# Patient Record
Sex: FEMALE | Race: WHITE | NOT HISPANIC OR LATINO | Employment: UNEMPLOYED
[De-identification: names, ages, dates, MRNs, and addresses within clinical notes are randomized per-mention and may not be internally consistent; named-entity substitution may affect disease eponyms.]

---

## 2017-01-26 ENCOUNTER — RECORDS - HEALTHEAST (OUTPATIENT)
Dept: ADMINISTRATIVE | Facility: OTHER | Age: 42
End: 2017-01-26

## 2017-03-23 ENCOUNTER — RECORDS - HEALTHEAST (OUTPATIENT)
Dept: ADMINISTRATIVE | Facility: OTHER | Age: 42
End: 2017-03-23

## 2017-04-17 ENCOUNTER — RECORDS - HEALTHEAST (OUTPATIENT)
Dept: ADMINISTRATIVE | Facility: OTHER | Age: 42
End: 2017-04-17

## 2017-05-03 ENCOUNTER — RECORDS - HEALTHEAST (OUTPATIENT)
Dept: ADMINISTRATIVE | Facility: OTHER | Age: 42
End: 2017-05-03

## 2017-05-06 ENCOUNTER — RECORDS - HEALTHEAST (OUTPATIENT)
Dept: ADMINISTRATIVE | Facility: OTHER | Age: 42
End: 2017-05-06

## 2017-05-24 ENCOUNTER — RECORDS - HEALTHEAST (OUTPATIENT)
Dept: ADMINISTRATIVE | Facility: OTHER | Age: 42
End: 2017-05-24

## 2017-05-24 DIAGNOSIS — Z53.9 ERRONEOUS ENCOUNTER--DISREGARD: Primary | ICD-10-CM

## 2017-07-28 ENCOUNTER — RECORDS - HEALTHEAST (OUTPATIENT)
Dept: ADMINISTRATIVE | Facility: OTHER | Age: 42
End: 2017-07-28

## 2017-08-07 ENCOUNTER — RECORDS - HEALTHEAST (OUTPATIENT)
Dept: ADMINISTRATIVE | Facility: OTHER | Age: 42
End: 2017-08-07

## 2017-09-15 ENCOUNTER — RECORDS - HEALTHEAST (OUTPATIENT)
Dept: ADMINISTRATIVE | Facility: OTHER | Age: 42
End: 2017-09-15

## 2017-09-15 ENCOUNTER — OFFICE VISIT - HEALTHEAST (OUTPATIENT)
Dept: ALLERGY | Facility: CLINIC | Age: 42
End: 2017-09-15

## 2017-09-15 DIAGNOSIS — J30.89 ALLERGIC RHINITIS DUE TO OTHER ALLERGEN: ICD-10-CM

## 2017-09-15 DIAGNOSIS — J45.30 MILD PERSISTENT ASTHMA WITHOUT COMPLICATION: ICD-10-CM

## 2017-09-15 ASSESSMENT — MIFFLIN-ST. JEOR: SCORE: 1664.2

## 2017-09-18 ENCOUNTER — COMMUNICATION - HEALTHEAST (OUTPATIENT)
Dept: LAB | Facility: CLINIC | Age: 42
End: 2017-09-18

## 2017-09-20 ENCOUNTER — COMMUNICATION - HEALTHEAST (OUTPATIENT)
Dept: ADMINISTRATIVE | Facility: CLINIC | Age: 42
End: 2017-09-20

## 2017-09-21 ENCOUNTER — AMBULATORY - HEALTHEAST (OUTPATIENT)
Dept: ALLERGY | Facility: CLINIC | Age: 42
End: 2017-09-21

## 2017-09-21 DIAGNOSIS — J45.30 MILD PERSISTENT ASTHMA WITHOUT COMPLICATION: ICD-10-CM

## 2017-09-25 ENCOUNTER — AMBULATORY - HEALTHEAST (OUTPATIENT)
Dept: ALLERGY | Facility: CLINIC | Age: 42
End: 2017-09-25

## 2017-09-25 DIAGNOSIS — J45.30 MILD PERSISTENT ASTHMA WITHOUT COMPLICATION: ICD-10-CM

## 2017-09-26 ENCOUNTER — AMBULATORY - HEALTHEAST (OUTPATIENT)
Dept: ALLERGY | Facility: CLINIC | Age: 42
End: 2017-09-26

## 2017-09-26 DIAGNOSIS — J45.30 MILD PERSISTENT ASTHMA WITHOUT COMPLICATION: ICD-10-CM

## 2017-09-28 ENCOUNTER — COMMUNICATION - HEALTHEAST (OUTPATIENT)
Dept: ALLERGY | Facility: CLINIC | Age: 42
End: 2017-09-28

## 2017-09-28 ENCOUNTER — AMBULATORY - HEALTHEAST (OUTPATIENT)
Dept: ALLERGY | Facility: CLINIC | Age: 42
End: 2017-09-28

## 2017-09-28 DIAGNOSIS — J30.9 ALLERGIC RHINITIS: ICD-10-CM

## 2017-09-28 DIAGNOSIS — J45.30 MILD PERSISTENT ASTHMA WITHOUT COMPLICATION: ICD-10-CM

## 2017-09-29 ENCOUNTER — AMBULATORY - HEALTHEAST (OUTPATIENT)
Dept: ALLERGY | Facility: CLINIC | Age: 42
End: 2017-09-29

## 2017-09-29 DIAGNOSIS — J30.1 ALLERGIC RHINITIS DUE TO POLLEN, UNSPECIFIED RHINITIS SEASONALITY: ICD-10-CM

## 2017-10-02 ENCOUNTER — RECORDS - HEALTHEAST (OUTPATIENT)
Dept: ADMINISTRATIVE | Facility: OTHER | Age: 42
End: 2017-10-02

## 2017-10-06 ENCOUNTER — AMBULATORY - HEALTHEAST (OUTPATIENT)
Dept: ALLERGY | Facility: CLINIC | Age: 42
End: 2017-10-06

## 2017-10-06 DIAGNOSIS — J30.1 ALLERGIC RHINITIS DUE TO POLLEN, UNSPECIFIED CHRONICITY, UNSPECIFIED SEASONALITY: ICD-10-CM

## 2017-10-13 ENCOUNTER — AMBULATORY - HEALTHEAST (OUTPATIENT)
Dept: ALLERGY | Facility: CLINIC | Age: 42
End: 2017-10-13

## 2017-10-13 DIAGNOSIS — J30.1 ALLERGIC RHINITIS DUE TO POLLEN, UNSPECIFIED CHRONICITY, UNSPECIFIED SEASONALITY: ICD-10-CM

## 2017-10-20 ENCOUNTER — AMBULATORY - HEALTHEAST (OUTPATIENT)
Dept: ALLERGY | Facility: CLINIC | Age: 42
End: 2017-10-20

## 2017-10-20 DIAGNOSIS — J30.1 ALLERGIC RHINITIS DUE TO POLLEN, UNSPECIFIED CHRONICITY, UNSPECIFIED SEASONALITY: ICD-10-CM

## 2017-11-03 ENCOUNTER — AMBULATORY - HEALTHEAST (OUTPATIENT)
Dept: ALLERGY | Facility: CLINIC | Age: 42
End: 2017-11-03

## 2017-11-03 DIAGNOSIS — J30.9 ALLERGIC RHINITIS, UNSPECIFIED CHRONICITY, UNSPECIFIED SEASONALITY, UNSPECIFIED TRIGGER: ICD-10-CM

## 2017-11-17 ENCOUNTER — AMBULATORY - HEALTHEAST (OUTPATIENT)
Dept: ALLERGY | Facility: CLINIC | Age: 42
End: 2017-11-17

## 2017-11-17 DIAGNOSIS — J30.9 ALLERGIC RHINITIS, UNSPECIFIED CHRONICITY, UNSPECIFIED SEASONALITY, UNSPECIFIED TRIGGER: ICD-10-CM

## 2017-11-27 ENCOUNTER — AMBULATORY - HEALTHEAST (OUTPATIENT)
Dept: ALLERGY | Facility: CLINIC | Age: 42
End: 2017-11-27

## 2017-11-27 DIAGNOSIS — J30.9 ALLERGIC RHINITIS, UNSPECIFIED CHRONICITY, UNSPECIFIED SEASONALITY, UNSPECIFIED TRIGGER: ICD-10-CM

## 2017-12-04 ENCOUNTER — RECORDS - HEALTHEAST (OUTPATIENT)
Dept: ADMINISTRATIVE | Facility: OTHER | Age: 42
End: 2017-12-04

## 2017-12-05 ENCOUNTER — OFFICE VISIT - HEALTHEAST (OUTPATIENT)
Dept: ALLERGY | Facility: CLINIC | Age: 42
End: 2017-12-05

## 2017-12-05 DIAGNOSIS — J30.9 ALLERGIC RHINITIS, UNSPECIFIED CHRONICITY, UNSPECIFIED SEASONALITY, UNSPECIFIED TRIGGER: ICD-10-CM

## 2017-12-11 ENCOUNTER — COMMUNICATION - HEALTHEAST (OUTPATIENT)
Dept: ALLERGY | Facility: CLINIC | Age: 42
End: 2017-12-11

## 2017-12-11 DIAGNOSIS — J45.30 MILD PERSISTENT ASTHMA WITHOUT COMPLICATION: ICD-10-CM

## 2017-12-19 ENCOUNTER — AMBULATORY - HEALTHEAST (OUTPATIENT)
Dept: ALLERGY | Facility: CLINIC | Age: 42
End: 2017-12-19

## 2017-12-19 DIAGNOSIS — J30.9 ALLERGIC RHINITIS, UNSPECIFIED CHRONICITY, UNSPECIFIED SEASONALITY, UNSPECIFIED TRIGGER: ICD-10-CM

## 2018-01-05 ENCOUNTER — AMBULATORY - HEALTHEAST (OUTPATIENT)
Dept: ALLERGY | Facility: CLINIC | Age: 43
End: 2018-01-05

## 2018-01-05 DIAGNOSIS — J30.2 CHRONIC SEASONAL ALLERGIC RHINITIS DUE TO OTHER ALLERGEN: ICD-10-CM

## 2018-01-20 ENCOUNTER — RECORDS - HEALTHEAST (OUTPATIENT)
Dept: ADMINISTRATIVE | Facility: OTHER | Age: 43
End: 2018-01-20

## 2018-01-25 ENCOUNTER — RECORDS - HEALTHEAST (OUTPATIENT)
Dept: ADMINISTRATIVE | Facility: OTHER | Age: 43
End: 2018-01-25

## 2018-01-29 ENCOUNTER — AMBULATORY - HEALTHEAST (OUTPATIENT)
Dept: ALLERGY | Facility: CLINIC | Age: 43
End: 2018-01-29

## 2018-01-29 DIAGNOSIS — J30.2 CHRONIC SEASONAL ALLERGIC RHINITIS DUE TO OTHER ALLERGEN: ICD-10-CM

## 2018-02-02 ENCOUNTER — AMBULATORY - HEALTHEAST (OUTPATIENT)
Dept: ALLERGY | Facility: CLINIC | Age: 43
End: 2018-02-02

## 2018-02-02 DIAGNOSIS — J30.2 CHRONIC SEASONAL ALLERGIC RHINITIS DUE TO OTHER ALLERGEN: ICD-10-CM

## 2018-02-05 ENCOUNTER — AMBULATORY - HEALTHEAST (OUTPATIENT)
Dept: ALLERGY | Facility: CLINIC | Age: 43
End: 2018-02-05

## 2018-02-05 DIAGNOSIS — J30.2 CHRONIC SEASONAL ALLERGIC RHINITIS DUE TO OTHER ALLERGEN: ICD-10-CM

## 2018-02-09 ENCOUNTER — AMBULATORY - HEALTHEAST (OUTPATIENT)
Dept: ALLERGY | Facility: CLINIC | Age: 43
End: 2018-02-09

## 2018-02-09 DIAGNOSIS — J30.2 CHRONIC SEASONAL ALLERGIC RHINITIS DUE TO OTHER ALLERGEN: ICD-10-CM

## 2018-02-13 ENCOUNTER — AMBULATORY - HEALTHEAST (OUTPATIENT)
Dept: ALLERGY | Facility: CLINIC | Age: 43
End: 2018-02-13

## 2018-02-13 DIAGNOSIS — J30.2 CHRONIC SEASONAL ALLERGIC RHINITIS DUE TO OTHER ALLERGEN: ICD-10-CM

## 2018-02-20 ENCOUNTER — OFFICE VISIT - HEALTHEAST (OUTPATIENT)
Dept: ALLERGY | Facility: CLINIC | Age: 43
End: 2018-02-20

## 2018-02-20 ENCOUNTER — AMBULATORY - HEALTHEAST (OUTPATIENT)
Dept: ALLERGY | Facility: CLINIC | Age: 43
End: 2018-02-20

## 2018-02-20 DIAGNOSIS — J30.2 CHRONIC SEASONAL ALLERGIC RHINITIS DUE TO OTHER ALLERGEN: ICD-10-CM

## 2018-02-20 DIAGNOSIS — J45.30 MILD PERSISTENT ASTHMA WITHOUT COMPLICATION: ICD-10-CM

## 2018-03-05 ENCOUNTER — AMBULATORY - HEALTHEAST (OUTPATIENT)
Dept: ALLERGY | Facility: CLINIC | Age: 43
End: 2018-03-05

## 2018-03-05 DIAGNOSIS — J30.2 CHRONIC SEASONAL ALLERGIC RHINITIS DUE TO OTHER ALLERGEN: ICD-10-CM

## 2018-03-12 ENCOUNTER — AMBULATORY - HEALTHEAST (OUTPATIENT)
Dept: ALLERGY | Facility: CLINIC | Age: 43
End: 2018-03-12

## 2018-03-12 DIAGNOSIS — J30.2 CHRONIC SEASONAL ALLERGIC RHINITIS DUE TO OTHER ALLERGEN: ICD-10-CM

## 2018-03-20 ENCOUNTER — AMBULATORY - HEALTHEAST (OUTPATIENT)
Dept: ALLERGY | Facility: CLINIC | Age: 43
End: 2018-03-20

## 2018-03-20 DIAGNOSIS — J30.2 CHRONIC SEASONAL ALLERGIC RHINITIS DUE TO OTHER ALLERGEN: ICD-10-CM

## 2018-04-03 ENCOUNTER — AMBULATORY - HEALTHEAST (OUTPATIENT)
Dept: ALLERGY | Facility: CLINIC | Age: 43
End: 2018-04-03

## 2018-04-03 DIAGNOSIS — J30.2 CHRONIC SEASONAL ALLERGIC RHINITIS DUE TO OTHER ALLERGEN: ICD-10-CM

## 2018-04-09 ENCOUNTER — AMBULATORY - HEALTHEAST (OUTPATIENT)
Dept: ALLERGY | Facility: CLINIC | Age: 43
End: 2018-04-09

## 2018-04-09 DIAGNOSIS — J30.2 CHRONIC SEASONAL ALLERGIC RHINITIS DUE TO OTHER ALLERGEN: ICD-10-CM

## 2018-04-20 ENCOUNTER — OFFICE VISIT - HEALTHEAST (OUTPATIENT)
Dept: ALLERGY | Facility: CLINIC | Age: 43
End: 2018-04-20

## 2018-04-20 DIAGNOSIS — T50.905A ADVERSE EFFECT OF DRUG, INITIAL ENCOUNTER: ICD-10-CM

## 2018-04-20 DIAGNOSIS — J30.2 CHRONIC SEASONAL ALLERGIC RHINITIS DUE TO OTHER ALLERGEN: ICD-10-CM

## 2018-04-20 DIAGNOSIS — J38.3 VOCAL CORD DYSFUNCTION: ICD-10-CM

## 2018-04-20 DIAGNOSIS — J45.20 MILD INTERMITTENT ASTHMA WITHOUT COMPLICATION: ICD-10-CM

## 2018-04-20 ASSESSMENT — MIFFLIN-ST. JEOR: SCORE: 1652.87

## 2018-04-30 ENCOUNTER — AMBULATORY - HEALTHEAST (OUTPATIENT)
Dept: ALLERGY | Facility: CLINIC | Age: 43
End: 2018-04-30

## 2018-04-30 DIAGNOSIS — J30.2 CHRONIC SEASONAL ALLERGIC RHINITIS DUE TO OTHER ALLERGEN: ICD-10-CM

## 2018-05-02 ENCOUNTER — COMMUNICATION - HEALTHEAST (OUTPATIENT)
Dept: ADMINISTRATIVE | Facility: CLINIC | Age: 43
End: 2018-05-02

## 2018-05-11 ENCOUNTER — AMBULATORY - HEALTHEAST (OUTPATIENT)
Dept: ALLERGY | Facility: CLINIC | Age: 43
End: 2018-05-11

## 2018-05-11 DIAGNOSIS — J30.2 CHRONIC SEASONAL ALLERGIC RHINITIS DUE TO OTHER ALLERGEN: ICD-10-CM

## 2018-05-14 ENCOUNTER — AMBULATORY - HEALTHEAST (OUTPATIENT)
Dept: ALLERGY | Facility: CLINIC | Age: 43
End: 2018-05-14

## 2018-05-14 DIAGNOSIS — J30.2 CHRONIC SEASONAL ALLERGIC RHINITIS DUE TO OTHER ALLERGEN: ICD-10-CM

## 2018-05-15 ENCOUNTER — RECORDS - HEALTHEAST (OUTPATIENT)
Dept: ADMINISTRATIVE | Facility: OTHER | Age: 43
End: 2018-05-15

## 2018-05-15 ENCOUNTER — TRANSFERRED RECORDS (OUTPATIENT)
Dept: HEALTH INFORMATION MANAGEMENT | Facility: CLINIC | Age: 43
End: 2018-05-15

## 2018-05-21 ENCOUNTER — AMBULATORY - HEALTHEAST (OUTPATIENT)
Dept: ALLERGY | Facility: CLINIC | Age: 43
End: 2018-05-21

## 2018-05-21 DIAGNOSIS — J30.2 CHRONIC SEASONAL ALLERGIC RHINITIS DUE TO OTHER ALLERGEN: ICD-10-CM

## 2018-05-31 ENCOUNTER — AMBULATORY - HEALTHEAST (OUTPATIENT)
Dept: ALLERGY | Facility: CLINIC | Age: 43
End: 2018-05-31

## 2018-05-31 ENCOUNTER — COMMUNICATION - HEALTHEAST (OUTPATIENT)
Dept: ADMINISTRATIVE | Facility: CLINIC | Age: 43
End: 2018-05-31

## 2018-05-31 DIAGNOSIS — J45.30 MILD PERSISTENT ASTHMA WITHOUT COMPLICATION: ICD-10-CM

## 2018-06-04 ENCOUNTER — AMBULATORY - HEALTHEAST (OUTPATIENT)
Dept: ALLERGY | Facility: CLINIC | Age: 43
End: 2018-06-04

## 2018-06-04 DIAGNOSIS — J30.2 CHRONIC SEASONAL ALLERGIC RHINITIS DUE TO OTHER ALLERGEN: ICD-10-CM

## 2018-06-18 ENCOUNTER — AMBULATORY - HEALTHEAST (OUTPATIENT)
Dept: ALLERGY | Facility: CLINIC | Age: 43
End: 2018-06-18

## 2018-06-25 ENCOUNTER — COMMUNICATION - HEALTHEAST (OUTPATIENT)
Dept: TELEHEALTH | Facility: CLINIC | Age: 43
End: 2018-06-25

## 2018-06-25 ENCOUNTER — OFFICE VISIT - HEALTHEAST (OUTPATIENT)
Dept: ALLERGY | Facility: CLINIC | Age: 43
End: 2018-06-25

## 2018-06-25 DIAGNOSIS — J30.2 CHRONIC SEASONAL ALLERGIC RHINITIS DUE TO OTHER ALLERGEN: ICD-10-CM

## 2018-06-25 DIAGNOSIS — J45.30 MILD PERSISTENT ASTHMA WITHOUT COMPLICATION: ICD-10-CM

## 2018-06-25 ASSESSMENT — MIFFLIN-ST. JEOR: SCORE: 1648.33

## 2018-07-30 ENCOUNTER — AMBULATORY - HEALTHEAST (OUTPATIENT)
Dept: ALLERGY | Facility: CLINIC | Age: 43
End: 2018-07-30

## 2018-07-30 DIAGNOSIS — J30.2 CHRONIC SEASONAL ALLERGIC RHINITIS DUE TO OTHER ALLERGEN: ICD-10-CM

## 2018-08-13 ENCOUNTER — AMBULATORY - HEALTHEAST (OUTPATIENT)
Dept: ALLERGY | Facility: CLINIC | Age: 43
End: 2018-08-13

## 2018-08-13 DIAGNOSIS — J30.2 CHRONIC SEASONAL ALLERGIC RHINITIS DUE TO OTHER ALLERGEN: ICD-10-CM

## 2018-08-23 ENCOUNTER — TRANSFERRED RECORDS (OUTPATIENT)
Dept: HEALTH INFORMATION MANAGEMENT | Facility: CLINIC | Age: 43
End: 2018-08-23
Payer: COMMERCIAL

## 2018-08-24 ENCOUNTER — COMMUNICATION - HEALTHEAST (OUTPATIENT)
Dept: ALLERGY | Facility: CLINIC | Age: 43
End: 2018-08-24

## 2018-08-24 DIAGNOSIS — J45.30 MILD PERSISTENT ASTHMA WITHOUT COMPLICATION: ICD-10-CM

## 2018-09-14 ENCOUNTER — AMBULATORY - HEALTHEAST (OUTPATIENT)
Dept: ALLERGY | Facility: CLINIC | Age: 43
End: 2018-09-14

## 2018-09-14 DIAGNOSIS — J30.2 CHRONIC SEASONAL ALLERGIC RHINITIS DUE TO OTHER ALLERGEN: ICD-10-CM

## 2018-09-24 ENCOUNTER — AMBULATORY - HEALTHEAST (OUTPATIENT)
Dept: ALLERGY | Facility: CLINIC | Age: 43
End: 2018-09-24

## 2018-09-24 DIAGNOSIS — J30.2 CHRONIC SEASONAL ALLERGIC RHINITIS DUE TO OTHER ALLERGEN: ICD-10-CM

## 2018-10-05 ENCOUNTER — AMBULATORY - HEALTHEAST (OUTPATIENT)
Dept: ALLERGY | Facility: CLINIC | Age: 43
End: 2018-10-05

## 2018-10-05 DIAGNOSIS — J30.89 SEASONAL ALLERGIC RHINITIS DUE TO OTHER ALLERGIC TRIGGER: ICD-10-CM

## 2018-10-09 ENCOUNTER — AMBULATORY - HEALTHEAST (OUTPATIENT)
Dept: ALLERGY | Facility: CLINIC | Age: 43
End: 2018-10-09

## 2018-10-09 DIAGNOSIS — J30.89 SEASONAL ALLERGIC RHINITIS DUE TO OTHER ALLERGIC TRIGGER: ICD-10-CM

## 2018-11-05 ENCOUNTER — COMMUNICATION - HEALTHEAST (OUTPATIENT)
Dept: ALLERGY | Facility: CLINIC | Age: 43
End: 2018-11-05

## 2018-11-05 DIAGNOSIS — J45.30 MILD PERSISTENT ASTHMA WITHOUT COMPLICATION: ICD-10-CM

## 2018-11-16 ENCOUNTER — AMBULATORY - HEALTHEAST (OUTPATIENT)
Dept: ALLERGY | Facility: CLINIC | Age: 43
End: 2018-11-16

## 2018-11-16 DIAGNOSIS — J30.89 SEASONAL ALLERGIC RHINITIS DUE TO OTHER ALLERGIC TRIGGER: ICD-10-CM

## 2018-11-26 ENCOUNTER — AMBULATORY - HEALTHEAST (OUTPATIENT)
Dept: ALLERGY | Facility: CLINIC | Age: 43
End: 2018-11-26

## 2018-11-26 DIAGNOSIS — J30.89 SEASONAL ALLERGIC RHINITIS DUE TO OTHER ALLERGIC TRIGGER: ICD-10-CM

## 2018-12-21 ENCOUNTER — COMMUNICATION - HEALTHEAST (OUTPATIENT)
Dept: ALLERGY | Facility: CLINIC | Age: 43
End: 2018-12-21

## 2018-12-21 ENCOUNTER — AMBULATORY - HEALTHEAST (OUTPATIENT)
Dept: ALLERGY | Facility: CLINIC | Age: 43
End: 2018-12-21

## 2018-12-21 DIAGNOSIS — J30.89 SEASONAL ALLERGIC RHINITIS DUE TO OTHER ALLERGIC TRIGGER: ICD-10-CM

## 2018-12-28 ENCOUNTER — AMBULATORY - HEALTHEAST (OUTPATIENT)
Dept: ALLERGY | Facility: CLINIC | Age: 43
End: 2018-12-28

## 2018-12-28 DIAGNOSIS — J30.89 NON-SEASONAL ALLERGIC RHINITIS DUE TO FUNGAL SPORES: ICD-10-CM

## 2019-01-25 ENCOUNTER — AMBULATORY - HEALTHEAST (OUTPATIENT)
Dept: ALLERGY | Facility: CLINIC | Age: 44
End: 2019-01-25

## 2019-01-25 DIAGNOSIS — J30.89 NON-SEASONAL ALLERGIC RHINITIS DUE TO FUNGAL SPORES: ICD-10-CM

## 2019-01-29 ENCOUNTER — TRANSFERRED RECORDS (OUTPATIENT)
Dept: HEALTH INFORMATION MANAGEMENT | Facility: CLINIC | Age: 44
End: 2019-01-29
Payer: COMMERCIAL

## 2019-02-26 ENCOUNTER — AMBULATORY - HEALTHEAST (OUTPATIENT)
Dept: ALLERGY | Facility: CLINIC | Age: 44
End: 2019-02-26

## 2019-02-26 DIAGNOSIS — J30.89 NON-SEASONAL ALLERGIC RHINITIS DUE TO FUNGAL SPORES: ICD-10-CM

## 2019-03-25 ENCOUNTER — AMBULATORY - HEALTHEAST (OUTPATIENT)
Dept: ALLERGY | Facility: CLINIC | Age: 44
End: 2019-03-25

## 2019-03-25 DIAGNOSIS — J30.2 SEASONAL ALLERGIC RHINITIS DUE TO FUNGAL SPORES: ICD-10-CM

## 2019-04-01 ENCOUNTER — AMBULATORY - HEALTHEAST (OUTPATIENT)
Dept: ALLERGY | Facility: CLINIC | Age: 44
End: 2019-04-01

## 2019-04-01 DIAGNOSIS — J30.2 SEASONAL ALLERGIC RHINITIS DUE TO FUNGAL SPORES: ICD-10-CM

## 2019-04-15 ENCOUNTER — OFFICE VISIT - HEALTHEAST (OUTPATIENT)
Dept: ALLERGY | Facility: CLINIC | Age: 44
End: 2019-04-15

## 2019-04-15 DIAGNOSIS — J45.30 MILD PERSISTENT ASTHMA WITHOUT COMPLICATION: ICD-10-CM

## 2019-04-15 DIAGNOSIS — J30.2 SEASONAL ALLERGIC RHINITIS DUE TO FUNGAL SPORES: ICD-10-CM

## 2019-04-15 DIAGNOSIS — K90.49 FOOD INTOLERANCE IN ADULT: ICD-10-CM

## 2019-04-15 ASSESSMENT — MIFFLIN-ST. JEOR: SCORE: 1684.62

## 2019-04-23 ENCOUNTER — AMBULATORY - HEALTHEAST (OUTPATIENT)
Dept: ALLERGY | Facility: CLINIC | Age: 44
End: 2019-04-23

## 2019-04-23 DIAGNOSIS — J30.2 SEASONAL ALLERGIC RHINITIS DUE TO FUNGAL SPORES: ICD-10-CM

## 2019-04-30 ENCOUNTER — AMBULATORY - HEALTHEAST (OUTPATIENT)
Dept: ALLERGY | Facility: CLINIC | Age: 44
End: 2019-04-30

## 2019-04-30 DIAGNOSIS — J30.2 SEASONAL ALLERGIC RHINITIS DUE TO FUNGAL SPORES: ICD-10-CM

## 2019-05-03 ENCOUNTER — AMBULATORY - HEALTHEAST (OUTPATIENT)
Dept: ALLERGY | Facility: CLINIC | Age: 44
End: 2019-05-03

## 2019-05-03 DIAGNOSIS — J30.2 SEASONAL ALLERGIC RHINITIS DUE TO FUNGAL SPORES: ICD-10-CM

## 2019-05-20 ENCOUNTER — AMBULATORY - HEALTHEAST (OUTPATIENT)
Dept: ALLERGY | Facility: CLINIC | Age: 44
End: 2019-05-20

## 2019-05-20 DIAGNOSIS — J30.2 SEASONAL ALLERGIC RHINITIS DUE TO FUNGAL SPORES: ICD-10-CM

## 2019-06-07 ENCOUNTER — AMBULATORY - HEALTHEAST (OUTPATIENT)
Dept: ALLERGY | Facility: CLINIC | Age: 44
End: 2019-06-07

## 2019-06-07 DIAGNOSIS — Z51.6 DESENSITIZATION TO ALLERGENS: ICD-10-CM

## 2019-06-24 ENCOUNTER — RECORDS - HEALTHEAST (OUTPATIENT)
Dept: ADMINISTRATIVE | Facility: OTHER | Age: 44
End: 2019-06-24

## 2019-06-24 LAB
PATH REPORT.COMMENTS IMP SPEC: NORMAL
PATH REPORT.FINAL DX SPEC: NORMAL
PATH REPORT.RELEVANT HX SPEC: NORMAL
RESULT FLAG (HE HISTORICAL CONVERSION): NORMAL

## 2019-06-28 ENCOUNTER — AMBULATORY - HEALTHEAST (OUTPATIENT)
Dept: ALLERGY | Facility: CLINIC | Age: 44
End: 2019-06-28

## 2019-06-28 DIAGNOSIS — Z51.6 DESENSITIZATION TO ALLERGENS: ICD-10-CM

## 2019-07-15 ENCOUNTER — AMBULATORY - HEALTHEAST (OUTPATIENT)
Dept: ALLERGY | Facility: CLINIC | Age: 44
End: 2019-07-15

## 2019-07-15 DIAGNOSIS — Z51.6 DESENSITIZATION TO ALLERGENS: ICD-10-CM

## 2019-08-05 ENCOUNTER — AMBULATORY - HEALTHEAST (OUTPATIENT)
Dept: ALLERGY | Facility: CLINIC | Age: 44
End: 2019-08-05

## 2019-08-05 DIAGNOSIS — J30.2 SEASONAL ALLERGIC RHINITIS DUE TO FUNGAL SPORES: ICD-10-CM

## 2019-08-26 ENCOUNTER — AMBULATORY - HEALTHEAST (OUTPATIENT)
Dept: ALLERGY | Facility: CLINIC | Age: 44
End: 2019-08-26

## 2019-08-26 DIAGNOSIS — J30.2 SEASONAL ALLERGIC RHINITIS DUE TO FUNGAL SPORES: ICD-10-CM

## 2019-09-17 ENCOUNTER — AMBULATORY - HEALTHEAST (OUTPATIENT)
Dept: ALLERGY | Facility: CLINIC | Age: 44
End: 2019-09-17

## 2019-09-17 DIAGNOSIS — J30.2 SEASONAL ALLERGIC RHINITIS DUE TO FUNGAL SPORES: ICD-10-CM

## 2019-10-08 ENCOUNTER — AMBULATORY - HEALTHEAST (OUTPATIENT)
Dept: ALLERGY | Facility: CLINIC | Age: 44
End: 2019-10-08

## 2019-10-08 ENCOUNTER — COMMUNICATION - HEALTHEAST (OUTPATIENT)
Dept: ALLERGY | Facility: CLINIC | Age: 44
End: 2019-10-08

## 2019-10-08 DIAGNOSIS — J30.2 SEASONAL ALLERGIC RHINITIS DUE TO FUNGAL SPORES: ICD-10-CM

## 2019-10-10 ENCOUNTER — AMBULATORY - HEALTHEAST (OUTPATIENT)
Dept: ALLERGY | Facility: CLINIC | Age: 44
End: 2019-10-10

## 2019-10-10 DIAGNOSIS — J30.2 SEASONAL ALLERGIC RHINITIS DUE TO FUNGAL SPORES: ICD-10-CM

## 2019-10-22 ENCOUNTER — OFFICE VISIT - HEALTHEAST (OUTPATIENT)
Dept: ALLERGY | Facility: CLINIC | Age: 44
End: 2019-10-22

## 2019-10-22 DIAGNOSIS — J30.2 SEASONAL ALLERGIC RHINITIS DUE TO FUNGAL SPORES: ICD-10-CM

## 2019-10-22 ASSESSMENT — MIFFLIN-ST. JEOR: SCORE: 1684.62

## 2019-10-30 ENCOUNTER — TRANSFERRED RECORDS (OUTPATIENT)
Dept: HEALTH INFORMATION MANAGEMENT | Facility: CLINIC | Age: 44
End: 2019-10-30

## 2019-11-11 ENCOUNTER — COMMUNICATION - HEALTHEAST (OUTPATIENT)
Dept: ALLERGY | Facility: CLINIC | Age: 44
End: 2019-11-11

## 2019-11-11 DIAGNOSIS — J45.30 MILD PERSISTENT ASTHMA WITHOUT COMPLICATION: ICD-10-CM

## 2019-11-15 ENCOUNTER — AMBULATORY - HEALTHEAST (OUTPATIENT)
Dept: ALLERGY | Facility: CLINIC | Age: 44
End: 2019-11-15

## 2019-11-15 DIAGNOSIS — J30.2 SEASONAL ALLERGIC RHINITIS DUE TO FUNGAL SPORES: ICD-10-CM

## 2019-12-03 ENCOUNTER — TELEPHONE (OUTPATIENT)
Dept: GASTROENTEROLOGY | Facility: CLINIC | Age: 44
End: 2019-12-03

## 2019-12-03 DIAGNOSIS — K75.81 NASH (NONALCOHOLIC STEATOHEPATITIS): ICD-10-CM

## 2019-12-03 DIAGNOSIS — K70.31 ALCOHOLIC CIRRHOSIS OF LIVER WITH ASCITES (H): Primary | ICD-10-CM

## 2019-12-06 NOTE — TELEPHONE ENCOUNTER
RECORDS RECEIVED FROM: External   DATE RECEIVED: 12.31.19   NOTES STATUS DETAILS   OFFICE NOTE from referring provider N/A    OFFICE NOTES from other specialists [facility] 3.19.18 Dr. Luna  10.9.15 Dr. Marsh   DISCHARGE SUMMARY from hospital N/A    MEDICATION LIST [facility] Every[facility]    LIVER BIOSPY (IF APPLICABLE)      PATHOLOGY REPORTS  N/A    IMAGING     ENDOSCOPY (IF AVAILABLE) N/A    COLONOSCOPY (IF AVAILABLE) [facility] 6.27.19   ULTRASOUND LIVER N/A    CT OF ABDOMEN Care Everywhere 5.8.19 [place]  5.3.18 [facility]   MRI OF LIVER [facility] Everywhere 1.9.17, 03.16.2018 [facility]   FIBROSCAN, US ELASTOGRAPHY, FIBROSIS SCAN, MR ELASTOGRAPHY [facility] Every[facility] 12.19.16 [facility] US elastrography   LABS     HEPATIC PANEL (LIVER PANEL) [facility] 5.2.19   BASIC METABOLIC PANEL [facility] 8.8.19   COMPLETE METABOLIC PANEL N/A    COMPLETE BLOOD COUNT (CBC) [facility] 5.1.19   INTERNATIONAL NORMALIZED RATIO (INR) N/A    HEPATITIS C ANTIBODY N/A    HEPATITIS C VIRAL LOAD/PCR N/A    HEPATITIS C GENOTYPE N/A    HEPATITIS B SURFACE ANTIGEN N/A    HEPATITIS B SURFACE ANTIBODY N/A    HEPATITIS B DNA QUANT LEVEL N/A    HEPATITIS B CORE ANTIBODY N/A      12.6.19  11:44 AM  Requested imaging from [facility] [place] and [facility].    Action    Action Taken 12.13.2019 Still MRI from [facility] 01.09.2017 Called  garo at [phone] spoke to Savi tyson states she will push over the images. Images received.

## 2019-12-13 ENCOUNTER — AMBULATORY - HEALTHEAST (OUTPATIENT)
Dept: ALLERGY | Facility: CLINIC | Age: 44
End: 2019-12-13

## 2019-12-13 DIAGNOSIS — J30.2 SEASONAL ALLERGIC RHINITIS DUE TO FUNGAL SPORES: ICD-10-CM

## 2019-12-27 ENCOUNTER — TELEPHONE (OUTPATIENT)
Dept: GASTROENTEROLOGY | Facility: CLINIC | Age: 44
End: 2019-12-27

## 2019-12-27 NOTE — TELEPHONE ENCOUNTER
Tried calling pt to confirm appt. Pt does not want to get voicemail messages therefore no voicemail was left.  Monica Buitrago CMA  12/27/2019 11:31 AM

## 2019-12-30 ENCOUNTER — AMBULATORY - HEALTHEAST (OUTPATIENT)
Dept: ALLERGY | Facility: CLINIC | Age: 44
End: 2019-12-30

## 2019-12-30 DIAGNOSIS — J30.2 SEASONAL ALLERGIC RHINITIS DUE TO FUNGAL SPORES: ICD-10-CM

## 2019-12-31 ENCOUNTER — TRANSFERRED RECORDS (OUTPATIENT)
Dept: HEALTH INFORMATION MANAGEMENT | Facility: CLINIC | Age: 44
End: 2019-12-31

## 2019-12-31 ENCOUNTER — PRE VISIT (OUTPATIENT)
Dept: GASTROENTEROLOGY | Facility: CLINIC | Age: 44
End: 2019-12-31

## 2020-01-30 DIAGNOSIS — K75.81 NASH (NONALCOHOLIC STEATOHEPATITIS): Primary | ICD-10-CM

## 2020-02-03 ENCOUNTER — AMBULATORY - HEALTHEAST (OUTPATIENT)
Dept: ALLERGY | Facility: CLINIC | Age: 45
End: 2020-02-03

## 2020-02-03 DIAGNOSIS — J30.2 SEASONAL ALLERGIC RHINITIS DUE TO FUNGAL SPORES: ICD-10-CM

## 2020-02-10 ENCOUNTER — AMBULATORY - HEALTHEAST (OUTPATIENT)
Dept: ALLERGY | Facility: CLINIC | Age: 45
End: 2020-02-10

## 2020-02-10 DIAGNOSIS — J30.2 SEASONAL ALLERGIC RHINITIS DUE TO FUNGAL SPORES: ICD-10-CM

## 2020-02-18 ENCOUNTER — AMBULATORY - HEALTHEAST (OUTPATIENT)
Dept: ALLERGY | Facility: CLINIC | Age: 45
End: 2020-02-18

## 2020-02-18 DIAGNOSIS — J30.2 SEASONAL ALLERGIC RHINITIS DUE TO FUNGAL SPORES: ICD-10-CM

## 2020-03-11 ENCOUNTER — TRANSFERRED RECORDS (OUTPATIENT)
Dept: HEALTH INFORMATION MANAGEMENT | Facility: CLINIC | Age: 45
End: 2020-03-11
Payer: COMMERCIAL

## 2020-03-16 ENCOUNTER — COMMUNICATION - HEALTHEAST (OUTPATIENT)
Dept: ALLERGY | Facility: CLINIC | Age: 45
End: 2020-03-16

## 2020-03-26 ENCOUNTER — TELEPHONE (OUTPATIENT)
Dept: GASTROENTEROLOGY | Facility: CLINIC | Age: 45
End: 2020-03-26

## 2020-03-26 NOTE — TELEPHONE ENCOUNTER
[facility] Call Center    Phone Message    May a detailed message be left on voicemail: yes     Reason for Call: Other: pt calling to see if she can keep the appt in april or not. Please call the pt back as soon as possible.      Action Taken: Message routed to:  [facility] Hepatology    Travel Screening: Not Applicable                                                                       166.8

## 2020-03-30 NOTE — TELEPHONE ENCOUNTER
[facility] Call Center    Phone Message    May a detailed message be left on voicemail: yes     Reason for Call: Other: Radiology scheduler calling stating that Radiologist cancelled 4/1 ultrasound abdomen for 4/1 prior to telephone visit with Dr. Cristobal. Caller questioning if this was a miscommunication. Please advise.     Action Taken: Message routed to:  [facility] HEPATOLOGY    Travel Screening: Not Applicable

## 2020-03-31 ENCOUNTER — TELEPHONE (OUTPATIENT)
Dept: GASTROENTEROLOGY | Facility: CLINIC | Age: 45
End: 2020-03-31

## 2020-03-31 NOTE — TELEPHONE ENCOUNTER
Called patient in an attempt to change visit from telephone visit to a video visit. Patient gave verbal consent for a video visit. She downloaded the [email or web address] Touchpoint Application on her phone. Would like text message link sent to [phone].       Alethea Williamson CMA    3/31/2020 4:07 PM

## 2020-03-31 NOTE — TELEPHONE ENCOUNTER
Ultrasound will not be rescheduled.   Radiologist approved ultrasound scheduled 4/1.      Samantha OLMEDO LPN

## 2020-04-01 ENCOUNTER — ANCILLARY PROCEDURE (OUTPATIENT)
Dept: ULTRASOUND IMAGING | Facility: CLINIC | Age: 45
End: 2020-04-01
Attending: INTERNAL MEDICINE
Payer: COMMERCIAL

## 2020-04-01 ENCOUNTER — OFFICE VISIT (OUTPATIENT)
Dept: GASTROENTEROLOGY | Facility: CLINIC | Age: 45
End: 2020-04-01
Attending: INTERNAL MEDICINE
Payer: COMMERCIAL

## 2020-04-01 VITALS — HEART RATE: 66 BPM | TEMPERATURE: 98.2 F | SYSTOLIC BLOOD PRESSURE: 113 MMHG | DIASTOLIC BLOOD PRESSURE: 79 MMHG

## 2020-04-01 DIAGNOSIS — K75.81 NASH (NONALCOHOLIC STEATOHEPATITIS): ICD-10-CM

## 2020-04-01 DIAGNOSIS — K75.81 NASH (NONALCOHOLIC STEATOHEPATITIS): Primary | ICD-10-CM

## 2020-04-01 LAB
ALBUMIN SERPL-MCNC: 3.9 G/DL (ref 3.4–5)
ALP SERPL-CCNC: 82 U/L (ref 40–150)
ALT SERPL W P-5'-P-CCNC: 36 U/L (ref 0–50)
ANION GAP SERPL CALCULATED.3IONS-SCNC: 7 MMOL/L (ref 3–14)
AST SERPL W P-5'-P-CCNC: 24 U/L (ref 0–45)
BILIRUB DIRECT SERPL-MCNC: 0.1 MG/DL (ref 0–0.2)
BILIRUB SERPL-MCNC: 0.6 MG/DL (ref 0.2–1.3)
BUN SERPL-MCNC: 11 MG/DL (ref 7–30)
CALCIUM SERPL-MCNC: 9.2 MG/DL (ref 8.5–10.1)
CHLORIDE SERPL-SCNC: 105 MMOL/L (ref 94–109)
CO2 SERPL-SCNC: 24 MMOL/L (ref 20–32)
CREAT SERPL-MCNC: 0.73 MG/DL (ref 0.52–1.04)
ERYTHROCYTE [DISTWIDTH] IN BLOOD BY AUTOMATED COUNT: 13.4 % (ref 10–15)
GFR SERPL CREATININE-BSD FRML MDRD: >90 ML/MIN/{1.73_M2}
GLUCOSE SERPL-MCNC: 119 MG/DL (ref 70–99)
HCT VFR BLD AUTO: 47.3 % (ref 35–47)
HGB BLD-MCNC: 15.8 G/DL (ref 11.7–15.7)
INR PPP: 0.93 (ref 0.86–1.14)
MCH RBC QN AUTO: 28.3 PG (ref 26.5–33)
MCHC RBC AUTO-ENTMCNC: 33.4 G/DL (ref 31.5–36.5)
MCV RBC AUTO: 85 FL (ref 78–100)
PLATELET # BLD AUTO: 402 10E9/L (ref 150–450)
POTASSIUM SERPL-SCNC: 4.2 MMOL/L (ref 3.4–5.3)
PROT SERPL-MCNC: 8.1 G/DL (ref 6.8–8.8)
RBC # BLD AUTO: 5.59 10E12/L (ref 3.8–5.2)
SODIUM SERPL-SCNC: 136 MMOL/L (ref 133–144)
WBC # BLD AUTO: 9.3 10E9/L (ref 4–11)

## 2020-04-01 PROCEDURE — G0463 HOSPITAL OUTPT CLINIC VISIT: HCPCS | Mod: ZF

## 2020-04-01 PROCEDURE — 80076 HEPATIC FUNCTION PANEL: CPT | Performed by: INTERNAL MEDICINE

## 2020-04-01 PROCEDURE — 85610 PROTHROMBIN TIME: CPT | Performed by: INTERNAL MEDICINE

## 2020-04-01 PROCEDURE — 80048 BASIC METABOLIC PNL TOTAL CA: CPT | Performed by: INTERNAL MEDICINE

## 2020-04-01 PROCEDURE — 36415 COLL VENOUS BLD VENIPUNCTURE: CPT | Performed by: INTERNAL MEDICINE

## 2020-04-01 PROCEDURE — 85027 COMPLETE CBC AUTOMATED: CPT | Performed by: INTERNAL MEDICINE

## 2020-04-01 RX ORDER — INFLIXIMAB 100 MG/10ML
10 INJECTION, POWDER, LYOPHILIZED, FOR SOLUTION INTRAVENOUS
COMMUNITY
Start: 2020-02-13 | End: 2022-03-03

## 2020-04-01 RX ORDER — EPINEPHRINE 0.3 MG/.3ML
INJECTION SUBCUTANEOUS
COMMUNITY
Start: 2019-05-21 | End: 2022-03-10

## 2020-04-01 RX ORDER — ATENOLOL 50 MG/1
50 TABLET ORAL DAILY
COMMUNITY
Start: 2020-02-14

## 2020-04-01 RX ORDER — SPIRONOLACTONE AND HYDROCHLOROTHIAZIDE 25; 25 MG/1; MG/1
1 TABLET ORAL DAILY
COMMUNITY
Start: 2020-03-11

## 2020-04-01 RX ORDER — FLUCONAZOLE 100 MG/1
TABLET ORAL PRN
COMMUNITY
Start: 2020-03-24 | End: 2022-03-03

## 2020-04-01 RX ORDER — FLUCONAZOLE 150 MG/1
TABLET ORAL PRN
COMMUNITY
Start: 2020-03-24 | End: 2022-03-03

## 2020-04-01 RX ORDER — CLINDAMYCIN HCL 300 MG
CAPSULE ORAL PRN
Refills: 0 | COMMUNITY
Start: 2019-09-21 | End: 2021-07-16

## 2020-04-01 RX ORDER — DULOXETIN HYDROCHLORIDE 20 MG/1
40 CAPSULE, DELAYED RELEASE ORAL DAILY
COMMUNITY
Start: 2019-05-31 | End: 2022-03-03

## 2020-04-01 RX ORDER — AMLODIPINE BESYLATE 5 MG/1
5 TABLET ORAL EVERY OTHER DAY
COMMUNITY
Start: 2020-03-11 | End: 2022-03-03

## 2020-04-01 RX ORDER — TOBRAMYCIN 3 MG/ML
1-2 SOLUTION/ DROPS OPHTHALMIC 2 TIMES DAILY
COMMUNITY
Start: 2019-05-01 | End: 2023-10-05

## 2020-04-01 RX ORDER — PREDNISONE 5 MG/1
5 TABLET ORAL PRN
COMMUNITY
Start: 2020-03-19 | End: 2022-03-03

## 2020-04-01 RX ORDER — LIDOCAINE 50 MG/G
PATCH TOPICAL PRN
Refills: 1 | COMMUNITY
Start: 2019-06-26

## 2020-04-01 RX ORDER — TRIAMCINOLONE ACETONIDE 1 MG/G
1 CREAM TOPICAL PRN
COMMUNITY
Start: 2018-11-21 | End: 2022-03-10

## 2020-04-01 RX ORDER — ADALIMUMAB 40MG/0.4ML
40 KIT SUBCUTANEOUS WEEKLY
COMMUNITY
Start: 2020-02-11 | End: 2021-07-16

## 2020-04-01 RX ORDER — AZELASTINE 1 MG/ML
1 SPRAY, METERED NASAL PRN
COMMUNITY
Start: 2019-05-22 | End: 2022-03-10

## 2020-04-01 RX ORDER — OLOPATADINE HYDROCHLORIDE 7 MG/ML
2 SOLUTION OPHTHALMIC 2 TIMES DAILY
COMMUNITY
Start: 2019-10-07 | End: 2022-10-31

## 2020-04-01 RX ORDER — PSYLLIUM HUSK 0.4 G
4000 CAPSULE ORAL DAILY
COMMUNITY
End: 2022-03-10

## 2020-04-01 RX ORDER — CIPROFLOXACIN 500 MG/1
500 TABLET, FILM COATED ORAL PRN
COMMUNITY
Start: 2019-04-09 | End: 2022-03-03

## 2020-04-01 RX ORDER — MUPIROCIN 20 MG/G
1 OINTMENT TOPICAL PRN
COMMUNITY
Start: 2019-10-23 | End: 2022-03-10

## 2020-04-01 RX ORDER — PENICILLIN V POTASSIUM 500 MG/1
TABLET, FILM COATED ORAL PRN
Refills: 0 | COMMUNITY
Start: 2019-08-14 | End: 2022-03-03

## 2020-04-01 RX ORDER — LOSARTAN POTASSIUM 100 MG/1
100 TABLET ORAL DAILY
COMMUNITY
Start: 2020-03-19

## 2020-04-01 RX ORDER — OXYCODONE HYDROCHLORIDE 10 MG/1
5-10 TABLET ORAL 3 TIMES DAILY PRN
COMMUNITY
Start: 2019-01-24 | End: 2022-03-03

## 2020-04-01 RX ORDER — DULOXETIN HYDROCHLORIDE 60 MG/1
60 CAPSULE, DELAYED RELEASE ORAL AT BEDTIME
COMMUNITY
Start: 2018-06-25 | End: 2022-03-03

## 2020-04-01 RX ORDER — METRONIDAZOLE 7.5 MG/G
1 LOTION TOPICAL 2 TIMES DAILY
COMMUNITY
Start: 2019-12-31 | End: 2022-03-10

## 2020-04-01 RX ORDER — GLYCERIN/PROPYLENE GLYCOL 0.6 %-0.6%
DROPPERETTE, SINGLE-USE DROP DISPENSER OPHTHALMIC (EYE) PRN
COMMUNITY

## 2020-04-01 RX ORDER — ACETAMINOPHEN AND CODEINE PHOSPHATE 120; 12 MG/5ML; MG/5ML
0.35 SOLUTION ORAL
COMMUNITY
Start: 2019-11-18 | End: 2024-07-24

## 2020-04-01 SDOH — HEALTH STABILITY: MENTAL HEALTH: HOW OFTEN DO YOU HAVE A DRINK CONTAINING ALCOHOL?: MONTHLY OR LESS

## 2020-04-01 ASSESSMENT — PAIN SCALES - GENERAL: PAINLEVEL: EXTREME PAIN (8)

## 2020-04-01 NOTE — LETTER
4/1/2020      RE: Laxmi Ya  [address] [place] [id number]         [facility]    Hepatology New Patient Visit    Referring provider:  Artur Marsh    CHIEF COMPLAINT AND REASON FOR VISIT:  COLON.      HISTORY OF PRESENT ILLNESS:  Ms. Ya is a 45-year-old white female whom I have historically seen at [facility] in [place].  She at that time came to us for abnormal liver function tests and that went back all the way to 2010.  At that time, we tested for etiology of her liver disease, and this came back negative for viral hepatitis.  She did not have a significant history of alcohol use.  Imaging showed that she has diffuse hepatic steatosis and there were also smaller lesions in the liver that were read as indeterminate.  This was thought to be fatty sparing areas.  We did do an MR elastography at that time and it came back normal.  Ms. Ya also was having a rheumatological diagnosis at that time which has since been labeled as seronegative rheumatoid arthritis and she was seen in [place] for that.  During that period, she also had issues with constipation and was seen in the [facility] [place] who also commented on her COLON history and advised her to do some lifestyle modifications.  The patient also claims that she has problems with gluten-containing food and that she is intolerant of it.  Please note that she had celiac biopsies taken from the duodenum in 04/2014 and this turned out to be negative.  Her markers were also negative.      Today she is telling me that she did not gain any more weight, although she carries the same abdominal obesity.  She complains of hyperhidrosis or excessive sweating.  She has been on antibiotics with Augmentin for sinus.  The constipation which she went to [place] for since resolved and now she is having regular bowel movements.  As far as her seronegative rheumatoid arthritis is concerned,  she was started on Humira on 08/2018 and has continued that until 2 weeks ago.  Now she is doing Remicade.  Denies any recent infections, has minimal edema of the lower extremities.  She complains of some right upper quadrant abdominal discomfort and also some left lower area discomfort.  Denies any other signs of fluid retention.  Does not have any pruritus.  Denies any nausea or vomiting.  She also denies any fever or chills and does not have any cough, shortness of breath or chest pain.     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 Authorizing Provider   Albuterol Sulfate (PROAIR HFA IN) Inhale into the lungs as needed    Yes Reported, Patient   amLODIPine (NORVASC) 5 MG tablet Take 5 mg by mouth every other day 3/11/20 3/11/21 Yes Reported, Patient   amoxicillin-clavulanate (AUGMENTIN) 875-125 MG tablet 2 times daily 3/27/20  Yes Reported, Patient   Artificial Tear Solution (SOOTHE XP) SOLN as needed   Yes Reported, Patient   atenolol (TENORMIN) 50 MG tablet Take 50 mg by mouth daily 2/14/20  Yes Reported, Patient   azelastine (ASTELIN) 0.1 % nasal spray Spray 1 spray in nostril as needed 5/22/19  Yes Reported, Patient    cholecalciferol (VITAMIN D-1000 MAX ST) 25 MCG (1000 UT) TABS Take 4,000 Units by mouth daily   Yes Reported, Patient   ciprofloxacin (CIPRO) 500 MG tablet Take 500 mg by mouth as needed 4/9/19  Yes Reported, Patient   clindamycin (CLEOCIN) 300 MG capsule as needed 9/21/19  Yes Reported, Patient   docusate sodium (COLACE) 50 MG capsule Take 2 capsules by mouth 2 times daily 6/25/18  Yes Reported, Patient   DULoxetine (CYMBALTA) 20 MG capsule Take 20 mg by mouth 2 times daily 5/31/19  Yes Reported, Patient   DULoxetine (CYMBALTA) 60 MG capsule Take 60 mg by mouth At Bedtime 6/25/18  Yes Reported, Patient   EPINEPHrine (ANY BX GENERIC EQUIV) 0.3 MG/0.3ML injection 2-pack Inject 0.3 ml intramuscularly once as needed for up to 1 dose, into thigh as directed for and/or potential for an allergic reaction 5/21/19  Yes Reported, Patient   Fexofenadine HCl (ALLEGRA PO) Take 180 mg by mouth daily    Yes Reported, Patient   fluconazole (DIFLUCAN) 100 MG tablet as needed 3/24/20  Yes Reported, Patient   fluconazole (DIFLUCAN) 150 MG tablet as needed 3/24/20  Yes Reported, Patient   fluticasone (FLONASE) 50 MCG/ACT nasal spray Spray 2 sprays into both nostrils as needed    Yes Reported, Patient   fluticasone-vilanterol (BREO ELLIPTA) 200-25 MCG/INH inhaler as needed 2/27/19  Yes Reported, Patient   HUMIRA *CF* PEN 40 MG/0.4ML pen kit Take 40 mg by mouth once a week 2/11/20  Yes Reported, Patient   inFLIXimab (REMICADE) 100 MG injection Inject 3 mg/kg into the vein every 2 months 2/13/20  Yes Reported, Patient   lidocaine (LIDODERM) 5 % patch as needed 6/26/19  Yes Reported, Patient   losartan (COZAAR) 100 MG tablet Take 100 mg by mouth daily 3/19/20  Yes Reported, Patient   metroNIDAZOLE (METROLOTION) 0.75 % external lotion Apply 1 Application topically 2 times daily 12/31/19  Yes Reported, Patient   Montelukast Sodium (SINGULAIR PO) Take 10 mg by mouth At Bedtime    Yes Reported, Patient   mupirocin (BACTROBAN) 2 % external  ointment Apply 1 Application topically as needed 10/23/19  Yes Reported, Patient   norethindrone (MICRONOR) 0.35 MG tablet Take 0.35 mg by mouth 11/18/19 11/17/20 Yes Reported, Patient   olopatadine (PAZEO) 0.7 % ophthalmic solution Apply 2 Units to eye 2 times daily 10/7/19  Yes Reported, Patient   OMEPRAZOLE PO Take 40 mg by mouth 2 times daily    Yes Reported, Patient   oxyCODONE IR (ROXICODONE) 10 MG tablet Take 5-10 mg by mouth 3 times daily as needed 1/24/19  Yes Reported, Patient   penicillin V (VEETID) 500 MG tablet as needed 8/14/19  Yes Reported, Patient   predniSONE (DELTASONE) 5 MG tablet Take 5 mg by mouth as needed 3/19/20  Yes Reported, Patient   spironolactone-HCTZ (ALDACTAZIDE) 25-25 MG tablet Take 1 tablet by mouth daily 3/11/20  Yes Reported, Patient   tobramycin (TOBREX) 0.3 % ophthalmic solution 1-2 drops 2 times daily 5/1/19  Yes Reported, Patient   tobramycin-dexamethasone (TOBRADEX) 0.3-0.1 % ophthalmic ointment 0.25 inches At Bedtime 7/22/19  Yes Reported, Patient   triamcinolone (KENALOG) 0.1 % external cream Apply 1 Application topically as needed 11/21/18  Yes Reported, Patient   azithromycin (ZITHROMAX) 500 MG tablet Take 500 mg by mouth daily    Reported, Patient   CLONAZEPAM PO     Reported, Patient   Cyclobenzaprine HCl (FLEXERIL PO)     Reported, Patient   Docusate Sodium (COLACE PO)     Reported, Patient   ibuprofen (IBU) 800 MG tablet Take 800 mg by mouth every 8 hours as needed    Reported, Patient   Levonorgestrel-Ethinyl Estrad (LEVORA 0.15/30, 28, PO)     Reported, Patient   Mometasone Furo-Formoterol Fum (DULERA IN)     Reported, Patient   NAPROXEN PO     Reported, Patient   Probiotic Product (CBLPath PO)     Reported, Patient       Allergies  Allergies   Allergen Reactions     Codeine      Other reaction(s): Gastrointestinal, GI intolerance, Vomiting  Vomiting       Gadolinium Dizziness and Nausea     Hydrocodone-Acetaminophen Nausea and Vomiting     Other  reaction(s): GI intolerance     Iodine      abstract     Sulfasalazine      Other reaction(s): GI intolerance  Has tried and had nausa.     Tramadol Nausea and Vomiting     Other reaction(s): Gastrointestinal, Other (see comments)  Nausea and vomiting   unknown       Gluten Meal Other (See Comments) and Rash     Other reaction(s): Gastrointestinal, GI intolerance  Dizziness, tired, rash, stomach cramps, thrush, mouth sores  Dizziness, tired, rash, stomach cramps, thrush, mouth sores         Family hx Social hx   Family History   Problem Relation Age of Onset     Hypertension Father         abstract     Cancer Paternal Aunt         gallbladder cancer abstract      Social History     Tobacco Use     Smoking status: Never Smoker     Smokeless tobacco: Never Used   Substance Use Topics     Alcohol use: Yes     Frequency: Monthly or less     Drug use: No          REVIEW OF SYSTEMS:  Ms. Ya states that she has frequent sinusitis.  She has allergies and is currently on antibiotics and she is doing quite well with that.  Otherwise, denies any fever.  Does not have any significant fatigue.  Denies any headaches or seizures.  Denies any cough, shortness of breath or chest pain.  Has no easy bruising and does not have any diabetes, although she was previously labeled as prediabetes.  No thyroid issues.  She has no psychiatric problems according to her.  She has seronegative rheumatoid arthritis which she is currently on Remicade.  She denies any eye, hearing or skin issues.  Otherwise, a comprehensive review of symptoms was noncontributory.       Examination  /79 (BP Location: Right arm, Patient Position: Sitting, Cuff Size: Adult Large)   Pulse 66   Temp 98.2  F (36.8  C) (Oral)   There is no height or weight on file to calculate BMI.    Gen- well, NAD, A+Ox3, normal color  Eye- EOMI  ENT- MMM, normal oropharynx  Lym- no palpable lymphadenopathy  CVS- S1, S2 normal, no added sounds, RRR  RS- CTA  Abd- Obese.  Healed surgical scars.  Extr- pulses good, no EMMY  MS- hands normal- no clubbing  Neuro- A+Ox3, no asterixis  Skin- no rash or jaundice  Psych- normal mood    Laboratory  Lab Results   Component Value Date     04/01/2020    POTASSIUM 4.2 04/01/2020    CHLORIDE 105 04/01/2020    CO2 24 04/01/2020    BUN 11 04/01/2020    CR 0.73 04/01/2020       Lab Results   Component Value Date    BILITOTAL 0.6 04/01/2020    ALT 36 04/01/2020    AST 24 04/01/2020    ALKPHOS 82 04/01/2020       Lab Results   Component Value Date    ALBUMIN 3.9 04/01/2020    PROTTOTAL 8.1 04/01/2020        Lab Results   Component Value Date    WBC 9.3 04/01/2020    HGB 15.8 04/01/2020    MCV 85 04/01/2020     04/01/2020       Lab Results   Component Value Date    INR 0.93 04/01/2020         Radiology    ASSESSMENT AND PLAN:  COLON.  Ms. Ya has COOLN considering that she has on imaging diffuse fatty infiltration of the liver and had abnormal LFTs from 2010.  Today's labs came back normal.  We will continue monitoring that.  We again advised her that she needs to do lifestyle modifications and above all weight loss.  Please note that the patient has significant abdominal obesity.  She said she will do that, and if she succeeds, that is good, but otherwise, we will send her to our medical weight management group.  She will need also to be physically more active and this might help that.  We did do in the past a number of years ago MR elastography at [facility] which showed normal liver.  When patient comes back here in 6 months, we can repeat the MR elastography and see if there is any change regarding that.  There are no indirect signs of fibrosis.  Her platelets are normal.  Regarding her other significant medical issues which include hypertension which the patient is on multiple medications, we advised her to follow with her doctors and make sure that she controls that as that can lead to complications.  She will follow with her PCP and  other doctors.  Otherwise, she will be seen here in 6 months.  We did talk with her before about getting a liver biopsy.  We will readdress that at her next visit.      This was a 1-hour visit of which more than 50% was spent in explaining to the patient what our plan of care was.  We answered all of her questions.     Cheryl Cristobal MD      cc:   Artur Marsh MD   [facility]   [address] [place]  [address]     Cheryl Cristobal MD  Hepatology  [facility]

## 2020-04-01 NOTE — PROGRESS NOTES
[facility]    Hepatology New Patient Visit    Referring provider:  Artur Marsh    CHIEF COMPLAINT AND REASON FOR VISIT:  COLON.      HISTORY OF PRESENT ILLNESS:  Ms. Ya is a 45-year-old white female whom I have historically seen at [facility].  She at that time came to us for abnormal liver function tests and that went back all the way to 2010.  At that time, we tested for etiology of her liver disease, and this came back negative for viral hepatitis.  She did not have a significant history of alcohol use.  Imaging showed that she has diffuse hepatic steatosis and there were also smaller lesions in the liver that were read as indeterminate.  This was thought to be fatty sparing areas.  We did do an MR elastography at that time and it came back normal.  Ms. Ya also was having a rheumatological diagnosis at that time which has since been labeled as seronegative rheumatoid arthritis and she was seen in [place] for that.  During that period, she also had issues with constipation and was seen in the [facility] at [place] who also commented on her COLON history and advised her to do some lifestyle modifications.  The patient also claims that she has problems with gluten-containing food and that she is intolerant of it.  Please note that she had celiac biopsies taken from the duodenum in 04/2014 and this turned out to be negative.  Her markers were also negative.      Today she is telling me that she did not gain any more weight, although she carries the same abdominal obesity.  She complains of hyperhidrosis or excessive sweating.  She has been on antibiotics with Augmentin for sinus.  The constipation which she went to [place] for since resolved and now she is having regular bowel movements.  As far as her seronegative rheumatoid arthritis is concerned, she was started on Humira on 08/2018 and has continued that until 2 weeks ago.   Now she is doing Remicade.  Denies any recent infections, has minimal edema of the lower extremities.  She complains of some right upper quadrant abdominal discomfort and also some left lower area discomfort.  Denies any other signs of fluid retention.  Does not have any pruritus.  Denies any nausea or vomiting.  She also denies any fever or chills and does not have any cough, shortness of breath or chest pain.     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 Authorizing Provider   Albuterol Sulfate (PROAIR HFA IN) Inhale into the lungs as needed    Yes Reported, Patient   amLODIPine (NORVASC) 5 MG tablet Take 5 mg by mouth every other day 3/11/20 3/11/21 Yes Reported, Patient   amoxicillin-clavulanate (AUGMENTIN) 875-125 MG tablet 2 times daily 3/27/20  Yes Reported, Patient   Artificial Tear Solution (SOOTHE XP) SOLN as needed   Yes Reported, Patient   atenolol (TENORMIN) 50 MG tablet Take 50 mg by mouth daily 2/14/20  Yes Reported, Patient   azelastine (ASTELIN) 0.1 % nasal spray Spray 1 spray in nostril as needed 5/22/19  Yes Reported, Patient   cholecalciferol (VITAMIN D-1000 MAX ST) 25 MCG (1000 UT) TABS Take 4,000 Units by  mouth daily   Yes Reported, Patient   ciprofloxacin (CIPRO) 500 MG tablet Take 500 mg by mouth as needed 4/9/19  Yes Reported, Patient   clindamycin (CLEOCIN) 300 MG capsule as needed 9/21/19  Yes Reported, Patient   docusate sodium (COLACE) 50 MG capsule Take 2 capsules by mouth 2 times daily 6/25/18  Yes Reported, Patient   DULoxetine (CYMBALTA) 20 MG capsule Take 20 mg by mouth 2 times daily 5/31/19  Yes Reported, Patient   DULoxetine (CYMBALTA) 60 MG capsule Take 60 mg by mouth At Bedtime 6/25/18  Yes Reported, Patient   EPINEPHrine (ANY BX GENERIC EQUIV) 0.3 MG/0.3ML injection 2-pack Inject 0.3 ml intramuscularly once as needed for up to 1 dose, into thigh as directed for and/or potential for an allergic reaction 5/21/19  Yes Reported, Patient   Fexofenadine HCl (ALLEGRA PO) Take 180 mg by mouth daily    Yes Reported, Patient   fluconazole (DIFLUCAN) 100 MG tablet as needed 3/24/20  Yes Reported, Patient   fluconazole (DIFLUCAN) 150 MG tablet as needed 3/24/20  Yes Reported, Patient   fluticasone (FLONASE) 50 MCG/ACT nasal spray Spray 2 sprays into both nostrils as needed    Yes Reported, Patient   fluticasone-vilanterol (BREO ELLIPTA) 200-25 MCG/INH inhaler as needed 2/27/19  Yes Reported, Patient   HUMIRA *CF* PEN 40 MG/0.4ML pen kit Take 40 mg by mouth once a week 2/11/20  Yes Reported, Patient   inFLIXimab (REMICADE) 100 MG injection Inject 3 mg/kg into the vein every 2 months 2/13/20  Yes Reported, Patient   lidocaine (LIDODERM) 5 % patch as needed 6/26/19  Yes Reported, Patient   losartan (COZAAR) 100 MG tablet Take 100 mg by mouth daily 3/19/20  Yes Reported, Patient   metroNIDAZOLE (METROLOTION) 0.75 % external lotion Apply 1 Application topically 2 times daily 12/31/19  Yes Reported, Patient   Montelukast Sodium (SINGULAIR PO) Take 10 mg by mouth At Bedtime    Yes Reported, Patient   mupirocin (BACTROBAN) 2 % external ointment Apply 1 Application topically as needed 10/23/19  Yes Reported, Patient    norethindrone (MICRONOR) 0.35 MG tablet Take 0.35 mg by mouth 11/18/19 11/17/20 Yes Reported, Patient   olopatadine (PAZEO) 0.7 % ophthalmic solution Apply 2 Units to eye 2 times daily 10/7/19  Yes Reported, Patient   OMEPRAZOLE PO Take 40 mg by mouth 2 times daily    Yes Reported, Patient   oxyCODONE IR (ROXICODONE) 10 MG tablet Take 5-10 mg by mouth 3 times daily as needed 1/24/19  Yes Reported, Patient   penicillin V (VEETID) 500 MG tablet as needed 8/14/19  Yes Reported, Patient   predniSONE (DELTASONE) 5 MG tablet Take 5 mg by mouth as needed 3/19/20  Yes Reported, Patient   spironolactone-HCTZ (ALDACTAZIDE) 25-25 MG tablet Take 1 tablet by mouth daily 3/11/20  Yes Reported, Patient   tobramycin (TOBREX) 0.3 % ophthalmic solution 1-2 drops 2 times daily 5/1/19  Yes Reported, Patient   tobramycin-dexamethasone (TOBRADEX) 0.3-0.1 % ophthalmic ointment 0.25 inches At Bedtime 7/22/19  Yes Reported, Patient   triamcinolone (KENALOG) 0.1 % external cream Apply 1 Application topically as needed 11/21/18  Yes Reported, Patient   azithromycin (ZITHROMAX) 500 MG tablet Take 500 mg by mouth daily    Reported, Patient   CLONAZEPAM PO     Reported, Patient   Cyclobenzaprine HCl (FLEXERIL PO)     Reported, Patient   Docusate Sodium (COLACE PO)     Reported, Patient   ibuprofen (IBU) 800 MG tablet Take 800 mg by mouth every 8 hours as needed    Reported, Patient   Levonorgestrel-Ethinyl Estrad (LEVORA 0.15/30, 28, PO)     Reported, Patient   Mometasone Furo-Formoterol Fum (DULERA IN)     Reported, Patient   NAPROXEN PO     Reported, Patient   Probiotic Product ([employer] PO)     Reported, Patient       Allergies  Allergies   Allergen Reactions     Codeine      Other reaction(s): Gastrointestinal, GI intolerance, Vomiting  Vomiting       Gadolinium Dizziness and Nausea     Hydrocodone-Acetaminophen Nausea and Vomiting     Other reaction(s): GI intolerance     Iodine      abstract     Sulfasalazine      Other  reaction(s): GI intolerance  Has tried and had nausa.     Tramadol Nausea and Vomiting     Other reaction(s): Gastrointestinal, Other (see comments)  Nausea and vomiting   unknown       Gluten Meal Other (See Comments) and Rash     Other reaction(s): Gastrointestinal, GI intolerance  Dizziness, tired, rash, stomach cramps, thrush, mouth sores  Dizziness, tired, rash, stomach cramps, thrush, mouth sores         Family hx Social hx   Family History   Problem Relation Age of Onset     Hypertension Father         abstract     Cancer Paternal Aunt         gallbladder cancer abstract      Social History     Tobacco Use     Smoking status: Never Smoker     Smokeless tobacco: Never Used   Substance Use Topics     Alcohol use: Yes     Frequency: Monthly or less     Drug use: No          REVIEW OF SYSTEMS:  Ms. Ya states that she has frequent sinusitis.  She has allergies and is currently on antibiotics and she is doing quite well with that.  Otherwise, denies any fever.  Does not have any significant fatigue.  Denies any headaches or seizures.  Denies any cough, shortness of breath or chest pain.  Has no easy bruising and does not have any diabetes, although she was previously labeled as prediabetes.  No thyroid issues.  She has no psychiatric problems according to her.  She has seronegative rheumatoid arthritis which she is currently on Remicade.  She denies any eye, hearing or skin issues.  Otherwise, a comprehensive review of symptoms was noncontributory.       Examination  /79 (BP Location: Right arm, Patient Position: Sitting, Cuff Size: Adult Large)   Pulse 66   Temp 98.2  F (36.8  C) (Oral)   There is no height or weight on file to calculate BMI.    Gen- well, NAD, A+Ox3, normal color  Eye- EOMI  ENT- MMM, normal oropharynx  Lym- no palpable lymphadenopathy  CVS- S1, S2 normal, no added sounds, RRR  RS- CTA  Abd- Obese. Healed surgical scars.  Extr- pulses good, no EMMY  MS- hands normal- no  clubbing  Neuro- A+Ox3, no asterixis  Skin- no rash or jaundice  Psych- normal mood    Laboratory  Lab Results   Component Value Date     04/01/2020    POTASSIUM 4.2 04/01/2020    CHLORIDE 105 04/01/2020    CO2 24 04/01/2020    BUN 11 04/01/2020    CR 0.73 04/01/2020       Lab Results   Component Value Date    BILITOTAL 0.6 04/01/2020    ALT 36 04/01/2020    AST 24 04/01/2020    ALKPHOS 82 04/01/2020       Lab Results   Component Value Date    ALBUMIN 3.9 04/01/2020    PROTTOTAL 8.1 04/01/2020        Lab Results   Component Value Date    WBC 9.3 04/01/2020    HGB 15.8 04/01/2020    MCV 85 04/01/2020     04/01/2020       Lab Results   Component Value Date    INR 0.93 04/01/2020         Radiology    ASSESSMENT AND PLAN:  COLON.  Ms. Ya has COLON considering that she has on imaging diffuse fatty infiltration of the liver and had abnormal LFTs from 2010.  Today's labs came back normal.  We will continue monitoring that.  We again advised her that she needs to do lifestyle modifications and above all weight loss.  Please note that the patient has significant abdominal obesity.  She said she will do that, and if she succeeds, that is good, but otherwise, we will send her to our medical weight management group.  She will need also to be physically more active and this might help that.  We did do in the past a number of years ago MR elastography at [facility] which showed normal liver.  When patient comes back here in 6 months, we can repeat the MR elastography and see if there is any change regarding that.  There are no indirect signs of fibrosis.  Her platelets are normal.  Regarding her other significant medical issues which include hypertension which the patient is on multiple medications, we advised her to follow with her doctors and make sure that she controls that as that can lead to complications.  She will follow with her PCP and other doctors.  Otherwise, she will be seen here in 6 months.  We did  talk with her before about getting a liver biopsy.  We will readdress that at her next visit.      This was a 1-hour visit of which more than 50% was spent in explaining to the patient what our plan of care was.  We answered all of her questions.      cc:   Artur Marsh MD   [facility]   [address] [place]  [address]     Cheryl Cristobal MD  Hepatology  [facility]

## 2020-04-01 NOTE — NURSING NOTE
Chief Complaint   Patient presents with     New Patient     Abnormal LFT's         /79 (BP Location: Right arm, Patient Position: Sitting, Cuff Size: Adult Large)   Pulse 66   Temp 98.2  F (36.8  C) (Oral)         Alethea Williamson CMA    4/1/2020 11:52 AM

## 2020-04-13 ENCOUNTER — TELEPHONE (OUTPATIENT)
Dept: GASTROENTEROLOGY | Facility: CLINIC | Age: 45
End: 2020-04-13

## 2020-04-13 DIAGNOSIS — K75.81 NASH (NONALCOHOLIC STEATOHEPATITIS): Primary | ICD-10-CM

## 2020-04-13 NOTE — TELEPHONE ENCOUNTER
Patient called stating she would like to know if Dr. Cristobal wants her to get an MRI due to recent abdominal ultrasound showing increase in size of previously identified lesions.           [facility]    Phone Message    May a detailed message be left on voicemail: yes     Reason for Call: Other: Pt calling to see if she can have another telephone visit with Dr Cristobal and needs discuss the US results. Please call to discuss further.     Action Taken: Message routed to:  [facility] ([facility] heptatology    Travel Screening: Not Applicable

## 2020-05-05 ENCOUNTER — COMMUNICATION - HEALTHEAST (OUTPATIENT)
Dept: ALLERGY | Facility: CLINIC | Age: 45
End: 2020-05-05

## 2020-05-07 ENCOUNTER — COMMUNICATION - HEALTHEAST (OUTPATIENT)
Dept: ADMINISTRATIVE | Facility: CLINIC | Age: 45
End: 2020-05-07

## 2020-05-07 ENCOUNTER — COMMUNICATION - HEALTHEAST (OUTPATIENT)
Dept: ALLERGY | Facility: CLINIC | Age: 45
End: 2020-05-07

## 2020-05-11 DIAGNOSIS — K75.81 NASH (NONALCOHOLIC STEATOHEPATITIS): Primary | ICD-10-CM

## 2020-06-30 ENCOUNTER — TRANSFERRED RECORDS (OUTPATIENT)
Dept: HEALTH INFORMATION MANAGEMENT | Facility: CLINIC | Age: 45
End: 2020-06-30
Payer: COMMERCIAL

## 2020-09-30 DIAGNOSIS — K75.81 NASH (NONALCOHOLIC STEATOHEPATITIS): Primary | ICD-10-CM

## 2020-12-14 ENCOUNTER — TELEPHONE (OUTPATIENT)
Dept: GASTROENTEROLOGY | Facility: CLINIC | Age: 45
End: 2020-12-14

## 2020-12-14 NOTE — TELEPHONE ENCOUNTER
Lab orders faxed.    [facility]    Phone Message    May a detailed message be left on voicemail: yes     Reason for Call: Order(s): Other:   Reason for requested: Pt is requesting for their labs to be sent to the [facility] in [place]  Date needed: asap  Provider name: Dr. Cristobal      Action Taken: Message routed to:  [facility] hep    Travel Screening: Not Applicable

## 2020-12-23 ENCOUNTER — VIRTUAL VISIT (OUTPATIENT)
Dept: GASTROENTEROLOGY | Facility: CLINIC | Age: 45
End: 2020-12-23
Attending: INTERNAL MEDICINE
Payer: MEDICARE

## 2020-12-23 DIAGNOSIS — K75.81 NASH (NONALCOHOLIC STEATOHEPATITIS): Primary | ICD-10-CM

## 2020-12-23 PROCEDURE — 99214 OFFICE O/P EST MOD 30 MIN: CPT | Mod: 95 | Performed by: INTERNAL MEDICINE

## 2020-12-23 RX ORDER — OXYBUTYNIN CHLORIDE 5 MG/1
5 TABLET, EXTENDED RELEASE ORAL AT BEDTIME
COMMUNITY
Start: 2020-09-30 | End: 2020-12-29

## 2020-12-23 ASSESSMENT — PAIN SCALES - GENERAL: PAINLEVEL: EXTREME PAIN (9)

## 2020-12-23 NOTE — PROGRESS NOTES
"Please send text to cell phone.     Laxmi Ya is a 45 year old female who is being evaluated via a billable video visit.      The patient has been notified of following:     \"This video visit will be conducted via a call between you and your physician/provider. We have found that certain health care needs can be provided without the need for an in-person physical exam.  This service lets us provide the care you need with a video conversation.  If a prescription is necessary we can send it directly to your pharmacy.  If lab work is needed we can place an order for that and you can then stop by our lab to have the test done at a later time.    Video visits are billed at different rates depending on your insurance coverage.  Please reach out to your insurance provider with any questions.    If during the course of the call the physician/provider feels a video visit is not appropriate, you will not be charged for this service.\"    Patient has given verbal consent for Video visit? Yes  How would you like to obtain your AVS? Mail a copy  If you are dropped from the video visit, the video invite should be resent to: Text to cell phone: [phone]  Will anyone else be joining your video visit? No      [facility]    Hepatology follow-up    CHIEF COMPLAINT/REASON FOR THE VISIT:  Nonalcoholic steatohepatitis.      CONSULTING PHYSICIAN: Artur Marsh MD      SUBJECTIVE:  Mrs. Ya is a 45-year-old female whom I have previously seen at [facility].  She was seen at that time with abnormal liver function tests.  Looking back at that time, her labs were abnormal, all the way to 2010.  At that time, we did a workup for causes and it all came back negative.  But because of her imaging, we diagnosed her with diffuse hepatic steatosis.  There were small lesions which were indeterminate, thought to be fatty sparing areas.  We did an MR elastography and it came " back normal.  Repeated now at the [facility], it is still normal, so no significant fibrosis.      Mrs. Ya was also diagnosed with rheumatoid arthritis and is followed in [place]; in fact, she is on Remicade infusion from 02/2020 and this is helping with her rheumatoid arthritis.  She has also been historically on low-dose prednisone, which she has discontinued since.  Her weight has remained stable.  She did have a hospital admission for diverticulitis.  Otherwise, no edema, no significant abdominal pain.  She has some nausea but no vomiting.  She is moving her bowels at least once a day.  She denies any fever or chills and has been tested twice for COVID, which were both negative.  She otherwise has no other new symptoms.     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 Authorizing Provider   Albuterol Sulfate (PROAIR HFA IN) Inhale into the lungs as needed    Yes Reported, Patient   amLODIPine (NORVASC) 5 MG tablet Take 5 mg by mouth every other day 3/11/20 3/11/21 Yes Reported, Patient   Artificial Tear Solution (SOOTHE XP) SOLN as needed   Yes Reported, Patient   atenolol (TENORMIN) 50 MG tablet Take 50 mg by mouth  daily 2/14/20  Yes Reported, Patient   azelastine (ASTELIN) 0.1 % nasal spray Spray 1 spray in nostril as needed 5/22/19  Yes Reported, Patient   cholecalciferol (VITAMIN D-1000 MAX ST) 25 MCG (1000 UT) TABS Take 4,000 Units by mouth daily   Yes Reported, Patient   clindamycin (CLEOCIN) 300 MG capsule as needed 9/21/19  Yes Reported, Patient   CLONAZEPAM PO    Yes Reported, Patient   diclofenac (VOLTAREN) 1 % topical gel Apply topically 4 times daily as needed 9/9/20  Yes Reported, Patient   docusate sodium (COLACE) 50 MG capsule Take 2 capsules by mouth 2 times daily 6/25/18  Yes Reported, Patient   DULoxetine (CYMBALTA) 20 MG capsule Take 20 mg by mouth 2 times daily 5/31/19  Yes Reported, Patient   DULoxetine (CYMBALTA) 60 MG capsule Take 60 mg by mouth At Bedtime 6/25/18  Yes Reported, Patient   EPINEPHrine (ANY BX GENERIC EQUIV) 0.3 MG/0.3ML injection 2-pack Inject 0.3 ml intramuscularly once as needed for up to 1 dose, into thigh as directed for and/or potential for an allergic reaction 5/21/19  Yes Reported, Patient   Fexofenadine HCl (ALLEGRA PO) Take 180 mg by mouth daily    Yes Reported, Patient   fluconazole (DIFLUCAN) 150 MG tablet as needed 3/24/20  Yes Reported, Patient   fluticasone (FLONASE) 50 MCG/ACT nasal spray Spray 2 sprays into both nostrils as needed    Yes Reported, Patient   fluticasone-vilanterol (BREO ELLIPTA) 200-25 MCG/INH inhaler as needed 2/27/19  Yes Reported, Patient   ibuprofen (IBU) 800 MG tablet Take 800 mg by mouth every 8 hours as needed   Yes Reported, Patient   inFLIXimab (REMICADE) 100 MG injection Inject 3 mg/kg into the vein every 2 months 8 mg dose 2/13/20  Yes Reported, Patient   Levonorgestrel-Ethinyl Estrad (LEVORA 0.15/30, 28, PO)    Yes Reported, Patient   lidocaine (LIDODERM) 5 % patch as needed 6/26/19  Yes Reported, Patient   losartan (COZAAR) 100 MG tablet Take 100 mg by mouth daily 3/19/20  Yes Reported, Patient   metroNIDAZOLE (METROLOTION) 0.75 % external  lotion Apply 1 Application topically 2 times daily 12/31/19  Yes Reported, Patient   Mometasone Furo-Formoterol Fum (DULERA IN)    Yes Reported, Patient   Montelukast Sodium (SINGULAIR PO) Take 10 mg by mouth At Bedtime    Yes Reported, Patient   mupirocin (BACTROBAN) 2 % external ointment Apply 1 Application topically as needed 10/23/19  Yes Reported, Patient   NAPROXEN PO    Yes Reported, Patient   olopatadine (PAZEO) 0.7 % ophthalmic solution Apply 2 Units to eye 2 times daily 10/7/19  Yes Reported, Patient   OMEPRAZOLE PO Take 40 mg by mouth 2 times daily    Yes Reported, Patient   oxybutynin ER (DITROPAN-XL) 5 MG 24 hr tablet Take 5 mg by mouth At Bedtime Take for sweating 9/30/20 12/29/20 Yes Reported, Patient   oxyCODONE IR (ROXICODONE) 10 MG tablet Take 5-10 mg by mouth 3 times daily as needed 1/24/19  Yes Reported, Patient   Probiotic Product ([employer] PO)    Yes Reported, Patient   spironolactone-HCTZ (ALDACTAZIDE) 25-25 MG tablet Take 1 tablet by mouth daily 3/11/20  Yes Reported, Patient   tobramycin (TOBREX) 0.3 % ophthalmic solution 1-2 drops 2 times daily 5/1/19  Yes Reported, Patient   tobramycin-dexamethasone (TOBRADEX) 0.3-0.1 % ophthalmic ointment 0.25 inches At Bedtime 7/22/19  Yes Reported, Patient   triamcinolone (KENALOG) 0.1 % external cream Apply 1 Application topically as needed 11/21/18  Yes Reported, Patient   amoxicillin-clavulanate (AUGMENTIN) 875-125 MG tablet 2 times daily 3/27/20   Reported, Patient   azithromycin (ZITHROMAX) 500 MG tablet Take 500 mg by mouth daily    Reported, Patient   ciprofloxacin (CIPRO) 500 MG tablet Take 500 mg by mouth as needed 4/9/19   Reported, Patient   Cyclobenzaprine HCl (FLEXERIL PO)     Reported, Patient   Docusate Sodium (COLACE PO)     Reported, Patient   fluconazole (DIFLUCAN) 100 MG tablet as needed 3/24/20   Reported, Patient   HUMIRA *CF* PEN 40 MG/0.4ML pen kit Take 40 mg by mouth once a week 2/11/20   Reported, Patient    norethindrone (MICRONOR) 0.35 MG tablet Take 0.35 mg by mouth 11/18/19 11/17/20  Reported, Patient   penicillin V (VEETID) 500 MG tablet as needed 8/14/19   Reported, Patient   predniSONE (DELTASONE) 5 MG tablet Take 5 mg by mouth as needed 3/19/20   Reported, Patient       Allergies  Allergies   Allergen Reactions     Codeine      Other reaction(s): Gastrointestinal, GI intolerance, Vomiting  Vomiting       Gadolinium Dizziness and Nausea     Hydrocodone-Acetaminophen Nausea and Vomiting     Other reaction(s): GI intolerance     Iodine      abstract     Sulfasalazine      Other reaction(s): GI intolerance  Has tried and had nausa.     Tramadol Nausea and Vomiting     Other reaction(s): Gastrointestinal, Other (see comments)  Nausea and vomiting   unknown       Gluten Meal Other (See Comments) and Rash     Other reaction(s): Gastrointestinal, GI intolerance  Dizziness, tired, rash, stomach cramps, thrush, mouth sores  Dizziness, tired, rash, stomach cramps, thrush, mouth sores         Review of systems  A 10-point review of systems was negative    Examination  There were no vitals taken for this visit.  There is no height or weight on file to calculate BMI.    Gen- well, NAD, A+Ox3, normal color  Psych- normal mood    Laboratory  Lab Results   Component Value Date     04/01/2020    POTASSIUM 4.2 04/01/2020    CHLORIDE 105 04/01/2020    CO2 24 04/01/2020    BUN 11 04/01/2020    CR 0.73 04/01/2020       Lab Results   Component Value Date    BILITOTAL 0.6 04/01/2020    ALT 36 04/01/2020    AST 24 04/01/2020    ALKPHOS 82 04/01/2020       Lab Results   Component Value Date    ALBUMIN 3.9 04/01/2020    PROTTOTAL 8.1 04/01/2020        Lab Results   Component Value Date    WBC 9.3 04/01/2020    HGB 15.8 04/01/2020    MCV 85 04/01/2020     04/01/2020       Lab Results   Component Value Date    INR 0.93 04/01/2020       Radiology    ASSESSMENT AND PLAN:  ISAIAH.  Mrs. Ya has COLON.  She had this seen on  imaging and had also abnormal liver function tests which date back to 2010.  Today, she appears to be doing relatively well except for her rheumatoid arthritis, which gives her pain and stiffness in her hands mostly.  She is on Remicade.  Plan will be to repeat her liver function tests.  She did have on MR elastography, which was normal when I saw her at [facility], but she did repeat one at the [facility], which also came back normal.  Otherwise, she is having a lot of issues doing lifestyle modifications as the patient has rheumatoid arthritis and it is difficult for her to exercise, so we will send her to our medical weight management and see if they can help her.  Please note that the patient is 5 feet 6 inches and is 226 pounds.  Otherwise, she will be seen here in 6 months.  For all her other medical issues and health care maintenance, she will follow with her primary care physician.     Cheryl Cristboal MD  Hepatology  [facility]    Video-Visit Details    Type of service:  Video Visit    Video Start Time: 7:50 AM.  Video End Time:   8:18 AM.  Originating Location (pt. Location): Home.    Distant Location (provider location):  [facility] [place]     Platform used for Video Visit: [email or web address].    Cheryl Cristobal MD

## 2020-12-23 NOTE — LETTER
"12/23/2020      RE: Laxmi Ya  [address] [facility] [id number]       Please send text to cell phone.     Laxmi Ya is a 45 year old female who is being evaluated via a billable video visit.      The patient has been notified of following:     \"This video visit will be conducted via a call between you and your physician/provider. We have found that certain health care needs can be provided without the need for an in-person physical exam.  This service lets us provide the care you need with a video conversation.  If a prescription is necessary we can send it directly to your pharmacy.  If lab work is needed we can place an order for that and you can then stop by our lab to have the test done at a later time.    Video visits are billed at different rates depending on your insurance coverage.  Please reach out to your insurance provider with any questions.    If during the course of the call the physician/provider feels a video visit is not appropriate, you will not be charged for this service.\"    Patient has given verbal consent for Video visit? Yes  How would you like to obtain your AVS? Mail a copy  If you are dropped from the video visit, the video invite should be resent to: Text to cell phone: [phone]  Will anyone else be joining your video visit? No      [facility]    Hepatology follow-up    CHIEF COMPLAINT/REASON FOR THE VISIT:  Nonalcoholic steatohepatitis.      CONSULTING PHYSICIAN: Artur Marsh MD      SUBJECTIVE:  Mrs. Ya is a 45-year-old female whom I have previously seen at [facility].  She was seen at that time with abnormal liver function tests.  Looking back at that time, her labs were abnormal, all the way to 2010.  At that time, we did a workup for causes and it all came back negative.  But because of her imaging, we diagnosed her with diffuse hepatic steatosis.  There were small lesions which were " indeterminate, thought to be fatty sparing areas.  We did an MR elastography and it came back normal.  Repeated now at the [facility], it is still normal, so no significant fibrosis.      Mrs. Ya was also diagnosed with rheumatoid arthritis and is followed in [place]; in fact, she is on Remicade infusion from 02/2020 and this is helping with her rheumatoid arthritis.  She has also been historically on low-dose prednisone, which she has discontinued since.  Her weight has remained stable.  She did have a hospital admission for diverticulitis.  Otherwise, no edema, no significant abdominal pain.  She has some nausea but no vomiting.  She is moving her bowels at least once a day.  She denies any fever or chills and has been tested twice for COVID, which were both negative.  She otherwise has no other new symptoms.     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 Authorizing Provider   Albuterol Sulfate (PROAIR HFA IN) Inhale into the lungs as needed    Yes Reported, Patient   amLODIPine (NORVASC) 5 MG tablet Take 5 mg by mouth every other day 3/11/20 3/11/21 Yes Reported, Patient   Artificial Tear Solution (SOOTHE XP) SOLN as  needed   Yes Reported, Patient   atenolol (TENORMIN) 50 MG tablet Take 50 mg by mouth daily 2/14/20  Yes Reported, Patient   azelastine (ASTELIN) 0.1 % nasal spray Spray 1 spray in nostril as needed 5/22/19  Yes Reported, Patient   cholecalciferol (VITAMIN D-1000 MAX ST) 25 MCG (1000 UT) TABS Take 4,000 Units by mouth daily   Yes Reported, Patient   clindamycin (CLEOCIN) 300 MG capsule as needed 9/21/19  Yes Reported, Patient   CLONAZEPAM PO    Yes Reported, Patient   diclofenac (VOLTAREN) 1 % topical gel Apply topically 4 times daily as needed 9/9/20  Yes Reported, Patient   docusate sodium (COLACE) 50 MG capsule Take 2 capsules by mouth 2 times daily 6/25/18  Yes Reported, Patient   DULoxetine (CYMBALTA) 20 MG capsule Take 20 mg by mouth 2 times daily 5/31/19  Yes Reported, Patient   DULoxetine (CYMBALTA) 60 MG capsule Take 60 mg by mouth At Bedtime 6/25/18  Yes Reported, Patient   EPINEPHrine (ANY BX GENERIC EQUIV) 0.3 MG/0.3ML injection 2-pack Inject 0.3 ml intramuscularly once as needed for up to 1 dose, into thigh as directed for and/or potential for an allergic reaction 5/21/19  Yes Reported, Patient   Fexofenadine HCl (ALLEGRA PO) Take 180 mg by mouth daily    Yes Reported, Patient   fluconazole (DIFLUCAN) 150 MG tablet as needed 3/24/20  Yes Reported, Patient   fluticasone (FLONASE) 50 MCG/ACT nasal spray Spray 2 sprays into both nostrils as needed    Yes Reported, Patient   fluticasone-vilanterol (BREO ELLIPTA) 200-25 MCG/INH inhaler as needed 2/27/19  Yes Reported, Patient   ibuprofen (IBU) 800 MG tablet Take 800 mg by mouth every 8 hours as needed   Yes Reported, Patient   inFLIXimab (REMICADE) 100 MG injection Inject 3 mg/kg into the vein every 2 months 8 mg dose 2/13/20  Yes Reported, Patient   Levonorgestrel-Ethinyl Estrad (LEVORA 0.15/30, 28, PO)    Yes Reported, Patient   lidocaine (LIDODERM) 5 % patch as needed 6/26/19  Yes Reported, Patient   losartan (COZAAR) 100 MG tablet Take 100 mg by mouth  daily 3/19/20  Yes Reported, Patient   metroNIDAZOLE (METROLOTION) 0.75 % external lotion Apply 1 Application topically 2 times daily 12/31/19  Yes Reported, Patient   Mometasone Furo-Formoterol Fum (DULERA IN)    Yes Reported, Patient   Montelukast Sodium (SINGULAIR PO) Take 10 mg by mouth At Bedtime    Yes Reported, Patient   mupirocin (BACTROBAN) 2 % external ointment Apply 1 Application topically as needed 10/23/19  Yes Reported, Patient   NAPROXEN PO    Yes Reported, Patient   olopatadine (PAZEO) 0.7 % ophthalmic solution Apply 2 Units to eye 2 times daily 10/7/19  Yes Reported, Patient   OMEPRAZOLE PO Take 40 mg by mouth 2 times daily    Yes Reported, Patient   oxybutynin ER (DITROPAN-XL) 5 MG 24 hr tablet Take 5 mg by mouth At Bedtime Take for sweating 9/30/20 12/29/20 Yes Reported, Patient   oxyCODONE IR (ROXICODONE) 10 MG tablet Take 5-10 mg by mouth 3 times daily as needed 1/24/19  Yes Reported, Patient   Probiotic Product ([employer] PO)    Yes Reported, Patient   spironolactone-HCTZ (ALDACTAZIDE) 25-25 MG tablet Take 1 tablet by mouth daily 3/11/20  Yes Reported, Patient   tobramycin (TOBREX) 0.3 % ophthalmic solution 1-2 drops 2 times daily 5/1/19  Yes Reported, Patient   tobramycin-dexamethasone (TOBRADEX) 0.3-0.1 % ophthalmic ointment 0.25 inches At Bedtime 7/22/19  Yes Reported, Patient   triamcinolone (KENALOG) 0.1 % external cream Apply 1 Application topically as needed 11/21/18  Yes Reported, Patient   amoxicillin-clavulanate (AUGMENTIN) 875-125 MG tablet 2 times daily 3/27/20   Reported, Patient   azithromycin (ZITHROMAX) 500 MG tablet Take 500 mg by mouth daily    Reported, Patient   ciprofloxacin (CIPRO) 500 MG tablet Take 500 mg by mouth as needed 4/9/19   Reported, Patient   Cyclobenzaprine HCl (FLEXERIL PO)     Reported, Patient   Docusate Sodium (COLACE PO)     Reported, Patient   fluconazole (DIFLUCAN) 100 MG tablet as needed 3/24/20   Reported, Patient   HUMIRA *CF* PEN 40  MG/0.4ML pen kit Take 40 mg by mouth once a week 2/11/20   Reported, Patient   norethindrone (MICRONOR) 0.35 MG tablet Take 0.35 mg by mouth 11/18/19 11/17/20  Reported, Patient   penicillin V (VEETID) 500 MG tablet as needed 8/14/19   Reported, Patient   predniSONE (DELTASONE) 5 MG tablet Take 5 mg by mouth as needed 3/19/20   Reported, Patient       Allergies  Allergies   Allergen Reactions     Codeine      Other reaction(s): Gastrointestinal, GI intolerance, Vomiting  Vomiting       Gadolinium Dizziness and Nausea     Hydrocodone-Acetaminophen Nausea and Vomiting     Other reaction(s): GI intolerance     Iodine      abstract     Sulfasalazine      Other reaction(s): GI intolerance  Has tried and had nausa.     Tramadol Nausea and Vomiting     Other reaction(s): Gastrointestinal, Other (see comments)  Nausea and vomiting   unknown       Gluten Meal Other (See Comments) and Rash     Other reaction(s): Gastrointestinal, GI intolerance  Dizziness, tired, rash, stomach cramps, thrush, mouth sores  Dizziness, tired, rash, stomach cramps, thrush, mouth sores         Review of systems  A 10-point review of systems was negative    Examination  There were no vitals taken for this visit.  There is no height or weight on file to calculate BMI.    Gen- well, NAD, A+Ox3, normal color  Psych- normal mood    Laboratory  Lab Results   Component Value Date     04/01/2020    POTASSIUM 4.2 04/01/2020    CHLORIDE 105 04/01/2020    CO2 24 04/01/2020    BUN 11 04/01/2020    CR 0.73 04/01/2020       Lab Results   Component Value Date    BILITOTAL 0.6 04/01/2020    ALT 36 04/01/2020    AST 24 04/01/2020    ALKPHOS 82 04/01/2020       Lab Results   Component Value Date    ALBUMIN 3.9 04/01/2020    PROTTOTAL 8.1 04/01/2020        Lab Results   Component Value Date    WBC 9.3 04/01/2020    HGB 15.8 04/01/2020    MCV 85 04/01/2020     04/01/2020       Lab Results   Component Value Date    INR 0.93 04/01/2020        Radiology    ASSESSMENT AND PLAN:  COLON.  Mrs. Ya has COLON.  She had this seen on imaging and had also abnormal liver function tests which date back to 2010.  Today, she appears to be doing relatively well except for her rheumatoid arthritis, which gives her pain and stiffness in her hands mostly.  She is on Remicade.  Plan will be to repeat her liver function tests.  She did have on MR elastography, which was normal when I saw her at [facility], but she did repeat one at the [facility], which also came back normal.  Otherwise, she is having a lot of issues doing lifestyle modifications as the patient has rheumatoid arthritis and it is difficult for her to exercise, so we will send her to our medical weight management and see if they can help her.  Please note that the patient is 5 feet 6 inches and is 226 pounds.  Otherwise, she will be seen here in 6 months.  For all her other medical issues and health care maintenance, she will follow with her primary care physician.     Cheryl Cristobal MD  Hepatology  [facility]    Video-Visit Details    Type of service:  Video Visit    Video Start Time: 7:50 AM.  Video End Time:   8:18 AM.  Originating Location (pt. Location): Home.    Distant Location (provider location):  [facility] [place]     Platform used for Video Visit: [employer].    MD Cheryl Barrientos MD

## 2020-12-23 NOTE — LETTER
"    12/23/2020         RE: Laxmi Ya  [id number] [facility] [address]        Dear Colleague,    Thank you for referring your patient, Laxmi Ya, to the [facility] [place]. Please see a copy of my visit note below.    Please send text to cell phone.     Laxmi Ya is a 45 year old female who is being evaluated via a billable video visit.      The patient has been notified of following:     \"This video visit will be conducted via a call between you and your physician/provider. We have found that certain health care needs can be provided without the need for an in-person physical exam.  This service lets us provide the care you need with a video conversation.  If a prescription is necessary we can send it directly to your pharmacy.  If lab work is needed we can place an order for that and you can then stop by our lab to have the test done at a later time.    Video visits are billed at different rates depending on your insurance coverage.  Please reach out to your insurance provider with any questions.    If during the course of the call the physician/provider feels a video visit is not appropriate, you will not be charged for this service.\"    Patient has given verbal consent for Video visit? Yes  How would you like to obtain your AVS? Mail a copy  If you are dropped from the video visit, the video invite should be resent to: Text to cell phone: [phone]  Will anyone else be joining your video visit? No      [facility]    Hepatology follow-up    CHIEF COMPLAINT/REASON FOR THE VISIT:  Nonalcoholic steatohepatitis.      CONSULTING PHYSICIAN: Artur Marsh MD      SUBJECTIVE:  Mrs. Ya is a 45-year-old female whom I have previously seen at [facility].  She was seen at that time with abnormal liver function tests.  Looking back at that time, her labs were abnormal, all the way to 2010.  At that time, we " did a workup for causes and it all came back negative.  But because of her imaging, we diagnosed her with diffuse hepatic steatosis.  There were small lesions which were indeterminate, thought to be fatty sparing areas.  We did an MR elastography and it came back normal.  Repeated now at the [facility], it is still normal, so no significant fibrosis.      Mrs. Ya was also diagnosed with rheumatoid arthritis and is followed in [place]; in fact, she is on Remicade infusion from 02/2020 and this is helping with her rheumatoid arthritis.  She has also been historically on low-dose prednisone, which she has discontinued since.  Her weight has remained stable.  She did have a hospital admission for diverticulitis.  Otherwise, no edema, no significant abdominal pain.  She has some nausea but no vomiting.  She is moving her bowels at least once a day.  She denies any fever or chills and has been tested twice for COVID, which were both negative.  She otherwise has no other new symptoms.     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 Authorizing Provider   Albuterol Sulfate (PROAIR HFA IN) Inhale into the lungs as needed    Yes  Reported, Patient   amLODIPine (NORVASC) 5 MG tablet Take 5 mg by mouth every other day 3/11/20 3/11/21 Yes Reported, Patient   Artificial Tear Solution (SOOTHE XP) SOLN as needed   Yes Reported, Patient   atenolol (TENORMIN) 50 MG tablet Take 50 mg by mouth daily 2/14/20  Yes Reported, Patient   azelastine (ASTELIN) 0.1 % nasal spray Spray 1 spray in nostril as needed 5/22/19  Yes Reported, Patient   cholecalciferol (VITAMIN D-1000 MAX ST) 25 MCG (1000 UT) TABS Take 4,000 Units by mouth daily   Yes Reported, Patient   clindamycin (CLEOCIN) 300 MG capsule as needed 9/21/19  Yes Reported, Patient   CLONAZEPAM PO    Yes Reported, Patient   diclofenac (VOLTAREN) 1 % topical gel Apply topically 4 times daily as needed 9/9/20  Yes Reported, Patient   docusate sodium (COLACE) 50 MG capsule Take 2 capsules by mouth 2 times daily 6/25/18  Yes Reported, Patient   DULoxetine (CYMBALTA) 20 MG capsule Take 20 mg by mouth 2 times daily 5/31/19  Yes Reported, Patient   DULoxetine (CYMBALTA) 60 MG capsule Take 60 mg by mouth At Bedtime 6/25/18  Yes Reported, Patient   EPINEPHrine (ANY BX GENERIC EQUIV) 0.3 MG/0.3ML injection 2-pack Inject 0.3 ml intramuscularly once as needed for up to 1 dose, into thigh as directed for and/or potential for an allergic reaction 5/21/19  Yes Reported, Patient   Fexofenadine HCl (ALLEGRA PO) Take 180 mg by mouth daily    Yes Reported, Patient   fluconazole (DIFLUCAN) 150 MG tablet as needed 3/24/20  Yes Reported, Patient   fluticasone (FLONASE) 50 MCG/ACT nasal spray Spray 2 sprays into both nostrils as needed    Yes Reported, Patient   fluticasone-vilanterol (BREO ELLIPTA) 200-25 MCG/INH inhaler as needed 2/27/19  Yes Reported, Patient   ibuprofen (IBU) 800 MG tablet Take 800 mg by mouth every 8 hours as needed   Yes Reported, Patient   inFLIXimab (REMICADE) 100 MG injection Inject 3 mg/kg into the vein every 2 months 8 mg dose 2/13/20  Yes Reported, Patient   Levonorgestrel-Ethinyl Estrad (LEVORA  0.15/30, 28, PO)    Yes Reported, Patient   lidocaine (LIDODERM) 5 % patch as needed 6/26/19  Yes Reported, Patient   losartan (COZAAR) 100 MG tablet Take 100 mg by mouth daily 3/19/20  Yes Reported, Patient   metroNIDAZOLE (METROLOTION) 0.75 % external lotion Apply 1 Application topically 2 times daily 12/31/19  Yes Reported, Patient   Mometasone Furo-Formoterol Fum (DULERA IN)    Yes Reported, Patient   Montelukast Sodium (SINGULAIR PO) Take 10 mg by mouth At Bedtime    Yes Reported, Patient   mupirocin (BACTROBAN) 2 % external ointment Apply 1 Application topically as needed 10/23/19  Yes Reported, Patient   NAPROXEN PO    Yes Reported, Patient   olopatadine (PAZEO) 0.7 % ophthalmic solution Apply 2 Units to eye 2 times daily 10/7/19  Yes Reported, Patient   OMEPRAZOLE PO Take 40 mg by mouth 2 times daily    Yes Reported, Patient   oxybutynin ER (DITROPAN-XL) 5 MG 24 hr tablet Take 5 mg by mouth At Bedtime Take for sweating 9/30/20 12/29/20 Yes Reported, Patient   oxyCODONE IR (ROXICODONE) 10 MG tablet Take 5-10 mg by mouth 3 times daily as needed 1/24/19  Yes Reported, Patient   Probiotic Product (InfoRemate PO)    Yes Reported, Patient   spironolactone-HCTZ (ALDACTAZIDE) 25-25 MG tablet Take 1 tablet by mouth daily 3/11/20  Yes Reported, Patient   tobramycin (TOBREX) 0.3 % ophthalmic solution 1-2 drops 2 times daily 5/1/19  Yes Reported, Patient   tobramycin-dexamethasone (TOBRADEX) 0.3-0.1 % ophthalmic ointment 0.25 inches At Bedtime 7/22/19  Yes Reported, Patient   triamcinolone (KENALOG) 0.1 % external cream Apply 1 Application topically as needed 11/21/18  Yes Reported, Patient   amoxicillin-clavulanate (AUGMENTIN) 875-125 MG tablet 2 times daily 3/27/20   Reported, Patient   azithromycin (ZITHROMAX) 500 MG tablet Take 500 mg by mouth daily    Reported, Patient   ciprofloxacin (CIPRO) 500 MG tablet Take 500 mg by mouth as needed 4/9/19   Reported, Patient   Cyclobenzaprine HCl (FLEXERIL PO)      Reported, Patient   Docusate Sodium (COLACE PO)     Reported, Patient   fluconazole (DIFLUCAN) 100 MG tablet as needed 3/24/20   Reported, Patient   HUMIRA *CF* PEN 40 MG/0.4ML pen kit Take 40 mg by mouth once a week 2/11/20   Reported, Patient   norethindrone (MICRONOR) 0.35 MG tablet Take 0.35 mg by mouth 11/18/19 11/17/20  Reported, Patient   penicillin V (VEETID) 500 MG tablet as needed 8/14/19   Reported, Patient   predniSONE (DELTASONE) 5 MG tablet Take 5 mg by mouth as needed 3/19/20   Reported, Patient       Allergies  Allergies   Allergen Reactions     Codeine      Other reaction(s): Gastrointestinal, GI intolerance, Vomiting  Vomiting       Gadolinium Dizziness and Nausea     Hydrocodone-Acetaminophen Nausea and Vomiting     Other reaction(s): GI intolerance     Iodine      abstract     Sulfasalazine      Other reaction(s): GI intolerance  Has tried and had nausa.     Tramadol Nausea and Vomiting     Other reaction(s): Gastrointestinal, Other (see comments)  Nausea and vomiting   unknown       Gluten Meal Other (See Comments) and Rash     Other reaction(s): Gastrointestinal, GI intolerance  Dizziness, tired, rash, stomach cramps, thrush, mouth sores  Dizziness, tired, rash, stomach cramps, thrush, mouth sores         Review of systems  A 10-point review of systems was negative    Examination  There were no vitals taken for this visit.  There is no height or weight on file to calculate BMI.    Gen- well, NAD, A+Ox3, normal color  Psych- normal mood    Laboratory  Lab Results   Component Value Date     04/01/2020    POTASSIUM 4.2 04/01/2020    CHLORIDE 105 04/01/2020    CO2 24 04/01/2020    BUN 11 04/01/2020    CR 0.73 04/01/2020       Lab Results   Component Value Date    BILITOTAL 0.6 04/01/2020    ALT 36 04/01/2020    AST 24 04/01/2020    ALKPHOS 82 04/01/2020       Lab Results   Component Value Date    ALBUMIN 3.9 04/01/2020    PROTTOTAL 8.1 04/01/2020        Lab Results   Component Value Date     WBC 9.3 04/01/2020    HGB 15.8 04/01/2020    MCV 85 04/01/2020     04/01/2020       Lab Results   Component Value Date    INR 0.93 04/01/2020       Radiology    ASSESSMENT AND PLAN:  COLON.  Mrs. Ya has COLON.  She had this seen on imaging and had also abnormal liver function tests which date back to 2010.  Today, she appears to be doing relatively well except for her rheumatoid arthritis, which gives her pain and stiffness in her hands mostly.  She is on Remicade.  Plan will be to repeat her liver function tests.  She did have on MR elastography, which was normal when I saw her at [facility], but she did repeat one at the [facility], which also came back normal.  Otherwise, she is having a lot of issues doing lifestyle modifications as the patient has rheumatoid arthritis and it is difficult for her to exercise, so we will send her to our medical weight management and see if they can help her.  Please note that the patient is 5 feet 6 inches and is 226 pounds.  Otherwise, she will be seen here in 6 months.  For all her other medical issues and health care maintenance, she will follow with her primary care physician.     Cheryl Cristobal MD  Hepatology  [facility]    Video-Visit Details    Type of service:  Video Visit    Video Start Time: 7:50 AM.  Video End Time:   8:18 AM.  Originating Location (pt. Location): Home.    Distant Location (provider location):  [facility] [place]     Platform used for Video Visit: [employer].    Cheryl Cristobal MD              Again, thank you for allowing me to participate in the care of your patient.        Sincerely,        Cheryl Cristobal MD

## 2021-03-04 ENCOUNTER — TRANSFERRED RECORDS (OUTPATIENT)
Dept: HEALTH INFORMATION MANAGEMENT | Facility: CLINIC | Age: 46
End: 2021-03-04
Payer: COMMERCIAL

## 2021-03-12 ENCOUNTER — TRANSFERRED RECORDS (OUTPATIENT)
Dept: HEALTH INFORMATION MANAGEMENT | Facility: CLINIC | Age: 46
End: 2021-03-12
Payer: COMMERCIAL

## 2021-05-27 NOTE — PATIENT INSTRUCTIONS - HE
Breo 1 puff daily --rinse mouth after    Follow in 6 months with me    No 56 day breaks from allergy shots

## 2021-05-27 NOTE — PROGRESS NOTES
Chief complaint:  Allergy and asthma follow-up    History of present illness:  This is a 44-year-old woman who is here today for follow-up of her allergies and asthma.  She has been on allergy shots for 2 years.  Of note, she was on allergy shots previously and restarted.  She has had more improvement in her nasal congestion and drainage continues on Flonase.  He uses Astelin as needed.  She has a history of asthma and recently had a neck surgery.  While in the hospital, she was switched to Breo and continues on this.  She feels that it works better for her than the Dulera.  She had rare need for her albuterol inhaler.  ACT score today is 20.  Denies any cough, wheeze or shortness of breath.    Patient also thinks she has a milk allergy.  She has no hives, swelling or shortness of breath after eating milk or dairy-containing products.  She states she does have bloating cramping and some diarrhea.  She has not tried Lactaid pills.    Past medical history, social history, family medical history, meds and allergies reviewed and updated accordingly.      Review of Systems performed as above and the remainder is negative.         Current Outpatient Medications:      adalimumab 40 mg/0.8 mL PnKt, Inject 40 mg under the skin., Disp: , Rfl:      albuterol (PROAIR HFA) 90 mcg/actuation inhaler, Inhale 2 puffs every 4 (four) hours as needed for wheezing., Disp: 1 Inhaler, Rfl: 0     amoxicillin-clavulanate (AUGMENTIN) 875-125 mg per tablet, 1 tablet 2 (two) times a day. For 14 days, Disp: , Rfl: 0     azelastine (ASTELIN) 137 mcg (0.1 %) nasal spray, Place 2 Sprays into both nostrils two times a day, Disp: , Rfl:      ciprofloxacin HCl (CIPRO) 500 MG tablet, Take 500 mg by mouth., Disp: , Rfl:      DULoxetine (CYMBALTA) 60 MG capsule, , Disp: , Rfl:      fexofenadine (ALLEGRA) 180 MG tablet, Take 180 mg by mouth daily., Disp: , Rfl:      fluconazole (DIFLUCAN) 150 MG tablet, , Disp: , Rfl:      fluticasone (FLONASE) 50  mcg/actuation nasal spray, , Disp: , Rfl:      ibuprofen (ADVIL,MOTRIN) 800 MG tablet, , Disp: , Rfl:      levonorgestrel-ethinyl estradiol (NORDETTE) 0.15-0.03 mg per tablet, Take 1 tablet by mouth daily., Disp: , Rfl:      LYRICA 150 mg capsule, , Disp: , Rfl:      mometasone-formoterol (DULERA) 200-5 mcg/actuation HFAA inhaler, Inhale 2 puffs 2 (two) times a day., Disp: 3 Inhaler, Rfl: 1     montelukast (SINGULAIR) 10 mg tablet, Take 1 tablet (10 mg total) by mouth daily., Disp: 90 tablet, Rfl: 4     naproxen (NAPROSYN) 500 MG tablet, as needed., Disp: , Rfl: 0     nystatin-triamcinolone (MYCOLOG) ointment, , Disp: , Rfl:      omeprazole (PRILOSEC) 40 MG capsule, , Disp: , Rfl:      RETAINE MGD, PF, 0.5-0.5 % Dpet, USE THE DROPS AS NEEDED FOR DRYNESS, Disp: , Rfl: 99     tablet cutter Misc, Use As Directed. Blink eye gel, uses daily, Disp: , Rfl:      tobramycin-dexamethasone (TOBRADEX) ophthalmic solution, Place 1 Drop into both eyes 4 times a day., Disp: , Rfl:      cyclobenzaprine (FLEXERIL) 10 MG tablet, , Disp: , Rfl:      fluticasone furoate-vilanterol (BREO ELLIPTA) 200-25 mcg/dose DsDv inhaler, Inhale 1 puff daily., Disp: 3 each, Rfl: 1     losartan (COZAAR) 50 MG tablet, Take 50 mg by mouth daily., Disp: , Rfl:      metroNIDAZOLE (FLAGYL) 500 MG tablet, TAKE 1 TABLET BY MOUTH THREE TIMES A DAY FOR 10 DAYS., Disp: , Rfl: 0     metroNIDAZOLE (METROCREAM) 0.75 % cream, Apply topically., Disp: , Rfl:      olopatadine (PATANOL) 0.1 % ophthalmic solution, Apply 1 drop to eye every 12 (twelve) hours., Disp: 5 mL, Rfl: 1     olopatadine 0.2 % Drop, INSTILL 1 DROP IN BOTH EYES EVERY 12 HOURS AS NEEDED, Disp: 17.5 mL, Rfl: 0     prednisoLONE acetate (PRED-FORTE) 1 % ophthalmic suspension, PLACE 1 DROP INTO BOTH EYES TWO TIMES A DAY., Disp: , Rfl: 1     prednisoLONE acetate (PRED-FORTE) 1 % ophthalmic suspension, Place 1 Drop into both eyes two times a day., Disp: , Rfl:     Allergies   Allergen Reactions      "Codeine      Gluten Other (See Comments)     Dizziness, tired, rash, stomach cramps, thrush, mouth sores     Hydrocodone-Acetaminophen      Tramadol Nausea And Vomiting       Pulse 84   Ht 5' 5\" (1.651 m)   Wt (!) 229 lb (103.9 kg)   BMI 38.11 kg/m    Gen: Pleasant female not in acute distress  HEENT: Eyes no erythema of the bulbar or palpebral conjunctiva, no edema. Ears: TMs well visualized, no effusions. Nose: No congestion, mucosa normal. Mouth: Throat clear, no lip or tongue edema.   Cardiac: Regular rate and rhythm, no murmurs, rubs or gallops  Respiratory: Clear to auscultation bilaterally, no adventitious breath sounds    Skin: No rashes or lesions  Psych: Alert and oriented times 3    Spirometry was performed.  FEV1 to FVC ratio 82%.  FEV1 2.63 L or 86% predicted.  FVC 3.20 L or 85% of predicted.    Impression report and plan:  1.  Allergic rhinitis  2.  Mild persistent asthma  3.  Food intolerance    Explained that symptoms with milk are not consistent with an IgE mediated food allergy.  For this reason she is not a candidate for food allergy testing.  She could try Lactaid tablets over-the-counter to see if this may help her symptoms.  There is no food intolerance test that is evidence-based for milk.  Continue with allergy shots.  Follow in 6 months.  Okay to continue on Brio but I would like to decrease her to the 100-25 dose in 6 months if she continues to do well.  Repeat spirometry at that visit.  Notify of systemic reaction.  The importance of not taking 56 day breaks from allergy shots was discussed.    Time spent with patient above and beyond allergy shot administration, 25 minutes, greater than half spent counseling and coordination of care regarding allergies, asthma and food intolerance.  "

## 2021-05-31 VITALS — HEIGHT: 66 IN | WEIGHT: 221 LBS | BODY MASS INDEX: 35.52 KG/M2

## 2021-05-31 VITALS — BODY MASS INDEX: 35.02 KG/M2 | WEIGHT: 217 LBS

## 2021-06-01 VITALS — BODY MASS INDEX: 36.99 KG/M2 | WEIGHT: 222 LBS | HEIGHT: 65 IN

## 2021-06-01 VITALS — HEIGHT: 65 IN | BODY MASS INDEX: 36.11 KG/M2

## 2021-06-01 VITALS — HEIGHT: 65 IN | BODY MASS INDEX: 36.82 KG/M2 | WEIGHT: 221 LBS

## 2021-06-02 ENCOUNTER — RECORDS - HEALTHEAST (OUTPATIENT)
Dept: ADMINISTRATIVE | Facility: CLINIC | Age: 46
End: 2021-06-02

## 2021-06-02 NOTE — TELEPHONE ENCOUNTER
She sent refill requests for Dulera and Breo. She was switched in April to Breo from Dulera so we cancelled the order for Dulera. He will fill 1 Breo 1 puff daily. Laxmi has an appt on Oct 22, 19

## 2021-06-02 NOTE — TELEPHONE ENCOUNTER
Dr. Gann  This person called and is requesting a call back:    Name Of Person Who Called: Dianna From [facility]     Why Did The Person Call: Dianna called with questions about an inhaler prescription that they received. Patient reference number for the pharmacy is [id number]    Best Phone Number To Call Back: [phone]    Okay To Leave A Detailed Voicemail?     Thank you.

## 2021-06-03 VITALS — RESPIRATION RATE: 16 BRPM | BODY MASS INDEX: 38.15 KG/M2 | WEIGHT: 229 LBS | HEIGHT: 65 IN

## 2021-06-03 VITALS — HEIGHT: 65 IN | WEIGHT: 229 LBS | BODY MASS INDEX: 38.15 KG/M2

## 2021-06-07 NOTE — TELEPHONE ENCOUNTER
Left message to call back and get scheduled for an allergy shot, we are now starting shot in the [place] location only M,T,TH,F 8-4

## 2021-06-08 NOTE — TELEPHONE ENCOUNTER
Dr. Gann   This person called and is requesting a call back:    Name Of Person Who Called: Laxmi     Why Did The Person Call: patient got call to schedule allergy shot     Best Phone Number To Call Back: [phone]    Okay To Leave A Detailed Voicemail? yes    Thank you.

## 2021-06-13 NOTE — PROGRESS NOTES
Chief complaint: Severe, chronic allergic rhinitis    History of present illness: This is a pleasant 42-year-old woman who is here today for evaluation of chronic rhinitis.  Previously, she was a patient of Dr. Duran.  She was on allergy shots with Dr. Bermudez initially and then was switched to allergy shots with Dr. Duran.  She did well on her shots and had less sinus infections.  She follows with an ENT at [facility].  She switch to their allergist recently.  She has been shots with them for about a year.  Last shot was about a week ago.  She states she continues to have recurrent sinus infections though despite continuing allergy shots.  She states that she believes dogs, mold, dust are her main allergens.  Previous testing here at [facility] showed positivity's dog, dust mite and mold.  I do not have any outside records review.  She reports that she was also positive to maple trees on outside testing.  She states she has been using Kenalog shots to help with her chronic sinus infections but feels that they are causing her to gain weight.  For this reason, she would like to restart allergy shots.  She has recently been on prednisone as well as Augmentin which did clear her current sinus infection.  Regarding allergies, she takes Zyrtec, Flonase and montelukast.    She also has a history of asthma.  She takes Dulera 200-5 2 puffs twice daily.  She does not use a chamber and has been getting thrush with it so she does not use it regularly.  She states she has asthma symptoms 2-3 times a week and has frequent pneumonias.  She has had the pneumonia shot as a result of this.  She does not wake up at night short of breath, however.  No hospitalizations or ER visits for asthma.    Past medical history, social history, family medical history, meds and allergies reviewed and updated accordingly.    Review of Systems performed as above and the remainder is negative.      Current Outpatient Prescriptions:      albuterol  "(PROAIR HFA) 90 mcg/actuation inhaler, Inhale 2 puffs every 4 (four) hours as needed for wheezing., Disp: 2 Inhaler, Rfl: 1     cetirizine (ZYRTEC) 10 MG tablet, Take 1 tablet (10 mg total) by mouth daily., Disp: 90 tablet, Rfl: 4     cyclobenzaprine (FLEXERIL) 10 MG tablet, , Disp: , Rfl:      DULoxetine (CYMBALTA) 60 MG capsule, , Disp: , Rfl:      fexofenadine (ALLEGRA) 180 MG tablet, Take 180 mg by mouth daily., Disp: , Rfl:      fluconazole (DIFLUCAN) 150 MG tablet, , Disp: , Rfl:      fluticasone (FLONASE) 50 mcg/actuation nasal spray, , Disp: , Rfl:      hydrochlorothiazide (MICROZIDE) 12.5 mg capsule, , Disp: , Rfl:      hydroxychloroquine (PLAQUENIL) 200 mg tablet, , Disp: , Rfl:      ibuprofen (ADVIL,MOTRIN) 800 MG tablet, , Disp: , Rfl:      LOTEMAX 0.5 % ophthalmic suspension, , Disp: , Rfl:      LYRICA 150 mg capsule, , Disp: , Rfl:      metFORMIN (GLUCOPHAGE-XR) 500 MG 24 hr tablet, , Disp: , Rfl:      mometasone-formoterol (DULERA) 100-5 mcg/actuation HFAA inhaler, Inhale 2 puffs 2 (two) times a day., Disp: 3 Inhaler, Rfl: 4     montelukast (SINGULAIR) 10 mg tablet, Take 1 tablet (10 mg total) by mouth daily., Disp: 90 tablet, Rfl: 4     nystatin-triamcinolone (MYCOLOG) ointment, , Disp: , Rfl:      omeprazole (PRILOSEC) 40 MG capsule, , Disp: , Rfl:      triamterene-hydrochlorothiazide (MAXZIDE-25) 37.5-25 mg per tablet, , Disp: , Rfl:     Allergies   Allergen Reactions     Codeine      Hydrocodone-Acetaminophen        /80  Resp 18  Ht 5' 6\" (1.676 m)  Wt 221 lb (100.2 kg)  BMI 35.67 kg/m2  Gen: Pleasant female not in acute distress  HEENT: Eyes no erythema of the bulbar or palpebral conjunctiva, no edema. Ears: TMs well visualized, no effusions. Nose: No congestion, mucosa normal. Mouth: Throat clear, no lip or tongue edema.   Cardiac: Regular rate and rhythm, no murmurs, rubs or gallops  Respiratory: Clear to auscultation bilaterally, no adventitious breath sounds  Lymph: No " supraclavicular or cervical lymphadenopathy  Skin: No rashes or lesions  Psych: Alert and oriented times 3    Last Percutaneous Allergy Test Results  Trees  [place], White  1:20 H  (W/F in mm): 0-0 (09/15/17 1239)  Birch Mix 1:20 H (W/F in mm): 0-0 (09/15/17 1239)  [place], Common 1:20 H (W/F in mm): 0-0 (09/15/17 1239)  Elm, American 1:20 H (W/F in mm): 0-0 (09/15/17 1239)  [place], Shagbark 1:20 H (W/F in mm): 0-0 (09/15/17 1239)  Maple, Hard/Sugar 1:20 H (W/F in mm): 0-0 (09/15/17 1239)  [place] Mix 1:20 H (W/F in mm): 0-0 (09/15/17 1239)  Oak, Red 1:20 H (W/F in mm): 0-0 (09/15/17 1239)  [place], American 1:20 H (W/F in mm): 0-0 (09/15/17 1239)  [place] Tree 1:20 H (W/F in mm): 0-0 (09/15/17 1239)  Dust Mites  D. Pteronyssinus Mite 30,000 AU/ML H (W/F in mm): 0-0 (09/15/17 1239)  D. Farinae Mite 30,000 AU/ML H (W/F in mm: 0-0 (09/15/17 1239)  Grasses  Grass Mix #4 10,000 BAU/ML H: 0-0 (09/15/17 1239)  [place] Grass 1:20 H (W/F in mm): 0-0 (09/15/17 1239)  Cockroach  Cockroach Mix 1:10 H (W/F in mm): 0-0 (09/15/17 1239)  Molds/Fungi  Alternaria Tenuis 1:10 H (W/F in mm): 0-0 (09/15/17 1239)  Aspergillus Fumigatus 1:10 H (W/F in mm): 0-0 (09/15/17 1239)  Homodendrum Cladosporioides 1:10 H (W/F in mm): 0-0 (09/15/17 1239)  Penicillin Notatum 1:10 H (W/F in mm): 0-0 (09/15/17 1239)  Epicoccum 1:10 H (W/F in mm): 0-0 (09/15/17 1239)  Weeds  Ragweed, Short 1:20 H (W/F in mm): 0-0 (09/15/17 1239)  [place], Sorrel 1:20 H (W/F in mm): 0-0 (09/15/17 1239)  Lamb's Quarter 1:20 H (W/F in mm): 0-0 (09/15/17 1239)  Pigweed, Rough Red Root 1:20 H  (W/F in mm): 0-0 (09/15/17 1239)  Plantain, English 1:20 H  (W/F in mm): 0-0 (09/15/17 1239)  Sagebrush, Mugwort 1:20 H  (W/F in mm): 0-0 (09/15/17 1239)  Animal  Cat 10,000 BAU/ML H (W/F in mm): 0-0 (09/15/17 1239)  Dog 1:10 H (W/F in mm): 0-0 (09/15/17 1239)  Controls  Device Type: QUINTIP (09/15/17 1239)  Neg. control: 50% Glycerine/Saline H (W/F in mm): 0-0 (09/15/17 1239)  Pos.  control: Histamine 6mg/ML (W/F in mms): 7-10 (09/15/17 1239)    Last Intradermal Allergy Test Results  Trees  Birch Mix  1:00 H (W/F in mm): 0-0 (09/15/17 1239)  [place], Common  1:100 H  (W/F in mm): 0-0 (09/15/17 1239)  Maple, Hard/Sugar  1:100 H (W/F in mm): 0-0 (09/15/17 1239)  Oak, Red  1:100 H (W/F in mm): 0-0 (09/15/17 1239)  Dust Mites  D. Pteronyssinus Mite 30,000 AU/ML H (W/F in mm): 0-0 (09/15/17 1239)  D. Farinae Mite 30,000 AU/ML H (W/F in mm): 0-0 (09/15/17 1239)  Grasses  Grass Mix #4  10,000 BAU/ML H (W/F in mm): 0-0 (09/15/17 1239)  Molds/Fungi  Alternaria Tenuis  1:100 H (W/F in mm): 7-20 (09/15/17 1239)  Aspergillus Fumigatus 1:100 H (W/F in mm): 0-0 (09/15/17 1239)  Cladosporium Cladosporioides  1:100 H (W/F in mm): 0-0 (09/15/17 1239)  Penicillin Notatum  1:100 H (W/F in mm): 7-10 (09/15/17 1239)  Epicoccum  1:100 H (W/F in mm): 0-0 (09/15/17 1239)  Weeds  Ragweed, Short  1:100 H (W/F in mm): 0-0 (09/15/17 1239)  Animal  Cat 10,000 BAU/ML H  (W/F in mm): 0-0 (09/15/17 1239)  Dog 10,000 BAU/ML H (W/F in mm): 0-0 (09/15/17 1239)  Controls  Neg. Control: 50% Glycerine/Saline H (W/F in mm): 0-0 (09/15/17 1239)  Pos. Control: Histamine 6 mg/ml (W/F in mm): 7-10 (09/15/17 1239)    Spirometry was performed.  FEV1 to FVC ratio 86%.  FEV1 2.69 L or 84% of predicted.  FVC 3.11 L or 79% predicted.    Impression report and plan:    1.  Allergic rhinitis to mold  2.  Chronic sinusitis  3.  Mild persistent asthma    I stated I could restart her on allergy shots but I would only be able to put in her allergy shots what I see positive and testing.  She would like dog and her allergy shot but she is not positive to do some skin testing.  She has been on multiple rounds of allergy shots.  I stated we would only do allergy shots for another 3-5 years.  In regards to her medication, I would not change any of her allergy medication at this time.  Continue on the Dulera.  I provided her with the chamber he went  over technique in hopes that this will prevent thrush in her.  If she has symptoms consistent with pneumonia, I would like to see her and perform a chest x-ray as well as a spirometry.  I am wondering if this is more active asthma.  Follow during her allergy shots.  I do need to obtain her records from her outside clinic.  I am specifically looking for her specific IgE testing to the environmental allergens.  I went over the risks and benefits of allergy shots.  I stated risks include hives, swelling, shortness of breath.  I did state that one in 2.5 million shot administrations can result in death.  I stated they must wait in the office for 30 minutes following the shot and carry an epinephrine device on the day of the shot.  I stated that shots are effective in about 90% of patients.  I stated that they should check with the insurance company prior to proceeding.  They understand the risks and benefits and agreed to proceed.    Time sent with the patient, 60 minutes, greater than half spent counseling in coronation care regarding allergy shots and asthma.

## 2021-06-13 NOTE — PROGRESS NOTES
MM  1:1000 V/V EXP 11/28/2017  1:100 V/V EXP 1/28/2018  1:10 V/V EXP 9/28/2018  1:1 V/V EXP 9/28/2018    JOHN DANIELY   CHARGED 30 UNITS  CHECKED BY KELYL

## 2021-06-14 NOTE — PROGRESS NOTES
Chief complaint: Follow-up allergy shots    History of present illness: This is a 42-year-old woman who is here today for follow-up of allergy shots.  She is moving into her blue vial.  No site or systemic reactions.  She reports she was diagnosed with a sinus infection by her ent.  She is currently on prednisone and an antibiotic that she is feeling much better.  She notes that the increased dose of Dulera seems to be helping.  She has no current cough, wheeze or shortness of breath.      Past medical history, social history, family medical history, meds and allergies reviewed and updated accordingly.    Review of Systems performed as above and the remainder is negative.      Current Outpatient Prescriptions:      albuterol (PROAIR HFA) 90 mcg/actuation inhaler, Inhale 2 puffs every 4 (four) hours as needed for wheezing., Disp: 2 Inhaler, Rfl: 1     amoxicillin-clavulanate (AUGMENTIN) 875-125 mg per tablet, 1 tablet 2 (two) times a day. For 14 days, Disp: , Rfl: 0     azelastine (ASTELIN) 137 mcg (0.1 %) nasal spray, Place 2 Sprays into both nostrils two times a day, Disp: , Rfl:      cyclobenzaprine (FLEXERIL) 10 MG tablet, , Disp: , Rfl:      DULoxetine (CYMBALTA) 60 MG capsule, , Disp: , Rfl:      fexofenadine (ALLEGRA) 180 MG tablet, Take 180 mg by mouth daily., Disp: , Rfl:      fluconazole (DIFLUCAN) 150 MG tablet, , Disp: , Rfl:      fluticasone (FLONASE) 50 mcg/actuation nasal spray, , Disp: , Rfl:      hydroxychloroquine (PLAQUENIL) 200 mg tablet, , Disp: , Rfl:      ibuprofen (ADVIL,MOTRIN) 800 MG tablet, , Disp: , Rfl:      levonorgestrel-ethinyl estradiol (NORDETTE) 0.15-0.03 mg per tablet, Take 1 tablet by mouth daily., Disp: , Rfl:      losartan (COZAAR) 50 MG tablet, Take 50 mg by mouth daily., Disp: , Rfl:      LOTEMAX 0.5 % ophthalmic suspension, , Disp: , Rfl:      LYRICA 150 mg capsule, , Disp: , Rfl:      mometasone-formoterol (DULERA) 200-5 mcg/actuation HFAA inhaler, Inhale 2 puffs 2 (two)  times a day., Disp: 3 Inhaler, Rfl: 0     montelukast (SINGULAIR) 10 mg tablet, Take 1 tablet (10 mg total) by mouth daily., Disp: 90 tablet, Rfl: 4     naproxen (NAPROSYN) 500 MG tablet, as needed., Disp: , Rfl: 0     nitrofurantoin, macrocrystal-monohydrate, (MACROBID) 100 MG capsule, Take 100 mg by mouth as needed., Disp: , Rfl:      nystatin-triamcinolone (MYCOLOG) ointment, , Disp: , Rfl:      olopatadine (PATANOL) 0.1 % ophthalmic solution, Apply 1 drop to eye every 12 (twelve) hours., Disp: 5 mL, Rfl: 1     olopatadine 0.2 % Drop, 1 drops each eye every 12 hours as needed, Disp: 1 Bottle, Rfl: 0     omeprazole (PRILOSEC) 40 MG capsule, , Disp: , Rfl:      prednisoLONE acetate (PRED-FORTE) 1 % ophthalmic suspension, PLACE 1 DROP INTO BOTH EYES TWO TIMES A DAY., Disp: , Rfl: 1     predniSONE (DELTASONE) 10 mg tablet, TAKE 2 TABLETS BY MOUTH EVERY DAY IN THE MORNING, Disp: , Rfl: 0     RETAINE MGD, PF, 0.5-0.5 % Dpet, USE THE DROPS AS NEEDED FOR DRYNESS, Disp: , Rfl: 99     tobramycin-dexamethasone (TOBRADEX) ophthalmic solution, Place 1 Drop into both eyes 4 times a day., Disp: , Rfl:      cetirizine (ZYRTEC) 10 MG tablet, Take 1 tablet (10 mg total) by mouth daily., Disp: 90 tablet, Rfl: 4     hydrochlorothiazide (MICROZIDE) 12.5 mg capsule, , Disp: , Rfl:      metFORMIN (GLUCOPHAGE-XR) 500 MG 24 hr tablet, , Disp: , Rfl:      triamterene-hydrochlorothiazide (MAXZIDE-25) 37.5-25 mg per tablet, , Disp: , Rfl:     Allergies   Allergen Reactions     Codeine      Gluten Other (See Comments)     Dizziness, tired, rash, stomach cramps, thrush, mouth sores     Hydrocodone-Acetaminophen      Tramadol Nausea And Vomiting       BP (!) 140/92  Pulse 88  Resp 20  Wt 217 lb (98.4 kg)  BMI 35.02 kg/m2  Gen: Pleasant female not in acute distress  HEENT: Eyes no erythema of the bulbar or palpebral conjunctiva, no edema. Ears: TMs well visualized, no effusions. Nose: No congestion, mucosa normal. Mouth: Throat clear, no  lip or tongue edema.   Cardiac: Regular rate and rhythm, no murmurs, rubs or gallops  Respiratory: Clear to auscultation bilaterally, no adventitious breath sounds    Skin: No rashes or lesions  Psych: Alert and oriented times 3    Impression report and plan:  1.  Allergic rhinitis  2.  Asthma    Continue allergy shots.  Continue current medication therapy.  Notify of systemic reaction.  Follow in 6 weeks.    Time spent with the patient, 15 minutes, greater than half spent counseling and coordination of care regarding allergies and asthma.

## 2021-06-16 NOTE — PROGRESS NOTES
Chief complaint: Follow-up allergy shots    History of present illness: This is a pleasant 42-year-old woman who is here today for follow-up of allergy shots.  She is moving into her yellow vial.  No site or systemic reactions.  She is a restart.  She reports she had some difficulty with her asthma in January and she was sick for most of the month.  She was put on prednisone and an antibiotic.  She recently finished Augmentin as prescribed by her ENT for sinus infections.  She states she seen a rheumatologist for inflammation and believes that this causes her sinuses to be inflamed as well.  She currently is using albuterol every other day.  She states that she feels some tightness and cough when she needs to use it.  She denies any nighttime awakenings.  She does not have any cough, wheeze or shortness of breath today.    Past medical history, social history, family medical history, meds and allergies reviewed and updated accordingly.    Review of Systems performed as above and the remainder is negative.      Current Outpatient Prescriptions:      albuterol (PROAIR HFA) 90 mcg/actuation inhaler, Inhale 2 puffs every 4 (four) hours as needed for wheezing., Disp: 2 Inhaler, Rfl: 1     azelastine (ASTELIN) 137 mcg (0.1 %) nasal spray, Place 2 Sprays into both nostrils two times a day, Disp: , Rfl:      cetirizine (ZYRTEC) 10 MG tablet, Take 1 tablet (10 mg total) by mouth daily., Disp: 90 tablet, Rfl: 4     DULERA 200-5 mcg/actuation HFAA inhaler, USE 2 INHALATIONS TWICE A DAY, Disp: 39 g, Rfl: 0     DULoxetine (CYMBALTA) 60 MG capsule, , Disp: , Rfl:      fexofenadine (ALLEGRA) 180 MG tablet, Take 180 mg by mouth daily., Disp: , Rfl:      fluconazole (DIFLUCAN) 150 MG tablet, , Disp: , Rfl:      fluticasone (FLONASE) 50 mcg/actuation nasal spray, , Disp: , Rfl:      hydroxychloroquine (PLAQUENIL) 200 mg tablet, , Disp: , Rfl:      ibuprofen (ADVIL,MOTRIN) 800 MG tablet, , Disp: , Rfl:      losartan (COZAAR) 50 MG  tablet, Take 50 mg by mouth daily., Disp: , Rfl:      LOTEMAX 0.5 % ophthalmic suspension, , Disp: , Rfl:      LYRICA 150 mg capsule, , Disp: , Rfl:      montelukast (SINGULAIR) 10 mg tablet, Take 1 tablet (10 mg total) by mouth daily., Disp: 90 tablet, Rfl: 4     naproxen (NAPROSYN) 500 MG tablet, as needed., Disp: , Rfl: 0     nitrofurantoin, macrocrystal-monohydrate, (MACROBID) 100 MG capsule, Take 100 mg by mouth as needed., Disp: , Rfl:      nystatin-triamcinolone (MYCOLOG) ointment, , Disp: , Rfl:      omeprazole (PRILOSEC) 40 MG capsule, , Disp: , Rfl:      prednisoLONE acetate (PRED-FORTE) 1 % ophthalmic suspension, PLACE 1 DROP INTO BOTH EYES TWO TIMES A DAY., Disp: , Rfl: 1     prednisoLONE acetate (PRED-FORTE) 1 % ophthalmic suspension, Place 1 Drop into both eyes two times a day., Disp: , Rfl:      predniSONE (DELTASONE) 10 mg tablet, TAKE 2 TABLETS BY MOUTH EVERY DAY IN THE MORNING, Disp: , Rfl: 0     RETAINE MGD, PF, 0.5-0.5 % Dpet, USE THE DROPS AS NEEDED FOR DRYNESS, Disp: , Rfl: 99     tablet cutter Misc, Use As Directed. Blink eye gel, uses daily, Disp: , Rfl:      tobramycin-dexamethasone (TOBRADEX) ophthalmic solution, Place 1 Drop into both eyes 4 times a day., Disp: , Rfl:      amoxicillin-clavulanate (AUGMENTIN) 875-125 mg per tablet, 1 tablet 2 (two) times a day. For 14 days, Disp: , Rfl: 0     cyclobenzaprine (FLEXERIL) 10 MG tablet, , Disp: , Rfl:      hydrochlorothiazide (MICROZIDE) 12.5 mg capsule, , Disp: , Rfl:      levonorgestrel-ethinyl estradiol (NORDETTE) 0.15-0.03 mg per tablet, Take 1 tablet by mouth daily., Disp: , Rfl:      metFORMIN (GLUCOPHAGE-XR) 500 MG 24 hr tablet, , Disp: , Rfl:      olopatadine (PATANOL) 0.1 % ophthalmic solution, Apply 1 drop to eye every 12 (twelve) hours., Disp: 5 mL, Rfl: 1     olopatadine 0.2 % Drop, 1 drops each eye every 12 hours as needed, Disp: 1 Bottle, Rfl: 0     triamterene-hydrochlorothiazide (MAXZIDE-25) 37.5-25 mg per tablet, , Disp: , Rfl:  "    Allergies   Allergen Reactions     Codeine      Gluten Other (See Comments)     Dizziness, tired, rash, stomach cramps, thrush, mouth sores     Hydrocodone-Acetaminophen      Tramadol Nausea And Vomiting       /78  Pulse 78  Ht 5' 5\" (1.651 m)  Gen: Pleasant female not in acute distress  HEENT: Eyes no erythema of the bulbar or palpebral conjunctiva, no edema. Ears: TMs well visualized, no effusions. Nose: No congestion, mucosa normal. Mouth: Throat clear, no lip or tongue edema.   Cardiac: Regular rate and rhythm, no murmurs, rubs or gallops  Respiratory: Clear to auscultation bilaterally, no adventitious breath sounds    Skin: No rashes or lesions  Psych: Alert and oriented times 3    Impression report and plan:    1.  Allergic rhinitis  2.  Mild persistent asthma  Continue allergy shots.  Continue current medication therapy.  Notify of systemic reaction.  Follow in 6 weeks.  Repeat spirometry at next visit, his asthma has not improved, and Spiriva.    Time spent with the patient above and beyond allergy shot administration, 15 minutes, greater than half spent counseling and coordination of care regarding asthma.  "

## 2021-06-16 NOTE — PROGRESS NOTES
Patient had an appointment today at 1130 for an injection.  She no-showed on the schedule.  She rescheduled for 1 pm.  Patient then showed up at 1:14 pm.  Staff was not able to give shot until 1:30 pm given that the patient had failed earlier appointment, rescheduled and then was late.  It was noted at 1:50 pm that they patient was no longer in the lobby. Attempts to reach the patient on cell phone were unsuccessful.  Patient will be made aware that the 30 min wait is mandatory and a nationwide guideline.  If she fails to stay for the 30 minute wait in the future, we will deny her allergy shots.

## 2021-06-17 NOTE — PROGRESS NOTES
Chief complaint: Follow-up allergy shots     History of present illness: This is a 43-year-old woman with a history of allergic rhinitis and asthma here today for follow-up visit.  She is moving into her red vial.  No systemic reactions.  She reports that she is using her Dulera inhaler only 1-2 times per week.  She does not use it daily.  She has many concerns today.  She reports that she would like to come weekly for her shots when she is on maintenance.  We have discussed in the past but this is not necessary.  She believes on day 10 or 11 after receiving her shot, she will have increased sinus congestion.  She currently takes montelukast, Allegra, Astelin and Flonase.  She reports that her blood pressures also been high.  She would like that rechecked if possible today.  This morning she states it was 160/100.  She has no headache.  She does not feel lightheaded.  She also thinks she may be having a drug allergy to 1 of her medications.  She has been having some sweating and feels that she is becoming flushed with taking these medications.  No hives, swelling or shortness of   breath after using medications.  She states the medications do seem to be helping her conditions, however.  Recently, minocycline was added to her regimen for her face.          Past medical history, social history, family medical history, meds and allergies reviewed and updated accordingly.    Review of Systems performed as above and the remainder is negative.      Current Outpatient Prescriptions:      albuterol (PROAIR HFA) 90 mcg/actuation inhaler, Inhale 2 puffs every 4 (four) hours as needed for wheezing., Disp: 2 Inhaler, Rfl: 1     amoxicillin-clavulanate (AUGMENTIN) 875-125 mg per tablet, 1 tablet 2 (two) times a day. For 14 days, Disp: , Rfl: 0     azelastine (ASTELIN) 137 mcg (0.1 %) nasal spray, Place 2 Sprays into both nostrils two times a day, Disp: , Rfl:      cyclobenzaprine (FLEXERIL) 10 MG tablet, , Disp: , Rfl:       DULERA 200-5 mcg/actuation HFAA inhaler, USE 2 INHALATIONS TWICE A DAY, Disp: 39 g, Rfl: 0     DULoxetine (CYMBALTA) 60 MG capsule, , Disp: , Rfl:      fexofenadine (ALLEGRA) 180 MG tablet, Take 180 mg by mouth daily., Disp: , Rfl:      fluconazole (DIFLUCAN) 150 MG tablet, , Disp: , Rfl:      fluticasone (FLONASE) 50 mcg/actuation nasal spray, , Disp: , Rfl:      hydrochlorothiazide (MICROZIDE) 12.5 mg capsule, , Disp: , Rfl:      hydroxychloroquine (PLAQUENIL) 200 mg tablet, , Disp: , Rfl:      ibuprofen (ADVIL,MOTRIN) 800 MG tablet, , Disp: , Rfl:      levonorgestrel-ethinyl estradiol (NORDETTE) 0.15-0.03 mg per tablet, Take 1 tablet by mouth daily., Disp: , Rfl:      losartan (COZAAR) 50 MG tablet, Take 50 mg by mouth daily., Disp: , Rfl:      LOTEMAX 0.5 % ophthalmic suspension, , Disp: , Rfl:      LYRICA 150 mg capsule, , Disp: , Rfl:      metFORMIN (GLUCOPHAGE-XR) 500 MG 24 hr tablet, , Disp: , Rfl:      montelukast (SINGULAIR) 10 mg tablet, Take 1 tablet (10 mg total) by mouth daily., Disp: 90 tablet, Rfl: 4     naproxen (NAPROSYN) 500 MG tablet, as needed., Disp: , Rfl: 0     nitrofurantoin, macrocrystal-monohydrate, (MACROBID) 100 MG capsule, Take 100 mg by mouth as needed., Disp: , Rfl:      nystatin-triamcinolone (MYCOLOG) ointment, , Disp: , Rfl:      olopatadine (PATANOL) 0.1 % ophthalmic solution, Apply 1 drop to eye every 12 (twelve) hours., Disp: 5 mL, Rfl: 1     olopatadine 0.2 % Drop, 1 drops each eye every 12 hours as needed, Disp: 1 Bottle, Rfl: 0     omeprazole (PRILOSEC) 40 MG capsule, , Disp: , Rfl:      prednisoLONE acetate (PRED-FORTE) 1 % ophthalmic suspension, PLACE 1 DROP INTO BOTH EYES TWO TIMES A DAY., Disp: , Rfl: 1     prednisoLONE acetate (PRED-FORTE) 1 % ophthalmic suspension, Place 1 Drop into both eyes two times a day., Disp: , Rfl:      predniSONE (DELTASONE) 10 mg tablet, TAKE 2 TABLETS BY MOUTH EVERY DAY IN THE MORNING, Disp: , Rfl: 0     RETAINE MGD, PF, 0.5-0.5 % Dpet, USE  THE DROPS AS NEEDED FOR DRYNESS, Disp: , Rfl: 99     tablet cutter Misc, Use As Directed. Blink eye gel, uses daily, Disp: , Rfl:      tobramycin-dexamethasone (TOBRADEX) ophthalmic solution, Place 1 Drop into both eyes 4 times a day., Disp: , Rfl:      triamterene-hydrochlorothiazide (MAXZIDE-25) 37.5-25 mg per tablet, , Disp: , Rfl:     Allergies   Allergen Reactions     Codeine      Gluten Other (See Comments)     Dizziness, tired, rash, stomach cramps, thrush, mouth sores     Hydrocodone-Acetaminophen      Tramadol Nausea And Vomiting         Current Outpatient Prescriptions:      albuterol (PROAIR HFA) 90 mcg/actuation inhaler, Inhale 2 puffs every 4 (four) hours as needed for wheezing., Disp: 2 Inhaler, Rfl: 1     amoxicillin-clavulanate (AUGMENTIN) 875-125 mg per tablet, 1 tablet 2 (two) times a day. For 14 days, Disp: , Rfl: 0     azelastine (ASTELIN) 137 mcg (0.1 %) nasal spray, Place 2 Sprays into both nostrils two times a day, Disp: , Rfl:      cyclobenzaprine (FLEXERIL) 10 MG tablet, , Disp: , Rfl:      DULERA 200-5 mcg/actuation HFAA inhaler, USE 2 INHALATIONS TWICE A DAY, Disp: 39 g, Rfl: 0     DULoxetine (CYMBALTA) 60 MG capsule, , Disp: , Rfl:      fexofenadine (ALLEGRA) 180 MG tablet, Take 180 mg by mouth daily., Disp: , Rfl:      fluticasone (FLONASE) 50 mcg/actuation nasal spray, , Disp: , Rfl:      hydroxychloroquine (PLAQUENIL) 200 mg tablet, , Disp: , Rfl:      levonorgestrel-ethinyl estradiol (NORDETTE) 0.15-0.03 mg per tablet, Take 1 tablet by mouth daily., Disp: , Rfl:      losartan (COZAAR) 50 MG tablet, Take 50 mg by mouth daily., Disp: , Rfl:      LOTEMAX 0.5 % ophthalmic suspension, , Disp: , Rfl:      LYRICA 150 mg capsule, , Disp: , Rfl:      metFORMIN (GLUCOPHAGE-XR) 500 MG 24 hr tablet, 500 mg 2 (two) times a day. , Disp: , Rfl:      metroNIDAZOLE (METROGEL) 0.75 % gel, Apply topically 2 (two) times a day., Disp: , Rfl:      montelukast (SINGULAIR) 10 mg tablet, Take 1 tablet (10 mg  "total) by mouth daily., Disp: 90 tablet, Rfl: 4     omeprazole (PRILOSEC) 40 MG capsule, , Disp: , Rfl:      tobramycin-dexamethasone (TOBRADEX) ophthalmic solution, Place 1 Drop into both eyes 4 times a day., Disp: , Rfl:      fluconazole (DIFLUCAN) 150 MG tablet, , Disp: , Rfl:      ibuprofen (ADVIL,MOTRIN) 800 MG tablet, , Disp: , Rfl:      naproxen (NAPROSYN) 500 MG tablet, as needed., Disp: , Rfl: 0     nystatin-triamcinolone (MYCOLOG) ointment, , Disp: , Rfl:      olopatadine (PATANOL) 0.1 % ophthalmic solution, Apply 1 drop to eye every 12 (twelve) hours., Disp: 5 mL, Rfl: 1     olopatadine 0.2 % Drop, 1 drops each eye every 12 hours as needed, Disp: 1 Bottle, Rfl: 0     prednisoLONE acetate (PRED-FORTE) 1 % ophthalmic suspension, PLACE 1 DROP INTO BOTH EYES TWO TIMES A DAY., Disp: , Rfl: 1     prednisoLONE acetate (PRED-FORTE) 1 % ophthalmic suspension, Place 1 Drop into both eyes two times a day., Disp: , Rfl:      predniSONE (DELTASONE) 10 mg tablet, TAKE 2 TABLETS BY MOUTH EVERY DAY IN THE MORNING, Disp: , Rfl: 0     RETAINE MGD, PF, 0.5-0.5 % Dpet, USE THE DROPS AS NEEDED FOR DRYNESS, Disp: , Rfl: 99     tablet cutter Misc, Use As Directed. Blink eye gel, uses daily, Disp: , Rfl:     Allergies   Allergen Reactions     Codeine      Gluten Other (See Comments)     Dizziness, tired, rash, stomach cramps, thrush, mouth sores     Hydrocodone-Acetaminophen      Tramadol Nausea And Vomiting       BP (!) 140/100  Pulse 88  Ht 5' 5\" (1.651 m)  Wt 222 lb (100.7 kg)  BMI 36.94 kg/m2      Gen: Pleasant female not in acute distress  HEENT: Eyes no erythema of the bulbar or palpebral conjunctiva, no edema. Ears: TMs well visualized, no effusions. Nose: No congestion, mucosa normal. Mouth: Throat clear, no lip or tongue edema.   Cardiac: Regular rate and rhythm, no murmurs, rubs or gallops  Respiratory: Clear to auscultation bilaterally, no adventitious breath sounds    Skin: No rashes or lesions  Psych: Alert and " oriented times 3      Impression report and plan:  1.  Allergic rhinitis  2.  Mild intermittent asthma  3.  Vocal cord dysfunction  4.  Adverse medication reaction, possible    I would recommend discontinuing Dulera and she is not using it very often and having relatively little symptoms.  I reviewed some vocal cord dysfunction exercises with her.  She has some throat congestion and I think that this is likely vocal cord dysfunction.  Continue her current allergy medications.  I stated I do not want her coming every week for allergy shots as there is noted to say that this is efficacious.  I stated she can come every 2 weeks but really coming every 4 weeks is more acceptable.  She will follow in 6 months with me.  The importance of not taking 56 day breaks from allergy shots was discussed.  Blood pressure rechecked.  Still elevated.  Recommended follow-up with her primary care physician.  I do not think she has a drug allergy.  I think she should follow-up with her primary care physician to discuss some of her concerns.  Recommended perhaps a visit with the pharmacist as well to review her medication list.  I did state that polypharmacy is a possibility in regards to her reactions.      Time spent with patient, 25 minutes, greater than half spent counseling and coordination of care regarding allergies, drug allergies, vocal cord dysfunction, asthma.

## 2021-06-18 NOTE — PROGRESS NOTES
Chief complaint: Follow-up breathing concerns    History of present illness: This is a 43-year-old woman with a history of asthma who is here today to follow-up breathing concerns.  She is currently on allergen immunotherapy.  The patient notes that she developed severe leg pain and then began wheezing.  This landed her in ER.  There she was even prednisone and Zithromax.  She was told by her ENT that she needs to restart Dulera.  She had been off this medication for some time.  We did call her in 1 inhaler of Dulera, however, she was to follow-up here for further evaluation.  She reports she is feeling better now.  No cough, wheeze or shortness of breath.  Using her albuterol inhaler 1-2 times per week.  No nighttime awakenings.  She would like to continue on this medication.  She had several questions regarding her allergy shots.  She wants to be tested again for maple, however, we have tested her in the past and she was negative via percutaneous, intradermal testing and specific IgE testing.  She states that she does think she is reacting to her dog as she has congestion and sneezing around her dog.  She reports that she wakes up in the morning with green drainage that she thinks is secondary to her reaction to the dog.  Again, percutaneous, intradermal and specific IgE testing to dog was negative.    Past medical history, social history, family medical history, meds and allergies reviewed and updated accordingly.    Review of Systems performed as above and the remainder is negative.        Current Outpatient Prescriptions:      albuterol (PROAIR HFA) 90 mcg/actuation inhaler, Inhale 2 puffs every 4 (four) hours as needed for wheezing., Disp: 2 Inhaler, Rfl: 1     amoxicillin-clavulanate (AUGMENTIN) 875-125 mg per tablet, 1 tablet 2 (two) times a day. For 14 days, Disp: , Rfl: 0     azelastine (ASTELIN) 137 mcg (0.1 %) nasal spray, Place 2 Sprays into both nostrils two times a day, Disp: , Rfl:       cyclobenzaprine (FLEXERIL) 10 MG tablet, , Disp: , Rfl:      DULERA 200-5 mcg/actuation HFAA inhaler, USE 2 INHALATIONS TWICE A DAY, Disp: 39 g, Rfl: 0     DULoxetine (CYMBALTA) 60 MG capsule, , Disp: , Rfl:      fexofenadine (ALLEGRA) 180 MG tablet, Take 180 mg by mouth daily., Disp: , Rfl:      fluconazole (DIFLUCAN) 150 MG tablet, , Disp: , Rfl:      fluticasone (FLONASE) 50 mcg/actuation nasal spray, , Disp: , Rfl:      hydroxychloroquine (PLAQUENIL) 200 mg tablet, , Disp: , Rfl:      ibuprofen (ADVIL,MOTRIN) 800 MG tablet, , Disp: , Rfl:      levonorgestrel-ethinyl estradiol (NORDETTE) 0.15-0.03 mg per tablet, Take 1 tablet by mouth daily., Disp: , Rfl:      losartan (COZAAR) 50 MG tablet, Take 50 mg by mouth daily., Disp: , Rfl:      LOTEMAX 0.5 % ophthalmic suspension, , Disp: , Rfl:      LYRICA 150 mg capsule, , Disp: , Rfl:      metFORMIN (GLUCOPHAGE-XR) 500 MG 24 hr tablet, 500 mg 2 (two) times a day. , Disp: , Rfl:      metroNIDAZOLE (METROGEL) 0.75 % gel, Apply topically 2 (two) times a day., Disp: , Rfl:      mometasone-formoterol (DULERA) 200-5 mcg/actuation HFAA inhaler, Inhale 2 puffs 2 (two) times a day., Disp: 1 Inhaler, Rfl: 1     montelukast (SINGULAIR) 10 mg tablet, Take 1 tablet (10 mg total) by mouth daily., Disp: 90 tablet, Rfl: 4     naproxen (NAPROSYN) 500 MG tablet, as needed., Disp: , Rfl: 0     nystatin-triamcinolone (MYCOLOG) ointment, , Disp: , Rfl:      olopatadine (PATANOL) 0.1 % ophthalmic solution, Apply 1 drop to eye every 12 (twelve) hours., Disp: 5 mL, Rfl: 1     olopatadine 0.2 % Drop, 1 drops each eye every 12 hours as needed, Disp: 1 Bottle, Rfl: 0     omeprazole (PRILOSEC) 40 MG capsule, , Disp: , Rfl:      prednisoLONE acetate (PRED-FORTE) 1 % ophthalmic suspension, PLACE 1 DROP INTO BOTH EYES TWO TIMES A DAY., Disp: , Rfl: 1     prednisoLONE acetate (PRED-FORTE) 1 % ophthalmic suspension, Place 1 Drop into both eyes two times a day., Disp: , Rfl:      predniSONE  "(DELTASONE) 10 mg tablet, TAKE 2 TABLETS BY MOUTH EVERY DAY IN THE MORNING, Disp: , Rfl: 0     RETAINE MGD, PF, 0.5-0.5 % Dpet, USE THE DROPS AS NEEDED FOR DRYNESS, Disp: , Rfl: 99     tablet cutter Misc, Use As Directed. Blink eye gel, uses daily, Disp: , Rfl:      tobramycin-dexamethasone (TOBRADEX) ophthalmic solution, Place 1 Drop into both eyes 4 times a day., Disp: , Rfl:     Allergies   Allergen Reactions     Codeine      Gluten Other (See Comments)     Dizziness, tired, rash, stomach cramps, thrush, mouth sores     Hydrocodone-Acetaminophen      Tramadol Nausea And Vomiting       /82  Ht 5' 5\" (1.651 m)  Wt 221 lb (100.2 kg)  BMI 36.78 kg/m2  Gen: Pleasant female not in acute distress  HEENT: Eyes no erythema of the bulbar or palpebral conjunctiva, no edema. Ears: TMs well visualized, no effusions. Nose: No congestion, mucosa normal. Mouth: Throat clear, no lip or tongue edema.   Cardiac: Regular rate and rhythm, no murmurs, rubs or gallops  Respiratory: Clear to auscultation bilaterally, no adventitious breath sounds    Skin: No rashes or lesions  Psych: Alert and oriented times 3    Impression report and plan:  1.  Allergic rhinitis  2.  Mild persistent asthma    Okay to continue with Dulera for now.  Refills provided.  Patient should follow-up with me in 6 months.  Patient has been late for multiple appointments and has no showed on several appointments recently.  I did discuss this in detail with patient.  Customer advocacy is aware.  There is a letter scanned into the record regarding their policy.  The importance of not taking 56 day breaks from allergy shots was discussed.  We also discussed that the patient cannot come every week for her allergy shot as there is no data to support this.  I recommended 2-4 week intervals.  No other further allergy testing is needed at this time.  I stated I would not retest her for maple or dog as this is not clinically indicated and has been exhausted in the " past.    Time spent with patient, 25 minutes, greater than half spent counseling and coordination of care regarding asthma and allergies.

## 2021-06-19 NOTE — LETTER
Letter by Nelia Gann MD at      Author: Nelia Gann MD Service: -- Author Type: --    Filed:  Encounter Date: 4/15/2019 Status: (Other)         April 15, 2019     Artur Marsh MD  [address]  [facility] [address]    Patient: Laxmi Ya   MR Number: [id number]   YOB: 1975   Date of Visit: 4/15/2019     Dear Dr. Maximo MD:    I had the pleasure of seeing Laxmi in the [facility] allergy and asthma clinic in follow-up.  She is doing well with her shots and asthma.  I have included my note for your review.    If you have questions, please do not hesitate to call me.     Sincerely,        Nelia Gann MD        CC  No Recipients    Progress Notes:  Chief complaint:  Allergy and asthma follow-up    History of present illness:  This is a 44-year-old woman who is here today for follow-up of her allergies and asthma.  She has been on allergy shots for 2 years.  Of note, she was on allergy shots previously and restarted.  She has had more improvement in her nasal congestion and drainage continues on Flonase.  He uses Astelin as needed.  She has a history of asthma and recently had a neck surgery.  While in the hospital, she was switched to Breo and continues on this.  She feels that it works better for her than the Dulera.  She had rare need for her albuterol inhaler.  ACT score today is 20.  Denies any cough, wheeze or shortness of breath.    Patient also thinks she has a milk allergy.  She has no hives, swelling or shortness of breath after eating milk or dairy-containing products.  She states she does have bloating cramping and some diarrhea.  She has not tried Lactaid pills.    Past medical history, social history, family medical history, meds and allergies reviewed and updated accordingly.      Review of Systems performed as above and the remainder is negative.         Current Outpatient Medications:   ?  adalimumab 40 mg/0.8 mL PnKt, Inject 40 mg under the  skin., Disp: , Rfl:   ?  albuterol (PROAIR HFA) 90 mcg/actuation inhaler, Inhale 2 puffs every 4 (four) hours as needed for wheezing., Disp: 1 Inhaler, Rfl: 0  ?  amoxicillin-clavulanate (AUGMENTIN) 875-125 mg per tablet, 1 tablet 2 (two) times a day. For 14 days, Disp: , Rfl: 0  ?  azelastine (ASTELIN) 137 mcg (0.1 %) nasal spray, Place 2 Sprays into both nostrils two times a day, Disp: , Rfl:   ?  ciprofloxacin HCl (CIPRO) 500 MG tablet, Take 500 mg by mouth., Disp: , Rfl:   ?  DULoxetine (CYMBALTA) 60 MG capsule, , Disp: , Rfl:   ?  fexofenadine (ALLEGRA) 180 MG tablet, Take 180 mg by mouth daily., Disp: , Rfl:   ?  fluconazole (DIFLUCAN) 150 MG tablet, , Disp: , Rfl:   ?  fluticasone (FLONASE) 50 mcg/actuation nasal spray, , Disp: , Rfl:   ?  ibuprofen (ADVIL,MOTRIN) 800 MG tablet, , Disp: , Rfl:   ?  levonorgestrel-ethinyl estradiol (NORDETTE) 0.15-0.03 mg per tablet, Take 1 tablet by mouth daily., Disp: , Rfl:   ?  LYRICA 150 mg capsule, , Disp: , Rfl:   ?  mometasone-formoterol (DULERA) 200-5 mcg/actuation HFAA inhaler, Inhale 2 puffs 2 (two) times a day., Disp: 3 Inhaler, Rfl: 1  ?  montelukast (SINGULAIR) 10 mg tablet, Take 1 tablet (10 mg total) by mouth daily., Disp: 90 tablet, Rfl: 4  ?  naproxen (NAPROSYN) 500 MG tablet, as needed., Disp: , Rfl: 0  ?  nystatin-triamcinolone (MYCOLOG) ointment, , Disp: , Rfl:   ?  omeprazole (PRILOSEC) 40 MG capsule, , Disp: , Rfl:   ?  RETAINE MGD, PF, 0.5-0.5 % Dpet, USE THE DROPS AS NEEDED FOR DRYNESS, Disp: , Rfl: 99  ?  tablet cutter Misc, Use As Directed. Blink eye gel, uses daily, Disp: , Rfl:   ?  tobramycin-dexamethasone (TOBRADEX) ophthalmic solution, Place 1 Drop into both eyes 4 times a day., Disp: , Rfl:   ?  cyclobenzaprine (FLEXERIL) 10 MG tablet, , Disp: , Rfl:   ?  fluticasone furoate-vilanterol (BREO ELLIPTA) 200-25 mcg/dose DsDv inhaler, Inhale 1 puff daily., Disp: 3 each, Rfl: 1  ?  losartan (COZAAR) 50 MG tablet, Take 50 mg by mouth daily., Disp: ,  "Rfl:   ?  metroNIDAZOLE (FLAGYL) 500 MG tablet, TAKE 1 TABLET BY MOUTH THREE TIMES A DAY FOR 10 DAYS., Disp: , Rfl: 0  ?  metroNIDAZOLE (METROCREAM) 0.75 % cream, Apply topically., Disp: , Rfl:   ?  olopatadine (PATANOL) 0.1 % ophthalmic solution, Apply 1 drop to eye every 12 (twelve) hours., Disp: 5 mL, Rfl: 1  ?  olopatadine 0.2 % Drop, INSTILL 1 DROP IN BOTH EYES EVERY 12 HOURS AS NEEDED, Disp: 17.5 mL, Rfl: 0  ?  prednisoLONE acetate (PRED-FORTE) 1 % ophthalmic suspension, PLACE 1 DROP INTO BOTH EYES TWO TIMES A DAY., Disp: , Rfl: 1  ?  prednisoLONE acetate (PRED-FORTE) 1 % ophthalmic suspension, Place 1 Drop into both eyes two times a day., Disp: , Rfl:     Allergies   Allergen Reactions   ? Codeine    ? Gluten Other (See Comments)     Dizziness, tired, rash, stomach cramps, thrush, mouth sores   ? Hydrocodone-Acetaminophen    ? Tramadol Nausea And Vomiting       Pulse 84   Ht 5' 5\" (1.651 m)   Wt (!) 229 lb (103.9 kg)   BMI 38.11 kg/m     Gen: Pleasant female not in acute distress  HEENT: Eyes no erythema of the bulbar or palpebral conjunctiva, no edema. Ears: TMs well visualized, no effusions. Nose: No congestion, mucosa normal. Mouth: Throat clear, no lip or tongue edema.   Cardiac: Regular rate and rhythm, no murmurs, rubs or gallops  Respiratory: Clear to auscultation bilaterally, no adventitious breath sounds    Skin: No rashes or lesions  Psych: Alert and oriented times 3    Spirometry was performed.  FEV1 to FVC ratio 82%.  FEV1 2.63 L or 86% predicted.  FVC 3.20 L or 85% of predicted.    Impression report and plan:  1.  Allergic rhinitis  2.  Mild persistent asthma  3.  Food intolerance    Explained that symptoms with milk are not consistent with an IgE mediated food allergy.  For this reason she is not a candidate for food allergy testing.  She could try Lactaid tablets over-the-counter to see if this may help her symptoms.  There is no food intolerance test that is evidence-based for milk.  " Continue with allergy shots.  Follow in 6 months.  Okay to continue on Brio but I would like to decrease her to the 100-25 dose in 6 months if she continues to do well.  Repeat spirometry at that visit.  Notify of systemic reaction.  The importance of not taking 56 day breaks from allergy shots was discussed.    Time spent with patient above and beyond allergy shot administration, 25 minutes, greater than half spent counseling and coordination of care regarding allergies, asthma and food intolerance.

## 2021-07-08 ENCOUNTER — TRANSFERRED RECORDS (OUTPATIENT)
Dept: HEALTH INFORMATION MANAGEMENT | Facility: CLINIC | Age: 46
End: 2021-07-08

## 2021-07-16 ENCOUNTER — OFFICE VISIT (OUTPATIENT)
Dept: ALLERGY | Facility: CLINIC | Age: 46
End: 2021-07-16
Payer: MEDICARE

## 2021-07-16 VITALS — WEIGHT: 221 LBS | HEART RATE: 65 BPM | BODY MASS INDEX: 35.52 KG/M2 | OXYGEN SATURATION: 98 % | HEIGHT: 66 IN

## 2021-07-16 DIAGNOSIS — Z01.82 ENCOUNTER FOR ALLERGY TESTING: ICD-10-CM

## 2021-07-16 DIAGNOSIS — J32.4 CHRONIC PANSINUSITIS: ICD-10-CM

## 2021-07-16 DIAGNOSIS — J45.30 MILD PERSISTENT ASTHMA WITHOUT COMPLICATION: Primary | ICD-10-CM

## 2021-07-16 DIAGNOSIS — J30.89 ALLERGIC RHINITIS CAUSED BY MOLD: ICD-10-CM

## 2021-07-16 PROCEDURE — 95004 PERQ TESTS W/ALRGNC XTRCS: CPT | Performed by: ALLERGY & IMMUNOLOGY

## 2021-07-16 PROCEDURE — 99214 OFFICE O/P EST MOD 30 MIN: CPT | Mod: 25 | Performed by: ALLERGY & IMMUNOLOGY

## 2021-07-16 PROCEDURE — 99000 SPECIMEN HANDLING OFFICE-LAB: CPT | Performed by: ALLERGY & IMMUNOLOGY

## 2021-07-16 PROCEDURE — 86769 SARS-COV-2 COVID-19 ANTIBODY: CPT | Mod: 90 | Performed by: ALLERGY & IMMUNOLOGY

## 2021-07-16 PROCEDURE — 86003 ALLG SPEC IGE CRUDE XTRC EA: CPT | Performed by: ALLERGY & IMMUNOLOGY

## 2021-07-16 PROCEDURE — 36415 COLL VENOUS BLD VENIPUNCTURE: CPT | Performed by: ALLERGY & IMMUNOLOGY

## 2021-07-16 RX ORDER — MULTIVIT WITH MINERALS/LUTEIN
1000 TABLET ORAL
COMMUNITY
End: 2022-03-10

## 2021-07-16 RX ORDER — ONDANSETRON 4 MG/1
8 TABLET, ORALLY DISINTEGRATING ORAL
COMMUNITY
Start: 2020-09-19 | End: 2022-03-03

## 2021-07-16 RX ORDER — VANCOMYCIN HYDROCHLORIDE 250 MG/1
CAPSULE ORAL
COMMUNITY
Start: 2021-06-04 | End: 2022-03-03

## 2021-07-16 RX ORDER — CEFPODOXIME PROXETIL 200 MG/1
200 TABLET, FILM COATED ORAL 2 TIMES DAILY
COMMUNITY
Start: 2021-01-29 | End: 2022-03-03

## 2021-07-16 RX ORDER — OXYBUTYNIN CHLORIDE 5 MG/1
TABLET, EXTENDED RELEASE ORAL
COMMUNITY
Start: 2020-12-16 | End: 2022-03-03

## 2021-07-16 RX ORDER — METFORMIN HCL 500 MG
500 TABLET, EXTENDED RELEASE 24 HR ORAL DAILY
COMMUNITY
Start: 2021-05-18 | End: 2023-03-17

## 2021-07-16 RX ORDER — FIDAXOMICIN 200 MG/1
200 TABLET, FILM COATED ORAL
COMMUNITY
Start: 2021-07-09 | End: 2021-07-23

## 2021-07-16 RX ORDER — METRONIDAZOLE 500 MG/1
TABLET ORAL
COMMUNITY
Start: 2021-06-19 | End: 2022-03-03

## 2021-07-16 ASSESSMENT — ASTHMA QUESTIONNAIRES
QUESTION_2 LAST FOUR WEEKS HOW OFTEN HAVE YOU HAD SHORTNESS OF BREATH: ONCE OR TWICE A WEEK
ACT_TOTALSCORE: 20
QUESTION_3 LAST FOUR WEEKS HOW OFTEN DID YOUR ASTHMA SYMPTOMS (WHEEZING, COUGHING, SHORTNESS OF BREATH, CHEST TIGHTNESS OR PAIN) WAKE YOU UP AT NIGHT OR EARLIER THAN USUAL IN THE MORNING: ONCE OR TWICE
QUESTION_5 LAST FOUR WEEKS HOW WOULD YOU RATE YOUR ASTHMA CONTROL: WELL CONTROLLED
QUESTION_4 LAST FOUR WEEKS HOW OFTEN HAVE YOU USED YOUR RESCUE INHALER OR NEBULIZER MEDICATION (SUCH AS ALBUTEROL): ONCE A WEEK OR LESS
QUESTION_1 LAST FOUR WEEKS HOW MUCH OF THE TIME DID YOUR ASTHMA KEEP YOU FROM GETTING AS MUCH DONE AT WORK, SCHOOL OR AT HOME: A LITTLE OF THE TIME

## 2021-07-16 ASSESSMENT — MIFFLIN-ST. JEOR: SCORE: 1664.2

## 2021-07-16 NOTE — PROGRESS NOTES
"      Subjective       HPI chief complaint: Allergies    History of present illness: This is a 46-year-old woman I have seen previously for allergies.  She is here today as she reports that her allergy symptoms have worsened.  She is been having some sinus infections.  Most recently was in the emergency room last week.  She is currently on prednisone and an antibiotic.  She has been on multiple rounds of antibiotics.  She has had 4 sinus surgeries and is followed by [facility].  She was on allergy shots previously.  She had intradermally positive to molds back in 2017.  She quit to allergy shots during the pandemic but her allergy symptoms have worsened and she would like to be retested to see if she can restart allergy shots.  She currently has a history of asthma and is taking Breo.  She reports she does not use it regularly or as needed.  No need for albuterol.  She reports that her ACT score today is 20.  No ER visits or hospitalizations for asthma.  No prednisone use for asthma.        Review of Systems         Objective    Pulse 65   Ht 1.676 m (5' 6\")   Wt 100.2 kg (221 lb)   SpO2 98%   BMI 35.67 kg/m    Body mass index is 35.67 kg/m .  Physical Exam      Gen: Pleasant female not in acute distress  HEENT: Eyes no erythema of the bulbar or palpebral conjunctiva, no edema.. Nose: No congestion, mucosa normal. Mouth: Throat clear, no lip or tongue edema.   Cardiac: Regular rate and rhythm, no murmurs, rubs or gallops  Respiratory: Clear to auscultation bilaterally, no adventitious breath sounds    Skin: No rashes or lesions  Psych: Alert and oriented times 3    30 percutaneous test were placed to the emergency skin test panel.  Positive histamine control.  Negative skin test.  Please see scanned photograph under the media tab the same date.    Impression report and plan:    1.  Chronic sinusitis    Allergy testing negative today.  Check specific IgE testing to see if we may be missing an allergen.  I " suspect this will be negative as well.  I would like to obtain her records from [facility].  Given her repeated sinus infections and chronic sinusitis as well as surgeries, could consider Dupixent.  Aware of the risk and benefits medication.  Pending the records obtained, will send for Dupixent.     2.  Mild admission asthma    She should follow in 12 months for repeat asthma check and spirometry at that time.  Notify if using Breo regularly.  Okay to continue to use it in the yellow zone of her asthma action plan.    Time spent with the patient chart review and documentation on day of service 30 minutes.

## 2021-07-16 NOTE — LETTER
"    7/16/2021         RE: Laxmi Ya  [address] [place] [id number]        Dear Colleague,    Thank you for referring your patient, Laxmi Ya, to the [facility]. Please see a copy of my visit note below.          Subjective       HPI chief complaint: Allergies    History of present illness: This is a 46-year-old woman I have seen previously for allergies.  She is here today as she reports that her allergy symptoms have worsened.  She is been having some sinus infections.  Most recently was in the emergency room last week.  She is currently on prednisone and an antibiotic.  She has been on multiple rounds of antibiotics.  She has had 4 sinus surgeries and is followed by [facility].  She was on allergy shots previously.  She had intradermally positive to molds back in 2017.  She quit to allergy shots during the pandemic but her allergy symptoms have worsened and she would like to be retested to see if she can restart allergy shots.  She currently has a history of asthma and is taking Breo.  She reports she does not use it regularly or as needed.  No need for albuterol.  She reports that her ACT score today is 20.  No ER visits or hospitalizations for asthma.  No prednisone use for asthma.        Review of Systems         Objective    Pulse 65   Ht 1.676 m (5' 6\")   Wt 100.2 kg (221 lb)   SpO2 98%   BMI 35.67 kg/m    Body mass index is 35.67 kg/m .  Physical Exam      Gen: Pleasant female not in acute distress  HEENT: Eyes no erythema of the bulbar or palpebral conjunctiva, no edema.. Nose: No congestion, mucosa normal. Mouth: Throat clear, no lip or tongue edema.   Cardiac: Regular rate and rhythm, no murmurs, rubs or gallops  Respiratory: Clear to auscultation bilaterally, no adventitious breath sounds    Skin: No rashes or lesions  Psych: Alert and oriented times 3    30 percutaneous test were placed to the emergency skin test panel.  Positive histamine control.  " Negative skin test.  Please see scanned photograph under the media tab the same date.    Impression report and plan:    1.  Chronic sinusitis    Allergy testing negative today.  Check specific IgE testing to see if we may be missing an allergen.  I suspect this will be negative as well.  I would like to obtain her records from [facility].  Given her repeated sinus infections and chronic sinusitis as well as surgeries, could consider Dupixent.  Aware of the risk and benefits medication.  Pending the records obtained, will send for Dupixent.     2.  Mild admission asthma    She should follow in 12 months for repeat asthma check and spirometry at that time.  Notify if using Breo regularly.  Okay to continue to use it in the yellow zone of her asthma action plan.    Time spent with the patient chart review and documentation on day of service 30 minutes.        Again, thank you for allowing me to participate in the care of your patient.        Sincerely,        Nelia CAMPOS MD

## 2021-07-17 ASSESSMENT — ASTHMA QUESTIONNAIRES: ACT_TOTALSCORE: 20

## 2021-07-19 LAB
A ALTERNATA IGE QN: <0.1 KU(A)/L
A FUMIGATUS IGE QN: <0.1 KU(A)/L
C HERBARUM IGE QN: <0.1 KU(A)/L
CALIF WALNUT POLN IGE QN: <0.1 KU(A)/L
CAT DANDER IGG QN: <0.1 KU(A)/L
COMMON RAGWEED IGE QN: <0.1 KU(A)/L
COTTONWOOD IGE QN: <0.1 KU(A)/L
D FARINAE IGE QN: <0.1 KU(A)/L
D PTERONYSS IGE QN: <0.1 KU(A)/L
DOG DANDER+EPITH IGE QN: <0.1 KU(A)/L
E PURPURASCENS IGE QN: <0.1 KU(A)/L
GOOSEFOOT IGE QN: <0.1 KU(A)/L
JOHNSON GRASS IGE QN: <0.1 KU(A)/L
P NOTATUM IGE QN: <0.1 KU(A)/L
ROACH IGE QN: <0.1 KU(A)/L
SARS-COV-2 AB SERPL IA-ACNC: >250 U/ML
SARS-COV-2 AB SERPL-IMP: POSITIVE
SILVER BIRCH IGE QN: <0.1 KU(A)/L
TIMOTHY IGE QN: <0.1 KU(A)/L
WHITE ASH IGE QN: <0.1 KU(A)/L
WHITE ELM IGE QN: <0.1 KU(A)/L
WHITE HICKORY IGE QN: <0.35 KU/L
WHITE MULBERRY IGE QN: <0.1 KU(A)/L
WHITE OAK IGE QN: <0.1 KU(A)/L
WORMWOOD IGE QN: <0.1 KU(A)/L

## 2021-07-22 DIAGNOSIS — J32.4 CHRONIC PANSINUSITIS: Primary | ICD-10-CM

## 2021-07-22 RX ORDER — DUPILUMAB 300 MG/2ML
300 INJECTION, SOLUTION SUBCUTANEOUS
Qty: 4 ML | Refills: 2 | Status: SHIPPED | OUTPATIENT
Start: 2021-07-22 | End: 2021-09-10

## 2021-07-23 ENCOUNTER — TELEPHONE (OUTPATIENT)
Dept: ALLERGY | Facility: CLINIC | Age: 46
End: 2021-07-23

## 2021-07-23 NOTE — TELEPHONE ENCOUNTER
----- Message from Nelia Gann MD sent at 7/22/2021  2:41 PM CDT -----  Please let patient know that I received [facility]'s records.  I don't have all the records, but it looks like she may qualify for Dupixent.  However, I am not sure if this would interact with her rheumatology medications.  I have submitted for this.  She should discuss with her rheumatologist as well.    Follow in 3 months if decides to start Dupixent.

## 2021-07-23 NOTE — TELEPHONE ENCOUNTER
----- Message from Nelia Gann MD sent at 7/22/2021  2:41 PM CDT -----  Please let patient know that I received [facility]'s records.  I don't have all the records, but it looks like she may qualify for Dupixent.  However, I am not sure if this would interact with her rheumatology medications.  I have submitted for this.  She should discuss with her rheumatologist as well.    Follow in 3 months if decides to start Dupixent.

## 2021-07-24 ENCOUNTER — HEALTH MAINTENANCE LETTER (OUTPATIENT)
Age: 46
End: 2021-07-24

## 2021-07-26 NOTE — TELEPHONE ENCOUNTER
[employer] has currently open mundo for $1500/year or we can try for the TYFFON Nichole PAP. I would suggest to go for the PAP because the mundo would cover only one whole month.

## 2021-07-26 NOTE — TELEPHONE ENCOUNTER
Prior Authorization Approval    Authorization Effective Date: 4/24/2021  Authorization Expiration Date: 7/23/2022  Medication: Dupixent 300mg/2ml Pens  Approved Dose/Quantity: 2 pens for 28 days  Reference #: [id number]   Insurance Company: [email or web address] - Phone [phone] Fax [phone]  Expected CoPay: $1234.12     CoPay Card Available: No    Foundation Assistance Needed: Yes  Which Pharmacy is filling the prescription (Not needed for infusion/clinic administered):  Pending Patient Assistance Program  Pharmacy Notified: NoComment:  pending PAP  Patient Notified: Yes

## 2021-07-28 NOTE — TELEPHONE ENCOUNTER
2nd attempt to reach pt. 1st attempt was a detailed AG&Pt message. Today I LMPTCB - requesting a call back to go over options.

## 2021-07-30 ENCOUNTER — TELEPHONE (OUTPATIENT)
Dept: ALLERGY | Facility: CLINIC | Age: 46
End: 2021-07-30

## 2021-08-11 NOTE — TELEPHONE ENCOUNTER
Pt called and LM that she has turned in her information to [facility] and they are now only needing the provider's portion.

## 2021-08-17 NOTE — TELEPHONE ENCOUNTER
Spoke with Miladis at [facility] to check status of application- they need proof of income and need pt to call in to e-sign document stating that she will not seek for additional financial assistance if approved. She can call the main number and select the option to speak with a nurse. Emailed pt with this information

## 2021-08-19 NOTE — TELEPHONE ENCOUNTER
Called [facility] to check status - they are still waiting for pt's proof of income and for her to e-sign that form. So emailed pt and she replied that she would get those taken care of.

## 2021-08-25 NOTE — TELEPHONE ENCOUNTER
Called to check status with [facility] [employer] - still waiting on POI and esignature on that new form. The nurse has tried to reach out to the patient with no luck.

## 2021-09-10 DIAGNOSIS — J33.9 NOSE POLYP: Primary | ICD-10-CM

## 2021-09-10 DIAGNOSIS — J32.4 CHRONIC PANSINUSITIS: ICD-10-CM

## 2021-09-10 RX ORDER — DUPILUMAB 300 MG/2ML
300 INJECTION, SOLUTION SUBCUTANEOUS
Qty: 4 ML | Refills: 2 | Status: SHIPPED | OUTPATIENT
Start: 2021-09-10 | End: 2021-12-30

## 2021-09-14 NOTE — TELEPHONE ENCOUNTER
Called [facility] - verified they received the updates prescription we faxed yesterday and pt is pending approval. Should have determination made within the next 24-48 hours.

## 2021-09-18 ENCOUNTER — HEALTH MAINTENANCE LETTER (OUTPATIENT)
Age: 46
End: 2021-09-18

## 2021-09-20 NOTE — TELEPHONE ENCOUNTER
Faxed in form changed to pens because the form only has the option of syringes and not pens

## 2021-09-21 NOTE — TELEPHONE ENCOUNTER
Free Drug Application Approved  Effective Dates: 9/17/2021 - 12/31/2021  Patient notified? Yes  Additional Information: NA

## 2021-09-24 NOTE — TELEPHONE ENCOUNTER
"Pt states she was informed on 9/22/21 by [facility] that they have not received the corrected rx for pens nor the one with the updated corrected ICD-10 code.     Called to see what is going is going on with [employer] because this is the 2nd pt that [facility] and their pharmacy is not communicating properly. I was told that the provider's signature got cut off of the updated enrollment form and that the signature should be under the nasal polyps side. I questioned that stating \"one side for the signatures is for dispense as written and the other side is allowing substation\". The agent just kept repeating herself. So I said I will refax it over.     "

## 2021-09-24 NOTE — TELEPHONE ENCOUNTER
Called [facility] to see if I could verify that they received rx I refaxed over to them today. Agent confirms they received it and everything looks good to go. I requested this to be expedited as this entire process as been a real hassle and longer than usual process. She stated she will update the system right now and send it on over to the nurse to reach out to the patient but couldn't promise that the call out to pt would happen today given how late in the day is was.

## 2021-09-27 NOTE — TELEPHONE ENCOUNTER
Called to check order status and spoke with Meesha - nurse educator is scheduled to call pt to set up shipment today.

## 2021-10-04 NOTE — TELEPHONE ENCOUNTER
called "[email or web address]way because pt notified me that her shipment got cancelled because [facility] for dup[facility]ent pens to [employer]. Spoke with Kelsi and she assured me they got the rx for pens over to [employer] today.

## 2021-10-28 ENCOUNTER — TELEPHONE (OUTPATIENT)
Dept: ALLERGY | Facility: CLINIC | Age: 46
End: 2021-10-28
Payer: MEDICARE

## 2021-10-28 NOTE — TELEPHONE ENCOUNTER
Received rx request from [employer] for patient re-enrollment into the PAP

## 2021-11-01 ENCOUNTER — TRANSFERRED RECORDS (OUTPATIENT)
Dept: HEALTH INFORMATION MANAGEMENT | Facility: CLINIC | Age: 46
End: 2021-11-01
Payer: COMMERCIAL

## 2021-11-03 ENCOUNTER — TRANSFERRED RECORDS (OUTPATIENT)
Dept: HEALTH INFORMATION MANAGEMENT | Facility: CLINIC | Age: 46
End: 2021-11-03
Payer: MEDICARE

## 2021-11-04 ENCOUNTER — TRANSFERRED RECORDS (OUTPATIENT)
Dept: HEALTH INFORMATION MANAGEMENT | Facility: CLINIC | Age: 46
End: 2021-11-04
Payer: MEDICARE

## 2021-11-19 ENCOUNTER — TRANSFERRED RECORDS (OUTPATIENT)
Dept: HEALTH INFORMATION MANAGEMENT | Facility: CLINIC | Age: 46
End: 2021-11-19

## 2021-12-01 NOTE — TELEPHONE ENCOUNTER
Called [facility] [employer] to check status of 2022 re-enrollment - they received the rx provider portion but need the pt's portion. I told her that was not included in the faxed that got sent to us for 2022 re-enrollment on 10/27/2021. She put me on hold to check if she is able to fax that section that is missing since it is different from the [employer] Enrollment form. She will fax me a form but pt can also call in and do it over the phone. Sent pt [employer] message to inform along with the phone number to call.

## 2021-12-08 ENCOUNTER — TRANSFERRED RECORDS (OUTPATIENT)
Dept: HEALTH INFORMATION MANAGEMENT | Facility: CLINIC | Age: 46
End: 2021-12-08
Payer: MEDICARE

## 2021-12-21 ENCOUNTER — TELEPHONE (OUTPATIENT)
Dept: ALLERGY | Facility: CLINIC | Age: 46
End: 2021-12-21
Payer: MEDICARE

## 2021-12-21 ENCOUNTER — TELEPHONE (OUTPATIENT)
Dept: NURSING | Facility: CLINIC | Age: 46
End: 2021-12-21

## 2021-12-21 NOTE — TELEPHONE ENCOUNTER
"Returned patient's call and got her scheduled with Dr. Cristobal 12/22/2021.     Samantha OLMEDO LPN  Hepatology Clinic       .  [facility]: Nurse Triage Note  SITUATION/BACKGROUND                                                      Laxmi Ya is a 46 year old female who calls with right sided abdominal pain and multiple symptoms. Temperature of  100.5 this morning.   Per patient, she was seen at [facility] yesterday and informed to follow up with Hepatology due to liver infection.   Pain is currently 10/10. Has taken pain medication - did not disclose name of medication. No relief. This nurse advised patient to return to ED. However, she responded, \"No thanks, they do not have specialist and can not do anything for me; order is needed for MRI ... etc...   She has an appointment in Hepatology on 3/16/22.   Patient placed on hold and contacted Hepatology. Consulting with Nurse Cano and report given. Patient hung up prior to transfer.   Hepatology nurseChristine will call patient back for further assistance at [phone].   NICANOR routed to Hepatology     "

## 2021-12-21 NOTE — TELEPHONE ENCOUNTER
Patient has appt with LEONCIO Gann on 2-7-22 for followup.  She wants to do food allergy testing that same day and needs new med as insurance is changing.  Please call.

## 2021-12-22 ENCOUNTER — OFFICE VISIT (OUTPATIENT)
Dept: GASTROENTEROLOGY | Facility: CLINIC | Age: 46
End: 2021-12-22
Attending: INTERNAL MEDICINE
Payer: MEDICARE

## 2021-12-22 VITALS
HEART RATE: 89 BPM | SYSTOLIC BLOOD PRESSURE: 132 MMHG | OXYGEN SATURATION: 98 % | WEIGHT: 214.6 LBS | BODY MASS INDEX: 34.64 KG/M2 | DIASTOLIC BLOOD PRESSURE: 95 MMHG

## 2021-12-22 DIAGNOSIS — K75.81 NASH (NONALCOHOLIC STEATOHEPATITIS): Primary | ICD-10-CM

## 2021-12-22 DIAGNOSIS — K57.32 DIVERTICULITIS OF COLON: ICD-10-CM

## 2021-12-22 PROCEDURE — 99215 OFFICE O/P EST HI 40 MIN: CPT | Mod: GC | Performed by: INTERNAL MEDICINE

## 2021-12-22 RX ORDER — SUCRALFATE ORAL 1 G/10ML
SUSPENSION ORAL 4 TIMES DAILY PRN
COMMUNITY
Start: 2021-12-08 | End: 2022-11-25

## 2021-12-22 RX ORDER — ONDANSETRON 8 MG/1
1 TABLET, ORALLY DISINTEGRATING ORAL
COMMUNITY
Start: 2020-09-30 | End: 2022-03-03

## 2021-12-22 RX ORDER — NYSTATIN 100000/ML
SUSPENSION, ORAL (FINAL DOSE FORM) ORAL
COMMUNITY
Start: 2021-12-20

## 2021-12-22 ASSESSMENT — PAIN SCALES - GENERAL: PAINLEVEL: EXTREME PAIN (9)

## 2021-12-22 NOTE — PROGRESS NOTES
Date of Service: 12/22/2021     Subjective:            Laxmi Ya is a 46 year old female presenting for evaluation of liver disease    - Etiology: COLON    History of Present Illness   Laxmi Ya is a 46 year old female with past medical history of COLON, RA on abatacept who is coming in for follow-up.    Patient had abnormal liver enzymes dated back to 2010, at that time work-up was negative.  She did had diffuse hepatic steatosis with small indeterminate lesion thought to be fatty sparing areas.  MR elastography was normal.  She repeated ultrasound as well as MR elastography in December 2020 showing hepatic steatosis but no fibrosis.    Patient has rheumatoid arthritis and was initially on Remicade infusion which was switched to golimumab which caused her to have abdominal pain and diverticulitis, later on it was switched to abatacept.  Patient has been struggling with transverse colon diverticulitis with abdominal pain as well.  Patient stated that her recent episode was few days back when she presented to the emergency department.  That day she had a few alcohol drinks and noticed to have some elevation in her AST/ALT.  Previously her liver enzymes have been normal on 12/7 and in November 2021.    Patient states that she drinks couple of drinks every few months.  She has lost 18 pounds in the last 1 month, watching her diet and salt intake.  She avoids gluten.    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     Obesity      Pyelonephritis, unspecified 1992    abstract     Rheumatoid arteritis (H)      Unspecified symptom associated with female genital organs 05/07/1999    chronic pelvic pain abstract       Surgical  History:  Past Surgical History:   Procedure Laterality Date     CHOLECYSTECTOMY       COLON SURGERY       CYSTOSCOPY,+URETEROSCOPY      abstract     HC EXCIS/CURET BENIGN ELBOW LESN  10/26/1987    (R) humerus abstract       Social History:  Social History     Tobacco Use     Smoking status: Never Smoker     Smokeless tobacco: Never Used   Substance Use Topics     Alcohol use: Yes     Drug use: No       Family History:  Family History   Problem Relation Age of Onset     Hypertension Father         abstract     Cancer Paternal Aunt         gallbladder cancer abstract       Medications:  Current Outpatient Medications   Medication     Albuterol Sulfate (PROAIR HFA IN)     Artificial Tear Solution (SOOTHE XP) SOLN     atenolol (TENORMIN) 50 MG tablet     docusate sodium (COLACE) 50 MG capsule     Fexofenadine HCl (ALLEGRA PO)     fidaxomicin (DIFICID) 200 MG tablet     fluticasone (FLONASE) 50 MCG/ACT nasal spray     inFLIXimab (REMICADE) 100 MG injection     losartan (COZAAR) 100 MG tablet     metFORMIN (GLUCOPHAGE-XR) 500 MG 24 hr tablet     Mometasone Furo-Formoterol Fum (DULERA IN)     Montelukast Sodium (SINGULAIR PO)     nystatin (MYCOSTATIN) 331618 UNIT/ML suspension     olopatadine (PAZEO) 0.7 % ophthalmic solution     OMEPRAZOLE PO     ondansetron (ZOFRAN-ODT) 8 MG ODT tab     spironolactone-HCTZ (ALDACTAZIDE) 25-25 MG tablet     tobramycin (TOBREX) 0.3 % ophthalmic solution     tobramycin-dexamethasone (TOBRADEX) 0.3-0.1 % ophthalmic ointment     amLODIPine (NORVASC) 5 MG tablet     azelastine (ASTELIN) 0.1 % nasal spray     cefpodoxime (VANTIN) 200 MG tablet     cholecalciferol (VITAMIN D-1000 MAX ST) 25 MCG (1000 UT) TABS     ciprofloxacin (CIPRO) 500 MG tablet     diclofenac (VOLTAREN) 1 % topical gel     DULoxetine (CYMBALTA) 20 MG capsule     DULoxetine (CYMBALTA) 60 MG capsule     Dupilumab (DUPIXENT) 300 MG/2ML SOPN     EPINEPHrine (ANY BX GENERIC EQUIV) 0.3 MG/0.3ML injection 2-pack     fluconazole  (DIFLUCAN) 100 MG tablet     fluconazole (DIFLUCAN) 150 MG tablet     fluticasone-vilanterol (BREO ELLIPTA) 200-25 MCG/INH inhaler     ibuprofen (IBU) 800 MG tablet     Levonorgestrel-Ethinyl Estrad (LEVORA 0.15/30, 28, PO)     lidocaine (LIDODERM) 5 % patch     metroNIDAZOLE (FLAGYL) 500 MG tablet     metroNIDAZOLE (METROLOTION) 0.75 % external lotion     mupirocin (BACTROBAN) 2 % external ointment     NAPROXEN PO     norethindrone (MICRONOR) 0.35 MG tablet     ondansetron (ZOFRAN-ODT) 4 MG ODT tab     oxybutynin ER (DITROPAN-XL) 5 MG 24 hr tablet     oxyCODONE IR (ROXICODONE) 10 MG tablet     penicillin V (VEETID) 500 MG tablet     predniSONE (DELTASONE) 5 MG tablet     Probiotic Product ("[employer] PO)     sucralfate (CARAFATE) 1 GM/10ML suspension     triamcinolone (KENALOG) 0.1 % external cream     vancomycin (VANCOCIN) 250 MG capsule     vitamin E (TOCOPHEROL) 1000 units (450 mg) capsule     No current facility-administered medications for this visit.       Review of Systems  A complete 10 point review of systems was asked and answered in the negative unless specifically commented upon in the HPI    Objective:         Vitals:    12/22/21 0941   BP: (!) 132/95   Pulse: 89   SpO2: 98%   Weight: 97.3 kg (214 lb 9.6 oz)     Body mass index is 34.64 kg/m .     Physical Exam  Constitutional: Well-developed, well-nourished, very anxious.  HEENT: Normocephalic. No scleral icterus. Moist oral mucosa.   Neck/Lymph: Normal ROM, supple.   Cardiac:  Regular rate and rhythm.  No overt murmurs  Respiratory: Clear to auscultation bilaterally.  No wheezes or rales  GI:  Abdomen soft, non-distended, nB/L upper quadrant tender. BS present. No shifting dullness.  Small ulcer on abd skin.  Skin:  Skin is warm and dry. No jaundice.   Peripheral Vascular: No lower extremity edema. 2+ pulses in all extremities  Musculoskeletal:  ROM intact.  Psychiatric: Anxious  Neuro:  No asterixis.    Labs and Diagnostic tests:  Lab  Results   Component Value Date     04/01/2020    POTASSIUM 4.2 04/01/2020    CHLORIDE 105 04/01/2020    CO2 24 04/01/2020    BUN 11 04/01/2020    CR 0.73 04/01/2020     Lab Results   Component Value Date    BILITOTAL 0.6 04/01/2020    ALT 36 04/01/2020    AST 24 04/01/2020    ALKPHOS 82 04/01/2020     Lab Results   Component Value Date    ALBUMIN 3.9 04/01/2020    PROTTOTAL 8.1 04/01/2020      Lab Results   Component Value Date    WBC 9.3 04/01/2020    HGB 15.8 04/01/2020    MCV 85 04/01/2020     04/01/2020     Lab Results   Component Value Date    INR 0.93 04/01/2020       Imaging:  US ABD 4/1/2020:  IMPRESSION:   1.  Diffusely increased echogenicity of the hepatic parenchyma,  compatible with steatosis.  2.   Redemonstration of two hypodense lesions in the right hepatic  lobe measuring 1.4 cm and 0.7 cm, not significantly changed in size  since the prior ultrasound on 12/19/2016.    MR Elastography 12/16/2020:  1. Normal liver stiffness.   2. Hepatic steatosis.     The liver MRE only protocol (or Hepatogram protocol) is a limited MRI protocol   designed to quantitatively assess liver fat and fibrosis. The imaging techniques   used in this protocol are not suitable for detection or characterization of   focal liver lesions and evaluation of lesions in other organs.      Procedures:    EGD 12/14/21:  Post-op Diagnoses:        - Normal esophagus.        - Esophagogastric landmarks identified.        - Normal stomach.        - Normal examined duodenum. Fluid aspiration performed. Biopsied.    Colonoscopy 12/14/21:  Post-op Diagnoses:        - The examined portion of the ileum was normal. Biopsied.        - Diverticulosis in the sigmoid colon, in the descending colon and in        the transverse colon.        - Patent functional end-to-end colo-colonic anastomosis, characterized        by healthy appearing mucosa.        - Normal mucosa in the entire examined colon. Biopsied.        - External and internal  hemorrhoids.    Assessment and Plan:  46 year old female with past medical history of COLON, RA on abatacept who is coming in for follow-up.    COLON:  Patient had MR elastography in December 2020 with no fibrosis and redemonstration of hepatic steatosis.  Previously she had indeterminate lesion which were thought to be fat sparing areas.  No new lesions seen upon recent CT abdomen at outside facility.  Would recommend to limit dietary intake and work on calorie counting.  Patient was recently diagnosed with SIBO at outside facility but wants to follow-up with GI over here.  We can place GI referral for treatment of SIBO/SCAD.  She has appointment with [facility] on January 5 which she will keep but later on we will plan to follow-up with GI at [facility].    --Repeat labs in 1-2 months  --Recommend to lose at least 10 pounds in the next 6 months  --Limit alcohol intake  --Referral for luminal GI at [facility]  --Referral for ID at [facility]  --Ultrasound abdomen/MRI in 2022  --Follow-up in 6 months    Follow Up:  - 6 months    Pt was seen and discussed with Dr. Cristobal.  Thank you very much for the opportunity to participate in the care of this patient.  If you have any further questions, please don't hesitate to contact our office.    Nereyda Farley M.D.  Advanced & Transplant Hepatology Fellow  [facility]    Attestation:  This patient has been seen and evaluated by me, Cheryl Cristobal.  Discussed with the house staff team or resident(s) and agree with the findings and plan in this note.

## 2021-12-22 NOTE — TELEPHONE ENCOUNTER
LMPTCB to touch base in regards to providing [employer] with the info they need from her.   Left a detailed message with the information below, normal TSH.  The office phone number was provided for additional questions/concerns.

## 2021-12-22 NOTE — LETTER
12/22/2021     RE: Laxmi Ya  [address] [place] [id number]    Dear Colleague,    Thank you for referring your patient, Laxmi Ya, to the [facility] [place]. Please see a copy of my visit note below.    Date of Service: 12/22/2021     Subjective:            Laxmi Ya is a 46 year old female presenting for evaluation of liver disease    - Etiology: COLON    History of Present Illness   Laxmi Ya is a 46 year old female with past medical history of COLON, RA on abatacept who is coming in for follow-up.    Patient had abnormal liver enzymes dated back to 2010, at that time work-up was negative.  She did had diffuse hepatic steatosis with small indeterminate lesion thought to be fatty sparing areas.  MR elastography was normal.  She repeated ultrasound as well as MR elastography in December 2020 showing hepatic steatosis but no fibrosis.    Patient has rheumatoid arthritis and was initially on Remicade infusion which was switched to golimumab which caused her to have abdominal pain and diverticulitis, later on it was switched to abatacept.  Patient has been struggling with transverse colon diverticulitis with abdominal pain as well.  Patient stated that her recent episode was few days back when she presented to the emergency department.  That day she had a few alcohol drinks and noticed to have some elevation in her AST/ALT.  Previously her liver enzymes have been normal on 12/7 and in November 2021.    Patient states that she drinks couple of drinks every few months.  She has lost 18 pounds in the last 1 month, watching her diet and salt intake.  She avoids gluten.    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     Obesity      Pyelonephritis, unspecified 1992    abstract     Rheumatoid arteritis (H)      Unspecified symptom associated with female genital organs 05/07/1999    chronic pelvic pain abstract       Surgical History:  Past Surgical History:   Procedure Laterality Date     CHOLECYSTECTOMY       COLON SURGERY       CYSTOSCOPY,+URETEROSCOPY      abstract     HC EXCIS/CURET BENIGN ELBOW LESN  10/26/1987    (R) humerus abstract       Social History:  Social History     Tobacco Use     Smoking status: Never Smoker     Smokeless tobacco: Never Used   Substance Use Topics     Alcohol use: Yes     Drug use: No       Family History:  Family History   Problem Relation Age of Onset     Hypertension Father         abstract     Cancer Paternal Aunt         gallbladder cancer abstract       Medications:  Current Outpatient Medications   Medication     Albuterol Sulfate (PROAIR HFA IN)     Artificial Tear Solution (SOOTHE XP) SOLN     atenolol (TENORMIN) 50 MG tablet     docusate sodium (COLACE) 50 MG capsule     Fexofenadine HCl (ALLEGRA PO)     fidaxomicin (DIFICID) 200 MG tablet     fluticasone (FLONASE) 50 MCG/ACT nasal spray     inFLIXimab (REMICADE) 100 MG injection     losartan (COZAAR) 100 MG tablet     metFORMIN (GLUCOPHAGE-XR) 500 MG 24 hr tablet     Mometasone Furo-Formoterol Fum (DULERA IN)     Montelukast Sodium (SINGULAIR PO)     nystatin (MYCOSTATIN) 176074 UNIT/ML suspension     olopatadine (PAZEO) 0.7 % ophthalmic solution     OMEPRAZOLE PO     ondansetron (ZOFRAN-ODT) 8 MG ODT tab     spironolactone-HCTZ (ALDACTAZIDE) 25-25 MG tablet     tobramycin (TOBREX) 0.3 % ophthalmic solution     tobramycin-dexamethasone (TOBRADEX) 0.3-0.1 % ophthalmic ointment     amLODIPine (NORVASC) 5 MG tablet     azelastine (ASTELIN) 0.1 % nasal spray     cefpodoxime (VANTIN) 200 MG tablet     cholecalciferol (VITAMIN D-1000 MAX ST) 25 MCG (1000 UT) TABS     ciprofloxacin (CIPRO) 500 MG tablet      diclofenac (VOLTAREN) 1 % topical gel     DULoxetine (CYMBALTA) 20 MG capsule     DULoxetine (CYMBALTA) 60 MG capsule     Dupilumab (DUPIXENT) 300 MG/2ML SOPN     EPINEPHrine (ANY BX GENERIC EQUIV) 0.3 MG/0.3ML injection 2-pack     fluconazole (DIFLUCAN) 100 MG tablet     fluconazole (DIFLUCAN) 150 MG tablet     fluticasone-vilanterol (BREO ELLIPTA) 200-25 MCG/INH inhaler     ibuprofen (IBU) 800 MG tablet     Levonorgestrel-Ethinyl Estrad (LEVORA 0.15/30, 28, PO)     lidocaine (LIDODERM) 5 % patch     metroNIDAZOLE (FLAGYL) 500 MG tablet     metroNIDAZOLE (METROLOTION) 0.75 % external lotion     mupirocin (BACTROBAN) 2 % external ointment     NAPROXEN PO     norethindrone (MICRONOR) 0.35 MG tablet     ondansetron (ZOFRAN-ODT) 4 MG ODT tab     oxybutynin ER (DITROPAN-XL) 5 MG 24 hr tablet     oxyCODONE IR (ROXICODONE) 10 MG tablet     penicillin V (VEETID) 500 MG tablet     predniSONE (DELTASONE) 5 MG tablet     Probiotic Product (MusicXray PO)     sucralfate (CARAFATE) 1 GM/10ML suspension     triamcinolone (KENALOG) 0.1 % external cream     vancomycin (VANCOCIN) 250 MG capsule     vitamin E (TOCOPHEROL) 1000 units (450 mg) capsule     No current facility-administered medications for this visit.       Review of Systems  A complete 10 point review of systems was asked and answered in the negative unless specifically commented upon in the HPI    Objective:         Vitals:    12/22/21 0941   BP: (!) 132/95   Pulse: 89   SpO2: 98%   Weight: 97.3 kg (214 lb 9.6 oz)     Body mass index is 34.64 kg/m .     Physical Exam  Constitutional: Well-developed, well-nourished, very anxious.  HEENT: Normocephalic. No scleral icterus. Moist oral mucosa.   Neck/Lymph: Normal ROM, supple.   Cardiac:  Regular rate and rhythm.  No overt murmurs  Respiratory: Clear to auscultation bilaterally.  No wheezes or rales  GI:  Abdomen soft, non-distended, nB/L upper quadrant tender. BS present. No shifting dullness.  Small ulcer on  abd skin.  Skin:  Skin is warm and dry. No jaundice.   Peripheral Vascular: No lower extremity edema. 2+ pulses in all extremities  Musculoskeletal:  ROM intact.  Psychiatric: Anxious  Neuro:  No asterixis.    Labs and Diagnostic tests:  Lab Results   Component Value Date     04/01/2020    POTASSIUM 4.2 04/01/2020    CHLORIDE 105 04/01/2020    CO2 24 04/01/2020    BUN 11 04/01/2020    CR 0.73 04/01/2020     Lab Results   Component Value Date    BILITOTAL 0.6 04/01/2020    ALT 36 04/01/2020    AST 24 04/01/2020    ALKPHOS 82 04/01/2020     Lab Results   Component Value Date    ALBUMIN 3.9 04/01/2020    PROTTOTAL 8.1 04/01/2020      Lab Results   Component Value Date    WBC 9.3 04/01/2020    HGB 15.8 04/01/2020    MCV 85 04/01/2020     04/01/2020     Lab Results   Component Value Date    INR 0.93 04/01/2020       Imaging:  US ABD 4/1/2020:  IMPRESSION:   1.  Diffusely increased echogenicity of the hepatic parenchyma,  compatible with steatosis.  2.   Redemonstration of two hypodense lesions in the right hepatic  lobe measuring 1.4 cm and 0.7 cm, not significantly changed in size  since the prior ultrasound on 12/19/2016.    MR Elastography 12/16/2020:  1. Normal liver stiffness.   2. Hepatic steatosis.     The liver MRE only protocol (or Hepatogram protocol) is a limited MRI protocol   designed to quantitatively assess liver fat and fibrosis. The imaging techniques   used in this protocol are not suitable for detection or characterization of   focal liver lesions and evaluation of lesions in other organs.      Procedures:    EGD 12/14/21:  Post-op Diagnoses:        - Normal esophagus.        - Esophagogastric landmarks identified.        - Normal stomach.        - Normal examined duodenum. Fluid aspiration performed. Biopsied.    Colonoscopy 12/14/21:  Post-op Diagnoses:        - The examined portion of the ileum was normal. Biopsied.        - Diverticulosis in the sigmoid colon, in the descending colon  and in        the transverse colon.        - Patent functional end-to-end colo-colonic anastomosis, characterized        by healthy appearing mucosa.        - Normal mucosa in the entire examined colon. Biopsied.        - External and internal hemorrhoids.    Assessment and Plan:  46 year old female with past medical history of COLON, RA on abatacept who is coming in for follow-up.    COLON:  Patient had MR elastography in December 2020 with no fibrosis and redemonstration of hepatic steatosis.  Previously she had indeterminate lesion which were thought to be fat sparing areas.  No new lesions seen upon recent CT abdomen at outside facility.  Would recommend to limit dietary intake and work on calorie counting.  Patient was recently diagnosed with SIBO at outside facility but wants to follow-up with GI over here.  We can place GI referral for treatment of SIBO/SCAD.  She has appointment with [facility] on January 5 which she will keep but later on we will plan to follow-up with GI at [facility].    --Repeat labs in 1-2 months  --Recommend to lose at least 10 pounds in the next 6 months  --Limit alcohol intake  --Referral for luminal GI at [facility]  --Referral for ID at [facility]  --Ultrasound abdomen/MRI in 2022  --Follow-up in 6 months    Follow Up:  - 6 months    Pt was seen and discussed with Dr. Cristobal.  Thank you very much for the opportunity to participate in the care of this patient.  If you have any further questions, please don't hesitate to contact our office.    Nereyda Farley M.D.  Advanced & Transplant Hepatology Fellow  [facility]    Attestation:  This patient has been seen and evaluated by me, Cheryl Cristobal.  Discussed with the house staff team or resident(s) and agree with the findings and plan in this note.     Again, thank you for allowing me to participate in the care of your patient.      Sincerely,    Cheryl Cristobal MD

## 2021-12-23 ENCOUNTER — TELEPHONE (OUTPATIENT)
Dept: GASTROENTEROLOGY | Facility: CLINIC | Age: 46
End: 2021-12-23
Payer: MEDICARE

## 2021-12-23 DIAGNOSIS — K75.81 NASH (NONALCOHOLIC STEATOHEPATITIS): Primary | ICD-10-CM

## 2021-12-23 DIAGNOSIS — K57.32 DIVERTICULITIS OF COLON: ICD-10-CM

## 2021-12-24 NOTE — TELEPHONE ENCOUNTER
Contacted patient to discuss referral  Mgt of anti TNF medication, unblanced gut micro biome, reoccuring inflamtion in bowel, highly suspected microscopic colitis/Chron's    Preferred Location: [facility]   Scheduling Instructions: Lift will call you to coordinate your care as prescribed by the provider.  If you don t hear from a representative within 2 business days, please call the number listed above.   Additional Information: SIBO/SCAD? Please make appt with Dr. Thompson or Esdras only!             Mgt of anti TNF medication, unblanced gut micro biome, reoccuring inflamtion in bowel, highly suspected microscopic colitis/Chron's    Preferred Location: [facility]   Scheduling Instructions: Lift will call you to coordinate your care as prescribed by the provider.  If you don t hear from a representative within 2 business days, please call the number listed above.   Additional Information: SIBO/SCAD? Please make appt with Dr. Thompson or Esdras only!

## 2021-12-27 ENCOUNTER — TELEPHONE (OUTPATIENT)
Dept: DERMATOLOGY | Facility: CLINIC | Age: 46
End: 2021-12-27
Payer: MEDICARE

## 2021-12-27 ENCOUNTER — TELEPHONE (OUTPATIENT)
Dept: GASTROENTEROLOGY | Facility: CLINIC | Age: 46
End: 2021-12-27
Payer: MEDICARE

## 2021-12-27 NOTE — TELEPHONE ENCOUNTER
[facility] Call Center    Phone Message    May a detailed message be left on voicemail: yes     Reason for Call: Other: Pt called in to get scheduled with Nereyda Farley, who pt stated has been in her appts with the doctor before, however writer is unsure if the pt would be considered a new pt of Dr. Farley or is she could be scheduled as a return. Please reach out to the pt. Thank you.     Action Taken: Message routed to:  [facility] vivian hep    Travel Screening: Not Applicable

## 2021-12-27 NOTE — TELEPHONE ENCOUNTER
"[facility] Call Center    Phone Message    May a detailed message be left on voicemail: yes     Reason for Call: Other: Pt states that her gastroenterologist Dr Cheryl Cristobal told her that her ulcer has grown outside her skin and has become infected, and she should be seen \"right away\" Pt states this came from Dr Cristobal- no referral in chart as of yet, please call pt to discuss her situation. Pt declined to schedule an appointment until she spoke with someone at the clinic. Thank you!     Action Taken: Message routed to:  [facility] Dermatology    Travel Screening: Not Applicable                                                                        "

## 2021-12-27 NOTE — TELEPHONE ENCOUNTER
Called patient and scheduled for 2/1/22 with Dr. Stevens.    Beverly Negron CMA on 12/27/2021 at 2:26 PM

## 2021-12-28 ENCOUNTER — TELEPHONE (OUTPATIENT)
Dept: GASTROENTEROLOGY | Facility: CLINIC | Age: 46
End: 2021-12-28
Payer: MEDICARE

## 2021-12-30 DIAGNOSIS — J33.9 NOSE POLYP: ICD-10-CM

## 2021-12-30 RX ORDER — DUPILUMAB 300 MG/2ML
300 INJECTION, SOLUTION SUBCUTANEOUS
Qty: 4 ML | Refills: 2 | Status: SHIPPED | OUTPATIENT
Start: 2021-12-30 | End: 2022-11-25

## 2021-12-30 NOTE — TELEPHONE ENCOUNTER
RECORDS RECEIVED FROM: Internal, external  Diagnoses: diverticulitis of colon   DATE RECEIVED: 2.3.22   NOTES (Gather within 2 years) STATUS DETAILS   OFFICE NOTE from referring provider   Internal 12.22.21 [facility]   OFFICE NOTE from other specialist Received 12.21.21 Jaylen Davis  9.9.21 Jaylen Dodson  More in Epic if related   DISCHARGE SUMMARY from hospital N/A    DISCHARGE REPORT from the ER Received 12.20.21 Jaylen Lei  11.1.21 [place]   LABS (any labs) Internal    MEDICATION LIST Internal    IMAGING  (NEED IMAGES AND REPORTS)     Osteomyelitis: Foot imaging  N/A    Liver Abscess: Abdominal imaging N/A    Other (anything related to diagnoses In process 4.1.20 US ab    12.20.21 CT ab pelvis, [place]  11.4.21 CT ab pelvis, [place]  11.1.21 CT ab pelvis, [place]  Requested all related to ab        Action 12.30.21 MJ   Action Taken Requested images from [place]

## 2022-01-03 ENCOUNTER — TRANSFERRED RECORDS (OUTPATIENT)
Dept: HEALTH INFORMATION MANAGEMENT | Facility: CLINIC | Age: 47
End: 2022-01-03

## 2022-01-11 NOTE — TELEPHONE ENCOUNTER
Free Drug Application Approved  Effective Dates: 1/11/2022-12/31/2022  Patient notified? Yes  Additional Information:

## 2022-01-26 ENCOUNTER — TELEPHONE (OUTPATIENT)
Dept: DERMATOLOGY | Facility: CLINIC | Age: 47
End: 2022-01-26
Payer: MEDICARE

## 2022-01-26 NOTE — TELEPHONE ENCOUNTER
[facility] Call Center    Phone Message    May a detailed message be left on voicemail: yes     Reason for Call: Other: Pt states her provider at the [facility] has sent a referral for her to see Dr. Monique with a condition relating to her rheumatology.     Pt would like a call back to schedule and discuss if she needs a referral or if she can schedule without one.     Writer does not see a referral.     Please call Pt back to discuss. Thank you.     Action Taken: Message routed to:  [facility] Derm    Travel Screening: Not Applicable

## 2022-01-27 NOTE — TELEPHONE ENCOUNTER
Review of chart does not show what pt is needing to see Dermatology for.  Will need to know why pt needs to be seen.     Suzi Hughes RN  Rheumatology Clinic

## 2022-01-27 NOTE — TELEPHONE ENCOUNTER
Patient scheduled by clinic scheduler to see Dr. Farley on 2/3/22.    Yamile OLMEDO LPN  Hepatology Clinic

## 2022-01-31 NOTE — TELEPHONE ENCOUNTER
Left voicemail for patient she can call back with Questions, testing will be determined by doctor day of appointment, depending on any reactions and type of reactions to food.

## 2022-01-31 NOTE — TELEPHONE ENCOUNTER
This patient need to know if Dr. Gann will do testing on her f/u 2/7? Please, give her call to let her know.     Thank you.

## 2022-02-03 ENCOUNTER — PRE VISIT (OUTPATIENT)
Dept: INFECTIOUS DISEASES | Facility: CLINIC | Age: 47
End: 2022-02-03
Payer: MEDICARE

## 2022-02-03 ENCOUNTER — LAB (OUTPATIENT)
Dept: LAB | Facility: CLINIC | Age: 47
End: 2022-02-03
Attending: INTERNAL MEDICINE
Payer: MEDICARE

## 2022-02-03 ENCOUNTER — OFFICE VISIT (OUTPATIENT)
Dept: GASTROENTEROLOGY | Facility: CLINIC | Age: 47
End: 2022-02-03
Attending: INTERNAL MEDICINE
Payer: MEDICARE

## 2022-02-03 VITALS
HEART RATE: 86 BPM | RESPIRATION RATE: 18 BRPM | OXYGEN SATURATION: 96 % | BODY MASS INDEX: 34.01 KG/M2 | HEIGHT: 66 IN | DIASTOLIC BLOOD PRESSURE: 86 MMHG | SYSTOLIC BLOOD PRESSURE: 120 MMHG | TEMPERATURE: 98.4 F | WEIGHT: 211.6 LBS

## 2022-02-03 DIAGNOSIS — K75.81 NASH (NONALCOHOLIC STEATOHEPATITIS): ICD-10-CM

## 2022-02-03 DIAGNOSIS — K75.81 NASH (NONALCOHOLIC STEATOHEPATITIS): Primary | ICD-10-CM

## 2022-02-03 LAB
ALBUMIN SERPL-MCNC: 3.8 G/DL (ref 3.4–5)
ALP SERPL-CCNC: 68 U/L (ref 40–150)
ALT SERPL W P-5'-P-CCNC: 64 U/L (ref 0–50)
ANION GAP SERPL CALCULATED.3IONS-SCNC: 6 MMOL/L (ref 3–14)
AST SERPL W P-5'-P-CCNC: 35 U/L (ref 0–45)
BILIRUB DIRECT SERPL-MCNC: 0.1 MG/DL (ref 0–0.2)
BILIRUB SERPL-MCNC: 0.7 MG/DL (ref 0.2–1.3)
BUN SERPL-MCNC: 9 MG/DL (ref 7–30)
CALCIUM SERPL-MCNC: 9.7 MG/DL (ref 8.5–10.1)
CHLORIDE BLD-SCNC: 104 MMOL/L (ref 94–109)
CO2 SERPL-SCNC: 28 MMOL/L (ref 20–32)
CREAT SERPL-MCNC: 0.9 MG/DL (ref 0.52–1.04)
ERYTHROCYTE [DISTWIDTH] IN BLOOD BY AUTOMATED COUNT: 12.9 % (ref 10–15)
GFR SERPL CREATININE-BSD FRML MDRD: 80 ML/MIN/1.73M2
GLUCOSE BLD-MCNC: 119 MG/DL (ref 70–99)
HCT VFR BLD AUTO: 46.9 % (ref 35–47)
HGB BLD-MCNC: 15.2 G/DL (ref 11.7–15.7)
INR PPP: 1.03 (ref 0.85–1.15)
MCH RBC QN AUTO: 27.3 PG (ref 26.5–33)
MCHC RBC AUTO-ENTMCNC: 32.4 G/DL (ref 31.5–36.5)
MCV RBC AUTO: 84 FL (ref 78–100)
PLATELET # BLD AUTO: 442 10E3/UL (ref 150–450)
POTASSIUM BLD-SCNC: 4.3 MMOL/L (ref 3.4–5.3)
PROT SERPL-MCNC: 7.6 G/DL (ref 6.8–8.8)
RBC # BLD AUTO: 5.57 10E6/UL (ref 3.8–5.2)
SODIUM SERPL-SCNC: 138 MMOL/L (ref 133–144)
WBC # BLD AUTO: 8 10E3/UL (ref 4–11)

## 2022-02-03 PROCEDURE — 36415 COLL VENOUS BLD VENIPUNCTURE: CPT | Performed by: PATHOLOGY

## 2022-02-03 PROCEDURE — G0463 HOSPITAL OUTPT CLINIC VISIT: HCPCS

## 2022-02-03 PROCEDURE — 85610 PROTHROMBIN TIME: CPT | Performed by: PATHOLOGY

## 2022-02-03 PROCEDURE — 82248 BILIRUBIN DIRECT: CPT | Performed by: PATHOLOGY

## 2022-02-03 PROCEDURE — 85027 COMPLETE CBC AUTOMATED: CPT | Performed by: PATHOLOGY

## 2022-02-03 PROCEDURE — 80053 COMPREHEN METABOLIC PANEL: CPT | Performed by: PATHOLOGY

## 2022-02-03 PROCEDURE — 99215 OFFICE O/P EST HI 40 MIN: CPT | Mod: GC | Performed by: INTERNAL MEDICINE

## 2022-02-03 ASSESSMENT — MIFFLIN-ST. JEOR: SCORE: 1621.31

## 2022-02-03 ASSESSMENT — PAIN SCALES - GENERAL: PAINLEVEL: EXTREME PAIN (8)

## 2022-02-03 NOTE — PROGRESS NOTES
Date of Service: 12/22/2021     Subjective:            Laxmi Ya is a 46 year old female presenting for evaluation of liver disease    - Etiology: COLON    History of Present Illness   Laxmi Ya is a 46 year old female with past medical history of COLON, RA on abatacept who is coming in for follow-up.    Patient had abnormal liver enzymes dated back to 2010, at that time work-up was negative.  She did had diffuse hepatic steatosis with small indeterminate lesion thought to be fatty sparing areas.  MR elastography was normal.  She repeated ultrasound as well as MR elastography in December 2020 showing hepatic steatosis but no fibrosis.    Patient has rheumatoid arthritis and was initially on Remicade infusion which was switched to golimumab which caused her to have abdominal pain and diverticulitis, later on it was switched to abatacept.  Patient has been struggling with transverse colon diverticulitis with abdominal pain as well.  Patient stated that her recent episode was few days back when she presented to the emergency department.  That day she had a few alcohol drinks and noticed to have some elevation in her AST/ALT.  Previously her liver enzymes have been normal on 12/7 and in November 2021. Pt stated that she forgot lab work today but will be going to lab after visit.    Pt had COVID-19 on Jan 17th 2022, had 1 week of Abx and now having sinus infection, will get another Abx course. She is hesitant to start Rifaximin in context of Abx for sinus infection. She has an upcoming visit with Dr. Thompson.    Patient states that she drinks couple of drinks every few months.     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      Obesity      Pyelonephritis, unspecified 1992    abstract     Rheumatoid arteritis (H)      Unspecified symptom associated with female genital organs 05/07/1999    chronic pelvic pain abstract       Surgical History:  Past Surgical History:   Procedure Laterality Date     CHOLECYSTECTOMY       COLON SURGERY       CYSTOSCOPY,+URETEROSCOPY      abstract     ZZ EXCIS/CURET BENIGN ELBOW LESN  10/26/1987    (R) humerus abstract       Social History:  Social History     Tobacco Use     Smoking status: Never Smoker     Smokeless tobacco: Never Used   Substance Use Topics     Alcohol use: Yes     Drug use: No       Family History:  Family History   Problem Relation Age of Onset     Hypertension Father         abstract     Cancer Paternal Aunt         gallbladder cancer abstract       Medications:  Current Outpatient Medications   Medication     Abatacept (ORENCIA IV)     Albuterol Sulfate (PROAIR HFA IN)     amoxicillin-clavulanate (AUGMENTIN) 875-125 MG tablet     Artificial Tear Solution (SOOTHE XP) SOLN     atenolol (TENORMIN) 50 MG tablet     cholecalciferol (VITAMIN D-1000 MAX ST) 25 MCG (1000 UT) TABS     diclofenac (VOLTAREN) 1 % topical gel     docusate sodium (COLACE) 50 MG capsule     EPINEPHrine (ANY BX GENERIC EQUIV) 0.3 MG/0.3ML injection 2-pack     Fexofenadine HCl (ALLEGRA PO)     fidaxomicin (DIFICID) 200 MG tablet     fluticasone (FLONASE) 50 MCG/ACT nasal spray     losartan (COZAAR) 100 MG tablet     metFORMIN (GLUCOPHAGE-XR) 500 MG 24 hr tablet     Mometasone Furo-Formoterol Fum (DULERA IN)     Montelukast Sodium (SINGULAIR PO)     mupirocin (BACTROBAN) 2 % external ointment     olopatadine (PAZEO) 0.7 % ophthalmic solution     OMEPRAZOLE PO     ondansetron (ZOFRAN-ODT) 8 MG ODT tab     oxyCODONE IR (ROXICODONE) 10 MG tablet     spironolactone-HCTZ (ALDACTAZIDE) 25-25 MG tablet     sucralfate (CARAFATE) 1 GM/10ML suspension     tobramycin (TOBREX) 0.3 % ophthalmic solution     tobramycin-dexamethasone  "(TOBRADEX) 0.3-0.1 % ophthalmic ointment     amLODIPine (NORVASC) 5 MG tablet     azelastine (ASTELIN) 0.1 % nasal spray     cefpodoxime (VANTIN) 200 MG tablet     ciprofloxacin (CIPRO) 500 MG tablet     DULoxetine (CYMBALTA) 20 MG capsule     DULoxetine (CYMBALTA) 60 MG capsule     Dupilumab (DUPIXENT) 300 MG/2ML SOPN     fluconazole (DIFLUCAN) 100 MG tablet     fluconazole (DIFLUCAN) 150 MG tablet     fluticasone-vilanterol (BREO ELLIPTA) 200-25 MCG/INH inhaler     ibuprofen (IBU) 800 MG tablet     inFLIXimab (REMICADE) 100 MG injection     Levonorgestrel-Ethinyl Estrad (LEVORA 0.15/30, 28, PO)     lidocaine (LIDODERM) 5 % patch     metroNIDAZOLE (FLAGYL) 500 MG tablet     metroNIDAZOLE (METROLOTION) 0.75 % external lotion     NAPROXEN PO     norethindrone (MICRONOR) 0.35 MG tablet     nystatin (MYCOSTATIN) 614895 UNIT/ML suspension     ondansetron (ZOFRAN-ODT) 4 MG ODT tab     oxybutynin ER (DITROPAN-XL) 5 MG 24 hr tablet     penicillin V (VEETID) 500 MG tablet     predniSONE (DELTASONE) 5 MG tablet     Probiotic Product ([employer] PO)     triamcinolone (KENALOG) 0.1 % external cream     vancomycin (VANCOCIN) 250 MG capsule     vitamin E (TOCOPHEROL) 1000 units (450 mg) capsule     No current facility-administered medications for this visit.       Review of Systems  A complete 10 point review of systems was asked and answered in the negative unless specifically commented upon in the HPI    Objective:         Vitals:    02/03/22 1018   BP: 120/86   BP Location: Right arm   Patient Position: Sitting   Cuff Size: Adult Large   Pulse: 86   Resp: 18   Temp: 98.4  F (36.9  C)   TempSrc: Oral   SpO2: 96%   Weight: 96 kg (211 lb 9.6 oz)   Height: 1.676 m (5' 5.98\")     Body mass index is 34.17 kg/m .     Physical Exam  Constitutional: Well-developed, well-nourished, very anxious.  HEENT: Normocephalic. No scleral icterus. Moist oral mucosa.   Neck/Lymph: Normal ROM, supple.   Cardiac:  Regular rate and " rhythm.  No overt murmurs  Respiratory: Clear to auscultation bilaterally.  No wheezes or rales  GI:  Abdomen soft, non-distended, NT. BS present. No shifting dullness.    Skin:  Skin is warm and dry. No jaundice.   Peripheral Vascular: No lower extremity edema. 2+ pulses in all extremities  Musculoskeletal:  ROM intact.  Psychiatric: Anxious  Neuro:  No asterixis.    Labs and Diagnostic tests:  Lab Results   Component Value Date     04/01/2020    POTASSIUM 4.2 04/01/2020    CHLORIDE 105 04/01/2020    CO2 24 04/01/2020    BUN 11 04/01/2020    CR 0.73 04/01/2020     Lab Results   Component Value Date    BILITOTAL 0.6 04/01/2020    ALT 36 04/01/2020    AST 24 04/01/2020    ALKPHOS 82 04/01/2020     Lab Results   Component Value Date    ALBUMIN 3.9 04/01/2020    PROTTOTAL 8.1 04/01/2020      Lab Results   Component Value Date    WBC 9.3 04/01/2020    HGB 15.8 04/01/2020    MCV 85 04/01/2020     04/01/2020     Lab Results   Component Value Date    INR 0.93 04/01/2020       Imaging:  US ABD 4/1/2020:  IMPRESSION:   1.  Diffusely increased echogenicity of the hepatic parenchyma,  compatible with steatosis.  2.   Redemonstration of two hypodense lesions in the right hepatic  lobe measuring 1.4 cm and 0.7 cm, not significantly changed in size  since the prior ultrasound on 12/19/2016.    MR Elastography 12/16/2020:  1. Normal liver stiffness.   2. Hepatic steatosis.     The liver MRE only protocol (or Hepatogram protocol) is a limited MRI protocol   designed to quantitatively assess liver fat and fibrosis. The imaging techniques   used in this protocol are not suitable for detection or characterization of   focal liver lesions and evaluation of lesions in other organs.      Procedures:    EGD 12/14/21:  Post-op Diagnoses:        - Normal esophagus.        - Esophagogastric landmarks identified.        - Normal stomach.        - Normal examined duodenum. Fluid aspiration performed. Biopsied.    Colonoscopy  12/14/21:  Post-op Diagnoses:        - The examined portion of the ileum was normal. Biopsied.        - Diverticulosis in the sigmoid colon, in the descending colon and in        the transverse colon.        - Patent functional end-to-end colo-colonic anastomosis, characterized        by healthy appearing mucosa.        - Normal mucosa in the entire examined colon. Biopsied.        - External and internal hemorrhoids.    Assessment and Plan:  46 year old female with past medical history of COLON, RA on abatacept who is coming in for follow-up.    COLON:  Patient had MR elastography in December 2020 with no fibrosis and redemonstration of hepatic steatosis.  Previously she had indeterminate lesion which were thought to be fat sparing areas.  No new lesions seen upon recent CT abdomen at outside facility.  Would recommend to limit dietary intake and work on calorie counting.      Pt had question regarding Dificid, would it up to Dr. Thompson to comment if she should continue or not.    --Awaiting labs from today  --Recommend to lose at least 10 pounds in the next 6 months  --Limit alcohol intake  --Ultrasound abdomen/MRI in 2022  --Follow-up in 6 months    Follow Up:  - 6 months    Pt was seen and discussed with Dr. Pearson.  Thank you very much for the opportunity to participate in the care of this patient.  If you have any further questions, please don't hesitate to contact our office.    Nereyda Farley M.D.  Advanced & Transplant Hepatology Fellow  [facility]

## 2022-02-03 NOTE — LETTER
2/3/2022     RE: Laxmi Ya  [address] [place] [id number]    Dear Colleague,    Thank you for referring your patient, Laxmi Ya, to the [facility] [place]. Please see a copy of my visit note below.    Date of Service: 12/22/2021     Subjective:            Laxmi Ya is a 46 year old female presenting for evaluation of liver disease    - Etiology: COLON    History of Present Illness   Laxmi Ya is a 46 year old female with past medical history of COLON, RA on abatacept who is coming in for follow-up.    Patient had abnormal liver enzymes dated back to 2010, at that time work-up was negative.  She did had diffuse hepatic steatosis with small indeterminate lesion thought to be fatty sparing areas.  MR elastography was normal.  She repeated ultrasound as well as MR elastography in December 2020 showing hepatic steatosis but no fibrosis.    Patient has rheumatoid arthritis and was initially on Remicade infusion which was switched to golimumab which caused her to have abdominal pain and diverticulitis, later on it was switched to abatacept.  Patient has been struggling with transverse colon diverticulitis with abdominal pain as well.  Patient stated that her recent episode was few days back when she presented to the emergency department.  That day she had a few alcohol drinks and noticed to have some elevation in her AST/ALT.  Previously her liver enzymes have been normal on 12/7 and in November 2021. Pt stated that she forgot lab work today but will be going to lab after visit.    Pt had COVID-19 on Jan 17th 2022, had 1 week of Abx and now having sinus infection, will get another Abx course. She is hesitant to start Rifaximin in context of Abx for sinus infection. She has an upcoming visit with Dr. Thompson.    Patient states that she drinks couple of drinks every few months.     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     Obesity      Pyelonephritis, unspecified 1992    abstract     Rheumatoid arteritis (H)      Unspecified symptom associated with female genital organs 05/07/1999    chronic pelvic pain abstract       Surgical History:  Past Surgical History:   Procedure Laterality Date     CHOLECYSTECTOMY       COLON SURGERY       CYSTOSCOPY,+URETEROSCOPY      abstract     ZZHC EXCIS/CURET BENIGN ELBOW LESN  10/26/1987    (R) humerus abstract       Social History:  Social History     Tobacco Use     Smoking status: Never Smoker     Smokeless tobacco: Never Used   Substance Use Topics     Alcohol use: Yes     Drug use: No       Family History:  Family History   Problem Relation Age of Onset     Hypertension Father         abstract     Cancer Paternal Aunt         gallbladder cancer abstract       Medications:  Current Outpatient Medications   Medication     Abatacept (ORENCIA IV)     Albuterol Sulfate (PROAIR HFA IN)     amoxicillin-clavulanate (AUGMENTIN) 875-125 MG tablet     Artificial Tear Solution (SOOTHE XP) SOLN     atenolol (TENORMIN) 50 MG tablet     cholecalciferol (VITAMIN D-1000 MAX ST) 25 MCG (1000 UT) TABS     diclofenac (VOLTAREN) 1 % topical gel     docusate sodium (COLACE) 50 MG capsule     EPINEPHrine (ANY BX GENERIC EQUIV) 0.3 MG/0.3ML injection 2-pack     Fexofenadine HCl (ALLEGRA PO)     fidaxomicin (DIFICID) 200 MG tablet     fluticasone (FLONASE) 50 MCG/ACT nasal spray     losartan (COZAAR) 100 MG tablet     metFORMIN (GLUCOPHAGE-XR) 500 MG 24 hr tablet     Mometasone Furo-Formoterol Fum (DULERA IN)     Montelukast Sodium (SINGULAIR PO)     mupirocin (BACTROBAN) 2 % external ointment     olopatadine (PAZEO) 0.7 % ophthalmic solution     OMEPRAZOLE PO     ondansetron  "(ZOFRAN-ODT) 8 MG ODT tab     oxyCODONE IR (ROXICODONE) 10 MG tablet     spironolactone-HCTZ (ALDACTAZIDE) 25-25 MG tablet     sucralfate (CARAFATE) 1 GM/10ML suspension     tobramycin (TOBREX) 0.3 % ophthalmic solution     tobramycin-dexamethasone (TOBRADEX) 0.3-0.1 % ophthalmic ointment     amLODIPine (NORVASC) 5 MG tablet     azelastine (ASTELIN) 0.1 % nasal spray     cefpodoxime (VANTIN) 200 MG tablet     ciprofloxacin (CIPRO) 500 MG tablet     DULoxetine (CYMBALTA) 20 MG capsule     DULoxetine (CYMBALTA) 60 MG capsule     Dupilumab (DUPIXENT) 300 MG/2ML SOPN     fluconazole (DIFLUCAN) 100 MG tablet     fluconazole (DIFLUCAN) 150 MG tablet     fluticasone-vilanterol (BREO ELLIPTA) 200-25 MCG/INH inhaler     ibuprofen (IBU) 800 MG tablet     inFLIXimab (REMICADE) 100 MG injection     Levonorgestrel-Ethinyl Estrad (LEVORA 0.15/30, 28, PO)     lidocaine (LIDODERM) 5 % patch     metroNIDAZOLE (FLAGYL) 500 MG tablet     metroNIDAZOLE (METROLOTION) 0.75 % external lotion     NAPROXEN PO     norethindrone (MICRONOR) 0.35 MG tablet     nystatin (MYCOSTATIN) 367110 UNIT/ML suspension     ondansetron (ZOFRAN-ODT) 4 MG ODT tab     oxybutynin ER (DITROPAN-XL) 5 MG 24 hr tablet     penicillin V (VEETID) 500 MG tablet     predniSONE (DELTASONE) 5 MG tablet     Probiotic Product ([employer] PO)     triamcinolone (KENALOG) 0.1 % external cream     vancomycin (VANCOCIN) 250 MG capsule     vitamin E (TOCOPHEROL) 1000 units (450 mg) capsule     No current facility-administered medications for this visit.       Review of Systems  A complete 10 point review of systems was asked and answered in the negative unless specifically commented upon in the HPI    Objective:         Vitals:    02/03/22 1018   BP: 120/86   BP Location: Right arm   Patient Position: Sitting   Cuff Size: Adult Large   Pulse: 86   Resp: 18   Temp: 98.4  F (36.9  C)   TempSrc: Oral   SpO2: 96%   Weight: 96 kg (211 lb 9.6 oz)   Height: 1.676 m (5' 5.98\") "     Body mass index is 34.17 kg/m .     Physical Exam  Constitutional: Well-developed, well-nourished, very anxious.  HEENT: Normocephalic. No scleral icterus. Moist oral mucosa.   Neck/Lymph: Normal ROM, supple.   Cardiac:  Regular rate and rhythm.  No overt murmurs  Respiratory: Clear to auscultation bilaterally.  No wheezes or rales  GI:  Abdomen soft, non-distended, NT. BS present. No shifting dullness.    Skin:  Skin is warm and dry. No jaundice.   Peripheral Vascular: No lower extremity edema. 2+ pulses in all extremities  Musculoskeletal:  ROM intact.  Psychiatric: Anxious  Neuro:  No asterixis.    Labs and Diagnostic tests:  Lab Results   Component Value Date     04/01/2020    POTASSIUM 4.2 04/01/2020    CHLORIDE 105 04/01/2020    CO2 24 04/01/2020    BUN 11 04/01/2020    CR 0.73 04/01/2020     Lab Results   Component Value Date    BILITOTAL 0.6 04/01/2020    ALT 36 04/01/2020    AST 24 04/01/2020    ALKPHOS 82 04/01/2020     Lab Results   Component Value Date    ALBUMIN 3.9 04/01/2020    PROTTOTAL 8.1 04/01/2020      Lab Results   Component Value Date    WBC 9.3 04/01/2020    HGB 15.8 04/01/2020    MCV 85 04/01/2020     04/01/2020     Lab Results   Component Value Date    INR 0.93 04/01/2020       Imaging:  US ABD 4/1/2020:  IMPRESSION:   1.  Diffusely increased echogenicity of the hepatic parenchyma,  compatible with steatosis.  2.   Redemonstration of two hypodense lesions in the right hepatic  lobe measuring 1.4 cm and 0.7 cm, not significantly changed in size  since the prior ultrasound on 12/19/2016.    MR Elastography 12/16/2020:  1. Normal liver stiffness.   2. Hepatic steatosis.     The liver MRE only protocol (or Hepatogram protocol) is a limited MRI protocol   designed to quantitatively assess liver fat and fibrosis. The imaging techniques   used in this protocol are not suitable for detection or characterization of   focal liver lesions and evaluation of lesions in other  organs.      Procedures:    EGD 12/14/21:  Post-op Diagnoses:        - Normal esophagus.        - Esophagogastric landmarks identified.        - Normal stomach.        - Normal examined duodenum. Fluid aspiration performed. Biopsied.    Colonoscopy 12/14/21:  Post-op Diagnoses:        - The examined portion of the ileum was normal. Biopsied.        - Diverticulosis in the sigmoid colon, in the descending colon and in        the transverse colon.        - Patent functional end-to-end colo-colonic anastomosis, characterized        by healthy appearing mucosa.        - Normal mucosa in the entire examined colon. Biopsied.        - External and internal hemorrhoids.    Assessment and Plan:  46 year old female with past medical history of COLON, RA on abatacept who is coming in for follow-up.    COLON:  Patient had MR elastography in December 2020 with no fibrosis and redemonstration of hepatic steatosis.  Previously she had indeterminate lesion which were thought to be fat sparing areas.  No new lesions seen upon recent CT abdomen at outside facility.  Would recommend to limit dietary intake and work on calorie counting.      Pt had question regarding Dificid, would it up to Dr. Thompson to comment if she should continue or not.    --Awaiting labs from today  --Recommend to lose at least 10 pounds in the next 6 months  --Limit alcohol intake  --Ultrasound abdomen/MRI in 2022  --Follow-up in 6 months    Follow Up:  - 6 months    Pt was seen and discussed with Dr. Pearson.  Thank you very much for the opportunity to participate in the care of this patient.  If you have any further questions, please don't hesitate to contact our office.    Nereyda Farley M.D.  Advanced & Transplant Hepatology Fellow  [facility]

## 2022-02-03 NOTE — NURSING NOTE
"Chief Complaint   Patient presents with     RECHECK     COLON     /86 (BP Location: Right arm, Patient Position: Sitting, Cuff Size: Adult Large)   Pulse 86   Temp 98.4  F (36.9  C) (Oral)   Resp 18   Ht 1.676 m (5' 5.98\")   Wt 96 kg (211 lb 9.6 oz)   SpO2 96%   BMI 34.17 kg/m      Kimberlee Ashley, [facility]  2/3/2022 10:21 AM      "

## 2022-02-03 NOTE — LETTER
2/3/2022       RE: Laxmi Ya  [address] [place] [id number]      Date of Service: 12/22/2021     Subjective:            Laxmi Ya is a 46 year old female presenting for evaluation of liver disease    - Etiology: COLON    History of Present Illness   Laxmi Ya is a 46 year old female with past medical history of COLON, RA on abatacept who is coming in for follow-up.    Patient had abnormal liver enzymes dated back to 2010, at that time work-up was negative.  She did had diffuse hepatic steatosis with small indeterminate lesion thought to be fatty sparing areas.  MR elastography was normal.  She repeated ultrasound as well as MR elastography in December 2020 showing hepatic steatosis but no fibrosis.    Patient has rheumatoid arthritis and was initially on Remicade infusion which was switched to golimumab which caused her to have abdominal pain and diverticulitis, later on it was switched to abatacept.  Patient has been struggling with transverse colon diverticulitis with abdominal pain as well.  Patient stated that her recent episode was few days back when she presented to the emergency department.  That day she had a few alcohol drinks and noticed to have some elevation in her AST/ALT.  Previously her liver enzymes have been normal on 12/7 and in November 2021. Pt stated that she forgot lab work today but will be going to lab after visit.    Pt had COVID-19 on Jan 17th 2022, had 1 week of Abx and now having sinus infection, will get another Abx course. She is hesitant to start Rifaximin in context of Abx for sinus infection. She has an upcoming visit with Dr. Thompson.    Patient states that she drinks couple of drinks every few months.     Past Medical History:  Past Medical History:   Diagnosis Date     Diarrhea 08/11/2000    travelers' abstract     Hematuria 08/11/2000    abstract     HTN (hypertension)      COLON (nonalcoholic steatohepatitis)      Neoplasm of uncertain behavior of bone and  articular cartilage 12/31/1987    periosteo chnondroma (R) humerus abstract     Obesity      Pyelonephritis, unspecified 1992    abstract     Rheumatoid arteritis (H)      Unspecified symptom associated with female genital organs 05/07/1999    chronic pelvic pain abstract       Surgical History:  Past Surgical History:   Procedure Laterality Date     CHOLECYSTECTOMY       COLON SURGERY       CYSTOSCOPY,+URETEROSCOPY      abstract     ZZHC EXCIS/CURET BENIGN ELBOW LESN  10/26/1987    (R) humerus abstract       Social History:  Social History     Tobacco Use     Smoking status: Never Smoker     Smokeless tobacco: Never Used   Substance Use Topics     Alcohol use: Yes     Drug use: No       Family History:  Family History   Problem Relation Age of Onset     Hypertension Father         abstract     Cancer Paternal Aunt         gallbladder cancer abstract       Medications:  Current Outpatient Medications   Medication     Abatacept (ORENCIA IV)     Albuterol Sulfate (PROAIR HFA IN)     amoxicillin-clavulanate (AUGMENTIN) 875-125 MG tablet     Artificial Tear Solution (SOOTHE XP) SOLN     atenolol (TENORMIN) 50 MG tablet     cholecalciferol (VITAMIN D-1000 MAX ST) 25 MCG (1000 UT) TABS     diclofenac (VOLTAREN) 1 % topical gel     docusate sodium (COLACE) 50 MG capsule     EPINEPHrine (ANY BX GENERIC EQUIV) 0.3 MG/0.3ML injection 2-pack     Fexofenadine HCl (ALLEGRA PO)     fidaxomicin (DIFICID) 200 MG tablet     fluticasone (FLONASE) 50 MCG/ACT nasal spray     losartan (COZAAR) 100 MG tablet     metFORMIN (GLUCOPHAGE-XR) 500 MG 24 hr tablet     Mometasone Furo-Formoterol Fum (DULERA IN)     Montelukast Sodium (SINGULAIR PO)     mupirocin (BACTROBAN) 2 % external ointment     olopatadine (PAZEO) 0.7 % ophthalmic solution     OMEPRAZOLE PO     ondansetron (ZOFRAN-ODT) 8 MG ODT tab     oxyCODONE IR (ROXICODONE) 10 MG tablet     spironolactone-HCTZ (ALDACTAZIDE) 25-25 MG tablet     sucralfate (CARAFATE) 1 GM/10ML suspension  "    tobramycin (TOBREX) 0.3 % ophthalmic solution     tobramycin-dexamethasone (TOBRADEX) 0.3-0.1 % ophthalmic ointment     amLODIPine (NORVASC) 5 MG tablet     azelastine (ASTELIN) 0.1 % nasal spray     cefpodoxime (VANTIN) 200 MG tablet     ciprofloxacin (CIPRO) 500 MG tablet     DULoxetine (CYMBALTA) 20 MG capsule     DULoxetine (CYMBALTA) 60 MG capsule     Dupilumab (DUPIXENT) 300 MG/2ML SOPN     fluconazole (DIFLUCAN) 100 MG tablet     fluconazole (DIFLUCAN) 150 MG tablet     fluticasone-vilanterol (BREO ELLIPTA) 200-25 MCG/INH inhaler     ibuprofen (IBU) 800 MG tablet     inFLIXimab (REMICADE) 100 MG injection     Levonorgestrel-Ethinyl Estrad (LEVORA 0.15/30, 28, PO)     lidocaine (LIDODERM) 5 % patch     metroNIDAZOLE (FLAGYL) 500 MG tablet     metroNIDAZOLE (METROLOTION) 0.75 % external lotion     NAPROXEN PO     norethindrone (MICRONOR) 0.35 MG tablet     nystatin (MYCOSTATIN) 474302 UNIT/ML suspension     ondansetron (ZOFRAN-ODT) 4 MG ODT tab     oxybutynin ER (DITROPAN-XL) 5 MG 24 hr tablet     penicillin V (VEETID) 500 MG tablet     predniSONE (DELTASONE) 5 MG tablet     Probiotic Product (ColosseoEAS PO)     triamcinolone (KENALOG) 0.1 % external cream     vancomycin (VANCOCIN) 250 MG capsule     vitamin E (TOCOPHEROL) 1000 units (450 mg) capsule     No current facility-administered medications for this visit.       Review of Systems  A complete 10 point review of systems was asked and answered in the negative unless specifically commented upon in the HPI    Objective:         Vitals:    02/03/22 1018   BP: 120/86   BP Location: Right arm   Patient Position: Sitting   Cuff Size: Adult Large   Pulse: 86   Resp: 18   Temp: 98.4  F (36.9  C)   TempSrc: Oral   SpO2: 96%   Weight: 96 kg (211 lb 9.6 oz)   Height: 1.676 m (5' 5.98\")     Body mass index is 34.17 kg/m .     Physical Exam  Constitutional: Well-developed, well-nourished, very anxious.  HEENT: Normocephalic. No scleral icterus. Moist " oral mucosa.   Neck/Lymph: Normal ROM, supple.   Cardiac:  Regular rate and rhythm.  No overt murmurs  Respiratory: Clear to auscultation bilaterally.  No wheezes or rales  GI:  Abdomen soft, non-distended, NT. BS present. No shifting dullness.    Skin:  Skin is warm and dry. No jaundice.   Peripheral Vascular: No lower extremity edema. 2+ pulses in all extremities  Musculoskeletal:  ROM intact.  Psychiatric: Anxious  Neuro:  No asterixis.    Labs and Diagnostic tests:  Lab Results   Component Value Date     04/01/2020    POTASSIUM 4.2 04/01/2020    CHLORIDE 105 04/01/2020    CO2 24 04/01/2020    BUN 11 04/01/2020    CR 0.73 04/01/2020     Lab Results   Component Value Date    BILITOTAL 0.6 04/01/2020    ALT 36 04/01/2020    AST 24 04/01/2020    ALKPHOS 82 04/01/2020     Lab Results   Component Value Date    ALBUMIN 3.9 04/01/2020    PROTTOTAL 8.1 04/01/2020      Lab Results   Component Value Date    WBC 9.3 04/01/2020    HGB 15.8 04/01/2020    MCV 85 04/01/2020     04/01/2020     Lab Results   Component Value Date    INR 0.93 04/01/2020       Imaging:  US ABD 4/1/2020:  IMPRESSION:   1.  Diffusely increased echogenicity of the hepatic parenchyma,  compatible with steatosis.  2.   Redemonstration of two hypodense lesions in the right hepatic  lobe measuring 1.4 cm and 0.7 cm, not significantly changed in size  since the prior ultrasound on 12/19/2016.    MR Elastography 12/16/2020:  1. Normal liver stiffness.   2. Hepatic steatosis.     The liver MRE only protocol (or Hepatogram protocol) is a limited MRI protocol   designed to quantitatively assess liver fat and fibrosis. The imaging techniques   used in this protocol are not suitable for detection or characterization of   focal liver lesions and evaluation of lesions in other organs.      Procedures:    EGD 12/14/21:  Post-op Diagnoses:        - Normal esophagus.        - Esophagogastric landmarks identified.        - Normal stomach.        - Normal  examined duodenum. Fluid aspiration performed. Biopsied.    Colonoscopy 12/14/21:  Post-op Diagnoses:        - The examined portion of the ileum was normal. Biopsied.        - Diverticulosis in the sigmoid colon, in the descending colon and in        the transverse colon.        - Patent functional end-to-end colo-colonic anastomosis, characterized        by healthy appearing mucosa.        - Normal mucosa in the entire examined colon. Biopsied.        - External and internal hemorrhoids.    Assessment and Plan:  46 year old female with past medical history of COLON, RA on abatacept who is coming in for follow-up.    COLON:  Patient had MR elastography in December 2020 with no fibrosis and redemonstration of hepatic steatosis.  Previously she had indeterminate lesion which were thought to be fat sparing areas.  No new lesions seen upon recent CT abdomen at outside facility.  Would recommend to limit dietary intake and work on calorie counting.      Pt had question regarding Dificid, would it up to Dr. Thompson to comment if she should continue or not.    --Awaiting labs from today  --Recommend to lose at least 10 pounds in the next 6 months  --Limit alcohol intake  --Ultrasound abdomen/MRI in 2022  --Follow-up in 6 months    Follow Up:  - 6 months    Pt was seen and discussed with Dr. Pearson.  Thank you very much for the opportunity to participate in the care of this patient.  If you have any further questions, please don't hesitate to contact our office.    Nereyda Farley M.D.  Advanced & Transplant Hepatology Fellow  [facility]            Nereyda Farley MD

## 2022-02-09 NOTE — TELEPHONE ENCOUNTER
RECORDS RECEIVED FROM: Bilat Tendonitis due to rheumatoid arthritis, lot of inflamation  - *possible cortisone injection in thumb joint* *history of auto immune disorder* , per pt ref. by Dr. Kwabena Dodson MD, Recent Xrays & MRI taken in Nov. @ [facility] in [place]   DATE RECEIVED: Mar 31, 2022     NOTES STATUS DETAILS   OFFICE NOTE from referring provider In process    OFFICE NOTE from other specialist In process    DISCHARGE SUMMARY from hospital In process    DISCHARGE REPORT from the ER In process    OPERATIVE REPORT In process    MEDICATION LIST In process    IMPLANT RECORD/STICKER In process    LABS     CBC/DIFF In process    CULTURES In process    INJECTIONS DONE IN RADIOLOGY In process    MRI In process 03/12/21 L radius ulna, R radius ulna, [facility]  03/04/21 L hand, R hand, [facility]  01/29/19 R hand, [facility]   Ultrasound In process 03/04/21 R wrist, L wrist, [facility],   03/11/20 R wrist, L wrist [facility]  08/23/18 R hand, [facility]   XRAYS (IMAGES & REPORTS) In process 06/30/20 B hands, [facility]  02/12/15 B hand, [facility]   TUMOR     PATHOLOGY  Slides & report In process      03/09/22   10:59 AM   FAXED REQUEST TO [facility] FOR RECORDS/IMAGES [phone]  Magi Clements CMA    Action 03/23/22 10:14 am IS   Action Taken Faxed request for [facility] [phone]

## 2022-02-10 NOTE — TELEPHONE ENCOUNTER
Called and spoke with pt, she is having blisters and open sores and open sore rashes, and believes that they are related to her RA.  She would like to see Dr. Monique, she has not seen another dermatologist at all. She says that her open sores are taking many months(3) to heal and then they leave scars.      These open sores occur on the legs, abdomen, in the groin, she has had rashes on the upper abdomen.  She has tried mupircin ointment and it has not helped.     Dr. Cristobal from [facility] has recommended that she see Dr. Monique. Will run past him and then let pt know.    Suzi Hughes RN  Rheumatology Clinic

## 2022-02-14 ENCOUNTER — TRANSCRIBE ORDERS (OUTPATIENT)
Dept: OTHER | Age: 47
End: 2022-02-14

## 2022-02-14 ENCOUNTER — TELEPHONE (OUTPATIENT)
Dept: DERMATOLOGY | Facility: CLINIC | Age: 47
End: 2022-02-14

## 2022-02-14 DIAGNOSIS — L98.9 SKIN PROBLEM: Primary | ICD-10-CM

## 2022-02-14 NOTE — TELEPHONE ENCOUNTER
[facility] Call Center    Phone Message    May a detailed message be left on voicemail: yes     Reason for Call: Appointment Intake    Referring Provider Name: Alan Boothe to Dr Eros Monique specifically  Diagnosis and/or Symptoms: Concerned that skin problems could be an autoimmune basis, pt cancelled appt with Dr Stevens, was referred to Dr Monique, please call pt to discuss, thanks!    Action Taken: Message routed to:  [facility]): Dermatology    Travel Screening: Not Applicable

## 2022-02-15 ENCOUNTER — MEDICAL CORRESPONDENCE (OUTPATIENT)
Dept: HEALTH INFORMATION MANAGEMENT | Facility: CLINIC | Age: 47
End: 2022-02-15
Payer: MEDICARE

## 2022-02-15 NOTE — TELEPHONE ENCOUNTER
Received request to contact patient for next refill. Med list states Dupixent on hold. Notified [facility] via fax ([phone]) patient will call when refill is needed.

## 2022-02-18 ENCOUNTER — TELEPHONE (OUTPATIENT)
Dept: DERMATOLOGY | Facility: CLINIC | Age: 47
End: 2022-02-18
Payer: MEDICARE

## 2022-02-18 ENCOUNTER — MEDICAL CORRESPONDENCE (OUTPATIENT)
Dept: HEALTH INFORMATION MANAGEMENT | Facility: CLINIC | Age: 47
End: 2022-02-18
Payer: MEDICARE

## 2022-02-21 ENCOUNTER — TRANSCRIBE ORDERS (OUTPATIENT)
Dept: MULTI SPECIALTY CLINIC | Facility: CLINIC | Age: 47
End: 2022-02-21
Payer: MEDICARE

## 2022-02-21 ENCOUNTER — MEDICAL CORRESPONDENCE (OUTPATIENT)
Dept: HEALTH INFORMATION MANAGEMENT | Facility: CLINIC | Age: 47
End: 2022-02-21
Payer: MEDICARE

## 2022-02-21 DIAGNOSIS — M13.0 SERONEGATIVE POLYARTHRITIS: Primary | ICD-10-CM

## 2022-02-22 ENCOUNTER — TRANSCRIBE ORDERS (OUTPATIENT)
Dept: OTHER | Age: 47
End: 2022-02-22

## 2022-02-22 DIAGNOSIS — B99.9 RECURRENT INFECTIONS: Primary | ICD-10-CM

## 2022-02-24 ENCOUNTER — TELEPHONE (OUTPATIENT)
Dept: RHEUMATOLOGY | Facility: CLINIC | Age: 47
End: 2022-02-24
Payer: MEDICARE

## 2022-02-24 NOTE — TELEPHONE ENCOUNTER
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 Lemus. Priority: 1-2 weeks.

## 2022-02-25 NOTE — TELEPHONE ENCOUNTER
Pt is calling back to following up on the referral status? Pt states she would like to know if she can be seen urgently within the next 1-2 week here.  Pt states she having extreme inflammation issues and is needing to be seen to discuss getting/changing infusion for bowel regime?    Ok to leave a detailed message if it goes to Pt's voicemail.

## 2022-02-25 NOTE — TELEPHONE ENCOUNTER
Placed call to patient to review her urgent request for an appointment. Patient states she has been treated for rheumatoid arthritis at [facility] but her rheumatologist retired on 2/15/22 and she needs to establish care. She is currently receiving abatacept (Orencia)  at [facility] (last infusion was 2/18/22) but states she does not think that it is effective and she needs a different treatment. Explained that first new appointment available is 6/24/22 and her request for an earlier appointment will be sent to the on-call provider for review.   Kaley Serna RN  Adult Rheumatology Clinic

## 2022-02-28 ENCOUNTER — TRANSFERRED RECORDS (OUTPATIENT)
Dept: HEALTH INFORMATION MANAGEMENT | Facility: CLINIC | Age: 47
End: 2022-02-28
Payer: MEDICARE

## 2022-02-28 ENCOUNTER — TELEPHONE (OUTPATIENT)
Dept: ALLERGY | Facility: CLINIC | Age: 47
End: 2022-02-28
Payer: MEDICARE

## 2022-02-28 NOTE — TELEPHONE ENCOUNTER
Placed call to patient to offer an appointment with Dr. Lemus on  3/9/22 at 8:30 am as an add on.  Patient agrees to appointment, message sent to scheduling team to add patient to Dr. Lemus's schedule.  Kaley Serna RN  Adult Rheumatology Clinic

## 2022-03-01 NOTE — TELEPHONE ENCOUNTER
RECORDS RECEIVED FROM: Care Everywhere    DATE RECEIVED: 03.30.2022   NOTES (Gather within 2 years) STATUS DETAILS   OFFICE NOTE from referring provider   Care Everywhere 02.15.2022 Amol Juares MD  H P    LABS (any labs) Internal / CE    MEDICATION LIST Internal / CE

## 2022-03-02 ENCOUNTER — TRANSFERRED RECORDS (OUTPATIENT)
Dept: HEALTH INFORMATION MANAGEMENT | Facility: CLINIC | Age: 47
End: 2022-03-02
Payer: MEDICARE

## 2022-03-03 ENCOUNTER — OFFICE VISIT (OUTPATIENT)
Dept: DERMATOLOGY | Facility: CLINIC | Age: 47
End: 2022-03-03
Attending: DERMATOLOGY
Payer: MEDICARE

## 2022-03-03 ENCOUNTER — PATIENT OUTREACH (OUTPATIENT)
Dept: CARE COORDINATION | Facility: CLINIC | Age: 47
End: 2022-03-03

## 2022-03-03 ENCOUNTER — ANCILLARY PROCEDURE (OUTPATIENT)
Dept: CT IMAGING | Facility: CLINIC | Age: 47
End: 2022-03-03
Attending: OTOLARYNGOLOGY
Payer: MEDICARE

## 2022-03-03 VITALS
DIASTOLIC BLOOD PRESSURE: 84 MMHG | OXYGEN SATURATION: 95 % | WEIGHT: 209 LBS | BODY MASS INDEX: 33.75 KG/M2 | HEART RATE: 71 BPM | SYSTOLIC BLOOD PRESSURE: 133 MMHG

## 2022-03-03 DIAGNOSIS — R21 CUTANEOUS ERUPTION: ICD-10-CM

## 2022-03-03 DIAGNOSIS — L73.9 FOLLICULITIS: Primary | ICD-10-CM

## 2022-03-03 DIAGNOSIS — J32.9 SINUSITIS: ICD-10-CM

## 2022-03-03 PROCEDURE — 99204 OFFICE O/P NEW MOD 45 MIN: CPT | Performed by: DERMATOLOGY

## 2022-03-03 PROCEDURE — G0463 HOSPITAL OUTPT CLINIC VISIT: HCPCS

## 2022-03-03 PROCEDURE — 70486 CT MAXILLOFACIAL W/O DYE: CPT | Mod: GC | Performed by: RADIOLOGY

## 2022-03-03 RX ORDER — CLINDAMYCIN PHOSPHATE 10 UG/ML
LOTION TOPICAL 2 TIMES DAILY
Qty: 60 ML | Refills: 3 | Status: SHIPPED | OUTPATIENT
Start: 2022-03-03 | End: 2022-11-25

## 2022-03-03 RX ORDER — TRIAMCINOLONE ACETONIDE 1 MG/G
OINTMENT TOPICAL 2 TIMES DAILY
Qty: 80 G | Refills: 1 | Status: SHIPPED | OUTPATIENT
Start: 2022-03-03 | End: 2022-12-08

## 2022-03-03 ASSESSMENT — PAIN SCALES - GENERAL: PAINLEVEL: NO PAIN (0)

## 2022-03-03 NOTE — LETTER
3/3/2022       RE: Laxmi Ya  [address] [facility] [address]     Dear Colleague,    Thank you for referring your patient, Laxmi Ya, to the [facility] RHEUMATOLOGY [facility]IC [place] at [facility]. Please see a copy of my visit note below.    Rheumatology-Dermatology Clinic New Patient Note    Patient is new to the clinic, seen in consultation for Dr. 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 Mild persistent asthma, unspecified whether complicated   3. Bacterial overgrowth syndrome with diarrhea   - Diagnosed   4. Nonalcoholic steatohepatitis  5. Hx of clostridium difficile x2  6. Multiple orthopedic surgeries   Carpal tunnel surgery b/l    Dissectmy and laminectomy, lumbar and cervical spine  7. Recurrent sinusitis  - MPO, PR3 neg  8. DMII  9. Blepharitis  10: Hx of diverticulosis and recurrent diverticulitis s/p partial colectomy  11. Hx of dermatographism  SH: A/TL grewal, currently on disability    CC:  Chief Complaint   Patient presents with     Consult     initial visit skin issues       History of Present Illness  Laxmi Ya is a 46 year old  female with the above noted PMH being seen in rheum-derm clinic for evaluation of intermittent rashes. Imntermittent rashes going on since 2017.  Started awhen she started biologics.     IM kenalog shot which has been helpful migraine hadache, facial pain/pressure/congestion,     Detailed Review of Systems  Otherwise nml state of health. No other skin concerns.    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     Obesity      Pyelonephritis, unspecified 1992    abstract     Rheumatoid arteritis (H)      Unspecified symptom associated with female genital organs 05/07/1999    chronic pelvic pain abstract     Medications:  Current Outpatient Medications   Medication     Abatacept (ORENCIA IV)     Albuterol Sulfate (PROAIR HFA IN)     Artificial Tear Solution (SOOTHE XP) SOLN     atenolol (TENORMIN) 50 MG tablet     diclofenac (VOLTAREN) 1 % topical gel     Dupilumab (DUPIXENT) 300 MG/2ML SOPN - took it once for sinusitis and then hold it because she was not sure if it was dupixent or simponi     Fexofenadine HCl (ALLEGRA PO)     fidaxomicin (DIFICID) 200 MG tablet - has been on this for atleast 3 months for cdiff     fluticasone (FLONASE) 50 MCG/ACT nasal spray          lidocaine (LIDODERM) 5 % patch     losartan (COZAAR) 100 MG tablet     metFORMIN (GLUCOPHAGE-XR) 500 MG 24 hr tablet     Montelukast Sodium (SINGULAIR PO)     nystatin (MYCOSTATIN) 038799 UNIT/ML suspension     olopatadine (PAZEO) 0.7 % ophthalmic solution     OMEPRAZOLE PO     spironolactone-HCTZ (ALDACTAZIDE) 25-25 MG tablet     sucralfate (CARAFATE) 1 GM/10ML suspension     tobramycin (TOBREX) 0.3 % ophthalmic solution     tobramycin-dexamethasone (TOBRADEX) 0.3-0.1 % ophthalmic ointment                                                    fluticasone-vilanterol (BREO ELLIPTA) 200-25 MCG/INH inhaler                       Mometasone Furo-Formoterol Fum (DULERA IN)     mupirocin (BACTROBAN) 2 % external ointment         norethindrone (MICRONOR) 0.35 MG tablet                              Probiotic Product (nuPSYS PO)     triamcinolone (KENALOG) 0.1 % external cream         vitamin E (TOCOPHEROL) 1000 units (450 mg) capsule    Rifaximin - starting for = SIBO  No current facility-administered medications for this visit.     Allergies:   Allergies   Allergen Reactions     Polyethylene Glycol Rash     Codeine      Other reaction(s): Gastrointestinal, GI intolerance, Vomiting  Vomiting       Gadolinium Dizziness and Nausea     Hydrocodone-Acetaminophen Nausea and Vomiting     Other reaction(s): GI intolerance     Iodine      abstract     Sulfasalazine      Other reaction(s): GI intolerance  Has tried and had nausa.     Tramadol Nausea and Vomiting     Other reaction(s): Gastrointestinal, Other (see comments)  Nausea and vomiting   unknown       Gluten Meal Other (See Comments) and Rash     Other reaction(s): Gastrointestinal, GI intolerance  Dizziness, tired, rash, stomach cramps, thrush, mouth sores  Dizziness, tired, rash, stomach cramps, thrush, mouth sores         Social History  Social History     Socioeconomic History     Marital status: Single     Spouse name: Not on file     Number of children: Not on file     Years of education: Not on file     Highest education level: Not on file   Occupational History     Not on file   Tobacco Use     Smoking status: Never Smoker     Smokeless tobacco: Never Used   Substance and Sexual Activity     Alcohol use: Yes     Drug use: No     Sexual activity: Not on file   Other Topics Concern      Service Not Asked     Blood Transfusions Not Asked     Caffeine Concern Not Asked     Occupational Exposure Not Asked     Hobby Hazards Not Asked     Sleep Concern Not Asked     Stress Concern Not Asked     Weight Concern Not Asked     Special Diet Not Asked     Back Care Not Asked     Exercise No     Bike Helmet Not Asked     Seat Belt No     Self-Exams No   Social History Narrative    Womens Health Kit given.         Social Determinants of Health     Financial Resource Strain: Not on file   Food Insecurity: Not on file   Transportation Needs: Not on file   Physical Activity: Not on file   Stress:  Not on file   Social Connections: Not on file   Intimate Partner Violence: Not on file   Housing Stability: Not on file     Family History:  Family History   Problem Relation Age of Onset     Hypertension Father         abstract     Cancer Paternal Aunt         gallbladder cancer abstract     Physical Exam  /84   Pulse 71   Wt 94.8 kg (209 lb)   SpO2 95%   BMI 33.75 kg/m    GEN: NAD, alert and appropriately responsive  SKIN: Exam of face, neck, trunk upper extremities andlegs  Grouped pink to brown 2mm flat papules on left elbow. Rt elbow clear.   A few similar papules on rt knee.    Reviewed images uploaded on 3/3/22  Punched out ulcer with yellow fibrinous base and scarred plaque on rt abdomen   Rt abdomen with poorly demarcated pink plaque  Reviewing phone started off as a bullae  Also nonspecific pink papules on rt upper abdomen  Inflammatory nodules in suprapubic    Labs/Imaging: Reviewed  DIEGO + CARIN, negative IIF  Neg FAVIOLA, C3/C4 5/27/14    Assessment & Plan:  1. Bullae  - Only has had a single bullae. Healed with scarring on rt abdomen. ? Infectious  - If gets another bullae, will let me know urgently    2. Intermittent inflammatory nodules on abdomen, suprapubic area and lower legs  - I do wonder about staph aureus folliculitis/furunculosis   - Will start benzoyl peroxide 5% wash   - She has mupirocin. Will have her use the mupirocin in the nares, perirectal area and in the perianal area daily one weekend a month    3. Eruption, elbows/knees  -  Unclear etiology  - Location concerning for psoriasis. No epidermal change. Looks more like GA or interstitial granulomatous dermatitis. Discussed biopsy, she would like to hold off for now  - Triamcinolone 0.05% ointment BID    4) Seronegative inflammatory arthritis  - Sounds like a spondyloarthropathy. Not sure if there is an underlying cause. Pretty significant GI work-up with no IBD found.     RTC in 6 months    Thank you for referring Laxmi Ya  to the rheumatology-dermatology clinic      Eros Monique MD, FAAD, FACP     Departments of Internal Medicine and Dermatology  [facility]  [phone]

## 2022-03-03 NOTE — PROGRESS NOTES
Social Work Intervention  [facility]    Data/Intervention:    Patient Name:  Laxmi Ya  /Age:  1975 (46 year old)    Visit Type: in person  Referral Source: Hepatology Clinic   Reason for Referral: Transportation/Financial Concerns     Collaborated With:    - Laxmi     Psychosocial Information/Concerns:  Patient present in clinic on this day and requesting assistance with parking payment. Patient does not work, however, is on SSDI. Patient shared she has many appointments at the [facility] and cannot afford the parking each time. Per brief chart review, patient does not have transportation benefit through insurance.      Intervention/Education/Resources Provided:  Met with patient and provided a one time [employer] pass (5 in/outs) for parking. Patient thankful for assistance and expressed understanding of one time assistance. Offered to follow-up with patient via [employer] to assess alternate transportation options. Patient in agreement.     Assessment/Plan:  Plan to follow-up with patient 3-4-2022 to discuss transportation in further detail.     Jackie Sewell [facility]   [facility]   Phone: [phone]  Pager: [phone]    Addendum 3-4-2022:   Spoke with patient via phone to assess [place] eligibility and discuss transportation options in further detail. Per report, patient does not meet [place] income guidelines. Informed patient [employer] does not travel to [facility], however, [employer] does and these two services link to get patient from [place] to [place]. Patient reports that switching buses will likely be difficult due to health conditions. Validated patients feelings and provided information if/when patient may need to use these services. Discussed discounted parking passes for the [facility] and shared information via [employer] message (Refer to message 3-4-2022 for more information). Patient expressed understanding and thanked  writer for assistance.     Assessment/Plan:  No further needs identified at this time. Provided patient with contact information/availability and encouraged them to reach out with any needs that may arise.     Jackie Sewell, [facility]  [facility]   [place]   Phone: [phone]  Pager: [phone]

## 2022-03-03 NOTE — NURSING NOTE
Chief Complaint   Patient presents with     Consult     initial visit skin issues     Blood pressure 133/84, pulse 71, weight 94.8 kg (209 lb), SpO2 95 %.    Marla Geronimo CMA

## 2022-03-03 NOTE — PROGRESS NOTES
Rheumatology-Dermatology Clinic New Patient Note    Patient is new to the clinic, seen in consultation for Dr. 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 Mild persistent asthma, unspecified whether complicated   3. Bacterial overgrowth syndrome with diarrhea   - Diagnosed   4. Nonalcoholic steatohepatitis  5. Hx of clostridium difficile x2  6. Multiple orthopedic surgeries   Carpal tunnel surgery b/l    Dissectmy and laminectomy, lumbar and cervical spine  7. Recurrent sinusitis  - MPO, PR3 neg  8. DMII  9. Blepharitis  10: Hx of diverticulosis and recurrent diverticulitis s/p partial colectomy  11. Hx of dermatographism  SH: BILLIE/TL grewal, currently on disability    CC:  Chief Complaint   Patient presents with     Consult     initial visit skin issues       History of Present Illness  Laxmi Ya is a 46 year old  female with the above noted PMH being seen in rheum-derm clinic for evaluation of intermittent rashes. Imntermittent rashes going on since 2017.  Started awhen she started biologics.     IM kenalog shot which has been helpful migraine hadache, facial pain/pressure/congestion,     Detailed Review of Systems  Otherwise nml state of health. No other skin concerns.    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     Obesity      Pyelonephritis, unspecified 1992    abstract     Rheumatoid  arteritis (H)      Unspecified symptom associated with female genital organs 05/07/1999    chronic pelvic pain abstract     Medications:  Current Outpatient Medications   Medication     Abatacept (ORENCIA IV)     Albuterol Sulfate (PROAIR HFA IN)     Artificial Tear Solution (SOOTHE XP) SOLN     atenolol (TENORMIN) 50 MG tablet     diclofenac (VOLTAREN) 1 % topical gel     Dupilumab (DUPIXENT) 300 MG/2ML SOPN - took it once for sinusitis and then hold it because she was not sure if it was dupixent or simponi     Fexofenadine HCl (ALLEGRA PO)     fidaxomicin (DIFICID) 200 MG tablet - has been on this for atleast 3 months for cdiff     fluticasone (FLONASE) 50 MCG/ACT nasal spray          lidocaine (LIDODERM) 5 % patch     losartan (COZAAR) 100 MG tablet     metFORMIN (GLUCOPHAGE-XR) 500 MG 24 hr tablet     Montelukast Sodium (SINGULAIR PO)     nystatin (MYCOSTATIN) 861224 UNIT/ML suspension     olopatadine (PAZEO) 0.7 % ophthalmic solution     OMEPRAZOLE PO     spironolactone-HCTZ (ALDACTAZIDE) 25-25 MG tablet     sucralfate (CARAFATE) 1 GM/10ML suspension     tobramycin (TOBREX) 0.3 % ophthalmic solution     tobramycin-dexamethasone (TOBRADEX) 0.3-0.1 % ophthalmic ointment                                                    fluticasone-vilanterol (BREO ELLIPTA) 200-25 MCG/INH inhaler                       Mometasone Furo-Formoterol Fum (DULERA IN)     mupirocin (BACTROBAN) 2 % external ointment         norethindrone (MICRONOR) 0.35 MG tablet                              Probiotic Product (Node1 PO)     triamcinolone (KENALOG) 0.1 % external cream         vitamin E (TOCOPHEROL) 1000 units (450 mg) capsule   Rifaximin - starting for = SIBO  No current facility-administered medications for this visit.     Allergies:   Allergies   Allergen Reactions     Polyethylene Glycol Rash     Codeine      Other reaction(s): Gastrointestinal, GI intolerance, Vomiting  Vomiting       Gadolinium Dizziness and Nausea      Hydrocodone-Acetaminophen Nausea and Vomiting     Other reaction(s): GI intolerance     Iodine      abstract     Sulfasalazine      Other reaction(s): GI intolerance  Has tried and had nausa.     Tramadol Nausea and Vomiting     Other reaction(s): Gastrointestinal, Other (see comments)  Nausea and vomiting   unknown       Gluten Meal Other (See Comments) and Rash     Other reaction(s): Gastrointestinal, GI intolerance  Dizziness, tired, rash, stomach cramps, thrush, mouth sores  Dizziness, tired, rash, stomach cramps, thrush, mouth sores         Social History  Social History     Socioeconomic History     Marital status: Single     Spouse name: Not on file     Number of children: Not on file     Years of education: Not on file     Highest education level: Not on file   Occupational History     Not on file   Tobacco Use     Smoking status: Never Smoker     Smokeless tobacco: Never Used   Substance and Sexual Activity     Alcohol use: Yes     Drug use: No     Sexual activity: Not on file   Other Topics Concern      Service Not Asked     Blood Transfusions Not Asked     Caffeine Concern Not Asked     Occupational Exposure Not Asked     Hobby Hazards Not Asked     Sleep Concern Not Asked     Stress Concern Not Asked     Weight Concern Not Asked     Special Diet Not Asked     Back Care Not Asked     Exercise No     Bike Helmet Not Asked     Seat Belt No     Self-Exams No   Social History Narrative    Womens Health Kit given.         Social Determinants of Health     Financial Resource Strain: Not on file   Food Insecurity: Not on file   Transportation Needs: Not on file   Physical Activity: Not on file   Stress: Not on file   Social Connections: Not on file   Intimate Partner Violence: Not on file   Housing Stability: Not on file     Family History:  Family History   Problem Relation Age of Onset     Hypertension Father         abstract     Cancer Paternal Aunt         gallbladder cancer abstract     Physical  Exam  /84   Pulse 71   Wt 94.8 kg (209 lb)   SpO2 95%   BMI 33.75 kg/m    GEN: NAD, alert and appropriately responsive  SKIN: Exam of face, neck, trunk upper extremities andlegs  Grouped pink to brown 2mm flat papules on left elbow. Rt elbow clear.   A few similar papules on rt knee.    Reviewed images uploaded on 3/3/22  Punched out ulcer with yellow fibrinous base and scarred plaque on rt abdomen   Rt abdomen with poorly demarcated pink plaque  Reviewing phone started off as a bullae  Also nonspecific pink papules on rt upper abdomen  Inflammatory nodules in suprapubic    Labs/Imaging: Reviewed  DIEGO + CARIN, negative IIF  Neg FAVIOLA, C3/C4 5/27/14    Assessment & Plan:  1. Bullae  - Only has had a single bullae. Healed with scarring on rt abdomen. ? Infectious  - If gets another bullae, will let me know urgently    2. Intermittent inflammatory nodules on abdomen, suprapubic area and lower legs  - I do wonder about staph aureus folliculitis/furunculosis   - Will start benzoyl peroxide 5% wash   - She has mupirocin. Will have her use the mupirocin in the nares, perirectal area and in the perianal area daily one weekend a month    3. Eruption, elbows/knees  -  Unclear etiology  - Location concerning for psoriasis. No epidermal change. Looks more like GA or interstitial granulomatous dermatitis. Discussed biopsy, she would like to hold off for now  - Triamcinolone 0.05% ointment BID    4) Seronegative inflammatory arthritis  - Sounds like a spondyloarthropathy. Not sure if there is an underlying cause. Pretty significant GI work-up with no IBD found.     RTC in 6 months    Thank you for referring Laxmi Ya to the rheumatology-dermatology clinic      Eros Monique MD, FAAD, FACP     Departments of Internal Medicine and Dermatology  [facility]  [phone]

## 2022-03-03 NOTE — PATIENT INSTRUCTIONS
Benzoyl peroxide 5% wash daily   Mupirocin ointment to nares, perirectal area and ears one weekend a month  Triamcinolone ointment twice a day to rashes

## 2022-03-04 ENCOUNTER — MEDICAL CORRESPONDENCE (OUTPATIENT)
Dept: HEALTH INFORMATION MANAGEMENT | Facility: CLINIC | Age: 47
End: 2022-03-04
Payer: MEDICARE

## 2022-03-07 ENCOUNTER — TRANSCRIBE ORDERS (OUTPATIENT)
Dept: OTHER | Age: 47
End: 2022-03-07

## 2022-03-07 DIAGNOSIS — J30.89 OTHER ALLERGIC RHINITIS: Primary | ICD-10-CM

## 2022-03-08 NOTE — PROGRESS NOTES
Rheumatology Clinic Visit  [facility]  Oswaldo Lemus M.D.     Laxmi Ya MRN# [id number]   YOB: 1975 Age: 46 year old   Date of Visit: 03/09/2022  Primary care provider: Artur Marsh          Assessment and Plan:     Seronegative inflammatory arthritis, likely rheumatoid  Recent small joint predominant stiffness and pain prompted use of orencia, restarted in March 2022; patient has had 2 infusions 2 weeks apart and will receive her next one on March 15. Patient feels some improvement in her lower extremity joints and her fatigue has been better. She still has significant morning stiffness for more than 1 hour, and pain in hands/wrists/neck for which she gets occipital blocks. On exam, has fusiform swelling of her hands, tenderness at multiple MCP and PIP, weak  strength b/l, with prominent prayer sign and tuck sign,  Extension incomplete 10-15 degrees with several PIPs b/l. Laboratory evaluation on February 3, 2022 showed normal CBC.  Comprehensive metabolic panel was normal except for slightly elevated ALT of 64.  CRP elevated at 17.7 in Dec 2021. CT of the sinus on March 3, 2022 showed clear paranasal sinuses . COVID-19 virus by PCR was positive on January 17. Autoimmune workup in 2021 for DIEGO/FAVIOLA panel/CCP/RF/ ANCA negative.      Impression:  1.  Seronegative inflammatory arthritis, active  2.  Persistent hand arthritis with decreased ROM in hand joints.       We discussed that in light of patient's improvement with 2 doses of Orencia in terms of fatigue and some joint pain/swelling, and tolerating it well, we recommend to continue Orencia 750 mg IV every 4 weeks after her loading dose.     We will monitor response of Orencia for the next 6-8 weeks, if she does not respond well we will consider options like Xeljanz and methotrexate. Methotrexate should be considered 2nd line due to dx of fatty liver. We will discuss with her on the follow-up.     We also discussed the options  of considering use of Medrol 8-12 mg daily for 10-14 days for flares of inflammatory arthrtis.  We will avoid prednisone as she did not tolerate it well in the past.  Patient then opted to start a Medrol course as a bridge till she gets more benefit with Orencia.  We would agree as she still has significant morning stiffness, with hand swelling/ pain. We will prescribe 12 mg daily for a week followed by 8 mg daily for a week, and then stop. Avoid use of Medrol on a continuous basis for safety, limit exposure to 1 course every 2-3 mths.     Patient has been following with Dr. Dosdon at [facility] for corticosteroid injections in thumbs/wrists and plans to establish care with Dr. Petit here for her decreased ROM in hand joints and advisability of continued intermittent injections. We agree    We will follow-up in 3 mths. She does not need any labs at this time.     Continue Xifaxin per gastroenterology for small bowel bacterial overgrowth    Patient seen and discussed with Dr. Allan Dawn MD  PGY3, Internal Medicine    I saw this patient with the medical resident. I agree with the stated findings and recommendations which reflect our joint impressions and plan.    Oswaldo Lemus M.D.  Staff Rheumatologist, [facility]  Pager [phone]             History of Present Illness:   Laxmi Ya with history of COLON, hypertension, diagnosis of seronegative rheumatoid arthritis with tenosynovitis presents for establishing care with rheumatology at [facility] after detention of her prior rheumatologist Dr. Saleh at [facility].  She has had polyarthralgias for years.    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 Saleh. Initially did not think it to be inflammatory. later diagnosed with inflammatory  arthritis, rheumatoid syndrome and then seronegative RA. Patient has been disabled Patient was initially on Humira for a year starting in 2018, then discontinued due to failure to improve her joint symptoms after some time, then received a dose of Embrel leading to infections including sinus/ UTIs, then patient was on Remicade for 2 yrs but it did not work for all the joints like her back and she started having siginficant GI issues like diarrhea and abdominal pain, but did not necessarily think that it failed completely to relieve her symptoms. Patient was started on Symponi in Oct 2021, but developed serious reaction to it reporting an an area of severe erythema in her intestinal area, along with abdominal pain/ nausea/vomiting so she was switched to Orencia in Nov 2021, received one infusion but then developed serious sinus infections/ Covid in Jan 2022 so it was held during that entire time and loading dose restarted in March 2022, has had 2 infusions 2 weeks apart at [facility] and will receive her next one on March 15. Patient feels some improvement in her lower extremity joints and her fatigue has been better. She still has significant morning stiffness for more than 1 hour, and pain in hands/wrists/neck for which she gets occipital blocks. When asked about steroids she thinks she may have an adverse reaction to prednisone while in the hospital but that could be due to her SIBO. She has not used much for oral steroids in all these years but has tolerated Solu-medrol okay in the past.  Small joints of hands, hips and her neck bother her the most with stiffness/pain and swelling.  It is very painful in the wrist, feels hands are swollen. Also reports some numbness/ tingling right side of arm. Feet also get painful. Neck gets stiff and painful, with shooting pain going down to thoracic area.  She can not stand for very long without getting pain in both hips. Also notices stiffness. Constantly changing positions helps.  Oxycodone also helps sometimes when she has severe pain. She tries not to take it often. She gets steroid injections with Dr. Dodson in wrists and thumbs alternating every 2 mths. She feels Orencia helps with legs and her fatigue, but has not been helping with other joints.  She received one dose in Nov 2021, but then she had sinus infections and infected with COVID in Jan 2021, restarted in March, for every 2 week infusions at [facility] to start with, has received 2 infusions so far.   She wakes up at 9am and experiences morning stiffness till 4 pm in the afternoon.  For her ADLs, reports either her boyfriend cooks or she orders food.  She has difficulty buttoning shirts.Her shoulders get painful and weak.   She has low grade fevers consistently and night sweats. Feels lymph nodes in neck get swollen sometimes. Orencia has helped with mouth ulcers but she does get them periodically.  She has been losing some hair in last 6 mths.    She has been having trouble lately with her eyes for years, they get infected. Sometimes her vision gets strained. she sees eye doctor for that. She is being prescribed eye drops for it.  She periodically has hard time breathing with exertion with some pains in the central chest area. She does get heartburn. She takes omeprazole. She does complain of skin rashes.  She sees derm for it. They are usually open sores on legs/abdomen. Every 2-3 mths.  Lost 21 lbs in last 3 mths unintentionally/  No appetite changes. Abd pain has gone away since being started on Xifaxin started by GI for her SIBO. No Raynaud's phenomenon. No other acute issues.              Review of Systems:     12-point ROS performed. Negative except for pertinent positives in HPI.          Active Problem List:   There are no problems to display for this patient.           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     Obesity      Pyelonephritis, unspecified 1992    abstract     Rheumatoid arteritis (H)      Unspecified symptom associated with female genital organs 05/07/1999    chronic pelvic pain abstract     Past Surgical History:   Procedure Laterality Date     CHOLECYSTECTOMY       COLON SURGERY       CYSTOSCOPY,+URETEROSCOPY      abstract     ZZHC EXCIS/CURET BENIGN ELBOW LESN  10/26/1987    (R) humerus abstract     1.Seronegative rheumatoid arthritis with predominant tenosynovitis:   In 2015, rheumatoid factor, cyclic citrullinated peptide antibodies of extractable nuclear antigen panel, and anti-DNA antibodies were negative. Patient was initially treated for undifferentiated mixed connective tissue disease by rheumatologist over home telephone visits with Cellcept/Plaquenil for a prior Hx of weakly positive DIEGO. Then she established care with Dr. Saleh at [facility] in 2018, initially disease thought to be non-inflammatory, later diagnosed with inflammatory arthritis, rheumatoid syndrome and then seronegative RA. Patient was initially on Humira for a year starting in 2018, then discontinued due to failure to improve her joint symptoms, received a trial of Embrel which led to infections including sinus/ UTIs, then patient was on Remicade for 2 yrs but it did not work for all the joints like her back and she started having siginficant GI issues like diarrhea and abdominal pain, but did not necessarily think that it failed completely to releive her symptoms. Patient was started on Symponi in Oct 2021, but developed serious reaction to it reporting an area of severe erythema in her intestinal area, along with abdominal pain/ nausea/vomiting so she was switched to Orencia in Nov 2021, received one infusion but then developed serious sinus  infections/ Covid in Jan 2022. Orencia infusions restarted in 2-2022.  - Previous Rx:                  Humira- did not have good response              Infliximab - did not have good response              Embrel- had infections following one dose              Golimumab - red swollen plaque, rt abdomen after first infusion and then stoppd  She has reacted to sulfasalazine reported to be nausea/abd discomfort. She thinks methotrexate can not be used for fatty liver.   2. Mild persistent asthma, unspecified whether complicated   3. Bacterial overgrowth syndrome with diarrhea   - Diagnosed   4. Nonalcoholic steatohepatitis  5. Hx of clostridium difficile x2  6. Multiple orthopedic surgeries              Carpal tunnel surgery b/l               Discectomy and laminectomy, lumbar and cervical spine  7. Recurrent sinusitis: Myeloperoxidase and proteinase 3 were negative in October 2021.  8. DMII  9. Blepharitis  10: Hx of diverticulosis and recurrent diverticulitis s/p partial colectomy; Bacterial overgrowth syndrome with diarrhea   11. Hx of dermatographism       Social History:     Social History     Socioeconomic History     Marital status: Single     Spouse name: Not on file     Number of children: Not on file     Years of education: Not on file     Highest education level: Not on file   Occupational History     Not on file   Tobacco Use     Smoking status: Never Smoker     Smokeless tobacco: Never Used   Substance and Sexual Activity     Alcohol use: Yes     Drug use: No     Sexual activity: Not on file   Other Topics Concern      Service Not Asked     Blood Transfusions Not Asked     Caffeine Concern Not Asked     Occupational Exposure Not Asked     Hobby Hazards Not Asked     Sleep Concern Not Asked     Stress Concern Not Asked     Weight Concern Not Asked     Special Diet Not Asked     Back Care Not Asked     Exercise No     Bike Helmet Not Asked     Seat Belt No     Self-Exams No   Social History Narrative     Womens Health Kit given.         Social Determinants of Health     Financial Resource Strain: Not on file   Food Insecurity: Not on file   Transportation Needs: Not on file   Physical Activity: Not on file   Stress: Not on file   Social Connections: Not on file   Intimate Partner Violence: Not on file   Housing Stability: Not on file   FHA/, currently on disability       Family History:     Family History   Problem Relation Age of Onset     Hypertension Father         abstract     Cancer Paternal Aunt         gallbladder cancer abstract            Allergies:     Allergies   Allergen Reactions     Polyethylene Glycol Rash     Codeine      Other reaction(s): Gastrointestinal, GI intolerance, Vomiting  Vomiting       Gadolinium Dizziness and Nausea     Hydrocodone-Acetaminophen Nausea and Vomiting     Other reaction(s): GI intolerance     Iodine      abstract     Sulfasalazine      Other reaction(s): GI intolerance  Has tried and had nausa.     Tramadol Nausea and Vomiting     Other reaction(s): Gastrointestinal, Other (see comments)  Nausea and vomiting   unknown       Gluten Meal Other (See Comments) and Rash     Other reaction(s): Gastrointestinal, GI intolerance  Dizziness, tired, rash, stomach cramps, thrush, mouth sores  Dizziness, tired, rash, stomach cramps, thrush, mouth sores              Medications:     Current Outpatient Medications   Medication Sig Dispense Refill     Abatacept (ORENCIA IV) Inject into the vein every 14 days       Albuterol Sulfate (PROAIR HFA IN) Inhale into the lungs as needed        Artificial Tear Solution (SOOTHE XP) SOLN as needed       atenolol (TENORMIN) 50 MG tablet Take 50 mg by mouth daily       benzoyl peroxide 5 % external liquid Use daily as directed 148 mL 3     clindamycin (CLEOCIN T) 1 % external lotion Apply topically 2 times daily 60 mL 3     diclofenac (VOLTAREN) 1 % topical gel Apply topically 4 times daily as needed        Fexofenadine HCl (ALLEGRA  PO) Take 180 mg by mouth daily        fidaxomicin (DIFICID) 200 MG tablet Take 200 mg by mouth       fluticasone (FLONASE) 50 MCG/ACT nasal spray Spray 2 sprays into both nostrils 2 times daily        lidocaine (LIDODERM) 5 % patch as needed   1     losartan (COZAAR) 100 MG tablet Take 100 mg by mouth daily       metFORMIN (GLUCOPHAGE-XR) 500 MG 24 hr tablet Take 500 mg by mouth daily        Montelukast Sodium (SINGULAIR PO) Take 10 mg by mouth At Bedtime        nystatin (MYCOSTATIN) 413816 UNIT/ML suspension TAKE 5ML BY MOUTH 4XDAILY FOR 14 DAYS. CONTINUE AT LEAST 2 DAYS AFTER SYMPTOMS RESOLVED       olopatadine (PAZEO) 0.7 % ophthalmic solution Apply 2 Units to eye 2 times daily       OMEPRAZOLE PO Take 40 mg by mouth 2 times daily        rifaximin (XIFAXAN) 550 MG TABS tablet Take 200 mg by mouth 3 times daily Take for 10 days, has on had as needed for small bowel bacteria overgrowh       spironolactone-HCTZ (ALDACTAZIDE) 25-25 MG tablet Take 1 tablet by mouth daily       sucralfate (CARAFATE) 1 GM/10ML suspension Take by mouth 4 times daily as needed        tobramycin-dexamethasone (TOBRADEX) 0.3-0.1 % ophthalmic ointment 0.25 inches At Bedtime       triamcinolone (KENALOG) 0.1 % external ointment Apply topically 2 times daily 80 g 1     azelastine (ASTELIN) 0.1 % nasal spray Spray 1 spray in nostril as needed (Patient not taking: Reported on 12/22/2021)       cholecalciferol (VITAMIN D-1000 MAX ST) 25 MCG (1000 UT) TABS Take 4,000 Units by mouth daily        docusate sodium (COLACE) 50 MG capsule Take 2 capsules by mouth 2 times daily       Dupilumab (DUPIXENT) 300 MG/2ML SOPN Inject 300 mg Subcutaneous every 14 days (Patient not taking: Reported on 3/9/2022) 4 mL 2     EPINEPHrine (ANY BX GENERIC EQUIV) 0.3 MG/0.3ML injection 2-pack Inject 0.3 ml intramuscularly once as needed for up to 1 dose, into thigh as directed for and/or potential for an allergic reaction       fluticasone-vilanterol (BREO ELLIPTA)  "200-25 MCG/INH inhaler Inhale 1 puff into the lungs daily (Patient not taking: Reported on 12/22/2021) 3 each 0     Levonorgestrel-Ethinyl Estrad (LEVORA 0.15/30, 28, PO)  (Patient not taking: Reported on 12/22/2021)       metroNIDAZOLE (METROLOTION) 0.75 % external lotion Apply 1 Application topically 2 times daily (Patient not taking: Reported on 12/22/2021)       Mometasone Furo-Formoterol Fum (DULERA IN)        mupirocin (BACTROBAN) 2 % external ointment Apply 1 Application topically as needed        norethindrone (MICRONOR) 0.35 MG tablet Take 0.35 mg by mouth       Probiotic Product (Drimmi PO)  (Patient not taking: Reported on 12/22/2021)       tobramycin (TOBREX) 0.3 % ophthalmic solution 1-2 drops 2 times daily (Patient not taking: Reported on 3/9/2022)       triamcinolone (KENALOG) 0.1 % external cream Apply 1 Application topically as needed (Patient not taking: Reported on 12/22/2021)       vitamin E (TOCOPHEROL) 1000 units (450 mg) capsule Take 1,000 Units by mouth (Patient not taking: Reported on 12/22/2021)              Physical Exam:   Blood pressure 126/88, pulse 68, temperature 97.7  F (36.5  C), temperature source Oral, resp. rate 20, height 1.676 m (5' 5.98\"), weight 95.7 kg (211 lb), SpO2 97 %.  Wt Readings from Last 6 Encounters:   03/09/22 95.7 kg (211 lb)   03/03/22 94.8 kg (209 lb)   02/03/22 96 kg (211 lb 9.6 oz)   12/22/21 97.3 kg (214 lb 9.6 oz)   07/16/21 100.2 kg (221 lb)   10/22/19 103.9 kg (229 lb)     Constitutional: well-developed, appearing stated age; cooperative  Eyes: nl EOM, PERRLA,conjunctiva, sclera  ENT: nl external ears, nose, hearing, lips, teeth, gums, throat  No mucous membrane lesions, normal saliva pool  Resp: lungs clear to auscultation, nl to palpation  CV: RRR, no murmurs, rubs or gallops, no edema  GI: no ABD mass or tenderness, no HSM  : not tested  MS: Has fusiform swelling of her hands, weak  strength b/l, with prominent prayer sign and tuck " sign,  Extension incomplete 10-15 degrees with several PIPs b/l.  Neck stiffness/ midline thoracic tenderness/ cervicocoracial junction. B/l ankle tenderness/ good alignment  and ROM of feet/ knees intact. Shoulder ROM intact, with some tenderness with movement. Knee, ankle, and foot MTP/IP joints were examined, and normal.   Skin: no nail pitting, alopecia, rash  Neuro: nl cranial nerves, strength, sensation, DTRs.   Psych: nl judgement, orientation, memory, affect.         Data:     @  RHEUM RESULTS Latest Ref Rng & Units 10/26/2001 2020 2/3/2022   ALBUMIN 3.4 - 5.0 g/dL - 3.9 3.8   ALT 0 - 50 U/L - 36 64(H)   AST 0 - 45 U/L - 24 35   CREATININE 0.52 - 1.04 mg/dL - 0.73 0.90   GFR ESTIMATE, IF BLACK >60 mL/min/[1.73:m2] - >90 -   GFR ESTIMATE >60 mL/min/1.73m2 - >90 80   HEMATOCRIT 35.0 - 47.0 % 40.2 47.3(H) 46.9   HEMOGLOBIN 11.7 - 15.7 g/dL 13.5 15.8(H) 15.2   WBC 4.0 - 11.0 10e3/uL 8.09 9.3 8.0   RBC 3.80 - 5.20 10e6/uL 4.95 5.59(H) 5.57(H)   RDW 10.0 - 15.0 % 15.6(A) 13.4 12.9   MCHC 31.5 - 36.5 g/dL 33.6 33.4 32.4   MCV 78 - 100 fL 81.3 85 84   PLATELET COUNT 150 - 450 10e3/uL - 402 442     MRI thoracic spine 21 with mild multilevel spondylosis with specific findings according to level includin. Small disc bulge/protrusion at T1-2, T11-12, T12-L1  2. No cord deformity/centralspinal canal stenosis. The foramen appears patent    MRI lumbar spine 2013  1. L3-L4 mild anterior degenerative disc disease and degeneration of   the left facet joint.   2. L4-L5 mild degenerative disc disease with small central posterior   disc protrusion and high intensity zone. Slight impression on the   thecal sac.   3. L5-S1 small central posterior disc protrusion extending below the   disc level. IMPRESSION:     1. L3-L4 mild anterior degenerative disc disease and degeneration of   the left facet joint.   2. L4-L5 mild degenerative disc disease with small central posterior   disc protrusion and high intensity  zone. Slight impression on the   thecal sac.   3. L5-S1 small central posterior disc protrusion extending below the   disc level.     MRI Radius Ulna left 3/12/21  1. Redemonstrated mild flexor tenosynovitis within the left carpal tunnel.   2. Remainder is otherwise unremarkable for inflammatory process within the left   Forearm.    MRI Radius Ulna Right 3/12/21  1. Redemonstrated are moderate inflammatory flexor tenosynovitis within the   right wrist which is not significantly changed from prior dedicated MRI   examination.   2. Remainder of the exam is unremarkable.    DX Lumbar spine 1/26/21  Postoperative changes if an L5-S1 laminectomy. Slight disk space   narrowing at L5. Lower lumbar facet arthritis. Low-grade lumbar subluxations. No   gross instability on flexion and extension. Slight wedge deformity of the L4   vertebral body.

## 2022-03-08 NOTE — TELEPHONE ENCOUNTER
NOTES Status Details   OFFICE NOTE from referring provider Care Everywhere / Internall 02.17.2022 Amol Juares MD     MEDICATION LIST Care Evetywhere  /Internal    LABS (Any and all labs)      Care Everywhere  /Internal

## 2022-03-09 ENCOUNTER — OFFICE VISIT (OUTPATIENT)
Dept: RHEUMATOLOGY | Facility: CLINIC | Age: 47
End: 2022-03-09
Attending: INTERNAL MEDICINE
Payer: MEDICARE

## 2022-03-09 ENCOUNTER — PRE VISIT (OUTPATIENT)
Dept: RHEUMATOLOGY | Facility: CLINIC | Age: 47
End: 2022-03-09
Payer: MEDICARE

## 2022-03-09 VITALS
BODY MASS INDEX: 33.91 KG/M2 | HEART RATE: 68 BPM | TEMPERATURE: 97.7 F | HEIGHT: 66 IN | RESPIRATION RATE: 20 BRPM | WEIGHT: 211 LBS | DIASTOLIC BLOOD PRESSURE: 88 MMHG | OXYGEN SATURATION: 97 % | SYSTOLIC BLOOD PRESSURE: 126 MMHG

## 2022-03-09 DIAGNOSIS — M13.0 SERONEGATIVE POLYARTHRITIS: ICD-10-CM

## 2022-03-09 PROCEDURE — 99205 OFFICE O/P NEW HI 60 MIN: CPT | Performed by: INTERNAL MEDICINE

## 2022-03-09 PROCEDURE — G0463 HOSPITAL OUTPT CLINIC VISIT: HCPCS

## 2022-03-09 RX ORDER — METHYLPREDNISOLONE 4 MG/1
TABLET ORAL
Qty: 35 TABLET | Refills: 1 | Status: SHIPPED | OUTPATIENT
Start: 2022-03-09 | End: 2022-06-22

## 2022-03-09 ASSESSMENT — PAIN SCALES - GENERAL: PAINLEVEL: EXTREME PAIN (9)

## 2022-03-09 NOTE — LETTER
3/9/2022       RE: Laxmi Ya  [address] [place] [id number]     Dear Colleague,    Thank you for referring your patient, Laxmi Ya, to the [facility] [place] at [facility]. Please see a copy of my visit note below.    Rheumatology Clinic Visit  [facility]  Oswaldo Lemus M.D.     Laxmi Ya MRN# [id number]   YOB: 1975 Age: 46 year old   Date of Visit: 03/09/2022  Primary care provider: Artur Marsh          Assessment and Plan:     Seronegative inflammatory arthritis, likely rheumatoid  Recent small joint predominant stiffness and pain prompted use of orencia, restarted in March 2022; patient has had 2 infusions 2 weeks apart and will receive her next one on March 15. Patient feels some improvement in her lower extremity joints and her fatigue has been better. She still has significant morning stiffness for more than 1 hour, and pain in hands/wrists/neck for which she gets occipital blocks. On exam, has fusiform swelling of her hands, tenderness at multiple MCP and PIP, weak  strength b/l, with prominent prayer sign and tuck sign,  Extension incomplete 10-15 degrees with several PIPs b/l. Laboratory evaluation on February 3, 2022 showed normal CBC.  Comprehensive metabolic panel was normal except for slightly elevated ALT of 64.  CRP elevated at 17.7 in Dec 2021. CT of the sinus on March 3, 2022 showed clear paranasal sinuses . COVID-19 virus by PCR was positive on January 17. Autoimmune workup in 2021 for DIEGO/FAVIOLA panel/CCP/RF/ ANCA negative.      Impression:  1.  Seronegative inflammatory arthritis, active  2.  Persistent hand arthritis with decreased ROM in hand joints.       We discussed that in light of patient's improvement with 2 doses of Orencia in terms of fatigue and some joint pain/swelling, and tolerating it well, we recommend to continue Orencia 750 mg IV every 4 weeks after  her loading dose.     We will monitor response of Orencia for the next 6-8 weeks, if she does not respond well we will consider options like Xeljanz and methotrexate. Methotrexate should be considered 2nd line due to dx of fatty liver. We will discuss with her on the follow-up.     We also discussed the options of considering use of Medrol 8-12 mg daily for 10-14 days for flares of inflammatory arthrtis.  We will avoid prednisone as she did not tolerate it well in the past.  Patient then opted to start a Medrol course as a bridge till she gets more benefit with Orencia.  We would agree as she still has significant morning stiffness, with hand swelling/ pain. We will prescribe 12 mg daily for a week followed by 8 mg daily for a week, and then stop. Avoid use of Medrol on a continuous basis for safety, limit exposure to 1 course every 2-3 mths.     Patient has been following with Dr. Dodson at [facility] for corticosteroid injections in thumbs/wrists and plans to establish care with Dr. Petit here for her decreased ROM in hand joints and advisability of continued intermittent injections. We agree    We will follow-up in 3 mths. She does not need any labs at this time.     Continue Xifaxin per gastroenterology for small bowel bacterial overgrowth    Patient seen and discussed with Dr. Allan Dawn MD  PGY3, Internal Medicine    I saw this patient with the medical resident. I agree with the stated findings and recommendations which reflect our joint impressions and plan.    Oswaldo Lemus M.D.  Staff Rheumatologist, [facility]  Pager [phone]             History of Present Illness:   Laxmi Ya with history of COLON, hypertension, diagnosis of seronegative rheumatoid arthritis with tenosynovitis presents for establishing care with rheumatology at [facility] after correction of her prior rheumatologist Dr. Saleh at [facility].  She has had polyarthralgias for years.    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 Saleh. Initially did not think it to be inflammatory. later diagnosed with inflammatory arthritis, rheumatoid syndrome and then seronegative RA. Patient has been disabled Patient was initially on Humira for a year starting in 2018, then discontinued due to failure to improve her joint symptoms after some time, then received a dose of Embrel leading to infections including sinus/ UTIs, then patient was on Remicade for 2 yrs but it did not work for all the joints like her back and she started having siginficant GI issues like diarrhea and abdominal pain, but did not necessarily think that it failed completely to relieve her symptoms. Patient was started on Symponi in Oct 2021, but developed serious reaction to it reporting an an area of severe erythema in her intestinal area, along with abdominal pain/ nausea/vomiting so she was switched to Orencia in Nov 2021, received one infusion but then developed serious sinus infections/ Covid in Jan 2022 so it was held during that entire time and loading dose restarted in March 2022, has had 2 infusions 2 weeks apart at [place] and will receive her next one on March 15. Patient feels some improvement in her lower extremity joints and her fatigue has been better. She still has significant morning stiffness for more than 1 hour, and pain in hands/wrists/neck for which she gets occipital blocks. When asked about steroids she thinks she may have an adverse reaction to prednisone while in the hospital but that could be due to her SIBO. She has not used much for oral steroids in all these years but has tolerated Solu-medrol okay in the past.  Small joints of hands, hips and her neck bother her the most with stiffness/pain and swelling.  It is very painful in the  wrist, feels hands are swollen. Also reports some numbness/ tingling right side of arm. Feet also get painful. Neck gets stiff and painful, with shooting pain going down to thoracic area.  She can not stand for very long without getting pain in both hips. Also notices stiffness. Constantly changing positions helps. Oxycodone also helps sometimes when she has severe pain. She tries not to take it often. She gets steroid injections with Dr. Ddoson in wrists and thumbs alternating every 2 mths. She feels Orencia helps with legs and her fatigue, but has not been helping with other joints.  She received one dose in Nov 2021, but then she had sinus infections and infected with COVID in Jan 2021, restarted in March, for every 2 week infusions at [place] to start with, has received 2 infusions so far.   She wakes up at 9am and experiences morning stiffness till 4 pm in the afternoon.  For her ADLs, reports either her boyfriend cooks or she orders food.  She has difficulty buttoning shirts.Her shoulders get painful and weak.   She has low grade fevers consistently and night sweats. Feels lymph nodes in neck get swollen sometimes. Orencia has helped with mouth ulcers but she does get them periodically.  She has been losing some hair in last 6 mths.    She has been having trouble lately with her eyes for years, they get infected. Sometimes her vision gets strained. she sees eye doctor for that. She is being prescribed eye drops for it.  She periodically has hard time breathing with exertion with some pains in the central chest area. She does get heartburn. She takes omeprazole. She does complain of skin rashes.  She sees derm for it. They are usually open sores on legs/abdomen. Every 2-3 mths.  Lost 21 lbs in last 3 mths unintentionally/  No appetite changes. Abd pain has gone away since being started on Xifaxin started by GI for her SIBO. No Raynaud's phenomenon. No other acute issues.              Review of Systems:     12-point  ROS performed. Negative except for pertinent positives in HPI.          Active Problem List:   There are no problems to display for this patient.           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     Obesity      Pyelonephritis, unspecified 1992    abstract     Rheumatoid arteritis (H)      Unspecified symptom associated with female genital organs 05/07/1999    chronic pelvic pain abstract     Past Surgical History:   Procedure Laterality Date     CHOLECYSTECTOMY       COLON SURGERY       CYSTOSCOPY,+URETEROSCOPY      abstract     ZZHC EXCIS/CURET BENIGN ELBOW LESN  10/26/1987    (R) humerus abstract     1.Seronegative rheumatoid arthritis with predominant tenosynovitis:   In 2015, rheumatoid factor, cyclic citrullinated peptide antibodies of extractable nuclear antigen panel, and anti-DNA antibodies were negative. Patient was initially treated for undifferentiated mixed connective tissue disease by rheumatologist over home telephone visits with Cellcept/Plaquenil for a prior Hx of weakly positive DIEGO. Then she established care with Dr. Saleh at [facility] in 2018, initially disease thought to be non-inflammatory, later diagnosed with inflammatory arthritis, rheumatoid syndrome and then seronegative RA. Patient was initially on Humira for a year starting in 2018, then discontinued due to failure to improve her joint symptoms, received a trial of Embrel which led to infections including sinus/ UTIs, then patient was on Remicade for 2 yrs but it did not work for all the joints like her back and she started having siginficant GI issues like diarrhea and abdominal pain, but did not necessarily think that it failed completely to  releive her symptoms. Patient was started on Symponi in Oct 2021, but developed serious reaction to it reporting an area of severe erythema in her intestinal area, along with abdominal pain/ nausea/vomiting so she was switched to Orencia in Nov 2021, received one infusion but then developed serious sinus infections/ Covid in Jan 2022. Orencia infusions restarted in 2-2022.  - Previous Rx:                  Humira- did not have good response              Infliximab - did not have good response              Embrel- had infections following one dose              Golimumab - red swollen plaque, rt abdomen after first infusion and then stoppd  She has reacted to sulfasalazine reported to be nausea/abd discomfort. She thinks methotrexate can not be used for fatty liver.   2. Mild persistent asthma, unspecified whether complicated   3. Bacterial overgrowth syndrome with diarrhea   - Diagnosed   4. Nonalcoholic steatohepatitis  5. Hx of clostridium difficile x2  6. Multiple orthopedic surgeries              Carpal tunnel surgery b/l               Discectomy and laminectomy, lumbar and cervical spine  7. Recurrent sinusitis: Myeloperoxidase and proteinase 3 were negative in October 2021.  8. DMII  9. Blepharitis  10: Hx of diverticulosis and recurrent diverticulitis s/p partial colectomy; Bacterial overgrowth syndrome with diarrhea   11. Hx of dermatographism       Social History:     Social History     Socioeconomic History     Marital status: Single     Spouse name: Not on file     Number of children: Not on file     Years of education: Not on file     Highest education level: Not on file   Occupational History     Not on file   Tobacco Use     Smoking status: Never Smoker     Smokeless tobacco: Never Used   Substance and Sexual Activity     Alcohol use: Yes     Drug use: No     Sexual activity: Not on file   Other Topics Concern      Service Not Asked     Blood Transfusions Not Asked     Caffeine Concern Not Asked      Occupational Exposure Not Asked     Hobby Hazards Not Asked     Sleep Concern Not Asked     Stress Concern Not Asked     Weight Concern Not Asked     Special Diet Not Asked     Back Care Not Asked     Exercise No     Bike Helmet Not Asked     Seat Belt No     Self-Exams No   Social History Narrative    Womens Health Kit given.         Social Determinants of Health     Financial Resource Strain: Not on file   Food Insecurity: Not on file   Transportation Needs: Not on file   Physical Activity: Not on file   Stress: Not on file   Social Connections: Not on file   Intimate Partner Violence: Not on file   Housing Stability: Not on file   FHA/, currently on disability       Family History:     Family History   Problem Relation Age of Onset     Hypertension Father         abstract     Cancer Paternal Aunt         gallbladder cancer abstract            Allergies:     Allergies   Allergen Reactions     Polyethylene Glycol Rash     Codeine      Other reaction(s): Gastrointestinal, GI intolerance, Vomiting  Vomiting       Gadolinium Dizziness and Nausea     Hydrocodone-Acetaminophen Nausea and Vomiting     Other reaction(s): GI intolerance     Iodine      abstract     Sulfasalazine      Other reaction(s): GI intolerance  Has tried and had nausa.     Tramadol Nausea and Vomiting     Other reaction(s): Gastrointestinal, Other (see comments)  Nausea and vomiting   unknown       Gluten Meal Other (See Comments) and Rash     Other reaction(s): Gastrointestinal, GI intolerance  Dizziness, tired, rash, stomach cramps, thrush, mouth sores  Dizziness, tired, rash, stomach cramps, thrush, mouth sores              Medications:     Current Outpatient Medications   Medication Sig Dispense Refill     Abatacept (ORENCIA IV) Inject into the vein every 14 days       Albuterol Sulfate (PROAIR HFA IN) Inhale into the lungs as needed        Artificial Tear Solution (SOOTHE XP) SOLN as needed       atenolol (TENORMIN) 50 MG  tablet Take 50 mg by mouth daily       benzoyl peroxide 5 % external liquid Use daily as directed 148 mL 3     clindamycin (CLEOCIN T) 1 % external lotion Apply topically 2 times daily 60 mL 3     diclofenac (VOLTAREN) 1 % topical gel Apply topically 4 times daily as needed        Fexofenadine HCl (ALLEGRA PO) Take 180 mg by mouth daily        fidaxomicin (DIFICID) 200 MG tablet Take 200 mg by mouth       fluticasone (FLONASE) 50 MCG/ACT nasal spray Spray 2 sprays into both nostrils 2 times daily        lidocaine (LIDODERM) 5 % patch as needed   1     losartan (COZAAR) 100 MG tablet Take 100 mg by mouth daily       metFORMIN (GLUCOPHAGE-XR) 500 MG 24 hr tablet Take 500 mg by mouth daily        Montelukast Sodium (SINGULAIR PO) Take 10 mg by mouth At Bedtime        nystatin (MYCOSTATIN) 693957 UNIT/ML suspension TAKE 5ML BY MOUTH 4XDAILY FOR 14 DAYS. CONTINUE AT LEAST 2 DAYS AFTER SYMPTOMS RESOLVED       olopatadine (PAZEO) 0.7 % ophthalmic solution Apply 2 Units to eye 2 times daily       OMEPRAZOLE PO Take 40 mg by mouth 2 times daily        rifaximin (XIFAXAN) 550 MG TABS tablet Take 200 mg by mouth 3 times daily Take for 10 days, has on had as needed for small bowel bacteria overgrowh       spironolactone-HCTZ (ALDACTAZIDE) 25-25 MG tablet Take 1 tablet by mouth daily       sucralfate (CARAFATE) 1 GM/10ML suspension Take by mouth 4 times daily as needed        tobramycin-dexamethasone (TOBRADEX) 0.3-0.1 % ophthalmic ointment 0.25 inches At Bedtime       triamcinolone (KENALOG) 0.1 % external ointment Apply topically 2 times daily 80 g 1     azelastine (ASTELIN) 0.1 % nasal spray Spray 1 spray in nostril as needed (Patient not taking: Reported on 12/22/2021)       cholecalciferol (VITAMIN D-1000 MAX ST) 25 MCG (1000 UT) TABS Take 4,000 Units by mouth daily        docusate sodium (COLACE) 50 MG capsule Take 2 capsules by mouth 2 times daily       Dupilumab (DUPIXENT) 300 MG/2ML SOPN Inject 300 mg Subcutaneous every  "14 days (Patient not taking: Reported on 3/9/2022) 4 mL 2     EPINEPHrine (ANY BX GENERIC EQUIV) 0.3 MG/0.3ML injection 2-pack Inject 0.3 ml intramuscularly once as needed for up to 1 dose, into thigh as directed for and/or potential for an allergic reaction       fluticasone-vilanterol (BREO ELLIPTA) 200-25 MCG/INH inhaler Inhale 1 puff into the lungs daily (Patient not taking: Reported on 12/22/2021) 3 each 0     Levonorgestrel-Ethinyl Estrad (LEVORA 0.15/30, 28, PO)  (Patient not taking: Reported on 12/22/2021)       metroNIDAZOLE (METROLOTION) 0.75 % external lotion Apply 1 Application topically 2 times daily (Patient not taking: Reported on 12/22/2021)       Mometasone Furo-Formoterol Fum (DULERA IN)        mupirocin (BACTROBAN) 2 % external ointment Apply 1 Application topically as needed        norethindrone (MICRONOR) 0.35 MG tablet Take 0.35 mg by mouth       Probiotic Product (Biomonde PO)  (Patient not taking: Reported on 12/22/2021)       tobramycin (TOBREX) 0.3 % ophthalmic solution 1-2 drops 2 times daily (Patient not taking: Reported on 3/9/2022)       triamcinolone (KENALOG) 0.1 % external cream Apply 1 Application topically as needed (Patient not taking: Reported on 12/22/2021)       vitamin E (TOCOPHEROL) 1000 units (450 mg) capsule Take 1,000 Units by mouth (Patient not taking: Reported on 12/22/2021)              Physical Exam:   Blood pressure 126/88, pulse 68, temperature 97.7  F (36.5  C), temperature source Oral, resp. rate 20, height 1.676 m (5' 5.98\"), weight 95.7 kg (211 lb), SpO2 97 %.  Wt Readings from Last 6 Encounters:   03/09/22 95.7 kg (211 lb)   03/03/22 94.8 kg (209 lb)   02/03/22 96 kg (211 lb 9.6 oz)   12/22/21 97.3 kg (214 lb 9.6 oz)   07/16/21 100.2 kg (221 lb)   10/22/19 103.9 kg (229 lb)     Constitutional: well-developed, appearing stated age; cooperative  Eyes: nl EOM, PERRLA,conjunctiva, sclera  ENT: nl external ears, nose, hearing, lips, teeth, gums, " throat  No mucous membrane lesions, normal saliva pool  Resp: lungs clear to auscultation, nl to palpation  CV: RRR, no murmurs, rubs or gallops, no edema  GI: no ABD mass or tenderness, no HSM  : not tested  MS: Has fusiform swelling of her hands, weak  strength b/l, with prominent prayer sign and tuck sign,  Extension incomplete 10-15 degrees with several PIPs b/l.  Neck stiffness/ midline thoracic tenderness/ cervicocoracial junction. B/l ankle tenderness/ good alignment  and ROM of feet/ knees intact. Shoulder ROM intact, with some tenderness with movement. Knee, ankle, and foot MTP/IP joints were examined, and normal.   Skin: no nail pitting, alopecia, rash  Neuro: nl cranial nerves, strength, sensation, DTRs.   Psych: nl judgement, orientation, memory, affect.         Data:     @  RHEUM RESULTS Latest Ref Rng & Units 10/26/2001 2020 2/3/2022   ALBUMIN 3.4 - 5.0 g/dL - 3.9 3.8   ALT 0 - 50 U/L - 36 64(H)   AST 0 - 45 U/L - 24 35   CREATININE 0.52 - 1.04 mg/dL - 0.73 0.90   GFR ESTIMATE, IF BLACK >60 mL/min/[1.73:m2] - >90 -   GFR ESTIMATE >60 mL/min/1.73m2 - >90 80   HEMATOCRIT 35.0 - 47.0 % 40.2 47.3(H) 46.9   HEMOGLOBIN 11.7 - 15.7 g/dL 13.5 15.8(H) 15.2   WBC 4.0 - 11.0 10e3/uL 8.09 9.3 8.0   RBC 3.80 - 5.20 10e6/uL 4.95 5.59(H) 5.57(H)   RDW 10.0 - 15.0 % 15.6(A) 13.4 12.9   MCHC 31.5 - 36.5 g/dL 33.6 33.4 32.4   MCV 78 - 100 fL 81.3 85 84   PLATELET COUNT 150 - 450 10e3/uL - 402 442     MRI thoracic spine 21 with mild multilevel spondylosis with specific findings according to level includin. Small disc bulge/protrusion at T1-2, T11-12, T12-L1  2. No cord deformity/centralspinal canal stenosis. The foramen appears patent    MRI lumbar spine 2013  1. L3-L4 mild anterior degenerative disc disease and degeneration of   the left facet joint.   2. L4-L5 mild degenerative disc disease with small central posterior   disc protrusion and high intensity zone. Slight impression on the    thecal sac.   3. L5-S1 small central posterior disc protrusion extending below the   disc level. IMPRESSION:     1. L3-L4 mild anterior degenerative disc disease and degeneration of   the left facet joint.   2. L4-L5 mild degenerative disc disease with small central posterior   disc protrusion and high intensity zone. Slight impression on the   thecal sac.   3. L5-S1 small central posterior disc protrusion extending below the   disc level.     MRI Radius Ulna left 3/12/21  1. Redemonstrated mild flexor tenosynovitis within the left carpal tunnel.   2. Remainder is otherwise unremarkable for inflammatory process within the left   Forearm.    MRI Radius Ulna Right 3/12/21  1. Redemonstrated are moderate inflammatory flexor tenosynovitis within the   right wrist which is not significantly changed from prior dedicated MRI   examination.   2. Remainder of the exam is unremarkable.    DX Lumbar spine 1/26/21  Postoperative changes if an L5-S1 laminectomy. Slight disk space   narrowing at L5. Lower lumbar facet arthritis. Low-grade lumbar subluxations. No   gross instability on flexion and extension. Slight wedge deformity of the L4   vertebral body.      Oswaldo Lemus MD

## 2022-03-09 NOTE — PATIENT INSTRUCTIONS
Diagnosis:  1.  Seronegative inflammatory arthritis: Fatigue and some joint pain has diminished in association with use of Orencia, now status post 2 doses completed.  Still significant morning stiffness and hand and wrist pain is present.  I recommend continuing Orencia and expecting maximum benefit in 6 to 10 weeks.  2.  Persistent hand arthritis: I agree with consultation with Dr. Petit regarding decreased range of motion in knuckles and fingers, and the advisability of continued intermittent corticosteroid injections in individual joints.    Plan:  1.  Continue Orencia 750 mg IV every 4 weeks, following completion of loading schedule.  2.  Consider use of Medrol 8 to 12 mg daily for 10 to 14 days for flares of inflammatory arthritis.  Use 12 mg daily for 7 days, then 8 mg daily for 7 days, then off.  For safety, avoid continuous use of Medrol, and limit exposure to 1 course every 2 to 3 months.  3.  Continue Xifaxin per gastroenterology for small bowel overgrowth

## 2022-03-09 NOTE — NURSING NOTE
"Chief Complaint   Patient presents with     Consult     Seronegative RA, inflammatory arthritis  dx at [facility]     Vital signs:  Temp: 97.7  F (36.5  C) Temp src: Oral BP: 126/88 Pulse: 68   Resp: 20 SpO2: 97 %     Height: 167.6 cm (5' 5.98\") Weight: 95.7 kg (211 lb)  Estimated body mass index is 34.07 kg/m  as calculated from the following:    Height as of this encounter: 1.676 m (5' 5.98\").    Weight as of this encounter: 95.7 kg (211 lb).        Kimberlee Ashley, [facility]  3/9/2022 8:43 AM      "

## 2022-03-09 NOTE — LETTER
3/9/2022      RE: Laxmi Ya  [address] [facility] [id number]       Rheumatology Clinic Visit  [facility]  Oswaldo Lemus M.D.     Laxmi Ya MRN# [id number]   YOB: 1975 Age: 46 year old   Date of Visit: 03/09/2022  Primary care provider: Artur Marsh          Assessment and Plan:     Seronegative inflammatory arthritis, likely rheumatoid  Recent small joint predominant stiffness and pain prompted use of orencia, restarted in March 2022; patient has had 2 infusions 2 weeks apart and will receive her next one on March 15. Patient feels some improvement in her lower extremity joints and her fatigue has been better. She still has significant morning stiffness for more than 1 hour, and pain in hands/wrists/neck for which she gets occipital blocks. On exam, has fusiform swelling of her hands, tenderness at multiple MCP and PIP, weak  strength b/l, with prominent prayer sign and tuck sign,  Extension incomplete 10-15 degrees with several PIPs b/l. Laboratory evaluation on February 3, 2022 showed normal CBC.  Comprehensive metabolic panel was normal except for slightly elevated ALT of 64.  CRP elevated at 17.7 in Dec 2021. CT of the sinus on March 3, 2022 showed clear paranasal sinuses . COVID-19 virus by PCR was positive on January 17. Autoimmune workup in 2021 for DIEGO/FAVIOLA panel/CCP/RF/ ANCA negative.      Impression:  1.  Seronegative inflammatory arthritis, active  2.  Persistent hand arthritis with decreased ROM in hand joints.       We discussed that in light of patient's improvement with 2 doses of Orencia in terms of fatigue and some joint pain/swelling, and tolerating it well, we recommend to continue Orencia 750 mg IV every 4 weeks after her loading dose.     We will monitor response of Orencia for the next 6-8 weeks, if she does not respond well we will consider options like Xeljanz and methotrexate. Methotrexate should be considered 2nd line due to dx of fatty liver. We  will discuss with her on the follow-up.     We also discussed the options of considering use of Medrol 8-12 mg daily for 10-14 days for flares of inflammatory arthrtis.  We will avoid prednisone as she did not tolerate it well in the past.  Patient then opted to start a Medrol course as a bridge till she gets more benefit with Orencia.  We would agree as she still has significant morning stiffness, with hand swelling/ pain. We will prescribe 12 mg daily for a week followed by 8 mg daily for a week, and then stop. Avoid use of Medrol on a continuous basis for safety, limit exposure to 1 course every 2-3 mths.     Patient has been following with Dr. Dodson at [facility] for corticosteroid injections in thumbs/wrists and plans to establish care with Dr. Petit here for her decreased ROM in hand joints and advisability of continued intermittent injections. We agree    We will follow-up in 3 mths. She does not need any labs at this time.     Continue Xifaxin per gastroenterology for small bowel bacterial overgrowth    Patient seen and discussed with Dr. Allan Dawn MD  PGY3, Internal Medicine    I saw this patient with the medical resident. I agree with the stated findings and recommendations which reflect our joint impressions and plan.    Oswaldo Lemus M.D.  Staff Rheumatologist, [facility]  Pager [phone]             History of Present Illness:   Laxmi Ya with history of COLON, hypertension, diagnosis of seronegative rheumatoid arthritis with tenosynovitis presents for establishing care with rheumatology at [facility] after intermediate of her prior rheumatologist Dr. Saleh at [facility].  She has had polyarthralgias for years.    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 Saleh.  Initially did not think it to be inflammatory. later diagnosed with inflammatory arthritis, rheumatoid syndrome and then seronegative RA. Patient has been disabled Patient was initially on Humira for a year starting in 2018, then discontinued due to failure to improve her joint symptoms after some time, then received a dose of Embrel leading to infections including sinus/ UTIs, then patient was on Remicade for 2 yrs but it did not work for all the joints like her back and she started having siginficant GI issues like diarrhea and abdominal pain, but did not necessarily think that it failed completely to relieve her symptoms. Patient was started on Symponi in Oct 2021, but developed serious reaction to it reporting an an area of severe erythema in her intestinal area, along with abdominal pain/ nausea/vomiting so she was switched to Orencia in Nov 2021, received one infusion but then developed serious sinus infections/ Covid in Jan 2022 so it was held during that entire time and loading dose restarted in March 2022, has had 2 infusions 2 weeks apart at [place] and will receive her next one on March 15. Patient feels some improvement in her lower extremity joints and her fatigue has been better. She still has significant morning stiffness for more than 1 hour, and pain in hands/wrists/neck for which she gets occipital blocks. When asked about steroids she thinks she may have an adverse reaction to prednisone while in the hospital but that could be due to her SIBO. She has not used much for oral steroids in all these years but has tolerated Solu-medrol okay in the past.  Small joints of hands, hips and her neck bother her the most with stiffness/pain and swelling.  It is very painful in the wrist, feels hands are swollen. Also reports some numbness/ tingling right side of arm. Feet also get painful. Neck gets stiff and painful, with shooting pain going down to thoracic area.  She can not stand for very long without  getting pain in both hips. Also notices stiffness. Constantly changing positions helps. Oxycodone also helps sometimes when she has severe pain. She tries not to take it often. She gets steroid injections with Dr. Dodson in wrists and thumbs alternating every 2 mths. She feels Orencia helps with legs and her fatigue, but has not been helping with other joints.  She received one dose in Nov 2021, but then she had sinus infections and infected with COVID in Jan 2021, restarted in March, for every 2 week infusions at [place] to start with, has received 2 infusions so far.   She wakes up at 9am and experiences morning stiffness till 4 pm in the afternoon.  For her ADLs, reports either her boyfriend cooks or she orders food.  She has difficulty buttoning shirts.Her shoulders get painful and weak.   She has low grade fevers consistently and night sweats. Feels lymph nodes in neck get swollen sometimes. Orencia has helped with mouth ulcers but she does get them periodically.  She has been losing some hair in last 6 mths.    She has been having trouble lately with her eyes for years, they get infected. Sometimes her vision gets strained. she sees eye doctor for that. She is being prescribed eye drops for it.  She periodically has hard time breathing with exertion with some pains in the central chest area. She does get heartburn. She takes omeprazole. She does complain of skin rashes.  She sees derm for it. They are usually open sores on legs/abdomen. Every 2-3 mths.  Lost 21 lbs in last 3 mths unintentionally/  No appetite changes. Abd pain has gone away since being started on Xifaxin started by GI for her SIBO. No Raynaud's phenomenon. No other acute issues.              Review of Systems:     12-point ROS performed. Negative except for pertinent positives in HPI.          Active Problem List:   There are no problems to display for this patient.           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     Obesity      Pyelonephritis, unspecified 1992    abstract     Rheumatoid arteritis (H)      Unspecified symptom associated with female genital organs 05/07/1999    chronic pelvic pain abstract     Past Surgical History:   Procedure Laterality Date     CHOLECYSTECTOMY       COLON SURGERY       CYSTOSCOPY,+URETEROSCOPY      abstract     ZZHC EXCIS/CURET BENIGN ELBOW LESN  10/26/1987    (R) humerus abstract     1.Seronegative rheumatoid arthritis with predominant tenosynovitis:   In 2015, rheumatoid factor, cyclic citrullinated peptide antibodies of extractable nuclear antigen panel, and anti-DNA antibodies were negative. Patient was initially treated for undifferentiated mixed connective tissue disease by rheumatologist over home telephone visits with Cellcept/Plaquenil for a prior Hx of weakly positive DIEGO. Then she established care with Dr. Saleh at [facility] in 2018, initially disease thought to be non-inflammatory, later diagnosed with inflammatory arthritis, rheumatoid syndrome and then seronegative RA. Patient was initially on Humira for a year starting in 2018, then discontinued due to failure to improve her joint symptoms, received a trial of Embrel which led to infections including sinus/ UTIs, then patient was on Remicade for 2 yrs but it did not work for all the joints like her back and she started having siginficant GI issues like diarrhea and abdominal pain, but did not necessarily think that it failed completely to releive her symptoms. Patient was started on Symponi in Oct 2021, but developed serious reaction to it reporting an area of severe erythema in her intestinal area, along with abdominal pain/ nausea/vomiting so she was  switched to Orencia in Nov 2021, received one infusion but then developed serious sinus infections/ Covid in Jan 2022. Orencia infusions restarted in 2-2022.  - Previous Rx:                  Humira- did not have good response              Infliximab - did not have good response              Embrel- had infections following one dose              Golimumab - red swollen plaque, rt abdomen after first infusion and then stoppd  She has reacted to sulfasalazine reported to be nausea/abd discomfort. She thinks methotrexate can not be used for fatty liver.   2. Mild persistent asthma, unspecified whether complicated   3. Bacterial overgrowth syndrome with diarrhea   - Diagnosed   4. Nonalcoholic steatohepatitis  5. Hx of clostridium difficile x2  6. Multiple orthopedic surgeries              Carpal tunnel surgery b/l               Discectomy and laminectomy, lumbar and cervical spine  7. Recurrent sinusitis: Myeloperoxidase and proteinase 3 were negative in October 2021.  8. DMII  9. Blepharitis  10: Hx of diverticulosis and recurrent diverticulitis s/p partial colectomy; Bacterial overgrowth syndrome with diarrhea   11. Hx of dermatographism       Social History:     Social History     Socioeconomic History     Marital status: Single     Spouse name: Not on file     Number of children: Not on file     Years of education: Not on file     Highest education level: Not on file   Occupational History     Not on file   Tobacco Use     Smoking status: Never Smoker     Smokeless tobacco: Never Used   Substance and Sexual Activity     Alcohol use: Yes     Drug use: No     Sexual activity: Not on file   Other Topics Concern      Service Not Asked     Blood Transfusions Not Asked     Caffeine Concern Not Asked     Occupational Exposure Not Asked     Hobby Hazards Not Asked     Sleep Concern Not Asked     Stress Concern Not Asked     Weight Concern Not Asked     Special Diet Not Asked     Back Care Not Asked     Exercise No      Bike Helmet Not Asked     Seat Belt No     Self-Exams No   Social History Narrative    Womens Health Kit given.         Social Determinants of Health     Financial Resource Strain: Not on file   Food Insecurity: Not on file   Transportation Needs: Not on file   Physical Activity: Not on file   Stress: Not on file   Social Connections: Not on file   Intimate Partner Violence: Not on file   Housing Stability: Not on file   FHA/, currently on disability       Family History:     Family History   Problem Relation Age of Onset     Hypertension Father         abstract     Cancer Paternal Aunt         gallbladder cancer abstract            Allergies:     Allergies   Allergen Reactions     Polyethylene Glycol Rash     Codeine      Other reaction(s): Gastrointestinal, GI intolerance, Vomiting  Vomiting       Gadolinium Dizziness and Nausea     Hydrocodone-Acetaminophen Nausea and Vomiting     Other reaction(s): GI intolerance     Iodine      abstract     Sulfasalazine      Other reaction(s): GI intolerance  Has tried and had nausa.     Tramadol Nausea and Vomiting     Other reaction(s): Gastrointestinal, Other (see comments)  Nausea and vomiting   unknown       Gluten Meal Other (See Comments) and Rash     Other reaction(s): Gastrointestinal, GI intolerance  Dizziness, tired, rash, stomach cramps, thrush, mouth sores  Dizziness, tired, rash, stomach cramps, thrush, mouth sores              Medications:     Current Outpatient Medications   Medication Sig Dispense Refill     Abatacept (ORENCIA IV) Inject into the vein every 14 days       Albuterol Sulfate (PROAIR HFA IN) Inhale into the lungs as needed        Artificial Tear Solution (SOOTHE XP) SOLN as needed       atenolol (TENORMIN) 50 MG tablet Take 50 mg by mouth daily       benzoyl peroxide 5 % external liquid Use daily as directed 148 mL 3     clindamycin (CLEOCIN T) 1 % external lotion Apply topically 2 times daily 60 mL 3     diclofenac (VOLTAREN) 1  % topical gel Apply topically 4 times daily as needed        Fexofenadine HCl (ALLEGRA PO) Take 180 mg by mouth daily        fidaxomicin (DIFICID) 200 MG tablet Take 200 mg by mouth       fluticasone (FLONASE) 50 MCG/ACT nasal spray Spray 2 sprays into both nostrils 2 times daily        lidocaine (LIDODERM) 5 % patch as needed   1     losartan (COZAAR) 100 MG tablet Take 100 mg by mouth daily       metFORMIN (GLUCOPHAGE-XR) 500 MG 24 hr tablet Take 500 mg by mouth daily        Montelukast Sodium (SINGULAIR PO) Take 10 mg by mouth At Bedtime        nystatin (MYCOSTATIN) 235357 UNIT/ML suspension TAKE 5ML BY MOUTH 4XDAILY FOR 14 DAYS. CONTINUE AT LEAST 2 DAYS AFTER SYMPTOMS RESOLVED       olopatadine (PAZEO) 0.7 % ophthalmic solution Apply 2 Units to eye 2 times daily       OMEPRAZOLE PO Take 40 mg by mouth 2 times daily        rifaximin (XIFAXAN) 550 MG TABS tablet Take 200 mg by mouth 3 times daily Take for 10 days, has on had as needed for small bowel bacteria overgrowh       spironolactone-HCTZ (ALDACTAZIDE) 25-25 MG tablet Take 1 tablet by mouth daily       sucralfate (CARAFATE) 1 GM/10ML suspension Take by mouth 4 times daily as needed        tobramycin-dexamethasone (TOBRADEX) 0.3-0.1 % ophthalmic ointment 0.25 inches At Bedtime       triamcinolone (KENALOG) 0.1 % external ointment Apply topically 2 times daily 80 g 1     azelastine (ASTELIN) 0.1 % nasal spray Spray 1 spray in nostril as needed (Patient not taking: Reported on 12/22/2021)       cholecalciferol (VITAMIN D-1000 MAX ST) 25 MCG (1000 UT) TABS Take 4,000 Units by mouth daily        docusate sodium (COLACE) 50 MG capsule Take 2 capsules by mouth 2 times daily       Dupilumab (DUPIXENT) 300 MG/2ML SOPN Inject 300 mg Subcutaneous every 14 days (Patient not taking: Reported on 3/9/2022) 4 mL 2     EPINEPHrine (ANY BX GENERIC EQUIV) 0.3 MG/0.3ML injection 2-pack Inject 0.3 ml intramuscularly once as needed for up to 1 dose, into thigh as directed for  "and/or potential for an allergic reaction       fluticasone-vilanterol (BREO ELLIPTA) 200-25 MCG/INH inhaler Inhale 1 puff into the lungs daily (Patient not taking: Reported on 12/22/2021) 3 each 0     Levonorgestrel-Ethinyl Estrad (LEVORA 0.15/30, 28, PO)  (Patient not taking: Reported on 12/22/2021)       metroNIDAZOLE (METROLOTION) 0.75 % external lotion Apply 1 Application topically 2 times daily (Patient not taking: Reported on 12/22/2021)       Mometasone Furo-Formoterol Fum (DULERA IN)        mupirocin (BACTROBAN) 2 % external ointment Apply 1 Application topically as needed        norethindrone (MICRONOR) 0.35 MG tablet Take 0.35 mg by mouth       Probiotic Product ([employer] PO)  (Patient not taking: Reported on 12/22/2021)       tobramycin (TOBREX) 0.3 % ophthalmic solution 1-2 drops 2 times daily (Patient not taking: Reported on 3/9/2022)       triamcinolone (KENALOG) 0.1 % external cream Apply 1 Application topically as needed (Patient not taking: Reported on 12/22/2021)       vitamin E (TOCOPHEROL) 1000 units (450 mg) capsule Take 1,000 Units by mouth (Patient not taking: Reported on 12/22/2021)              Physical Exam:   Blood pressure 126/88, pulse 68, temperature 97.7  F (36.5  C), temperature source Oral, resp. rate 20, height 1.676 m (5' 5.98\"), weight 95.7 kg (211 lb), SpO2 97 %.  Wt Readings from Last 6 Encounters:   03/09/22 95.7 kg (211 lb)   03/03/22 94.8 kg (209 lb)   02/03/22 96 kg (211 lb 9.6 oz)   12/22/21 97.3 kg (214 lb 9.6 oz)   07/16/21 100.2 kg (221 lb)   10/22/19 103.9 kg (229 lb)     Constitutional: well-developed, appearing stated age; cooperative  Eyes: nl EOM, PERRLA,conjunctiva, sclera  ENT: nl external ears, nose, hearing, lips, teeth, gums, throat  No mucous membrane lesions, normal saliva pool  Resp: lungs clear to auscultation, nl to palpation  CV: RRR, no murmurs, rubs or gallops, no edema  GI: no ABD mass or tenderness, no HSM  : not tested  MS: Has " fusiform swelling of her hands, weak  strength b/l, with prominent prayer sign and tuck sign,  Extension incomplete 10-15 degrees with several PIPs b/l.  Neck stiffness/ midline thoracic tenderness/ cervicocoracial junction. B/l ankle tenderness/ good alignment  and ROM of feet/ knees intact. Shoulder ROM intact, with some tenderness with movement. Knee, ankle, and foot MTP/IP joints were examined, and normal.   Skin: no nail pitting, alopecia, rash  Neuro: nl cranial nerves, strength, sensation, DTRs.   Psych: nl judgement, orientation, memory, affect.         Data:     @  RHEUM RESULTS Latest Ref Rng & Units 10/26/2001 2020 2/3/2022   ALBUMIN 3.4 - 5.0 g/dL - 3.9 3.8   ALT 0 - 50 U/L - 36 64(H)   AST 0 - 45 U/L - 24 35   CREATININE 0.52 - 1.04 mg/dL - 0.73 0.90   GFR ESTIMATE, IF BLACK >60 mL/min/[1.73:m2] - >90 -   GFR ESTIMATE >60 mL/min/1.73m2 - >90 80   HEMATOCRIT 35.0 - 47.0 % 40.2 47.3(H) 46.9   HEMOGLOBIN 11.7 - 15.7 g/dL 13.5 15.8(H) 15.2   WBC 4.0 - 11.0 10e3/uL 8.09 9.3 8.0   RBC 3.80 - 5.20 10e6/uL 4.95 5.59(H) 5.57(H)   RDW 10.0 - 15.0 % 15.6(A) 13.4 12.9   MCHC 31.5 - 36.5 g/dL 33.6 33.4 32.4   MCV 78 - 100 fL 81.3 85 84   PLATELET COUNT 150 - 450 10e3/uL - 402 442     MRI thoracic spine 21 with mild multilevel spondylosis with specific findings according to level includin. Small disc bulge/protrusion at T1-2, T11-12, T12-L1  2. No cord deformity/centralspinal canal stenosis. The foramen appears patent    MRI lumbar spine 2013  1. L3-L4 mild anterior degenerative disc disease and degeneration of   the left facet joint.   2. L4-L5 mild degenerative disc disease with small central posterior   disc protrusion and high intensity zone. Slight impression on the   thecal sac.   3. L5-S1 small central posterior disc protrusion extending below the   disc level. IMPRESSION:     1. L3-L4 mild anterior degenerative disc disease and degeneration of   the left facet joint.   2. L4-L5 mild  degenerative disc disease with small central posterior   disc protrusion and high intensity zone. Slight impression on the   thecal sac.   3. L5-S1 small central posterior disc protrusion extending below the   disc level.     MRI Radius Ulna left 3/12/21  1. Redemonstrated mild flexor tenosynovitis within the left carpal tunnel.   2. Remainder is otherwise unremarkable for inflammatory process within the left   Forearm.    MRI Radius Ulna Right 3/12/21  1. Redemonstrated are moderate inflammatory flexor tenosynovitis within the   right wrist which is not significantly changed from prior dedicated MRI   examination.   2. Remainder of the exam is unremarkable.    DX Lumbar spine 1/26/21  Postoperative changes if an L5-S1 laminectomy. Slight disk space   narrowing at L5. Lower lumbar facet arthritis. Low-grade lumbar subluxations. No   gross instability on flexion and extension. Slight wedge deformity of the L4   vertebral body.        Oswaldo Lemus MD

## 2022-03-10 NOTE — TELEPHONE ENCOUNTER
FUTURE VISIT INFORMATION      FUTURE VISIT INFORMATION:    Date: 5/25/22    Time: 1:30PM    Location: [facility]  REFERRAL INFORMATION:    Referring provider:  Dr. MELY Pan    Referring providers clinic:  [facility]    Reason for visit/diagnosis  Other allergic rhinitis per Pt, Ref by Dr. MELY Pan @ [facility], Recs in [facility]. [facility] location verified.    RECORDS REQUESTED FROM:       Clinic name Comments Records Status Imaging Status   [facility] 8/25/21, 7/8/22, 7/28/21, 2/28/22 note from Dr Pan    Images  8/25/21 CT Sinus    Scanned in EPIC received disc 3/10/22   [facility]  [facility] MRN: [id number]   10/4/16 MR Brain  Sent to scan 3/10/22 req 3/10/22 - PACS   Imaging 3/3/22 CT Sinus  [facility] PACS   [facility]   2/2/22 note from Yuliya Bates PA-C   Care everywhere     [facility]   4/20/2020 note from Rene Erickson Jr., MD   Care everywhere     Department of Otorhinolaryngology in [place], [place]  6/3/19 note from Chapin Garcia M.D.  Care everywhere      Division of Allergic Diseases in [place], [place]      10/8/18 note form Omkar Teague M.D.  Care everywhere     [place] [facility]  10/31/19 CT Sinuses  10/3/18 CT Sinuses Care everywhere  req 3/10/22 - PACS           3/10/22 9:44AM received [facility] images, sent a fax to [facility] and [facility] for images - [facility]   3/21/22 4:17PM images received in PACS - [facility]

## 2022-03-11 DIAGNOSIS — R21 CUTANEOUS ERUPTION: Primary | ICD-10-CM

## 2022-03-15 ENCOUNTER — TRANSCRIBE ORDERS (OUTPATIENT)
Dept: OTHER | Age: 47
End: 2022-03-15

## 2022-03-15 ENCOUNTER — TRANSFERRED RECORDS (OUTPATIENT)
Dept: HEALTH INFORMATION MANAGEMENT | Facility: CLINIC | Age: 47
End: 2022-03-15

## 2022-03-15 NOTE — TELEPHONE ENCOUNTER
Fexofenadine HCl (ALLEGRA PO)      HISTORICAL ONLY ON MEDICATION LIST    Last Office Visit : 3-3-2022  Future Office visit:  8-    Routing refill request to provider for review/approval because:  Medication is reported/historical      Kathleen M Doege RN

## 2022-03-16 RX ORDER — FEXOFENADINE HCL 180 MG/1
180 TABLET ORAL DAILY
Qty: 90 TABLET | Refills: 2 | Status: SHIPPED | OUTPATIENT
Start: 2022-03-16

## 2022-03-16 NOTE — TELEPHONE ENCOUNTER
[facility].  I reviewed the patient's chart and most recent labs.    This refill request is approved.  CC'd Dr. Monique as NICANOR.     Kandice Pace MD

## 2022-03-23 ENCOUNTER — THERAPY VISIT (OUTPATIENT)
Dept: PHYSICAL THERAPY | Facility: CLINIC | Age: 47
End: 2022-03-23
Payer: MEDICARE

## 2022-03-23 DIAGNOSIS — M54.6 BILATERAL THORACIC BACK PAIN: ICD-10-CM

## 2022-03-23 DIAGNOSIS — M54.2 NECK PAIN: Primary | ICD-10-CM

## 2022-03-23 PROCEDURE — 97012 MECHANICAL TRACTION THERAPY: CPT | Mod: GP | Performed by: PHYSICAL THERAPIST

## 2022-03-23 PROCEDURE — 97110 THERAPEUTIC EXERCISES: CPT | Mod: GP | Performed by: PHYSICAL THERAPIST

## 2022-03-23 PROCEDURE — 97163 PT EVAL HIGH COMPLEX 45 MIN: CPT | Mod: GP | Performed by: PHYSICAL THERAPIST

## 2022-03-23 NOTE — PROGRESS NOTES
Physical Therapy Initial Evaluation  Subjective:  Flare up of neck, shoulders, and thoracic pain, progressively worse over the past year. Long history of multiple co-morbidities which led to disability: such as RA, diabetes, HBP, migraines, OA (C6-7 [facility]/disc, L5-S1 lami/disc, B carpal tunnel, colon resection, sinus surgeries. Recent cervical X-rays at [place] (per patient report) showed osteophytes. Did have neck surgery ~ 4 years ago at [facility]/discectomy C 6-7 at [place] due to R arm/pain/NT.Does have some lingering weakness in R arm/hand from this and prior B carpal tunnel surgery. Feels like arms really heavy. Gets massage 1x week/sometimes cupping. C/o: B equal neck pain with radiation down to shoulder blades and up into head/SO. WORSE with driving (hard to turn head), sleeping, head feels heavy, constant HA, can't hold phone long. BETTER with Kenalog shot for HA, ice/heat. Goals are to reduce pain, increase mobility, less HA.       Patient Health History  Laxmi Ya being seen for Neck cervical traction and manual muscle manipulation..       Problem occurred: Having arthritis.   Pain is reported as 10/10 on pain scale.  General health as reported by patient is fair.  Pertinent medical history includes: changes in bowel/bladder, chest pain, diabetes, persistent fever/chills, high blood pressure, migraines/headaches, numbness/tingling, osteoarthritis, pain at night/rest, rheumatoid arthritis, weakness, unexplained weight loss, other, hepatitis, incontinence and overweight. Other medical history details: Autoimmune; Inflammatory Arthritis..     Medical allergies: none.   Surgeries include:  Orthopedic surgery and other. Other surgery history details: Bi-lateral carpal tunnel surgeries, colon resection, gallbladder removed, many sinus surgeries..    Current medications:  High blood pressure medication, pain medication, steroids and other. Other medications details: Orencia 1000mg IV Infusion every 3 weeks..   "  Current occupation is Disabled.   Primary job tasks include:  Other.   Other job/home tasks details: Unable to do any of these. I rest at home and constantly have to change positions..                                  Objective:  Standing Alignment:    Cervical/Thoracic:  Forward head, cervical lordosis increased and thoracic kyphosis increased  Shoulder/UE:  Rounded shoulders  Lumbar:  Lordosis decr                                Cervical/Thoracic Evaluation    AROM:  AROM Cervical:    Flexion:            Min loss, pull upper T-spine  Extension:       Mod loss, pain L upper T-spine  Rotation:         Left: mod loss, pain     Right: max loss, pain  Side Bend:      Left: mod loss, pain     Right:  Mod loss, pain  AROM Thoracic:    Flexion:               Min loss  Extension:          Max loss  Rotation:            Left: mod loss     Right: mod loss    Strength: poor cerv retraction control (\"bobble head\"), poor scap retraction control.   Headaches: cervical  Cervical Myotomes:  Cervical myotomes: B gross UE strength fair, no specific myotomal loss of strength.                  DTR's:  not assessed          Cervical Dermatomes:  not assessed                    Cervical Palpation:  : posterior cervical scar mild tenderness but well-healed.   Tenderness present at Left:    Rhomboids; Upper Trap; Erector Spinae and Suboccipitals  Tenderness present at Right:    Rhomboids; Upper Trap and Suboccipitals    Cervical Stability/Joint Clearing:  not assessed      Spinal Segmental Conclusions:    Level:  at C4, C2, C3, C5, C6, C7, T1, T2, T3, T5, T8, T6, T9, T7, T4 and T10                                                General     ROS    Assessment/Plan:    Patient is a 47 year old female with cervical, thoracic and both sides shoulder complaints.    Patient has the following significant findings with corresponding treatment plan.                Diagnosis 1: Neck/ Upper back/thoracic/shoulder pain  Pain -  hot/cold therapy, " manual therapy, splint/taping/bracing/orthotics, self management, education, directional preference exercise and home program  Decreased ROM/flexibility - manual therapy and therapeutic exercise  Decreased joint mobility - manual therapy and therapeutic exercise  Decreased strength - therapeutic exercise and therapeutic activities  Decreased proprioception - neuro re-education and therapeutic activities  Inflammation - self management/home program  Impaired muscle performance - neuro re-education  Decreased function - therapeutic activities  Impaired posture - neuro re-education    Therapy Evaluation Codes:   1) History comprised of:   Personal factors that impact the plan of care:      Time since onset of symptoms.    Comorbidity factors that impact the plan of care are:      Diabetes, High blood pressure, Migraines/headaches, Osteoarthritis, Overweight, Rheumatoid arthritis and Weakness.     Medications impacting care: High blood pressure, Pain, Steroids and Orencia.  2) Examination of Body Systems comprised of:   Body structures and functions that impact the plan of care:      Cervical spine, Shoulder and Thoracic Spine.   Activity limitations that impact the plan of care are:      Bathing, Bending, Cooking, Driving, Dressing, Lifting, Reading/Computer work, Sitting, Standing and Sleeping.  3) Clinical presentation characteristics are:   Unstable/Unpredictable.  4) Decision-Making    High complexity using standardized patient assessment instrument and/or measureable assessment of functional outcome.  Cumulative Therapy Evaluation is: High complexity.    Previous and current functional limitations:  (See Goal Flow Sheet for this information)    Short term and Long term goals: (See Goal Flow Sheet for this information)     Communication ability:  Patient appears to be able to clearly communicate and understand verbal and written communication and follow directions correctly.  Treatment Explanation - The following  has been discussed with the patient:   RX ordered/plan of care  Anticipated outcomes  Possible risks and side effects  This patient would benefit from PT intervention to resume normal activities.   Rehab potential is questionable.    Frequency:  2 X week, once daily  Duration:  for 3 weeks tapering to 1 X a week over 4 weeks  Discharge Plan:  Achieve all LTG.  Independent in home treatment program.  Reach maximal therapeutic benefit.    Please refer to the daily flowsheet for treatment today, total treatment time and time spent performing 1:1 timed codes.

## 2022-03-24 NOTE — PROGRESS NOTES
[facility]    OUTPATIENT Physical Therapy ORTHOPEDIC EVALUATION  PLAN OF TREATMENT FOR OUTPATIENT REHABILITATION  (COMPLETE FOR INITIAL CLAIMS ONLY)  Patient's Last Name, First Name, M.I.  YOB: 1975  Laxmi Ya    Provider s Name:  [facility]   Medical Record No.  [id number]   Start of Care Date:  03/23/22   Onset Date:   03/14/22 (date of order)   Type:     _X__PT   ___OT Medical Diagnosis:    Encounter Diagnoses   Name Primary?     Neck pain Yes     Bilateral thoracic back pain         Treatment Diagnosis:  neck pain/traction         Goals:     03/23/22 0500   Body Part   Goals listed below are for neck/thoracic pain   Goal #1   Goal #1 driving/transportation   Previous Functional Level No restrictions   Current Functional Level Unable to rotate neck to look over shoulders   STG Target Performance Drive using side and rearview mirrors   Rationale for safe driving   Due date 04/21/22   LTG Target Performance Able to look over either shoulder    Rationale for safe driving   Due date 05/19/22   Goal #2   Goal #2 headaches   Previous Functional Level Headache frequency per week was   Performance Level 2   Current Functional Level Constant headache   Performance Level 10/10   STG Target Performance Decrease intensity of headaches to   Performance Level 5/10 or less   Rationale for full and safe concentration;to establish restorative sleep pattern;to improve quality of life and resume normal social activities   Due Date 04/21/22   LTG Target Performance Return to previous frequency of headaches which is   Performance Level 2x week or less   Rationale for full and safe concentration;to establish restorative sleep pattern;to improve quality of life and resume normal social activities   Due Date 05/19/22       Therapy Frequency:  2x week for 3 weeks, then 1x week 4  weeks  Predicted Duration of Therapy Intervention:  10 weeks    Peng George, PT                 I CERTIFY THE NEED FOR THESE SERVICES FURNISHED UNDER        THIS PLAN OF TREATMENT AND WHILE UNDER MY CARE .             Physician Signature               Date    X_____________________________________________________                Kimberlee Vasquez PA-C             Certification Date From:  03/23/22   Certification Date To:  06/20/22    Referring Provider:  No ref. provider found    Initial Assessment        See Epic Evaluation SOC Date: 03/23/22

## 2022-03-28 NOTE — TELEPHONE ENCOUNTER
Action    Action Taken Spoke to [place] today and put the patient as STAT to expedite the requests, 3/28-IS

## 2022-03-29 ENCOUNTER — THERAPY VISIT (OUTPATIENT)
Dept: PHYSICAL THERAPY | Facility: CLINIC | Age: 47
End: 2022-03-29
Payer: MEDICARE

## 2022-03-29 DIAGNOSIS — M54.2 NECK PAIN: Primary | ICD-10-CM

## 2022-03-29 PROCEDURE — 97110 THERAPEUTIC EXERCISES: CPT | Mod: GP | Performed by: PHYSICAL THERAPIST

## 2022-03-29 PROCEDURE — 97012 MECHANICAL TRACTION THERAPY: CPT | Mod: GP | Performed by: PHYSICAL THERAPIST

## 2022-03-29 PROCEDURE — 97140 MANUAL THERAPY 1/> REGIONS: CPT | Mod: GP | Performed by: PHYSICAL THERAPIST

## 2022-03-30 ENCOUNTER — PRE VISIT (OUTPATIENT)
Dept: INFECTIOUS DISEASES | Facility: CLINIC | Age: 47
End: 2022-03-30
Payer: MEDICARE

## 2022-03-31 ENCOUNTER — PRE VISIT (OUTPATIENT)
Dept: ORTHOPEDICS | Facility: CLINIC | Age: 47
End: 2022-03-31

## 2022-04-04 ENCOUNTER — THERAPY VISIT (OUTPATIENT)
Dept: PHYSICAL THERAPY | Facility: CLINIC | Age: 47
End: 2022-04-04
Payer: MEDICARE

## 2022-04-04 DIAGNOSIS — M54.2 NECK PAIN: Primary | ICD-10-CM

## 2022-04-04 PROCEDURE — 97140 MANUAL THERAPY 1/> REGIONS: CPT | Mod: GP | Performed by: PHYSICAL THERAPY ASSISTANT

## 2022-04-04 PROCEDURE — 97012 MECHANICAL TRACTION THERAPY: CPT | Mod: GP | Performed by: PHYSICAL THERAPY ASSISTANT

## 2022-04-05 ENCOUNTER — MEDICAL CORRESPONDENCE (OUTPATIENT)
Dept: HEALTH INFORMATION MANAGEMENT | Facility: CLINIC | Age: 47
End: 2022-04-05
Payer: MEDICARE

## 2022-04-07 ENCOUNTER — THERAPY VISIT (OUTPATIENT)
Dept: PHYSICAL THERAPY | Facility: CLINIC | Age: 47
End: 2022-04-07
Payer: MEDICARE

## 2022-04-07 DIAGNOSIS — M54.2 NECK PAIN: Primary | ICD-10-CM

## 2022-04-07 DIAGNOSIS — M54.6 BILATERAL THORACIC BACK PAIN: ICD-10-CM

## 2022-04-07 PROCEDURE — 97012 MECHANICAL TRACTION THERAPY: CPT | Mod: GP | Performed by: PHYSICAL THERAPY ASSISTANT

## 2022-04-07 PROCEDURE — 97535 SELF CARE MNGMENT TRAINING: CPT | Mod: GP | Performed by: PHYSICAL THERAPY ASSISTANT

## 2022-04-07 PROCEDURE — 97110 THERAPEUTIC EXERCISES: CPT | Mod: GP | Performed by: PHYSICAL THERAPY ASSISTANT

## 2022-04-07 PROCEDURE — 97140 MANUAL THERAPY 1/> REGIONS: CPT | Mod: GP | Performed by: PHYSICAL THERAPY ASSISTANT

## 2022-04-18 ENCOUNTER — TELEPHONE (OUTPATIENT)
Dept: INFECTIOUS DISEASES | Facility: CLINIC | Age: 47
End: 2022-04-18

## 2022-04-18 NOTE — TELEPHONE ENCOUNTER
[facility] Call Center    Phone Message    May a detailed message be left on voicemail: yes     Reason for Call: Other: Pt called in stating that referral has been sent for her to see Dr. Gonzales with urgency for her to be scheduled within one week. Referral has been scanned in through media in chart review. Pt is requesting an earlier appt and is currently scheduled for 05/11/2022. Requesting call back at this time.     Action Taken: Other: ID    Travel Screening: Not Applicable

## 2022-04-18 NOTE — TELEPHONE ENCOUNTER
Spoke with patient regarding an upcoming appointment. Per referral sent on 04/05- patient to be seen urgently. Patient declined scheduling appointment available this Wednesday as she only wants to be seen by Dr. Gonzales. Informed patient that Dr. Gonzales's next opening was 5/11, which she is scheduled for. Patient adamant it needs to be sooner. Once again reiterated that we could see her this Wednesday, patient declined and said she will wait for Dr. Gonzaels. Message sent to Dr. Gonzales for review, will keep patient on cancellation list.

## 2022-05-05 ENCOUNTER — THERAPY VISIT (OUTPATIENT)
Dept: PHYSICAL THERAPY | Facility: CLINIC | Age: 47
End: 2022-05-05
Payer: MEDICARE

## 2022-05-05 DIAGNOSIS — M54.2 NECK PAIN: Primary | ICD-10-CM

## 2022-05-05 PROCEDURE — 97110 THERAPEUTIC EXERCISES: CPT | Mod: GP | Performed by: PHYSICAL THERAPY ASSISTANT

## 2022-05-05 PROCEDURE — 97012 MECHANICAL TRACTION THERAPY: CPT | Mod: GP | Performed by: PHYSICAL THERAPY ASSISTANT

## 2022-05-25 ENCOUNTER — PRE VISIT (OUTPATIENT)
Dept: OTOLARYNGOLOGY | Facility: CLINIC | Age: 47
End: 2022-05-25

## 2022-05-26 NOTE — TELEPHONE ENCOUNTER
FUTURE VISIT INFORMATION      FUTURE VISIT INFORMATION:    Date: 8/24/22    Time: 2PM    Location: [facility] [place]  REFERRAL INFORMATION:    Referring provider:  Dr. MELY Pan    Referring providers clinic:  [facility]    Reason for visit/diagnosis  Other allergic rhinitis per Pt, Ref by Dr. MELY Pan @ [facility], Recs in [facility]. [facility] [place] location verified.     RECORDS REQUESTED FROM:         Clinic name Comments Records Status Imaging Status   [facility] 8/25/21, 7/8/22, 7/28/21, 2/28/22 note from Dr Pan     Images  8/25/21 CT Sinus     Scanned in EPIC received disc 3/10/22   [facility]  [facility] MRN: [id number]    10/4/16 MR Brain  Sent to scan 3/10/22 req 3/10/22 - PACS   Imaging 3/3/22 CT Sinus  [facility] PACS   [facility]   2/2/22 note from Yuliya Bates PA-C   Care everywhere      [facility]   4/20/2020 note from Rene Erickson Jr., MD   Care everywhere      Department of Otorhinolaryngology in [place], [place]  6/3/19 note from Chapin Garcia M.D.  Care everywhere        Division of Allergic Diseases in [place], [place]      10/8/18 note form Omkar Teague M.D.  Care everywhere      [facility]  10/31/19 CT Sinuses  10/3/18 CT Sinuses Care everywhere  req 3/10/22 - PACS

## 2022-06-02 ENCOUNTER — THERAPY VISIT (OUTPATIENT)
Dept: PHYSICAL THERAPY | Facility: CLINIC | Age: 47
End: 2022-06-02
Payer: MEDICARE

## 2022-06-02 DIAGNOSIS — M54.2 NECK PAIN: Primary | ICD-10-CM

## 2022-06-02 DIAGNOSIS — M54.6 BILATERAL THORACIC BACK PAIN: ICD-10-CM

## 2022-06-02 PROCEDURE — 97012 MECHANICAL TRACTION THERAPY: CPT | Mod: GP | Performed by: PHYSICAL THERAPIST

## 2022-06-02 PROCEDURE — 97140 MANUAL THERAPY 1/> REGIONS: CPT | Mod: GP | Performed by: PHYSICAL THERAPIST

## 2022-06-17 ENCOUNTER — OFFICE VISIT (OUTPATIENT)
Dept: RHEUMATOLOGY | Facility: CLINIC | Age: 47
End: 2022-06-17
Attending: INTERNAL MEDICINE
Payer: MEDICARE

## 2022-06-17 ENCOUNTER — TELEPHONE (OUTPATIENT)
Dept: RHEUMATOLOGY | Facility: CLINIC | Age: 47
End: 2022-06-17

## 2022-06-17 VITALS
WEIGHT: 210.9 LBS | OXYGEN SATURATION: 95 % | BODY MASS INDEX: 33.89 KG/M2 | HEIGHT: 66 IN | SYSTOLIC BLOOD PRESSURE: 133 MMHG | HEART RATE: 84 BPM | DIASTOLIC BLOOD PRESSURE: 90 MMHG

## 2022-06-17 DIAGNOSIS — G89.29 CHRONIC LOW BACK PAIN WITHOUT SCIATICA, UNSPECIFIED BACK PAIN LATERALITY: ICD-10-CM

## 2022-06-17 DIAGNOSIS — M13.80 SERONEGATIVE ARTHRITIS: Primary | ICD-10-CM

## 2022-06-17 DIAGNOSIS — M13.80 SERONEGATIVE INFLAMMATORY ARTHRITIS: ICD-10-CM

## 2022-06-17 DIAGNOSIS — M54.50 CHRONIC LOW BACK PAIN WITHOUT SCIATICA, UNSPECIFIED BACK PAIN LATERALITY: ICD-10-CM

## 2022-06-17 PROCEDURE — 99214 OFFICE O/P EST MOD 30 MIN: CPT | Performed by: INTERNAL MEDICINE

## 2022-06-17 RX ORDER — HEPARIN SODIUM (PORCINE) LOCK FLUSH IV SOLN 100 UNIT/ML 100 UNIT/ML
5 SOLUTION INTRAVENOUS
Status: CANCELLED | OUTPATIENT
Start: 2022-06-17

## 2022-06-17 RX ORDER — DIPHENHYDRAMINE HYDROCHLORIDE 50 MG/ML
50 INJECTION INTRAMUSCULAR; INTRAVENOUS
Status: CANCELLED
Start: 2022-06-17

## 2022-06-17 RX ORDER — EPINEPHRINE 1 MG/ML
0.3 INJECTION, SOLUTION, CONCENTRATE INTRAVENOUS EVERY 5 MIN PRN
Status: CANCELLED | OUTPATIENT
Start: 2022-06-17

## 2022-06-17 RX ORDER — METHYLPREDNISOLONE SODIUM SUCCINATE 125 MG/2ML
125 INJECTION, POWDER, LYOPHILIZED, FOR SOLUTION INTRAMUSCULAR; INTRAVENOUS
Status: CANCELLED
Start: 2022-06-17

## 2022-06-17 RX ORDER — MEPERIDINE HYDROCHLORIDE 25 MG/ML
25 INJECTION INTRAMUSCULAR; INTRAVENOUS; SUBCUTANEOUS EVERY 30 MIN PRN
Status: CANCELLED | OUTPATIENT
Start: 2022-06-17

## 2022-06-17 RX ORDER — DIPHENHYDRAMINE HCL 25 MG
25 CAPSULE ORAL ONCE
Status: CANCELLED | OUTPATIENT
Start: 2022-06-17

## 2022-06-17 RX ORDER — HEPARIN SODIUM,PORCINE 10 UNIT/ML
5 VIAL (ML) INTRAVENOUS
Status: CANCELLED | OUTPATIENT
Start: 2022-06-17

## 2022-06-17 RX ORDER — ALBUTEROL SULFATE 90 UG/1
1-2 AEROSOL, METERED RESPIRATORY (INHALATION)
Status: CANCELLED
Start: 2022-06-17

## 2022-06-17 RX ORDER — METHYLPREDNISOLONE SODIUM SUCCINATE 125 MG/2ML
125 INJECTION, POWDER, LYOPHILIZED, FOR SOLUTION INTRAMUSCULAR; INTRAVENOUS ONCE
Status: CANCELLED | OUTPATIENT
Start: 2022-06-17

## 2022-06-17 RX ORDER — ALBUTEROL SULFATE 0.83 MG/ML
2.5 SOLUTION RESPIRATORY (INHALATION)
Status: CANCELLED | OUTPATIENT
Start: 2022-06-17

## 2022-06-17 RX ORDER — ACETAMINOPHEN 325 MG/1
650 TABLET ORAL ONCE
Status: CANCELLED | OUTPATIENT
Start: 2022-06-17

## 2022-06-17 RX ORDER — NALOXONE HYDROCHLORIDE 0.4 MG/ML
0.2 INJECTION, SOLUTION INTRAMUSCULAR; INTRAVENOUS; SUBCUTANEOUS
Status: CANCELLED | OUTPATIENT
Start: 2022-06-17

## 2022-06-17 ASSESSMENT — PAIN SCALES - GENERAL: PAINLEVEL: EXTREME PAIN (8)

## 2022-06-17 NOTE — TELEPHONE ENCOUNTER
----- Message from Oswaldo Lemus MD sent at 6/17/2022  9:30 AM CDT -----  Regarding: change in RX  Patient has seronegative inflammatory arthritis, incompletely responsive to Orencia.  I recommend discontinuation of Orencia (last infusion June 9) and substitution of Actemra.  Patient has not tolerated multiple anti-TNF agents including infliximab and Simponi.  I recommend Actemra 750 mg IV every 28 days.  Please alert me whether a standard loading dose or schedule would be also appropriate, and help me by setting up orders thank you.

## 2022-06-17 NOTE — NURSING NOTE
"Chief Complaint   Patient presents with     RECHECK     BP (!) 138/102   Pulse 84   Ht 1.676 m (5' 5.98\")   Wt 95.7 kg (210 lb 14.4 oz)   SpO2 95%   BMI 34.06 kg/m      Sj Luna MA  "
I will STOP taking the medications listed below when I get home from the hospital:    ferrous sulfate 325 mg (65 mg elemental iron) oral tablet  -- 1  by mouth once a day

## 2022-06-17 NOTE — PROGRESS NOTES
Rheumatology Clinic Visit  [facility]  Oswaldo Lemus M.D.     Laxmi aY MRN# [id number]   YOB: 1975 Age: 47 year old   Date of Visit: 6-  Primary care provider: Artur Marsh          Assessment and Plan:     Seronegative inflammatory (rheumatoid) arthritis:  Chronic small joint predominant stiffness and pain continue despite > 12 weeks exposure to orencia restarted in March 2022.  She still has significant morning stiffness for more than 1 hour, and pain in wrists/neck. Exam shows tenderness without gross synovial swelling at multiple MCP. Extension incomplete 10-15 degrees with several PIPs b/l. Laboratory evaluation on February 3, 2022 showed normal CBC.  Comprehensive metabolic panel was normal except for slightly elevated ALT of 64.  CRP elevated at 17.7 in Dec 2021. CT of the sinus on March 3, 2022 showed clear paranasal sinuses . COVID-19 virus by PCR was positive on January 17. Autoimmune workup in 2021 for DIEGO/FAVIOLA panel/CCP/RF/ ANCA negative.      Impression:  1.  Seronegative inflammatory arthritis, inadequately responsive to orencia monotherapy X 4 months. I now recommend substitution of actemra for orencia.  2.  Persistent hand arthritis with decreased ROM in hand joints.   3.  Carpal tunnel syndrome  4. Bowel bacterial overgrowth: improved with xifaxin    Plan:  1. Stop orencia when authorization of actemra is achieved.  2. Trial of actemra IV as a substitute.  Plan 750 mg IV every 28 days, starting no less than 3 weeks after the last Orencia infusion.  3.  Follow-up with Dr. Petit regarding thumb and carpal tunnel pain  4.  Follow-up with Dr. Thompson regarding bowel disease  5.  Follow-up with Dr. Meenakshi staples regarding infectious disease.    RTC 4 mos    Oswaldo Lemus M.D.  Staff Rheumatologist, [facility]  Pager [phone]           History of Present Illness:   Laxmi Ya with history of COLON, hypertension, diagnosis of seronegative rheumatoid arthritis  with tenosynovitis presents for follow-up of seronegative inflammatory arthritis.  She was last seen in March 2022, when Orencia was continued for active inflammatory arthritis.    Background; Seronegative rheumatoid arthritis with predominant tenosynovitis:   In 2015, rheumatoid factor, cyclic citrullinated peptide antibodies of extractable nuclear antigen panel, and anti-DNA antibodies were negative. Patient was initially treated for undifferentiated mixed connective tissue disease with Cellcept/Plaquenil for a prior Hx of weakly positive DIEGO. Then she established care with Dr. Saleh at [facility] in 2018, initially disease thought to be non-inflammatory, later diagnosed with inflammatory arthritis, rheumatoid syndrome and then seronegative RA. Patient was initially on Humira for a year starting in 2018, then discontinued due to failure to improve her joint symptoms, received a trial of Embrel which led to infections including sinus/ UTIs, then patient was on Remicade for 2 yrs but it did not work for all the joints like her back and she started having siginficant GI issues like diarrhea and abdominal pain, but did not necessarily think that it failed completely to releive her symptoms. Patient was started on Symponi in Oct 2021, but developed serious reaction to it reporting an area of severe erythema in her intestinal area, along with abdominal pain/ nausea/vomiting so she was switched to Orencia in Nov 2021, received one infusion but then developed serious sinus infections/ Covid in Jan 2022. Orencia infusions restarted in 2-2022, held in 6-2022. Actemra started 7-2022:     Interval history June 16, 2022    She reports improved pain in back/thoracic area and ankles, but she notes little improvement in the hands. Constant achy pain in hands/fingers/wrists persists. Showering is a chore with grasping bath objects.  Recent course of Medrol did not help her hand pain.  She will see Dr. Petit regarding possible  restart injections in the hands/thumbs. She had been getting carpal tunnel and thumb injections every 2 months at [place]; last injection was 6 months ago.  Abdominal pain/symptoms improved with use of xifaxin.    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 Saleh. Initially did not think it to be inflammatory. later diagnosed with inflammatory arthritis, rheumatoid syndrome and then seronegative RA. Patient has been disabled Patient was initially on Humira for a year starting in 2018, then discontinued due to failure to improve her joint symptoms after some time, then received a dose of Embrel leading to infections including sinus/ UTIs, then patient was on Remicade for 2 yrs but it did not work for all the joints like her back and she started having siginficant GI issues like diarrhea and abdominal pain, but did not necessarily think that it failed completely to relieve her symptoms. Patient was started on Symponi in Oct 2021, but developed serious reaction to it reporting an an area of severe erythema in her intestinal area, along with abdominal pain/ nausea/vomiting so she was switched to Orencia in Nov 2021, received one infusion but then developed serious sinus infections/ Covid in Jan 2022 so it was held during that entire time and loading dose restarted in March 2022, has had 2 infusions 2 weeks apart at [place] and will receive her next one on March 15. Patient feels some improvement in her lower extremity joints and her fatigue has been better. She still has significant morning stiffness for more than 1 hour, and pain in hands/wrists/neck for which she gets occipital blocks. When asked about steroids she thinks she may have an adverse reaction to prednisone while in the hospital but  that could be due to her SIBO. She has not used much for oral steroids in all these years but has tolerated Solu-medrol okay in the past.  Small joints of hands, hips and her neck bother her the most with stiffness/pain and swelling.  It is very painful in the wrist, feels hands are swollen. Also reports some numbness/ tingling right side of arm. Feet also get painful. Neck gets stiff and painful, with shooting pain going down to thoracic area.  She can not stand for very long without getting pain in both hips. Also notices stiffness. Constantly changing positions helps. Oxycodone also helps sometimes when she has severe pain. She tries not to take it often. She gets steroid injections with Dr. Dodson in wrists and thumbs alternating every 2 mths. She feels Orencia helps with legs and her fatigue, but has not been helping with other joints.  She received one dose in Nov 2021, but then she had sinus infections and infected with COVID in Jan 2021, restarted in March, for every 2 week infusions at [place] to start with, has received 2 infusions so far.   She wakes up at 9am and experiences morning stiffness till 4 pm in the afternoon.  For her ADLs, reports either her boyfriend cooks or she orders food.  She has difficulty buttoning shirts.Her shoulders get painful and weak.   She has low grade fevers consistently and night sweats. Feels lymph nodes in neck get swollen sometimes. Orencia has helped with mouth ulcers but she does get them periodically.  She has been losing some hair in last 6 mths.    She has been having trouble lately with her eyes for years, they get infected. Sometimes her vision gets strained. she sees eye doctor for that. She is being prescribed eye drops for it.  She periodically has hard time breathing with exertion with some pains in the central chest area. She does get heartburn. She takes omeprazole. She does complain of skin rashes.  She sees derm for it. They are usually open sores on  legs/abdomen. Every 2-3 mths.  Lost 21 lbs in last 3 mths unintentionally/  No appetite changes. Abd pain has gone away since being started on Xifaxin started by GI for her SIBO. No Raynaud's phenomenon. No other acute issues.              Review of Systems:     12-point ROS performed. Negative except for pertinent positives in HPI.          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     Obesity      Pyelonephritis, unspecified 1992    abstract     Rheumatoid arteritis (H)      Seronegative inflammatory arthritis 6/17/2022     Unspecified symptom associated with female genital organs 05/07/1999    chronic pelvic pain abstract     Past Surgical History:   Procedure Laterality Date     CHOLECYSTECTOMY       COLON SURGERY       CYSTOSCOPY,+URETEROSCOPY      abstract     ZZHC EXCIS/CURET BENIGN ELBOW LESN  10/26/1987    (R) humerus abstract     1.Seronegative rheumatoid arthritis with predominant tenosynovitis:   In 2015, rheumatoid factor, cyclic citrullinated peptide antibodies of extractable nuclear antigen panel, and anti-DNA antibodies were negative. Patient was initially treated for undifferentiated mixed connective tissue disease with Cellcept/Plaquenil for a prior Hx of weakly positive DIEGO. Then she established care with Dr. Saleh at [facility] in 2018, initially disease thought to be non-inflammatory, later diagnosed with inflammatory arthritis, rheumatoid syndrome and then seronegative RA. Patient was initially on Humira for a year starting in 2018, then  discontinued due to failure to improve her joint symptoms, received a trial of Embrel which led to infections including sinus/ UTIs, then patient was on Remicade for 2 yrs but it did not work for all the joints like her back and she started having siginficant GI issues like diarrhea and abdominal pain, but did not necessarily think that it failed completely to releive her symptoms. Patient was started on Symponi in Oct 2021, but developed serious reaction to it reporting an area of severe erythema in her intestinal area, along with abdominal pain/ nausea/vomiting so she was switched to Orencia in Nov 2021, received one infusion but then developed serious sinus infections/ Covid in Jan 2022. Orencia infusions restarted in 2-2022, held in 6-2022. Actemra started 7-2022:   - Previous Rx:                  Humira- did not have good response              Infliximab - did not have good response              Embrel- had infections following one dose              Golimumab - red swollen plaque, rt abdomen after first infusion and then stoppd  She has reacted to sulfasalazine reported to be nausea/abd discomfort. She thinks methotrexate can not be used for fatty liver.   2. Mild persistent asthma, unspecified whether complicated   3. Bacterial overgrowth syndrome with diarrhea   - Diagnosed   4. Nonalcoholic steatohepatitis  5. Hx of clostridium difficile x2  6. Multiple orthopedic surgeries              Carpal tunnel surgery b/l               Discectomy and laminectomy, lumbar and cervical spine  7. Recurrent sinusitis: Myeloperoxidase and proteinase 3 were negative in October 2021.  8. DMII  9. Blepharitis  10: Hx of diverticulosis and recurrent diverticulitis s/p partial colectomy; Bacterial overgrowth syndrome with diarrhea   11. Hx of dermatographism       Social History:     Social History     Socioeconomic History     Marital status: Single     Spouse name: Not on file     Number of children: Not on file     Years of  education: Not on file     Highest education level: Not on file   Occupational History     Not on file   Tobacco Use     Smoking status: Never Smoker     Smokeless tobacco: Never Used   Substance and Sexual Activity     Alcohol use: Yes     Drug use: No     Sexual activity: Not on file   Other Topics Concern      Service Not Asked     Blood Transfusions Not Asked     Caffeine Concern Not Asked     Occupational Exposure Not Asked     Hobby Hazards Not Asked     Sleep Concern Not Asked     Stress Concern Not Asked     Weight Concern Not Asked     Special Diet Not Asked     Back Care Not Asked     Exercise No     Bike Helmet Not Asked     Seat Belt No     Self-Exams No   Social History Narrative    Womens Health Kit given.         Social Determinants of Health     Financial Resource Strain: Not on file   Food Insecurity: Not on file   Transportation Needs: Not on file   Physical Activity: Not on file   Stress: Not on file   Social Connections: Not on file   Intimate Partner Violence: Not on file   Housing Stability: Not on file   FHA/, currently on disability       Family History:     Family History   Problem Relation Age of Onset     Hypertension Father         abstract     Cancer Paternal Aunt         gallbladder cancer abstract            Allergies:     Allergies   Allergen Reactions     Polyethylene Glycol Rash     Codeine      Other reaction(s): Gastrointestinal, GI intolerance, Vomiting  Vomiting       Gadolinium Dizziness and Nausea     Hydrocodone-Acetaminophen Nausea and Vomiting     Other reaction(s): GI intolerance     Iodine      abstract     Sulfasalazine      Other reaction(s): GI intolerance  Has tried and had nausa.     Tramadol Nausea and Vomiting     Other reaction(s): Gastrointestinal, Other (see comments)  Nausea and vomiting   unknown       Gluten Meal Other (See Comments) and Rash     Other reaction(s): Gastrointestinal, GI intolerance  Dizziness, tired, rash, stomach cramps,  thrush, mouth sores  Dizziness, tired, rash, stomach cramps, thrush, mouth sores              Medications:     Current Outpatient Medications   Medication Sig Dispense Refill     Abatacept (ORENCIA IV) Inject into the vein every 14 days       Albuterol Sulfate (PROAIR HFA IN) Inhale into the lungs as needed        Artificial Tear Solution (SOOTHE XP) SOLN as needed       atenolol (TENORMIN) 50 MG tablet Take 50 mg by mouth daily       benzoyl peroxide 5 % external liquid Use daily as directed 148 mL 3     clindamycin (CLEOCIN T) 1 % external lotion Apply topically 2 times daily 60 mL 3     diclofenac (VOLTAREN) 1 % topical gel Apply topically 4 times daily as needed        fexofenadine (ALLEGRA) 180 MG tablet Take 1 tablet (180 mg) by mouth daily 90 tablet 2     fidaxomicin (DIFICID) 200 MG tablet Take 200 mg by mouth       fluticasone (FLONASE) 50 MCG/ACT nasal spray Spray 2 sprays into both nostrils 2 times daily        lidocaine (LIDODERM) 5 % patch as needed   1     losartan (COZAAR) 100 MG tablet Take 100 mg by mouth daily       Montelukast Sodium (SINGULAIR PO) Take 10 mg by mouth At Bedtime        nystatin (MYCOSTATIN) 061917 UNIT/ML suspension TAKE 5ML BY MOUTH 4XDAILY FOR 14 DAYS. CONTINUE AT LEAST 2 DAYS AFTER SYMPTOMS RESOLVED       olopatadine (PAZEO) 0.7 % ophthalmic solution Apply 2 Units to eye 2 times daily       OMEPRAZOLE PO Take 40 mg by mouth 2 times daily        rifaximin (XIFAXAN) 550 MG TABS tablet Take 200 mg by mouth 3 times daily Take for 10 days, has on had as needed for small bowel bacteria overgrowh       spironolactone-HCTZ (ALDACTAZIDE) 25-25 MG tablet Take 1 tablet by mouth daily       sucralfate (CARAFATE) 1 GM/10ML suspension Take by mouth 4 times daily as needed        tobramycin-dexamethasone (TOBRADEX) 0.3-0.1 % ophthalmic ointment 0.25 inches At Bedtime       triamcinolone (KENALOG) 0.1 % external ointment Apply topically 2 times daily 80 g 1     Dupilumab (DUPIXENT) 300 MG/2ML  "SOPN Inject 300 mg Subcutaneous every 14 days (Patient not taking: No sig reported) 4 mL 2     metFORMIN (GLUCOPHAGE-XR) 500 MG 24 hr tablet Take 500 mg by mouth daily        methylPREDNISolone (MEDROL) 4 MG tablet Take 3 tablets daily for 7 days, then 2 tablets daily for 7 days, then off. 35 tablet 1     norethindrone (MICRONOR) 0.35 MG tablet Take 0.35 mg by mouth       tobramycin (TOBREX) 0.3 % ophthalmic solution 1-2 drops 2 times daily (Patient not taking: Reported on 6/17/2022)              Physical Exam:   Blood pressure (!) 133/90, pulse 84, height 1.676 m (5' 5.98\"), weight 95.7 kg (210 lb 14.4 oz), SpO2 95 %.  Wt Readings from Last 6 Encounters:   06/17/22 95.7 kg (210 lb 14.4 oz)   03/09/22 95.7 kg (211 lb)   03/03/22 94.8 kg (209 lb)   02/03/22 96 kg (211 lb 9.6 oz)   12/22/21 97.3 kg (214 lb 9.6 oz)   07/16/21 100.2 kg (221 lb)     Constitutional: well-developed, appearing stated age; cooperative  Eyes: nl EOM, PERRLA,conjunctiva, sclera  ENT: nl external ears, nose, hearing, lips, teeth, gums, throat  No mucous membrane lesions, normal saliva pool  Resp: lungs clear to auscultation, nl to palpation  CV: RRR, no murmurs, rubs or gallops, no edema  GI: no ABD mass or tenderness, no HSM  : not tested  MS: Has fusiform swelling of her hands, weak  strength b/l, with prominent prayer sign and tuck sign,  Extension incomplete 10-15 degrees with several PIPs b/l.  Neck stiffness/ midline thoracic tenderness/ cervicocoracial junction. B/l ankle tenderness/ good alignment  and ROM of feet/ knees intact. Shoulder ROM intact, with some tenderness with movement. Knee, ankle, and foot MTP/IP joints were examined, and normal.   Skin: no nail pitting, alopecia, rash  Neuro: nl cranial nerves, strength, sensation, DTRs.   Psych: nl judgement, orientation, memory, affect.         Data:     @  RHEUM RESULTS Latest Ref Rng & Units 10/26/2001 4/1/2020 2/3/2022   ALBUMIN 3.4 - 5.0 g/dL - 3.9 3.8   ALT 0 - 50 U/L - 36 " 64(H)   AST 0 - 45 U/L - 24 35   CREATININE 0.52 - 1.04 mg/dL - 0.73 0.90   GFR ESTIMATE, IF BLACK >60 mL/min/[1.73:m2] - >90 -   GFR ESTIMATE >60 mL/min/1.73m2 - >90 80   HEMATOCRIT 35.0 - 47.0 % 40.2 47.3(H) 46.9   HEMOGLOBIN 11.7 - 15.7 g/dL 13.5 15.8(H) 15.2   WBC 4.0 - 11.0 10e3/uL 8.09 9.3 8.0   RBC 3.80 - 5.20 10e6/uL 4.95 5.59(H) 5.57(H)   RDW 10.0 - 15.0 % 15.6(A) 13.4 12.9   MCHC 31.5 - 36.5 g/dL 33.6 33.4 32.4   MCV 78 - 100 fL 81.3 85 84   PLATELET COUNT 150 - 450 10e3/uL - 402 442     MRI thoracic spine 21 with mild multilevel spondylosis with specific findings according to level includin. Small disc bulge/protrusion at T1-2, T11-12, T12-L1  2. No cord deformity/centralspinal canal stenosis. The foramen appears patent    MRI lumbar spine 2013  1. L3-L4 mild anterior degenerative disc disease and degeneration of   the left facet joint.   2. L4-L5 mild degenerative disc disease with small central posterior   disc protrusion and high intensity zone. Slight impression on the   thecal sac.   3. L5-S1 small central posterior disc protrusion extending below the   disc level. IMPRESSION:     1. L3-L4 mild anterior degenerative disc disease and degeneration of   the left facet joint.   2. L4-L5 mild degenerative disc disease with small central posterior   disc protrusion and high intensity zone. Slight impression on the   thecal sac.   3. L5-S1 small central posterior disc protrusion extending below the   disc level.     MRI Radius Ulna left 3/12/21  1. Redemonstrated mild flexor tenosynovitis within the left carpal tunnel.   2. Remainder is otherwise unremarkable for inflammatory process within the left   Forearm.    MRI Radius Ulna Right 3/12/21  1. Redemonstrated are moderate inflammatory flexor tenosynovitis within the   right wrist which is not significantly changed from prior dedicated MRI   examination.   2. Remainder of the exam is unremarkable.    DX Lumbar spine 21  Postoperative  changes if an L5-S1 laminectomy. Slight disk space   narrowing at L5. Lower lumbar facet arthritis. Low-grade lumbar subluxations. No   gross instability on flexion and extension. Slight wedge deformity of the L4   vertebral body.

## 2022-06-17 NOTE — TELEPHONE ENCOUNTER
Tocilizumab (Actemra)  Infusion plan entered per Dr. Lemus's instruction.  Per documentation from [facility], Quantiferon Gold was negative on 2/7/20, and HBV titer on 3/8/19 indicated patient is immune to infection. Patient received infusions at [facility] in [place], orders will be sent to infusion center when signed.   Infusion plan sent to Dr. Lemus for review and signature.   Kaley Serna RN  [facility]

## 2022-06-17 NOTE — LETTER
6/17/2022       RE: Laxmi Ya  [address] [place] [id number]     Dear Colleague,    Thank you for referring your patient, Laxmi Ya, to the [facility] [place] at [facility]. Please see a copy of my visit note below.    Rheumatology Clinic Visit  [facility]  Oswaldo Lemus M.D.     Laxmi Ya MRN# [id number]   YOB: 1975 Age: 47 year old   Date of Visit: 6-  Primary care provider: Artur Marsh          Assessment and Plan:     Seronegative inflammatory (rheumatoid) arthritis:  Chronic small joint predominant stiffness and pain continue despite > 12 weeks exposure to orencia restarted in March 2022.  She still has significant morning stiffness for more than 1 hour, and pain in wrists/neck. Exam shows tenderness without gross synovial swelling at multiple MCP. Extension incomplete 10-15 degrees with several PIPs b/l. Laboratory evaluation on February 3, 2022 showed normal CBC.  Comprehensive metabolic panel was normal except for slightly elevated ALT of 64.  CRP elevated at 17.7 in Dec 2021. CT of the sinus on March 3, 2022 showed clear paranasal sinuses . COVID-19 virus by PCR was positive on January 17. Autoimmune workup in 2021 for DIEGO/FAVIOLA panel/CCP/RF/ ANCA negative.      Impression:  1.  Seronegative inflammatory arthritis, inadequately responsive to orencia monotherapy X 4 months. I now recommend substitution of actemra for orencia.  2.  Persistent hand arthritis with decreased ROM in hand joints.   3.  Carpal tunnel syndrome  4. Bowel bacterial overgrowth: improved with xifaxin    Plan:  1. Stop orencia when authorization of actemra is achieved.  2. Trial of actemra IV as a substitute.  Plan 750 mg IV every 28 days, starting no less than 3 weeks after the last Orencia infusion.  3.  Follow-up with Dr. Petit regarding thumb and carpal tunnel pain  4.  Follow-up with Dr. Thompson  regarding bowel disease  5.  Follow-up with Dr. Meenakshi staples regarding infectious disease.    RTC 4 mos    Oswaldo Lemus M.D.  Staff Rheumatologist, [facility]  Pager [phone]           History of Present Illness:   Laxmi Ya with history of COLON, hypertension, diagnosis of seronegative rheumatoid arthritis with tenosynovitis presents for follow-up of seronegative inflammatory arthritis.  She was last seen in March 2022, when Orencia was continued for active inflammatory arthritis.    Background; Seronegative rheumatoid arthritis with predominant tenosynovitis:   In 2015, rheumatoid factor, cyclic citrullinated peptide antibodies of extractable nuclear antigen panel, and anti-DNA antibodies were negative. Patient was initially treated for undifferentiated mixed connective tissue disease with Cellcept/Plaquenil for a prior Hx of weakly positive DIEGO. Then she established care with Dr. Saleh at [facility] in 2018, initially disease thought to be non-inflammatory, later diagnosed with inflammatory arthritis, rheumatoid syndrome and then seronegative RA. Patient was initially on Humira for a year starting in 2018, then discontinued due to failure to improve her joint symptoms, received a trial of Embrel which led to infections including sinus/ UTIs, then patient was on Remicade for 2 yrs but it did not work for all the joints like her back and she started having siginficant GI issues like diarrhea and abdominal pain, but did not necessarily think that it failed completely to releive her symptoms. Patient was started on Symponi in Oct 2021, but developed serious reaction to it reporting an area of severe erythema in her intestinal area, along with abdominal pain/ nausea/vomiting so she was switched to Orencia in Nov 2021, received one infusion but then developed serious sinus infections/ Covid in Jan 2022. Orencia infusions restarted in 2-2022, held in 6-2022. Actemra started 7-2022:     Interval history June 16,  2022    She reports improved pain in back/thoracic area and ankles, but she notes little improvement in the hands. Constant achy pain in hands/fingers/wrists persists. Showering is a chore with grasping bath objects.  Recent course of Medrol did not help her hand pain.  She will see Dr. Petit regarding possible restart injections in the hands/thumbs. She had been getting carpal tunnel and thumb injections every 2 months at [facility]; last injection was 6 months ago.  Abdominal pain/symptoms improved with use of xifaxin.    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 Saleh. Initially did not think it to be inflammatory. later diagnosed with inflammatory arthritis, rheumatoid syndrome and then seronegative RA. Patient has been disabled Patient was initially on Humira for a year starting in 2018, then discontinued due to failure to improve her joint symptoms after some time, then received a dose of Embrel leading to infections including sinus/ UTIs, then patient was on Remicade for 2 yrs but it did not work for all the joints like her back and she started having siginficant GI issues like diarrhea and abdominal pain, but did not necessarily think that it failed completely to relieve her symptoms. Patient was started on Symponi in Oct 2021, but developed serious reaction to it reporting an an area of severe erythema in her intestinal area, along with abdominal pain/ nausea/vomiting so she was switched to Orencia in Nov 2021, received one infusion but then developed serious sinus infections/ Covid in Jan 2022 so it was held during that entire time and loading dose restarted in March 2022, has had 2 infusions 2 weeks apart at [facility] and will receive her next one on March 15. Patient feels some  improvement in her lower extremity joints and her fatigue has been better. She still has significant morning stiffness for more than 1 hour, and pain in hands/wrists/neck for which she gets occipital blocks. When asked about steroids she thinks she may have an adverse reaction to prednisone while in the hospital but that could be due to her SIBO. She has not used much for oral steroids in all these years but has tolerated Solu-medrol okay in the past.  Small joints of hands, hips and her neck bother her the most with stiffness/pain and swelling.  It is very painful in the wrist, feels hands are swollen. Also reports some numbness/ tingling right side of arm. Feet also get painful. Neck gets stiff and painful, with shooting pain going down to thoracic area.  She can not stand for very long without getting pain in both hips. Also notices stiffness. Constantly changing positions helps. Oxycodone also helps sometimes when she has severe pain. She tries not to take it often. She gets steroid injections with Dr. Dodson in wrists and thumbs alternating every 2 mths. She feels Orencia helps with legs and her fatigue, but has not been helping with other joints.  She received one dose in Nov 2021, but then she had sinus infections and infected with COVID in Jan 2021, restarted in March, for every 2 week infusions at [facility] to start with, has received 2 infusions so far.   She wakes up at 9am and experiences morning stiffness till 4 pm in the afternoon.  For her ADLs, reports either her boyfriend cooks or she orders food.  She has difficulty buttoning shirts.Her shoulders get painful and weak.   She has low grade fevers consistently and night sweats. Feels lymph nodes in neck get swollen sometimes. Orencia has helped with mouth ulcers but she does get them periodically.  She has been losing some hair in last 6 mths.    She has been having trouble lately with her eyes for years, they get infected. Sometimes her vision gets  strained. she sees eye doctor for that. She is being prescribed eye drops for it.  She periodically has hard time breathing with exertion with some pains in the central chest area. She does get heartburn. She takes omeprazole. She does complain of skin rashes.  She sees derm for it. They are usually open sores on legs/abdomen. Every 2-3 mths.  Lost 21 lbs in last 3 mths unintentionally/  No appetite changes. Abd pain has gone away since being started on Xifaxin started by GI for her SIBO. No Raynaud's phenomenon. No other acute issues.              Review of Systems:     12-point ROS performed. Negative except for pertinent positives in HPI.          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     Obesity      Pyelonephritis, unspecified 1992    abstract     Rheumatoid arteritis (H)      Seronegative inflammatory arthritis 6/17/2022     Unspecified symptom associated with female genital organs 05/07/1999    chronic pelvic pain abstract     Past Surgical History:   Procedure Laterality Date     CHOLECYSTECTOMY       COLON SURGERY       CYSTOSCOPY,+URETEROSCOPY      abstract     ZZHC EXCIS/CURET BENIGN ELBOW LESN  10/26/1987    (R) humerus abstract     1.Seronegative rheumatoid arthritis with predominant tenosynovitis:   In 2015, rheumatoid factor, cyclic citrullinated peptide antibodies of extractable nuclear antigen panel, and anti-DNA antibodies were negative. Patient was initially treated for undifferentiated mixed connective tissue disease with Cellcept/Plaquenil for a  prior Hx of weakly positive DIEGO. Then she established care with Dr. Saleh at [facility] in 2018, initially disease thought to be non-inflammatory, later diagnosed with inflammatory arthritis, rheumatoid syndrome and then seronegative RA. Patient was initially on Humira for a year starting in 2018, then discontinued due to failure to improve her joint symptoms, received a trial of Embrel which led to infections including sinus/ UTIs, then patient was on Remicade for 2 yrs but it did not work for all the joints like her back and she started having siginficant GI issues like diarrhea and abdominal pain, but did not necessarily think that it failed completely to releive her symptoms. Patient was started on Symponi in Oct 2021, but developed serious reaction to it reporting an area of severe erythema in her intestinal area, along with abdominal pain/ nausea/vomiting so she was switched to Orencia in Nov 2021, received one infusion but then developed serious sinus infections/ Covid in Jan 2022. Orencia infusions restarted in 2-2022, held in 6-2022. Actemra started 7-2022:   - Previous Rx:                  Humira- did not have good response              Infliximab - did not have good response              Embrel- had infections following one dose              Golimumab - red swollen plaque, rt abdomen after first infusion and then stoppd  She has reacted to sulfasalazine reported to be nausea/abd discomfort. She thinks methotrexate can not be used for fatty liver.   2. Mild persistent asthma, unspecified whether complicated   3. Bacterial overgrowth syndrome with diarrhea   - Diagnosed   4. Nonalcoholic steatohepatitis  5. Hx of clostridium difficile x2  6. Multiple orthopedic surgeries              Carpal tunnel surgery b/l               Discectomy and laminectomy, lumbar and cervical spine  7. Recurrent sinusitis: Myeloperoxidase and proteinase 3 were negative in October 2021.  8. DMII  9. Blepharitis  10: Hx of  diverticulosis and recurrent diverticulitis s/p partial colectomy; Bacterial overgrowth syndrome with diarrhea   11. Hx of dermatographism       Social History:     Social History     Socioeconomic History     Marital status: Single     Spouse name: Not on file     Number of children: Not on file     Years of education: Not on file     Highest education level: Not on file   Occupational History     Not on file   Tobacco Use     Smoking status: Never Smoker     Smokeless tobacco: Never Used   Substance and Sexual Activity     Alcohol use: Yes     Drug use: No     Sexual activity: Not on file   Other Topics Concern      Service Not Asked     Blood Transfusions Not Asked     Caffeine Concern Not Asked     Occupational Exposure Not Asked     Hobby Hazards Not Asked     Sleep Concern Not Asked     Stress Concern Not Asked     Weight Concern Not Asked     Special Diet Not Asked     Back Care Not Asked     Exercise No     Bike Helmet Not Asked     Seat Belt No     Self-Exams No   Social History Narrative    Womens Health Kit given.         Social Determinants of Health     Financial Resource Strain: Not on file   Food Insecurity: Not on file   Transportation Needs: Not on file   Physical Activity: Not on file   Stress: Not on file   Social Connections: Not on file   Intimate Partner Violence: Not on file   Housing Stability: Not on file   FHA/, currently on disability       Family History:     Family History   Problem Relation Age of Onset     Hypertension Father         abstract     Cancer Paternal Aunt         gallbladder cancer abstract            Allergies:     Allergies   Allergen Reactions     Polyethylene Glycol Rash     Codeine      Other reaction(s): Gastrointestinal, GI intolerance, Vomiting  Vomiting       Gadolinium Dizziness and Nausea     Hydrocodone-Acetaminophen Nausea and Vomiting     Other reaction(s): GI intolerance     Iodine      abstract     Sulfasalazine      Other reaction(s):  GI intolerance  Has tried and had nausa.     Tramadol Nausea and Vomiting     Other reaction(s): Gastrointestinal, Other (see comments)  Nausea and vomiting   unknown       Gluten Meal Other (See Comments) and Rash     Other reaction(s): Gastrointestinal, GI intolerance  Dizziness, tired, rash, stomach cramps, thrush, mouth sores  Dizziness, tired, rash, stomach cramps, thrush, mouth sores              Medications:     Current Outpatient Medications   Medication Sig Dispense Refill     Abatacept (ORENCIA IV) Inject into the vein every 14 days       Albuterol Sulfate (PROAIR HFA IN) Inhale into the lungs as needed        Artificial Tear Solution (SOOTHE XP) SOLN as needed       atenolol (TENORMIN) 50 MG tablet Take 50 mg by mouth daily       benzoyl peroxide 5 % external liquid Use daily as directed 148 mL 3     clindamycin (CLEOCIN T) 1 % external lotion Apply topically 2 times daily 60 mL 3     diclofenac (VOLTAREN) 1 % topical gel Apply topically 4 times daily as needed        fexofenadine (ALLEGRA) 180 MG tablet Take 1 tablet (180 mg) by mouth daily 90 tablet 2     fidaxomicin (DIFICID) 200 MG tablet Take 200 mg by mouth       fluticasone (FLONASE) 50 MCG/ACT nasal spray Spray 2 sprays into both nostrils 2 times daily        lidocaine (LIDODERM) 5 % patch as needed   1     losartan (COZAAR) 100 MG tablet Take 100 mg by mouth daily       Montelukast Sodium (SINGULAIR PO) Take 10 mg by mouth At Bedtime        nystatin (MYCOSTATIN) 405710 UNIT/ML suspension TAKE 5ML BY MOUTH 4XDAILY FOR 14 DAYS. CONTINUE AT LEAST 2 DAYS AFTER SYMPTOMS RESOLVED       olopatadine (PAZEO) 0.7 % ophthalmic solution Apply 2 Units to eye 2 times daily       OMEPRAZOLE PO Take 40 mg by mouth 2 times daily        rifaximin (XIFAXAN) 550 MG TABS tablet Take 200 mg by mouth 3 times daily Take for 10 days, has on had as needed for small bowel bacteria overgrowh       spironolactone-HCTZ (ALDACTAZIDE) 25-25 MG tablet Take 1 tablet by mouth  "daily       sucralfate (CARAFATE) 1 GM/10ML suspension Take by mouth 4 times daily as needed        tobramycin-dexamethasone (TOBRADEX) 0.3-0.1 % ophthalmic ointment 0.25 inches At Bedtime       triamcinolone (KENALOG) 0.1 % external ointment Apply topically 2 times daily 80 g 1     Dupilumab (DUPIXENT) 300 MG/2ML SOPN Inject 300 mg Subcutaneous every 14 days (Patient not taking: No sig reported) 4 mL 2     metFORMIN (GLUCOPHAGE-XR) 500 MG 24 hr tablet Take 500 mg by mouth daily        methylPREDNISolone (MEDROL) 4 MG tablet Take 3 tablets daily for 7 days, then 2 tablets daily for 7 days, then off. 35 tablet 1     norethindrone (MICRONOR) 0.35 MG tablet Take 0.35 mg by mouth       tobramycin (TOBREX) 0.3 % ophthalmic solution 1-2 drops 2 times daily (Patient not taking: Reported on 6/17/2022)              Physical Exam:   Blood pressure (!) 133/90, pulse 84, height 1.676 m (5' 5.98\"), weight 95.7 kg (210 lb 14.4 oz), SpO2 95 %.  Wt Readings from Last 6 Encounters:   06/17/22 95.7 kg (210 lb 14.4 oz)   03/09/22 95.7 kg (211 lb)   03/03/22 94.8 kg (209 lb)   02/03/22 96 kg (211 lb 9.6 oz)   12/22/21 97.3 kg (214 lb 9.6 oz)   07/16/21 100.2 kg (221 lb)     Constitutional: well-developed, appearing stated age; cooperative  Eyes: nl EOM, PERRLA,conjunctiva, sclera  ENT: nl external ears, nose, hearing, lips, teeth, gums, throat  No mucous membrane lesions, normal saliva pool  Resp: lungs clear to auscultation, nl to palpation  CV: RRR, no murmurs, rubs or gallops, no edema  GI: no ABD mass or tenderness, no HSM  : not tested  MS: Has fusiform swelling of her hands, weak  strength b/l, with prominent prayer sign and tuck sign,  Extension incomplete 10-15 degrees with several PIPs b/l.  Neck stiffness/ midline thoracic tenderness/ cervicocoracial junction. B/l ankle tenderness/ good alignment  and ROM of feet/ knees intact. Shoulder ROM intact, with some tenderness with movement. Knee, ankle, and foot MTP/IP joints " were examined, and normal.   Skin: no nail pitting, alopecia, rash  Neuro: nl cranial nerves, strength, sensation, DTRs.   Psych: nl judgement, orientation, memory, affect.         Data:     @  RHEUM RESULTS Latest Ref Rng & Units 10/26/2001 2020 2/3/2022   ALBUMIN 3.4 - 5.0 g/dL - 3.9 3.8   ALT 0 - 50 U/L - 36 64(H)   AST 0 - 45 U/L - 24 35   CREATININE 0.52 - 1.04 mg/dL - 0.73 0.90   GFR ESTIMATE, IF BLACK >60 mL/min/[1.73:m2] - >90 -   GFR ESTIMATE >60 mL/min/1.73m2 - >90 80   HEMATOCRIT 35.0 - 47.0 % 40.2 47.3(H) 46.9   HEMOGLOBIN 11.7 - 15.7 g/dL 13.5 15.8(H) 15.2   WBC 4.0 - 11.0 10e3/uL 8.09 9.3 8.0   RBC 3.80 - 5.20 10e6/uL 4.95 5.59(H) 5.57(H)   RDW 10.0 - 15.0 % 15.6(A) 13.4 12.9   MCHC 31.5 - 36.5 g/dL 33.6 33.4 32.4   MCV 78 - 100 fL 81.3 85 84   PLATELET COUNT 150 - 450 10e3/uL - 402 442     MRI thoracic spine 21 with mild multilevel spondylosis with specific findings according to level includin. Small disc bulge/protrusion at T1-2, T11-12, T12-L1  2. No cord deformity/centralspinal canal stenosis. The foramen appears patent    MRI lumbar spine 2013  1. L3-L4 mild anterior degenerative disc disease and degeneration of   the left facet joint.   2. L4-L5 mild degenerative disc disease with small central posterior   disc protrusion and high intensity zone. Slight impression on the   thecal sac.   3. L5-S1 small central posterior disc protrusion extending below the   disc level. IMPRESSION:     1. L3-L4 mild anterior degenerative disc disease and degeneration of   the left facet joint.   2. L4-L5 mild degenerative disc disease with small central posterior   disc protrusion and high intensity zone. Slight impression on the   thecal sac.   3. L5-S1 small central posterior disc protrusion extending below the   disc level.     MRI Radius Ulna left 3/12/21  1. Redemonstrated mild flexor tenosynovitis within the left carpal tunnel.   2. Remainder is otherwise unremarkable for inflammatory  process within the left   Forearm.    MRI Radius Ulna Right 3/12/21  1. Redemonstrated are moderate inflammatory flexor tenosynovitis within the   right wrist which is not significantly changed from prior dedicated MRI   examination.   2. Remainder of the exam is unremarkable.    DX Lumbar spine 1/26/21  Postoperative changes if an L5-S1 laminectomy. Slight disk space   narrowing at L5. Lower lumbar facet arthritis. Low-grade lumbar subluxations. No   gross instability on flexion and extension. Slight wedge deformity of the L4   vertebral body.        Again, thank you for allowing me to participate in the care of your patient.      Sincerely,    Oswaldo Lemus MD

## 2022-06-17 NOTE — PATIENT INSTRUCTIONS
Diagnosis:  Seronegative inflammatory arthritis, incompletely responsive to orencia, which has been used regularly for 4 months.  Osteoarthritis  Carpal tunnel syndrome  Bowel bacteria overgrowth: improved with xifaxin  Plan:    Stope orencia when approval of actemra is achieved.  Trial of actemra IV as a substitute.  Plan 750 mg IV every 28 days, starting no less than 3 weeks after the last Orencia infusion.  3.  Follow-up with Dr. Petit regarding thumb and carpal tunnel pain  4.  Follow-up with Dr. Thompson regarding bowel disease  5.  Follow-up with Dr. Meenakshi staples regarding infectious disease.

## 2022-06-22 ENCOUNTER — THERAPY VISIT (OUTPATIENT)
Dept: PHYSICAL THERAPY | Facility: CLINIC | Age: 47
End: 2022-06-22
Payer: MEDICARE

## 2022-06-22 DIAGNOSIS — M13.0 SERONEGATIVE POLYARTHRITIS: ICD-10-CM

## 2022-06-22 DIAGNOSIS — M54.6 BILATERAL THORACIC BACK PAIN: ICD-10-CM

## 2022-06-22 DIAGNOSIS — M54.2 NECK PAIN: Primary | ICD-10-CM

## 2022-06-22 PROCEDURE — 97140 MANUAL THERAPY 1/> REGIONS: CPT | Mod: GP | Performed by: PHYSICAL THERAPIST

## 2022-06-22 PROCEDURE — 97012 MECHANICAL TRACTION THERAPY: CPT | Mod: GP | Performed by: PHYSICAL THERAPIST

## 2022-06-22 RX ORDER — METHYLPREDNISOLONE 4 MG/1
TABLET ORAL
Qty: 35 TABLET | Refills: 1 | Status: SHIPPED | OUTPATIENT
Start: 2022-06-22 | End: 2022-08-11

## 2022-06-22 NOTE — TELEPHONE ENCOUNTER
Return call placed to patient who reports increasing pain and stiffness in her neck, hands, wrists and hips. She currently rates pain at 7/10. She is waiting to schedule an tocilizumab (Actemra)  infusion at the [facility] until she is sure that her insurance will cover infusions there previously was receiving abatacept (Orencia)  with an incomplete response per visit notes on 6/17/22.  A message has been sent to the infusion finance team asking for an insurance check.   Patient is requesting a refill of Medrol taper as previously prescribed by Dr. Lemus in March 2022. Explained that request will be sent to him and she will be updated with his response.   Kaley Serna RN  Adult Rheumatology Clinic

## 2022-06-22 NOTE — TELEPHONE ENCOUNTER
[facility] Call Center    Phone Message    May a detailed message be left on voicemail: yes     Reason for Call: Symptoms or Concerns     If patient has red-flag symptoms, warm transfer to triage line    Current symptom or concern: RA flare-sharp pain/inflammation/swelling in hands and neck. Burning sensation in hands, hips painful and still as well.     Symptoms have been present for:  1 day-started yesterday (6/21/22)     Has patient previously been seen for this? Yes    By Dr. Oswaldo Lemus    Date: LOV -6/17/22    Is hoping for a Medrol pack, says this has been helpful in the past. Would like this sent to the [facility] in [place] if possible.    In addition to addressing her current flare, pt had additional questions for nurse:   1.) Is wondering if her infusion has been approved through Medicare yet?   2.) Says she spoke to Dr. Lemus's nurse about a week ago about fitting her in for an appt (says he wanted to see her back in 4 months, but there are no appts available at that time), she's wondering if his nurse was able to find an appt for her?     Action Taken: Message routed to:  [facility]    Travel Screening: Not Applicable

## 2022-06-23 NOTE — PROGRESS NOTES
[facility]    OUTPATIENT Physical Therapy ORTHOPEDIC EVALUATION  PLAN OF TREATMENT FOR OUTPATIENT REHABILITATION  (COMPLETE FOR INITIAL CLAIMS ONLY)  Patient's Last Name, First Name, M.I.  YOB: 1975  Laxmi Ya    Provider s Name:  [facility]   Medical Record No.  [id number]   Start of Care Date:  03/23/22   Onset Date:   03/14/22 (date of order)   Type:     _X__PT   ___OT Medical Diagnosis:    Encounter Diagnoses   Name Primary?    Neck pain Yes    Bilateral thoracic back pain         Treatment Diagnosis:  neck pain/traction         Goals:     06/22/22 0500   Body Part   Goals listed below are for neck/thoracic pain   Goal #1   Goal #1 driving/transportation   Previous Functional Level No restrictions   Current Functional Level Unable to rotate neck to look over shoulders   STG Target Performance Drive using side and rearview mirrors   Rationale for safe driving   Due date 05/12/22   Date Goal Met 06/02/22   LTG Target Performance Able to look over either shoulder    Rationale for safe driving   Due date 08/18/22   Goal #2   Goal #2 headaches   Previous Functional Level Headache frequency per week was   Performance Level 2   Current Functional Level Headache frequency per week is   Performance Level 3-4x week,7/10   STG Target Performance Decrease intensity of headaches to   Performance Level 5/10 or less   Rationale for full and safe concentration;to establish restorative sleep pattern;to improve quality of life and resume normal social activities   Due Date 06/30/22   If goal not met, Why? ongoing   LTG Target Performance Return to previous frequency of headaches which is   Performance Level 2x week or less   Rationale for full and safe concentration;to establish restorative sleep pattern;to improve quality of life and resume normal social activities   Due  Date 07/28/22       Therapy Frequency:  1x week for 4 weeks, then 2x month 8 weeks  Predicted Duration of Therapy Intervention:  8 weeks    Peng George, PT                 I CERTIFY THE NEED FOR THESE SERVICES FURNISHED UNDER        THIS PLAN OF TREATMENT AND WHILE UNDER MY CARE .             Physician Signature               Date    X_____________________________________________________    Kimberlee Vasquez PA-C                       Certification Date From:  06/21/22   Certification Date To:  09/16/22    Referring Provider:  No ref. provider found    Initial Assessment        See Epic Evaluation SOC Date: 03/23/22

## 2022-06-23 NOTE — TELEPHONE ENCOUNTER
Pt is calling to speak with Kaley about sending orders to [facility] for her infusion. Phone number for [facility] infusion center is [phone]. Pt would like a call back at [phone].

## 2022-06-23 NOTE — PROGRESS NOTES
Subjective:  HPI  Physical Exam                    Objective:  System    Physical Exam    General     ROS    Assessment/Plan:    PROGRESS  REPORT    Progress reporting period is from 06/22/22.       SUBJECTIVE  Subjective changes noted by patient:   Subjective: A lot of stiffness in neck, especially in the back of the neck.Also some occipital pain, shooting into shoulder blades. Takes oxy, Advil as needed. Trying to find biologic to help with inflammation per RA MD. Also dealing with hands/wrists shooting pains from RA.Also seeing chiro 1-2x week.    Current pain level is 7/10 Current Pain level: 8/10.     Previous pain level was   Initial Pain level: 10/10.   Changes in function:  Yes (See Goal flowsheet attached for changes in current functional level)  Adverse reaction to treatment or activity: None    OBJECTIVE  Changes noted in objective findings:    Objective: CROM:flexion WNL, ext max loss, L rot mod loss, R min loss, B SB mod loss. Able to reach overhead with B arms with less pulling and no sharp pain.TTP B UT/medial scap and thoracic paraspinals.     ASSESSMENT/PLAN  Updated problem list and treatment plan: Diagnosis 1:  Neck/thoracic pain  Pain -  hot/cold therapy, mechanical traction, manual therapy, splint/taping/bracing/orthotics, self management, education, directional preference exercise and home program  Decreased ROM/flexibility - manual therapy and therapeutic exercise  Decreased joint mobility - manual therapy and therapeutic exercise  Decreased strength - therapeutic exercise and therapeutic activities  Decreased proprioception - neuro re-education and therapeutic activities  Inflammation - self management/home program  Impaired muscle performance - neuro re-education  Decreased function - therapeutic activities  Impaired posture - neuro re-education  STG/LTGs have been met or progress has been made towards goals:  Yes (See Goal flow sheet completed today.)  Assessment of Progress: The patient's  progress has slowed.  Self Management Plans:  Patient has been instructed in a home treatment program.  Patient  has been instructed in self management of symptoms.    Laxmi continues to require the following intervention to meet STG and LTG's:  PT    Recommendations:  This patient would benefit from continued therapy.     Frequency:  1 X week, once daily  Duration:  for 4 weeks tapering to 2 X a month over 8 weeks        Please refer to the daily flowsheet for treatment today, total treatment time and time spent performing 1:1 timed codes.

## 2022-06-23 NOTE — TELEPHONE ENCOUNTER
Placed call to patient to confirm that Dr. Lemus has sent an order for Medrol to her pharmacy and that tocilizumab (Actemra)  infusions are covered at [facility] by her insurance. Proved telephone number to infusion center, patient states she will call to schedule infusion.  Kaley Serna RN  Adult Rheumatology Clinic

## 2022-06-24 NOTE — TELEPHONE ENCOUNTER
Spoke to nurse at [facility] infusion center who stated that they are unable to take infusion orders from Dr. Lemus and that a provider at [facility] will need to order tocilizumab (Actemra). Received call from katharine Espitia nurse who stated that patient needs to establish care with a new rheumatologist at [facility] as current providers will not change infusion orders from abatacept (Orencia)  to tocilizumab (Actemra)  without seeing patient in clinic.   Return call placed to patient to explain above, patient states she has contacted [facility] to request an infusion appointment on 6/29/22 as the next available appointment at the [facility] is in two weeks and she is due for an infusion sooner than that. Confirmed that [facility] will be able to see the infusion plan and do not need to have it faxed to them. Patient verbalized understanding and agrees to plan.   Kaley Serna RN  Adult Rheumatology Clinic

## 2022-06-24 NOTE — TELEPHONE ENCOUNTER
Return call placed to patient to discuss sending infusion orders to [facility]. Left voice message requesting she check her MyChart for  response to her request asking for clarification of where at [facility] to send the infusion plan. .  Kaley Serna RN  Adult Rheumatology Clinic

## 2022-06-30 ENCOUNTER — OFFICE VISIT (OUTPATIENT)
Dept: ORTHOPEDICS | Facility: CLINIC | Age: 47
End: 2022-06-30
Payer: MEDICARE

## 2022-06-30 ENCOUNTER — MEDICAL CORRESPONDENCE (OUTPATIENT)
Dept: HEALTH INFORMATION MANAGEMENT | Facility: CLINIC | Age: 47
End: 2022-06-30

## 2022-06-30 DIAGNOSIS — M13.80 SERONEGATIVE INFLAMMATORY ARTHRITIS: Primary | ICD-10-CM

## 2022-06-30 PROCEDURE — 77002 NEEDLE LOCALIZATION BY XRAY: CPT | Performed by: ORTHOPAEDIC SURGERY

## 2022-06-30 PROCEDURE — 20600 DRAIN/INJ JOINT/BURSA W/O US: CPT | Mod: 50 | Performed by: ORTHOPAEDIC SURGERY

## 2022-06-30 PROCEDURE — 99207 PR DROP WITH A PROCEDURE: CPT | Performed by: ORTHOPAEDIC SURGERY

## 2022-06-30 RX ORDER — LIDOCAINE HYDROCHLORIDE 10 MG/ML
1 INJECTION, SOLUTION EPIDURAL; INFILTRATION; INTRACAUDAL; PERINEURAL
Status: DISCONTINUED | OUTPATIENT
Start: 2022-06-30 | End: 2022-12-06

## 2022-06-30 RX ORDER — TRIAMCINOLONE ACETONIDE 40 MG/ML
40 INJECTION, SUSPENSION INTRA-ARTICULAR; INTRAMUSCULAR
Status: DISCONTINUED | OUTPATIENT
Start: 2022-06-30 | End: 2022-12-06

## 2022-06-30 RX ADMIN — LIDOCAINE HYDROCHLORIDE 1 ML: 10 INJECTION, SOLUTION EPIDURAL; INFILTRATION; INTRACAUDAL; PERINEURAL at 10:50

## 2022-06-30 RX ADMIN — TRIAMCINOLONE ACETONIDE 40 MG: 40 INJECTION, SUSPENSION INTRA-ARTICULAR; INTRAMUSCULAR at 10:50

## 2022-06-30 NOTE — LETTER
6/30/2022         RE: Laxmi Ya  [address] [facility] [address]        Dear Colleague,    Thank you for referring your patient, Laxmi Ya, to the [facility] [place]. Please see a copy of my visit note below.        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  She is previously managed with corticosteroid injections to the STT joint and carpal tunnels at the [facility].  She is transitioning her care to the [facility].     Plan:  Bilateral STT joint corticosteroid injections performed in clinic today  OT referral for hand-based splints and strengthening program    Will be referred to sports medicine for carpal tunnel injections    Follow-up in 4 to 6 months for repeat STT joint injections          PROCEDURE:  After written consent, verifying site and side, a sterile Chlora- prep was performed for the injection site.  C-arm guidance was used for placement of a 25-gauge needle into the left thumb STT joint with 1% Lidocaine.  1 mL of Kenalog (40 mg) and 1 mL of lidocaine was injected under fluoroscopic guidance with good relief of pain.Identical procedure carried out on the right thumb STT joint.  Patient tolerated the procedure well.  No complications.  Follow up instructions were given.                 History of Present Illness:   47 year old female, right-hand-dominant, with a history of seronegative rheumatoid arthritis and undifferentiated connective tissue disorder.  She additionally has a history of bilateral carpal tunnel release.  She previously followed at [facility] for ongoing inflammatory flexor  tenosynovitis and thumb pain.  She has been getting alternating corticosteroid injections to the bilateral carpal tunnels and the STT joints, her last injection to the carpal tunnels was in November 2021 in the STT joints in September 2021.  She has had ongoing pain at the base of her thumbs, radiates into her forearms.  Worse with grasping activities.  Her injections give her roughly 3 months or so of pain relief.  She is interested in repeat injections today.  She does not have any splints that include the thumb, only at nighttime splints for carpal tunnel syndrome.           Physical Exam:     EXAMINATION pertinent findings:   PSYCH: Pleasant, healthy-appearing, alert, oriented x3, cooperative. Normal mood and affect.  VITAL SIGNS: There were no vitals taken for this visit..  Reviewed nursing intake notes.   There is no height or weight on file to calculate BMI.  RESP: non labored breathing   SKIN: grossly normal   NEURO: grossly normal , no motor deficits  VASCULAR: satisfactory perfusion of all extremities   MUSCULOSKELETAL:   Bilateral upper extremities  No gross deformities.  Mild swelling globally. Well healed surgical scar from previous carpal tunnel release.  Tenderness to palpation at the first CMC joint, STT joint, radiocarpal joint.  Tenderness along the FCR tendon.  Mild tenderness along the fourth extensor compartment.  No significant tenderness at the first extensor compartment.  Negative Finkelstein's.  Positive CMC grind.  Full range of motion of the digits and wrist.   Negative Tinel's at the carpal tunnel.             Data:   Live fluoroscopy x-rays of the bilateral hands and wrist obtained today: Mild joint space narrowing of the STT joints bilaterally.  No significant joint space narrowing at the bilateral first CMC joints.    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       Past Surgical History:     Past Surgical History:   Procedure Laterality Date     CHOLECYSTECTOMY       COLON SURGERY       CYSTOSCOPY,+URETEROSCOPY      abstract     ZZHC EXCIS/CURET BENIGN ELBOW LESN  10/26/1987    (R) humerus abstract            Social History:     Social History     Socioeconomic History     Marital status: Single     Spouse name: Not on file     Number of children: Not on file     Years of education: Not on file     Highest education level: Not on file   Occupational History     Not on file   Tobacco Use     Smoking status: Never Smoker     Smokeless tobacco: Never Used   Substance and Sexual Activity     Alcohol use: Yes     Drug use: No     Sexual activity: Not on file   Other Topics Concern      Service Not Asked     Blood Transfusions Not Asked     Caffeine Concern Not Asked     Occupational Exposure Not Asked     Hobby Hazards Not Asked     Sleep Concern Not Asked     Stress Concern Not Asked     Weight Concern Not Asked     Special Diet Not Asked     Back Care Not Asked     Exercise No     Bike Helmet Not Asked     Seat Belt No     Self-Exams No   Social History Narrative    Womens Health Kit given.         Social Determinants of Health     Financial Resource Strain: Not on file   Food Insecurity: Not on file   Transportation Needs: Not on file   Physical Activity: Not on file   Stress: Not on file    Social Connections: Not on file   Intimate Partner Violence: Not on file   Housing Stability: Not on file            Family History:       Family History   Problem Relation Age of Onset     Hypertension Father         abstract     Cancer Paternal Aunt         gallbladder cancer abstract            Medications:     Current Outpatient Medications   Medication Sig     Abatacept (ORENCIA IV) Inject into the vein every 14 days     Albuterol Sulfate (PROAIR HFA IN) Inhale into the lungs as needed      Artificial Tear Solution (SOOTHE XP) SOLN as needed     atenolol (TENORMIN) 50 MG tablet Take 50 mg by mouth daily     benzoyl peroxide 5 % external liquid Use daily as directed     clindamycin (CLEOCIN T) 1 % external lotion Apply topically 2 times daily     diclofenac (VOLTAREN) 1 % topical gel Apply topically 4 times daily as needed      Dupilumab (DUPIXENT) 300 MG/2ML SOPN Inject 300 mg Subcutaneous every 14 days (Patient not taking: No sig reported)     fexofenadine (ALLEGRA) 180 MG tablet Take 1 tablet (180 mg) by mouth daily     fidaxomicin (DIFICID) 200 MG tablet Take 200 mg by mouth     fluticasone (FLONASE) 50 MCG/ACT nasal spray Spray 2 sprays into both nostrils 2 times daily      lidocaine (LIDODERM) 5 % patch as needed      losartan (COZAAR) 100 MG tablet Take 100 mg by mouth daily     metFORMIN (GLUCOPHAGE-XR) 500 MG 24 hr tablet Take 500 mg by mouth daily      methylPREDNISolone (MEDROL) 4 MG tablet Take 3 tablets daily for 7 days, then 2 tablets daily for 7 days, then off.     Montelukast Sodium (SINGULAIR PO) Take 10 mg by mouth At Bedtime      norethindrone (MICRONOR) 0.35 MG tablet Take 0.35 mg by mouth     nystatin (MYCOSTATIN) 252876 UNIT/ML suspension TAKE 5ML BY MOUTH 4XDAILY FOR 14 DAYS. CONTINUE AT LEAST 2 DAYS AFTER SYMPTOMS RESOLVED     olopatadine (PAZEO) 0.7 % ophthalmic solution Apply 2 Units to eye 2 times daily     OMEPRAZOLE PO Take 40 mg by mouth 2 times daily      rifaximin (XIFAXAN) 550  MG TABS tablet Take 200 mg by mouth 3 times daily Take for 10 days, has on had as needed for small bowel bacteria overgrowh     spironolactone-HCTZ (ALDACTAZIDE) 25-25 MG tablet Take 1 tablet by mouth daily     sucralfate (CARAFATE) 1 GM/10ML suspension Take by mouth 4 times daily as needed      tobramycin (TOBREX) 0.3 % ophthalmic solution 1-2 drops 2 times daily (Patient not taking: Reported on 2022)     tobramycin-dexamethasone (TOBRADEX) 0.3-0.1 % ophthalmic ointment 0.25 inches At Bedtime     triamcinolone (KENALOG) 0.1 % external ointment Apply topically 2 times daily     No current facility-administered medications for this visit.              Review of Systems:   A comprehensive 10 point review of systems was performed and found to be negative except as described in this note.     Seen and discussed with Dr Petit, who is in agreement with this assessment and plan    Will MD [facility]  Orthopaedic Surgery, PGY4          Hand / Upper Extremity Injection/Arthrocentesis    Date/Time: 2022 10:50 AM  Performed by: Twila Petit MD  Authorized by: Twila Petit MD     Indications:  Pain  Needle Size:  25 G  Guidance: fluoroscopy      Medications:  40 mg triamcinolone 40 MG/ML; 1 mL lidocaine (PF) 1 %  Consent Given by:  Patient  Timeout: timeout called immediately prior to procedure    Prep: patient was prepped and draped in usual sterile fashion          [facility] [address] [address] [phone]  Dept: [phone]  ______________________________________________________________________________    Patient: Laxmi Ya   : 1975   MRN: [id number]   2022    INVASIVE PROCEDURE SAFETY CHECKLIST    Date: 2022   Procedure: Bilateral thumb CMC injections  Patient Name: Laxmi Ya  MRN: [id number]  YOB: 1975    Action: Complete sections as appropriate. Any  discrepancy results in a HARD COPY until resolved.     PRE PROCEDURE:  Patient ID verified with 2 identifiers (name and  or MRN): Yes  Procedure and site verified with patient/designee (when able): Yes  Accurate consent documentation in medical record: Yes  H&P (or appropriate assessment) documented in medical record: Yes  H&P must be up to 20 days prior to procedure and updates within 24 hours of procedure as applicable: Yes  Relevant diagnostic and radiology test results appropriately labeled and displayed as applicable: NA  Procedure site(s) marked with provider initials: NA    TIMEOUT:  Time-Out performed immediately prior to starting procedure, including verbal and active participation of all team members addressing the following:Yes  * Correct patient identify  * Confirmed that the correct side and site are marked  * An accurate procedure consent form  * Agreement on the procedure to be done  * Correct patient position  * Relevant images and results are properly labeled and appropriately displayed  * The need to administer antibiotics or fluids for irrigation purposes during the procedure as applicable   * Safety precautions based on patient history or medication use    DURING PROCEDURE: Verification of correct person, site, and procedures any time the responsibility for care of the patient is transferred to another member of the care team.       The following medications were given:         Prior to injection, verified patient identity using patient's name and date of birth.  Due to injection administration, patient instructed to remain in clinic for 15 minutes  afterwards, and to report any adverse reaction to me immediately.    Joint injection was performed.    Medication Name: Kenalog 40 NDC [address]-4  Drug Amount Wasted:  None.  Vial/Syringe: Single dose vial  Expiration Date:  2024      Medication Name lidocaine 1% NDC 48843-528-52     Joint injection was performed.    Medication Name: Kenalog  40 NDC [address]-0  Drug Amount Wasted:  None.  Vial/Syringe: Single dose vial  Expiration Date:  3/1/2024    Medication Name lidocaine 1% NDC 12471-845-52      Scribed by Chelsy Savage for Dr. Petit on June 30, 2022 at 11:00 based on the provider's statements to me.     Chelsy Savage

## 2022-06-30 NOTE — NURSING NOTE
Reason For Visit:   Chief Complaint   Patient presents with     Consult     Bilateral tendonitis and RA        Primary MD: Artur Marsh  Ref. MD: Dr. Dodson    Age: 47 year old    ?  No      There were no vitals taken for this visit.      Pain Assessment  Patient Currently in Pain: Yes  0-10 Pain Scale: 9  Primary Pain Location: Wrist (Bilateral wrist pain)    Hand Dominance Evaluation  Hand Dominance: Right      force  R hand pincher force: 0.907 kg (2 lb)  R hand  level 2 force: 8.165 kg (18 lb)  L hand pincher force: 1.814 kg (4 lb)  L hand  level  2 force: 4.082 kg (9 lb)    QuickDASH Assessment  No flowsheet data found.       Current Outpatient Medications   Medication Sig Dispense Refill     Abatacept (ORENCIA IV) Inject into the vein every 14 days       Albuterol Sulfate (PROAIR HFA IN) Inhale into the lungs as needed        Artificial Tear Solution (SOOTHE XP) SOLN as needed       atenolol (TENORMIN) 50 MG tablet Take 50 mg by mouth daily       benzoyl peroxide 5 % external liquid Use daily as directed 148 mL 3     clindamycin (CLEOCIN T) 1 % external lotion Apply topically 2 times daily 60 mL 3     diclofenac (VOLTAREN) 1 % topical gel Apply topically 4 times daily as needed        Dupilumab (DUPIXENT) 300 MG/2ML SOPN Inject 300 mg Subcutaneous every 14 days (Patient not taking: No sig reported) 4 mL 2     fexofenadine (ALLEGRA) 180 MG tablet Take 1 tablet (180 mg) by mouth daily 90 tablet 2     fidaxomicin (DIFICID) 200 MG tablet Take 200 mg by mouth       fluticasone (FLONASE) 50 MCG/ACT nasal spray Spray 2 sprays into both nostrils 2 times daily        lidocaine (LIDODERM) 5 % patch as needed   1     losartan (COZAAR) 100 MG tablet Take 100 mg by mouth daily       metFORMIN (GLUCOPHAGE-XR) 500 MG 24 hr tablet Take 500 mg by mouth daily        methylPREDNISolone (MEDROL) 4 MG tablet Take 3 tablets daily for 7 days, then 2 tablets daily for 7 days, then off. 35 tablet 1      Montelukast Sodium (SINGULAIR PO) Take 10 mg by mouth At Bedtime        norethindrone (MICRONOR) 0.35 MG tablet Take 0.35 mg by mouth       nystatin (MYCOSTATIN) 751917 UNIT/ML suspension TAKE 5ML BY MOUTH 4XDAILY FOR 14 DAYS. CONTINUE AT LEAST 2 DAYS AFTER SYMPTOMS RESOLVED       olopatadine (PAZEO) 0.7 % ophthalmic solution Apply 2 Units to eye 2 times daily       OMEPRAZOLE PO Take 40 mg by mouth 2 times daily        rifaximin (XIFAXAN) 550 MG TABS tablet Take 200 mg by mouth 3 times daily Take for 10 days, has on had as needed for small bowel bacteria overgrowh       spironolactone-HCTZ (ALDACTAZIDE) 25-25 MG tablet Take 1 tablet by mouth daily       sucralfate (CARAFATE) 1 GM/10ML suspension Take by mouth 4 times daily as needed        tobramycin (TOBREX) 0.3 % ophthalmic solution 1-2 drops 2 times daily (Patient not taking: Reported on 6/17/2022)       tobramycin-dexamethasone (TOBRADEX) 0.3-0.1 % ophthalmic ointment 0.25 inches At Bedtime       triamcinolone (KENALOG) 0.1 % external ointment Apply topically 2 times daily 80 g 1       Allergies   Allergen Reactions     Polyethylene Glycol Rash     Codeine      Other reaction(s): Gastrointestinal, GI intolerance, Vomiting  Vomiting       Gadolinium Dizziness and Nausea     Hydrocodone-Acetaminophen Nausea and Vomiting     Other reaction(s): GI intolerance     Iodine      abstract     Sulfasalazine      Other reaction(s): GI intolerance  Has tried and had nausa.     Tramadol Nausea and Vomiting     Other reaction(s): Gastrointestinal, Other (see comments)  Nausea and vomiting   unknown       Gluten Meal Other (See Comments) and Rash     Other reaction(s): Gastrointestinal, GI intolerance  Dizziness, tired, rash, stomach cramps, thrush, mouth sores  Dizziness, tired, rash, stomach cramps, thrush, mouth sores         MIN Garza

## 2022-06-30 NOTE — PROGRESS NOTES
Hand / Upper Extremity Injection/Arthrocentesis    Date/Time: 2022 10:50 AM  Performed by: Twila Petit MD  Authorized by: Twila Petit MD     Indications:  Pain  Needle Size:  25 G  Guidance: fluoroscopy      Medications:  40 mg triamcinolone 40 MG/ML; 1 mL lidocaine (PF) 1 %  Consent Given by:  Patient  Timeout: timeout called immediately prior to procedure    Prep: patient was prepped and draped in usual sterile fashion          [facility] [address] [id number] [phone]  Dept: [phone]  ______________________________________________________________________________    Patient: Laxmi Ya   : 1975   MRN: [id number]   2022    INVASIVE PROCEDURE SAFETY CHECKLIST    Date: 2022   Procedure: Bilateral thumb CMC injections  Patient Name: Laxmi Ya  MRN: [id number]  YOB: 1975    Action: Complete sections as appropriate. Any discrepancy results in a HARD COPY until resolved.     PRE PROCEDURE:  Patient ID verified with 2 identifiers (name and  or MRN): Yes  Procedure and site verified with patient/designee (when able): Yes  Accurate consent documentation in medical record: Yes  H&P (or appropriate assessment) documented in medical record: Yes  H&P must be up to 20 days prior to procedure and updates within 24 hours of procedure as applicable: Yes  Relevant diagnostic and radiology test results appropriately labeled and displayed as applicable: NA  Procedure site(s) marked with provider initials: NA    TIMEOUT:  Time-Out performed immediately prior to starting procedure, including verbal and active participation of all team members addressing the following:Yes  * Correct patient identify  * Confirmed that the correct side and site are marked  * An accurate procedure consent form  * Agreement on the procedure to be done  * Correct patient position  * Relevant  images and results are properly labeled and appropriately displayed  * The need to administer antibiotics or fluids for irrigation purposes during the procedure as applicable   * Safety precautions based on patient history or medication use    DURING PROCEDURE: Verification of correct person, site, and procedures any time the responsibility for care of the patient is transferred to another member of the care team.       The following medications were given:         Prior to injection, verified patient identity using patient's name and date of birth.  Due to injection administration, patient instructed to remain in clinic for 15 minutes  afterwards, and to report any adverse reaction to me immediately.    Joint injection was performed.    Medication Name: Kenalog 40 NDC [address]-2  Drug Amount Wasted:  None.  Vial/Syringe: Single dose vial  Expiration Date:  1/1/2024      Medication Name lidocaine 1% NDC 25813-261-13     Joint injection was performed.    Medication Name: Kenalog 40 NDC [address]-7  Drug Amount Wasted:  None.  Vial/Syringe: Single dose vial  Expiration Date:  3/1/2024    Medication Name lidocaine 1% NDC 00429-850-95      Scribed by Chelsy Savage for Dr. Petit on June 30, 2022 at 11:00 based on the provider's statements to me.     Chelsy Savage

## 2022-06-30 NOTE — PROGRESS NOTES
[facility], Orthopaedic Surgery Consultation    Laxmi Ya MRN# [id number]   Age: 47 year old YOB: 1975     Requesting physician: F=Referred Self  Artur Marsh            Assessment and Plan:   Assessment:  47-year-old female, right-hand-dominant, with seronegative RA and undifferentiated connective tissue disorder with bilateral STT arthritis and ongoing flexor tenosynovitis.  She is previously managed with corticosteroid injections to the STT joint and carpal tunnels at the [facility].  She is transitioning her care to the [place].     Plan:  Bilateral STT joint corticosteroid injections performed in clinic today  OT referral for hand-based splints and strengthening program    Will be referred to sports medicine for carpal tunnel injections    Follow-up in 4 to 6 months for repeat STT joint injections          PROCEDURE:  After written consent, verifying site and side, a sterile Chlora- prep was performed for the injection site.  C-arm guidance was used for placement of a 25-gauge needle into the left thumb STT joint with 1% Lidocaine.  1 mL of Kenalog (40 mg) and 1 mL of lidocaine was injected under fluoroscopic guidance with good relief of pain.Identical procedure carried out on the right thumb STT joint.  Patient tolerated the procedure well.  No complications.  Follow up instructions were given.  Bilateral procedure was performed.    Twila Petit MD   Hand and Upper Extremity Specialist  [facility]                 History of Present Illness:   47 year old female, right-hand-dominant, with a history of seronegative rheumatoid arthritis and undifferentiated connective tissue disorder.  She additionally has a history of bilateral carpal tunnel release.  She previously followed at [facility] for ongoing inflammatory flexor tenosynovitis and thumb pain.  She has been getting alternating corticosteroid injections to the bilateral carpal tunnels and the STT  joints, her last injection to the carpal tunnels was in November 2021 in the STT joints in September 2021.  She has had ongoing pain at the base of her thumbs, radiates into her forearms.  Worse with grasping activities.  Her injections give her roughly 3 months or so of pain relief.  She is interested in repeat injections today.  She does not have any splints that include the thumb, only at nighttime splints for carpal tunnel syndrome.           Physical Exam:     EXAMINATION pertinent findings:   PSYCH: Pleasant, healthy-appearing, alert, oriented x3, cooperative. Normal mood and affect.  VITAL SIGNS: There were no vitals taken for this visit..  Reviewed nursing intake notes.   There is no height or weight on file to calculate BMI.  RESP: non labored breathing   SKIN: grossly normal   NEURO: grossly normal , no motor deficits  VASCULAR: satisfactory perfusion of all extremities   MUSCULOSKELETAL:   Bilateral upper extremities  No gross deformities.  Mild swelling globally. Well healed surgical scar from previous carpal tunnel release.  Tenderness to palpation at the first CMC joint, STT joint, radiocarpal joint.  Tenderness along the FCR tendon.  Mild tenderness along the fourth extensor compartment.  No significant tenderness at the first extensor compartment.  Negative Finkelstein's.  Positive CMC grind.  Full range of motion of the digits and wrist.   Negative Tinel's at the carpal tunnel.             Data:   Live fluoroscopy x-rays of the bilateral hands and wrist obtained today: Mild joint space narrowing of the STT joints bilaterally.  No significant joint space narrowing at the bilateral first CMC joints.    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       Past Surgical History:     Past Surgical History:   Procedure Laterality Date     CHOLECYSTECTOMY       COLON SURGERY       CYSTOSCOPY,+URETEROSCOPY      abstract     ZZHC EXCIS/CURET BENIGN ELBOW LESN  10/26/1987    (R) humerus abstract            Social History:     Social History     Socioeconomic History     Marital status: Single     Spouse name: Not on file     Number of children: Not on file     Years of education: Not on file     Highest education level: Not on file   Occupational History     Not on file   Tobacco Use     Smoking status: Never Smoker     Smokeless tobacco: Never Used   Substance and Sexual Activity     Alcohol use: Yes     Drug use: No     Sexual activity: Not on file   Other Topics Concern      Service Not Asked     Blood Transfusions Not Asked     Caffeine Concern Not Asked     Occupational Exposure Not Asked     Hobby Hazards Not Asked     Sleep Concern Not Asked     Stress Concern Not Asked     Weight Concern Not Asked     Special Diet Not Asked     Back Care Not Asked     Exercise No     Bike Helmet Not Asked     Seat Belt No     Self-Exams No   Social History Narrative    Womens Health Kit given.         Social Determinants of Health     Financial Resource Strain: Not on file   Food Insecurity: Not on file   Transportation Needs: Not on file   Physical Activity: Not on file   Stress: Not on file   Social Connections: Not on file   Intimate Partner Violence: Not on file   Housing Stability: Not on file            Family History:        Family History   Problem Relation Age of Onset     Hypertension Father         abstract     Cancer Paternal Aunt         gallbladder cancer abstract            Medications:     Current Outpatient Medications   Medication Sig     Abatacept (ORENCIA IV) Inject into the vein every 14 days     Albuterol Sulfate (PROAIR HFA IN) Inhale into the lungs as needed      Artificial Tear Solution (SOOTHE XP) SOLN as needed     atenolol (TENORMIN) 50 MG tablet Take 50 mg by mouth daily     benzoyl peroxide 5 % external liquid Use daily as directed     clindamycin (CLEOCIN T) 1 % external lotion Apply topically 2 times daily     diclofenac (VOLTAREN) 1 % topical gel Apply topically 4 times daily as needed      Dupilumab (DUPIXENT) 300 MG/2ML SOPN Inject 300 mg Subcutaneous every 14 days (Patient not taking: No sig reported)     fexofenadine (ALLEGRA) 180 MG tablet Take 1 tablet (180 mg) by mouth daily     fidaxomicin (DIFICID) 200 MG tablet Take 200 mg by mouth     fluticasone (FLONASE) 50 MCG/ACT nasal spray Spray 2 sprays into both nostrils 2 times daily      lidocaine (LIDODERM) 5 % patch as needed      losartan (COZAAR) 100 MG tablet Take 100 mg by mouth daily     metFORMIN (GLUCOPHAGE-XR) 500 MG 24 hr tablet Take 500 mg by mouth daily      methylPREDNISolone (MEDROL) 4 MG tablet Take 3 tablets daily for 7 days, then 2 tablets daily for 7 days, then off.     Montelukast Sodium (SINGULAIR PO) Take 10 mg by mouth At Bedtime      norethindrone (MICRONOR) 0.35 MG tablet Take 0.35 mg by mouth     nystatin (MYCOSTATIN) 240557 UNIT/ML suspension TAKE 5ML BY MOUTH 4XDAILY FOR 14 DAYS. CONTINUE AT LEAST 2 DAYS AFTER SYMPTOMS RESOLVED     olopatadine (PAZEO) 0.7 % ophthalmic solution Apply 2 Units to eye 2 times daily     OMEPRAZOLE PO Take 40 mg by mouth 2 times daily      rifaximin (XIFAXAN) 550 MG TABS tablet Take 200 mg by mouth 3 times daily Take for 10 days, has on had as needed for small bowel bacteria overgrowh      spironolactone-HCTZ (ALDACTAZIDE) 25-25 MG tablet Take 1 tablet by mouth daily     sucralfate (CARAFATE) 1 GM/10ML suspension Take by mouth 4 times daily as needed      tobramycin (TOBREX) 0.3 % ophthalmic solution 1-2 drops 2 times daily (Patient not taking: Reported on 6/17/2022)     tobramycin-dexamethasone (TOBRADEX) 0.3-0.1 % ophthalmic ointment 0.25 inches At Bedtime     triamcinolone (KENALOG) 0.1 % external ointment Apply topically 2 times daily     No current facility-administered medications for this visit.              Review of Systems:   A comprehensive 10 point review of systems was performed and found to be negative except as described in this note.     Seen and discussed with Dr Petit, who is in agreement with this assessment and plan    Luis E Pyle MD  Orthopaedic Surgery, PGY4

## 2022-07-06 ENCOUNTER — OFFICE VISIT (OUTPATIENT)
Dept: INFECTIOUS DISEASES | Facility: CLINIC | Age: 47
End: 2022-07-06
Attending: INTERNAL MEDICINE
Payer: MEDICARE

## 2022-07-06 ENCOUNTER — LAB (OUTPATIENT)
Dept: LAB | Facility: CLINIC | Age: 47
End: 2022-07-06

## 2022-07-06 VITALS
WEIGHT: 211.5 LBS | TEMPERATURE: 97.6 F | OXYGEN SATURATION: 97 % | BODY MASS INDEX: 33.99 KG/M2 | SYSTOLIC BLOOD PRESSURE: 124 MMHG | HEART RATE: 86 BPM | HEIGHT: 66 IN | DIASTOLIC BLOOD PRESSURE: 81 MMHG

## 2022-07-06 DIAGNOSIS — D84.9 IMMUNODEFICIENCY, UNSPECIFIED (H): ICD-10-CM

## 2022-07-06 DIAGNOSIS — B99.9 RECURRENT INFECTIONS: ICD-10-CM

## 2022-07-06 DIAGNOSIS — Z79.2 ENCOUNTER FOR LONG-TERM (CURRENT) USE OF ANTIBIOTICS: ICD-10-CM

## 2022-07-06 DIAGNOSIS — M06.9 RHEUMATOID ARTHRITIS INVOLVING MULTIPLE SITES, UNSPECIFIED WHETHER RHEUMATOID FACTOR PRESENT (H): ICD-10-CM

## 2022-07-06 DIAGNOSIS — A49.8 RECURRENT CLOSTRIDIOIDES DIFFICILE INFECTION: ICD-10-CM

## 2022-07-06 DIAGNOSIS — B99.9 RECURRENT INFECTIONS: Primary | ICD-10-CM

## 2022-07-06 DIAGNOSIS — K63.8219 SMALL INTESTINAL BACTERIAL OVERGROWTH (SIBO): ICD-10-CM

## 2022-07-06 DIAGNOSIS — R10.9 CHRONIC ABDOMINAL PAIN: ICD-10-CM

## 2022-07-06 DIAGNOSIS — R50.9 RECURRENT FEVER OF UNKNOWN ETIOLOGY: ICD-10-CM

## 2022-07-06 DIAGNOSIS — G89.29 CHRONIC ABDOMINAL PAIN: ICD-10-CM

## 2022-07-06 LAB
BASOPHILS # BLD AUTO: 0.1 10E3/UL (ref 0–0.2)
BASOPHILS NFR BLD AUTO: 0 %
EOSINOPHIL # BLD AUTO: 0 10E3/UL (ref 0–0.7)
EOSINOPHIL NFR BLD AUTO: 0 %
ERYTHROCYTE [DISTWIDTH] IN BLOOD BY AUTOMATED COUNT: 14.6 % (ref 10–15)
HCT VFR BLD AUTO: 45.7 % (ref 35–47)
HGB BLD-MCNC: 15 G/DL (ref 11.7–15.7)
IMM GRANULOCYTES # BLD: 0.2 10E3/UL
IMM GRANULOCYTES NFR BLD: 1 %
LYMPHOCYTES # BLD AUTO: 1.1 10E3/UL (ref 0.8–5.3)
LYMPHOCYTES NFR BLD AUTO: 6 %
MCH RBC QN AUTO: 27.2 PG (ref 26.5–33)
MCHC RBC AUTO-ENTMCNC: 32.8 G/DL (ref 31.5–36.5)
MCV RBC AUTO: 83 FL (ref 78–100)
MONOCYTES # BLD AUTO: 0.3 10E3/UL (ref 0–1.3)
MONOCYTES NFR BLD AUTO: 2 %
NEUTROPHILS # BLD AUTO: 16.3 10E3/UL (ref 1.6–8.3)
NEUTROPHILS NFR BLD AUTO: 91 %
NRBC # BLD AUTO: 0 10E3/UL
NRBC BLD AUTO-RTO: 0 /100
PLATELET # BLD AUTO: 488 10E3/UL (ref 150–450)
RBC # BLD AUTO: 5.52 10E6/UL (ref 3.8–5.2)
WBC # BLD AUTO: 18 10E3/UL (ref 4–11)

## 2022-07-06 PROCEDURE — 86360 T CELL ABSOLUTE COUNT/RATIO: CPT | Performed by: INTERNAL MEDICINE

## 2022-07-06 PROCEDURE — 99205 OFFICE O/P NEW HI 60 MIN: CPT | Performed by: INTERNAL MEDICINE

## 2022-07-06 PROCEDURE — 82784 ASSAY IGA/IGD/IGG/IGM EACH: CPT | Performed by: INTERNAL MEDICINE

## 2022-07-06 PROCEDURE — 36415 COLL VENOUS BLD VENIPUNCTURE: CPT | Performed by: PATHOLOGY

## 2022-07-06 PROCEDURE — 85025 COMPLETE CBC W/AUTO DIFF WBC: CPT | Performed by: PATHOLOGY

## 2022-07-06 ASSESSMENT — PAIN SCALES - GENERAL: PAINLEVEL: EXTREME PAIN (8)

## 2022-07-07 LAB
CD3 CELLS # BLD: 600 CELLS/UL (ref 603–2990)
CD3 CELLS NFR BLD: 51 % (ref 49–84)
CD3+CD4+ CELLS # BLD: 415 CELLS/UL (ref 441–2156)
CD3+CD4+ CELLS NFR BLD: 35 % (ref 28–63)
CD3+CD4+ CELLS/CD3+CD8+ CLL BLD: 1.62 % (ref 1.4–2.6)
CD3+CD8+ CELLS # BLD: 256 CELLS/UL (ref 125–1312)
CD3+CD8+ CELLS NFR BLD: 22 % (ref 10–40)
IGA SERPL-MCNC: 177 MG/DL (ref 84–499)
IGG SERPL-MCNC: 834 MG/DL (ref 610–1616)
IGM SERPL-MCNC: 135 MG/DL (ref 35–242)
T CELL COMMENT: ABNORMAL

## 2022-07-08 ENCOUNTER — TELEPHONE (OUTPATIENT)
Dept: RHEUMATOLOGY | Facility: CLINIC | Age: 47
End: 2022-07-08

## 2022-07-08 NOTE — TELEPHONE ENCOUNTER
[facility] Call Center    Phone Message    May a detailed message be left on voicemail: yes     Reason for Call: Other: Patient feels like she might need a little more methylprednisolone. Her symptoms have improved but still not 100%. She feels like she needs to take a little bit more per day.     Action Taken: Message routed to:  [facility] Rheum    Travel Screening: Not Applicable

## 2022-07-08 NOTE — TELEPHONE ENCOUNTER
I am glad that MP use was associated with partial remission of pain and stiffness symptoms. As mentioned in my note from 6-17 visit, a number of symptoms in the hands and wrists are likely associated with conditions (osteoarthritis, carpal tunnel, for example) that will not be expected to improve with either medrol or orencia or actemra. To address those symptoms, I recommend followup with Dr. Petit.  I agree with using another round of medrol as prescribed, but with tempering expectations for how much the drug can do.

## 2022-07-08 NOTE — TELEPHONE ENCOUNTER
"Returned call to patient, who reports she missed her tocilizumab (Actemra)  infusion on 7/7 because she arrived late to appointment and it has been rescheduled to 7/12/22. Her last abatacept (Orencia)  Infusion was on 6/8/22 and she completed previous methylprednsisolone burst on 7/6/22.  She states she realized she has one refill of the methylprednisolone burst ordered by Dr. Lemus on 6/22/22 and will be filling that prescription to treat pain and stiffness in her hands, neck, wrist and hips. She reports that methylprednisolone burst  was \"somewhat helpful\"  but would like to start with a 16 mg dose for one week before tapering  to 12 mg. I instructed her to take the methylprednisolone as ordered and confirmed that her request for a change in directions will be sent to Dr. Lemus. She expressed concern about taking a dose of methylprednisolone that doesn't completely eliminate her pain and stiffness, reporting \"I feel like if it isn't going to fix all my symptoms it's not really worth taking it.\" Explained that she will be updated with Dr. Lemus's response.   Kaley Serna RN  Adult Rheumatology Clinic      "

## 2022-07-11 ENCOUNTER — TRANSCRIBE ORDERS (OUTPATIENT)
Dept: OTHER | Age: 47
End: 2022-07-11

## 2022-07-11 DIAGNOSIS — K63.8219 SMALL INTESTINAL BACTERIAL OVERGROWTH: Primary | ICD-10-CM

## 2022-07-11 DIAGNOSIS — B99.9 RECURRENT INFECTIONS: ICD-10-CM

## 2022-07-11 NOTE — TELEPHONE ENCOUNTER
Return call placed to patient to explain per Dr. Lemus's message, that medrol is intended to temporarily reduce symptoms of inflammatory arthritis and to confirm  That abatacept (Orencia)  Infusion is scheduled for 7/12/22 as long term treatment. Patient verbalized understanding and agrees to plan.   Kaley Serna RN  Adult Rheumatology Clinic

## 2022-07-12 ENCOUNTER — INFUSION THERAPY VISIT (OUTPATIENT)
Dept: INFUSION THERAPY | Facility: CLINIC | Age: 47
End: 2022-07-12
Attending: INTERNAL MEDICINE
Payer: MEDICARE

## 2022-07-12 VITALS
TEMPERATURE: 98.2 F | HEART RATE: 64 BPM | SYSTOLIC BLOOD PRESSURE: 137 MMHG | WEIGHT: 211.1 LBS | DIASTOLIC BLOOD PRESSURE: 82 MMHG | RESPIRATION RATE: 16 BRPM | OXYGEN SATURATION: 97 % | BODY MASS INDEX: 34.09 KG/M2

## 2022-07-12 DIAGNOSIS — M13.80 SERONEGATIVE INFLAMMATORY ARTHRITIS: Primary | ICD-10-CM

## 2022-07-12 LAB
ALBUMIN SERPL-MCNC: 3 G/DL (ref 3.4–5)
ALBUMIN UR-MCNC: NEGATIVE MG/DL
ALP SERPL-CCNC: 55 U/L (ref 40–150)
ALT SERPL W P-5'-P-CCNC: 32 U/L (ref 0–50)
ANION GAP SERPL CALCULATED.3IONS-SCNC: 10 MMOL/L (ref 3–14)
APPEARANCE UR: CLEAR
AST SERPL W P-5'-P-CCNC: 14 U/L (ref 0–45)
BILIRUB SERPL-MCNC: 0.3 MG/DL (ref 0.2–1.3)
BILIRUB UR QL STRIP: NEGATIVE
BUN SERPL-MCNC: 14 MG/DL (ref 7–30)
CALCIUM SERPL-MCNC: 8.6 MG/DL (ref 8.5–10.1)
CHLORIDE BLD-SCNC: 109 MMOL/L (ref 94–109)
CHOLEST SERPL-MCNC: 191 MG/DL
CO2 SERPL-SCNC: 21 MMOL/L (ref 20–32)
COLOR UR AUTO: YELLOW
CREAT SERPL-MCNC: 0.73 MG/DL (ref 0.52–1.04)
FASTING STATUS PATIENT QL REPORTED: ABNORMAL
GFR SERPL CREATININE-BSD FRML MDRD: >90 ML/MIN/1.73M2
GLUCOSE BLD-MCNC: 153 MG/DL (ref 70–99)
GLUCOSE UR STRIP-MCNC: NEGATIVE MG/DL
HDLC SERPL-MCNC: 45 MG/DL
HGB UR QL STRIP: NEGATIVE
KETONES UR STRIP-MCNC: NEGATIVE MG/DL
LDLC SERPL CALC-MCNC: 94 MG/DL
LEUKOCYTE ESTERASE UR QL STRIP: NEGATIVE
MUCOUS THREADS #/AREA URNS LPF: PRESENT /LPF
NITRATE UR QL: NEGATIVE
NONHDLC SERPL-MCNC: 146 MG/DL
PH UR STRIP: 6 [PH] (ref 5–7)
POTASSIUM BLD-SCNC: 3.7 MMOL/L (ref 3.4–5.3)
PROT SERPL-MCNC: 6.2 G/DL (ref 6.8–8.8)
RBC URINE: 1 /HPF
SODIUM SERPL-SCNC: 140 MMOL/L (ref 133–144)
SP GR UR STRIP: 1.02 (ref 1–1.03)
SQUAMOUS EPITHELIAL: <1 /HPF
TRIGL SERPL-MCNC: 259 MG/DL
UROBILINOGEN UR STRIP-MCNC: NORMAL MG/DL
WBC URINE: 0 /HPF

## 2022-07-12 PROCEDURE — 96375 TX/PRO/DX INJ NEW DRUG ADDON: CPT

## 2022-07-12 PROCEDURE — 36415 COLL VENOUS BLD VENIPUNCTURE: CPT | Performed by: INTERNAL MEDICINE

## 2022-07-12 PROCEDURE — 80053 COMPREHEN METABOLIC PANEL: CPT | Performed by: INTERNAL MEDICINE

## 2022-07-12 PROCEDURE — 80061 LIPID PANEL: CPT | Performed by: INTERNAL MEDICINE

## 2022-07-12 PROCEDURE — 250N000011 HC RX IP 250 OP 636: Performed by: INTERNAL MEDICINE

## 2022-07-12 PROCEDURE — 81001 URINALYSIS AUTO W/SCOPE: CPT | Performed by: INTERNAL MEDICINE

## 2022-07-12 PROCEDURE — 258N000003 HC RX IP 258 OP 636: Performed by: INTERNAL MEDICINE

## 2022-07-12 PROCEDURE — 250N000013 HC RX MED GY IP 250 OP 250 PS 637: Performed by: INTERNAL MEDICINE

## 2022-07-12 PROCEDURE — 96365 THER/PROPH/DIAG IV INF INIT: CPT

## 2022-07-12 PROCEDURE — 999N000127 HC STATISTIC PERIPHERAL IV START W US GUIDANCE

## 2022-07-12 RX ORDER — HEPARIN SODIUM (PORCINE) LOCK FLUSH IV SOLN 100 UNIT/ML 100 UNIT/ML
5 SOLUTION INTRAVENOUS
Status: CANCELLED | OUTPATIENT
Start: 2022-08-09

## 2022-07-12 RX ORDER — HEPARIN SODIUM,PORCINE 10 UNIT/ML
5 VIAL (ML) INTRAVENOUS
Status: CANCELLED | OUTPATIENT
Start: 2022-08-09

## 2022-07-12 RX ORDER — DIPHENHYDRAMINE HCL 25 MG
25 CAPSULE ORAL ONCE
Status: DISCONTINUED | OUTPATIENT
Start: 2022-07-12 | End: 2022-07-12 | Stop reason: HOSPADM

## 2022-07-12 RX ORDER — ACETAMINOPHEN 325 MG/1
650 TABLET ORAL ONCE
Status: COMPLETED | OUTPATIENT
Start: 2022-07-12 | End: 2022-07-12

## 2022-07-12 RX ORDER — DIPHENHYDRAMINE HCL 25 MG
25 CAPSULE ORAL ONCE
Status: CANCELLED | OUTPATIENT
Start: 2022-08-09

## 2022-07-12 RX ORDER — METHYLPREDNISOLONE SODIUM SUCCINATE 125 MG/2ML
125 INJECTION, POWDER, LYOPHILIZED, FOR SOLUTION INTRAMUSCULAR; INTRAVENOUS ONCE
Status: COMPLETED | OUTPATIENT
Start: 2022-07-12 | End: 2022-07-12

## 2022-07-12 RX ORDER — METHYLPREDNISOLONE SODIUM SUCCINATE 125 MG/2ML
125 INJECTION, POWDER, LYOPHILIZED, FOR SOLUTION INTRAMUSCULAR; INTRAVENOUS
Status: CANCELLED
Start: 2022-08-09

## 2022-07-12 RX ORDER — DIPHENHYDRAMINE HYDROCHLORIDE 50 MG/ML
50 INJECTION INTRAMUSCULAR; INTRAVENOUS
Status: CANCELLED
Start: 2022-08-09

## 2022-07-12 RX ORDER — MEPERIDINE HYDROCHLORIDE 25 MG/ML
25 INJECTION INTRAMUSCULAR; INTRAVENOUS; SUBCUTANEOUS EVERY 30 MIN PRN
Status: CANCELLED | OUTPATIENT
Start: 2022-08-09

## 2022-07-12 RX ORDER — NALOXONE HYDROCHLORIDE 0.4 MG/ML
0.2 INJECTION, SOLUTION INTRAMUSCULAR; INTRAVENOUS; SUBCUTANEOUS
Status: CANCELLED | OUTPATIENT
Start: 2022-08-09

## 2022-07-12 RX ORDER — ACETAMINOPHEN 325 MG/1
650 TABLET ORAL ONCE
Status: CANCELLED | OUTPATIENT
Start: 2022-08-09

## 2022-07-12 RX ORDER — ALBUTEROL SULFATE 90 UG/1
1-2 AEROSOL, METERED RESPIRATORY (INHALATION)
Status: CANCELLED
Start: 2022-08-09

## 2022-07-12 RX ORDER — ALBUTEROL SULFATE 0.83 MG/ML
2.5 SOLUTION RESPIRATORY (INHALATION)
Status: CANCELLED | OUTPATIENT
Start: 2022-08-09

## 2022-07-12 RX ORDER — EPINEPHRINE 1 MG/ML
0.3 INJECTION, SOLUTION INTRAMUSCULAR; SUBCUTANEOUS EVERY 5 MIN PRN
Status: CANCELLED | OUTPATIENT
Start: 2022-08-09

## 2022-07-12 RX ORDER — METHYLPREDNISOLONE SODIUM SUCCINATE 125 MG/2ML
125 INJECTION, POWDER, LYOPHILIZED, FOR SOLUTION INTRAMUSCULAR; INTRAVENOUS ONCE
Status: CANCELLED | OUTPATIENT
Start: 2022-08-09

## 2022-07-12 RX ADMIN — ACETAMINOPHEN 650 MG: 325 TABLET ORAL at 10:35

## 2022-07-12 RX ADMIN — TOCILIZUMAB 400 MG: 20 INJECTION, SOLUTION, CONCENTRATE INTRAVENOUS at 10:55

## 2022-07-12 RX ADMIN — METHYLPREDNISOLONE SODIUM SUCCINATE 125 MG: 125 INJECTION, POWDER, FOR SOLUTION INTRAMUSCULAR; INTRAVENOUS at 10:36

## 2022-07-12 RX ADMIN — SODIUM CHLORIDE 250 ML: 9 INJECTION, SOLUTION INTRAVENOUS at 10:52

## 2022-07-12 NOTE — LETTER
7/12/2022         RE: Laxmi Ya  [address] [place] [id number]        Dear Colleague,    Thank you for referring your patient, Laxmi Ya, to the [facility]. Please see a copy of my visit note below.    Infusion Nursing Note:  Laxmi Ya presents today for   Chief Complaint   Patient presents with     Infusion     tocilizumab (ACTEMRA)       Patient seen by provider today: No   present during visit today: Not Applicable.    Note:   -Labs drawn via PIV per orders from Oswaldo Lemus MD.  -Actemra infused over 1hr; 250ml NS infused concurrently per pt request. First dose teaching completed and pt questions answered.    Intravenous Access:  Peripheral IV placed by vascular access.    Treatment Conditions:  Biological Infusion Checklist:  ~~~ NOTE: If the patient answers yes to any of the questions below, hold the infusion and contact ordering provider or on-call provider.    1. Have you recently had an elevated temperature, fever, chills, productive cough, coughing for 3 weeks or longer or hemoptysis, abnormal vital signs, night sweats,  chest pain or have you noticed a decrease in your appetite, unexplained weight loss or fatigue? No  2. Do you have any open wounds or new incisions? No  3. Do you have any recent or upcoming hospitalizations, surgeries or dental procedures? No  4. Do you currently have or recently have had any signs of illness or infection or are you on any antibiotics? No  5. Have you had any new, sudden or worsening abdominal pain? No  6. Have you or anyone in your household received a live vaccination in the past 4 weeks? Please note:  No live vaccines while on biologic/chemotherapy until 6 months after the last treatment.  Patient can receive the flu vaccine (shot only) and the pneumovax.  It is optimal for the patient to get these vaccines mid cycle, but they can be given at any time as long as it is not on the day  of the infusion. No  7. Have you recently been diagnosed with any new nervous system diseases (ie. Multiple sclerosis, Guillain [place], seizures, neurological changes) or cancer diagnosis? No  8. Are you on any form of radiation or chemotherapy? No  9. Are you pregnant or breast feeding or do you have plans of pregnancy in the future? No  10. Have you been having any signs of worsening depression or suicidal ideations?  (benlysta only) No  11. Have there been any other new onset medical symptoms? No      Post Infusion Assessment:  Patient tolerated infusion without incident.  Blood return noted pre and post infusion.  Site patent and intact, free from redness, edema or discomfort.  No evidence of extravasations.  Access discontinued per protocol.  Biologic Infusion Post Education: Call the triage nurse at your clinic or seek medical attention if you have chills and/or temperature greater than or equal to 100.5, uncontrolled nausea/vomiting, diarrhea, constipation, dizziness, shortness of breath, chest pain, heart palpitations, weakness or any other new or concerning symptoms, questions or concerns.  You cannot have any live virus vaccines prior to or during treatment or up to 6 months post infusion.  If you have an upcoming surgery, medical procedure or dental procedure during treatment, this should be discussed with your ordering physician and your surgeon/dentist.  If you are having any concerning symptom, if you are unsure if you should get your next infusion or wish to speak to a provider before your next infusion, please call your care coordinator or triage nurse at your clinic to notify them so we can adequately serve you.     Discharge Plan:   Discharge instructions reviewed with: Patient.  Patient and/or family verbalized understanding of discharge instructions and all questions answered.  AVS to patient via [employer].  Patient will return 8/9/22 for next appointment.   Patient discharged in stable condition  accompanied by: self.  Departure Mode: Ambulatory.      Kaley Handley RN    /86 (BP Location: Right arm, Patient Position: Semi-Stephens's, Cuff Size: Adult Regular)   Pulse 80   Temp 98.2  F (36.8  C)   Resp 16   Wt 95.8 kg (211 lb 1.6 oz)   SpO2 97%   BMI 34.09 kg/m      Administrations This Visit     0.9% sodium chloride BOLUS     Admin Date  07/12/2022 Action  New Bag Dose  250 mL Route  Intravenous Administered By  Kaley Handley RN          acetaminophen (TYLENOL) tablet 650 mg     Admin Date  07/12/2022 Action  Given Dose  650 mg Route  Oral Administered By  Kaley Handley, LEXI          methylPREDNISolone sodium succinate (solu-MEDROL) injection 125 mg     Admin Date  07/12/2022 Action  Given Dose  125 mg Route  Intravenous Administered By  Kaley Handley, LEXI          tocilizumab (ACTEMRA) 400 mg in sodium chloride 0.9 % 130 mL infusion     Admin Date  07/12/2022 Action  New Bag Dose  400 mg Rate  130 mL/hr Route  Intravenous Administered By  Kaley Handley, LEXI                                  Again, thank you for allowing me to participate in the care of your patient.        Sincerely,        [facility]

## 2022-07-12 NOTE — PROGRESS NOTES
Infusion Nursing Note:  Laxmi Ya presents today for   Chief Complaint   Patient presents with     Infusion     tocilizumab (ACTEMRA)       Patient seen by provider today: No   present during visit today: Not Applicable.    Note:   -Labs drawn via PIV per orders from Oswaldo Lemus MD.  -Actemra infused over 1hr; 250ml NS infused concurrently per pt request. First dose teaching completed and pt questions answered.    Intravenous Access:  Peripheral IV placed by vascular access.    Treatment Conditions:  Biological Infusion Checklist:  ~~~ NOTE: If the patient answers yes to any of the questions below, hold the infusion and contact ordering provider or on-call provider.    1. Have you recently had an elevated temperature, fever, chills, productive cough, coughing for 3 weeks or longer or hemoptysis, abnormal vital signs, night sweats,  chest pain or have you noticed a decrease in your appetite, unexplained weight loss or fatigue? No  2. Do you have any open wounds or new incisions? No  3. Do you have any recent or upcoming hospitalizations, surgeries or dental procedures? No  4. Do you currently have or recently have had any signs of illness or infection or are you on any antibiotics? No  5. Have you had any new, sudden or worsening abdominal pain? No  6. Have you or anyone in your household received a live vaccination in the past 4 weeks? Please note:  No live vaccines while on biologic/chemotherapy until 6 months after the last treatment.  Patient can receive the flu vaccine (shot only) and the pneumovax.  It is optimal for the patient to get these vaccines mid cycle, but they can be given at any time as long as it is not on the day of the infusion. No  7. Have you recently been diagnosed with any new nervous system diseases (ie. Multiple sclerosis, Guillain [place], seizures, neurological changes) or cancer diagnosis? No  8. Are you on any form of radiation or chemotherapy? No  9. Are you  pregnant or breast feeding or do you have plans of pregnancy in the future? No  10. Have you been having any signs of worsening depression or suicidal ideations?  (benlysta only) No  11. Have there been any other new onset medical symptoms? No      Post Infusion Assessment:  Patient tolerated infusion without incident.  Blood return noted pre and post infusion.  Site patent and intact, free from redness, edema or discomfort.  No evidence of extravasations.  Access discontinued per protocol.  Biologic Infusion Post Education: Call the triage nurse at your clinic or seek medical attention if you have chills and/or temperature greater than or equal to 100.5, uncontrolled nausea/vomiting, diarrhea, constipation, dizziness, shortness of breath, chest pain, heart palpitations, weakness or any other new or concerning symptoms, questions or concerns.  You cannot have any live virus vaccines prior to or during treatment or up to 6 months post infusion.  If you have an upcoming surgery, medical procedure or dental procedure during treatment, this should be discussed with your ordering physician and your surgeon/dentist.  If you are having any concerning symptom, if you are unsure if you should get your next infusion or wish to speak to a provider before your next infusion, please call your care coordinator or triage nurse at your clinic to notify them so we can adequately serve you.     Discharge Plan:   Discharge instructions reviewed with: Patient.  Patient and/or family verbalized understanding of discharge instructions and all questions answered.  AVS to patient via [employer].  Patient will return 8/9/22 for next appointment.   Patient discharged in stable condition accompanied by: self.  Departure Mode: Ambulatory.      Kaley Handley RN    /86 (BP Location: Right arm, Patient Position: Semi-Tsephens's, Cuff Size: Adult Regular)   Pulse 80   Temp 98.2  F (36.8  C)   Resp 16   Wt 95.8 kg (211 lb 1.6 oz)   SpO2 97%    BMI 34.09 kg/m      Administrations This Visit     0.9% sodium chloride BOLUS     Admin Date  07/12/2022 Action  New Bag Dose  250 mL Route  Intravenous Administered By  Kaley Handley, LEXI          acetaminophen (TYLENOL) tablet 650 mg     Admin Date  07/12/2022 Action  Given Dose  650 mg Route  Oral Administered By  Kaley Handley RN          methylPREDNISolone sodium succinate (solu-MEDROL) injection 125 mg     Admin Date  07/12/2022 Action  Given Dose  125 mg Route  Intravenous Administered By  Kaley Handley, LEXI          tocilizumab (ACTEMRA) 400 mg in sodium chloride 0.9 % 130 mL infusion     Admin Date  07/12/2022 Action  New Bag Dose  400 mg Rate  130 mL/hr Route  Intravenous Administered By  Kaley Handley, LEXI

## 2022-07-12 NOTE — PATIENT INSTRUCTIONS
Dear Laxmi Ya    Thank you for choosing [facility] ([facility]) for your infusion.  The following information is a summary of our appointment as well as important reminders.      Last dose of Tylenol was  7/12/22 at 10:30 am.    EDUCATION POST BIOLOGICAL/CHEMOTHERAPY INFUSION  Call the triage nurse at your clinic or seek medical attention if you have chills and/or temperature greater than or equal to 100.5, uncontrolled nausea/vomiting, diarrhea, constipation, dizziness, shortness of breath, chest pain, heart palpitations, weakness or any other new or concerning symptoms, questions or concerns.  You can not have any live virus vaccines prior to or during treatment or up to 6 months post infusion.  If you have an upcoming surgery, medical procedure or dental procedure during treatment, this should be discussed with your ordering physician and your surgeon/dentist.  If you are having any concerning symptom, if you are unsure if you should get your next infusion or wish to speak to a provider before your next infusion, please call your care coordinator or triage nurse at your clinic to notify them so we can adequately serve you.     We look forward in seeing you on your next appointment here at [facility] ([facility]).  Please don t hesitate to call us at [phone] to reschedule any of your appointments or to speak with one of the [facility] registered nurses.  It was a pleasure taking care of you today.    Sincerely,    [facility]  [facility]  [address] [place] [address]  [id number]  Phone:  [phone]

## 2022-07-13 ENCOUNTER — TELEPHONE (OUTPATIENT)
Dept: RHEUMATOLOGY | Facility: CLINIC | Age: 47
End: 2022-07-13

## 2022-07-13 NOTE — TELEPHONE ENCOUNTER
----- Message from Oswaldo Lemus MD sent at 7/12/2022 11:30 AM CDT -----  Regarding: FW: treatment plan  Please detele bendrayl from premeds. Thanks.  ----- Message -----  From: Kaley Handley RN  Sent: 7/12/2022  10:25 AM CDT  To: Oswaldo Lemus MD  Subject: treatment plan                                   Laxmi is requesting premed: benadryl be permanently removed from her treatment plan.  I also encouraged her to follow-up with you.    Kaley Handley  [facility]

## 2022-07-13 NOTE — TELEPHONE ENCOUNTER
Benadryl orders removed from premedication list for Tocilizumab infusion plan per Dr. Lemus's request.   Kaley Serna RN  [facility]

## 2022-07-13 NOTE — RESULT ENCOUNTER NOTE
Kidney function is normal. Urine is clear. Liver function is normal. Cholesterol is mildly abnormal. Albumin is mildly decreased, which could be related to nutrition or to smoldering inflammation.  I recommend rechecking hepatic panel in about 1 month.  It was a pleasure seeing you in clinic. Please let me know if you have questions.    Sincerely,    Oswaldo Lemus MD  Rheumatologist, [facility]

## 2022-07-24 NOTE — PROGRESS NOTES
Division of Infectious Diseases and International Medicine  Department of Medicine        INFECTIOUS DISEASE CLINIC OUTPATIENT CONSULTATION NOTE [facility] [address]  [address] [place] [address]  Office: [phone]  Fax:  [phone]     HISTORY OF PRESENT ILLNESS:  Ms. Laxmi Ya is a 47-year-old woman with a complex medical history who comes to Infectious Disease Clinic today for consultation regarding the question of whether she has an underlying immunodecifiency that might help explain what she describes as a lifelong problem with infections.  She has seen other Infectious Disease physicians in the past over the past year and a quarter at [facility] in [place] and at [facility] in [place].  Some of those records are inaccessible to me because [facility] authorization has not been provided by the patient.  She also has been followed in recent months by Dr. Haley Barber at [facility] for a diagnosis of SIBO (small intestinal bacterial overgrowth), but the referral to [facility] triggering today's appointment was instead apparently submitted (per the referral form) by Dr. Amol Juares, Gastroenterologist at [facility], so it appears she has been seeing a number of sub-specialty providers over the past year.  I lack records from both those gastroenterology providers, as well.  She is also followed at [facility] for recurrent sinusitides for which, she tells me, she received Kenalog injections there about every four months (I lack records of that).  She has also seen several rheumatologists in the past, it appears, including at [facility] and Dr. Lemus at [facility] on 6/17/22.  She was seen in [facility] late last week by Dr. Petit (6/30/22) who injected both of her wrists with steroid injections.  Ms. Ya says that she is in the process of switching her overall care from [facility]  "to [facility] (because it is closer to her home).    She tells me that she has been suffering from various types of Gram positive and Gram negative infections her \"whole life\" and therefore she is greatly concerned that her immune system has been somehow disordered for her whole life.  She feels she has a compromised ability to fight off infections.  She is a somewhat excursive historian, so a linear history was a bit difficult to obtain, but some of her problems over the years have included chronic bilateral wrist tenosynovitis, other chronic \"joint inflammations\" which over the past year or so have worsened to the point of \"going through the roof\", recurrent UTIs, recurrent sinusitides and ear infections for many years, skin problems, and chronic abdominal pains that wax and wane.  She says she had a left hemicolectomy twelve years ago to treat chronic lower left abdominal pain and that worked to resolve the pain for about a half decade.  After which she required another colonic surgery.  She says she has been diagnosed with recurrent C difficile infection (no documentation of positive tests is in our chart) with the past year which became resistant to oral vancomycin with the recurrence and therefore required resorting to treatment with fidaxomicin (Dificid) with good response.  Since then, she has been prescribed intermittent courses of fidaxomicin by her [facility] physician, Dr. Barber.  For a long time, she says, she did not have explanations for repeatedly being sick and having chronic pain, but then in 12/2021 she underwent a colonoscopy at [facility] where \"they finally found something\" and she apparently was at that point diagnosed with SIBO as well as \"inflammed diverticulitis\" (perhaps small intestinal diverticulitis?).  (I lack records from that colonoscopy.)  She says that she has been told this potentially explains some of her chronic difficulties because \"my gut microbiome is off\".  She " "believes that when the SIBO (and / or diverticulitis) flares she develops a recurrence of her abdominal pains and that is also associated with increased arthralgias of multiple joints (especially the wrists), fatigue, high fevers, increased malaise / fatigue, and increased propensity to URI symptoms.  For the SIBO, she has recently had chronic prescriptions of rifaximin (Xifaxan) through Dr. Barber which she says keeps it under control most of the time -- without frequent courses of rifaximin, she says, \"I do not know if I would still be alive\".  When she has a flare of her Gi and other joint and constitutional symptoms despite the rifaximin, then she has been given another course of fidaxomicin -- I have the impression from her description that she has received several courses of fidaxomicin in the course of the past year, but I am uncertain (due a non-lineal chronology) how many or how recently she last received that medication.  Apparently, the rationale for the repeated fidaxomicin is that she has recurrent episodes of C difficile infection, so she is wondering if she is a candidate for a fecal microbiota transplant.  At present, she does not have acute new symptoms today because she is on rifaximin and recently had an injection of her wrists, so today / this week she is feeling relatively good, although never knows how long that will last.  Today, she lacks recent acute febrile or EENT symptoms, cough, dyspnea, chest pain, new GI symptoms,  symptoms, new acute skin concerns, or neurological symptoms.    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     Obesity      Pyelonephritis, unspecified 1992    abstract     Rheumatoid " arteritis (H)      Seronegative inflammatory arthritis 6/17/2022     Unspecified symptom associated with female genital organs 05/07/1999    chronic pelvic pain abstract     Past Surgical History:   Procedure Laterality Date     CHOLECYSTECTOMY       COLON SURGERY       CYSTOSCOPY,+URETEROSCOPY      abstract     ZZ EXCIS/CURET BENIGN ELBOW LESN  10/26/1987    (R) humerus abstract     ALLERGIES:  Allergies   Allergen Reactions     Polyethylene Glycol Rash     Codeine      Other reaction(s): Gastrointestinal, GI intolerance, Vomiting  Vomiting       Gadolinium Dizziness and Nausea     Hydrocodone-Acetaminophen Nausea and Vomiting     Other reaction(s): GI intolerance     Iodine      abstract     Sulfasalazine      Other reaction(s): GI intolerance  Has tried and had nausa.     Tramadol Nausea and Vomiting     Other reaction(s): Gastrointestinal, Other (see comments)  Nausea and vomiting   unknown       Gluten Meal Other (See Comments) and Rash     Other reaction(s): Gastrointestinal, GI intolerance  Dizziness, tired, rash, stomach cramps, thrush, mouth sores  Dizziness, tired, rash, stomach cramps, thrush, mouth sores       MEDICATIONS:  Current Outpatient Medications   Medication Sig Dispense Refill     Abatacept (ORENCIA IV) Inject into the vein every 14 days       Albuterol Sulfate (PROAIR HFA IN) Inhale into the lungs as needed        Artificial Tear Solution (SOOTHE XP) SOLN as needed       atenolol (TENORMIN) 50 MG tablet Take 50 mg by mouth daily       benzoyl peroxide 5 % external liquid Use daily as directed 148 mL 3     clindamycin (CLEOCIN T) 1 % external lotion Apply topically 2 times daily 60 mL 3     diclofenac (VOLTAREN) 1 % topical gel Apply topically 4 times daily as needed        fexofenadine (ALLEGRA) 180 MG tablet Take 1 tablet (180 mg) by mouth daily 90 tablet 2     fidaxomicin (DIFICID) 200 MG tablet Take 200 mg by mouth       fluticasone (FLONASE) 50 MCG/ACT nasal spray Spray 2 sprays into  both nostrils 2 times daily        lidocaine (LIDODERM) 5 % patch as needed   1     losartan (COZAAR) 100 MG tablet Take 100 mg by mouth daily       methylPREDNISolone (MEDROL) 4 MG tablet Take 3 tablets daily for 7 days, then 2 tablets daily for 7 days, then off. 35 tablet 1     Montelukast Sodium (SINGULAIR PO) Take 10 mg by mouth At Bedtime        nystatin (MYCOSTATIN) 768023 UNIT/ML suspension TAKE 5ML BY MOUTH 4XDAILY FOR 14 DAYS. CONTINUE AT LEAST 2 DAYS AFTER SYMPTOMS RESOLVED       olopatadine (PAZEO) 0.7 % ophthalmic solution Apply 2 Units to eye 2 times daily       OMEPRAZOLE PO Take 40 mg by mouth 2 times daily        rifaximin (XIFAXAN) 550 MG TABS tablet Take 200 mg by mouth 3 times daily Take for 10 days, has on had as needed for small bowel bacteria overgrowh       spironolactone-HCTZ (ALDACTAZIDE) 25-25 MG tablet Take 1 tablet by mouth daily       sucralfate (CARAFATE) 1 GM/10ML suspension Take by mouth 4 times daily as needed        tobramycin (TOBREX) 0.3 % ophthalmic solution 1-2 drops 2 times daily       tobramycin-dexamethasone (TOBRADEX) 0.3-0.1 % ophthalmic ointment 0.25 inches At Bedtime       triamcinolone (KENALOG) 0.1 % external ointment Apply topically 2 times daily 80 g 1     Dupilumab (DUPIXENT) 300 MG/2ML SOPN Inject 300 mg Subcutaneous every 14 days (Patient not taking: No sig reported) 4 mL 2     metFORMIN (GLUCOPHAGE-XR) 500 MG 24 hr tablet Take 500 mg by mouth daily        norethindrone (MICRONOR) 0.35 MG tablet Take 0.35 mg by mouth       SOCIAL HISTORY:  Social History     Socioeconomic History     Marital status: Single     Spouse name: Not on file     Number of children: Not on file     Years of education: Not on file     Highest education level: Not on file   Occupational History     Not on file   Tobacco Use     Smoking status: Never Smoker     Smokeless tobacco: Never Used   Substance and Sexual Activity     Alcohol use: Yes     Drug use: No     Sexual activity: Not on file  "  Other Topics Concern      Service Not Asked     Blood Transfusions Not Asked     Caffeine Concern Not Asked     Occupational Exposure Not Asked     Hobby Hazards Not Asked     Sleep Concern Not Asked     Stress Concern Not Asked     Weight Concern Not Asked     Special Diet Not Asked     Back Care Not Asked     Exercise No     Bike Helmet Not Asked     Seat Belt No     Self-Exams No   Social History Narrative    Womens Health Kit given.         Social Determinants of Health     Financial Resource Strain: Not on file   Food Insecurity: Not on file   Transportation Needs: Not on file   Physical Activity: Not on file   Stress: Not on file   Social Connections: Not on file   Intimate Partner Violence: Not on file   Housing Stability: Not on file   She has been disabled and unable to work since 4/18 from her prior (much enjoyed) work as a  with [employer].  She is apparently now in the process of converting that disability to Social Security disability.    FAMILY HISTORY:  Family History   Problem Relation Age of Onset     Hypertension Father         abstract     Cancer Paternal Aunt         gallbladder cancer abstract     REVIEW OF SYSTEMS:  As per HPI.    PHYSICAL EXAMINATION:  General:  A pleasant, conversant, obese, but otherwise WDWN and healthy-appearing, mildly anxious, 47-year-old woman in NAD.  Vital signs:  /81   Pulse 86   Temp 97.6  F (36.4  C) (Oral)   Ht 1.676 m (5' 5.98\")   Wt 95.9 kg (211 lb 8 oz)   SpO2 97%   BMI 34.16 kg/m   .  Skin:  No acute visible rash or lesion.  Head/Neck:  NCAT.  External auditory canals are patent without discharge.  PERRL.  EOMI.  Conjunctivae are pink without injection.  Sclerae are white.  No anterior oral lesion.  Neck is supple without apparent lymphadenopathy or thyromegaly.  Lungs:  Bilaterally clear to auscultation.  CV:  RRR.  Abdomen:  Bowel sounds active, soft, obese, mild diffuse generalized tenderness to firm palpation without " rebound or guarding,  no hepatosplenomegaly by percussion.  Extremities:  Distally warm, no upper extremity edema.  Neuro:  Alert, oriented, memory/cognition/affect are normal, gait is normal.    LABORATORY STUDIES (Past results reviewed with the patient during her appointment today):      CD3% Total T Cells 07/06/2022 51  49 - 84 % Final     Absolute CD3, Total T Cells 07/06/2022 600 (A) 603 - 2,990 cells/uL Final     CD4% [place] T Cells 07/06/2022 35  28 - 63 % Final     Absolute CD4, [place] T Cells 07/06/2022 415 (A) 441 - 2,156 cells/uL Final     CD8% Suppressor T Cells 07/06/2022 22  10 - 40 % Final     Absolute CD8, Suppressor T Cells 07/06/2022 256  125 - 1,312 cells/uL Final     CD4:CD8 Ratio 07/06/2022 1.62  1.40 - 2.60 Final     Immunoglobulin A 07/06/2022 177  84 - 499 mg/dL Final     Immunoglobulin G 07/06/2022 834  610 - 1,616 mg/dL Final     Immunoglobulin M 07/06/2022 135  35 - 242 mg/dL Final     WBC Count 07/06/2022 18.0 (A) 4.0 - 11.0 10e3/uL Final     RBC Count 07/06/2022 5.52 (A) 3.80 - 5.20 10e6/uL Final     Hemoglobin 07/06/2022 15.0  11.7 - 15.7 g/dL Final     Hematocrit 07/06/2022 45.7  35.0 - 47.0 % Final     MCV 07/06/2022 83  78 - 100 fL Final     MCH 07/06/2022 27.2  26.5 - 33.0 pg Final     MCHC 07/06/2022 32.8  31.5 - 36.5 g/dL Final     RDW 07/06/2022 14.6  10.0 - 15.0 % Final     Platelet Count 07/06/2022 488 (A) 150 - 450 10e3/uL Final     % Neutrophils 07/06/2022 91  % Final     % Lymphocytes 07/06/2022 6  % Final     % Monocytes 07/06/2022 2  % Final     % Eosinophils 07/06/2022 0  % Final     % Basophils 07/06/2022 0  % Final     % Immature Granulocytes 07/06/2022 1  % Final     NRBCs per 100 WBC 07/06/2022 0  <1 /100 Final     Absolute Neutrophils 07/06/2022 16.3 (A) 1.6 - 8.3 10e3/uL Final     Absolute Lymphocytes 07/06/2022 1.1  0.8 - 5.3 10e3/uL Final     Absolute Monocytes 07/06/2022 0.3  0.0 - 1.3 10e3/uL Final     Absolute Eosinophils 07/06/2022 0.0  0.0 - 0.7 10e3/uL Final  "    Absolute Basophils 07/06/2022 0.1  0.0 - 0.2 10e3/uL Final     Absolute Immature Granulocytes 07/06/2022 0.2  <=0.4 10e3/uL Final     Absolute NRBCs 07/06/2022 0.0  10e3/uL Final     5/18/22 [facility] studies:  HIV / HCV / Treponema screens negative,  Cervical GC / Chlamydia / / Trichomonas / Mycoplasma / Ureaplasma NAATs negative.    2/15/22 ([place]) Hemoglobin A1C 6.2 %.      02/03/22  1116   NA  138   POTASSIUM  4.3   CHLORIDE  104   CO2  28   ANIONGAP  6   GLC  119*   BUN  9   CR  0.90   CARMEN  9.7      02/03/22  1048   PROTTOTAL 7.6*   ALBUMIN 3.8*   BILITOTAL 0.7   ALKPHOS 68   AST 35   ALT 64   INR   1.03    IMPRESSION/PLAN:  A very pleasant 47 year old woman with a very complicated and difficult to fully understand past and ongoing medical history including multiple ongoing chronic organ system complaints who is seen in consultation today in Infectious Disease Clinic for evaluation of the question of \"why\" she has had so many various infections in her life and whether she has an underlying immunodeficiency.  I am hampered in evaluating her today by the absence of key records from [facility] and [facility], as well, apparently some other health systems / clinics, since some CareEverywhere consents have not been completed and because some are not availlable through CareEverywhere.  Over a number of years, she has been seen by a wide number of different practices and health systems, it appears.  I am also hampered by the complexity of her story and a by her tendency (to some degree apparently out of frustration and perplexity with her health) to skip around chronologically and to provide speculative self-diagnoses.  In the absence of additional records, for example, I am having some difficulty understanding the source and basis for her reported diagnosis of SIBO, for the the presumption of recurrent C difficile infections, for the chronic use of rifaximin (although that is sometimes given as " a primary therapy for SIBO, I realize) and for (reportedly) recurrent courses of fidaxomicin.  I told her that with the information I have available at hand at present, I am having difficulty connecting her diagnosis of SIBO with her multiple other chronic issues including her propensity to sinus / ear / URI infections, her reported rheumatoid arthritis and waxing / waning arthralgias, her self-reported propensity to attacks of fever with other threatening constitutional symptoms, and the waxing / waning nature of her abdominal discomforts.  While awaiting further background information, I attempted to preliminarily address two issues today:  1. Would she be a candidate for fecal microbiota transplant?:  I told her that I would need to see records of the history of her C difficile infections and their treatment before considering if FMT might be appropriate.  I told her in addition that I believe that a history of chronic, ongoing rifaximin use (or frequently repeated courses of that) would probably be an exclusion from receiving FMT.  (She said that she did not think she could live without taking rifaximin because it has been so beneficial.)  2. Does she have an underlying immunodeficiency?:  Although it is difficult to say for sure from the information presently available, her pattern of symptoms and infections does not readily point to a particular likely immunodeficiency.  Past studies including repeated CBC with differentials are not suggestive for any cellular abnormality.  We will perform a basic immunodeficiency work-up, however, by obtaining T-cell subsets and serum immunoglobulins.    Beyond those current points, I will request records from Dr. Barber at [facility], will attempt to obtain [facility]where access to notes and study records from [facility], and will attempt to review her chart in more detail (including records from [facility] going further back and available Rheumatology  records) to try to clarify her history, looking for patterns that might bring some of her varied complaints and past health experiences together more cohesively.  I told her I would contact her if the immunodeficiency screen turns up any suggestive features and that I would like her to return for a follow-up appointment with me in ID Clinic in three months, but which time I hope to have more coherent information in hand.  While understandably frustrated with the inability to obtain answers more quickly, she seemed to understand this plan today.

## 2022-07-28 ENCOUNTER — TELEPHONE (OUTPATIENT)
Dept: ALLERGY | Facility: CLINIC | Age: 47
End: 2022-07-28

## 2022-07-28 ENCOUNTER — THERAPY VISIT (OUTPATIENT)
Dept: PHYSICAL THERAPY | Facility: CLINIC | Age: 47
End: 2022-07-28
Payer: MEDICARE

## 2022-07-28 DIAGNOSIS — M54.2 NECK PAIN: Primary | ICD-10-CM

## 2022-07-28 PROCEDURE — 97012 MECHANICAL TRACTION THERAPY: CPT | Mod: GP | Performed by: PHYSICAL THERAPIST

## 2022-07-28 NOTE — TELEPHONE ENCOUNTER
Patient called wanting to know if appropriate to see Dr. Rosado for Immunology. Patient has been referred to see an immunology provider due to recurrent infections by EDWIGE PETE and Dr. Gonzales (Infectious Disease). Patient has been see by [facility] and [facility] for previous workup.    Advised I will review with Dr. Rosado and get back to her.   Payton RN, BSN  07/28/22 3:57 PM

## 2022-07-29 NOTE — TELEPHONE ENCOUNTER
Discussed with Dr. Rosado who also reviewed the chart. Dr. Rosado recommends patient be seen by Dr. Omkar Moore at [facility]. I provided the phone number to call them [phone]. Patient was appreciative.   Payton RN, BSN  07/29/22 9:58 AM

## 2022-08-09 ENCOUNTER — INFUSION THERAPY VISIT (OUTPATIENT)
Dept: INFUSION THERAPY | Facility: CLINIC | Age: 47
End: 2022-08-09
Attending: INTERNAL MEDICINE
Payer: MEDICARE

## 2022-08-09 VITALS
RESPIRATION RATE: 16 BRPM | WEIGHT: 211.4 LBS | BODY MASS INDEX: 34.14 KG/M2 | HEART RATE: 66 BPM | SYSTOLIC BLOOD PRESSURE: 113 MMHG | OXYGEN SATURATION: 97 % | TEMPERATURE: 98.4 F | DIASTOLIC BLOOD PRESSURE: 77 MMHG

## 2022-08-09 DIAGNOSIS — M13.80 SERONEGATIVE INFLAMMATORY ARTHRITIS: Primary | ICD-10-CM

## 2022-08-09 DIAGNOSIS — M13.0 SERONEGATIVE POLYARTHRITIS: ICD-10-CM

## 2022-08-09 PROCEDURE — 999N000127 HC STATISTIC PERIPHERAL IV START W US GUIDANCE

## 2022-08-09 PROCEDURE — 250N000013 HC RX MED GY IP 250 OP 250 PS 637: Performed by: INTERNAL MEDICINE

## 2022-08-09 PROCEDURE — 96365 THER/PROPH/DIAG IV INF INIT: CPT

## 2022-08-09 PROCEDURE — 96375 TX/PRO/DX INJ NEW DRUG ADDON: CPT

## 2022-08-09 PROCEDURE — 258N000003 HC RX IP 258 OP 636: Performed by: INTERNAL MEDICINE

## 2022-08-09 PROCEDURE — 250N000011 HC RX IP 250 OP 636: Performed by: INTERNAL MEDICINE

## 2022-08-09 RX ORDER — ALBUTEROL SULFATE 90 UG/1
1-2 AEROSOL, METERED RESPIRATORY (INHALATION)
Status: CANCELLED
Start: 2022-09-06

## 2022-08-09 RX ORDER — HEPARIN SODIUM,PORCINE 10 UNIT/ML
5 VIAL (ML) INTRAVENOUS
Status: CANCELLED | OUTPATIENT
Start: 2022-09-06

## 2022-08-09 RX ORDER — ALBUTEROL SULFATE 0.83 MG/ML
2.5 SOLUTION RESPIRATORY (INHALATION)
Status: CANCELLED | OUTPATIENT
Start: 2022-09-06

## 2022-08-09 RX ORDER — EPINEPHRINE 1 MG/ML
0.3 INJECTION, SOLUTION INTRAMUSCULAR; SUBCUTANEOUS EVERY 5 MIN PRN
Status: CANCELLED | OUTPATIENT
Start: 2022-09-06

## 2022-08-09 RX ORDER — DIPHENHYDRAMINE HYDROCHLORIDE 50 MG/ML
50 INJECTION INTRAMUSCULAR; INTRAVENOUS
Status: CANCELLED
Start: 2022-09-06

## 2022-08-09 RX ORDER — ACETAMINOPHEN 325 MG/1
650 TABLET ORAL ONCE
Status: COMPLETED | OUTPATIENT
Start: 2022-08-09 | End: 2022-08-09

## 2022-08-09 RX ORDER — METHYLPREDNISOLONE SODIUM SUCCINATE 125 MG/2ML
125 INJECTION, POWDER, LYOPHILIZED, FOR SOLUTION INTRAMUSCULAR; INTRAVENOUS ONCE
Status: COMPLETED | OUTPATIENT
Start: 2022-08-09 | End: 2022-08-09

## 2022-08-09 RX ORDER — MEPERIDINE HYDROCHLORIDE 25 MG/ML
25 INJECTION INTRAMUSCULAR; INTRAVENOUS; SUBCUTANEOUS EVERY 30 MIN PRN
Status: CANCELLED | OUTPATIENT
Start: 2022-09-06

## 2022-08-09 RX ORDER — NALOXONE HYDROCHLORIDE 0.4 MG/ML
0.2 INJECTION, SOLUTION INTRAMUSCULAR; INTRAVENOUS; SUBCUTANEOUS
Status: CANCELLED | OUTPATIENT
Start: 2022-09-06

## 2022-08-09 RX ORDER — HEPARIN SODIUM (PORCINE) LOCK FLUSH IV SOLN 100 UNIT/ML 100 UNIT/ML
5 SOLUTION INTRAVENOUS
Status: CANCELLED | OUTPATIENT
Start: 2022-09-06

## 2022-08-09 RX ORDER — ACETAMINOPHEN 325 MG/1
650 TABLET ORAL ONCE
Status: CANCELLED | OUTPATIENT
Start: 2022-09-06

## 2022-08-09 RX ORDER — METHYLPREDNISOLONE SODIUM SUCCINATE 125 MG/2ML
125 INJECTION, POWDER, LYOPHILIZED, FOR SOLUTION INTRAMUSCULAR; INTRAVENOUS
Status: CANCELLED
Start: 2022-09-06

## 2022-08-09 RX ORDER — METHYLPREDNISOLONE SODIUM SUCCINATE 125 MG/2ML
125 INJECTION, POWDER, LYOPHILIZED, FOR SOLUTION INTRAMUSCULAR; INTRAVENOUS ONCE
Status: CANCELLED | OUTPATIENT
Start: 2022-09-06

## 2022-08-09 RX ADMIN — METHYLPREDNISOLONE SODIUM SUCCINATE 125 MG: 125 INJECTION, POWDER, FOR SOLUTION INTRAMUSCULAR; INTRAVENOUS at 14:44

## 2022-08-09 RX ADMIN — ACETAMINOPHEN 650 MG: 325 TABLET ORAL at 14:26

## 2022-08-09 RX ADMIN — TOCILIZUMAB 400 MG: 20 INJECTION, SOLUTION, CONCENTRATE INTRAVENOUS at 15:00

## 2022-08-09 NOTE — LETTER
8/9/2022         RE: Laxmi Ya  [address] [place] [id number]        Dear Colleague,    Thank you for referring your patient, Laxmi Ya, to the [facility]. Please see a copy of my visit note below.    Infusion Nursing Note:  Laxmi Ya presents today for Actemra.    Patient seen by provider today: No   present during visit today: Not Applicable.    Note:   -Premedications: Tylenol, Solu-Medrol  -Actemra infused over ~ 1hr  -250 mL of NS infused post Actemra per pt request.    Intravenous Access:  [facility]    Treatment Conditions:  Biological Infusion Checklist:  ~~~ NOTE: If the patient answers yes to any of the questions below, hold the infusion and contact ordering provider or on-call provider.    1. Have you recently had an elevated temperature, fever, chills, productive cough, coughing for 3 weeks or longer or hemoptysis, abnormal vital signs, night sweats,  chest pain or have you noticed a decrease in your appetite, unexplained weight loss or fatigue? No  2. Do you have any open wounds or new incisions? No  3. Do you have any recent or upcoming hospitalizations, surgeries or dental procedures? No  4. Do you currently have or recently have had any signs of illness or infection or are you on any antibiotics? No  5. Have you had any new, sudden or worsening abdominal pain? No  6. Have you or anyone in your household received a live vaccination in the past 4 weeks? Please note:  No live vaccines while on biologic/chemotherapy until 6 months after the last treatment.  Patient can receive the flu vaccine (shot only) and the pneumovax.  It is optimal for the patient to get these vaccines mid cycle, but they can be given at any time as long as it is not on the day of the infusion. No  7. Have you recently been diagnosed with any new nervous system diseases (ie. Multiple sclerosis, Guillain [place], seizures, neurological  changes) or cancer diagnosis? No  8. Are you on any form of radiation or chemotherapy? No  9. Are you pregnant or breast feeding or do you have plans of pregnancy in the future? No  10. Have you been having any signs of worsening depression or suicidal ideations?  (benlysta only) No  11. Have there been any other new onset medical symptoms? No  Post Infusion Assessment:  Patient tolerated infusion without incident.  Site patent and intact, free from redness, edema or discomfort.  No evidence of extravasations.  Access discontinued per protocol.  Biologic Infusion Post Education: Call the triage nurse at your clinic or seek medical attention if you have chills and/or temperature greater than or equal to 100.5, uncontrolled nausea/vomiting, diarrhea, constipation, dizziness, shortness of breath, chest pain, heart palpitations, weakness or any other new or concerning symptoms, questions or concerns.  You cannot have any live virus vaccines prior to or during treatment or up to 6 months post infusion.  If you have an upcoming surgery, medical procedure or dental procedure during treatment, this should be discussed with your ordering physician and your surgeon/dentist.  If you are having any concerning symptom, if you are unsure if you should get your next infusion or wish to speak to a provider before your next infusion, please call your care coordinator or triage nurse at your clinic to notify them so we can adequately serve you.     Discharge Plan:   [facility]  Patient will return 9/6/22 for next appointment.   Patient discharged in stable condition accompanied by: self.  Departure Mode: Ambulatory.    Marla Mosqueda RN    /77 (BP Location: Left arm)   Pulse 69   Temp 98.4  F (36.9  C) (Oral)   Resp 16   Wt 95.9 kg (211 lb 6.4 oz)   SpO2 97%   BMI 34.14 kg/m      Administrations This Visit     acetaminophen (TYLENOL) tablet 650 mg     Admin Date  08/09/2022 Action  Given Dose  650 mg  Route  Oral Administered By  Marla Mosqueda, RN          methylPREDNISolone sodium succinate (solu-MEDROL) injection 125 mg     Admin Date  08/09/2022 Action  Given Dose  125 mg Route  Intravenous Administered By  Marla Mosqueda, RN          tocilizumab (ACTEMRA) 400 mg in sodium chloride 0.9 % 130 mL infusion     Admin Date  08/09/2022 Action  New Bag Dose  400 mg Rate  130 mL/hr Route  Intravenous Administered By  Marla Mosqueda, RN                                  Again, thank you for allowing me to participate in the care of your patient.        Sincerely,        [facility]

## 2022-08-09 NOTE — PATIENT INSTRUCTIONS
Dear Laxmi Ya    Thank you for choosing [facility] for your infusion.  The following information is a summary of our appointment as well as important reminders.      We look forward in seeing you on your next appointment here at [facility]).  Please don t hesitate to call us at [phone] to reschedule any of your appointments or to speak with one of the [facility] registered nurses.  It was a pleasure taking care of you today.    Sincerely,    [facility]  [facility]  [address] [place] [address]  [id number]  Phone:  [phone]

## 2022-08-09 NOTE — PROGRESS NOTES
Infusion Nursing Note:  Laxmi Ya presents today for Actemra.    Patient seen by provider today: No   present during visit today: Not Applicable.    Note:   -Premedications: Tylenol, Solu-Medrol  -Actemra infused over ~ 1hr  -250 mL of NS infused post Actemra per pt request.    Intravenous Access:  [facility]    Treatment Conditions:  Biological Infusion Checklist:  ~~~ NOTE: If the patient answers yes to any of the questions below, hold the infusion and contact ordering provider or on-call provider.    1. Have you recently had an elevated temperature, fever, chills, productive cough, coughing for 3 weeks or longer or hemoptysis, abnormal vital signs, night sweats,  chest pain or have you noticed a decrease in your appetite, unexplained weight loss or fatigue? No  2. Do you have any open wounds or new incisions? No  3. Do you have any recent or upcoming hospitalizations, surgeries or dental procedures? No  4. Do you currently have or recently have had any signs of illness or infection or are you on any antibiotics? No  5. Have you had any new, sudden or worsening abdominal pain? No  6. Have you or anyone in your household received a live vaccination in the past 4 weeks? Please note:  No live vaccines while on biologic/chemotherapy until 6 months after the last treatment.  Patient can receive the flu vaccine (shot only) and the pneumovax.  It is optimal for the patient to get these vaccines mid cycle, but they can be given at any time as long as it is not on the day of the infusion. No  7. Have you recently been diagnosed with any new nervous system diseases (ie. Multiple sclerosis, Guillain [place], seizures, neurological changes) or cancer diagnosis? No  8. Are you on any form of radiation or chemotherapy? No  9. Are you pregnant or breast feeding or do you have plans of pregnancy in the future? No  10. Have you been having any signs of worsening depression or suicidal ideations?   (benlysta only) No  11. Have there been any other new onset medical symptoms? No  Post Infusion Assessment:  Patient tolerated infusion without incident.  Site patent and intact, free from redness, edema or discomfort.  No evidence of extravasations.  Access discontinued per protocol.  Biologic Infusion Post Education: Call the triage nurse at your clinic or seek medical attention if you have chills and/or temperature greater than or equal to 100.5, uncontrolled nausea/vomiting, diarrhea, constipation, dizziness, shortness of breath, chest pain, heart palpitations, weakness or any other new or concerning symptoms, questions or concerns.  You cannot have any live virus vaccines prior to or during treatment or up to 6 months post infusion.  If you have an upcoming surgery, medical procedure or dental procedure during treatment, this should be discussed with your ordering physician and your surgeon/dentist.  If you are having any concerning symptom, if you are unsure if you should get your next infusion or wish to speak to a provider before your next infusion, please call your care coordinator or triage nurse at your clinic to notify them so we can adequately serve you.     Discharge Plan:   [facility]  Patient will return 9/6/22 for next appointment.   Patient discharged in stable condition accompanied by: self.  Departure Mode: Ambulatory.    Marla Mosqueda RN    /77 (BP Location: Left arm)   Pulse 69   Temp 98.4  F (36.9  C) (Oral)   Resp 16   Wt 95.9 kg (211 lb 6.4 oz)   SpO2 97%   BMI 34.14 kg/m      Administrations This Visit     acetaminophen (TYLENOL) tablet 650 mg     Admin Date  08/09/2022 Action  Given Dose  650 mg Route  Oral Administered By  Marla Mosqueda RN          methylPREDNISolone sodium succinate (solu-MEDROL) injection 125 mg     Admin Date  08/09/2022 Action  Given Dose  125 mg Route  Intravenous Administered By  Marla Mosqueda, RN          tocilizumab  (ACTEMRA) 400 mg in sodium chloride 0.9 % 130 mL infusion     Admin Date  08/09/2022 Action  New Bag Dose  400 mg Rate  130 mL/hr Route  Intravenous Administered By  Marla Mosqueda, RN

## 2022-08-10 NOTE — TELEPHONE ENCOUNTER
Pt requesting a steroid burst. Forwarded to Dr Lemus's Rheumatology team for follow-up.    CLEMENTINA Doty, RN  ealth Refill Team

## 2022-08-11 RX ORDER — METHYLPREDNISOLONE 4 MG/1
TABLET ORAL
Qty: 35 TABLET | Refills: 1 | Status: SHIPPED | OUTPATIENT
Start: 2022-08-11 | End: 2022-10-07

## 2022-08-11 NOTE — TELEPHONE ENCOUNTER
"Return call placed to patient, who reports she has pain and stiffness in her shoulders, rib cage, hips, wrists and hands which has gradually increased since finishing a Medrol taper three weeks ago. States pain was 5/10 at end of taper and is now 8/10. She reports difficult completing ADLs such as bathing and brushing her teeth due to \"inflammation everywhere.\" States that acetaminophen is not effective and she is unable to take NSAIDs because of GI effects. She has received tocilizumab (Actemra)  Infusions on 7/12/22 and 8/9/22. Explained that Dr. Lemus will be sent above information and she will be updated with his response.   Kaley Serna RN  [facility]ult [facility]ology [facility]     "

## 2022-08-11 NOTE — TELEPHONE ENCOUNTER
[facility] Call Center    Phone Message    May a detailed message be left on voicemail: yes     Pt is returning Kaley's call; writer unable to get a hold of Kaley.    Pt refused to have writer take down the information that Kaley was asking for  via the [employer] message.  Pt states she rather wait and discuss it with Kaley.    Please contact the Pt back.

## 2022-08-11 NOTE — TELEPHONE ENCOUNTER
Placed call to patient to assess for flare symptoms after patient requested a refill of Medrol burst. Left message requesting she respond to MyChart message or return call to clinic.  Kaley Serna RN  Adult Rheumatology Clinic

## 2022-08-20 ENCOUNTER — HEALTH MAINTENANCE LETTER (OUTPATIENT)
Age: 47
End: 2022-08-20

## 2022-08-24 ENCOUNTER — PRE VISIT (OUTPATIENT)
Dept: OTOLARYNGOLOGY | Facility: CLINIC | Age: 47
End: 2022-08-24

## 2022-09-01 ENCOUNTER — THERAPY VISIT (OUTPATIENT)
Dept: PHYSICAL THERAPY | Facility: CLINIC | Age: 47
End: 2022-09-01
Payer: MEDICARE

## 2022-09-01 DIAGNOSIS — M54.6 BILATERAL THORACIC BACK PAIN: ICD-10-CM

## 2022-09-01 DIAGNOSIS — M54.2 NECK PAIN: Primary | ICD-10-CM

## 2022-09-01 PROCEDURE — 97012 MECHANICAL TRACTION THERAPY: CPT | Mod: GP | Performed by: PHYSICAL THERAPIST

## 2022-09-01 PROCEDURE — 97140 MANUAL THERAPY 1/> REGIONS: CPT | Mod: GP | Performed by: PHYSICAL THERAPIST

## 2022-09-02 NOTE — PROGRESS NOTES
Subjective:  HPI  Physical Exam                    Objective:  System    Physical Exam    General     ROS    Assessment/Plan:    PROGRESS  REPORT    Progress reporting period is from 09/01/22.       SUBJECTIVE  Subjective changes noted by patient:  Subjective: 10 min late. Had cataract surgery on 08/17/22 so has been waiting to schedule PT. Having HA but not as frequent. Still pain in mid-upper back.Due for infusion neck week.Still very debilitated in functional mobility. Has a cervical traction unit at home but reports not as effective as in clinic.    Current pain level is 7/10 Current Pain level: 9/10.     Previous pain level was   Initial Pain level: 10/10.   Changes in function:  Yes (See Goal flowsheet attached for changes in current functional level)  Adverse reaction to treatment or activity: activity - any type of exercise    OBJECTIVE  Changes noted in objective findings:    Objective: TTP and tightness noted throughout B thoracic paraspinals and upper traps. Hypomobility T4-12, CROM flexion min loss, extension mod loss, B SB mod loss, B rotation mod loss. Very weak throughout mid-scapular musculature.     ASSESSMENT/PLAN  Updated problem list and treatment plan: Diagnosis 1:  Neck/upper back pain  Pain -  hot/cold therapy, mechanical traction, manual therapy, splint/taping/bracing/orthotics, self management, education, directional preference exercise and home program  Decreased ROM/flexibility - manual therapy and therapeutic exercise  Decreased joint mobility - manual therapy and therapeutic exercise  Decreased strength - therapeutic exercise and therapeutic activities  Decreased proprioception - neuro re-education and therapeutic activities  Inflammation - self management/home program  Impaired muscle performance - neuro re-education  Decreased function - therapeutic activities  Impaired posture - neuro re-education  STG/LTGs have been met or progress has been made towards goals:  Yes (See Goal flow  sheet completed today.)  Assessment of Progress: The patient's progress has slowed.  Self Management Plans:  Patient does not tolerate any exercise without major flare up for 2-3 days    Laxmi continues to require the following intervention to meet STG and LTG's:  PT    Recommendations:  This patient would benefit from continued therapy.     Frequency:  1 X week, once daily  Duration:  for 4 weeks tapering to 2 X a month over 8 weeks        Please refer to the daily flowsheet for treatment today, total treatment time and time spent performing 1:1 timed codes.

## 2022-09-02 NOTE — PROGRESS NOTES
[facility]    OUTPATIENT Physical Therapy ORTHOPEDIC EVALUATION  PLAN OF TREATMENT FOR OUTPATIENT REHABILITATION  (COMPLETE FOR INITIAL CLAIMS ONLY)  Patient's Last Name, First Name, M.I.  YOB: 1975  Laxmi Ya    Provider s Name:  [facility]   Medical Record No.  [id number]   Start of Care Date:  03/23/22   Onset Date:   03/14/22 (date of order)   Type:     _X__PT   ___OT Medical Diagnosis:    Encounter Diagnoses   Name Primary?    Neck pain Yes    Bilateral thoracic back pain         Treatment Diagnosis:  neck pain/traction         Goals:     09/01/22 0500   Body Part   Goals listed below are for neck/thoracic pain   Goal #1   Goal #1 driving/transportation   Previous Functional Level No restrictions   Current Functional Level Unable to rotate neck to look over shoulders   STG Target Performance Drive using side and rearview mirrors   Rationale for safe driving   Due date 05/12/22   Date Goal Met 06/02/22   LTG Target Performance Able to look over either shoulder    Rationale for safe driving   Due date 11/25/22   Goal #2   Goal #2 headaches   Previous Functional Level Headache frequency per week was   Performance Level 2   Current Functional Level Headache frequency per week is   Performance Level 2-3 week,7/10   STG Target Performance Decrease intensity of headaches to   Performance Level 5/10 or less   Rationale for full and safe concentration;to establish restorative sleep pattern;to improve quality of life and resume normal social activities   Due Date 09/30/22   LTG Target Performance Return to previous frequency of headaches which is   Performance Level 2x week or less   Rationale for full and safe concentration;to establish restorative sleep pattern;to improve quality of life and resume normal social activities   Due Date 11/25/22       Therapy  Frequency:  1x week for 4 weeks, then 2x month 8 weeks  Predicted Duration of Therapy Intervention:  8 weeks    Peng George, PT, OCS                 I CERTIFY THE NEED FOR THESE SERVICES FURNISHED UNDER        THIS PLAN OF TREATMENT AND WHILE UNDER MY CARE .           Physician Signature               Date    X_____________________________________________________                         Certification Date From:  09/17/22   Certification Date To:  12/09/22    Referring Provider:  Kimberlee Vasquez PA-C    Initial Assessment        See Epic Evaluation SOC Date: 03/23/22

## 2022-09-06 ENCOUNTER — INFUSION THERAPY VISIT (OUTPATIENT)
Dept: INFUSION THERAPY | Facility: CLINIC | Age: 47
End: 2022-09-06
Attending: INTERNAL MEDICINE
Payer: MEDICARE

## 2022-09-06 VITALS
HEART RATE: 70 BPM | TEMPERATURE: 98.1 F | WEIGHT: 210.2 LBS | DIASTOLIC BLOOD PRESSURE: 76 MMHG | OXYGEN SATURATION: 97 % | SYSTOLIC BLOOD PRESSURE: 115 MMHG | RESPIRATION RATE: 18 BRPM | BODY MASS INDEX: 33.95 KG/M2

## 2022-09-06 DIAGNOSIS — M13.80 SERONEGATIVE INFLAMMATORY ARTHRITIS: Primary | ICD-10-CM

## 2022-09-06 LAB
ALBUMIN SERPL BCG-MCNC: 4.4 G/DL (ref 3.5–5.2)
ALBUMIN UR-MCNC: NEGATIVE MG/DL
ALP SERPL-CCNC: 43 U/L (ref 35–104)
ALT SERPL W P-5'-P-CCNC: 38 U/L (ref 10–35)
ANION GAP SERPL CALCULATED.3IONS-SCNC: 13 MMOL/L (ref 7–15)
APPEARANCE UR: CLEAR
AST SERPL W P-5'-P-CCNC: 30 U/L (ref 10–35)
BASOPHILS # BLD AUTO: 0.1 10E3/UL (ref 0–0.2)
BASOPHILS NFR BLD AUTO: 1 %
BILIRUB SERPL-MCNC: 0.6 MG/DL
BILIRUB UR QL STRIP: NEGATIVE
BUN SERPL-MCNC: 11.3 MG/DL (ref 6–20)
CALCIUM SERPL-MCNC: 9.7 MG/DL (ref 8.6–10)
CHLORIDE SERPL-SCNC: 102 MMOL/L (ref 98–107)
CHOLEST SERPL-MCNC: 270 MG/DL
COLOR UR AUTO: YELLOW
CREAT SERPL-MCNC: 0.82 MG/DL (ref 0.51–0.95)
DEPRECATED HCO3 PLAS-SCNC: 23 MMOL/L (ref 22–29)
EOSINOPHIL # BLD AUTO: 0.7 10E3/UL (ref 0–0.7)
EOSINOPHIL NFR BLD AUTO: 6 %
ERYTHROCYTE [DISTWIDTH] IN BLOOD BY AUTOMATED COUNT: 14.3 % (ref 10–15)
GFR SERPL CREATININE-BSD FRML MDRD: 89 ML/MIN/1.73M2
GLUCOSE SERPL-MCNC: 108 MG/DL (ref 70–99)
GLUCOSE UR STRIP-MCNC: NEGATIVE MG/DL
HCT VFR BLD AUTO: 41.8 % (ref 35–47)
HDLC SERPL-MCNC: 51 MG/DL
HGB BLD-MCNC: 14.5 G/DL (ref 11.7–15.7)
HGB UR QL STRIP: NEGATIVE
IMM GRANULOCYTES # BLD: 0 10E3/UL
IMM GRANULOCYTES NFR BLD: 0 %
KETONES UR STRIP-MCNC: NEGATIVE MG/DL
LDLC SERPL CALC-MCNC: 161 MG/DL
LEUKOCYTE ESTERASE UR QL STRIP: NEGATIVE
LYMPHOCYTES # BLD AUTO: 2.4 10E3/UL (ref 0.8–5.3)
LYMPHOCYTES NFR BLD AUTO: 22 %
MCH RBC QN AUTO: 28.3 PG (ref 26.5–33)
MCHC RBC AUTO-ENTMCNC: 34.7 G/DL (ref 31.5–36.5)
MCV RBC AUTO: 82 FL (ref 78–100)
MONOCYTES # BLD AUTO: 1 10E3/UL (ref 0–1.3)
MONOCYTES NFR BLD AUTO: 10 %
NEUTROPHILS # BLD AUTO: 6.4 10E3/UL (ref 1.6–8.3)
NEUTROPHILS NFR BLD AUTO: 61 %
NITRATE UR QL: NEGATIVE
NONHDLC SERPL-MCNC: 219 MG/DL
NRBC # BLD AUTO: 0 10E3/UL
NRBC BLD AUTO-RTO: 0 /100
PH UR STRIP: 6 [PH] (ref 5–7)
PLATELET # BLD AUTO: 329 10E3/UL (ref 150–450)
POTASSIUM SERPL-SCNC: 3.9 MMOL/L (ref 3.4–5.3)
PROT SERPL-MCNC: 6.8 G/DL (ref 6.4–8.3)
RBC # BLD AUTO: 5.13 10E6/UL (ref 3.8–5.2)
RBC URINE: 0 /HPF
SODIUM SERPL-SCNC: 138 MMOL/L (ref 136–145)
SP GR UR STRIP: 1.01 (ref 1–1.03)
SQUAMOUS EPITHELIAL: <1 /HPF
TRIGL SERPL-MCNC: 292 MG/DL
UROBILINOGEN UR STRIP-MCNC: NORMAL MG/DL
WBC # BLD AUTO: 10.6 10E3/UL (ref 4–11)
WBC URINE: <1 /HPF

## 2022-09-06 PROCEDURE — 250N000013 HC RX MED GY IP 250 OP 250 PS 637: Performed by: INTERNAL MEDICINE

## 2022-09-06 PROCEDURE — 80053 COMPREHEN METABOLIC PANEL: CPT | Performed by: INTERNAL MEDICINE

## 2022-09-06 PROCEDURE — 85004 AUTOMATED DIFF WBC COUNT: CPT | Performed by: INTERNAL MEDICINE

## 2022-09-06 PROCEDURE — 36415 COLL VENOUS BLD VENIPUNCTURE: CPT | Performed by: INTERNAL MEDICINE

## 2022-09-06 PROCEDURE — 258N000003 HC RX IP 258 OP 636: Performed by: INTERNAL MEDICINE

## 2022-09-06 PROCEDURE — 999N000248 HC STATISTIC IV INSERT WITH US BY RN

## 2022-09-06 PROCEDURE — 81001 URINALYSIS AUTO W/SCOPE: CPT | Performed by: INTERNAL MEDICINE

## 2022-09-06 PROCEDURE — 96375 TX/PRO/DX INJ NEW DRUG ADDON: CPT

## 2022-09-06 PROCEDURE — 999N000285 HC STATISTIC VASC ACCESS LAB DRAW WITH PIV START

## 2022-09-06 PROCEDURE — 96365 THER/PROPH/DIAG IV INF INIT: CPT

## 2022-09-06 PROCEDURE — 80061 LIPID PANEL: CPT | Performed by: INTERNAL MEDICINE

## 2022-09-06 PROCEDURE — 250N000011 HC RX IP 250 OP 636: Performed by: INTERNAL MEDICINE

## 2022-09-06 RX ORDER — ALBUTEROL SULFATE 90 UG/1
1-2 AEROSOL, METERED RESPIRATORY (INHALATION)
Status: CANCELLED
Start: 2022-10-04

## 2022-09-06 RX ORDER — ACETAMINOPHEN 325 MG/1
650 TABLET ORAL ONCE
Status: COMPLETED | OUTPATIENT
Start: 2022-09-06 | End: 2022-09-06

## 2022-09-06 RX ORDER — EPINEPHRINE 1 MG/ML
0.3 INJECTION, SOLUTION INTRAMUSCULAR; SUBCUTANEOUS EVERY 5 MIN PRN
Status: CANCELLED | OUTPATIENT
Start: 2022-10-04

## 2022-09-06 RX ORDER — ALBUTEROL SULFATE 0.83 MG/ML
2.5 SOLUTION RESPIRATORY (INHALATION)
Status: CANCELLED | OUTPATIENT
Start: 2022-10-04

## 2022-09-06 RX ORDER — METHYLPREDNISOLONE SODIUM SUCCINATE 125 MG/2ML
125 INJECTION, POWDER, LYOPHILIZED, FOR SOLUTION INTRAMUSCULAR; INTRAVENOUS ONCE
Status: CANCELLED | OUTPATIENT
Start: 2022-10-04

## 2022-09-06 RX ORDER — NALOXONE HYDROCHLORIDE 0.4 MG/ML
0.2 INJECTION, SOLUTION INTRAMUSCULAR; INTRAVENOUS; SUBCUTANEOUS
Status: CANCELLED | OUTPATIENT
Start: 2022-10-04

## 2022-09-06 RX ORDER — METHYLPREDNISOLONE SODIUM SUCCINATE 125 MG/2ML
125 INJECTION, POWDER, LYOPHILIZED, FOR SOLUTION INTRAMUSCULAR; INTRAVENOUS ONCE
Status: COMPLETED | OUTPATIENT
Start: 2022-09-06 | End: 2022-09-06

## 2022-09-06 RX ORDER — METHYLPREDNISOLONE SODIUM SUCCINATE 125 MG/2ML
125 INJECTION, POWDER, LYOPHILIZED, FOR SOLUTION INTRAMUSCULAR; INTRAVENOUS
Status: CANCELLED
Start: 2022-10-04

## 2022-09-06 RX ORDER — DIPHENHYDRAMINE HYDROCHLORIDE 50 MG/ML
50 INJECTION INTRAMUSCULAR; INTRAVENOUS
Status: CANCELLED
Start: 2022-10-04

## 2022-09-06 RX ORDER — HEPARIN SODIUM (PORCINE) LOCK FLUSH IV SOLN 100 UNIT/ML 100 UNIT/ML
5 SOLUTION INTRAVENOUS
Status: CANCELLED | OUTPATIENT
Start: 2022-10-04

## 2022-09-06 RX ORDER — MEPERIDINE HYDROCHLORIDE 25 MG/ML
25 INJECTION INTRAMUSCULAR; INTRAVENOUS; SUBCUTANEOUS EVERY 30 MIN PRN
Status: CANCELLED | OUTPATIENT
Start: 2022-10-04

## 2022-09-06 RX ORDER — ACETAMINOPHEN 325 MG/1
650 TABLET ORAL ONCE
Status: CANCELLED | OUTPATIENT
Start: 2022-10-04

## 2022-09-06 RX ORDER — HEPARIN SODIUM,PORCINE 10 UNIT/ML
5 VIAL (ML) INTRAVENOUS
Status: CANCELLED | OUTPATIENT
Start: 2022-10-04

## 2022-09-06 RX ADMIN — METHYLPREDNISOLONE SODIUM SUCCINATE 125 MG: 125 INJECTION, POWDER, FOR SOLUTION INTRAMUSCULAR; INTRAVENOUS at 13:34

## 2022-09-06 RX ADMIN — TOCILIZUMAB 400 MG: 20 INJECTION, SOLUTION, CONCENTRATE INTRAVENOUS at 14:06

## 2022-09-06 RX ADMIN — SODIUM CHLORIDE 250 ML: 9 INJECTION, SOLUTION INTRAVENOUS at 14:06

## 2022-09-06 RX ADMIN — ACETAMINOPHEN 650 MG: 325 TABLET ORAL at 13:22

## 2022-09-06 NOTE — PROGRESS NOTES
Infusion Nursing Note:  Laxmi Ya presents today for Actemra infusion.  Patient seen by provider today: No   present during visit today: Not Applicable.    Note: Actemra infused over ~1hour. 250ml NS ran concurrently. Pre-medications: tylenol, solu-medrol.    Intravenous Access:  Labs drawn without difficulty.  Peripheral IV placed.    Treatment Conditions:  Biological Infusion Checklist:  ~~~ NOTE: If the patient answers yes to any of the questions below, hold the infusion and contact ordering provider or on-call provider.    1. Have you recently had an elevated temperature, fever, chills, productive cough, coughing for 3 weeks or longer or hemoptysis, abnormal vital signs, night sweats,  chest pain or have you noticed a decrease in your appetite, unexplained weight loss or fatigue? No  2. Do you have any open wounds or new incisions? No  3. Do you have any recent or upcoming hospitalizations, surgeries or dental procedures? No  4. Do you currently have or recently have had any signs of illness or infection or are you on any antibiotics? No  5. Have you had any new, sudden or worsening abdominal pain? No  6. Have you or anyone in your household received a live vaccination in the past 4 weeks? Please note:  No live vaccines while on biologic/chemotherapy until 6 months after the last treatment.  Patient can receive the flu vaccine (shot only) and the pneumovax.  It is optimal for the patient to get these vaccines mid cycle, but they can be given at any time as long as it is not on the day of the infusion. No  7. Have you recently been diagnosed with any new nervous system diseases (ie. Multiple sclerosis, Guillain [place], seizures, neurological changes) or cancer diagnosis? No  8. Are you on any form of radiation or chemotherapy? No  9. Are you pregnant or breast feeding or do you have plans of pregnancy in the future? No  10. Have you been having any signs of worsening depression or suicidal  ideations?  (benlysta only) N/A  11. Have there been any other new onset medical symptoms? No      Post Infusion Assessment:  Patient tolerated infusion without incident.  Blood return noted pre and post infusion.  Site patent and intact, free from redness, edema or discomfort.  No evidence of extravasations.  Access discontinued per protocol.    Biologic Infusion Post Education: Call the triage nurse at your clinic or seek medical attention if you have chills and/or temperature greater than or equal to 100.5, uncontrolled nausea/vomiting, diarrhea, constipation, dizziness, shortness of breath, chest pain, heart palpitations, weakness or any other new or concerning symptoms, questions or concerns.  You cannot have any live virus vaccines prior to or during treatment or up to 6 months post infusion.  If you have an upcoming surgery, medical procedure or dental procedure during treatment, this should be discussed with your ordering physician and your surgeon/dentist.  If you are having any concerning symptom, if you are unsure if you should get your next infusion or wish to speak to a provider before your next infusion, please call your care coordinator or triage nurse at your clinic to notify them so we can adequately serve you.     Discharge Plan:   Discharge instructions reviewed with: Patient.  Patient and/or family verbalized understanding of discharge instructions and all questions answered.  AVS to patient via [employer].  Patient will return 10/4/2022 for next appointment.   Patient discharged in stable condition accompanied by: self.  Departure Mode: Ambulatory.    Administrations This Visit     0.9% sodium chloride BOLUS     Admin Date  09/06/2022 Action  New Bag Dose  250 mL Route  Intravenous Administered By  CHAD PEARCE          acetaminophen (TYLENOL) tablet 650 mg     Admin Date  09/06/2022 Action  Given Dose  650 mg Route  Oral Administered By  CHAD PEARCE          methylPREDNISolone  sodium succinate (solu-MEDROL) injection 125 mg     Admin Date  09/06/2022 Action  Given Dose  125 mg Route  Intravenous Administered By  CHAD PEARCE          tocilizumab (ACTEMRA) 400 mg in sodium chloride 0.9 % 130 mL infusion     Admin Date  09/06/2022 Action  New Bag Dose  400 mg Rate  130 mL/hr Route  Intravenous Administered By  CHAD PEARCE              /82 (BP Location: Left arm)   Pulse 69   Temp 98.1  F (36.7  C) (Oral)   Resp 18   Wt 95.3 kg (210 lb 3.2 oz)   SpO2 97%   BMI 33.95 kg/m        CHAD PEARCE, RN

## 2022-09-06 NOTE — LETTER
9/6/2022         RE: Laxmi Ya  [address] [place] [id number]        Dear Colleague,    Thank you for referring your patient, Laxmi Ya, to the [facility]. Please see a copy of my visit note below.    Infusion Nursing Note:  Laxmi Ya presents today for Actemra infusion.  Patient seen by provider today: No   present during visit today: Not Applicable.    Note: Actemra infused over ~1hour. 250ml NS ran concurrently. Pre-medications: tylenol, solu-medrol.    Intravenous Access:  Labs drawn without difficulty.  Peripheral IV placed.    Treatment Conditions:  Biological Infusion Checklist:  ~~~ NOTE: If the patient answers yes to any of the questions below, hold the infusion and contact ordering provider or on-call provider.    1. Have you recently had an elevated temperature, fever, chills, productive cough, coughing for 3 weeks or longer or hemoptysis, abnormal vital signs, night sweats,  chest pain or have you noticed a decrease in your appetite, unexplained weight loss or fatigue? No  2. Do you have any open wounds or new incisions? No  3. Do you have any recent or upcoming hospitalizations, surgeries or dental procedures? No  4. Do you currently have or recently have had any signs of illness or infection or are you on any antibiotics? No  5. Have you had any new, sudden or worsening abdominal pain? No  6. Have you or anyone in your household received a live vaccination in the past 4 weeks? Please note:  No live vaccines while on biologic/chemotherapy until 6 months after the last treatment.  Patient can receive the flu vaccine (shot only) and the pneumovax.  It is optimal for the patient to get these vaccines mid cycle, but they can be given at any time as long as it is not on the day of the infusion. No  7. Have you recently been diagnosed with any new nervous system diseases (ie. Multiple sclerosis, Guillain [place], seizures,  neurological changes) or cancer diagnosis? No  8. Are you on any form of radiation or chemotherapy? No  9. Are you pregnant or breast feeding or do you have plans of pregnancy in the future? No  10. Have you been having any signs of worsening depression or suicidal ideations?  (benlysta only) N/A  11. Have there been any other new onset medical symptoms? No      Post Infusion Assessment:  Patient tolerated infusion without incident.  Blood return noted pre and post infusion.  Site patent and intact, free from redness, edema or discomfort.  No evidence of extravasations.  Access discontinued per protocol.    Biologic Infusion Post Education: Call the triage nurse at your clinic or seek medical attention if you have chills and/or temperature greater than or equal to 100.5, uncontrolled nausea/vomiting, diarrhea, constipation, dizziness, shortness of breath, chest pain, heart palpitations, weakness or any other new or concerning symptoms, questions or concerns.  You cannot have any live virus vaccines prior to or during treatment or up to 6 months post infusion.  If you have an upcoming surgery, medical procedure or dental procedure during treatment, this should be discussed with your ordering physician and your surgeon/dentist.  If you are having any concerning symptom, if you are unsure if you should get your next infusion or wish to speak to a provider before your next infusion, please call your care coordinator or triage nurse at your clinic to notify them so we can adequately serve you.     Discharge Plan:   Discharge instructions reviewed with: Patient.  Patient and/or family verbalized understanding of discharge instructions and all questions answered.  AVS to patient via [employer].  Patient will return 10/4/2022 for next appointment.   Patient discharged in stable condition accompanied by: self.  Departure Mode: Ambulatory.    Administrations This Visit     0.9% sodium chloride BOLUS     Admin Date  09/06/2022  Action  New Bag Dose  250 mL Route  Intravenous Administered By  CHAD PEARCE          acetaminophen (TYLENOL) tablet 650 mg     Admin Date  09/06/2022 Action  Given Dose  650 mg Route  Oral Administered By  CHAD PEARCE          methylPREDNISolone sodium succinate (solu-MEDROL) injection 125 mg     Admin Date  09/06/2022 Action  Given Dose  125 mg Route  Intravenous Administered By  CHAD PEARCE          tocilizumab (ACTEMRA) 400 mg in sodium chloride 0.9 % 130 mL infusion     Admin Date  09/06/2022 Action  New Bag Dose  400 mg Rate  130 mL/hr Route  Intravenous Administered By  CHAD PEARCE              /82 (BP Location: Left arm)   Pulse 69   Temp 98.1  F (36.7  C) (Oral)   Resp 18   Wt 95.3 kg (210 lb 3.2 oz)   SpO2 97%   BMI 33.95 kg/m        CHAD PEARCE RN                        Again, thank you for allowing me to participate in the care of your patient.        Sincerely,        [facility]

## 2022-09-06 NOTE — PATIENT INSTRUCTIONS
Dear Laxmi Ya    Thank you for choosing [facility] ([facility]) for your infusion.  The following information is a summary of our appointment as well as important reminders.      Last dose of Tylenol was  1:22pm.    EDUCATION POST BIOLOGICAL/CHEMOTHERAPY INFUSION  Call the triage nurse at your clinic or seek medical attention if you have chills and/or temperature greater than or equal to 100.5, uncontrolled nausea/vomiting, diarrhea, constipation, dizziness, shortness of breath, chest pain, heart palpitations, weakness or any other new or concerning symptoms, questions or concerns.  You can not have any live virus vaccines prior to or during treatment or up to 6 months post infusion.  If you have an upcoming surgery, medical procedure or dental procedure during treatment, this should be discussed with your ordering physician and your surgeon/dentist.  If you are having any concerning symptom, if you are unsure if you should get your next infusion or wish to speak to a provider before your next infusion, please call your care coordinator or triage nurse at your clinic to notify them so we can adequately serve you.     We look forward in seeing you on your next appointment here at [facility] ([facility]).  Please don t hesitate to call us at [phone] to reschedule any of your appointments or to speak with one of the [facility] registered nurses.  It was a pleasure taking care of you today.    Sincerely,    [facility]  [facility]  [address] [place] [address]  [address]  Phone:  [phone]

## 2022-09-07 ENCOUNTER — THERAPY VISIT (OUTPATIENT)
Dept: PHYSICAL THERAPY | Facility: CLINIC | Age: 47
End: 2022-09-07
Payer: MEDICARE

## 2022-09-07 DIAGNOSIS — M54.6 BILATERAL THORACIC BACK PAIN: ICD-10-CM

## 2022-09-07 DIAGNOSIS — M54.2 NECK PAIN: Primary | ICD-10-CM

## 2022-09-07 PROCEDURE — 97012 MECHANICAL TRACTION THERAPY: CPT | Mod: GP | Performed by: PHYSICAL THERAPIST

## 2022-09-07 PROCEDURE — 97140 MANUAL THERAPY 1/> REGIONS: CPT | Mod: GP | Performed by: PHYSICAL THERAPIST

## 2022-09-13 ENCOUNTER — OFFICE VISIT (OUTPATIENT)
Dept: ORTHOPEDICS | Facility: CLINIC | Age: 47
End: 2022-09-13
Payer: MEDICARE

## 2022-09-13 ENCOUNTER — THERAPY VISIT (OUTPATIENT)
Dept: OCCUPATIONAL THERAPY | Facility: CLINIC | Age: 47
End: 2022-09-13
Payer: MEDICARE

## 2022-09-13 DIAGNOSIS — R20.2 PARESTHESIA OF BOTH HANDS: ICD-10-CM

## 2022-09-13 DIAGNOSIS — G56.03 CARPAL TUNNEL SYNDROME, BILATERAL: ICD-10-CM

## 2022-09-13 DIAGNOSIS — M25.531 PAIN IN BOTH WRISTS: ICD-10-CM

## 2022-09-13 DIAGNOSIS — M79.642 BILATERAL HAND PAIN: ICD-10-CM

## 2022-09-13 DIAGNOSIS — M25.532 PAIN IN BOTH WRISTS: ICD-10-CM

## 2022-09-13 DIAGNOSIS — M13.80 SERONEGATIVE INFLAMMATORY ARTHRITIS: Primary | ICD-10-CM

## 2022-09-13 DIAGNOSIS — M79.641 BILATERAL HAND PAIN: ICD-10-CM

## 2022-09-13 DIAGNOSIS — M79.645 BILATERAL THUMB PAIN: ICD-10-CM

## 2022-09-13 DIAGNOSIS — M79.644 BILATERAL THUMB PAIN: ICD-10-CM

## 2022-09-13 DIAGNOSIS — G56.03 BILATERAL CARPAL TUNNEL SYNDROME: Primary | ICD-10-CM

## 2022-09-13 DIAGNOSIS — R29.898 WEAKNESS OF BOTH HANDS: ICD-10-CM

## 2022-09-13 PROCEDURE — 20526 THER INJECTION CARP TUNNEL: CPT | Mod: 50 | Performed by: PREVENTIVE MEDICINE

## 2022-09-13 PROCEDURE — 76942 ECHO GUIDE FOR BIOPSY: CPT | Performed by: PREVENTIVE MEDICINE

## 2022-09-13 PROCEDURE — 97760 ORTHOTIC MGMT&TRAING 1ST ENC: CPT | Mod: GO | Performed by: OCCUPATIONAL THERAPIST

## 2022-09-13 PROCEDURE — 99207 PR DROP WITH A PROCEDURE: CPT | Performed by: PREVENTIVE MEDICINE

## 2022-09-13 PROCEDURE — 97165 OT EVAL LOW COMPLEX 30 MIN: CPT | Mod: GO | Performed by: OCCUPATIONAL THERAPIST

## 2022-09-13 RX ORDER — DOXYCYCLINE HYCLATE 100 MG
150 TABLET ORAL 2 TIMES DAILY
COMMUNITY
Start: 2022-08-18 | End: 2023-10-05

## 2022-09-13 RX ADMIN — METHYLPREDNISOLONE ACETATE 40 MG: 40 INJECTION, SUSPENSION INTRA-ARTICULAR; INTRALESIONAL; INTRAMUSCULAR; SOFT TISSUE at 16:39

## 2022-09-13 RX ADMIN — LIDOCAINE HYDROCHLORIDE 2 ML: 10 INJECTION, SOLUTION EPIDURAL; INFILTRATION; INTRACAUDAL; PERINEURAL at 16:39

## 2022-09-13 NOTE — LETTER
9/13/2022      RE: Laxmi Ya  [address] [place] [id number]     Dear Colleague,    Thank you for referring your patient, Laxmi Ya, to the [facility] [place]. Please see a copy of my visit note below.    HISTORY OF PRESENT ILLNESS  Ms. Ya is a pleasant 47 year old year old female who presents to clinic today with bilateral carpal tunnel injections per request of Dr. Bañuelos. Patient reports her last carpal tunnel injections was completed in January 2022 at [facility]. Patient reports that both wrist are in pain equally. She normally will receive bilateral injection at each appointment. She reports she typically received immediate relief and will last between 2-3 months.     Laxmi explains that every 2 months she will alternate between bilateral CMC joint injections and bilateral carpal tunnel injections.  Patient has been attending hand therapy.     Wants to get bilateral carpal tunnel injections as previously discussed with hand surgery and that have improved her pain and numbnessi n her hands for over 3-4 months at a time  MEDICAL HISTORY  Patient Active Problem List   Diagnosis     Seronegative inflammatory arthritis       Current Outpatient Medications   Medication Sig Dispense Refill     Abatacept (ORENCIA IV) Inject into the vein every 14 days       Albuterol Sulfate (PROAIR HFA IN) Inhale into the lungs as needed        Artificial Tear Solution (SOOTHE XP) SOLN as needed       atenolol (TENORMIN) 50 MG tablet Take 50 mg by mouth daily       benzoyl peroxide 5 % external liquid Use daily as directed 148 mL 3     clindamycin (CLEOCIN T) 1 % external lotion Apply topically 2 times daily 60 mL 3     diclofenac (VOLTAREN) 1 % topical gel Apply topically 4 times daily as needed        doxycycline hyclate (VIBRA-TABS) 100 MG tablet Take 100 mg by mouth 2 times daily       Dupilumab (DUPIXENT) 300 MG/2ML SOPN Inject 300 mg Subcutaneous every 14 days 4 mL 2      fexofenadine (ALLEGRA) 180 MG tablet Take 1 tablet (180 mg) by mouth daily 90 tablet 2     fidaxomicin (DIFICID) 200 MG tablet Take 200 mg by mouth       fluticasone (FLONASE) 50 MCG/ACT nasal spray Spray 2 sprays into both nostrils 2 times daily        lidocaine (LIDODERM) 5 % patch as needed   1     losartan (COZAAR) 100 MG tablet Take 100 mg by mouth daily       methylPREDNISolone (MEDROL) 4 MG tablet TAKE 3 TABLETS DAILY FOR 7 DAYS, THEN 2 TABLETS DAILY FOR 7 DAYS, THEN OFF. 35 tablet 1     Montelukast Sodium (SINGULAIR PO) Take 10 mg by mouth At Bedtime        nystatin (MYCOSTATIN) 239793 UNIT/ML suspension TAKE 5ML BY MOUTH 4XDAILY FOR 14 DAYS. CONTINUE AT LEAST 2 DAYS AFTER SYMPTOMS RESOLVED       olopatadine (PAZEO) 0.7 % ophthalmic solution Apply 2 Units to eye 2 times daily       OMEPRAZOLE PO Take 40 mg by mouth 2 times daily        rifaximin (XIFAXAN) 550 MG TABS tablet Take 200 mg by mouth 3 times daily Take for 10 days, has on had as needed for small bowel bacteria overgrowh       spironolactone-HCTZ (ALDACTAZIDE) 25-25 MG tablet Take 1 tablet by mouth daily       sucralfate (CARAFATE) 1 GM/10ML suspension Take by mouth 4 times daily as needed        tobramycin (TOBREX) 0.3 % ophthalmic solution 1-2 drops 2 times daily       tobramycin-dexamethasone (TOBRADEX) 0.3-0.1 % ophthalmic ointment 0.25 inches At Bedtime       triamcinolone (KENALOG) 0.1 % external ointment Apply topically 2 times daily 80 g 1     metFORMIN (GLUCOPHAGE-XR) 500 MG 24 hr tablet Take 500 mg by mouth daily        norethindrone (MICRONOR) 0.35 MG tablet Take 0.35 mg by mouth         Allergies   Allergen Reactions     Polyethylene Glycol Rash     Codeine      Other reaction(s): Gastrointestinal, GI intolerance, Vomiting  Vomiting       Gadolinium Dizziness and Nausea     Hydrocodone-Acetaminophen Nausea and Vomiting     Other reaction(s): GI intolerance     Iodine      abstract     Sulfasalazine      Other reaction(s): GI  intolerance  Has tried and had nausa.     Tramadol Nausea and Vomiting     Other reaction(s): Gastrointestinal, Other (see comments)  Nausea and vomiting   unknown       Gluten Meal Other (See Comments) and Rash     Other reaction(s): Gastrointestinal, GI intolerance  Dizziness, tired, rash, stomach cramps, thrush, mouth sores  Dizziness, tired, rash, stomach cramps, thrush, mouth sores         Family History   Problem Relation Age of Onset     Hypertension Father         abstract     Cancer Paternal Aunt         gallbladder cancer abstract     Social History     Socioeconomic History     Marital status: Single   Tobacco Use     Smoking status: Never Smoker     Smokeless tobacco: Never Used   Substance and Sexual Activity     Alcohol use: Yes     Drug use: No   Other Topics Concern     Exercise No     Seat Belt No     Self-Exams No   Social History Narrative    Womens Health Kit given.           Additional medical/Social/Surgical histories reviewed in [facility] and updated as appropriate.     REVIEW OF SYSTEMS (9/13/2022)  10 point ROS of systems including Constitutional, Eyes, Respiratory, Cardiovascular, Gastroenterology, Genitourinary, Integumentary, Musculoskeletal, Psychiatric, Allergic/Immunologic were all negative except for pertinent positives noted in my HPI.     PHYSICAL EXAM  VSS  Vital Signs: There were no vitals taken for this visit. Patient declined being weighed. There is no height or weight on file to calculate BMI.    General  - normal appearance, in no obvious distress  HEENT  - conjunctivae not injected, moist mucous membranes, normocephalic/atraumatic head, ears normal appearance, no lesions, mouth normal appearance, no scars, normal dentition and teeth present  CV  - normal radial pulse  Pulm  - normal respiratory pattern, non-labored  Musculoskeletal - wrist  - inspection: mild thenar atrophy, normal joint alignment, no swelling  - palpation: no bony or soft tissue tenderness, no tenderness at the  "anatomical snuffbox  - ROM:  90 deg flexion   70 deg extension   25 deg abduction   65 deg adduction  - strength: 4/5  strength, 4/5 wrist abduction, 5/5 flexion, extension, pronation, supination, adduction  - special tests:  (+) Tinel's  (-) Finkelstein  (+) Phalen  (-) Keys click test  (-) ulnar impaction  Neuro  - some thenar numbness, no motor deficit, grossly normal coordination, normal muscle tone  Skin  - no ecchymosis, erythema, warmth, or induration, no obvious rash  Psych  - interactive, appropriate, normal mood and affect  ASSESSMENT & PLAN  48 yo female with bilateral carpal tunnel syndrome, and possible autoimmune arthritis    I independently reviewed the following imaging studies:  Bilateral wrist MRIs: shows arthritis  After a 20 minute discussion and examination, we decided to perform a same day injection for diagnostic and therapeutic purposes for bilateral carpal tunnel   Cont. Hand therapy and wrist braces  F/u in 1-2 months      Appropriate PPE was utilized for prevention of spread of Covid-19.  Matt Sullivan MD, CAQSM    Bilateral Carpal Tunnel Injection - Ultrasound Guided  The patient was informed of the risks and the benefits of the procedure and a written consent was signed.  The patient s right and left  wrist was prepped with chlorhexidine in sterile fashion.   40 mg of methylprednisolone suspension was drawn up into a 3 mL syringe with 1.0 mL of 1% lidocaine.  Injection was performed using sterile technique.  Under ultrasound guidance a 1.5\" 22-gauge needle was used to enter the left and right wrist at the distal palmar crease medial to the median nerve.  Needle placement was visualized and documented with ultrasound.  Ultrasound visualization required to ensure injection material enters the perineural sheath and not a vessel or nerve itself.  Injection performed long axis to the probe.  Injection solution visualized surrounding the perineurium.  Images were permanently stored for " the patient's record.  There were no complications. The patient tolerated the procedure well. There was negligible bleeding.         Hand / Upper Extremity Injection: bilateral carpal tunnel    Date/Time: 9/13/2022 4:39 PM  Performed by: Matt Sullivan MD  Authorized by: Matt Sullivan MD     Indications:  Tendon swelling, pain, therapeutic and diagnostic  Needle Size:  25 G  Guidance: ultrasound    Approach:  Volar  Condition: carpal tunnel    Laterality:  Bilateral    Site:  Bilateral carpal tunnel  Medications (Right):  40 mg methylPREDNISolone 40 MG/ML; 2 mL lidocaine (PF) 1 %  Medications (Left):  40 mg methylPREDNISolone 40 MG/ML; 2 mL lidocaine (PF) 1 %  Outcome:  Tolerated well, no immediate complications  Procedure discussed: discussed risks, benefits, and alternatives    Consent Given by:  Patient  Timeout: timeout called immediately prior to procedure    Prep: patient was prepped and draped in usual sterile fashion      Again, thank you for allowing me to participate in the care of your patient.      Sincerely,    Matt Sullivan MD

## 2022-09-13 NOTE — PROGRESS NOTES
Hand Therapy Initial Evaluation    Current Date:  9/13/2022    Diagnosis: Scaphotrapeziotrapezoid joint arthritis; flexor tenosynovitis; seronegative rheumatoid arthritis  DOI: Ongoing for years (Order date: 6/30/22)  Referring physician: Twila Petit MD    Precautions: None    Subjective:  Laxmi Ya is a 47 year old female.    Patient reports symptoms of the bilateral wrists and thumbs which occurred due to rheumatoid arthritis. Symptoms are gradually getting worse since last thumb CMC joint cortisone injections.  General health as reported by patient is fair.  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     Obesity      Pyelonephritis, unspecified 1992    abstract     Rheumatoid arteritis (H)      Seronegative inflammatory arthritis 6/17/2022     Unspecified symptom associated with female genital organs 05/07/1999    chronic pelvic pain abstract     Past Surgical History:   Procedure Laterality Date     CHOLECYSTECTOMY       COLON SURGERY       CYSTOSCOPY,+URETEROSCOPY      abstract     ZZHC EXCIS/CURET BENIGN ELBOW LESN  10/26/1987    (R) humerus abstract     Current Outpatient Medications   Medication     Abatacept (ORENCIA IV)     Albuterol Sulfate (PROAIR HFA IN)     Artificial Tear Solution (SOOTHE XP) SOLN     atenolol (TENORMIN) 50 MG tablet     benzoyl peroxide 5 % external liquid     clindamycin (CLEOCIN T) 1 % external lotion     diclofenac (VOLTAREN) 1 % topical gel     doxycycline hyclate (VIBRA-TABS) 100 MG tablet     Dupilumab (DUPIXENT) 300 MG/2ML SOPN     fexofenadine (ALLEGRA) 180 MG tablet     fidaxomicin (DIFICID) 200 MG tablet     fluticasone (FLONASE) 50 MCG/ACT nasal spray     lidocaine  (LIDODERM) 5 % patch     losartan (COZAAR) 100 MG tablet     metFORMIN (GLUCOPHAGE-XR) 500 MG 24 hr tablet     methylPREDNISolone (MEDROL) 4 MG tablet     Montelukast Sodium (SINGULAIR PO)     norethindrone (MICRONOR) 0.35 MG tablet     nystatin (MYCOSTATIN) 217951 UNIT/ML suspension     olopatadine (PAZEO) 0.7 % ophthalmic solution     OMEPRAZOLE PO     rifaximin (XIFAXAN) 550 MG TABS tablet     spironolactone-HCTZ (ALDACTAZIDE) 25-25 MG tablet     sucralfate (CARAFATE) 1 GM/10ML suspension     tobramycin (TOBREX) 0.3 % ophthalmic solution     tobramycin-dexamethasone (TOBRADEX) 0.3-0.1 % ophthalmic ointment     triamcinolone (KENALOG) 0.1 % external ointment     Current Facility-Administered Medications   Medication     lidocaine (PF) (XYLOCAINE) 1 % injection 1 mL     triamcinolone (KENALOG-40) injection 40 mg     Allergies   Allergen Reactions     Polyethylene Glycol Rash     Codeine      Other reaction(s): Gastrointestinal, GI intolerance, Vomiting  Vomiting       Gadolinium Dizziness and Nausea     Hydrocodone-Acetaminophen Nausea and Vomiting     Other reaction(s): GI intolerance     Iodine      abstract     Sulfasalazine      Other reaction(s): GI intolerance  Has tried and had nausa.     Tramadol Nausea and Vomiting     Other reaction(s): Gastrointestinal, Other (see comments)  Nausea and vomiting   unknown       Gluten Meal Other (See Comments) and Rash     Other reaction(s): Gastrointestinal, GI intolerance  Dizziness, tired, rash, stomach cramps, thrush, mouth sores  Dizziness, tired, rash, stomach cramps, thrush, mouth sores         Occupational Profile Information:  Right hand dominant  Current occupation is: On disability  Prior functional level:  independent-shared household chores; reports difficulty with ADL, showers 1-2 times per day due to fatigue; unable to engage in household chores, asks significant other for assistance, considering hiring help  Mobility: No difficulty  Transportation:  Drives  Barriers include: None    Patient reports symptoms of pain, stiffness/loss of motion, weakness/loss of strength, edema, numbness and tingling   Patient reported occupational performance deficits: bathing, dressing, eating/feeding self, hygiene, toileting, household chores and driving   Special tests:  x-ray.    Previous treatment: Bilateral STT joint corticosteroid injections; previously seen at [facility] with history of STT and carpal tunnel injections; wears OTC wrist braces at night      Functional Outcome Measure:   Upper Extremity Functional Index Score:  SCORE: 12/80   (A lower score indicates greater disability.)        Objective:  Pain Level (Scale 0-10)   9/13/2022   At Rest 9/10   With Use 9/10     Pain Description  Date 9/13/2022   Location Thumb CMC joint, radiocarpal joint   Pain Quality Aching, Burning, and Sharp   Frequency constant     Pain is worst daytime or nighttime   Exacerbated by  Use   Relieved by None   Progression Unchanged     Sensation  Decreased median nerve distribution per patient report.    ROM  Thumb 9/13/2022 9/13/2022   AROM  (PROM) Left Right   MP 0/55 0/55   IP 0/45 0/50   RABD 40 45   Kapandji Opposition Scale (0-10/10) 6/10 6/10     Fingers- Lacks 1 cm from distal palmar crease bilaterally.    Thumb Observation/Appearance   - none  + mild    ++ moderate    +++ severe     9/13/2022   Shoulder deformity present over CMC R: -  L: -   Edema over the CMC joint R: +  L: +      Provocative Tests  Pain Report:  - none    + mild    ++ moderate    +++ severe     9/13/2022   CMC Adduction Stress Test R: ++  L: ++   CMC Extension Stress Test R: ++  L: ++       Strength  Testing deferred due to pain.    Assessment:  Patient presents with symptoms consistent with diagnosis of the above condition,  with non-surgical intervention.     Patient's limitations or Problem List includes:  Pain, Decreased ROM/motion, Increased edema, Decreased stability, Decreased  and Decreased pinch  of the bilateral wrist and thumb which interferes with the patient's ability to perform Self Care Tasks (dressing, eating, bathing, hygiene/toileting), Recreational Activities, Household Chores and Driving  as compared to previous level of function.    Rehab Potential:  Good - Return to full activity, some limitations    Patient will benefit from skilled Occupational Therapy to increase ROM,  strength, pinch strength and stability of thumb and decrease pain and edema to return to previous activity level and resume normal daily tasks and to reach their rehab potential.    Barriers to Learning:  No barrier    Communication Issues:  Patient appears to be able to clearly communicate and understand verbal and written communication and follow directions correctly.    Chart Review: Chart Review    Identified Performance Deficits: bathing/showering, toileting, dressing, feeding, hygiene and grooming, driving and community mobility, home establishment and management, meal preparation and cleanup, shopping and leisure activities    Assessment of Occupational Performance:  3-5 Performance Deficits    Clinical Decision Making (Complexity): Low complexity    Treatment Explanation:  The following has been discussed with the patient:  RX ordered/plan of care  Anticipated outcomes  Possible risks and side effects    Plan:  Frequency:  2 X a month, once daily  Duration:  for 2 months    Treatment Plan:   Modalities:  Paraffin  Therapeutic Exercise:  AROM, AAROM, PROM, Tendon Gliding, Blocking, Isotonics and Isometrics  Manual Techniques:  Joint mobilization and Myofascial release  Orthotic Fabrication:  Bilateral hand-based thumb spica orthoses    Discharge Plan:  Achieve all LTG.  Independent in home treatment program.  Reach maximal therapeutic benefit.    Home Exercise Program:  Thumb spica orthoses, wear as-needed for painful activities    Next Visit:  Thumb stability program  Patient expressed interest in forearm-based  thumb spica orthoses  Joint protection and energy conservation

## 2022-09-13 NOTE — PROGRESS NOTES
[facility]    OUTPATIENT Occupational Therapy ORTHOPEDIC EVALUATION  PLAN OF TREATMENT FOR OUTPATIENT REHABILITATION  (COMPLETE FOR INITIAL CLAIMS ONLY)  Patient's Last Name, First Name, M.I.  YOB: 1975  Laxmi Ya    Provider s Name:  [facility]   Medical Record No.  [id number]   Start of Care Date:  09/13/22   Onset Date:   06/30/22 (Order date)   Type:     ___PT   __X_OT Medical Diagnosis:    Encounter Diagnoses   Name Primary?    Bilateral thumb pain     Pain in both wrists         Treatment Diagnosis:  Seronegative rheumatoid arthritis        Goals:     09/13/22 0500   Goal #1   Goal #1 hygiene   Previous Performance Level Independent   Current Functional Task Hold   Current Performance Level 9/10 thumb pain holding toothbrush.   STG Target Performance Toothbrush and brush teeth   STG Target Perform Level 4/10 thumb pain   Due Date 10/13/22   LTG Target Task/Performance Pain free hygiene/grooming   Due Date 11/13/22   Goals #2   Goal #2 household chores   Previous Performance Level Independent   Current Functional Task    Current Performance Level Unable to open a tight or new jar.   STG Target Perfomance Open a tight or new jar   STG Target Perform Level Able to open with 4/10 thumb pain.   Due Date 11/13/22   LTG Target Task/Performance Pain free household chores  (with or without use of adaptive equipment.)   Due Date 12/13/22       Therapy Frequency:  2x/month  Predicted Duration of Therapy Intervention:  3 months    Marla Connor OT                 I CERTIFY THE NEED FOR THESE SERVICES FURNISHED UNDER        THIS PLAN OF TREATMENT AND WHILE UNDER MY CARE     (Physician attestation of this document indicates review and certification of the therapy plan).                     Certification Date From:  09/13/22   Certification Date To:   12/13/22    Referring Provider:  Twila Petit MD    Initial Assessment        See Epic Evaluation SOC Date: 09/13/22

## 2022-09-13 NOTE — NURSING NOTE
[address] [address]  Dept: [phone]  ______________________________________________________________________________    Patient: Laxmi Ya   : 1975   MRN: [id number]   2022    INVASIVE PROCEDURE SAFETY CHECKLIST    Date: 2022   Procedure: Bilateral carpal tunnel injections with depo & USG  Patient Name: Laxmi Ya  MRN: [id number]  YOB: 1975    Action: Complete sections as appropriate. Any discrepancy results in a HARD COPY until resolved.     PRE PROCEDURE:  Patient ID verified with 2 identifiers (name and  or MRN): Yes  Procedure and site verified with patient/designee (when able): Yes  Accurate consent documentation in medical record: Yes  H&P (or appropriate assessment) documented in medical record: Yes  H&P must be up to 20 days prior to procedure and updates within 24 hours of procedure as applicable: NA  Relevant diagnostic and radiology test results appropriately labeled and displayed as applicable: NA  Procedure site(s) marked with provider initials: NA    TIMEOUT:  Time-Out performed immediately prior to starting procedure, including verbal and active participation of all team members addressing the following:Yes  * Correct patient identify  * Confirmed that the correct side and site are marked  * An accurate procedure consent form  * Agreement on the procedure to be done  * Correct patient position  * Relevant images and results are properly labeled and appropriately displayed  * The need to administer antibiotics or fluids for irrigation purposes during the procedure as applicable   * Safety precautions based on patient history or medication use    DURING PROCEDURE: Verification of correct person, site, and procedures any time the responsibility for care of the patient is transferred to another member of the care team.       Prior to injection, verified patient identity  using patient's name and date of birth.  Due to injection administration, patient instructed to remain in clinic for 15 minutes  afterwards, and to report any adverse reaction to me immediately.    Tendon sheath injection was performed.     Lido  Drug Amount Wasted:  Yes: 1 mg/ml   Vial/Syringe: Single dose vial  Expiration Date:  06/01/2026    Depo  Drug Amount Wasted:  No  Vial/Syringe: Single dose vial  Expiration Date: 04/01/2024    Yandy Bashir, ATC  September 13, 2022

## 2022-09-13 NOTE — PROGRESS NOTES
HISTORY OF PRESENT ILLNESS  Ms. Ya is a pleasant 47 year old year old female who presents to clinic today with bilateral carpal tunnel injections per request of Dr. Bañuelos. Patient reports her last carpal tunnel injections was completed in January 2022 at [facility]. Patient reports that both wrist are in pain equally. She normally will receive bilateral injection at each appointment. She reports she typically received immediate relief and will last between 2-3 months.     Laxmi explains that every 2 months she will alternate between bilateral CMC joint injections and bilateral carpal tunnel injections.  Patient has been attending hand therapy.     Wants to get bilateral carpal tunnel injections as previously discussed with hand surgery and that have improved her pain and numbnessi n her hands for over 3-4 months at a time  MEDICAL HISTORY  Patient Active Problem List   Diagnosis     Seronegative inflammatory arthritis       Current Outpatient Medications   Medication Sig Dispense Refill     Abatacept (ORENCIA IV) Inject into the vein every 14 days       Albuterol Sulfate (PROAIR HFA IN) Inhale into the lungs as needed        Artificial Tear Solution (SOOTHE XP) SOLN as needed       atenolol (TENORMIN) 50 MG tablet Take 50 mg by mouth daily       benzoyl peroxide 5 % external liquid Use daily as directed 148 mL 3     clindamycin (CLEOCIN T) 1 % external lotion Apply topically 2 times daily 60 mL 3     diclofenac (VOLTAREN) 1 % topical gel Apply topically 4 times daily as needed        doxycycline hyclate (VIBRA-TABS) 100 MG tablet Take 100 mg by mouth 2 times daily       Dupilumab (DUPIXENT) 300 MG/2ML SOPN Inject 300 mg Subcutaneous every 14 days 4 mL 2     fexofenadine (ALLEGRA) 180 MG tablet Take 1 tablet (180 mg) by mouth daily 90 tablet 2     fidaxomicin (DIFICID) 200 MG tablet Take 200 mg by mouth       fluticasone (FLONASE) 50 MCG/ACT nasal spray Spray 2 sprays into both nostrils 2 times daily         lidocaine (LIDODERM) 5 % patch as needed   1     losartan (COZAAR) 100 MG tablet Take 100 mg by mouth daily       methylPREDNISolone (MEDROL) 4 MG tablet TAKE 3 TABLETS DAILY FOR 7 DAYS, THEN 2 TABLETS DAILY FOR 7 DAYS, THEN OFF. 35 tablet 1     Montelukast Sodium (SINGULAIR PO) Take 10 mg by mouth At Bedtime        nystatin (MYCOSTATIN) 802924 UNIT/ML suspension TAKE 5ML BY MOUTH 4XDAILY FOR 14 DAYS. CONTINUE AT LEAST 2 DAYS AFTER SYMPTOMS RESOLVED       olopatadine (PAZEO) 0.7 % ophthalmic solution Apply 2 Units to eye 2 times daily       OMEPRAZOLE PO Take 40 mg by mouth 2 times daily        rifaximin (XIFAXAN) 550 MG TABS tablet Take 200 mg by mouth 3 times daily Take for 10 days, has on had as needed for small bowel bacteria overgrowh       spironolactone-HCTZ (ALDACTAZIDE) 25-25 MG tablet Take 1 tablet by mouth daily       sucralfate (CARAFATE) 1 GM/10ML suspension Take by mouth 4 times daily as needed        tobramycin (TOBREX) 0.3 % ophthalmic solution 1-2 drops 2 times daily       tobramycin-dexamethasone (TOBRADEX) 0.3-0.1 % ophthalmic ointment 0.25 inches At Bedtime       triamcinolone (KENALOG) 0.1 % external ointment Apply topically 2 times daily 80 g 1     metFORMIN (GLUCOPHAGE-XR) 500 MG 24 hr tablet Take 500 mg by mouth daily        norethindrone (MICRONOR) 0.35 MG tablet Take 0.35 mg by mouth         Allergies   Allergen Reactions     Polyethylene Glycol Rash     Codeine      Other reaction(s): Gastrointestinal, GI intolerance, Vomiting  Vomiting       Gadolinium Dizziness and Nausea     Hydrocodone-Acetaminophen Nausea and Vomiting     Other reaction(s): GI intolerance     Iodine      abstract     Sulfasalazine      Other reaction(s): GI intolerance  Has tried and had nausa.     Tramadol Nausea and Vomiting     Other reaction(s): Gastrointestinal, Other (see comments)  Nausea and vomiting   unknown       Gluten Meal Other (See Comments) and Rash     Other reaction(s): Gastrointestinal, GI  intolerance  Dizziness, tired, rash, stomach cramps, thrush, mouth sores  Dizziness, tired, rash, stomach cramps, thrush, mouth sores         Family History   Problem Relation Age of Onset     Hypertension Father         abstract     Cancer Paternal Aunt         gallbladder cancer abstract     Social History     Socioeconomic History     Marital status: Single   Tobacco Use     Smoking status: Never Smoker     Smokeless tobacco: Never Used   Substance and Sexual Activity     Alcohol use: Yes     Drug use: No   Other Topics Concern     Exercise No     Seat Belt No     Self-Exams No   Social History Narrative    Womens Health Kit given.           Additional medical/Social/Surgical histories reviewed in [facility] and updated as appropriate.     REVIEW OF SYSTEMS (9/13/2022)  10 point ROS of systems including Constitutional, Eyes, Respiratory, Cardiovascular, Gastroenterology, Genitourinary, Integumentary, Musculoskeletal, Psychiatric, Allergic/Immunologic were all negative except for pertinent positives noted in my HPI.     PHYSICAL EXAM  VSS  Vital Signs: There were no vitals taken for this visit. Patient declined being weighed. There is no height or weight on file to calculate BMI.    General  - normal appearance, in no obvious distress  HEENT  - conjunctivae not injected, moist mucous membranes, normocephalic/atraumatic head, ears normal appearance, no lesions, mouth normal appearance, no scars, normal dentition and teeth present  CV  - normal radial pulse  Pulm  - normal respiratory pattern, non-labored  Musculoskeletal - wrist  - inspection: mild thenar atrophy, normal joint alignment, no swelling  - palpation: no bony or soft tissue tenderness, no tenderness at the anatomical snuffbox  - ROM:  90 deg flexion   70 deg extension   25 deg abduction   65 deg adduction  - strength: 4/5  strength, 4/5 wrist abduction, 5/5 flexion, extension, pronation, supination, adduction  - special tests:  (+) Tinel's  (-)  "Finkelstein  (+) Phalen  (-) Keys click test  (-) ulnar impaction  Neuro  - some thenar numbness, no motor deficit, grossly normal coordination, normal muscle tone  Skin  - no ecchymosis, erythema, warmth, or induration, no obvious rash  Psych  - interactive, appropriate, normal mood and affect  ASSESSMENT & PLAN  46 yo female with bilateral carpal tunnel syndrome, and possible autoimmune arthritis    I independently reviewed the following imaging studies:  Bilateral wrist MRIs: shows arthritis  After a 20 minute discussion and examination, we decided to perform a same day injection for diagnostic and therapeutic purposes for bilateral carpal tunnel   Cont. Hand therapy and wrist braces  F/u in 1-2 months      Appropriate PPE was utilized for prevention of spread of Covid-19.  Matt Sullivan MD, CAQSM    Bilateral Carpal Tunnel Injection - Ultrasound Guided  The patient was informed of the risks and the benefits of the procedure and a written consent was signed.  The patient s right and left  wrist was prepped with chlorhexidine in sterile fashion.   40 mg of methylprednisolone suspension was drawn up into a 3 mL syringe with 1.0 mL of 1% lidocaine.  Injection was performed using sterile technique.  Under ultrasound guidance a 1.5\" 22-gauge needle was used to enter the left and right wrist at the distal palmar crease medial to the median nerve.  Needle placement was visualized and documented with ultrasound.  Ultrasound visualization required to ensure injection material enters the perineural sheath and not a vessel or nerve itself.  Injection performed long axis to the probe.  Injection solution visualized surrounding the perineurium.  Images were permanently stored for the patient's record.  There were no complications. The patient tolerated the procedure well. There was negligible bleeding.         Hand / Upper Extremity Injection: bilateral carpal tunnel    Date/Time: 9/13/2022 4:39 PM  Performed by: Nate, " Matt Fuller MD  Authorized by: Matt Sullivan MD     Indications:  Tendon swelling, pain, therapeutic and diagnostic  Needle Size:  25 G  Guidance: ultrasound    Approach:  Volar  Condition: carpal tunnel    Laterality:  Bilateral    Site:  Bilateral carpal tunnel  Medications (Right):  40 mg methylPREDNISolone 40 MG/ML; 2 mL lidocaine (PF) 1 %  Medications (Left):  40 mg methylPREDNISolone 40 MG/ML; 2 mL lidocaine (PF) 1 %  Outcome:  Tolerated well, no immediate complications  Procedure discussed: discussed risks, benefits, and alternatives    Consent Given by:  Patient  Timeout: timeout called immediately prior to procedure    Prep: patient was prepped and draped in usual sterile fashion

## 2022-09-14 ENCOUNTER — PATIENT OUTREACH (OUTPATIENT)
Dept: CARE COORDINATION | Facility: CLINIC | Age: 47
End: 2022-09-14

## 2022-09-14 ENCOUNTER — THERAPY VISIT (OUTPATIENT)
Dept: PHYSICAL THERAPY | Facility: CLINIC | Age: 47
End: 2022-09-14
Payer: MEDICARE

## 2022-09-14 DIAGNOSIS — M54.6 BILATERAL THORACIC BACK PAIN: ICD-10-CM

## 2022-09-14 DIAGNOSIS — M54.2 NECK PAIN: Primary | ICD-10-CM

## 2022-09-14 PROCEDURE — 97140 MANUAL THERAPY 1/> REGIONS: CPT | Mod: GP | Performed by: PHYSICAL THERAPIST

## 2022-09-14 PROCEDURE — 97012 MECHANICAL TRACTION THERAPY: CPT | Mod: GP | Performed by: PHYSICAL THERAPIST

## 2022-09-14 NOTE — PROGRESS NOTES
Social Work Intervention  [facility]    Data/Intervention:    Patient Name:  Laxmi Ya  /Age:  1975 (47 year old)    Visit Type: telephone  Referral Source: [facility]  Reason for Referral:  Parking Cost Assistance    Collaborated With:    -Laxmi    Psychosocial Information/Concerns:  I spoke with Laxmi and she reported she has been better and discussed having a lot of pain in her wrists and needing to get back into the hand therapy clinic. Laxmi said she has an appointment with this clinic tomorrow but really wishes it was today. I provided emotional support and validation.     Laxmi discussed her difficulties with paying for parking for her many appointments at the [facility] and also noted she really can't walk more than across the street due to her rheumatoid arthritis.     Laxmi reported her monthly income is $3,300 before insurance premiums are taken out and she noted this is a combination of SSDI (which she has been on since 2018) and payment from a long term disability plan. Laxmi reported there have not been any changes to her income and said she is over income and doesn't qualify for things like SNAP or cash assistance through her county. 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 Laxmi discussed difficulties affording this house but she is hesitant to sell it because she worries she won't be able to afford other housing with the cost of rent, mortgage rates etc.     Laxmi goes to [facility] in [place] for other appointments and comes to the [facility] for Rheumatology appointments and infusions.     Intervention/Education/Resources Provided:  I informed Laxmi of the Medicare Savings Program which is income based and noted I will check if she would be eligible  for this to get some of her premium money paid back to her.   I discussed different possible ways to offset some expenses for Laxmi including energy assistance through [facility][note 3], however due to her income Laxmi is very likely over income for this assistance but she expressed interest in contacting the agency to explore this option anyway.   I also discussed the possibility of seeing if Laxmi can switch her infusions to [facility] in [place] as parking there is free. Laxmi said going to [place] is actually further away for her and she really doesn't want to switch things because she has had trouble trying to do this in the past and likes how things are more organized and easier for her to coordinate at the [facility] [place] [facility].     Laxmi reported she has a handicap tag for her car and I explained that Laxmi could park at a parking meter on the street for a longer period of time and for cheaper with this tag. Laxmi expressed concern about the distance she would have to walk if she were to do this but seemed receptive to the idea.     Laxmi reported she is hoping to get more money from Social Security in January as a cost of living increase and hopes to have something else figured out to help with the parking cost by then. Laxmi asked if she can get another parking pass, which I explained is really only supposed to be a one time thing.     I asked if Laxmi has thought about purchasing a parking pass and she said asked if there is a reduced rate for these for people who are disabled. I advised that she talk to the parking office or  staff about this and she said she already did and was told there is no discount.     Assessment/Plan:  Unfortunately due to Laxmi's income she is not eligible for a Medicare Savings Plan, or really any other solid type of assistance to help with parking or to offset expenses for her. Due to this I will mail one more [employer] Parking Pass (good for 5 random in/outs) to Laxmi.     I will also send  Laxmi brown My Chart Message informing her of this and include the phone number for the Energy Assistance program in [facility].     Provided patient/family with contact information and availability.    TERESA Finley, [facility]    [facility]  Ph: [phone], Pgr: [phone]  9/14/2022

## 2022-09-15 ENCOUNTER — THERAPY VISIT (OUTPATIENT)
Dept: OCCUPATIONAL THERAPY | Facility: CLINIC | Age: 47
End: 2022-09-15
Payer: MEDICARE

## 2022-09-15 ENCOUNTER — ANCILLARY PROCEDURE (OUTPATIENT)
Dept: MAMMOGRAPHY | Facility: CLINIC | Age: 47
End: 2022-09-15
Attending: FAMILY MEDICINE
Payer: MEDICARE

## 2022-09-15 DIAGNOSIS — M79.644 BILATERAL THUMB PAIN: Primary | ICD-10-CM

## 2022-09-15 DIAGNOSIS — M25.531 PAIN IN BOTH WRISTS: ICD-10-CM

## 2022-09-15 DIAGNOSIS — M25.532 PAIN IN BOTH WRISTS: ICD-10-CM

## 2022-09-15 DIAGNOSIS — Z12.31 VISIT FOR SCREENING MAMMOGRAM: ICD-10-CM

## 2022-09-15 DIAGNOSIS — M79.645 BILATERAL THUMB PAIN: Primary | ICD-10-CM

## 2022-09-15 PROCEDURE — 77067 SCR MAMMO BI INCL CAD: CPT | Mod: GC

## 2022-09-15 PROCEDURE — 97763 ORTHC/PROSTC MGMT SBSQ ENC: CPT | Mod: GO | Performed by: OCCUPATIONAL THERAPIST

## 2022-09-15 PROCEDURE — 77063 BREAST TOMOSYNTHESIS BI: CPT | Mod: GC

## 2022-09-20 NOTE — TELEPHONE ENCOUNTER
Health Call Center    Phone Message    May a detailed message be left on voicemail: yes    Reason for Call: Order(s): Other:   Reason for requested: MRI, pt had seen Dr. Cristobal before via [facility] at [facility] and this is the 1 yr f/u with him. But pt is new to the [facility] Hepatology clinic. Pt thought Dr. Cristobal may want her to have a MRI before her scheduled appt on 12/31/19. If so, pt would like the MRI done in 2019, please.  Call pt back to let her know IF MRI is needed.    Date needed: during this month, December 2019  Provider name: Dr. Cristobal      Action Taken: Message routed to:  [facility] ([facility]):  Hepatology   1 Principal Discharge DX:	Altered mental status

## 2022-09-26 ENCOUNTER — TELEPHONE (OUTPATIENT)
Dept: INFECTIOUS DISEASES | Facility: CLINIC | Age: 47
End: 2022-09-26

## 2022-09-26 RX ORDER — LIDOCAINE HYDROCHLORIDE 10 MG/ML
2 INJECTION, SOLUTION EPIDURAL; INFILTRATION; INTRACAUDAL; PERINEURAL
Status: DISCONTINUED | OUTPATIENT
Start: 2022-09-13 | End: 2023-03-07

## 2022-09-26 RX ORDER — METHYLPREDNISOLONE ACETATE 40 MG/ML
40 INJECTION, SUSPENSION INTRA-ARTICULAR; INTRALESIONAL; INTRAMUSCULAR; SOFT TISSUE
Status: DISCONTINUED | OUTPATIENT
Start: 2022-09-13 | End: 2023-03-17

## 2022-09-26 RX ORDER — METHYLPREDNISOLONE ACETATE 40 MG/ML
40 INJECTION, SUSPENSION INTRA-ARTICULAR; INTRALESIONAL; INTRAMUSCULAR; SOFT TISSUE
Status: DISCONTINUED | OUTPATIENT
Start: 2022-09-13 | End: 2023-05-25

## 2022-09-26 NOTE — TELEPHONE ENCOUNTER
[facility]    Phone Message    May a detailed message be left on voicemail: yes     Reason for Call: Other: patient called in and would like to get a referral to see possibly Dr. Scott as she is dealing with Autoimmune issues.  Also tried to schedule follow up with Dr Gonzales and rossana is out till january.  Please advse if patient can see someone else or if she would be able to get in sooner. Thank you.     Action Taken: Other: ID    Travel Screening: Not Applicable

## 2022-09-26 NOTE — TELEPHONE ENCOUNTER
Patient called back. She would like a call instead of a mychart message. She would like to be scheduled with someone in the next 1-2 weeks.  Stated she is an urgent case and needs to be seen sooner than 4 months.  Writer stated she would send the message.

## 2022-10-04 ENCOUNTER — TELEPHONE (OUTPATIENT)
Dept: RHEUMATOLOGY | Facility: CLINIC | Age: 47
End: 2022-10-04

## 2022-10-04 NOTE — TELEPHONE ENCOUNTER
Patient updating she has tested positive for CoVid, coughing and sore throat symptoms.      Patient requesting infusion of monclonal antibody which she has had before instead of Paxlovid tabs because of her GI sensitivity.  Instructed patient to contact her primary care provider to have the medication ordered as her symptom's began w/i the last 5 days.    Cancel infusion appointment for today and update to Dr. Lemus.    Christine Corey RN  Rheumatology Clinic

## 2022-10-04 NOTE — TELEPHONE ENCOUNTER
Per Dr. Lemus's message he is in agreement with holding the tocilizumab (Actemra)  Infusion x1 wk pending patient is free from Covid symptom's.    Reminder set to f/u with patient on 10/10, will need new Infusion plan for new date.    Christine Corey RN  Rheumatology Clinic

## 2022-10-05 ENCOUNTER — TELEPHONE (OUTPATIENT)
Dept: DERMATOLOGY | Facility: CLINIC | Age: 47
End: 2022-10-05

## 2022-10-05 NOTE — TELEPHONE ENCOUNTER
[facility] Call [facility]    Phone Message    May a detailed message be left on voicemail: yes     Reason for Call: Other: Patient tested positive for covid 10/02/2022 and still has symptoms. Patient requested reschedule. Writer did not cancel 10/06/2022 appointment yet.    Please call back to reschedule.    Thank you    Action Taken: Message routed to:  [facility] ([facility]): Derm Rheum    Travel Screening: Not Applicable

## 2022-10-05 NOTE — TELEPHONE ENCOUNTER
Return call placed to patient to explain that there are not any appointments available with Dr. Monique before the dermatology/rheumatology clinic transitions to other providers. Patient verbalized understanding and agrees to plan to be rescheduled with a different provider.  Kaley Serna RN  Adult Rheumatology Clinic

## 2022-10-07 DIAGNOSIS — M13.0 SERONEGATIVE POLYARTHRITIS: ICD-10-CM

## 2022-10-07 RX ORDER — METHYLPREDNISOLONE 4 MG/1
TABLET ORAL
Qty: 36 TABLET | Refills: 1 | Status: SHIPPED | OUTPATIENT
Start: 2022-10-07 | End: 2022-11-25

## 2022-10-07 NOTE — TELEPHONE ENCOUNTER
Received call from patient reporting an RA flare, with redness and swelling in hands, knees, ankles and feet, accompanied by tenderness all over her body. She rates pain at 8-9/10, described as aching and tenderness.  She rescheduled her tocilizumab (Actemra) infusion from 10/4 to 10/11 after horace Covid and reports she received monoclonal antibodies yesterday. She is requesting a refill of methylprednisolone as previously ordered on 8/11/22 for a RA flare. Informed patient that request will be sent for Dr. Lemus to review.   Kaley Serna RN  Adult Rheumatology Clinic

## 2022-10-10 ENCOUNTER — TELEPHONE (OUTPATIENT)
Dept: RHEUMATOLOGY | Facility: CLINIC | Age: 47
End: 2022-10-10

## 2022-10-10 NOTE — TELEPHONE ENCOUNTER
Patient had a lapse in her infusion because she had Covid. She is wondering if anything needs to be done any differently because of the lapse.

## 2022-10-11 ENCOUNTER — TELEPHONE (OUTPATIENT)
Dept: RHEUMATOLOGY | Facility: CLINIC | Age: 47
End: 2022-10-11

## 2022-10-11 ENCOUNTER — MYC MEDICAL ADVICE (OUTPATIENT)
Dept: RHEUMATOLOGY | Facility: CLINIC | Age: 47
End: 2022-10-11

## 2022-10-11 NOTE — TELEPHONE ENCOUNTER
Attempted to call patient, unable to leave voice message, voicemail full.  Will send [employer] message.    JESSICA MoraN, RN  RN Care Coordinator Rheumatology

## 2022-10-12 ENCOUNTER — TELEPHONE (OUTPATIENT)
Dept: DERMATOLOGY | Facility: CLINIC | Age: 47
End: 2022-10-12

## 2022-10-12 NOTE — TELEPHONE ENCOUNTER
Will route to Dr. Lemus for confirmation.    Kaley Merchant, BSN, RN  RN Care Coordinator Rheumatology

## 2022-10-12 NOTE — TELEPHONE ENCOUNTER
Health Call Center    Phone Message    May a detailed message be left on voicemail: yes     Reason for Call: Other: Per Pt Laxmi following up on her 10/5 convo with RN Kaley. She was under the impression that she would be getting a call back from Dr. Monique's team, as she had to cancel her last appointment with Dr. Monique due to Covid. She would like to be squeezed in. Please call to advise if that is possible. [phone]. Thank you.      Action Taken: Message routed to:  [facility] ([facility] Derm Rheum    Travel Screening: Not Applicable

## 2022-10-13 NOTE — TELEPHONE ENCOUNTER
Not sure I understand: is there concern that the infusion center will not provide infusion without MD re-ordering actemra after a 10 day delay? If so, please check with infusion center. I am happy to provide any orders that the infusion center needs to get restarted.

## 2022-10-13 NOTE — TELEPHONE ENCOUNTER
Call to Infusion Center. Confirmed that patient is scheduled. Confirmed that there is a 10 day delay due to her illness. Infusion Center confirmed that they will be able to release the order and perform the infusion. They do not need an order designating the delay.    Will send [employer] message to patient.    JESSICA MoraN, RN  RN Care Coordinator Rheumatology

## 2022-10-14 NOTE — TELEPHONE ENCOUNTER
Called and left message letting pt know that Dr. Monique will be opening a clinic in December and when it is open that we will call to offer her an appt with him.  Left number to get a hold of me.    Suzi Hughes RN  Rheumatology Clinic

## 2022-10-14 NOTE — TELEPHONE ENCOUNTER
Please seen encounter from 10/12/22 for resolution of this matter.    Suzi Hughes RN  Rheumatology Clinic

## 2022-10-18 DIAGNOSIS — M79.645 BILATERAL THUMB PAIN: Primary | ICD-10-CM

## 2022-10-18 DIAGNOSIS — M79.644 BILATERAL THUMB PAIN: Primary | ICD-10-CM

## 2022-10-20 ENCOUNTER — INFUSION THERAPY VISIT (OUTPATIENT)
Dept: INFUSION THERAPY | Facility: CLINIC | Age: 47
End: 2022-10-20
Attending: INTERNAL MEDICINE
Payer: MEDICARE

## 2022-10-20 VITALS
DIASTOLIC BLOOD PRESSURE: 89 MMHG | OXYGEN SATURATION: 96 % | SYSTOLIC BLOOD PRESSURE: 130 MMHG | TEMPERATURE: 98.2 F | HEART RATE: 63 BPM | RESPIRATION RATE: 18 BRPM

## 2022-10-20 DIAGNOSIS — M13.80 SERONEGATIVE INFLAMMATORY ARTHRITIS: Primary | ICD-10-CM

## 2022-10-20 PROCEDURE — 999N000203 HC STATISTICAL VASC ACCESS NURSE TIME, 16-31 MINUTES

## 2022-10-20 PROCEDURE — 999N000285 HC STATISTIC VASC ACCESS LAB DRAW WITH PIV START

## 2022-10-20 PROCEDURE — 258N000003 HC RX IP 258 OP 636: Performed by: INTERNAL MEDICINE

## 2022-10-20 PROCEDURE — 250N000013 HC RX MED GY IP 250 OP 250 PS 637: Performed by: INTERNAL MEDICINE

## 2022-10-20 PROCEDURE — 250N000011 HC RX IP 250 OP 636: Performed by: INTERNAL MEDICINE

## 2022-10-20 PROCEDURE — 96375 TX/PRO/DX INJ NEW DRUG ADDON: CPT

## 2022-10-20 PROCEDURE — 96365 THER/PROPH/DIAG IV INF INIT: CPT

## 2022-10-20 RX ORDER — ACETAMINOPHEN 325 MG/1
650 TABLET ORAL ONCE
Status: CANCELLED | OUTPATIENT
Start: 2022-11-01

## 2022-10-20 RX ORDER — NALOXONE HYDROCHLORIDE 0.4 MG/ML
0.2 INJECTION, SOLUTION INTRAMUSCULAR; INTRAVENOUS; SUBCUTANEOUS
Status: CANCELLED | OUTPATIENT
Start: 2022-11-01

## 2022-10-20 RX ORDER — METHYLPREDNISOLONE SODIUM SUCCINATE 125 MG/2ML
125 INJECTION, POWDER, LYOPHILIZED, FOR SOLUTION INTRAMUSCULAR; INTRAVENOUS ONCE
Status: CANCELLED | OUTPATIENT
Start: 2022-11-01

## 2022-10-20 RX ORDER — HEPARIN SODIUM,PORCINE 10 UNIT/ML
5 VIAL (ML) INTRAVENOUS
Status: CANCELLED | OUTPATIENT
Start: 2022-11-01

## 2022-10-20 RX ORDER — MEPERIDINE HYDROCHLORIDE 50 MG/ML
25 INJECTION INTRAMUSCULAR; INTRAVENOUS; SUBCUTANEOUS EVERY 30 MIN PRN
Status: CANCELLED | OUTPATIENT
Start: 2022-11-01

## 2022-10-20 RX ORDER — METHYLPREDNISOLONE SODIUM SUCCINATE 125 MG/2ML
125 INJECTION, POWDER, LYOPHILIZED, FOR SOLUTION INTRAMUSCULAR; INTRAVENOUS ONCE
Status: COMPLETED | OUTPATIENT
Start: 2022-10-20 | End: 2022-10-20

## 2022-10-20 RX ORDER — DIPHENHYDRAMINE HYDROCHLORIDE 50 MG/ML
50 INJECTION INTRAMUSCULAR; INTRAVENOUS
Status: CANCELLED
Start: 2022-11-01

## 2022-10-20 RX ORDER — METHYLPREDNISOLONE SODIUM SUCCINATE 125 MG/2ML
125 INJECTION, POWDER, LYOPHILIZED, FOR SOLUTION INTRAMUSCULAR; INTRAVENOUS
Status: CANCELLED
Start: 2022-11-01

## 2022-10-20 RX ORDER — ALBUTEROL SULFATE 90 UG/1
1-2 AEROSOL, METERED RESPIRATORY (INHALATION)
Status: CANCELLED
Start: 2022-11-01

## 2022-10-20 RX ORDER — ACETAMINOPHEN 325 MG/1
650 TABLET ORAL ONCE
Status: COMPLETED | OUTPATIENT
Start: 2022-10-20 | End: 2022-10-20

## 2022-10-20 RX ORDER — HEPARIN SODIUM (PORCINE) LOCK FLUSH IV SOLN 100 UNIT/ML 100 UNIT/ML
5 SOLUTION INTRAVENOUS
Status: CANCELLED | OUTPATIENT
Start: 2022-11-01

## 2022-10-20 RX ORDER — ALBUTEROL SULFATE 0.83 MG/ML
2.5 SOLUTION RESPIRATORY (INHALATION)
Status: CANCELLED | OUTPATIENT
Start: 2022-11-01

## 2022-10-20 RX ORDER — EPINEPHRINE 1 MG/ML
0.3 INJECTION, SOLUTION INTRAMUSCULAR; SUBCUTANEOUS EVERY 5 MIN PRN
Status: CANCELLED | OUTPATIENT
Start: 2022-11-01

## 2022-10-20 RX ADMIN — METHYLPREDNISOLONE SODIUM SUCCINATE 125 MG: 125 INJECTION, POWDER, FOR SOLUTION INTRAMUSCULAR; INTRAVENOUS at 10:14

## 2022-10-20 RX ADMIN — SODIUM CHLORIDE 250 ML: 9 INJECTION, SOLUTION INTRAVENOUS at 10:15

## 2022-10-20 RX ADMIN — TOCILIZUMAB 400 MG: 20 INJECTION, SOLUTION, CONCENTRATE INTRAVENOUS at 10:26

## 2022-10-20 RX ADMIN — ACETAMINOPHEN 650 MG: 325 TABLET, FILM COATED ORAL at 09:41

## 2022-10-20 ASSESSMENT — PAIN SCALES - GENERAL: PAINLEVEL: WORST PAIN (10)

## 2022-10-20 NOTE — PROGRESS NOTES
~~~ NOTE: If the patient answers yes to any of the questions below, hold the infusion and contact ordering provider or on-call provider.    1. Have you recently had an elevated temperature, fever, chills, productive cough, coughing for 3 weeks or longer or hemoptysis,  abnormal vital signs, night sweats,  chest pain or have you noticed a decrease in your appetite, unexplained weight loss or fatigue? No  2. Do you have any open wounds or new incisions? No  3. Do you have any upcoming hospitalizations or surgeries? Does not include esophagogastroduodenoscopy, colonoscopy, endoscopic retrograde cholangiopancreatography (ERCP), endoscopic ultrasound (EUS), dental procedures or joint aspiration/steroid injections No  4. Do you currently have any signs of illness or infection or are you on any antibiotics? No  5. Have you had any new, sudden or worsening abdominal pain? No  6. Have you or anyone in your household received a live vaccination in the past 4 weeks? Please note: No live vaccines while on biologic/chemotherapy until 6 months after the last treatment. Patient can receive the flu vaccine (shot only), pneumovax and the Covid vaccine. It is optimal for the patient to get these vaccines mid cycle, but they can be given at any time as long as it is not on the day of the infusion. No  7. Have you recently been diagnosed with any new nervous system diseases (ie. Multiple sclerosis, Guillain [place], seizures, neurological changes) or cancer diagnosis? Are you on any form of radiation or chemotherapy? No  8. Are you pregnant or breast feeding or do you have plans of pregnancy in the future? No  9. Have you been having any signs of worsening depression or suicidal ideations?  (benlysta only) No  10. Have there been any other new onset medical symptoms? No  11. Have you had any new blood clots? (IVIG only) No   Infusion Nursing Note:  [facility]    Patient seen by provider today: No    Note:  Pt arrives ambulatory with steady gate to [facility]. Pt requesting bedroom. Pt reports 10/10 pain.  /89   Pulse 63   Temp 98.2  F (36.8  C) (Oral)   Resp 18   SpO2 96%     + pt premedicated with tylenol and solu-medrol.    Intravenous Access:  Peripheral IV placed by PICC in left forearm per pt request.    Treatment Conditions:  Pt requesting to contact 's clinic and verify when labs are due. Clinic called at [phone] and Suzi ELLIOTT verified that labs are not due until next month/November.    Post Infusion Assessment:  Patient tolerated infusion without incident. Pt slept during infusion.    Site patent and intact, free from redness, edema or discomfort.  No evidence of extravasations.  Access discontinued per protocol.     Discharge Plan:   Patient discharged in stable condition accompanied by: self.  Departure Mode: Ambulatory.      Kateryna Miguel RN

## 2022-10-27 ENCOUNTER — OFFICE VISIT (OUTPATIENT)
Dept: ORTHOPEDICS | Facility: CLINIC | Age: 47
End: 2022-10-27
Payer: MEDICARE

## 2022-10-27 ENCOUNTER — THERAPY VISIT (OUTPATIENT)
Dept: OCCUPATIONAL THERAPY | Facility: CLINIC | Age: 47
End: 2022-10-27
Payer: MEDICARE

## 2022-10-27 ENCOUNTER — TELEPHONE (OUTPATIENT)
Dept: RHEUMATOLOGY | Facility: CLINIC | Age: 47
End: 2022-10-27

## 2022-10-27 ENCOUNTER — THERAPY VISIT (OUTPATIENT)
Dept: PHYSICAL THERAPY | Facility: CLINIC | Age: 47
End: 2022-10-27
Payer: MEDICARE

## 2022-10-27 ENCOUNTER — ANCILLARY PROCEDURE (OUTPATIENT)
Dept: GENERAL RADIOLOGY | Facility: CLINIC | Age: 47
End: 2022-10-27
Attending: ORTHOPAEDIC SURGERY
Payer: MEDICARE

## 2022-10-27 DIAGNOSIS — M25.531 PAIN IN BOTH WRISTS: ICD-10-CM

## 2022-10-27 DIAGNOSIS — M79.644 BILATERAL THUMB PAIN: Primary | ICD-10-CM

## 2022-10-27 DIAGNOSIS — M25.532 PAIN IN BOTH WRISTS: ICD-10-CM

## 2022-10-27 DIAGNOSIS — M54.6 BILATERAL THORACIC BACK PAIN: ICD-10-CM

## 2022-10-27 DIAGNOSIS — M54.2 NECK PAIN: Primary | ICD-10-CM

## 2022-10-27 DIAGNOSIS — M79.645 BILATERAL THUMB PAIN: Primary | ICD-10-CM

## 2022-10-27 DIAGNOSIS — M79.645 BILATERAL THUMB PAIN: ICD-10-CM

## 2022-10-27 DIAGNOSIS — M79.644 BILATERAL THUMB PAIN: ICD-10-CM

## 2022-10-27 DIAGNOSIS — M13.80 SERONEGATIVE INFLAMMATORY ARTHRITIS: Primary | ICD-10-CM

## 2022-10-27 PROCEDURE — 99207 PR DROP WITH A PROCEDURE: CPT | Performed by: ORTHOPAEDIC SURGERY

## 2022-10-27 PROCEDURE — 97140 MANUAL THERAPY 1/> REGIONS: CPT | Mod: GO | Performed by: OCCUPATIONAL THERAPIST

## 2022-10-27 PROCEDURE — 77002 NEEDLE LOCALIZATION BY XRAY: CPT | Performed by: ORTHOPAEDIC SURGERY

## 2022-10-27 PROCEDURE — 20600 DRAIN/INJ JOINT/BURSA W/O US: CPT | Mod: 50 | Performed by: ORTHOPAEDIC SURGERY

## 2022-10-27 PROCEDURE — 97530 THERAPEUTIC ACTIVITIES: CPT | Mod: GP | Performed by: PHYSICAL THERAPIST

## 2022-10-27 PROCEDURE — 97012 MECHANICAL TRACTION THERAPY: CPT | Mod: GP | Performed by: PHYSICAL THERAPIST

## 2022-10-27 PROCEDURE — 97763 ORTHC/PROSTC MGMT SBSQ ENC: CPT | Mod: GO | Performed by: OCCUPATIONAL THERAPIST

## 2022-10-27 PROCEDURE — 97110 THERAPEUTIC EXERCISES: CPT | Mod: GO | Performed by: OCCUPATIONAL THERAPIST

## 2022-10-27 RX ADMIN — LIDOCAINE HYDROCHLORIDE 2 ML: 10 INJECTION, SOLUTION EPIDURAL; INFILTRATION; INTRACAUDAL; PERINEURAL at 11:08

## 2022-10-27 RX ADMIN — TRIAMCINOLONE ACETONIDE 40 MG: 40 INJECTION, SUSPENSION INTRA-ARTICULAR; INTRAMUSCULAR at 11:08

## 2022-10-27 NOTE — LETTER
10/27/2022         RE: Laxmi Ya  [address] [place] [id number]        Dear Colleague,    Thank you for referring your patient, Laxmi Ya, to the [facility]. Please see a copy of my visit note below.    Hand / Upper Extremity Injection/Arthrocentesis    Date/Time: 10/27/2022 11:08 AM  Performed by: Twila Petit MD  Authorized by: Twila Petit MD     Indications:  Pain  Needle Size:  25 G  Guidance: fluoroscopy    Condition: osteoarthritis    Location:  Thumb   Location comment:  Bilateral thumb STT joint     Medications:  40 mg triamcinolone 40 MG/ML; 2 mL lidocaine (PF) 1 %  Outcome:  Tolerated well, no immediate complications  Procedure discussed: discussed risks, benefits, and alternatives    Consent Given by:  Patient  Timeout: timeout called immediately prior to procedure    Prep: patient was prepped and draped in usual sterile fashion          [facility] [address] [address]  [phone]  Dept: [phone]  ______________________________________________________________________________    Patient: Laxmi Ya   : 1975   MRN: [id number]   2022    INVASIVE PROCEDURE SAFETY CHECKLIST    Date: 10/27/2022   Procedure:bilateral thumb STT joint cortisone injections  Patient Name: Laxmi Ya  MRN: [id number]  YOB: 1975    Action: Complete sections as appropriate. Any discrepancy results in a HARD COPY until resolved.     PRE PROCEDURE:  Patient ID verified with 2 identifiers (name and  or MRN): Yes  Procedure and site verified with patient/designee (when able): Yes  Accurate consent documentation in medical record: Yes  H&P (or appropriate assessment) documented in medical record: Yes  H&P must be up to 20 days prior to procedure and updates within 24 hours of procedure as applicable: Yes  Relevant diagnostic and  radiology test results appropriately labeled and displayed as applicable: Yes  Procedure site(s) marked with provider initials: NA    TIMEOUT:  Time-Out performed immediately prior to starting procedure, including verbal and active participation of all team members addressing the following:Yes  * Correct patient identify  * Confirmed that the correct side and site are marked  * An accurate procedure consent form  * Agreement on the procedure to be done  * Correct patient position  * Relevant images and results are properly labeled and appropriately displayed  * The need to administer antibiotics or fluids for irrigation purposes during the procedure as applicable   * Safety precautions based on patient history or medication use    DURING PROCEDURE: Verification of correct person, site, and procedures any time the responsibility for care of the patient is transferred to another member of the care team.       The following medications were given:         Prior to injection, verified patient identity using patient's name and date of birth.  Due to injection administration, patient instructed to remain in clinic for 15 minutes  afterwards, and to report any adverse reaction to me immediately.    Joint injection was performed.    Medication Name: Kenalog 40 NDC [address]-7  Drug Amount Wasted:  None.  Vial/Syringe: Single dose vial  Expiration Date:  7/1/2024    Medication Name Lidocaine 1% NDC 43485-461-26    Scribed by Chelsy Savage for Dr. Petit on October 27, 2022 at 11:15a based on the provider's statements to me.     MIN Garza

## 2022-10-27 NOTE — PROGRESS NOTES
Hand / Upper Extremity Injection/Arthrocentesis    Date/Time: 10/27/2022 11:08 AM  Performed by: Twila Petit MD  Authorized by: Twila Petit MD     Indications:  Pain  Needle Size:  25 G  Guidance: fluoroscopy    Condition: osteoarthritis    Location:  Thumb   Location comment:  Bilateral thumb STT joint     Medications:  40 mg triamcinolone 40 MG/ML; 2 mL lidocaine (PF) 1 %  Outcome:  Tolerated well, no immediate complications  Procedure discussed: discussed risks, benefits, and alternatives    Consent Given by:  Patient  Timeout: timeout called immediately prior to procedure    Prep: patient was prepped and draped in usual sterile fashion          [facility] [address]  [address] [facility] [id number] [phone]  Dept: [phone]  ______________________________________________________________________________    Patient: Laxmi Ya   : 1975   MRN: [id number]   2022    INVASIVE PROCEDURE SAFETY CHECKLIST    Date: 10/27/2022   Procedure:bilateral thumb STT joint cortisone injections  Patient Name: Laxmi Ya  MRN: [id number]  YOB: 1975    Action: Complete sections as appropriate. Any discrepancy results in a HARD COPY until resolved.     PRE PROCEDURE:  Patient ID verified with 2 identifiers (name and  or MRN): Yes  Procedure and site verified with patient/designee (when able): Yes  Accurate consent documentation in medical record: Yes  H&P (or appropriate assessment) documented in medical record: Yes  H&P must be up to 20 days prior to procedure and updates within 24 hours of procedure as applicable: Yes  Relevant diagnostic and radiology test results appropriately labeled and displayed as applicable: Yes  Procedure site(s) marked with provider initials: NA    TIMEOUT:  Time-Out performed immediately prior to starting procedure, including verbal and active participation of all team  members addressing the following:Yes  * Correct patient identify  * Confirmed that the correct side and site are marked  * An accurate procedure consent form  * Agreement on the procedure to be done  * Correct patient position  * Relevant images and results are properly labeled and appropriately displayed  * The need to administer antibiotics or fluids for irrigation purposes during the procedure as applicable   * Safety precautions based on patient history or medication use    DURING PROCEDURE: Verification of correct person, site, and procedures any time the responsibility for care of the patient is transferred to another member of the care team.       The following medications were given:         Prior to injection, verified patient identity using patient's name and date of birth.  Due to injection administration, patient instructed to remain in clinic for 15 minutes  afterwards, and to report any adverse reaction to me immediately.    Joint injection was performed.    Medication Name: Kenalog 40 NDC [address]-6  Drug Amount Wasted:  None.  Vial/Syringe: Single dose vial  Expiration Date:  7/1/2024    Medication Name Lidocaine 1% NDC 91970-871-97    Scribed by Chelsy Savage for Dr. Petit on October 27, 2022 at 11:15a based on the provider's statements to me.     MIN Garza

## 2022-10-27 NOTE — NURSING NOTE
Reason For Visit:   Chief Complaint   Patient presents with     RECHECK     Bilateral STT joint repeat cortisone injections       Primary MD: Artur Marsh  Ref. MD: NAY    Age: 47 year old    ?  No      There were no vitals taken for this visit.      Pain Assessment  Patient Currently in Pain: Yes  0-10 Pain Scale: 8 (Up to 10/10 with use)  Primary Pain Location: Wrist (Bilateral wrist)    Hand Dominance Evaluation  Hand Dominance: Right      force  R hand pincher force: 4.082 kg (9 lb) (with pain)  R hand  level 2 force: 13.6 kg (30 lb)  L hand pincher force: 4.082 kg (9 lb) (with pain)  L hand  level  2 force: 11.3 kg (25 lb)    QuickDASH Assessment  No flowsheet data found.       Current Outpatient Medications   Medication Sig Dispense Refill     Albuterol Sulfate (PROAIR HFA IN) Inhale into the lungs as needed        Artificial Tear Solution (SOOTHE XP) SOLN as needed       atenolol (TENORMIN) 50 MG tablet Take 50 mg by mouth daily       benzoyl peroxide 5 % external liquid Use daily as directed 148 mL 3     clindamycin (CLEOCIN T) 1 % external lotion Apply topically 2 times daily 60 mL 3     diclofenac (VOLTAREN) 1 % topical gel Apply topically 4 times daily as needed        doxycycline hyclate (VIBRA-TABS) 100 MG tablet Take 100 mg by mouth 2 times daily       Dupilumab (DUPIXENT) 300 MG/2ML SOPN Inject 300 mg Subcutaneous every 14 days 4 mL 2     fexofenadine (ALLEGRA) 180 MG tablet Take 1 tablet (180 mg) by mouth daily 90 tablet 2     fidaxomicin (DIFICID) 200 MG tablet Take 200 mg by mouth       fluticasone (FLONASE) 50 MCG/ACT nasal spray Spray 2 sprays into both nostrils 2 times daily        lidocaine (LIDODERM) 5 % patch as needed   1     losartan (COZAAR) 100 MG tablet Take 100 mg by mouth daily       metFORMIN (GLUCOPHAGE-XR) 500 MG 24 hr tablet Take 500 mg by mouth daily        methylPREDNISolone (MEDROL) 4 MG tablet Take 4 tabs daily X 4, Then 3 tabs daily X 4, then 2 tabs  daily X 4 36 tablet 1     Montelukast Sodium (SINGULAIR PO) Take 10 mg by mouth At Bedtime        norethindrone (MICRONOR) 0.35 MG tablet Take 0.35 mg by mouth       nystatin (MYCOSTATIN) 624726 UNIT/ML suspension TAKE 5ML BY MOUTH 4XDAILY FOR 14 DAYS. CONTINUE AT LEAST 2 DAYS AFTER SYMPTOMS RESOLVED       olopatadine (PAZEO) 0.7 % ophthalmic solution Apply 2 Units to eye 2 times daily       OMEPRAZOLE PO Take 40 mg by mouth 2 times daily        rifaximin (XIFAXAN) 550 MG TABS tablet Take 200 mg by mouth 3 times daily Take for 10 days, has on had as needed for small bowel bacteria overgrowh       spironolactone-HCTZ (ALDACTAZIDE) 25-25 MG tablet Take 1 tablet by mouth daily       sucralfate (CARAFATE) 1 GM/10ML suspension Take by mouth 4 times daily as needed        tobramycin (TOBREX) 0.3 % ophthalmic solution 1-2 drops 2 times daily       tobramycin-dexamethasone (TOBRADEX) 0.3-0.1 % ophthalmic ointment 0.25 inches At Bedtime       triamcinolone (KENALOG) 0.1 % external ointment Apply topically 2 times daily 80 g 1       Allergies   Allergen Reactions     Polyethylene Glycol Rash     Codeine      Other reaction(s): Gastrointestinal, GI intolerance, Vomiting  Vomiting       Gadolinium Dizziness and Nausea     Hydrocodone-Acetaminophen Nausea and Vomiting     Other reaction(s): GI intolerance     Iodine      abstract     Sulfasalazine      Other reaction(s): GI intolerance  Has tried and had nausa.     Tramadol Nausea and Vomiting     Other reaction(s): Gastrointestinal, Other (see comments)  Nausea and vomiting   unknown       Gluten Meal Other (See Comments) and Rash     Other reaction(s): Gastrointestinal, GI intolerance  Dizziness, tired, rash, stomach cramps, thrush, mouth sores  Dizziness, tired, rash, stomach cramps, thrush, mouth sores         MIN Garza

## 2022-10-27 NOTE — TELEPHONE ENCOUNTER
Patient in clinic seeing another provider and patient asked to see this RN face to face to discuss her infusion experience in [facility]. Patient lives in [place] and would prefer for her monthly infusions to be closer to home.    Patient expressed that due to her severe inflammatory arthritis, she is disabled, experiences significant pain in her hips, ribs, back, and cannot sit for extended periods of time. It is too painful for patient to sit for the tocilizumab (Actemra)  Infusion, she needs to be lying down otherwise the pain is insurmountable.     [facility] was not able to accommodate this since they only have 2 beds. Patient has done previous infusions at [place] [facility] where she was able to lay down. Patient is asking for changes to be made to her treatment plan to accommodate her disease process.    Calls placed to both [facility] and and [facility], spoke with charge nurses to discuss their infusion center accommodations. Neither center can schedule a patient into a bed , nor can either center confirm they will have a bed available. Bed availability is dependent on scheduling and staffing. They both said that patient can request a bed, however it is not guaranteed. [facility] has 5 beds, [facility] has 2 beds.  Both centers confirmed that they will try to accommodate and recommended that this RN add the patient request to the treatment plan.    Call returned to patient to update her on this information. Patient may call and change to [facility] location. Patient does have phone numbers to call. Infusion plan updated.    All questions answered.    Kaley Merchant, BSN, RN  RN Care Coordinator Rheumatology

## 2022-10-30 RX ORDER — LIDOCAINE HYDROCHLORIDE 10 MG/ML
2 INJECTION, SOLUTION EPIDURAL; INFILTRATION; INTRACAUDAL; PERINEURAL
Status: DISCONTINUED | OUTPATIENT
Start: 2022-10-27 | End: 2023-03-07

## 2022-10-30 RX ORDER — TRIAMCINOLONE ACETONIDE 40 MG/ML
40 INJECTION, SUSPENSION INTRA-ARTICULAR; INTRAMUSCULAR
Status: DISCONTINUED | OUTPATIENT
Start: 2022-10-27 | End: 2023-03-07

## 2022-10-31 ENCOUNTER — OFFICE VISIT (OUTPATIENT)
Dept: GASTROENTEROLOGY | Facility: CLINIC | Age: 47
End: 2022-10-31
Attending: INTERNAL MEDICINE
Payer: MEDICARE

## 2022-10-31 VITALS
BODY MASS INDEX: 34.35 KG/M2 | OXYGEN SATURATION: 97 % | HEART RATE: 69 BPM | WEIGHT: 212.7 LBS | SYSTOLIC BLOOD PRESSURE: 168 MMHG | DIASTOLIC BLOOD PRESSURE: 101 MMHG

## 2022-10-31 DIAGNOSIS — K57.32 DIVERTICULITIS OF COLON: ICD-10-CM

## 2022-10-31 DIAGNOSIS — K75.81 NASH (NONALCOHOLIC STEATOHEPATITIS): ICD-10-CM

## 2022-10-31 PROCEDURE — 99215 OFFICE O/P EST HI 40 MIN: CPT | Performed by: INTERNAL MEDICINE

## 2022-10-31 RX ORDER — OLOPATADINE HYDROCHLORIDE 7 MG/ML
1 SOLUTION OPHTHALMIC DAILY
COMMUNITY

## 2022-10-31 ASSESSMENT — PAIN SCALES - GENERAL: PAINLEVEL: WORST PAIN (10)

## 2022-10-31 NOTE — PROGRESS NOTES
GI CLINIC VISIT    CC/REFERRING MD:  Cheryl Mcneill*  REASON FOR CONSULTATION:   Cheryl Mcneill* for SIBO    ASSESSMENT/PLAN:  47 year old female with numerous complaints including abdominal pain and diarrhea that resolved on doxycycline.    She has 2 specific questions today: Should she get microbiome testing, and how should she improve her microbiota.    I focused on these two questions today.     I advised against any type of microbiome testing.  Discussed the limitations of these tests and the inability at this time to accurately discern normal from abnormal.  Undoubtably she would have decreased adversity with some other positive signal that would not substantially impact her care in any knowledgeable way.    I discussed that the best way to improve her intestinal microbiota is a healthy diet and healthy lifestyle.  Recommended increasing soluble fiber.  Recommended plant-based diet.  Recommended avoiding heavily processed food and red meats.    I extensively reviewed her work-up over the past 2 years.  She does not have any intestinal inflammation that I can see.  While I cannot see the primary data, her [place] gastroenterologist interpreted her duodenal aspirate is negative for SIBO.  There is no clear evidence of intestinal inflammation on endoscopy or CT scans in 2021 in 2022.  I reviewed her pathology results on her with her [employer] dee from [place] which was negative for microscopic colitis.    At this time I recommend avoiding additional subspecialist in her care.  I am not sure why her symptoms are better on doxycycline.  She seems to have multisystem involvement in terms of her symptoms.  Although for the most part I do not see objective evidence of inflammation.  I am not sure who would be best to manage her doxycycline responsive systemic symptoms but I informed her I would not be able to do this.  Informed her that she does not have Crohn's disease or ulcerative colitis or microscopic  colitis.    Finally we reviewed a number of normal test in her chart.  It is my opinion that healthy diet and a healthy lifestyle including weight loss, regular sleep, routine exercise, and treatment of mental health is what is needed for long-term symptomatic control.     Recommend she follow-up with her local gastroenterologist    Summary recommendations:  -- Recommend routine intake of soluble fiber  -- Recommend healthy diet  -- Recommend weight loss  -- Recommend ongoing mental tammi care  -- Recommend minimizing subspecialty consults and 2nd opinions in light of numerous normal testing. Focus on healthy diet, exercise, and mental health.   -- Do NOT recommend any microbiome testing.     Thank you for this consultation.  It was a pleasure to participate in the care of this patient; please contact us with any further questions.  A total of 57 minutes, face to face, was spent with this patient, >50% of which was counseling regarding the above delineated issues.    This note was created with voice recognition software, and while reviewed for accuracy, typos may remain.     Lokesh Thompson MD MS  Associate Professor of Medicine  Inflammatory Bowel Disease Program  Division of Gastroenterology, Hepatology and Nutrition  [facility]  Pager: [phone]      HPI  Here today for a number of reasons. She wants a microbiome test to figure out what is wrong with her gut bacteria. She wants to get her microbiome back into a normal state so she does not have to be on antibiotics.     In terms of her symptoms she has diarrhea abdominal pain.     Typically she will have diarrhea every 2 hours (12 a day) for years. Reports this is daily for years.     She was on rifaximin.    Only improved with doxycycline. On doxy, she reports that all of her symptoms (GI, Eye,     She has been disabled for 4 and a half years and is on social security.     Diverticulitis led to surgery for smoldering symptoms.     [facility] data summary:  12/21/21:  Colonoscopy reported a patent the colo-colonic anastomosis that was healthy appearing. Incidentally noted were internal and external hemorrhoids. Random colon biopsies were nondiagnostic. Terminal ileal examination and biopsies were also normal. EGD was normal. Duodenal biopsies were non- nondiagnostic. Duodenal aspirate was not consistent with small intestinal bacterial overgrowth. CT enterography completed in November 2021 noted normal appearance of the small bowel. There were no inflammatory changes noted. There was no CT evidence for acute diverticulitis. Repeat CT scan of the abdomen in the emergency room on 12/20/2021 again found no acute findings to explain the abdominal pain. There was no evidence of diverticulitis. Incidentally noted was an increased stool burden on the right side of the colon.       ROS:    10 point ROS neg other than the symptoms noted above in the HPI.    PREVIOUS ENDOSCOPY:  12/14/21 Colonoscopy: Normal ileum, Diverticulosis, end to end colonic anastomosis with healthy appearance, otherwise normal colon.  Pathology reviewed on patients phone (primary path report) - no microscopic colitis.     12/14/21 EGD: Normal EGD. Duodenal fluid aspiration done for SIBO.     PERTINENT RELEVANT IMAGING OR LABS:  11/4/21: Normal CT from anus to rectum      ALLERGIES:     Allergies   Allergen Reactions     Polyethylene Glycol Rash     Codeine      Other reaction(s): Gastrointestinal, GI intolerance, Vomiting  Vomiting       Gadolinium Dizziness and Nausea     Hydrocodone-Acetaminophen Nausea and Vomiting     Other reaction(s): GI intolerance     Iodine      abstract     Sulfasalazine      Other reaction(s): GI intolerance  Has tried and had nausa.     Tramadol Nausea and Vomiting     Other reaction(s): Gastrointestinal, Other (see comments)  Nausea and vomiting   unknown       Gluten Meal Other (See Comments) and Rash     Other reaction(s): Gastrointestinal, GI intolerance  Dizziness, tired,  rash, stomach cramps, thrush, mouth sores  Dizziness, tired, rash, stomach cramps, thrush, mouth sores         PERTINENT MEDICATIONS:    Current Outpatient Medications:      Artificial Tear Solution (SOOTHE XP) SOLN, as needed, Disp: , Rfl:      atenolol (TENORMIN) 50 MG tablet, Take 50 mg by mouth daily, Disp: , Rfl:      doxycycline hyclate (VIBRA-TABS) 100 MG tablet, Take 100 mg by mouth 2 times daily, Disp: , Rfl:      fexofenadine (ALLEGRA) 180 MG tablet, Take 1 tablet (180 mg) by mouth daily, Disp: 90 tablet, Rfl: 2     fidaxomicin (DIFICID) 200 MG tablet, Take 200 mg by mouth, Disp: , Rfl:      fluticasone (FLONASE) 50 MCG/ACT nasal spray, Spray 2 sprays into both nostrils 2 times daily , Disp: , Rfl:      lidocaine (LIDODERM) 5 % patch, as needed , Disp: , Rfl: 1     losartan (COZAAR) 100 MG tablet, Take 100 mg by mouth daily, Disp: , Rfl:      methylPREDNISolone (MEDROL) 4 MG tablet, Take 4 tabs daily X 4, Then 3 tabs daily X 4, then 2 tabs daily X 4, Disp: 36 tablet, Rfl: 1     Montelukast Sodium (SINGULAIR PO), Take 10 mg by mouth At Bedtime , Disp: , Rfl:      olopatadine (PATADAY) 0.7 % ophthalmic solution, Apply 1 drop to eye daily PRN, Disp: , Rfl:      OMEPRAZOLE PO, Take 40 mg by mouth 2 times daily , Disp: , Rfl:      spironolactone-HCTZ (ALDACTAZIDE) 25-25 MG tablet, Take 1 tablet by mouth daily, Disp: , Rfl:      sucralfate (CARAFATE) 1 GM/10ML suspension, Take by mouth 4 times daily as needed , Disp: , Rfl:      tobramycin (TOBREX) 0.3 % ophthalmic solution, 1-2 drops 2 times daily, Disp: , Rfl:      tobramycin-dexamethasone (TOBRADEX) 0.3-0.1 % ophthalmic ointment, 0.25 inches At Bedtime, Disp: , Rfl:      triamcinolone (KENALOG) 0.1 % external ointment, Apply topically 2 times daily, Disp: 80 g, Rfl: 1     Albuterol Sulfate (PROAIR HFA IN), Inhale into the lungs as needed  (Patient not taking: Reported on 10/31/2022), Disp: , Rfl:      benzoyl peroxide 5 % external liquid, Use daily as  directed (Patient not taking: Reported on 10/31/2022), Disp: 148 mL, Rfl: 3     clindamycin (CLEOCIN T) 1 % external lotion, Apply topically 2 times daily (Patient not taking: Reported on 10/31/2022), Disp: 60 mL, Rfl: 3     diclofenac (VOLTAREN) 1 % topical gel, Apply topically 4 times daily as needed  (Patient not taking: Reported on 10/31/2022), Disp: , Rfl:      Dupilumab (DUPIXENT) 300 MG/2ML SOPN, Inject 300 mg Subcutaneous every 14 days (Patient not taking: Reported on 10/31/2022), Disp: 4 mL, Rfl: 2     metFORMIN (GLUCOPHAGE-XR) 500 MG 24 hr tablet, Take 500 mg by mouth daily , Disp: , Rfl:      norethindrone (MICRONOR) 0.35 MG tablet, Take 0.35 mg by mouth, Disp: , Rfl:      nystatin (MYCOSTATIN) 764414 UNIT/ML suspension, TAKE 5ML BY MOUTH 4XDAILY FOR 14 DAYS. CONTINUE AT LEAST 2 DAYS AFTER SYMPTOMS RESOLVED, Disp: , Rfl:      rifaximin (XIFAXAN) 550 MG TABS tablet, Take 200 mg by mouth 3 times daily Take for 10 days, has on had as needed for small bowel bacteria overgrowh (Patient not taking: Reported on 10/31/2022), Disp: , Rfl:     Current Facility-Administered Medications:      lidocaine (PF) (XYLOCAINE) 1 % injection 1 mL, 1 mL, , , Twila Petit MD, 1 mL at 06/30/22 1050     lidocaine (PF) (XYLOCAINE) 1 % injection 2 mL, 2 mL, , , Twila Petit MD, 2 mL at 10/27/22 1108     lidocaine (PF) (XYLOCAINE) 1 % injection 2 mL, 2 mL, , , Matt Sullivan MD, 2 mL at 09/13/22 1639     lidocaine (PF) (XYLOCAINE) 1 % injection 2 mL, 2 mL, , , Matt Sullivan MD, 2 mL at 09/13/22 1639     methylPREDNISolone (DEPO-MEDROL) injection 40 mg, 40 mg, , , Matt Sullivan MD, 40 mg at 09/13/22 1639     methylPREDNISolone (DEPO-MEDROL) injection 40 mg, 40 mg, , , Matt Sullivan MD, 40 mg at 09/13/22 1639     triamcinolone (KENALOG-40) injection 40 mg, 40 mg, , , Twila Petit MD, 40 mg at 10/27/22 1108     triamcinolone (KENALOG-40) injection 40 mg, 40 mg, ,  Trino Ann Elizabeth, MD, 40 mg at 06/30/22 1050    PROBLEM LIST  Patient Active Problem List    Diagnosis Date Noted     Bilateral thumb pain 09/13/2022     Priority: Medium     Pain in both wrists 09/13/2022     Priority: Medium     Seronegative inflammatory arthritis 06/17/2022     Priority: Medium       PERTINENT 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     Obesity      Pyelonephritis, unspecified 1992    abstract     Rheumatoid arteritis (H)      Seronegative inflammatory arthritis 6/17/2022     Unspecified symptom associated with female genital organs 05/07/1999    chronic pelvic pain abstract       PREVIOUS SURGERIES:  Past Surgical History:   Procedure Laterality Date     CHOLECYSTECTOMY       COLON SURGERY       CYSTOSCOPY,+URETEROSCOPY      abstract     ZZHC EXCIS/CURET BENIGN ELBOW LESN  10/26/1987    (R) humerus abstract       SOCIAL HISTORY:  Social History     Socioeconomic History     Marital status: Single     Spouse name: Not on file     Number of children: Not on file     Years of education: Not on file     Highest education level: Not on file   Occupational History     Not on file   Tobacco Use     Smoking status: Never     Smokeless tobacco: Never   Substance and Sexual Activity     Alcohol use: Yes     Drug use: No     Sexual activity: Not on file   Other Topics Concern      Service Not Asked     Blood Transfusions Not Asked     Caffeine Concern Not Asked     Occupational Exposure Not Asked     Hobby Hazards Not Asked     Sleep Concern Not Asked     Stress Concern Not Asked     Weight Concern Not Asked     Special Diet Not Asked     Back Care Not Asked     Exercise No     Bike Helmet Not Asked      Seat Belt No     Self-Exams No   Social History Narrative    Womens Health Kit given.         Social Determinants of Health     Financial Resource Strain: Not on file   Food Insecurity: Not on file   Transportation Needs: Not on file   Physical Activity: Not on file   Stress: Not on file   Social Connections: Not on file   Intimate Partner Violence: Not on file   Housing Stability: Not on file       FAMILY HISTORY:  Family History   Problem Relation Age of Onset     Hypertension Father         abstract     Cancer Paternal Aunt         gallbladder cancer abstract       Past/family/social history reviewed and no changes    PHYSICAL EXAMINATION:  Constitutional: aaox3, cooperative, pleasant, not dyspneic/diaphoretic, no acute distress  Vitals reviewed: BP (!) 168/101   Pulse 69   Wt 96.5 kg (212 lb 11.2 oz)   SpO2 97%   BMI 34.35 kg/m    Wt:   Wt Readings from Last 2 Encounters:   10/31/22 96.5 kg (212 lb 11.2 oz)   09/06/22 95.3 kg (210 lb 3.2 oz)      Constitutional - general appearance is well and in no acute distress. Body habitus normal  Eyes - No redness or discharge  Respiratory - No cough, unlabored breathing  Musculoskeletal - range of motion intact: Neck and arms  Skin - No discoloration or lesions  Neurological - No tremors, headaches  Psychiatric - No anxiety, alert & oriented

## 2022-10-31 NOTE — LETTER
10/31/2022         RE: Laxmi Ya  [address] [place] [id number]        Dear Colleague,    Thank you for referring your patient, Laxmi Ya, to the [facility] [place]. Please see a copy of my visit note below.    GI CLINIC VISIT    CC/REFERRING MD:  Cheryl Mcneill*  REASON FOR CONSULTATION:   Cheryl Mcneill* for SIBO    ASSESSMENT/PLAN:  47 year old female with numerous complaints including abdominal pain and diarrhea that resolved on doxycycline.    She has 2 specific questions today: Should she get microbiome testing, and how should she improve her microbiota.    I focused on these two questions today.     I advised against any type of microbiome testing.  Discussed the limitations of these tests and the inability at this time to accurately discern normal from abnormal.  Undoubtably she would have decreased adversity with some other positive signal that would not substantially impact her care in any knowledgeable way.    I discussed that the best way to improve her intestinal microbiota is a healthy diet and healthy lifestyle.  Recommended increasing soluble fiber.  Recommended plant-based diet.  Recommended avoiding heavily processed food and red meats.    I extensively reviewed her work-up over the past 2 years.  She does not have any intestinal inflammation that I can see.  While I cannot see the primary data, her [facility] gastroenterologist interpreted her duodenal aspirate is negative for SIBO.  There is no clear evidence of intestinal inflammation on endoscopy or CT scans in 2021 in 2022.  I reviewed her pathology results on her with her [employer] dee from [facility] which was negative for microscopic colitis.    At this time I recommend avoiding additional subspecialist in her care.  I am not sure why her symptoms are better on doxycycline.  She seems to have multisystem involvement in terms of her symptoms.  Although for the most part I do not see objective  evidence of inflammation.  I am not sure who would be best to manage her doxycycline responsive systemic symptoms but I informed her I would not be able to do this.  Informed her that she does not have Crohn's disease or ulcerative colitis or microscopic colitis.    Finally we reviewed a number of normal test in her chart.  It is my opinion that healthy diet and a healthy lifestyle including weight loss, regular sleep, routine exercise, and treatment of mental health is what is needed for long-term symptomatic control.     Recommend she follow-up with her local gastroenterologist    Summary recommendations:  -- Recommend routine intake of soluble fiber  -- Recommend healthy diet  -- Recommend weight loss  -- Recommend ongoing mental tammi care  -- Recommend minimizing subspecialty consults and 2nd opinions in light of numerous normal testing. Focus on healthy diet, exercise, and mental health.   -- Do NOT recommend any microbiome testing.     Thank you for this consultation.  It was a pleasure to participate in the care of this patient; please contact us with any further questions.  A total of 57 minutes, face to face, was spent with this patient, >50% of which was counseling regarding the above delineated issues.    This note was created with voice recognition software, and while reviewed for accuracy, typos may remain.     Lokesh Thompson MD MS  Associate Professor of Medicine  Inflammatory Bowel Disease Program  Division of Gastroenterology, Hepatology and Nutrition  [facility]  Pager: [phone]      HPI  Here today for a number of reasons. She wants a microbiome test to figure out what is wrong with her gut bacteria. She wants to get her microbiome back into a normal state so she does not have to be on antibiotics.     In terms of her symptoms she has diarrhea abdominal pain.     Typically she will have diarrhea every 2 hours (12 a day) for years. Reports this is daily for years.     She was on  rifaximin.    Only improved with doxycycline. On doxy, she reports that all of her symptoms (GI, Eye,     She has been disabled for 4 and a half years and is on social security.     Diverticulitis led to surgery for smoldering symptoms.     [facility] data summary: 12/21/21:  Colonoscopy reported a patent the colo-colonic anastomosis that was healthy appearing. Incidentally noted were internal and external hemorrhoids. Random colon biopsies were nondiagnostic. Terminal ileal examination and biopsies were also normal. EGD was normal. Duodenal biopsies were non- nondiagnostic. Duodenal aspirate was not consistent with small intestinal bacterial overgrowth. CT enterography completed in November 2021 noted normal appearance of the small bowel. There were no inflammatory changes noted. There was no CT evidence for acute diverticulitis. Repeat CT scan of the abdomen in the emergency room on 12/20/2021 again found no acute findings to explain the abdominal pain. There was no evidence of diverticulitis. Incidentally noted was an increased stool burden on the right side of the colon.       ROS:    10 point ROS neg other than the symptoms noted above in the HPI.    PREVIOUS ENDOSCOPY:  12/14/21 Colonoscopy: Normal ileum, Diverticulosis, end to end colonic anastomosis with healthy appearance, otherwise normal colon.  Pathology reviewed on patients phone (primary path report) - no microscopic colitis.     12/14/21 EGD: Normal EGD. Duodenal fluid aspiration done for SIBO.     PERTINENT RELEVANT IMAGING OR LABS:  11/4/21: Normal CT from anus to rectum      ALLERGIES:     Allergies   Allergen Reactions     Polyethylene Glycol Rash     Codeine      Other reaction(s): Gastrointestinal, GI intolerance, Vomiting  Vomiting       Gadolinium Dizziness and Nausea     Hydrocodone-Acetaminophen Nausea and Vomiting     Other reaction(s): GI intolerance     Iodine      abstract     Sulfasalazine      Other reaction(s): GI intolerance  Has tried  and had nausa.     Tramadol Nausea and Vomiting     Other reaction(s): Gastrointestinal, Other (see comments)  Nausea and vomiting   unknown       Gluten Meal Other (See Comments) and Rash     Other reaction(s): Gastrointestinal, GI intolerance  Dizziness, tired, rash, stomach cramps, thrush, mouth sores  Dizziness, tired, rash, stomach cramps, thrush, mouth sores         PERTINENT MEDICATIONS:    Current Outpatient Medications:      Artificial Tear Solution (SOOTHE XP) SOLN, as needed, Disp: , Rfl:      atenolol (TENORMIN) 50 MG tablet, Take 50 mg by mouth daily, Disp: , Rfl:      doxycycline hyclate (VIBRA-TABS) 100 MG tablet, Take 100 mg by mouth 2 times daily, Disp: , Rfl:      fexofenadine (ALLEGRA) 180 MG tablet, Take 1 tablet (180 mg) by mouth daily, Disp: 90 tablet, Rfl: 2     fidaxomicin (DIFICID) 200 MG tablet, Take 200 mg by mouth, Disp: , Rfl:      fluticasone (FLONASE) 50 MCG/ACT nasal spray, Spray 2 sprays into both nostrils 2 times daily , Disp: , Rfl:      lidocaine (LIDODERM) 5 % patch, as needed , Disp: , Rfl: 1     losartan (COZAAR) 100 MG tablet, Take 100 mg by mouth daily, Disp: , Rfl:      methylPREDNISolone (MEDROL) 4 MG tablet, Take 4 tabs daily X 4, Then 3 tabs daily X 4, then 2 tabs daily X 4, Disp: 36 tablet, Rfl: 1     Montelukast Sodium (SINGULAIR PO), Take 10 mg by mouth At Bedtime , Disp: , Rfl:      olopatadine (PATADAY) 0.7 % ophthalmic solution, Apply 1 drop to eye daily PRN, Disp: , Rfl:      OMEPRAZOLE PO, Take 40 mg by mouth 2 times daily , Disp: , Rfl:      spironolactone-HCTZ (ALDACTAZIDE) 25-25 MG tablet, Take 1 tablet by mouth daily, Disp: , Rfl:      sucralfate (CARAFATE) 1 GM/10ML suspension, Take by mouth 4 times daily as needed , Disp: , Rfl:      tobramycin (TOBREX) 0.3 % ophthalmic solution, 1-2 drops 2 times daily, Disp: , Rfl:      tobramycin-dexamethasone (TOBRADEX) 0.3-0.1 % ophthalmic ointment, 0.25 inches At Bedtime, Disp: , Rfl:      triamcinolone (KENALOG) 0.1 %  external ointment, Apply topically 2 times daily, Disp: 80 g, Rfl: 1     Albuterol Sulfate (PROAIR HFA IN), Inhale into the lungs as needed  (Patient not taking: Reported on 10/31/2022), Disp: , Rfl:      benzoyl peroxide 5 % external liquid, Use daily as directed (Patient not taking: Reported on 10/31/2022), Disp: 148 mL, Rfl: 3     clindamycin (CLEOCIN T) 1 % external lotion, Apply topically 2 times daily (Patient not taking: Reported on 10/31/2022), Disp: 60 mL, Rfl: 3     diclofenac (VOLTAREN) 1 % topical gel, Apply topically 4 times daily as needed  (Patient not taking: Reported on 10/31/2022), Disp: , Rfl:      Dupilumab (DUPIXENT) 300 MG/2ML SOPN, Inject 300 mg Subcutaneous every 14 days (Patient not taking: Reported on 10/31/2022), Disp: 4 mL, Rfl: 2     metFORMIN (GLUCOPHAGE-XR) 500 MG 24 hr tablet, Take 500 mg by mouth daily , Disp: , Rfl:      norethindrone (MICRONOR) 0.35 MG tablet, Take 0.35 mg by mouth, Disp: , Rfl:      nystatin (MYCOSTATIN) 649204 UNIT/ML suspension, TAKE 5ML BY MOUTH 4XDAILY FOR 14 DAYS. CONTINUE AT LEAST 2 DAYS AFTER SYMPTOMS RESOLVED, Disp: , Rfl:      rifaximin (XIFAXAN) 550 MG TABS tablet, Take 200 mg by mouth 3 times daily Take for 10 days, has on had as needed for small bowel bacteria overgrowh (Patient not taking: Reported on 10/31/2022), Disp: , Rfl:     Current Facility-Administered Medications:      lidocaine (PF) (XYLOCAINE) 1 % injection 1 mL, 1 mL, , , Twila Petit MD, 1 mL at 06/30/22 1050     lidocaine (PF) (XYLOCAINE) 1 % injection 2 mL, 2 mL, , , Twila Petit MD, 2 mL at 10/27/22 1108     lidocaine (PF) (XYLOCAINE) 1 % injection 2 mL, 2 mL, , , Matt Sullivan MD, 2 mL at 09/13/22 1639     lidocaine (PF) (XYLOCAINE) 1 % injection 2 mL, 2 mL, , , Matt Sullivan MD, 2 mL at 09/13/22 1639     methylPREDNISolone (DEPO-MEDROL) injection 40 mg, 40 mg, , , Matt Sullivan MD, 40 mg at 09/13/22 1639     methylPREDNISolone  (DEPO-MEDROL) injection 40 mg, 40 mg, , , Matt Sullivan MD, 40 mg at 09/13/22 1639     triamcinolone (KENALOG-40) injection 40 mg, 40 mg, , , Twila Petit MD, 40 mg at 10/27/22 1108     triamcinolone (KENALOG-40) injection 40 mg, 40 mg, , , Twila Petit MD, 40 mg at 06/30/22 1050    PROBLEM LIST  Patient Active Problem List    Diagnosis Date Noted     Bilateral thumb pain 09/13/2022     Priority: Medium     Pain in both wrists 09/13/2022     Priority: Medium     Seronegative inflammatory arthritis 06/17/2022     Priority: Medium       PERTINENT 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     Obesity      Pyelonephritis, unspecified 1992    abstract     Rheumatoid arteritis (H)      Seronegative inflammatory arthritis 6/17/2022     Unspecified symptom associated with female genital organs 05/07/1999    chronic pelvic pain abstract       PREVIOUS SURGERIES:  Past Surgical History:   Procedure Laterality Date     CHOLECYSTECTOMY       COLON SURGERY       CYSTOSCOPY,+URETEROSCOPY      abstract     ZZHC EXCIS/CURET BENIGN ELBOW LESN  10/26/1987    (R) humerus abstract       SOCIAL HISTORY:  Social History     Socioeconomic History     Marital status: Single     Spouse name: Not on file     Number of children: Not on file     Years of education: Not on file     Highest education level: Not on file   Occupational History     Not on file   Tobacco Use     Smoking status: Never     Smokeless tobacco: Never   Substance and Sexual Activity     Alcohol use: Yes     Drug use: No     Sexual activity: Not on file   Other Topics Concern      Service Not Asked     Blood Transfusions Not Asked     Caffeine  Concern Not Asked     Occupational Exposure Not Asked     Hobby Hazards Not Asked     Sleep Concern Not Asked     Stress Concern Not Asked     Weight Concern Not Asked     Special Diet Not Asked     Back Care Not Asked     Exercise No     Bike Helmet Not Asked     Seat Belt No     Self-Exams No   Social History Narrative    Womens Health Kit given.         Social Determinants of Health     Financial Resource Strain: Not on file   Food Insecurity: Not on file   Transportation Needs: Not on file   Physical Activity: Not on file   Stress: Not on file   Social Connections: Not on file   Intimate Partner Violence: Not on file   Housing Stability: Not on file       FAMILY HISTORY:  Family History   Problem Relation Age of Onset     Hypertension Father         abstract     Cancer Paternal Aunt         gallbladder cancer abstract       Past/family/social history reviewed and no changes    PHYSICAL EXAMINATION:  Constitutional: aaox3, cooperative, pleasant, not dyspneic/diaphoretic, no acute distress  Vitals reviewed: BP (!) 168/101   Pulse 69   Wt 96.5 kg (212 lb 11.2 oz)   SpO2 97%   BMI 34.35 kg/m    Wt:   Wt Readings from Last 2 Encounters:   10/31/22 96.5 kg (212 lb 11.2 oz)   09/06/22 95.3 kg (210 lb 3.2 oz)      Constitutional - general appearance is well and in no acute distress. Body habitus normal  Eyes - No redness or discharge  Respiratory - No cough, unlabored breathing  Musculoskeletal - range of motion intact: Neck and arms  Skin - No discoloration or lesions  Neurological - No tremors, headaches  Psychiatric - No anxiety, alert & oriented                       Again, thank you for allowing me to participate in the care of your patient.        Sincerely,        Lokesh Thompson MD

## 2022-10-31 NOTE — PATIENT INSTRUCTIONS
It was a pleasure taking care of you today. I've included a brief summary of our discussion and care plan from today's visit below.  Please review this information with your primary care provider.  _______________________________________________________________________    My recommendations are summarized as follows:    -- I do not recommend any microbiome testing.     -- To promote microbiome health, you can eat healthy and increase soluble fiber.     -- Please continue to follow-up with your care team as needed.       Thank you for choosing [facility]!       Sincerely,    Lokesh Thompson MD  [facility]  Division of Gastroenterology

## 2022-10-31 NOTE — PROGRESS NOTES
Service Date: 10/27/2022    HISTORY OF PRESENT ILLNESS:  Zaynab is a 47-year-old right-hand dominant female who originally had been followed at [facility] with carpal tunnel injections and STT injection.  She has transitioned her care to the [facility].  She more recently had carpal tunnel injections by Dr. Sullivan.  She is here for followup of bilateral wrist scaphotrapeziotrapezoid injections.    She reports that she had an excellent response.  With the injection, her pain in her hands is 5/10.  Without the injection, her pain is 10/10.  She most recently had a Medrol Dosepak and she would rank her pain is 8 on a scale from 0-10.    She requests a similar injection today.    PROCEDURE:  After written consent, verifying site and side, a sterile ChloraPrep was performed for the injection site.  C-arm guidance was used for difficult placement of a 25-gauge needle into the left thumb scaphotrapeziotrapezoid joint with 1% lidocaine. 1 mL of Kenalog (40 mg) and 1 mL of lidocaine was injected under fluoroscopic guidance with good relief of pain. Identical procedure was carried out on the right thumb STT joint.  The patient tolerated the procedure well.  No complications.    Follow instructions were given.  Bilateral procedures were performed.    She will be seeing Dr. Sullivan back for carpal tunnel injections.  I will see her back in 4-6 months for repeat STT joint injections provided she continues to get a good response.    Twila Petit MD        D: 10/30/2022   T: 10/31/2022   MT: luis felipe    Name:     ZAYNAB COLBERT  MRN:      [id number]        Account:      [id number]   :      1975           Service Date: 10/27/2022       Document: [id number]

## 2022-11-01 ENCOUNTER — THERAPY VISIT (OUTPATIENT)
Dept: OCCUPATIONAL THERAPY | Facility: CLINIC | Age: 47
End: 2022-11-01
Payer: MEDICARE

## 2022-11-01 ENCOUNTER — THERAPY VISIT (OUTPATIENT)
Dept: PHYSICAL THERAPY | Facility: CLINIC | Age: 47
End: 2022-11-01
Payer: MEDICARE

## 2022-11-01 DIAGNOSIS — M25.531 PAIN IN BOTH WRISTS: ICD-10-CM

## 2022-11-01 DIAGNOSIS — M25.532 PAIN IN BOTH WRISTS: ICD-10-CM

## 2022-11-01 DIAGNOSIS — M79.645 BILATERAL THUMB PAIN: Primary | ICD-10-CM

## 2022-11-01 DIAGNOSIS — M79.644 BILATERAL THUMB PAIN: Primary | ICD-10-CM

## 2022-11-01 DIAGNOSIS — M54.6 BILATERAL THORACIC BACK PAIN: ICD-10-CM

## 2022-11-01 DIAGNOSIS — M54.2 NECK PAIN: Primary | ICD-10-CM

## 2022-11-01 PROCEDURE — 97140 MANUAL THERAPY 1/> REGIONS: CPT | Mod: GP | Performed by: PHYSICAL THERAPIST

## 2022-11-01 PROCEDURE — 97535 SELF CARE MNGMENT TRAINING: CPT | Mod: GO | Performed by: OCCUPATIONAL THERAPIST

## 2022-11-01 PROCEDURE — 97530 THERAPEUTIC ACTIVITIES: CPT | Mod: GP | Performed by: PHYSICAL THERAPIST

## 2022-11-01 PROCEDURE — 97110 THERAPEUTIC EXERCISES: CPT | Mod: GO | Performed by: OCCUPATIONAL THERAPIST

## 2022-11-01 PROCEDURE — 97012 MECHANICAL TRACTION THERAPY: CPT | Mod: GP | Performed by: PHYSICAL THERAPIST

## 2022-11-01 PROCEDURE — 97140 MANUAL THERAPY 1/> REGIONS: CPT | Mod: GO | Performed by: OCCUPATIONAL THERAPIST

## 2022-11-17 ENCOUNTER — INFUSION THERAPY VISIT (OUTPATIENT)
Dept: INFUSION THERAPY | Facility: CLINIC | Age: 47
End: 2022-11-17
Attending: INTERNAL MEDICINE
Payer: MEDICARE

## 2022-11-17 VITALS
HEART RATE: 60 BPM | TEMPERATURE: 98.2 F | SYSTOLIC BLOOD PRESSURE: 122 MMHG | RESPIRATION RATE: 16 BRPM | OXYGEN SATURATION: 95 % | DIASTOLIC BLOOD PRESSURE: 80 MMHG

## 2022-11-17 DIAGNOSIS — M13.80 SERONEGATIVE INFLAMMATORY ARTHRITIS: Primary | ICD-10-CM

## 2022-11-17 LAB
ALBUMIN SERPL-MCNC: 3.7 G/DL (ref 3.5–5)
ALBUMIN UR-MCNC: NEGATIVE MG/DL
ALP SERPL-CCNC: 40 U/L (ref 45–120)
ALT SERPL W P-5'-P-CCNC: 30 U/L (ref 0–45)
ANION GAP SERPL CALCULATED.3IONS-SCNC: 13 MMOL/L (ref 5–18)
APPEARANCE UR: CLEAR
AST SERPL W P-5'-P-CCNC: 15 U/L (ref 0–40)
BACTERIA #/AREA URNS HPF: ABNORMAL /HPF
BASOPHILS # BLD AUTO: 0 10E3/UL (ref 0–0.2)
BASOPHILS NFR BLD AUTO: 1 %
BILIRUB SERPL-MCNC: 0.6 MG/DL (ref 0–1)
BILIRUB UR QL STRIP: NEGATIVE
BUN SERPL-MCNC: 10 MG/DL (ref 8–22)
CALCIUM SERPL-MCNC: 9.1 MG/DL (ref 8.5–10.5)
CHLORIDE BLD-SCNC: 106 MMOL/L (ref 98–107)
CO2 SERPL-SCNC: 22 MMOL/L (ref 22–31)
COLOR UR AUTO: ABNORMAL
CREAT SERPL-MCNC: 0.78 MG/DL (ref 0.6–1.1)
EOSINOPHIL # BLD AUTO: 0.2 10E3/UL (ref 0–0.7)
EOSINOPHIL NFR BLD AUTO: 2 %
ERYTHROCYTE [DISTWIDTH] IN BLOOD BY AUTOMATED COUNT: 13.1 % (ref 10–15)
GFR SERPL CREATININE-BSD FRML MDRD: >90 ML/MIN/1.73M2
GLUCOSE BLD-MCNC: 115 MG/DL (ref 70–125)
GLUCOSE UR STRIP-MCNC: NEGATIVE MG/DL
HCT VFR BLD AUTO: 40.7 % (ref 35–47)
HGB BLD-MCNC: 13.8 G/DL (ref 11.7–15.7)
HGB UR QL STRIP: NEGATIVE
IMM GRANULOCYTES # BLD: 0.1 10E3/UL
IMM GRANULOCYTES NFR BLD: 1 %
KETONES UR STRIP-MCNC: NEGATIVE MG/DL
LEUKOCYTE ESTERASE UR QL STRIP: NEGATIVE
LYMPHOCYTES # BLD AUTO: 2.2 10E3/UL (ref 0.8–5.3)
LYMPHOCYTES NFR BLD AUTO: 27 %
MCH RBC QN AUTO: 29.2 PG (ref 26.5–33)
MCHC RBC AUTO-ENTMCNC: 33.9 G/DL (ref 31.5–36.5)
MCV RBC AUTO: 86 FL (ref 78–100)
MONOCYTES # BLD AUTO: 0.9 10E3/UL (ref 0–1.3)
MONOCYTES NFR BLD AUTO: 11 %
MUCOUS THREADS #/AREA URNS LPF: PRESENT /LPF
NEUTROPHILS # BLD AUTO: 4.8 10E3/UL (ref 1.6–8.3)
NEUTROPHILS NFR BLD AUTO: 58 %
NITRATE UR QL: NEGATIVE
NRBC # BLD AUTO: 0 10E3/UL
NRBC BLD AUTO-RTO: 0 /100
PH UR STRIP: 7 [PH] (ref 5–7)
PLATELET # BLD AUTO: 301 10E3/UL (ref 150–450)
POTASSIUM BLD-SCNC: 3.7 MMOL/L (ref 3.5–5)
PROT SERPL-MCNC: 6.5 G/DL (ref 6–8)
RBC # BLD AUTO: 4.72 10E6/UL (ref 3.8–5.2)
RBC URINE: <1 /HPF
SODIUM SERPL-SCNC: 141 MMOL/L (ref 136–145)
SP GR UR STRIP: 1.02 (ref 1–1.03)
SQUAMOUS EPITHELIAL: 1 /HPF
UROBILINOGEN UR STRIP-MCNC: <2 MG/DL
WBC # BLD AUTO: 8.1 10E3/UL (ref 4–11)
WBC URINE: 1 /HPF

## 2022-11-17 PROCEDURE — 250N000013 HC RX MED GY IP 250 OP 250 PS 637: Performed by: INTERNAL MEDICINE

## 2022-11-17 PROCEDURE — 85025 COMPLETE CBC W/AUTO DIFF WBC: CPT | Performed by: INTERNAL MEDICINE

## 2022-11-17 PROCEDURE — 96375 TX/PRO/DX INJ NEW DRUG ADDON: CPT

## 2022-11-17 PROCEDURE — 80053 COMPREHEN METABOLIC PANEL: CPT | Performed by: INTERNAL MEDICINE

## 2022-11-17 PROCEDURE — 36415 COLL VENOUS BLD VENIPUNCTURE: CPT | Performed by: INTERNAL MEDICINE

## 2022-11-17 PROCEDURE — 82040 ASSAY OF SERUM ALBUMIN: CPT | Performed by: INTERNAL MEDICINE

## 2022-11-17 PROCEDURE — 250N000011 HC RX IP 250 OP 636: Performed by: INTERNAL MEDICINE

## 2022-11-17 PROCEDURE — 258N000003 HC RX IP 258 OP 636: Performed by: INTERNAL MEDICINE

## 2022-11-17 PROCEDURE — 96365 THER/PROPH/DIAG IV INF INIT: CPT

## 2022-11-17 PROCEDURE — 81001 URINALYSIS AUTO W/SCOPE: CPT | Performed by: INTERNAL MEDICINE

## 2022-11-17 RX ORDER — NALOXONE HYDROCHLORIDE 0.4 MG/ML
0.2 INJECTION, SOLUTION INTRAMUSCULAR; INTRAVENOUS; SUBCUTANEOUS
Status: CANCELLED | OUTPATIENT
Start: 2022-12-15

## 2022-11-17 RX ORDER — MEPERIDINE HYDROCHLORIDE 50 MG/ML
25 INJECTION INTRAMUSCULAR; INTRAVENOUS; SUBCUTANEOUS EVERY 30 MIN PRN
Status: DISCONTINUED | OUTPATIENT
Start: 2022-11-17 | End: 2022-11-17 | Stop reason: HOSPADM

## 2022-11-17 RX ORDER — ALBUTEROL SULFATE 90 UG/1
1-2 AEROSOL, METERED RESPIRATORY (INHALATION)
Status: CANCELLED
Start: 2022-12-15

## 2022-11-17 RX ORDER — NALOXONE HYDROCHLORIDE 0.4 MG/ML
0.2 INJECTION, SOLUTION INTRAMUSCULAR; INTRAVENOUS; SUBCUTANEOUS
Status: DISCONTINUED | OUTPATIENT
Start: 2022-11-17 | End: 2022-11-17 | Stop reason: HOSPADM

## 2022-11-17 RX ORDER — DIPHENHYDRAMINE HYDROCHLORIDE 50 MG/ML
50 INJECTION INTRAMUSCULAR; INTRAVENOUS
Status: CANCELLED
Start: 2022-12-15

## 2022-11-17 RX ORDER — EPINEPHRINE 1 MG/ML
0.3 INJECTION, SOLUTION INTRAMUSCULAR; SUBCUTANEOUS EVERY 5 MIN PRN
Status: DISCONTINUED | OUTPATIENT
Start: 2022-11-17 | End: 2022-11-17 | Stop reason: HOSPADM

## 2022-11-17 RX ORDER — METHYLPREDNISOLONE SODIUM SUCCINATE 125 MG/2ML
125 INJECTION, POWDER, LYOPHILIZED, FOR SOLUTION INTRAMUSCULAR; INTRAVENOUS
Status: CANCELLED
Start: 2022-12-15

## 2022-11-17 RX ORDER — METHYLPREDNISOLONE SODIUM SUCCINATE 125 MG/2ML
125 INJECTION, POWDER, LYOPHILIZED, FOR SOLUTION INTRAMUSCULAR; INTRAVENOUS ONCE
Status: CANCELLED | OUTPATIENT
Start: 2022-12-15

## 2022-11-17 RX ORDER — ALBUTEROL SULFATE 0.83 MG/ML
2.5 SOLUTION RESPIRATORY (INHALATION)
Status: CANCELLED | OUTPATIENT
Start: 2022-12-15

## 2022-11-17 RX ORDER — HEPARIN SODIUM,PORCINE 10 UNIT/ML
5 VIAL (ML) INTRAVENOUS
Status: CANCELLED | OUTPATIENT
Start: 2022-12-15

## 2022-11-17 RX ORDER — ACETAMINOPHEN 325 MG/1
650 TABLET ORAL ONCE
Status: CANCELLED | OUTPATIENT
Start: 2022-12-15

## 2022-11-17 RX ORDER — METHYLPREDNISOLONE SODIUM SUCCINATE 125 MG/2ML
125 INJECTION, POWDER, LYOPHILIZED, FOR SOLUTION INTRAMUSCULAR; INTRAVENOUS ONCE
Status: COMPLETED | OUTPATIENT
Start: 2022-11-17 | End: 2022-11-17

## 2022-11-17 RX ORDER — ALBUTEROL SULFATE 0.83 MG/ML
2.5 SOLUTION RESPIRATORY (INHALATION)
Status: DISCONTINUED | OUTPATIENT
Start: 2022-11-17 | End: 2022-11-17 | Stop reason: HOSPADM

## 2022-11-17 RX ORDER — HEPARIN SODIUM (PORCINE) LOCK FLUSH IV SOLN 100 UNIT/ML 100 UNIT/ML
5 SOLUTION INTRAVENOUS
Status: CANCELLED | OUTPATIENT
Start: 2022-12-15

## 2022-11-17 RX ORDER — MEPERIDINE HYDROCHLORIDE 50 MG/ML
25 INJECTION INTRAMUSCULAR; INTRAVENOUS; SUBCUTANEOUS EVERY 30 MIN PRN
Status: CANCELLED | OUTPATIENT
Start: 2022-12-15

## 2022-11-17 RX ORDER — ACETAMINOPHEN 325 MG/1
650 TABLET ORAL ONCE
Status: COMPLETED | OUTPATIENT
Start: 2022-11-17 | End: 2022-11-17

## 2022-11-17 RX ORDER — EPINEPHRINE 1 MG/ML
0.3 INJECTION, SOLUTION INTRAMUSCULAR; SUBCUTANEOUS EVERY 5 MIN PRN
Status: CANCELLED | OUTPATIENT
Start: 2022-12-15

## 2022-11-17 RX ORDER — DIPHENHYDRAMINE HYDROCHLORIDE 50 MG/ML
50 INJECTION INTRAMUSCULAR; INTRAVENOUS
Status: DISCONTINUED | OUTPATIENT
Start: 2022-11-17 | End: 2022-11-17 | Stop reason: HOSPADM

## 2022-11-17 RX ORDER — ALBUTEROL SULFATE 90 UG/1
1-2 AEROSOL, METERED RESPIRATORY (INHALATION)
Status: DISCONTINUED | OUTPATIENT
Start: 2022-11-17 | End: 2022-11-17 | Stop reason: HOSPADM

## 2022-11-17 RX ORDER — METHYLPREDNISOLONE SODIUM SUCCINATE 125 MG/2ML
125 INJECTION, POWDER, LYOPHILIZED, FOR SOLUTION INTRAMUSCULAR; INTRAVENOUS
Status: DISCONTINUED | OUTPATIENT
Start: 2022-11-17 | End: 2022-11-17 | Stop reason: HOSPADM

## 2022-11-17 RX ADMIN — SODIUM CHLORIDE 250 ML: 9 INJECTION, SOLUTION INTRAVENOUS at 09:56

## 2022-11-17 RX ADMIN — ACETAMINOPHEN 650 MG: 325 TABLET, FILM COATED ORAL at 09:59

## 2022-11-17 RX ADMIN — METHYLPREDNISOLONE SODIUM SUCCINATE 125 MG: 125 INJECTION, POWDER, FOR SOLUTION INTRAMUSCULAR; INTRAVENOUS at 09:59

## 2022-11-17 RX ADMIN — TOCILIZUMAB 400 MG: 20 INJECTION, SOLUTION, CONCENTRATE INTRAVENOUS at 10:33

## 2022-11-17 NOTE — PROGRESS NOTES
~~~ NOTE: If the patient answers yes to any of the questions below, hold the infusion and contact ordering provider or on-call provider.    1. Have you recently had an elevated temperature, fever, chills, productive cough, coughing for 3 weeks or longer or hemoptysis,  abnormal vital signs, night sweats,  chest pain or have you noticed a decrease in your appetite, unexplained weight loss or fatigue? No  2. Do you have any open wounds or new incisions? No  3. Do you have any upcoming hospitalizations or surgeries? Does not include esophagogastroduodenoscopy, colonoscopy, endoscopic retrograde cholangiopancreatography (ERCP), endoscopic ultrasound (EUS), dental procedures or joint aspiration/steroid injections No  4. Do you currently have any signs of illness or infection or are you on any antibiotics? No  5. Have you had any new, sudden or worsening abdominal pain? No  6. Have you or anyone in your household received a live vaccination in the past 4 weeks? Please note: No live vaccines while on biologic/chemotherapy until 6 months after the last treatment. Patient can receive the flu vaccine (shot only), pneumovax and the Covid vaccine. It is optimal for the patient to get these vaccines mid cycle, but they can be given at any time as long as it is not on the day of the infusion. No  7. Have you recently been diagnosed with any new nervous system diseases (ie. Multiple sclerosis, Guillain [place], seizures, neurological changes) or cancer diagnosis? Are you on any form of radiation or chemotherapy? No  8. Are you pregnant or breast feeding or do you have plans of pregnancy in the future? No  9. Have you been having any signs of worsening depression or suicidal ideations?  (benlysta only) NA  10. Have there been any other new onset medical symptoms? No  11. Have you had any new blood clots? (IVIG only) NA    Infusion Nursing Note:  Laxmi Ya presents today for labs and Actemra.    Patient seen by provider today:  No   present during visit today: Not Applicable.    Note: Laxmi arrived ambulatory by herself for labs and Actemra. She was settled into Bedroom 2. Plan of care reviewed and all questions answered at this time.  Patient pre medicated with Tylenol and solumedrol 30 minutes prior to treatment as ordered.  CBC, CMP and UA completed as ordered. Lipid panel is not due until March 2023 per plan orders, however, writer noted in the Sept lab results note that Dr. Lemus advised patient have a fasting lipid panel drawn again with her next infusion appt. Laxmi did not see this message and she has not been fasting today. Fasting lipid panel can be drawn at her next appt if not done sooner elsewhere. Laxmi stated she is scheduled for a physical and may have it done then.     Intravenous Access:  Peripheral IV placed and labs drawn by vascular access nurse per patient request.    Treatment Conditions:  Biological checklist completed. Ok to proceed with treatment.    Post Infusion Assessment:  Patient tolerated infusion without incident.  VSS post infusion.  Blood return noted pre and post infusion.  Site patent and intact, free from redness, edema or discomfort.  Access discontinued per protocol.     Discharge Plan:   Patient will return December 15th for next appointment.   Patient discharged in stable condition accompanied by: self.  Departure Mode: Ambulatory.      Tracey Lopez RN

## 2022-11-20 ENCOUNTER — HEALTH MAINTENANCE LETTER (OUTPATIENT)
Age: 47
End: 2022-11-20

## 2022-11-20 NOTE — PROGRESS NOTES
Rheumatology Clinic Visit  [facility]  Oswaldo Lemus M.D.     Laxmi Ya MRN# [id number]   YOB: 1975 Age: 47 year old   Date of Visit:11/25/2022    Primary care provider: Artur Marsh          Assessment and Plan:     Seronegative inflammatory (rheumatoid) arthritis:  Chronic small joint predominant stiffness and pain have improved modestly since substitution of Actemra IV infusions for Orencia after the last visit in June 2022.  She still has significant morning stiffness for 30 minutes and pain in wrists/neck and hips.  Exam shows resolved tenderness without gross synovial swelling at multiple MCP.  Hip internal rotation is painful.    Data: Lab work on November 17, 2022 showed comprehensive metabolic panel normal; CBC normal; urinalysis clear.. Autoimmune workup in 2021 for DIEGO/FAVIOLA panel/CCP/RF/ ANCA negative.      Impression:  1.  Seronegative inflammatory arthritis, inadequately responsive to orencia monotherapy, now significantly improved after starting Actemra intravenous infusions in August 2022.  Improved joints are focused primarily in the small joints of the hands and feet; neck pain and rib cage pain persist, but I do not think these pain generators are due to the same systemic pathologic process as inflammatory arthritis in the hands and feet.  Accordingly, although core related pain including cervical pain and costochondral discomfort may respond to prednisone, I do not think that such pains will be expected to improve with altered dose or frequency of IL-6 antagonist tocilizumab.  I do recommend renewing CRP and sedimentation rate with next blood draw, and monitoring hyperlipidemia due to the lipid elevating effects of Actemra.    2. Carpal tunnel syndrome  3. Bowel bacterial overgrowth: improved with xifaxin and doxycycline.    Plan:  1. Continue actemra IV. Plan 750 mg IV every 28 days. Check on altered dose or mode of delivery (subcutaneous).  I am not aware of  data supporting use of greater than 8 mg/kg per 28 days to achieve greater anti-inflammatory effect.  Thus, I think that 750 mg likely represents practical maximum dose.  3.  Follow-up with Dr. Petit regarding thumb and carpal tunnel pain  4.  Follow-up with Dr. Thompson regarding bowel disease  5.  Follow-up with Dr. Meenakshi staples regarding infectious disease.  2. For flaring pain despite Actemra.  Medrol 16 mg (4 tabs) with taper (3 tablets daily for 1 week, then 2 tablets daily for 1 week) over several weeks for active arthritis flares.  I recommend avoiding use of Medrol burst and taper greater than once out of every 2 months.  I do not recommend chronic low-dose corticosteroids.    RTC 6 mos    Oswaldo Lemus M.D.  Staff Rheumatologist, [facility]  Pager [phone]           History of Present Illness:   Laxmi Ya with history of COLON, hypertension, diagnosis of seronegative rheumatoid arthritis with tenosynovitis presents for follow-up.  She was last seen in June 2022, when Orencia Orencia was stopped, and Actemra was started, for active inflammatory arthritis.    Background; Seronegative rheumatoid arthritis with predominant tenosynovitis:   In 2015, rheumatoid factor, cyclic citrullinated peptide antibodies of extractable nuclear antigen panel, and anti-DNA antibodies were negative. Patient was initially treated for undifferentiated mixed connective tissue disease with Cellcept/Plaquenil for a prior Hx of weakly positive DIEGO. Then she established care with Dr. Saleh at [facility] in 2018, initially disease thought to be non-inflammatory, later diagnosed with inflammatory arthritis, rheumatoid syndrome and then seronegative RA. Patient was initially on Humira for a year starting in 2018, then discontinued due to failure to improve her joint symptoms, received a trial of Embrel which led to infections including sinus/ UTIs, then patient was on Remicade for 2 yrs but it did not work for all the joints like  her back and she started having siginficant GI issues like diarrhea and abdominal pain, but did not necessarily think that it failed completely to releive her symptoms. Patient was started on Symponi in Oct 2021, but developed serious reaction to it reporting an area of severe erythema in her intestinal area, along with abdominal pain/ nausea/vomiting so she was switched to Orencia in Nov 2021, received one infusion but then developed serious sinus infections/ Covid in Jan 2022. Orencia infusions restarted in 2-2022, held in 6-2022. Actemra started 7-2022, continued through November 2022.    Interval history November 25, 2022     Burning pain in hands and fingers is much better over all.   She attributes improvement to actemra infusions, but she still has pain in neck, rib cage daily.   She gets relief with intermittent medrol 2-3 week courses, and has used 3 times since June.  She is dealing with Staph overgrowth. She has marked blepharitis, treating with tobradex. She has been prescribed doxy 100 mg bid  She just had cataract surgerios; now she is treating for constant mattering  Doxy seems to have also helped with sharp abdominal pains asst'd with Cibo, in the last 3 months.  She started injections for thumb bases with Dr. Petit in 6-2022. She has been injected q 4 mos in both thumb bases. She still has a strong sense of swelling/burning through all the fingers.Former wrist pain has improved with steroid injections by Dr. Sullivan; however she had marked muscle pain/weakness after dexamathosone (not kenalog) injection. More injections are planned in 2 weeks.    Interval history June 16, 2022    She reports improved pain in back/thoracic area and ankles, but she notes little improvement in the hands. Constant achy pain in hands/fingers/wrists persists. Showering is a chore with grasping bath objects.  Recent course of Medrol did not help her hand pain.  She will see Dr. Petit regarding possible restart  injections in the hands/thumbs. She had been getting carpal tunnel and thumb injections every 2 months at [facility]; last injection was 6 months ago.  Abdominal pain/symptoms improved with use of xifaxin.    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 Saleh. Initially did not think it to be inflammatory. later diagnosed with inflammatory arthritis, rheumatoid syndrome and then seronegative RA. Patient has been disabled Patient was initially on Humira for a year starting in 2018, then discontinued due to failure to improve her joint symptoms after some time, then received a dose of Embrel leading to infections including sinus/ UTIs, then patient was on Remicade for 2 yrs but it did not work for all the joints like her back and she started having siginficant GI issues like diarrhea and abdominal pain, but did not necessarily think that it failed completely to relieve her symptoms. Patient was started on Symponi in Oct 2021, but developed serious reaction to it reporting an an area of severe erythema in her intestinal area, along with abdominal pain/ nausea/vomiting so she was switched to Orencia in Nov 2021, received one infusion but then developed serious sinus infections/ Covid in Jan 2022 so it was held during that entire time and loading dose restarted in March 2022, has had 2 infusions 2 weeks apart at [facility] and will receive her next one on March 15. Patient feels some improvement in her lower extremity joints and her fatigue has been better. She still has significant morning stiffness for more than 1 hour, and pain in hands/wrists/neck for which she gets occipital blocks. When asked about steroids she thinks she may have an adverse reaction to prednisone while in the hospital but that  could be due to her SIBO. She has not used much for oral steroids in all these years but has tolerated Solu-medrol okay in the past.  Small joints of hands, hips and her neck bother her the most with stiffness/pain and swelling.  It is very painful in the wrist, feels hands are swollen. Also reports some numbness/ tingling right side of arm. Feet also get painful. Neck gets stiff and painful, with shooting pain going down to thoracic area.  She can not stand for very long without getting pain in both hips. Also notices stiffness. Constantly changing positions helps. Oxycodone also helps sometimes when she has severe pain. She tries not to take it often. She gets steroid injections with Dr. Dodson in wrists and thumbs alternating every 2 mths. She feels Orencia helps with legs and her fatigue, but has not been helping with other joints.  She received one dose in Nov 2021, but then she had sinus infections and infected with COVID in Jan 2021, restarted in March, for every 2 week infusions at [facility] to start with, has received 2 infusions so far.   She wakes up at 9am and experiences morning stiffness till 4 pm in the afternoon.  For her ADLs, reports either her boyfriend cooks or she orders food.  She has difficulty buttoning shirts.Her shoulders get painful and weak.   She has low grade fevers consistently and night sweats. Feels lymph nodes in neck get swollen sometimes. Orencia has helped with mouth ulcers but she does get them periodically.  She has been losing some hair in last 6 mths.    She has been having trouble lately with her eyes for years, they get infected. Sometimes her vision gets strained. she sees eye doctor for that. She is being prescribed eye drops for it.  She periodically has hard time breathing with exertion with some pains in the central chest area. She does get heartburn. She takes omeprazole. She does complain of skin rashes.  She sees derm for it. They are usually open sores on legs/abdomen.  Every 2-3 mths.  Lost 21 lbs in last 3 mths unintentionally/  No appetite changes. Abd pain has gone away since being started on Xifaxin started by GI for her SIBO. No Raynaud's phenomenon. No other acute issues.              Review of Systems:     12-point ROS performed. Negative except for pertinent positives in HPI.          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     Obesity      Pyelonephritis, unspecified 1992    abstract     Rheumatoid arteritis (H)      Seronegative inflammatory arthritis 6/17/2022     Unspecified symptom associated with female genital organs 05/07/1999    chronic pelvic pain abstract     Past Surgical History:   Procedure Laterality Date     CHOLECYSTECTOMY       COLON SURGERY       CYSTOSCOPY,+URETEROSCOPY      abstract     ZZHC EXCIS/CURET BENIGN ELBOW LESN  10/26/1987    (R) humerus abstract     1.Seronegative rheumatoid arthritis with predominant tenosynovitis:   In 2015, rheumatoid factor, cyclic citrullinated peptide antibodies of extractable nuclear antigen panel, and anti-DNA antibodies were negative. Patient was initially treated for undifferentiated mixed connective tissue disease with Cellcept/Plaquenil for a prior Hx of weakly positive DIEGO. Then she established care with Dr. Saleh at [facility] in 2018, initially disease thought to be non-inflammatory, later diagnosed with inflammatory arthritis, rheumatoid syndrome  and then seronegative RA. Patient was initially on Humira for a year starting in 2018, then discontinued due to failure to improve her joint symptoms, received a trial of Embrel which led to infections including sinus/ UTIs, then patient was on Remicade for 2 yrs but it did not work for all the joints like her back and she started having siginficant GI issues like diarrhea and abdominal pain, but did not necessarily think that it failed completely to releive her symptoms. Patient was started on Symponi in Oct 2021, but developed serious reaction to it reporting an area of severe erythema in her intestinal area, along with abdominal pain/ nausea/vomiting so she was switched to Orencia in Nov 2021, received one infusion but then developed serious sinus infections/ Covid in Jan 2022. Orencia infusions restarted in 2-2022, held in 6-2022. Actemra started 7-2022:   - Previous Rx:                  Humira- did not have good response              Infliximab - did not have good response              Embrel- had infections following one dose              Golimumab - red swollen plaque, rt abdomen after first infusion and then stoppd  She has reacted to sulfasalazine reported to be nausea/abd discomfort. She thinks methotrexate can not be used for fatty liver.   2. Mild persistent asthma, unspecified whether complicated   3. Bacterial overgrowth syndrome with diarrhea   - Diagnosed   4. Nonalcoholic steatohepatitis  5. Hx of clostridium difficile x2  6. Multiple orthopedic surgeries              Carpal tunnel surgery b/l               Discectomy and laminectomy, lumbar and cervical spine  7. Recurrent sinusitis: Myeloperoxidase and proteinase 3 were negative in October 2021.  8. DMII  9. Blepharitis  10: Hx of diverticulosis and recurrent diverticulitis s/p partial colectomy; Bacterial overgrowth syndrome with diarrhea   11. Hx of dermatographism       Social History:     Social History     Socioeconomic History     Marital  status: Single     Spouse name: Not on file     Number of children: Not on file     Years of education: Not on file     Highest education level: Not on file   Occupational History     Not on file   Tobacco Use     Smoking status: Never     Smokeless tobacco: Never   Substance and Sexual Activity     Alcohol use: Yes     Drug use: No     Sexual activity: Not on file   Other Topics Concern      Service Not Asked     Blood Transfusions Not Asked     Caffeine Concern Not Asked     Occupational Exposure Not Asked     Hobby Hazards Not Asked     Sleep Concern Not Asked     Stress Concern Not Asked     Weight Concern Not Asked     Special Diet Not Asked     Back Care Not Asked     Exercise No     Bike Helmet Not Asked     Seat Belt No     Self-Exams No   Social History Narrative    Womens Health Kit given.         Social Determinants of Health     Financial Resource Strain: Not on file   Food Insecurity: Not on file   Transportation Needs: Not on file   Physical Activity: Not on file   Stress: Not on file   Social Connections: Not on file   Intimate Partner Violence: Not on file   Housing Stability: Not on file   FHA/, currently on disability       Family History:     Family History   Problem Relation Age of Onset     Hypertension Father         abstract     Cancer Paternal Aunt         gallbladder cancer abstract            Allergies:     Allergies   Allergen Reactions     Polyethylene Glycol Rash     Codeine      Other reaction(s): Gastrointestinal, GI intolerance, Vomiting  Vomiting       Gadolinium Dizziness and Nausea     Hydrocodone-Acetaminophen Nausea and Vomiting     Other reaction(s): GI intolerance     Iodine      abstract     Sulfasalazine      Other reaction(s): GI intolerance  Has tried and had nausa.     Tramadol Nausea and Vomiting     Other reaction(s): Gastrointestinal, Other (see comments)  Nausea and vomiting   unknown       Gluten Meal Other (See Comments) and Rash     Other  reaction(s): Gastrointestinal, GI intolerance  Dizziness, tired, rash, stomach cramps, thrush, mouth sores  Dizziness, tired, rash, stomach cramps, thrush, mouth sores              Medications:     Current Outpatient Medications   Medication Sig Dispense Refill     Artificial Tear Solution (SOOTHE XP) SOLN as needed       atenolol (TENORMIN) 50 MG tablet Take 50 mg by mouth daily       doxycycline hyclate (VIBRA-TABS) 100 MG tablet Take 100 mg by mouth 2 times daily       fexofenadine (ALLEGRA) 180 MG tablet Take 1 tablet (180 mg) by mouth daily 90 tablet 2     fidaxomicin (DIFICID) 200 MG tablet Take 200 mg by mouth       fluticasone (FLONASE) 50 MCG/ACT nasal spray Spray 2 sprays into both nostrils 2 times daily        lidocaine (LIDODERM) 5 % patch as needed   1     losartan (COZAAR) 100 MG tablet Take 100 mg by mouth daily       methylPREDNISolone (MEDROL) 4 MG tablet Take 4 tabs daily X 4, Then 3 tabs daily X 4, then 2 tabs daily X 4 36 tablet 1     Montelukast Sodium (SINGULAIR PO) Take 10 mg by mouth At Bedtime        nystatin (MYCOSTATIN) 774847 UNIT/ML suspension TAKE 5ML BY MOUTH 4XDAILY FOR 14 DAYS. CONTINUE AT LEAST 2 DAYS AFTER SYMPTOMS RESOLVED       olopatadine (PATADAY) 0.7 % ophthalmic solution Apply 1 drop to eye daily PRN       OMEPRAZOLE PO Take 40 mg by mouth 2 times daily        spironolactone-HCTZ (ALDACTAZIDE) 25-25 MG tablet Take 1 tablet by mouth daily       tobramycin (TOBREX) 0.3 % ophthalmic solution 1-2 drops 2 times daily       tobramycin-dexamethasone (TOBRADEX) 0.3-0.1 % ophthalmic ointment 0.25 inches At Bedtime       triamcinolone (KENALOG) 0.1 % external ointment Apply topically 2 times daily 80 g 1     Albuterol Sulfate (PROAIR HFA IN) Inhale into the lungs as needed  (Patient not taking: Reported on 10/31/2022)       benzoyl peroxide 5 % external liquid Use daily as directed (Patient not taking: Reported on 10/31/2022) 148 mL 3     clindamycin (CLEOCIN T) 1 % external lotion  Apply topically 2 times daily (Patient not taking: Reported on 10/31/2022) 60 mL 3     diclofenac (VOLTAREN) 1 % topical gel Apply topically 4 times daily as needed  (Patient not taking: Reported on 10/31/2022)       Dupilumab (DUPIXENT) 300 MG/2ML SOPN Inject 300 mg Subcutaneous every 14 days (Patient not taking: Reported on 10/31/2022) 4 mL 2     metFORMIN (GLUCOPHAGE-XR) 500 MG 24 hr tablet Take 500 mg by mouth daily        norethindrone (MICRONOR) 0.35 MG tablet Take 0.35 mg by mouth       rifaximin (XIFAXAN) 550 MG TABS tablet Take 200 mg by mouth 3 times daily Take for 10 days, has on had as needed for small bowel bacteria overgrowh (Patient not taking: Reported on 10/31/2022)       sucralfate (CARAFATE) 1 GM/10ML suspension Take by mouth 4 times daily as needed               Physical Exam:   Blood pressure 136/88, pulse 69, weight 95.7 kg (211 lb), SpO2 99 %.  Wt Readings from Last 6 Encounters:   11/25/22 95.7 kg (211 lb)   10/31/22 96.5 kg (212 lb 11.2 oz)   09/06/22 95.3 kg (210 lb 3.2 oz)   08/09/22 95.9 kg (211 lb 6.4 oz)   07/12/22 95.8 kg (211 lb 1.6 oz)   07/06/22 95.9 kg (211 lb 8 oz)     Constitutional: well-developed, appearing stated age; cooperative  Eyes: nl EOM, PERRLA,conjunctiva, sclera  ENT: nl external ears, nose, hearing, lips, teeth, gums, throat  No mucous membrane lesions, normal saliva pool  Resp: lungs clear to auscultation, nl to palpation  CV: RRR, no murmurs, rubs or gallops, no edema  GI: no ABD mass or tenderness, no HSM  : not tested  MS: Former fusiform swelling in the fingers and hands and weak  strength have resolved.  Fingers now show full extension at all DIPs and PIPs.  Neck stiffness/ midline thoracic tenderness/ cervicocoracial junction. B/l ankle tenderness/ good alignment  and ROM of feet/ knees intact. Shoulder ROM intact, with some tenderness with movement.  Left greater than right hip internal rotation impaired by pain.  Knee, ankle, and foot MTP/IP joints  were examined, and normal.   Skin: no nail pitting, alopecia, rash  Neuro: nl cranial nerves, strength, sensation, DTRs.   Psych: nl judgement, orientation, memory, affect.         Data:     @  RHEUM RESULTS Latest Ref Rng & Units 10/26/2001 2020 2/3/2022   ALBUMIN 3.5 - 5.0 g/dL - 3.9 3.8   ALT 0 - 45 U/L - 36 64(H)   AST 0 - 40 U/L - 24 35   CREATININE 0.60 - 1.10 mg/dL - 0.73 0.90   GFR ESTIMATE, IF BLACK >60 mL/min/[1.73:m2] - >90 -   GFR ESTIMATE >60 mL/min/1.73m2 - >90 80   HEMATOCRIT 35.0 - 47.0 % 40.2 47.3(H) 46.9   HEMOGLOBIN 11.7 - 15.7 g/dL 13.5 15.8(H) 15.2   IGA 84 - 499 mg/dL - - -   IGM 35 - 242 mg/dL - - -    - 1,616 mg/dL - - -   WBC 4.0 - 11.0 10e3/uL 8.09 9.3 8.0   RBC 3.80 - 5.20 10e6/uL 4.95 5.59(H) 5.57(H)   RDW 10.0 - 15.0 % 15.6(A) 13.4 12.9   MCHC 31.5 - 36.5 g/dL 33.6 33.4 32.4   MCV 78 - 100 fL 81.3 85 84   PLATELET COUNT 150 - 450 10e3/uL - 402 442     MRI thoracic spine 21 with mild multilevel spondylosis with specific findings according to level includin. Small disc bulge/protrusion at T1-2, T11-12, T12-L1  2. No cord deformity/centralspinal canal stenosis. The foramen appears patent    MRI lumbar spine 2013  1. L3-L4 mild anterior degenerative disc disease and degeneration of   the left facet joint.   2. L4-L5 mild degenerative disc disease with small central posterior   disc protrusion and high intensity zone. Slight impression on the   thecal sac.   3. L5-S1 small central posterior disc protrusion extending below the   disc level. IMPRESSION:     1. L3-L4 mild anterior degenerative disc disease and degeneration of   the left facet joint.   2. L4-L5 mild degenerative disc disease with small central posterior   disc protrusion and high intensity zone. Slight impression on the   thecal sac.   3. L5-S1 small central posterior disc protrusion extending below the   disc level.     MRI Radius Ulna left 3/12/21  1. Redemonstrated mild flexor tenosynovitis within  the left carpal tunnel.   2. Remainder is otherwise unremarkable for inflammatory process within the left   Forearm.    MRI Radius Ulna Right 3/12/21  1. Redemonstrated are moderate inflammatory flexor tenosynovitis within the   right wrist which is not significantly changed from prior dedicated MRI   examination.   2. Remainder of the exam is unremarkable.    DX Lumbar spine 1/26/21  Postoperative changes if an L5-S1 laminectomy. Slight disk space   narrowing at L5. Lower lumbar facet arthritis. Low-grade lumbar subluxations. No   gross instability on flexion and extension. Slight wedge deformity of the L4   vertebral body.

## 2022-11-23 DIAGNOSIS — R21 CUTANEOUS ERUPTION: ICD-10-CM

## 2022-11-25 ENCOUNTER — OFFICE VISIT (OUTPATIENT)
Dept: RHEUMATOLOGY | Facility: CLINIC | Age: 47
End: 2022-11-25
Attending: INTERNAL MEDICINE
Payer: MEDICARE

## 2022-11-25 ENCOUNTER — TELEPHONE (OUTPATIENT)
Dept: RHEUMATOLOGY | Facility: CLINIC | Age: 47
End: 2022-11-25

## 2022-11-25 VITALS
SYSTOLIC BLOOD PRESSURE: 136 MMHG | BODY MASS INDEX: 34.08 KG/M2 | WEIGHT: 211 LBS | OXYGEN SATURATION: 99 % | HEART RATE: 69 BPM | DIASTOLIC BLOOD PRESSURE: 88 MMHG

## 2022-11-25 DIAGNOSIS — M13.80 SERONEGATIVE INFLAMMATORY ARTHRITIS: Primary | ICD-10-CM

## 2022-11-25 DIAGNOSIS — G89.29 CHRONIC LOW BACK PAIN WITHOUT SCIATICA, UNSPECIFIED BACK PAIN LATERALITY: ICD-10-CM

## 2022-11-25 DIAGNOSIS — E78.5 HYPERLIPIDEMIA, UNSPECIFIED HYPERLIPIDEMIA TYPE: ICD-10-CM

## 2022-11-25 DIAGNOSIS — K63.89 INTESTINAL BACTERIAL OVERGROWTH: ICD-10-CM

## 2022-11-25 DIAGNOSIS — M54.50 CHRONIC LOW BACK PAIN WITHOUT SCIATICA, UNSPECIFIED BACK PAIN LATERALITY: ICD-10-CM

## 2022-11-25 DIAGNOSIS — M13.0 SERONEGATIVE POLYARTHRITIS: ICD-10-CM

## 2022-11-25 PROCEDURE — G0463 HOSPITAL OUTPT CLINIC VISIT: HCPCS

## 2022-11-25 PROCEDURE — 99214 OFFICE O/P EST MOD 30 MIN: CPT | Performed by: INTERNAL MEDICINE

## 2022-11-25 RX ORDER — METHYLPREDNISOLONE 4 MG/1
TABLET ORAL
Qty: 36 TABLET | Refills: 1 | Status: SHIPPED | OUTPATIENT
Start: 2022-11-25 | End: 2022-12-21

## 2022-11-25 NOTE — LETTER
11/25/2022       RE: Laxmi Ya  [address] [place] [id number]     Dear Colleague,    Thank you for referring your patient, Laxmi Ya, to the [facility] [place] at [facility]. Please see a copy of my visit note below.    Rheumatology Clinic Visit  [facility]  Oswaldo Lemus M.D.     Laxmi Ya MRN# [id number]   YOB: 1975 Age: 47 year old   Date of Visit:11/25/2022    Primary care provider: Artur Marsh          Assessment and Plan:     Seronegative inflammatory (rheumatoid) arthritis:  Chronic small joint predominant stiffness and pain have improved modestly since substitution of Actemra IV infusions for Orencia after the last visit in June 2022.  She still has significant morning stiffness for 30 minutes and pain in wrists/neck and hips.  Exam shows resolved tenderness without gross synovial swelling at multiple MCP.  Hip internal rotation is painful.    Data: Lab work on November 17, 2022 showed comprehensive metabolic panel normal; CBC normal; urinalysis clear.. Autoimmune workup in 2021 for DIEGO/FAVIOLA panel/CCP/RF/ ANCA negative.      Impression:  1.  Seronegative inflammatory arthritis, inadequately responsive to orencia monotherapy, now significantly improved after starting Actemra intravenous infusions in August 2022.  Improved joints are focused primarily in the small joints of the hands and feet; neck pain and rib cage pain persist, but I do not think these pain generators are due to the same systemic pathologic process as inflammatory arthritis in the hands and feet.  Accordingly, although core related pain including cervical pain and costochondral discomfort may respond to prednisone, I do not think that such pains will be expected to improve with altered dose or frequency of IL-6 antagonist tocilizumab.  I do recommend renewing CRP and sedimentation rate with next blood draw, and  monitoring hyperlipidemia due to the lipid elevating effects of Actemra.    2. Carpal tunnel syndrome  3. Bowel bacterial overgrowth: improved with xifaxin and doxycycline.    Plan:  1. Continue actemra IV. Plan 750 mg IV every 28 days. Check on altered dose or mode of delivery (subcutaneous).  I am not aware of data supporting use of greater than 8 mg/kg per 28 days to achieve greater anti-inflammatory effect.  Thus, I think that 750 mg likely represents practical maximum dose.  3.  Follow-up with Dr. Petit regarding thumb and carpal tunnel pain  4.  Follow-up with Dr. Thompson regarding bowel disease  5.  Follow-up with Dr. Meenakshi staples regarding infectious disease.  2. For flaring pain despite Actemra.  Medrol 16 mg (4 tabs) with taper (3 tablets daily for 1 week, then 2 tablets daily for 1 week) over several weeks for active arthritis flares.  I recommend avoiding use of Medrol burst and taper greater than once out of every 2 months.  I do not recommend chronic low-dose corticosteroids.    RTC 6 mos    Oswaldo Lemus M.D.  Staff Rheumatologist, [facility]  Pager [phone]           History of Present Illness:   Laxmi Ya with history of COLON, hypertension, diagnosis of seronegative rheumatoid arthritis with tenosynovitis presents for follow-up.  She was last seen in June 2022, when Orencia Orencia was stopped, and Actemra was started, for active inflammatory arthritis.    Background; Seronegative rheumatoid arthritis with predominant tenosynovitis:   In 2015, rheumatoid factor, cyclic citrullinated peptide antibodies of extractable nuclear antigen panel, and anti-DNA antibodies were negative. Patient was initially treated for undifferentiated mixed connective tissue disease with Cellcept/Plaquenil for a prior Hx of weakly positive DIEGO. Then she established care with Dr. Saleh at [facility] in 2018, initially disease thought to be non-inflammatory, later diagnosed with inflammatory arthritis, rheumatoid  syndrome and then seronegative RA. Patient was initially on Humira for a year starting in 2018, then discontinued due to failure to improve her joint symptoms, received a trial of Embrel which led to infections including sinus/ UTIs, then patient was on Remicade for 2 yrs but it did not work for all the joints like her back and she started having siginficant GI issues like diarrhea and abdominal pain, but did not necessarily think that it failed completely to releive her symptoms. Patient was started on Symponi in Oct 2021, but developed serious reaction to it reporting an area of severe erythema in her intestinal area, along with abdominal pain/ nausea/vomiting so she was switched to Orencia in Nov 2021, received one infusion but then developed serious sinus infections/ Covid in Jan 2022. Orencia infusions restarted in 2-2022, held in 6-2022. Actemra started 7-2022, continued through November 2022.    Interval history November 25, 2022     Burning pain in hands and fingers is much better over all.   She attributes improvement to actemra infusions, but she still has pain in neck, rib cage daily.   She gets relief with intermittent medrol 2-3 week courses, and has used 3 times since June.  She is dealing with Staph overgrowth. She has marked blepharitis, treating with tobradex. She has been prescribed doxy 100 mg bid  She just had cataract surgerios; now she is treating for constant mattering  Doxy seems to have also helped with sharp abdominal pains asst'd with Cibo, in the last 3 months.  She started injections for thumb bases with Dr. Petit in 6-2022. She has been injected q 4 mos in both thumb bases. She still has a strong sense of swelling/burning through all the fingers.Former wrist pain has improved with steroid injections by Dr. Sullivan; however she had marked muscle pain/weakness after dexamathosone (not kenalog) injection. More injections are planned in 2 weeks.    Interval history June 16, 2022    She  reports improved pain in back/thoracic area and ankles, but she notes little improvement in the hands. Constant achy pain in hands/fingers/wrists persists. Showering is a chore with grasping bath objects.  Recent course of Medrol did not help her hand pain.  She will see Dr. Petit regarding possible restart injections in the hands/thumbs. She had been getting carpal tunnel and thumb injections every 2 months at [place]; last injection was 6 months ago.  Abdominal pain/symptoms improved with use of xifaxin.    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 Saleh. Initially did not think it to be inflammatory. later diagnosed with inflammatory arthritis, rheumatoid syndrome and then seronegative RA. Patient has been disabled Patient was initially on Humira for a year starting in 2018, then discontinued due to failure to improve her joint symptoms after some time, then received a dose of Embrel leading to infections including sinus/ UTIs, then patient was on Remicade for 2 yrs but it did not work for all the joints like her back and she started having siginficant GI issues like diarrhea and abdominal pain, but did not necessarily think that it failed completely to relieve her symptoms. Patient was started on Symponi in Oct 2021, but developed serious reaction to it reporting an an area of severe erythema in her intestinal area, along with abdominal pain/ nausea/vomiting so she was switched to Orencia in Nov 2021, received one infusion but then developed serious sinus infections/ Covid in Jan 2022 so it was held during that entire time and loading dose restarted in March 2022, has had 2 infusions 2 weeks apart at [place] and will receive her next one on March 15. Patient feels some improvement  in her lower extremity joints and her fatigue has been better. She still has significant morning stiffness for more than 1 hour, and pain in hands/wrists/neck for which she gets occipital blocks. When asked about steroids she thinks she may have an adverse reaction to prednisone while in the hospital but that could be due to her SIBO. She has not used much for oral steroids in all these years but has tolerated Solu-medrol okay in the past.  Small joints of hands, hips and her neck bother her the most with stiffness/pain and swelling.  It is very painful in the wrist, feels hands are swollen. Also reports some numbness/ tingling right side of arm. Feet also get painful. Neck gets stiff and painful, with shooting pain going down to thoracic area.  She can not stand for very long without getting pain in both hips. Also notices stiffness. Constantly changing positions helps. Oxycodone also helps sometimes when she has severe pain. She tries not to take it often. She gets steroid injections with Dr. Dodson in wrists and thumbs alternating every 2 mths. She feels Orencia helps with legs and her fatigue, but has not been helping with other joints.  She received one dose in Nov 2021, but then she had sinus infections and infected with COVID in Jan 2021, restarted in March, for every 2 week infusions at [place] to start with, has received 2 infusions so far.   She wakes up at 9am and experiences morning stiffness till 4 pm in the afternoon.  For her ADLs, reports either her boyfriend cooks or she orders food.  She has difficulty buttoning shirts.Her shoulders get painful and weak.   She has low grade fevers consistently and night sweats. Feels lymph nodes in neck get swollen sometimes. Orencia has helped with mouth ulcers but she does get them periodically.  She has been losing some hair in last 6 mths.    She has been having trouble lately with her eyes for years, they get infected. Sometimes her vision gets strained. she sees  eye doctor for that. She is being prescribed eye drops for it.  She periodically has hard time breathing with exertion with some pains in the central chest area. She does get heartburn. She takes omeprazole. She does complain of skin rashes.  She sees derm for it. They are usually open sores on legs/abdomen. Every 2-3 mths.  Lost 21 lbs in last 3 mths unintentionally/  No appetite changes. Abd pain has gone away since being started on Xifaxin started by GI for her SIBO. No Raynaud's phenomenon. No other acute issues.              Review of Systems:     12-point ROS performed. Negative except for pertinent positives in HPI.          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     Obesity      Pyelonephritis, unspecified 1992    abstract     Rheumatoid arteritis (H)      Seronegative inflammatory arthritis 6/17/2022     Unspecified symptom associated with female genital organs 05/07/1999    chronic pelvic pain abstract     Past Surgical History:   Procedure Laterality Date     CHOLECYSTECTOMY       COLON SURGERY       CYSTOSCOPY,+URETEROSCOPY      abstract     ZZHC EXCIS/CURET BENIGN ELBOW LESN  10/26/1987    (R) humerus abstract     1.Seronegative rheumatoid arthritis with predominant tenosynovitis:   In 2015, rheumatoid factor, cyclic citrullinated peptide antibodies of extractable nuclear antigen panel, and anti-DNA antibodies were negative. Patient was  initially treated for undifferentiated mixed connective tissue disease with Cellcept/Plaquenil for a prior Hx of weakly positive DIEGO. Then she established care with Dr. Saleh at [facility] in 2018, initially disease thought to be non-inflammatory, later diagnosed with inflammatory arthritis, rheumatoid syndrome and then seronegative RA. Patient was initially on Humira for a year starting in 2018, then discontinued due to failure to improve her joint symptoms, received a trial of Embrel which led to infections including sinus/ UTIs, then patient was on Remicade for 2 yrs but it did not work for all the joints like her back and she started having siginficant GI issues like diarrhea and abdominal pain, but did not necessarily think that it failed completely to releive her symptoms. Patient was started on Symponi in Oct 2021, but developed serious reaction to it reporting an area of severe erythema in her intestinal area, along with abdominal pain/ nausea/vomiting so she was switched to Orencia in Nov 2021, received one infusion but then developed serious sinus infections/ Covid in Jan 2022. Orencia infusions restarted in 2-2022, held in 6-2022. Actemra started 7-2022:   - Previous Rx:                  Humira- did not have good response              Infliximab - did not have good response              Embrel- had infections following one dose              Golimumab - red swollen plaque, rt abdomen after first infusion and then stoppd  She has reacted to sulfasalazine reported to be nausea/abd discomfort. She thinks methotrexate can not be used for fatty liver.   2. Mild persistent asthma, unspecified whether complicated   3. Bacterial overgrowth syndrome with diarrhea   - Diagnosed   4. Nonalcoholic steatohepatitis  5. Hx of clostridium difficile x2  6. Multiple orthopedic surgeries              Carpal tunnel surgery b/l               Discectomy and laminectomy, lumbar and cervical spine  7. Recurrent sinusitis:  Myeloperoxidase and proteinase 3 were negative in October 2021.  8. DMII  9. Blepharitis  10: Hx of diverticulosis and recurrent diverticulitis s/p partial colectomy; Bacterial overgrowth syndrome with diarrhea   11. Hx of dermatographism       Social History:     Social History     Socioeconomic History     Marital status: Single     Spouse name: Not on file     Number of children: Not on file     Years of education: Not on file     Highest education level: Not on file   Occupational History     Not on file   Tobacco Use     Smoking status: Never     Smokeless tobacco: Never   Substance and Sexual Activity     Alcohol use: Yes     Drug use: No     Sexual activity: Not on file   Other Topics Concern      Service Not Asked     Blood Transfusions Not Asked     Caffeine Concern Not Asked     Occupational Exposure Not Asked     Hobby Hazards Not Asked     Sleep Concern Not Asked     Stress Concern Not Asked     Weight Concern Not Asked     Special Diet Not Asked     Back Care Not Asked     Exercise No     Bike Helmet Not Asked     Seat Belt No     Self-Exams No   Social History Narrative    Womens Health Kit given.         Social Determinants of Health     Financial Resource Strain: Not on file   Food Insecurity: Not on file   Transportation Needs: Not on file   Physical Activity: Not on file   Stress: Not on file   Social Connections: Not on file   Intimate Partner Violence: Not on file   Housing Stability: Not on file   A/, currently on disability       Family History:     Family History   Problem Relation Age of Onset     Hypertension Father         abstract     Cancer Paternal Aunt         gallbladder cancer abstract            Allergies:     Allergies   Allergen Reactions     Polyethylene Glycol Rash     Codeine      Other reaction(s): Gastrointestinal, GI intolerance, Vomiting  Vomiting       Gadolinium Dizziness and Nausea     Hydrocodone-Acetaminophen Nausea and Vomiting     Other  reaction(s): GI intolerance     Iodine      abstract     Sulfasalazine      Other reaction(s): GI intolerance  Has tried and had nausa.     Tramadol Nausea and Vomiting     Other reaction(s): Gastrointestinal, Other (see comments)  Nausea and vomiting   unknown       Gluten Meal Other (See Comments) and Rash     Other reaction(s): Gastrointestinal, GI intolerance  Dizziness, tired, rash, stomach cramps, thrush, mouth sores  Dizziness, tired, rash, stomach cramps, thrush, mouth sores              Medications:     Current Outpatient Medications   Medication Sig Dispense Refill     Artificial Tear Solution (SOOTHE XP) SOLN as needed       atenolol (TENORMIN) 50 MG tablet Take 50 mg by mouth daily       doxycycline hyclate (VIBRA-TABS) 100 MG tablet Take 100 mg by mouth 2 times daily       fexofenadine (ALLEGRA) 180 MG tablet Take 1 tablet (180 mg) by mouth daily 90 tablet 2     fidaxomicin (DIFICID) 200 MG tablet Take 200 mg by mouth       fluticasone (FLONASE) 50 MCG/ACT nasal spray Spray 2 sprays into both nostrils 2 times daily        lidocaine (LIDODERM) 5 % patch as needed   1     losartan (COZAAR) 100 MG tablet Take 100 mg by mouth daily       methylPREDNISolone (MEDROL) 4 MG tablet Take 4 tabs daily X 4, Then 3 tabs daily X 4, then 2 tabs daily X 4 36 tablet 1     Montelukast Sodium (SINGULAIR PO) Take 10 mg by mouth At Bedtime        nystatin (MYCOSTATIN) 531160 UNIT/ML suspension TAKE 5ML BY MOUTH 4XDAILY FOR 14 DAYS. CONTINUE AT LEAST 2 DAYS AFTER SYMPTOMS RESOLVED       olopatadine (PATADAY) 0.7 % ophthalmic solution Apply 1 drop to eye daily PRN       OMEPRAZOLE PO Take 40 mg by mouth 2 times daily        spironolactone-HCTZ (ALDACTAZIDE) 25-25 MG tablet Take 1 tablet by mouth daily       tobramycin (TOBREX) 0.3 % ophthalmic solution 1-2 drops 2 times daily       tobramycin-dexamethasone (TOBRADEX) 0.3-0.1 % ophthalmic ointment 0.25 inches At Bedtime       triamcinolone (KENALOG) 0.1 % external ointment  Apply topically 2 times daily 80 g 1     Albuterol Sulfate (PROAIR HFA IN) Inhale into the lungs as needed  (Patient not taking: Reported on 10/31/2022)       benzoyl peroxide 5 % external liquid Use daily as directed (Patient not taking: Reported on 10/31/2022) 148 mL 3     clindamycin (CLEOCIN T) 1 % external lotion Apply topically 2 times daily (Patient not taking: Reported on 10/31/2022) 60 mL 3     diclofenac (VOLTAREN) 1 % topical gel Apply topically 4 times daily as needed  (Patient not taking: Reported on 10/31/2022)       Dupilumab (DUPIXENT) 300 MG/2ML SOPN Inject 300 mg Subcutaneous every 14 days (Patient not taking: Reported on 10/31/2022) 4 mL 2     metFORMIN (GLUCOPHAGE-XR) 500 MG 24 hr tablet Take 500 mg by mouth daily        norethindrone (MICRONOR) 0.35 MG tablet Take 0.35 mg by mouth       rifaximin (XIFAXAN) 550 MG TABS tablet Take 200 mg by mouth 3 times daily Take for 10 days, has on had as needed for small bowel bacteria overgrowh (Patient not taking: Reported on 10/31/2022)       sucralfate (CARAFATE) 1 GM/10ML suspension Take by mouth 4 times daily as needed               Physical Exam:   Blood pressure 136/88, pulse 69, weight 95.7 kg (211 lb), SpO2 99 %.  Wt Readings from Last 6 Encounters:   11/25/22 95.7 kg (211 lb)   10/31/22 96.5 kg (212 lb 11.2 oz)   09/06/22 95.3 kg (210 lb 3.2 oz)   08/09/22 95.9 kg (211 lb 6.4 oz)   07/12/22 95.8 kg (211 lb 1.6 oz)   07/06/22 95.9 kg (211 lb 8 oz)     Constitutional: well-developed, appearing stated age; cooperative  Eyes: nl EOM, PERRLA,conjunctiva, sclera  ENT: nl external ears, nose, hearing, lips, teeth, gums, throat  No mucous membrane lesions, normal saliva pool  Resp: lungs clear to auscultation, nl to palpation  CV: RRR, no murmurs, rubs or gallops, no edema  GI: no ABD mass or tenderness, no HSM  : not tested  MS: Former fusiform swelling in the fingers and hands and weak  strength have resolved.  Fingers now show full extension at  all DIPs and PIPs.  Neck stiffness/ midline thoracic tenderness/ cervicocoracial junction. B/l ankle tenderness/ good alignment  and ROM of feet/ knees intact. Shoulder ROM intact, with some tenderness with movement.  Left greater than right hip internal rotation impaired by pain.  Knee, ankle, and foot MTP/IP joints were examined, and normal.   Skin: no nail pitting, alopecia, rash  Neuro: nl cranial nerves, strength, sensation, DTRs.   Psych: nl judgement, orientation, memory, affect.         Data:     @  RHEUM RESULTS Latest Ref Rng & Units 10/26/2001 2020 2/3/2022   ALBUMIN 3.5 - 5.0 g/dL - 3.9 3.8   ALT 0 - 45 U/L - 36 64(H)   AST 0 - 40 U/L - 24 35   CREATININE 0.60 - 1.10 mg/dL - 0.73 0.90   GFR ESTIMATE, IF BLACK >60 mL/min/[1.73:m2] - >90 -   GFR ESTIMATE >60 mL/min/1.73m2 - >90 80   HEMATOCRIT 35.0 - 47.0 % 40.2 47.3(H) 46.9   HEMOGLOBIN 11.7 - 15.7 g/dL 13.5 15.8(H) 15.2   IGA 84 - 499 mg/dL - - -   IGM 35 - 242 mg/dL - - -    - 1,616 mg/dL - - -   WBC 4.0 - 11.0 10e3/uL 8.09 9.3 8.0   RBC 3.80 - 5.20 10e6/uL 4.95 5.59(H) 5.57(H)   RDW 10.0 - 15.0 % 15.6(A) 13.4 12.9   MCHC 31.5 - 36.5 g/dL 33.6 33.4 32.4   MCV 78 - 100 fL 81.3 85 84   PLATELET COUNT 150 - 450 10e3/uL - 402 442     MRI thoracic spine 21 with mild multilevel spondylosis with specific findings according to level includin. Small disc bulge/protrusion at T1-2, T11-12, T12-L1  2. No cord deformity/centralspinal canal stenosis. The foramen appears patent    MRI lumbar spine 2013  1. L3-L4 mild anterior degenerative disc disease and degeneration of   the left facet joint.   2. L4-L5 mild degenerative disc disease with small central posterior   disc protrusion and high intensity zone. Slight impression on the   thecal sac.   3. L5-S1 small central posterior disc protrusion extending below the   disc level. IMPRESSION:     1. L3-L4 mild anterior degenerative disc disease and degeneration of   the left facet joint.   2.  L4-L5 mild degenerative disc disease with small central posterior   disc protrusion and high intensity zone. Slight impression on the   thecal sac.   3. L5-S1 small central posterior disc protrusion extending below the   disc level.     MRI Radius Ulna left 3/12/21  1. Redemonstrated mild flexor tenosynovitis within the left carpal tunnel.   2. Remainder is otherwise unremarkable for inflammatory process within the left   Forearm.    MRI Radius Ulna Right 3/12/21  1. Redemonstrated are moderate inflammatory flexor tenosynovitis within the   right wrist which is not significantly changed from prior dedicated MRI   examination.   2. Remainder of the exam is unremarkable.    DX Lumbar spine 1/26/21  Postoperative changes if an L5-S1 laminectomy. Slight disk space   narrowing at L5. Lower lumbar facet arthritis. Low-grade lumbar subluxations. No   gross instability on flexion and extension. Slight wedge deformity of the L4   vertebral body.        Again, thank you for allowing me to participate in the care of your patient.      Sincerely,    Oswaldo Lemus MD

## 2022-11-25 NOTE — TELEPHONE ENCOUNTER
"Patient out in waiting area after clinic visit, asked to speak to this RN.  Patient had spoken with pharmacist downstairs and wanted to provide an update.  The pharmacist told her that she could not receive an equivalent dose of tocilizumab (Actemra)  Subcutaneous.  She would also not get coverage for the subcutaneous actemra being on Medicare.  Patient was also told by the pharmacist that she could increase her IV dose to \"800mg\".  This RN discussed that I would share her information with Yuliya Hammond and Jaskaran and explained the process for prior auth and specialty medications.  Discussed that someone from our pharmacy team will be following up with her.  Patient verbalized understanding.    Patient also needed a new ID referral placed by Dr. Lemus.    CLEMENTINA Mora, RN  RN Care Coordinator Rheumatology        "

## 2022-11-25 NOTE — PATIENT INSTRUCTIONS
Dx  Seronegative inflammatory arthritis somewhat improved with actemra  Osteoarthritis  Carpal tunnel syndrome  Bowel bacteria overgrowth: improved with xifaxin/doxycycline  Hip pain/rib pain: continue PTx/chiropracty, intermittent medrol doses.    Plan:    Continue actemra IV. Plan 750 mg IV every 28 days. Check on altered dose or mode of delivery.  3.  Follow-up with Dr. Petit regarding thumb and carpal tunnel pain  4.  Follow-up with Dr. Thompson regarding bowel disease  5.  Follow-up with Dr. Meenakshi staples regarding infectious disease.  Medrol 16 mg (4 tabs) with taper over several weeks for active arthritis    Other ID physicians: Dr. MARIA INES Yeboah or Dr. Yepez ChanDiagnosis:

## 2022-11-25 NOTE — NURSING NOTE
Chief Complaint   Patient presents with     RECHECK     Blood pressure 136/88, pulse 69, weight 95.7 kg (211 lb), SpO2 99 %.    Alberto Neely on 11/25/2022 at 2:11 PM

## 2022-11-29 ENCOUNTER — VIRTUAL VISIT (OUTPATIENT)
Dept: PHARMACY | Facility: CLINIC | Age: 47
End: 2022-11-29
Attending: INTERNAL MEDICINE
Payer: COMMERCIAL

## 2022-11-29 DIAGNOSIS — K63.8219 SMALL INTESTINAL BACTERIAL OVERGROWTH (SIBO): ICD-10-CM

## 2022-11-29 DIAGNOSIS — M06.00 SERONEGATIVE RHEUMATOID ARTHRITIS (H): Primary | ICD-10-CM

## 2022-11-29 DIAGNOSIS — A04.9 BACTERIAL ENTERITIS: ICD-10-CM

## 2022-11-29 DIAGNOSIS — I10 BENIGN ESSENTIAL HYPERTENSION: ICD-10-CM

## 2022-11-29 PROCEDURE — 99607 MTMS BY PHARM ADDL 15 MIN: CPT

## 2022-11-29 PROCEDURE — 99605 MTMS BY PHARM NP 15 MIN: CPT

## 2022-11-29 NOTE — PROGRESS NOTES
Medication Therapy Management (MTM) Encounter    ASSESSMENT:                            Medication Adherence/Access: No issues identified    Did not have enough time to cover all conditions. Plan to assess other conditions in follow up visits.    Rheumatoid Arthritis: Discussed subcutaneous dosing is equal to less dose overall but increase in frequency may improve efficacy. Per package instructions, patients <100 kg can take 162 mg weekly or twice weekly. Patient prefers to try weekly injections to maximize frequency and increase total dose to as close to infusions as possible. Extensively discussed insurance coverage on most biologics including PA process and patient assistance programs. Per patient reported income, likely will qualify for patient assistance. Discussed that this program will replace insurance coverage to make medication affordable.    SIBO/Staph Overgrowth: Would benefit from taking doxycyline 150 mg twice daily to determine if it improves diarrhea symptoms. If not, patient would benefit from follow up with gastroenterologist and infectious disease for further evaluation.     Hypertension: Patient is meeting BP goal of < 140/90mmHg.  Current treatment plan is effective, no change in therapy.    PLAN:                            1. We will send in an order for Actemra 162 mg weekly to replace the infusions. We will contact you about the patient assistance program if your insurance copay is too high.    Follow-up: Return in about 6 weeks (around 1/10/2023) for MTM Pharmacist Visit.    SUBJECTIVE/OBJECTIVE:                          Laxmi Ya is a 47 year old female called for an initial visit. She was referred to me from Oswaldo Lemus MD.      Reason for visit: Actemra loosing effectiveness before next infusion, wondering about more frequent subcutaneous injections to improve efficacy as suggested by rheumatologist    Allergies/ADRs: Reviewed in chart  Past Medical History: Reviewed in  chart  Tobacco: She reports that she has never smoked. She has never used smokeless tobacco.  Alcohol: Less than 1 beverage / month    Medication Adherence/Access: no issues reported    Rheumatoid Arthritis: Currently using Actemra infusion monthly (next dose 12/15), diclofenac 1% gel as needed (uses once per day a few times per week). Has Medrol for flares as needed on hand. Reports the Actemra has been more helpful than other medications she has tried but is still having stiffness in pain in hips and neck regularly. Feels it is typically worse closer to her infusion time. Discussed with rheumatologist that increasing dose or frequency of Actemra could improve symptoms. Did talk to another pharmacist and is now unsure if her insurance will cover anything other than IV infusions. No side effects noted.    Tried and failed: Humira, Enbrel, Orencia, Remicade, Simponi, Hydroxychloroquine    Liver Function Studies - Recent Labs   Lab Test 11/17/22  0937   PROTTOTAL 6.5   ALBUMIN 3.7   BILITOTAL 0.6   ALKPHOS 40*   AST 15   ALT 30     CBC RESULTS: Recent Labs   Lab Test 11/17/22  0937   WBC 8.1   RBC 4.72   HGB 13.8   HCT 40.7   MCV 86   MCH 29.2   MCHC 33.9   RDW 13.1        SIBO/Staph Overgrowth: Currently taking doxycycline 150 mg twice daily (recently increased). Previously took Xifaxan 200 mg three times daily x 10 days as needed for SIBO. Very concerned that diarrhea symptoms have returned as they were controlled by doxycycline previously. Has a history of C. Diff (was on Dificid) but thinks these symptoms are not consistent with her previous C. Diff symptoms. Was told a microbiome test would not be helpful for her and now is unsure of what to do next to help with symptoms.     Hypertension: Current meds include atenolol 50 mg daily, losartan 100 mg daily, spironolactone/hctz 25/25 mg daily. Pt reports no medication side effects or concerns. Does not perform home BP monitoring.    BP Readings from Last 3  Encounters:   11/25/22 136/88   11/17/22 122/80   10/31/22 (!) 168/101     Potassium   Date Value Ref Range Status   11/17/2022 3.7 3.5 - 5.0 mmol/L Final   04/01/2020 4.2 3.4 - 5.3 mmol/L Final     Today's Vitals: There were no vitals taken for this visit.  ----------------    I spent 60 minutes with this patient today. All changes were made via verbal approval with Oswaldo Lemus. A copy of the visit note was provided to the patient's provider(s).    A summary of these recommendations was sent via [employer].    Augustine ConnerD  Medication Therapy Management Pharmacist  [facility]  Phone: [phone]    Telemedicine Visit Details  Type of service:  Telephone visit  Start Time: 11:00 AM  End Time: 12:00 PM  Originating Location (pt. Location): Home  Distant Location (provider location):  On-site  Provider has received verbal consent for a visit from the patient? Yes     Medication Therapy Recommendations  Seronegative rheumatoid arthritis (H)    Current Medication: tocilizumab   Rationale: Dosage form inappropriate - Ineffective medication - Effectiveness   Recommendation: Change Medication Formulation  - Actemra 162 MG/0.9ML Sosy - Weekly   Status: Accepted per Provider

## 2022-11-29 NOTE — TELEPHONE ENCOUNTER
FUTURE VISIT INFORMATION      FUTURE VISIT INFORMATION:    Date: 2/15/23    Time: 2pm    Location: [facility] [place], [place]  REFERRAL INFORMATION:    Referring provider:  Charly Pan MD    Referring providers clinic:  [facility]    Reason for visit/diagnosis  Other allergic rhinitis per Pt, Ref by Dr. MELY Pan @ [facility], Recs in [facility]. [facility] [place] [place] location verified. 3/10/22 8/25/21 CT received from [facility]    RECORDS REQUESTED FROM:       Clinic name Comments Records Status Imaging Status   [facility] 8/25/21, 7/8/22, 7/28/21, 2/28/22 note from Dr Pan     Images  8/25/21 CT Sinus     Scanned in EPIC -PACS   [facility] MRN: [id number]    10/4/16 MR Brain  Sent to scan 3/10/22  PACS   Imaging 3/3/22 CT Sinus  [facility] PACS   [facility]   2/2/22 note from Yuliya Bates PA-C   Care everywhere      [facility]   4/20/2020 note from Rene Erickson Jr., MD   Care everywhere      Department of Otorhinolaryngology in [place], [place]  6/3/19 note from Chapin Garcia M.D.  Care everywhere        Division of Allergic Diseases in [place], [place]      10/8/18 note form Omkar Teague M.D.  Care everywhere      [facility]  10/31/19 CT Sinuses  10/3/18 CT Sinuses Care everywhere  - PACS

## 2022-11-30 NOTE — TELEPHONE ENCOUNTER
Last Clinic Visit: 3/3/2022  [facility] [place]  Process #1

## 2022-12-01 DIAGNOSIS — M06.09 SERONEGATIVE RHEUMATOID ARTHRITIS OF MULTIPLE SITES (H): Primary | ICD-10-CM

## 2022-12-06 DIAGNOSIS — R21 CUTANEOUS ERUPTION: ICD-10-CM

## 2022-12-07 NOTE — PATIENT INSTRUCTIONS
"Recommendations from today's MTM visit:                                                       1. We will send in an order for Actemra 162 mg weekly to replace the infusions. We will contact you about the patient assistance program if your insurance copay is too high.    Follow-up: Return in about 6 weeks (around 1/10/2023) for MTM Pharmacist Visit.    It was great speaking with you today.  I value your experience and would be very thankful for your time in providing feedback in our clinic survey. In the next few days, you may receive an email or text message from [facility] [employer] with a link to a survey related to your  clinical pharmacist.\"     To schedule another MTM appointment, please call the clinic directly or you may call the MTM scheduling line at [phone] or toll-free at [phone].     My Clinical Pharmacist's contact information:                                                      Please feel free to contact me with any questions or concerns you have.      Yuliya Kaplan, PharmD  Medication Therapy Management Pharmacist  [facility]  Phone: [phone]     "

## 2022-12-08 ENCOUNTER — OFFICE VISIT (OUTPATIENT)
Dept: DERMATOLOGY | Facility: CLINIC | Age: 47
End: 2022-12-08
Attending: DERMATOLOGY
Payer: MEDICARE

## 2022-12-08 ENCOUNTER — LAB (OUTPATIENT)
Dept: LAB | Facility: CLINIC | Age: 47
End: 2022-12-08
Payer: MEDICARE

## 2022-12-08 ENCOUNTER — TELEPHONE (OUTPATIENT)
Dept: GASTROENTEROLOGY | Facility: CLINIC | Age: 47
End: 2022-12-08

## 2022-12-08 VITALS
DIASTOLIC BLOOD PRESSURE: 79 MMHG | BODY MASS INDEX: 33.11 KG/M2 | HEART RATE: 67 BPM | SYSTOLIC BLOOD PRESSURE: 112 MMHG | WEIGHT: 205 LBS | OXYGEN SATURATION: 97 %

## 2022-12-08 DIAGNOSIS — K75.81 NASH (NONALCOHOLIC STEATOHEPATITIS): ICD-10-CM

## 2022-12-08 DIAGNOSIS — H01.136 ECZEMATOUS DERMATITIS OF EYELIDS OF BOTH EYES, UNSPECIFIED EYELID: Primary | ICD-10-CM

## 2022-12-08 DIAGNOSIS — M13.0 SERONEGATIVE POLYARTHRITIS: ICD-10-CM

## 2022-12-08 DIAGNOSIS — H01.133 ECZEMATOUS DERMATITIS OF EYELIDS OF BOTH EYES, UNSPECIFIED EYELID: Primary | ICD-10-CM

## 2022-12-08 DIAGNOSIS — M13.80 SERONEGATIVE INFLAMMATORY ARTHRITIS: ICD-10-CM

## 2022-12-08 DIAGNOSIS — K75.81 NASH (NONALCOHOLIC STEATOHEPATITIS): Primary | ICD-10-CM

## 2022-12-08 DIAGNOSIS — R21 CUTANEOUS ERUPTION: ICD-10-CM

## 2022-12-08 DIAGNOSIS — E78.5 HYPERLIPIDEMIA, UNSPECIFIED HYPERLIPIDEMIA TYPE: ICD-10-CM

## 2022-12-08 LAB
ALBUMIN SERPL BCG-MCNC: 4.7 G/DL (ref 3.5–5.2)
ALP SERPL-CCNC: 43 U/L (ref 35–104)
ALT SERPL W P-5'-P-CCNC: 47 U/L (ref 10–35)
ANION GAP SERPL CALCULATED.3IONS-SCNC: 11 MMOL/L (ref 7–15)
AST SERPL W P-5'-P-CCNC: 33 U/L (ref 10–35)
BILIRUB DIRECT SERPL-MCNC: <0.2 MG/DL (ref 0–0.3)
BILIRUB SERPL-MCNC: 0.9 MG/DL
BUN SERPL-MCNC: 12.6 MG/DL (ref 6–20)
CALCIUM SERPL-MCNC: 10 MG/DL (ref 8.6–10)
CHLORIDE SERPL-SCNC: 101 MMOL/L (ref 98–107)
CREAT SERPL-MCNC: 0.88 MG/DL (ref 0.51–0.95)
DEPRECATED HCO3 PLAS-SCNC: 26 MMOL/L (ref 22–29)
ERYTHROCYTE [DISTWIDTH] IN BLOOD BY AUTOMATED COUNT: 13 % (ref 10–15)
GFR SERPL CREATININE-BSD FRML MDRD: 82 ML/MIN/1.73M2
GLUCOSE SERPL-MCNC: 127 MG/DL (ref 70–99)
HCT VFR BLD AUTO: 47.1 % (ref 35–47)
HGB BLD-MCNC: 16.2 G/DL (ref 11.7–15.7)
INR PPP: 0.95 (ref 0.85–1.15)
MCH RBC QN AUTO: 29.5 PG (ref 26.5–33)
MCHC RBC AUTO-ENTMCNC: 34.4 G/DL (ref 31.5–36.5)
MCV RBC AUTO: 86 FL (ref 78–100)
PLATELET # BLD AUTO: 352 10E3/UL (ref 150–450)
POTASSIUM SERPL-SCNC: 4.4 MMOL/L (ref 3.4–5.3)
PROT SERPL-MCNC: 7.1 G/DL (ref 6.4–8.3)
RBC # BLD AUTO: 5.5 10E6/UL (ref 3.8–5.2)
SODIUM SERPL-SCNC: 138 MMOL/L (ref 136–145)
WBC # BLD AUTO: 8.7 10E3/UL (ref 4–11)

## 2022-12-08 PROCEDURE — 80053 COMPREHEN METABOLIC PANEL: CPT | Performed by: PATHOLOGY

## 2022-12-08 PROCEDURE — 85610 PROTHROMBIN TIME: CPT | Performed by: PATHOLOGY

## 2022-12-08 PROCEDURE — 36415 COLL VENOUS BLD VENIPUNCTURE: CPT | Performed by: PATHOLOGY

## 2022-12-08 PROCEDURE — 82248 BILIRUBIN DIRECT: CPT | Performed by: PATHOLOGY

## 2022-12-08 PROCEDURE — 99214 OFFICE O/P EST MOD 30 MIN: CPT | Performed by: DERMATOLOGY

## 2022-12-08 PROCEDURE — G0463 HOSPITAL OUTPT CLINIC VISIT: HCPCS

## 2022-12-08 PROCEDURE — 85027 COMPLETE CBC AUTOMATED: CPT | Performed by: PATHOLOGY

## 2022-12-08 RX ORDER — TACROLIMUS 1 MG/G
OINTMENT TOPICAL
Qty: 30 G | Refills: 3 | Status: SHIPPED | OUTPATIENT
Start: 2022-12-08 | End: 2023-10-05

## 2022-12-08 NOTE — LETTER
"12/8/2022       RE: Laxmi Ya  [address] [facility] [address]     Dear Colleague,    Thank you for referring your patient, Laxmi Ya, to the [facility] [place] [facility] [place]. Please see a copy of my visit note below.    Expand All Collapse All  Rheumatology-Dermatology Clinic New Patient Note     Patient is new to the clinic, seen in consultation for Dr. 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 Mild persistent asthma, unspecified whether complicated   3. Bacterial overgrowth syndrome with diarrhea   - On rifaximin and doxycyline  4. Nonalcoholic steatohepatitis  5. Hx of clostridium difficile x2  6. Multiple orthopedic surgeries              Carpal tunnel surgery b/l               Dissectmy and laminectomy, lumbar and cervical spine  7. Recurrent sinusitis  - MPO, PR3 neg  8. DMII  9. Blepharitis  10: Hx of diverticulosis and recurrent diverticulitis s/p partial colectomy  11. Hx of dermatographism  SH: BILLIE/TL grewal, currently on disability     Follow-up  Multiple issues.     Interval Hx:   She feels she has \"staph overgrowth\". She was given doxycyline 100mg BID which was increased to 150m g BID and has cleared her eyes. She overall feels much better. SIBO is also better and not need rifaximin as often. She still is having a lot of joimnt pain in hip and neck. Stiffness is worse in am. Pain worse at night. Stiffness last almost all day. She is getting injections in thumbs which is helping. No new bullae. No more inflammatory nodules. She is using the " benzoyl peroxide wash and mupirocin ointment. Rash on elbows/knees resolved.    Detailed Review of Systems  Otherwise nml state of health. No other skin concerns.     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     Obesity      Pyelonephritis, unspecified 1992    abstract     Rheumatoid arteritis (H)      Seronegative inflammatory arthritis 6/17/2022     Unspecified symptom associated with female genital organs 05/07/1999    chronic pelvic pain abstract     Medications:  Current Outpatient Medications   Medication     Albuterol Sulfate (PROAIR HFA IN)     Artificial Tear Solution (SOOTHE XP) SOLN     atenolol (TENORMIN) 50 MG tablet     benzoyl peroxide 5 % external liquid     diclofenac (VOLTAREN) 1 % topical gel     doxycycline hyclate (VIBRA-TABS) 100 MG tablet     fexofenadine (ALLEGRA) 180 MG tablet     fidaxomicin (DIFICID) 200 MG tablet     fluticasone (FLONASE) 50 MCG/ACT nasal spray     lidocaine (LIDODERM) 5 % patch     losartan (COZAAR) 100 MG tablet     methylPREDNISolone (MEDROL) 4 MG tablet     Montelukast Sodium (SINGULAIR PO)     nystatin (MYCOSTATIN) 982314 UNIT/ML suspension     olopatadine (PATADAY) 0.7 % ophthalmic solution     OMEPRAZOLE PO     spironolactone-HCTZ (ALDACTAZIDE) 25-25 MG tablet     tobramycin (TOBREX) 0.3 % ophthalmic solution     tobramycin-dexamethasone (TOBRADEX) 0.3-0.1 % ophthalmic ointment     Tocilizumab 162 MG/0.9ML SOAJ     tocilizumab     triamcinolone (KENALOG) 0.1 % external ointment     metFORMIN (GLUCOPHAGE-XR) 500 MG 24 hr tablet     norethindrone (MICRONOR) 0.35 MG tablet     rifaximin (XIFAXAN) 550 MG TABS tablet     Current Facility-Administered Medications   Medication      lidocaine (PF) (XYLOCAINE) 1 % injection 2 mL     lidocaine (PF) (XYLOCAINE) 1 % injection 2 mL     lidocaine (PF) (XYLOCAINE) 1 % injection 2 mL     methylPREDNISolone (DEPO-MEDROL) injection 40 mg     methylPREDNISolone (DEPO-MEDROL) injection 40 mg     triamcinolone (KENALOG-40) injection 40 mg        Allergies   Allergen Reactions     Polyethylene Glycol Rash     Codeine      Other reaction(s): Gastrointestinal, GI intolerance, Vomiting  Vomiting       Gadolinium Dizziness and Nausea     Hydrocodone-Acetaminophen Nausea and Vomiting     Other reaction(s): GI intolerance     Iodine      abstract     Sulfasalazine      Other reaction(s): GI intolerance  Has tried and had nausa.     Tramadol Nausea and Vomiting     Other reaction(s): Gastrointestinal, Other (see comments)  Nausea and vomiting   unknown       Gluten Meal Other (See Comments) and Rash     Other reaction(s): Gastrointestinal, GI intolerance  Dizziness, tired, rash, stomach cramps, thrush, mouth sores  Dizziness, tired, rash, stomach cramps, thrush, mouth sores            Family History:  Family History         Family History   Problem Relation Age of Onset     Hypertension Father           abstract     Cancer Paternal Aunt           gallbladder cancer abstract         Physical Exam  /79 (BP Location: Right arm, Patient Position: Sitting, Cuff Size: Adult Large)   Pulse 67   Wt 93 kg (205 lb)   SpO2 97%   BMI 33.11 kg/m  \  Face clear today   Eyelid lichenified  Elbows clear     Labs/Imaging: Reviewed  DIEGO + CARIN, negative IIF  Neg FAVIOLA, C3/C4 5/27/14     Assessment & Plan:  1. Bullae  - I previously thought this was 2/2 infection. No new lesions     2. Folliculitis/furunculiosis  - Resolved with BPO wash and mupirocin ointment in nares, perianal areas and ears one weekend a month  - Has gotten some follkiculitis in groin. Possibly nicholas 1 HS. Fairly clear since start doxcycyline and BPO wash,      3)  Seronegative inflammatory arthritis  -  Sounds like a spondyloarthropathy. Not sure if there is an underlying cause. Pretty significant GI work-up with no IBD found.   - Follows with rheumatology on actemra    4) Eyelid dermatitis  -  History of seasonal allergies and asthma, Used to get allergy shots. No hx of rashes as a kid. Does think of her self as having sensitive skin     5) Blepharitis   - Currently on doxycyline. It helps a lot. Could consider slowly decreasing dose.      6) Intertrigo, inguinal b/l   Resolved with doxycyline. Irritant dermatitis vs erythrasma. Now clear.   If comes back, ok to try protopic ointment    RTC 1 year  with Dr Hermelinda Monique MD, FAAD, FACP     Departments of Internal Medicine and Dermatology  [facility]  [phone]

## 2022-12-08 NOTE — NURSING NOTE
Chief Complaint   Patient presents with     RECHECK     Blood pressure 112/79, pulse 67, weight 93 kg (205 lb), SpO2 97 %.    Alberto Neely on 12/8/2022 at 11:04 AM

## 2022-12-08 NOTE — PATIENT INSTRUCTIONS
Consider appointment with Haris:  Asim Evangelista    Benzoyl peroxide daily   Mupirocin ointment to nares, ears, and perianal area once a day, one weekend a month    Protopic ointment for eyelids and groin

## 2022-12-08 NOTE — TELEPHONE ENCOUNTER
Lab and MRI order placed.  Patient notified.    Samantha OLMEDO LPN  Hepatology Clinic    [facility]    Phone Message    May a detailed message be left on voicemail: yes     Reason for Call: Other: Pt called wanting to know if Dr Cristobal had sent in lab orders already. Pt would like to get labs drawn today at noon. Pt also requested lab orders for MRI w/ contrast as she is due for those as well she sated. Pt mentioned she had been having pain under area around liver. Please call pt back at  [phone] to inform her if she can get labs drawn today for her appt w/ Dr Cristobal on 12/13/22. Thank you.    Action Taken: Other: hepa    Travel Screening: Not Applicable

## 2022-12-08 NOTE — PROGRESS NOTES
"Expand All Collapse All  Rheumatology-Dermatology Clinic New Patient Note     Patient is new to the clinic, seen in consultation for Dr. 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 Mild persistent asthma, unspecified whether complicated   3. Bacterial overgrowth syndrome with diarrhea   - On rifaximin and doxycyline  4. Nonalcoholic steatohepatitis  5. Hx of clostridium difficile x2  6. Multiple orthopedic surgeries              Carpal tunnel surgery b/l               Dissectmy and laminectomy, lumbar and cervical spine  7. Recurrent sinusitis  - MPO, PR3 neg  8. DMII  9. Blepharitis  10: Hx of diverticulosis and recurrent diverticulitis s/p partial colectomy  11. Hx of dermatographism  SH: A/, currently on disability     Follow-up  Multiple issues.     Interval Hx:   She feels she has \"staph overgrowth\". She was given doxycyline 100mg BID which was increased to 150m g BID and has cleared her eyes. She overall feels much better. SIBO is also better and not need rifaximin as often. She still is having a lot of joimnt pain in hip and neck. Stiffness is worse in am. Pain worse at night. Stiffness last almost all day. She is getting injections in thumbs which is helping. No new bullae. No more inflammatory nodules. She is using the benzoyl peroxide wash and mupirocin ointment. Rash on elbows/knees resolved.    Detailed Review of Systems  Otherwise nml state of health. No other skin concerns.     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     Obesity      Pyelonephritis, unspecified 1992    abstract     Rheumatoid arteritis (H)      Seronegative inflammatory arthritis 6/17/2022     Unspecified symptom associated with female genital organs 05/07/1999    chronic pelvic pain abstract     Medications:  Current Outpatient Medications   Medication     Albuterol Sulfate (PROAIR HFA IN)     Artificial Tear Solution (SOOTHE XP) SOLN     atenolol (TENORMIN) 50 MG tablet     benzoyl peroxide 5 % external liquid     diclofenac (VOLTAREN) 1 % topical gel     doxycycline hyclate (VIBRA-TABS) 100 MG tablet     fexofenadine (ALLEGRA) 180 MG tablet     fidaxomicin (DIFICID) 200 MG tablet     fluticasone (FLONASE) 50 MCG/ACT nasal spray     lidocaine (LIDODERM) 5 % patch     losartan (COZAAR) 100 MG tablet     methylPREDNISolone (MEDROL) 4 MG tablet     Montelukast Sodium (SINGULAIR PO)     nystatin (MYCOSTATIN) 669967 UNIT/ML suspension     olopatadine (PATADAY) 0.7 % ophthalmic solution     OMEPRAZOLE PO     spironolactone-HCTZ (ALDACTAZIDE) 25-25 MG tablet     tobramycin (TOBREX) 0.3 % ophthalmic solution     tobramycin-dexamethasone (TOBRADEX) 0.3-0.1 % ophthalmic ointment     Tocilizumab 162 MG/0.9ML SOAJ     tocilizumab     triamcinolone (KENALOG) 0.1 % external ointment     metFORMIN (GLUCOPHAGE-XR) 500 MG 24 hr tablet     norethindrone (MICRONOR) 0.35 MG tablet     rifaximin (XIFAXAN) 550 MG TABS tablet     Current Facility-Administered Medications   Medication     lidocaine (PF) (XYLOCAINE) 1 % injection 2 mL     lidocaine (PF) (XYLOCAINE) 1 % injection 2 mL     lidocaine (PF) (XYLOCAINE) 1 % injection 2 mL     methylPREDNISolone (DEPO-MEDROL) injection 40 mg     methylPREDNISolone (DEPO-MEDROL) injection 40 mg     triamcinolone (KENALOG-40) injection 40 mg        Allergies    Allergen Reactions     Polyethylene Glycol Rash     Codeine      Other reaction(s): Gastrointestinal, GI intolerance, Vomiting  Vomiting       Gadolinium Dizziness and Nausea     Hydrocodone-Acetaminophen Nausea and Vomiting     Other reaction(s): GI intolerance     Iodine      abstract     Sulfasalazine      Other reaction(s): GI intolerance  Has tried and had nausa.     Tramadol Nausea and Vomiting     Other reaction(s): Gastrointestinal, Other (see comments)  Nausea and vomiting   unknown       Gluten Meal Other (See Comments) and Rash     Other reaction(s): Gastrointestinal, GI intolerance  Dizziness, tired, rash, stomach cramps, thrush, mouth sores  Dizziness, tired, rash, stomach cramps, thrush, mouth sores            Family History:  Family History         Family History   Problem Relation Age of Onset     Hypertension Father           abstract     Cancer Paternal Aunt           gallbladder cancer abstract         Physical Exam  /79 (BP Location: Right arm, Patient Position: Sitting, Cuff Size: Adult Large)   Pulse 67   Wt 93 kg (205 lb)   SpO2 97%   BMI 33.11 kg/m  \  Face clear today   Eyelid lichenified  Elbows clear     Labs/Imaging: Reviewed  DIEGO + CARIN, negative IIF  Neg FAVIOLA, C3/C4 5/27/14     Assessment & Plan:  1. Bullae  - I previously thought this was 2/2 infection. No new lesions     2. Folliculitis/furunculiosis  - Resolved with BPO wash and mupirocin ointment in nares, perianal areas and ears one weekend a month  - Has gotten some follkiculitis in groin. Possibly nicholas 1 HS. Fairly clear since start doxcycyline and BPO wash,      3)  Seronegative inflammatory arthritis  - Sounds like a spondyloarthropathy. Not sure if there is an underlying cause. Pretty significant GI work-up with no IBD found.   - Follows with rheumatology on actemra    4) Eyelid dermatitis  -  History of seasonal allergies and asthma, Used to get allergy shots. No hx of rashes as a kid. Does think of her self as  having sensitive skin     5) Blepharitis   - Currently on doxycyline. It helps a lot. Could consider slowly decreasing dose.      6) Intertrigo, inguinal b/l   Resolved with doxycyline. Irritant dermatitis vs erythrasma. Now clear.   If comes back, ok to try protopic ointment    RTC 1 year  with Dr Hermelinda Monique MD, FAAD, FACP     Departments of Internal Medicine and Dermatology  [facility]  [phone]

## 2022-12-09 ENCOUNTER — TELEPHONE (OUTPATIENT)
Dept: RHEUMATOLOGY | Facility: CLINIC | Age: 47
End: 2022-12-09

## 2022-12-09 DIAGNOSIS — M13.80 SERONEGATIVE INFLAMMATORY ARTHRITIS: Primary | ICD-10-CM

## 2022-12-09 LAB
CHOLEST SERPL-MCNC: 281 MG/DL
CRP SERPL-MCNC: <3 MG/L
HDLC SERPL-MCNC: 57 MG/DL
LDLC SERPL CALC-MCNC: 162 MG/DL
NONHDLC SERPL-MCNC: 224 MG/DL
TRIGL SERPL-MCNC: 309 MG/DL

## 2022-12-09 PROCEDURE — 80061 LIPID PANEL: CPT | Performed by: PATHOLOGY

## 2022-12-09 PROCEDURE — 86140 C-REACTIVE PROTEIN: CPT | Performed by: PATHOLOGY

## 2022-12-09 PROCEDURE — 36415 COLL VENOUS BLD VENIPUNCTURE: CPT | Performed by: PATHOLOGY

## 2022-12-09 NOTE — TELEPHONE ENCOUNTER
Health Call Center    Phone Message    May a detailed message be left on voicemail: yes or Mychart Message     Reason for Call: Other: Lab orders   Pt is calling to ask if Dr. Lemus's office can place in orders for SED Rate and CRP ?    Pt states she completed the lab draw yesterday but there were no orders for the two test on file.  Pt is asking if this can be placed ASAP so the lab can process the blood drawn yesterday for these two test as well.    Please contact Pt back either via telephone or [employer] to let her know once orders has been placed.    Action Taken: Message routed to:  [facility] Adult Rheumatology

## 2022-12-09 NOTE — TELEPHONE ENCOUNTER
Added CRP to orders for current specimen. ESR may be ordered later if it will give needed additional information. Thank you!

## 2022-12-09 NOTE — TELEPHONE ENCOUNTER
"Patient had lipids drawn today and several other labs test yesterday.  ESR and CRP were not done. Per last notes, 11/25/22, \" I do recommend renewing CRP and sedimentation rate with next blood draw, and monitoring hyperlipidemia due to the lipid elevating effects of Actemra.\"  Advised patient orders would be sent to you for signature.  She is aware ESR may need to be redrawn d/t time constraints of it being drawn yesterday.  Orders are pended for sig.     Macy Sawyer RN    "

## 2022-12-12 ENCOUNTER — TELEPHONE (OUTPATIENT)
Dept: GASTROENTEROLOGY | Facility: CLINIC | Age: 47
End: 2022-12-12

## 2022-12-12 NOTE — TELEPHONE ENCOUNTER
Health Call Center    Phone Message    May a detailed message be left on voicemail: yes     Reason for Call: Other: Patient is stating Dr. Cristobal told her to see Dr. Mcqueen last December (2021) and is requesting a referall to do that. Please call patient back at [phone] with any questions. She is wanting to see Dr. Mcqueen for bacterial overgrowth. Thank you :)     Action Taken: Message routed to:  [facility] ([facility] GI    Travel Screening: Not Applicable

## 2022-12-13 ENCOUNTER — MYC MEDICAL ADVICE (OUTPATIENT)
Dept: PHARMACY | Facility: CLINIC | Age: 47
End: 2022-12-13

## 2022-12-13 ENCOUNTER — ANCILLARY PROCEDURE (OUTPATIENT)
Dept: GENERAL RADIOLOGY | Facility: CLINIC | Age: 47
End: 2022-12-13
Attending: PREVENTIVE MEDICINE
Payer: MEDICARE

## 2022-12-13 ENCOUNTER — OFFICE VISIT (OUTPATIENT)
Dept: GASTROENTEROLOGY | Facility: CLINIC | Age: 47
End: 2022-12-13
Attending: INTERNAL MEDICINE
Payer: MEDICARE

## 2022-12-13 ENCOUNTER — OFFICE VISIT (OUTPATIENT)
Dept: ORTHOPEDICS | Facility: CLINIC | Age: 47
End: 2022-12-13
Payer: MEDICARE

## 2022-12-13 ENCOUNTER — ANCILLARY PROCEDURE (OUTPATIENT)
Dept: CT IMAGING | Facility: CLINIC | Age: 47
End: 2022-12-13
Attending: NURSE PRACTITIONER
Payer: MEDICARE

## 2022-12-13 VITALS
DIASTOLIC BLOOD PRESSURE: 79 MMHG | TEMPERATURE: 98.2 F | OXYGEN SATURATION: 96 % | WEIGHT: 207.8 LBS | SYSTOLIC BLOOD PRESSURE: 120 MMHG | HEIGHT: 66 IN | BODY MASS INDEX: 33.4 KG/M2 | RESPIRATION RATE: 24 BRPM | HEART RATE: 75 BPM

## 2022-12-13 VITALS — WEIGHT: 207 LBS | HEIGHT: 66 IN | BODY MASS INDEX: 33.27 KG/M2

## 2022-12-13 DIAGNOSIS — M54.12 CERVICAL RADICULAR PAIN: ICD-10-CM

## 2022-12-13 DIAGNOSIS — R10.9 FLANK PAIN: ICD-10-CM

## 2022-12-13 DIAGNOSIS — K75.81 NASH (NONALCOHOLIC STEATOHEPATITIS): Primary | ICD-10-CM

## 2022-12-13 DIAGNOSIS — G56.03 CARPAL TUNNEL SYNDROME ON BOTH SIDES: ICD-10-CM

## 2022-12-13 DIAGNOSIS — M51.34 DDD (DEGENERATIVE DISC DISEASE), THORACIC: ICD-10-CM

## 2022-12-13 DIAGNOSIS — M50.30 DDD (DEGENERATIVE DISC DISEASE), CERVICAL: Primary | ICD-10-CM

## 2022-12-13 DIAGNOSIS — M51.24 THORACIC DISC HERNIATION: ICD-10-CM

## 2022-12-13 DIAGNOSIS — M50.30 DDD (DEGENERATIVE DISC DISEASE), CERVICAL: ICD-10-CM

## 2022-12-13 PROCEDURE — 72040 X-RAY EXAM NECK SPINE 2-3 VW: CPT | Performed by: RADIOLOGY

## 2022-12-13 PROCEDURE — 20526 THER INJECTION CARP TUNNEL: CPT | Mod: 50 | Performed by: PREVENTIVE MEDICINE

## 2022-12-13 PROCEDURE — 74176 CT ABD & PELVIS W/O CONTRAST: CPT | Mod: GC | Performed by: RADIOLOGY

## 2022-12-13 PROCEDURE — 99214 OFFICE O/P EST MOD 30 MIN: CPT | Performed by: INTERNAL MEDICINE

## 2022-12-13 PROCEDURE — G0463 HOSPITAL OUTPT CLINIC VISIT: HCPCS

## 2022-12-13 PROCEDURE — 99214 OFFICE O/P EST MOD 30 MIN: CPT | Mod: 25 | Performed by: PREVENTIVE MEDICINE

## 2022-12-13 RX ORDER — DOXYCYCLINE HYCLATE 50 MG/1
150 CAPSULE ORAL 2 TIMES DAILY
COMMUNITY
Start: 2022-11-22 | End: 2023-10-05

## 2022-12-13 RX ADMIN — LIDOCAINE HYDROCHLORIDE 4 ML: 10 INJECTION, SOLUTION EPIDURAL; INFILTRATION; INTRACAUDAL; PERINEURAL at 15:41

## 2022-12-13 ASSESSMENT — PAIN SCALES - GENERAL: PAINLEVEL: WORST PAIN (10)

## 2022-12-13 NOTE — LETTER
12/13/2022      RE: Laxmi Ya  [address] [place] [id number]     Dear Colleague,    Thank you for referring your patient, Laxmi Ya, to the [facility] [place]. Please see a copy of my visit note below.    HISTORY OF PRESENT ILLNESS  Ms. Ya is a pleasant 47 year old year old female who presents to clinic today for follow up for her bilateral carpal tunnel syndrome. Patient was last seen on  9/13/2022 with Dr. Sullivan where she received injections. Patient reports since that visit, she felt that those injections did not provide any relief to her pain. She reports she had increased pain and weakness from those injection.     She would like to discuss her cervical spine pain. She points to the base of her skill as primary location of pain that will radiate into her mid-back.     Laxmi explains that she was last seen with Dr. Petit on 10/27/2022 where she received bilateral thumb STT joint injections.     Patient needs refills on the following medications:     Location:     Quality:  achy pain    Severity: 5/10 at worst    Duration: see above  Timing: occurs intermittently  Context: occurs while exercising and lifting and using the joint  Modifying factors:  resting and non-use makes it better, movement and use makes it worse  Associated signs & symptoms: none  Previous similar pain: yes  Exercise: walking, ROM exercises with weights  Additional history: as documented    MEDICAL HISTORY  Patient Active Problem List   Diagnosis     Seronegative inflammatory arthritis     Bilateral thumb pain     Pain in both wrists       Current Outpatient Medications   Medication Sig Dispense Refill     Albuterol Sulfate (PROAIR HFA IN) Inhale into the lungs as needed       Artificial Tear Solution (SOOTHE XP) SOLN as needed       atenolol (TENORMIN) 50 MG tablet Take 50 mg by mouth daily       benzoyl peroxide 5 % external liquid Use daily as directed. Preferred bdrand: Medpura  236 mL 11     diclofenac (VOLTAREN) 1 % topical gel Apply topically 4 times daily as needed       doxycycline hyclate (VIBRA-TABS) 100 MG tablet Take 150 mg by mouth 2 times daily       doxycycline hyclate (VIBRAMYCIN) 50 MG capsule Take 150 mg by mouth 2 times daily       fexofenadine (ALLEGRA) 180 MG tablet Take 1 tablet (180 mg) by mouth daily 90 tablet 2     fidaxomicin (DIFICID) 200 MG tablet Take 200 mg by mouth       fluticasone (FLONASE) 50 MCG/ACT nasal spray Spray 2 sprays into both nostrils 2 times daily        lidocaine (LIDODERM) 5 % patch as needed   1     losartan (COZAAR) 100 MG tablet Take 100 mg by mouth daily       metFORMIN (GLUCOPHAGE-XR) 500 MG 24 hr tablet Take 500 mg by mouth daily        methylPREDNISolone (MEDROL) 4 MG tablet Take 4 tabs daily X 4, Then 3 tabs daily X 4, then 2 tabs daily X 4 36 tablet 1     Montelukast Sodium (SINGULAIR PO) Take 10 mg by mouth At Bedtime        norethindrone (MICRONOR) 0.35 MG tablet Take 0.35 mg by mouth       nystatin (MYCOSTATIN) 965255 UNIT/ML suspension TAKE 5ML BY MOUTH 4XDAILY FOR 14 DAYS. CONTINUE AT LEAST 2 DAYS AFTER SYMPTOMS RESOLVED       olopatadine (PATADAY) 0.7 % ophthalmic solution Apply 1 drop to eye daily PRN       OMEPRAZOLE PO Take 40 mg by mouth 2 times daily        rifaximin (XIFAXAN) 550 MG TABS tablet Take 200 mg by mouth 3 times daily Take for 10 days, has on had as needed for small bowel bacteria overgrowh       spironolactone-HCTZ (ALDACTAZIDE) 25-25 MG tablet Take 1 tablet by mouth daily       tacrolimus (PROTOPIC) 0.1 % external ointment Apply to itchy areas on eyelids twice a day as needed for itching and rash 30 g 3     tobramycin (TOBREX) 0.3 % ophthalmic solution 1-2 drops 2 times daily       tobramycin-dexamethasone (TOBRADEX) 0.3-0.1 % ophthalmic ointment 0.25 inches At Bedtime       Tocilizumab 162 MG/0.9ML SOAJ Inject 162 mg Subcutaneous once a week 10.8 mL 3     tocilizumab Inject into the vein once         Allergies  "  Allergen Reactions     Polyethylene Glycol Rash     Codeine      Other reaction(s): Gastrointestinal, GI intolerance, Vomiting  Vomiting       Gadolinium Dizziness and Nausea     Hydrocodone-Acetaminophen Nausea and Vomiting     Other reaction(s): GI intolerance     Iodine      abstract     Sulfasalazine      Other reaction(s): GI intolerance  Has tried and had nausa.     Tramadol Nausea and Vomiting     Other reaction(s): Gastrointestinal, Other (see comments)  Nausea and vomiting   unknown       Gluten Meal Other (See Comments) and Rash     Other reaction(s): Gastrointestinal, GI intolerance  Dizziness, tired, rash, stomach cramps, thrush, mouth sores  Dizziness, tired, rash, stomach cramps, thrush, mouth sores         Family History   Problem Relation Age of Onset     Hypertension Father         abstract     Cancer Paternal Aunt         gallbladder cancer abstract     Social History     Socioeconomic History     Marital status: Single   Tobacco Use     Smoking status: Never     Smokeless tobacco: Never   Substance and Sexual Activity     Alcohol use: Yes     Drug use: No   Other Topics Concern     Exercise No     Seat Belt No     Self-Exams No   Social History Narrative    Womens Health Kit given.           Additional medical/Social/Surgical [facility] and updated as appropriate.     REVIEW OF SYSTEMS (12/13/2022)  10 point ROS of systems including Constitutional, Eyes, Respiratory, Cardiovascular, Gastroenterology, Genitourinary, Integumentary, Musculoskeletal, Psychiatric, Allergic/Immunologic were all negative except for pertinent positives noted in my HPI.     PHYSICAL EXAM  VSS  Vital Signs: Ht 1.676 m (5' 5.98\")   Wt 93.9 kg (207 lb)   BMI 33.43 kg/m   Patient declined being weighed. Body mass index is 33.43 kg/m .    General  - normal appearance, in no obvious distress  HEENT  - conjunctivae not injected, moist mucous membranes, normocephalic/atraumatic head, ears normal appearance, no " "lesions, mouth normal appearance, no scars, normal dentition and teeth present  CV  - normal radial pulse  Pulm  - normal respiratory pattern, non-labored  Musculoskeletal - wrist  - inspection: mild thenar atrophy, normal joint alignment, no swelling  - palpation: no bony or soft tissue tenderness, no tenderness at the anatomical snuffbox  - ROM:  90 deg flexion   70 deg extension   25 deg abduction   65 deg adduction  - strength: 4/5  strength, 4/5 wrist abduction, 5/5 flexion, extension, pronation, supination, adduction  - special tests:  (+) Tinel's  (-) Finkelstein  (+) Phalen  (-) Keys click test  (-) ulnar impaction  Neuro  - some thenar numbness, no motor deficit, grossly normal coordination, normal muscle tone  Skin  - no ecchymosis, erythema, warmth, or induration, no obvious rash  Psych  - interactive, appropriate, normal mood and affect  ASSESSMENT & PLAN      I independently reviewed the following imaging studies:    Patient HAS / HAS NOT completed physical therapy for 4-6 weeks  Patient has been doing home exercise physical therapy program for this problem      Appropriate PPE was utilized for prevention of spread of Covid-19.  Matt Sullivan MD, CAQSM    Bilateral Carpal Tunnel Injection - Ultrasound Guided  The patient was informed of the risks and the benefits of the procedure and a written consent was signed.  The patient s bilateral wrist was prepped with chlorhexidine in sterile fashion.   10 mg of kenalog suspension was drawn up into a 5 mL syringe with 4.0 mL of 1% lidocaine for each wrist  Injection was performed using sterile technique.  Under ultrasound guidance a 1.5\" 22-gauge needle was used to enter the right and left wrist at the distal palmar crease medial to the median nerve.  Needle placement was visualized and documented with ultrasound.  Ultrasound visualization required to ensure injection material enters the perineural sheath and not a vessel or nerve itself.  Injection " performed long axis to the probe.  Injection solution visualized surrounding the perineurium.  Images were permanently stored for the patient's record.  There were no complications. The patient tolerated the procedure well. There was negligible bleeding.         Hand / Upper Extremity Injection: bilateral carpal tunnel    Date/Time: 12/13/2022 3:41 PM  Performed by: Matt Sullivan MD  Authorized by: Matt Sullivan MD     Indications:  Tendon swelling, diagnostic, therapeutic and pain  Needle Size:  22 G  Guidance: ultrasound    Approach:  Dorsal  Condition: carpal tunnel    Laterality:  Bilateral    Site:  Bilateral carpal tunnel  Medications (Right):  10 mg triamcinolone acetonide 10 MG/ML; 4 mL lidocaine (PF) 1 %  Medications (Left):  4 mL lidocaine (PF) 1 %; 10 mg triamcinolone acetonide 10 MG/ML  Outcome:  Tolerated well, no immediate complications  Procedure discussed: discussed risks, benefits, and alternatives    Consent Given by:  Patient  Timeout: timeout called immediately prior to procedure    Prep: patient was prepped and draped in usual sterile fashion            Again, thank you for allowing me to participate in the care of your patient.      Sincerely,    Matt Sullivan MD

## 2022-12-13 NOTE — PROGRESS NOTES
[facility] Hepatology    Follow-up Visit    CHIEF COMPLAINT AND REASON FOR THE VISIT:  Nonalcoholic steatohepatitis.    CONSULTING HEALTHCARE PROVIDER:  Artur Marsh MD    SUBJECTIVE:  Mrs. Laxmi Ya is a 47-year-old female with history of COLON and she also has rheumatoid arthritis, and she came to us for her regular followup.  The patient had abnormal liver function tests, as we have said in our previous dictations, dating back to 2010 and full workup done was negative except that her imaging showed she had hepatic steatosis.     There was also a small indeterminate area which we thought might be fatty-sparing. MR elastography showed normal liver with no fibrosis, although it showed steatosis.  Patient has significant rheumatoid arthritis and that was the problem.  She was initially on Remicade for that but was switched to golimumab, which she is uncomfortable with still as she attributes it to diverticulitis and eventually she was put on abatacept.     Now today, she is telling me that she is relatively doing well.  She is on rifaximin for small-bowel intestinal bacterial overgrowth.  She denies any significant abdominal pain, nausea or vomiting.  She has back pain which is bothering her and 1% she feels nauseated.  She is moving her bowels at least 4 times a day with no blood in it.  Weight-wise, she remains stable.  Please note that the patient previously saw Dr. Yaya Mcqueen for the SIBO and she saw also Dr. Lokesh Thompson for the same here.    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 Authorizing Provider   Albuterol Sulfate (PROAIR HFA IN) Inhale into the lungs as needed   Yes Reported, Patient   Artificial Tear Solution (SOOTHE XP) SOLN as needed   Yes Reported, Patient   atenolol (TENORMIN) 50 MG tablet Take 50 mg by mouth daily 2/14/20  Yes Reported, Patient   benzoyl peroxide 5 % external liquid Use daily as directed. Preferred bdrand: Medpura 12/8/22  Yes Eros Monique MD   diclofenac (VOLTAREN) 1 % topical gel Apply topically 4 times daily as needed 9/9/20  Yes Reported, Patient   doxycycline hyclate (VIBRA-TABS) 100 MG tablet Take 150 mg by mouth 2 times daily 8/18/22  Yes Reported, Patient   doxycycline hyclate (VIBRAMYCIN) 50 MG capsule Take 150 mg by mouth 2 times daily 11/22/22  Yes Reported, Patient   fexofenadine (ALLEGRA) 180 MG tablet Take 1 tablet (180 mg) by mouth daily 3/16/22  Yes Eros Monique MD   fidaxomicin (DIFICID) 200 MG tablet Take 200 mg by mouth 11/19/21  Yes Reported, Patient   fluticasone (FLONASE) 50 MCG/ACT nasal spray Spray 2 sprays into both nostrils 2 times daily    Yes Reported, Patient   lidocaine (LIDODERM) 5 % patch as needed  6/26/19  Yes Reported, Patient   losartan (COZAAR) 100 MG tablet Take 100 mg by mouth daily 3/19/20  Yes Reported, Patient   methylPREDNISolone (MEDROL) 4 MG tablet Take 4 tabs daily X 4, Then 3 tabs daily X 4, then 2 tabs daily X 4 11/25/22  Yes Oswaldo Lemus MD   Montelukast Sodium (SINGULAIR PO) Take 10 mg by mouth At Bedtime    Yes Reported, Patient   nystatin (MYCOSTATIN)  751861 UNIT/ML suspension TAKE 5ML BY MOUTH 4XDAILY FOR 14 DAYS. CONTINUE AT LEAST 2 DAYS AFTER SYMPTOMS RESOLVED 12/20/21  Yes Reported, Patient   olopatadine (PATADAY) 0.7 % ophthalmic solution Apply 1 drop to eye daily PRN   Yes Reported, Patient   OMEPRAZOLE PO Take 40 mg by mouth 2 times daily    Yes Reported, Patient   rifaximin (XIFAXAN) 550 MG TABS tablet Take 200 mg by mouth 3 times daily Take for 10 days, has on had as needed for small bowel bacteria overgrowh   Yes Reported, Patient   spironolactone-HCTZ (ALDACTAZIDE) 25-25 MG tablet Take 1 tablet by mouth daily 3/11/20  Yes Reported, Patient   tacrolimus (PROTOPIC) 0.1 % external ointment Apply to itchy areas on eyelids twice a day as needed for itching and rash 12/8/22  Yes Eros Monique MD   tobramycin (TOBREX) 0.3 % ophthalmic solution 1-2 drops 2 times daily 5/1/19  Yes Reported, Patient   tobramycin-dexamethasone (TOBRADEX) 0.3-0.1 % ophthalmic ointment 0.25 inches At Bedtime 7/22/19  Yes Reported, Patient   Tocilizumab 162 MG/0.9ML SOAJ Inject 162 mg Subcutaneous once a week 12/1/22  Yes Oswaldo Lemus MD   tocilizumab Inject into the vein once   Yes Reported, Patient   metFORMIN (GLUCOPHAGE-XR) 500 MG 24 hr tablet Take 500 mg by mouth daily  5/18/21 5/18/22  Reported, Patient   norethindrone (MICRONOR) 0.35 MG tablet Take 0.35 mg by mouth 11/18/19 11/17/20  Reported, Patient       Allergies  Allergies   Allergen Reactions     Polyethylene Glycol Rash     Codeine      Other reaction(s): Gastrointestinal, GI intolerance, Vomiting  Vomiting       Gadolinium Dizziness and Nausea     Hydrocodone-Acetaminophen Nausea and Vomiting     Other reaction(s): GI intolerance     Iodine      abstract     Sulfasalazine      Other reaction(s): GI intolerance  Has tried and had nausa.     Tramadol Nausea and Vomiting     Other reaction(s): Gastrointestinal, Other (see comments)  Nausea and vomiting   unknown       Gluten Meal Other (See Comments) and  "Rash     Other reaction(s): Gastrointestinal, GI intolerance  Dizziness, tired, rash, stomach cramps, thrush, mouth sores  Dizziness, tired, rash, stomach cramps, thrush, mouth sores         Review of systems  A 10-point review of systems was negative    Examination  /79 (BP Location: Right arm, Patient Position: Sitting, Cuff Size: Adult Large)   Pulse 75   Temp 98.2  F (36.8  C) (Oral)   Resp 24   Ht 1.676 m (5' 5.98\")   Wt 94.3 kg (207 lb 12.8 oz)   SpO2 96%   BMI 33.56 kg/m    Body mass index is 33.56 kg/m .    Gen- well, NAD, A+Ox3, normal color  Lym- no palpable LAD  CVS- RRR  RS- CTA  Abd- Not tender.  Extr- hands normal, no EMMY  Skin- no rash or jaundice  Neuro- no asterixis  Psych- normal mood    Laboratory  Lab Results   Component Value Date     12/08/2022     04/01/2020    POTASSIUM 4.4 12/08/2022    POTASSIUM 3.7 11/17/2022    POTASSIUM 4.2 04/01/2020    CHLORIDE 101 12/08/2022    CHLORIDE 106 11/17/2022    CHLORIDE 105 04/01/2020    CO2 26 12/08/2022    CO2 22 11/17/2022    CO2 24 04/01/2020    BUN 12.6 12/08/2022    BUN 10 11/17/2022    BUN 11 04/01/2020    CR 0.88 12/08/2022    CR 0.73 04/01/2020       Lab Results   Component Value Date    BILITOTAL 0.9 12/08/2022    BILITOTAL 0.6 04/01/2020    ALT 47 12/08/2022    ALT 36 04/01/2020    AST 33 12/08/2022    AST 24 04/01/2020    ALKPHOS 43 12/08/2022    ALKPHOS 82 04/01/2020       Lab Results   Component Value Date    ALBUMIN 4.7 12/08/2022    ALBUMIN 3.7 11/17/2022    ALBUMIN 3.9 04/01/2020    PROTTOTAL 7.1 12/08/2022    PROTTOTAL 8.1 04/01/2020        Lab Results   Component Value Date    WBC 8.7 12/08/2022    WBC 9.3 04/01/2020    HGB 16.2 12/08/2022    HGB 15.8 04/01/2020    MCV 86 12/08/2022    MCV 85 04/01/2020     12/08/2022     04/01/2020       Lab Results   Component Value Date    INR 0.95 12/08/2022    INR 0.93 04/01/2020       Radiology    ASSESSMENT AND PLAN:  1.  COLON-related liver disease.  Mrs. " Felton has COLON and related liver disease.  She does not have significant fibrosis, as per MR elastography.  She will have an MRI now, which was ordered earlier.  She was also supposed to have a CT scan of the abdomen for a question of kidney stones.  This is going to be done urgently, and if this is done before the abdominal MRI and it is good for the liver, then we might hold doing the abdominal MRI.    Besides that, we advised her to do some lifestyle modifications including weight loss, which she is unable to do because of her rheumatoid arthritis.  Otherwise, Mrs. Ya will follow with her primary care physician for her healthcare maintenance issues and with her rheumatologist for the rheumatoid arthritis.  She will be seen here in 6 months.    I spent 30 minutes on this encounter of 12/13/2022 in chart reviewing, history taking, physical examination and documentation.  Some of the time was also used for coordination of care and counseling.    Cheryl Cristobal MD  Hepatology  [facility]

## 2022-12-13 NOTE — PROGRESS NOTES
HISTORY OF PRESENT ILLNESS  Ms. Ya is a pleasant 47 year old year old female who presents to clinic today for follow up for her bilateral carpal tunnel syndrome. Patient was last seen on  9/13/2022 with Dr. Sullivan where she received injections. Patient reports since that visit, she felt that those injections did not provide any relief to her pain. She reports she had increased pain and weakness from those injection.     She would like to discuss her cervical spine pain. She points to the base of her skill as primary location of pain that will radiate into her mid-back.     Laxmi explains that she was last seen with Dr. Petit on 10/27/2022 where she received bilateral thumb STT joint injections.   Has had more increase in pain in her neck and upper back    Location: cervical and thoracic     Quality:  achy pain    Severity: 7/10 at worst    Duration: see above  Timing: occurs intermittently  Context: occurs while exercising and lifting and using the joint  Modifying factors:  resting and non-use makes it better, movement and use makes it worse  Associated signs & symptoms: neck pain radiates into shoulders and upper back    MEDICAL HISTORY  Patient Active Problem List   Diagnosis     Seronegative inflammatory arthritis     Bilateral thumb pain     Pain in both wrists       Current Outpatient Medications   Medication Sig Dispense Refill     Albuterol Sulfate (PROAIR HFA IN) Inhale into the lungs as needed       Artificial Tear Solution (SOOTHE XP) SOLN as needed       atenolol (TENORMIN) 50 MG tablet Take 50 mg by mouth daily       benzoyl peroxide 5 % external liquid Use daily as directed. Preferred bdrand: Medpura 236 mL 11     diclofenac (VOLTAREN) 1 % topical gel Apply topically 4 times daily as needed       doxycycline hyclate (VIBRA-TABS) 100 MG tablet Take 150 mg by mouth 2 times daily       doxycycline hyclate (VIBRAMYCIN) 50 MG capsule Take 150 mg by mouth 2 times daily       fexofenadine (ALLEGRA)  180 MG tablet Take 1 tablet (180 mg) by mouth daily 90 tablet 2     fidaxomicin (DIFICID) 200 MG tablet Take 200 mg by mouth       fluticasone (FLONASE) 50 MCG/ACT nasal spray Spray 2 sprays into both nostrils 2 times daily        lidocaine (LIDODERM) 5 % patch as needed   1     losartan (COZAAR) 100 MG tablet Take 100 mg by mouth daily       metFORMIN (GLUCOPHAGE-XR) 500 MG 24 hr tablet Take 500 mg by mouth daily        methylPREDNISolone (MEDROL) 4 MG tablet Take 4 tabs daily X 4, Then 3 tabs daily X 4, then 2 tabs daily X 4 36 tablet 1     Montelukast Sodium (SINGULAIR PO) Take 10 mg by mouth At Bedtime        norethindrone (MICRONOR) 0.35 MG tablet Take 0.35 mg by mouth       nystatin (MYCOSTATIN) 430223 UNIT/ML suspension TAKE 5ML BY MOUTH 4XDAILY FOR 14 DAYS. CONTINUE AT LEAST 2 DAYS AFTER SYMPTOMS RESOLVED       olopatadine (PATADAY) 0.7 % ophthalmic solution Apply 1 drop to eye daily PRN       OMEPRAZOLE PO Take 40 mg by mouth 2 times daily        rifaximin (XIFAXAN) 550 MG TABS tablet Take 200 mg by mouth 3 times daily Take for 10 days, has on had as needed for small bowel bacteria overgrowh       spironolactone-HCTZ (ALDACTAZIDE) 25-25 MG tablet Take 1 tablet by mouth daily       tacrolimus (PROTOPIC) 0.1 % external ointment Apply to itchy areas on eyelids twice a day as needed for itching and rash 30 g 3     tobramycin (TOBREX) 0.3 % ophthalmic solution 1-2 drops 2 times daily       tobramycin-dexamethasone (TOBRADEX) 0.3-0.1 % ophthalmic ointment 0.25 inches At Bedtime       Tocilizumab 162 MG/0.9ML SOAJ Inject 162 mg Subcutaneous once a week 10.8 mL 3     tocilizumab Inject into the vein once         Allergies   Allergen Reactions     Polyethylene Glycol Rash     Codeine      Other reaction(s): Gastrointestinal, GI intolerance, Vomiting  Vomiting       Gadolinium Dizziness and Nausea     Hydrocodone-Acetaminophen Nausea and Vomiting     Other reaction(s): GI intolerance     Iodine      abstract      "Sulfasalazine      Other reaction(s): GI intolerance  Has tried and had nausa.     Tramadol Nausea and Vomiting     Other reaction(s): Gastrointestinal, Other (see comments)  Nausea and vomiting   unknown       Gluten Meal Other (See Comments) and Rash     Other reaction(s): Gastrointestinal, GI intolerance  Dizziness, tired, rash, stomach cramps, thrush, mouth sores  Dizziness, tired, rash, stomach cramps, thrush, mouth sores         Family History   Problem Relation Age of Onset     Hypertension Father         abstract     Cancer Paternal Aunt         gallbladder cancer abstract     Social History     Socioeconomic History     Marital status: Single   Tobacco Use     Smoking status: Never     Smokeless tobacco: Never   Substance and Sexual Activity     Alcohol use: Yes     Drug use: No   Other Topics Concern     Exercise No     Seat Belt No     Self-Exams No   Social History Narrative    Womens Health Kit given.           Additional medical/Social/S[facility] and updated as appropriate.     REVIEW OF SYSTEMS (12/13/2022)  10 point ROS of systems including Constitutional, Eyes, Respiratory, Cardiovascular, Gastroenterology, Genitourinary, Integumentary, Musculoskeletal, Psychiatric, Allergic/Immunologic were all negative except for pertinent positives noted in my HPI.     PHYSICAL EXAM  VSS  Vital Signs: Ht 1.676 m (5' 5.98\")   Wt 93.9 kg (207 lb)   BMI 33.43 kg/m   Patient declined being weighed. Body mass index is 33.43 kg/m .    General  - normal appearance, in no obvious distress  HEENT  - conjunctivae not injected, moist mucous membranes, normocephalic/atraumatic head, ears normal appearance, no lesions, mouth normal appearance, no scars, normal dentition and teeth present  CV  - normal radial pulse  Pulm  - normal respiratory pattern, non-labored  Musculoskeletal - wrist  - inspection: mild thenar atrophy, normal joint alignment, no swelling  - palpation: no bony or soft tissue " "tenderness, no tenderness at the anatomical snuffbox  - ROM:  90 deg flexion   70 deg extension   25 deg abduction   65 deg adduction  - strength: 4/5  strength, 4/5 wrist abduction, 5/5 flexion, extension, pronation, supination, adduction  - special tests:  (+) Tinel's  (-) Finkelstein  (+) Phalen  (-) Keys click test  (-) ulnar impaction  Neuro  - some thenar numbness, no motor deficit, grossly normal coordination, normal muscle tone  Skin  - no ecchymosis, erythema, warmth, or induration, no obvious rash  Psych  - interactive, appropriate, normal mood and affect  Neck: has pain with flexion/extension, positive spurlings, has some upper back pain and pain into shoulder blade and thoracic paraspinal muscles    ASSESSMENT & PLAN  48 yo female with cervical radicular pain, bilateral carpal tunnel syndrome, worse    I independently reviewed the following imaging studies:  Cervical xrays: shows ddd  Thoracic xrays: shows ddd  Discussed and ordered thoracic and cervical MRIs at her request due to chronic neck and upper back pain with radicular symptoms  After a 20 minute discussion and examination, we decided to perform a same day injection for diagnostic and therapeutic purposes for bilateral CT injections    Patient has been doing home exercise physical therapy program for this problem      Appropriate PPE was utilized for prevention of spread of Covid-19.  Matt Sullivan MD, CAQSM    Bilateral Carpal Tunnel Injection - Ultrasound Guided  The patient was informed of the risks and the benefits of the procedure and a written consent was signed.  The patient s bilateral wrist was prepped with chlorhexidine in sterile fashion.   10 mg of kenalog suspension was drawn up into a 5 mL syringe with 4.0 mL of 1% lidocaine for each wrist  Injection was performed using sterile technique.  Under ultrasound guidance a 1.5\" 22-gauge needle was used to enter the right and left wrist at the distal palmar crease medial to the " median nerve.  Needle placement was visualized and documented with ultrasound.  Ultrasound visualization required to ensure injection material enters the perineural sheath and not a vessel or nerve itself.  Injection performed long axis to the probe.  Injection solution visualized surrounding the perineurium.  Images were permanently stored for the patient's record.  There were no complications. The patient tolerated the procedure well. There was negligible bleeding.         Hand / Upper Extremity Injection: bilateral carpal tunnel    Date/Time: 12/13/2022 3:41 PM  Performed by: Matt Sullivan MD  Authorized by: Matt Sullivan MD     Indications:  Tendon swelling, diagnostic, therapeutic and pain  Needle Size:  22 G  Guidance: ultrasound    Approach:  Dorsal  Condition: carpal tunnel    Laterality:  Bilateral    Site:  Bilateral carpal tunnel  Medications (Right):  10 mg triamcinolone acetonide 10 MG/ML; 4 mL lidocaine (PF) 1 %  Medications (Left):  4 mL lidocaine (PF) 1 %; 10 mg triamcinolone acetonide 10 MG/ML  Outcome:  Tolerated well, no immediate complications  Procedure discussed: discussed risks, benefits, and alternatives    Consent Given by:  Patient  Timeout: timeout called immediately prior to procedure    Prep: patient was prepped and draped in usual sterile fashion

## 2022-12-13 NOTE — NURSING NOTE
[address] [address]  Dept: [phone]  ______________________________________________________________________________    Patient: Laxmi Ya   : 1975   MRN: [id number]   2022    INVASIVE PROCEDURE SAFETY CHECKLIST    Date: 2022   Procedure: Bilateral carpal tunnel injections with kenalog and USG  Patient Name: Laxmi Ya  MRN: [id number]  YOB: 1975    Action: Complete sections as appropriate. Any discrepancy results in a HARD COPY until resolved.     PRE PROCEDURE:  Patient ID verified with 2 identifiers (name and  or MRN): Yes  Procedure and site verified with patient/designee (when able): Yes  Accurate consent documentation in medical record: Yes  H&P (or appropriate assessment) documented in medical record: Yes  H&P must be up to 20 days prior to procedure and updates within 24 hours of procedure as applicable: NA  Relevant diagnostic and radiology test results appropriately labeled and displayed as applicable: NA  Procedure site(s) marked with provider initials: NA    TIMEOUT:  Time-Out performed immediately prior to starting procedure, including verbal and active participation of all team members addressing the following:Yes  * Correct patient identify  * Confirmed that the correct side and site are marked  * An accurate procedure consent form  * Agreement on the procedure to be done  * Correct patient position  * Relevant images and results are properly labeled and appropriately displayed  * The need to administer antibiotics or fluids for irrigation purposes during the procedure as applicable   * Safety precautions based on patient history or medication use    DURING PROCEDURE: Verification of correct person, site, and procedures any time the responsibility for care of the patient is transferred to another member of the care team.       Prior to injection, verified patient identity  using patient's name and date of birth.  Due to injection administration, patient instructed to remain in clinic for 15 minutes  afterwards, and to report any adverse reaction to me immediately.    Tendon sheath injection was performed.     Lido- left wrist  Drug Amount Wasted:  Yes, 1mL  Vial/Syringe: Single dose vial  Expiration Date:  08/01/2025    Lido- right wrist  Drug Amount Wasted:  Yes, 1mL  Vial/Syringe: Single dose vial  Expiration Date:  08/01/2025    Kenalog  Drug Amount Wasted:  Yes: 0.5 mg/ml   Vial/Syringe: Single dose vial  Expiration Date: 04/01/2024    Yandy Bashir, ATC  December 13, 2022

## 2022-12-13 NOTE — NURSING NOTE
"Chief Complaint   Patient presents with     RECHECK     COLON     Vital signs:  Temp: 98.2  F (36.8  C) Temp src: Oral BP: 120/79 Pulse: 75   Resp: 24 SpO2: 96 %     Height: 167.6 cm (5' 5.98\") Weight: 94.3 kg (207 lb 12.8 oz)  Estimated body mass index is 33.56 kg/m  as calculated from the following:    Height as of this encounter: 1.676 m (5' 5.98\").    Weight as of this encounter: 94.3 kg (207 lb 12.8 oz).        Kimberlee Ashley, [facility]  12/13/2022 11:11 AM      "

## 2022-12-13 NOTE — LETTER
12/13/2022         RE: Laxmi Ya  [address] [facility] [address]      [facility]    Follow-up Visit    CHIEF COMPLAINT AND REASON FOR THE VISIT:  Nonalcoholic steatohepatitis.    CONSULTING HEALTHCARE PROVIDER:  Artur Marsh MD    SUBJECTIVE:  Mrs. Laxmi Ya is a 47-year-old female with history of COLON and she also has rheumatoid arthritis, and she came to us for her regular followup.  The patient had abnormal liver function tests, as we have said in our previous dictations, dating back to 2010 and full workup done was negative except that her imaging showed she had hepatic steatosis.     There was also a small indeterminate area which we thought might be fatty-sparing. MR elastography showed normal liver with no fibrosis, although it showed steatosis.  Patient has significant rheumatoid arthritis and that was the problem.  She was initially on Remicade for that but was switched to golimumab, which she is uncomfortable with still as she attributes it to diverticulitis and eventually she was put on abatacept.     Now today, she is telling me that she is relatively doing well.  She is on rifaximin for small-bowel intestinal bacterial overgrowth.  She denies any significant abdominal pain, nausea or vomiting.  She has back pain which is bothering her and 1% she feels nauseated.  She is moving her bowels at least 4 times a day with no blood in it.  Weight-wise, she remains stable.  Please note that the patient previously saw Dr. Yaya Mcqueen for the SIBO and she saw also Dr. Lokesh Thompson for the same here.    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 Authorizing Provider   Albuterol Sulfate (PROAIR HFA IN) Inhale into the lungs as needed   Yes Reported, Patient   Artificial Tear Solution (SOOTHE XP) SOLN as needed   Yes Reported, Patient   atenolol (TENORMIN) 50 MG tablet Take 50 mg by mouth daily 2/14/20  Yes Reported, Patient   benzoyl peroxide 5 % external liquid Use daily as directed. Preferred bdrand: Medpura 12/8/22  Yes Eros Monique MD   diclofenac (VOLTAREN) 1 % topical gel Apply topically 4 times daily as needed 9/9/20  Yes Reported, Patient   doxycycline hyclate (VIBRA-TABS) 100 MG tablet Take 150 mg by mouth 2 times daily 8/18/22  Yes Reported, Patient   doxycycline hyclate (VIBRAMYCIN) 50 MG capsule Take 150 mg by mouth 2 times daily 11/22/22  Yes Reported, Patient   fexofenadine (ALLEGRA) 180 MG tablet Take 1 tablet (180 mg) by mouth daily 3/16/22  Yes Eros Monique MD   fidaxomicin (DIFICID) 200 MG tablet Take 200 mg by mouth 11/19/21  Yes Reported, Patient   fluticasone (FLONASE) 50 MCG/ACT nasal spray Spray 2 sprays into both nostrils 2 times daily    Yes Reported, Patient   lidocaine (LIDODERM) 5 % patch as needed  6/26/19  Yes Reported, Patient   losartan (COZAAR) 100 MG tablet Take 100 mg by mouth daily 3/19/20  Yes Reported, Patient   methylPREDNISolone (MEDROL) 4 MG tablet Take 4 tabs daily X 4, Then 3 tabs daily X 4, then 2 tabs daily X 4 11/25/22  Yes Oswaldo Lemus MD   Montelukast Sodium (SINGULAIR PO) Take  10 mg by mouth At Bedtime    Yes Reported, Patient   nystatin (MYCOSTATIN) 587176 UNIT/ML suspension TAKE 5ML BY MOUTH 4XDAILY FOR 14 DAYS. CONTINUE AT LEAST 2 DAYS AFTER SYMPTOMS RESOLVED 12/20/21  Yes Reported, Patient   olopatadine (PATADAY) 0.7 % ophthalmic solution Apply 1 drop to eye daily PRN   Yes Reported, Patient   OMEPRAZOLE PO Take 40 mg by mouth 2 times daily    Yes Reported, Patient   rifaximin (XIFAXAN) 550 MG TABS tablet Take 200 mg by mouth 3 times daily Take for 10 days, has on had as needed for small bowel bacteria overgrowh   Yes Reported, Patient   spironolactone-HCTZ (ALDACTAZIDE) 25-25 MG tablet Take 1 tablet by mouth daily 3/11/20  Yes Reported, Patient   tacrolimus (PROTOPIC) 0.1 % external ointment Apply to itchy areas on eyelids twice a day as needed for itching and rash 12/8/22  Yes Eros Monique MD   tobramycin (TOBREX) 0.3 % ophthalmic solution 1-2 drops 2 times daily 5/1/19  Yes Reported, Patient   tobramycin-dexamethasone (TOBRADEX) 0.3-0.1 % ophthalmic ointment 0.25 inches At Bedtime 7/22/19  Yes Reported, Patient   Tocilizumab 162 MG/0.9ML SOAJ Inject 162 mg Subcutaneous once a week 12/1/22  Yes Oswaldo Lemus MD   tocilizumab Inject into the vein once   Yes Reported, Patient   metFORMIN (GLUCOPHAGE-XR) 500 MG 24 hr tablet Take 500 mg by mouth daily  5/18/21 5/18/22  Reported, Patient   norethindrone (MICRONOR) 0.35 MG tablet Take 0.35 mg by mouth 11/18/19 11/17/20  Reported, Patient       Allergies  Allergies   Allergen Reactions     Polyethylene Glycol Rash     Codeine      Other reaction(s): Gastrointestinal, GI intolerance, Vomiting  Vomiting       Gadolinium Dizziness and Nausea     Hydrocodone-Acetaminophen Nausea and Vomiting     Other reaction(s): GI intolerance     Iodine      abstract     Sulfasalazine      Other reaction(s): GI intolerance  Has tried and had nausa.     Tramadol Nausea and Vomiting     Other reaction(s): Gastrointestinal, Other (see  "comments)  Nausea and vomiting   unknown       Gluten Meal Other (See Comments) and Rash     Other reaction(s): Gastrointestinal, GI intolerance  Dizziness, tired, rash, stomach cramps, thrush, mouth sores  Dizziness, tired, rash, stomach cramps, thrush, mouth sores         Review of systems  A 10-point review of systems was negative    Examination  /79 (BP Location: Right arm, Patient Position: Sitting, Cuff Size: Adult Large)   Pulse 75   Temp 98.2  F (36.8  C) (Oral)   Resp 24   Ht 1.676 m (5' 5.98\")   Wt 94.3 kg (207 lb 12.8 oz)   SpO2 96%   BMI 33.56 kg/m    Body mass index is 33.56 kg/m .    Gen- well, NAD, A+Ox3, normal color  Lym- no palpable LAD  CVS- RRR  RS- CTA  Abd- Not tender.  Extr- hands normal, no EMMY  Skin- no rash or jaundice  Neuro- no asterixis  Psych- normal mood    Laboratory  Lab Results   Component Value Date     12/08/2022     04/01/2020    POTASSIUM 4.4 12/08/2022    POTASSIUM 3.7 11/17/2022    POTASSIUM 4.2 04/01/2020    CHLORIDE 101 12/08/2022    CHLORIDE 106 11/17/2022    CHLORIDE 105 04/01/2020    CO2 26 12/08/2022    CO2 22 11/17/2022    CO2 24 04/01/2020    BUN 12.6 12/08/2022    BUN 10 11/17/2022    BUN 11 04/01/2020    CR 0.88 12/08/2022    CR 0.73 04/01/2020       Lab Results   Component Value Date    BILITOTAL 0.9 12/08/2022    BILITOTAL 0.6 04/01/2020    ALT 47 12/08/2022    ALT 36 04/01/2020    AST 33 12/08/2022    AST 24 04/01/2020    ALKPHOS 43 12/08/2022    ALKPHOS 82 04/01/2020       Lab Results   Component Value Date    ALBUMIN 4.7 12/08/2022    ALBUMIN 3.7 11/17/2022    ALBUMIN 3.9 04/01/2020    PROTTOTAL 7.1 12/08/2022    PROTTOTAL 8.1 04/01/2020        Lab Results   Component Value Date    WBC 8.7 12/08/2022    WBC 9.3 04/01/2020    HGB 16.2 12/08/2022    HGB 15.8 04/01/2020    MCV 86 12/08/2022    MCV 85 04/01/2020     12/08/2022     04/01/2020       Lab Results   Component Value Date    INR 0.95 12/08/2022    INR 0.93 04/01/2020 "       Radiology    ASSESSMENT AND PLAN:  1.  COLON-related liver disease.  Mrs. Ya has COLON and related liver disease.  She does not have significant fibrosis, as per MR elastography.  She will have an MRI now, which was ordered earlier.  She was also supposed to have a CT scan of the abdomen for a question of kidney stones.  This is going to be done urgently, and if this is done before the abdominal MRI and it is good for the liver, then we might hold doing the abdominal MRI.    Besides that, we advised her to do some lifestyle modifications including weight loss, which she is unable to do because of her rheumatoid arthritis.  Otherwise, Mrs. Ya will follow with her primary care physician for her healthcare maintenance issues and with her rheumatologist for the rheumatoid arthritis.  She will be seen here in 6 months.    I spent 30 minutes on this encounter of 12/13/2022 in chart reviewing, history taking, physical examination and documentation.  Some of the time was also used for coordination of care and counseling.    Cheryl Cristobal MD  Hepatology  [facility]

## 2022-12-15 ENCOUNTER — INFUSION THERAPY VISIT (OUTPATIENT)
Dept: INFUSION THERAPY | Facility: CLINIC | Age: 47
End: 2022-12-15
Attending: INTERNAL MEDICINE
Payer: MEDICARE

## 2022-12-15 ENCOUNTER — TELEPHONE (OUTPATIENT)
Dept: RHEUMATOLOGY | Facility: CLINIC | Age: 47
End: 2022-12-15

## 2022-12-15 VITALS
RESPIRATION RATE: 18 BRPM | DIASTOLIC BLOOD PRESSURE: 89 MMHG | OXYGEN SATURATION: 96 % | TEMPERATURE: 98.3 F | WEIGHT: 207 LBS | HEART RATE: 68 BPM | BODY MASS INDEX: 33.43 KG/M2 | SYSTOLIC BLOOD PRESSURE: 122 MMHG

## 2022-12-15 DIAGNOSIS — M13.80 SERONEGATIVE INFLAMMATORY ARTHRITIS: Primary | ICD-10-CM

## 2022-12-15 PROBLEM — M79.645 BILATERAL THUMB PAIN: Status: RESOLVED | Noted: 2022-09-13 | Resolved: 2022-12-15

## 2022-12-15 PROBLEM — M25.532 PAIN IN BOTH WRISTS: Status: RESOLVED | Noted: 2022-09-13 | Resolved: 2022-12-15

## 2022-12-15 PROBLEM — M79.644 BILATERAL THUMB PAIN: Status: RESOLVED | Noted: 2022-09-13 | Resolved: 2022-12-15

## 2022-12-15 PROBLEM — M25.531 PAIN IN BOTH WRISTS: Status: RESOLVED | Noted: 2022-09-13 | Resolved: 2022-12-15

## 2022-12-15 PROCEDURE — 250N000013 HC RX MED GY IP 250 OP 250 PS 637: Performed by: INTERNAL MEDICINE

## 2022-12-15 PROCEDURE — 96365 THER/PROPH/DIAG IV INF INIT: CPT

## 2022-12-15 PROCEDURE — 250N000011 HC RX IP 250 OP 636: Performed by: INTERNAL MEDICINE

## 2022-12-15 PROCEDURE — 258N000003 HC RX IP 258 OP 636: Performed by: INTERNAL MEDICINE

## 2022-12-15 PROCEDURE — 96375 TX/PRO/DX INJ NEW DRUG ADDON: CPT

## 2022-12-15 PROCEDURE — 999N000248 HC STATISTIC IV INSERT WITH US BY RN

## 2022-12-15 RX ORDER — NALOXONE HYDROCHLORIDE 0.4 MG/ML
0.2 INJECTION, SOLUTION INTRAMUSCULAR; INTRAVENOUS; SUBCUTANEOUS
Status: CANCELLED | OUTPATIENT
Start: 2023-01-12

## 2022-12-15 RX ORDER — ACETAMINOPHEN 325 MG/1
650 TABLET ORAL ONCE
Status: COMPLETED | OUTPATIENT
Start: 2022-12-15 | End: 2022-12-15

## 2022-12-15 RX ORDER — MEPERIDINE HYDROCHLORIDE 50 MG/ML
25 INJECTION INTRAMUSCULAR; INTRAVENOUS; SUBCUTANEOUS EVERY 30 MIN PRN
Status: CANCELLED | OUTPATIENT
Start: 2023-01-12

## 2022-12-15 RX ORDER — METHYLPREDNISOLONE SODIUM SUCCINATE 125 MG/2ML
125 INJECTION, POWDER, LYOPHILIZED, FOR SOLUTION INTRAMUSCULAR; INTRAVENOUS ONCE
Status: CANCELLED | OUTPATIENT
Start: 2023-01-12

## 2022-12-15 RX ORDER — ACETAMINOPHEN 325 MG/1
650 TABLET ORAL ONCE
Status: CANCELLED | OUTPATIENT
Start: 2023-01-12

## 2022-12-15 RX ORDER — METHYLPREDNISOLONE SODIUM SUCCINATE 125 MG/2ML
125 INJECTION, POWDER, LYOPHILIZED, FOR SOLUTION INTRAMUSCULAR; INTRAVENOUS
Status: CANCELLED
Start: 2023-01-12

## 2022-12-15 RX ORDER — DIPHENHYDRAMINE HYDROCHLORIDE 50 MG/ML
50 INJECTION INTRAMUSCULAR; INTRAVENOUS
Status: CANCELLED
Start: 2023-01-12

## 2022-12-15 RX ORDER — ALBUTEROL SULFATE 0.83 MG/ML
2.5 SOLUTION RESPIRATORY (INHALATION)
Status: CANCELLED | OUTPATIENT
Start: 2023-01-12

## 2022-12-15 RX ORDER — HEPARIN SODIUM (PORCINE) LOCK FLUSH IV SOLN 100 UNIT/ML 100 UNIT/ML
5 SOLUTION INTRAVENOUS
Status: CANCELLED | OUTPATIENT
Start: 2023-01-12

## 2022-12-15 RX ORDER — HEPARIN SODIUM,PORCINE 10 UNIT/ML
5 VIAL (ML) INTRAVENOUS
Status: CANCELLED | OUTPATIENT
Start: 2023-01-12

## 2022-12-15 RX ORDER — ALBUTEROL SULFATE 90 UG/1
1-2 AEROSOL, METERED RESPIRATORY (INHALATION)
Status: CANCELLED
Start: 2023-01-12

## 2022-12-15 RX ORDER — METHYLPREDNISOLONE SODIUM SUCCINATE 125 MG/2ML
125 INJECTION, POWDER, LYOPHILIZED, FOR SOLUTION INTRAMUSCULAR; INTRAVENOUS ONCE
Status: COMPLETED | OUTPATIENT
Start: 2022-12-15 | End: 2022-12-15

## 2022-12-15 RX ORDER — EPINEPHRINE 1 MG/ML
0.3 INJECTION, SOLUTION INTRAMUSCULAR; SUBCUTANEOUS EVERY 5 MIN PRN
Status: CANCELLED | OUTPATIENT
Start: 2023-01-12

## 2022-12-15 RX ADMIN — METHYLPREDNISOLONE SODIUM SUCCINATE 125 MG: 125 INJECTION, POWDER, FOR SOLUTION INTRAMUSCULAR; INTRAVENOUS at 11:21

## 2022-12-15 RX ADMIN — TOCILIZUMAB 400 MG: 20 INJECTION, SOLUTION, CONCENTRATE INTRAVENOUS at 11:43

## 2022-12-15 RX ADMIN — SODIUM CHLORIDE 250 ML: 9 INJECTION, SOLUTION INTRAVENOUS at 11:21

## 2022-12-15 RX ADMIN — ACETAMINOPHEN 650 MG: 325 TABLET ORAL at 11:19

## 2022-12-15 NOTE — PROGRESS NOTES
Infusion Nursing Note:  Laxmi Ya presents today for Actemra 4mg/kg (monthly).    Patient seen by provider today: No    Note: Pt arrives ambulatory to [facility]. Pt reports that Provider has been monitoring elevated cholesterol, and may not continue with the Actemra after this infusion. Pt reports continued pain, and is currently on methylprednisolone. Pt reports that she believed that she was on 750mg Actemra, and explained ordered therapy plan dosing to pt; in-basket sent to  to update and clarify desired dosing.     /89 (BP Location: Left arm, Patient Position: Sitting, Cuff Size: Adult Large)   Pulse 68   Temp 98.3  F (36.8  C) (Oral)   Resp 18   Wt 93.9 kg (207 lb)   SpO2 96%   BMI 33.43 kg/m      + pt premedicated with tylenol and solumedrol    Intravenous Access:  Peripheral IV placed in left forearm by PICC team.    Treatment Conditions:  Biological Infusion Checklist:  ~~~ NOTE: If the patient answers yes to any of the questions below, hold the infusion and contact ordering provider or on-call provider.    1. Have you recently had an elevated temperature, fever, chills, productive cough, coughing for 3 weeks or longer or hemoptysis, abnormal vital signs, night sweats,  chest pain or have you noticed a decrease in your appetite, unexplained weight loss or fatigue? No  2. Do you have any open wounds or new incisions? No  3. Do you have any recent or upcoming hospitalizations, surgeries or dental procedures? No  4. Do you currently have or recently have had any signs of illness or infection or are you on any antibiotics? No  5. Have you had any new, sudden or worsening abdominal pain? No  6. Have you or anyone in your household received a live vaccination in the past 4 weeks? Please note:  No live vaccines while on biologic/chemotherapy until 6 months after the last treatment.  Patient can receive the flu vaccine (shot only) and the pneumovax.  It is optimal for the  patient to get these vaccines mid cycle, but they can be given at any time as long as it is not on the day of the infusion. No  7. Have you recently been diagnosed with any new nervous system diseases (ie. Multiple sclerosis, Guillain [place], seizures, neurological changes) or cancer diagnosis? No  8. Are you on any form of radiation or chemotherapy? No  9. Are you pregnant or breast feeding or do you have plans of pregnancy in the future? No  10. Have you been having any signs of worsening depression or suicidal ideations?  (benlysta only) No  11. Have there been any other new onset medical symptoms? No    Post Infusion Assessment:  Patient tolerated infusion without incident. Pt was able to rest in bed during infusion.    Site patent and intact, free from redness, edema or discomfort.  No evidence of extravasations.  Access discontinued per protocol.     Discharge Plan:   Patient discharged in stable condition accompanied by: self.  Departure Mode: Ambulatory.      Kateryna Miguel RN

## 2022-12-15 NOTE — TELEPHONE ENCOUNTER
[facility] Call Center    Phone Message    May a detailed message be left on voicemail: yes     Reason for Call: Other: Pt would like to talk to Kaley about her infusion. Pt states she would like to switch medication as she feels what she is on is not working and she would like to try something else. Pt would like a call back @ [phone].     Action Taken: Message routed to:  [facility] Adult Rheumatology     Travel Screening: Not Applicable

## 2022-12-16 ENCOUNTER — VIRTUAL VISIT (OUTPATIENT)
Dept: PHARMACY | Facility: CLINIC | Age: 47
End: 2022-12-16
Payer: COMMERCIAL

## 2022-12-16 DIAGNOSIS — M06.00 SERONEGATIVE RHEUMATOID ARTHRITIS (H): Primary | ICD-10-CM

## 2022-12-16 PROCEDURE — 99607 MTMS BY PHARM ADDL 15 MIN: CPT

## 2022-12-16 PROCEDURE — 99606 MTMS BY PHARM EST 15 MIN: CPT

## 2022-12-16 NOTE — PROGRESS NOTES
Medication Therapy Management (MTM) Encounter    ASSESSMENT:                            Medication Adherence/Access: No issues identified    Rheumatoid Arthritis: Patient prefers not to continue on Actemra therapy due to side effect of hyperlipidemia and lack of efficacy. Discussed further options including increasing Actemra dose to 750 mg monthly and adding a cholesterol medication or switching to a SIOMARA inhibitor. Patient declined both options due to side effect concerns. Patient prefers to start Stelara infusions. Did review use of Stelara would be off-label due to medication not being FDA approved for rheumatoid arthritis. Could consider change to Stelara if rheumatologist agrees.    PLAN:                            1. I will discuss potentially changing biologic therapy to Stelara with Dr. Lemus.    Follow-up: Return in about 6 weeks (around 1/27/2023) for MTM Pharmacist Visit.    SUBJECTIVE/OBJECTIVE:                          Laxmi Ya is a 47 year old female called for a follow-up visit.  Today's visit is a follow-up MTM visit from 11/29/22.     Reason for visit: Would like to switch biologic therapy    Allergies/ADRs: Reviewed in chart  Past Medical History: Reviewed in chart  Tobacco: She reports that she has never smoked. She has never used smokeless tobacco.  Alcohol: Less than 1 beverage / month    Medication Adherence/Access: no issues reported    Rheumatoid Arthritis: Currently using Actemra 400 mg infusion monthly (recently determined that orders for 750 mg per dose were not placed), diclofenac 1% gel as needed (uses once per day a few times per week). Has Medrol for flares as needed on hand. Reports the Actemra has been more helpful than other medications she has tried but is still having stiffness in pain in hips and neck regularly. Feels it is typically worse closer to her infusion time. Was planning to start Actemra self injections weekly but notes she has a history of skin reactions to  self injections and now prefers not to pursue this. Also very concerned that Actemra has caused her cholesterol to significantly increase. Patient would like to start Stelara IV infusions instead since a previous rheumatologist suggested it.     Tried and failed: Humira, Enbrel, Orencia, Remicade, Simponi, Hydroxychloroquine    Liver Function Studies - Recent Labs   Lab Test 12/08/22  1211   PROTTOTAL 7.1   ALBUMIN 4.7   BILITOTAL 0.9   ALKPHOS 43   AST 33   ALT 47*     CBC RESULTS: Recent Labs   Lab Test 12/08/22  1211   WBC 8.7   RBC 5.50*   HGB 16.2*   HCT 47.1*   MCV 86   MCH 29.5   MCHC 34.4   RDW 13.0        Recent Labs   Lab Test 12/09/22  1211 09/06/22  1339   CHOL 281* 270*   HDL 57 51   * 161*   TRIG 309* 292*     Today's Vitals: There were no vitals taken for this visit.  ----------------    I spent 45 minutes with this patient today. I offer these suggestions for consideration by Oswaldo Lemus MD. A copy of the visit note was provided to the patient's provider(s).    A summary of these recommendations was declined by the patient.    Yuliya Kaplan, PharmD  Medication Therapy Management Pharmacist  [facility]  Phone: [phone]    Telemedicine Visit Details  Type of service:  Telephone visit  Start Time: 11:00 AM  End Time: 11:45 AM  Originating Location (pt. Location): Home  Distant Location (provider location):  On-site  Provider has received verbal consent for a visit from the patient? Yes     Medication Therapy Recommendations  Seronegative rheumatoid arthritis (H)    Current Medication: tocilizumab   Rationale: More effective medication available - Ineffective medication - Effectiveness   Recommendation: Change Medication Formulation  - Stelara 130 MG/26ML Soln   Status: Contact Provider - Awaiting Response

## 2022-12-21 DIAGNOSIS — M13.0 SERONEGATIVE POLYARTHRITIS: ICD-10-CM

## 2022-12-21 RX ORDER — LIDOCAINE HYDROCHLORIDE 10 MG/ML
4 INJECTION, SOLUTION EPIDURAL; INFILTRATION; INTRACAUDAL; PERINEURAL
Status: DISCONTINUED | OUTPATIENT
Start: 2022-12-13 | End: 2023-03-07

## 2022-12-21 RX ORDER — METHYLPREDNISOLONE 4 MG/1
TABLET ORAL
Qty: 36 TABLET | Refills: 1 | Status: SHIPPED | OUTPATIENT
Start: 2022-12-21 | End: 2023-01-11

## 2022-12-21 NOTE — TELEPHONE ENCOUNTER
[facility] Call Center    Phone Message    May a detailed message be left on voicemail: yes     Reason for Call: Medication Refill Request    Has the patient contacted the pharmacy for the refill? Yes   Name of medication being requested: Medrol  Provider who prescribed the medication: Dr. Lemus  Pharmacy: [facility]   Date medication is needed: ASAP        Action Taken: Other: [facility] rheum    Travel Screening: Not Applicable

## 2022-12-21 NOTE — TELEPHONE ENCOUNTER
methylPREDNISolone (MEDROL) 4 MG tablet      Sig: Take 4 tabs daily X 4, Then 3 tabs daily X 4, then 2 tabs daily X 4  Per pharm  Disp date 11-25-22, 12-18-22 for 36 tb  Each time  Last Written Prescription Date:  11-25-22  Last Fill Quantity: 36,   # refills: 1  Last Office Visit : 11-25-22  Future Office visit:  2-10-23    Routing refill request to provider for review/approval because:   ? Use for flares- taper: FYI to clinic  Med not on protocol

## 2022-12-21 NOTE — TELEPHONE ENCOUNTER
Spoke with pharmacist Yuliya, she spoke with patient earlier and sent an urgent message to Dr. Lemus regarding his recommendations.  Refill pool has pended a methylprednisolone order, will route to Dr. Lemus for review.    JESSICA MoraN, RN  RN Care Coordinator Rheumatology

## 2022-12-26 ENCOUNTER — TELEPHONE (OUTPATIENT)
Dept: ORTHOPEDICS | Facility: CLINIC | Age: 47
End: 2022-12-26

## 2022-12-26 NOTE — PATIENT INSTRUCTIONS
"Recommendations from today's MTM visit:                                                       1. I will discuss potentially changing biologic therapy to Don with Dr. Lemus.    Follow-up: Return in about 6 weeks (around 1/27/2023) for MTM Pharmacist Visit.    It was great speaking with you today.  I value your experience and would be very thankful for your time in providing feedback in our clinic survey. In the next few days, you may receive an email or text message from [facility] [employer] with a link to a survey related to your  clinical pharmacist.\"     To schedule another MTM appointment, please call the clinic directly or you may call the MTM scheduling line at [phone] or toll-free at [phone].     My Clinical Pharmacist's contact information:                                                      Please feel free to contact me with any questions or concerns you have.      Yuliya Kaplan, PharmD  Medication Therapy Management Pharmacist  [facility]  Phone: [phone]     "

## 2022-12-26 NOTE — TELEPHONE ENCOUNTER
FYI - Status Update    Who is Calling: patient    Update: pt requesting the three mri orders be sent to [facility] in [place], requesting callback for confirmation  Does caller want a call/response back: Yes     Could we send this information to you in [facility] or would you prefer to receive a phone call?:   Patient would prefer a phone call   Okay to leave a detailed message?: Yes at Cell number on file:    Telephone Information:   Mobile [phone]

## 2022-12-27 NOTE — TELEPHONE ENCOUNTER
I attempted to call the patient at the cell phone number provided, but the voicemail box was full. I was calling to let them know that I faxed the patient's MRI orders to [facility] in [place].    Sandro Pace, EMT

## 2023-01-09 ENCOUNTER — MYC MEDICAL ADVICE (OUTPATIENT)
Dept: PHARMACY | Facility: CLINIC | Age: 48
End: 2023-01-09

## 2023-01-10 ENCOUNTER — TELEPHONE (OUTPATIENT)
Dept: RHEUMATOLOGY | Facility: CLINIC | Age: 48
End: 2023-01-10

## 2023-01-10 DIAGNOSIS — M13.0 SERONEGATIVE POLYARTHRITIS: ICD-10-CM

## 2023-01-10 DIAGNOSIS — M06.09 SERONEGATIVE RHEUMATOID ARTHRITIS OF MULTIPLE SITES (H): Primary | ICD-10-CM

## 2023-01-10 NOTE — TELEPHONE ENCOUNTER
PA Initiation    Medication: Stelara  Insurance Company: [employer] - Phone [phone] Fax [phone]  Pharmacy Filling the Rx:    Filling Pharmacy Phone:    Filling Pharmacy Fax:    Start Date: 1/10/2023    Sent via fax

## 2023-01-10 NOTE — TELEPHONE ENCOUNTER
Received a fax from [facility] stating patient is requesting new script of Medrol.  Called and spoke with Marcie at [facility] in [place] who confirmed that patient filled original Methylprednisolone 4mg take 4 tabs for 10 days on 12/27/22 and refilled the same script on 1/3/2023 stating that she wanted meds on hand when she needed it.  Patient further clarified with pharmacy that she changed insurance providers.  Since there was no restrictions on refills and insurance covered both meds, Pharmacy went ahead and filled.    Pt requesting to have the medrol refilled to have on hand if needed per pharmacy.    Will send request to provider.

## 2023-01-11 ENCOUNTER — PRE VISIT (OUTPATIENT)
Dept: INFECTIOUS DISEASES | Facility: CLINIC | Age: 48
End: 2023-01-11

## 2023-01-11 RX ORDER — METHYLPREDNISOLONE 4 MG/1
16 TABLET ORAL DAILY
Qty: 120 TABLET | Refills: 0 | Status: SHIPPED | OUTPATIENT
Start: 2023-01-11 | End: 2023-02-07

## 2023-01-11 NOTE — TELEPHONE ENCOUNTER
Spoke with patient who clarified she is getting Actemra 400mg IV monthly not 750mg IV monthly.  methylPREDNISolone 4mg tab 4 tabs(16mg) PO Daily is what the patient is taking since ordered taper in Nov was not effective to manage her symptoms.  Spoke with Yuliya Kaplan PharmD who confirmed that patient has indeed been taking 16mg of methylprednisolone daily as this is managing her symptoms, the last time she checked in with the patient.   F/U sent to Dr. SEAN Lemus for methylPREDNISolone 4mg tab take 16mg (4tabs) po daily x 30 days.  Hoping Stalera IV is approved in the next 30 days.

## 2023-01-11 NOTE — TELEPHONE ENCOUNTER
If I understand the note correctly, patient has refilled this prescription twice already: December 27 and again on January 3.  The proposed course of treatment is for a 2-week period.  There was not 2 weeks between the first 2 fills and there has not been 2 weeks since the second fill.  I think it reasonable for the patient to have 10 4 mg tablets on hand in case of flare occurs, but the most appropriate practice would be to contact rheumatology in every case of a requirement for Medrol.  I do not think that the medication should be refilled at this time.

## 2023-01-12 ENCOUNTER — INFUSION THERAPY VISIT (OUTPATIENT)
Dept: INFUSION THERAPY | Facility: CLINIC | Age: 48
End: 2023-01-12
Attending: INTERNAL MEDICINE
Payer: MEDICARE

## 2023-01-12 VITALS
RESPIRATION RATE: 16 BRPM | TEMPERATURE: 97.8 F | HEART RATE: 76 BPM | OXYGEN SATURATION: 98 % | SYSTOLIC BLOOD PRESSURE: 114 MMHG | DIASTOLIC BLOOD PRESSURE: 75 MMHG

## 2023-01-12 DIAGNOSIS — M13.80 SERONEGATIVE INFLAMMATORY ARTHRITIS: Primary | ICD-10-CM

## 2023-01-12 LAB
ALBUMIN SERPL BCG-MCNC: 4 G/DL (ref 3.5–5.2)
ALBUMIN UR-MCNC: NEGATIVE MG/DL
ALP SERPL-CCNC: 42 U/L (ref 35–104)
ALT SERPL W P-5'-P-CCNC: 32 U/L (ref 10–35)
ANION GAP SERPL CALCULATED.3IONS-SCNC: 16 MMOL/L (ref 7–15)
APPEARANCE UR: CLEAR
AST SERPL W P-5'-P-CCNC: 19 U/L (ref 10–35)
BASOPHILS # BLD AUTO: 0.1 10E3/UL (ref 0–0.2)
BASOPHILS NFR BLD AUTO: 1 %
BILIRUB SERPL-MCNC: 0.5 MG/DL
BILIRUB UR QL STRIP: NEGATIVE
BUN SERPL-MCNC: 20 MG/DL (ref 6–20)
CALCIUM SERPL-MCNC: 9.4 MG/DL (ref 8.6–10)
CHLORIDE SERPL-SCNC: 101 MMOL/L (ref 98–107)
CHOLEST SERPL-MCNC: 225 MG/DL
COLOR UR AUTO: ABNORMAL
CREAT SERPL-MCNC: 1.12 MG/DL (ref 0.51–0.95)
DEPRECATED HCO3 PLAS-SCNC: 20 MMOL/L (ref 22–29)
EOSINOPHIL # BLD AUTO: 0.1 10E3/UL (ref 0–0.7)
EOSINOPHIL NFR BLD AUTO: 1 %
ERYTHROCYTE [DISTWIDTH] IN BLOOD BY AUTOMATED COUNT: 13 % (ref 10–15)
GFR SERPL CREATININE-BSD FRML MDRD: 61 ML/MIN/1.73M2
GLUCOSE SERPL-MCNC: 288 MG/DL (ref 70–99)
GLUCOSE UR STRIP-MCNC: 70 MG/DL
HCT VFR BLD AUTO: 42.3 % (ref 35–47)
HDLC SERPL-MCNC: 60 MG/DL
HGB BLD-MCNC: 14.3 G/DL (ref 11.7–15.7)
HGB UR QL STRIP: NEGATIVE
IMM GRANULOCYTES # BLD: 0.2 10E3/UL
IMM GRANULOCYTES NFR BLD: 1 %
KETONES UR STRIP-MCNC: NEGATIVE MG/DL
LDLC SERPL CALC-MCNC: 112 MG/DL
LEUKOCYTE ESTERASE UR QL STRIP: NEGATIVE
LYMPHOCYTES # BLD AUTO: 1.8 10E3/UL (ref 0.8–5.3)
LYMPHOCYTES NFR BLD AUTO: 12 %
MCH RBC QN AUTO: 29.4 PG (ref 26.5–33)
MCHC RBC AUTO-ENTMCNC: 33.8 G/DL (ref 31.5–36.5)
MCV RBC AUTO: 87 FL (ref 78–100)
MONOCYTES # BLD AUTO: 0.9 10E3/UL (ref 0–1.3)
MONOCYTES NFR BLD AUTO: 6 %
NEUTROPHILS # BLD AUTO: 11.4 10E3/UL (ref 1.6–8.3)
NEUTROPHILS NFR BLD AUTO: 79 %
NITRATE UR QL: NEGATIVE
NONHDLC SERPL-MCNC: 165 MG/DL
NRBC # BLD AUTO: 0 10E3/UL
NRBC BLD AUTO-RTO: 0 /100
PH UR STRIP: 6 [PH] (ref 5–7)
PLATELET # BLD AUTO: 309 10E3/UL (ref 150–450)
POTASSIUM SERPL-SCNC: 4.1 MMOL/L (ref 3.4–5.3)
PROT SERPL-MCNC: 6.2 G/DL (ref 6.4–8.3)
RBC # BLD AUTO: 4.87 10E6/UL (ref 3.8–5.2)
RBC URINE: <1 /HPF
SODIUM SERPL-SCNC: 137 MMOL/L (ref 136–145)
SP GR UR STRIP: 1.01 (ref 1–1.03)
TRIGL SERPL-MCNC: 264 MG/DL
UROBILINOGEN UR STRIP-MCNC: NORMAL MG/DL
WBC # BLD AUTO: 14.4 10E3/UL (ref 4–11)
WBC URINE: 0 /HPF

## 2023-01-12 PROCEDURE — 258N000003 HC RX IP 258 OP 636: Performed by: INTERNAL MEDICINE

## 2023-01-12 PROCEDURE — 81001 URINALYSIS AUTO W/SCOPE: CPT | Performed by: INTERNAL MEDICINE

## 2023-01-12 PROCEDURE — 250N000011 HC RX IP 250 OP 636: Performed by: INTERNAL MEDICINE

## 2023-01-12 PROCEDURE — 96375 TX/PRO/DX INJ NEW DRUG ADDON: CPT

## 2023-01-12 PROCEDURE — 999N000285 HC STATISTIC VASC ACCESS LAB DRAW WITH PIV START

## 2023-01-12 PROCEDURE — 80053 COMPREHEN METABOLIC PANEL: CPT | Performed by: INTERNAL MEDICINE

## 2023-01-12 PROCEDURE — 85025 COMPLETE CBC W/AUTO DIFF WBC: CPT | Performed by: INTERNAL MEDICINE

## 2023-01-12 PROCEDURE — 96365 THER/PROPH/DIAG IV INF INIT: CPT

## 2023-01-12 PROCEDURE — 36415 COLL VENOUS BLD VENIPUNCTURE: CPT | Performed by: INTERNAL MEDICINE

## 2023-01-12 PROCEDURE — 96366 THER/PROPH/DIAG IV INF ADDON: CPT

## 2023-01-12 PROCEDURE — 250N000013 HC RX MED GY IP 250 OP 250 PS 637: Performed by: INTERNAL MEDICINE

## 2023-01-12 PROCEDURE — 80061 LIPID PANEL: CPT | Performed by: INTERNAL MEDICINE

## 2023-01-12 PROCEDURE — 999N000128 HC STATISTIC PERIPHERAL IV START W/O US GUIDANCE

## 2023-01-12 RX ORDER — METHYLPREDNISOLONE SODIUM SUCCINATE 125 MG/2ML
125 INJECTION, POWDER, LYOPHILIZED, FOR SOLUTION INTRAMUSCULAR; INTRAVENOUS ONCE
Status: CANCELLED | OUTPATIENT
Start: 2023-02-09

## 2023-01-12 RX ORDER — MEPERIDINE HYDROCHLORIDE 25 MG/ML
25 INJECTION INTRAMUSCULAR; INTRAVENOUS; SUBCUTANEOUS EVERY 30 MIN PRN
Status: CANCELLED | OUTPATIENT
Start: 2023-02-09

## 2023-01-12 RX ORDER — ALBUTEROL SULFATE 0.83 MG/ML
2.5 SOLUTION RESPIRATORY (INHALATION)
Status: CANCELLED | OUTPATIENT
Start: 2023-02-09

## 2023-01-12 RX ORDER — EPINEPHRINE 1 MG/ML
0.3 INJECTION, SOLUTION INTRAMUSCULAR; SUBCUTANEOUS EVERY 5 MIN PRN
Status: CANCELLED | OUTPATIENT
Start: 2023-02-09

## 2023-01-12 RX ORDER — METHYLPREDNISOLONE SODIUM SUCCINATE 125 MG/2ML
125 INJECTION, POWDER, LYOPHILIZED, FOR SOLUTION INTRAMUSCULAR; INTRAVENOUS
Status: CANCELLED
Start: 2023-02-09

## 2023-01-12 RX ORDER — DIPHENHYDRAMINE HYDROCHLORIDE 50 MG/ML
50 INJECTION INTRAMUSCULAR; INTRAVENOUS
Status: CANCELLED
Start: 2023-02-09

## 2023-01-12 RX ORDER — ALBUTEROL SULFATE 90 UG/1
1-2 AEROSOL, METERED RESPIRATORY (INHALATION)
Status: CANCELLED
Start: 2023-02-09

## 2023-01-12 RX ORDER — ACETAMINOPHEN 325 MG/1
650 TABLET ORAL ONCE
Status: COMPLETED | OUTPATIENT
Start: 2023-01-12 | End: 2023-01-12

## 2023-01-12 RX ORDER — NALOXONE HYDROCHLORIDE 0.4 MG/ML
0.2 INJECTION, SOLUTION INTRAMUSCULAR; INTRAVENOUS; SUBCUTANEOUS
Status: CANCELLED | OUTPATIENT
Start: 2023-02-09

## 2023-01-12 RX ORDER — HEPARIN SODIUM,PORCINE 10 UNIT/ML
5 VIAL (ML) INTRAVENOUS
Status: CANCELLED | OUTPATIENT
Start: 2023-02-09

## 2023-01-12 RX ORDER — ACETAMINOPHEN 325 MG/1
650 TABLET ORAL ONCE
Status: CANCELLED | OUTPATIENT
Start: 2023-02-09

## 2023-01-12 RX ORDER — HEPARIN SODIUM (PORCINE) LOCK FLUSH IV SOLN 100 UNIT/ML 100 UNIT/ML
5 SOLUTION INTRAVENOUS
Status: CANCELLED | OUTPATIENT
Start: 2023-02-09

## 2023-01-12 RX ORDER — METHYLPREDNISOLONE SODIUM SUCCINATE 125 MG/2ML
125 INJECTION, POWDER, LYOPHILIZED, FOR SOLUTION INTRAMUSCULAR; INTRAVENOUS ONCE
Status: COMPLETED | OUTPATIENT
Start: 2023-01-12 | End: 2023-01-12

## 2023-01-12 RX ADMIN — TOCILIZUMAB 400 MG: 20 INJECTION, SOLUTION, CONCENTRATE INTRAVENOUS at 13:21

## 2023-01-12 RX ADMIN — METHYLPREDNISOLONE SODIUM SUCCINATE 125 MG: 125 INJECTION, POWDER, FOR SOLUTION INTRAMUSCULAR; INTRAVENOUS at 12:57

## 2023-01-12 RX ADMIN — ACETAMINOPHEN 650 MG: 325 TABLET ORAL at 12:57

## 2023-01-12 RX ADMIN — SODIUM CHLORIDE 250 ML: 9 INJECTION, SOLUTION INTRAVENOUS at 14:16

## 2023-01-12 ASSESSMENT — PAIN SCALES - GENERAL: PAINLEVEL: WORST PAIN (10)

## 2023-01-12 NOTE — PROGRESS NOTES
Infusion Nursing Note:  Laxmi Ya presents today for   Chief Complaint   Patient presents with     Infusion     tocilizumab (ACTEMRA)       Patient seen by provider today: No   present during visit today: Not Applicable.    Note:   -Orders from Oswaldo Lemus MD completed. Frequency: every 4 weeks.  -Labs drawn via PIV without difficulty. Urine specimen provided by patient.  -Premeds: 650mg Tylenol po, 125mg Solu-medrol IV.  -Actemra infused over 60min.  -250ml NS infused over 15min after Actemra per pt request.    Intravenous Access:  Peripheral IV placed in Lt forearm by vascular access team.    Treatment Conditions:  Biological Infusion Checklist:  ~~~ NOTE: If the patient answers yes to any of the questions below, hold the infusion and contact ordering provider or on-call provider.    1. Have you recently had an elevated temperature, fever, chills, productive cough, coughing for 3 weeks or longer or hemoptysis, abnormal vital signs, night sweats,  chest pain or have you noticed a decrease in your appetite, unexplained weight loss or fatigue? No  2. Do you have any open wounds or new incisions? No  3. Do you have any recent or upcoming hospitalizations, surgeries or dental procedures? No  4. Do you currently have or recently have had any signs of illness or infection or are you on any antibiotics? No  5. Have you had any new, sudden or worsening abdominal pain? No  6. Have you or anyone in your household received a live vaccination in the past 4 weeks? Please note:  No live vaccines while on biologic/chemotherapy until 6 months after the last treatment.  Patient can receive the flu vaccine (shot only) and the pneumovax.  It is optimal for the patient to get these vaccines mid cycle, but they can be given at any time as long as it is not on the day of the infusion. No  7. Have you recently been diagnosed with any new nervous system diseases (ie. Multiple sclerosis, Guillain Floral, seizures,  neurological changes) or cancer diagnosis? No  8. Are you on any form of radiation or chemotherapy? No  9. Are you pregnant or breast feeding or do you have plans of pregnancy in the future? No  10. Have you been having any signs of worsening depression or suicidal ideations?  (benlysta only) N/A  11. Have there been any other new onset medical symptoms? No    Post Infusion Assessment:  Patient tolerated infusion without incident.  Blood return noted pre and post infusion.  Site patent and intact, free from redness, edema or discomfort.  No evidence of extravasations.  Access discontinued per protocol.     Discharge Plan:   Discharge instructions reviewed with: Patient.  Patient and/or family verbalized understanding of discharge instructions and all questions answered.  AVS to patient via [email or web address].  Patient will return 2/9/23 for next appointment.   Patient discharged in stable condition accompanied by: self.  Departure Mode: Ambulatory.      Kaley Handley RN    /76 (BP Location: Left arm, Patient Position: Fowlers, Cuff Size: Adult Large)   Pulse 74   Temp 97.8  F (36.6  C)   Resp 16   SpO2 98%     Administrations This Visit     0.9% sodium chloride BOLUS     Admin Date  01/12/2023 Action  New Bag Dose  250 mL Route  Intravenous Administered By  Kaley Handley RN          acetaminophen (TYLENOL) tablet 650 mg     Admin Date  01/12/2023 Action  Given Dose  650 mg Route  Oral Administered By  Kaley Handley RN          methylPREDNISolone sodium succinate (solu-MEDROL) injection 125 mg     Admin Date  01/12/2023 Action  Given Dose  125 mg Route  Intravenous Administered By  Kaley Handley, LEXI          tocilizumab (ACTEMRA) 400 mg in sodium chloride 0.9 % 130 mL infusion     Admin Date  01/12/2023 Action  New Bag Dose  400 mg Rate  130 mL/hr Route  Intravenous Administered By  Kaley Handley RN

## 2023-01-12 NOTE — PATIENT INSTRUCTIONS
Dear Laxmi Ya    Thank you for choosing [facility] ([facility]) for your Actemra infusion.  The following information is a summary of our appointment as well as important reminders.      Last dose of Tylenol was  1/12/23 at 1:00 pm.    EDUCATION POST BIOLOGICAL/CHEMOTHERAPY INFUSION  Call the triage nurse at your clinic or seek medical attention if you have chills and/or temperature greater than or equal to 100.5, uncontrolled nausea/vomiting, diarrhea, constipation, dizziness, shortness of breath, chest pain, heart palpitations, weakness or any other new or concerning symptoms, questions or concerns.  You can not have any live virus vaccines prior to or during treatment or up to 6 months post infusion.  If you have an upcoming surgery, medical procedure or dental procedure during treatment, this should be discussed with your ordering physician and your surgeon/dentist.  If you are having any concerning symptom, if you are unsure if you should get your next infusion or wish to speak to a provider before your next infusion, please call your care coordinator or triage nurse at your clinic to notify them so we can adequately serve you.     We look forward to seeing you on 2/9/23 for your next appointment here at [facility]).  Please don t hesitate to call us at [phone] to reschedule any of your appointments or to speak with one of the [facility] registered nurses.  It was a pleasure taking care of you today.    Sincerely,    [facility]  [address] [place] [address]  [address]  Phone:  [phone]

## 2023-01-12 NOTE — RESULT ENCOUNTER NOTE
Ms. Ya,  Blood sugar is quite high, and there is trace sugar spilling into the urine. I am concerned that this reflects a pro-diabetes effect of the continued higher dose medrol. I recommend limiting daily medrol to 8 mg daily while waiting to start ustekinumab.  Cholesterol is still high; this should be rechecked one month after switching to ustekinumab from actemra.  Kidney function is slightly off. It could reflect mild dehydration. I recommend rechecking creatinine in several weeks.  Please let me know if you have questions.    Sincerely,    Oswaldo Lemus MD  Rheumatologist, [facility]

## 2023-01-12 NOTE — LETTER
1/12/2023         RE: Laxmi Ya  [address] [place] [id number]        Dear Colleague,    Thank you for referring your patient, Laxmi Ya, to the [facility]. Please see a copy of my visit note below.    Infusion Nursing Note:  Laxmi Ya presents today for   Chief Complaint   Patient presents with     Infusion     tocilizumab (ACTEMRA)       Patient seen by provider today: No   present during visit today: Not Applicable.    Note:   -Orders from Oswaldo Lemus MD completed. Frequency: every 4 weeks.  -Labs drawn via PIV without difficulty. Urine specimen provided by patient.  -Premeds: 650mg Tylenol po, 125mg Solu-medrol IV.  -Actemra infused over 60min.  -250ml NS infused over 15min after Actemra per pt request.    Intravenous Access:  Peripheral IV placed in Lt forearm by vascular access team.    Treatment Conditions:  Biological Infusion Checklist:  ~~~ NOTE: If the patient answers yes to any of the questions below, hold the infusion and contact ordering provider or on-call provider.    1. Have you recently had an elevated temperature, fever, chills, productive cough, coughing for 3 weeks or longer or hemoptysis, abnormal vital signs, night sweats,  chest pain or have you noticed a decrease in your appetite, unexplained weight loss or fatigue? No  2. Do you have any open wounds or new incisions? No  3. Do you have any recent or upcoming hospitalizations, surgeries or dental procedures? No  4. Do you currently have or recently have had any signs of illness or infection or are you on any antibiotics? No  5. Have you had any new, sudden or worsening abdominal pain? No  6. Have you or anyone in your household received a live vaccination in the past 4 weeks? Please note:  No live vaccines while on biologic/chemotherapy until 6 months after the last treatment.  Patient can receive the flu vaccine (shot only) and the pneumovax.  It is  optimal for the patient to get these vaccines mid cycle, but they can be given at any time as long as it is not on the day of the infusion. No  7. Have you recently been diagnosed with any new nervous system diseases (ie. Multiple sclerosis, Guillain Naylor, seizures, neurological changes) or cancer diagnosis? No  8. Are you on any form of radiation or chemotherapy? No  9. Are you pregnant or breast feeding or do you have plans of pregnancy in the future? No  10. Have you been having any signs of worsening depression or suicidal ideations?  (benlysta only) N/A  11. Have there been any other new onset medical symptoms? No    Post Infusion Assessment:  Patient tolerated infusion without incident.  Blood return noted pre and post infusion.  Site patent and intact, free from redness, edema or discomfort.  No evidence of extravasations.  Access discontinued per protocol.     Discharge Plan:   Discharge instructions reviewed with: Patient.  Patient and/or family verbalized understanding of discharge instructions and all questions answered.  AVS to patient via [employer].  Patient will return 2/9/23 for next appointment.   Patient discharged in stable condition accompanied by: self.  Departure Mode: Ambulatory.      Kaley Handley RN    /76 (BP Location: Left arm, Patient Position: Fowlers, Cuff Size: Adult Large)   Pulse 74   Temp 97.8  F (36.6  C)   Resp 16   SpO2 98%     Administrations This Visit     0.9% sodium chloride BOLUS     Admin Date  01/12/2023 Action  New Bag Dose  250 mL Route  Intravenous Administered By  Kaley Handley RN          acetaminophen (TYLENOL) tablet 650 mg     Admin Date  01/12/2023 Action  Given Dose  650 mg Route  Oral Administered By  Kaley Handley RN          methylPREDNISolone sodium succinate (solu-MEDROL) injection 125 mg     Admin Date  01/12/2023 Action  Given Dose  125 mg Route  Intravenous Administered By  Kaley Handley, LEXI          tocilizumab (ACTEMRA) 400 mg in sodium chloride  0.9 % 130 mL infusion     Admin Date  01/12/2023 Action  New Bag Dose  400 mg Rate  130 mL/hr Route  Intravenous Administered By  Kaley Handley RN                                Again, thank you for allowing me to participate in the care of your patient.        Sincerely,        Specialty Infusion Nurse

## 2023-01-13 ENCOUNTER — MYC MEDICAL ADVICE (OUTPATIENT)
Dept: RHEUMATOLOGY | Facility: CLINIC | Age: 48
End: 2023-01-13
Payer: MEDICARE

## 2023-01-13 DIAGNOSIS — M06.09 SERONEGATIVE RHEUMATOID ARTHRITIS OF MULTIPLE SITES (H): Primary | ICD-10-CM

## 2023-01-13 DIAGNOSIS — M13.0 SERONEGATIVE POLYARTHRITIS: ICD-10-CM

## 2023-01-17 NOTE — TELEPHONE ENCOUNTER
PRIOR AUTHORIZATION DENIED    Medication: Stelara    Denial Date: 1/11/2023    Denial Rational: Not covered diagnosis    Appeal Information:

## 2023-01-19 ENCOUNTER — TELEPHONE (OUTPATIENT)
Dept: RHEUMATOLOGY | Facility: CLINIC | Age: 48
End: 2023-01-19

## 2023-01-20 ENCOUNTER — MYC MEDICAL ADVICE (OUTPATIENT)
Dept: PHARMACY | Facility: CLINIC | Age: 48
End: 2023-01-20

## 2023-02-01 NOTE — TELEPHONE ENCOUNTER
MEDICATION APPEAL DENIED    Medication: Stelara    Denial Date: 1/21     Denial Rational: Not covered diagnosis    Second Level Appeal Information: applying to free drug

## 2023-02-01 NOTE — TELEPHONE ENCOUNTER
Free Drug Application Initiated  Medication: Stelara  Sponsor: [employer]  Phone #:[phone]  Fax #:  [phone]  Additional Information: sent pt their application to send and faxed md signed form with medication list and denials

## 2023-02-01 NOTE — TELEPHONE ENCOUNTER
Medication Appeal Initiation    We have initiated an appeal for the requested medication:  Medication: Stelara  Appeal Start Date:  1/16/2023  Insurance Company: Medicare Blue - Phone [phone] Fax [phone]  Comments:  Submitted via fax-follow up at [phone] or try [phone]

## 2023-02-02 DIAGNOSIS — M79.644 BILATERAL THUMB PAIN: Primary | ICD-10-CM

## 2023-02-02 DIAGNOSIS — M79.645 BILATERAL THUMB PAIN: Primary | ICD-10-CM

## 2023-02-06 ENCOUNTER — TRANSCRIBE ORDERS (OUTPATIENT)
Dept: OTHER | Age: 48
End: 2023-02-06

## 2023-02-06 ENCOUNTER — MYC MEDICAL ADVICE (OUTPATIENT)
Dept: RHEUMATOLOGY | Facility: CLINIC | Age: 48
End: 2023-02-06

## 2023-02-06 ENCOUNTER — LAB (OUTPATIENT)
Dept: LAB | Facility: CLINIC | Age: 48
End: 2023-02-06
Payer: MEDICARE

## 2023-02-06 DIAGNOSIS — M96.1 POSTLAMINECTOMY SYNDROME, NOT ELSEWHERE CLASSIFIED: ICD-10-CM

## 2023-02-06 DIAGNOSIS — G89.4 CHRONIC PAIN SYNDROME: Primary | ICD-10-CM

## 2023-02-06 DIAGNOSIS — M06.9 RHEUMATOID ARTHRITIS, UNSPECIFIED (H): ICD-10-CM

## 2023-02-06 DIAGNOSIS — M06.09 SERONEGATIVE RHEUMATOID ARTHRITIS OF MULTIPLE SITES (H): Primary | ICD-10-CM

## 2023-02-06 DIAGNOSIS — M13.0 SERONEGATIVE POLYARTHRITIS: ICD-10-CM

## 2023-02-06 DIAGNOSIS — K75.81 NONALCOHOLIC STEATOHEPATITIS (NASH): ICD-10-CM

## 2023-02-06 DIAGNOSIS — M47.814 SPONDYLOSIS OF THORACIC REGION WITHOUT MYELOPATHY OR RADICULOPATHY: ICD-10-CM

## 2023-02-06 DIAGNOSIS — M13.0 SERONEGATIVE POLYARTHRITIS: Primary | ICD-10-CM

## 2023-02-06 DIAGNOSIS — M06.09 SERONEGATIVE RHEUMATOID ARTHRITIS OF MULTIPLE SITES (H): ICD-10-CM

## 2023-02-06 DIAGNOSIS — F11.24 OPIOID DEPENDENCE WITH OPIOID-INDUCED MOOD DISORDER (H): ICD-10-CM

## 2023-02-06 DIAGNOSIS — E08.65 DIABETES MELLITUS DUE TO UNDERLYING CONDITION WITH HYPERGLYCEMIA (H): ICD-10-CM

## 2023-02-06 DIAGNOSIS — G56.03 CARPAL TUNNEL SYNDROME, BILATERAL UPPER LIMBS: ICD-10-CM

## 2023-02-06 DIAGNOSIS — K90.89 OTHER INTESTINAL MALABSORPTION: ICD-10-CM

## 2023-02-06 DIAGNOSIS — M65.4 RADIAL STYLOID TENOSYNOVITIS (DE QUERVAIN): ICD-10-CM

## 2023-02-06 DIAGNOSIS — E11.9 DIABETES MELLITUS WITHOUT COMPLICATION (H): Primary | ICD-10-CM

## 2023-02-06 LAB
ALBUMIN SERPL BCG-MCNC: 4.7 G/DL (ref 3.5–5.2)
ALBUMIN UR-MCNC: ABNORMAL MG/DL
ALP SERPL-CCNC: 35 U/L (ref 35–104)
ALT SERPL W P-5'-P-CCNC: 39 U/L (ref 10–35)
ANION GAP SERPL CALCULATED.3IONS-SCNC: 14 MMOL/L (ref 7–15)
APPEARANCE UR: ABNORMAL
AST SERPL W P-5'-P-CCNC: 26 U/L (ref 10–35)
BACTERIA #/AREA URNS HPF: ABNORMAL /HPF
BASOPHILS # BLD AUTO: 0 10E3/UL (ref 0–0.2)
BASOPHILS NFR BLD AUTO: 0 %
BILIRUB SERPL-MCNC: 0.7 MG/DL
BILIRUB UR QL STRIP: ABNORMAL
BUN SERPL-MCNC: 13 MG/DL (ref 6–20)
CALCIUM SERPL-MCNC: 9.6 MG/DL (ref 8.6–10)
CHLORIDE SERPL-SCNC: 100 MMOL/L (ref 98–107)
COLOR UR AUTO: YELLOW
CREAT SERPL-MCNC: 0.96 MG/DL (ref 0.51–0.95)
DEPRECATED HCO3 PLAS-SCNC: 26 MMOL/L (ref 22–29)
EOSINOPHIL # BLD AUTO: 0.1 10E3/UL (ref 0–0.7)
EOSINOPHIL NFR BLD AUTO: 1 %
ERYTHROCYTE [DISTWIDTH] IN BLOOD BY AUTOMATED COUNT: 12.8 % (ref 10–15)
GFR SERPL CREATININE-BSD FRML MDRD: 74 ML/MIN/1.73M2
GLUCOSE SERPL-MCNC: 103 MG/DL (ref 70–99)
GLUCOSE UR STRIP-MCNC: NEGATIVE MG/DL
HBA1C MFR BLD: 7 % (ref 0–5.6)
HCT VFR BLD AUTO: 44.2 % (ref 35–47)
HGB BLD-MCNC: 15.1 G/DL (ref 11.7–15.7)
HGB UR QL STRIP: NEGATIVE
IMM GRANULOCYTES # BLD: 0.1 10E3/UL
IMM GRANULOCYTES NFR BLD: 1 %
KETONES UR STRIP-MCNC: ABNORMAL MG/DL
LEUKOCYTE ESTERASE UR QL STRIP: NEGATIVE
LYMPHOCYTES # BLD AUTO: 3.7 10E3/UL (ref 0.8–5.3)
LYMPHOCYTES NFR BLD AUTO: 32 %
MCH RBC QN AUTO: 29.8 PG (ref 26.5–33)
MCHC RBC AUTO-ENTMCNC: 34.2 G/DL (ref 31.5–36.5)
MCV RBC AUTO: 87 FL (ref 78–100)
MONOCYTES # BLD AUTO: 1 10E3/UL (ref 0–1.3)
MONOCYTES NFR BLD AUTO: 8 %
MUCOUS THREADS #/AREA URNS LPF: PRESENT /LPF
NEUTROPHILS # BLD AUTO: 6.7 10E3/UL (ref 1.6–8.3)
NEUTROPHILS NFR BLD AUTO: 58 %
NITRATE UR QL: NEGATIVE
PH UR STRIP: 6.5 [PH] (ref 5–8)
PLATELET # BLD AUTO: 329 10E3/UL (ref 150–450)
POTASSIUM SERPL-SCNC: 4.2 MMOL/L (ref 3.4–5.3)
PROT SERPL-MCNC: 7 G/DL (ref 6.4–8.3)
RBC # BLD AUTO: 5.06 10E6/UL (ref 3.8–5.2)
RBC #/AREA URNS AUTO: ABNORMAL /HPF
SODIUM SERPL-SCNC: 140 MMOL/L (ref 136–145)
SP GR UR STRIP: 1.02 (ref 1–1.03)
SQUAMOUS #/AREA URNS AUTO: ABNORMAL /LPF
UROBILINOGEN UR STRIP-ACNC: 0.2 E.U./DL
WBC # BLD AUTO: 11.7 10E3/UL (ref 4–11)
WBC #/AREA URNS AUTO: ABNORMAL /HPF
YEAST #/AREA URNS HPF: ABNORMAL /HPF

## 2023-02-06 PROCEDURE — 82043 UR ALBUMIN QUANTITATIVE: CPT

## 2023-02-06 PROCEDURE — 81001 URINALYSIS AUTO W/SCOPE: CPT

## 2023-02-06 PROCEDURE — 82570 ASSAY OF URINE CREATININE: CPT

## 2023-02-06 PROCEDURE — 36415 COLL VENOUS BLD VENIPUNCTURE: CPT

## 2023-02-06 PROCEDURE — 83036 HEMOGLOBIN GLYCOSYLATED A1C: CPT

## 2023-02-06 PROCEDURE — 85025 COMPLETE CBC W/AUTO DIFF WBC: CPT

## 2023-02-06 PROCEDURE — 80053 COMPREHEN METABOLIC PANEL: CPT

## 2023-02-06 RX ORDER — METHYLPREDNISOLONE 4 MG
TABLET, DOSE PACK ORAL
Qty: 21 TABLET | Refills: 0 | Status: SHIPPED | OUTPATIENT
Start: 2023-02-06 | End: 2023-02-07 | Stop reason: DRUGHIGH

## 2023-02-06 NOTE — TELEPHONE ENCOUNTER
I do not recommend culture; bacteria alone are not a sign of infection in the urine.   I recommend repeat UA in 2 weeks.

## 2023-02-06 NOTE — TELEPHONE ENCOUNTER
[facility] Call Center    Phone Message    May a detailed message be left on voicemail: yes     Reason for Call: Other: Lab test and Medication refill request     Pt has a couple question, Pt states for her urine test, she was really dehydrated and had very little urine to be test.  Pt is wondering If Dr. Lemus would like her to redo the urine test?  Pt states she will be at the [facility] on Wednesday 2/8/23    Please contact the Pt back regarding this.    Also Pt is requesting a refill on the Medrol; Pt uses [facility] pharmacy in [facility].    Action Taken: Message routed to:  [facility] ([facility] Adult Rheumatology

## 2023-02-07 ENCOUNTER — TRANSFERRED RECORDS (OUTPATIENT)
Dept: HEALTH INFORMATION MANAGEMENT | Facility: CLINIC | Age: 48
End: 2023-02-07

## 2023-02-07 ENCOUNTER — TELEPHONE (OUTPATIENT)
Dept: RHEUMATOLOGY | Facility: CLINIC | Age: 48
End: 2023-02-07

## 2023-02-07 DIAGNOSIS — M13.0 SERONEGATIVE POLYARTHRITIS: ICD-10-CM

## 2023-02-07 LAB
CREAT UR-MCNC: 415 MG/DL
MICROALBUMIN UR-MCNC: <12 MG/L
MICROALBUMIN/CREAT UR: NORMAL MG/G{CREAT}

## 2023-02-07 RX ORDER — METHYLPREDNISOLONE 4 MG/1
TABLET ORAL
Qty: 120 TABLET | Refills: 1 | Status: SHIPPED | OUTPATIENT
Start: 2023-02-07 | End: 2023-02-08

## 2023-02-07 NOTE — TELEPHONE ENCOUNTER
Returned call to patient to discuss her request for a urine culture.   Explained that her results did not meet the criteria for a culture to be performed, if it had, the micro would have been done.  Stressed to patient that is specimen did not demonstrate a UTI, she needs to re-check it in a couple weeks.    Patient explained that the wrong Medrol had been sent in yesterday, she did not pick it up, patient is asking for a new refill to be sent. Will route to Dr. Lemus for review.    Kaley Merchant, JESSICAN, RN  RN Care Coordinator Rheumatology

## 2023-02-07 NOTE — TELEPHONE ENCOUNTER
Laxmi is calling back and states she would like if Kaley Merchant can give her a call to discuss her questions.  Would prefer not to keep sending [employer] messages back and forth.    Please call the Pt back. Ok to leave a detailed message if it goes to a voicemail.

## 2023-02-08 ENCOUNTER — VIRTUAL VISIT (OUTPATIENT)
Dept: PHARMACY | Facility: CLINIC | Age: 48
End: 2023-02-08
Payer: COMMERCIAL

## 2023-02-08 ENCOUNTER — TELEPHONE (OUTPATIENT)
Dept: RHEUMATOLOGY | Facility: CLINIC | Age: 48
End: 2023-02-08
Payer: MEDICARE

## 2023-02-08 DIAGNOSIS — M13.0 SERONEGATIVE POLYARTHRITIS: ICD-10-CM

## 2023-02-08 DIAGNOSIS — E78.5 HYPERLIPIDEMIA LDL GOAL <130: ICD-10-CM

## 2023-02-08 DIAGNOSIS — M06.00 SERONEGATIVE RHEUMATOID ARTHRITIS (H): Primary | ICD-10-CM

## 2023-02-08 PROCEDURE — 99207 PR NO CHARGE LOS: CPT

## 2023-02-08 RX ORDER — DIPHENHYDRAMINE HYDROCHLORIDE 50 MG/ML
50 INJECTION INTRAMUSCULAR; INTRAVENOUS
Status: CANCELLED
Start: 2023-02-08

## 2023-02-08 RX ORDER — METHYLPREDNISOLONE SODIUM SUCCINATE 125 MG/2ML
125 INJECTION, POWDER, LYOPHILIZED, FOR SOLUTION INTRAMUSCULAR; INTRAVENOUS
Status: CANCELLED
Start: 2023-02-08

## 2023-02-08 RX ORDER — ALBUTEROL SULFATE 0.83 MG/ML
2.5 SOLUTION RESPIRATORY (INHALATION)
Status: CANCELLED | OUTPATIENT
Start: 2023-02-08

## 2023-02-08 RX ORDER — METHYLPREDNISOLONE 4 MG/1
TABLET ORAL
Qty: 120 TABLET | Refills: 1 | Status: SHIPPED | OUTPATIENT
Start: 2023-02-08 | End: 2023-05-25

## 2023-02-08 RX ORDER — ACETAMINOPHEN 325 MG/1
650 TABLET ORAL ONCE
Status: CANCELLED | OUTPATIENT
Start: 2023-02-08

## 2023-02-08 RX ORDER — METHYLPREDNISOLONE SODIUM SUCCINATE 125 MG/2ML
125 INJECTION, POWDER, LYOPHILIZED, FOR SOLUTION INTRAMUSCULAR; INTRAVENOUS ONCE
Status: CANCELLED | OUTPATIENT
Start: 2023-02-08

## 2023-02-08 RX ORDER — HEPARIN SODIUM,PORCINE 10 UNIT/ML
5 VIAL (ML) INTRAVENOUS
Status: CANCELLED | OUTPATIENT
Start: 2023-02-08

## 2023-02-08 RX ORDER — MEPERIDINE HYDROCHLORIDE 25 MG/ML
25 INJECTION INTRAMUSCULAR; INTRAVENOUS; SUBCUTANEOUS EVERY 30 MIN PRN
Status: CANCELLED | OUTPATIENT
Start: 2023-02-08

## 2023-02-08 RX ORDER — ALBUTEROL SULFATE 90 UG/1
1-2 AEROSOL, METERED RESPIRATORY (INHALATION)
Status: CANCELLED
Start: 2023-02-08

## 2023-02-08 RX ORDER — HEPARIN SODIUM (PORCINE) LOCK FLUSH IV SOLN 100 UNIT/ML 100 UNIT/ML
5 SOLUTION INTRAVENOUS
Status: CANCELLED | OUTPATIENT
Start: 2023-02-08

## 2023-02-08 RX ORDER — EPINEPHRINE 1 MG/ML
0.3 INJECTION, SOLUTION, CONCENTRATE INTRAVENOUS EVERY 5 MIN PRN
Status: CANCELLED | OUTPATIENT
Start: 2023-02-08

## 2023-02-08 NOTE — TELEPHONE ENCOUNTER
It would help me to review note from previous provider who recommended culturing urine.  In general the finding of bacteria, in the absence of other urinary indicators, is not an indication to do culture. Presence of antibiotics like xifaxin and doxycycline is likely to make results of urine culture even more difficult to interpret.

## 2023-02-08 NOTE — TELEPHONE ENCOUNTER
Thanks for clarifying the current medrol and stelara situation. Sorry about the incorrect initial Rx.

## 2023-02-08 NOTE — TELEPHONE ENCOUNTER
New Actemra Therapy plan entered with increased dose.  Will route to Dr. Lemus for review.    Kaley Merchant, BSN, RN  RN Care Coordinator Rheumatology

## 2023-02-10 RX ORDER — METHYLPREDNISOLONE 4 MG/1
TABLET ORAL
Qty: 120 TABLET | Refills: 1 | OUTPATIENT
Start: 2023-02-10

## 2023-02-10 NOTE — TELEPHONE ENCOUNTER
Pharmacist Yuliya sent correct refill, has been picked up by patient. This request will be declined.    JESSICA MoraN, RN  RN Care Coordinator Rheumatology

## 2023-02-13 ENCOUNTER — INFUSION THERAPY VISIT (OUTPATIENT)
Dept: INFUSION THERAPY | Facility: CLINIC | Age: 48
End: 2023-02-13
Attending: INTERNAL MEDICINE
Payer: MEDICARE

## 2023-02-13 VITALS
OXYGEN SATURATION: 96 % | DIASTOLIC BLOOD PRESSURE: 70 MMHG | SYSTOLIC BLOOD PRESSURE: 105 MMHG | RESPIRATION RATE: 16 BRPM | TEMPERATURE: 98.3 F | HEART RATE: 72 BPM | BODY MASS INDEX: 34.64 KG/M2 | WEIGHT: 214.5 LBS

## 2023-02-13 DIAGNOSIS — M13.80 SERONEGATIVE INFLAMMATORY ARTHRITIS: Primary | ICD-10-CM

## 2023-02-13 PROCEDURE — 999N000285 HC STATISTIC VASC ACCESS LAB DRAW WITH PIV START

## 2023-02-13 PROCEDURE — 96375 TX/PRO/DX INJ NEW DRUG ADDON: CPT

## 2023-02-13 PROCEDURE — 250N000013 HC RX MED GY IP 250 OP 250 PS 637: Performed by: INTERNAL MEDICINE

## 2023-02-13 PROCEDURE — 96365 THER/PROPH/DIAG IV INF INIT: CPT

## 2023-02-13 PROCEDURE — 250N000011 HC RX IP 250 OP 636: Performed by: INTERNAL MEDICINE

## 2023-02-13 PROCEDURE — 999N000248 HC STATISTIC IV INSERT WITH US BY RN

## 2023-02-13 PROCEDURE — 258N000003 HC RX IP 258 OP 636: Performed by: INTERNAL MEDICINE

## 2023-02-13 RX ORDER — HEPARIN SODIUM,PORCINE 10 UNIT/ML
5 VIAL (ML) INTRAVENOUS
Status: CANCELLED | OUTPATIENT
Start: 2023-03-13

## 2023-02-13 RX ORDER — METHYLPREDNISOLONE SODIUM SUCCINATE 125 MG/2ML
125 INJECTION, POWDER, LYOPHILIZED, FOR SOLUTION INTRAMUSCULAR; INTRAVENOUS ONCE
Status: CANCELLED | OUTPATIENT
Start: 2023-03-13

## 2023-02-13 RX ORDER — DIPHENHYDRAMINE HYDROCHLORIDE 50 MG/ML
50 INJECTION INTRAMUSCULAR; INTRAVENOUS
Status: CANCELLED
Start: 2023-03-13

## 2023-02-13 RX ORDER — EPINEPHRINE 1 MG/ML
0.3 INJECTION, SOLUTION INTRAMUSCULAR; SUBCUTANEOUS EVERY 5 MIN PRN
Status: CANCELLED | OUTPATIENT
Start: 2023-03-13

## 2023-02-13 RX ORDER — HEPARIN SODIUM (PORCINE) LOCK FLUSH IV SOLN 100 UNIT/ML 100 UNIT/ML
5 SOLUTION INTRAVENOUS
Status: CANCELLED | OUTPATIENT
Start: 2023-03-13

## 2023-02-13 RX ORDER — ALBUTEROL SULFATE 90 UG/1
1-2 AEROSOL, METERED RESPIRATORY (INHALATION)
Status: CANCELLED
Start: 2023-03-13

## 2023-02-13 RX ORDER — MEPERIDINE HYDROCHLORIDE 25 MG/ML
25 INJECTION INTRAMUSCULAR; INTRAVENOUS; SUBCUTANEOUS EVERY 30 MIN PRN
Status: CANCELLED | OUTPATIENT
Start: 2023-03-13

## 2023-02-13 RX ORDER — ACETAMINOPHEN 325 MG/1
650 TABLET ORAL ONCE
Status: COMPLETED | OUTPATIENT
Start: 2023-02-13 | End: 2023-02-13

## 2023-02-13 RX ORDER — ACETAMINOPHEN 325 MG/1
650 TABLET ORAL ONCE
Status: CANCELLED | OUTPATIENT
Start: 2023-03-13

## 2023-02-13 RX ORDER — METHYLPREDNISOLONE SODIUM SUCCINATE 125 MG/2ML
125 INJECTION, POWDER, LYOPHILIZED, FOR SOLUTION INTRAMUSCULAR; INTRAVENOUS ONCE
Status: COMPLETED | OUTPATIENT
Start: 2023-02-13 | End: 2023-02-13

## 2023-02-13 RX ORDER — METHYLPREDNISOLONE SODIUM SUCCINATE 125 MG/2ML
125 INJECTION, POWDER, LYOPHILIZED, FOR SOLUTION INTRAMUSCULAR; INTRAVENOUS
Status: CANCELLED
Start: 2023-03-13

## 2023-02-13 RX ORDER — ALBUTEROL SULFATE 0.83 MG/ML
2.5 SOLUTION RESPIRATORY (INHALATION)
Status: CANCELLED | OUTPATIENT
Start: 2023-03-13

## 2023-02-13 RX ADMIN — ACETAMINOPHEN 650 MG: 325 TABLET ORAL at 12:02

## 2023-02-13 RX ADMIN — SODIUM CHLORIDE 250 ML: 9 INJECTION, SOLUTION INTRAVENOUS at 13:16

## 2023-02-13 RX ADMIN — TOCILIZUMAB 800 MG: 20 INJECTION, SOLUTION, CONCENTRATE INTRAVENOUS at 12:22

## 2023-02-13 RX ADMIN — METHYLPREDNISOLONE SODIUM SUCCINATE 125 MG: 125 INJECTION, POWDER, FOR SOLUTION INTRAMUSCULAR; INTRAVENOUS at 12:03

## 2023-02-13 NOTE — PROGRESS NOTES
Chief Complaint   Patient presents with     Infusion     IV Actemra     Infusion Nursing Note:  Laxmi Ya presents today for IV Actemra.  First dose increased from 400 mg to 800 mg.  Patient seen by provider today: No   present during visit today: Not Applicable.    Note:  Premeds administered per orders.    Actemra infused over 1 hour.  250 NS infused post Actemra per pt request post infusion.    Intravenous Access:  Peripheral IV placed by vascular access.  Per pt, labs not yet due. Recently drawn 2/6.    Treatment Conditions:  Biological Infusion Checklist:  ~~~ NOTE: If the patient answers yes to any of the questions below, hold the infusion and contact ordering provider or on-call provider.    1. Have you recently had an elevated temperature, fever, chills, productive cough, coughing for 3 weeks or longer or hemoptysis, abnormal vital signs, night sweats,  chest pain or have you noticed a decrease in your appetite, unexplained weight loss or fatigue? No  2. Do you have any open wounds or new incisions? No  3. Do you have any recent or upcoming hospitalizations, surgeries or dental procedures? No  4. Do you currently have or recently have had any signs of illness or infection or are you on any antibiotics? Yes, bladder infection, today is last day of antibiotics (levofloxacin). Patient reports she is feeling well and sympstoms are improved.   5. Have you had any new, sudden or worsening abdominal pain? No  6. Have you or anyone in your household received a live vaccination in the past 4 weeks? Please note:  No live vaccines while on biologic/chemotherapy until 6 months after the last treatment.  Patient can receive the flu vaccine (shot only) and the pneumovax.  It is optimal for the patient to get these vaccines mid cycle, but they can be given at any time as long as it is not on the day of the infusion. No  7. Have you recently been diagnosed with any new nervous system diseases (ie. Multiple  sclerosis, Guillain Tyner, seizures, neurological changes) or cancer diagnosis? No  8. Are you on any form of radiation or chemotherapy? No  9. Are you pregnant or breast feeding or do you have plans of pregnancy in the future? No  10. Have you been having any signs of worsening depression or suicidal ideations?  (benlysta only) No  11. Have there been any other new onset medical symptoms? No    Post Infusion Assessment:  Patient tolerated infusion without incident.  Blood return noted pre and post infusion.  Site patent and intact, free from redness, edema or discomfort.  No evidence of extravasations.  Access discontinued per protocol.     POST-INFUSION OF BIOLOGICAL MEDICATION:  Reviewed with patient.  Given biologic medication or medication hand-out. Inform patient if any fever, chills or signs of infection, new symptoms, abdominal pain, heart palpitations, shortness of breath, reaction, weakness, neurological changes, seek medical attention immediately and should not receive infusions. No live virus vaccines prior to or during treatment or up to 6 months post infusion. If the patient has an upcoming procedure or surgery, this should be discussed with the rheumatologist and surgeon or provider.    Discharge Plan:   List of upcoming appts printed for patient.   AVS to patient via [employer].  Patient will return 3/14 for next appointment.   Patient discharged in stable condition accompanied by: self.  Departure Mode: Ambulatory.    Administrations This Visit     acetaminophen (TYLENOL) tablet 650 mg     Admin Date  02/13/2023 Action  Given Dose  650 mg Route  Oral Administered By  Arsalan Phillip, LEXI          methylPREDNISolone sodium succinate (solu-MEDROL) injection 125 mg     Admin Date  02/13/2023 Action  Given Dose  125 mg Route  Intravenous Administered By  Arsalan Phillip, LEXI          tocilizumab (ACTEMRA) 800 mg in sodium chloride 0.9 % 150 mL infusion     Admin Date  02/13/2023 Action  New Bag Dose  800 mg  "Rate  150 mL/hr Route  Intravenous Administered By  Arsalan Phillip RN              Vital signs:  Temp: 98.3  F (36.8  C) Temp src: Oral BP: 114/71 Pulse: 80   Resp: 18 SpO2: 96 % O2 Device: None (Room air)     Weight: 97.3 kg (214 lb 8 oz)  Estimated body mass index is 34.64 kg/m  as calculated from the following:    Height as of 12/13/22: 1.676 m (5' 5.98\").    Weight as of this encounter: 97.3 kg (214 lb 8 oz).                          "

## 2023-02-13 NOTE — PATIENT INSTRUCTIONS
Dear Laxmi Ya    Thank you for choosing [facility] ([facility]) for your infusion.  The following information is a summary of our appointment as well as important reminders.      EDUCATION POST BIOLOGICAL/CHEMOTHERAPY INFUSION  Call the triage nurse at your clinic or seek medical attention if you have chills and/or temperature greater than or equal to 100.5, uncontrolled nausea/vomiting, diarrhea, constipation, dizziness, shortness of breath, chest pain, heart palpitations, weakness or any other new or concerning symptoms, questions or concerns.  You can not have any live virus vaccines prior to or during treatment or up to 6 months post infusion.  If you have an upcoming surgery, medical procedure or dental procedure during treatment, this should be discussed with your ordering physician and your surgeon/dentist.  If you are having any concerning symptom, if you are unsure if you should get your next infusion or wish to speak to a provider before your next infusion, please call your care coordinator or triage nurse at your clinic to notify them so we can adequately serve you. EDUCATION POST BIOLOGICAL/CHEMOTHERAPY INFUSION  Call the triage nurse at your clinic or seek medical attention if you have chills and/or temperature greater than or equal to 100.5, uncontrolled nausea/vomiting, diarrhea, constipation, dizziness, shortness of breath, chest pain, heart palpitations, weakness or any other new or concerning symptoms, questions or concerns.  You can not have any live virus vaccines prior to or during treatment or up to 6 months post infusion.  If you have an upcoming surgery, medical procedure or dental procedure during treatment, this should be discussed with your ordering physician and your surgeon/dentist.  If you are having any concerning symptom, if you are unsure if you should get your next infusion or wish to speak to a provider before your  next infusion, please call your care coordinator or triage nurse at your clinic to notify them so we can adequately serve you.     We look forward in seeing you on your next appointment here at [facility] ([facility]).  Please don t hesitate to call us at [phone] to reschedule any of your appointments or to speak with one of the [facility] registered nurses.  It was a pleasure taking care of you today.    Sincerely,    [facility]  [facility]  [address] [place] [address]  [id number]  Phone:  [phone]

## 2023-02-13 NOTE — LETTER
2/13/2023         RE: Laxmi Ya  [address] [place] [id number]        Dear Colleague,    Thank you for referring your patient, Laxmi Ya, to the [facility]. Please see a copy of my visit note below.    Chief Complaint   Patient presents with     Infusion     IV Actemra     Infusion Nursing Note:  Laxmi Ya presents today for IV Actemra.  First dose increased from 400 mg to 800 mg.  Patient seen by provider today: No   present during visit today: Not Applicable.    Note:  Premeds administered per orders.    Actemra infused over 1 hour.  250 NS infused post Actemra per pt request post infusion.    Intravenous Access:  Peripheral IV placed by vascular access.  Per pt, labs not yet due. Recently drawn 2/6.    Treatment Conditions:  Biological Infusion Checklist:  ~~~ NOTE: If the patient answers yes to any of the questions below, hold the infusion and contact ordering provider or on-call provider.    1. Have you recently had an elevated temperature, fever, chills, productive cough, coughing for 3 weeks or longer or hemoptysis, abnormal vital signs, night sweats,  chest pain or have you noticed a decrease in your appetite, unexplained weight loss or fatigue? No  2. Do you have any open wounds or new incisions? No  3. Do you have any recent or upcoming hospitalizations, surgeries or dental procedures? No  4. Do you currently have or recently have had any signs of illness or infection or are you on any antibiotics? Yes, bladder infection, today is last day of antibiotics (levofloxacin). Patient reports she is feeling well and sympstoms are improved.   5. Have you had any new, sudden or worsening abdominal pain? No  6. Have you or anyone in your household received a live vaccination in the past 4 weeks? Please note:  No live vaccines while on biologic/chemotherapy until 6 months after the last treatment.  Patient can receive the flu vaccine  (shot only) and the pneumovax.  It is optimal for the patient to get these vaccines mid cycle, but they can be given at any time as long as it is not on the day of the infusion. No  7. Have you recently been diagnosed with any new nervous system diseases (ie. Multiple sclerosis, Guillain Hymera, seizures, neurological changes) or cancer diagnosis? No  8. Are you on any form of radiation or chemotherapy? No  9. Are you pregnant or breast feeding or do you have plans of pregnancy in the future? No  10. Have you been having any signs of worsening depression or suicidal ideations?  (benlysta only) No  11. Have there been any other new onset medical symptoms? No    Post Infusion Assessment:  Patient tolerated infusion without incident.  Blood return noted pre and post infusion.  Site patent and intact, free from redness, edema or discomfort.  No evidence of extravasations.  Access discontinued per protocol.     POST-INFUSION OF BIOLOGICAL MEDICATION:  Reviewed with patient.  Given biologic medication or medication hand-out. Inform patient if any fever, chills or signs of infection, new symptoms, abdominal pain, heart palpitations, shortness of breath, reaction, weakness, neurological changes, seek medical attention immediately and should not receive infusions. No live virus vaccines prior to or during treatment or up to 6 months post infusion. If the patient has an upcoming procedure or surgery, this should be discussed with the rheumatologist and surgeon or provider.    Discharge Plan:   List of upcoming appts printed for patient.   AVS to patient via [employer].  Patient will return 3/14 for next appointment.   Patient discharged in stable condition accompanied by: self.  Departure Mode: Ambulatory.    Administrations This Visit     acetaminophen (TYLENOL) tablet 650 mg     Admin Date  02/13/2023 Action  Given Dose  650 mg Route  Oral Administered By  Arsalan Phillip, RN          methylPREDNISolone sodium succinate  "(solu-MEDROL) injection 125 mg     Admin Date  02/13/2023 Action  Given Dose  125 mg Route  Intravenous Administered By  Arsalan Phillip, RN          tocilizumab (ACTEMRA) 800 mg in sodium chloride 0.9 % 150 mL infusion     Admin Date  02/13/2023 Action  New Bag Dose  800 mg Rate  150 mL/hr Route  Intravenous Administered By  Arsalan Phillip, RN              Vital signs:  Temp: 98.3  F (36.8  C) Temp src: Oral BP: 114/71 Pulse: 80   Resp: 18 SpO2: 96 % O2 Device: None (Room air)     Weight: 97.3 kg (214 lb 8 oz)  Estimated body mass index is 34.64 kg/m  as calculated from the following:    Height as of 12/13/22: 1.676 m (5' 5.98\").    Weight as of this encounter: 97.3 kg (214 lb 8 oz).                              Again, thank you for allowing me to participate in the care of your patient.        Sincerely,        Specialty Infusion Nurse    "

## 2023-02-15 ENCOUNTER — PRE VISIT (OUTPATIENT)
Dept: OTOLARYNGOLOGY | Facility: CLINIC | Age: 48
End: 2023-02-15

## 2023-02-16 ENCOUNTER — OFFICE VISIT (OUTPATIENT)
Dept: ORTHOPEDICS | Facility: CLINIC | Age: 48
End: 2023-02-16
Payer: MEDICARE

## 2023-02-16 ENCOUNTER — ANCILLARY PROCEDURE (OUTPATIENT)
Dept: GENERAL RADIOLOGY | Facility: CLINIC | Age: 48
End: 2023-02-16
Attending: ORTHOPAEDIC SURGERY
Payer: MEDICARE

## 2023-02-16 DIAGNOSIS — M79.645 BILATERAL THUMB PAIN: ICD-10-CM

## 2023-02-16 DIAGNOSIS — M79.644 BILATERAL THUMB PAIN: ICD-10-CM

## 2023-02-16 DIAGNOSIS — M13.80 SERONEGATIVE INFLAMMATORY ARTHRITIS: Primary | ICD-10-CM

## 2023-02-16 PROCEDURE — 77002 NEEDLE LOCALIZATION BY XRAY: CPT | Mod: LT | Performed by: ORTHOPAEDIC SURGERY

## 2023-02-16 PROCEDURE — 20605 DRAIN/INJ JOINT/BURSA W/O US: CPT | Mod: 50 | Performed by: ORTHOPAEDIC SURGERY

## 2023-02-16 RX ORDER — LIDOCAINE HYDROCHLORIDE 10 MG/ML
2 INJECTION, SOLUTION EPIDURAL; INFILTRATION; INTRACAUDAL; PERINEURAL
Status: DISCONTINUED | OUTPATIENT
Start: 2023-02-16 | End: 2023-06-03

## 2023-02-16 RX ORDER — TRIAMCINOLONE ACETONIDE 40 MG/ML
40 INJECTION, SUSPENSION INTRA-ARTICULAR; INTRAMUSCULAR
Status: DISCONTINUED | OUTPATIENT
Start: 2023-02-16 | End: 2023-06-03

## 2023-02-16 RX ADMIN — LIDOCAINE HYDROCHLORIDE 2 ML: 10 INJECTION, SOLUTION EPIDURAL; INFILTRATION; INTRACAUDAL; PERINEURAL at 11:45

## 2023-02-16 RX ADMIN — TRIAMCINOLONE ACETONIDE 40 MG: 40 INJECTION, SUSPENSION INTRA-ARTICULAR; INTRAMUSCULAR at 11:45

## 2023-02-16 NOTE — LETTER
2023         RE: Laxmi Ya  [address] [place] [id number]        Dear Colleague,    Thank you for referring your patient, Laxmi aY, to the [facility]. Please see a copy of my visit note below.    Hand / Upper Extremity Injection/Arthrocentesis: bilateral thumb CMC    Date/Time: 2023 11:45 AM  Performed by: Twila Petit MD  Authorized by: Twila Petit MD     Indications:  Pain  Needle Size:  25 G  Guidance: fluoroscopy    Condition: osteoarthritis    Laterality:  Bilateral  Location:  Thumb   Location comment:  Bilateral STT not CMC  Site:  Bilateral thumb CMC    Medications (Right):  40 mg triamcinolone 40 MG/ML; 2 mL lidocaine (PF) 1 %  Medications (Left):  40 mg triamcinolone 40 MG/ML; 2 mL lidocaine (PF) 1 %  Outcome:  Tolerated well, no immediate complications  Procedure discussed: discussed risks, benefits, and alternatives    Consent Given by:  Patient  Timeout: timeout called immediately prior to procedure    Prep: patient was prepped and draped in usual sterile fashion          [facility] [address] [id number] [phone]  Dept: [phone]  ______________________________________________________________________________    Patient: Laxmi Ya   : 1975   MRN: [id number]   2023    INVASIVE PROCEDURE SAFETY CHECKLIST    Date: 2023   Procedure:Bilateral STT injections  Patient Name: Laxmi Ya  MRN: [id number]  YOB: 1975    Action: Complete sections as appropriate. Any discrepancy results in a HARD COPY until resolved.     PRE PROCEDURE:  Patient ID verified with 2 identifiers (name and  or MRN): Yes  Procedure and site verified with patient/designee (when able): Yes  Accurate consent documentation in medical record: Yes  H&P (or appropriate assessment) documented in medical record: Yes  H&P  must be up to 20 days prior to procedure and updates within 24 hours of procedure as applicable: Yes  Relevant diagnostic and radiology test results appropriately labeled and displayed as applicable: Yes  Procedure site(s) marked with provider initials: NA    TIMEOUT:  Time-Out performed immediately prior to starting procedure, including verbal and active participation of all team members addressing the following:Yes  * Correct patient identify  * Confirmed that the correct side and site are marked  * An accurate procedure consent form  * Agreement on the procedure to be done  * Correct patient position  * Relevant images and results are properly labeled and appropriately displayed  * The need to administer antibiotics or fluids for irrigation purposes during the procedure as applicable   * Safety precautions based on patient history or medication use    DURING PROCEDURE: Verification of correct person, site, and procedures any time the responsibility for care of the patient is transferred to another member of the care team.       The following medications were given:         Prior to injection, verified patient identity using patient's name and date of birth.  Due to injection administration, patient instructed to remain in clinic for 15 minutes  afterwards, and to report any adverse reaction to me immediately.    Joint injection was performed.    Medication Name: Kenalog 40 NDC [address]-2  Drug Amount Wasted:  None.  Vial/Syringe: Single dose vial  Expiration Date:  9/1/2024    Medication Name Lidocaine 1% NDC 92056-548-32    Scribed by Chelsy Savage for Dr. Petit on February 16, 2023 at 11:50a based on the provider's statements to me.     Chelsy Savage [facility]        Progress Notes  Twila Petit MD (Physician)     Orthopedics  Service Date: 2/16/2023     HISTORY OF PRESENT ILLNESS:  Laxmi is a 47-year-old right-hand dominant female who originally had been followed at [facility] with carpal  tunnel injections and STT injection.  She has transitioned her care to the [facility].  She more recently had carpal tunnel injections by Dr. Sullivan who has switched his injection medication to Kenologue, which is more effective.  She is here for followup of bilateral wrist scaphotrapeziotrapezoid injections.     She reports that she had an excellent response.  With the injection, her pain in her hands is 6/10.  Without the injection, her pain is 10/10.  She most recently had Medrol due to other joint pain, particularly her back.  and she would rank her pain is 9 on a scale from 0-10.     She requests a similar injection today.     PROCEDURE:  After written consent, verifying site and side, a sterile ChloraPrep was performed for the injection site.  C-arm guidance was used for difficult placement of a 25-gauge needle into the left thumb scaphotrapeziotrapezoid joint with 1% lidocaine. 1 mL of Kenalog (40 mg) and 1 mL of lidocaine was injected under fluoroscopic guidance with good relief of pain. Identical procedure was carried out on the right thumb STT joint.  The patient tolerated the procedure well.  No complications.     Follow instructions were given.  Bilateral procedures were performed.     She will be seeing Dr. Sullivan back for carpal tunnel injections.  I will see her back in 4-6 months for repeat STT joint injections provided she continues to get a good response.    Twila Petit MD   Hand and Upper Extremity Specialist  [facility]

## 2023-02-16 NOTE — PROGRESS NOTES
Progress Notes  Twila Petit MD (Physician)     Orthopedics  Service Date: 2/16/2023     HISTORY OF PRESENT ILLNESS:  Laxmi is a 47-year-old right-hand dominant female who originally had been followed at [facility] with carpal tunnel injections and STT injection.  She has transitioned her care to the [place] [facility].  She more recently had carpal tunnel injections by Dr. Sullivan who has switched his injection medication to Kenologue, which is more effective.  She is here for followup of bilateral wrist scaphotrapeziotrapezoid injections.     She reports that she had an excellent response.  With the injection, her pain in her hands is 6/10.  Without the injection, her pain is 10/10.  She most recently had Medrol due to other joint pain, particularly her back.  and she would rank her pain is 9 on a scale from 0-10.     She requests a similar injection today.     PROCEDURE:  After written consent, verifying site and side, a sterile ChloraPrep was performed for the injection site.  C-arm guidance was used for difficult placement of a 25-gauge needle into the left thumb scaphotrapeziotrapezoid joint with 1% lidocaine. 1 mL of Kenalog (40 mg) and 1 mL of lidocaine was injected under fluoroscopic guidance with good relief of pain. Identical procedure was carried out on the right thumb STT joint.  The patient tolerated the procedure well.  No complications.     Follow instructions were given.  Bilateral procedures were performed.     She will be seeing Dr. Sullivan back for carpal tunnel injections.  I will see her back in 4-6 months for repeat STT joint injections provided she continues to get a good response.    Twila Petit MD   Hand and Upper Extremity Specialist  [facility]

## 2023-02-16 NOTE — NURSING NOTE
Reason For Visit:   Chief Complaint   Patient presents with     RECHECK     4 mo follow-up repeat bilateral STT injections       Primary MD: Amol Juares  Ref. MD: NAY    Age: 47 year old    ?  No      There were no vitals taken for this visit.      Pain Assessment  Patient Currently in Pain: Yes  0-10 Pain Scale: 10  Primary Pain Location: Wrist (Bilateral wrist)    Hand Dominance Evaluation  Hand Dominance: Right      force  R hand pincher force: 2.722 kg (6 lb) (with pain)  R hand  level 2 force: 11.3 kg (25 lb) (with pain)  L hand pincher force: 2.268 kg (5 lb) (with pain)  L hand  level  2 force: 6.804 kg (15 lb) (with pain)    QuickDASH Assessment  QuickDASH Main 2/16/2023   1. Open a tight or new jar. Unable   2. Do heavy household chores (e.g., wash walls, floors) Unable   3. Carry a shopping bag or briefcase. Unable   4. Wash your back. Severe difficulty   5. Use a knife to cut food. Unable   6. Recreational activities in which you take some force or impact through your arm, shoulder or hand (e.g., golf, hammering, tennis, etc.). Unable   7. During the past week, to what extent has your arm, shoulder or hand problem interfered with your normal social activities with family, friends, neighbours or groups? Extremely   8. During the past week, were you limited in your work or other regular daily activities as a result of your arm, shoulder or hand problem? Unable   9. Arm, shoulder or hand pain. Extreme   10.Tingling (pins and needles) in your arm,shoulder or hand. Extreme   11. During the past week, how much difficulty have you had sleeping because of the pain in your arm, shoulder or hand? So much difficulty that I can't sleep   Quickdash Ability Score 97.73          Current Outpatient Medications   Medication Sig Dispense Refill     Albuterol Sulfate (PROAIR HFA IN) Inhale into the lungs as needed       Artificial Tear Solution (SOOTHE XP) SOLN as needed       atenolol (TENORMIN)  50 MG tablet Take 50 mg by mouth daily       benzoyl peroxide 5 % external liquid Use daily as directed. Preferred bdrand: Medpura 236 mL 11     clindamycin (CLEOCIN T) 1 % external solution Apply twice daily as needed to active areas. MUST USE WITH BENZOYL PEROXIDE WASH  TO AVOID BACTERIAL RESISTANCE. 60 mL 2     diclofenac (VOLTAREN) 1 % topical gel Apply topically 4 times daily as needed       doxycycline hyclate (VIBRA-TABS) 100 MG tablet Take 150 mg by mouth 2 times daily       doxycycline hyclate (VIBRAMYCIN) 50 MG capsule Take 150 mg by mouth 2 times daily       fexofenadine (ALLEGRA) 180 MG tablet Take 1 tablet (180 mg) by mouth daily 90 tablet 2     fidaxomicin (DIFICID) 200 MG tablet Take 200 mg by mouth       fluticasone (FLONASE) 50 MCG/ACT nasal spray Spray 2 sprays into both nostrils 2 times daily        lidocaine (LIDODERM) 5 % patch as needed   1     losartan (COZAAR) 100 MG tablet Take 100 mg by mouth daily       metFORMIN (GLUCOPHAGE-XR) 500 MG 24 hr tablet Take 500 mg by mouth daily        methylPREDNISolone (MEDROL) 4 MG tablet Take 4 tabs by mouth daily 120 tablet 1     Montelukast Sodium (SINGULAIR PO) Take 10 mg by mouth At Bedtime        mupirocin (BACTROBAN) 2 % external cream Apply topically 2 times daily 30 g 2     mupirocin (BACTROBAN) 2 % external ointment Use 2 times a day to affected area. 30 g 2     norethindrone (MICRONOR) 0.35 MG tablet Take 0.35 mg by mouth       nystatin (MYCOSTATIN) 313441 UNIT/ML suspension TAKE 5ML BY MOUTH 4XDAILY FOR 14 DAYS. CONTINUE AT LEAST 2 DAYS AFTER SYMPTOMS RESOLVED       olopatadine (PATADAY) 0.7 % ophthalmic solution Apply 1 drop to eye daily PRN       OMEPRAZOLE PO Take 40 mg by mouth 2 times daily        rifaximin (XIFAXAN) 550 MG TABS tablet Take 200 mg by mouth 3 times daily Take for 10 days, has on had as needed for small bowel bacteria overgrowh       spironolactone-HCTZ (ALDACTAZIDE) 25-25 MG tablet Take 1 tablet by mouth daily        tacrolimus (PROTOPIC) 0.1 % external ointment Apply to itchy areas on eyelids twice a day as needed for itching and rash 30 g 3     tobramycin (TOBREX) 0.3 % ophthalmic solution 1-2 drops 2 times daily       tobramycin-dexamethasone (TOBRADEX) 0.3-0.1 % ophthalmic ointment 0.25 inches At Bedtime       ustekinumab (STELARA) 45 MG/0.5ML SOSY Inject 0.5 mL at week 0 and 4, then every 12 weeks 0.5 mL 5       Allergies   Allergen Reactions     Polyethylene Glycol Rash     Codeine      Other reaction(s): Gastrointestinal, GI intolerance, Vomiting  Vomiting       Gadolinium Dizziness and Nausea     Hydrocodone-Acetaminophen Nausea and Vomiting     Other reaction(s): GI intolerance     Iodine      abstract     Sulfasalazine      Other reaction(s): GI intolerance  Has tried and had nausa.     Tramadol Nausea and Vomiting     Other reaction(s): Gastrointestinal, Other (see comments)  Nausea and vomiting   unknown       Gluten Meal Other (See Comments) and Rash     Other reaction(s): Gastrointestinal, GI intolerance  Dizziness, tired, rash, stomach cramps, thrush, mouth sores  Dizziness, tired, rash, stomach cramps, thrush, mouth sores         MIN Garza

## 2023-02-16 NOTE — PROGRESS NOTES
Hand / Upper Extremity Injection/Arthrocentesis: bilateral thumb CMC    Date/Time: 2023 11:45 AM  Performed by: Twila Petit MD  Authorized by: Twila Petit MD     Indications:  Pain  Needle Size:  25 G  Guidance: fluoroscopy    Condition: osteoarthritis    Laterality:  Bilateral  Location:  Thumb   Location comment:  Bilateral STT not CMC  Site:  Bilateral thumb CMC    Medications (Right):  40 mg triamcinolone 40 MG/ML; 2 mL lidocaine (PF) 1 %  Medications (Left):  40 mg triamcinolone 40 MG/ML; 2 mL lidocaine (PF) 1 %  Outcome:  Tolerated well, no immediate complications  Procedure discussed: discussed risks, benefits, and alternatives    Consent Given by:  Patient  Timeout: timeout called immediately prior to procedure    Prep: patient was prepped and draped in usual sterile fashion          [facility] [address] [address]  [phone]  Dept: [phone]  ______________________________________________________________________________    Patient: Laxmi Ya   : 1975   MRN: [id number]   2023    INVASIVE PROCEDURE SAFETY CHECKLIST    Date: 2023   Procedure:Bilateral STT injections  Patient Name: Laxmi Ya  MRN: [id number]  YOB: 1975    Action: Complete sections as appropriate. Any discrepancy results in a HARD COPY until resolved.     PRE PROCEDURE:  Patient ID verified with 2 identifiers (name and  or MRN): Yes  Procedure and site verified with patient/designee (when able): Yes  Accurate consent documentation in medical record: Yes  H&P (or appropriate assessment) documented in medical record: Yes  H&P must be up to 20 days prior to procedure and updates within 24 hours of procedure as applicable: Yes  Relevant diagnostic and radiology test results appropriately labeled and displayed as applicable: Yes  Procedure site(s) marked with provider initials:  NA    TIMEOUT:  Time-Out performed immediately prior to starting procedure, including verbal and active participation of all team members addressing the following:Yes  * Correct patient identify  * Confirmed that the correct side and site are marked  * An accurate procedure consent form  * Agreement on the procedure to be done  * Correct patient position  * Relevant images and results are properly labeled and appropriately displayed  * The need to administer antibiotics or fluids for irrigation purposes during the procedure as applicable   * Safety precautions based on patient history or medication use    DURING PROCEDURE: Verification of correct person, site, and procedures any time the responsibility for care of the patient is transferred to another member of the care team.       The following medications were given:         Prior to injection, verified patient identity using patient's name and date of birth.  Due to injection administration, patient instructed to remain in clinic for 15 minutes  afterwards, and to report any adverse reaction to me immediately.    Joint injection was performed.    Medication Name: Kenalog 40 NDC [address]-5  Drug Amount Wasted:  None.  Vial/Syringe: Single dose vial  Expiration Date:  9/1/2024    Medication Name Lidocaine 1% NDC 51911-630-13    Scribed by Chelsy Savage for Dr. Petit on February 16, 2023 at 11:50a based on the provider's statements to me.     MIN Garza

## 2023-02-20 NOTE — PROGRESS NOTES
Disease State Management Encounter:                          Laxmi Ya is a 47 year old female called for a follow-up visit.  Today's visit is a follow-up visit from 12/16/22.    Reason for visit: Stelara patient assistance delay - looking at other options for RA management    Rheumatoid Arthritis: Currently using Actemra 400 mg infusion monthly (determined that orders for 750 mg per dose were not placed), diclofenac 1% gel as needed (uses once per day a few times per week), methylprednisolone 16 mg daily. Reports the Actemra has been more helpful than other medications she has tried but is still having significant stiffness and pain in hips, back, and neck regularly. Cannot taper below methylprednisolone 16 mg daily without being in intolerable pain. In process of applying for patient assistance for Stelara but entire program is now experiencing significant delays. Next Actemra infusion scheduled for 2/9/23 but has been started on an antibiotic for a UTI and is wondering if she needs to postpone.    Tried and failed: Humira, Enbrel, Orencia, Remicade, Simponi, Hydroxychloroquine    Liver Function Studies - Recent Labs   Lab Test 02/06/23  1222   PROTTOTAL 7.0   ALBUMIN 4.7   BILITOTAL 0.7   ALKPHOS 35   AST 26   ALT 39*     CBC RESULTS: Recent Labs   Lab Test 02/06/23  1222   WBC 11.7*   RBC 5.06   HGB 15.1   HCT 44.2   MCV 87   MCH 29.8   MCHC 34.2   RDW 12.8        Hyperlipidemia: Current therapy includes no current medications. Patient reports she does not want to stay on Actemra if it is causing her cholesterol to increase significantly. Was not fasting when December labs were taken but was fasting for January labs. Would like to discuss these results today.  Recent Labs   Lab Test 01/12/23  1301 12/09/22  1211   CHOL 225* 281*   HDL 60 57   * 162*   TRIG 264* 309*     Today's Vitals: There were no vitals taken for this visit.    Assessment/Plan: Discussed in depth risks of remaining on high  dose methylprednisolone long term including hyperglycemia, decreased bone density, and weight gain. At this time Stelara patient assistance approvals have been delayed and no resolution date has been provided by the drug company. Due to this, an alternate biologic therapy plan would be beneficial to reduce methylprednisolone dose. Reviewed options of changing biologics to a medication other than Stelara or increasing Actemra dose to help with ongoing arthritis symptoms and decrease need for methylprednisolone. Discussed lipid panel results in depth and determine lack of fasting for the first reading likely resulted in higher LDL levels and not Actemra side effects. Would benefit from increasing Actemra IV dose to 8 mg/kg monthly. Will send new plan to RN care coordinator for therapy plan adjustment. Reviewed recommendation to avoid biologic infusions while on antibiotics. Would benefit from rescheduling infusion to after antibiotics are completed.     1. We will increase the Actemra infusion dose to 8 mg/kg (~800 mg) to help with the continued symptoms and tapering methylprednisolone.    2. Call and reschedule your next Actemra infusion for after you have completed your antibiotics.    Follow-up: Return in about 8 weeks (around 4/5/2023) for [facility] Pharmacist Visit.    I spent 30 minutes with this patient today. All changes were made via collaborative practice agreement with Oswaldo Lemus MD. A copy of the visit note was provided to the patient's provider(s).    A summary of these recommendations was sent via "[employer].    Yuliya Kaplan, PharmD  Medication Therapy Management Pharmacist  [facility]  Phone: [phone]     Medication Therapy Recommendations  Seronegative rheumatoid arthritis (H)    Current Medication: tocilizumab (ACTEMRA) 800 mg in sodium chloride 0.9 % 150 mL infusion   Rationale: Dose too low - Dosage too low - Effectiveness   Recommendation: Increase Dose - Increase from  400 mg to 800 mg monthly   Status: Accepted per CPA          Current Medication: tocilizumab (ACTEMRA) 800 mg in sodium chloride 0.9 % 150 mL infusion   Rationale: Does not understand instructions - Adherence - Adherence   Recommendation: Provide Education - Reschedule infusion until after antibiotic therapy is complete   Status: Patient Agreed - Adherence/Education

## 2023-02-20 NOTE — PATIENT INSTRUCTIONS
Recommendations from today's disease management visit:                                                      1. We will increase the Actemra infusion dose to 8 mg/kg (~800 mg) to help with the continued symptoms and tapering methylprednisolone.    2. Call and reschedule your next Actemra infusion for after you have completed your antibiotics.    Follow-up: Return in about 8 weeks (around 4/5/2023) for MTM Pharmacist Visit.    To schedule another MTM appointment, please call the clinic directly or you may call the MTM scheduling line at [phone] or toll-free at [phone].     My Clinical Pharmacist's contact information:                                                      Please feel free to contact me with any questions or concerns you have.      Augustine ConnerD  Medication Therapy Management Pharmacist  [facility]  Phone: [phone]

## 2023-02-27 ENCOUNTER — OFFICE VISIT (OUTPATIENT)
Dept: ALLERGY | Facility: CLINIC | Age: 48
End: 2023-02-27
Payer: MEDICARE

## 2023-02-27 VITALS
BODY MASS INDEX: 34.39 KG/M2 | WEIGHT: 214 LBS | HEART RATE: 87 BPM | RESPIRATION RATE: 18 BRPM | HEIGHT: 66 IN | OXYGEN SATURATION: 97 %

## 2023-02-27 DIAGNOSIS — J33.9 NASAL POLYPS: ICD-10-CM

## 2023-02-27 DIAGNOSIS — J32.4 CHRONIC PANSINUSITIS: Primary | ICD-10-CM

## 2023-02-27 PROCEDURE — 99214 OFFICE O/P EST MOD 30 MIN: CPT | Performed by: ALLERGY & IMMUNOLOGY

## 2023-02-27 RX ORDER — MOMETASONE FUROATE MONOHYDRATE 50 UG/1
2 SPRAY, METERED NASAL DAILY
Qty: 17 G | Refills: 4 | Status: SHIPPED | OUTPATIENT
Start: 2023-02-27 | End: 2023-06-01

## 2023-02-27 RX ORDER — DUPILUMAB 300 MG/2ML
300 INJECTION, SOLUTION SUBCUTANEOUS
Qty: 4 ML | Refills: 2 | Status: SHIPPED | OUTPATIENT
Start: 2023-02-27 | End: 2023-06-19

## 2023-02-27 RX ORDER — TRIAMCINOLONE ACETONIDE 55 UG/1
2 SPRAY, METERED NASAL DAILY
Qty: 16.9 ML | Refills: 11 | Status: SHIPPED | OUTPATIENT
Start: 2023-02-27 | End: 2023-05-03

## 2023-02-27 NOTE — PROGRESS NOTES
Subjective   Laxmi is a 47 year old, presenting for the following health issues:  RECHECK (F/U dupixent)      HPI     Chief complaint: Chronic sinusitis and nasal polyps    History of present illness: This is a 47-year-old woman I have not seen since July 2021.  She is here today as she would like to restart Dupixent.  She was using this 300 mg every 2 weeks for about a month before she started to note some inflammation in her stomach.  This was found to be secondary to another etiology but she thought was maybe the Dupixent so she stopped the medication.  She states she continues to struggle with sinus pressure, facial pain and congestion.  She states is very difficult to read out of her nose.  She states that she is currently receiving Kenalog shots every 3 months from her ENT.  She had 4 sinus surgeries by [facility].  She previously was on allergy shots.  However, most recent allergy test from July 2021 was negative.  Specific IgE testing at that time was negative as well.  She reports she has a history of nasal polyps.  She states she had nasal polyp removal previously.  Imaging previously performed in March 2022 showed clear paranasal sinuses with osseous thickening of the lateral wall of the right maxillary sinus.  Possibly consistent with prior chronic sinusitis.  I do not have any outside records recently from ENT to review.  She is currently taking Nasacort nasal spray 2 sprays each nostril daily.  She has a history of asthma but has been out of her Breo inhaler for some time.  He has been using her rescue Hailer 1 time per week.  She denies any current cough, wheeze or shortness of breath.  She states that she is been having a lot of inflammation from her rheumatoid arthritis.  She for this reason, she is been on multiple rounds of oral steroids as well.  Recent eosinophil count was 100 but she had an eosinophil count 5 months previous that was 700.          Objective    Pulse 87   Resp 18   Ht  "1.676 m (5' 6\")   Wt 97.1 kg (214 lb)   SpO2 97%   BMI 34.54 kg/m    Body mass index is 34.54 kg/m .  Physical Exam   Gen: Pleasant female not in acute distress  HEENT: Eyes no erythema of the bulbar or palpebral conjunctiva, no edema.  Nose: No congestion, mucosa normal. Mouth: Throat clear, no lip or tongue edema.     Respiratory: Clear to auscultation bilaterally, no adventitious breath sounds    Skin: No rashes or lesions  Psych: Alert and oriented times 3      Impression report and plan:  1.  Chronic sinusitis with nasal polyps history  2.  Intermittent asthma    Recommend that she remain off Breo inhaler for now given that she is using her albuterol inhaler only 1 time per week.  I suspect her systemic steroids may be in part treating her asthma.  I stated we could try Dupixent again for the next 3 months.  I would like her to follow-up after 3 months.  Reviewed risks of eye irritation, eosinophilic granulomatous polyangiitis.  I did state to the patient that she will require regular monitoring and while on Dupixent.  This would require being seen every 6 months.  Checking use of counts periodically.  Continue Nasacort nasal spray for now.    Time spent with patient, chart review and documentation, 30 minutes on date of service.            "

## 2023-02-27 NOTE — LETTER
2/27/2023         RE: Laxmi Ya  [address] [place] [id number]        Dear Colleague,    Thank you for referring your patient, Laxmi Ya, to the [facility]. Please see a copy of my visit note below.          Subjective   Laxmi is a 47 year old, presenting for the following health issues:  RECHECK (F/U dupixent)      HPI     Chief complaint: Chronic sinusitis and nasal polyps    History of present illness: This is a 47-year-old woman I have not seen since July 2021.  She is here today as she would like to restart Dupixent.  She was using this 300 mg every 2 weeks for about a month before she started to note some inflammation in her stomach.  This was found to be secondary to another etiology but she thought was maybe the Dupixent so she stopped the medication.  She states she continues to struggle with sinus pressure, facial pain and congestion.  She states is very difficult to read out of her nose.  She states that she is currently receiving Kenalog shots every 3 months from her ENT.  She had 4 sinus surgeries by [facility].  She previously was on allergy shots.  However, most recent allergy test from July 2021 was negative.  Specific IgE testing at that time was negative as well.  She reports she has a history of nasal polyps.  She states she had nasal polyp removal previously.  Imaging previously performed in March 2022 showed clear paranasal sinuses with osseous thickening of the lateral wall of the right maxillary sinus.  Possibly consistent with prior chronic sinusitis.  I do not have any outside records recently from ENT to review.  She is currently taking Nasacort nasal spray 2 sprays each nostril daily.  She has a history of asthma but has been out of her Breo inhaler for some time.  He has been using her rescue Hailer 1 time per week.  She denies any current cough, wheeze or shortness of breath.  She states that she is been having a lot of inflammation  "from her rheumatoid arthritis.  She for this reason, she is been on multiple rounds of oral steroids as well.  Recent eosinophil count was 100 but she had an eosinophil count 5 months previous that was 700.         Objective    Pulse 87   Resp 18   Ht 1.676 m (5' 6\")   Wt 97.1 kg (214 lb)   SpO2 97%   BMI 34.54 kg/m    Body mass index is 34.54 kg/m .  Physical Exam   Gen: Pleasant female not in acute distress  HEENT: Eyes no erythema of the bulbar or palpebral conjunctiva, no edema.  Nose: No congestion, mucosa normal. Mouth: Throat clear, no lip or tongue edema.     Respiratory: Clear to auscultation bilaterally, no adventitious breath sounds    Skin: No rashes or lesions  Psych: Alert and oriented times 3      Impression report and plan:  1.  Chronic sinusitis with nasal polyps history  2.  Intermittent asthma    Recommend that she remain off Breo inhaler for now given that she is using her albuterol inhaler only 1 time per week.  I suspect her systemic steroids may be in part treating her asthma.  I stated we could try Dupixent again for the next 3 months.  I would like her to follow-up after 3 months.  Reviewed risks of eye irritation, eosinophilic granulomatous polyangiitis.  I did state to the patient that she will require regular monitoring and while on Dupixent.  This would require being seen every 6 months.  Checking use of counts periodically.  Continue Nasacort nasal spray for now.    Time spent with patient, chart review and documentation, 30 minutes on date of service.               Again, thank you for allowing me to participate in the care of your patient.        Sincerely,        Nelia CAMPOS MD    " hematemesis

## 2023-02-28 ENCOUNTER — TELEPHONE (OUTPATIENT)
Dept: ALLERGY | Facility: CLINIC | Age: 48
End: 2023-02-28

## 2023-02-28 ENCOUNTER — VIRTUAL VISIT (OUTPATIENT)
Dept: PHARMACY | Facility: CLINIC | Age: 48
End: 2023-02-28
Payer: COMMERCIAL

## 2023-02-28 DIAGNOSIS — Z78.9 TAKES DIETARY SUPPLEMENTS: ICD-10-CM

## 2023-02-28 DIAGNOSIS — R73.9 STEROID-INDUCED HYPERGLYCEMIA: ICD-10-CM

## 2023-02-28 DIAGNOSIS — M06.00 SERONEGATIVE RHEUMATOID ARTHRITIS (H): Primary | ICD-10-CM

## 2023-02-28 DIAGNOSIS — T38.0X5A STEROID-INDUCED HYPERGLYCEMIA: ICD-10-CM

## 2023-02-28 DIAGNOSIS — E11.9 TYPE 2 DIABETES MELLITUS WITHOUT COMPLICATION, WITHOUT LONG-TERM CURRENT USE OF INSULIN (H): ICD-10-CM

## 2023-02-28 PROCEDURE — 99207 PR NO CHARGE LOS: CPT

## 2023-02-28 NOTE — TELEPHONE ENCOUNTER
Free Drug Application Initiated  Medication:  DUPIXENT   Sponsor:  [employer]up[employer]   Phone #: [phone]  Fax #: [phone]  Additional Information:   Writer spoke to patient regarding Rx for dupixent-- patient was enrolled on free drug for 2022. She has NOT completed the paperwork for renewal for 2023. I have expressed that she needs to get working on the forms and proof of income. She will not be able to get drug until they review her re-enrollment.       HERMES Gonzalez, [facility]  Specialty Pharmacy Clinic Liaison     [facility]    [email or web address]     Phone: [phone]  Fax: [phone]

## 2023-02-28 NOTE — PROGRESS NOTES
Disease State Management Encounter:                          Laxmi Ya is a 47 year old female called for a follow-up visit. Today's visit is a follow-up visit from 2/8/23.    Reason for visit: Actemra follow up     Medication Adherence/Access: The patient fills medications at [place]: NO, fills medications at [facility] in [place].    Rheumatoid Arthritis: Currently using Actemra 800 mg infusion monthly (last infusion 2/13/23) , diclofenac 1% gel as needed (uses once per day a few times per week), methylprednisolone 16 mg daily (has been trying to go down to 8 or 12 mg since Actemra infusion but not following any set taper). Reports the Actemra has been more helpful than other medications she has tried previously but dose was not controlling symptoms adequately. Notes since her first increased dose infusion her upper arms feel much improved - pain and soreness seem to be improving. Also is starting to notice some improvement in her back but nothing significant yet. Reports she has been fatigued lately but otherwise no side effects.    Tried and failed: Humira, Enbrel, Orencia, Remicade, Simponi, Hydroxychloroquine    Liver Function Studies - Recent Labs   Lab Test 02/06/23  1222   PROTTOTAL 7.0   ALBUMIN 4.7   BILITOTAL 0.7   ALKPHOS 35   AST 26   ALT 39*     CBC RESULTS: Recent Labs   Lab Test 02/06/23  1222   WBC 11.7*   RBC 5.06   HGB 15.1   HCT 44.2   MCV 87   MCH 29.8   MCHC 34.2   RDW 12.8        Type 2 Diabetes:  Currently taking metformin  mg nightly. Patient is not experiencing side effects.  Blood sugar monitoring: never. Notes she doesn't know where her SMBG supplies are and needs new ones.  Symptoms of low blood sugar? none  Symptoms of high blood sugar? Fatigue - this is a   Eye exam: due  Foot exam: due  Aspirin: No  Statin: No   ACEi/ARB: Yes: losartan 100 mg.   Urine Albumin:   Lab Results   Component Value Date    UMALCR  02/06/2023      Comment:      Unable to calculate, urine  albumin and/or urine creatinine is outside detectable limits.  Microalbuminuria is defined as an albumin:creatinine ratio of 17 to 299 for males and 25 to 299 for females. A ratio of albumin:creatinine of 300 or higher is indicative of overt proteinuria.  Due to biologic variability, positive results should be confirmed by a second, first-morning random or 24-hour timed urine specimen. If there is discrepancy, a third specimen is recommended. When 2 out of 3 results are in the microalbuminuria range, this is evidence for incipient nephropathy and warrants increased efforts at glucose control, blood pressure control, and institution of therapy with an angiotensin-converting-enzyme (ACE) inhibitor (if the patient can tolerate it).        Lab Results   Component Value Date    A1C 7.0 02/06/2023     Supplements: Currently taking none. Was previously on calcium supplements but stopped due to total pill burden. Wondering if she needs to restart this. Also notes that the supplements she was on previously caused stomach upset - unsure if this was the calcium or the inactive ingredients that caused this. Is getting a little cheese in her diet, milk once every couple of days. In general though avoids dairy due to high cholesterol in cheese and GI discomfort from certain milks.    Today's Vitals: There were no vitals taken for this visit.    Assessment/Plan: Patient starting to notice some improvement from increased Actemra dose already. Anticipate this increased benefit may continue to improve over the next 2-3 months. Would benefit from starting to decrease methylprednisolone dose as symptoms improve to minimize side effects. Recommend decreasing methylprednisolone dose by taking 12 mg daily x 7 days, 8 mg daily x 7 days, 4 mg daily x 7 days then stop. New fatigue symptoms may be due to hyperglycemia side effect from steroids. Would benefit from completing 1 week of blood sugar testing to determine if additional medication  is needed. Discussed steroid side effect of bone fractures. Recommended restarting calcium citrate 600 mg daily and using brand such as pure encapsulations to avoid potential stomach upset from type of calcium and/or inactive ingredients.     1. Start Calcium citrate 600 mg daily. You can try the pure encapsulations brand since it has minimal inactive ingredients.    2. Continue Actemra 800 mg infusion monthly.    3. Start decreasing methylprednisolone dose by taking 12 mg daily x 7 days, 8 mg daily x 7 days, 4 mg daily x 7 days then stop.    4. I will send in blood sugar testing supplies for you to start using. The fatigue may be due to high blood sugars and testing will determine if that is happening. Test your blood sugar every day in the morning before you eat or drink anything and 2 hours after your largest meal.    Follow-up: Return in about 1 week (around 3/7/2023) for MTM Pharmacist Visit, using a phone visit.    I spent 30 minutes with this patient today. All changes were made via collaborative practice agreement with Oswaldo Lemus MD. A copy of the visit note was provided to the patient's provider(s).    A summary of these recommendations was sent via [employer].    Yuliya Kaplan, PharmD  Medication Therapy Management Pharmacist  [facility]  Phone: [phone]     Medication Therapy Recommendations  Seronegative rheumatoid arthritis (H)    Current Medication: methylPREDNISolone (MEDROL) 4 MG tablet   Rationale: Dose too high - Dosage too high - Safety   Recommendation: Decrease Dose - 12 mg daily x 7 days, 8 mg daily x 7 days, 4 mg daily x 7 days then stop   Status: Accepted per CPA         Takes dietary supplements    Rationale: Preventive therapy - Needs additional medication therapy - Indication   Recommendation: Start Medication - CALCIUM CITRATE + D3 PO - 600 mg daily   Status: Accepted - no CPA Needed         Type 2 diabetes mellitus without complication, without  long-term current use of insulin (H)    Current Medication: metFORMIN (GLUCOPHAGE-XR) 500 MG 24 hr tablet ()   Rationale: Medication requires monitoring - Needs additional monitoring - Effectiveness   Recommendation: Self-Monitoring - blood glucose monitoring w/Device meter device kit - Test 2 times daily as directed   Status: Accepted per CPA

## 2023-03-01 RX ORDER — LANCETS
EACH MISCELLANEOUS
Qty: 100 EACH | Refills: 1 | OUTPATIENT
Start: 2023-03-01

## 2023-03-01 RX ORDER — BLOOD SUGAR DIAGNOSTIC
STRIP MISCELLANEOUS
Qty: 100 STRIP | Refills: 1 | OUTPATIENT
Start: 2023-03-01

## 2023-03-01 NOTE — TELEPHONE ENCOUNTER
Adjustments to prescriptions addressed in MTM encounter after patient sent mychart. New prescriptions sent to [facility].

## 2023-03-02 NOTE — TELEPHONE ENCOUNTER
FREE DRUG DUPIXENT  03/02/2023    Writer faxed patient application to [employer]    Phone [phone]  [phone]      HERMES Gonzalez, [facility]  Specialty Pharmacy Clinic Liaison     [facility]    [email or web address]     Phone: [phone]  Fax: [phone]

## 2023-03-07 ENCOUNTER — VIRTUAL VISIT (OUTPATIENT)
Dept: PHARMACY | Facility: CLINIC | Age: 48
End: 2023-03-07
Payer: COMMERCIAL

## 2023-03-07 DIAGNOSIS — E11.9 TYPE 2 DIABETES MELLITUS WITHOUT COMPLICATION, WITHOUT LONG-TERM CURRENT USE OF INSULIN (H): Primary | ICD-10-CM

## 2023-03-07 DIAGNOSIS — M06.00 SERONEGATIVE RHEUMATOID ARTHRITIS (H): ICD-10-CM

## 2023-03-07 DIAGNOSIS — R73.9 STEROID-INDUCED HYPERGLYCEMIA: ICD-10-CM

## 2023-03-07 DIAGNOSIS — T38.0X5A STEROID-INDUCED HYPERGLYCEMIA: ICD-10-CM

## 2023-03-07 PROCEDURE — 99207 PR NO CHARGE LOS: CPT

## 2023-03-07 RX ORDER — CONTAINER,EMPTY
EACH MISCELLANEOUS
Qty: 1 EACH | Refills: 0 | Status: SHIPPED | OUTPATIENT
Start: 2023-03-07 | End: 2023-10-05

## 2023-03-07 RX ORDER — PEN NEEDLE, DIABETIC 32GX 5/32"
NEEDLE, DISPOSABLE MISCELLANEOUS
Qty: 100 EACH | Refills: 3 | Status: SHIPPED | OUTPATIENT
Start: 2023-03-07 | End: 2023-04-07

## 2023-03-07 RX ORDER — GLUCOSAMINE HCL/CHONDROITIN SU 500-400 MG
CAPSULE ORAL
Qty: 100 EACH | Refills: 3 | Status: SHIPPED | OUTPATIENT
Start: 2023-03-07 | End: 2023-10-05

## 2023-03-07 NOTE — PROGRESS NOTES
"Disease State Management Encounter:                          Laxmi Ya is a 47 year old female called for a follow-up visit.  Today's visit is a follow-up visit from 2/28/23.     Reason for visit: Blood sugar follow up    Rheumatoid Arthritis: Currently using Actemra 800 mg infusion monthly (last infusion 2/13/23) , diclofenac 1% gel as needed (uses once per day a few times per week), methylprednisolone 12 mg daily. Reports the Actemra has been more helpful than other medications she has tried previously but dose was not controlling symptoms adequately. Notes since her first increased dose infusion her upper arms feel much improved - pain and soreness seem to be improving. Also is starting to notice some improvement in her back but nothing significant yet. Reports she has been fatigued lately but otherwise no side effects.     Tried and failed: Humira, Enbrel, Orencia, Remicade, Simponi, Hydroxychloroquine         Liver Function Studies - Recent Labs   Lab Test 02/06/23  1222   PROTTOTAL 7.0   ALBUMIN 4.7   BILITOTAL 0.7   ALKPHOS 35   AST 26   ALT 39*          CBC RESULTS: Recent Labs   Lab Test 02/06/23  1222   WBC 11.7*   RBC 5.06   HGB 15.1   HCT 44.2   MCV 87   MCH 29.8   MCHC 34.2   RDW 12.8         Type 2 Diabetes/Steroid Induced Hyperglycemia:  Currently taking metformin  mg nightly. Patient is not experiencing side effects. Concerned that her blood sugars are too high and the metformin is no longer working.  Blood sugar monitoring: See below. 1-2 times daily  Date FBG/ 2hours post Lunch/2hours post Dinner /2hours post   3/6 118 210 315 (10pm)   3/5 130     3/2 201     3/1 129     2/28 128       Symptoms of low blood sugar? none  Symptoms of high blood sugar? Fatigue, nausea, general feeling of being \"unwell\"  Eye exam: due  Foot exam: due  Aspirin: No  Statin: No   ACEi/ARB: Yes: losartan 100 mg.   Urine Albumin:           Lab Results   Component Value Date     UMALCR   02/06/2023         " Comment:         Unable to calculate, urine albumin and/or urine creatinine is outside detectable limits.  Microalbuminuria is defined as an albumin:creatinine ratio of 17 to 299 for males and 25 to 299 for females. A ratio of albumin:creatinine of 300 or higher is indicative of overt proteinuria.  Due to biologic variability, positive results should be confirmed by a second, first-morning random or 24-hour timed urine specimen. If there is discrepancy, a third specimen is recommended. When 2 out of 3 results are in the microalbuminuria range, this is evidence for incipient nephropathy and warrants increased efforts at glucose control, blood pressure control, and institution of therapy with an angiotensin-converting-enzyme (ACE) inhibitor (if the patient can tolerate it).              Lab Results   Component Value Date     A1C 7.0 02/06/2023     Today's Vitals: There were no vitals taken for this visit.    Assessment/Plan: Patient is noticing improvement in symptoms with increased Actemra dose. Would benefit from continuing Actemra 800 mg infusions monthly. Based on limited blood sugar data, patient appears to have steroid induced hyperglycemia. Would benefit from starting NPH insulin 10 units daily to help reduce blood sugars and continue to test blood sugars twice daily (fasting and post-prandial).    1. Start taking NPH insulin 10 units daily at the same time methylprednisolone dose is taken.    2. Continue checking your blood sugars in the morning before eating and 2 hours after your largest meal.      Follow-up: Return via [employer] message about blood sugars in 1 week.    I spent 30 minutes with this patient today. All changes were made via collaborative practice agreement with Oswaldo Lemus MD. A copy of the visit note was provided to the patient's provider(s).    A summary of these recommendations was sent via [employer].    Yuliya Kaplan, PharmD  Medication Therapy Management Pharmacist  [facility]  [facility]heumatology [facility]  Phone: [phone]     Medication Therapy Recommendations  Steroid-induced hyperglycemia    Rationale: Untreated condition - Needs additional medication therapy - Indication   Recommendation: Start Medication - NovoLIN N VIAL 100 UNIT/ML susp - 10 units daily   Status: Accepted per CPA

## 2023-03-07 NOTE — PATIENT INSTRUCTIONS
Recommendations from today's disease management visit:                                                      1. Start Calcium citrate 600 mg daily. You can try the pure encapsulations brand since it has minimal inactive ingredients.    2. Continue Actemra 800 mg infusion monthly.    3. Start decreasing methylprednisolone dose by taking 12 mg daily x 7 days, 8 mg daily x 7 days, 4 mg daily x 7 days then stop.    4. I will send in blood sugar testing supplies for you to start using. The fatigue may be due to high blood sugars and testing will determine if that is happening. Test your blood sugar every day in the morning before you eat or drink anything and 2 hours after your largest meal.    Follow-up: Return in about 1 week (around 3/7/2023) for MTM Pharmacist Visit, using a phone visit.    To schedule another MTM appointment, please call the clinic directly or you may call the MTM scheduling line at [phone] or toll-free at [phone].     My Clinical Pharmacist's contact information:                                                      Please feel free to contact me with any questions or concerns you have.      Yuliya Kaplan, PharmD  Medication Therapy Management Pharmacist  [facility]  Phone: [phone]

## 2023-03-08 ENCOUNTER — THERAPY VISIT (OUTPATIENT)
Dept: PHYSICAL THERAPY | Facility: CLINIC | Age: 48
End: 2023-03-08
Attending: PAIN MEDICINE
Payer: MEDICARE

## 2023-03-08 DIAGNOSIS — R10.2 PELVIC PAIN IN FEMALE: ICD-10-CM

## 2023-03-08 DIAGNOSIS — M54.2 NECK PAIN: Primary | ICD-10-CM

## 2023-03-08 DIAGNOSIS — M54.50 BILATERAL LOW BACK PAIN WITHOUT SCIATICA: ICD-10-CM

## 2023-03-08 PROCEDURE — 97535 SELF CARE MNGMENT TRAINING: CPT | Mod: GP | Performed by: PHYSICAL THERAPIST

## 2023-03-08 PROCEDURE — 97163 PT EVAL HIGH COMPLEX 45 MIN: CPT | Mod: GP | Performed by: PHYSICAL THERAPIST

## 2023-03-09 PROBLEM — R10.2 PELVIC PAIN IN FEMALE: Status: ACTIVE | Noted: 2023-03-09

## 2023-03-09 PROBLEM — M54.2 NECK PAIN: Status: ACTIVE | Noted: 2023-03-09

## 2023-03-09 PROBLEM — M54.50 BILATERAL LOW BACK PAIN WITHOUT SCIATICA: Status: ACTIVE | Noted: 2023-03-09

## 2023-03-09 NOTE — PROGRESS NOTES
"Physical Therapy Initial Evaluation  Subjective:  Laxmi is a 46 y/o woman w/ complaints of bilateral cervical, thoracic, and lumbar spine pain, alongside left hip pain. Laxmi experiences constant, chronic, aching pain in all these regions, but recently has been experiencing more, \"shooting, sharp\" pain in her lower back and L hip that, \"takes her breath away\". Laxmi's pain is severely irritable.  Laxim has been experiencing intermittent  episodes of mixed bowel and bladder incontinence. Laxmi manages her pain with ice, heat, rest, chiropractic, and massage. She has recently started walking with a cane in order to offload her L hip. Laxmi's pain affects her ability to shower/complete personal hygiene tasks, stand, sit, and walk. Her goals for PT are to manage her pain and gain functional strength so that she can complete basic ADLs without debilitating pain. Relevant past medical hx include RA, OA, cervical and lumbar laminectomies, and COLON.     The history is provided by the patient.   Patient Health History  Laxmi Ya being seen for Pain and immobility in neck.          Pain is reported as 10/10 on pain scale.  General health as reported by patient is poor.  Pertinent medical history includes: diabetes, migraines/headaches, rheumatoid arthritis, osteoarthritis, incontinence, numbness/tingling and overweight. Other medical history details: See EMR.   Red flags:  Numbness in perianal region, incontinence, cold/hot extremity, changes in bowel and bladder habits, bilateral numbness, none as reported by patient, severe dizziness, significant weakness and unexplained weight loss (Chronic issues, no new onset).  Medical allergies: latex. Other medical allergies details: See EMR.    Other surgery history details: See EMR.    Current medications:  High blood pressure medication and steroids. Other medications details: See EMR.    Current occupation is Disabled.                                   "     Objective:  Standing Alignment:        Lumbar:  Lordosis decr  Pelvic:  Normal          Gait:    Gait Type:  Antalgic   Assistive Devices:  Cane      Flexibility/Screens:       Lower Extremity:  Decreased left lower extremity flexibility:Hamstrings    Decreased right lower extremity flexibility:  Hamstrings  Spine:  Decreased left spine flexibility:  Sternocleidomastoid; Scalenes; Rhomboids; Upper Trap; Levator and Quadratus Lumborum    Decreased right spine flexibility:  Sternocleidomastoid; Scalenes; Rhomboids; Upper Trap; Levator and Quadratus Lumborum             Lumbar/SI Evaluation  ROM:    AROM Lumbar:   Flexion:            Hypo  Ext:                    Hypo   Side Bend:        Left:  Hypo    Right:  Hypo  Rotation:           Left:  Hypo    Right:  Hypo  Side Glide:        Left:     Right:       AROM Thoracic:  Flex:               Hypo  Ext:                Hypo  Rotation:        Left:  Hypo    Right:  Hypo               Neural Tension/Mobility:  Lumbar:  Normal                     Cervical/Thoracic Evaluation    Headaches: cervical          Cervical Palpation:    Tenderness present at Left:    Sternocleidomstoid; Scalenes; Rhomboids; Upper Trap; Levator; Erector Spinae; Facet and Suboccipitals  Tenderness present at Right:    Sternocleidomstoid; Scalenes; Rhomboids; Upper Trap; Levator; Erector Spinae; Facet and Suboccipitals      Spinal Segmental Conclusions:    Level:  Hypo at C3, C4, C5, C6 and C7  Level:  Hypo at C4, C5, C3, C6 and C7  Level:  Hypo at C3, C4, C6, C5 and C7                                                General Evaluation:  AROM:      Cervical/Thoracic:  Significant findings:  Hypomobile, painful movement  Lumbopelvic:  Significant findings: hypomobile, painful movement    Lower Extremity:  Significant findings:  Hypomobile, painful movement on L                      Posture:    Standing:   Rounded shoulders and forward head  Sitting:  Rounded shoulders and forward head                                            ROS    Assessment/Plan:     Patient is a 47 year old female with cervical, thoracic, lumbar, sacral and both sides hip complaints.    Patient has the following significant findings with corresponding treatment plan.                Diagnosis 1:  Cervical pain with headache  Pain -  hot/cold therapy, US, electric stimulation, mechanical traction, manual therapy, STS and splint/taping/bracing/orthotics  Decreased ROM/flexibility - manual therapy, therapeutic exercise, therapeutic activity and home program  Decreased joint mobility - manual therapy, therapeutic exercise, therapeutic activity and home program  Decreased strength - therapeutic exercise, therapeutic activities and home program  Impaired balance - neuro re-education, gait training, therapeutic activities, adaptive equipment/assistive device and home program  Decreased proprioception - neuro re-education, gait training, therapeutic activities and home program  Inflammation - cold therapy, US, electric stimulation and self management/home program  Diagnosis 2:  Cervical pain with mobility deficits   Pain -  hot/cold therapy, US, electric stimulation, mechanical traction, manual therapy and STS  Decreased ROM/flexibility - manual therapy, therapeutic exercise, therapeutic activity and home program  Decreased joint mobility - manual therapy, therapeutic exercise, therapeutic activity and home program  Decreased strength - therapeutic exercise, therapeutic activities and home program  Impaired balance - neuro re-education, gait training, therapeutic activities, adaptive equipment/assistive device and home program  Decreased proprioception - neuro re-education, gait training, therapeutic activities and home program  Inflammation - cold therapy, US, electric stimulation, iontophoresis and self management/home program  Impaired gait - gait training, assistive devices and home program  Impaired muscle performance - biofeedback, electric  stimulation, neuro re-education and home program  Decreased function - therapeutic activities, home program and functional performance testing  Diagnosis 3: Chronic low back pain with generalized pain Pain -  hot/cold therapy, US, electric stimulation, mechanical traction, manual therapy, STS, self management, education, directional preference exercise and home program  Decreased ROM/flexibility - manual therapy, therapeutic exercise, therapeutic activity and home program  Decreased joint mobility - manual therapy, therapeutic exercise, therapeutic activity and home program  Decreased strength - therapeutic exercise, therapeutic activities and home program  Impaired balance - neuro re-education, gait training, therapeutic activities, adaptive equipment/assistive device and home program  Decreased proprioception - neuro re-education, gait training, therapeutic activities and home program  Inflammation - cold therapy, US, electric stimulation and self management/home program  Impaired gait - gait training, assistive devices and home program  Impaired muscle performance - biofeedback, electric stimulation, neuro re-education and home program  Decreased function - therapeutic activities, home program and functional performance testing  Impaired posture - neuro re-education, therapeutic activities and home program  Instability -  Therapeutic Activity  Therapeutic Exercise  Neuromuscular Re-education  Splinting/Taping/Bracing/Orthotic  home program   Diagnosis 4:  Acute low back pain with referred painPain -  hot/cold therapy, US, electric stimulation, mechanical traction, manual therapy, STS, splint/taping/bracing/orthotics, self management, education, directional preference exercise and home program  Decreased ROM/flexibility - manual therapy, therapeutic exercise, therapeutic activity and home program  Decreased joint mobility - manual therapy, therapeutic exercise, therapeutic activity and home program  Decreased  strength - therapeutic exercise, therapeutic activities and home program  Impaired balance - neuro re-education, gait training, therapeutic activities, adaptive equipment/assistive device and home program  Decreased proprioception - neuro re-education, gait training, therapeutic activities and home program  Inflammation - cold therapy, US, electric stimulation and self management/home program  Impaired gait - gait training, assistive devices and home program  Impaired muscle performance - biofeedback, electric stimulation, neuro re-education   Decreased function - therapeutic activities, home program and functional performance testing  Impaired posture - neuro re-education, therapeutic activities and home program    Therapy Evaluation Codes:   1) History comprised of:   Personal factors that impact the plan of care:      Anxiety, Coping style, Overall behavior pattern, Past/current experiences and Time since onset of symptoms.    Comorbidity factors that impact the plan of care are:      Numbness/tingling, Osteoarthritis, Pain at night/rest and Rheumatoid arthritis.     Medications impacting care: See medical record.  2) Examination of Body Systems comprised of:   Body structures and functions that impact the plan of care:      Cervical spine, Hip, Lumbar spine, Pelvis, Sacral illiac joint and Thoracic Spine.   Activity limitations that impact the plan of care are:      Bathing, Bending, Cooking, Driving, Dressing, Jumping, Lifting, Reading/Computer work, Running, Sitting, Squatting/kneeling, Stairs, Standing, Walking, Sleeping, Laying down and Fecal incontinence.  3) Clinical presentation characteristics are:   Evolving/Changing.  4) Decision-Making    High complexity using standardized patient assessment instrument and/or measureable assessment of functional outcome.  Cumulative Therapy Evaluation is: High complexity.    Previous and current functional limitations:  (See Goal Flow Sheet for this information)     Short term and Long term goals: (See Goal Flow Sheet for this information)     Communication ability:  Patient appears to be able to clearly communicate and understand verbal and written communication and follow directions correctly.  Treatment Explanation - The following has been discussed with the patient:   RX ordered/plan of care  Anticipated outcomes  Possible risks and side effects  This patient would benefit from PT intervention to resume normal activities.   Rehab potential is good.    Frequency:  1 X week, once daily  Duration:  for 8 weeks  Discharge Plan:  Achieve all LTG.  Independent in home treatment program.  Reach maximal therapeutic benefit.    Please refer to the daily flowsheet for treatment today, total treatment time and time spent performing 1:1 timed codes.

## 2023-03-09 NOTE — PROGRESS NOTES
{PROVIDER CHARTING PREFERENCE:537107}    Subjective   Laxmi is a 47 year old{ACCOMPANIED BY STATEMENT (Optional):247669}, presenting for the following health issues:  No chief complaint on file.      HPI     ***    Review of Systems   {ROS COMP (Optional):274085}      Objective    There were no vitals taken for this visit.  There is no height or weight on file to calculate BMI.  Physical Exam   {Exam List (Optional):838466}    {Diagnostic Test Results (Optional):912307}    {AMBULATORY ATTESTATION (Optional):588798}

## 2023-03-09 NOTE — PROGRESS NOTES
[facility]    OUTPATIENT Physical Therapy ORTHOPEDIC EVALUATION  PLAN OF TREATMENT FOR OUTPATIENT REHABILITATION  (COMPLETE FOR INITIAL CLAIMS ONLY)  Patient's Last Name, First Name, M.I.  YOB: 1975  Laxmi Ya    Provider s Name:  [facility]   Medical Record No.  [id number]   Start of Care Date:  03/08/23   Onset Date:   02/03/23 (Date of Order)   Treatment Diagnosis:  Lower back pain with mobility deficits, lower back pain w/ referred pain, neck pain w/ headache, neck pain w/ mobility deficits Medical Diagnosis:  Data Unavailable       Goals:     03/08/23 0500   Body Part   Goals listed below are for Neck/low back/hip   Goal #1   Goal #1 ambulation   Previous Functional Level Minutes patient could walk  (30 minutes)   Current Functional Level Minutes patient can walk;with cane  (5 min)   STG Target Performance Minutes patient will be able to walk;without assistive device  (15 min)   Rationale for safe community ambulation;to maintain proper body mechanics/posture while ambulating to avoid additional compensatory injury due to improper gait mechanics;to promote a healthy and active lifestyle   Due Date 04/06/23    LTG Target Performance Minutes patient will be able to  walk;without assistive device  (30 min)   Rationale for safe community ambulation;to maintain proper body mechanics/posture while ambulating to avoid additional compensatory injury due to improper gait mechanics;to promote a healthy and active lifestyle   Due Date 05/04/23   Goal #2   Goal #2 standing   Previous Functional Level Minutes patient could stand  (30 min)   Current Functional Level Minutes patient can stand  (5 min)   STG Target Performance Minutes patient will be able to stand  (15 min)   Rationale for housekeeping tasks such as vacuuming, bed making, mowing, gardening;for personal  Checking status of PA hygiene;for meal preparation;for safe household ambulation;for safe community ambulation   Due date 04/06/23   LTG Target Performance Minutes patient will be able to stand  (30 min)   Rationale for housekeeping tasks such as vacuuming, bed making, mowing;for personal hygiene;for meal preparation;for safe household ambulation;for safe community ambulation   Due date 05/04/23   Goal #3   Goal #3 sitting   Previous Functional Level Minutes patient could sit  (30 min)   Current Functional Level Minutes patient can sit  (2 min before position shifit)   STG Target Performance Minutes patient will be able to sit  (25 min)   Rationale for community transportation;to allow rest from standing;for personal hygiene   Due date 04/06/23   LTG Target Performance Minutes patient will be able to sit  (30 min)   Rationale to allow rest from standing;for community transportation;for personal hygiene   Due date 05/04/23         Therapy Frequency:  Once per week  Predicted Duration of Therapy Intervention:  8 weeks    Peng George, PT                 I CERTIFY THE NEED FOR THESE SERVICES FURNISHED UNDER        THIS PLAN OF TREATMENT AND WHILE UNDER MY CARE .             Physician Signature               Date    X_____________________________________________________                         Certification Date From:  03/08/23   Certification Date To:  06/05/23    Referring Provider:  Ariela Leach PA-C    Initial Assessment        See Epic Evaluation SOC Date: 03/08/23

## 2023-03-10 ENCOUNTER — TELEPHONE (OUTPATIENT)
Dept: DERMATOLOGY | Facility: CLINIC | Age: 48
End: 2023-03-10
Payer: MEDICARE

## 2023-03-10 DIAGNOSIS — L73.9 FOLLICULITIS: ICD-10-CM

## 2023-03-10 NOTE — TELEPHONE ENCOUNTER
[facility] Call Center    Phone Message    May a detailed message be left on voicemail: yes     Reason for Call: Medication Question or concern regarding medication   Prescription Clarification  Name of Medication: mupirocin (BACTROBAN) 2 % external ointment   Prescribing Provider: Eneida   Pharmacy: [facility]/PHARMACY #[id number] - [place], [place] - [address] fax:[phone]   What on the order needs clarification? Pt requesting PA for future refills of mupirocin (BACTROBAN) 2 % external ointment / also like 90 day Rx sent to pharmacy. Thanks!      Action Taken: Message routed to:  [facility] ([facility] DERM    Travel Screening: Not Applicable

## 2023-03-10 NOTE — TELEPHONE ENCOUNTER
Prior Authorization Retail Medication Request    Medication/Dose: Mupirocin 2% ointment  ICD code (if different than what is on RX):    Previously Tried and Failed:    Rationale:      Insurance Name:  medicare  Insurance ID:  [id number]       Pharmacy Information (if different than what is on RX)  Name:    Phone:

## 2023-03-14 ENCOUNTER — LAB REQUISITION (OUTPATIENT)
Dept: LAB | Facility: CLINIC | Age: 48
End: 2023-03-14

## 2023-03-14 ENCOUNTER — INFUSION THERAPY VISIT (OUTPATIENT)
Dept: INFUSION THERAPY | Facility: CLINIC | Age: 48
End: 2023-03-14
Attending: INTERNAL MEDICINE
Payer: MEDICARE

## 2023-03-14 VITALS
HEART RATE: 85 BPM | WEIGHT: 215.4 LBS | RESPIRATION RATE: 18 BRPM | BODY MASS INDEX: 34.77 KG/M2 | OXYGEN SATURATION: 95 % | TEMPERATURE: 97.8 F | DIASTOLIC BLOOD PRESSURE: 83 MMHG | SYSTOLIC BLOOD PRESSURE: 120 MMHG

## 2023-03-14 DIAGNOSIS — M13.80 SERONEGATIVE INFLAMMATORY ARTHRITIS: Primary | ICD-10-CM

## 2023-03-14 DIAGNOSIS — M13.0 SERONEGATIVE POLYARTHRITIS: ICD-10-CM

## 2023-03-14 DIAGNOSIS — Z77.21 EXPOSURE TO BLOOD OR BODY FLUID: Primary | ICD-10-CM

## 2023-03-14 LAB
ALBUMIN UR-MCNC: NEGATIVE MG/DL
APPEARANCE UR: CLEAR
BACTERIA #/AREA URNS HPF: ABNORMAL /HPF
BILIRUB UR QL STRIP: NEGATIVE
COLOR UR AUTO: ABNORMAL
GLUCOSE UR STRIP-MCNC: 500 MG/DL
HGB UR QL STRIP: NEGATIVE
HIV 1+2 AB+HIV1P24 AG SERPLBLD IA.RAPID: NON REACTIVE
HIV 1+2 AB+HIV1P24 AG SERPLBLD IA.RAPID: NON REACTIVE
HIV INTERPRETATION: NORMAL
KETONES UR STRIP-MCNC: NEGATIVE MG/DL
LEUKOCYTE ESTERASE UR QL STRIP: NEGATIVE
MUCOUS THREADS #/AREA URNS LPF: PRESENT /LPF
NITRATE UR QL: NEGATIVE
PH UR STRIP: 6.5 [PH] (ref 5–7)
RBC URINE: 1 /HPF
SP GR UR STRIP: 1.02 (ref 1–1.03)
SQUAMOUS EPITHELIAL: 6 /HPF
UROBILINOGEN UR STRIP-MCNC: NORMAL MG/DL
WBC URINE: <1 /HPF

## 2023-03-14 PROCEDURE — 258N000003 HC RX IP 258 OP 636: Performed by: INTERNAL MEDICINE

## 2023-03-14 PROCEDURE — 250N000011 HC RX IP 250 OP 636: Performed by: INTERNAL MEDICINE

## 2023-03-14 PROCEDURE — 87522 HEPATITIS C REVRS TRNSCRPJ: CPT | Performed by: INTERNAL MEDICINE

## 2023-03-14 PROCEDURE — 87340 HEPATITIS B SURFACE AG IA: CPT | Performed by: INTERNAL MEDICINE

## 2023-03-14 PROCEDURE — 87389 HIV-1 AG W/HIV-1&-2 AB AG IA: CPT | Performed by: INTERNAL MEDICINE

## 2023-03-14 PROCEDURE — 250N000013 HC RX MED GY IP 250 OP 250 PS 637: Performed by: INTERNAL MEDICINE

## 2023-03-14 PROCEDURE — 999N000128 HC STATISTIC PERIPHERAL IV START W/O US GUIDANCE

## 2023-03-14 PROCEDURE — 96365 THER/PROPH/DIAG IV INF INIT: CPT

## 2023-03-14 PROCEDURE — 36415 COLL VENOUS BLD VENIPUNCTURE: CPT | Performed by: INTERNAL MEDICINE

## 2023-03-14 PROCEDURE — 96375 TX/PRO/DX INJ NEW DRUG ADDON: CPT

## 2023-03-14 PROCEDURE — 87806 HIV AG W/HIV1&2 ANTB W/OPTIC: CPT | Performed by: INTERNAL MEDICINE

## 2023-03-14 PROCEDURE — 81001 URINALYSIS AUTO W/SCOPE: CPT

## 2023-03-14 RX ORDER — ACETAMINOPHEN 325 MG/1
650 TABLET ORAL ONCE
Status: CANCELLED | OUTPATIENT
Start: 2023-04-10

## 2023-03-14 RX ORDER — ALBUTEROL SULFATE 90 UG/1
1-2 AEROSOL, METERED RESPIRATORY (INHALATION)
Status: CANCELLED
Start: 2023-04-10

## 2023-03-14 RX ORDER — ACETAMINOPHEN 325 MG/1
650 TABLET ORAL ONCE
Status: COMPLETED | OUTPATIENT
Start: 2023-03-14 | End: 2023-03-14

## 2023-03-14 RX ORDER — HEPARIN SODIUM (PORCINE) LOCK FLUSH IV SOLN 100 UNIT/ML 100 UNIT/ML
5 SOLUTION INTRAVENOUS
Status: CANCELLED | OUTPATIENT
Start: 2023-04-10

## 2023-03-14 RX ORDER — MEPERIDINE HYDROCHLORIDE 25 MG/ML
25 INJECTION INTRAMUSCULAR; INTRAVENOUS; SUBCUTANEOUS EVERY 30 MIN PRN
Status: CANCELLED | OUTPATIENT
Start: 2023-04-10

## 2023-03-14 RX ORDER — DIPHENHYDRAMINE HYDROCHLORIDE 50 MG/ML
50 INJECTION INTRAMUSCULAR; INTRAVENOUS
Status: CANCELLED
Start: 2023-04-10

## 2023-03-14 RX ORDER — HEPARIN SODIUM,PORCINE 10 UNIT/ML
5 VIAL (ML) INTRAVENOUS
Status: CANCELLED | OUTPATIENT
Start: 2023-04-10

## 2023-03-14 RX ORDER — ALBUTEROL SULFATE 0.83 MG/ML
2.5 SOLUTION RESPIRATORY (INHALATION)
Status: CANCELLED | OUTPATIENT
Start: 2023-04-10

## 2023-03-14 RX ORDER — METHYLPREDNISOLONE SODIUM SUCCINATE 125 MG/2ML
125 INJECTION, POWDER, LYOPHILIZED, FOR SOLUTION INTRAMUSCULAR; INTRAVENOUS ONCE
Status: COMPLETED | OUTPATIENT
Start: 2023-03-14 | End: 2023-03-14

## 2023-03-14 RX ORDER — METHYLPREDNISOLONE SODIUM SUCCINATE 125 MG/2ML
125 INJECTION, POWDER, LYOPHILIZED, FOR SOLUTION INTRAMUSCULAR; INTRAVENOUS
Status: CANCELLED
Start: 2023-04-10

## 2023-03-14 RX ORDER — METHYLPREDNISOLONE SODIUM SUCCINATE 125 MG/2ML
125 INJECTION, POWDER, LYOPHILIZED, FOR SOLUTION INTRAMUSCULAR; INTRAVENOUS ONCE
Status: CANCELLED | OUTPATIENT
Start: 2023-04-10

## 2023-03-14 RX ORDER — EPINEPHRINE 1 MG/ML
0.3 INJECTION, SOLUTION INTRAMUSCULAR; SUBCUTANEOUS EVERY 5 MIN PRN
Status: CANCELLED | OUTPATIENT
Start: 2023-04-10

## 2023-03-14 RX ADMIN — METHYLPREDNISOLONE SODIUM SUCCINATE 125 MG: 125 INJECTION, POWDER, FOR SOLUTION INTRAMUSCULAR; INTRAVENOUS at 12:43

## 2023-03-14 RX ADMIN — TOCILIZUMAB 800 MG: 20 INJECTION, SOLUTION, CONCENTRATE INTRAVENOUS at 13:13

## 2023-03-14 RX ADMIN — ACETAMINOPHEN 650 MG: 325 TABLET ORAL at 12:42

## 2023-03-14 NOTE — PATIENT INSTRUCTIONS
Dear Laxmi Ya    Thank you for choosing [facility] ([facility]) for your Actemra infusion.  The following information is a summary of our appointment as well as important reminders.      EDUCATION POST BIOLOGICAL/CHEMOTHERAPY INFUSION  Call the triage nurse at your clinic or seek medical attention if you have chills and/or temperature greater than or equal to 100.5, uncontrolled nausea/vomiting, diarrhea, constipation, dizziness, shortness of breath, chest pain, heart palpitations, weakness or any other new or concerning symptoms, questions or concerns.  You can not have any live virus vaccines prior to or during treatment or up to 6 months post infusion.  If you have an upcoming surgery, medical procedure or dental procedure during treatment, this should be discussed with your ordering physician and your surgeon/dentist.  If you are having any concerning symptom, if you are unsure if you should get your next infusion or wish to speak to a provider before your next infusion, please call your care coordinator or triage nurse at your clinic to notify them so we can adequately serve you.     We look forward in seeing you on your next appointment here at [facility]).  Please don t hesitate to call us at [phone] to reschedule any of your appointments or to speak with one of the [facility] registered nurses.  It was a pleasure taking care of you today.    Sincerely,    [facility]  [address] [place] [address]  [address]  Phone:  [phone]

## 2023-03-14 NOTE — PROGRESS NOTES
Nursing Note  Laxmi Ya presents today to [facility] for:   Chief Complaint   Patient presents with     Infusion     Actemra     During today's [facility] appointment, orders from Dr. Oswaldo Lemus were completed.  Frequency: every 28 days    Progress note:  Patient identification verified by name and date of birth.  Assessment completed.  Vitals recorded in Doc Flowsheets.  Patient was provided with education regarding medication/procedure and possible side effects.  Patient verbalized understanding.     present during visit today: Not Applicable.    Treatment Conditions: ~~~ NOTE: If the patient answers yes to any of the questions below, hold the infusion and contact ordering provider or on-call provider.    1. Have you recently had an elevated temperature, fever, chills, productive cough, coughing for 3 weeks or longer or hemoptysis, abnormal vital signs, night sweats,  chest pain or have you noticed a decrease in your appetite, unexplained weight loss or fatigue? No  2. Do you have any open wounds or new incisions? No  3. Do you have any recent or upcoming hospitalizations, surgeries or dental procedures? No  4. Do you currently have or recently have had any signs of illness or infection or are you on any antibiotics? No  5. Have you had any new, sudden or worsening abdominal pain? No  6. Have you or anyone in your household received a live vaccination in the past 4 weeks? Please note:  No live vaccines while on biologic/chemotherapy until 6 months after the last treatment.  Patient can receive the flu vaccine (shot only) and the pneumovax.  It is optimal for the patient to get these vaccines mid cycle, but they can be given at any time as long as it is not on the day of the infusion. No  7. Have you recently been diagnosed with any new nervous system diseases (ie. Multiple sclerosis, Guillain New Era, seizures, neurological changes) or  cancer diagnosis? No  8. Are you on any form of radiation or chemotherapy? No  9. Are you pregnant or breast feeding or do you have plans of pregnancy in the future? No  10. Have you been having any signs of worsening depression or suicidal ideations?  (benlysta only) NA  11. Have there been any other new onset medical symptoms? No      Premedications:   -650mg tylenol  -125mg solumedrol    Drug Waste Record: No    Infusion length and rate:  infusion given over approximately 60 minutes    Labs: UA sent per orders    Vascular access: peripheral IV was placed by vascular access nurse.    Is the next appt scheduled? 4/11    Post Infusion Assessment:  Patient tolerated infusion without incident.     Discharge Plan:   Follow up plan of care with: ongoing infusions at [facility]., ordering provider as scheduled. and [facility]S to [facility]  Discharge instructions were reviewed with patient.  Patient/representative verbalized understanding of discharge instructions and all questions answered.  Patient discharged from [facility] in stable condition.    Rosetta Grant RN       Administrations This Visit     acetaminophen (TYLENOL) tablet 650 mg     Admin Date  03/14/2023 Action  $Given Dose  650 mg Route  Oral Administered By  Rosetta Grant RN          methylPREDNISolone sodium succinate (solu-MEDROL) injection 125 mg     Admin Date  03/14/2023 Action  $Given Dose  125 mg Route  Intravenous Administered By  Rosetta Grant RN          tocilizumab (ACTEMRA) 800 mg in sodium chloride 0.9 % 150 mL infusion     Admin Date  03/14/2023 Action  $New Bag Dose  800 mg Rate  150 mL/hr Route  Intravenous Administered By  Rosetta Grant RN                BP (P) 123/82   Pulse (P) 79   Temp 97.8  F (36.6  C) (Oral)   Resp 18   Wt 97.7 kg (215 lb 6.4 oz)   SpO2 95%   BMI 34.77 kg/m

## 2023-03-15 ENCOUNTER — MYC MEDICAL ADVICE (OUTPATIENT)
Dept: PHARMACY | Facility: CLINIC | Age: 48
End: 2023-03-15

## 2023-03-15 ENCOUNTER — THERAPY VISIT (OUTPATIENT)
Dept: PHYSICAL THERAPY | Facility: CLINIC | Age: 48
End: 2023-03-15
Attending: PAIN MEDICINE
Payer: MEDICARE

## 2023-03-15 ENCOUNTER — VIRTUAL VISIT (OUTPATIENT)
Dept: PHARMACY | Facility: CLINIC | Age: 48
End: 2023-03-15
Payer: COMMERCIAL

## 2023-03-15 DIAGNOSIS — M54.50 BILATERAL LOW BACK PAIN WITHOUT SCIATICA: ICD-10-CM

## 2023-03-15 DIAGNOSIS — R10.2 PELVIC PAIN IN FEMALE: Primary | ICD-10-CM

## 2023-03-15 DIAGNOSIS — T38.0X5A STEROID-INDUCED HYPERGLYCEMIA: ICD-10-CM

## 2023-03-15 DIAGNOSIS — E11.9 TYPE 2 DIABETES MELLITUS WITHOUT COMPLICATION, WITHOUT LONG-TERM CURRENT USE OF INSULIN (H): ICD-10-CM

## 2023-03-15 DIAGNOSIS — Z78.9 TAKES DIETARY SUPPLEMENTS: ICD-10-CM

## 2023-03-15 DIAGNOSIS — M54.2 NECK PAIN: ICD-10-CM

## 2023-03-15 DIAGNOSIS — M06.00 SERONEGATIVE RHEUMATOID ARTHRITIS (H): Primary | ICD-10-CM

## 2023-03-15 DIAGNOSIS — R73.9 STEROID-INDUCED HYPERGLYCEMIA: ICD-10-CM

## 2023-03-15 LAB
HBV SURFACE AG SERPL QL IA: NONREACTIVE
HIV 1+2 AB+HIV1 P24 AG SERPL QL IA: NONREACTIVE

## 2023-03-15 PROCEDURE — 97010 HOT OR COLD PACKS THERAPY: CPT | Mod: GP | Performed by: PHYSICAL THERAPIST

## 2023-03-15 PROCEDURE — 97140 MANUAL THERAPY 1/> REGIONS: CPT | Mod: GP | Performed by: PHYSICAL THERAPIST

## 2023-03-15 PROCEDURE — 99207 PR NO CHARGE LOS: CPT | Mod: VID

## 2023-03-15 PROCEDURE — 97012 MECHANICAL TRACTION THERAPY: CPT | Mod: GP | Performed by: PHYSICAL THERAPIST

## 2023-03-15 NOTE — TELEPHONE ENCOUNTER
Prior Authorization Not Needed per Insurance    Medication: mupirocin (BACTROBAN) 2 % external ointment--NO PA NEEDED  Insurance Company: [employer] - Phone [phone] Fax [phone]  Expected CoPay:      Pharmacy Filling the Rx: [id number] - [place], [place] - [address]  Pharmacy Notified: Yes  Patient Notified: Yes **Instructed pharmacy to notify patient when script is ready to /ship.**

## 2023-03-15 NOTE — PROGRESS NOTES
Disease State Management Encounter:                          Laxmi Ya is a 47 year old female contacted via secure video for a follow-up visit. Today's visit is a follow-up visit from 3/7/23.     Reason for visit: Hyperglycemia follow up    Rheumatoid Arthritis: Currently using Actemra 800 mg infusion monthly (last infusion 3/14/23) , diclofenac 1% gel as needed (uses once per day a few times per week), methylprednisolone 8 mg daily. Planning to follow methylprednisolone taper and decrease to 4 mg daily next week. Reports the Actemra has been more helpful than other medications she has tried previously but dose was not controlling symptoms adequately. Notes since her first increased dose infusion her upper arms feel much improved - pain and soreness seem to be improving. Also is starting to notice some improvement in her back but nothing significant yet. Reports she has been fatigued lately but otherwise no side effects.     Tried and failed: Humira, Enbrel, Orencia, Remicade, Simponi, Hydroxychloroquine     Liver Function Studies - Recent Labs   Lab Test 02/06/23  1222   PROTTOTAL 7.0   ALBUMIN 4.7   BILITOTAL 0.7   ALKPHOS 35   AST 26   ALT 39*     CBC RESULTS: Recent Labs   Lab Test 02/06/23  1222   WBC 11.7*   RBC 5.06   HGB 15.1   HCT 44.2   MCV 87   MCH 29.8   MCHC 34.2   RDW 12.8        Type 2 Diabetes/Steroid Induced Hyperglycemia:  Currently taking NPH insulin 10 units daily. Patient is not experiencing side effects. Stopped metformin when she started insulin. Reports that her diarrhea and nausea symptoms have improved and is now wondering if those were side effects and not worsening of her GI conditions.  Blood sugar monitoring: See below. 2-3 times daily  Date FBG/ 2hours post Lunch/2hours post Dinner /2hours post   3/14 125 221 227   3/13 128 204 253   3/12 123 195    3/11 162 263      Symptoms of low blood sugar? none  Symptoms of high blood sugar? Fatigue, nausea, general feeling of being  "\"unwell\". Notes she feels like this nearly every night.  Eye exam: due  Foot exam: due  Aspirin: No  Statin: No   ACEi/ARB: Yes: losartan 100 mg.   Urine Albumin:           Lab Results   Component Value Date     UMALCR   02/06/2023         Comment:         Unable to calculate, urine albumin and/or urine creatinine is outside detectable limits.  Microalbuminuria is defined as an albumin:creatinine ratio of 17 to 299 for males and 25 to 299 for females. A ratio of albumin:creatinine of 300 or higher is indicative of overt proteinuria.  Due to biologic variability, positive results should be confirmed by a second, first-morning random or 24-hour timed urine specimen. If there is discrepancy, a third specimen is recommended. When 2 out of 3 results are in the microalbuminuria range, this is evidence for incipient nephropathy and warrants increased efforts at glucose control, blood pressure control, and institution of therapy with an angiotensin-converting-enzyme (ACE) inhibitor (if the patient can tolerate it).              Lab Results   Component Value Date     A1C 7.0 02/06/2023     Supplements: Currently taking none. Reports she is seeing a functional medicine provider who has recommended a herbal medication regimen that would help with SIBO and a zinc supplement. Wondering if these will be safe with her Actemra and other medications.    Herbal regimen for SIBO: Metagenics Candibactin BR, Metagenics Candibactin AR, Atrantil, Orthomolecular Ortho Spore IG, Activated Charcoal    Fermented Zinc Complex supplement:       Today's Vitals: There were no vitals taken for this visit.    Assessment/Plan: Would benefit from continuing Actemra 800 mg monthly and continuing to follow methylprednisolone taper plan. Due to ongoing steroid induced hyperglycemia and readings >200 at 2 hour post-prandial readings, would benefit from increasing to 12 units of NPH insulin daily. Multiple drug interactions noted between current " prescription medication regimen and proposed herbal medication plan:    Fermented Zinc Complex  Oregano - may cause hypoglycemia when used with insulin  Sunset - may have additive effects on BP/HR when used with atenolol  Elderberry - potentially immunostimulating and could counteract Actemra  Eleuthero - may cause hypoglycemia when used with insulin, potentially immunostimulating and could counteract Actemra  Astragalus - potentially immunostimulating and could counteract Actemra  Aloe - may cause hypoglycemia when used with insulin, may cause hypokalemia with diuretics (spironolactone/hctz)  Corriander - may cause hypoglycemia when used with insulin, may cause hypotension when used with antihypertensives  Artichoke - may cause hypoglycemia when used with insulin, may cause hypotension when used with antihypertensives  Jen - may increase losartan drug levels leading to hypotension  Turmeric - may cause hypoglycemia when used with insulin    Metagenics Candibactin BR   May reduce effects of losartan and cause hypertension  Potentially increase side effects from methylprednisolone including hypokalemia   May cause hypoglycemia when used with insulin  May cause hypotension when used with antihypertensives  Rhubarb component - Potentially hepatotoxic with Actemra    Metagenic Candibactin AR   May cause hypoglycemia when used with insulin  Variable effect when used with antihypertensives (may become hypotensive or hypertensive)    Orthomolecualar Ortho Spore IG   Antibiotics inhibit effectiveness of supplement  May cause hypernatremia when used with corticosteroids     Activated Charcol   Can inactivate all oral tablets   May cause contraception tablets to become less effective    1. Increase NPH insulin to 12 units daily. Send me blood sugar readings on Friday.    2. The fermented zinc complex may not be a good choice due to the immunostimulating ingredients. If we could find a supplement with just zinc and copper  it would be safe with your other medications.    3. If you decide to start the herbal medications for SIBO please let us know. We will likely need to increase the liver enzyme lab testing and move around medication administration timing to avoid inactivating medications with activated charcoal.      Follow-up: Return in 15 days (on 3/30/2023) for MTM Pharmacist Visit.    I spent 52 minutes with this patient today. All changes were made via collaborative practice agreement with Oswaldo Lemus MD. A copy of the visit note was provided to the patient's provider(s).    A summary of these recommendations was sent via [employer].    Yuliya Kaplan, PharmD  Medication Therapy Management Pharmacist  [facility]  Phone: [phone]     Medication Therapy Recommendations  Steroid-induced hyperglycemia    Current Medication: insulin  UNIT/ML injection   Rationale: Dose too low - Dosage too low - Effectiveness   Recommendation: Increase Dose - 12 units daily   Status: Accepted per CPA         Takes dietary supplements    Current Medication: UNABLE TO FIND   Rationale: Medication interaction - Adverse medication event - Safety   Recommendation: Provide Education - Recommend against starting due to possible immunostimulating effect   Status: Patient Agreed - Adherence/Education

## 2023-03-16 ENCOUNTER — MYC MEDICAL ADVICE (OUTPATIENT)
Dept: PHARMACY | Facility: CLINIC | Age: 48
End: 2023-03-16
Payer: MEDICARE

## 2023-03-16 RX ORDER — MUPIROCIN 20 MG/G
OINTMENT TOPICAL DAILY
Qty: 30 G | Refills: 2 | Status: SHIPPED | OUTPATIENT
Start: 2023-03-16 | End: 2023-06-14

## 2023-03-17 LAB — HCV RNA SERPL NAA+PROBE-ACNC: NOT DETECTED IU/ML

## 2023-03-22 ENCOUNTER — THERAPY VISIT (OUTPATIENT)
Dept: PHYSICAL THERAPY | Facility: CLINIC | Age: 48
End: 2023-03-22
Payer: MEDICARE

## 2023-03-22 DIAGNOSIS — R10.2 PELVIC PAIN IN FEMALE: Primary | ICD-10-CM

## 2023-03-22 DIAGNOSIS — M54.2 NECK PAIN: ICD-10-CM

## 2023-03-22 PROCEDURE — 97012 MECHANICAL TRACTION THERAPY: CPT | Mod: GP | Performed by: PHYSICAL THERAPIST

## 2023-03-22 PROCEDURE — 97140 MANUAL THERAPY 1/> REGIONS: CPT | Mod: GP | Performed by: PHYSICAL THERAPIST

## 2023-03-22 NOTE — TELEPHONE ENCOUNTER
Free Drug Application Initiated  Medication:   [employer]  Sponsor: [employer]   Phone #:  [phone]  Fax #:  [phone]  Additional Information:     Writer spoke to [employer]-- they are now missing the MD forms for renewal      I have attached the 3 pages to the media tab please have Dr Gann sign and complete RX sections      FAX TO [employer] and Liaison----inform liaison when complete    Thank you!     HERMES [email or web address], [facility]  Specialty Pharmacy Clinic Liaison     [facility]    [email or web address]     Phone: [phone]  Fax: [phone]

## 2023-03-24 ENCOUNTER — MYC MEDICAL ADVICE (OUTPATIENT)
Dept: PHARMACY | Facility: CLINIC | Age: 48
End: 2023-03-24
Payer: MEDICARE

## 2023-03-24 NOTE — PATIENT INSTRUCTIONS
"Recommendations from today's MTM visit:                                                       1. Increase NPH insulin to 12 units daily. Send me blood sugar readings on Friday.    2. The fermented zinc complex may not be a good choice due to the immunostimulating ingredients. If we could find a supplement with just zinc and copper it would be safe with your other medications.    3. If you decide to start the herbal medications for SIBO please let us know. We will likely need to increase the liver enzyme lab testing and move around medication administration timing to avoid inactivating medications with activated charcoal.      Follow-up: Return in 15 days (on 3/30/2023) for MTM Pharmacist Visit.    It was great speaking with you today.  I value your experience and would be very thankful for your time in providing feedback in our clinic survey. In the next few days, you may receive an email or text message from [employer] with a link to a survey related to your  clinical pharmacist.\"     To schedule another MTM appointment, please call the clinic directly or you may call the MTM scheduling line at [phone] or toll-free at [phone].     My Clinical Pharmacist's contact information:                                                      Please feel free to contact me with any questions or concerns you have.      Yuliya Kaplan, Frances  Medication Therapy Management Pharmacist  [facility]  Phone: [phone]     "

## 2023-03-28 ENCOUNTER — MEDICAL CORRESPONDENCE (OUTPATIENT)
Dept: HEALTH INFORMATION MANAGEMENT | Facility: CLINIC | Age: 48
End: 2023-03-28
Payer: MEDICARE

## 2023-03-29 ENCOUNTER — THERAPY VISIT (OUTPATIENT)
Dept: PHYSICAL THERAPY | Facility: CLINIC | Age: 48
End: 2023-03-29
Payer: MEDICARE

## 2023-03-29 DIAGNOSIS — M54.2 NECK PAIN: ICD-10-CM

## 2023-03-29 DIAGNOSIS — G89.29 CHRONIC BILATERAL LOW BACK PAIN WITHOUT SCIATICA: ICD-10-CM

## 2023-03-29 DIAGNOSIS — R10.2 PELVIC PAIN IN FEMALE: Primary | ICD-10-CM

## 2023-03-29 DIAGNOSIS — M54.50 CHRONIC BILATERAL LOW BACK PAIN WITHOUT SCIATICA: ICD-10-CM

## 2023-03-29 PROCEDURE — 97012 MECHANICAL TRACTION THERAPY: CPT | Mod: GP | Performed by: PHYSICAL THERAPIST

## 2023-03-29 PROCEDURE — 97140 MANUAL THERAPY 1/> REGIONS: CPT | Mod: GP | Performed by: PHYSICAL THERAPIST

## 2023-03-29 NOTE — TELEPHONE ENCOUNTER
"Writer spoke with [employer] to check status of application. All portions were received and everything \"looks good\". It is now under final review.  "

## 2023-03-30 ENCOUNTER — TRANSCRIBE ORDERS (OUTPATIENT)
Dept: OTHER | Age: 48
End: 2023-03-30

## 2023-03-30 ENCOUNTER — VIRTUAL VISIT (OUTPATIENT)
Dept: PHARMACY | Facility: CLINIC | Age: 48
End: 2023-03-30
Payer: COMMERCIAL

## 2023-03-30 DIAGNOSIS — R73.9 STEROID-INDUCED HYPERGLYCEMIA: ICD-10-CM

## 2023-03-30 DIAGNOSIS — T38.0X5A STEROID-INDUCED HYPERGLYCEMIA: ICD-10-CM

## 2023-03-30 DIAGNOSIS — Z78.9 TAKES DIETARY SUPPLEMENTS: ICD-10-CM

## 2023-03-30 DIAGNOSIS — E11.9 TYPE 2 DIABETES MELLITUS WITHOUT COMPLICATION, WITHOUT LONG-TERM CURRENT USE OF INSULIN (H): ICD-10-CM

## 2023-03-30 DIAGNOSIS — N39.0 RECURRENT UTI: Primary | ICD-10-CM

## 2023-03-30 DIAGNOSIS — M06.00 SERONEGATIVE RHEUMATOID ARTHRITIS (H): Primary | ICD-10-CM

## 2023-03-30 PROCEDURE — 99207 PR NO CHARGE LOS: CPT

## 2023-03-30 NOTE — PROGRESS NOTES
Disease State Management Encounter:                          Laxmi Ya is a 48 year old female called for a follow-up visit.  Today's visit is a follow-up visit from 3/15/23.    Reason for visit: Hyperglycemia and steroid taper follow up    Rheumatoid Arthritis: Currently using Actemra 800 mg infusion monthly (last infusion 3/14/23) , diclofenac 1% gel as needed (uses once per day a few times per week), methylprednisolone 4 mg daily. Planning to follow methylprednisolone taper and discontinue methylprednisolone next week. Reports the Actemra has been more helpful than other medications she has tried previously but dose was not controlling symptoms adequately. Notes her upper arms feel improved - pain and soreness seem to be better. Also is starting to notice some improvement in her back but nothing significant yet. Reports she has been fatigued lately and has noticed stretch marks appear on both sides of her abdomen. Notes she believes the stretch marks are due to her inflammation worsening on her current medications. Also reports she is having eye inflammation resulting in sore eyes, subconjunctival hemorrhage in right eye, and increased tear production. Has had this checked by ophthalmologist who noted vision and pressures were normal, however patient reports she is positive the inflammation is worsening pressures and will cause long term damage.      Tried and failed: Humira, Enbrel, Orencia, Remicade, Simponi, Hydroxychloroquine     Liver Function Studies - Recent Labs   Lab Test 02/06/23  1222   PROTTOTAL 7.0   ALBUMIN 4.7   BILITOTAL 0.7   ALKPHOS 35   AST 26   ALT 39*     CBC RESULTS: Recent Labs   Lab Test 02/06/23  1222   WBC 11.7*   RBC 5.06   HGB 15.1   HCT 44.2   MCV 87   MCH 29.8   MCHC 34.2   RDW 12.8        Type 2 Diabetes/Steroid Induced Hyperglycemia:  Currently taking NPH insulin 24 units daily (since 3/24). Patient is not experiencing side effects. Stopped metformin when she started  "insulin. Reports that her diarrhea and nausea symptoms have improved and is now wondering if those were side effects and not worsening of her GI conditions. Notes she is unsure if this insulin is actually helping and plans to work through  diabetes education for future blood sugar management.  Blood sugar monitoring: See below. 2-3 times daily  Date FBG/ 2hours post Lunch/2hours post Dinner /2hours post   3/30 110  175   3/29 108 204 253   3/28 112 195    3/27 102 (25 units) 263      Symptoms of low blood sugar? none  Symptoms of high blood sugar? Fatigue, nausea, general feeling of being \"unwell\".  Eye exam: due  Foot exam: due  Aspirin: No  Statin: No   ACEi/ARB: Yes: losartan 100 mg.   Urine Albumin:           Lab Results   Component Value Date     UMALCR   02/06/2023         Comment:         Unable to calculate, urine albumin and/or urine creatinine is outside detectable limits.  Microalbuminuria is defined as an albumin:creatinine ratio of 17 to 299 for males and 25 to 299 for females. A ratio of albumin:creatinine of 300 or higher is indicative of overt proteinuria.  Due to biologic variability, positive results should be confirmed by a second, first-morning random or 24-hour timed urine specimen. If there is discrepancy, a third specimen is recommended. When 2 out of 3 results are in the microalbuminuria range, this is evidence for incipient nephropathy and warrants increased efforts at glucose control, blood pressure control, and institution of therapy with an angiotensin-converting-enzyme (ACE) inhibitor (if the patient can tolerate it).              Lab Results   Component Value Date     A1C 7.0 02/06/2023     Supplements: Currently taking none. Reports she is looking at starting a couple different supplements - digestive enzymes and multivitamin. Wondering if these will be safe with her Actemra and other medications.     Today's Vitals: There were no vitals taken for this visit.    Assessment/Plan: " Patient is continuing to report inflammation symptoms despite maximum dose Actemra. Due to new symptoms, would benefit from reevaluation with rheumatologist. Plan to send note to rheumatologist to review. Some post-prandial readings still above goal of <200 mg/dL. Would benefit from increasing NPH to 26 units to help reduce blood sugars. Patient declines recommendations and prefers to wait for any further adjustments until appointment with diabetes education. Will plan to transition all diabetes/steroid induced hyperglycemia medications to diabetes education management. Plan to check for medication interactions between possible supplements and current medications.    1. I will send a message to Dr. Lemus to share your concerns about new abdominal stretch marks and possible eye inflammation.    2. We will plan to transfer management of your blood sugars to [facility] diabetes education. You will need to contact them for all future refills and medication adjustments.    3. I will check for interactions between the new supplements you would like to start and your current medications.    Follow-up: Return in about 4 weeks (around 4/27/2023) for [facility] Pharmacist Visit.    I spent 60 minutes with this patient today. All changes were made via collaborative practice agreement with Oswaldo Lemus MD. A copy of the visit note was provided to the patient's provider(s).    A summary of these recommendations was sent via [employer].    Yuliya Kaplan, PharmD  Medication Therapy Management Pharmacist  [facility]  Phone: [phone]     Medication Therapy Recommendations  Steroid-induced hyperglycemia    Current Medication: insulin  UNIT/ML injection (Discontinued)   Rationale: Dose too low - Dosage too low - Effectiveness   Recommendation: Increase Dose - 26 units daily   Status: Declined per Patient

## 2023-03-31 ENCOUNTER — TELEPHONE (OUTPATIENT)
Dept: GASTROENTEROLOGY | Facility: CLINIC | Age: 48
End: 2023-03-31
Payer: MEDICARE

## 2023-04-11 ENCOUNTER — MYC MEDICAL ADVICE (OUTPATIENT)
Dept: OTOLARYNGOLOGY | Facility: CLINIC | Age: 48
End: 2023-04-11

## 2023-04-11 ENCOUNTER — INFUSION THERAPY VISIT (OUTPATIENT)
Dept: INFUSION THERAPY | Facility: CLINIC | Age: 48
End: 2023-04-11
Attending: INTERNAL MEDICINE
Payer: MEDICARE

## 2023-04-11 VITALS
HEART RATE: 60 BPM | BODY MASS INDEX: 35.3 KG/M2 | SYSTOLIC BLOOD PRESSURE: 127 MMHG | RESPIRATION RATE: 16 BRPM | TEMPERATURE: 99 F | WEIGHT: 218.7 LBS | OXYGEN SATURATION: 96 % | DIASTOLIC BLOOD PRESSURE: 88 MMHG

## 2023-04-11 DIAGNOSIS — M13.80 SERONEGATIVE INFLAMMATORY ARTHRITIS: Primary | ICD-10-CM

## 2023-04-11 LAB
ALBUMIN SERPL BCG-MCNC: 4.4 G/DL (ref 3.5–5.2)
ALBUMIN UR-MCNC: NEGATIVE MG/DL
ALP SERPL-CCNC: 41 U/L (ref 35–104)
ALT SERPL W P-5'-P-CCNC: 54 U/L (ref 10–35)
ANION GAP SERPL CALCULATED.3IONS-SCNC: 11 MMOL/L (ref 7–15)
APPEARANCE UR: CLEAR
AST SERPL W P-5'-P-CCNC: 33 U/L (ref 10–35)
BASOPHILS # BLD AUTO: 0.1 10E3/UL (ref 0–0.2)
BASOPHILS NFR BLD AUTO: 1 %
BILIRUB SERPL-MCNC: 0.7 MG/DL
BILIRUB UR QL STRIP: NEGATIVE
BUN SERPL-MCNC: 12.2 MG/DL (ref 6–20)
CALCIUM SERPL-MCNC: 9.8 MG/DL (ref 8.6–10)
CHLORIDE SERPL-SCNC: 105 MMOL/L (ref 98–107)
COLOR UR AUTO: YELLOW
CREAT SERPL-MCNC: 0.94 MG/DL (ref 0.51–0.95)
DEPRECATED HCO3 PLAS-SCNC: 24 MMOL/L (ref 22–29)
EOSINOPHIL # BLD AUTO: 0.4 10E3/UL (ref 0–0.7)
EOSINOPHIL NFR BLD AUTO: 6 %
ERYTHROCYTE [DISTWIDTH] IN BLOOD BY AUTOMATED COUNT: 13.5 % (ref 10–15)
GFR SERPL CREATININE-BSD FRML MDRD: 75 ML/MIN/1.73M2
GLUCOSE SERPL-MCNC: 134 MG/DL (ref 70–99)
GLUCOSE UR STRIP-MCNC: NEGATIVE MG/DL
HCT VFR BLD AUTO: 42.6 % (ref 35–47)
HGB BLD-MCNC: 14.8 G/DL (ref 11.7–15.7)
HGB UR QL STRIP: NEGATIVE
IMM GRANULOCYTES # BLD: 0 10E3/UL
IMM GRANULOCYTES NFR BLD: 0 %
KETONES UR STRIP-MCNC: NEGATIVE MG/DL
LEUKOCYTE ESTERASE UR QL STRIP: NEGATIVE
LYMPHOCYTES # BLD AUTO: 2.4 10E3/UL (ref 0.8–5.3)
LYMPHOCYTES NFR BLD AUTO: 36 %
MCH RBC QN AUTO: 30 PG (ref 26.5–33)
MCHC RBC AUTO-ENTMCNC: 34.7 G/DL (ref 31.5–36.5)
MCV RBC AUTO: 86 FL (ref 78–100)
MONOCYTES # BLD AUTO: 0.8 10E3/UL (ref 0–1.3)
MONOCYTES NFR BLD AUTO: 12 %
MUCOUS THREADS #/AREA URNS LPF: PRESENT /LPF
NEUTROPHILS # BLD AUTO: 3 10E3/UL (ref 1.6–8.3)
NEUTROPHILS NFR BLD AUTO: 45 %
NITRATE UR QL: NEGATIVE
NRBC # BLD AUTO: 0 10E3/UL
NRBC BLD AUTO-RTO: 0 /100
PH UR STRIP: 6 [PH] (ref 5–7)
PLATELET # BLD AUTO: 320 10E3/UL (ref 150–450)
POTASSIUM SERPL-SCNC: 4.3 MMOL/L (ref 3.4–5.3)
PROT SERPL-MCNC: 6.6 G/DL (ref 6.4–8.3)
RBC # BLD AUTO: 4.93 10E6/UL (ref 3.8–5.2)
RBC URINE: 1 /HPF
SODIUM SERPL-SCNC: 140 MMOL/L (ref 136–145)
SP GR UR STRIP: 1.02 (ref 1–1.03)
SQUAMOUS EPITHELIAL: 15 /HPF
UROBILINOGEN UR STRIP-MCNC: NORMAL MG/DL
WBC # BLD AUTO: 6.7 10E3/UL (ref 4–11)
WBC URINE: 1 /HPF

## 2023-04-11 PROCEDURE — 80053 COMPREHEN METABOLIC PANEL: CPT | Performed by: INTERNAL MEDICINE

## 2023-04-11 PROCEDURE — 999N000285 HC STATISTIC VASC ACCESS LAB DRAW WITH PIV START

## 2023-04-11 PROCEDURE — 36415 COLL VENOUS BLD VENIPUNCTURE: CPT | Performed by: INTERNAL MEDICINE

## 2023-04-11 PROCEDURE — 81001 URINALYSIS AUTO W/SCOPE: CPT | Performed by: INTERNAL MEDICINE

## 2023-04-11 PROCEDURE — 250N000013 HC RX MED GY IP 250 OP 250 PS 637: Performed by: INTERNAL MEDICINE

## 2023-04-11 PROCEDURE — 96375 TX/PRO/DX INJ NEW DRUG ADDON: CPT

## 2023-04-11 PROCEDURE — 96365 THER/PROPH/DIAG IV INF INIT: CPT

## 2023-04-11 PROCEDURE — 258N000003 HC RX IP 258 OP 636: Performed by: INTERNAL MEDICINE

## 2023-04-11 PROCEDURE — 85025 COMPLETE CBC W/AUTO DIFF WBC: CPT | Performed by: INTERNAL MEDICINE

## 2023-04-11 PROCEDURE — 999N000128 HC STATISTIC PERIPHERAL IV START W/O US GUIDANCE

## 2023-04-11 PROCEDURE — 250N000011 HC RX IP 250 OP 636: Performed by: INTERNAL MEDICINE

## 2023-04-11 RX ORDER — ACETAMINOPHEN 325 MG/1
650 TABLET ORAL ONCE
Status: COMPLETED | OUTPATIENT
Start: 2023-04-11 | End: 2023-04-11

## 2023-04-11 RX ORDER — DIPHENHYDRAMINE HYDROCHLORIDE 50 MG/ML
50 INJECTION INTRAMUSCULAR; INTRAVENOUS
Status: CANCELLED
Start: 2023-05-08

## 2023-04-11 RX ORDER — MEPERIDINE HYDROCHLORIDE 25 MG/ML
25 INJECTION INTRAMUSCULAR; INTRAVENOUS; SUBCUTANEOUS EVERY 30 MIN PRN
Status: CANCELLED | OUTPATIENT
Start: 2023-05-08

## 2023-04-11 RX ORDER — ACETAMINOPHEN 325 MG/1
650 TABLET ORAL ONCE
Status: CANCELLED | OUTPATIENT
Start: 2023-05-08

## 2023-04-11 RX ORDER — METHYLPREDNISOLONE SODIUM SUCCINATE 125 MG/2ML
125 INJECTION, POWDER, LYOPHILIZED, FOR SOLUTION INTRAMUSCULAR; INTRAVENOUS
Status: CANCELLED
Start: 2023-05-08

## 2023-04-11 RX ORDER — METHYLPREDNISOLONE SODIUM SUCCINATE 125 MG/2ML
125 INJECTION, POWDER, LYOPHILIZED, FOR SOLUTION INTRAMUSCULAR; INTRAVENOUS ONCE
Status: CANCELLED | OUTPATIENT
Start: 2023-05-08

## 2023-04-11 RX ORDER — ALBUTEROL SULFATE 90 UG/1
1-2 AEROSOL, METERED RESPIRATORY (INHALATION)
Status: CANCELLED
Start: 2023-05-08

## 2023-04-11 RX ORDER — HEPARIN SODIUM (PORCINE) LOCK FLUSH IV SOLN 100 UNIT/ML 100 UNIT/ML
5 SOLUTION INTRAVENOUS
Status: CANCELLED | OUTPATIENT
Start: 2023-05-08

## 2023-04-11 RX ORDER — HEPARIN SODIUM,PORCINE 10 UNIT/ML
5 VIAL (ML) INTRAVENOUS
Status: CANCELLED | OUTPATIENT
Start: 2023-05-08

## 2023-04-11 RX ORDER — METHYLPREDNISOLONE SODIUM SUCCINATE 125 MG/2ML
125 INJECTION, POWDER, LYOPHILIZED, FOR SOLUTION INTRAMUSCULAR; INTRAVENOUS ONCE
Status: COMPLETED | OUTPATIENT
Start: 2023-04-11 | End: 2023-04-11

## 2023-04-11 RX ORDER — EPINEPHRINE 1 MG/ML
0.3 INJECTION, SOLUTION INTRAMUSCULAR; SUBCUTANEOUS EVERY 5 MIN PRN
Status: CANCELLED | OUTPATIENT
Start: 2023-05-08

## 2023-04-11 RX ORDER — ALBUTEROL SULFATE 0.83 MG/ML
2.5 SOLUTION RESPIRATORY (INHALATION)
Status: CANCELLED | OUTPATIENT
Start: 2023-05-08

## 2023-04-11 RX ADMIN — METHYLPREDNISOLONE SODIUM SUCCINATE 125 MG: 125 INJECTION, POWDER, FOR SOLUTION INTRAMUSCULAR; INTRAVENOUS at 13:08

## 2023-04-11 RX ADMIN — TOCILIZUMAB 800 MG: 20 INJECTION, SOLUTION, CONCENTRATE INTRAVENOUS at 13:24

## 2023-04-11 RX ADMIN — ACETAMINOPHEN 650 MG: 325 TABLET ORAL at 13:06

## 2023-04-11 NOTE — PROGRESS NOTES
"  [facility]  New Patient Visit      Encounter date:   April 12, 2023    Referring Provider:   Referred Self, MD  No address on file    Chief Complaint: recurrent sinusitis    History of Present Illness:   Laxmi Ya is a 48 year old female with significant history of sinus disease who presents for consultation regarding sinusitis for which she undergoes Kenalog injections through [facility]. She is here with continued symptoms of inflammation. She has been using Nasonex recently. There is drainage from her nose, post nasal, and from the eyes. She recently completed a several month course of systemic steroids under rheum specailist. The overall sinus symptoms are stable on Kenalog but she describes residual sinus \"inlammation\" and headaches.     She does have a rather complex history of recurrent systemic infections; she has been evaluated by multiple I&D providers as well as following with GI for SIBO (on Xifaxan) and rheumatology for rheumatoid arthritis.     Sino-Nasal Outcome Test (SNOT - 22)   DNT    [facility]:  She has had 3 sinonasal surgeries with Dr. Pan and Dr. Mac as well as tonsillectomy.    Review of systems: A 14-point review of systems has been conducted and was negative for any notable symptoms, except as dictated in the history of present illness.     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     Obesity      Pyelonephritis, unspecified 1992    abstract     Rheumatoid arteritis (H)      Seronegative inflammatory arthritis 6/17/2022     Unspecified symptom associated with female genital organs 05/07/1999    chronic pelvic pain abstract        Surgical History:   Past Surgical " "History:   Procedure Laterality Date     CHOLECYSTECTOMY       COLON SURGERY       CYSTOSCOPY,+URETEROSCOPY      abstract     ZZHC EXCIS/CURET BENIGN ELBOW LESN  10/26/1987    (R) humerus abstract        Family History:  Family History   Problem Relation Age of Onset     Hypertension Father         abstract     Cancer Paternal Aunt         gallbladder cancer abstract        Social History:   Social History     Socioeconomic History     Marital status: Single   Tobacco Use     Smoking status: Never     Smokeless tobacco: Never   Substance and Sexual Activity     Alcohol use: Yes     Drug use: No   Other Topics Concern     Exercise No     Seat Belt No     Self-Exams No   Social History Narrative    Womens Health Kit given.            Physical Exam:  Vital signs: BP (!) 144/97 (BP Location: Left arm, Patient Position: Sitting, Cuff Size: Adult Regular)   Pulse 74   Ht 1.676 m (5' 6\")   Wt 98.9 kg (218 lb)   SpO2 96%   BMI 35.19 kg/m     General Appearance: No acute distress, appropriate demeanor, conversant  Eyes: moist conjunctivae; EOMI; pupils symmetric; visual acuity grossly intact; no proptosis  Head: normocephalic; overall symmetric appearance without deformity  Face: overall symmetric without deformity; HB I/VI  Ears: Normal appearance of external ear; external meatus normal in appearance; TMs intact without perforation bilaterally;   Nose: No external deformity; see endoscopy  Oral Cavity/oropharynx: Normal appearance of mucosa; tongue midline; no mass or lesions; tonsils surgically absent; oropharynx without obvious mucosal abnormality  Neck: no palpable lymphadenopathy; thyroid without palpable nodules  Lungs: symmetric chest rise; no wheezing  CV: Good distal perfusion; normal hear rate  Extremities: No deformity  Neurologic Exam: Cranial nerves II-XII are grossly intact; no focal deficit    Procedure Note  Procedure performed: Rigid nasal endoscopy  Indication: To evaluate for sinonasal pathology not " visualized on routine anterior rhinoscopy  Anesthesia: 4% topical lidocaine with 0.05% oxymetazoline  Description of procedure: A 30 degree, 3 mm rigid endoscope was inserted into bilateral nasal cavities and the nasal valves, nasal cavity, middle meatus, sphenoethmoid recess, and nasopharynx were thoroughly evaluated for evidence of obstruction, edema, purulence, polyps and/or mass/lesion.     [place]tor Endoscopic Scoring System  Endoscopic observation Right Left   Polyps in middle meatus (0 = absent, 1 = restricted to middle meatus, 2 = Beyond middle meatus) 0 0   Discharge (0 = absent, 1 = thin and clear, 2 = thick, purulent) 0 0   Edema (0 = absent, 1 = mild-moderate, 2 = moderate-severe) 0 0   Crusting (0 = absent, 1 = mild-moderate, 2 = moderate-severe) 0 0   Scarring (0= absent, 1 = mild-moderate, 2 = moderate-severe) 0 0   Total 0 0     Findings  RT: surgical resection of inferior turbinate; post surgical changes; MM and SER clear  LT:  lateralization of the MM; near complete resection of inferior turbinate; spheno-ethmoid recess is clear;     The patient tolerated the procedure well without complication.     Laboratory Review:  n/a    Imaging Review:  CT Sinus 3/3/22  The paranasal sinuses are clear. Osseous thickening of the  lateral wall of the right maxillary sinus which may represent sequela of prior chronic sinusitis.    Pathology Review:  n/a    Assessment/Medical Decision Making:  CRS  Atypical facial pain  Rheumatoid arthritis    Plan:  Bilateral endoscopy performed today shows no evidence of sinusitis.  Based on today's exam, I do not recommend any additional treatments, including revision surgery.  Because I see no evidence of polyp disease today, I would not recommend her starting Dupixent for sinus disease alone.  I would not rule out starting Dupixent for other diseases like eosinophilic asthma or eczema, but I would not start it for polyps as I see no evidence of polyps disease.  I would  also not recommend additional steroid treatment for any sinusitis based on today's exam and my review of prior imaging.  I would manage her symptoms conservatively with topical nasal steroid sprays and irrigations.  We discussed multifactorial nature of headache symptoms.  I am not certain that sinuses are underlying her headaches at this time.  She was allowed to ask a number of insightful questions which I did answer the best my ability.    She may follow-up with me as needed.    Ángel Stanley MD    [facility]  Rhinology  Endoscopic Skull Base Surgery  [facility]  Department of Otolaryngology - Head & Neck Surgery    Scribe Disclosure:  I, Griffin Lawler, am serving as a scribe to document services personally performed by Ángel Stanley MD at this visit, based upon the provider's statements to me. All documentation has been reviewed by the aforementioned provider prior to being entered into the official medical record.

## 2023-04-11 NOTE — PROGRESS NOTES
Nursing Note  Laxmi Ya [facility] for:   Chief Complaint   Patient presents with     Infusion     Tocilizumab (actemra)     During today's [facility] appointment, orders from Dr. Lemus were completed.  Frequency: every 28 days    Progress note:  Patient identification verified by name and date of birth.  Assessment completed.  Vitals recorded in Doc Flowsheets.  Patient was provided with education regarding medication/procedure and possible side effects.  Patient verbalized understanding.     present during visit today: Not Applicable.    Treatment Conditions: Patient denies fever, chills, signs of infection, recent illness, antibiotics use, productive cough or elevated temperature.  Premedications: administered per order.  Drug Waste Record: No  Infusion length and rate:  150 ml/hr., over 60 minutes, patient request 250 ml/ns at end, run over 10 minutes after flush  Labs: were drawn per orders.   Vascular access: peripheral IV was placed by vascular access nurse.  Is the next appt scheduled? yes    Post Infusion Assessment:  Patient tolerated infusion without incident.  Site patent and intact, free from redness, edema or discomfort.  No evidence of extravasations.  Access discontinued per protocol.     Discharge Plan:   Follow up plan of care with: ongoing infusions at [facility].  Discharge instructions were reviewed with patient.  Patient/representative verbalized understanding of discharge instructions and all questions answered.  Patient discharged from [facility] in stable condition.    Kaley Palacio RN    Administrations This Visit     acetaminophen (TYLENOL) tablet 650 mg     Admin Date  04/11/2023 Action  $Given Dose  650 mg Route  Oral Administered By  Kaley Palacio RN          methylPREDNISolone sodium succinate (solu-MEDROL) injection 125 mg  "    Admin Date  04/11/2023 Action  $Given Dose  125 mg Route  Intravenous Administered By  Kaley Palacio RN          tocilizumab (ACTEMRA) 800 mg in sodium chloride 0.9 % 150 mL infusion     Admin Date  04/11/2023 Action  $New Bag Dose  800 mg Rate  150 mL/hr Route  Intravenous Administered By  Kaley Palacio RN                BP (!) 145/92   Pulse 77   Temp 99  F (37.2  C) (Oral)   Resp 16   Wt 99.2 kg (218 lb 11.2 oz)   SpO2 96%   BMI 35.30 kg/m      PRIOR TO INFUSION OF BIOLOGICAL MEDICATIONS OR ANY OF THESE AS LISTED: Acctemra (tocilizumab) \".rheumbiologicalchecklist\"    Prior to Infusion of biological medications or any of these as listed:    1. Elevated temperature, fever, chills, productive cough or abnormal vital signs, night sweats, coughing up blood or sputum, no appetite or abnormal vital signs : NO    2. Open wounds or new incisions: NO    3. Recent hospitalization: NO    4.  Recent surgeries:  NO    5. Any upcoming surgeries or dental procedures?:NO    6. Any current or recent bouts of illness or infection? On any antibiotics? : NO    7. Any new, sudden or worsening abdominal pain :NO    8. Vaccination within 4 weeks? Patient or someone in the household is scheduled to receive vaccination? No live virus vaccines prior to or during treatment :NO    9. Any nervous system diseases [i.e. multiple sclerosis, Guillain-[place], seizures, neurological  changes]: NO    10. Pregnant or breast feeding; or plans on pregnancy in the future: NO    11. Signs of worsening depression or suicidal ideations while taking benlysta:NO    12. New-onset medical symptoms: NO    13.  New cancer diagnosis or on chemotherapy or radiation NO    14.  Evaluate for any sign of active TB [Unexplained weight loss, Loss of appetite, Night sweats, Fever, Fatigue, Chills, Coughing for 3 weeks or longer, Hemoptysis (coughing up blood), Chest pain]: NO    **Note: If answered yes to any of the above, hold the infusion and " contact ordering rheumatologist or on-call rheumatologist.

## 2023-04-11 NOTE — PATIENT INSTRUCTIONS
Dear Laxmi Ya    Thank you for choosing [facility] ([facility]) for your infusion.  The following information is a summary of our appointment as well as important reminders.      We look forward in seeing you on your next appointment here at [facility] ([facility]).  Please don t hesitate to call us at [phone] to reschedule any of your appointments or to speak with one of the [facility] registered nurses.  It was a pleasure taking care of you today.    Sincerely,    [facility]  [facility]  [address] [place] [address]  [address]  Phone:  [phone]

## 2023-04-12 ENCOUNTER — OFFICE VISIT (OUTPATIENT)
Dept: OTOLARYNGOLOGY | Facility: CLINIC | Age: 48
End: 2023-04-12
Payer: MEDICARE

## 2023-04-12 VITALS
DIASTOLIC BLOOD PRESSURE: 97 MMHG | WEIGHT: 218 LBS | HEIGHT: 66 IN | OXYGEN SATURATION: 96 % | BODY MASS INDEX: 35.03 KG/M2 | HEART RATE: 74 BPM | SYSTOLIC BLOOD PRESSURE: 144 MMHG

## 2023-04-12 DIAGNOSIS — R09.81 NASAL CONGESTION: ICD-10-CM

## 2023-04-12 DIAGNOSIS — M13.80 SERONEGATIVE INFLAMMATORY ARTHRITIS: ICD-10-CM

## 2023-04-12 DIAGNOSIS — G50.1 ATYPICAL FACIAL PAIN: ICD-10-CM

## 2023-04-12 DIAGNOSIS — J30.1 SEASONAL ALLERGIC RHINITIS DUE TO POLLEN: Primary | ICD-10-CM

## 2023-04-12 DIAGNOSIS — E66.01 MORBID OBESITY (H): ICD-10-CM

## 2023-04-12 PROBLEM — E11.9 DIABETES MELLITUS, TYPE 2 (H): Status: ACTIVE | Noted: 2023-04-12

## 2023-04-12 PROCEDURE — 31231 NASAL ENDOSCOPY DX: CPT | Performed by: OTOLARYNGOLOGY

## 2023-04-12 PROCEDURE — 99204 OFFICE O/P NEW MOD 45 MIN: CPT | Mod: 25 | Performed by: OTOLARYNGOLOGY

## 2023-04-12 ASSESSMENT — PAIN SCALES - GENERAL: PAINLEVEL: NO PAIN (0)

## 2023-04-12 NOTE — TELEPHONE ENCOUNTER
Free Drug Application Approved  Medication: Dupixent  Effective Dates: til 12/31/2023  Filling Pharmacy:[facility]  Patient notified: Yes

## 2023-04-12 NOTE — PATIENT INSTRUCTIONS
You were seen in the ENT Clinic today by Dr. Stanley. If you have any questions or concerns after your appointment, please contact us (see below)      2.   The following recommendations have been made based upon your appointment today:   -Continue sinus rinses and Nasonex.    3.   If symptoms worsen or fail to improve, please return to the ENT clinic.           How to Contact Us:  Send a Insights message to your provider. Our team will respond to you via Insights. Occasionally, we will need to call you to get further information.  For urgent matters (Monday-Friday), call the ENT Clinic: [phone] and speak with a call center team member - they will route your call appropriately.   If you'd like to speak directly with a nurse, please find our contact information below. We do our best to check voicemail frequently throughout the day, and will work to call you back within 1-2 days. For urgent matters, please use the general clinic phone numbers listed above.        Lucy STEELE RN  ENT RN Care Coordinator  Direct: [phone]  Elo BOONE LPN  Direct: [phone]         [facility]  Department of Otolaryngology

## 2023-04-17 ENCOUNTER — THERAPY VISIT (OUTPATIENT)
Dept: PHYSICAL THERAPY | Facility: CLINIC | Age: 48
End: 2023-04-17
Payer: MEDICARE

## 2023-04-17 DIAGNOSIS — R10.2 PELVIC PAIN IN FEMALE: Primary | ICD-10-CM

## 2023-04-17 DIAGNOSIS — M54.2 NECK PAIN: ICD-10-CM

## 2023-04-17 PROCEDURE — 97112 NEUROMUSCULAR REEDUCATION: CPT | Mod: GP | Performed by: PHYSICAL THERAPIST

## 2023-04-17 PROCEDURE — 97140 MANUAL THERAPY 1/> REGIONS: CPT | Mod: GP | Performed by: PHYSICAL THERAPIST

## 2023-04-26 ENCOUNTER — TELEPHONE (OUTPATIENT)
Dept: ORTHOPEDICS | Facility: CLINIC | Age: 48
End: 2023-04-26

## 2023-04-26 NOTE — TELEPHONE ENCOUNTER
[facility] Call Center    Phone Message    May a detailed message be left on voicemail: yes     Reason for Call: Other: Laxmi called she is scheduled for 5/23/23 with Dr. Sullivan for wrist injections but is wondering if there is anyway to fit her in sooner bucause she is having a lot of wrist pain. Please call to discuss     Action Taken: Other: [facility]    Travel Screening: Not Applicable

## 2023-04-26 NOTE — TELEPHONE ENCOUNTER
TIM LVM for patient informing her that her appointment on 5/23/2023 is Dr. Sullivan's next available appointment as starting on 4/27/2023-5/8/2023 he will not be in clinic.    Provided call back number of [phone] if she has further questions or she can send a my chart message.    TIM Kebede

## 2023-04-27 ENCOUNTER — THERAPY VISIT (OUTPATIENT)
Dept: PHYSICAL THERAPY | Facility: CLINIC | Age: 48
End: 2023-04-27
Payer: MEDICARE

## 2023-04-27 DIAGNOSIS — M54.50 BILATERAL LOW BACK PAIN WITHOUT SCIATICA: ICD-10-CM

## 2023-04-27 DIAGNOSIS — M54.2 NECK PAIN: ICD-10-CM

## 2023-04-27 DIAGNOSIS — R10.2 PELVIC PAIN IN FEMALE: Primary | ICD-10-CM

## 2023-04-27 PROCEDURE — 97012 MECHANICAL TRACTION THERAPY: CPT | Mod: GP | Performed by: PHYSICAL THERAPIST

## 2023-04-27 PROCEDURE — 97140 MANUAL THERAPY 1/> REGIONS: CPT | Mod: GP | Performed by: PHYSICAL THERAPIST

## 2023-04-28 NOTE — TELEPHONE ENCOUNTER
Talked to patient 2/27, she had not been able to get through to the program & neither had I. It's a known ongoing issue. Even our representative cannot get through to the foundation. The representative said she was fairly positive they do not cover off label indications. I resubmitted the paperwork because I'd prefer to get an official denial letter

## 2023-05-03 ENCOUNTER — OFFICE VISIT (OUTPATIENT)
Dept: UROLOGY | Facility: CLINIC | Age: 48
End: 2023-05-03
Attending: INTERNAL MEDICINE
Payer: MEDICARE

## 2023-05-03 ENCOUNTER — THERAPY VISIT (OUTPATIENT)
Dept: PHYSICAL THERAPY | Facility: CLINIC | Age: 48
End: 2023-05-03
Payer: MEDICARE

## 2023-05-03 VITALS
BODY MASS INDEX: 34.72 KG/M2 | HEIGHT: 66 IN | DIASTOLIC BLOOD PRESSURE: 70 MMHG | WEIGHT: 216 LBS | SYSTOLIC BLOOD PRESSURE: 122 MMHG

## 2023-05-03 DIAGNOSIS — G89.29 CHRONIC BILATERAL LOW BACK PAIN WITHOUT SCIATICA: ICD-10-CM

## 2023-05-03 DIAGNOSIS — M79.644 BILATERAL THUMB PAIN: Primary | ICD-10-CM

## 2023-05-03 DIAGNOSIS — M79.645 BILATERAL THUMB PAIN: Primary | ICD-10-CM

## 2023-05-03 DIAGNOSIS — R10.2 PELVIC PAIN IN FEMALE: Primary | ICD-10-CM

## 2023-05-03 DIAGNOSIS — M54.2 NECK PAIN: ICD-10-CM

## 2023-05-03 DIAGNOSIS — M54.50 CHRONIC BILATERAL LOW BACK PAIN WITHOUT SCIATICA: ICD-10-CM

## 2023-05-03 DIAGNOSIS — N39.0 RECURRENT UTI: ICD-10-CM

## 2023-05-03 PROCEDURE — 99205 OFFICE O/P NEW HI 60 MIN: CPT | Performed by: OBSTETRICS & GYNECOLOGY

## 2023-05-03 PROCEDURE — 97530 THERAPEUTIC ACTIVITIES: CPT | Mod: GP | Performed by: PHYSICAL THERAPIST

## 2023-05-03 PROCEDURE — 97140 MANUAL THERAPY 1/> REGIONS: CPT | Mod: GP | Performed by: PHYSICAL THERAPIST

## 2023-05-03 RX ORDER — DOXYCYCLINE HYCLATE 50 MG/1
TABLET, FILM COATED ORAL
COMMUNITY
Start: 2022-11-25 | End: 2023-05-03

## 2023-05-03 NOTE — PROGRESS NOTES
"May 3, 2023    Referring Provider: Amol Juares MD  [address] [place] [address]    Primary Care Provider: Amol Juares    CC: Recurrent UTI     HPI:  Laxmi Ya is a 48 year old female with h/o RA, T2DM, and HTN who presents for concerns of recurrent UTIs. Last A1c was 6.2 in 2/2023.     Recent Urine Labs:  4/11/23: Negative LE and nitrites  3/14/2023: Few bacteria, Negative LE and nitrites  2/7/2023: Many bacteria, Negative LE and nitrites, Ucx <10k CFU/mL, amorphous material  2/6/2023: Few yeast, moderate bacteria, negative LE and nitrites  12/13/2022: Negative LE and nitrites, Ucx no growth   7/26/2022: Many bacteria, RBC, WBC, Ucx >100k proteus mirabilis - Treated with Cipro  7/21/2023: Negative LE and nitrite  5/18/2022: + Ureaplasma parvum PCR - treated with doxy  5/18/2022: Previously using ciprofloxacin postcoitally for a UTI prophylaxis. She subsequently developed C. Diff x 2. She has been followed by GI and Infectious Disease for this issue. Dificid is taken with antibiotic use to help prevent C diff\"    She has had frequent UTIs since the age of 15. Since starting doxycycline in December 2022 by her ophthalmologist for a possible chronic eye infection, she feels that her bladder infection frequency has improved. She receives UA every 60days from rheumatology. She is concerned about more frequent blood in her urine. She has noticed intermittent episodes of straining to void, however this resolves within 24 hours.     Beginning in June 2022, she has had intermittent worsening sacral pain that causes her to have difficulty walking and requiring a cane. She has a pelvic MRI planned at [place] in a few weeks for further evaluation.    She has new 7/10 left pelvic pain that is worsen when lying on her left or during ovulation. No known reliving factors. She notices a pressure sensation in her left pelvis. H/o left jean carlos-colectomy for diverticulosis.       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     Obesity      Pyelonephritis, unspecified 1992    abstract     Rheumatoid arteritis (H)      Seronegative inflammatory arthritis 6/17/2022     Unspecified symptom associated with female genital organs 05/07/1999    chronic pelvic pain abstract       Past Surgical History:   Procedure Laterality Date     CHOLECYSTECTOMY       COLON SURGERY       CYSTOSCOPY,+URETEROSCOPY      abstract     ZZHC EXCIS/CURET BENIGN ELBOW LESN  10/26/1987    (R) humerus abstract       Social History     Socioeconomic History     Marital status: Single     Spouse name: Not on file     Number of children: Not on file     Years of education: Not on file     Highest education level: Not on file   Occupational History     Not on file   Tobacco Use     Smoking status: Never     Smokeless tobacco: Never   Vaping Use     Vaping status: Not on file   Substance and Sexual Activity     Alcohol use: Yes     Drug use: No     Sexual activity: Not on file   Other Topics Concern      Service Not Asked     Blood Transfusions Not Asked     Caffeine Concern Not Asked     Occupational Exposure Not Asked     Hobby Hazards Not Asked     Sleep Concern Not Asked     Stress Concern Not Asked     Weight Concern Not Asked     Special Diet Not Asked     Back Care Not Asked     Exercise No     Bike Helmet Not Asked     Seat Belt No     Self-Exams No   Social History Narrative    Womens Health Kit given.         Social Determinants of Health     Financial Resource Strain: Not on file   Food Insecurity: Not on file   Transportation Needs: Not on file   Physical Activity: Not on file   Stress: Not on file   Social Connections: Not on file   Intimate Partner Violence: Not on  file   Housing Stability: Not on file       Family History   Problem Relation Age of Onset     Hypertension Father         abstract     Cancer Paternal Aunt         gallbladder cancer abstract       Review of Systems     All other systems reviewed and are negative.  See HPI for pertinent positives.    Allergies   Allergen Reactions     Polyethylene Glycol Rash     Codeine      Other reaction(s): Gastrointestinal, GI intolerance, Vomiting  Vomiting       Gadolinium Dizziness and Nausea     Hydrocodone-Acetaminophen Nausea and Vomiting     Other reaction(s): GI intolerance     Iodine      abstract     Sulfasalazine      Other reaction(s): GI intolerance  Has tried and had nausa.     Tramadol Nausea and Vomiting     Other reaction(s): Gastrointestinal, Other (see comments)  Nausea and vomiting   unknown       Gluten Meal Other (See Comments) and Rash     Other reaction(s): Gastrointestinal, GI intolerance  Dizziness, tired, rash, stomach cramps, thrush, mouth sores  Dizziness, tired, rash, stomach cramps, thrush, mouth sores         Current Outpatient Medications   Medication     Albuterol Sulfate (PROAIR HFA IN)     alcohol swab prep pads     Artificial Tear Solution (SOOTHE XP) SOLN     atenolol (TENORMIN) 50 MG tablet     benzoyl peroxide 5 % external liquid     blood glucose (NO BRAND SPECIFIED) lancets standard     blood glucose (NO BRAND SPECIFIED) test strip     blood glucose monitoring (NO BRAND SPECIFIED) meter device kit     clindamycin (CLEOCIN T) 1 % external solution     diclofenac (VOLTAREN) 1 % topical gel     doxycycline hyclate (VIBRA-TABS) 100 MG tablet     doxycycline hyclate (VIBRAMYCIN) 50 MG capsule     dupilumab (DUPIXENT) 300 MG/2ML prefilled pen     fexofenadine (ALLEGRA) 180 MG tablet     fidaxomicin (DIFICID) 200 MG tablet     fluticasone (FLONASE) 50 MCG/ACT nasal spray     lidocaine (LIDODERM) 5 % patch     losartan (COZAAR) 100 MG tablet     methylPREDNISolone (MEDROL) 4 MG tablet      mometasone (NASONEX) 50 MCG/ACT nasal spray     Montelukast Sodium (SINGULAIR PO)     mupirocin (BACTROBAN) 2 % external ointment     norethindrone (MICRONOR) 0.35 MG tablet     nystatin (MYCOSTATIN) 858304 UNIT/ML suspension     olopatadine (PATADAY) 0.7 % ophthalmic solution     OMEPRAZOLE PO     rifaximin (XIFAXAN) 550 MG TABS tablet     Sharps Container MISC     spironolactone-HCTZ (ALDACTAZIDE) 25-25 MG tablet     tacrolimus (PROTOPIC) 0.1 % external ointment     tobramycin (TOBREX) 0.3 % ophthalmic solution     tobramycin-dexamethasone (TOBRADEX) 0.3-0.1 % ophthalmic ointment     triamcinolone (NASACORT) 55 MCG/ACT nasal aerosol     UNABLE TO FIND     ustekinumab (STELARA) 45 MG/0.5ML SOSY     Current Facility-Administered Medications   Medication     lidocaine (PF) (XYLOCAINE) 1 % injection 2 mL     lidocaine (PF) (XYLOCAINE) 1 % injection 2 mL     methylPREDNISolone (DEPO-MEDROL) injection 40 mg     triamcinolone (KENALOG-40) injection 40 mg     triamcinolone (KENALOG-40) injection 40 mg       There were no vitals taken for this visit. No LMP recorded. Patient has had an injection. There is no height or weight on file to calculate BMI.  She is alert, comfortable in no acute distress, non-labored breathing.   Abdomen is obese, soft, non-tender, non-distended. No hernia with valsalva in the left lower quadrant.    A/P: Laxmi Ya is a 48 year old F with concerns for frequent UTI and new left pelvic pain.    She has a long history of frequent UTIs since the age of 15 with an extensive workup. Since starting the doxycycline prophylaxis in December of 2022 from her ophthalmologist for a possible eye infection, she has had less frequent UTIs. We discussed continuing this medication.     Long discussion with pt regarding diagnosis of criteria for recurrent UTI. Pt has had 2 culture proven UTIs in the past 12 months. Has responded to ciprofloxacin on most recent episode.  We discussed a plan for F/U for  recurrent UTI symptoms with collection of UA with reflex to culture as needed.    We discussed that hematuria is a very common finding with UTI and that a few drops of blood can change the color of her urine. She recently had a CT scan in December 2022, with no anatomical abnormalities of the kidneys, ureters, or bladder. We discussed undergoing dx cystoscopy for evaluation of possible lower and upper track abnormality.    It is unclear at this time what may be causing her left pelvic pain. Exam was reassuring for no hernia. Given that this is occurring more frequently around ovulation, it may be ovarian in origin. We discussed that she may wait until her pelvic MRI at [place] in a few weeks, however she is very concerned and would like sooner evaluation if able. Therefore, we will plan for a pelvic ultrasound.     Kimberly Matthew, MS4  [facility] [employer]  5/3/2023    I attest that I was present for the history and personally performed the physical exam and medical decision making and that I agree with the findings and treatment plan outlined above.     I spent a total of 60 minutes with  Ms. aY  on the date of the encounter in chart review, face to face patient visit, review of tests, documentation and/or discussion with other providers about the issues documented above.    Omkar Sharma MD  Professor, OB/GYN  Urogynecologist  CC  Patient Care Team:  Amol Juares MD as PCP - General  Lokesh Thompson MD as MD (Gastroenterology)  Ángel Stanley MD as MD (Otolaryngology)  Oswaldo Lemus MD as Assigned Rheumatology Provider  Tim Gonzales MD as Assigned PCP  Yuliya Kaplan RPH as Pharmacist (Pharmacist)  Yuliya Kaplan RPH as Assigned MTM Pharmacist  Cheryl Cristobal MD as Assigned Gastroenterology Provider  Asim Shen MD as MD (Dermatology)  Nelia Gann MD as Assigned Allergy Provider  Mainor Leroy MD as MD (Endocrinology,  Diabetes, and Metabolism)  Omkar Sharma MD as MD (OB/Gyn)  Matt Evangelista MD as MD (Dermatology)  Ángel Stanley MD as Assigned Surgical Provider  Matt Sullivan MD as Assigned Musculoskeletal Provider  ADAM MANCILLA

## 2023-05-04 ENCOUNTER — OFFICE VISIT (OUTPATIENT)
Dept: ORTHOPEDICS | Facility: CLINIC | Age: 48
End: 2023-05-04
Payer: MEDICARE

## 2023-05-04 DIAGNOSIS — M13.80 SERONEGATIVE INFLAMMATORY ARTHRITIS: Primary | ICD-10-CM

## 2023-05-04 DIAGNOSIS — M79.645 BILATERAL THUMB PAIN: Primary | ICD-10-CM

## 2023-05-04 DIAGNOSIS — M79.644 BILATERAL THUMB PAIN: Primary | ICD-10-CM

## 2023-05-04 DIAGNOSIS — G56.03 BILATERAL CARPAL TUNNEL SYNDROME: ICD-10-CM

## 2023-05-04 PROCEDURE — 20526 THER INJECTION CARP TUNNEL: CPT | Mod: 50 | Performed by: ORTHOPAEDIC SURGERY

## 2023-05-04 PROCEDURE — 99214 OFFICE O/P EST MOD 30 MIN: CPT | Mod: 25 | Performed by: ORTHOPAEDIC SURGERY

## 2023-05-04 RX ORDER — TRIAMCINOLONE ACETONIDE 40 MG/ML
40 INJECTION, SUSPENSION INTRA-ARTICULAR; INTRAMUSCULAR
Status: DISCONTINUED | OUTPATIENT
Start: 2023-05-04 | End: 2024-01-07

## 2023-05-04 RX ORDER — LIDOCAINE HYDROCHLORIDE 10 MG/ML
2 INJECTION, SOLUTION EPIDURAL; INFILTRATION; INTRACAUDAL; PERINEURAL
Status: DISCONTINUED | OUTPATIENT
Start: 2023-05-04 | End: 2024-01-07

## 2023-05-04 RX ADMIN — LIDOCAINE HYDROCHLORIDE 2 ML: 10 INJECTION, SOLUTION EPIDURAL; INFILTRATION; INTRACAUDAL; PERINEURAL at 11:08

## 2023-05-04 RX ADMIN — TRIAMCINOLONE ACETONIDE 40 MG: 40 INJECTION, SUSPENSION INTRA-ARTICULAR; INTRAMUSCULAR at 11:08

## 2023-05-04 NOTE — PROGRESS NOTES
Hand / Upper Extremity Injection/Arthrocentesis: bilateral carpal tunnel    Date/Time: 2023 11:08 AM    Performed by: Twila Petit MD  Authorized by: Twila Petit MD    Indications:  Pain  Needle Size:  25 G  Guidance: landmark    Condition: carpal tunnel    Laterality:  Bilateral    Site:  Bilateral carpal tunnel  Medications (Right):  40 mg triamcinolone 40 MG/ML; 2 mL lidocaine (PF) 1 %  Medications (Left):  40 mg triamcinolone 40 MG/ML; 2 mL lidocaine (PF) 1 %  Outcome:  Tolerated well, no immediate complications  Procedure discussed: discussed risks, benefits, and alternatives    Consent Given by:  Patient  Timeout: timeout called immediately prior to procedure    Prep: patient was prepped and draped in usual sterile fashion          [facility] [address] [address]  [phone]  Dept: [phone]  ______________________________________________________________________________    Patient: Laxmi Ya   : 1975   MRN: [id number]   May 4, 2023    INVASIVE PROCEDURE SAFETY CHECKLIST    Date: 2023   Procedure: Bilateral carpal tunnel injection  Patient Name: Laxmi Ya  MRN: [id number]  YOB: 1975    Action: Complete sections as appropriate. Any discrepancy results in a HARD COPY until resolved.     PRE PROCEDURE:  Patient ID verified with 2 identifiers (name and  or MRN): Yes  Procedure and site verified with patient/designee (when able): Yes  Accurate consent documentation in medical record: Yes  H&P (or appropriate assessment) documented in medical record: Yes  H&P must be up to 20 days prior to procedure and updates within 24 hours of procedure as applicable: Yes  Relevant diagnostic and radiology test results appropriately labeled and displayed as applicable: NA  Procedure site(s) marked with provider initials: NA    TIMEOUT:  Time-Out performed immediately prior to  starting procedure, including verbal and active participation of all team members addressing the following:Yes  * Correct patient identify  * Confirmed that the correct side and site are marked  * An accurate procedure consent form  * Agreement on the procedure to be done  * Correct patient position  * Relevant images and results are properly labeled and appropriately displayed  * The need to administer antibiotics or fluids for irrigation purposes during the procedure as applicable   * Safety precautions based on patient history or medication use    DURING PROCEDURE: Verification of correct person, site, and procedures any time the responsibility for care of the patient is transferred to another member of the care team.       The following medications were given:         Prior to injection, verified patient identity using patient's name and date of birth.  Due to injection administration, patient instructed to remain in clinic for 15 minutes  afterwards, and to report any adverse reaction to me immediately.    Tendon sheath injection was performed.   Medication Name: Kenalog 40 NDC [address]-6  Drug Amount Wasted:  None.  Vial/Syringe: Single dose vial  Expiration Date:  12/1/2024    Medication Name Lidocaine 1% NDC 68799-351-81    Scribed by Chelsy Savage for Dr. Petit on May 4, 2023 at 11:10a based on the provider's statements to me.     MIN Garza

## 2023-05-04 NOTE — LETTER
5/4/2023         RE: Laxmi Ya  [address] [place] [id number]        Dear Colleague,    Thank you for referring your patient, Laxmi Ya, to the [facility] [place]. Please see a copy of my visit note below.    48-year-old right-hand-dominant female returns for ongoing hand pain.  She last had a Kenalog injection by Dr. Matt Sullivan in December, 2022.  At that time he used ultrasound guidance and injected 10 mg bilaterally.    On February 16, 2023 I had done STT injections with 40 mg of Kenalog.  She reports that that was not affect as effective as it had been previously.    Her major complaints today are tingling and numbness into her fingers her fingers feel very swollen she has difficulty with grasping.  Between the carpal tunnel injection in the R thumb arthritis injection, she feels like her carpal tunnel is worse.  She ranks her pain as 7 out of 10 at rest.  Previously with shots her pain would become less than 4 out of 10.  She would prefer and requests a carpal tunnel injection today.    On examination today Laxmi is a delightful cooperative 48-year-old female.  She is able to fully flex and extend all her digits although slowly.  She does have a positive carpal compression test.  She does have a positive grind test on her thumb.    Impression bilateral hand carpal tunnel syndrome and bilateral thumb STT arthritis    Plan risk benefits alternatives to carpal tunnel injection using 40 mg of Kenalog to get greater relief were discussed with the patient and she wishes to proceed.  She would like to delay the thumb arthritis until a later date.    PROCEDURE:  After written consent, verifying site and side, a sterile Chlora- prep was performed for the injection site.  Palpation was used for placement of a 25-gauge needle into the right carpal tunnel with 1% Lidocaine with no dysesthesias and full range of motion of the flexor tendons.  1 mL of Kenalog (40 mg) and 1  mL of lidocaine was injected using dynamic technique with good relief of pain.  Identical procedure was carried out on the contralateral side.  Patient tolerated the procedure well.  No complications.    Patient will be seen back in 1 month for STT injections under fluoroscopy.  We will set her up with occupational therapy services for ongoing tendon gliding and arthritis protection of the thumb with review of activities of daily living for protective hand use.    Twila Petit MD   Hand and Upper Extremity Specialist  [facility]        Hand / Upper Extremity Injection/Arthrocentesis: bilateral carpal tunnel    Date/Time: 2023 11:08 AM    Performed by: Twila Petit MD  Authorized by: Twila Petit MD    Indications:  Pain  Needle Size:  25 G  Guidance: landmark    Condition: carpal tunnel    Laterality:  Bilateral    Site:  Bilateral carpal tunnel  Medications (Right):  40 mg triamcinolone 40 MG/ML; 2 mL lidocaine (PF) 1 %  Medications (Left):  40 mg triamcinolone 40 MG/ML; 2 mL lidocaine (PF) 1 %  Outcome:  Tolerated well, no immediate complications  Procedure discussed: discussed risks, benefits, and alternatives    Consent Given by:  Patient  Timeout: timeout called immediately prior to procedure    Prep: patient was prepped and draped in usual sterile fashion          [facility] [address]  [address] [facility] [id number] [phone]  Dept: [phone]  ______________________________________________________________________________    Patient: Laxmi Ya   : 1975   MRN: [id number]   May 4, 2023    INVASIVE PROCEDURE SAFETY CHECKLIST    Date: 2023   Procedure: Bilateral carpal tunnel injection  Patient Name: Laxmi Ya  MRN: [id number]  YOB: 1975    Action: Complete sections as appropriate. Any discrepancy results in a HARD COPY until resolved.     PRE PROCEDURE:  Patient ID  verified with 2 identifiers (name and  or MRN): Yes  Procedure and site verified with patient/designee (when able): Yes  Accurate consent documentation in medical record: Yes  H&P (or appropriate assessment) documented in medical record: Yes  H&P must be up to 20 days prior to procedure and updates within 24 hours of procedure as applicable: Yes  Relevant diagnostic and radiology test results appropriately labeled and displayed as applicable: NA  Procedure site(s) marked with provider initials: NA    TIMEOUT:  Time-Out performed immediately prior to starting procedure, including verbal and active participation of all team members addressing the following:Yes  * Correct patient identify  * Confirmed that the correct side and site are marked  * An accurate procedure consent form  * Agreement on the procedure to be done  * Correct patient position  * Relevant images and results are properly labeled and appropriately displayed  * The need to administer antibiotics or fluids for irrigation purposes during the procedure as applicable   * Safety precautions based on patient history or medication use    DURING PROCEDURE: Verification of correct person, site, and procedures any time the responsibility for care of the patient is transferred to another member of the care team.       The following medications were given:       Prior to injection, verified patient identity using patient's name and date of birth.  Due to injection administration, patient instructed to remain in clinic for 15 minutes  afterwards, and to report any adverse reaction to me immediately.    Tendon sheath injection was performed.   Medication Name: Kenalog 40 NDC [address]-0  Drug Amount Wasted:  None.  Vial/Syringe: Single dose vial  Expiration Date:  2024    Medication Name Lidocaine 1% NDC 81176-207-31    Scribed by Chelsy Savage for Dr. Petit on May 4, 2023 at 11:10a based on the provider's statements to me.     MIN Garza

## 2023-05-04 NOTE — NURSING NOTE
Reason For Visit:   Chief Complaint   Patient presents with     RECHECK     Bilateral thumb STT injections       Primary MD: Amol Juares  Ref. MD: NAY    Age: 48 year old    ?  No      There were no vitals taken for this visit.      Pain Assessment  Patient Currently in Pain: Yes  0-10 Pain Scale: 8 (Up to 10/10 with use)  Primary Pain Location: Finger (Comment which one) (Bilateral thumb)    Hand Dominance Evaluation  Hand Dominance: Right      force  R hand pincher force: 3.629 kg (8 lb)  R hand  level 2 force: 6.804 kg (15 lb)  L hand pincher force: 4.082 kg (9 lb)  L hand  level  2 force: 9.072 kg (20 lb)    QuickDASH Assessment      5/4/2023    10:20 AM   QuickDASH Main   1. Open a tight or new jar Severe difficulty   2. Do heavy household chores (e.g., wash walls, floors) Unable   3. Carry a shopping bag or briefcase Unable   4. Wash your back Severe difficulty   5. Use a knife to cut food Unable   6. Recreational activities in which you take some force or impact through your arm, shoulder or hand (e.g., golf, hammering, tennis, etc.) Unable   7. During the past week, to what extent has your arm, shoulder or hand problem interfered with your normal social activities with family, friends, neighbours or groups Extremely   8. During the past week, were you limited in your work or other regular daily activities as a result of your arm, shoulder or hand problem Unable   9. Arm, shoulder or hand pain Extreme   10.Tingling (pins and needles) in your arm,shoulder or hand Severe   11. During the past week, how much difficulty have you had sleeping because of the pain in your arm, shoulder or hand Severe difficulty   Quickdash Ability Score 90.91          Current Outpatient Medications   Medication Sig Dispense Refill     Albuterol Sulfate (PROAIR HFA IN) Inhale into the lungs as needed       alcohol swab prep pads Use to swab area of injection/pratima as directed. 100 each 3     Artificial  Tear Solution (SOOTHE XP) SOLN as needed       atenolol (TENORMIN) 50 MG tablet Take 50 mg by mouth daily       benzoyl peroxide 5 % external liquid Use daily as directed. Preferred bdrand: Medpura 236 mL 11     blood glucose (NO BRAND SPECIFIED) lancets standard Use to test blood sugar 2 times daily or as directed. 100 lancet. 1     blood glucose (NO BRAND SPECIFIED) test strip Use to test blood sugar 2 times daily or as directed. 100 strip 1     blood glucose monitoring (NO BRAND SPECIFIED) meter device kit Use to test blood sugar 2 times daily or as directed. 1 kit 0     clindamycin (CLEOCIN T) 1 % external solution Apply twice daily as needed to active areas. MUST USE WITH BENZOYL PEROXIDE WASH  TO AVOID BACTERIAL RESISTANCE. 60 mL 2     diclofenac (VOLTAREN) 1 % topical gel Apply topically 4 times daily as needed       doxycycline hyclate (VIBRA-TABS) 100 MG tablet Take 150 mg by mouth 2 times daily       doxycycline hyclate (VIBRAMYCIN) 50 MG capsule Take 150 mg by mouth 2 times daily       dupilumab (DUPIXENT) 300 MG/2ML prefilled pen Inject 2 mLs (300 mg) Subcutaneous every 14 days 4 mL 2     fexofenadine (ALLEGRA) 180 MG tablet Take 1 tablet (180 mg) by mouth daily 90 tablet 2     fidaxomicin (DIFICID) 200 MG tablet Take 200 mg by mouth       fluticasone (FLONASE) 50 MCG/ACT nasal spray Spray 2 sprays into both nostrils 2 times daily        lidocaine (LIDODERM) 5 % patch as needed   1     losartan (COZAAR) 100 MG tablet Take 100 mg by mouth daily       methylPREDNISolone (MEDROL) 4 MG tablet Take 4 tabs by mouth daily 120 tablet 1     mometasone (NASONEX) 50 MCG/ACT nasal spray Spray 2 sprays into both nostrils daily 17 g 4     Montelukast Sodium (SINGULAIR PO) Take 10 mg by mouth At Bedtime        mupirocin (BACTROBAN) 2 % external ointment Apply topically daily for 90 days Use 2 times a day to affected area. 30 g 2     norethindrone (MICRONOR) 0.35 MG tablet Take 0.35 mg by mouth       nystatin  (MYCOSTATIN) 747649 UNIT/ML suspension TAKE 5ML BY MOUTH 4XDAILY FOR 14 DAYS. CONTINUE AT LEAST 2 DAYS AFTER SYMPTOMS RESOLVED       olopatadine (PATADAY) 0.7 % ophthalmic solution Apply 1 drop to eye daily PRN       OMEPRAZOLE PO Take 40 mg by mouth 2 times daily        rifaximin (XIFAXAN) 550 MG TABS tablet Take 200 mg by mouth 3 times daily Take for 10 days, has on had as needed for small bowel bacteria overgrowh       Sharps Container MISC Use to dispose of sharps as directed 1 each 0     spironolactone-HCTZ (ALDACTAZIDE) 25-25 MG tablet Take 1 tablet by mouth daily       tacrolimus (PROTOPIC) 0.1 % external ointment Apply to itchy areas on eyelids twice a day as needed for itching and rash 30 g 3     tobramycin (TOBREX) 0.3 % ophthalmic solution 1-2 drops 2 times daily       tobramycin-dexamethasone (TOBRADEX) 0.3-0.1 % ophthalmic ointment 0.25 inches At Bedtime       UNABLE TO FIND MEDICATION NAME: Fermented zinc complex         Allergies   Allergen Reactions     Polyethylene Glycol Rash     Codeine      Other reaction(s): Gastrointestinal, GI intolerance, Vomiting  Vomiting       Gadolinium Dizziness and Nausea     Hydrocodone-Acetaminophen Nausea and Vomiting     Other reaction(s): GI intolerance     Iodine      abstract     Sulfasalazine      Other reaction(s): GI intolerance  Has tried and had nausa.     Tramadol Nausea and Vomiting     Other reaction(s): Gastrointestinal, Other (see comments)  Nausea and vomiting   unknown       Acetaminophen Nausea     Nausea and vomiting  Nausea and vomiting  Nausea and vomiting  Nausea and vomiting       Gluten Meal Other (See Comments) and Rash     Other reaction(s): Gastrointestinal, GI intolerance  Dizziness, tired, rash, stomach cramps, thrush, mouth sores  Dizziness, tired, rash, stomach cramps, thrush, mouth sores       Propylene Glycol Dizziness, Nausea and Vomiting, Nausea and Rash       Chelsy Savage, MIN

## 2023-05-04 NOTE — PROGRESS NOTES
48-year-old right-hand-dominant female returns for ongoing hand pain.  She last had a Kenalog injection by Dr. Matt Sullivan in December, 2022.  At that time he used ultrasound guidance and injected 10 mg bilaterally.    On February 16, 2023 I had done STT injections with 40 mg of Kenalog.  She reports that that was not affect as effective as it had been previously.    Her major complaints today are tingling and numbness into her fingers her fingers feel very swollen she has difficulty with grasping.  Between the carpal tunnel injection in the R thumb arthritis injection, she feels like her carpal tunnel is worse.  She ranks her pain as 7 out of 10 at rest.  Previously with shots her pain would become less than 4 out of 10.  She would prefer and requests a carpal tunnel injection today.    On examination today Laxmi is a delightful cooperative 48-year-old female.  She is able to fully flex and extend all her digits although slowly.  She does have a positive carpal compression test.  She does have a positive grind test on her thumb.    Impression bilateral hand carpal tunnel syndrome and bilateral thumb STT arthritis    Plan risk benefits alternatives to carpal tunnel injection using 40 mg of Kenalog to get greater relief were discussed with the patient and she wishes to proceed.  She would like to delay the thumb arthritis until a later date.    PROCEDURE:  After written consent, verifying site and side, a sterile Chlora- prep was performed for the injection site.  Palpation was used for placement of a 25-gauge needle into the right carpal tunnel with 1% Lidocaine with no dysesthesias and full range of motion of the flexor tendons.  1 mL of Kenalog (40 mg) and 1 mL of lidocaine was injected using dynamic technique with good relief of pain.  Identical procedure was carried out on the contralateral side.  Patient tolerated the procedure well.  No complications.    Patient will be seen back in 1 month for STT  injections under fluoroscopy.  We will set her up with occupational therapy services for ongoing tendon gliding and arthritis protection of the thumb with review of activities of daily living for protective hand use.    Twila Petit MD   Hand and Upper Extremity Specialist  [facility]

## 2023-05-05 ENCOUNTER — OFFICE VISIT (OUTPATIENT)
Dept: UROLOGY | Facility: CLINIC | Age: 48
End: 2023-05-05
Payer: MEDICARE

## 2023-05-05 ENCOUNTER — VIRTUAL VISIT (OUTPATIENT)
Dept: PHARMACY | Facility: CLINIC | Age: 48
End: 2023-05-05
Payer: COMMERCIAL

## 2023-05-05 VITALS — SYSTOLIC BLOOD PRESSURE: 130 MMHG | DIASTOLIC BLOOD PRESSURE: 84 MMHG | HEART RATE: 78 BPM

## 2023-05-05 DIAGNOSIS — T38.0X5A STEROID-INDUCED HYPERGLYCEMIA: ICD-10-CM

## 2023-05-05 DIAGNOSIS — N39.0 RECURRENT UTI: ICD-10-CM

## 2023-05-05 DIAGNOSIS — Z78.9 TAKES DIETARY SUPPLEMENTS: ICD-10-CM

## 2023-05-05 DIAGNOSIS — R10.2 PELVIC PAIN IN FEMALE: Primary | ICD-10-CM

## 2023-05-05 DIAGNOSIS — M06.00 SERONEGATIVE RHEUMATOID ARTHRITIS (H): Primary | ICD-10-CM

## 2023-05-05 DIAGNOSIS — R73.9 STEROID-INDUCED HYPERGLYCEMIA: ICD-10-CM

## 2023-05-05 LAB
ALBUMIN UR-MCNC: NEGATIVE MG/DL
APPEARANCE UR: CLEAR
BILIRUB UR QL STRIP: NEGATIVE
COLOR UR AUTO: YELLOW
GLUCOSE UR STRIP-MCNC: NEGATIVE MG/DL
HGB UR QL STRIP: NEGATIVE
KETONES UR STRIP-MCNC: NEGATIVE MG/DL
LEUKOCYTE ESTERASE UR QL STRIP: NEGATIVE
NITRATE UR QL: NEGATIVE
PH UR STRIP: 7 [PH] (ref 5–8)
SP GR UR STRIP: 1.01 (ref 1–1.03)
UROBILINOGEN UR STRIP-ACNC: 0.2 E.U./DL

## 2023-05-05 PROCEDURE — 81003 URINALYSIS AUTO W/O SCOPE: CPT | Performed by: PATHOLOGY

## 2023-05-05 PROCEDURE — 52000 CYSTOURETHROSCOPY: CPT | Performed by: OBSTETRICS & GYNECOLOGY

## 2023-05-05 PROCEDURE — 99207 PR NO CHARGE LOS: CPT

## 2023-05-05 NOTE — LETTER
5/5/2023       RE: Laxmi Ya  [address] [place] [id number]     Dear Colleague,    Thank you for referring your patient, Laxmi Ya, to the [facility]. Please see a copy of my visit note below.    Reason for Visit:  Cystoscopy    Clinical Data: Ms. Laxmi Ya is a 48 year old female with a hx of bladder pain    Cystoscopy procedure:  Pt. Was consented and placed in the lithotomy position.  She was cleaned and preparred in the usual fashion.  Lidocain gel was inserted into the urethra and given time to take effect.  A 16 fr flexible cystoscope was then inserted through the urethra and into the bladder.  The urethra was wnl.  The bladder was with 1+ trabeculation.  No tumors, diverticulae, or stones.  Bilateral u/o's were effluxing clear urine.  The cystoscope was then withdrawn.  The pt. Tolerated the procedure well.    A/P:  48 year old female with a normal cystoscopy  -Will f/u with results of pt's US    Thank you for allowing me to participate in the care of  Ms. Laxmi Ya and I will keep you updated on her progress.    Omkar Sharma MD

## 2023-05-05 NOTE — PROGRESS NOTES
Reason for Visit:  Cystoscopy    Clinical Data: Ms. Laxmi Ya is a 48 year old female with a hx of bladder pain    Cystoscopy procedure:  Pt. Was consented and placed in the lithotomy position.  She was cleaned and preparred in the usual fashion.  Lidocain gel was inserted into the urethra and given time to take effect.  A 16 fr flexible cystoscope was then inserted through the urethra and into the bladder.  The urethra was wnl.  The bladder was with 1+ trabeculation.  No tumors, diverticulae, or stones.  Bilateral u/o's were effluxing clear urine.  The cystoscope was then withdrawn.  The pt. Tolerated the procedure well.    A/P:  48 year old female with a normal cystoscopy  -Will f/u with results of pt's US    Thank you for allowing me to participate in the care of  Ms. Laxmi Ya and I will keep you updated on her progress.    Omkar Sharma MD

## 2023-05-08 ENCOUNTER — TELEPHONE (OUTPATIENT)
Dept: RHEUMATOLOGY | Facility: CLINIC | Age: 48
End: 2023-05-08
Payer: MEDICARE

## 2023-05-08 NOTE — TELEPHONE ENCOUNTER
Received call from [facility] diabetes education department. Patient is seen by Dr. Juares, Matias Rainey, RN, [facility], and Mariya Hernandez, RN, [facility], for diabetes management. Requested addition of NPH 20 units at start of Actemra infusions due to methylprednisolone causing steroid induced hyperglycemia as current insulin regimen is not adequate to control sugars on infusion days. Added NPH 20 units as pre medication prior to Actemra infusion per MTM CPA with Dr. Lemus.    Yuliya Kaplan, PharmD  Medication Therapy Management Pharmacist  [facility]

## 2023-05-10 ENCOUNTER — TELEPHONE (OUTPATIENT)
Dept: ORTHOPEDICS | Facility: CLINIC | Age: 48
End: 2023-05-10

## 2023-05-10 ENCOUNTER — INFUSION THERAPY VISIT (OUTPATIENT)
Dept: INFUSION THERAPY | Facility: CLINIC | Age: 48
End: 2023-05-10
Attending: INTERNAL MEDICINE
Payer: MEDICARE

## 2023-05-10 VITALS
HEART RATE: 65 BPM | BODY MASS INDEX: 34.7 KG/M2 | DIASTOLIC BLOOD PRESSURE: 79 MMHG | SYSTOLIC BLOOD PRESSURE: 116 MMHG | WEIGHT: 215 LBS | RESPIRATION RATE: 16 BRPM | TEMPERATURE: 97.6 F

## 2023-05-10 DIAGNOSIS — M13.80 SERONEGATIVE INFLAMMATORY ARTHRITIS: Primary | ICD-10-CM

## 2023-05-10 PROCEDURE — 250N000013 HC RX MED GY IP 250 OP 250 PS 637: Performed by: INTERNAL MEDICINE

## 2023-05-10 PROCEDURE — 96365 THER/PROPH/DIAG IV INF INIT: CPT

## 2023-05-10 PROCEDURE — 96375 TX/PRO/DX INJ NEW DRUG ADDON: CPT

## 2023-05-10 PROCEDURE — 258N000003 HC RX IP 258 OP 636: Performed by: INTERNAL MEDICINE

## 2023-05-10 PROCEDURE — 250N000011 HC RX IP 250 OP 636: Performed by: INTERNAL MEDICINE

## 2023-05-10 PROCEDURE — 999N000248 HC STATISTIC IV INSERT WITH US BY RN

## 2023-05-10 RX ORDER — ACETAMINOPHEN 325 MG/1
650 TABLET ORAL ONCE
Status: CANCELLED | OUTPATIENT
Start: 2023-06-06

## 2023-05-10 RX ORDER — HEPARIN SODIUM,PORCINE 10 UNIT/ML
5 VIAL (ML) INTRAVENOUS
Status: CANCELLED | OUTPATIENT
Start: 2023-06-06

## 2023-05-10 RX ORDER — ALBUTEROL SULFATE 90 UG/1
1-2 AEROSOL, METERED RESPIRATORY (INHALATION)
Status: CANCELLED
Start: 2023-06-06

## 2023-05-10 RX ORDER — ACETAMINOPHEN 325 MG/1
650 TABLET ORAL ONCE
Status: COMPLETED | OUTPATIENT
Start: 2023-05-10 | End: 2023-05-10

## 2023-05-10 RX ORDER — HEPARIN SODIUM (PORCINE) LOCK FLUSH IV SOLN 100 UNIT/ML 100 UNIT/ML
5 SOLUTION INTRAVENOUS
Status: CANCELLED | OUTPATIENT
Start: 2023-06-06

## 2023-05-10 RX ORDER — MEPERIDINE HYDROCHLORIDE 25 MG/ML
25 INJECTION INTRAMUSCULAR; INTRAVENOUS; SUBCUTANEOUS EVERY 30 MIN PRN
Status: CANCELLED | OUTPATIENT
Start: 2023-06-06

## 2023-05-10 RX ORDER — METHYLPREDNISOLONE SODIUM SUCCINATE 125 MG/2ML
125 INJECTION, POWDER, LYOPHILIZED, FOR SOLUTION INTRAMUSCULAR; INTRAVENOUS
Status: CANCELLED
Start: 2023-06-06

## 2023-05-10 RX ORDER — METHYLPREDNISOLONE SODIUM SUCCINATE 125 MG/2ML
125 INJECTION, POWDER, LYOPHILIZED, FOR SOLUTION INTRAMUSCULAR; INTRAVENOUS ONCE
Status: CANCELLED | OUTPATIENT
Start: 2023-06-06

## 2023-05-10 RX ORDER — DIPHENHYDRAMINE HYDROCHLORIDE 50 MG/ML
50 INJECTION INTRAMUSCULAR; INTRAVENOUS
Status: CANCELLED
Start: 2023-06-06

## 2023-05-10 RX ORDER — EPINEPHRINE 1 MG/ML
0.3 INJECTION, SOLUTION INTRAMUSCULAR; SUBCUTANEOUS EVERY 5 MIN PRN
Status: CANCELLED | OUTPATIENT
Start: 2023-06-06

## 2023-05-10 RX ORDER — ALBUTEROL SULFATE 0.83 MG/ML
2.5 SOLUTION RESPIRATORY (INHALATION)
Status: CANCELLED | OUTPATIENT
Start: 2023-06-06

## 2023-05-10 RX ORDER — METHYLPREDNISOLONE SODIUM SUCCINATE 125 MG/2ML
125 INJECTION, POWDER, LYOPHILIZED, FOR SOLUTION INTRAMUSCULAR; INTRAVENOUS ONCE
Status: COMPLETED | OUTPATIENT
Start: 2023-05-10 | End: 2023-05-10

## 2023-05-10 RX ADMIN — METHYLPREDNISOLONE SODIUM SUCCINATE 125 MG: 125 INJECTION, POWDER, FOR SOLUTION INTRAMUSCULAR; INTRAVENOUS at 13:17

## 2023-05-10 RX ADMIN — TOCILIZUMAB 800 MG: 20 INJECTION, SOLUTION, CONCENTRATE INTRAVENOUS at 13:34

## 2023-05-10 RX ADMIN — SODIUM CHLORIDE 250 ML: 9 INJECTION, SOLUTION INTRAVENOUS at 14:39

## 2023-05-10 RX ADMIN — ACETAMINOPHEN 650 MG: 325 TABLET ORAL at 13:22

## 2023-05-10 NOTE — TELEPHONE ENCOUNTER
Laxmi had bilateral carpal tunnel injections on 5-4-23 with Dr Petit and was scheduled to have bilateral thumb STT cortisone injections on 5-25-23.    She found out that Dr Real at the [facility] was planning to do the nerve block injections on her head and neck on 5-25-23 so she needs to reschedule her thumbs.  She is not sure what they use for this but thinks it was cortisone.    (Dr Roscoe Real at [facility] used Triamcinolone acetonide 40 mg mixed with 3.5 ml of bupivicaine 0.5% and 3.5 ml of lidocaine 20 mg/ml at the last occipital injection on 3-2-23).      Could she have her thumbs done on 6-1 with Dr Petit or should she wait another month?    Patient requested we ask Dr Petit and RN to send My Chart back to her with recommendation.    Ortho appointment for 5-25-23 cancelled.   Heather Spivey RN

## 2023-05-10 NOTE — PROGRESS NOTES
Infusion Nursing Note:  Laxmi Ya presents today for actemra infusion every 4 weeks.    Patient seen by provider today: No   present during visit today: Not Applicable.    Note: Pt has NPH put in her therapy plan by Yuliya Kaplan .  Writer spoke with the pharmacist and NPH cannot be given in the infusion center.  Yuliya to remove this from the therapy plan.   Pt was updated on this before she came into infusion and states that she has the NPH to give herself today and for the next 2 days..      Intravenous Access:  Peripheral IV placed by vascular access    Treatment Conditions:  Biological Infusion Checklist:  ~~~ NOTE: If the patient answers yes to any of the questions below, hold the infusion and contact ordering provider or on-call provider.    1. Have you recently had an elevated temperature, fever, chills, productive cough, coughing for 3 weeks or longer or hemoptysis, abnormal vital signs, night sweats,  chest pain or have you noticed a decrease in your appetite, unexplained weight loss or fatigue? No  2. Do you have any open wounds or new incisions? No  3. Do you have any recent or upcoming hospitalizations, surgeries or dental procedures? No  4. Do you currently have or recently have had any signs of illness or infection or are you on any antibiotics? No  5. Have you had any new, sudden or worsening abdominal pain? No  6. Have you or anyone in your household received a live vaccination in the past 4 weeks? Please note:  No live vaccines while on biologic/chemotherapy until 6 months after the last treatment.  Patient can receive the flu vaccine (shot only) and the pneumovax.  It is optimal for the patient to get these vaccines mid cycle, but they can be given at any time as long as it is not on the day of the infusion. No  7. Have you recently been diagnosed with any new nervous system diseases (ie. Multiple sclerosis, Guillain Quincy, seizures, neurological changes) or cancer diagnosis?  No  8. Are you on any form of radiation or chemotherapy? No  9. Are you pregnant or breast feeding or do you have plans of pregnancy in the future? No  10. Have you been having any signs of worsening depression or suicidal ideations?  (benlysta only)n/a  11. Have there been any other new onset medical symptoms? No        Post Infusion Assessment:  Patient tolerated infusion without incident.       Discharge Plan:   Patient and/or family verbalized understanding of discharge instructions and all questions answered.      Colleen Good RN    Administrations This Visit     0.9% sodium chloride BOLUS     Admin Date  05/10/2023 Action  $New Bag Dose  250 mL Route  Intravenous Administered By  Shawanda Davidson RN          acetaminophen (TYLENOL) tablet 650 mg     Admin Date  05/10/2023 Action  $Given Dose  650 mg Route  Oral Administered By  Colleen Good RN          methylPREDNISolone sodium succinate (solu-MEDROL) injection 125 mg     Admin Date  05/10/2023 Action  $Given Dose  125 mg Route  Intravenous Administered By  Colleen Good RN          tocilizumab (ACTEMRA) 800 mg in sodium chloride 0.9 % 150 mL infusion     Admin Date  05/10/2023 Action  $New Bag Dose  800 mg Rate  150 mL/hr Route  Intravenous Administered By  Colleen Good RN              \

## 2023-05-10 NOTE — PATIENT INSTRUCTIONS
Dear Laxmi Ya    Thank you for choosing [facility] ([facility]) for your infusion.  The following information is a summary of our appointment as well as important reminders.        We look forward in seeing you on your next appointment here at [facility] ([facility]).  Please don t hesitate to call us at [phone] to reschedule any of your appointments or to speak with one of the [facility] registered nurses.  It was a pleasure taking care of you today.    Sincerely,    [facility]  [facility]  [address] [place] [address]  [id number]  Phone:  [phone]      Actemra 800mg every month  Tylenol 650mg   Solu-medrol 125mg

## 2023-05-11 ENCOUNTER — MYC MEDICAL ADVICE (OUTPATIENT)
Dept: PHARMACY | Facility: CLINIC | Age: 48
End: 2023-05-11

## 2023-05-11 NOTE — TELEPHONE ENCOUNTER
Dr Petit advised she should wait another month for her injections.  See My Chart correspondence from 5-11-23 for patient notification. Heather Spivey RN

## 2023-05-11 NOTE — TELEPHONE ENCOUNTER
Date: 5/11     Company: [employer]     Phone Number: [phone]     Rep: Gisela DE GUZMAN     Comments: Spoke to rep.  She sees the documentation but was unable to answer the status at this time.  She said she will escalate to a supervisor and call me back tomorrow.

## 2023-05-12 ENCOUNTER — TELEPHONE (OUTPATIENT)
Dept: ALLERGY | Facility: CLINIC | Age: 48
End: 2023-05-12
Payer: MEDICARE

## 2023-05-12 DIAGNOSIS — J45.30 MILD PERSISTENT ASTHMA WITHOUT COMPLICATION: Primary | ICD-10-CM

## 2023-05-12 RX ORDER — FLUTICASONE FUROATE AND VILANTEROL 200; 25 UG/1; UG/1
1 POWDER RESPIRATORY (INHALATION) DAILY
Qty: 90 EACH | Refills: 0 | Status: SHIPPED | OUTPATIENT
Start: 2023-05-12 | End: 2023-08-22

## 2023-05-12 NOTE — TELEPHONE ENCOUNTER
Name Of Person Who Called: Laxmi  relationship (patient)    Reason for call: patient call need a refill for Breo Ellipta, need 90 days supply.      Best Phone Number To Call Back: Cell number on file:    Telephone Information:   Mobile [phone]       Okay To Leave A Detailed Voicemail? Yes

## 2023-05-15 ENCOUNTER — VIRTUAL VISIT (OUTPATIENT)
Dept: PHARMACY | Facility: CLINIC | Age: 48
End: 2023-05-15
Payer: COMMERCIAL

## 2023-05-15 DIAGNOSIS — T38.0X5A STEROID-INDUCED HYPERGLYCEMIA: ICD-10-CM

## 2023-05-15 DIAGNOSIS — R73.9 STEROID-INDUCED HYPERGLYCEMIA: ICD-10-CM

## 2023-05-15 DIAGNOSIS — E11.9 TYPE 2 DIABETES MELLITUS WITHOUT COMPLICATION, WITHOUT LONG-TERM CURRENT USE OF INSULIN (H): ICD-10-CM

## 2023-05-15 DIAGNOSIS — M06.00 SERONEGATIVE RHEUMATOID ARTHRITIS (H): Primary | ICD-10-CM

## 2023-05-15 PROCEDURE — 99207 PR NO CHARGE LOS: CPT | Mod: VID

## 2023-05-15 NOTE — PATIENT INSTRUCTIONS
Recommendations from today's disease management visit:                                                      1. Start taking NPH insulin 10 units daily at the same time methylprednisolone dose is taken.    2. Continue checking your blood sugars in the morning before eating and 2 hours after your largest meal.      Follow-up: Return via [employer] message about blood sugars in 1 week.    To schedule another MTM appointment, please call the clinic directly or you may call the MTM scheduling line at [phone] or toll-free at [phone].     My Clinical Pharmacist's contact information:                                                      Please feel free to contact me with any questions or concerns you have.      Yuliya Kalpan, PharmD  Medication Therapy Management Pharmacist  [facility]  Phone: [phone]     [Negative] : Heme/Lymph

## 2023-05-16 ENCOUNTER — TELEPHONE (OUTPATIENT)
Dept: UROLOGY | Facility: CLINIC | Age: 48
End: 2023-05-16

## 2023-05-16 ENCOUNTER — TRANSCRIBE ORDERS (OUTPATIENT)
Dept: OTHER | Age: 48
End: 2023-05-16

## 2023-05-16 DIAGNOSIS — M79.645 BILATERAL THUMB PAIN: Primary | ICD-10-CM

## 2023-05-16 DIAGNOSIS — M79.673 PAIN OF FOOT, UNSPECIFIED LATERALITY: Primary | ICD-10-CM

## 2023-05-16 DIAGNOSIS — M79.644 BILATERAL THUMB PAIN: Primary | ICD-10-CM

## 2023-05-16 NOTE — TELEPHONE ENCOUNTER
Health Call Center    Phone Message    May a detailed message be left on voicemail: yes     Reason for Call: Order(s): Other: US Pelvic Complete with Transvaginal  Reason for requested: Please fax to [facility] at [phone] attn: Lara PATTERSON   Date needed: asap  Provider name: Dr. Sharma    Per patient, please leave appt that is currently scheduled with [place] until patient knows she can get this done at [facility]. Thank you.    Action Taken: Message routed to:  [facility] Urology    Travel Screening: Not Applicable

## 2023-05-17 ENCOUNTER — THERAPY VISIT (OUTPATIENT)
Dept: PHYSICAL THERAPY | Facility: CLINIC | Age: 48
End: 2023-05-17
Payer: MEDICARE

## 2023-05-17 DIAGNOSIS — M54.2 NECK PAIN: ICD-10-CM

## 2023-05-17 DIAGNOSIS — R10.2 PELVIC PAIN IN FEMALE: Primary | ICD-10-CM

## 2023-05-17 DIAGNOSIS — G89.29 CHRONIC BILATERAL LOW BACK PAIN WITHOUT SCIATICA: ICD-10-CM

## 2023-05-17 DIAGNOSIS — M54.50 CHRONIC BILATERAL LOW BACK PAIN WITHOUT SCIATICA: ICD-10-CM

## 2023-05-17 PROCEDURE — 97012 MECHANICAL TRACTION THERAPY: CPT | Mod: GP | Performed by: PHYSICAL THERAPIST

## 2023-05-17 PROCEDURE — 97140 MANUAL THERAPY 1/> REGIONS: CPT | Mod: GP | Performed by: PHYSICAL THERAPIST

## 2023-05-17 PROCEDURE — 97110 THERAPEUTIC EXERCISES: CPT | Mod: GP | Performed by: PHYSICAL THERAPIST

## 2023-05-19 ENCOUNTER — TELEPHONE (OUTPATIENT)
Dept: RHEUMATOLOGY | Facility: CLINIC | Age: 48
End: 2023-05-19
Payer: MEDICARE

## 2023-05-19 NOTE — TELEPHONE ENCOUNTER
Modification made to NS fluid order per direction of Dr. Lemus.  Will route to provider for review and signature.    JESSICA MoraN, RN  RN Care Coordinator Rheumatology

## 2023-05-23 NOTE — TELEPHONE ENCOUNTER
Is patient currently getting any pre-infusion antihistamine?  If not, based upon the report of no infusion reactions, I propose that she continue getting anti-histamine only prn infusion reaction. Aim I missing something?

## 2023-05-24 ENCOUNTER — THERAPY VISIT (OUTPATIENT)
Dept: PHYSICAL THERAPY | Facility: CLINIC | Age: 48
End: 2023-05-24
Payer: MEDICARE

## 2023-05-24 DIAGNOSIS — R10.2 PELVIC PAIN IN FEMALE: Primary | ICD-10-CM

## 2023-05-24 DIAGNOSIS — G89.29 CHRONIC BILATERAL LOW BACK PAIN WITHOUT SCIATICA: ICD-10-CM

## 2023-05-24 DIAGNOSIS — M54.50 CHRONIC BILATERAL LOW BACK PAIN WITHOUT SCIATICA: ICD-10-CM

## 2023-05-24 DIAGNOSIS — M54.2 NECK PAIN: ICD-10-CM

## 2023-05-24 PROCEDURE — 97140 MANUAL THERAPY 1/> REGIONS: CPT | Mod: GP | Performed by: PHYSICAL THERAPIST

## 2023-05-24 PROCEDURE — 97012 MECHANICAL TRACTION THERAPY: CPT | Mod: GP | Performed by: PHYSICAL THERAPIST

## 2023-05-24 PROCEDURE — 97110 THERAPEUTIC EXERCISES: CPT | Mod: GP | Performed by: PHYSICAL THERAPIST

## 2023-05-24 NOTE — TELEPHONE ENCOUNTER
Pt stated [facility] did not receive and told pt she needs sent via fax. [phone] [place]. Pt MRN [id number]  Please call pt once sent. Thank you

## 2023-05-24 NOTE — TELEPHONE ENCOUNTER
Writer faxed via epic fax and printer fax. Writer called and spoke to pt and stated fax was sent.

## 2023-05-25 ENCOUNTER — TELEPHONE (OUTPATIENT)
Dept: ORTHOPEDICS | Facility: CLINIC | Age: 48
End: 2023-05-25

## 2023-05-25 NOTE — TELEPHONE ENCOUNTER
[facility] Call Center    Phone Message    May a detailed message be left on voicemail: yes     Reason for Call: Other: Patient's cortisone injection with [facility] has been rescheduled. She is wondering if she can take back the appointment for 06/01 at 10:20am. Patient is requesting a call back.     Action Taken: Message routed to:  [facility] Orthopedics    Travel Screening: Not Applicable

## 2023-05-26 NOTE — PATIENT INSTRUCTIONS
Recommendations from today's disease management visit:                                                      1. I will add the requested NPH insulin order of 20 units to your infusion therapy plan.    2. All of the proposed supplements are safe with your current medications. Consider not starting the vitamin C as you are getting the recommended daily value in the multivitamin.    Follow-up: Return in about 4 weeks (around 6/2/2023) for MTM Pharmacist Visit.    To schedule another MTM appointment, please call the clinic directly or you may call the MTM scheduling line at [phone] or toll-free at [phone].     My Clinical Pharmacist's contact information:                                                      Please feel free to contact me with any questions or concerns you have.      Yuliya Kaplan PharmD  Medication Therapy Management Pharmacist  [facility]  Phone: [phone]

## 2023-05-26 NOTE — PROGRESS NOTES
Disease State Management Encounter:                          Laxmi Ya is a 48 year old female called for a follow-up visit.  Today's visit is a follow-up visit from 3/30/23.    Reason for visit: Diabetes education would like her to add NPH insulin prior to Actemra infusions    Rheumatoid Arthritis/Steroid Induced Hyperglycemia: Currently using Actemra 800 mg infusion monthly (last infusion 4/11/23) , diclofenac 1% gel as needed (uses once per day a few times per week). Completed methylprednisolone taper but does still receive steroids prior to Actemra infusions. Reports the Actemra has been more helpful than other medications she has tried previously and higher dose is helping particularly with the pain in her arms. Notes she has been seeing [facility] diabetes education and they have suggested adding 20 units of NPH prior to Actemra infusions to help with steroid induced hyperglycemia from the pre medications. Blood sugars have been in the 200s on infusion days even with long and short acting insulin. Would like this added to her infusion therapy plan so she doesn't need to  a 3rd type of insulin from the pharmacy.      Tried and failed: Humira, Enbrel, Orencia, Remicade, Simponi, Hydroxychloroquine    Liver Function Studies - Recent Labs   Lab Test 04/11/23  1257   PROTTOTAL 6.6   ALBUMIN 4.4   BILITOTAL 0.7   ALKPHOS 41   AST 33   ALT 54*     CBC RESULTS: Recent Labs   Lab Test 04/11/23  1257   WBC 6.7   RBC 4.93   HGB 14.8   HCT 42.6   MCV 86   MCH 30.0   MCHC 34.7   RDW 13.5        Supplements: Currently taking fermented zinc complex daily. Would like to start taking vitamin C 500 mg daily, potassium gluconate, vitamin D 5000 units daily, digestive enzymes (Digest Gold, GlutenEase, and Lacto - by [employer]) and women's multivitamin daily. Would like to ensure that these will not interact with her medications or cause high blood sugars prior to starting them.    Today's Vitals: There were  no vitals taken for this visit.    Assessment/Plan: Would benefit from using NPH prior to infusions as prescribed by diabetes education. Plan to add an order to infusion therapy plan for 20 units of NPH insulin prior to Actemra infusion. Reviewed requested supplements for interactions. No drug interactions found. Did discuss multivitamin has recommended daily amount of vitamin C in it so adding an additional vitamin C supplement will not be of any benefit. Recommended not starting the additional vitamin C supplement but starting other supplements for general health.     1. I will add the requested NPH insulin order of 20 units to your infusion therapy plan.    -- Determined prior to infusion that infusion center cannot administer insulin prior to infusions. Patient will obtain a prescription from diabetes education and bring the NPH to her next appointment.    2. All of the proposed supplements are safe with your current medications. Consider not starting the vitamin C as you are getting the recommended daily value in the multivitamin.    Follow-up: Return in about 4 weeks (around 6/2/2023) for [facility] Pharmacist Visit.    I spent 30 minutes with this patient today. All changes were made via collaborative practice agreement with and verbal approval from Oswaldo Lemus MD. A copy of the visit note was provided to the patient's provider(s).    A summary of these recommendations was sent via [employer].    Augustine ConnerD  Medication Therapy Management Pharmacist  [facility]  Phone: [phone]     Medication Therapy Recommendations  Steroid-induced hyperglycemia    Rationale: Untreated condition - Needs additional medication therapy - Indication   Recommendation: Start Medication - NovoLIN N VIAL 100 UNIT/ML susp - 20 units prior to Actemra infusion   Status: Accepted per Provider         Takes dietary supplements    Current Medication: UNABLE TO FIND   Rationale: Synergistic therapy -  Needs additional medication therapy - Indication   Recommendation: Start Medication - MULTIVITAMIN ADULT (MINERALS) PO - Ok to start all supplements - advised against additional vitamin C due to duplicative therapy   Status: Accepted - no CPA Needed

## 2023-05-26 NOTE — TELEPHONE ENCOUNTER
Called and LVM. Previous appointment time has been taken. Patient can call back to be scheduled sooner if desired.    MIN Garza

## 2023-05-31 ENCOUNTER — THERAPY VISIT (OUTPATIENT)
Dept: PHYSICAL THERAPY | Facility: CLINIC | Age: 48
End: 2023-05-31
Payer: MEDICARE

## 2023-05-31 DIAGNOSIS — J32.4 CHRONIC PANSINUSITIS: ICD-10-CM

## 2023-05-31 DIAGNOSIS — G89.29 CHRONIC BILATERAL LOW BACK PAIN WITHOUT SCIATICA: ICD-10-CM

## 2023-05-31 DIAGNOSIS — R10.2 PELVIC PAIN IN FEMALE: Primary | ICD-10-CM

## 2023-05-31 DIAGNOSIS — J33.9 NASAL POLYPS: ICD-10-CM

## 2023-05-31 DIAGNOSIS — M54.50 CHRONIC BILATERAL LOW BACK PAIN WITHOUT SCIATICA: ICD-10-CM

## 2023-05-31 DIAGNOSIS — M54.2 NECK PAIN: ICD-10-CM

## 2023-05-31 PROCEDURE — 97110 THERAPEUTIC EXERCISES: CPT | Mod: GP | Performed by: PHYSICAL THERAPIST

## 2023-05-31 PROCEDURE — 97140 MANUAL THERAPY 1/> REGIONS: CPT | Mod: GP | Performed by: PHYSICAL THERAPIST

## 2023-06-01 ENCOUNTER — HEALTH MAINTENANCE LETTER (OUTPATIENT)
Age: 48
End: 2023-06-01

## 2023-06-01 DIAGNOSIS — J33.9 NASAL POLYPS: ICD-10-CM

## 2023-06-01 DIAGNOSIS — J32.4 CHRONIC PANSINUSITIS: ICD-10-CM

## 2023-06-01 RX ORDER — MOMETASONE FUROATE MONOHYDRATE 50 UG/1
SPRAY, METERED NASAL
Qty: 17 G | Refills: 3 | Status: SHIPPED | OUTPATIENT
Start: 2023-06-01 | End: 2023-08-22

## 2023-06-01 RX ORDER — MOMETASONE FUROATE MONOHYDRATE 50 UG/1
SPRAY, METERED NASAL
Qty: 17 G | Refills: 1 | Status: SHIPPED | OUTPATIENT
Start: 2023-06-01 | End: 2023-06-01

## 2023-06-03 RX ORDER — INSULIN HUMAN 100 [IU]/ML
40 INJECTION, SUSPENSION SUBCUTANEOUS EVERY EVENING
COMMUNITY
Start: 2023-05-09 | End: 2024-05-22

## 2023-06-03 RX ORDER — INSULIN LISPRO 100 [IU]/ML
35 INJECTION, SOLUTION INTRAVENOUS; SUBCUTANEOUS
COMMUNITY
Start: 2023-04-13

## 2023-06-03 NOTE — PATIENT INSTRUCTIONS
Recommendations from today's disease management visit:                                                      1. I will ask Dr. Lemus if there are any medical indications of restarting the saline infusion after your Actemra infusion, and if he would be willing to add it to the therapy plan.    2. You could consider starting a statin that has less risk of causing muscle pains like rosuvastatin or pravastatin. This is recommended due to your diabetes diagnosis. You can discuss these options with Dr. Juares.    Follow-up: Return in about 3 months (around 8/15/2023) for MTM Pharmacist Visit.    To schedule another MTM appointment, please call the clinic directly or you may call the MTM scheduling line at [phone] or toll-free at [phone].     My Clinical Pharmacist's contact information:                                                      Please feel free to contact me with any questions or concerns you have.      Yuliya Kaplan, PharmD  Medication Therapy Management Pharmacist  [facility]  Phone: [phone]

## 2023-06-03 NOTE — PROGRESS NOTES
Disease State Management Encounter:                          Laxmi Ya is a 48 year old female called for a follow-up visit.  Today's visit is a follow-up visit from 23.    Reason for visit: Questions about statin and fluids after Actemra infusion    Rheumatoid Arthritis: Currently using Actemra 800 mg infusion monthly (last infusion 5/10/23) , diclofenac 1% gel as needed (uses once per day a few times per week). Reports the Actemra has been more helpful than other medications she has tried previously but dose was not controlling symptoms adequately. Notes her upper arms feel improved - pain and soreness seem to be better. Also is starting to notice some improvement in her back but nothing significant yet. Does have some fatigue after infusion. Notes she also has been getting a 250 mL infusion of saline after her Actemra infusion due to redness and itchiness. This has been discontinued and she requests that it be re-added to her plan. Does not take antihistamines prior to during infusion.     Tried and failed: Humira, Enbrel, Orencia, Remicade, Simponi, Hydroxychloroquine    Liver Function Studies - Recent Labs   Lab Test 23  1257   PROTTOTAL 6.6   ALBUMIN 4.4   BILITOTAL 0.7   ALKPHOS 41   AST 33   ALT 54*     CBC RESULTS: Recent Labs   Lab Test 23  1257   WBC 6.7   RBC 4.93   HGB 14.8   HCT 42.6   MCV 86   MCH 30.0   MCHC 34.7   RDW 13.5        Type 2 Diabetes/Steroid Induced Hyperglycemia:  Currently taking Lantus 14 units daily, Humalog 5 units three times daily with meals with correction scale of 1:25 for BG >150, NPH insulin 20 units on day of infusion then 15 units daily x 2 days after infusion. Patient is not experiencing side effects. Plans to work through  diabetes education for future blood sugar management. Was told that she should consider starting a statin but feels her cholesterol is fine and concerned that it could increase muscle pain.  Blood sugar monitorin-3 times  "daily  Symptoms of low blood sugar? none  Symptoms of high blood sugar? Fatigue, nausea, general feeling of being \"unwell\".  Eye exam: due  Foot exam: due  Aspirin: No  Statin: No   ACEi/ARB: Yes: losartan 100 mg.     Lab Results   Component Value Date    A1C 7.0 02/06/2023     Today's Vitals: There were no vitals taken for this visit.    Assessment/Plan: Reviewed usual treatment for a hypersensitivity reaction (reddness, itchiness, swelling) is typically treated with an antihistamine like benadryl which could be added to the therapy plan. Patient declined this option citing potential drowsiness side effect and concerns needing to drive home after infusion. Requests provider is notified again of her request to add a saline infusion after her Actemra infusion. Will plan to message provider. Reviewed 2023 ADA guidelines, recommending initiation of a statin for patients with diabetes over age 40. Discussed rosuvastatin and pravastatin have least risk of causing muscle pain side effect. Encouraged patient to review these options with PCP.     1. I will ask Dr. Lemus if there are any medical indications of restarting the saline infusion after your Actemra infusion, and if he would be willing to add it to the therapy plan.    2. You could consider starting a statin that has less risk of causing muscle pains like rosuvastatin or pravastatin. This is recommended due to your diabetes diagnosis. You can discuss these options with Dr. Juares.    Follow-up: Return in about 3 months (around 8/15/2023) for MTM Pharmacist Visit.    I spent 40 minutes with this patient today. I offer these suggestions for consideration by Oswaldo Lemus MD. A copy of the visit note was provided to the patient's provider(s).    A summary of these recommendations was declined by the patient.    Yuliya Kaplan, Frances  Medication Therapy Management Pharmacist  [facility]  Phone: [phone]     Medication Therapy " Recommendations  Diabetes mellitus, type 2 (H)    Rationale: Preventive therapy - Needs additional medication therapy - Indication   Recommendation: Start Medication - rosuvastatin 10 MG tablet - Daily - discuss starting with PCP   Status: Patient Agreed - Adherence/Education         Seronegative rheumatoid arthritis (H)    Current Medication: Tocilizumab (ACTEMRA IV)   Rationale: Undesirable effect - Adverse medication event - Safety   Recommendation: Start Medication - SALINE FLUSH IV - Will request 250 mL infusion after Actemra from provider   Status: Accepted per Provider

## 2023-06-06 ENCOUNTER — TELEPHONE (OUTPATIENT)
Dept: ORTHOPEDICS | Facility: CLINIC | Age: 48
End: 2023-06-06

## 2023-06-07 ENCOUNTER — INFUSION THERAPY VISIT (OUTPATIENT)
Dept: INFUSION THERAPY | Facility: CLINIC | Age: 48
End: 2023-06-07
Attending: INTERNAL MEDICINE
Payer: MEDICARE

## 2023-06-07 VITALS
TEMPERATURE: 98 F | DIASTOLIC BLOOD PRESSURE: 89 MMHG | OXYGEN SATURATION: 95 % | WEIGHT: 212 LBS | SYSTOLIC BLOOD PRESSURE: 128 MMHG | RESPIRATION RATE: 16 BRPM | HEART RATE: 67 BPM | BODY MASS INDEX: 34.22 KG/M2

## 2023-06-07 DIAGNOSIS — E78.5 HYPERLIPEMIA: ICD-10-CM

## 2023-06-07 DIAGNOSIS — M13.80 SERONEGATIVE INFLAMMATORY ARTHRITIS: Primary | ICD-10-CM

## 2023-06-07 LAB
ALBUMIN SERPL BCG-MCNC: 4.4 G/DL (ref 3.5–5.2)
ALBUMIN UR-MCNC: 10 MG/DL
ALP SERPL-CCNC: 42 U/L (ref 35–104)
ALT SERPL W P-5'-P-CCNC: 65 U/L (ref 10–35)
ANION GAP SERPL CALCULATED.3IONS-SCNC: 10 MMOL/L (ref 7–15)
APPEARANCE UR: ABNORMAL
AST SERPL W P-5'-P-CCNC: 50 U/L (ref 10–35)
BACTERIA #/AREA URNS HPF: ABNORMAL /HPF
BASOPHILS # BLD AUTO: 0.1 10E3/UL (ref 0–0.2)
BASOPHILS NFR BLD AUTO: 1 %
BILIRUB SERPL-MCNC: 0.8 MG/DL
BILIRUB UR QL STRIP: NEGATIVE
BUN SERPL-MCNC: 15 MG/DL (ref 6–20)
CALCIUM SERPL-MCNC: 9.6 MG/DL (ref 8.6–10)
CHLORIDE SERPL-SCNC: 104 MMOL/L (ref 98–107)
CHOLEST SERPL-MCNC: 255 MG/DL
COLOR UR AUTO: YELLOW
CREAT SERPL-MCNC: 0.84 MG/DL (ref 0.51–0.95)
DEPRECATED HCO3 PLAS-SCNC: 24 MMOL/L (ref 22–29)
EOSINOPHIL # BLD AUTO: 0.2 10E3/UL (ref 0–0.7)
EOSINOPHIL NFR BLD AUTO: 3 %
ERYTHROCYTE [DISTWIDTH] IN BLOOD BY AUTOMATED COUNT: 12.4 % (ref 10–15)
GFR SERPL CREATININE-BSD FRML MDRD: 86 ML/MIN/1.73M2
GLUCOSE SERPL-MCNC: 120 MG/DL (ref 70–99)
GLUCOSE UR STRIP-MCNC: NEGATIVE MG/DL
HCT VFR BLD AUTO: 45.6 % (ref 35–47)
HDLC SERPL-MCNC: 65 MG/DL
HGB BLD-MCNC: 15.7 G/DL (ref 11.7–15.7)
HGB UR QL STRIP: NEGATIVE
IMM GRANULOCYTES # BLD: 0 10E3/UL
IMM GRANULOCYTES NFR BLD: 0 %
KETONES UR STRIP-MCNC: NEGATIVE MG/DL
LDLC SERPL CALC-MCNC: 149 MG/DL
LEUKOCYTE ESTERASE UR QL STRIP: NEGATIVE
LYMPHOCYTES # BLD AUTO: 2 10E3/UL (ref 0.8–5.3)
LYMPHOCYTES NFR BLD AUTO: 29 %
MCH RBC QN AUTO: 29.7 PG (ref 26.5–33)
MCHC RBC AUTO-ENTMCNC: 34.4 G/DL (ref 31.5–36.5)
MCV RBC AUTO: 86 FL (ref 78–100)
MONOCYTES # BLD AUTO: 0.8 10E3/UL (ref 0–1.3)
MONOCYTES NFR BLD AUTO: 12 %
MUCOUS THREADS #/AREA URNS LPF: PRESENT /LPF
NEUTROPHILS # BLD AUTO: 3.9 10E3/UL (ref 1.6–8.3)
NEUTROPHILS NFR BLD AUTO: 55 %
NITRATE UR QL: NEGATIVE
NONHDLC SERPL-MCNC: 190 MG/DL
NRBC # BLD AUTO: 0 10E3/UL
NRBC BLD AUTO-RTO: 0 /100
PH UR STRIP: 6.5 [PH] (ref 5–7)
PLATELET # BLD AUTO: 313 10E3/UL (ref 150–450)
POTASSIUM SERPL-SCNC: 3.9 MMOL/L (ref 3.4–5.3)
PROT SERPL-MCNC: 6.7 G/DL (ref 6.4–8.3)
RBC # BLD AUTO: 5.29 10E6/UL (ref 3.8–5.2)
RBC URINE: 1 /HPF
SODIUM SERPL-SCNC: 138 MMOL/L (ref 136–145)
SP GR UR STRIP: 1.02 (ref 1–1.03)
SQUAMOUS EPITHELIAL: 8 /HPF
TRIGL SERPL-MCNC: 207 MG/DL
UROBILINOGEN UR STRIP-MCNC: NORMAL MG/DL
WBC # BLD AUTO: 7 10E3/UL (ref 4–11)
WBC URINE: 2 /HPF

## 2023-06-07 PROCEDURE — 96375 TX/PRO/DX INJ NEW DRUG ADDON: CPT

## 2023-06-07 PROCEDURE — 250N000011 HC RX IP 250 OP 636: Performed by: INTERNAL MEDICINE

## 2023-06-07 PROCEDURE — 80053 COMPREHEN METABOLIC PANEL: CPT | Performed by: INTERNAL MEDICINE

## 2023-06-07 PROCEDURE — 85004 AUTOMATED DIFF WBC COUNT: CPT | Performed by: INTERNAL MEDICINE

## 2023-06-07 PROCEDURE — 83718 ASSAY OF LIPOPROTEIN: CPT

## 2023-06-07 PROCEDURE — 258N000003 HC RX IP 258 OP 636: Performed by: INTERNAL MEDICINE

## 2023-06-07 PROCEDURE — 36415 COLL VENOUS BLD VENIPUNCTURE: CPT | Performed by: INTERNAL MEDICINE

## 2023-06-07 PROCEDURE — 999N000128 HC STATISTIC PERIPHERAL IV START W/O US GUIDANCE

## 2023-06-07 PROCEDURE — 81001 URINALYSIS AUTO W/SCOPE: CPT | Performed by: INTERNAL MEDICINE

## 2023-06-07 PROCEDURE — 250N000013 HC RX MED GY IP 250 OP 250 PS 637: Performed by: INTERNAL MEDICINE

## 2023-06-07 PROCEDURE — 96365 THER/PROPH/DIAG IV INF INIT: CPT

## 2023-06-07 RX ORDER — HEPARIN SODIUM,PORCINE 10 UNIT/ML
5 VIAL (ML) INTRAVENOUS
Status: CANCELLED | OUTPATIENT
Start: 2023-07-04

## 2023-06-07 RX ORDER — EPINEPHRINE 1 MG/ML
0.3 INJECTION, SOLUTION INTRAMUSCULAR; SUBCUTANEOUS EVERY 5 MIN PRN
Status: CANCELLED | OUTPATIENT
Start: 2023-07-04

## 2023-06-07 RX ORDER — METHYLPREDNISOLONE SODIUM SUCCINATE 125 MG/2ML
125 INJECTION, POWDER, LYOPHILIZED, FOR SOLUTION INTRAMUSCULAR; INTRAVENOUS
Status: CANCELLED
Start: 2023-07-04

## 2023-06-07 RX ORDER — ACETAMINOPHEN 325 MG/1
650 TABLET ORAL ONCE
Status: COMPLETED | OUTPATIENT
Start: 2023-06-07 | End: 2023-06-07

## 2023-06-07 RX ORDER — ACETAMINOPHEN 325 MG/1
650 TABLET ORAL ONCE
Status: CANCELLED | OUTPATIENT
Start: 2023-07-04

## 2023-06-07 RX ORDER — ALBUTEROL SULFATE 90 UG/1
1-2 AEROSOL, METERED RESPIRATORY (INHALATION)
Status: CANCELLED
Start: 2023-07-04

## 2023-06-07 RX ORDER — HEPARIN SODIUM (PORCINE) LOCK FLUSH IV SOLN 100 UNIT/ML 100 UNIT/ML
5 SOLUTION INTRAVENOUS
Status: CANCELLED | OUTPATIENT
Start: 2023-07-04

## 2023-06-07 RX ORDER — DIPHENHYDRAMINE HYDROCHLORIDE 50 MG/ML
50 INJECTION INTRAMUSCULAR; INTRAVENOUS
Status: CANCELLED
Start: 2023-07-04

## 2023-06-07 RX ORDER — MEPERIDINE HYDROCHLORIDE 25 MG/ML
25 INJECTION INTRAMUSCULAR; INTRAVENOUS; SUBCUTANEOUS EVERY 30 MIN PRN
Status: CANCELLED | OUTPATIENT
Start: 2023-07-04

## 2023-06-07 RX ORDER — METHYLPREDNISOLONE SODIUM SUCCINATE 125 MG/2ML
125 INJECTION, POWDER, LYOPHILIZED, FOR SOLUTION INTRAMUSCULAR; INTRAVENOUS ONCE
Status: CANCELLED | OUTPATIENT
Start: 2023-07-04

## 2023-06-07 RX ORDER — METHYLPREDNISOLONE SODIUM SUCCINATE 125 MG/2ML
125 INJECTION, POWDER, LYOPHILIZED, FOR SOLUTION INTRAMUSCULAR; INTRAVENOUS ONCE
Status: COMPLETED | OUTPATIENT
Start: 2023-06-07 | End: 2023-06-07

## 2023-06-07 RX ORDER — ALBUTEROL SULFATE 0.83 MG/ML
2.5 SOLUTION RESPIRATORY (INHALATION)
Status: CANCELLED | OUTPATIENT
Start: 2023-07-04

## 2023-06-07 RX ADMIN — TOCILIZUMAB 800 MG: 20 INJECTION, SOLUTION, CONCENTRATE INTRAVENOUS at 13:07

## 2023-06-07 RX ADMIN — ACETAMINOPHEN 650 MG: 325 TABLET ORAL at 12:51

## 2023-06-07 RX ADMIN — METHYLPREDNISOLONE SODIUM SUCCINATE 125 MG: 125 INJECTION, POWDER, FOR SOLUTION INTRAMUSCULAR; INTRAVENOUS at 13:04

## 2023-06-07 RX ADMIN — SODIUM CHLORIDE 250 ML: 9 INJECTION, SOLUTION INTRAVENOUS at 14:27

## 2023-06-07 ASSESSMENT — PAIN SCALES - GENERAL: PAINLEVEL: WORST PAIN (10)

## 2023-06-07 NOTE — PROGRESS NOTES
Infusion Nursing Note:  Laxmi Ya presents today for actemra.    Patient seen by provider today: No   present during visit today: Not Applicable.    Note: 250ml NS infused post infusion per orders/patient's request     Intravenous Access:  Labs drawn without difficulty.  [facility] by [facility]    Treatment Conditions:  Biological Infusion Checklist:  ~~~ NOTE: If the patient answers yes to any of the questions below, hold the infusion and contact ordering provider or on-call provider.    1. Have you recently had an elevated temperature, fever, chills, productive cough, coughing for 3 weeks or longer or hemoptysis,  abnormal vital signs, night sweats,  chest pain or have you noticed a decrease in your appetite, unexplained weight loss or fatigue? No  2. Do you have any open wounds or new incisions? No  3. Do you have any upcoming hospitalizations or surgeries? Does not include esophagogastroduodenoscopy, colonoscopy, endoscopic retrograde cholangiopancreatography (ERCP), endoscopic ultrasound (EUS), dental procedures or joint aspiration/steroid injections No  4. Do you currently have any signs of illness or infection or are you on any antibiotics? No  5. Have you had any new, sudden or worsening abdominal pain? No  6. Have you or anyone in your household received a live vaccination in the past 4 weeks? Please note: No live vaccines while on biologic/chemotherapy until 6 months after the last treatment. Patient can receive the flu vaccine (shot only), pneumovax and the Covid vaccine. It is optimal for the patient to get these vaccines mid cycle, but they can be given at any time as long as it is not on the day of the infusion. No  7. Have you recently been diagnosed with any new nervous system diseases (ie. Multiple sclerosis, Guillain [place], seizures, neurological changes) or cancer diagnosis? Are you on any form of radiation or chemotherapy? No  8. Are you pregnant or breast feeding or do you  have plans of pregnancy in the future? No  9. Have you been having any signs of worsening depression or suicidal ideations?  (benlysta only) N/A  10. Have there been any other new onset medical symptoms? No  11. Have you had any new blood clots? (IVIG only) N/A    Post Infusion Assessment:  Patient tolerated infusion without incident.  Blood return noted pre and post infusion.  Site patent and intact, free from redness, edema or discomfort.  No evidence of extravasations.  Access discontinued per protocol.     POST-INFUSION OF BIOLOGICAL MEDICATION:     Reviewed with patient.  Given biologic medication or medication hand-out. Inform patient if any fever, chills or signs of infection, new symptoms, abdominal pain, heart palpitations, shortness of breath, reaction, weakness, neurological changes, seek medical attention immediately and should not receive infusions. No live virus vaccines prior to or during treatment or up to 6 months post infusion. If the patient has an upcoming procedure or surgery, this should be discussed with the rheumatologist and surgeon or provider.    Discharge Plan:   Discharge instructions reviewed with: Patient.  Patient and/or family verbalized understanding of discharge instructions and all questions answered.  AVS to patient via [employer].  Patient will return 7/5/23 for next appointment.   Patient discharged in stable condition accompanied by: self.  Departure Mode: Ambulatory.    Administrations This Visit     acetaminophen (TYLENOL) tablet 650 mg     Admin Date  06/07/2023 Action  $Given Dose  650 mg Route  Oral Administered By  Jacquelyn Nicole, RN          methylPREDNISolone sodium succinate (solu-MEDROL) injection 125 mg     Admin Date  06/07/2023 Action  $Given Dose  125 mg Route  Intravenous Administered By  Jacquelyn Nicole, RN          tocilizumab (ACTEMRA) 800 mg in sodium chloride 0.9 % 150 mL infusion     Admin Date  06/07/2023 Action  $New Bag Dose  800 mg Rate  150 mL/hr  Route  Intravenous Administered By  Jacquelyn Nicole, RN                Jacquelyn Nicole, RN

## 2023-06-07 NOTE — PATIENT INSTRUCTIONS
Dear Laxmi Ya    Thank you for choosing [facility] ([facility]) for your infusion.  The following information is a summary of our appointment as well as important reminders.      EDUCATION POST BIOLOGICAL/CHEMOTHERAPY INFUSION  Call the triage nurse at your clinic or seek medical attention if you have chills and/or temperature greater than or equal to 100.5, uncontrolled nausea/vomiting, diarrhea, constipation, dizziness, shortness of breath, chest pain, heart palpitations, weakness or any other new or concerning symptoms, questions or concerns.  You can not have any live virus vaccines prior to or during treatment or up to 6 months post infusion.  If you have an upcoming surgery, medical procedure or dental procedure during treatment, this should be discussed with your ordering physician and your surgeon/dentist.  If you are having any concerning symptom, if you are unsure if you should get your next infusion or wish to speak to a provider before your next infusion, please call your care coordinator or triage nurse at your clinic to notify them so we can adequately serve you.     We look forward in seeing you on your next appointment here at [facility] ([facility]).  Please don t hesitate to call us at [phone] to reschedule any of your appointments or to speak with one of the [facility] registered nurses.  It was a pleasure taking care of you today.    Sincerely,    [facility]  [address] [place] [address]  [id number]  Phone:  [phone]

## 2023-06-14 ENCOUNTER — THERAPY VISIT (OUTPATIENT)
Dept: PHYSICAL THERAPY | Facility: CLINIC | Age: 48
End: 2023-06-14
Payer: MEDICARE

## 2023-06-14 ENCOUNTER — THERAPY VISIT (OUTPATIENT)
Dept: OCCUPATIONAL THERAPY | Facility: CLINIC | Age: 48
End: 2023-06-14
Payer: MEDICARE

## 2023-06-14 DIAGNOSIS — M54.50 CHRONIC BILATERAL LOW BACK PAIN WITHOUT SCIATICA: ICD-10-CM

## 2023-06-14 DIAGNOSIS — R10.2 PELVIC PAIN IN FEMALE: Primary | ICD-10-CM

## 2023-06-14 DIAGNOSIS — R20.2 PARESTHESIA OF BOTH HANDS: ICD-10-CM

## 2023-06-14 DIAGNOSIS — M79.645 BILATERAL THUMB PAIN: ICD-10-CM

## 2023-06-14 DIAGNOSIS — M79.644 BILATERAL THUMB PAIN: ICD-10-CM

## 2023-06-14 DIAGNOSIS — M79.642 BILATERAL HAND PAIN: ICD-10-CM

## 2023-06-14 DIAGNOSIS — M54.2 NECK PAIN: ICD-10-CM

## 2023-06-14 DIAGNOSIS — M79.641 BILATERAL HAND PAIN: ICD-10-CM

## 2023-06-14 DIAGNOSIS — G89.29 CHRONIC BILATERAL LOW BACK PAIN WITHOUT SCIATICA: ICD-10-CM

## 2023-06-14 DIAGNOSIS — R29.898 WEAKNESS OF BOTH HANDS: ICD-10-CM

## 2023-06-14 DIAGNOSIS — G56.03 BILATERAL CARPAL TUNNEL SYNDROME: ICD-10-CM

## 2023-06-14 PROCEDURE — 97140 MANUAL THERAPY 1/> REGIONS: CPT | Mod: GP | Performed by: PHYSICAL THERAPIST

## 2023-06-14 PROCEDURE — 97760 ORTHOTIC MGMT&TRAING 1ST ENC: CPT | Mod: GO

## 2023-06-14 PROCEDURE — 97165 OT EVAL LOW COMPLEX 30 MIN: CPT | Mod: GO

## 2023-06-14 NOTE — PROGRESS NOTES
OCCUPATIONAL THERAPY EVALUATION  Type of Visit: Evaluation    See electronic medical record for Abuse and Falls Screening details.    Subjective      Presenting condition or subjective complaint:  bilateral carpal tunnel syndrome   Date of onset: 05/04/23 5/4/23 (script date); referred by Dr. 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     Obesity      Pyelonephritis, unspecified 1992    abstract     Rheumatoid arteritis (H)      Seronegative inflammatory arthritis 6/17/2022     Unspecified symptom associated with female genital organs 05/07/1999    chronic pelvic pain abstract     Dates & types of surgery:     Past Surgical History:   Procedure Laterality Date     CHOLECYSTECTOMY       COLON SURGERY      Left hemicolectomy for diverticulosis     CYSTOSCOPY,+URETEROSCOPY      abstract     ZZHC EXCIS/CURET BENIGN ELBOW LESN  10/26/1987    (R) humerus abstract     Prior therapy history for the same diagnosis, illness or injury:     Yes (hand therapy in 09/2022)    Patient goals for therapy:  daily living skills      Objective   ADDITIONAL HISTORY:  Right hand dominant  Patient reports symptoms of pain, stiffness/loss of motion, weakness/loss of strength, numbness and tingling   Transportation: drives    Functional Outcome Measure:   Upper Extremity Functional Index Score:  SCORE:   Column Totals: /80: 11   (A lower score indicates greater disability.)    PAIN:  Pain Level at Rest: 9/10  Pain Level with Use: 10/10  Pain Location: B hand and thumb (mostly CMCj and MPj)   Pain Quality: Aching, Dull, Sharp and Shooting, burning    Pain Frequency: constant  Pain is Worst: daytime or nighttime  Pain is Exacerbated By: with use  Pain is Relieved By: brace  Pain Progression: Unchanged    EDEMA: reports mild edema in B webspace      SENSATION: Decreased Median Nerve distribution per pt report (B thumb, IF, and LF)     ROM:   Wrist ROM  Left AROM Right AROM    Extension 50 55   Flexion 60 68     Thumb ROM  Left AROM Right AROM    MP Joint 55 45   IP Joint  40 40   Radial Abduction 51 52   Palmar Abduction 58 54   Kapandji Opposition Scale (0-10/10) 5 5       OBSERVATIONS/APPEARANCE:   Thumb Left Right   Shoulder deformity present at CMC joint - -   Edema over CMC joint + +   Noted collapse of MP Joint into hyperextension during pinch - -     SPECIAL TESTS:   CTS Special Tests  Pain Report Left Right   Median Nerve Compression at Pronator - -   Carpal Compression Test-Durkan Test (30 sec) + +   Barragan Test for Lumbrical Incursion (fist x30 sec) +, 'just along thumb' + 'just along thumb'   Tinel's at Carpal Tunnel + +     STRENGTH:     Measured in pounds 6/14/2023 6/14/2023    Left Right   Trial 1 18# 20#   Trial 2     Trial 3     Average       3 Point Pinch  Measured in pounds 6/14/2023 6/14/2023    Left Right   Trial 1 5#, ++ 5#, ++   Trial 2     Trial 3     Average          Assessment & Plan   CLINICAL IMPRESSIONS   Medical Diagnosis: B CTS    Treatment Diagnosis: B hand and thumb pain, B hand weakness, parasthesia of both hands    Impression/Assessment: Patient's limitations or Problem List includes: Pain, Decreased ROM/motion, Increased edema, Weakness, Sensory disturbance, Decreased , Decreased pinch and Tightness in musculature of the bilateral hand and thumb which interferes with the patient's ability to perform Self Care Tasks (dressing, bathing), Work Tasks, Sleep Patterns, Recreational Activities, Household Chores and Driving  as compared to previous level of function.    Clinical Decision Making (Complexity):   Assessment of  Occupational Performance: 5 or more Performance Deficits  Occupational Performance Limitations: bathing/showering, dressing, functional mobility, driving and community mobility, home establishment and management, meal preparation and cleanup, shopping, work and leisure activities  Clinical Decision Making (Complexity): Low complexity    PLAN OF CARE  Treatment Interventions:   Modalities:  Paraffin  Therapeutic Exercise:  AROM, AAROM, PROM, Tendon Gliding, Blocking, Reverse Blocking, Place and Hold, Isotonics and Isometrics  Neuromuscular re-education:  Nerve Gliding, Proprioceptive Training, Kinesiotaping, Isometrics and Stabilization  Manual Techniques:  Friction massage, Myofascial release and Manual edema mobilization  Orthotic Fabrication:  Static  Self Care:  Self Care Tasks, Ergonomic Considerations and Work Tasks    Long Term Goals   OT Goal 1  Goal Identifier: Hygiene  Goal Description: Brushing teeth with 5/10 pain  Rationale: In order to maximize safety and independence with performance of self-care activities  Target Date: 08/09/23      Frequency of Treatment: 1x/week  Duration of Treatment: 8 weeks     Education Assessment: Learner/Method: Patient;No Barriers to Learning     Risks and benefits of evaluation/treatment have been explained.   Patient/Family/caregiver agrees with Plan of Care.     Evaluation Time:    OT Eval, Low Complexity Minutes (92989): 30      Signing Clinician: PITER Lange [facility]                                                                                   OUTPATIENT OCCUPATIONAL THERAPY      PLAN OF TREATMENT FOR OUTPATIENT REHABILITATION   Patient's Last Name, First Name, Laxmi Coleman YOB: 1975   Provider's Name   [facility]   Medical Record No.  [id number]     Onset Date: 05/04/23 Start of Care Date: 06/14/23     Medical Diagnosis:  B CTS      OT Treatment Diagnosis:  B hand and  thumb pain, B hand weakness, parasthesia of both hands Plan of Treatment  Frequency/Duration:1x/week/8 weeks    Certification date from 06/14/23   To 08/09/23        See note for plan of treatment details and functional goals     PITER Lange                         I CERTIFY THE NEED FOR THESE SERVICES FURNISHED UNDER        THIS PLAN OF TREATMENT AND WHILE UNDER MY CARE     (Physician attestation of this document indicates review and certification of the therapy plan).                  Referring Provider:  Twila Petit      Initial Assessment  See Epic Evaluation- 06/14/23

## 2023-06-15 NOTE — PROGRESS NOTES
06/14/23 0500   Appointment Info   Signing clinician's name / credentials Sofi Lemons PT, OCS   Total/Authorized Visits E&T 02/03/23   Visits Used 11   PT Tx Diagnosis Lower back pain with mobility deficits, lower back pain w/ referred pain, neck pain w/ headache, neck pain w/ mobility deficits   Precautions/Limitations inflammatory concerns   Other pertinent information verbal ok for internal vaginal pelvic floor muscle assessment   Quick Adds Certification   Progress Note/Certification   Start of Care Date 03/08/23   Onset of illness/injury or Date of Surgery 02/03/23   Therapy Frequency 1x week   Predicted Duration 8 weeks   Certification date from 06/06/23   Certification date to 09/03/23   Progress Note Due Date 06/05/23   Progress Note Completed Date 06/14/23   GOALS   PT Goals 3   PT Goal 1   Goal Identifier ambulation   Goal Description Minutes patient will be able to  walk 20-30 minutes   Rationale to maximize safety and independence with performance of ADLs and functional tasks;to maximize safety and independence within the home;to maximize safety and independence within the community;to maximize safety and independence with transportation;to maximize safety and independence with self cares   Goal Progress 15 minutes   Target Date 09/07/23   PT Goal 2   Goal Identifier headaches   Goal Description HA 2x month or less with intensity 2/10 or less   Rationale to maximize safety and independence with performance of ADLs and functional tasks;to maximize safety and independence within the home;to maximize safety and independence within the community;to maximize safety and independence with transportation;to maximize safety and independence with self cares   Goal Progress improving with traction   Target Date 08/10/23   PT Goal 3   Goal Identifier pelvic pain   Goal Description with walking/intimacy/urinary urgency 2/10 or less   Rationale to maximize safety and independence with performance of ADLs  and functional tasks;to maximize safety and independence within the home;to maximize safety and independence with transportation;to maximize safety and independence with self cares;to maximize safety and independence within the community   Goal Progress currently 7/10   Target Date 09/07/23   Subjective Report   Subjective Report Pelvic pain felt better after last visit.Continues to have vaginal pain and B anterior hip/pelvic pain but slightly better than last visit. ELAINE holding steady but not worse. LBP/sacral pain still limits functional mobility with walking, traveling, bending/lifting.   Objective Measures   Objective Measures Objective Measure 1;Objective Measure 2   Objective Measure 1   Objective Measure Kegel strength 2+, delayed relaxation, high tone pelvic floor.   Objective Measure 2   Objective Measure TTP R>L LA/OI with minimal pressure.   Treatment Interventions (PT)   Interventions Manual Therapy   Therapeutic Procedure/Exercise   Ther Proc 1 glute myofascial full arc   Ther Proc 1 - Details x20   Skilled Intervention patient needed manual and verbal cues to engage proper gluteal contraction   Patient Response/Progress improved glute contraction   Manual Therapy   Manual Therapy: Mobilization, MFR, MLD, friction massage minutes (26562) 25   Manual Therapy Manual Therapy 2   Manual Therapy 2 supine internal MFR to pelvic floor x25 minutes   Skilled Intervention to decrease tone in pelvic floor   Patient Response/Progress less tension in PF post treatment   Education   Learner/Method Patient;No Barriers to Learning   Plan   Home program PTRX   Plan for next session assess effects of internal work   Comments   Comments Patient 20 minutes late, called.   Total Session Time   Timed Code Treatment Minutes 25   Total Treatment Time (sum of timed and untimed services) [address] [place] [address] Services                                                                                    OUTPATIENT PHYSICAL THERAPY    PLAN OF TREATMENT FOR OUTPATIENT REHABILITATION   Patient's Last Name, First Name, Laxmi Coleman YOB: 1975   Provider's Name   [facility]   Medical Record No.  [id number]     Onset Date: 02/03/23  Start of Care Date: 03/08/23     Medical Diagnosis:     Lower back pain with mobility deficits, lower back pain w/ referred pain, neck pain w/ headache, neck pain w/ mobility deficits, pelvic pain    PT Treatment Diagnosis:  Lower back pain with mobility deficits, lower back pain w/ referred pain, neck pain w/ headache, neck pain w/ mobility deficits Plan of Treatment  Frequency/Duration: 1x week/ 8 weeks    Certification date from 06/06/23 to 09/03/23         See note for plan of treatment details and functional goals     Peng George, PT                         I CERTIFY THE NEED FOR THESE SERVICES FURNISHED UNDER        THIS PLAN OF TREATMENT AND WHILE UNDER MY CARE     (Physician attestation of this document indicates review and certification of the therapy plan).                Referring Provider:  Kimberlee Vasquez      Initial Assessment  See Epic Evaluation- Start of Care Date: 03/08/23

## 2023-06-15 NOTE — PROGRESS NOTES
06/14/23 0500   Appointment Info   Signing clinician's name / credentials Sofi Lemons PT, OCS   Total/Authorized Visits E&T 02/03/23   Visits Used 11   Medical Diagnosis Lower back pain with mobility deficits, lower back pain w/ referred pain, neck pain w/ headache, neck pain w/ mobility deficits, pelvic pain   PT Tx Diagnosis Lower back pain with mobility deficits, lower back pain w/ referred pain, neck pain w/ headache, neck pain w/ mobility deficits   Precautions/Limitations inflammatory concerns   Other pertinent information verbal ok for internal vaginal pelvic floor muscle assessment   Quick Adds Certification   Progress Note/Certification   Start of Care Date 03/08/23   Onset of illness/injury or Date of Surgery 02/03/23   Therapy Frequency 1x week   Predicted Duration 8 weeks   Certification date from 06/06/23   Certification date to 09/03/23   Progress Note Due Date 06/05/23   Progress Note Completed Date 06/14/23   GOALS   PT Goals 3   PT Goal 1   Goal Identifier ambulation   Goal Description Minutes patient will be able to  walk 20-30 minutes   Rationale to maximize safety and independence with performance of ADLs and functional tasks;to maximize safety and independence within the home;to maximize safety and independence within the community;to maximize safety and independence with transportation;to maximize safety and independence with self cares   Goal Progress 15 minutes   Target Date 09/07/23   PT Goal 2   Goal Identifier headaches   Goal Description HA 2x month or less with intensity 2/10 or less   Rationale to maximize safety and independence with performance of ADLs and functional tasks;to maximize safety and independence within the home;to maximize safety and independence within the community;to maximize safety and independence with transportation;to maximize safety and independence with self cares   Goal Progress improving with traction   Target Date 08/10/23   PT Goal 3   Goal  Identifier pelvic pain   Goal Description with walking/intimacy/urinary urgency 2/10 or less   Rationale to maximize safety and independence with performance of ADLs and functional tasks;to maximize safety and independence within the home;to maximize safety and independence with transportation;to maximize safety and independence with self cares;to maximize safety and independence within the community   Goal Progress currently 7/10   Target Date 09/07/23   Subjective Report   Subjective Report Pelvic pain felt better after last visit.Continues to have vaginal pain and B anterior hip/pelvic pain but slightly better than last visit. ELAINE holding steady but not worse. LBP/sacral pain still limits functional mobility with walking, traveling, bending/lifting.   Objective Measures   Objective Measures Objective Measure 1;Objective Measure 2   Objective Measure 1   Objective Measure Kegel strength 2+, delayed relaxation, high tone pelvic floor.   Objective Measure 2   Objective Measure TTP R>L LA/OI with minimal pressure.   Treatment Interventions (PT)   Interventions Manual Therapy   Therapeutic Procedure/Exercise   Ther Proc 1 glute myofascial full arc   Ther Proc 1 - Details x20   Skilled Intervention patient needed manual and verbal cues to engage proper gluteal contraction   Patient Response/Progress improved glute contraction   Manual Therapy   Manual Therapy: Mobilization, MFR, MLD, friction massage minutes (22837) 25   Manual Therapy Manual Therapy 2   Manual Therapy 2 supine internal MFR to pelvic floor x25 minutes   Skilled Intervention to decrease tone in pelvic floor   Patient Response/Progress less tension in PF post treatment   Education   Learner/Method Patient;No Barriers to Learning   Plan   Home program PTRX   Plan for next session assess effects of internal work   Comments   Comments Patient 20 minutes late, called.   Total Session Time   Timed Code Treatment Minutes 25   Total Treatment Time (sum of timed  and untimed services) 25         [facility]                                                                                   OUTPATIENT PHYSICAL THERAPY    PLAN OF TREATMENT FOR OUTPATIENT REHABILITATION   Patient's Last Name, First Name, Laxmi Coleman YOB: 1975   Provider's Name   [facility]   Medical Record No.  [id number]     Onset Date: 02/03/23  Start of Care Date: 03/08/23     Medical Diagnosis:  Lower back pain with mobility deficits, lower back pain w/ referred pain, neck pain w/ headache, neck pain w/ mobility deficits, pelvic pain      PT Treatment Diagnosis:  Lower back pain with mobility deficits, lower back pain w/ referred pain, neck pain w/ headache, neck pain w/ mobility deficits Plan of Treatment  Frequency/Duration: 1x week/ 8 weeks    Certification date from 06/06/23 to 09/03/23         See note for plan of treatment details and functional goals     Peng George, PT                         I CERTIFY THE NEED FOR THESE SERVICES FURNISHED UNDER        THIS PLAN OF TREATMENT AND WHILE UNDER MY CARE .             Physician Signature               Date    X_____________________________________________________                    Referring Provider:  Kimberlee Vasquez      Initial Assessment  See Epic Evaluation- Start of Care Date: 03/08/23

## 2023-06-19 ENCOUNTER — TELEPHONE (OUTPATIENT)
Dept: ORTHOPEDICS | Facility: CLINIC | Age: 48
End: 2023-06-19

## 2023-06-19 DIAGNOSIS — J33.9 NASAL POLYPS: ICD-10-CM

## 2023-06-19 DIAGNOSIS — J32.4 CHRONIC PANSINUSITIS: ICD-10-CM

## 2023-06-19 RX ORDER — DUPILUMAB 300 MG/2ML
300 INJECTION, SOLUTION SUBCUTANEOUS
Qty: 4 ML | Refills: 0 | Status: SHIPPED | OUTPATIENT
Start: 2023-06-19 | End: 2023-07-17

## 2023-06-27 ENCOUNTER — TELEPHONE (OUTPATIENT)
Dept: RHEUMATOLOGY | Facility: CLINIC | Age: 48
End: 2023-06-27
Payer: MEDICARE

## 2023-06-27 ENCOUNTER — MYC MEDICAL ADVICE (OUTPATIENT)
Dept: OPHTHALMOLOGY | Facility: CLINIC | Age: 48
End: 2023-06-27
Payer: MEDICARE

## 2023-06-27 ENCOUNTER — MYC MEDICAL ADVICE (OUTPATIENT)
Dept: PHARMACY | Facility: CLINIC | Age: 48
End: 2023-06-27
Payer: MEDICARE

## 2023-06-27 ENCOUNTER — TELEPHONE (OUTPATIENT)
Dept: OPHTHALMOLOGY | Facility: CLINIC | Age: 48
End: 2023-06-27
Payer: MEDICARE

## 2023-06-27 NOTE — TELEPHONE ENCOUNTER
Offered patient first available MTM return visit, which is Thursday, 06/29. Patient would like to consult with pharmacist prior to this date. Please call back to discuss appointment options.

## 2023-06-27 NOTE — TELEPHONE ENCOUNTER
[employer] message with image received overnight-- will document in [employer] message.    John Crawley RN 7:14 AM 06/28/23    ---        Felton Laxmi JUDITH [phone]     Left message with direct number at [phone]    John Crawley RN 4:54 PM 06/27/23    ----      [employer] message sent to pt requesting image    I will reach out to patient.    John Crawley RN 4:17 PM 06/27/23             [facility]    Phone Message    May a detailed message be left on voicemail: yes     Reason for Call: Symptoms or Concerns     If patient has red-flag symptoms, warm transfer to triage line    Current symptom or concern: Right eye Purple eye and bulging. Patient states it appears to be blood in her eye.    Symptoms have been present for:  2 day(s)    Has patient previously been seen for this? No    By :     Date:     Are there any new or worsening symptoms? No      Action Taken: Message routed to:  [facility] Ophthalmology    Travel Screening: Not Applicable

## 2023-06-28 ENCOUNTER — THERAPY VISIT (OUTPATIENT)
Dept: OCCUPATIONAL THERAPY | Facility: CLINIC | Age: 48
End: 2023-06-28
Payer: MEDICARE

## 2023-06-28 ENCOUNTER — THERAPY VISIT (OUTPATIENT)
Dept: PHYSICAL THERAPY | Facility: CLINIC | Age: 48
End: 2023-06-28
Payer: MEDICARE

## 2023-06-28 ENCOUNTER — TELEPHONE (OUTPATIENT)
Dept: RHEUMATOLOGY | Facility: CLINIC | Age: 48
End: 2023-06-28

## 2023-06-28 DIAGNOSIS — G56.03 BILATERAL CARPAL TUNNEL SYNDROME: Primary | ICD-10-CM

## 2023-06-28 DIAGNOSIS — R10.2 PELVIC PAIN IN FEMALE: Primary | ICD-10-CM

## 2023-06-28 DIAGNOSIS — M79.645 BILATERAL THUMB PAIN: ICD-10-CM

## 2023-06-28 DIAGNOSIS — G89.29 CHRONIC BILATERAL LOW BACK PAIN WITHOUT SCIATICA: ICD-10-CM

## 2023-06-28 DIAGNOSIS — R20.2 PARESTHESIA OF BOTH HANDS: ICD-10-CM

## 2023-06-28 DIAGNOSIS — M79.644 BILATERAL THUMB PAIN: ICD-10-CM

## 2023-06-28 DIAGNOSIS — M79.642 BILATERAL HAND PAIN: ICD-10-CM

## 2023-06-28 DIAGNOSIS — M79.641 BILATERAL HAND PAIN: ICD-10-CM

## 2023-06-28 DIAGNOSIS — M54.50 CHRONIC BILATERAL LOW BACK PAIN WITHOUT SCIATICA: ICD-10-CM

## 2023-06-28 DIAGNOSIS — M54.2 NECK PAIN: ICD-10-CM

## 2023-06-28 DIAGNOSIS — R29.898 WEAKNESS OF BOTH HANDS: ICD-10-CM

## 2023-06-28 PROCEDURE — 97140 MANUAL THERAPY 1/> REGIONS: CPT | Mod: GP | Performed by: PHYSICAL THERAPIST

## 2023-06-28 PROCEDURE — 97763 ORTHC/PROSTC MGMT SBSQ ENC: CPT | Mod: GO

## 2023-06-28 PROCEDURE — 97140 MANUAL THERAPY 1/> REGIONS: CPT | Mod: GO

## 2023-06-28 NOTE — TELEPHONE ENCOUNTER
"  [facility] Call Center    Phone Message    May a detailed message be left on voicemail: yes     Reason for Call: Pt had skin biopsy done on one of her feet earlier today (6/28/23) at [facility]. Pt reports she had a \"sunburn-like\" rash, seemed to start off as hives on her feet, but then those hives turned into blister. Pt reports her doctors suspects it may have caused by her autoimmune condition, as well as because of her doxcyline use.    Pt reports this was a very deep biopsy, that required several sutures afterwards.     Pt has appt scheduled 7/5/23 for Actemra infusion, is wondering if she should keep this appt? If infusion should be postponed, ot would like to know how long this should be postponed for?     Note: Pt does have upcoming appt scheduled with Dr. Lemus 6/30/23, had to change this to video visit because pt does not think she would be able to walk for this appt.     Routed to [facility]    Travel screening: Negative  "

## 2023-06-28 NOTE — TELEPHONE ENCOUNTER
Spoke to pt at 1055    Reviewed would not have ophthalomogist available for exam this week and able to schedule with Dr. Kwesi ARNOLD this week for exam.    Reviewed with pt per [facility] has established care with another eye provider who has performed cataract surgery and may reach out to clinic.    Pt states did reach out and not able to see next week and has appt next Thursday.    Reviewed able to see with Dr. Bradshaw on Wednesday or Friday next week and clinic is at [facility]-- not [facility].    Pt states would like to schedule with Dr. Bradshaw July 7th.    appt scheduled per request-- details of location/duration/parking provided and available in [employer] message I sent earlier.    John Crawley RN 11:05 AM 06/28/23

## 2023-06-28 NOTE — TELEPHONE ENCOUNTER
I am glad that patient is moving ahead with diagnosis for the rash.  She may go ahead safely with the planned July 5 Actemra infusion.

## 2023-06-29 ENCOUNTER — MYC MEDICAL ADVICE (OUTPATIENT)
Dept: RHEUMATOLOGY | Facility: CLINIC | Age: 48
End: 2023-06-29

## 2023-06-30 ENCOUNTER — VIRTUAL VISIT (OUTPATIENT)
Dept: RHEUMATOLOGY | Facility: CLINIC | Age: 48
End: 2023-06-30
Attending: INTERNAL MEDICINE
Payer: MEDICARE

## 2023-06-30 VITALS
BODY MASS INDEX: 33.91 KG/M2 | SYSTOLIC BLOOD PRESSURE: 109 MMHG | HEIGHT: 66 IN | WEIGHT: 211 LBS | DIASTOLIC BLOOD PRESSURE: 70 MMHG

## 2023-06-30 DIAGNOSIS — M13.80 SERONEGATIVE ARTHRITIS: Primary | ICD-10-CM

## 2023-06-30 PROCEDURE — 99214 OFFICE O/P EST MOD 30 MIN: CPT | Mod: VID | Performed by: INTERNAL MEDICINE

## 2023-06-30 RX ORDER — CEPHALEXIN 500 MG/1
CAPSULE ORAL
COMMUNITY
Start: 2023-06-29 | End: 2023-10-05

## 2023-06-30 ASSESSMENT — PAIN SCALES - GENERAL: PAINLEVEL: WORST PAIN (10)

## 2023-06-30 NOTE — LETTER
6/30/2023       RE: Laxmi Ya  [address] [place] [id number]     Dear Colleague,    Thank you for referring your patient, Laxmi Ya, to the [facility] [place] at [facility]. Please see a copy of my visit note below.      Rheumatology Clinic Visit  [facility]  Oswaldo Lemus M.D.     Laxmi Ya MRN# [id number]   YOB: 1975 Age: 48 year old   Date of Visit:06/30/2023    Primary care provider: Artur Marsh          Assessment and Plan:     Seronegative inflammatory (rheumatoid) arthritis:  Chronic small joint predominant stiffness and pain have improved modestly since substitution of Actemra IV infusions for Orencia after the last visit in June 2022.  Video exam shows no gross swelling at MCP, wrists, or fingers.    Data: Lab work on 6-2023 showed comprehensive metabolic panel normal except for < 2X ULN elevation AST and ALT; cholesterol high at 256; CBC normal; urinalysis clear. In 2021, DIEGO/FAVIOLA panel/CCP/RF/ ANCA all negative.      Impression:  1.  Seronegative inflammatory arthritis remains significantly improved after starting Actemra intravenous infusions in August 2022.  Improved joints are focused primarily in the small joints of the hands and feet. neck pain and rib cage pain persist, but I do not think these pain generators are due to the same systemic pathologic process as inflammatory arthritis in the hands and feet. I recommend renewing CRP and sedimentation rate with next blood draw, and monitoring hyperlipidemia due to the lipid elevating effects of Actemra.    2. Carpal tunnel syndrome; stable  3. Toe bullae, s/p unroofing, culture: plan complete course of antibiotics per primary care.    Plan:  Continue actemra IV. Plan 750 mg IV every 28 days.  For flaring pain despite Actemra, use Medrol 16 mg (4 tabs) with taper (3 tablets daily for 1 week, then 2 tablets daily for 1 week) over  several weeks for active arthritis flares.  I recommend avoiding use of Medrol burst and taper greater than once out of every 2 months.  I do not recommend chronic low-dose corticosteroids.  Follow-up with Dr. Petit regarding thumb and carpal tunnel syndrome symptoms  Follow-up with Dr. Thompson regarding bowel disease    RTC 6 mos    Oswaldo Lemus M.D.  Staff Rheumatologist, [facility]  Pager [phone]           History of Present Illness:   Laxmi Ya with history of COLON, hypertension, diagnosis of seronegative rheumatoid arthritis with tenosynovitis presents for follow-up.  She was last seen in , when actemra was continued at 750 mg/m2 every 28 days for inflammatory arthritis.    Background; Seronegative rheumatoid arthritis with predominant tenosynovitis:   In 2015, rheumatoid factor, cyclic citrullinated peptide antibodies of extractable nuclear antigen panel, and anti-DNA antibodies were negative. Patient was initially treated for undifferentiated mixed connective tissue disease with Cellcept/Plaquenil for a prior Hx of weakly positive DIEGO. Then she established care with Dr. Saleh at [facility] in 2018, initially disease thought to be non-inflammatory, later diagnosed with inflammatory arthritis, rheumatoid syndrome and then seronegative RA. Patient was initially on Humira for a year starting in 2018, then discontinued due to failure to improve her joint symptoms, received a trial of Embrel which led to infections including sinus/ UTIs, then patient was on Remicade for 2 yrs but it did not work for all the joints like her back and she started having siginficant GI issues like diarrhea and abdominal pain, but did not necessarily think that it failed completely to releive her symptoms. Patient was started on Symponi in Oct 2021, but developed serious reaction to it reporting an area of severe erythema in her intestinal area, along with abdominal pain/ nausea/vomiting so she was switched to  Orencia in Nov 2021, received one infusion but then developed serious sinus infections/ Covid in Jan 2022. Orencia infusions restarted in 2-2022, held in 6-2022. Actemra started 7-2022, continued through November 2022, dose increased to .    Interval history June 30, 2023    She had I/D of a blister on her R great toe that had started 3-4 weeks ago. She was started on abx, but the toe remains throbbing and painful. It awakens her at night. She is taking alternating ibuprofen and acetaminophen, still having significant pain. Also using cliobetasol on blisters on several toes.    Since increasing actemra dose in 4-2023, she notes big improvement in control of diffuse joint pain. Former dosing cycle symptoms are also reduced now. She has a little more joint pain for 1 week before scheduled actemra.  She has required much less frequent medrol courses since April 2023.  Still has thumb base pain; still gettnig intermittent injections from Dr. Petit. She has impression that surgical fusion may be needed. She is using wrist/thumb braces made by occupational therapy.  She still has chronic hip pain; she is working with a pelvic  weekly; there is modest improvement with low back stretching/manipulation.    Interval history November 25, 2022     Burning pain in hands and fingers is much better over all.   She attributes improvement to actemra infusions, but she still has pain in neck, rib cage daily.   She gets relief with intermittent medrol 2-3 week courses, and has used 3 times since June.  She is dealing with Staph overgrowth. She has marked blepharitis, treating with tobradex. She has been prescribed doxy 100 mg bid  She just had cataract surgerios; now she is treating for constant mattering  Doxy seems to have also helped with sharp abdominal pains asst'd with Cibo, in the last 3 months.  She started injections for thumb bases with Dr. Petit in 6-2022. She has been injected q 4 mos in both thumb  bases. She still has a strong sense of swelling/burning through all the fingers.Former wrist pain has improved with steroid injections by Dr. Sullivan; however she had marked muscle pain/weakness after dexamathosone (not kenalog) injection. More injections are planned in 2 weeks.    Interval history June 16, 2022    She reports improved pain in back/thoracic area and ankles, but she notes little improvement in the hands. Constant achy pain in hands/fingers/wrists persists. Showering is a chore with grasping bath objects.  Recent course of Medrol did not help her hand pain.  She will see Dr. Petit regarding possible restart injections in the hands/thumbs. She had been getting carpal tunnel and thumb injections every 2 months at [place]; last injection was 6 months ago.  Abdominal pain/symptoms improved with use of xifaxin.    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 Saleh. Initially did not think it to be inflammatory. later diagnosed with inflammatory arthritis, rheumatoid syndrome and then seronegative RA. Patient has been disabled Patient was initially on Humira for a year starting in 2018, then discontinued due to failure to improve her joint symptoms after some time, then received a dose of Embrel leading to infections including sinus/ UTIs, then patient was on Remicade for 2 yrs but it did not work for all the joints like her back and she started having siginficant GI issues like diarrhea and abdominal pain, but did not necessarily think that it failed completely to relieve her symptoms. Patient was started on Symponi in Oct 2021, but developed serious reaction to it reporting an an area of severe erythema in her intestinal area, along with abdominal pain/ nausea/vomiting so she  was switched to Orencia in Nov 2021, received one infusion but then developed serious sinus infections/ Covid in Jan 2022 so it was held during that entire time and loading dose restarted in March 2022, has had 2 infusions 2 weeks apart at [place] and will receive her next one on March 15. Patient feels some improvement in her lower extremity joints and her fatigue has been better. She still has significant morning stiffness for more than 1 hour, and pain in hands/wrists/neck for which she gets occipital blocks. When asked about steroids she thinks she may have an adverse reaction to prednisone while in the hospital but that could be due to her SIBO. She has not used much for oral steroids in all these years but has tolerated Solu-medrol okay in the past.  Small joints of hands, hips and her neck bother her the most with stiffness/pain and swelling.  It is very painful in the wrist, feels hands are swollen. Also reports some numbness/ tingling right side of arm. Feet also get painful. Neck gets stiff and painful, with shooting pain going down to thoracic area.  She can not stand for very long without getting pain in both hips. Also notices stiffness. Constantly changing positions helps. Oxycodone also helps sometimes when she has severe pain. She tries not to take it often. She gets steroid injections with Dr. Dodson in wrists and thumbs alternating every 2 mths. She feels Orencia helps with legs and her fatigue, but has not been helping with other joints.  She received one dose in Nov 2021, but then she had sinus infections and infected with COVID in Jan 2021, restarted in March, for every 2 week infusions at [place] to start with, has received 2 infusions so far.   She wakes up at 9am and experiences morning stiffness till 4 pm in the afternoon.  For her ADLs, reports either her boyfriend cooks or she orders food.  She has difficulty buttoning shirts.Her shoulders get painful and weak.   She has low grade fevers  consistently and night sweats. Feels lymph nodes in neck get swollen sometimes. Orencia has helped with mouth ulcers but she does get them periodically.  She has been losing some hair in last 6 mths.    She has been having trouble lately with her eyes for years, they get infected. Sometimes her vision gets strained. she sees eye doctor for that. She is being prescribed eye drops for it.  She periodically has hard time breathing with exertion with some pains in the central chest area. She does get heartburn. She takes omeprazole. She does complain of skin rashes.  She sees derm for it. They are usually open sores on legs/abdomen. Every 2-3 mths.  Lost 21 lbs in last 3 mths unintentionally/  No appetite changes. Abd pain has gone away since being started on Xifaxin started by GI for her SIBO. No Raynaud's phenomenon. No other acute issues.              Review of Systems:     12-point ROS performed. Negative except for pertinent positives in HPI.          Active Problem List:     Patient Active Problem List    Diagnosis Date Noted    Bilateral carpal tunnel syndrome 06/14/2023     Priority: Medium    Bilateral hand pain 06/14/2023     Priority: Medium    Bilateral thumb pain 06/14/2023     Priority: Medium    Weakness of both hands 06/14/2023     Priority: Medium    Paresthesia of both hands 06/14/2023     Priority: Medium    Diabetes mellitus, type 2 (H) 04/12/2023     Priority: Medium    Morbid obesity (H) 04/12/2023     Priority: Medium    Neck pain 03/09/2023     Priority: Medium    Bilateral low back pain without sciatica 03/09/2023     Priority: Medium    Pelvic pain in female 03/09/2023     Priority: Medium    Seronegative inflammatory arthritis 06/17/2022     Priority: Medium            Past Medical History:     Past Medical History:   Diagnosis Date    Asthma     Diarrhea 08/11/2000    travelers' abstract    Fibromyalgia     GERD (gastroesophageal reflux disease)     Hematuria 08/11/2000    abstract    HTN  (hypertension)     COLON (nonalcoholic steatohepatitis)     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    Obesity     Pyelonephritis, unspecified 1992    abstract    Rheumatoid arteritis (H)     Seronegative inflammatory arthritis 06/17/2022    Unspecified symptom associated with female genital organs 05/07/1999    chronic pelvic pain abstract     Past Surgical History:   Procedure Laterality Date    CHOLECYSTECTOMY      COLON SURGERY      Left hemicolectomy for diverticulosis    CYSTOSCOPY,+URETEROSCOPY      abstract    ZZHC EXCIS/CURET BENIGN ELBOW LESN  10/26/1987    (R) humerus abstract     1.Seronegative rheumatoid arthritis with predominant tenosynovitis:   In 2015, rheumatoid factor, cyclic citrullinated peptide antibodies of extractable nuclear antigen panel, and anti-DNA antibodies were negative. Patient was initially treated for undifferentiated mixed connective tissue disease with Cellcept/Plaquenil for a prior Hx of weakly positive DIEGO. Then she established care with Dr. Saleh at [facility] in 2018, initially disease thought to be non-inflammatory, later diagnosed with inflammatory arthritis, rheumatoid syndrome and then seronegative RA. Patient was initially on Humira for a year starting in 2018, then discontinued due to failure to improve her joint symptoms, received a trial of Embrel which led to infections including sinus/ UTIs, then patient was on Remicade for 2 yrs but it did not work for all the joints like her back and she started having siginficant GI issues like diarrhea and abdominal pain, but did not necessarily think that it failed completely to releive her symptoms. Patient was started on Symponi in Oct 2021, but developed serious reaction to it reporting an area of severe erythema in her intestinal area, along with abdominal pain/ nausea/vomiting so she was switched to Orencia in Nov 2021, received one infusion but  then developed serious sinus infections/ Covid in Jan 2022. Orencia infusions restarted in 2-2022, held in 6-2022. Actemra started 7-2022:   - Previous Rx:                  Humira- did not have good response              Infliximab - did not have good response              Embrel- had infections following one dose              Golimumab - red swollen plaque, rt abdomen after first infusion and then stopped   Orencia--not effective   Actemra started 2022  She has reacted to sulfasalazine reported to be nausea/abd discomfort. She thinks methotrexate can not be used for fatty liver.   2. Mild persistent asthma, unspecified whether complicated   3. Bacterial overgrowth syndrome with diarrhea   - Diagnosed   4. Nonalcoholic steatohepatitis  5. Hx of clostridium difficile x2  6. Multiple orthopedic surgeries              Carpal tunnel surgery b/l               Discectomy and laminectomy, lumbar and cervical spine  7. Recurrent sinusitis: Myeloperoxidase and proteinase 3 were negative in October 2021.  8. DMII  9. Blepharitis  10: Hx of diverticulosis and recurrent diverticulitis s/p partial colectomy; Bacterial overgrowth syndrome with diarrhea   11. Hx of dermatographism       Social History:     Social History     Socioeconomic History    Marital status: Single     Spouse name: Not on file    Number of children: Not on file    Years of education: Not on file    Highest education level: Not on file   Occupational History    Not on file   Tobacco Use    Smoking status: Never    Smokeless tobacco: Never   Substance and Sexual Activity    Alcohol use: Yes    Drug use: No    Sexual activity: Not on file   Other Topics Concern     Service Not Asked    Blood Transfusions Not Asked    Caffeine Concern Not Asked    Occupational Exposure Not Asked    Hobby Hazards Not Asked    Sleep Concern Not Asked    Stress Concern Not Asked    Weight Concern Not Asked    Special Diet Not Asked    Back Care Not Asked    Exercise No     Bike Helmet Not Asked    Seat Belt No    Self-Exams No   Social History Narrative    Womens Health Kit given.         Social Determinants of Health     Financial Resource Strain: Not on file   Food Insecurity: Not on file   Transportation Needs: Not on file   Physical Activity: Not on file   Stress: Not on file   Social Connections: Not on file   Intimate Partner Violence: Not on file   Housing Stability: Not on file   FHA/, currently on disability       Family History:     Family History   Problem Relation Age of Onset    Hypertension Father         abstract    Cancer Paternal Aunt         gallbladder cancer abstract            Allergies:     Allergies   Allergen Reactions    Polyethylene Glycol Rash    Codeine      Other reaction(s): Gastrointestinal, GI intolerance, Vomiting  Vomiting      Gadolinium Dizziness and Nausea    Hydrocodone-Acetaminophen Nausea and Vomiting     Other reaction(s): GI intolerance    Iodine      abstract    Sulfasalazine      Other reaction(s): GI intolerance  Has tried and had nausa.    Tramadol Nausea and Vomiting     Other reaction(s): Gastrointestinal, Other (see comments)  Nausea and vomiting   unknown      Acetaminophen Nausea     Nausea and vomiting  Nausea and vomiting  Nausea and vomiting  Nausea and vomiting      Gluten Meal Other (See Comments) and Rash     Other reaction(s): Gastrointestinal, GI intolerance  Dizziness, tired, rash, stomach cramps, thrush, mouth sores  Dizziness, tired, rash, stomach cramps, thrush, mouth sores      Propylene Glycol Dizziness, Nausea and Vomiting, Nausea and Rash            Medications:     Current Outpatient Medications   Medication Sig Dispense Refill    Albuterol Sulfate (PROAIR HFA IN) Inhale into the lungs as needed      alcohol swab prep pads Use to swab area of injection/pratima as directed. 100 each 3    Artificial Tear Solution (SOOTHE XP) SOLN as needed      atenolol (TENORMIN) 50 MG tablet Take 50 mg by mouth daily       benzoyl peroxide 5 % external liquid Use daily as directed. Preferred bdrand: Medpura 236 mL 11    blood glucose (NO BRAND SPECIFIED) lancets standard Use to test blood sugar 2 times daily or as directed. 100 lancet. 1    blood glucose (NO BRAND SPECIFIED) test strip Use to test blood sugar 2 times daily or as directed. 100 strip 1    blood glucose monitoring (NO BRAND SPECIFIED) meter device kit Use to test blood sugar 2 times daily or as directed. 1 kit 0    cephALEXin (KEFLEX) 500 MG capsule Take 1 Capsule (500 mg) by mouth four times daily for 10 days      clindamycin (CLEOCIN T) 1 % external solution Apply twice daily as needed to active areas. MUST USE WITH BENZOYL PEROXIDE WASH  TO AVOID BACTERIAL RESISTANCE. 60 mL 2    diclofenac (VOLTAREN) 1 % topical gel Apply topically 4 times daily as needed      doxycycline hyclate (VIBRA-TABS) 100 MG tablet Take 150 mg by mouth 2 times daily      doxycycline hyclate (VIBRAMYCIN) 50 MG capsule Take 150 mg by mouth 2 times daily      dupilumab (DUPIXENT) 300 MG/2ML prefilled pen Inject 2 mLs (300 mg) Subcutaneous every 14 days 4 mL 0    fexofenadine (ALLEGRA) 180 MG tablet Take 1 tablet (180 mg) by mouth daily 90 tablet 2    fidaxomicin (DIFICID) 200 MG tablet Take 200 mg by mouth as needed      fluticasone-vilanterol (BREO ELLIPTA) 200-25 MCG/ACT inhaler Inhale 1 puff into the lungs daily 90 each 0    insulin glargine (LANTUS PEN) 100 UNIT/ML pen Inject 20 Units Subcutaneous daily      insulin lispro (HUMALOG KWIKPEN) 100 UNIT/ML (1 unit dial) KWIKPEN Inject 5 Units Subcutaneous 3 times daily (before meals) Plus 1:25 correction scale if BG >150      insulin NPH (HUMULIN N KWIKPEN) 100 UNIT/ML injection 30 units on day of infusion then 20 units daily x 2 days after infusion      lidocaine (LIDODERM) 5 % patch as needed   1    losartan (COZAAR) 100 MG tablet Take 100 mg by mouth daily      mometasone (NASONEX) 50 MCG/ACT nasal spray INSTILL 2 SPRAYS INTO BOTH NOSTRILS  "DAILY. 17 g 3    Montelukast Sodium (SINGULAIR PO) Take 10 mg by mouth At Bedtime       olopatadine (PATADAY) 0.7 % ophthalmic solution Apply 1 drop to eye daily PRN      OMEPRAZOLE PO Take 40 mg by mouth 2 times daily       rifaximin (XIFAXAN) 550 MG TABS tablet Take 200 mg by mouth 3 times daily Take for 10 days, has on had as needed for small bowel bacteria overgrowh      Sharps Container MISC Use to dispose of sharps as directed 1 each 0    spironolactone-HCTZ (ALDACTAZIDE) 25-25 MG tablet Take 1 tablet by mouth daily      tacrolimus (PROTOPIC) 0.1 % external ointment Apply to itchy areas on eyelids twice a day as needed for itching and rash 30 g 3    tobramycin (TOBREX) 0.3 % ophthalmic solution 1-2 drops 2 times daily      tobramycin-dexamethasone (TOBRADEX) 0.3-0.1 % ophthalmic ointment 0.25 inches At Bedtime      fluticasone (FLONASE) 50 MCG/ACT nasal spray Spray 2 sprays into both nostrils 2 times daily  (Patient not taking: Reported on 6/30/2023)      norethindrone (MICRONOR) 0.35 MG tablet Take 0.35 mg by mouth      nystatin (MYCOSTATIN) 419945 UNIT/ML suspension TAKE 5ML BY MOUTH 4XDAILY FOR 14 DAYS. CONTINUE AT LEAST 2 DAYS AFTER SYMPTOMS RESOLVED (Patient not taking: Reported on 6/30/2023)      UNABLE TO FIND MEDICATION NAME: Fermented zinc complex (Patient not taking: Reported on 6/30/2023)              Physical Exam:   Blood pressure 109/70, height 1.676 m (5' 6\"), weight 95.7 kg (211 lb).  Wt Readings from Last 6 Encounters:   06/30/23 95.7 kg (211 lb)   06/07/23 96.2 kg (212 lb)   05/10/23 97.5 kg (215 lb)   05/03/23 98 kg (216 lb)   04/12/23 98.9 kg (218 lb)   04/11/23 99.2 kg (218 lb 11.2 oz)     Constitutional: well-developed, appearing stated age; cooperative  Eyes: nl EOM, PERRLA,conjunctiva, sclera  ENT: nl external ears, nose, hearing, lips, teeth, gums, throat  No mucous membrane lesions, normal saliva pool  Resp: breathing unlabored  MS: Former fusiform swelling in the fingers and hands " and weak  strength have resolved.  Fingers now show full extension at all DIPs and PIPs.  Skin: no nail pitting, alopecia, rash  Neuro: nl cranial nerves, strength, sensation, DTRs.   Psych: nl judgement, orientation, memory, affect.         Data:     @      Latest Ref Rng & Units 3/14/2023     3:42 PM 2023    12:57 PM 2023    12:49 PM   RHEUM RESULTS   Albumin 3.5 - 5.2 g/dL  4.4  4.4    ALT 10 - 35 U/L  54  65    AST 10 - 35 U/L  33  50    Creatinine 0.51 - 0.95 mg/dL  0.94  0.84    GFR Estimate >60 mL/min/1.73m2  75  86    Hematocrit 35.0 - 47.0 %  42.6  45.6    Hemoglobin 11.7 - 15.7 g/dL  14.8  15.7    Hep B Surface Agn Nonreactive Nonreactive      WBC 4.0 - 11.0 10e3/uL  6.7  7.0    RBC Count 3.80 - 5.20 10e6/uL  4.93  5.29    RDW 10.0 - 15.0 %  13.5  12.4    MCHC 31.5 - 36.5 g/dL  34.7  34.4    MCV 78 - 100 fL  86  86    Platelet Count 150 - 450 10e3/uL  320  313      MRI thoracic spine 21 with mild multilevel spondylosis with specific findings according to level includin. Small disc bulge/protrusion at T1-2, T11-12, T12-L1  2. No cord deformity/centralspinal canal stenosis. The foramen appears patent    MRI lumbar spine 2013  1. L3-L4 mild anterior degenerative disc disease and degeneration of   the left facet joint.   2. L4-L5 mild degenerative disc disease with small central posterior   disc protrusion and high intensity zone. Slight impression on the   thecal sac.   3. L5-S1 small central posterior disc protrusion extending below the   disc level. IMPRESSION:     1. L3-L4 mild anterior degenerative disc disease and degeneration of   the left facet joint.   2. L4-L5 mild degenerative disc disease with small central posterior   disc protrusion and high intensity zone. Slight impression on the   thecal sac.   3. L5-S1 small central posterior disc protrusion extending below the   disc level.     MRI Radius Ulna left 3/12/21  1. Redemonstrated mild flexor tenosynovitis within the  left carpal tunnel.   2. Remainder is otherwise unremarkable for inflammatory process within the left   Forearm.    MRI Radius Ulna Right 3/12/21  1. Redemonstrated are moderate inflammatory flexor tenosynovitis within the   right wrist which is not significantly changed from prior dedicated MRI   examination.   2. Remainder of the exam is unremarkable.    DX Lumbar spine 1/26/21  Postoperative changes if an L5-S1 laminectomy. Slight disk space   narrowing at L5. Lower lumbar facet arthritis. Low-grade lumbar subluxations. No   gross instability on flexion and extension. Slight wedge deformity of the L4   vertebral body.        Again, thank you for allowing me to participate in the care of your patient.      Sincerely,    Oswaldo Lemus MD

## 2023-06-30 NOTE — NURSING NOTE
Is the patient currently in the state of [place]? YES    Visit mode:VIDEO    If the visit is dropped, the patient can be reconnected by: VIDEO VISIT: Text to cell phone: [phone]    Will anyone else be joining the visit? NO    How would you like to obtain your AVS? MyChart    Are changes needed to the allergy or medication list? NO    Reason for visit: RECHECK    Medication and allergies have been reviewed.     Augie Laughlin, VF

## 2023-07-05 ENCOUNTER — TELEPHONE (OUTPATIENT)
Dept: NEPHROLOGY | Facility: CLINIC | Age: 48
End: 2023-07-05

## 2023-07-07 NOTE — PATIENT INSTRUCTIONS
Dx  Seronegative inflammatory arthritis, improved with higher dose actemra  Osteoarthritis  Carpal tunnel syndrome    Plan:  Continue actemra IV. Plan 750 mg IV every 28 days.  For flaring pain despite Actemra, use Medrol 16 mg (4 tabs) with taper (3 tablets daily for 1 week, then 2 tablets daily for 1 week) over several weeks for active arthritis flares.  I recommend avoiding use of Medrol burst and taper greater than once out of every 2 months.  I do not recommend chronic low-dose corticosteroids.  Follow-up with Dr. Petit regarding thumb and carpal tunnel syndrome symptoms  Follow-up with Dr. Thompson regarding bowel disease

## 2023-07-12 ENCOUNTER — THERAPY VISIT (OUTPATIENT)
Dept: OCCUPATIONAL THERAPY | Facility: CLINIC | Age: 48
End: 2023-07-12
Payer: MEDICARE

## 2023-07-12 ENCOUNTER — THERAPY VISIT (OUTPATIENT)
Dept: PHYSICAL THERAPY | Facility: CLINIC | Age: 48
End: 2023-07-12
Payer: MEDICARE

## 2023-07-12 DIAGNOSIS — M79.642 BILATERAL HAND PAIN: ICD-10-CM

## 2023-07-12 DIAGNOSIS — M54.2 NECK PAIN: ICD-10-CM

## 2023-07-12 DIAGNOSIS — R20.2 PARESTHESIA OF BOTH HANDS: ICD-10-CM

## 2023-07-12 DIAGNOSIS — M79.641 BILATERAL HAND PAIN: ICD-10-CM

## 2023-07-12 DIAGNOSIS — M79.644 BILATERAL THUMB PAIN: ICD-10-CM

## 2023-07-12 DIAGNOSIS — G89.29 CHRONIC BILATERAL LOW BACK PAIN WITHOUT SCIATICA: ICD-10-CM

## 2023-07-12 DIAGNOSIS — M54.50 CHRONIC BILATERAL LOW BACK PAIN WITHOUT SCIATICA: ICD-10-CM

## 2023-07-12 DIAGNOSIS — M79.645 BILATERAL THUMB PAIN: ICD-10-CM

## 2023-07-12 DIAGNOSIS — G56.03 BILATERAL CARPAL TUNNEL SYNDROME: Primary | ICD-10-CM

## 2023-07-12 DIAGNOSIS — R29.898 WEAKNESS OF BOTH HANDS: ICD-10-CM

## 2023-07-12 DIAGNOSIS — R10.2 PELVIC PAIN IN FEMALE: Primary | ICD-10-CM

## 2023-07-12 PROCEDURE — 97110 THERAPEUTIC EXERCISES: CPT | Mod: GO

## 2023-07-12 PROCEDURE — 97140 MANUAL THERAPY 1/> REGIONS: CPT | Mod: GP | Performed by: PHYSICAL THERAPIST

## 2023-07-12 PROCEDURE — 97140 MANUAL THERAPY 1/> REGIONS: CPT | Mod: GO

## 2023-07-13 ENCOUNTER — APPOINTMENT (OUTPATIENT)
Dept: LAB | Facility: CLINIC | Age: 48
End: 2023-07-13
Attending: INTERNAL MEDICINE
Payer: MEDICARE

## 2023-07-13 ENCOUNTER — INFUSION THERAPY VISIT (OUTPATIENT)
Dept: INFUSION THERAPY | Facility: CLINIC | Age: 48
End: 2023-07-13
Attending: INTERNAL MEDICINE
Payer: MEDICARE

## 2023-07-13 VITALS
SYSTOLIC BLOOD PRESSURE: 117 MMHG | RESPIRATION RATE: 16 BRPM | TEMPERATURE: 97.8 F | WEIGHT: 213.8 LBS | BODY MASS INDEX: 34.51 KG/M2 | OXYGEN SATURATION: 95 % | DIASTOLIC BLOOD PRESSURE: 70 MMHG | HEART RATE: 76 BPM

## 2023-07-13 DIAGNOSIS — M13.80 SERONEGATIVE INFLAMMATORY ARTHRITIS: Primary | ICD-10-CM

## 2023-07-13 DIAGNOSIS — Z79.899 LONG-TERM CURRENT USE OF TOCILIZUMAB: ICD-10-CM

## 2023-07-13 DIAGNOSIS — M19.90 INFLAMMATORY ARTHRITIS: ICD-10-CM

## 2023-07-13 LAB — HOLD SPECIMEN: NORMAL

## 2023-07-13 PROCEDURE — 96375 TX/PRO/DX INJ NEW DRUG ADDON: CPT

## 2023-07-13 PROCEDURE — 84460 ALANINE AMINO (ALT) (SGPT): CPT

## 2023-07-13 PROCEDURE — 258N000003 HC RX IP 258 OP 636: Performed by: INTERNAL MEDICINE

## 2023-07-13 PROCEDURE — 36415 COLL VENOUS BLD VENIPUNCTURE: CPT

## 2023-07-13 PROCEDURE — 250N000011 HC RX IP 250 OP 636: Mod: JZ | Performed by: INTERNAL MEDICINE

## 2023-07-13 PROCEDURE — 250N000013 HC RX MED GY IP 250 OP 250 PS 637: Performed by: INTERNAL MEDICINE

## 2023-07-13 PROCEDURE — 80061 LIPID PANEL: CPT

## 2023-07-13 PROCEDURE — 96365 THER/PROPH/DIAG IV INF INIT: CPT

## 2023-07-13 PROCEDURE — 84450 TRANSFERASE (AST) (SGOT): CPT

## 2023-07-13 RX ORDER — HEPARIN SODIUM,PORCINE 10 UNIT/ML
5 VIAL (ML) INTRAVENOUS
Status: CANCELLED | OUTPATIENT
Start: 2023-08-02

## 2023-07-13 RX ORDER — DIPHENHYDRAMINE HYDROCHLORIDE 50 MG/ML
50 INJECTION INTRAMUSCULAR; INTRAVENOUS
Status: CANCELLED
Start: 2023-08-02

## 2023-07-13 RX ORDER — METHYLPREDNISOLONE SODIUM SUCCINATE 125 MG/2ML
125 INJECTION, POWDER, LYOPHILIZED, FOR SOLUTION INTRAMUSCULAR; INTRAVENOUS
Status: CANCELLED
Start: 2023-08-02

## 2023-07-13 RX ORDER — MEPERIDINE HYDROCHLORIDE 25 MG/ML
25 INJECTION INTRAMUSCULAR; INTRAVENOUS; SUBCUTANEOUS EVERY 30 MIN PRN
Status: CANCELLED | OUTPATIENT
Start: 2023-08-02

## 2023-07-13 RX ORDER — ALBUTEROL SULFATE 0.83 MG/ML
2.5 SOLUTION RESPIRATORY (INHALATION)
Status: CANCELLED | OUTPATIENT
Start: 2023-08-02

## 2023-07-13 RX ORDER — HEPARIN SODIUM (PORCINE) LOCK FLUSH IV SOLN 100 UNIT/ML 100 UNIT/ML
5 SOLUTION INTRAVENOUS
Status: CANCELLED | OUTPATIENT
Start: 2023-08-02

## 2023-07-13 RX ORDER — ALBUTEROL SULFATE 90 UG/1
1-2 AEROSOL, METERED RESPIRATORY (INHALATION)
Status: CANCELLED
Start: 2023-08-02

## 2023-07-13 RX ORDER — METHYLPREDNISOLONE SODIUM SUCCINATE 125 MG/2ML
125 INJECTION, POWDER, LYOPHILIZED, FOR SOLUTION INTRAMUSCULAR; INTRAVENOUS ONCE
Status: COMPLETED | OUTPATIENT
Start: 2023-07-13 | End: 2023-07-13

## 2023-07-13 RX ORDER — ACETAMINOPHEN 325 MG/1
650 TABLET ORAL ONCE
Status: CANCELLED | OUTPATIENT
Start: 2023-08-02

## 2023-07-13 RX ORDER — ACETAMINOPHEN 325 MG/1
650 TABLET ORAL ONCE
Status: COMPLETED | OUTPATIENT
Start: 2023-07-13 | End: 2023-07-13

## 2023-07-13 RX ORDER — EPINEPHRINE 1 MG/ML
0.3 INJECTION, SOLUTION INTRAMUSCULAR; SUBCUTANEOUS EVERY 5 MIN PRN
Status: CANCELLED | OUTPATIENT
Start: 2023-08-02

## 2023-07-13 RX ORDER — METHYLPREDNISOLONE SODIUM SUCCINATE 125 MG/2ML
125 INJECTION, POWDER, LYOPHILIZED, FOR SOLUTION INTRAMUSCULAR; INTRAVENOUS ONCE
Status: CANCELLED | OUTPATIENT
Start: 2023-08-02

## 2023-07-13 RX ADMIN — SODIUM CHLORIDE 250 ML: 9 INJECTION, SOLUTION INTRAVENOUS at 16:30

## 2023-07-13 RX ADMIN — METHYLPREDNISOLONE SODIUM SUCCINATE 125 MG: 125 INJECTION, POWDER, FOR SOLUTION INTRAMUSCULAR; INTRAVENOUS at 15:04

## 2023-07-13 RX ADMIN — TOCILIZUMAB 800 MG: 20 INJECTION, SOLUTION, CONCENTRATE INTRAVENOUS at 15:25

## 2023-07-13 RX ADMIN — ACETAMINOPHEN 650 MG: 325 TABLET ORAL at 15:04

## 2023-07-13 ASSESSMENT — PAIN SCALES - GENERAL: PAINLEVEL: WORST PAIN (10)

## 2023-07-13 NOTE — NURSING NOTE
Chief Complaint   Patient presents with     Blood Draw     Vitals, blood drawn and PIV placed by KG, VAT. Pt already checked into next appt.      Page was sent to provider regarding lipid panel order, no order. A just in case tube was drawn and sent to 1st floor lab.    SOFYA Negro LPN

## 2023-07-13 NOTE — PROGRESS NOTES
Infusion Nursing Note:  Laxmi Ya presents today for Actemera.    Patient seen by provider today: No   present during visit today: Not Applicable.    Note: Pt received Actemera over one hour, pre-medicated as ordered.  Pt tolerated without incident.  VS done per protocol.  NS bolus given over 30 minutes post infusion.      Intravenous Access:  Peripheral IV placed.    Treatment Conditions:  ~~~ NOTE: If the patient answers yes to any of the questions below, hold the infusion and contact ordering provider or on-call provider.    1. Do you currently have any signs of illness or infection or are you on any antibiotics? No  2. Have you recently had an elevated temperature, fever, chills, productive cough, coughing for 3 weeks or longer or hemoptysis, abnormal vital signs, night sweats, chest pain or have you noticed a decrease in your appetite, unexplained weight loss or fatigue? No  3. Have you had any new, sudden, or worsening abdominal pain? No  4. Do you have any open wounds or new incisions? (exclude for patients with hidradenitis suppurativa) No  5. Have you recently been diagnosed with any new nervous system diseases (ie. Multiple sclerosis, Guillain [place], seizures, neurological changes) or cancer diagnosis? Are you on any form of radiation or chemotherapy? No  6. Have there been any other new onset medical symptoms? No  7. Are you pregnant or breast feeding or do you have plans of pregnancy in the future? No; N/A  8. Do you have any upcoming hospitalizations or surgeries? Does not include esophagogastroduodenoscopy, colonoscopy, endoscopic retrograde cholangiopancreatography (ERCP), endoscopic ultrasound (EUS), dental procedures (including cleanings, fillings, implants, extractions)  or joint aspiration/steroid injections No  9. Have you or anyone in your household received a live vaccination in the past 4 weeks? Please note: No live vaccines while on biologic/chemotherapy until 6 months after  the last treatment. Patient can receive the flu vaccine (shot only).  It is optimal for the patient to get it mid cycle, but it can be given at any time as long as it is not on the day of the infusion. No  10. If applicable to prescribed medication, confirm negative PPD or quantiferon gold MTB. If positive, verify has negative chest x-ray or the patient is at least 4 weeks post initiation of INH/B6 therapy and have clearance from provider before infusion (Y/N:[id number])  11. If applicable to prescribed medication, confirm negative hepatitis B surface antigen or hepatitis C. If positive, clearance from provider before infusion. (Y/N: [id number])  12. Rheumatology patients receiving tocilizumab (Actemra): If labs were drawn within the past week, hold dosing until cleared to infuse If AST/ALT > 2 X upper limit normal; ANC < 1.0.  N/A  13. Patients receiving belimumab (Benlysta): Have you been having any signs of worsening depression or suicidal ideations? N/A      Post Infusion Assessment:  Patient tolerated infusion without incident.  Blood return noted pre and post infusion.  Site patent and intact, free from redness, edema or discomfort.  No evidence of extravasations.  Access discontinued per protocol.  Biologic Infusion Post Education: Call the triage nurse at your clinic or seek medical attention if you have chills and/or temperature greater than or equal to 100.5, uncontrolled nausea/vomiting, diarrhea, constipation, dizziness, shortness of breath, chest pain, heart palpitations, weakness or any other new or concerning symptoms, questions or concerns.  You cannot have any live virus vaccines prior to or during treatment or up to 6 months post infusion.  If you have an upcoming surgery, medical procedure or dental procedure during treatment, this should be discussed with your ordering physician and your surgeon/dentist.  If you are having any concerning symptom, if you are unsure if you should get your next infusion or  wish to speak to a provider before your next infusion, please call your care coordinator or triage nurse at your clinic to notify them so we can adequately serve you.       Discharge Plan:   Discharge instructions reviewed with: Patient.  Patient and/or family verbalized understanding of discharge instructions and all questions answered.  Copy of AVS reviewed with patient and/or family.  Patient will return 08/03 for next appointment.  Patient discharged in stable condition accompanied by: self.  Departure Mode: Ambulatory.      Yandy Dias RN

## 2023-07-17 DIAGNOSIS — J32.4 CHRONIC PANSINUSITIS: ICD-10-CM

## 2023-07-17 DIAGNOSIS — J33.9 NASAL POLYPS: ICD-10-CM

## 2023-07-17 RX ORDER — DUPILUMAB 300 MG/2ML
300 INJECTION, SOLUTION SUBCUTANEOUS
Qty: 4 ML | Refills: 0 | Status: SHIPPED | OUTPATIENT
Start: 2023-07-17 | End: 2023-08-07

## 2023-07-17 NOTE — TELEPHONE ENCOUNTER
FREE DRUG APPLICATION DENIED    Medication:  Stelara  Denial Reason(s): not sufficient information  Program Name:  [facility]  Denial Date: 7/17/2023   Patient Notified: yes  Additional Information: waiting on fax

## 2023-07-18 ENCOUNTER — THERAPY VISIT (OUTPATIENT)
Dept: OCCUPATIONAL THERAPY | Facility: CLINIC | Age: 48
End: 2023-07-18
Payer: MEDICARE

## 2023-07-18 ENCOUNTER — THERAPY VISIT (OUTPATIENT)
Dept: PHYSICAL THERAPY | Facility: CLINIC | Age: 48
End: 2023-07-18
Payer: MEDICARE

## 2023-07-18 ENCOUNTER — OFFICE VISIT (OUTPATIENT)
Dept: DERMATOLOGY | Facility: CLINIC | Age: 48
End: 2023-07-18
Payer: MEDICARE

## 2023-07-18 ENCOUNTER — LAB (OUTPATIENT)
Dept: LAB | Facility: CLINIC | Age: 48
End: 2023-07-18
Payer: MEDICARE

## 2023-07-18 DIAGNOSIS — R21 RASH: ICD-10-CM

## 2023-07-18 DIAGNOSIS — L81.9 POST-INFLAMMATORY PIGMENTARY CHANGES: ICD-10-CM

## 2023-07-18 DIAGNOSIS — L71.9 ROSACEA: ICD-10-CM

## 2023-07-18 DIAGNOSIS — R29.898 WEAKNESS OF BOTH HANDS: ICD-10-CM

## 2023-07-18 DIAGNOSIS — M79.641 BILATERAL HAND PAIN: ICD-10-CM

## 2023-07-18 DIAGNOSIS — M54.50 CHRONIC BILATERAL LOW BACK PAIN WITHOUT SCIATICA: ICD-10-CM

## 2023-07-18 DIAGNOSIS — M79.645 BILATERAL THUMB PAIN: ICD-10-CM

## 2023-07-18 DIAGNOSIS — M79.642 BILATERAL HAND PAIN: ICD-10-CM

## 2023-07-18 DIAGNOSIS — R10.2 PELVIC PAIN IN FEMALE: Primary | ICD-10-CM

## 2023-07-18 DIAGNOSIS — M79.644 BILATERAL THUMB PAIN: ICD-10-CM

## 2023-07-18 DIAGNOSIS — G56.03 BILATERAL CARPAL TUNNEL SYNDROME: Primary | ICD-10-CM

## 2023-07-18 DIAGNOSIS — L20.89 OTHER ATOPIC DERMATITIS: ICD-10-CM

## 2023-07-18 DIAGNOSIS — R20.2 PARESTHESIA OF BOTH HANDS: ICD-10-CM

## 2023-07-18 DIAGNOSIS — L57.0 ACTINIC KERATOSIS: ICD-10-CM

## 2023-07-18 DIAGNOSIS — M54.2 NECK PAIN: ICD-10-CM

## 2023-07-18 DIAGNOSIS — G89.29 CHRONIC BILATERAL LOW BACK PAIN WITHOUT SCIATICA: ICD-10-CM

## 2023-07-18 DIAGNOSIS — R21 RASH: Primary | ICD-10-CM

## 2023-07-18 LAB
ALT SERPL W P-5'-P-CCNC: 56 U/L (ref 0–50)
AST SERPL W P-5'-P-CCNC: 46 U/L (ref 0–45)
CHOLEST SERPL-MCNC: 270 MG/DL
HDLC SERPL-MCNC: 59 MG/DL
LDLC SERPL CALC-MCNC: 167 MG/DL
NONHDLC SERPL-MCNC: 211 MG/DL
TRIGL SERPL-MCNC: 220 MG/DL

## 2023-07-18 PROCEDURE — 83516 IMMUNOASSAY NONANTIBODY: CPT | Mod: 90 | Performed by: PATHOLOGY

## 2023-07-18 PROCEDURE — 97140 MANUAL THERAPY 1/> REGIONS: CPT | Mod: GO | Performed by: OCCUPATIONAL THERAPIST

## 2023-07-18 PROCEDURE — 88346 IMFLUOR 1ST 1ANTB STAIN PX: CPT | Mod: 90 | Performed by: PATHOLOGY

## 2023-07-18 PROCEDURE — 97112 NEUROMUSCULAR REEDUCATION: CPT | Mod: GO | Performed by: OCCUPATIONAL THERAPIST

## 2023-07-18 PROCEDURE — 88350 IMFLUOR EA ADDL 1ANTB STN PX: CPT | Mod: 90 | Performed by: PATHOLOGY

## 2023-07-18 PROCEDURE — 97012 MECHANICAL TRACTION THERAPY: CPT | Mod: GP | Performed by: PHYSICAL THERAPIST

## 2023-07-18 PROCEDURE — 99214 OFFICE O/P EST MOD 30 MIN: CPT | Performed by: STUDENT IN AN ORGANIZED HEALTH CARE EDUCATION/TRAINING PROGRAM

## 2023-07-18 PROCEDURE — 97140 MANUAL THERAPY 1/> REGIONS: CPT | Mod: GP | Performed by: PHYSICAL THERAPIST

## 2023-07-18 PROCEDURE — 99000 SPECIMEN HANDLING OFFICE-LAB: CPT | Performed by: PATHOLOGY

## 2023-07-18 PROCEDURE — 36415 COLL VENOUS BLD VENIPUNCTURE: CPT | Performed by: PATHOLOGY

## 2023-07-18 PROCEDURE — 97110 THERAPEUTIC EXERCISES: CPT | Mod: GO | Performed by: OCCUPATIONAL THERAPIST

## 2023-07-18 RX ORDER — TACROLIMUS 1 MG/G
OINTMENT TOPICAL 2 TIMES DAILY
Qty: 60 G | Refills: 3 | Status: SHIPPED | OUTPATIENT
Start: 2023-07-18 | End: 2023-09-14

## 2023-07-18 ASSESSMENT — PAIN SCALES - GENERAL: PAINLEVEL: WORST PAIN (10)

## 2023-07-18 NOTE — LETTER
7/18/2023       RE: Laxmi Ya  [address] [facility] [address]     Dear Colleague,    Thank you for referring your patient, Laxmi Ya, to the [facility] [place] at [facility]. Please see a copy of my visit note below.    [facility] Dermatology Note    Encounter Date: Jul 18, 2023    Dermatology Problem List:  #Pauciinflammatory sub epi blister       Major PMHx  #Seronegative inflammatory arthritis  - small joint predominent on actemra    - prev on abatacept    Infliximab  - ineffective for all of her joints              Golimumab - red swollen plaque, rt abdomen after first infusion and then stopped              Humira - ineffective              Enbrel - recurrent infections (UTI, sinusitids)  ______________________________________    Impression/Plan:  Laxmi was seen today for derm problem.    Diagnoses and all orders for this visit:    Rash  -     Pemphigoid Antibody Panel; Future  - pt experienced acute onset blistering over dorsal first 3 toes bilaterally that [place]  - a bx was performed which showed a pauci inflammatory blister. Pt has no hx of recent bullous lesions elsewhere  - overall suspicion for autoimmune bullous disorder is very low, antibody panel to further lower suspicion  - bullous pernio is a consideration but pt denies hx of raynauds or typical exacerbating factors, additionally no perivacsular or adnexal infiltrate was mentioned in the bx  - bullous phototoxic eruption is a consideration given the patient is not on doxycycline and eruption started [place].  However she states that she applied sunscreen to her toes and did not get burn elsewhere making this less likely  - Additional considerations include blistering distal dactylitis this is typically in younger children and occurs on the fingers but  "could certainly occur in this area on an adult, it would have a PAL see inflammatory subepidermal blister it be self resolving as it was in this case  - Laxmi is very worried about what this eruption might be, she states that her tocilizumab infusion led to rapid improvement in the lesions and and is suspicious that the improvement with her anti-inflammatory medications suggest that these bullae were autoimmune in nature  -Overall dermatosis is resolved, and if she has does not have recurrent episodes of this, we can be reassured that it was an isolated event and likely something like the blistering distal dactylitis      Other atopic dermatitis  -     tacrolimus (PROTOPIC) 0.1 % external ointment; Apply topically 2 times daily    Rosacea  -Severe ET type, recommend daily sunscreen use and application Protopic twice daily    Actinic keratosis  -Laxmi states that she had a biopsy of her cheek at [place] which showed a precancer, she later had recurrence of scaling on this cheek and was given a cream that turned her face to \"hamburger\".  This was likely something like Efudex  - She discusses today that she feels like the patch is back, on my exam there is no textural change to suggest the presence of an AK, and the difference in appearance between her cheeks is due to difference in severity of fixed dilated blood vessels        Follow-up PRN.       Staff Involved:  Staff Only    Asim Shen MD   of Dermatology  Department of Dermatology  [facility]      CC:   Chief Complaint   Patient presents with    Derm Problem     Eczematous dermatitis - patient has blisters on toes on both feet, rashes on lower legs, and a patch on face that is returning.       History of Present Illness:  Ms. Laxmi Ya is a 48 year old female who presents as a return patient.    Pt noticed bumps on her first 3 toes on each foot, the bumps enlarged until they formed a blister. She states " she was outside the day before it showed up[place] Was [place] an umbrella. She sprayed toes w/ sunscreen. Did not get a burn anywhere on he body. Was not drinking anything with citrus in it.         Labs:      Physical exam:  Vitals: There were no vitals taken for this visit.  GEN: well developed, well-nourished, in no acute distress, in a pleasant mood.     SKIN: Richards phototype 1  - Focused examination of the face and feet was performed.  - erythema and telangiectasia involving forehead, cheeks, and chin   - pigment alteration at sites of previous inflammation    - No other lesions of concern on areas examined.     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 She has never used smokeless tobacco. She reports current alcohol use. She reports that she does not use drugs.    Family History:  Family History   Problem Relation Age of Onset    Hypertension Father         abstract    Cancer Paternal Aunt         gallbladder cancer abstract       Medications:  Current Outpatient Medications    Medication Sig Dispense Refill    Albuterol Sulfate (PROAIR HFA IN) Inhale into the lungs as needed      alcohol swab prep pads Use to swab area of injection/pratima as directed. 100 each 3    Artificial Tear Solution (SOOTHE XP) SOLN as needed      atenolol (TENORMIN) 50 MG tablet Take 50 mg by mouth daily      benzoyl peroxide 5 % external liquid Use daily as directed. Preferred bdrand: Medpura 236 mL 11    blood glucose (NO BRAND SPECIFIED) lancets standard Use to test blood sugar 2 times daily or as directed. 100 lancet. 1    blood glucose (NO BRAND SPECIFIED) test strip Use to test blood sugar 2 times daily or as directed. 100 strip 1    blood glucose monitoring (NO BRAND SPECIFIED) meter device kit Use to test blood sugar 2 times daily or as directed. 1 kit 0    cephALEXin (KEFLEX) 500 MG capsule Take 1 Capsule (500 mg) by mouth four times daily for 10 days      clindamycin (CLEOCIN T) 1 % external solution Apply twice daily as needed to active areas. MUST USE WITH BENZOYL PEROXIDE WASH  TO AVOID BACTERIAL RESISTANCE. 60 mL 2    diclofenac (VOLTAREN) 1 % topical gel Apply topically 4 times daily as needed      doxycycline hyclate (VIBRA-TABS) 100 MG tablet Take 150 mg by mouth 2 times daily      doxycycline hyclate (VIBRAMYCIN) 50 MG capsule Take 150 mg by mouth 2 times daily      dupilumab (DUPIXENT) 300 MG/2ML prefilled pen Inject 2 mLs (300 mg) Subcutaneous every 14 days 4 mL 0    fexofenadine (ALLEGRA) 180 MG tablet Take 1 tablet (180 mg) by mouth daily 90 tablet 2    fidaxomicin (DIFICID) 200 MG tablet Take 200 mg by mouth as needed      fluticasone-vilanterol (BREO ELLIPTA) 200-25 MCG/ACT inhaler Inhale 1 puff into the lungs daily 90 each 0    insulin glargine (LANTUS PEN) 100 UNIT/ML pen Inject 20 Units Subcutaneous daily      insulin lispro (HUMALOG KWIKPEN) 100 UNIT/ML (1 unit dial) KWIKPEN Inject 5 Units Subcutaneous 3 times daily (before meals) Plus 1:25 correction scale if BG >150      insulin NPH  (HUMULIN N KWIKPEN) 100 UNIT/ML injection 30 units on day of infusion then 20 units daily x 2 days after infusion      lidocaine (LIDODERM) 5 % patch as needed   1    losartan (COZAAR) 100 MG tablet Take 100 mg by mouth daily      mometasone (NASONEX) 50 MCG/ACT nasal spray INSTILL 2 SPRAYS INTO BOTH NOSTRILS DAILY. 17 g 3    Montelukast Sodium (SINGULAIR PO) Take 10 mg by mouth At Bedtime       olopatadine (PATADAY) 0.7 % ophthalmic solution Apply 1 drop to eye daily PRN      OMEPRAZOLE PO Take 40 mg by mouth 2 times daily       rifaximin (XIFAXAN) 550 MG TABS tablet Take 200 mg by mouth 3 times daily Take for 10 days, has on had as needed for small bowel bacteria overgrowh      tacrolimus (PROTOPIC) 0.1 % external ointment Apply topically 2 times daily 60 g 3    tacrolimus (PROTOPIC) 0.1 % external ointment Apply to itchy areas on eyelids twice a day as needed for itching and rash 30 g 3    tobramycin (TOBREX) 0.3 % ophthalmic solution 1-2 drops 2 times daily      tobramycin-dexamethasone (TOBRADEX) 0.3-0.1 % ophthalmic ointment 0.25 inches At Bedtime      fluticasone (FLONASE) 50 MCG/ACT nasal spray Spray 2 sprays into both nostrils 2 times daily  (Patient not taking: Reported on 6/30/2023)      norethindrone (MICRONOR) 0.35 MG tablet Take 0.35 mg by mouth      nystatin (MYCOSTATIN) 215665 UNIT/ML suspension TAKE 5ML BY MOUTH 4XDAILY FOR 14 DAYS. CONTINUE AT LEAST 2 DAYS AFTER SYMPTOMS RESOLVED (Patient not taking: Reported on 6/30/2023)      Sharps Container MISC Use to dispose of sharps as directed 1 each 0    spironolactone-HCTZ (ALDACTAZIDE) 25-25 MG tablet Take 1 tablet by mouth daily      UNABLE TO FIND MEDICATION NAME: Fermented zinc complex (Patient not taking: Reported on 6/30/2023)       Allergies   Allergen Reactions    Polyethylene Glycol Rash    Codeine      Other reaction(s): Gastrointestinal, GI intolerance, Vomiting  Vomiting      Gadolinium Dizziness and Nausea    Hydrocodone-Acetaminophen Nausea  and Vomiting     Other reaction(s): GI intolerance    Iodine      abstract    Sulfasalazine      Other reaction(s): GI intolerance  Has tried and had nausa.    Tramadol Nausea and Vomiting     Other reaction(s): Gastrointestinal, Other (see comments)  Nausea and vomiting   unknown      Acetaminophen Nausea     Nausea and vomiting  Nausea and vomiting  Nausea and vomiting  Nausea and vomiting      Gluten Meal Other (See Comments) and Rash     Other reaction(s): Gastrointestinal, GI intolerance  Dizziness, tired, rash, stomach cramps, thrush, mouth sores  Dizziness, tired, rash, stomach cramps, thrush, mouth sores      Propylene Glycol Dizziness, Nausea and Vomiting, Nausea and Rash

## 2023-07-18 NOTE — NURSING NOTE
Dermatology Rooming Note    Laxmi Ya's goals for this visit include:   Chief Complaint   Patient presents with     Derm Problem     Eczematous dermatitis - patient has blisters on toes on both feet, rashes on lower legs, and a patch on face that is returning.     Faith Castellanos

## 2023-07-18 NOTE — PROGRESS NOTES
[facility]ology Note    Encounter Date: Jul 18, 2023    Dermatology Problem List:  #Pauciinflammatory sub epi blister       Major PMHx  #Seronegative inflammatory arthritis  - small joint predominent on actemra    - prev on abatacept    Infliximab  - ineffective for all of her joints              Golimumab - red swollen plaque, rt abdomen after first infusion and then stopped              Humira - ineffective              Enbrel - recurrent infections (UTI, sinusitids)  ______________________________________    Impression/Plan:  Laxmi was seen today for derm problem.    Diagnoses and all orders for this visit:    Rash  -     Pemphigoid Antibody Panel; Future  - pt experienced acute onset blistering over dorsal first 3 toes bilaterally that [place]  - a bx was performed which showed a pauci inflammatory blister. Pt has no hx of recent bullous lesions elsewhere  - overall suspicion for autoimmune bullous disorder is very low, antibody panel to further lower suspicion  - bullous pernio is a consideration but pt denies hx of raynauds or typical exacerbating factors, additionally no perivacsular or adnexal infiltrate was mentioned in the bx  - bullous phototoxic eruption is a consideration given the patient is not on doxycycline and eruption started [place].  However she states that she applied sunscreen to her toes and did not get burn elsewhere making this less likely  - Additional considerations include blistering distal dactylitis this is typically in younger children and occurs on the fingers but could certainly occur in this area on an adult, it would have a PAL see inflammatory subepidermal blister it be self resolving as it was in this case  - Laxmi is very worried about what this eruption might be, she states that her tocilizumab infusion led to rapid improvement in the lesions and and is  "suspicious that the improvement with her anti-inflammatory medications suggest that these bullae were autoimmune in nature  -Overall dermatosis is resolved, and if she has does not have recurrent episodes of this, we can be reassured that it was an isolated event and likely something like the blistering distal dactylitis      Other atopic dermatitis  -     tacrolimus (PROTOPIC) 0.1 % external ointment; Apply topically 2 times daily    Rosacea  -Severe ET type, recommend daily sunscreen use and application Protopic twice daily    Actinic keratosis  -Laxmi states that she had a biopsy of her cheek at [place] which showed a precancer, she later had recurrence of scaling on this cheek and was given a cream that turned her face to \"hamburger\".  This was likely something like Efudex  - She discusses today that she feels like the patch is back, on my exam there is no textural change to suggest the presence of an AK, and the difference in appearance between her cheeks is due to difference in severity of fixed dilated blood vessels        Follow-up PRN.       Staff Involved:  Staff Only    Asim Shen MD   of Dermatology  Department of Dermatology  [facility]      CC:   Chief Complaint   Patient presents with     Derm Problem     Eczematous dermatitis - patient has blisters on toes on both feet, rashes on lower legs, and a patch on face that is returning.       History of Present Illness:  Ms. Laxmi Ya is a 48 year old female who presents as a return patient.    Pt noticed bumps on her first 3 toes on each foot, the bumps enlarged until they formed a blister. She states she was outside the day before it showed up[place] Was not [place] an umbrella. She sprayed toes w/ sunscreen. Did not get a burn anywhere on he body. Was not drinking anything with citrus in it.         Labs:      Physical exam:  Vitals: There were no vitals taken for this " visit.  GEN: well developed, well-nourished, in no acute distress, in a pleasant mood.     SKIN: Richards phototype 1  - Focused examination of the face and feet was performed.  - erythema and telangiectasia involving forehead, cheeks, and chin   - pigment alteration at sites of previous inflammation    - No other lesions of concern on areas examined.     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 She has never used smokeless tobacco. She reports current alcohol use. She reports that she does not use drugs.    Family History:  Family History   Problem Relation Age of Onset     Hypertension Father         abstract     Cancer Paternal Aunt         gallbladder cancer abstract       Medications:  Current Outpatient Medications   Medication Sig Dispense Refill     Albuterol Sulfate (PROAIR HFA IN) Inhale into the lungs as needed       alcohol swab prep pads Use to swab area of injection/pratima as directed. 100 each 3     Artificial Tear Solution (SOOTHE XP) SOLN as needed       atenolol (TENORMIN) 50 MG  tablet Take 50 mg by mouth daily       benzoyl peroxide 5 % external liquid Use daily as directed. Preferred bdrand: Medpura 236 mL 11     blood glucose (NO BRAND SPECIFIED) lancets standard Use to test blood sugar 2 times daily or as directed. 100 lancet. 1     blood glucose (NO BRAND SPECIFIED) test strip Use to test blood sugar 2 times daily or as directed. 100 strip 1     blood glucose monitoring (NO BRAND SPECIFIED) meter device kit Use to test blood sugar 2 times daily or as directed. 1 kit 0     cephALEXin (KEFLEX) 500 MG capsule Take 1 Capsule (500 mg) by mouth four times daily for 10 days       clindamycin (CLEOCIN T) 1 % external solution Apply twice daily as needed to active areas. MUST USE WITH BENZOYL PEROXIDE WASH  TO AVOID BACTERIAL RESISTANCE. 60 mL 2     diclofenac (VOLTAREN) 1 % topical gel Apply topically 4 times daily as needed       doxycycline hyclate (VIBRA-TABS) 100 MG tablet Take 150 mg by mouth 2 times daily       doxycycline hyclate (VIBRAMYCIN) 50 MG capsule Take 150 mg by mouth 2 times daily       dupilumab (DUPIXENT) 300 MG/2ML prefilled pen Inject 2 mLs (300 mg) Subcutaneous every 14 days 4 mL 0     fexofenadine (ALLEGRA) 180 MG tablet Take 1 tablet (180 mg) by mouth daily 90 tablet 2     fidaxomicin (DIFICID) 200 MG tablet Take 200 mg by mouth as needed       fluticasone-vilanterol (BREO ELLIPTA) 200-25 MCG/ACT inhaler Inhale 1 puff into the lungs daily 90 each 0     insulin glargine (LANTUS PEN) 100 UNIT/ML pen Inject 20 Units Subcutaneous daily       insulin lispro (HUMALOG KWIKPEN) 100 UNIT/ML (1 unit dial) KWIKPEN Inject 5 Units Subcutaneous 3 times daily (before meals) Plus 1:25 correction scale if BG >150       insulin NPH (HUMULIN N KWIKPEN) 100 UNIT/ML injection 30 units on day of infusion then 20 units daily x 2 days after infusion       lidocaine (LIDODERM) 5 % patch as needed   1     losartan (COZAAR) 100 MG tablet Take 100 mg by mouth daily       mometasone (NASONEX) 50  MCG/ACT nasal spray INSTILL 2 SPRAYS INTO BOTH NOSTRILS DAILY. 17 g 3     Montelukast Sodium (SINGULAIR PO) Take 10 mg by mouth At Bedtime        olopatadine (PATADAY) 0.7 % ophthalmic solution Apply 1 drop to eye daily PRN       OMEPRAZOLE PO Take 40 mg by mouth 2 times daily        rifaximin (XIFAXAN) 550 MG TABS tablet Take 200 mg by mouth 3 times daily Take for 10 days, has on had as needed for small bowel bacteria overgrowh       tacrolimus (PROTOPIC) 0.1 % external ointment Apply topically 2 times daily 60 g 3     tacrolimus (PROTOPIC) 0.1 % external ointment Apply to itchy areas on eyelids twice a day as needed for itching and rash 30 g 3     tobramycin (TOBREX) 0.3 % ophthalmic solution 1-2 drops 2 times daily       tobramycin-dexamethasone (TOBRADEX) 0.3-0.1 % ophthalmic ointment 0.25 inches At Bedtime       fluticasone (FLONASE) 50 MCG/ACT nasal spray Spray 2 sprays into both nostrils 2 times daily  (Patient not taking: Reported on 6/30/2023)       norethindrone (MICRONOR) 0.35 MG tablet Take 0.35 mg by mouth       nystatin (MYCOSTATIN) 938399 UNIT/ML suspension TAKE 5ML BY MOUTH 4XDAILY FOR 14 DAYS. CONTINUE AT LEAST 2 DAYS AFTER SYMPTOMS RESOLVED (Patient not taking: Reported on 6/30/2023)       Sharps Container MISC Use to dispose of sharps as directed 1 each 0     spironolactone-HCTZ (ALDACTAZIDE) 25-25 MG tablet Take 1 tablet by mouth daily       UNABLE TO FIND MEDICATION NAME: Fermented zinc complex (Patient not taking: Reported on 6/30/2023)       Allergies   Allergen Reactions     Polyethylene Glycol Rash     Codeine      Other reaction(s): Gastrointestinal, GI intolerance, Vomiting  Vomiting       Gadolinium Dizziness and Nausea     Hydrocodone-Acetaminophen Nausea and Vomiting     Other reaction(s): GI intolerance     Iodine      abstract     Sulfasalazine      Other reaction(s): GI intolerance  Has tried and had nausa.     Tramadol Nausea and Vomiting     Other reaction(s): Gastrointestinal,  Other (see comments)  Nausea and vomiting   unknown       Acetaminophen Nausea     Nausea and vomiting  Nausea and vomiting  Nausea and vomiting  Nausea and vomiting       Gluten Meal Other (See Comments) and Rash     Other reaction(s): Gastrointestinal, GI intolerance  Dizziness, tired, rash, stomach cramps, thrush, mouth sores  Dizziness, tired, rash, stomach cramps, thrush, mouth sores       Propylene Glycol Dizziness, Nausea and Vomiting, Nausea and Rash

## 2023-07-19 ENCOUNTER — TELEPHONE (OUTPATIENT)
Dept: DERMATOLOGY | Facility: CLINIC | Age: 48
End: 2023-07-19

## 2023-07-19 NOTE — TELEPHONE ENCOUNTER
Called pharnracy to verify, pharmacy stated rx was received and they need a PA. Relayed this information to the patient and submitted PA to PA team.

## 2023-07-19 NOTE — TELEPHONE ENCOUNTER
[facility] Call Center    Phone Message    May a detailed message be left on voicemail: yes     Reason for Call: Medication Question or concern regarding medication   Prescription Clarification  Name of Medication:tacrolimus (PROTOPIC) 0.1 % external ointment   Prescribing Provider: SEAN Shen   Pharmacy: [facility]ACY #[id number] - [place], [place] - [address]   What on the order needs clarification?   Patient states pharmacy told her they did not receive Rx. Please re send          Action Taken: Message routed to:  [facility] Derm    Travel Screening: Not Applicable

## 2023-07-19 NOTE — TELEPHONE ENCOUNTER
Prior Authorization Retail Medication Request    Medication/Dose: Tacrolimus (Protopic) 0.01% external ointment  ICD code (if different than what is on RX):  See chart  Previously Tried and Failed:  See chart  Rationale:  See chart    Insurance Name:  Medicare  Insurance ID:  [id number]

## 2023-07-20 LAB
ANNOTATION COMMENT IMP: NORMAL
PATHOLOGY STUDY: NORMAL

## 2023-07-22 NOTE — TELEPHONE ENCOUNTER
Prior Authorization Not Needed per Insurance    Medication: TACROLIMUS 0.1 % EX OINT  Insurance Company: [employer] - Phone [phone] Fax [phone]  Expected CoPay:      Pharmacy Filling the Rx: [facility][id number] - [place], [place] - [address]  Pharmacy Notified: Yes  Patient Notified: Yes **Instructed pharmacy to notify patient when script is ready to /ship.**

## 2023-07-26 ENCOUNTER — VIRTUAL VISIT (OUTPATIENT)
Dept: RHEUMATOLOGY | Facility: CLINIC | Age: 48
End: 2023-07-26
Attending: INTERNAL MEDICINE
Payer: COMMERCIAL

## 2023-07-26 DIAGNOSIS — M06.09 SERONEGATIVE RHEUMATOID ARTHRITIS OF MULTIPLE SITES (H): Primary | ICD-10-CM

## 2023-07-26 DIAGNOSIS — E78.5 HYPERLIPIDEMIA, UNSPECIFIED HYPERLIPIDEMIA TYPE: ICD-10-CM

## 2023-07-26 DIAGNOSIS — Z78.9 TAKES DIETARY SUPPLEMENTS: ICD-10-CM

## 2023-07-26 NOTE — LETTER
7/26/2023       RE: Laxmi Ya  [address] [place] [id number]     Dear Colleague,    Thank you for referring your patient, Laxmi Ya, to the [facility] [place] at [facility]. Please see a copy of my visit note below.    Medication Therapy Management (MTM) Encounter    ASSESSMENT:                            Medication Adherence/Access: No issues identified    Rheumatoid Arthritis: Medication effective for symptoms, will plan to place PT referral for lymphedema as requested.    Hyperlipidemia: Discussed cholesterol medication recommended due to age and diabetes diagnosis. Also reviewed LDL has increased some on higher Actemra dose which may be a side effect of this medication. Recommended discussing starting rosuvastatin or pravastatin with PCP due to lower risk of muscle pain side effect.    Supplements: Reviewed it is unlikely supplement absorption will be effected by increased urination however since patient is concerned can separate supplements from blood pressure medications which are likely causing the increased urination. Would benefit from taking blood pressure medications in the morning as scheduled and moving supplements to lunchtime.    PLAN:                            1. Talk to your primary care doctor about staring rosuvastatin or pravastatin for cholesterol management.    2. I will place a new referral for PT for lymphedema .     3. Take the multivitamin and vitamin E supplements with lunch and take blood pressure meds with breakfast.    Follow-up: Return in about 3 months (around 10/26/2023) for MTM Pharmacist Visit.    SUBJECTIVE/OBJECTIVE:                          Laxmi Ya is a 48 year old female called for a follow-up visit from 5/15/23.     Reason for visit: Recently completed lipid panel and would like recommendations on what cholesterol medication may be needed    Allergies/ADRs: Reviewed in  chart  Past Medical History: Reviewed in chart  Tobacco: She reports that she has never smoked. She has never used smokeless tobacco.  Alcohol: Less than 1 beverages / week    Medication Adherence/Access: no issues reported    Rheumatoid Arthritis:   Actemra 800 mg infusion monthly (last infusion 7/13/23)   Diclofenac 1% gel as needed (uses once per day a few times per week)   Reports the Actemra has been more helpful than other medications she has tried previously. Still having some pain and soreness but seems to be better then on lower Actemra dose. Does have some fatigue after infusion. Also going to PT for pelvic floor inflammation. Physical therapist recommended PT for lymphedema as well but notes she will need a new referral for this.     Tried and failed: Humira, Enbrel, Orencia, Remicade, Simponi, Hydroxychloroquine     Liver Function Studies -   Recent Labs   Lab Test 07/13/23  1412 06/07/23  1249   PROTTOTAL  --  6.7   ALBUMIN  --  4.4   BILITOTAL  --  0.8   ALKPHOS  --  42   AST 46* 50*   ALT 56* 65*     CBC RESULTS:   Recent Labs   Lab Test 06/07/23  1249   WBC 7.0   RBC 5.29*   HGB 15.7   HCT 45.6   MCV 86   MCH 29.7   MCHC 34.4   RDW 12.4        Hyperlipidemia:   No current medications  Reports she has been told by both her PCP and now her rheumatologist that she should consider being on a cholesterol medication. Would like a recommendation about best cholesterol medication for her and reasoning why these medications continue to be recommended. Concerned about potential side effect of muscle pains.  Recent Labs   Lab Test 07/13/23  1412 06/07/23  1249   CHOL 270* 255*   HDL 59 65   * 149*   TRIG 220* 207*     Supplements:   Multivitamin daily  Vitamin E daily  Noticing that she has increased urination after taking her blood pressure medications and supplements in the morning and wondering if she is getting the maximum benefit from her supplements or if they are being flushed out due to  this.    Today's Vitals: There were no vitals taken for this visit.  ----------------  Post Discharge Medication Reconciliation Status: discharge medications reconciled, continue medications without change.    I spent 30 minutes with this patient today. All changes were made via collaborative practice agreement with Oswaldo Lemus MD. A copy of the visit note was provided to the patient's provider(s).    A summary of these recommendations was sent via [employer].    Yuliya Kaplan, PharmD  Medication Therapy Management Pharmacist  [facility]  Phone: [phone]    Telemedicine Visit Details  Type of service:  Telephone visit  Start Time: 11:30 AM  End Time: 12:00 PM     Medication Therapy Recommendations  Hyperlipidemia, unspecified hyperlipidemia type    Rationale: Untreated condition - Needs additional medication therapy - Indication   Recommendation: Start Medication - rosuvastatin 10 MG tablet - Discuss starting 10 mg daily with PCP   Status: Patient Agreed - Adherence/Education         Takes dietary supplements    Current Medication: multivitamin w/minerals (THERA-VIT-M) tablet   Rationale: Does not understand instructions - Adherence - Adherence   Recommendation: Provide Education - Move administration to lunchtime   Status: Patient Agreed - Adherence/Education                Order needs to be signed by physician. Will route to Dr. Lemus.    Kaley Merchant, JESSICAN, RN  RN Care Coordinator Rheumatology      Again, thank you for allowing me to participate in the care of your patient.      Sincerely,    YULIYA KAPLAN [facility]

## 2023-07-26 NOTE — PROGRESS NOTES
Medication Therapy Management (MTM) Encounter    ASSESSMENT:                            Medication Adherence/Access: No issues identified    Rheumatoid Arthritis: Medication effective for symptoms, will plan to place PT referral for lymphedema as requested.    Hyperlipidemia: Discussed cholesterol medication recommended due to age and diabetes diagnosis. Also reviewed LDL has increased some on higher Actemra dose which may be a side effect of this medication. Recommended discussing starting rosuvastatin or pravastatin with PCP due to lower risk of muscle pain side effect.    Supplements: Reviewed it is unlikely supplement absorption will be effected by increased urination however since patient is concerned can separate supplements from blood pressure medications which are likely causing the increased urination. Would benefit from taking blood pressure medications in the morning as scheduled and moving supplements to lunchtime.    PLAN:                            1. Talk to your primary care doctor about staring rosuvastatin or pravastatin for cholesterol management.    2. I will place a new referral for PT for lymphedema .     3. Take the multivitamin and vitamin E supplements with lunch and take blood pressure meds with breakfast.    Follow-up: Return in about 3 months (around 10/26/2023) for MTM Pharmacist Visit.    SUBJECTIVE/OBJECTIVE:                          Laxmi Ya is a 48 year old female called for a follow-up visit from 5/15/23.     Reason for visit: Recently completed lipid panel and would like recommendations on what cholesterol medication may be needed    Allergies/ADRs: Reviewed in chart  Past Medical History: Reviewed in chart  Tobacco: She reports that she has never smoked. She has never used smokeless tobacco.  Alcohol: Less than 1 beverages / week    Medication Adherence/Access: no issues reported    Rheumatoid Arthritis:   Actemra 800 mg infusion monthly (last infusion 7/13/23)   Diclofenac  1% gel as needed (uses once per day a few times per week)   Reports the Actemra has been more helpful than other medications she has tried previously. Still having some pain and soreness but seems to be better then on lower Actemra dose. Does have some fatigue after infusion. Also going to PT for pelvic floor inflammation. Physical therapist recommended PT for lymphedema as well but notes she will need a new referral for this.     Tried and failed: Humira, Enbrel, Orencia, Remicade, Simponi, Hydroxychloroquine     Liver Function Studies -   Recent Labs   Lab Test 07/13/23  1412 06/07/23  1249   PROTTOTAL  --  6.7   ALBUMIN  --  4.4   BILITOTAL  --  0.8   ALKPHOS  --  42   AST 46* 50*   ALT 56* 65*     CBC RESULTS:   Recent Labs   Lab Test 06/07/23  1249   WBC 7.0   RBC 5.29*   HGB 15.7   HCT 45.6   MCV 86   MCH 29.7   MCHC 34.4   RDW 12.4        Hyperlipidemia:   No current medications  Reports she has been told by both her PCP and now her rheumatologist that she should consider being on a cholesterol medication. Would like a recommendation about best cholesterol medication for her and reasoning why these medications continue to be recommended. Concerned about potential side effect of muscle pains.  Recent Labs   Lab Test 07/13/23  1412 06/07/23  1249   CHOL 270* 255*   HDL 59 65   * 149*   TRIG 220* 207*     Supplements:   Multivitamin daily  Vitamin E daily  Noticing that she has increased urination after taking her blood pressure medications and supplements in the morning and wondering if she is getting the maximum benefit from her supplements or if they are being flushed out due to this.    Today's Vitals: There were no vitals taken for this visit.  ----------------  Post Discharge Medication Reconciliation Status: discharge medications reconciled, continue medications without change.    I spent 30 minutes with this patient today. All changes were made via collaborative practice agreement with Oswaldo  MD Allan. A copy of the visit note was provided to the patient's provider(s).    A summary of these recommendations was sent via [employer].    Yuliya Kaplan PharmD  Medication Therapy Management Pharmacist  [facility]  Phone: [phone]    Telemedicine Visit Details  Type of service:  Telephone visit  Start Time: 11:30 AM  End Time: 12:00 PM     Medication Therapy Recommendations  Hyperlipidemia, unspecified hyperlipidemia type    Rationale: Untreated condition - Needs additional medication therapy - Indication   Recommendation: Start Medication - rosuvastatin 10 MG tablet - Discuss starting 10 mg daily with PCP   Status: Patient Agreed - Adherence/Education         Takes dietary supplements    Current Medication: multivitamin w/minerals (THERA-VIT-M) tablet   Rationale: Does not understand instructions - Adherence - Adherence   Recommendation: Provide Education - Move administration to lunchtime   Status: Patient Agreed - Adherence/Education

## 2023-07-28 NOTE — PROGRESS NOTES
Order needs to be signed by physician. Will route to Dr. Lemus.    JESSICA MoraN, RN  RN Care Coordinator Rheumatology

## 2023-08-05 DIAGNOSIS — M06.09 SERONEGATIVE RHEUMATOID ARTHRITIS OF MULTIPLE SITES (H): Primary | ICD-10-CM

## 2023-08-05 RX ORDER — CYANOCOBALAMIN (VITAMIN B-12) 500 MCG
400 LOZENGE ORAL DAILY
COMMUNITY
End: 2023-10-05

## 2023-08-05 RX ORDER — MULTIPLE VITAMINS W/ MINERALS TAB 9MG-400MCG
1 TAB ORAL DAILY
COMMUNITY

## 2023-08-06 NOTE — PATIENT INSTRUCTIONS
"Recommendations from today's MTM visit:                                                       1. Talk to your primary care doctor about staring rosuvastatin or pravastatin for cholesterol management.    2. I will place a new referral for PT for lymphedema .     3. Take the multivitamin and vitamin E supplements with lunch and take blood pressure meds with breakfast.    Follow-up: Return in about 3 months (around 10/26/2023) for MTM Pharmacist Visit.    It was great speaking with you today.  I value your experience and would be very thankful for your time in providing feedback in our clinic survey. In the next few days, you may receive an email or text message from [employer] with a link to a survey related to your  clinical pharmacist.\"     To schedule another MTM appointment, please call the clinic directly or you may call the MTM scheduling line at [phone] or toll-free at [phone].     My Clinical Pharmacist's contact information:                                                      Please feel free to contact me with any questions or concerns you have.      Yuliya Kaplan, PharmD  Medication Therapy Management Pharmacist  [facility]  Phone: [phone]     "

## 2023-08-07 DIAGNOSIS — J33.9 NASAL POLYPS: ICD-10-CM

## 2023-08-07 DIAGNOSIS — J32.4 CHRONIC PANSINUSITIS: ICD-10-CM

## 2023-08-07 RX ORDER — DUPILUMAB 300 MG/2ML
300 INJECTION, SOLUTION SUBCUTANEOUS
Qty: 4 ML | Refills: 0 | Status: SHIPPED | OUTPATIENT
Start: 2023-08-07 | End: 2023-08-22

## 2023-08-09 ENCOUNTER — THERAPY VISIT (OUTPATIENT)
Dept: PHYSICAL THERAPY | Facility: CLINIC | Age: 48
End: 2023-08-09
Payer: MEDICARE

## 2023-08-09 DIAGNOSIS — G89.29 CHRONIC BILATERAL LOW BACK PAIN WITHOUT SCIATICA: ICD-10-CM

## 2023-08-09 DIAGNOSIS — M54.50 CHRONIC BILATERAL LOW BACK PAIN WITHOUT SCIATICA: ICD-10-CM

## 2023-08-09 DIAGNOSIS — R10.2 PELVIC PAIN IN FEMALE: Primary | ICD-10-CM

## 2023-08-09 DIAGNOSIS — M54.2 NECK PAIN: ICD-10-CM

## 2023-08-09 PROCEDURE — 97140 MANUAL THERAPY 1/> REGIONS: CPT | Mod: GP | Performed by: PHYSICAL THERAPIST

## 2023-08-10 ENCOUNTER — PATIENT OUTREACH (OUTPATIENT)
Dept: CARE COORDINATION | Facility: CLINIC | Age: 48
End: 2023-08-10

## 2023-08-10 ENCOUNTER — INFUSION THERAPY VISIT (OUTPATIENT)
Dept: INFUSION THERAPY | Facility: CLINIC | Age: 48
End: 2023-08-10
Attending: INTERNAL MEDICINE
Payer: MEDICARE

## 2023-08-10 ENCOUNTER — MYC MEDICAL ADVICE (OUTPATIENT)
Dept: DERMATOLOGY | Facility: CLINIC | Age: 48
End: 2023-08-10

## 2023-08-10 ENCOUNTER — TELEPHONE (OUTPATIENT)
Dept: DERMATOLOGY | Facility: CLINIC | Age: 48
End: 2023-08-10

## 2023-08-10 VITALS
SYSTOLIC BLOOD PRESSURE: 114 MMHG | DIASTOLIC BLOOD PRESSURE: 78 MMHG | OXYGEN SATURATION: 95 % | RESPIRATION RATE: 16 BRPM | HEART RATE: 68 BPM

## 2023-08-10 DIAGNOSIS — M13.80 SERONEGATIVE INFLAMMATORY ARTHRITIS: Primary | ICD-10-CM

## 2023-08-10 DIAGNOSIS — Z71.89 COUNSELING AND COORDINATION OF CARE: Primary | ICD-10-CM

## 2023-08-10 LAB
ALBUMIN SERPL BCG-MCNC: 4.5 G/DL (ref 3.5–5.2)
ALBUMIN UR-MCNC: NEGATIVE MG/DL
ALP SERPL-CCNC: 44 U/L (ref 35–104)
ALT SERPL W P-5'-P-CCNC: 64 U/L (ref 0–50)
ANION GAP SERPL CALCULATED.3IONS-SCNC: 13 MMOL/L (ref 7–15)
APPEARANCE UR: CLEAR
AST SERPL W P-5'-P-CCNC: 51 U/L (ref 0–45)
BASOPHILS # BLD AUTO: 0.1 10E3/UL (ref 0–0.2)
BASOPHILS NFR BLD AUTO: 1 %
BILIRUB SERPL-MCNC: 0.8 MG/DL
BILIRUB UR QL STRIP: NEGATIVE
BUN SERPL-MCNC: 10.1 MG/DL (ref 6–20)
CALCIUM SERPL-MCNC: 9.3 MG/DL (ref 8.6–10)
CHLORIDE SERPL-SCNC: 104 MMOL/L (ref 98–107)
COLOR UR AUTO: YELLOW
CREAT SERPL-MCNC: 1.1 MG/DL (ref 0.51–0.95)
DEPRECATED HCO3 PLAS-SCNC: 23 MMOL/L (ref 22–29)
EOSINOPHIL # BLD AUTO: 0.2 10E3/UL (ref 0–0.7)
EOSINOPHIL NFR BLD AUTO: 3 %
ERYTHROCYTE [DISTWIDTH] IN BLOOD BY AUTOMATED COUNT: 13.4 % (ref 10–15)
GFR SERPL CREATININE-BSD FRML MDRD: 62 ML/MIN/1.73M2
GLUCOSE SERPL-MCNC: 151 MG/DL (ref 70–99)
GLUCOSE UR STRIP-MCNC: NEGATIVE MG/DL
HCT VFR BLD AUTO: 45.8 % (ref 35–47)
HGB BLD-MCNC: 16 G/DL (ref 11.7–15.7)
HGB UR QL STRIP: NEGATIVE
HYALINE CASTS: 1 /LPF
IMM GRANULOCYTES # BLD: 0 10E3/UL
IMM GRANULOCYTES NFR BLD: 0 %
KETONES UR STRIP-MCNC: NEGATIVE MG/DL
LEUKOCYTE ESTERASE UR QL STRIP: NEGATIVE
LYMPHOCYTES # BLD AUTO: 2.2 10E3/UL (ref 0.8–5.3)
LYMPHOCYTES NFR BLD AUTO: 29 %
MCH RBC QN AUTO: 29.9 PG (ref 26.5–33)
MCHC RBC AUTO-ENTMCNC: 34.9 G/DL (ref 31.5–36.5)
MCV RBC AUTO: 85 FL (ref 78–100)
MONOCYTES # BLD AUTO: 0.9 10E3/UL (ref 0–1.3)
MONOCYTES NFR BLD AUTO: 13 %
MUCOUS THREADS #/AREA URNS LPF: PRESENT /LPF
NEUTROPHILS # BLD AUTO: 4 10E3/UL (ref 1.6–8.3)
NEUTROPHILS NFR BLD AUTO: 54 %
NITRATE UR QL: NEGATIVE
NRBC # BLD AUTO: 0 10E3/UL
NRBC BLD AUTO-RTO: 0 /100
PH UR STRIP: 6 [PH] (ref 5–7)
PLATELET # BLD AUTO: 315 10E3/UL (ref 150–450)
POTASSIUM SERPL-SCNC: 3.8 MMOL/L (ref 3.4–5.3)
PROT SERPL-MCNC: 6.6 G/DL (ref 6.4–8.3)
RBC # BLD AUTO: 5.36 10E6/UL (ref 3.8–5.2)
RBC URINE: <1 /HPF
SODIUM SERPL-SCNC: 140 MMOL/L (ref 136–145)
SP GR UR STRIP: 1.02 (ref 1–1.03)
SQUAMOUS EPITHELIAL: 4 /HPF
UROBILINOGEN UR STRIP-MCNC: NORMAL MG/DL
WBC # BLD AUTO: 7.4 10E3/UL (ref 4–11)
WBC URINE: 1 /HPF

## 2023-08-10 PROCEDURE — 96375 TX/PRO/DX INJ NEW DRUG ADDON: CPT

## 2023-08-10 PROCEDURE — 250N000013 HC RX MED GY IP 250 OP 250 PS 637: Performed by: INTERNAL MEDICINE

## 2023-08-10 PROCEDURE — 80053 COMPREHEN METABOLIC PANEL: CPT | Performed by: INTERNAL MEDICINE

## 2023-08-10 PROCEDURE — 85004 AUTOMATED DIFF WBC COUNT: CPT | Performed by: INTERNAL MEDICINE

## 2023-08-10 PROCEDURE — 258N000003 HC RX IP 258 OP 636: Performed by: INTERNAL MEDICINE

## 2023-08-10 PROCEDURE — 999N000248 HC STATISTIC IV INSERT WITH US BY RN

## 2023-08-10 PROCEDURE — 250N000011 HC RX IP 250 OP 636: Mod: JZ | Performed by: INTERNAL MEDICINE

## 2023-08-10 PROCEDURE — 96365 THER/PROPH/DIAG IV INF INIT: CPT

## 2023-08-10 PROCEDURE — 36415 COLL VENOUS BLD VENIPUNCTURE: CPT | Performed by: INTERNAL MEDICINE

## 2023-08-10 PROCEDURE — 81001 URINALYSIS AUTO W/SCOPE: CPT | Performed by: INTERNAL MEDICINE

## 2023-08-10 RX ORDER — HEPARIN SODIUM,PORCINE 10 UNIT/ML
5 VIAL (ML) INTRAVENOUS
Status: CANCELLED | OUTPATIENT
Start: 2023-09-07

## 2023-08-10 RX ORDER — METHYLPREDNISOLONE SODIUM SUCCINATE 125 MG/2ML
125 INJECTION, POWDER, LYOPHILIZED, FOR SOLUTION INTRAMUSCULAR; INTRAVENOUS ONCE
Status: COMPLETED | OUTPATIENT
Start: 2023-08-10 | End: 2023-08-10

## 2023-08-10 RX ORDER — ACETAMINOPHEN 325 MG/1
650 TABLET ORAL ONCE
Status: COMPLETED | OUTPATIENT
Start: 2023-08-10 | End: 2023-08-10

## 2023-08-10 RX ORDER — METHYLPREDNISOLONE SODIUM SUCCINATE 125 MG/2ML
125 INJECTION, POWDER, LYOPHILIZED, FOR SOLUTION INTRAMUSCULAR; INTRAVENOUS ONCE
Status: CANCELLED | OUTPATIENT
Start: 2023-09-07

## 2023-08-10 RX ORDER — METHYLPREDNISOLONE SODIUM SUCCINATE 125 MG/2ML
125 INJECTION, POWDER, LYOPHILIZED, FOR SOLUTION INTRAMUSCULAR; INTRAVENOUS
Status: CANCELLED
Start: 2023-09-07

## 2023-08-10 RX ORDER — ALBUTEROL SULFATE 90 UG/1
1-2 AEROSOL, METERED RESPIRATORY (INHALATION)
Status: CANCELLED
Start: 2023-09-07

## 2023-08-10 RX ORDER — HEPARIN SODIUM (PORCINE) LOCK FLUSH IV SOLN 100 UNIT/ML 100 UNIT/ML
5 SOLUTION INTRAVENOUS
Status: CANCELLED | OUTPATIENT
Start: 2023-09-07

## 2023-08-10 RX ORDER — ALBUTEROL SULFATE 0.83 MG/ML
2.5 SOLUTION RESPIRATORY (INHALATION)
Status: CANCELLED | OUTPATIENT
Start: 2023-09-07

## 2023-08-10 RX ORDER — DIPHENHYDRAMINE HYDROCHLORIDE 50 MG/ML
50 INJECTION INTRAMUSCULAR; INTRAVENOUS
Status: CANCELLED
Start: 2023-09-07

## 2023-08-10 RX ORDER — MEPERIDINE HYDROCHLORIDE 25 MG/ML
25 INJECTION INTRAMUSCULAR; INTRAVENOUS; SUBCUTANEOUS EVERY 30 MIN PRN
Status: CANCELLED | OUTPATIENT
Start: 2023-09-07

## 2023-08-10 RX ORDER — EPINEPHRINE 1 MG/ML
0.3 INJECTION, SOLUTION INTRAMUSCULAR; SUBCUTANEOUS EVERY 5 MIN PRN
Status: CANCELLED | OUTPATIENT
Start: 2023-09-07

## 2023-08-10 RX ORDER — ACETAMINOPHEN 325 MG/1
650 TABLET ORAL ONCE
Status: CANCELLED | OUTPATIENT
Start: 2023-09-07

## 2023-08-10 RX ADMIN — SODIUM CHLORIDE 250 ML: 9 INJECTION, SOLUTION INTRAVENOUS at 14:47

## 2023-08-10 RX ADMIN — ACETAMINOPHEN 650 MG: 325 TABLET ORAL at 15:07

## 2023-08-10 RX ADMIN — TOCILIZUMAB 800 MG: 20 INJECTION, SOLUTION, CONCENTRATE INTRAVENOUS at 15:13

## 2023-08-10 RX ADMIN — METHYLPREDNISOLONE SODIUM SUCCINATE 125 MG: 125 INJECTION, POWDER, FOR SOLUTION INTRAMUSCULAR; INTRAVENOUS at 15:08

## 2023-08-10 ASSESSMENT — PAIN SCALES - GENERAL: PAINLEVEL: WORST PAIN (10)
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
## 2023-08-10 NOTE — TELEPHONE ENCOUNTER
[facility] Call Center    Phone Message    May a detailed message be left on voicemail: yes     Reason for Call: Symptoms or Concerns     If patient has red-flag symptoms, warm transfer to triage line    Current symptom or concern: Pt has a spot on her back the is raised and shiny. Pt was told by her massage therapist. Looking at past pt has Cutaneous on her chart from Dr. Monique.     Symptoms have been present for:  2+ month(s)    Has patient previously been seen for this? No    By : NA    Date: Na    Are there any new or worsening symptoms? Yes: Please call pt back to discuss. I am not sure if this is a schedule as T-cell or general. Thanks   Pt did say she is willing to see any provider.     Action Taken: Message routed to:  [facility] Derm    Travel Screening: Not Applicable

## 2023-08-10 NOTE — PROGRESS NOTES
Infusion Nursing Note:  Laxmi Ya presents today for   Chief Complaint   Patient presents with    Infusion     tocilizumab (ACTEMRA)       Patient seen by provider today: No   present during visit today: Not Applicable.    Note:   -Orders from Oswaldo Lemus MD completed. Frequency: every 4 weeks.  -Labs drawn via PIV by RN. Urine specimen provided by patient.  -250ml NS infused over ~30min prior to infusion and during interval between insulin dose and premeds. Patient reports she felt this worked well and she didn't have any flushing during Actemra infusion.  -Premeds:   -650mg Tylenol po   -125mg Solu-medrol IV; given 20min after patient self-administered insulin   -800mg Actemra infused over 60min.    Intravenous Access:  Peripheral IV placed in Lt forearm by VAT.    Treatment Conditions:  Biological Infusion Checklist:  ~~~ NOTE: If the patient answers yes to any of the questions below, hold the infusion and contact ordering provider or on-call provider.    Have you recently had an elevated temperature, fever, chills, productive cough, coughing for 3 weeks or longer or hemoptysis,  abnormal vital signs, night sweats,  chest pain or have you noticed a decrease in your appetite, unexplained weight loss or fatigue? No  Do you have any open wounds or new incisions? No  Do you have any upcoming hospitalizations or surgeries? Does not include esophagogastroduodenoscopy, colonoscopy, endoscopic retrograde cholangiopancreatography (ERCP), endoscopic ultrasound (EUS), dental procedures or joint aspiration/steroid injections No  Do you currently have any signs of illness or infection or are you on any antibiotics? No  Have you had any new, sudden or worsening abdominal pain? No  Have you or anyone in your household received a live vaccination in the past 4 weeks? Please note: No live vaccines while on biologic/chemotherapy until 6 months after the last treatment. Patient can receive the flu vaccine  (shot only), pneumovax and the Covid vaccine. It is optimal for the patient to get these vaccines mid cycle, but they can be given at any time as long as it is not on the day of the infusion. No  Have you recently been diagnosed with any new nervous system diseases (ie. Multiple sclerosis, Guillain [place], seizures, neurological changes) or cancer diagnosis? Are you on any form of radiation or chemotherapy? No  Are you pregnant or breast feeding or do you have plans of pregnancy in the future? No  Have you been having any signs of worsening depression or suicidal ideations?  (benlysta only) N/A  Have there been any other new onset medical symptoms? No  Have you had any new blood clots? (IVIG only) N/A    Post Infusion Assessment:  Patient tolerated infusion without incident.  Blood return noted pre and post infusion.  Site patent and intact, free from redness, edema or discomfort.  No evidence of extravasations.  Access discontinued per protocol.     Discharge Plan:   Discharge instructions reviewed with: Patient.  Patient and/or family verbalized understanding of discharge instructions and all questions answered.  AVS to patient via [facility] [employer].      Future Appointments   Date Time Provider Department Center   9/6/2023 12:00 PM  SIP INFUSION NURSE [facility]   10/4/2023 12:00 PM  SIP INFUSION NURSE [facility]   11/1/2023 12:00 PM [facility] INFUSION NURSE [facility]     Patient discharged in stable condition accompanied by: self.  Departure Mode: Ambulatory.      Kaley Handley RN    /78 (BP Location: Right arm, Patient Position: Sitting, Cuff Size: Adult Regular)   Pulse 68   Resp 16   SpO2 95%     Administrations This Visit       0.9% sodium chloride BOLUS       Admin Date  08/10/2023 Action  $New Bag Dose  250 mL Route  Intravenous Administered By  Kaley Handley RN              acetaminophen (TYLENOL) tablet 650 mg       Admin Date  08/10/2023 Action  $Given Dose  650 mg Route  Oral Administered  By  Kaley Handley, LEXI              methylPREDNISolone sodium succinate (solu-MEDROL) injection 125 mg       Admin Date  08/10/2023 Action  $Given Dose  125 mg Route  Intravenous Administered By  Kaley Handley RN              tocilizumab (ACTEMRA) 800 mg in sodium chloride 0.9 % 150 mL infusion       Admin Date  08/10/2023 Action  $New Bag Dose  800 mg Rate  150 mL/hr Route  Intravenous Administered By  Kaley Handley, RN

## 2023-08-11 ENCOUNTER — OFFICE VISIT (OUTPATIENT)
Dept: DERMATOLOGY | Facility: CLINIC | Age: 48
End: 2023-08-11
Payer: MEDICARE

## 2023-08-11 ENCOUNTER — ANCILLARY PROCEDURE (OUTPATIENT)
Dept: ULTRASOUND IMAGING | Facility: CLINIC | Age: 48
End: 2023-08-11
Attending: OBSTETRICS & GYNECOLOGY
Payer: MEDICARE

## 2023-08-11 ENCOUNTER — PATIENT OUTREACH (OUTPATIENT)
Dept: CARE COORDINATION | Facility: CLINIC | Age: 48
End: 2023-08-11

## 2023-08-11 ENCOUNTER — TELEPHONE (OUTPATIENT)
Dept: RHEUMATOLOGY | Facility: CLINIC | Age: 48
End: 2023-08-11

## 2023-08-11 DIAGNOSIS — Z79.899 LONG-TERM CURRENT USE OF TOCILIZUMAB: Primary | ICD-10-CM

## 2023-08-11 DIAGNOSIS — R74.01 ELEVATED ALT MEASUREMENT: ICD-10-CM

## 2023-08-11 DIAGNOSIS — R74.8 ELEVATED ALKALINE PHOSPHATASE LEVEL: ICD-10-CM

## 2023-08-11 DIAGNOSIS — R21 RASH AND NONSPECIFIC SKIN ERUPTION: ICD-10-CM

## 2023-08-11 DIAGNOSIS — N39.0 RECURRENT UTI: ICD-10-CM

## 2023-08-11 DIAGNOSIS — L82.0 INFLAMED SEBORRHEIC KERATOSIS: Primary | ICD-10-CM

## 2023-08-11 DIAGNOSIS — R10.2 PELVIC PAIN IN FEMALE: ICD-10-CM

## 2023-08-11 PROCEDURE — 76830 TRANSVAGINAL US NON-OB: CPT | Mod: GC | Performed by: RADIOLOGY

## 2023-08-11 PROCEDURE — 17110 DESTRUCTION B9 LES UP TO 14: CPT | Mod: GC | Performed by: STUDENT IN AN ORGANIZED HEALTH CARE EDUCATION/TRAINING PROGRAM

## 2023-08-11 PROCEDURE — 76856 US EXAM PELVIC COMPLETE: CPT | Mod: GC | Performed by: RADIOLOGY

## 2023-08-11 PROCEDURE — 99214 OFFICE O/P EST MOD 30 MIN: CPT | Mod: 25 | Performed by: STUDENT IN AN ORGANIZED HEALTH CARE EDUCATION/TRAINING PROGRAM

## 2023-08-11 RX ORDER — ROSUVASTATIN CALCIUM 5 MG/1
2 TABLET, COATED ORAL DAILY
COMMUNITY
Start: 2023-08-07 | End: 2024-08-06

## 2023-08-11 ASSESSMENT — PAIN SCALES - GENERAL: PAINLEVEL: WORST PAIN (10)

## 2023-08-11 NOTE — LETTER
8/11/2023       RE: Laxmi Ya  [address] [place] [id number]     Dear Colleague,    Thank you for referring your patient, Laxmi Ya, to the [facility] DERMATOLOGY CLINIC [place] at [facility]. Please see a copy of my visit note below.    [facility] Dermatology Note  Encounter Date: Aug 11, 2023  Office Visit     Dermatology Problem List:  #Pauciinflammatory sub epi blister        Major PMHx  #Seronegative inflammatory arthritis  - small joint predominent on actemra    - prev on abatacept    Infliximab  - ineffective for all of her joints              Golimumab - red swollen plaque, rt abdomen after first infusion and then stopped              Humira - ineffective              Enbrel - recurrent infections (UTI, sinusitids)    ____________________________________________    Assessment & Plan:   # inflamed seborrheic keratosis, back  - Given symptomatology and location, recommended LN2  - See below    # dermatitis, upper chest  Ddx: PMLE vs folliculitis vs matthew's, interestingly occurs after sun exposure. Minimal activity noted on exam today. Recommended sparing use of clobetasol as needed.    Procedures Performed:   - Cryotherapy procedure note, location(s): right mid back. After verbal consent and discussion of risks and benefits including, but not limited to, dyspigmentation/scar, blister, and pain, 1 lesion(s) was(were) treated with 1-2 mm freeze border for 1-2 cycles with liquid nitrogen. Post cryotherapy instructions were provided.    Follow-up: prn for new or changing lesions    Staff and Resident:     Amol Mahmood MD  PGY-4 Dermatology  Pager: [phone]    ____________________________________________    CC: Derm Problem (Laxmi is here for a spot of concern on her back. )    HPI:  Ms. Laxmi Ya is a(n) 48 year old female who presents today as a return patient for lesion(s) of concern     - spot on back present for 3  months, growing, scaly, itchy sometimes  - rash on upper chest with sun exposure, sometimes itchy, wonders if can use her topical CS  - Patient is otherwise feeling well, without additional skin concerns.    Labs Reviewed:  N/A    Physical Exam:  Vitals: There were no vitals taken for this visit.  SKIN: Focused examination of back was performed.  - Waxy stuck on papule on right back  - few heme crusted papules on upper chest  - No other lesions of concern on areas examined.     Medications:  Current Outpatient Medications   Medication    Albuterol Sulfate (PROAIR HFA IN)    alcohol swab prep pads    Artificial Tear Solution (SOOTHE XP) SOLN    atenolol (TENORMIN) 50 MG tablet    benzoyl peroxide 5 % external liquid    blood glucose (NO BRAND SPECIFIED) lancets standard    blood glucose (NO BRAND SPECIFIED) test strip    blood glucose monitoring (NO BRAND SPECIFIED) meter device kit    cephALEXin (KEFLEX) 500 MG capsule    clindamycin (CLEOCIN T) 1 % external solution    diclofenac (VOLTAREN) 1 % topical gel    doxycycline hyclate (VIBRA-TABS) 100 MG tablet    doxycycline hyclate (VIBRAMYCIN) 50 MG capsule    dupilumab (DUPIXENT) 300 MG/2ML prefilled pen    fexofenadine (ALLEGRA) 180 MG tablet    fidaxomicin (DIFICID) 200 MG tablet    fluticasone-vilanterol (BREO ELLIPTA) 200-25 MCG/ACT inhaler    insulin glargine (LANTUS PEN) 100 UNIT/ML pen    insulin lispro (HUMALOG KWIKPEN) 100 UNIT/ML (1 unit dial) KWIKPEN    insulin NPH (HUMULIN N KWIKPEN) 100 UNIT/ML injection    lidocaine (LIDODERM) 5 % patch    losartan (COZAAR) 100 MG tablet    mometasone (NASONEX) 50 MCG/ACT nasal spray    Montelukast Sodium (SINGULAIR PO)    multivitamin w/minerals (THERA-VIT-M) tablet    olopatadine (PATADAY) 0.7 % ophthalmic solution    OMEPRAZOLE PO    rifaximin (XIFAXAN) 550 MG TABS tablet    rosuvastatin (CRESTOR) 5 MG tablet    Sharps Container MISC    spironolactone-HCTZ (ALDACTAZIDE) 25-25 MG tablet    tacrolimus (PROTOPIC) 0.1 %  external ointment    tacrolimus (PROTOPIC) 0.1 % external ointment    tobramycin (TOBREX) 0.3 % ophthalmic solution    tobramycin-dexamethasone (TOBRADEX) 0.3-0.1 % ophthalmic ointment    UNABLE TO FIND    vitamin E 400 units TABS    fluticasone (FLONASE) 50 MCG/ACT nasal spray    norethindrone (MICRONOR) 0.35 MG tablet    nystatin (MYCOSTATIN) 901871 UNIT/ML suspension     Current Facility-Administered Medications   Medication    lidocaine (PF) (XYLOCAINE) 1 % injection 2 mL    lidocaine (PF) (XYLOCAINE) 1 % injection 2 mL    triamcinolone (KENALOG-40) injection 40 mg    triamcinolone (KENALOG-40) injection 40 mg      Past Medical History:   Patient Active Problem List   Diagnosis    Seronegative inflammatory arthritis    Neck pain    Bilateral low back pain without sciatica    Pelvic pain in female    Diabetes mellitus, type 2 (H)    Morbid obesity (H)    Bilateral carpal tunnel syndrome    Bilateral hand pain    Bilateral thumb pain    Weakness of both hands    Paresthesia of both hands     Past Medical History:   Diagnosis Date    Asthma     Diarrhea 08/11/2000    travelers' abstract    Fibromyalgia     GERD (gastroesophageal reflux disease)     Hematuria 08/11/2000    abstract    HTN (hypertension)     COLON (nonalcoholic steatohepatitis)     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 on close of this encounter.    Attestation signed by Asim Shen MD at 8/11/2023 12:51 PM:  I have personally examined this patient and agree with the resident doctor's documentation and plan of care. I have reviewed and amended the resident's note. The  documentation accurately reflects my clinical observations, diagnoses, treatment and follow-up plans. I was present for key portions of the procedure(s).       Asim Shen MD  Dermatology Staff

## 2023-08-11 NOTE — TELEPHONE ENCOUNTER
----- Message from Oswaldo Lemus MD sent at 8/11/2023  7:16 AM CDT -----  Liver function tests are slightly abnormal, very similar to measurements from 1 month ago.  I recommend no change in medication, but repeating hepatic panel in 1 month please let me know if you have questions.    Sincerely,    Oswaldo Lemus MD  Rheumatologist, [facility]

## 2023-08-11 NOTE — PROGRESS NOTES
Clinic Care Coordination Contact  Program: Health insurance   County:  [facility] Case #:  County Worker:   Ean #:   Subscriber #:   Renewal:  Date Applied:     FRW Outreach:   8/11/23 - FRW spoke with patient. Appointment scheduled to assist with certain pop application.   Erica Castillo  Financial Resource Worker  [facility]  Clinic Care Coordination  [phone]    Health Insurance:      Referral/Screening:    Health Insurance Screening: [employer]   1. Do you currently have health insurance, did you receive a renewal? Medicare and BCBS  2. If you applied through [employer], do you know your username/password?  No   3. How many people in the household? 1  4. Do you file taxes, who do you file with? Self  5. What is your monthly income (include all tax members)? Social security - $2,700  6. Do you have access to insurance through an employer (if yes, need EIN)?  7. Do you have social security cards and/or green cards?    FRW Screening    Row Name 08/10/23 2056       Insurance:   Is this a new insurance application request? Yes       OTHER   Any other information for the FRW? Please assess for and assist in applying for MA with a spend down.

## 2023-08-11 NOTE — NURSING NOTE
Dermatology Rooming Note    Laxmi Ya's goals for this visit include:   Chief Complaint   Patient presents with    Derm Problem     Laxmi is here for a spot of concern on her back.      Chi Lopez, EMT

## 2023-08-11 NOTE — PROGRESS NOTES
[facility] Dermatology Note  Encounter Date: Aug 11, 2023  Office Visit     Dermatology Problem List:  #Pauciinflammatory sub epi blister        Major PMHx  #Seronegative inflammatory arthritis  - small joint predominent on actemra    - prev on abatacept    Infliximab  - ineffective for all of her joints              Golimumab - red swollen plaque, rt abdomen after first infusion and then stopped              Humira - ineffective              Enbrel - recurrent infections (UTI, sinusitids)    ____________________________________________    Assessment & Plan:   # inflamed seborrheic keratosis, back  - Given symptomatology and location, recommended LN2  - See below    # dermatitis, upper chest  Ddx: PMLE vs folliculitis vs matthew's, interestingly occurs after sun exposure. Minimal activity noted on exam today. Recommended sparing use of clobetasol as needed.    Procedures Performed:   - Cryotherapy procedure note, location(s): right mid back. After verbal consent and discussion of risks and benefits including, but not limited to, dyspigmentation/scar, blister, and pain, 1 lesion(s) was(were) treated with 1-2 mm freeze border for 1-2 cycles with liquid nitrogen. Post cryotherapy instructions were provided.    Follow-up: prn for new or changing lesions    Staff and Resident:     Amol Mahmood MD  PGY-4 Dermatology  Pager: [phone]    ____________________________________________    CC: Derm Problem (Laxmi is here for a spot of concern on her back. )    HPI:  Ms. Laxmi Ya is a(n) 48 year old female who presents today as a return patient for lesion(s) of concern     - spot on back present for 3 months, growing, scaly, itchy sometimes  - rash on upper chest with sun exposure, sometimes itchy, wonders if can use her topical CS  - Patient is otherwise feeling well, without additional skin concerns.    Labs Reviewed:  N/A    Physical Exam:  Vitals: There were no vitals taken for this visit.  SKIN:  Focused examination of back was performed.  - Waxy stuck on papule on right back  - few heme crusted papules on upper chest  - No other lesions of concern on areas examined.     Medications:  Current Outpatient Medications   Medication    Albuterol Sulfate (PROAIR HFA IN)    alcohol swab prep pads    Artificial Tear Solution (SOOTHE XP) SOLN    atenolol (TENORMIN) 50 MG tablet    benzoyl peroxide 5 % external liquid    blood glucose (NO BRAND SPECIFIED) lancets standard    blood glucose (NO BRAND SPECIFIED) test strip    blood glucose monitoring (NO BRAND SPECIFIED) meter device kit    cephALEXin (KEFLEX) 500 MG capsule    clindamycin (CLEOCIN T) 1 % external solution    diclofenac (VOLTAREN) 1 % topical gel    doxycycline hyclate (VIBRA-TABS) 100 MG tablet    doxycycline hyclate (VIBRAMYCIN) 50 MG capsule    dupilumab (DUPIXENT) 300 MG/2ML prefilled pen    fexofenadine (ALLEGRA) 180 MG tablet    fidaxomicin (DIFICID) 200 MG tablet    fluticasone-vilanterol (BREO ELLIPTA) 200-25 MCG/ACT inhaler    insulin glargine (LANTUS PEN) 100 UNIT/ML pen    insulin lispro (HUMALOG KWIKPEN) 100 UNIT/ML (1 unit dial) KWIKPEN    insulin NPH (HUMULIN N KWIKPEN) 100 UNIT/ML injection    lidocaine (LIDODERM) 5 % patch    losartan (COZAAR) 100 MG tablet    mometasone (NASONEX) 50 MCG/ACT nasal spray    Montelukast Sodium (SINGULAIR PO)    multivitamin w/minerals (THERA-VIT-M) tablet    olopatadine (PATADAY) 0.7 % ophthalmic solution    OMEPRAZOLE PO    rifaximin (XIFAXAN) 550 MG TABS tablet    rosuvastatin (CRESTOR) 5 MG tablet    Sharps Container MISC    spironolactone-HCTZ (ALDACTAZIDE) 25-25 MG tablet    tacrolimus (PROTOPIC) 0.1 % external ointment    tacrolimus (PROTOPIC) 0.1 % external ointment    tobramycin (TOBREX) 0.3 % ophthalmic solution    tobramycin-dexamethasone (TOBRADEX) 0.3-0.1 % ophthalmic ointment    UNABLE TO FIND    vitamin E 400 units TABS    fluticasone (FLONASE) 50 MCG/ACT nasal spray    norethindrone  (MICRONOR) 0.35 MG tablet    nystatin (MYCOSTATIN) 688228 UNIT/ML suspension     Current Facility-Administered Medications   Medication    lidocaine (PF) (XYLOCAINE) 1 % injection 2 mL    lidocaine (PF) (XYLOCAINE) 1 % injection 2 mL    triamcinolone (KENALOG-40) injection 40 mg    triamcinolone (KENALOG-40) injection 40 mg      Past Medical History:   Patient Active Problem List   Diagnosis    Seronegative inflammatory arthritis    Neck pain    Bilateral low back pain without sciatica    Pelvic pain in female    Diabetes mellitus, type 2 (H)    Morbid obesity (H)    Bilateral carpal tunnel syndrome    Bilateral hand pain    Bilateral thumb pain    Weakness of both hands    Paresthesia of both hands     Past Medical History:   Diagnosis Date    Asthma     Diarrhea 08/11/2000    travelers' abstract    Fibromyalgia     GERD (gastroesophageal reflux disease)     Hematuria 08/11/2000    abstract    HTN (hypertension)     COLON (nonalcoholic steatohepatitis)     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 on close of this encounter.

## 2023-08-14 ENCOUNTER — PATIENT OUTREACH (OUTPATIENT)
Dept: CARE COORDINATION | Facility: CLINIC | Age: 48
End: 2023-08-14
Payer: MEDICARE

## 2023-08-14 NOTE — PROGRESS NOTES
Clinic Care Coordination Contact  Program: Health insurance   County:  [facility] Case #:  County Worker:   Ean #:   Subscriber #:   Renewal:  Date Applied: 8/14/23    FRW Outreach:   8/14/23 - FRW assisted with certain population application. FRW will mail application to patient to sign. FRW will follow up within 30 days.   Erica Jonathan  Financial Resource Worker  [facility]  Clinic Care Coordination  [phone]    8/11/23 - FRW spoke with patient. Appointment scheduled to assist with certain pop application.   Erica Jonathan  Financial Resource Worker  [facility]  Clinic Care Coordination  [phone]    Health Insurance:      Referral/Screening:    Health Insurance Screening: MNSURE   1. Do you currently have health insurance, did you receive a renewal? Medicare and BCBS  2. If you applied through Mnsure, do you know your username/password?  No   3. How many people in the household? 1  4. Do you file taxes, who do you file with? Self  5. What is your monthly income (include all tax members)? Social security - $2,700  6. Do you have access to insurance through an employer (if yes, need EIN)?  7. Do you have social security cards and/or green cards?    FRW Screening    Row Name 08/10/23 0307       Insurance:   Is this a new insurance application request? Yes       OTHER   Any other information for the FRW? Please assess for and assist in applying for MA with a spend down.

## 2023-08-17 ENCOUNTER — TELEPHONE (OUTPATIENT)
Dept: ORTHOPEDICS | Facility: CLINIC | Age: 48
End: 2023-08-17
Payer: MEDICARE

## 2023-08-17 DIAGNOSIS — G56.03 BILATERAL CARPAL TUNNEL SYNDROME: Primary | ICD-10-CM

## 2023-08-17 NOTE — TELEPHONE ENCOUNTER
[facility]    Phone Message    May a detailed message be left on voicemail: yes     Reason for Call: Other: Patient needs to reschedule wrist injection before Dr. Petit's [place].  Is there any way that she could be worked in on 08/31? Please call or [place] [employer] [place] with a time or alternative recommendation      Action Taken: Message routed to:  [facility] ortho     Travel Screening: Not Applicable

## 2023-08-17 NOTE — TELEPHONE ENCOUNTER
Spoke with patient.  She currently has an appointment on 8-24-23 but  has family coming from out of town and asked to cancel the visit on the 24th. She wanted to see Dr Petit on 8-31-23.   She  was informed of Dr Petit's schedule and availability. Next appointment not until 12-21-23.  Offered to have her see another provider sooner in our hand clinic if desired but she isn't sure yet.  She will contact us if she wants this.  She rescheduled for 12-21-23 for now. Heather Spivey, RN

## 2023-08-21 ENCOUNTER — TELEPHONE (OUTPATIENT)
Dept: ORTHOPEDICS | Facility: CLINIC | Age: 48
End: 2023-08-21
Payer: MEDICARE

## 2023-08-21 NOTE — TELEPHONE ENCOUNTER
Hello,  I'm with ortho con.  Pt of Dr. Petit would like CORNELIUS thumb inj on 8/24.  XR machine is scheduled but no appt with Dr. Petit.  Can you help?  Please call the pt.  Thank you.

## 2023-08-21 NOTE — TELEPHONE ENCOUNTER
Angel Andrade is looking for a call back as she missed the call regarding Dr. Petit from [facility] for an inj this week. Thank you.

## 2023-08-21 NOTE — TELEPHONE ENCOUNTER
ATC returned patient call, left vm requesting c/b to clinic or a [employer] message.    Ziggy Villatoro, ATC

## 2023-08-22 ENCOUNTER — VIRTUAL VISIT (OUTPATIENT)
Dept: ALLERGY | Facility: CLINIC | Age: 48
End: 2023-08-22
Payer: MEDICARE

## 2023-08-22 DIAGNOSIS — J33.9 NASAL POLYPS: ICD-10-CM

## 2023-08-22 DIAGNOSIS — J45.30 MILD PERSISTENT ASTHMA WITHOUT COMPLICATION: ICD-10-CM

## 2023-08-22 DIAGNOSIS — J32.4 CHRONIC PANSINUSITIS: ICD-10-CM

## 2023-08-22 PROCEDURE — 99213 OFFICE O/P EST LOW 20 MIN: CPT | Mod: VID | Performed by: ALLERGY & IMMUNOLOGY

## 2023-08-22 RX ORDER — DUPILUMAB 300 MG/2ML
300 INJECTION, SOLUTION SUBCUTANEOUS
Qty: 4 ML | Refills: 5 | Status: SHIPPED | OUTPATIENT
Start: 2023-08-22 | End: 2024-01-19

## 2023-08-22 RX ORDER — FLUTICASONE FUROATE AND VILANTEROL 200; 25 UG/1; UG/1
1 POWDER RESPIRATORY (INHALATION) DAILY
Qty: 90 EACH | Refills: 1 | Status: SHIPPED | OUTPATIENT
Start: 2023-08-22 | End: 2023-11-02

## 2023-08-22 RX ORDER — MOMETASONE FUROATE MONOHYDRATE 50 UG/1
2 SPRAY, METERED NASAL DAILY
Qty: 17 G | Refills: 11 | Status: SHIPPED | OUTPATIENT
Start: 2023-08-22

## 2023-08-22 NOTE — TELEPHONE ENCOUNTER
Spoke with patient.  Reinstated her appointment on 8-24-23. Assisted in setting up appointment with Dr Morrison on 9-20-23 for carpal tunnel injections per patient request since Dr Petit has no appointments left.  Will keep appointment in December for now. Heather Spivey RN

## 2023-08-22 NOTE — PATIENT INSTRUCTIONS
Dupixent every 2 weeks    Mometasone spray    Breo 1 puff daily     Follow in6 months---check spirometry at that time

## 2023-08-22 NOTE — PROGRESS NOTES
"Laxmi is a 48 year old who is being evaluated via a billable video visit.      How would you like to obtain your AVS? MyChart  If the video visit is dropped, the invitation should be resent by:   Will anyone else be joining your video visit? No              Subjective   Laxmi is a 48 year old, presenting for the following health issues:  RECHECK (Dupixent follow up)    HPI     Chief complaint:  Dupixent follow-up    History of Present Illness: This is a pleasant 48-year-old woman here today for follow-up of Dupixent.  She is been taking Dupixent for nasal polyps and chronic sinus disease.  She uses 300 mg every 2 weeks.  She does note some injection site reactions but no eye irritation or other systemic symptoms associated with Dupixent.  She does still have some drainage down the back of her throat and would like a refill of her mometasone nasal spray.  She reports this seems to help.  She also was on Breo for asthma and reports that seems to be helping.  She takes 1 puff a day this adequately controls asthma and she denies any cough, wheeze or shortness of breath.            Objective    Vitals - Patient Reported  Weight (Patient Reported): 96.6 kg (213 lb)  Height (Patient Reported): 167.6 cm (5' 6\")  BMI (Based on Pt Reported Ht/Wt): 34.38        Physical Exam   GENERAL: Healthy, alert and no distress  EYES: Eyes grossly normal to inspection.  No discharge or erythema, or obvious scleral/conjunctival abnormalities.  RESP: No audible wheeze, cough, or visible cyanosis.  No visible retractions or increased work of breathing.    SKIN: Visible skin clear. No significant rash, abnormal pigmentation or lesions.  NEURO: Cranial nerves grossly intact.  Mentation and speech appropriate for age.  PSYCH: Mentation appears normal, affect normal/bright, judgement and insight intact, normal speech and appearance well-groomed.        Impression report and plan:  1.  Chronic sinusitis with nasal polyps  2.  Asthma    Continue " Dupixent every 2 weeks.  Follow in 6 months.  Recent eosinophil count was normal.  Risks of the eye irritation and eosinophilic granulomatous polyangiitis were reviewed.  Continue Breo.  Continue mometasone nasal spray.  Follow in 6 months and repeat spirometry at that time.        Video-Visit Details    Type of service:  Video Visit   Video Start Time: 1107  Video End Time: 1115    Originating Location (pt. Location): Home    Distant Location (provider location):  On-site  Platform used for Video Visit: Howard

## 2023-08-22 NOTE — TELEPHONE ENCOUNTER
[facility]    Phone Message    May a detailed message be left on voicemail: yes     Reason for Call: Other: Patient calling back again to try and get a hold of someone on Dr. Petit team. Please call [phone].      Action Taken: Message routed to:  [facility]): [phone]    Travel Screening: Not Applicable

## 2023-08-22 NOTE — LETTER
"    8/22/2023         RE: Laxmi Ya  [address] [place] [id number]        Dear Colleague,    Thank you for referring your patient, Laxmi Ya, to the [facility]. Please see a copy of my visit note below.    Laxmi is a 48 year old who is being evaluated via a billable video visit.      How would you like to obtain your AVS? MyChart  If the video visit is dropped, the invitation should be resent by:   Will anyone else be joining your video visit? No              Subjective  Laxmi is a 48 year old, presenting for the following health issues:  RECHECK (Dupixent follow up)    HPI     Chief complaint:  Dupixent follow-up    History of Present Illness: This is a pleasant 48-year-old woman here today for follow-up of Dupixent.  She is been taking Dupixent for nasal polyps and chronic sinus disease.  She uses 300 mg every 2 weeks.  She does note some injection site reactions but no eye irritation or other systemic symptoms associated with Dupixent.  She does still have some drainage down the back of her throat and would like a refill of her mometasone nasal spray.  She reports this seems to help.  She also was on Breo for asthma and reports that seems to be helping.  She takes 1 puff a day this adequately controls asthma and she denies any cough, wheeze or shortness of breath.            Objective   Vitals - Patient Reported  Weight (Patient Reported): 96.6 kg (213 lb)  Height (Patient Reported): 167.6 cm (5' 6\")  BMI (Based on Pt Reported Ht/Wt): 34.38        Physical Exam   GENERAL: Healthy, alert and no distress  EYES: Eyes grossly normal to inspection.  No discharge or erythema, or obvious scleral/conjunctival abnormalities.  RESP: No audible wheeze, cough, or visible cyanosis.  No visible retractions or increased work of breathing.    SKIN: Visible skin clear. No significant rash, abnormal pigmentation or lesions.  NEURO: Cranial nerves grossly intact.  Mentation and speech " appropriate for age.  PSYCH: Mentation appears normal, affect normal/bright, judgement and insight intact, normal speech and appearance well-groomed.        Impression report and plan:  1.  Chronic sinusitis with nasal polyps  2.  Asthma    Continue Dupixent every 2 weeks.  Follow in 6 months.  Recent eosinophil count was normal.  Risks of the eye irritation and eosinophilic granulomatous polyangiitis were reviewed.  Continue Breo.  Continue mometasone nasal spray.  Follow in 6 months and repeat spirometry at that time.        Video-Visit Details    Type of service:  Video Visit   Video Start Time: 1107  Video End Time: 1115    Originating Location (pt. Location): Home    Distant Location (provider location):  On-site  Platform used for Video Visit: Howard        Again, thank you for allowing me to participate in the care of your patient.        Sincerely,        Nelia CAMPOS MD

## 2023-08-24 ENCOUNTER — TELEPHONE (OUTPATIENT)
Dept: ORTHOPEDICS | Facility: CLINIC | Age: 48
End: 2023-08-24

## 2023-08-24 ENCOUNTER — PATIENT OUTREACH (OUTPATIENT)
Dept: CARE COORDINATION | Facility: CLINIC | Age: 48
End: 2023-08-24

## 2023-08-24 DIAGNOSIS — M19.90 INFLAMMATORY ARTHRITIS: Primary | ICD-10-CM

## 2023-08-24 RX ORDER — METHYLPREDNISOLONE 4 MG
TABLET, DOSE PACK ORAL
Qty: 21 TABLET | Refills: 0 | Status: SHIPPED | OUTPATIENT
Start: 2023-08-24 | End: 2023-10-05

## 2023-08-24 NOTE — TELEPHONE ENCOUNTER
Patient checked into her 10:10 AM appointment at 11:10 AM, states she thought her appointment was at 11.    MD had already left clinic by the time we were informed she arrived late, MD had a surgery at 11:30 over at [facility].    Patient would not reschedule appointment at the  as requested by RN, and patient asked to talk to RN about her shots.    RN spoke with patient back in exam room.  She wanted Dr Sullivan to do her shots today.  Informed her that he is not at this location any longer, goes to [place].  She verbalized understanding and declines driving out to [place].    She then wanted sports medicine to do her shots today.  Suggested that she speak with their scheduling about an appointment, as I am not able to schedule for them. She might try to do this.    Per her request tried to work her into the Ortho hand clinic sooner than 9-20-23, which is when she was seeing Dr Morrison for carpal tunnel injections.    Offered her an appointment on 9-18-23 with Dr Hernadez and she declined.  She decided to keep her visit on 9-20 with Dr Morrison and then change the visit to her thumb injections instead.  Will push out her carpal tunnel injections until October.    Then she asked RN for a prescription for a Medrol dose pack.  She doesn't think Dr Petit ever prescribed one for her, but states she has offered them to her before.  Patient is diabetic, and steroid makes her blood sugars elevated, but she has had so much pain and swelling in her hand joints she wants prednisone.    Advised the only thing I can do is put the request to our provider. She might have to ask her Rheumatology MD to fill this if they are the ones that have filled this for her in the past.  Patient would like us to check. Heather Spivey RN

## 2023-08-24 NOTE — PROGRESS NOTES
Clinic Care Coordination Contact  Program: Health insurance   County:  [facility] Case #:  [facility] Worker:   Ean #:   Subscriber #:   Renewal:  Date Applied: 8/14/23    FRW Outreach:   8/24/23 - FRW spoke with patient. Patient did receive application that FRW mailed. Patient is going to be moving out of state in the next 4-5 months. Patient is contemplating if it's worth applying while she's in [place] when she's moving to [place] soon. FRW will follow up within 30 days.  Erica Castillo  Financial Resource Worker  [facility] Care Coordination  [phone]      8/14/23 - FRW assisted with certain population application. FRW will mail application to patient to sign. FRW will follow up within 30 days.   Erica Castillo  Financial Resource Worker  [facility] Care Coordination  [phone]    8/11/23 - FRW spoke with patient. Appointment scheduled to assist with certain pop application.   Erica Castillo  Financial Resource Worker  [facility] Care Coordination  [phone]    Health Insurance:      Referral/Screening:    Health Insurance Screening: [employer]   1. Do you currently have health insurance, did you receive a renewal? Medicare and BCBS  2. If you applied through [employer], do you know your username/password?  No   3. How many people in the household? 1  4. Do you file taxes, who do you file with? Self  5. What is your monthly income (include all tax members)? Social security - $2,700  6. Do you have access to insurance through an employer (if yes, need EIN)?  7. Do you have social security cards and/or green cards?    FRW Screening    Row Name 08/10/23 1434       Insurance:   Is this a new insurance application request? Yes       OTHER   Any other information for the FRW? Please assess for and assist in applying for MA with a spend down.

## 2023-08-24 NOTE — TELEPHONE ENCOUNTER
Patient present at [facility] for an appt with Ortho. Patient stated that she was late for her appt, they were unable to see her.  Patient came to Rheumatolgy pod asking to speak with this RN.  Patient expressed that she is in a flare, this RN was able to physically assess swelling in her bilateral hands, fingers, specifically thumbs,  and wrists.The swelling and pain is limiting her ability to perform day to day activities. It has worsened over the last 2 weeks.    Patient shared that on her Actemra infusion, she is feeling better overall. Able to do more physical activity.  Patient stated she is on humalog and lantis insulin.  Patient does not want to go back on long term steroids, however she is wondering if Dr. Lemus would order a medrol back.    Will route to provider for review.    JESSICA MoraN, RN  RN Care Coordinator Rheumatology

## 2023-08-28 NOTE — TELEPHONE ENCOUNTER
Dr Oswaldo Lemus in Rheumatology prescribed a Medrol dose eloise for her on 8-24-23. Will close out this encounter. Heather Spivey RN

## 2023-08-29 NOTE — TELEPHONE ENCOUNTER
DIAGNOSIS: Thumb Injections   APPOINTMENT DATE: 09/20/2023   NOTES STATUS DETAILS   OFFICE NOTE from referring provider Internal 08/24/2023 - Telephone Encounter   OFFICE NOTE from other specialist Internal    MEDICATION LIST Internal  Care Everywhere    EMG (for Spine) Care Everywhere 01/14/2020, 02/13/2015 - [facility]   LABS     INJECTIONS DONE IN RADIOLOGY N/A [facility]: Reports in CE Only.   MRI PACS [facility]:  03/12/2021 - RT Radius/Ulna  03/12/2021 - LT Radius/Ulna  03/04/2021, 01/29/2019 - RT Hand  03/04/2021 - LT Hand   ULTRASOUND PACS [facility]:  03/04/2021, 08/23/2018 - RT Wrist  03/04/2021 - LT Wrist   XRAYS (IMAGES & REPORTS) PACS [facility]:  02/12/2015 - Bilateral Hand

## 2023-09-05 ENCOUNTER — TELEPHONE (OUTPATIENT)
Dept: RHEUMATOLOGY | Facility: CLINIC | Age: 48
End: 2023-09-05
Payer: MEDICARE

## 2023-09-05 DIAGNOSIS — M19.90 INFLAMMATORY ARTHRITIS: Primary | ICD-10-CM

## 2023-09-05 NOTE — TELEPHONE ENCOUNTER
[facility]    Phone Message    May a detailed message be left on voicemail: yes     Reason for Call: Pt will be coming in tomorrow for an infusion, and will be having her lab tests repeated for Dr. Lemus.   She is aware that he wanted to recheck her hepatic panel, but she was also under the impression that he would be repeating her UA as well?   In addition to the regular UA, she is also requesting to have a pregnancy test added on to her labs as well. She has been told she would not be able to have her infusion if there is the possibility that she could be pregnant, and she reports there is a possibility of that right now.     She would like to be notified if/when these orders have been entered? She does not want to drive to her infusion tomorrow if the pregnancy test is not entered because she knows she would not be able to have her infusion. Ok to leave this in a detailed message on her voicemail.       Action Taken: Message routed to:  [facility] ([facility]): [facility] RHEUMATOLOGY ADULT [place]    Travel Screening: Not Applicable

## 2023-09-06 ENCOUNTER — INFUSION THERAPY VISIT (OUTPATIENT)
Dept: INFUSION THERAPY | Facility: CLINIC | Age: 48
End: 2023-09-06
Attending: INTERNAL MEDICINE
Payer: MEDICARE

## 2023-09-06 VITALS
DIASTOLIC BLOOD PRESSURE: 78 MMHG | RESPIRATION RATE: 16 BRPM | TEMPERATURE: 97.7 F | SYSTOLIC BLOOD PRESSURE: 117 MMHG | HEART RATE: 72 BPM | WEIGHT: 213.1 LBS | BODY MASS INDEX: 34.4 KG/M2

## 2023-09-06 DIAGNOSIS — M19.90 INFLAMMATORY ARTHRITIS: ICD-10-CM

## 2023-09-06 DIAGNOSIS — R74.01 ELEVATED ALT MEASUREMENT: ICD-10-CM

## 2023-09-06 DIAGNOSIS — N39.0 RECURRENT UTI: ICD-10-CM

## 2023-09-06 DIAGNOSIS — R74.8 ELEVATED ALKALINE PHOSPHATASE LEVEL: ICD-10-CM

## 2023-09-06 DIAGNOSIS — M13.80 SERONEGATIVE INFLAMMATORY ARTHRITIS: Primary | ICD-10-CM

## 2023-09-06 LAB
ALBUMIN SERPL BCG-MCNC: 4.4 G/DL (ref 3.5–5.2)
ALBUMIN UR-MCNC: NEGATIVE MG/DL
ALP SERPL-CCNC: 47 U/L (ref 35–104)
ALT SERPL W P-5'-P-CCNC: 52 U/L (ref 0–50)
APPEARANCE UR: CLEAR
AST SERPL W P-5'-P-CCNC: 39 U/L (ref 0–45)
BILIRUB DIRECT SERPL-MCNC: <0.2 MG/DL (ref 0–0.3)
BILIRUB SERPL-MCNC: 0.8 MG/DL
BILIRUB UR QL STRIP: NEGATIVE
COLOR UR AUTO: ABNORMAL
GLUCOSE UR STRIP-MCNC: NEGATIVE MG/DL
HCG UR QL: NEGATIVE
HGB UR QL STRIP: NEGATIVE
KETONES UR STRIP-MCNC: NEGATIVE MG/DL
LEUKOCYTE ESTERASE UR QL STRIP: NEGATIVE
MUCOUS THREADS #/AREA URNS LPF: PRESENT /LPF
NITRATE UR QL: NEGATIVE
PH UR STRIP: 6 [PH] (ref 5–7)
PROT SERPL-MCNC: 6.4 G/DL (ref 6.4–8.3)
RBC URINE: 2 /HPF
SP GR UR STRIP: 1.02 (ref 1–1.03)
SQUAMOUS EPITHELIAL: 5 /HPF
UROBILINOGEN UR STRIP-MCNC: NORMAL MG/DL
WBC URINE: 2 /HPF

## 2023-09-06 PROCEDURE — 81025 URINE PREGNANCY TEST: CPT

## 2023-09-06 PROCEDURE — 36415 COLL VENOUS BLD VENIPUNCTURE: CPT

## 2023-09-06 PROCEDURE — 999N000285 HC STATISTIC VASC ACCESS LAB DRAW WITH PIV START

## 2023-09-06 PROCEDURE — 250N000013 HC RX MED GY IP 250 OP 250 PS 637: Performed by: INTERNAL MEDICINE

## 2023-09-06 PROCEDURE — 999N000128 HC STATISTIC PERIPHERAL IV START W/O US GUIDANCE

## 2023-09-06 PROCEDURE — 250N000011 HC RX IP 250 OP 636: Mod: JZ | Performed by: INTERNAL MEDICINE

## 2023-09-06 PROCEDURE — 96375 TX/PRO/DX INJ NEW DRUG ADDON: CPT

## 2023-09-06 PROCEDURE — 258N000003 HC RX IP 258 OP 636: Performed by: INTERNAL MEDICINE

## 2023-09-06 PROCEDURE — 96365 THER/PROPH/DIAG IV INF INIT: CPT

## 2023-09-06 PROCEDURE — 80076 HEPATIC FUNCTION PANEL: CPT

## 2023-09-06 PROCEDURE — 81003 URINALYSIS AUTO W/O SCOPE: CPT

## 2023-09-06 RX ORDER — ALBUTEROL SULFATE 0.83 MG/ML
2.5 SOLUTION RESPIRATORY (INHALATION)
Status: CANCELLED | OUTPATIENT
Start: 2023-09-07

## 2023-09-06 RX ORDER — DIPHENHYDRAMINE HYDROCHLORIDE 50 MG/ML
50 INJECTION INTRAMUSCULAR; INTRAVENOUS
Status: CANCELLED
Start: 2023-09-07

## 2023-09-06 RX ORDER — EPINEPHRINE 1 MG/ML
0.3 INJECTION, SOLUTION INTRAMUSCULAR; SUBCUTANEOUS EVERY 5 MIN PRN
Status: CANCELLED | OUTPATIENT
Start: 2023-09-07

## 2023-09-06 RX ORDER — METHYLPREDNISOLONE SODIUM SUCCINATE 125 MG/2ML
125 INJECTION, POWDER, LYOPHILIZED, FOR SOLUTION INTRAMUSCULAR; INTRAVENOUS
Status: CANCELLED
Start: 2023-09-07

## 2023-09-06 RX ORDER — MEPERIDINE HYDROCHLORIDE 25 MG/ML
25 INJECTION INTRAMUSCULAR; INTRAVENOUS; SUBCUTANEOUS EVERY 30 MIN PRN
Status: CANCELLED | OUTPATIENT
Start: 2023-09-07

## 2023-09-06 RX ORDER — ACETAMINOPHEN 325 MG/1
650 TABLET ORAL ONCE
Status: COMPLETED | OUTPATIENT
Start: 2023-09-06 | End: 2023-09-06

## 2023-09-06 RX ORDER — ACETAMINOPHEN 325 MG/1
650 TABLET ORAL ONCE
Status: CANCELLED | OUTPATIENT
Start: 2023-09-07

## 2023-09-06 RX ORDER — METHYLPREDNISOLONE SODIUM SUCCINATE 125 MG/2ML
125 INJECTION, POWDER, LYOPHILIZED, FOR SOLUTION INTRAMUSCULAR; INTRAVENOUS ONCE
Status: CANCELLED | OUTPATIENT
Start: 2023-09-07

## 2023-09-06 RX ORDER — HEPARIN SODIUM (PORCINE) LOCK FLUSH IV SOLN 100 UNIT/ML 100 UNIT/ML
5 SOLUTION INTRAVENOUS
Status: CANCELLED | OUTPATIENT
Start: 2023-09-07

## 2023-09-06 RX ORDER — METHYLPREDNISOLONE SODIUM SUCCINATE 125 MG/2ML
125 INJECTION, POWDER, LYOPHILIZED, FOR SOLUTION INTRAMUSCULAR; INTRAVENOUS ONCE
Status: COMPLETED | OUTPATIENT
Start: 2023-09-06 | End: 2023-09-06

## 2023-09-06 RX ORDER — HEPARIN SODIUM,PORCINE 10 UNIT/ML
5 VIAL (ML) INTRAVENOUS
Status: CANCELLED | OUTPATIENT
Start: 2023-09-07

## 2023-09-06 RX ORDER — ALBUTEROL SULFATE 90 UG/1
1-2 AEROSOL, METERED RESPIRATORY (INHALATION)
Status: CANCELLED
Start: 2023-09-07

## 2023-09-06 RX ADMIN — TOCILIZUMAB 800 MG: 20 INJECTION, SOLUTION, CONCENTRATE INTRAVENOUS at 13:47

## 2023-09-06 RX ADMIN — SODIUM CHLORIDE 250 ML: 9 INJECTION, SOLUTION INTRAVENOUS at 14:43

## 2023-09-06 RX ADMIN — ACETAMINOPHEN 650 MG: 325 TABLET ORAL at 13:10

## 2023-09-06 RX ADMIN — METHYLPREDNISOLONE SODIUM SUCCINATE 125 MG: 125 INJECTION, POWDER, LYOPHILIZED, FOR SOLUTION INTRAMUSCULAR; INTRAVENOUS at 13:27

## 2023-09-06 NOTE — PATIENT INSTRUCTIONS
Dear Laxmi Ya    Thank you for choosing [facility] ([facility]) for your infusion.  The following information is a summary of our appointment as well as important reminders.      EDUCATION POST BIOLOGICAL/CHEMOTHERAPY INFUSION  Call the triage nurse at your clinic or seek medical attention if you have chills and/or temperature greater than or equal to 100.5, uncontrolled nausea/vomiting, diarrhea, constipation, dizziness, shortness of breath, chest pain, heart palpitations, weakness or any other new or concerning symptoms, questions or concerns.  You can not have any live virus vaccines prior to or during treatment or up to 6 months post infusion.  If you have an upcoming surgery, medical procedure or dental procedure during treatment, this should be discussed with your ordering physician and your surgeon/dentist.  If you are having any concerning symptom, if you are unsure if you should get your next infusion or wish to speak to a provider before your next infusion, please call your care coordinator or triage nurse at your clinic to notify them so we can adequately serve you.       If you are a transplant patient and require transplant education, please click on this link: [email or web address].    We look forward in seeing you on your next appointment here at [facility] ([facility]).  Please don t hesitate to call us at [phone] to reschedule any of your appointments or to speak with one of the [facility] registered nurses.  It was a pleasure taking care of you today.    Sincerely,    [facility]  [address] [place] [address]  [address]  Phone:  [phone]

## 2023-09-06 NOTE — PROGRESS NOTES
Infusion Nursing Note:  Laxmi Ya presents today for ACTEMRA.    Patient seen by provider today: No   present during visit today: Not Applicable.    Note: Actemra infused over 60 minutes. Premeds given (Tylenol and Solu-medrol). Solu-mederol given 20 minutes after her self administered insulin injection. 250 ml Saline infused for 30 minutes post Actemra.  Administrations This Visit       0.9% sodium chloride BOLUS       Admin Date  09/06/2023 Action  $New Bag Dose  250 mL Route  Intravenous Administered By  Lara Aldana RN              acetaminophen (TYLENOL) tablet 650 mg       Admin Date  09/06/2023 Action  $Given Dose  650 mg Route  Oral Administered By  Carolina Rolon RN              methylPREDNISolone sodium succinate (solu-MEDROL) injection 125 mg       Admin Date  09/06/2023 Action  $Given Dose  125 mg Route  Intravenous Administered By  Carolina Rolon RN              tocilizumab (ACTEMRA) 800 mg in sodium chloride 0.9 % 150 mL infusion       Admin Date  09/06/2023 Action  $New Bag Dose  800 mg Rate  150 mL/hr Route  Intravenous Administered By  Carolina Rolon RN                     Intravenous Access:  Peripheral IV placed by Vascular.  Labs (Hepatic panel, Urine routine and Urine pregnancy test) drawn per patient request). Pregnancy test negative.    Treatment Conditions:  Biological Infusion Checklist:  ~~~ NOTE: If the patient answers yes to any of the questions below, hold the infusion and contact ordering provider or on-call provider.    Have you recently had an elevated temperature, fever, chills, productive cough, coughing for 3 weeks or longer or hemoptysis,  abnormal vital signs, night sweats,  chest pain or have you noticed a decrease in your appetite, unexplained weight loss or fatigue? No  Do you have any open wounds or new incisions? No  Do you have any upcoming hospitalizations or surgeries? Does not include esophagogastroduodenoscopy, colonoscopy, endoscopic  retrograde cholangiopancreatography (ERCP), endoscopic ultrasound (EUS), dental procedures or joint aspiration/steroid injections No  Do you currently have any signs of illness or infection or are you on any antibiotics? No  Have you had any new, sudden or worsening abdominal pain? No  Have you or anyone in your household received a live vaccination in the past 4 weeks? Please note: No live vaccines while on biologic/chemotherapy until 6 months after the last treatment. Patient can receive the flu vaccine (shot only), pneumovax and the Covid vaccine. It is optimal for the patient to get these vaccines mid cycle, but they can be given at any time as long as it is not on the day of the infusion. No  Have you recently been diagnosed with any new nervous system diseases (ie. Multiple sclerosis, Guillain [place]quette, seizures, neurological changes) or cancer diagnosis? Are you on any form of radiation or chemotherapy? No  Are you pregnant or breast feeding or do you have plans of pregnancy in the future? No  Have you been having any signs of worsening depression or suicidal ideations?  (benlysta only) No  Have there been any other new onset medical symptoms? No  Have you had any new blood clots? (IVIG only) No      Post Infusion Assessment:  Patient tolerated infusion without incident.  Blood return noted pre and post infusion.  Site patent and intact, free from redness, edema or discomfort.  No evidence of extravasations.  Access discontinued per protocol.  Biologic Infusion Post Education: Call the triage nurse at your clinic or seek medical attention if you have chills and/or temperature greater than or equal to 100.5, uncontrolled nausea/vomiting, diarrhea, constipation, dizziness, shortness of breath, chest pain, heart palpitations, weakness or any other new or concerning symptoms, questions or concerns.  You cannot have any live virus vaccines prior to or during treatment or up to 6 months post infusion.  If you have an  upcoming surgery, medical procedure or dental procedure during treatment, this should be discussed with your ordering physician and your surgeon/dentist.  If you are having any concerning symptom, if you are unsure if you should get your next infusion or wish to speak to a provider before your next infusion, please call your care coordinator or triage nurse at your clinic to notify them so we can adequately serve you.       Discharge Plan:   Discharge instructions reviewed with: Patient.  Patient and/or family verbalized understanding of discharge instructions and all questions answered.  AVS to patient via [employer].  Patient will return 10/04/23 for next appointment.   Patient discharged in stable condition accompanied by: self.  Departure Mode: Ambulatory.    /78 (BP Location: Left arm, Patient Position: Sitting, Cuff Size: Adult Regular)   Pulse 72   Temp 97.7  F (36.5  C) (Oral)   Resp 16   Wt 96.7 kg (213 lb 1.6 oz)   BMI 34.40 kg/m       Carolina Rolon RN

## 2023-09-07 ENCOUNTER — TELEPHONE (OUTPATIENT)
Dept: DERMATOLOGY | Facility: CLINIC | Age: 48
End: 2023-09-07
Payer: MEDICARE

## 2023-09-07 ENCOUNTER — TELEPHONE (OUTPATIENT)
Dept: RHEUMATOLOGY | Facility: CLINIC | Age: 48
End: 2023-09-07
Payer: MEDICARE

## 2023-09-07 ENCOUNTER — THERAPY VISIT (OUTPATIENT)
Dept: OCCUPATIONAL THERAPY | Facility: CLINIC | Age: 48
End: 2023-09-07
Attending: INTERNAL MEDICINE
Payer: MEDICARE

## 2023-09-07 DIAGNOSIS — M06.09 SERONEGATIVE RHEUMATOID ARTHRITIS OF MULTIPLE SITES (H): ICD-10-CM

## 2023-09-07 DIAGNOSIS — M19.90 INFLAMMATORY ARTHRITIS: Primary | ICD-10-CM

## 2023-09-07 PROCEDURE — 97167 OT EVAL HIGH COMPLEX 60 MIN: CPT | Mod: GO | Performed by: OCCUPATIONAL THERAPIST

## 2023-09-07 NOTE — TELEPHONE ENCOUNTER
[facility] Call Center    Phone Message    May a detailed message be left on voicemail: yes     Reason for Call: Patient saw Dr Shen in August and he froze off a lesion but he only froze off half. Patient needs to come back in and have the rest frozen off and does not want to wait for next avail appt March 2024 with any provider [facility]. Please call back [phone] Thank you    Action Taken: Message routed to:  [facility] ([facility]): Derm    Travel Screening: Not Applicable

## 2023-09-07 NOTE — TELEPHONE ENCOUNTER
Pharmacist Yuliya MURPHY was notified by patient that she needs specifically a Lympedema referral placed to be seen by OT/PT.    Kaley Merchant, BSN, RN  RN Care Coordinator Rheumatology

## 2023-09-08 NOTE — PROGRESS NOTES
OCCUPATIONAL THERAPY EVALUATION  Type of Visit: Evaluation    See electronic medical record for Abuse and Falls Screening details.    Subjective      Presenting condition or subjective complaint: Swollen lymph throughout entire body.  Date of onset: 09/07/22    Relevant medical history:     Dates & types of surgery:      Prior diagnostic imaging/testing results:       Prior therapy history for the same diagnosis, illness or injury: No      Prior Level of Function  Transfers: Independent  Ambulation: Independent  ADL: Independent  IADL: Driving, Finances, Housekeeping, Laundry, Meal preparation, Medication management    Living Environment  Social support: Alone   Type of home: House; 1 level   Stairs to enter the home: No       Ramp: No   Stairs inside the home: No       Help at home: Self Cares (home health aide/personal care attendant, family, etc); Home management tasks (cooking, cleaning); Home and Yard maintenance tasks; Assist for driving and community activities  Equipment owned: Straight Cane; Walker with wheels; Bath bench     Employment: No    Hobbies/Interests: Disabled. Unable to do.    Patient goals for therapy: Mobility etc.    Pain assessment:    Pain present  Objective       EDEMA EVALUATION  Additional history:  Body part affected by edema: Face, arm pits, abdomen, thighs, legs. All.  If cancer related, treatment:    If not cancer related, problems with veins or cause of swelling: Inflammatory Rheumatoid Arthritis  Distance able to walk: Not far at all.  Time able to stand: 2 mins  Sensation problems in hands/feet: Yes Numbing, tingling, sharp pain, swelling, burning.  Edema etiology: Insidious    FUNCTIONAL SCALES  Lower Extremity Functional Scale (score out of 80). A lower score indicates greater impairment:    Shoulder Pain and Disability Index (score out of 100).  A higher score indicates greater impairment:      Cognitive Status Examination  Orientation: Oriented to person, place and time    Level of Consciousness: Alert  Follows Commands and Answers Questions: 100% of the time  Personal Safety and Judgement: Intact  Memory: Intact    EDEMA  Skin Condition: Dryness, Intact, Non-pitting  Scar: Yes  Capillary Refill: Symmetrical  Stemmer Sign: -  Ulceration: No    GIRTH MEASUREMENTS: suprapubic swelling is 1/2 cantelope in size.  She has facial swelling and swelling at shoulders/abdomen/upper thighs.        RANGE OF MOTION:  limlited  STRENGTH:  limited  POSTURE: WFL  PALPATION: soft and elastic skin, but pores are stretched and has stretch marks recently on abdomen  ACTIVITIES OF DAILY LIVING: Pt is able to do tasks, but is disabled and slow to do them limiting amount of time.  She has such severe pain and limited motion.  She reports only being able to lie still prior to this med and she is happy to be moving again.  BED MOBILITY: WFL  TRANSFERS: WF  GAIT/LOCOMOTION: Pt walks clinic distance, but reports as limited  BALANCE: WFL  SENSATION:  impaired in handns  VASCULAR:  no concerns  COORDINATION:  limited from CTS in hands  MUSCLE TONE: WFL    Assessment & Plan   CLINICAL IMPRESSIONS  Medical Diagnosis: edema, will clarify to lymphedema    Treatment Diagnosis: lymphedema    Impression/Assessment: Pt is a 48 year old female presenting to Occupational Therapy due to lymphedema.  The following significant findings have been identified: Impaired activity tolerance, Impaired mobility, Impaired ROM, Impaired sensation, Impaired strength, and Pain.  These identified deficits interfere with their ability to perform self care tasks, work tasks, recreational activities, household chores, household mobility, and meal planning and preparation as compared to previous level of function.     Clinical Decision Making (Complexity):  Assessment of Occupational Performance: 5 or more Performance Deficits  Occupational Performance Limitations: bathing/showering, toileting, functional mobility, hygiene and grooming,  driving and community mobility, health management and maintenance, home establishment and management, meal preparation and cleanup, and leisure activities  Clinical Decision Making (Complexity): High complexity    PLAN OF CARE  Treatment Interventions:  Interventions: Self-Care/Home Management, Gradient Compression Bandaging, Manual Therapy    Long Term Goals   OT Goal 1  Goal Identifier: 1  Goal Description: In order to improve functional mobility for activities of living, by the completion of intensive treatment, patient and/or caregiver will;      Verbalize and/or demonstrate understanding of techniques to independently manage their lymphedema at home  Rationale: In order to maximize safety and independence with performance of self-care activities  Target Date: 11/30/23  OT Goal 2  Goal Identifier: 1a  Goal Description: demonstrate independence in performing prescribed exercises/self MLD to facilate the lymph system  Rationale: In order to maximize safety and independence with performance of self-care activities  Target Date: 11/30/23  OT Goal 3  Goal Identifier: 1b  Goal Description: be independent in donning/doffing, wearing schedule, and care of compression garments  Rationale: In order to maximize safety and independence with performance of self-care activities  Target Date: 11/30/23  OT Goal 4  Goal Identifier: 2  Goal Description: Pt will decrease the suprapubic swelling to perform hygiene/shave with a normal effort.  Rationale: In order to maximize safety and independence with performance of self-care activities  Target Date: 11/30/23      Frequency of Treatment: 3x/week  Duration of Treatment: 12 weeks       Education Assessment: Learner/Method: Patient;No Barriers to Learning     Risks and benefits of evaluation/treatment have been explained.   Patient/Family/caregiver agrees with Plan of Care.     Evaluation Time:    OT Eval, High Complexity Minutes (01498): 55      Signing Clinician: Alanis Schilling  OT      LEONEL[facility]                                                                                   OUTPATIENT OCCUPATIONAL THERAPY      PLAN OF TREATMENT FOR OUTPATIENT REHABILITATION   Patient's Last Name, First Name, Laxmi Coleman YOB: 1975   Provider's Name   [facility]   Medical Record No.  [id number]     Onset Date: 09/07/22 Start of Care Date: 09/07/23     Medical Diagnosis:  edema, will clarify to lymphedema      OT Treatment Diagnosis:  lymphedema Plan of Treatment  Frequency/Duration:3x/week/12 weeks    Certification date from 09/07/23   To 11/30/23        See note for plan of treatment details and functional goals     Alanis Schilling OT                         I CERTIFY THE NEED FOR THESE SERVICES FURNISHED UNDER        THIS PLAN OF TREATMENT AND WHILE UNDER MY CARE .             Physician Signature               Date    X_____________________________________________________                    Referring Provider:  Will clarify tx to her primary Dr. Juares      Initial Assessment  See Epic Evaluation- 09/07/23

## 2023-09-10 ENCOUNTER — HEALTH MAINTENANCE LETTER (OUTPATIENT)
Age: 48
End: 2023-09-10

## 2023-09-13 ENCOUNTER — THERAPY VISIT (OUTPATIENT)
Dept: PHYSICAL THERAPY | Facility: CLINIC | Age: 48
End: 2023-09-13
Payer: MEDICARE

## 2023-09-13 DIAGNOSIS — M54.2 NECK PAIN: ICD-10-CM

## 2023-09-13 DIAGNOSIS — M54.50 CHRONIC BILATERAL LOW BACK PAIN WITHOUT SCIATICA: ICD-10-CM

## 2023-09-13 DIAGNOSIS — G89.29 CHRONIC BILATERAL LOW BACK PAIN WITHOUT SCIATICA: ICD-10-CM

## 2023-09-13 DIAGNOSIS — R10.2 PELVIC PAIN IN FEMALE: Primary | ICD-10-CM

## 2023-09-13 PROCEDURE — 97140 MANUAL THERAPY 1/> REGIONS: CPT | Mod: GP | Performed by: PHYSICAL THERAPIST

## 2023-09-14 ENCOUNTER — ANCILLARY PROCEDURE (OUTPATIENT)
Dept: ULTRASOUND IMAGING | Facility: CLINIC | Age: 48
End: 2023-09-14
Payer: MEDICARE

## 2023-09-14 ENCOUNTER — PATIENT OUTREACH (OUTPATIENT)
Dept: CARE COORDINATION | Facility: CLINIC | Age: 48
End: 2023-09-14

## 2023-09-14 ENCOUNTER — TRANSCRIBE ORDERS (OUTPATIENT)
Dept: OTHER | Age: 48
End: 2023-09-14

## 2023-09-14 ENCOUNTER — OFFICE VISIT (OUTPATIENT)
Dept: DERMATOLOGY | Facility: CLINIC | Age: 48
End: 2023-09-14
Payer: MEDICARE

## 2023-09-14 DIAGNOSIS — N90.89 VULVAR MASS: ICD-10-CM

## 2023-09-14 DIAGNOSIS — I89.0 LYMPHEDEMA: Primary | ICD-10-CM

## 2023-09-14 DIAGNOSIS — N89.9 NODULE OF VAGINA: ICD-10-CM

## 2023-09-14 DIAGNOSIS — L82.0 INFLAMED SEBORRHEIC KERATOSIS: Primary | ICD-10-CM

## 2023-09-14 PROCEDURE — 99214 OFFICE O/P EST MOD 30 MIN: CPT | Mod: 25 | Performed by: STUDENT IN AN ORGANIZED HEALTH CARE EDUCATION/TRAINING PROGRAM

## 2023-09-14 PROCEDURE — 17110 DESTRUCTION B9 LES UP TO 14: CPT | Mod: GC | Performed by: STUDENT IN AN ORGANIZED HEALTH CARE EDUCATION/TRAINING PROGRAM

## 2023-09-14 PROCEDURE — 76857 US EXAM PELVIC LIMITED: CPT | Mod: GC | Performed by: RADIOLOGY

## 2023-09-14 NOTE — NURSING NOTE
Dermatology Rooming Note    aLxmi Ya's goals for this visit include:   Chief Complaint   Patient presents with    Derm Problem     Pt wants a cyst looked at that was in groin and has concern about that previously frozen. Pt also has concerns about calcification spots near groin and buttocks.     Favio Shearer, EMT-B

## 2023-09-14 NOTE — PATIENT INSTRUCTIONS
Cryotherapy Instructions     For the areas treated with liquid nitrogen (cryotherapy or freezing) today, they are expected to get pink, puffy, and perhaps even blister. The area should then crust up and fall off and the goal is to have nice new skin underneath. There is nothing special that you need to do for these areas. You can wash them as you do normal skin.     Sometimes a blister develops; if a blister does develop do NOT pop it. However, if it breaks open on its own, be sure to wash it with soap and water daily and put plain vasaline or petroleum jelly and a bandaid on it until the skin is healed.     Please call the clinic if you have any questions or concerns.

## 2023-09-14 NOTE — PROGRESS NOTES
I have personally examined this patient and agree with the resident doctor's documentation and plan of care. I have reviewed and amended the resident's note. The documentation accurately reflects my clinical observations, diagnoses, treatment and follow-up plans. I was present for key portions of the procedure(s).       Asim Shen MD  Dermatology Staff        [facility] Dermatology Note    Encounter Date: Sep 14, 2023    Dermatology Problem List:    #Labia majora nodule    - referral to GYN surgery evaluation   - scheduled for US 09/14/23     # Inflamed seborrheic keratosis, back  - Given symptomatology and location, recommended LN2  - See below     Major PMHx  #Seronegative inflammatory arthritis  - small joint predominent on actemra    - prev on abatacept    Infliximab  - ineffective for all of her joints              Golimumab - red swollen plaque, rt abdomen after first infusion and then stopped              Humira - ineffective              Enbrel - recurrent infections (UTI, sinusitids)  _____________________________________    Impression/Plan:  Laxmi was seen today for derm problem.    Diagnoses and all orders for this visit:    Inflamed seborrheic keratosis  -     DESTRUCT BENIGN LESION, UP TO 14  - back, repeat LN2     Nodule of vagina  -     Cancel: INJECTION INTO SKIN LESIONS <=7  -     Discontinue: triamcinolone acetonide (KENALOG-10) injection 3 mg  -     Ob/Gyn Referral; Future    - discussed risks and benefits of surgery   - risk factors include obesity  - pt wishes to proceed, referral to gyn placed    Cryotherapy procedure note: After verbal consent and discussion of risks and benefits including but no limited to dyspigmentation/scar, blister, and pain, 1 ISK on back was(were) treated with 1-2mm freeze border for 2 cycles with liquid nitrogen. Post cryotherapy instructions were provided.     Follow-up as needed.     Staff and Resident:  Dr. Shen and Reyna Brown DO      Asim Shen MD   of Dermatology  Department of Dermatology  [facility]    CC:   Chief Complaint   Patient presents with    Derm Problem     Pt wants a cyst looked at that was in groin and has concern about that previously frozen. Pt also has concerns about calcification spots near groin and buttocks.     History of Present Illness:  Ms. Laxmi Ya is a 48 year old female who presents as a return patient.    Pt presents for re freezing of spot frozen on 08/11, reports that the lesion did not resolve after freezing , would like it gone as it continues to bother her     She also reports a vaginal nodule that has increased in size, not painful but does bleed at times and bothers her     Labs:  NA    Physical exam:  Vitals: There were no vitals taken for this visit.  GEN: well developed, well-nourished, in no acute distress, in a pleasant mood.     SKIN: Richards phototype I  -There is a tan to brown waxy stuck on papule with surrounding erythema on the upper back.   -Firm, subcutaneous nodule palpated to the labia majora, non tender to palpation   -No other lesions of concern on areas examined.      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 She has never used smokeless tobacco. She reports current alcohol use. She reports that she does not use drugs.    Family History:  Family History   Problem Relation Age of Onset    Hypertension Father         abstract    Cancer Paternal Aunt         gallbladder cancer abstract       Medications:  Current Outpatient Medications   Medication Sig Dispense Refill    Albuterol Sulfate (PROAIR HFA IN) Inhale into the lungs as needed      alcohol swab prep pads Use to swab area of injection/pratima as directed. 100 each 3    Artificial Tear Solution (SOOTHE XP) SOLN as needed      atenolol (TENORMIN) 50 MG tablet Take 50 mg by mouth daily      benzoyl peroxide 5 % external liquid Use daily as directed. Preferred bdrand: Medpura 236 mL 11    blood glucose (NO BRAND SPECIFIED) lancets standard Use to test blood sugar 2 times daily or as directed. 100 lancet. 1    blood glucose (NO BRAND SPECIFIED) test strip Use to test blood sugar 2 times daily or as directed. 100 strip 1    blood glucose monitoring (NO BRAND SPECIFIED) meter device kit Use to test blood sugar 2 times daily or as directed. 1 kit 0    cephALEXin (KEFLEX) 500 MG capsule Take 1 Capsule (500 mg) by mouth four times daily for 10 days      clindamycin (CLEOCIN T) 1 % external solution Apply twice daily as needed to active areas. MUST USE WITH BENZOYL PEROXIDE WASH  TO AVOID BACTERIAL RESISTANCE. 60 mL 2    diclofenac (VOLTAREN) 1 % topical gel Apply topically 4 times daily as needed      doxycycline hyclate (VIBRA-TABS) 100 MG tablet Take 150 mg by mouth 2 times daily      doxycycline hyclate (VIBRAMYCIN) 50 MG capsule Take 150 mg by mouth 2 times daily      dupilumab (DUPIXENT) 300 MG/2ML prefilled pen Inject 2 mLs (300 mg) Subcutaneous every 14 days 4 mL 5    fexofenadine  (ALLEGRA) 180 MG tablet Take 1 tablet (180 mg) by mouth daily 90 tablet 2    fidaxomicin (DIFICID) 200 MG tablet Take 200 mg by mouth as needed      fluticasone (FLONASE) 50 MCG/ACT nasal spray Spray 2 sprays into both nostrils 2 times daily      fluticasone-vilanterol (BREO ELLIPTA) 200-25 MCG/ACT inhaler Inhale 1 puff into the lungs daily 90 each 1    insulin glargine (LANTUS PEN) 100 UNIT/ML pen Inject 20 Units Subcutaneous daily      insulin lispro (HUMALOG KWIKPEN) 100 UNIT/ML (1 unit dial) KWIKPEN Inject 5 Units Subcutaneous 3 times daily (before meals) Plus 1:25 correction scale if BG >150      insulin NPH (HUMULIN N KWIKPEN) 100 UNIT/ML injection 30 units on day of infusion then 20 units daily x 2 days after infusion      lidocaine (LIDODERM) 5 % patch as needed   1    losartan (COZAAR) 100 MG tablet Take 100 mg by mouth daily      mometasone (NASONEX) 50 MCG/ACT nasal spray Spray 2 sprays into both nostrils daily 17 g 11    Montelukast Sodium (SINGULAIR PO) Take 10 mg by mouth At Bedtime       multivitamin w/minerals (THERA-VIT-M) tablet Take 1 tablet by mouth daily      nystatin (MYCOSTATIN) 104351 UNIT/ML suspension       olopatadine (PATADAY) 0.7 % ophthalmic solution Apply 1 drop to eye daily PRN      OMEPRAZOLE PO Take 40 mg by mouth 2 times daily       rifaximin (XIFAXAN) 550 MG TABS tablet Take 200 mg by mouth 3 times daily Take for 10 days, has on had as needed for small bowel bacteria overgrowh      rosuvastatin (CRESTOR) 5 MG tablet Take 2 tablets by mouth daily      Sharps Container MISC Use to dispose of sharps as directed 1 each 0    spironolactone-HCTZ (ALDACTAZIDE) 25-25 MG tablet Take 1 tablet by mouth daily      tacrolimus (PROTOPIC) 0.1 % external ointment Apply to itchy areas on eyelids twice a day as needed for itching and rash 30 g 3    tobramycin (TOBREX) 0.3 % ophthalmic solution 1-2 drops 2 times daily      tobramycin-dexamethasone (TOBRADEX) 0.3-0.1 % ophthalmic ointment 0.25 inches  At Bedtime      UNABLE TO FIND MEDICATION NAME: Fermented zinc complex      vitamin E 400 units TABS Take 400 Units by mouth daily      methylPREDNISolone (MEDROL DOSEPAK) 4 MG tablet therapy pack Take per package instructions. Take once a day by mouth (Patient not taking: Reported on 9/14/2023) 21 tablet 0    norethindrone (MICRONOR) 0.35 MG tablet Take 0.35 mg by mouth       Allergies   Allergen Reactions    Polyethylene Glycol Rash    Codeine      Other reaction(s): Gastrointestinal, GI intolerance, Vomiting  Vomiting      Gadolinium Dizziness and Nausea    Hydrocodone-Acetaminophen Nausea and Vomiting     Other reaction(s): GI intolerance    Iodine      abstract    Sulfasalazine      Other reaction(s): GI intolerance  Has tried and had nausa.    Tramadol Nausea and Vomiting     Other reaction(s): Gastrointestinal, Other (see comments)  Nausea and vomiting   unknown      Acetaminophen Nausea     Nausea and vomiting  Nausea and vomiting  Nausea and vomiting  Nausea and vomiting      Gluten Meal Other (See Comments) and Rash     Other reaction(s): Gastrointestinal, GI intolerance  Dizziness, tired, rash, stomach cramps, thrush, mouth sores  Dizziness, tired, rash, stomach cramps, thrush, mouth sores      Propylene Glycol Dizziness, Nausea and Vomiting, Nausea and Rash

## 2023-09-14 NOTE — PROGRESS NOTES
09/13/23 0500   Appointment Info   Signing clinician's name / credentials Sofi Lemons PT, OCS   Total/Authorized Visits E&T 02/03/23   Visits Used 16   Medical Diagnosis Lower back pain with mobility deficits, lower back pain w/ referred pain, neck pain w/ headache, neck pain w/ mobility deficits, pelvic pain   PT Tx Diagnosis Lower back pain with mobility deficits, lower back pain w/ referred pain, neck pain w/ headache, neck pain w/ mobility deficits   Precautions/Limitations 15 min late   Other pertinent information verbal ok for internal vaginal pelvic floor muscle assessment   Quick Adds Certification   Progress Note/Certification   Start of Care Date 03/08/23   Onset of illness/injury or Date of Surgery 02/03/23   Therapy Frequency 1x week   Predicted Duration 8 weeks   Certification date from 09/04/23   Certification date to 12/11/23   Progress Note Due Date 09/13/23   Progress Note Completed Date 06/14/23   GOALS   PT Goals 3   PT Goal 1   Goal Identifier ambulation   Goal Description Minutes patient will be able to  walk 20-30 minutes   Rationale to maximize safety and independence with performance of ADLs and functional tasks;to maximize safety and independence within the home;to maximize safety and independence within the community;to maximize safety and independence with transportation;to maximize safety and independence with self cares   Goal Progress 15 minutes   Target Date 12/07/23   PT Goal 2   Goal Identifier headaches   Goal Description HA 2x month or less with intensity 2/10 or less   Rationale to maximize safety and independence with performance of ADLs and functional tasks;to maximize safety and independence within the home;to maximize safety and independence within the community;to maximize safety and independence with transportation;to maximize safety and independence with self cares   Goal Progress 3/10   Target Date 08/10/23   Date Met 08/09/23   PT Goal 3   Goal Identifier  pelvic pain   Goal Description with walking/intimacy/urinary urgency 2/10 or less   Rationale to maximize safety and independence with performance of ADLs and functional tasks;to maximize safety and independence within the home;to maximize safety and independence with transportation;to maximize safety and independence with self cares;to maximize safety and independence within the community   Goal Progress currently 5/10   Target Date 12/07/23   Subjective Report   Subjective Report Patient continues to struggle with pretty severe pain in B hips/sacrum/lower pelvis. Also dealing with vulva cyst/mass on R side which is rubbing against her leg and causes discomfort with walking. Had ultrasound from OB/gyn so waiting on results of that to see what treatment options available.Was hoping to have the cyst released. Denies much bladder urgency/frequency or leakage at this point.   Objective Measures   Objective Measures Objective Measure 1   Objective Measure 1   Objective Measure L>R LA/OI TTP and moderate tightness L, min on R.   Treatment Interventions (PT)   Interventions Manual Therapy   Therapeutic Procedure/Exercise   Ther Proc 1 glute myofascial full arc   Ther Proc 1 - Details x20   Skilled Intervention patient needed manual and verbal cues to engage proper gluteal contraction   Patient Response/Progress improved glute contraction   Manual Therapy   Manual Therapy: Mobilization, MFR, MLD, friction massage minutes (46737) 25   Manual Therapy Manual Therapy 2   Manual Therapy 2 supine internal MFR to pelvic floor x25 minutes   Skilled Intervention to decrease tone in pelvic floor   Patient Response/Progress less tension in PF post treatment   Education   Learner/Method Patient;No Barriers to Learning   Plan   Home program PTRX   Plan for next session check HEP   Total Session Time   Timed Code Treatment Minutes 25   Total Treatment Time (sum of timed and untimed services) 25         [facility]  Services                                                                                   OUTPATIENT PHYSICAL THERAPY    PLAN OF TREATMENT FOR OUTPATIENT REHABILITATION   Patient's Last Name, First Name, Laxmi Coleman YOB: 1975   Provider's Name   [facility]   Medical Record No.  [id number]     Onset Date: 02/03/23  Start of Care Date: 03/08/23     Medical Diagnosis:  Lower back pain with mobility deficits, lower back pain w/ referred pain, neck pain w/ headache, neck pain w/ mobility deficits, pelvic pain      PT Treatment Diagnosis:  Lower back pain with mobility deficits, lower back pain w/ referred pain, neck pain w/ headache, neck pain w/ mobility deficits Plan of Treatment  Frequency/Duration: 1x week/ 8 weeks    Certification date from 09/04/23 to 12/11/23         See note for plan of treatment details and functional goals     Peng George, PT                         I CERTIFY THE NEED FOR THESE SERVICES FURNISHED UNDER        THIS PLAN OF TREATMENT AND WHILE UNDER MY CARE .             Physician Signature               Date    X_____________________________________________________                    Referring Provider:  Ingrid Leach PA-C      Initial Assessment  See Epic Evaluation- Start of Care Date: 03/08/23

## 2023-09-14 NOTE — PROGRESS NOTES
Drug Administration Record    Prior to injection, verified patient identity using patient's name and date of birth.  Due to injection administration, patient instructed to remain in clinic for 15 minutes  afterwards, and to report any adverse reaction to me immediately.    Drug Name: triamcinolone acetonide(kenalog)  Dose: 0.3 mL of triamcinolone 10mg/mL, 3 mg dose  Route administered: ID  NDC #: Kenalog-10 (4941-9070-60)  Amount of waste(mL):4.7 mL  Reason for waste: Multi dose vial    LOT #: [id number]  SITE: see note  : [employer]  EXPIRATION DATE: Oct 2025

## 2023-09-14 NOTE — PROGRESS NOTES
Dermatology Rooming Note    Laxmi Ya's goals for this visit include:   Chief Complaint   Patient presents with    Derm Problem     Pt wants a cyst looked at that was in groin and has concern about that previously frozen. Pt also has concerns about calcification spots near groin and buttocks.     Favio Shearer, EMT-B

## 2023-09-14 NOTE — LETTER
9/14/2023       RE: Laxmi Ya  [address] [facility] [address]     Dear Colleague,    Thank you for referring your patient, Laxmi Ya, to the [facility] DERMATOLOGY CLINIC [place] at [facility]. Please see a copy of my visit note below.    [facility] Dermatology Note    Encounter Date: Sep 14, 2023    Dermatology Problem List:    #Labia majora nodule    - referral to GYN surgery evaluation   - scheduled for US 09/14/23     # Inflamed seborrheic keratosis, back  - Given symptomatology and location, recommended LN2  - See below     Major PMHx  #Seronegative inflammatory arthritis  - small joint predominent on actemra    - prev on abatacept    Infliximab  - ineffective for all of her joints              Golimumab - red swollen plaque, rt abdomen after first infusion and then stopped              Humira - ineffective              Enbrel - recurrent infections (UTI, sinusitids)  _____________________________________    Impression/Plan:  Laxmi was seen today for derm problem.    Diagnoses and all orders for this visit:    Inflamed seborrheic keratosis  -     DESTRUCT BENIGN LESION, UP TO 14  - back, repeat LN2     Nodule of vagina  -     Cancel: INJECTION INTO SKIN LESIONS <=7  -     Discontinue: triamcinolone acetonide (KENALOG-10) injection 3 mg  -     Ob/Gyn Referral; Future    - discussed risks and benefits of surgery   - risk factors include obesity  - pt wishes to proceed, referral to gyn placed      Follow-up as needed.     Staff and Resident:  Dr. Shen and DO Asim Krishnan MD   of Dermatology  Department of Dermatology  [facility]    CC:   Chief Complaint   Patient presents with    Derm Problem     Pt wants a cyst looked at that was in groin and has concern about that previously frozen. Pt also has concerns about calcification spots near groin and  buttocks.     History of Present Illness:  Ms. Laxmi Ya is a 48 year old female who presents as a return patient.    Pt presents for re freezing of spot frozen on 08/11, reports that the lesion did not resolve after freezing , would like it gone as it continues to bother her     She also reports a vaginal nodule that has increased in size, not painful but does bleed at times and bothers her     Labs:  NA    Physical exam:  Vitals: There were no vitals taken for this visit.  GEN: well developed, well-nourished, in no acute distress, in a pleasant mood.     SKIN: Richards phototype I  -There is a tan to brown waxy stuck on papule with surrounding erythema on the upper back.   -Firm, subcutaneous nodule palpated to the labia majora, non tender to palpation   -No other lesions of concern on areas examined.      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 She has never used smokeless tobacco. She reports current alcohol use. She reports that she does not use drugs.    Family History:  Family History   Problem Relation  Age of Onset    Hypertension Father         abstract    Cancer Paternal Aunt         gallbladder cancer abstract       Medications:  Current Outpatient Medications   Medication Sig Dispense Refill    Albuterol Sulfate (PROAIR HFA IN) Inhale into the lungs as needed      alcohol swab prep pads Use to swab area of injection/pratima as directed. 100 each 3    Artificial Tear Solution (SOOTHE XP) SOLN as needed      atenolol (TENORMIN) 50 MG tablet Take 50 mg by mouth daily      benzoyl peroxide 5 % external liquid Use daily as directed. Preferred bdrand: Medpura 236 mL 11    blood glucose (NO BRAND SPECIFIED) lancets standard Use to test blood sugar 2 times daily or as directed. 100 lancet. 1    blood glucose (NO BRAND SPECIFIED) test strip Use to test blood sugar 2 times daily or as directed. 100 strip 1    blood glucose monitoring (NO BRAND SPECIFIED) meter device kit Use to test blood sugar 2 times daily or as directed. 1 kit 0    cephALEXin (KEFLEX) 500 MG capsule Take 1 Capsule (500 mg) by mouth four times daily for 10 days      clindamycin (CLEOCIN T) 1 % external solution Apply twice daily as needed to active areas. MUST USE WITH BENZOYL PEROXIDE WASH  TO AVOID BACTERIAL RESISTANCE. 60 mL 2    diclofenac (VOLTAREN) 1 % topical gel Apply topically 4 times daily as needed      doxycycline hyclate (VIBRA-TABS) 100 MG tablet Take 150 mg by mouth 2 times daily      doxycycline hyclate (VIBRAMYCIN) 50 MG capsule Take 150 mg by mouth 2 times daily      dupilumab (DUPIXENT) 300 MG/2ML prefilled pen Inject 2 mLs (300 mg) Subcutaneous every 14 days 4 mL 5    fexofenadine (ALLEGRA) 180 MG tablet Take 1 tablet (180 mg) by mouth daily 90 tablet 2    fidaxomicin (DIFICID) 200 MG tablet Take 200 mg by mouth as needed      fluticasone (FLONASE) 50 MCG/ACT nasal spray Spray 2 sprays into both nostrils 2 times daily      fluticasone-vilanterol (BREO ELLIPTA) 200-25 MCG/ACT inhaler Inhale 1 puff into the lungs daily 90 each 1     insulin glargine (LANTUS PEN) 100 UNIT/ML pen Inject 20 Units Subcutaneous daily      insulin lispro (HUMALOG KWIKPEN) 100 UNIT/ML (1 unit dial) KWIKPEN Inject 5 Units Subcutaneous 3 times daily (before meals) Plus 1:25 correction scale if BG >150      insulin NPH (HUMULIN N KWIKPEN) 100 UNIT/ML injection 30 units on day of infusion then 20 units daily x 2 days after infusion      lidocaine (LIDODERM) 5 % patch as needed   1    losartan (COZAAR) 100 MG tablet Take 100 mg by mouth daily      mometasone (NASONEX) 50 MCG/ACT nasal spray Spray 2 sprays into both nostrils daily 17 g 11    Montelukast Sodium (SINGULAIR PO) Take 10 mg by mouth At Bedtime       multivitamin w/minerals (THERA-VIT-M) tablet Take 1 tablet by mouth daily      nystatin (MYCOSTATIN) 017611 UNIT/ML suspension       olopatadine (PATADAY) 0.7 % ophthalmic solution Apply 1 drop to eye daily PRN      OMEPRAZOLE PO Take 40 mg by mouth 2 times daily       rifaximin (XIFAXAN) 550 MG TABS tablet Take 200 mg by mouth 3 times daily Take for 10 days, has on had as needed for small bowel bacteria overgrowh      rosuvastatin (CRESTOR) 5 MG tablet Take 2 tablets by mouth daily      Sharps Container MISC Use to dispose of sharps as directed 1 each 0    spironolactone-HCTZ (ALDACTAZIDE) 25-25 MG tablet Take 1 tablet by mouth daily      tacrolimus (PROTOPIC) 0.1 % external ointment Apply to itchy areas on eyelids twice a day as needed for itching and rash 30 g 3    tobramycin (TOBREX) 0.3 % ophthalmic solution 1-2 drops 2 times daily      tobramycin-dexamethasone (TOBRADEX) 0.3-0.1 % ophthalmic ointment 0.25 inches At Bedtime      UNABLE TO FIND MEDICATION NAME: Fermented zinc complex      vitamin E 400 units TABS Take 400 Units by mouth daily      methylPREDNISolone (MEDROL DOSEPAK) 4 MG tablet therapy pack Take per package instructions. Take once a day by mouth (Patient not taking: Reported on 9/14/2023) 21 tablet 0    norethindrone (MICRONOR) 0.35 MG tablet  Take 0.35 mg by mouth       Allergies   Allergen Reactions    Polyethylene Glycol Rash    Codeine      Other reaction(s): Gastrointestinal, GI intolerance, Vomiting  Vomiting      Gadolinium Dizziness and Nausea    Hydrocodone-Acetaminophen Nausea and Vomiting     Other reaction(s): GI intolerance    Iodine      abstract    Sulfasalazine      Other reaction(s): GI intolerance  Has tried and had nausa.    Tramadol Nausea and Vomiting     Other reaction(s): Gastrointestinal, Other (see comments)  Nausea and vomiting   unknown      Acetaminophen Nausea     Nausea and vomiting  Nausea and vomiting  Nausea and vomiting  Nausea and vomiting      Gluten Meal Other (See Comments) and Rash     Other reaction(s): Gastrointestinal, GI intolerance  Dizziness, tired, rash, stomach cramps, thrush, mouth sores  Dizziness, tired, rash, stomach cramps, thrush, mouth sores      Propylene Glycol Dizziness, Nausea and Vomiting, Nausea and Rash           Dermatology Rooming Note    Laxmi Ya's goals for this visit include:   Chief Complaint   Patient presents with    Derm Problem     Pt wants a cyst looked at that was in groin and has concern about that previously frozen. Pt also has concerns about calcification spots near groin and buttocks.     Favio Shearer, EMT-B      Drug Administration Record    Prior to injection, verified patient identity using patient's name and date of birth.  Due to injection administration, patient instructed to remain in clinic for 15 minutes  afterwards, and to report any adverse reaction to me immediately.    Drug Name: triamcinolone acetonide(kenalog)  Dose: 0.3 mL of triamcinolone 10mg/mL, 3 mg dose  Route administered: ID  NDC #: Kenalog-10 (2377-9144-50)  Amount of waste(mL):4.7 mL  Reason for waste: Multi dose vial    LOT #: [id number]  SITE: see note  : [employer]  EXPIRATION DATE: Oct 2025

## 2023-09-14 NOTE — PROGRESS NOTES
Clinic Care Coordination Contact  Program: Health insurance   County:  [facility] Case #:  [facility] Worker:   Ean #:   Subscriber #:   Renewal:  Date Applied: 8/14/23    FRW Outreach:   9/14/23 - FRW spoke with patient. Patient is not interested in applying for MA with spenddown at this time due to current situation of possibly moving out of state.  Plan: FRW closed the FRW program and remove patient from panel. Patient can be referred back to FRW if needed.     Erica Castillo  Financial Resource Worker  [facility]  Clinic Care Coordination  [phone]    8/24/23 - FRW spoke with patient. Patient did receive application that FRW mailed. Patient is going to be moving out of state in the next 4-5 months. Patient is contemplating if it's worth applying while she's in [place] when she's moving to [place] soon. FRW will follow up within 30 days.  Erica Castillo  Financial Resource Worker  [facility]  Clinic Care Coordination  [phone]      8/14/23 - FRW assisted with certain population application. FRW will mail application to patient to sign. FRW will follow up within 30 days.   Erica Castillo  Financial Resource Worker  [facility]  Clinic Care Coordination  [phone]    8/11/23 - FRW spoke with patient. Appointment scheduled to assist with certain pop application.   Erica Castillo  Financial Resource Worker  [facility]  Clinic Care Coordination  [phone]    Health Insurance:      Referral/Screening:    Health Insurance Screening: [employer]   1. Do you currently have health insurance, did you receive a renewal? Medicare and BCBS  2. If you applied through [employer], do you know your username/password?  No   3. How many people in the household? 1  4. Do you file taxes, who do you file with? Self  5. What is your monthly income (include all tax members)? Social security - $2,700  6. Do you have access to insurance through an employer (if yes, need EIN)?  7. Do you have social  security cards and/or green cards?    FRW Screening    Row Name 08/10/23 [id number]       Insurance:   Is this a new insurance application request? Yes       OTHER   Any other information for the FRW? Please assess for and assist in applying for MA with a spend down.

## 2023-09-14 NOTE — PROGRESS NOTES
Chief Complaint:   Chief Complaint   Patient presents with    Consult     Consult for bilateral STT joint injection under fluoroscopy       Referring Physician: No ref. provider found    Diagnosis: bilateral thumb CMC/STT arthritis, bilateral carpal tunnel syndrome  Treatment:   9/20/2023: bilateral STT steroid injection  5/4/2023: bilateral carpal tunnel steroid injection (Dr. Petit)  2/16/2023: bilateral STT steroid injection (Dr. Petit)  12/13/2022: bilateral carpal tunnel steroid injection (Dr. Sullivan)  10/27/2022: bilateral STT steroid injections (Dr. Petit)  9/13/2022: bilateral carpal tunnel steroid injection (Dr. Sullivan)  6/30/2022: bilateral STT steroid injections (Dr. Petit)  Bilateral carpal tunnel steroid injections (outside)    History of Present Illness: Laxmi Ya is a 48 year old RHD female presenting for evaluation of bilateral hand and wrist pain. She's had history of carpal tunnel steroid injections and releases, she continues to feel some numbness/tingling in her fingers. Today she'd like to have steroid injections into the STT region.    Clinical documentation by Dr. Petit on 5/4/2023 was reviewed.    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    Obesity     Pyelonephritis, unspecified 1992    abstract    Rheumatoid arteritis (H)     Seronegative inflammatory arthritis 06/17/2022    Unspecified symptom associated with female genital organs 05/07/1999    chronic pelvic pain abstract       Past Surgical History:   Past Surgical History:   Procedure Laterality Date    CHOLECYSTECTOMY      COLON SURGERY      Left  hemicolectomy for diverticulosis    CYSTOSCOPY,+URETEROSCOPY      abstract    ZZHC EXCIS/CURET BENIGN ELBOW LESN  10/26/1987    (R) humerus abstract       Medications:   Current Outpatient Medications:     Albuterol Sulfate (PROAIR HFA IN), Inhale into the lungs as needed, Disp: , Rfl:     alcohol swab prep pads, Use to swab area of injection/pratima as directed., Disp: 100 each, Rfl: 3    Artificial Tear Solution (SOOTHE XP) SOLN, as needed, Disp: , Rfl:     atenolol (TENORMIN) 50 MG tablet, Take 50 mg by mouth daily, Disp: , Rfl:     benzoyl peroxide 5 % external liquid, Use daily as directed. Preferred bdrand: Medpura, Disp: 236 mL, Rfl: 11    blood glucose (NO BRAND SPECIFIED) lancets standard, Use to test blood sugar 2 times daily or as directed., Disp: 100 lancet., Rfl: 1    blood glucose (NO BRAND SPECIFIED) test strip, Use to test blood sugar 2 times daily or as directed., Disp: 100 strip, Rfl: 1    blood glucose monitoring (NO BRAND SPECIFIED) meter device kit, Use to test blood sugar 2 times daily or as directed., Disp: 1 kit, Rfl: 0    cephALEXin (KEFLEX) 500 MG capsule, Take 1 Capsule (500 mg) by mouth four times daily for 10 days, Disp: , Rfl:     clindamycin (CLEOCIN T) 1 % external solution, Apply twice daily as needed to active areas. MUST USE WITH BENZOYL PEROXIDE WASH  TO AVOID BACTERIAL RESISTANCE., Disp: 60 mL, Rfl: 2    diclofenac (VOLTAREN) 1 % topical gel, Apply topically 4 times daily as needed, Disp: , Rfl:     doxycycline hyclate (VIBRA-TABS) 100 MG tablet, Take 150 mg by mouth 2 times daily, Disp: , Rfl:     doxycycline hyclate (VIBRAMYCIN) 50 MG capsule, Take 150 mg by mouth 2 times daily, Disp: , Rfl:     dupilumab (DUPIXENT) 300 MG/2ML prefilled pen, Inject 2 mLs (300 mg) Subcutaneous every 14 days, Disp: 4 mL, Rfl: 5    fexofenadine (ALLEGRA) 180 MG tablet, Take 1 tablet (180 mg) by mouth daily, Disp: 90 tablet, Rfl: 2    fidaxomicin (DIFICID) 200 MG tablet, Take 200 mg by mouth as  needed, Disp: , Rfl:     fluticasone (FLONASE) 50 MCG/ACT nasal spray, Spray 2 sprays into both nostrils 2 times daily, Disp: , Rfl:     fluticasone-vilanterol (BREO ELLIPTA) 200-25 MCG/ACT inhaler, Inhale 1 puff into the lungs daily, Disp: 90 each, Rfl: 1    insulin glargine (LANTUS PEN) 100 UNIT/ML pen, Inject 20 Units Subcutaneous daily, Disp: , Rfl:     insulin lispro (HUMALOG KWIKPEN) 100 UNIT/ML (1 unit dial) KWIKPEN, Inject 5 Units Subcutaneous 3 times daily (before meals) Plus 1:25 correction scale if BG >150, Disp: , Rfl:     insulin NPH (HUMULIN N KWIKPEN) 100 UNIT/ML injection, 30 units on day of infusion then 20 units daily x 2 days after infusion, Disp: , Rfl:     lidocaine (LIDODERM) 5 % patch, as needed , Disp: , Rfl: 1    losartan (COZAAR) 100 MG tablet, Take 100 mg by mouth daily, Disp: , Rfl:     methylPREDNISolone (MEDROL DOSEPAK) 4 MG tablet therapy pack, Take per package instructions. Take once a day by mouth (Patient not taking: Reported on 9/14/2023), Disp: 21 tablet, Rfl: 0    mometasone (NASONEX) 50 MCG/ACT nasal spray, Spray 2 sprays into both nostrils daily, Disp: 17 g, Rfl: 11    Montelukast Sodium (SINGULAIR PO), Take 10 mg by mouth At Bedtime , Disp: , Rfl:     multivitamin w/minerals (THERA-VIT-M) tablet, Take 1 tablet by mouth daily, Disp: , Rfl:     norethindrone (MICRONOR) 0.35 MG tablet, Take 0.35 mg by mouth, Disp: , Rfl:     nystatin (MYCOSTATIN) 992241 UNIT/ML suspension, , Disp: , Rfl:     olopatadine (PATADAY) 0.7 % ophthalmic solution, Apply 1 drop to eye daily PRN, Disp: , Rfl:     OMEPRAZOLE PO, Take 40 mg by mouth 2 times daily , Disp: , Rfl:     rifaximin (XIFAXAN) 550 MG TABS tablet, Take 200 mg by mouth 3 times daily Take for 10 days, has on had as needed for small bowel bacteria overgrowh, Disp: , Rfl:     rosuvastatin (CRESTOR) 5 MG tablet, Take 2 tablets by mouth daily, Disp: , Rfl:     Sharps Container MISC, Use to dispose of sharps as directed, Disp: 1 each, Rfl:  0    spironolactone-HCTZ (ALDACTAZIDE) 25-25 MG tablet, Take 1 tablet by mouth daily, Disp: , Rfl:     tacrolimus (PROTOPIC) 0.1 % external ointment, Apply to itchy areas on eyelids twice a day as needed for itching and rash, Disp: 30 g, Rfl: 3    tobramycin (TOBREX) 0.3 % ophthalmic solution, 1-2 drops 2 times daily, Disp: , Rfl:     tobramycin-dexamethasone (TOBRADEX) 0.3-0.1 % ophthalmic ointment, 0.25 inches At Bedtime, Disp: , Rfl:     UNABLE TO FIND, MEDICATION NAME: Fermented zinc complex, Disp: , Rfl:     vitamin E 400 units TABS, Take 400 Units by mouth daily, Disp: , Rfl:     Current Facility-Administered Medications:     lidocaine (PF) (XYLOCAINE) 1 % injection 2 mL, 2 mL, , , Twila Petit MD, 2 mL at 05/04/23 1108    lidocaine (PF) (XYLOCAINE) 1 % injection 2 mL, 2 mL, , , Twila Petit MD, 2 mL at 05/04/23 1108    triamcinolone (KENALOG-40) injection 40 mg, 40 mg, , , Twila Petit MD, 40 mg at 05/04/23 1108    triamcinolone (KENALOG-40) injection 40 mg, 40 mg, , , Twila Petit MD, 40 mg at 05/04/23 1108    Allergy:   Allergies   Allergen Reactions    Polyethylene Glycol Rash    Codeine      Other reaction(s): Gastrointestinal, GI intolerance, Vomiting  Vomiting      Gadolinium Dizziness and Nausea    Hydrocodone-Acetaminophen Nausea and Vomiting     Other reaction(s): GI intolerance    Iodine      abstract    Sulfasalazine      Other reaction(s): GI intolerance  Has tried and had nausa.    Tramadol Nausea and Vomiting     Other reaction(s): Gastrointestinal, Other (see comments)  Nausea and vomiting   unknown      Acetaminophen Nausea     Nausea and vomiting  Nausea and vomiting  Nausea and vomiting  Nausea and vomiting      Gluten Meal Other (See Comments) and Rash     Other reaction(s): Gastrointestinal, GI intolerance  Dizziness, tired, rash, stomach cramps, thrush, mouth sores  Dizziness, tired, rash, stomach cramps, thrush, mouth sores       Propylene Glycol Dizziness, Nausea and Vomiting, Nausea and Rash       Social History:   History   Smoking Status    Never   Smokeless Tobacco    Never      Social History     Tobacco Use    Smoking status: Never    Smokeless tobacco: Never   Substance Use Topics    Alcohol use: Yes    Drug use: No        Family History:   Family History   Problem Relation Age of Onset    Hypertension Father         abstract    Cancer Paternal Aunt         gallbladder cancer abstract       Physical Examination:  There were no vitals filed for this visit.  There is no height or weight on file to calculate BMI.    Well appearing, well nourished  Alert and oriented x 3, cooperative with exam     Right Upper Extremity:  TTP CMC joint: yes   TTP STT joint: yes   Grind test:yes   MP hyper-extension: no   Thenar atrophy: no   Tinel's sign (volar wrist): positive   Durkan's test: negative   Motor Exam:   Abductor pollicis brevis strength: 4/5    1st dorsal interosseous strength: 5/5   - Flexor pollicis longus strength: 4/5   Intact thumbs up  Vascular Exam:   2+ radial pulse, < 2 sec capillary refill    Left Upper Extremity:  TTP CMC joint: yes   TTP STT joint: yes   Grind test:yes   MP hyper-extension: no   Thenar atrophy: no   Tinel's sign (volar wrist): positive   Durkan's test: negative   Motor Exam:   Abductor pollicis brevis strength: 4/5    1st dorsal interosseous strength: 5/5   - Flexor pollicis longus strength: 4/5   Intact thumbs up  Vascular Exam:   2+ radial pulse, < 2 sec capillary refill      Imaging/Studies:  XR (3 views) of the  bilateral  hand was obtained  9/20/2023 .  I reviewed the images with the patient.  The imaging study shows bilateral thumb CMC and STT arthritis with possible chondrocalcinosis on radial side.    Assessment: Laxmi Ya is a 48 year old female with bilateral STT, thumb CMC, median neuropathy.    Plan:   I had a discussion with the patient regarding my clinical findings, diagnosis, and treatment  plan. I reviewed the treatment options for basal joint arthritis (observation, bracing, steroid injection, occupational therapy, surgery, etc.) as well as the risks and benefits of each.  At this time, since the pain is moderate/severe and constant, I recommend a course of bracing, occupational therapy for thenar strengthening, NSAIDs, and a steroid injection.  If symptoms are persistent at the time of follow-up, surgical intervention may be indicated.  The patient understands and agrees to the treatment plan.  All questions answered.      Continue bracing   Steroid injection given today.  See procedure note below.    - Ultrasound to evaluate bilateral median nerves in region of lacertus fibrosus    Procedure Note: After a discussion with Laxmi Ya regarding treatment options, we decided to proceed with a steroid injection to the bilateral STT joint.  Risks of the procedure including transient hyperglycemia, fat atrophy, skin depigmentation tendon/ligament weakening, and infection were discussed prior to injection and verbal consent from Laxmi Ya was obtained. The area was prepped with alcohol.  20  mg Kenalog and 0.5 mL of 1% lidocaine was injected.  Laxmi JUDITH Felton tolerated the injection well. A clean dressing was placed over the site of the injection. Laxmi Freitason was given post injection instructions.       Next Visit:   Follow-up: 3 weeks   Imaging: None   Plan: Symptom check. Possible bilateral CMC steroid injections      WANDA STARKS MD  Answers submitted by the patient for this visit:  Symptoms you have experienced in the last 30 days (Submitted on 9/20/2023)  General Symptoms: Yes  Skin Symptoms: Yes  HENT Symptoms: No  EYE SYMPTOMS: Yes  HEART SYMPTOMS: No  LUNG SYMPTOMS: No  INTESTINAL SYMPTOMS: Yes  URINARY SYMPTOMS: Yes  GYNECOLOGIC SYMPTOMS: Yes  BREAST SYMPTOMS: No  SKELETAL SYMPTOMS: Yes  BLOOD SYMPTOMS: Yes  NERVOUS SYSTEM SYMPTOMS: Yes  MENTAL HEALTH SYMPTOMS: No  Please answer  the questions below to tell us what conditions you are experiencing: (Submitted on 9/20/2023)  Fever: No  Loss of appetite: No  Weight loss: Yes  Weight gain: Yes  Fatigue: Yes  Night sweats: Yes  Chills: No  Increased stress: No  Excessive hunger: No  Excessive thirst: Yes  Feeling hot or cold when others believe the temperature is normal: Yes  Loss of height: No  Post-operative complications: No  Surgical site pain: No  Hallucinations: No  Change in or Loss of Energy: No  Hyperactivity: No  Confusion: No   (Submitted on 9/20/2023)  Changes in hair: No  Changes in moles/birth marks: No  Itching: Yes  Rashes: Yes  Changes in nails: Yes  Acne: No  Hair in places you don't want it: No  Change in facial hair: No  Warts: Yes  Non-healing sores: Yes  Scarring: Yes  Flaking of skin: No  Color changes of hands/feet in cold : No  Sun sensitivity: Yes  Skin thickening: No  Please answer the questions below to tell us what conditions you are experiencing: (Submitted on 9/20/2023)  Eye pain: Yes  Vision loss: No  Dry eyes: Yes  Watery eyes: Yes  Eye bulging: Yes  Double vision: No  Flashing of lights: No  Spots: No  Floaters: Yes  Redness: Yes  Crossed eyes: No  Tunnel Vision: No  Yellowing of eyes: No  Eye irritation: Yes  Please answer the questions below to tell us what conditions you are experiencing: (Submitted on 9/20/2023)  Heart burn or indigestion: Yes  Nausea: Yes  Vomiting: No  Abdominal pain: Yes  Bloating: Yes  Constipation: No  Diarrhea: Yes  Blood in stool: No  Black stools: No  Rectal or Anal pain: No  Fecal incontinence: Yes  Yellowing of skin or eyes: No  Vomit with blood: No  Change in stools: No  Please answer the questions below to tell us what condition you are experiencing: (Submitted on 9/20/2023)  Trouble holding urine or incontinence: Yes  Pain or burning: No  Trouble starting or stopping: Yes  Increased frequency of urination: Yes  Blood in urine: No  Decreased frequency of urination: No  Frequent  nighttime urination: No  Flank pain: Yes  Difficulty emptying bladder: No  Please answer the questions below to tell us what condition you are experiencing: (Submitted on 9/20/2023)  Bleeding or spotting between periods: No  Heavy or painful periods: Yes  Irregular periods: Yes  Vaginal discharge: Yes  Hot flashes: No  Vaginal dryness: Yes  Genital ulcers: Yes  Reduced libido: Yes  Painful intercourse: No  Difficulty with sexual arousal: Yes  Post-menopausal bleeding: No  Please answer the questions below to tell us what condition you are experiencing: (Submitted on 9/20/2023)  Back pain: Yes  Muscle aches: Yes  Neck pain: Yes  Swollen joints: Yes  Joint pain: Yes  Bone pain: Yes  Muscle cramps: Yes  Muscle weakness: Yes  Joint stiffness: Yes  Bone fracture: No  Please answer the questions below to tell us what condition you are experiencing: (Submitted on 9/20/2023)  Anemia: No  Swollen glands: Yes  Easy bleeding or bruising: Yes  Edema or swelling: Yes  Please answer the questions below to tell us what condition you are experiencing: (Submitted on 9/20/2023)  Trouble with coordination: Yes  Dizziness or trouble with balance: No  Fainting or black-out spells: No  Memory loss: Yes  Headache: Yes  Seizures: No  Speech problems: No  Tingling: Yes  Tremor: No  Weakness: Yes  Difficulty walking: Yes  Paralysis: No  Numbness: Yes

## 2023-09-15 ENCOUNTER — TELEPHONE (OUTPATIENT)
Dept: OBGYN | Facility: CLINIC | Age: 48
End: 2023-09-15

## 2023-09-15 DIAGNOSIS — G56.03 BILATERAL CARPAL TUNNEL SYNDROME: Primary | ICD-10-CM

## 2023-09-15 NOTE — TELEPHONE ENCOUNTER
[facility]    Phone Message    May a detailed message be left on voicemail: yes     Reason for Call: Appointment Intake    Referring Provider Name: Reyna Brown DO   Diagnosis and/or Symptoms:     vaginal nodule, wants mass removed on labia majora/vulvar  mass       Patient is being referred for the above. Dx not in guidelines. Sending encounter per guidelines. Please review and follow-up with patient for scheduling.      Action Taken: Message routed to:  Other: [facility]    Travel Screening: Not Applicable

## 2023-09-19 ENCOUNTER — TRANSCRIBE ORDERS (OUTPATIENT)
Dept: OTHER | Age: 48
End: 2023-09-19

## 2023-09-19 DIAGNOSIS — N90.89 VULVAR MASS: Primary | ICD-10-CM

## 2023-09-20 ENCOUNTER — OFFICE VISIT (OUTPATIENT)
Dept: ORTHOPEDICS | Facility: CLINIC | Age: 48
End: 2023-09-20
Payer: MEDICARE

## 2023-09-20 ENCOUNTER — ANCILLARY PROCEDURE (OUTPATIENT)
Dept: GENERAL RADIOLOGY | Facility: CLINIC | Age: 48
End: 2023-09-20
Attending: STUDENT IN AN ORGANIZED HEALTH CARE EDUCATION/TRAINING PROGRAM
Payer: MEDICARE

## 2023-09-20 ENCOUNTER — MYC MEDICAL ADVICE (OUTPATIENT)
Dept: OBGYN | Facility: CLINIC | Age: 48
End: 2023-09-20

## 2023-09-20 ENCOUNTER — PRE VISIT (OUTPATIENT)
Dept: ORTHOPEDICS | Facility: CLINIC | Age: 48
End: 2023-09-20

## 2023-09-20 ENCOUNTER — TELEPHONE (OUTPATIENT)
Dept: DERMATOLOGY | Facility: CLINIC | Age: 48
End: 2023-09-20

## 2023-09-20 DIAGNOSIS — G56.03 BILATERAL CARPAL TUNNEL SYNDROME: ICD-10-CM

## 2023-09-20 DIAGNOSIS — M79.644 BILATERAL THUMB PAIN: Primary | ICD-10-CM

## 2023-09-20 DIAGNOSIS — M79.644 BILATERAL THUMB PAIN: ICD-10-CM

## 2023-09-20 DIAGNOSIS — R29.898 OTHER SYMPTOMS AND SIGNS INVOLVING THE MUSCULOSKELETAL SYSTEM: ICD-10-CM

## 2023-09-20 DIAGNOSIS — M79.645 BILATERAL THUMB PAIN: ICD-10-CM

## 2023-09-20 DIAGNOSIS — M79.645 BILATERAL THUMB PAIN: Primary | ICD-10-CM

## 2023-09-20 DIAGNOSIS — G56.10 MEDIAN NERVE NEUROPATHY, UNSPECIFIED LATERALITY: ICD-10-CM

## 2023-09-20 PROCEDURE — 20600 DRAIN/INJ JOINT/BURSA W/O US: CPT | Mod: 50 | Performed by: STUDENT IN AN ORGANIZED HEALTH CARE EDUCATION/TRAINING PROGRAM

## 2023-09-20 PROCEDURE — 99203 OFFICE O/P NEW LOW 30 MIN: CPT | Mod: 25 | Performed by: STUDENT IN AN ORGANIZED HEALTH CARE EDUCATION/TRAINING PROGRAM

## 2023-09-20 PROCEDURE — 73130 X-RAY EXAM OF HAND: CPT | Mod: RT | Performed by: RADIOLOGY

## 2023-09-20 RX ORDER — LIDOCAINE HYDROCHLORIDE 10 MG/ML
3 INJECTION, SOLUTION EPIDURAL; INFILTRATION; INTRACAUDAL; PERINEURAL
Status: DISCONTINUED | OUTPATIENT
Start: 2023-09-20 | End: 2024-01-07

## 2023-09-20 RX ORDER — TRIAMCINOLONE ACETONIDE 40 MG/ML
40 INJECTION, SUSPENSION INTRA-ARTICULAR; INTRAMUSCULAR
Status: DISCONTINUED | OUTPATIENT
Start: 2023-09-20 | End: 2024-01-07

## 2023-09-20 RX ADMIN — TRIAMCINOLONE ACETONIDE 40 MG: 40 INJECTION, SUSPENSION INTRA-ARTICULAR; INTRAMUSCULAR at 16:19

## 2023-09-20 RX ADMIN — LIDOCAINE HYDROCHLORIDE 3 ML: 10 INJECTION, SOLUTION EPIDURAL; INFILTRATION; INTRACAUDAL; PERINEURAL at 16:19

## 2023-09-20 ASSESSMENT — ENCOUNTER SYMPTOMS
DYSURIA: 0
STIFFNESS: 1
NAIL CHANGES: 1
BLOATING: 1
NIGHT SWEATS: 1
NUMBNESS: 1
POOR WOUND HEALING: 1
MYALGIAS: 1
ALTERED TEMPERATURE REGULATION: 1
BRUISES/BLEEDS EASILY: 1
RECTAL PAIN: 0
BLOOD IN STOOL: 0
CONSTIPATION: 0
HEARTBURN: 1
VOMITING: 0
BOWEL INCONTINENCE: 1
DISTURBANCES IN COORDINATION: 1
WEAKNESS: 1
CHILLS: 0
EYE PAIN: 1
NAUSEA: 1
MUSCLE WEAKNESS: 1
JAUNDICE: 0
NECK PAIN: 1
HALLUCINATIONS: 0
DIARRHEA: 1
LOSS OF CONSCIOUSNESS: 0
PARALYSIS: 0
BACK PAIN: 1
FLANK PAIN: 1
WEIGHT GAIN: 1
ARTHRALGIAS: 1
TREMORS: 0
SEIZURES: 0
EYE REDNESS: 1
MUSCLE CRAMPS: 1
DECREASED APPETITE: 0
FEVER: 0
DIZZINESS: 0
WEIGHT LOSS: 1
DOUBLE VISION: 0
SKIN CHANGES: 0
HEMATURIA: 0
EYE WATERING: 1
INCREASED ENERGY: 0
TINGLING: 1
ABDOMINAL PAIN: 1
SPEECH CHANGE: 0
POLYDIPSIA: 1
POLYPHAGIA: 0
DECREASED LIBIDO: 1
HEADACHES: 1
JOINT SWELLING: 1
SWOLLEN GLANDS: 1
EYE IRRITATION: 1
HOT FLASHES: 0
DIFFICULTY URINATING: 0
MEMORY LOSS: 1
FATIGUE: 1

## 2023-09-20 NOTE — NURSING NOTE
Reason For Visit:   Chief Complaint   Patient presents with    Consult     Consult for bilateral STT joint injection under fluoroscopy       Primary MD: Amol Juares  Ref. MD: salazar    Age: 48 year old    ?  No      There were no vitals taken for this visit.      Pain Assessment  Patient Currently in Pain: Yes  0-10 Pain Scale: 10  Primary Pain Location: Finger (Comment which one) (bilateral thumbs)  Pain Descriptors: Constant, Sharp  Alleviating Factors: Other (comment) (medrol dose pack)    Hand Dominance Evaluation  Hand Dominance: Right          QuickDASH Assessment      9/20/2023     3:59 PM   QuickDASH Main   1. Open a tight or new jar Severe difficulty   2. Do heavy household chores (e.g., wash walls, floors) Severe difficulty   3. Carry a shopping bag or briefcase Severe difficulty   4. Wash your back Severe difficulty   5. Use a knife to cut food Severe difficulty   6. Recreational activities in which you take some force or impact through your arm, shoulder or hand (e.g., golf, hammering, tennis, etc.) Unable   7. During the past week, to what extent has your arm, shoulder or hand problem interfered with your normal social activities with family, friends, neighbours or groups Extremely   8. During the past week, were you limited in your work or other regular daily activities as a result of your arm, shoulder or hand problem Unable   9. Arm, shoulder or hand pain Extreme   10.Tingling (pins and needles) in your arm,shoulder or hand Moderate   11. During the past week, how much difficulty have you had sleeping because of the pain in your arm, shoulder or hand Severe difficulty   Quickdash Ability Score 81.82          Current Outpatient Medications   Medication Sig Dispense Refill    Albuterol Sulfate (PROAIR HFA IN) Inhale into the lungs as needed      alcohol swab prep pads Use to swab area of injection/pratima as directed. 100 each 3    Artificial Tear Solution (SOOTHE XP) SOLN as needed       atenolol (TENORMIN) 50 MG tablet Take 50 mg by mouth daily      benzoyl peroxide 5 % external liquid Use daily as directed. Preferred bdrand: Medpura 236 mL 11    blood glucose (NO BRAND SPECIFIED) lancets standard Use to test blood sugar 2 times daily or as directed. 100 lancet. 1    blood glucose (NO BRAND SPECIFIED) test strip Use to test blood sugar 2 times daily or as directed. 100 strip 1    blood glucose monitoring (NO BRAND SPECIFIED) meter device kit Use to test blood sugar 2 times daily or as directed. 1 kit 0    cephALEXin (KEFLEX) 500 MG capsule Take 1 Capsule (500 mg) by mouth four times daily for 10 days      clindamycin (CLEOCIN T) 1 % external solution Apply twice daily as needed to active areas. MUST USE WITH BENZOYL PEROXIDE WASH  TO AVOID BACTERIAL RESISTANCE. 60 mL 2    diclofenac (VOLTAREN) 1 % topical gel Apply topically 4 times daily as needed      doxycycline hyclate (VIBRA-TABS) 100 MG tablet Take 150 mg by mouth 2 times daily      doxycycline hyclate (VIBRAMYCIN) 50 MG capsule Take 150 mg by mouth 2 times daily      dupilumab (DUPIXENT) 300 MG/2ML prefilled pen Inject 2 mLs (300 mg) Subcutaneous every 14 days 4 mL 5    fexofenadine (ALLEGRA) 180 MG tablet Take 1 tablet (180 mg) by mouth daily 90 tablet 2    fidaxomicin (DIFICID) 200 MG tablet Take 200 mg by mouth as needed      fluticasone (FLONASE) 50 MCG/ACT nasal spray Spray 2 sprays into both nostrils 2 times daily      fluticasone-vilanterol (BREO ELLIPTA) 200-25 MCG/ACT inhaler Inhale 1 puff into the lungs daily 90 each 1    insulin glargine (LANTUS PEN) 100 UNIT/ML pen Inject 20 Units Subcutaneous daily      insulin lispro (HUMALOG KWIKPEN) 100 UNIT/ML (1 unit dial) KWIKPEN Inject 5 Units Subcutaneous 3 times daily (before meals) Plus 1:25 correction scale if BG >150      insulin NPH (HUMULIN N KWIKPEN) 100 UNIT/ML injection 30 units on day of infusion then 20 units daily x 2 days after infusion      lidocaine (LIDODERM) 5 % patch as  needed   1    losartan (COZAAR) 100 MG tablet Take 100 mg by mouth daily      methylPREDNISolone (MEDROL DOSEPAK) 4 MG tablet therapy pack Take per package instructions. Take once a day by mouth (Patient not taking: Reported on 9/14/2023) 21 tablet 0    mometasone (NASONEX) 50 MCG/ACT nasal spray Spray 2 sprays into both nostrils daily 17 g 11    Montelukast Sodium (SINGULAIR PO) Take 10 mg by mouth At Bedtime       multivitamin w/minerals (THERA-VIT-M) tablet Take 1 tablet by mouth daily      norethindrone (MICRONOR) 0.35 MG tablet Take 0.35 mg by mouth      nystatin (MYCOSTATIN) 962519 UNIT/ML suspension       olopatadine (PATADAY) 0.7 % ophthalmic solution Apply 1 drop to eye daily PRN      OMEPRAZOLE PO Take 40 mg by mouth 2 times daily       rifaximin (XIFAXAN) 550 MG TABS tablet Take 200 mg by mouth 3 times daily Take for 10 days, has on had as needed for small bowel bacteria overgrowh      rosuvastatin (CRESTOR) 5 MG tablet Take 2 tablets by mouth daily      Sharps Container MISC Use to dispose of sharps as directed 1 each 0    spironolactone-HCTZ (ALDACTAZIDE) 25-25 MG tablet Take 1 tablet by mouth daily      tacrolimus (PROTOPIC) 0.1 % external ointment Apply to itchy areas on eyelids twice a day as needed for itching and rash 30 g 3    tobramycin (TOBREX) 0.3 % ophthalmic solution 1-2 drops 2 times daily      tobramycin-dexamethasone (TOBRADEX) 0.3-0.1 % ophthalmic ointment 0.25 inches At Bedtime      UNABLE TO FIND MEDICATION NAME: Fermented zinc complex      vitamin E 400 units TABS Take 400 Units by mouth daily         Allergies   Allergen Reactions    Polyethylene Glycol Rash    Codeine      Other reaction(s): Gastrointestinal, GI intolerance, Vomiting  Vomiting      Gadolinium Dizziness and Nausea    Hydrocodone-Acetaminophen Nausea and Vomiting     Other reaction(s): GI intolerance    Iodine      abstract    Sulfasalazine      Other reaction(s): GI intolerance  Has tried and had nausa.    Tramadol  Nausea and Vomiting     Other reaction(s): Gastrointestinal, Other (see comments)  Nausea and vomiting   unknown      Acetaminophen Nausea     Nausea and vomiting  Nausea and vomiting  Nausea and vomiting  Nausea and vomiting      Gluten Meal Other (See Comments) and Rash     Other reaction(s): Gastrointestinal, GI intolerance  Dizziness, tired, rash, stomach cramps, thrush, mouth sores  Dizziness, tired, rash, stomach cramps, thrush, mouth sores      Propylene Glycol Dizziness, Nausea and Vomiting, Nausea and Rash       Tyra Lao, ATC

## 2023-09-20 NOTE — TELEPHONE ENCOUNTER
Informed the we cannot have her come in to take off the extra skin from the blister. Informed the patient to keep the area covered and moist with Vaseline. Patient already has an appointment Tuesday 09/26 with Dr. Hermelinda WALLACE CMA

## 2023-09-20 NOTE — PROGRESS NOTES
Hand / Upper Extremity Injection/Arthrocentesis    Date/Time: 2023 4:19 PM    Performed by: Hyacinth Morrison MD  Authorized by: Hyacinth Morrison MD    Indications:  Pain  Needle Size:  25 G  Guidance: landmark    Condition: osteoarthritis    Location:  Thumb   Location comment:  Bilateral STT joint    Medications:  40 mg triamcinolone 40 MG/ML; 3 mL lidocaine (PF) 1 %  Outcome:  Tolerated well, no immediate complications  Procedure discussed: discussed risks, benefits, and alternatives    Consent Given by:  Patient  Timeout: timeout called immediately prior to procedure        [facility] [address] [address] [phone]  Dept: [phone]  ______________________________________________________________________________    Patient: Laxmi Ya   : 1975   MRN: [id number]   2023    INVASIVE PROCEDURE SAFETY CHECKLIST    Date: 2023   Procedure:Bilateral STT steroid joint injection  Patient Name: Laxmi Ya  MRN: [id number]  YOB: 1975    Action: Complete sections as appropriate. Any discrepancy results in a HARD COPY until resolved.     PRE PROCEDURE:  Patient ID verified with 2 identifiers (name and  or MRN): Yes  Procedure and site verified with patient/designee (when able): Yes  Accurate consent documentation in medical record: Yes  H&P (or appropriate assessment) documented in medical record: Yes  H&P must be up to 20 days prior to procedure and updates within 24 hours of procedure as applicable: Yes  Relevant diagnostic and radiology test results appropriately labeled and displayed as applicable: Yes  Procedure site(s) marked with provider initials: NA    TIMEOUT:  Time-Out performed immediately prior to starting procedure, including verbal and active participation of all team members addressing the following:Yes  * Correct patient identify  * Confirmed that the correct side and  site are marked  * An accurate procedure consent form  * Agreement on the procedure to be done  * Correct patient position  * Relevant images and results are properly labeled and appropriately displayed  * The need to administer antibiotics or fluids for irrigation purposes during the procedure as applicable   * Safety precautions based on patient history or medication use    DURING PROCEDURE: Verification of correct person, site, and procedures any time the responsibility for care of the patient is transferred to another member of the care team.       The following medications were given:         Prior to injection, verified patient identity using patient's name and date of birth.  Due to injection administration, patient instructed to remain in clinic for 15 minutes  afterwards, and to report any adverse reaction to me immediately.    Joint injection was performed.    Medication Name: Kenalog 40 mg/mL NDC [address]-0  Drug Amount Wasted:  None.  Vial/Syringe: Single dose vial  Expiration Date:  4/1/2025    Medication Name Lidocaine 1% NDC 53835-404-48    Scribed by Tyra Lao ATC for Dr. Jo on September 20, 2023 at 4:31 pm based on the provider's statements to me.     Tyra Lao ATC

## 2023-09-20 NOTE — TELEPHONE ENCOUNTER
[facility] Call Center    Phone Message    May a detailed message be left on voicemail: yes     Reason for Call: Other: Pt had a mole frozen off on 9/14 but a piece was left. She states she will be at the [facility] today and would like to know if a nurse can look at it and cut off the hanging piece.      Please call Pt back to discuss. She knows there are no appointments available and is asking for a nurse only visit.     Thank you.     Action Taken: Message routed to:  [facility] Derm    Travel Screening: Not Applicable

## 2023-09-20 NOTE — TELEPHONE ENCOUNTER
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  Based on the availability of our provider(s), we are unable to accommodate this request.    Were all sites offered this patient?  No    Does scheduling algorithm request to schedule next available?  Patient has been scheduled for the first available opening with 12/6/23 on .  We have informed the patient that the clinic will review their referral and reach out if a sooner appointment is medically necessary.     Pt has an urgent referral and ONLY wants to see , pt is already scheduled for 12/6/23- writer informed that clinic will review urgent referral and schedule as appropriate, writer also told pt that the clinic might check other providers schedules as well, but pt was very adamant on seeing , please call pt, thank you!

## 2023-09-20 NOTE — LETTER
9/20/2023         RE: Laxmi Ya  [address] [facility] [address]        Dear Colleague,    Thank you for referring your patient, Laxmi Ya, to the [facility]. Please see a copy of my visit note below.    Chief Complaint:   Chief Complaint   Patient presents with    Consult     Consult for bilateral STT joint injection under fluoroscopy       Referring Physician: No ref. provider found    Diagnosis: bilateral thumb CMC/STT arthritis, bilateral carpal tunnel syndrome  Treatment:   9/20/2023: bilateral STT steroid injection  5/4/2023: bilateral carpal tunnel steroid injection (Dr. Petit)  2/16/2023: bilateral STT steroid injection (Dr. Petit)  12/13/2022: bilateral carpal tunnel steroid injection (Dr. Sullivan)  10/27/2022: bilateral STT steroid injections (Dr. Petit)  9/13/2022: bilateral carpal tunnel steroid injection (Dr. Sullivan)  6/30/2022: bilateral STT steroid injections (Dr. Petit)  Bilateral carpal tunnel steroid injections (outside)    History of Present Illness: Laxmi Ya is a 48 year old RHD female presenting for evaluation of bilateral hand and wrist pain. She's had history of carpal tunnel steroid injections and releases, she continues to feel some numbness/tingling in her fingers. Today she'd like to have steroid injections into the STT region.    Clinical documentation by Dr. Petit on 5/4/2023 was reviewed.    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    Obesity     Pyelonephritis, unspecified 1992    abstract    Rheumatoid arteritis (H)     Seronegative inflammatory  arthritis 06/17/2022    Unspecified symptom associated with female genital organs 05/07/1999    chronic pelvic pain abstract       Past Surgical History:   Past Surgical History:   Procedure Laterality Date    CHOLECYSTECTOMY      COLON SURGERY      Left hemicolectomy for diverticulosis    CYSTOSCOPY,+URETEROSCOPY      abstract    [facility] EXCIS/CURET BENIGN ELBOW LESN  10/26/1987    (R) humerus abstract       Medications:   Current Outpatient Medications:     Albuterol Sulfate (PROAIR HFA IN), Inhale into the lungs as needed, Disp: , Rfl:     alcohol swab prep pads, Use to swab area of injection/pratima as directed., Disp: 100 each, Rfl: 3    Artificial Tear Solution (SOOTHE XP) SOLN, as needed, Disp: , Rfl:     atenolol (TENORMIN) 50 MG tablet, Take 50 mg by mouth daily, Disp: , Rfl:     benzoyl peroxide 5 % external liquid, Use daily as directed. Preferred bdrand: [employer]pura, Disp: 236 mL, Rfl: 11    blood glucose (NO BRAND SPECIFIED) lancets standard, Use to test blood sugar 2 times daily or as directed., Disp: 100 lancet., Rfl: 1    blood glucose (NO BRAND SPECIFIED) test strip, Use to test blood sugar 2 times daily or as directed., Disp: 100 strip, Rfl: 1    blood glucose monitoring (NO BRAND SPECIFIED) meter device kit, Use to test blood sugar 2 times daily or as directed., Disp: 1 kit, Rfl: 0    cephALEXin (KEFLEX) 500 MG capsule, Take 1 Capsule (500 mg) by mouth four times daily for 10 days, Disp: , Rfl:     clindamycin (CLEOCIN T) 1 % external solution, Apply twice daily as needed to active areas. MUST USE WITH BENZOYL PEROXIDE WASH  TO AVOID BACTERIAL RESISTANCE., Disp: 60 mL, Rfl: 2    diclofenac (VOLTAREN) 1 % topical gel, Apply topically 4 times daily as needed, Disp: , Rfl:     doxycycline hyclate (VIBRA-TABS) 100 MG tablet, Take 150 mg by mouth 2 times daily, Disp: , Rfl:     doxycycline hyclate (VIBRAMYCIN) 50 MG capsule, Take 150 mg by mouth 2 times daily, Disp: , Rfl:     dupilumab (DUPIXENT) 300 MG/2ML  prefilled pen, Inject 2 mLs (300 mg) Subcutaneous every 14 days, Disp: 4 mL, Rfl: 5    fexofenadine (ALLEGRA) 180 MG tablet, Take 1 tablet (180 mg) by mouth daily, Disp: 90 tablet, Rfl: 2    fidaxomicin (DIFICID) 200 MG tablet, Take 200 mg by mouth as needed, Disp: , Rfl:     fluticasone (FLONASE) 50 MCG/ACT nasal spray, Spray 2 sprays into both nostrils 2 times daily, Disp: , Rfl:     fluticasone-vilanterol (BREO ELLIPTA) 200-25 MCG/ACT inhaler, Inhale 1 puff into the lungs daily, Disp: 90 each, Rfl: 1    insulin glargine (LANTUS PEN) 100 UNIT/ML pen, Inject 20 Units Subcutaneous daily, Disp: , Rfl:     insulin lispro (HUMALOG KWIKPEN) 100 UNIT/ML (1 unit dial) KWIKPEN, Inject 5 Units Subcutaneous 3 times daily (before meals) Plus 1:25 correction scale if BG >150, Disp: , Rfl:     insulin NPH (HUMULIN N KWIKPEN) 100 UNIT/ML injection, 30 units on day of infusion then 20 units daily x 2 days after infusion, Disp: , Rfl:     lidocaine (LIDODERM) 5 % patch, as needed , Disp: , Rfl: 1    losartan (COZAAR) 100 MG tablet, Take 100 mg by mouth daily, Disp: , Rfl:     methylPREDNISolone (MEDROL DOSEPAK) 4 MG tablet therapy pack, Take per package instructions. Take once a day by mouth (Patient not taking: Reported on 9/14/2023), Disp: 21 tablet, Rfl: 0    mometasone (NASONEX) 50 MCG/ACT nasal spray, Spray 2 sprays into both nostrils daily, Disp: 17 g, Rfl: 11    Montelukast Sodium (SINGULAIR PO), Take 10 mg by mouth At Bedtime , Disp: , Rfl:     multivitamin w/minerals (THERA-VIT-M) tablet, Take 1 tablet by mouth daily, Disp: , Rfl:     norethindrone (MICRONOR) 0.35 MG tablet, Take 0.35 mg by mouth, Disp: , Rfl:     nystatin (MYCOSTATIN) 164342 UNIT/ML suspension, , Disp: , Rfl:     olopatadine (PATADAY) 0.7 % ophthalmic solution, Apply 1 drop to eye daily PRN, Disp: , Rfl:     OMEPRAZOLE PO, Take 40 mg by mouth 2 times daily , Disp: , Rfl:     rifaximin (XIFAXAN) 550 MG TABS tablet, Take 200 mg by mouth 3 times daily Take  for 10 days, has on had as needed for small bowel bacteria overgrowh, Disp: , Rfl:     rosuvastatin (CRESTOR) 5 MG tablet, Take 2 tablets by mouth daily, Disp: , Rfl:     Sharps Container MISC, Use to dispose of sharps as directed, Disp: 1 each, Rfl: 0    spironolactone-HCTZ (ALDACTAZIDE) 25-25 MG tablet, Take 1 tablet by mouth daily, Disp: , Rfl:     tacrolimus (PROTOPIC) 0.1 % external ointment, Apply to itchy areas on eyelids twice a day as needed for itching and rash, Disp: 30 g, Rfl: 3    tobramycin (TOBREX) 0.3 % ophthalmic solution, 1-2 drops 2 times daily, Disp: , Rfl:     tobramycin-dexamethasone (TOBRADEX) 0.3-0.1 % ophthalmic ointment, 0.25 inches At Bedtime, Disp: , Rfl:     UNABLE TO FIND, MEDICATION NAME: Fermented zinc complex, Disp: , Rfl:     vitamin E 400 units TABS, Take 400 Units by mouth daily, Disp: , Rfl:     Current Facility-Administered Medications:     lidocaine (PF) (XYLOCAINE) 1 % injection 2 mL, 2 mL, , , Twila Petit MD, 2 mL at 05/04/23 1108    lidocaine (PF) (XYLOCAINE) 1 % injection 2 mL, 2 mL, , , Twila Petit MD, 2 mL at 05/04/23 1108    triamcinolone (KENALOG-40) injection 40 mg, 40 mg, , , Twila Petit MD, 40 mg at 05/04/23 1108    triamcinolone (KENALOG-40) injection 40 mg, 40 mg, , , Twila Petit MD, 40 mg at 05/04/23 1108    Allergy:   Allergies   Allergen Reactions    Polyethylene Glycol Rash    Codeine      Other reaction(s): Gastrointestinal, GI intolerance, Vomiting  Vomiting      Gadolinium Dizziness and Nausea    Hydrocodone-Acetaminophen Nausea and Vomiting     Other reaction(s): GI intolerance    Iodine      abstract    Sulfasalazine      Other reaction(s): GI intolerance  Has tried and had nausa.    Tramadol Nausea and Vomiting     Other reaction(s): Gastrointestinal, Other (see comments)  Nausea and vomiting   unknown      Acetaminophen Nausea     Nausea and vomiting  Nausea and vomiting  Nausea and vomiting  Nausea  and vomiting      Gluten Meal Other (See Comments) and Rash     Other reaction(s): Gastrointestinal, GI intolerance  Dizziness, tired, rash, stomach cramps, thrush, mouth sores  Dizziness, tired, rash, stomach cramps, thrush, mouth sores      Propylene Glycol Dizziness, Nausea and Vomiting, Nausea and Rash       Social History:   History   Smoking Status    Never   Smokeless Tobacco    Never      Social History     Tobacco Use    Smoking status: Never    Smokeless tobacco: Never   Substance Use Topics    Alcohol use: Yes    Drug use: No        Family History:   Family History   Problem Relation Age of Onset    Hypertension Father         abstract    Cancer Paternal Aunt         gallbladder cancer abstract       Physical Examination:  There were no vitals filed for this visit.  There is no height or weight on file to calculate BMI.    Well appearing, well nourished  Alert and oriented x 3, cooperative with exam     Right Upper Extremity:  TTP CMC joint: yes   TTP STT joint: yes   Grind test:yes   MP hyper-extension: no   Thenar atrophy: no   Tinel's sign (volar wrist): positive   Durkan's test: negative   Motor Exam:   Abductor pollicis brevis strength: 4/5    1st dorsal interosseous strength: 5/5   - Flexor pollicis longus strength: 4/5   Intact thumbs up  Vascular Exam:   2+ radial pulse, < 2 sec capillary refill    Left Upper Extremity:  TTP CMC joint: yes   TTP STT joint: yes   Grind test:yes   MP hyper-extension: no   Thenar atrophy: no   Tinel's sign (volar wrist): positive   Durkan's test: negative   Motor Exam:   Abductor pollicis brevis strength: 4/5    1st dorsal interosseous strength: 5/5   - Flexor pollicis longus strength: 4/5   Intact thumbs up  Vascular Exam:   2+ radial pulse, < 2 sec capillary refill      Imaging/Studies:  XR (3 views) of the  bilateral  hand was obtained  9/20/2023 .  I reviewed the images with the patient.  The imaging study shows bilateral thumb CMC and STT arthritis with possible  chondrocalcinosis on radial side.    Assessment: Laxmi Ya is a 48 year old female with bilateral STT, thumb CMC, median neuropathy.    Plan:   I had a discussion with the patient regarding my clinical findings, diagnosis, and treatment plan. I reviewed the treatment options for basal joint arthritis (observation, bracing, steroid injection, occupational therapy, surgery, etc.) as well as the risks and benefits of each.  At this time, since the pain is moderate/severe and constant, I recommend a course of bracing, occupational therapy for thenar strengthening, NSAIDs, and a steroid injection.  If symptoms are persistent at the time of follow-up, surgical intervention may be indicated.  The patient understands and agrees to the treatment plan.  All questions answered.      Continue bracing   Steroid injection given today.  See procedure note below.    - Ultrasound to evaluate bilateral median nerves in region of lacertus fibrosus    Procedure Note: After a discussion with Laxmi Ya regarding treatment options, we decided to proceed with a steroid injection to the bilateral STT joint.  Risks of the procedure including transient hyperglycemia, fat atrophy, skin depigmentation tendon/ligament weakening, and infection were discussed prior to injection and verbal consent from Laxmi Ya was obtained. The area was prepped with alcohol.  20  mg Kenalog and 0.5 mL of 1% lidocaine was injected.  Laxmi Ya tolerated the injection well. A clean dressing was placed over the site of the injection. Laxmi Ya was given post injection instructions.       Next Visit:   Follow-up: 3 weeks   Imaging: None   Plan: Symptom check. Possible bilateral CMC steroid injections      WANDA STARKS MD  Answers submitted by the patient for this visit:  Symptoms you have experienced in the last 30 days (Submitted on 9/20/2023)  General Symptoms: Yes  Skin Symptoms: Yes  HENT Symptoms: No  EYE SYMPTOMS: Yes  HEART  SYMPTOMS: No  LUNG SYMPTOMS: No  INTESTINAL SYMPTOMS: Yes  URINARY SYMPTOMS: Yes  GYNECOLOGIC SYMPTOMS: Yes  BREAST SYMPTOMS: No  SKELETAL SYMPTOMS: Yes  BLOOD SYMPTOMS: Yes  NERVOUS SYSTEM SYMPTOMS: Yes  MENTAL HEALTH SYMPTOMS: No  Please answer the questions below to tell us what conditions you are experiencing: (Submitted on 9/20/2023)  Fever: No  Loss of appetite: No  Weight loss: Yes  Weight gain: Yes  Fatigue: Yes  Night sweats: Yes  Chills: No  Increased stress: No  Excessive hunger: No  Excessive thirst: Yes  Feeling hot or cold when others believe the temperature is normal: Yes  Loss of height: No  Post-operative complications: No  Surgical site pain: No  Hallucinations: No  Change in or Loss of Energy: No  Hyperactivity: No  Confusion: No   (Submitted on 9/20/2023)  Changes in hair: No  Changes in moles/birth marks: No  Itching: Yes  Rashes: Yes  Changes in nails: Yes  Acne: No  Hair in places you don't want it: No  Change in facial hair: No  Warts: Yes  Non-healing sores: Yes  Scarring: Yes  Flaking of skin: No  Color changes of hands/feet in cold : No  Sun sensitivity: Yes  Skin thickening: No  Please answer the questions below to tell us what conditions you are experiencing: (Submitted on 9/20/2023)  Eye pain: Yes  Vision loss: No  Dry eyes: Yes  Watery eyes: Yes  Eye bulging: Yes  Double vision: No  Flashing of lights: No  Spots: No  Floaters: Yes  Redness: Yes  Crossed eyes: No  Tunnel Vision: No  Yellowing of eyes: No  Eye irritation: Yes  Please answer the questions below to tell us what conditions you are experiencing: (Submitted on 9/20/2023)  Heart burn or indigestion: Yes  Nausea: Yes  Vomiting: No  Abdominal pain: Yes  Bloating: Yes  Constipation: No  Diarrhea: Yes  Blood in stool: No  Black stools: No  Rectal or Anal pain: No  Fecal incontinence: Yes  Yellowing of skin or eyes: No  Vomit with blood: No  Change in stools: No  Please answer the questions below to tell us what condition you are  experiencing: (Submitted on 9/20/2023)  Trouble holding urine or incontinence: Yes  Pain or burning: No  Trouble starting or stopping: Yes  Increased frequency of urination: Yes  Blood in urine: No  Decreased frequency of urination: No  Frequent nighttime urination: No  Flank pain: Yes  Difficulty emptying bladder: No  Please answer the questions below to tell us what condition you are experiencing: (Submitted on 9/20/2023)  Bleeding or spotting between periods: No  Heavy or painful periods: Yes  Irregular periods: Yes  Vaginal discharge: Yes  Hot flashes: No  Vaginal dryness: Yes  Genital ulcers: Yes  Reduced libido: Yes  Painful intercourse: No  Difficulty with sexual arousal: Yes  Post-menopausal bleeding: No  Please answer the questions below to tell us what condition you are experiencing: (Submitted on 9/20/2023)  Back pain: Yes  Muscle aches: Yes  Neck pain: Yes  Swollen joints: Yes  Joint pain: Yes  Bone pain: Yes  Muscle cramps: Yes  Muscle weakness: Yes  Joint stiffness: Yes  Bone fracture: No  Please answer the questions below to tell us what condition you are experiencing: (Submitted on 9/20/2023)  Anemia: No  Swollen glands: Yes  Easy bleeding or bruising: Yes  Edema or swelling: Yes  Please answer the questions below to tell us what condition you are experiencing: (Submitted on 9/20/2023)  Trouble with coordination: Yes  Dizziness or trouble with balance: No  Fainting or black-out spells: No  Memory loss: Yes  Headache: Yes  Seizures: No  Speech problems: No  Tingling: Yes  Tremor: No  Weakness: Yes  Difficulty walking: Yes  Paralysis: No  Numbness: Yes      Hand / Upper Extremity Injection/Arthrocentesis    Date/Time: 9/20/2023 4:19 PM    Performed by: Hyacinth Morrison MD  Authorized by: Hyacinth Morrison MD    Indications:  Pain  Needle Size:  25 G  Guidance: landmark    Condition: osteoarthritis    Location:  Thumb   Location comment:  Bilateral STT joint    Medications:  40 mg triamcinolone 40  MG/ML; 3 mL lidocaine (PF) 1 %  Outcome:  Tolerated well, no immediate complications  Procedure discussed: discussed risks, benefits, and alternatives    Consent Given by:  Patient  Timeout: timeout called immediately prior to procedure        [facility] [address]  [address] [facility] [address] [phone]  Dept: [phone]  ______________________________________________________________________________    Patient: Laxmi Ya   : 1975   MRN: [id number]   2023    INVASIVE PROCEDURE SAFETY CHECKLIST    Date: 2023   Procedure:Bilateral STT steroid joint injection  Patient Name: Laxmi Ya  MRN: [id number]  YOB: 1975    Action: Complete sections as appropriate. Any discrepancy results in a HARD COPY until resolved.     PRE PROCEDURE:  Patient ID verified with 2 identifiers (name and  or MRN): Yes  Procedure and site verified with patient/designee (when able): Yes  Accurate consent documentation in medical record: Yes  H&P (or appropriate assessment) documented in medical record: Yes  H&P must be up to 20 days prior to procedure and updates within 24 hours of procedure as applicable: Yes  Relevant diagnostic and radiology test results appropriately labeled and displayed as applicable: Yes  Procedure site(s) marked with provider initials: NA    TIMEOUT:  Time-Out performed immediately prior to starting procedure, including verbal and active participation of all team members addressing the following:Yes  * Correct patient identify  * Confirmed that the correct side and site are marked  * An accurate procedure consent form  * Agreement on the procedure to be done  * Correct patient position  * Relevant images and results are properly labeled and appropriately displayed  * The need to administer antibiotics or fluids for irrigation purposes during the procedure as applicable   * Safety precautions based on  patient history or medication use    DURING PROCEDURE: Verification of correct person, site, and procedures any time the responsibility for care of the patient is transferred to another member of the care team.       The following medications were given:         Prior to injection, verified patient identity using patient's name and date of birth.  Due to injection administration, patient instructed to remain in clinic for 15 minutes  afterwards, and to report any adverse reaction to me immediately.    Joint injection was performed.    Medication Name: Kenalog 40 mg/mL NDC [address]-8  Drug Amount Wasted:  None.  Vial/Syringe: Single dose vial  Expiration Date:  4/1/2025    Medication Name Lidocaine 1% NDC 36049-207-99    Scribed by Tyra Lao ATC for Dr. Jo on September 20, 2023 at 4:31 pm based on the provider's statements to me.     TIM Chopra MD

## 2023-09-20 NOTE — TELEPHONE ENCOUNTER
I  did call and speak with Laxmi to go over her referral and her options.     Patient stated she did want to see Dr. Pate, but I went over with her that she would not be able to get in until December.  I do suggest that she see another one of our OB/GYN doctors and she can get in next Tuesday to be seen.     Patient did inquire if she needs to have surgery can Dr. Pate do the surgery before December. I did let her know that I am not sure about either of those questions, but that can be discussed at the appointment.    I did get her in to see Dr. Mcarthur on 9-26-23 at 1:30. It is on Dr. Mcarthur's DOD schedule and was put on the nurse schedule as well.     Please see patients US from 9-14-23.    All questions were answered and address was also given to the clinic.    Sent a [employer] message with all of the information as well.

## 2023-09-21 ENCOUNTER — THERAPY VISIT (OUTPATIENT)
Dept: OCCUPATIONAL THERAPY | Facility: CLINIC | Age: 48
End: 2023-09-21
Payer: MEDICARE

## 2023-09-21 ENCOUNTER — THERAPY VISIT (OUTPATIENT)
Dept: PHYSICAL THERAPY | Facility: CLINIC | Age: 48
End: 2023-09-21
Payer: MEDICARE

## 2023-09-21 DIAGNOSIS — M79.644 BILATERAL THUMB PAIN: ICD-10-CM

## 2023-09-21 DIAGNOSIS — M79.642 BILATERAL HAND PAIN: ICD-10-CM

## 2023-09-21 DIAGNOSIS — M54.50 CHRONIC BILATERAL LOW BACK PAIN WITHOUT SCIATICA: ICD-10-CM

## 2023-09-21 DIAGNOSIS — R10.2 PELVIC PAIN IN FEMALE: Primary | ICD-10-CM

## 2023-09-21 DIAGNOSIS — M54.2 NECK PAIN: ICD-10-CM

## 2023-09-21 DIAGNOSIS — G89.29 CHRONIC BILATERAL LOW BACK PAIN WITHOUT SCIATICA: ICD-10-CM

## 2023-09-21 DIAGNOSIS — R20.2 PARESTHESIA OF BOTH HANDS: ICD-10-CM

## 2023-09-21 DIAGNOSIS — M79.641 BILATERAL HAND PAIN: ICD-10-CM

## 2023-09-21 DIAGNOSIS — G56.03 BILATERAL CARPAL TUNNEL SYNDROME: Primary | ICD-10-CM

## 2023-09-21 DIAGNOSIS — R29.898 WEAKNESS OF BOTH HANDS: ICD-10-CM

## 2023-09-21 DIAGNOSIS — M79.645 BILATERAL THUMB PAIN: ICD-10-CM

## 2023-09-21 PROCEDURE — 97112 NEUROMUSCULAR REEDUCATION: CPT | Mod: GO | Performed by: OCCUPATIONAL THERAPIST

## 2023-09-21 PROCEDURE — 97140 MANUAL THERAPY 1/> REGIONS: CPT | Mod: GP | Performed by: PHYSICAL THERAPIST

## 2023-09-21 PROCEDURE — 97140 MANUAL THERAPY 1/> REGIONS: CPT | Mod: GO | Performed by: OCCUPATIONAL THERAPIST

## 2023-09-21 NOTE — PROGRESS NOTES
09/21/23 0500   Appointment Info   Treating Provider Bettye Dalton OTR/L, CHT   Total/Authorized Visits 10 (POC)   Visits Used 5   Medical Diagnosis B CTS  ((additional information: bilateral thumb STT arthritis and bilateral carpal tunnel))   OT Tx Diagnosis B hand and thumb pain, B hand weakness, parasthesia of both hands   Other pertinent information s/p STT injection on 2/16/23, B carpal tunnel injection on 5/4/23 by Dr. Petit; reports history of multiple thumb injections at [place]  (h/o bilateral carpal tunnel release)   Quick Add  Certification   Progress Note/Certification   Start Of Care Date 06/14/23   Onset of Illness/Injury or Date of Surgery 05/04/23   Therapy Frequency 1x/week   Predicted Duration 10 weeks   Certification date from 08/10/23   Certification date to 11/16/23   Progress Note Due Date 11/16/23   Progress Note Completed Date 09/21/23   OT Goal 1   Goal Identifier Hygiene   Goal Description Brushing teeth with 5/10 pain   Rationale In order to maximize safety and independence with performance of self-care activities   Goal Progress little progress, pain persists   Target Date 11/16/23   Subjective Report   Subjective Report I wear the braces most days. I wear the splints. I got shots yesterday in the thumbs.   Objective Measure 1   Objective Measure Pain   Details 10/10   Objective Measure 2   Objective Measure Tinel's at CT   Details B +   Objective Measure 3   Objective Measure Palpation at CMC   Details B: +++   Objective Measure 4   Objective Measure  strength   Details R: 16 +++  L: 15 +++   Objective Measure 5   Objective Measure Pinch Strength   Details NT d/t pain post injections   Treatment Interventions (OT)   Interventions Manual Therapy;Therapeutic Procedure/Exercise;Neuromuscular Re-education   Neuromuscular Re-education   Neuromuscular Re-ed Minutes (40195) 20   Neuro Re-ed 1 Distal median nerve gliding   Neuro Re-ed 1 - Details passive glides in clinic   PTRx Neuro  Re-ed 1 K-tape   PTRx Neuro Re-ed 1 - Details for Radial nerve and CMC, 1 strip along extensors, one strip across PIN, 1 strip to support CMC   PTRx Neuro Re-ed 2 Nerve Gliding Proximal Median   PTRx Neuro Re-ed 2 - Details 3 reps   Skilled Intervention Provided passive nerve gliding to decrease nerve tension and reduce pain and paresthesias   Therapeutic Activity   PTRx Ther Act 1 [place] Massage to Thumb   PTRx Ther Act 1 - Details No Notes   Manual Therapy   Manual Therapy Minutes (72890) 42   Manual Therapy 1 MFR   Manual Therapy 1 - Details Along B extensors, LEPs, MEPs, Flexors, thenars  (muscle knots palpated along the flexor wad bilaterally;)   Skilled Intervention to reduce tension along median nerve distribution; to increase flexibility of FA flexors and extensors, to decrease pain   Education   Learner/Method Patient;No Barriers to Learning   Plan   Home program Begin gentle tendon/median nerve glides; as well as thumb dynamic stability; HEP provided (virtual); Cont HBTS during the day and wrist netural brace at nighttime;   Plan for next session Progress HEP as appropriate, AMBER education (for thumb CMC OA and CTS), trial compression glove   Comments   Comments Increased edema per patient around the digits and hand today; pt may benefit from fitting of compression gloves to help with edema management; pt also reports ongoing numbness and tingling in bilateral hands; nighttime brace provides minimal relief;  (*pt requesting scar management tips for scar on the dorsum of her left thumb (along the P1) after getting injured while pressure washing. Educated patient on scar massage and use of gel pad at night; issued gel pad to trial)   Total Session Time   Timed Code Treatment Minutes 62   Total Treatment Time (sum of timed and untimed services) 62         [facility]                                                                                   OUTPATIENT OCCUPATIONAL  THERAPY    PLAN OF TREATMENT FOR OUTPATIENT REHABILITATION   Patient's Last Name, First Name, Laxmi Coleman YOB: 1975   Provider's Name   [facility]   Medical Record No.  [id number]     Onset Date: 05/04/23 Start of Care Date: 06/14/23     Medical Diagnosis:  B CTS ((additional information: bilateral thumb STT arthritis and bilateral carpal tunnel))      OT Treatment Diagnosis:  B hand and thumb pain, B hand weakness, parasthesia of both hands Plan of Treatment  Frequency/Duration:1x/week/10 weeks    Certification date from 08/10/23   To 11/16/23        See note for plan of treatment details and functional goals     Bettye Dalton, OT                         I CERTIFY THE NEED FOR THESE SERVICES FURNISHED UNDER        THIS PLAN OF TREATMENT AND WHILE UNDER MY CARE     (Physician attestation of this document indicates review and certification of the therapy plan).                Referring Provider:  Twila Petit      Initial Assessment  See Epic Evaluation- 06/14/23    PLAN  Continue therapy per current plan of care.  5 additional visits    Beginning/End Dates of Progress Note Reporting Period:  6/14/23 to 09/21/2023    Referring Provider:  Twila Petit

## 2023-09-26 ENCOUNTER — TELEPHONE (OUTPATIENT)
Dept: OBGYN | Facility: CLINIC | Age: 48
End: 2023-09-26

## 2023-09-26 NOTE — TELEPHONE ENCOUNTER
Patient called and needed to cancel her appointment with Dr. Mcarthur today due to she is not feeling well.     Patient is rescheduled for 10-11-23 @ 4:15pm    Patient verbalized understanding and all questions were answered.

## 2023-09-29 ENCOUNTER — TELEPHONE (OUTPATIENT)
Dept: RHEUMATOLOGY | Facility: CLINIC | Age: 48
End: 2023-09-29
Payer: MEDICARE

## 2023-09-29 NOTE — TELEPHONE ENCOUNTER
Patient has covid and [facility] wants her to have a remdesivir infusion. She wants to make sure it is okay with Dr. Lemus

## 2023-09-29 NOTE — TELEPHONE ENCOUNTER
Patient called to report she tested positive for covid. Symptoms started Monday night but tested positive yesterday. Has been in contact with [facility] who recommended Remdesivir infusions - would like to make sure these are safe to take. Reviewed general side effects and no drug interactions were found. Did education on needing to complete start of treatment no later than 7 days after symptom onset and encouraged her to contact [place] to set up infusions.    Reviewed that she should postpone next Actemra infusion on 10/4/23 if her symptoms are still present then. Discussed she should wait to get infusion until she is feeling better and recovered from covid. Patient agreeable to plan.    Yuliya Kaplan, PharmD  Medication Therapy Management Pharmacist  [facility]

## 2023-09-29 NOTE — TELEPHONE ENCOUNTER
RICHARD Dwyer pharmacist spoke with patient.    Kaley Merchant, BSN, RN  RN Care Coordinator Rheumatology

## 2023-10-04 ENCOUNTER — MYC MEDICAL ADVICE (OUTPATIENT)
Dept: RHEUMATOLOGY | Facility: CLINIC | Age: 48
End: 2023-10-04

## 2023-10-04 ENCOUNTER — INFUSION THERAPY VISIT (OUTPATIENT)
Dept: INFUSION THERAPY | Facility: CLINIC | Age: 48
End: 2023-10-04
Attending: INTERNAL MEDICINE
Payer: MEDICARE

## 2023-10-04 VITALS
WEIGHT: 211.5 LBS | OXYGEN SATURATION: 98 % | HEART RATE: 66 BPM | DIASTOLIC BLOOD PRESSURE: 83 MMHG | SYSTOLIC BLOOD PRESSURE: 117 MMHG | RESPIRATION RATE: 18 BRPM | BODY MASS INDEX: 34.14 KG/M2

## 2023-10-04 DIAGNOSIS — M13.80 SERONEGATIVE INFLAMMATORY ARTHRITIS: Primary | ICD-10-CM

## 2023-10-04 LAB
ALBUMIN SERPL BCG-MCNC: 4.6 G/DL (ref 3.5–5.2)
ALBUMIN UR-MCNC: NEGATIVE MG/DL
ALP SERPL-CCNC: 46 U/L (ref 35–104)
ALT SERPL W P-5'-P-CCNC: 82 U/L (ref 0–50)
ANION GAP SERPL CALCULATED.3IONS-SCNC: 12 MMOL/L (ref 7–15)
APPEARANCE UR: CLEAR
AST SERPL W P-5'-P-CCNC: 63 U/L (ref 0–45)
BACTERIA #/AREA URNS HPF: ABNORMAL /HPF
BASO+EOS+MONOS # BLD AUTO: ABNORMAL 10*3/UL
BASO+EOS+MONOS NFR BLD AUTO: ABNORMAL %
BASOPHILS # BLD AUTO: 0.1 10E3/UL (ref 0–0.2)
BASOPHILS NFR BLD AUTO: 1 %
BILIRUB SERPL-MCNC: 0.9 MG/DL
BILIRUB UR QL STRIP: NEGATIVE
BUN SERPL-MCNC: 14.4 MG/DL (ref 6–20)
CALCIUM SERPL-MCNC: 9.4 MG/DL (ref 8.6–10)
CHLORIDE SERPL-SCNC: 104 MMOL/L (ref 98–107)
COLOR UR AUTO: ABNORMAL
CREAT SERPL-MCNC: 0.87 MG/DL (ref 0.51–0.95)
DEPRECATED HCO3 PLAS-SCNC: 23 MMOL/L (ref 22–29)
EGFRCR SERPLBLD CKD-EPI 2021: 82 ML/MIN/1.73M2
EOSINOPHIL # BLD AUTO: 0.2 10E3/UL (ref 0–0.7)
EOSINOPHIL NFR BLD AUTO: 2 %
ERYTHROCYTE [DISTWIDTH] IN BLOOD BY AUTOMATED COUNT: 12.4 % (ref 10–15)
GLUCOSE SERPL-MCNC: 171 MG/DL (ref 70–99)
GLUCOSE UR STRIP-MCNC: NEGATIVE MG/DL
HCT VFR BLD AUTO: 46.2 % (ref 35–47)
HGB BLD-MCNC: 16.1 G/DL (ref 11.7–15.7)
HGB UR QL STRIP: NEGATIVE
IMM GRANULOCYTES # BLD: 0 10E3/UL
IMM GRANULOCYTES NFR BLD: 0 %
KETONES UR STRIP-MCNC: NEGATIVE MG/DL
LEUKOCYTE ESTERASE UR QL STRIP: NEGATIVE
LYMPHOCYTES # BLD AUTO: 2.2 10E3/UL (ref 0.8–5.3)
LYMPHOCYTES NFR BLD AUTO: 25 %
MCH RBC QN AUTO: 30.1 PG (ref 26.5–33)
MCHC RBC AUTO-ENTMCNC: 34.8 G/DL (ref 31.5–36.5)
MCV RBC AUTO: 86 FL (ref 78–100)
MONOCYTES # BLD AUTO: 0.8 10E3/UL (ref 0–1.3)
MONOCYTES NFR BLD AUTO: 9 %
MUCOUS THREADS #/AREA URNS LPF: PRESENT /LPF
NEUTROPHILS # BLD AUTO: 5.7 10E3/UL (ref 1.6–8.3)
NEUTROPHILS NFR BLD AUTO: 63 %
NITRATE UR QL: NEGATIVE
NRBC # BLD AUTO: 0 10E3/UL
NRBC BLD AUTO-RTO: 0 /100
PH UR STRIP: 6.5 [PH] (ref 5–7)
PLATELET # BLD AUTO: 348 10E3/UL (ref 150–450)
POTASSIUM SERPL-SCNC: 4 MMOL/L (ref 3.4–5.3)
PROT SERPL-MCNC: 6.9 G/DL (ref 6.4–8.3)
RBC # BLD AUTO: 5.35 10E6/UL (ref 3.8–5.2)
RBC URINE: 1 /HPF
SODIUM SERPL-SCNC: 139 MMOL/L (ref 135–145)
SP GR UR STRIP: 1.01 (ref 1–1.03)
SQUAMOUS EPITHELIAL: 1 /HPF
UROBILINOGEN UR STRIP-MCNC: NORMAL MG/DL
WBC # BLD AUTO: 9.1 10E3/UL (ref 4–11)
WBC URINE: <1 /HPF

## 2023-10-04 PROCEDURE — 80053 COMPREHEN METABOLIC PANEL: CPT | Performed by: INTERNAL MEDICINE

## 2023-10-04 PROCEDURE — 96375 TX/PRO/DX INJ NEW DRUG ADDON: CPT

## 2023-10-04 PROCEDURE — 96365 THER/PROPH/DIAG IV INF INIT: CPT

## 2023-10-04 PROCEDURE — 258N000003 HC RX IP 258 OP 636: Performed by: INTERNAL MEDICINE

## 2023-10-04 PROCEDURE — 36415 COLL VENOUS BLD VENIPUNCTURE: CPT | Performed by: INTERNAL MEDICINE

## 2023-10-04 PROCEDURE — 250N000013 HC RX MED GY IP 250 OP 250 PS 637: Performed by: INTERNAL MEDICINE

## 2023-10-04 PROCEDURE — 85025 COMPLETE CBC W/AUTO DIFF WBC: CPT | Performed by: INTERNAL MEDICINE

## 2023-10-04 PROCEDURE — 250N000011 HC RX IP 250 OP 636: Mod: JZ | Performed by: INTERNAL MEDICINE

## 2023-10-04 PROCEDURE — 81001 URINALYSIS AUTO W/SCOPE: CPT | Performed by: INTERNAL MEDICINE

## 2023-10-04 RX ORDER — ALBUTEROL SULFATE 90 UG/1
1-2 AEROSOL, METERED RESPIRATORY (INHALATION)
Status: CANCELLED
Start: 2023-10-05

## 2023-10-04 RX ORDER — EPINEPHRINE 1 MG/ML
0.3 INJECTION, SOLUTION INTRAMUSCULAR; SUBCUTANEOUS EVERY 5 MIN PRN
Status: CANCELLED | OUTPATIENT
Start: 2023-10-05

## 2023-10-04 RX ORDER — ALBUTEROL SULFATE 0.83 MG/ML
2.5 SOLUTION RESPIRATORY (INHALATION)
Status: CANCELLED | OUTPATIENT
Start: 2023-10-05

## 2023-10-04 RX ORDER — METHYLPREDNISOLONE SODIUM SUCCINATE 125 MG/2ML
125 INJECTION, POWDER, LYOPHILIZED, FOR SOLUTION INTRAMUSCULAR; INTRAVENOUS ONCE
Status: COMPLETED | OUTPATIENT
Start: 2023-10-04 | End: 2023-10-04

## 2023-10-04 RX ORDER — METHYLPREDNISOLONE SODIUM SUCCINATE 125 MG/2ML
125 INJECTION, POWDER, LYOPHILIZED, FOR SOLUTION INTRAMUSCULAR; INTRAVENOUS
Status: CANCELLED
Start: 2023-10-05

## 2023-10-04 RX ORDER — HEPARIN SODIUM (PORCINE) LOCK FLUSH IV SOLN 100 UNIT/ML 100 UNIT/ML
5 SOLUTION INTRAVENOUS
Status: CANCELLED | OUTPATIENT
Start: 2023-10-05

## 2023-10-04 RX ORDER — HEPARIN SODIUM,PORCINE 10 UNIT/ML
5 VIAL (ML) INTRAVENOUS
Status: CANCELLED | OUTPATIENT
Start: 2023-10-05

## 2023-10-04 RX ORDER — ACETAMINOPHEN 325 MG/1
650 TABLET ORAL ONCE
Status: COMPLETED | OUTPATIENT
Start: 2023-10-04 | End: 2023-10-04

## 2023-10-04 RX ORDER — ACETAMINOPHEN 325 MG/1
650 TABLET ORAL ONCE
Status: CANCELLED | OUTPATIENT
Start: 2023-10-05

## 2023-10-04 RX ORDER — DIPHENHYDRAMINE HYDROCHLORIDE 50 MG/ML
50 INJECTION INTRAMUSCULAR; INTRAVENOUS
Status: CANCELLED
Start: 2023-10-05

## 2023-10-04 RX ORDER — METHYLPREDNISOLONE SODIUM SUCCINATE 125 MG/2ML
125 INJECTION, POWDER, LYOPHILIZED, FOR SOLUTION INTRAMUSCULAR; INTRAVENOUS ONCE
Status: CANCELLED | OUTPATIENT
Start: 2023-10-05

## 2023-10-04 RX ORDER — MEPERIDINE HYDROCHLORIDE 25 MG/ML
25 INJECTION INTRAMUSCULAR; INTRAVENOUS; SUBCUTANEOUS EVERY 30 MIN PRN
Status: CANCELLED | OUTPATIENT
Start: 2023-10-05

## 2023-10-04 RX ADMIN — TOCILIZUMAB 800 MG: 20 INJECTION, SOLUTION, CONCENTRATE INTRAVENOUS at 13:21

## 2023-10-04 RX ADMIN — ACETAMINOPHEN 650 MG: 325 TABLET ORAL at 12:54

## 2023-10-04 RX ADMIN — METHYLPREDNISOLONE SODIUM SUCCINATE 125 MG: 125 INJECTION, POWDER, LYOPHILIZED, FOR SOLUTION INTRAMUSCULAR; INTRAVENOUS at 13:17

## 2023-10-04 RX ADMIN — SODIUM CHLORIDE 250 ML: 9 INJECTION, SOLUTION INTRAVENOUS at 13:22

## 2023-10-04 NOTE — TELEPHONE ENCOUNTER
Question addressed in other 10/4/23 mychart message.    Yuliya Kaplan, PharmD  Medication Therapy Management Pharmacist  [facility]

## 2023-10-04 NOTE — PROGRESS NOTES
Nursing Note  Laxmi JUDITH Ya presents today to [facility] for: Actemra  During today's [facility] appointment, orders from Dr. Lemus were completed.  Frequency: monthly    Progress note:  Patient identification verified by name and date of birth.  Assessment completed.  Vitals recorded in Doc Flowsheets.  Patient was provided with education regarding medication/procedure and possible side effects.  Patient verbalized understanding.     present during visit today: Not Applicable.    Treatment Conditions: ~~~ NOTE: If the patient answers yes to any of the questions below, hold the infusion and contact ordering provider or on-call provider.    Have you recently had an elevated temperature, fever, chills, productive cough, coughing for 3 weeks or longer or hemoptysis,  abnormal vital signs, night sweats,  chest pain or have you noticed a decrease in your appetite, unexplained weight loss or fatigue? Yes, recent COVID infection, asymptomatic today per patient  Do you have any open wounds or new incisions? No  Do you have any upcoming hospitalizations or surgeries? Does not include esophagogastroduodenoscopy, colonoscopy, endoscopic retrograde cholangiopancreatography (ERCP), endoscopic ultrasound (EUS), dental procedures or joint aspiration/steroid injections No  Do you currently have any signs of illness or infection or are you on any antibiotics? Yes, recent COVID infection. Asymptomatic today.  Have you had any new, sudden or worsening abdominal pain? No  Have you or anyone in your household received a live vaccination in the past 4 weeks? Please note: No live vaccines while on biologic/chemotherapy until 6 months after the last treatment. Patient can receive the flu vaccine (shot only), pneumovax and the Covid vaccine. It is optimal for the patient to get these vaccines mid cycle, but they can be given at any time as long as it is not on the day  of the infusion. No  Have you recently been diagnosed with any new nervous system diseases (ie. Multiple sclerosis, Guillain Gerald, seizures, neurological changes) or cancer diagnosis? Are you on any form of radiation or chemotherapy? No  Are you pregnant or breast feeding or do you have plans of pregnancy in the future? No  Have there been any other new onset medical symptoms? Yes, recent COVID infection.  Dr. Lemus notified of recent COVID infection. As long as patient is not showing any more signs of illness, ok to proceed. Actemra infusion given today.    Premedications: administered per order.    Drug Waste Record: No    Infusion length and rate:  infusion given over approximately  60 minutes. Pt requested 250ml NS to be infusion after infusion.    Labs: were drawn per orders.     Vascular access: peripheral IV was placed by vascular access nurse.    Is the next appt scheduled? yes    Post Infusion Assessment:  Patient tolerated infusion without incident.     Discharge Plan:   Follow up plan of care with: ongoing infusions at [facility].  Discharge instructions were reviewed with patient.  Patient/representative verbalized understanding of discharge instructions and all questions answered.  Patient discharged from [facility] in stable condition.    Shawanda Davidson RN      Administrations This Visit       acetaminophen (TYLENOL) tablet 650 mg       Admin Date  10/04/2023 Action  $Given Dose  650 mg Route  Oral Administered By  Vanessa Gonzalez RN              methylPREDNISolone sodium succinate (solu-MEDROL) injection 125 mg       Admin Date  10/04/2023 Action  $Given Dose  125 mg Route  Intravenous Administered By  Vanessa Gonzalez RN              sodium chloride 0.9% BOLUS 250 mL       Admin Date  10/04/2023 Action  $New Bag Dose  250 mL Route  Intravenous Administered By  Vanessa Gonzalez RN              tocilizumab (ACTEMRA) 800 mg in sodium  chloride 0.9 % 150 mL infusion       Admin Date  10/04/2023 Action  $New Bag Dose  800 mg Rate  150 mL/hr Route  Intravenous Administered By  Vanessa Gonzalez RN                     /83 (BP Location: Left arm)   Pulse 66   Resp 18   Wt 95.9 kg (211 lb 8 oz)   SpO2 98%   BMI 34.14 kg/m

## 2023-10-05 ENCOUNTER — THERAPY VISIT (OUTPATIENT)
Dept: PHYSICAL THERAPY | Facility: CLINIC | Age: 48
End: 2023-10-05
Payer: MEDICARE

## 2023-10-05 ENCOUNTER — THERAPY VISIT (OUTPATIENT)
Dept: OCCUPATIONAL THERAPY | Facility: CLINIC | Age: 48
End: 2023-10-05
Attending: INTERNAL MEDICINE
Payer: MEDICARE

## 2023-10-05 ENCOUNTER — VIRTUAL VISIT (OUTPATIENT)
Dept: RHEUMATOLOGY | Facility: CLINIC | Age: 48
End: 2023-10-05
Payer: COMMERCIAL

## 2023-10-05 DIAGNOSIS — M79.644 BILATERAL THUMB PAIN: Primary | ICD-10-CM

## 2023-10-05 DIAGNOSIS — I89.0 LYMPHEDEMA: Primary | ICD-10-CM

## 2023-10-05 DIAGNOSIS — M54.2 NECK PAIN: ICD-10-CM

## 2023-10-05 DIAGNOSIS — M54.50 CHRONIC BILATERAL LOW BACK PAIN WITHOUT SCIATICA: ICD-10-CM

## 2023-10-05 DIAGNOSIS — M79.645 BILATERAL THUMB PAIN: Primary | ICD-10-CM

## 2023-10-05 DIAGNOSIS — M06.09 SERONEGATIVE RHEUMATOID ARTHRITIS OF MULTIPLE SITES (H): Primary | ICD-10-CM

## 2023-10-05 DIAGNOSIS — R10.2 PELVIC PAIN IN FEMALE: Primary | ICD-10-CM

## 2023-10-05 DIAGNOSIS — G89.29 CHRONIC BILATERAL LOW BACK PAIN WITHOUT SCIATICA: ICD-10-CM

## 2023-10-05 DIAGNOSIS — E78.5 HYPERLIPIDEMIA, UNSPECIFIED HYPERLIPIDEMIA TYPE: ICD-10-CM

## 2023-10-05 PROBLEM — R20.2 PARESTHESIA OF BOTH HANDS: Status: RESOLVED | Noted: 2023-06-14 | Resolved: 2023-10-05

## 2023-10-05 PROBLEM — M79.641 BILATERAL HAND PAIN: Status: RESOLVED | Noted: 2023-06-14 | Resolved: 2023-10-05

## 2023-10-05 PROBLEM — G56.03 BILATERAL CARPAL TUNNEL SYNDROME: Status: RESOLVED | Noted: 2023-06-14 | Resolved: 2023-10-05

## 2023-10-05 PROBLEM — M79.642 BILATERAL HAND PAIN: Status: RESOLVED | Noted: 2023-06-14 | Resolved: 2023-10-05

## 2023-10-05 PROBLEM — R29.898 WEAKNESS OF BOTH HANDS: Status: RESOLVED | Noted: 2023-06-14 | Resolved: 2023-10-05

## 2023-10-05 PROCEDURE — 97140 MANUAL THERAPY 1/> REGIONS: CPT | Mod: GP | Performed by: PHYSICAL THERAPIST

## 2023-10-05 PROCEDURE — 97140 MANUAL THERAPY 1/> REGIONS: CPT | Mod: GO | Performed by: OCCUPATIONAL THERAPIST

## 2023-10-05 RX ORDER — TOCILIZUMAB 20 MG/ML
800 INJECTION, SOLUTION, CONCENTRATE INTRAVENOUS
COMMUNITY

## 2023-10-05 RX ORDER — ALBUTEROL SULFATE 90 UG/1
2 AEROSOL, METERED RESPIRATORY (INHALATION) EVERY 6 HOURS PRN
COMMUNITY

## 2023-10-05 NOTE — PATIENT INSTRUCTIONS
"Recommendations from today's MTM visit:                                                    1. Please will let us know when surgery planned for cyst removal. Will need to hold Actemra 4 weeks before and 2 weeks after procedure.  2. Your medication list has been updated. Please let us know if there any other changes in your medications.    Follow-up: Return in about 3 months (around 1/5/2024).    It was great speaking with you today.  I value your experience and would be very thankful for your time in providing feedback in our clinic survey. In the next few days, you may receive an email or text message from [facility] with a link to a survey related to your  clinical pharmacist.\"     To schedule another MTM appointment, please call the clinic directly or you may call the MTM scheduling line at [phone] or toll-free at [phone].    My Clinical Pharmacist's contact information:                                                      Please feel free to contact me with any questions or concerns you have.      Yuliya Kaplan, PharmD  Medication Therapy Management Pharmacist  [facility]  Phone: [phone]   "

## 2023-10-05 NOTE — PROGRESS NOTES
Medication Therapy Management (MTM) Encounter    ASSESSMENT:                            Medication Adherence/Access: No issues identified.    Medication Reconciliation: Medication list updated per patient request.    Rheumatoid Arthritis: Patient stable on current medications. Patient will let us know if/when surgery is scheduled for cyst removal and will need to hold Actemra for 4 weeks before and 2 weeks after the procedure. Actemra would not be a likely cause of the cysts.    Hyperlipidemia: Recently started rosuvastatin 10 mg daily, moderate intensity statin is appropriate per ACC/AHA guidelines.    PLAN:                            1. Patient will let us know when surgery planned for cyst removal. Will need to hold Actemra 4 weeks before and 2 weeks after procedure.  2. Medication list updated per patient request.    Follow-up: Return in about 3 months (around 1/5/2024).    SUBJECTIVE/OBJECTIVE:                          Laxmi Ya is a 48 year old female called for a follow-up visit from 5/15/23.       Reason for visit: medication reconciliation/update med list    Allergies/ADRs: Reviewed in chart  Past Medical History: Reviewed in chart  Tobacco: She reports that she has never smoked. She has never used smokeless tobacco.  Alcohol: Less than 1 beverages / week    Medication Adherence/Access: No issues reported.    Medication Reconciliation:  No longer taking the following -  Tacrolimus ointment  Vitamin E  Keflex  Zinc  Sharps and swabs  Clinda 1%  Doxy 50 mg  Flonase    Medications to be updated -   Humulin-N, is now doing 40 units daily instead of 30 units  Using Ventolin, not Proair  Using tobramycin-dexamethasone drops 2 drops twice daily in both eyes, not using ointment or tobramycin drops  Does have a new ointment for eyes but does not know the name  Started rosuvastatin 10 mg daily    Rheumatoid Arthritis:   Actemra 800 mg infusion monthly (last infusion 7/13/23)   Diclofenac 1% gel as needed (uses  once per day a few times per week)    Mentions ovarian/groin cysts that she may need surgically removed. Asked that she let us know as she will need to hold/delay Actemra for surgery for at least 4 weeks before and 2 weeks after procedure. Discussed Actemra would not likely be the cause of the cysts themselves, but could contribute to infection of cysts.    Hyperlipidemia:   Rosuvastatin 10 mg daily    Patient saw PCP and has now started daily statin, no concerns or mention of side effects.    Recent Labs   Lab Test 07/13/23  1412 06/07/23  1249   CHOL 270* 255*   HDL 59 65   * 149*   TRIG 220* 207*      Today's Vitals: There were no vitals taken for this visit.  ----------------  Post Discharge Medication Reconciliation Status: discharge medications reconciled, continue medications without change.    I spent 30 minutes with this patient today. All changes were made via collaborative practice agreement with Oswaldo Lemus MD. A copy of the visit note was provided to the patient's provider(s).    A summary of these recommendations was sent via [employer].    Augustine ConnerD  Medication Therapy Management Pharmacist  [facility]  Phone: [phone]    Telemedicine Visit Details  Type of service:  Telephone visit  Start Time: 11:30 AM  End Time: 12:00 PM     Medication Therapy Recommendations  Seronegative rheumatoid arthritis of multiple sites (H)    Current Medication: tocilizumab (ACTEMRA) 400 MG/20ML   Rationale: Does not understand instructions - Adherence - Adherence   Recommendation: Provide Education - Hold Actemra for 4 weeks before and 2 weeks after surgical procedure   Status: Patient Agreed - Adherence/Education

## 2023-10-08 ENCOUNTER — TELEPHONE (OUTPATIENT)
Dept: RHEUMATOLOGY | Facility: CLINIC | Age: 48
End: 2023-10-08
Payer: MEDICARE

## 2023-10-08 DIAGNOSIS — M06.09 SERONEGATIVE RHEUMATOID ARTHRITIS OF MULTIPLE SITES (H): Primary | ICD-10-CM

## 2023-10-12 DIAGNOSIS — G56.03 BILATERAL CARPAL TUNNEL SYNDROME: Primary | ICD-10-CM

## 2023-10-12 NOTE — PROGRESS NOTES
Chief Complaint:   Chief Complaint   Patient presents with    RECHECK     Follow up for bilateral carpal tunnel syndrome and bilateral thumb pain       Referring Physician: No ref. provider found    Diagnosis: bilateral thumb CMC/STT arthritis, bilateral carpal tunnel syndrome  Treatment:   10/18/2023: Bilateral thumb CMC steroid injection  9/20/2023: bilateral STT steroid injection  5/4/2023: bilateral carpal tunnel steroid injection (Dr. Petit)  2/16/2023: bilateral STT steroid injection (Dr. Petit)  12/13/2022: bilateral carpal tunnel steroid injection (Dr. Sullivan)  10/27/2022: bilateral STT steroid injections (Dr. Petit)  9/13/2022: bilateral carpal tunnel steroid injection (Dr. Sullivan)  6/30/2022: bilateral STT steroid injections (Dr. Petit)  Bilateral carpal tunnel steroid injections (outside)    History of Present Illness: Laxmi Ya is a 48 year old RHD female presenting for evaluation of bilateral hand and wrist pain. She's had history of carpal tunnel steroid injections and releases, she continues to feel some numbness/tingling in her fingers. Today she'd like to have steroid injections into the STT region.    Clinical documentation by Dr. Petit on 5/4/2023 was reviewed.    10/18/2023: reports the last STT injections helped.  She presents today for bilateral thumb CMC steroid injections.  She continues to have pain in bilateral thumbs.  She also continues to have numbness/tingling in the median nerve distribution.      Physical Examination:  There were no vitals filed for this visit.  There is no height or weight on file to calculate BMI.    Well appearing, well nourished  Alert and oriented x 3, cooperative with exam     Right Upper Extremity:  TTP CMC joint: yes   TTP STT joint: yes   Grind test:yes   MP hyper-extension: no   Thenar atrophy: no   Tinel's sign (volar wrist): positive   Durkan's test: negative   Motor Exam:   Abductor pollicis brevis strength: 4/5    1st dorsal  interosseous strength: 5/5   - Flexor pollicis longus strength: 4/5   Intact thumbs up  Vascular Exam:   2+ radial pulse, < 2 sec capillary refill    Left Upper Extremity:  TTP CMC joint: yes   TTP STT joint: yes   Grind test:yes   MP hyper-extension: no   Thenar atrophy: no   Tinel's sign (volar wrist): positive   Durkan's test: negative   Motor Exam:   Abductor pollicis brevis strength: 4/5    1st dorsal interosseous strength: 5/5   - Flexor pollicis longus strength: 4/5   Intact thumbs up  Vascular Exam:   2+ radial pulse, < 2 sec capillary refill      Imaging/Studies:  XR (3 views) of the  bilateral  hand was obtained  9/20/2023 .  I reviewed the images with the patient.  The imaging study shows bilateral thumb CMC and STT arthritis with possible chondrocalcinosis on radial side.    Assessment: Laxmi Ya is a 48 year old female with bilateral STT, thumb CMC, median neuropathy.    Plan:   I had a discussion with the patient regarding my clinical findings, diagnosis, and treatment plan. I reviewed the treatment options for basal joint arthritis (observation, bracing, steroid injection, occupational therapy, surgery, etc.) as well as the risks and benefits of each.  Today she elects bilateral thumb CMC steroid injection.  If symptoms are persistent at the time of follow-up, surgical intervention may be indicated.  The patient understands and agrees to the treatment plan.  All questions answered.      Continue bracing   Steroid injection given today.  See procedure note below.    - Ultrasound (previously ordered) to evaluate bilateral median nerves in region of lacertus fibrosus    Procedure Note: After a discussion with Laxmi Ya regarding treatment options, we decided to proceed with a steroid injection to the bilateral CMC joint.  Risks of the procedure including transient hyperglycemia, fat atrophy, skin depigmentation tendon/ligament weakening, and infection were discussed prior to injection and  verbal consent from Laxmi Ya was obtained. The area was prepped with alcohol.  20  mg Kenalog and 0.5 mL of 1% lidocaine was injected.  Laxmi Ya tolerated the injection well. A clean dressing was placed over the site of the injection. Laxmi Ya was given post injection instructions.       Next Visit:   Follow-up: 3 weeks   Imaging: None   Plan: Symptom check.       WANDA STARKS MD

## 2023-10-17 ENCOUNTER — THERAPY VISIT (OUTPATIENT)
Dept: PHYSICAL THERAPY | Facility: CLINIC | Age: 48
End: 2023-10-17
Payer: MEDICARE

## 2023-10-17 DIAGNOSIS — G89.29 CHRONIC BILATERAL LOW BACK PAIN WITHOUT SCIATICA: ICD-10-CM

## 2023-10-17 DIAGNOSIS — R10.2 PELVIC PAIN IN FEMALE: Primary | ICD-10-CM

## 2023-10-17 DIAGNOSIS — M54.2 NECK PAIN: ICD-10-CM

## 2023-10-17 DIAGNOSIS — M54.50 CHRONIC BILATERAL LOW BACK PAIN WITHOUT SCIATICA: ICD-10-CM

## 2023-10-17 PROCEDURE — 97140 MANUAL THERAPY 1/> REGIONS: CPT | Mod: GP | Performed by: PHYSICAL THERAPIST

## 2023-10-17 NOTE — TELEPHONE ENCOUNTER
DIAGNOSIS: Orthotics   APPOINTMENT DATE: 10/23/2023   NOTES STATUS DETAILS   OFFICE NOTE from referring provider SELF    MEDICATION LIST Internal

## 2023-10-18 ENCOUNTER — OFFICE VISIT (OUTPATIENT)
Dept: ORTHOPEDICS | Facility: CLINIC | Age: 48
End: 2023-10-18
Payer: MEDICARE

## 2023-10-18 DIAGNOSIS — M79.644 BILATERAL THUMB PAIN: Primary | ICD-10-CM

## 2023-10-18 DIAGNOSIS — M79.645 BILATERAL THUMB PAIN: Primary | ICD-10-CM

## 2023-10-18 PROCEDURE — 20600 DRAIN/INJ JOINT/BURSA W/O US: CPT | Mod: 50 | Performed by: STUDENT IN AN ORGANIZED HEALTH CARE EDUCATION/TRAINING PROGRAM

## 2023-10-18 PROCEDURE — 99213 OFFICE O/P EST LOW 20 MIN: CPT | Mod: 25 | Performed by: STUDENT IN AN ORGANIZED HEALTH CARE EDUCATION/TRAINING PROGRAM

## 2023-10-18 RX ORDER — TRIAMCINOLONE ACETONIDE 40 MG/ML
40 INJECTION, SUSPENSION INTRA-ARTICULAR; INTRAMUSCULAR
Status: DISCONTINUED | OUTPATIENT
Start: 2023-10-18 | End: 2024-01-07

## 2023-10-18 RX ORDER — LIDOCAINE HYDROCHLORIDE 10 MG/ML
2 INJECTION, SOLUTION EPIDURAL; INFILTRATION; INTRACAUDAL; PERINEURAL
Status: DISCONTINUED | OUTPATIENT
Start: 2023-10-18 | End: 2024-01-07

## 2023-10-18 RX ADMIN — LIDOCAINE HYDROCHLORIDE 2 ML: 10 INJECTION, SOLUTION EPIDURAL; INFILTRATION; INTRACAUDAL; PERINEURAL at 14:56

## 2023-10-18 RX ADMIN — TRIAMCINOLONE ACETONIDE 40 MG: 40 INJECTION, SUSPENSION INTRA-ARTICULAR; INTRAMUSCULAR at 14:56

## 2023-10-18 NOTE — PROGRESS NOTES
Hand / Upper Extremity Injection/Arthrocentesis: bilateral thumb CMC    Date/Time: 10/18/2023 2:56 PM    Performed by: Hyacinth Morrison MD  Authorized by: Hyacinth Morrison MD    Indications:  Pain  Needle Size:  25 G  Guidance: landmark    Condition: osteoarthritis    Laterality:  Bilateral  Location:  Thumb  Site:  Bilateral thumb CMC    Medications (Right):  40 mg triamcinolone 40 MG/ML; 2 mL lidocaine (PF) 1 %  Medications (Left):  40 mg triamcinolone 40 MG/ML; 2 mL lidocaine (PF) 1 %  Outcome:  Tolerated well, no immediate complications  Procedure discussed: discussed risks, benefits, and alternatives    Consent Given by:  Patient  Timeout: timeout called immediately prior to procedure    Prep: patient was prepped and draped in usual sterile fashion         [facility] [address] [address]  [phone]  Dept: [phone]  ______________________________________________________________________________    Patient: Laxmi Ya   : 1975   MRN: [id number]   2023    INVASIVE PROCEDURE SAFETY CHECKLIST    Date: 10/18/2023   Procedure:Bilateral Thumb CMC joint Steroid injections  Patient Name: Laxmi Ya  MRN: [id number]  YOB: 1975    Action: Complete sections as appropriate. Any discrepancy results in a HARD COPY until resolved.     PRE PROCEDURE:  Patient ID verified with 2 identifiers (name and  or MRN): Yes  Procedure and site verified with patient/designee (when able): Yes  Accurate consent documentation in medical record: Yes  H&P (or appropriate assessment) documented in medical record: Yes  H&P must be up to 20 days prior to procedure and updates within 24 hours of procedure as applicable: Yes  Relevant diagnostic and radiology test results appropriately labeled and displayed as applicable: NA  Procedure site(s) marked with provider initials: NA    TIMEOUT:  Time-Out performed immediately  prior to starting procedure, including verbal and active participation of all team members addressing the following:Yes  * Correct patient identify  * Confirmed that the correct side and site are marked  * An accurate procedure consent form  * Agreement on the procedure to be done  * Correct patient position  * Relevant images and results are properly labeled and appropriately displayed  * The need to administer antibiotics or fluids for irrigation purposes during the procedure as applicable   * Safety precautions based on patient history or medication use    DURING PROCEDURE: Verification of correct person, site, and procedures any time the responsibility for care of the patient is transferred to another member of the care team.       The following medications were given:         Prior to injection, verified patient identity using patient's name and date of birth.  Due to injection administration, patient instructed to remain in clinic for 15 minutes  afterwards, and to report any adverse reaction to me immediately.    Joint injection was performed.    Medication Name: Kenalog 40mg/mL x2 NDC [address]-9  Drug Amount Wasted:  None.  Vial/Syringe: Single dose vial  Expiration Date:  3/1/25    Medication Name Lidocaine 1% NDC 74592-354-70    Scribed by DUY RESENDEZ ATC for Dr. Jo on October 18, 2023 at 2:59p based on the provider's statements to me.     DUY RESENDEZ, TIM

## 2023-10-18 NOTE — NURSING NOTE
Reason For Visit:   Chief Complaint   Patient presents with    RECHECK     Follow up for bilateral carpal tunnel syndrome and bilateral thumb pain       Primary MD: Amol Juares  Ref. MD: salazar    Age: 48 year old    ?  No      There were no vitals taken for this visit.      Pain Assessment  Patient Currently in Pain: Yes  0-10 Pain Scale: 10  Primary Pain Location: Finger (Comment which one) (bilateral thumb)  Pain Descriptors: Sharp, Aching, Constant    Hand Dominance Evaluation  Hand Dominance: Right          QuickDASH Assessment      9/20/2023     3:59 PM   QuickDASH Main   1. Open a tight or new jar Severe difficulty   2. Do heavy household chores (e.g., wash walls, floors) Severe difficulty   3. Carry a shopping bag or briefcase Severe difficulty   4. Wash your back Severe difficulty   5. Use a knife to cut food Severe difficulty   6. Recreational activities in which you take some force or impact through your arm, shoulder or hand (e.g., golf, hammering, tennis, etc.) Unable   7. During the past week, to what extent has your arm, shoulder or hand problem interfered with your normal social activities with family, friends, neighbours or groups Extremely   8. During the past week, were you limited in your work or other regular daily activities as a result of your arm, shoulder or hand problem Unable   9. Arm, shoulder or hand pain Extreme   10.Tingling (pins and needles) in your arm,shoulder or hand Moderate   11. During the past week, how much difficulty have you had sleeping because of the pain in your arm, shoulder or hand Severe difficulty   Quickdash Ability Score 81.82          Current Outpatient Medications   Medication Sig Dispense Refill    albuterol (PROAIR HFA/PROVENTIL HFA/VENTOLIN HFA) 108 (90 Base) MCG/ACT inhaler Inhale 2 puffs into the lungs every 6 hours as needed for shortness of breath, wheezing or cough      Artificial Tear Solution (SOOTHE XP) SOLN as needed      atenolol  (TENORMIN) 50 MG tablet Take 50 mg by mouth daily      benzoyl peroxide 5 % external liquid Use daily as directed. Preferred bdrand: Medpura 236 mL 11    blood glucose (NO BRAND SPECIFIED) lancets standard Use to test blood sugar 2 times daily or as directed. 100 lancet. 1    blood glucose (NO BRAND SPECIFIED) test strip Use to test blood sugar 2 times daily or as directed. 100 strip 1    blood glucose monitoring (NO BRAND SPECIFIED) meter device kit Use to test blood sugar 2 times daily or as directed. 1 kit 0    diclofenac (VOLTAREN) 1 % topical gel Apply topically 4 times daily as needed      dupilumab (DUPIXENT) 300 MG/2ML prefilled pen Inject 2 mLs (300 mg) Subcutaneous every 14 days 4 mL 5    fexofenadine (ALLEGRA) 180 MG tablet Take 1 tablet (180 mg) by mouth daily 90 tablet 2    fidaxomicin (DIFICID) 200 MG tablet Take 200 mg by mouth as needed      fluticasone-vilanterol (BREO ELLIPTA) 200-25 MCG/ACT inhaler Inhale 1 puff into the lungs daily 90 each 1    insulin glargine (LANTUS PEN) 100 UNIT/ML pen Inject 20 Units Subcutaneous daily      insulin lispro (HUMALOG KWIKPEN) 100 UNIT/ML (1 unit dial) KWIKPEN Inject 5 Units Subcutaneous 3 times daily (before meals) Plus 1:25 correction scale if BG >150      insulin NPH (HUMULIN N KWIKPEN) 100 UNIT/ML injection Inject 40 Units Subcutaneous every evening      lidocaine (LIDODERM) 5 % patch as needed   1    losartan (COZAAR) 100 MG tablet Take 100 mg by mouth daily      mometasone (NASONEX) 50 MCG/ACT nasal spray Spray 2 sprays into both nostrils daily 17 g 11    Montelukast Sodium (SINGULAIR PO) Take 10 mg by mouth At Bedtime       multivitamin w/minerals (THERA-VIT-M) tablet Take 1 tablet by mouth daily      norethindrone (MICRONOR) 0.35 MG tablet Take 0.35 mg by mouth      nystatin (MYCOSTATIN) 966661 UNIT/ML suspension       olopatadine (PATADAY) 0.7 % ophthalmic solution Apply 1 drop to eye daily PRN      OMEPRAZOLE PO Take 40 mg by mouth 2 times daily        rifaximin (XIFAXAN) 550 MG TABS tablet Take 200 mg by mouth 3 times daily Take for 10 days, has on had as needed for small bowel bacteria overgrowh      rosuvastatin (CRESTOR) 5 MG tablet Take 2 tablets by mouth daily      spironolactone-HCTZ (ALDACTAZIDE) 25-25 MG tablet Take 1 tablet by mouth daily      tocilizumab (ACTEMRA) 400 MG/20ML Inject 800 mg into the vein every 28 days         Allergies   Allergen Reactions    Polyethylene Glycol Rash    Codeine      Other reaction(s): Gastrointestinal, GI intolerance, Vomiting  Vomiting      Gadolinium Dizziness and Nausea    Hydrocodone-Acetaminophen Nausea and Vomiting     Other reaction(s): GI intolerance    Iodine      abstract    Sulfasalazine      Other reaction(s): GI intolerance  Has tried and had nausa.    Tramadol Nausea and Vomiting     Other reaction(s): Gastrointestinal, Other (see comments)  Nausea and vomiting   unknown      Acetaminophen Nausea     Nausea and vomiting  Nausea and vomiting  Nausea and vomiting  Nausea and vomiting      Gluten Meal Other (See Comments) and Rash     Other reaction(s): Gastrointestinal, GI intolerance  Dizziness, tired, rash, stomach cramps, thrush, mouth sores  Dizziness, tired, rash, stomach cramps, thrush, mouth sores      Propylene Glycol Dizziness, Nausea and Vomiting, Nausea and Rash       Tyra Lao, ATC

## 2023-10-18 NOTE — LETTER
10/18/2023         RE: Laxmi Ya  [address] [place] [id number]        Dear Colleague,    Thank you for referring your patient, Laxmi Ya, to the [facility] [place]. Please see a copy of my visit note below.    Chief Complaint:   Chief Complaint   Patient presents with    RECHECK     Follow up for bilateral carpal tunnel syndrome and bilateral thumb pain       Referring Physician: No ref. provider found    Diagnosis: bilateral thumb CMC/STT arthritis, bilateral carpal tunnel syndrome  Treatment:   10/18/2023: Bilateral thumb CMC steroid injection  9/20/2023: bilateral STT steroid injection  5/4/2023: bilateral carpal tunnel steroid injection (Dr. Petit)  2/16/2023: bilateral STT steroid injection (Dr. Petit)  12/13/2022: bilateral carpal tunnel steroid injection (Dr. Sullivan)  10/27/2022: bilateral STT steroid injections (Dr. Peitt)  9/13/2022: bilateral carpal tunnel steroid injection (Dr. Sullivan)  6/30/2022: bilateral STT steroid injections (Dr. Petit)  Bilateral carpal tunnel steroid injections (outside)    History of Present Illness: Laxmi Ya is a 48 year old RHD female presenting for evaluation of bilateral hand and wrist pain. She's had history of carpal tunnel steroid injections and releases, she continues to feel some numbness/tingling in her fingers. Today she'd like to have steroid injections into the STT region.    Clinical documentation by Dr. Petit on 5/4/2023 was reviewed.    10/18/2023: reports the last STT injections helped.  She presents today for bilateral thumb CMC steroid injections.  She continues to have pain in bilateral thumbs.  She also continues to have numbness/tingling in the median nerve distribution.      Physical Examination:  There were no vitals filed for this visit.  There is no height or weight on file to calculate BMI.    Well appearing, well nourished  Alert and oriented x 3, cooperative with exam      Right Upper Extremity:  TTP CMC joint: yes   TTP STT joint: yes   Grind test:yes   MP hyper-extension: no   Thenar atrophy: no   Tinel's sign (volar wrist): positive   Durkan's test: negative   Motor Exam:   Abductor pollicis brevis strength: 4/5    1st dorsal interosseous strength: 5/5   - Flexor pollicis longus strength: 4/5   Intact thumbs up  Vascular Exam:   2+ radial pulse, < 2 sec capillary refill    Left Upper Extremity:  TTP CMC joint: yes   TTP STT joint: yes   Grind test:yes   MP hyper-extension: no   Thenar atrophy: no   Tinel's sign (volar wrist): positive   Durkan's test: negative   Motor Exam:   Abductor pollicis brevis strength: 4/5    1st dorsal interosseous strength: 5/5   - Flexor pollicis longus strength: 4/5   Intact thumbs up  Vascular Exam:   2+ radial pulse, < 2 sec capillary refill      Imaging/Studies:  XR (3 views) of the  bilateral  hand was obtained  9/20/2023 .  I reviewed the images with the patient.  The imaging study shows bilateral thumb CMC and STT arthritis with possible chondrocalcinosis on radial side.    Assessment: Laxmi Ya is a 48 year old female with bilateral STT, thumb CMC, median neuropathy.    Plan:   I had a discussion with the patient regarding my clinical findings, diagnosis, and treatment plan. I reviewed the treatment options for basal joint arthritis (observation, bracing, steroid injection, occupational therapy, surgery, etc.) as well as the risks and benefits of each.  Today she elects bilateral thumb CMC steroid injection.  If symptoms are persistent at the time of follow-up, surgical intervention may be indicated.  The patient understands and agrees to the treatment plan.  All questions answered.      Continue bracing   Steroid injection given today.  See procedure note below.    - Ultrasound (previously ordered) to evaluate bilateral median nerves in region of lacertus fibrosus    Procedure Note: After a discussion with Laxmi Ya regarding  treatment options, we decided to proceed with a steroid injection to the bilateral CMC joint.  Risks of the procedure including transient hyperglycemia, fat atrophy, skin depigmentation tendon/ligament weakening, and infection were discussed prior to injection and verbal consent from Laxmi Ya was obtained. The area was prepped with alcohol.  20  mg Kenalog and 0.5 mL of 1% lidocaine was injected.  Laxmi Ya tolerated the injection well. A clean dressing was placed over the site of the injection. Laxmi Ya was given post injection instructions.       Next Visit:   Follow-up: 3 weeks   Imaging: None   Plan: Symptom check.       HYACINTH STARKS MD    Hand / Upper Extremity Injection/Arthrocentesis: bilateral thumb CMC    Date/Time: 10/18/2023 2:56 PM    Performed by: Hyacinth Starks MD  Authorized by: Hyacinth Starks MD    Indications:  Pain  Needle Size:  25 G  Guidance: landmark    Condition: osteoarthritis    Laterality:  Bilateral  Location:  Thumb  Site:  Bilateral thumb CMC    Medications (Right):  40 mg triamcinolone 40 MG/ML; 2 mL lidocaine (PF) 1 %  Medications (Left):  40 mg triamcinolone 40 MG/ML; 2 mL lidocaine (PF) 1 %  Outcome:  Tolerated well, no immediate complications  Procedure discussed: discussed risks, benefits, and alternatives    Consent Given by:  Patient  Timeout: timeout called immediately prior to procedure    Prep: patient was prepped and draped in usual sterile fashion         [facility] [address] [id number] [phone]  Dept: [phone]  ______________________________________________________________________________    Patient: Laxmi Ya   : 1975   MRN: [id number]   2023    INVASIVE PROCEDURE SAFETY CHECKLIST    Date: 10/18/2023   Procedure:Bilateral Thumb CMC joint Steroid injections  Patient Name: Laxmi Ya  MRN: [id number]  Date of birth:  1975    Action: Complete sections as appropriate. Any discrepancy results in a HARD COPY until resolved.     PRE PROCEDURE:  Patient ID verified with 2 identifiers (name and  or MRN): Yes  Procedure and site verified with patient/designee (when able): Yes  Accurate consent documentation in medical record: Yes  H&P (or appropriate assessment) documented in medical record: Yes  H&P must be up to 20 days prior to procedure and updates within 24 hours of procedure as applicable: Yes  Relevant diagnostic and radiology test results appropriately labeled and displayed as applicable: NA  Procedure site(s) marked with provider initials: NA    TIMEOUT:  Time-Out performed immediately prior to starting procedure, including verbal and active participation of all team members addressing the following:Yes  * Correct patient identify  * Confirmed that the correct side and site are marked  * An accurate procedure consent form  * Agreement on the procedure to be done  * Correct patient position  * Relevant images and results are properly labeled and appropriately displayed  * The need to administer antibiotics or fluids for irrigation purposes during the procedure as applicable   * Safety precautions based on patient history or medication use    DURING PROCEDURE: Verification of correct person, site, and procedures any time the responsibility for care of the patient is transferred to another member of the care team.       The following medications were given:         Prior to injection, verified patient identity using patient's name and date of birth.  Due to injection administration, patient instructed to remain in clinic for 15 minutes  afterwards, and to report any adverse reaction to me immediately.    Joint injection was performed.    Medication Name: Kenalog 40mg/mL x2 NDC [address]-7  Drug Amount Wasted:  None.  Vial/Syringe: Single dose vial  Expiration Date:  3/1/25    Medication Name Lidocaine 1% NDC  15798-290-92    Scribed by DUY RESENDEZ ATC for Dr. Jo on October 18, 2023 at 2:59p based on the provider's statements to me.     DUY RESENDEZ ATC VENUS VAKHSHORI, MD

## 2023-10-23 ENCOUNTER — PRE VISIT (OUTPATIENT)
Dept: ORTHOPEDICS | Facility: CLINIC | Age: 48
End: 2023-10-23

## 2023-10-27 NOTE — PROGRESS NOTES
Chief Complaint:   Chief Complaint   Patient presents with    RECHECK     Follow-up bilateral carpal tunnel looking for repeat injections       Referring Physician: No ref. provider found    Diagnosis: bilateral thumb CMC/STT arthritis, bilateral carpal tunnel syndrome  Treatment:   11/1/2023: bilateral carpal tunnel steroid injection  10/18/2023: Bilateral thumb CMC steroid injection  9/20/2023: bilateral STT steroid injection  5/4/2023: bilateral carpal tunnel steroid injection (Dr. Petit)  2/16/2023: bilateral STT steroid injection (Dr. Petit)  12/13/2022: bilateral carpal tunnel steroid injection (Dr. Sullivan)  10/27/2022: bilateral STT steroid injections (Dr. Petit)  9/13/2022: bilateral carpal tunnel steroid injection (Dr. Sullivan)  6/30/2022: bilateral STT steroid injections (Dr. Petit)  Bilateral carpal tunnel steroid injections (outside)    History of Present Illness: Laxmi Ya is a 48 year old RHD female presenting for evaluation of bilateral hand and wrist pain. She's had history of carpal tunnel steroid injections and releases, she continues to feel some numbness/tingling in her fingers. Today she'd like to have steroid injections into the STT region.    Clinical documentation by Dr. Petit on 5/4/2023 was reviewed.    10/18/2023: reports the last STT injections helped.  She presents today for bilateral thumb CMC steroid injections.  She continues to have pain in bilateral thumbs.  She also continues to have numbness/tingling in the median nerve distribution.    11/1/2023: reports the last thumb CMC steroid injections helped. Presents today for bilateral carpal tunnel steroid injections.    Physical Examination:  There were no vitals filed for this visit.  There is no height or weight on file to calculate BMI.    Well appearing, well nourished  Alert and oriented x 3, cooperative with exam     Right Upper Extremity:  TTP CMC joint: yes   TTP STT joint: yes   Grind test:yes   MP  hyper-extension: no   Thenar atrophy: no   Tinel's sign (volar wrist): positive   Durkan's test: negative   Pain with lacertus fibrosus compression  5mm 2pt discrimination all digits  Motor Exam:   Abductor pollicis brevis strength: 4/5    1st dorsal interosseous strength: 5/5   - Flexor pollicis longus strength: 4/5   Intact thumbs up  Vascular Exam:   2+ radial pulse, < 2 sec capillary refill    Left Upper Extremity:  TTP CMC joint: yes   TTP STT joint: yes   Grind test:yes   MP hyper-extension: no   Thenar atrophy: no   Tinel's sign (volar wrist): positive   Durkan's test: negative   Pain with lacertus fibrosis compression  5mm 2pt discrimination all digits  Motor Exam:   Abductor pollicis brevis strength: 4/5    1st dorsal interosseous strength: 5/5   - Flexor pollicis longus strength: 4/5   Intact thumbs up  Vascular Exam:   2+ radial pulse, < 2 sec capillary refill      Imaging/Studies:  XR (3 views) of the  bilateral  hand was obtained  9/20/2023 .  I reviewed the images with the patient.  The imaging study shows bilateral thumb CMC and STT arthritis with possible chondrocalcinosis on radial side.    Assessment: Laxmi Ya is a 48 year old female with bilateral STT, thumb CMC, median neuropathy.    Plan:   I had a discussion with the patient regarding my clinical findings, diagnosis, and treatment plan. I reviewed the treatment options for basal joint arthritis (observation, bracing, steroid injection, occupational therapy, surgery, etc.) as well as the risks and benefits of each.  Today she elects bilateral thumb CMC steroid injection.  If symptoms are persistent at the time of follow-up, surgical intervention may be indicated.  The patient understands and agrees to the treatment plan.  All questions answered.      Continue bracing   Steroid injection given today.  See procedure note below.   - Ultrasound (previously ordered) to evaluate bilateral median nerves in region of lacertus fibrosus --  scheduled for 12/14/2023    Procedure Note: After a discussion with Laxmi Ya regarding treatment options, we decided to proceed with a steroid injection to the bilateral carpal tunnels.  Risks of the procedure including transient hyperglycemia, fat atrophy, skin depigmentation tendon/ligament weakening, and infection were discussed prior to injection and verbal consent from Laxmi Ya was obtained. The area was prepped with alcohol.  20  mg Kenalog and 0.5 mL of 1% lidocaine was injected into each site for a total of 40mg.  Laxmi Ya tolerated the injection well. A clean dressing was placed over the site of the injection. Laxmi Ya was given post injection instructions.       Next Visit:   Follow-up: after ultrasound   Imaging: None   Plan: Symptom check.       WANDA STARKS MD

## 2023-10-31 DIAGNOSIS — M79.645 BILATERAL THUMB PAIN: Primary | ICD-10-CM

## 2023-10-31 DIAGNOSIS — M79.644 BILATERAL THUMB PAIN: Primary | ICD-10-CM

## 2023-11-01 ENCOUNTER — TELEPHONE (OUTPATIENT)
Dept: ALLERGY | Facility: CLINIC | Age: 48
End: 2023-11-01

## 2023-11-01 ENCOUNTER — INFUSION THERAPY VISIT (OUTPATIENT)
Dept: INFUSION THERAPY | Facility: CLINIC | Age: 48
End: 2023-11-01
Attending: INTERNAL MEDICINE
Payer: MEDICARE

## 2023-11-01 ENCOUNTER — OFFICE VISIT (OUTPATIENT)
Dept: ORTHOPEDICS | Facility: CLINIC | Age: 48
End: 2023-11-01
Payer: MEDICARE

## 2023-11-01 VITALS
OXYGEN SATURATION: 98 % | HEART RATE: 66 BPM | DIASTOLIC BLOOD PRESSURE: 78 MMHG | TEMPERATURE: 98 F | SYSTOLIC BLOOD PRESSURE: 115 MMHG | RESPIRATION RATE: 16 BRPM

## 2023-11-01 DIAGNOSIS — J45.30 MILD PERSISTENT ASTHMA WITHOUT COMPLICATION: ICD-10-CM

## 2023-11-01 DIAGNOSIS — M13.80 SERONEGATIVE INFLAMMATORY ARTHRITIS: Primary | ICD-10-CM

## 2023-11-01 DIAGNOSIS — M06.09 SERONEGATIVE RHEUMATOID ARTHRITIS OF MULTIPLE SITES (H): ICD-10-CM

## 2023-11-01 DIAGNOSIS — G56.03 BILATERAL CARPAL TUNNEL SYNDROME: Primary | ICD-10-CM

## 2023-11-01 LAB
ALBUMIN SERPL BCG-MCNC: 4.2 G/DL (ref 3.5–5.2)
ALP SERPL-CCNC: 45 U/L (ref 35–104)
ALT SERPL W P-5'-P-CCNC: 41 U/L (ref 0–50)
AST SERPL W P-5'-P-CCNC: 32 U/L (ref 0–45)
BILIRUB DIRECT SERPL-MCNC: 0.21 MG/DL (ref 0–0.3)
BILIRUB SERPL-MCNC: 0.8 MG/DL
PROT SERPL-MCNC: 6.5 G/DL (ref 6.4–8.3)

## 2023-11-01 PROCEDURE — 82040 ASSAY OF SERUM ALBUMIN: CPT

## 2023-11-01 PROCEDURE — 250N000011 HC RX IP 250 OP 636: Mod: JZ | Performed by: INTERNAL MEDICINE

## 2023-11-01 PROCEDURE — 258N000003 HC RX IP 258 OP 636: Performed by: INTERNAL MEDICINE

## 2023-11-01 PROCEDURE — 250N000013 HC RX MED GY IP 250 OP 250 PS 637: Performed by: INTERNAL MEDICINE

## 2023-11-01 PROCEDURE — 96365 THER/PROPH/DIAG IV INF INIT: CPT

## 2023-11-01 PROCEDURE — 96375 TX/PRO/DX INJ NEW DRUG ADDON: CPT

## 2023-11-01 PROCEDURE — 99213 OFFICE O/P EST LOW 20 MIN: CPT | Mod: 25 | Performed by: STUDENT IN AN ORGANIZED HEALTH CARE EDUCATION/TRAINING PROGRAM

## 2023-11-01 PROCEDURE — 20526 THER INJECTION CARP TUNNEL: CPT | Mod: 50 | Performed by: STUDENT IN AN ORGANIZED HEALTH CARE EDUCATION/TRAINING PROGRAM

## 2023-11-01 PROCEDURE — 36415 COLL VENOUS BLD VENIPUNCTURE: CPT

## 2023-11-01 RX ORDER — METHYLPREDNISOLONE SODIUM SUCCINATE 125 MG/2ML
125 INJECTION, POWDER, LYOPHILIZED, FOR SOLUTION INTRAMUSCULAR; INTRAVENOUS ONCE
Status: COMPLETED | OUTPATIENT
Start: 2023-11-01 | End: 2023-11-01

## 2023-11-01 RX ORDER — MEPERIDINE HYDROCHLORIDE 25 MG/ML
25 INJECTION INTRAMUSCULAR; INTRAVENOUS; SUBCUTANEOUS EVERY 30 MIN PRN
Status: CANCELLED | OUTPATIENT
Start: 2023-11-02

## 2023-11-01 RX ORDER — ALBUTEROL SULFATE 0.83 MG/ML
2.5 SOLUTION RESPIRATORY (INHALATION)
Status: CANCELLED | OUTPATIENT
Start: 2023-11-02

## 2023-11-01 RX ORDER — ACETAMINOPHEN 325 MG/1
650 TABLET ORAL ONCE
Status: CANCELLED | OUTPATIENT
Start: 2023-11-02

## 2023-11-01 RX ORDER — LIDOCAINE HYDROCHLORIDE 10 MG/ML
1 INJECTION, SOLUTION EPIDURAL; INFILTRATION; INTRACAUDAL; PERINEURAL
Status: DISCONTINUED | OUTPATIENT
Start: 2023-11-01 | End: 2024-01-07

## 2023-11-01 RX ORDER — HEPARIN SODIUM (PORCINE) LOCK FLUSH IV SOLN 100 UNIT/ML 100 UNIT/ML
5 SOLUTION INTRAVENOUS
Status: CANCELLED | OUTPATIENT
Start: 2023-11-02

## 2023-11-01 RX ORDER — TRIAMCINOLONE ACETONIDE 40 MG/ML
20 INJECTION, SUSPENSION INTRA-ARTICULAR; INTRAMUSCULAR
Status: DISCONTINUED | OUTPATIENT
Start: 2023-11-01 | End: 2024-01-07

## 2023-11-01 RX ORDER — ACETAMINOPHEN 325 MG/1
650 TABLET ORAL ONCE
Status: COMPLETED | OUTPATIENT
Start: 2023-11-01 | End: 2023-11-01

## 2023-11-01 RX ORDER — METHYLPREDNISOLONE SODIUM SUCCINATE 125 MG/2ML
125 INJECTION, POWDER, LYOPHILIZED, FOR SOLUTION INTRAMUSCULAR; INTRAVENOUS ONCE
Status: CANCELLED | OUTPATIENT
Start: 2023-11-02

## 2023-11-01 RX ORDER — EPINEPHRINE 1 MG/ML
0.3 INJECTION, SOLUTION INTRAMUSCULAR; SUBCUTANEOUS EVERY 5 MIN PRN
Status: CANCELLED | OUTPATIENT
Start: 2023-11-02

## 2023-11-01 RX ORDER — METHYLPREDNISOLONE SODIUM SUCCINATE 125 MG/2ML
125 INJECTION, POWDER, LYOPHILIZED, FOR SOLUTION INTRAMUSCULAR; INTRAVENOUS
Status: CANCELLED
Start: 2023-11-02

## 2023-11-01 RX ORDER — DIPHENHYDRAMINE HYDROCHLORIDE 50 MG/ML
50 INJECTION INTRAMUSCULAR; INTRAVENOUS
Status: CANCELLED
Start: 2023-11-02

## 2023-11-01 RX ORDER — HEPARIN SODIUM,PORCINE 10 UNIT/ML
5 VIAL (ML) INTRAVENOUS
Status: CANCELLED | OUTPATIENT
Start: 2023-11-02

## 2023-11-01 RX ORDER — ALBUTEROL SULFATE 90 UG/1
1-2 AEROSOL, METERED RESPIRATORY (INHALATION)
Status: CANCELLED
Start: 2023-11-02

## 2023-11-01 RX ADMIN — ACETAMINOPHEN 650 MG: 325 TABLET ORAL at 13:03

## 2023-11-01 RX ADMIN — METHYLPREDNISOLONE SODIUM SUCCINATE 125 MG: 125 INJECTION, POWDER, LYOPHILIZED, FOR SOLUTION INTRAMUSCULAR; INTRAVENOUS at 13:04

## 2023-11-01 RX ADMIN — SODIUM CHLORIDE 250 ML: 9 INJECTION, SOLUTION INTRAVENOUS at 14:23

## 2023-11-01 RX ADMIN — LIDOCAINE HYDROCHLORIDE 1 ML: 10 INJECTION, SOLUTION EPIDURAL; INFILTRATION; INTRACAUDAL; PERINEURAL at 16:02

## 2023-11-01 RX ADMIN — TRIAMCINOLONE ACETONIDE 20 MG: 40 INJECTION, SUSPENSION INTRA-ARTICULAR; INTRAMUSCULAR at 16:02

## 2023-11-01 RX ADMIN — TOCILIZUMAB 800 MG: 20 INJECTION, SOLUTION, CONCENTRATE INTRAVENOUS at 13:27

## 2023-11-01 NOTE — PROGRESS NOTES
Infusion Nursing Note:  Laxmi Ya presents today for Actemra.    Patient seen by provider today: No   present during visit today: Not Applicable.    Note: Pt received IV Actemra over one hour, followed by 250 ml NS over 30 minutes.  Pt pre-medicated with po tylenol and IV Solu-medrol as ordered.      Intravenous Access:  Labs drawn without difficulty.  Peripheral IV placed.    Treatment Conditions:  ~~~ NOTE: If the patient answers yes to any of the questions below, hold the infusion and contact ordering provider or on-call provider.    Do you currently have any signs of illness or infection or are you on any antibiotics? No  Have you recently had an elevated temperature, fever, chills, productive cough, coughing for 3 weeks or longer or hemoptysis, abnormal vital signs, night sweats, chest pain or have you noticed a decrease in your appetite, unexplained weight loss or fatigue? No  Have you had any new, sudden, or worsening abdominal pain? No  Do you have any open wounds or new incisions? (exclude for patients with hidradenitis suppurativa) No  Have you recently been diagnosed with any new nervous system diseases (ie. Multiple sclerosis, Guillain [place], seizures, neurological changes) or cancer diagnosis? Are you on any form of radiation or chemotherapy? No  Have there been any other new onset medical symptoms? No  Are you pregnant or breast feeding or do you have plans of pregnancy in the future? No; N/A  Do you have any upcoming hospitalizations or surgeries? Does not include esophagogastroduodenoscopy, colonoscopy, endoscopic retrograde cholangiopancreatography (ERCP), endoscopic ultrasound (EUS), dental procedures (including cleanings, fillings, implants, extractions)  or joint aspiration/steroid injections No  Have you or anyone in your household received a live vaccination in the past 4 weeks? Please note: No live vaccines while on biologic/chemotherapy until 6 months after the last  treatment. Patient can receive the flu vaccine (shot only).  It is optimal for the patient to get it mid cycle, but it can be given at any time as long as it is not on the day of the infusion. No  If applicable to prescribed medication, confirm negative PPD or quantiferon gold MTB. If positive, verify has negative chest x-ray or the patient is at least 4 weeks post initiation of INH/B6 therapy and have clearance from provider before infusion (Y/N:[id number])  If applicable to prescribed medication, confirm negative hepatitis B surface antigen or hepatitis C. If positive, clearance from provider before infusion. (Y/N: [id number])  Rheumatology patients receiving tocilizumab (Actemra): If labs were drawn within the past week, hold dosing until cleared to infuse If AST/ALT > 2 X upper limit normal; ANC < 1.0. YES; N/A  Patients receiving belimumab (Benlysta): Have you been having any signs of worsening depression or suicidal ideations?    N/A    Lab Results   Component Value Date     10/04/2023    POTASSIUM 4.0 10/04/2023    CR 0.87 10/04/2023    CARMEN 9.4 10/04/2023    BILITOTAL 0.8 11/01/2023    ALBUMIN 4.2 11/01/2023    ALT 41 11/01/2023    AST 32 11/01/2023       Results reviewed, labs MET treatment parameters, ok to proceed with treatment.      Post Infusion Assessment:  Patient tolerated infusion without incident.  Blood return noted pre and post infusion.  Site patent and intact, free from redness, edema or discomfort.  No evidence of extravasations.  Access discontinued per protocol.  Biologic Infusion Post Education: Call the triage nurse at your clinic or seek medical attention if you have chills and/or temperature greater than or equal to 100.5, uncontrolled nausea/vomiting, diarrhea, constipation, dizziness, shortness of breath, chest pain, heart palpitations, weakness or any other new or concerning symptoms, questions or concerns.  You cannot have any live virus vaccines prior to or during treatment or up to 6  months post infusion.  If you have an upcoming surgery, medical procedure or dental procedure during treatment, this should be discussed with your ordering physician and your surgeon/dentist.  If you are having any concerning symptom, if you are unsure if you should get your next infusion or wish to speak to a provider before your next infusion, please call your care coordinator or triage nurse at your clinic to notify them so we can adequately serve you.       Discharge Plan:   Discharge instructions reviewed with: Patient.  Patient and/or family verbalized understanding of discharge instructions and all questions answered.  AVS to patient via [employer].  Patient will return 11/29 for next appointment.   Patient discharged in stable condition accompanied by: self.  Departure Mode: Ambulatory.    Administrations This Visit       acetaminophen (TYLENOL) tablet 650 mg       Admin Date  11/01/2023 Action  $Given Dose  650 mg Route  Oral Documented By  Yandy Dias RN              methylPREDNISolone sodium succinate (solu-MEDROL) injection 125 mg       Admin Date  11/01/2023 Action  $Given Dose  125 mg Route  Intravenous Documented By  Yandy Dias RN              sodium chloride 0.9% BOLUS 250 mL       Admin Date  11/01/2023 Action  $New Bag Dose  250 mL Route  Intravenous Documented By  Yandy Dias RN              tocilizumab (ACTEMRA) 800 mg in sodium chloride 0.9 % 150 mL infusion       Admin Date  11/01/2023 Action  $New Bag Dose  800 mg Rate  150 mL/hr Route  Intravenous Documented By  Yandy Dias RN Carissa L. LEXI Dias

## 2023-11-01 NOTE — PROGRESS NOTES
Hand / Upper Extremity Injection/Arthrocentesis: bilateral carpal tunnel    Date/Time: 2023 4:02 PM    Performed by: Hyacinth Morrison MD  Authorized by: Hyacinth Morrison MD    Indications:  Pain  Needle Size:  25 G  Guidance: ultrasound    Condition: carpal tunnel    Laterality:  Bilateral    Site:  Bilateral carpal tunnel  Medications (Right):  20 mg triamcinolone 40 MG/ML; 0.5 mL lidocaine (PF) 1 %  Medications (Left):  20 mg triamcinolone 40 MG/ML; 0.5 mL lidocaine (PF) 1 %  Outcome:  Tolerated well, no immediate complications  Procedure discussed: discussed risks, benefits, and alternatives    Consent Given by:  Patient  Timeout: timeout called immediately prior to procedure    Prep: patient was prepped and draped in usual sterile fashion          [facility] [address] [address]  [phone]  Dept: [phone]  ______________________________________________________________________________    Patient: Laxmi Ya   : 1975   MRN: [id number]   2023    INVASIVE PROCEDURE SAFETY CHECKLIST    Date: 2023   Procedure:Bilateral carpal tunnel steroid injection  Patient Name: Laxmi Ya  MRN: [id number]  YOB: 1975    Action: Complete sections as appropriate. Any discrepancy results in a HARD COPY until resolved.     PRE PROCEDURE:  Patient ID verified with 2 identifiers (name and  or MRN): Yes  Procedure and site verified with patient/designee (when able): Yes  Accurate consent documentation in medical record: Yes  H&P (or appropriate assessment) documented in medical record: Yes  H&P must be up to 20 days prior to procedure and updates within 24 hours of procedure as applicable: Yes  Relevant diagnostic and radiology test results appropriately labeled and displayed as applicable: Yes  Procedure site(s) marked with provider initials: NA    TIMEOUT:  Time-Out performed immediately prior to  starting procedure, including verbal and active participation of all team members addressing the following:Yes  * Correct patient identify  * Confirmed that the correct side and site are marked  * An accurate procedure consent form  * Agreement on the procedure to be done  * Correct patient position  * Relevant images and results are properly labeled and appropriately displayed  * The need to administer antibiotics or fluids for irrigation purposes during the procedure as applicable   * Safety precautions based on patient history or medication use    DURING PROCEDURE: Verification of correct person, site, and procedures any time the responsibility for care of the patient is transferred to another member of the care team.       The following medications were given:         Prior to injection, verified patient identity using patient's name and date of birth.  Due to injection administration, patient instructed to remain in clinic for 15 minutes  afterwards, and to report any adverse reaction to me immediately.    Tendon sheath injection was performed.   Medication Name: Kenalog 40 NDC [address]-5  Drug Amount Wasted:  None.  Vial/Syringe: Single dose vial  Expiration Date:  7/1/25    20 mg Kenalog 40 was injected in the Right and 20 mg in the Left for a total of 40 mg injected.    Medication Name Lidocaine 1% NDC 72432-554-03    Scribed by Tyra Lao ATC for Dr. Jo on November 1, 2023 at 4:05 pm based on the provider's statements to me.     Tyra Lao ATC

## 2023-11-01 NOTE — PATIENT INSTRUCTIONS
EDUCATION POST BIOLOGICAL/CHEMOTHERAPY INFUSION  Call the triage nurse at your clinic or seek medical attention if you have chills and/or temperature greater than or equal to 100.5, uncontrolled nausea/vomiting, diarrhea, constipation, dizziness, shortness of breath, chest pain, heart palpitations, weakness or any other new or concerning symptoms, questions or concerns.  You can not have any live virus vaccines prior to or during treatment or up to 6 months post infusion.  If you have an upcoming surgery, medical procedure or dental procedure during treatment, this should be discussed with your ordering physician and your surgeon/dentist.  If you are having any concerning symptom, if you are unsure if you should get your next infusion or wish to speak to a provider before your next infusion, please call your care coordinator or triage nurse at your clinic to notify them so we can adequately serve you.  Dear Laxmi Ya    Thank you for choosing [facility] ([facility]) for your infusion.  The following information is a summary of our appointment as well as important reminders.      Last dose of Tylenol was 1 pm .    If you are a transplant patient and require transplant education, please click on this link: [email or web address].    We look forward in seeing you on your next appointment here at [facility] ([facility]).  Please don t hesitate to call us at [phone] to reschedule any of your appointments or to speak with one of the [facility] registered nurses.  It was a pleasure taking care of you today.    Sincerely,    [facility]  [facility]  [address] [place] [address]  [address]  Phone:  [phone]

## 2023-11-01 NOTE — NURSING NOTE
Reason For Visit:   Chief Complaint   Patient presents with    RECHECK     Follow-up bilateral carpal tunnel looking for repeat injections       Primary MD: Amol Juares  Ref. MD: Katja    Age: 48 year old    ?  No      There were no vitals taken for this visit.      Pain Assessment  Patient Currently in Pain: Yes  0-10 Pain Scale: 10  Primary Pain Location: Wrist (bilateral)    Hand Dominance Evaluation  Hand Dominance: Right          QuickDASH Assessment      9/20/2023     3:59 PM   QuickDASH Main   1. Open a tight or new jar Severe difficulty   2. Do heavy household chores (e.g., wash walls, floors) Severe difficulty   3. Carry a shopping bag or briefcase Severe difficulty   4. Wash your back Severe difficulty   5. Use a knife to cut food Severe difficulty   6. Recreational activities in which you take some force or impact through your arm, shoulder or hand (e.g., golf, hammering, tennis, etc.) Unable   7. During the past week, to what extent has your arm, shoulder or hand problem interfered with your normal social activities with family, friends, neighbours or groups Extremely   8. During the past week, were you limited in your work or other regular daily activities as a result of your arm, shoulder or hand problem Unable   9. Arm, shoulder or hand pain Extreme   10.Tingling (pins and needles) in your arm,shoulder or hand Moderate   11. During the past week, how much difficulty have you had sleeping because of the pain in your arm, shoulder or hand Severe difficulty   Quickdash Ability Score 81.82          Current Outpatient Medications   Medication Sig Dispense Refill    albuterol (PROAIR HFA/PROVENTIL HFA/VENTOLIN HFA) 108 (90 Base) MCG/ACT inhaler Inhale 2 puffs into the lungs every 6 hours as needed for shortness of breath, wheezing or cough      Artificial Tear Solution (SOOTHE XP) SOLN as needed      atenolol (TENORMIN) 50 MG tablet Take 50 mg by mouth daily      benzoyl peroxide 5 % external  liquid Use daily as directed. Preferred bdrand: Medpura 236 mL 11    blood glucose (NO BRAND SPECIFIED) lancets standard Use to test blood sugar 2 times daily or as directed. 100 lancet. 1    blood glucose (NO BRAND SPECIFIED) test strip Use to test blood sugar 2 times daily or as directed. 100 strip 1    blood glucose monitoring (NO BRAND SPECIFIED) meter device kit Use to test blood sugar 2 times daily or as directed. 1 kit 0    diclofenac (VOLTAREN) 1 % topical gel Apply topically 4 times daily as needed      dupilumab (DUPIXENT) 300 MG/2ML prefilled pen Inject 2 mLs (300 mg) Subcutaneous every 14 days 4 mL 5    fexofenadine (ALLEGRA) 180 MG tablet Take 1 tablet (180 mg) by mouth daily 90 tablet 2    fidaxomicin (DIFICID) 200 MG tablet Take 200 mg by mouth as needed      fluticasone-vilanterol (BREO ELLIPTA) 200-25 MCG/ACT inhaler Inhale 1 puff into the lungs daily 90 each 1    insulin glargine (LANTUS PEN) 100 UNIT/ML pen Inject 20 Units Subcutaneous daily      insulin lispro (HUMALOG KWIKPEN) 100 UNIT/ML (1 unit dial) KWIKPEN Inject 5 Units Subcutaneous 3 times daily (before meals) Plus 1:25 correction scale if BG >150      insulin NPH (HUMULIN N KWIKPEN) 100 UNIT/ML injection Inject 40 Units Subcutaneous every evening      lidocaine (LIDODERM) 5 % patch as needed   1    losartan (COZAAR) 100 MG tablet Take 100 mg by mouth daily      mometasone (NASONEX) 50 MCG/ACT nasal spray Spray 2 sprays into both nostrils daily 17 g 11    Montelukast Sodium (SINGULAIR PO) Take 10 mg by mouth At Bedtime       multivitamin w/minerals (THERA-VIT-M) tablet Take 1 tablet by mouth daily      norethindrone (MICRONOR) 0.35 MG tablet Take 0.35 mg by mouth      nystatin (MYCOSTATIN) 872916 UNIT/ML suspension       olopatadine (PATADAY) 0.7 % ophthalmic solution Apply 1 drop to eye daily PRN      OMEPRAZOLE PO Take 40 mg by mouth 2 times daily       rifaximin (XIFAXAN) 550 MG TABS tablet Take 200 mg by mouth 3 times daily Take for 10  days, has on had as needed for small bowel bacteria overgrowh      rosuvastatin (CRESTOR) 5 MG tablet Take 2 tablets by mouth daily      spironolactone-HCTZ (ALDACTAZIDE) 25-25 MG tablet Take 1 tablet by mouth daily      tocilizumab (ACTEMRA) 400 MG/20ML Inject 800 mg into the vein every 28 days         Allergies   Allergen Reactions    Polyethylene Glycol Rash    Codeine      Other reaction(s): Gastrointestinal, GI intolerance, Vomiting  Vomiting      Gadolinium Dizziness and Nausea    Hydrocodone-Acetaminophen Nausea and Vomiting     Other reaction(s): GI intolerance    Iodine      abstract    Sulfasalazine      Other reaction(s): GI intolerance  Has tried and had nausa.    Tramadol Nausea and Vomiting     Other reaction(s): Gastrointestinal, Other (see comments)  Nausea and vomiting   unknown      Acetaminophen Nausea     Nausea and vomiting  Nausea and vomiting  Nausea and vomiting  Nausea and vomiting      Gluten Meal Other (See Comments) and Rash     Other reaction(s): Gastrointestinal, GI intolerance  Dizziness, tired, rash, stomach cramps, thrush, mouth sores  Dizziness, tired, rash, stomach cramps, thrush, mouth sores      Propylene Glycol Dizziness, Nausea and Vomiting, Nausea and Rash       DUY RESENDEZ, ATC

## 2023-11-01 NOTE — TELEPHONE ENCOUNTER
[facility] Call Center    Phone Message    May a detailed message be left on voicemail: yes     Reason for Call:   Patient calling re please cancel refil for fluticasone-vilanterol (BREO ELLIPTA) 200-25 MCG/ACT inhaler  so patient can get for free at [facility] through [facility] but it needs to be Rx through [facility] - please call when done - [phone]    Action Taken: Other: [facility] ALL    Travel Screening: Not Applicable

## 2023-11-01 NOTE — LETTER
11/1/2023         RE: Laxmi Ya  [address] [facility] [address]        Dear Colleague,    Thank you for referring your patient, Laxmi Ya, to the [facility]. Please see a copy of my visit note below.    Chief Complaint:   Chief Complaint   Patient presents with    RECHECK     Follow-up bilateral carpal tunnel looking for repeat injections       Referring Physician: No ref. provider found    Diagnosis: bilateral thumb CMC/STT arthritis, bilateral carpal tunnel syndrome  Treatment:   11/1/2023: bilateral carpal tunnel steroid injection  10/18/2023: Bilateral thumb CMC steroid injection  9/20/2023: bilateral STT steroid injection  5/4/2023: bilateral carpal tunnel steroid injection (Dr. Petit)  2/16/2023: bilateral STT steroid injection (Dr. Petit)  12/13/2022: bilateral carpal tunnel steroid injection (Dr. Sullivan)  10/27/2022: bilateral STT steroid injections (Dr. Petit)  9/13/2022: bilateral carpal tunnel steroid injection (Dr. Sullivan)  6/30/2022: bilateral STT steroid injections (Dr. Petit)  Bilateral carpal tunnel steroid injections (outside)    History of Present Illness: Laxmi Ya is a 48 year old RHD female presenting for evaluation of bilateral hand and wrist pain. She's had history of carpal tunnel steroid injections and releases, she continues to feel some numbness/tingling in her fingers. Today she'd like to have steroid injections into the STT region.    Clinical documentation by Dr. Petit on 5/4/2023 was reviewed.    10/18/2023: reports the last STT injections helped.  She presents today for bilateral thumb CMC steroid injections.  She continues to have pain in bilateral thumbs.  She also continues to have numbness/tingling in the median nerve distribution.    11/1/2023: reports the last thumb CMC steroid injections helped. Presents today for bilateral carpal tunnel steroid injections.    Physical Examination:  There were no  vitals filed for this visit.  There is no height or weight on file to calculate BMI.    Well appearing, well nourished  Alert and oriented x 3, cooperative with exam     Right Upper Extremity:  TTP CMC joint: yes   TTP STT joint: yes   Grind test:yes   MP hyper-extension: no   Thenar atrophy: no   Tinel's sign (volar wrist): positive   Durkan's test: negative   Pain with lacertus fibrosus compression  5mm 2pt discrimination all digits  Motor Exam:   Abductor pollicis brevis strength: 4/5    1st dorsal interosseous strength: 5/5   - Flexor pollicis longus strength: 4/5   Intact thumbs up  Vascular Exam:   2+ radial pulse, < 2 sec capillary refill    Left Upper Extremity:  TTP CMC joint: yes   TTP STT joint: yes   Grind test:yes   MP hyper-extension: no   Thenar atrophy: no   Tinel's sign (volar wrist): positive   Durkan's test: negative   Pain with lacertus fibrosis compression  5mm 2pt discrimination all digits  Motor Exam:   Abductor pollicis brevis strength: 4/5    1st dorsal interosseous strength: 5/5   - Flexor pollicis longus strength: 4/5   Intact thumbs up  Vascular Exam:   2+ radial pulse, < 2 sec capillary refill      Imaging/Studies:  XR (3 views) of the  bilateral  hand was obtained  9/20/2023 .  I reviewed the images with the patient.  The imaging study shows bilateral thumb CMC and STT arthritis with possible chondrocalcinosis on radial side.    Assessment: Laxmi aY is a 48 year old female with bilateral STT, thumb CMC, median neuropathy.    Plan:   I had a discussion with the patient regarding my clinical findings, diagnosis, and treatment plan. I reviewed the treatment options for basal joint arthritis (observation, bracing, steroid injection, occupational therapy, surgery, etc.) as well as the risks and benefits of each.  Today she elects bilateral thumb CMC steroid injection.  If symptoms are persistent at the time of follow-up, surgical intervention may be indicated.  The patient understands  and agrees to the treatment plan.  All questions answered.      Continue bracing   Steroid injection given today.  See procedure note below.   - Ultrasound (previously ordered) to evaluate bilateral median nerves in region of lacertus fibrosus -- scheduled for 12/14/2023    Procedure Note: After a discussion with Laxmi Ya regarding treatment options, we decided to proceed with a steroid injection to the bilateral carpal tunnels.  Risks of the procedure including transient hyperglycemia, fat atrophy, skin depigmentation tendon/ligament weakening, and infection were discussed prior to injection and verbal consent from Laxmi Ya was obtained. The area was prepped with alcohol.  20  mg Kenalog and 0.5 mL of 1% lidocaine was injected.  Laxmi Ya tolerated the injection well. A clean dressing was placed over the site of the injection. Laxmi Ya was given post injection instructions.       Next Visit:   Follow-up: after ultrasound   Imaging: None   Plan: Symptom check.       HYACINTH STARKS MD    Hand / Upper Extremity Injection/Arthrocentesis: bilateral carpal tunnel    Date/Time: 11/1/2023 4:02 PM    Performed by: Hyacinth Starks MD  Authorized by: Hyacinth Starks MD    Indications:  Pain  Needle Size:  25 G  Guidance: ultrasound    Condition: carpal tunnel    Laterality:  Bilateral    Site:  Bilateral carpal tunnel  Medications (Right):  20 mg triamcinolone 40 MG/ML; 1 mL lidocaine (PF) 1 %  Medications (Left):  20 mg triamcinolone 40 MG/ML; 1 mL lidocaine (PF) 1 %  Outcome:  Tolerated well, no immediate complications  Procedure discussed: discussed risks, benefits, and alternatives    Consent Given by:  Patient  Timeout: timeout called immediately prior to procedure    Prep: patient was prepped and draped in usual sterile fashion        [facility] [address]  4TH FLOOR  [facility] [address] [phone]  Dept:  [phone]  ______________________________________________________________________________    Patient: Laxmi Ya   : 1975   MRN: [id number]   2023    INVASIVE PROCEDURE SAFETY CHECKLIST    Date: 2023   Procedure:Bilateral carpal tunnel steroid injection  Patient Name: Laxmi Ya  MRN: [id number]  YOB: 1975    Action: Complete sections as appropriate. Any discrepancy results in a HARD COPY until resolved.     PRE PROCEDURE:  Patient ID verified with 2 identifiers (name and  or MRN): Yes  Procedure and site verified with patient/designee (when able): Yes  Accurate consent documentation in medical record: Yes  H&P (or appropriate assessment) documented in medical record: Yes  H&P must be up to 20 days prior to procedure and updates within 24 hours of procedure as applicable: Yes  Relevant diagnostic and radiology test results appropriately labeled and displayed as applicable: Yes  Procedure site(s) marked with provider initials: NA    TIMEOUT:  Time-Out performed immediately prior to starting procedure, including verbal and active participation of all team members addressing the following:Yes  * Correct patient identify  * Confirmed that the correct side and site are marked  * An accurate procedure consent form  * Agreement on the procedure to be done  * Correct patient position  * Relevant images and results are properly labeled and appropriately displayed  * The need to administer antibiotics or fluids for irrigation purposes during the procedure as applicable   * Safety precautions based on patient history or medication use    DURING PROCEDURE: Verification of correct person, site, and procedures any time the responsibility for care of the patient is transferred to another member of the care team.       The following medications were given:         Prior to injection, verified patient identity using patient's name and date of birth.  Due to injection  administration, patient instructed to remain in clinic for 15 minutes  afterwards, and to report any adverse reaction to me immediately.    Tendon sheath injection was performed.   Medication Name: Kenalog 40 NDC [address]-0  Drug Amount Wasted:  None.  Vial/Syringe: Single dose vial  Expiration Date:  7/1/25    Two vials on Kenalog 40 were used, with zero wasted.    Medication Name Lidocaine 1% NDC 87649-198-32    Scribed by Trya Lao ATC for Dr. Jo on November 1, 2023 at 4:05 pm based on the provider's statements to me.     Tyra Lao ATC

## 2023-11-01 NOTE — PROGRESS NOTES
Hand / Upper Extremity Injection/Arthrocentesis: bilateral carpal tunnel    Date/Time: 2023 4:02 PM    Performed by: Hyacinth Morrison MD  Authorized by: Hyacinth Morrison MD    Indications:  Pain  Needle Size:  25 G  Guidance: ultrasound    Condition: carpal tunnel    Laterality:  Bilateral    Site:  Bilateral carpal tunnel  Medications (Right):  20 mg triamcinolone 40 MG/ML; 1 mL lidocaine (PF) 1 %  Medications (Left):  20 mg triamcinolone 40 MG/ML; 1 mL lidocaine (PF) 1 %  Outcome:  Tolerated well, no immediate complications  Procedure discussed: discussed risks, benefits, and alternatives    Consent Given by:  Patient  Timeout: timeout called immediately prior to procedure    Prep: patient was prepped and draped in usual sterile fashion        [facility] [address] [address]  [phone]  Dept: [phone]  ______________________________________________________________________________    Patient: Laxmi Ya   : 1975   MRN: [id number]   2023    INVASIVE PROCEDURE SAFETY CHECKLIST    Date: 2023   Procedure:Bilateral carpal tunnel steroid injection  Patient Name: Laxmi Ya  MRN: [id number]  YOB: 1975    Action: Complete sections as appropriate. Any discrepancy results in a HARD COPY until resolved.     PRE PROCEDURE:  Patient ID verified with 2 identifiers (name and  or MRN): Yes  Procedure and site verified with patient/designee (when able): Yes  Accurate consent documentation in medical record: Yes  H&P (or appropriate assessment) documented in medical record: Yes  H&P must be up to 20 days prior to procedure and updates within 24 hours of procedure as applicable: Yes  Relevant diagnostic and radiology test results appropriately labeled and displayed as applicable: Yes  Procedure site(s) marked with provider initials: NA    TIMEOUT:  Time-Out performed immediately prior to  starting procedure, including verbal and active participation of all team members addressing the following:Yes  * Correct patient identify  * Confirmed that the correct side and site are marked  * An accurate procedure consent form  * Agreement on the procedure to be done  * Correct patient position  * Relevant images and results are properly labeled and appropriately displayed  * The need to administer antibiotics or fluids for irrigation purposes during the procedure as applicable   * Safety precautions based on patient history or medication use    DURING PROCEDURE: Verification of correct person, site, and procedures any time the responsibility for care of the patient is transferred to another member of the care team.       The following medications were given:         Prior to injection, verified patient identity using patient's name and date of birth.  Due to injection administration, patient instructed to remain in clinic for 15 minutes  afterwards, and to report any adverse reaction to me immediately.    Tendon sheath injection was performed.   Medication Name: Kenalog 40 NDC [address]-3  Drug Amount Wasted:  None.  Vial/Syringe: Single dose vial  Expiration Date:  7/1/25      Medication Name Lidocaine 1% NDC 97191-862-52    Scribed by Tyra Lao ATC for Dr. Jo on November 1, 2023 at 4:05 pm based on the provider's statements to me.     Tyra Lao ATC

## 2023-11-02 RX ORDER — FLUTICASONE FUROATE AND VILANTEROL 200; 25 UG/1; UG/1
1 POWDER RESPIRATORY (INHALATION) DAILY
Qty: 60 EACH | Refills: 0 | Status: SHIPPED | OUTPATIENT
Start: 2023-11-02 | End: 2023-11-02

## 2023-11-02 NOTE — TELEPHONE ENCOUNTER
Returned call, no answer, left message explaining we have not recieved any refill requests to cancel and that she can fill the prescription at whatever pharmacy she prefers, directed to call back if she needs further assistance, sent Breo to [facility] and provided call back number if needs anything else

## 2023-11-08 ENCOUNTER — THERAPY VISIT (OUTPATIENT)
Dept: PHYSICAL THERAPY | Facility: CLINIC | Age: 48
End: 2023-11-08
Payer: MEDICARE

## 2023-11-08 DIAGNOSIS — G89.29 CHRONIC BILATERAL LOW BACK PAIN WITHOUT SCIATICA: ICD-10-CM

## 2023-11-08 DIAGNOSIS — R10.2 PELVIC PAIN IN FEMALE: Primary | ICD-10-CM

## 2023-11-08 DIAGNOSIS — M54.2 NECK PAIN: ICD-10-CM

## 2023-11-08 DIAGNOSIS — M54.50 CHRONIC BILATERAL LOW BACK PAIN WITHOUT SCIATICA: ICD-10-CM

## 2023-11-08 PROCEDURE — 97140 MANUAL THERAPY 1/> REGIONS: CPT | Mod: GP | Performed by: PHYSICAL THERAPIST

## 2023-11-09 ENCOUNTER — THERAPY VISIT (OUTPATIENT)
Dept: OCCUPATIONAL THERAPY | Facility: CLINIC | Age: 48
End: 2023-11-09
Attending: INTERNAL MEDICINE
Payer: MEDICARE

## 2023-11-09 ENCOUNTER — THERAPY VISIT (OUTPATIENT)
Dept: PHYSICAL THERAPY | Facility: CLINIC | Age: 48
End: 2023-11-09
Payer: MEDICARE

## 2023-11-09 DIAGNOSIS — M54.2 NECK PAIN: ICD-10-CM

## 2023-11-09 DIAGNOSIS — M54.50 CHRONIC BILATERAL LOW BACK PAIN WITHOUT SCIATICA: ICD-10-CM

## 2023-11-09 DIAGNOSIS — I89.0 LYMPHEDEMA: Primary | ICD-10-CM

## 2023-11-09 DIAGNOSIS — G89.29 CHRONIC BILATERAL LOW BACK PAIN WITHOUT SCIATICA: ICD-10-CM

## 2023-11-09 DIAGNOSIS — R10.2 PELVIC PAIN IN FEMALE: Primary | ICD-10-CM

## 2023-11-09 PROCEDURE — 97140 MANUAL THERAPY 1/> REGIONS: CPT | Mod: GP | Performed by: PHYSICAL THERAPIST

## 2023-11-09 PROCEDURE — 97012 MECHANICAL TRACTION THERAPY: CPT | Mod: GP | Performed by: PHYSICAL THERAPIST

## 2023-11-09 PROCEDURE — 97140 MANUAL THERAPY 1/> REGIONS: CPT | Mod: GO | Performed by: OCCUPATIONAL THERAPIST

## 2023-11-10 ENCOUNTER — MYC MEDICAL ADVICE (OUTPATIENT)
Dept: RHEUMATOLOGY | Facility: CLINIC | Age: 48
End: 2023-11-10
Payer: MEDICARE

## 2023-11-10 DIAGNOSIS — M13.0 SERONEGATIVE POLYARTHRITIS: ICD-10-CM

## 2023-11-10 NOTE — PROGRESS NOTES
11/09/23 0500   Appointment Info   Signing clinician's name / credentials Sofi Lemons PT, OCS   Total/Authorized Visits E&T 02/03/23   Visits Used 20   Medical Diagnosis Lower back pain with mobility deficits, lower back pain w/ referred pain, neck pain w/ headache, neck pain w/ mobility deficits, pelvic pain   PT Tx Diagnosis Lower back pain with mobility deficits, lower back pain w/ referred pain, neck pain w/ headache, neck pain w/ mobility deficits   Precautions/Limitations 8 min late.   Other pertinent information verbal ok for internal vaginal pelvic floor muscle assessment   Quick Adds Certification   Progress Note/Certification   Start of Care Date 03/08/23   Onset of illness/injury or Date of Surgery 02/03/23   Therapy Frequency 1x week   Predicted Duration 8 weeks   Certification date from 09/04/23   Certification date to 12/11/23   Progress Note Completed Date 11/09/23   GOALS   PT Goals 3   PT Goal 1   Goal Identifier ambulation   Goal Description Minutes patient will be able to  walk 20-30 minutes   Rationale to maximize safety and independence with performance of ADLs and functional tasks;to maximize safety and independence within the home;to maximize safety and independence within the community;to maximize safety and independence with transportation;to maximize safety and independence with self cares   Goal Progress 15 minutes   Target Date 12/07/23   PT Goal 2   Goal Identifier headaches   Goal Description HA 2x month or less with intensity 2/10 or less   Rationale to maximize safety and independence with performance of ADLs and functional tasks;to maximize safety and independence within the home;to maximize safety and independence within the community;to maximize safety and independence with transportation;to maximize safety and independence with self cares   Goal Progress 3/10   Target Date 08/10/23   Date Met 08/09/23   PT Goal 3   Goal Identifier pelvic pain   Goal Description with  walking/intimacy/urinary urgency 2/10 or less   Rationale to maximize safety and independence with performance of ADLs and functional tasks;to maximize safety and independence within the home;to maximize safety and independence with transportation;to maximize safety and independence with self cares;to maximize safety and independence within the community   Goal Progress currently 5/10   Target Date 12/07/23   Subjective Report   Subjective Report B leg/hip pain improves ~ 75% after doing pelvic floor MFR. Legs/hips feels like can walk better. Lasts about 1 week. Having cortisone injections tomorrow into cervical spine. Getting lymphedema treatment but still notices significant swelling in lower pelvis/vulva especially.   Objective Measures   Objective Measures Objective Measure 1;Objective Measure 2   Objective Measure 1   Objective Measure R>L TTP LA/OI today. Kegel strength 2-. Tolerates moderate to max pressure with MFR.   Objective Measure 2   Objective Measure CROM: flexion WNL, extension mod loss, B rotation and B SB all mod loss.   PT Modalities   PT Modalities Mechanical Traction   Mechanical Traction   Mechanical Traction, Minutes (46475) 15   Traction -Type Cervical   Position supine   Type Intermittent   Duration 12 min   Max poundage 26   Min poundage 0   Hold Time 60   Rest Time 10   Steps ramping up 1   Steps ramping down 1   Speed 100%   Patient Response/Progress less pain post traction   Treatment Interventions (PT)   Interventions Manual Therapy   Therapeutic Procedure/Exercise   Ther Proc 1 glute myofascial full arc   Ther Proc 1 - Details x20   Skilled Intervention patient needed manual and verbal cues to engage proper gluteal contraction   Patient Response/Progress improved glute contraction   Manual Therapy   Manual Therapy: Mobilization, MFR, MLD, friction massage minutes (39931) 25   Manual Therapy Manual Therapy 2   Manual Therapy 2 supine internal MFR to pelvic floor x25 minutes   Manual  Therapy 2 - Details with IR/ER hips   Skilled Intervention to decrease tone in pelvic floor   Patient Response/Progress less tension in PF post treatment   Education   Learner/Method Patient;No Barriers to Learning   Plan   Home program PTRX   Plan for next session continue to assess PF and neck pain   Total Session Time   Timed Code Treatment Minutes 25   Total Treatment Time (sum of timed and untimed services) 40         PLAN  Continue therapy per current plan of care.    Beginning/End Dates of Progress Note Reporting Period:  11/09/23 to 11/09/2023    Referring Provider:  Kimberlee Vasquez

## 2023-11-14 ENCOUNTER — THERAPY VISIT (OUTPATIENT)
Dept: PHYSICAL THERAPY | Facility: CLINIC | Age: 48
End: 2023-11-14
Payer: MEDICARE

## 2023-11-14 DIAGNOSIS — M54.2 NECK PAIN: ICD-10-CM

## 2023-11-14 DIAGNOSIS — M54.50 CHRONIC BILATERAL LOW BACK PAIN WITHOUT SCIATICA: ICD-10-CM

## 2023-11-14 DIAGNOSIS — R10.2 PELVIC PAIN IN FEMALE: Primary | ICD-10-CM

## 2023-11-14 DIAGNOSIS — G89.29 CHRONIC BILATERAL LOW BACK PAIN WITHOUT SCIATICA: ICD-10-CM

## 2023-11-14 PROCEDURE — 97140 MANUAL THERAPY 1/> REGIONS: CPT | Mod: GP | Performed by: PHYSICAL THERAPIST

## 2023-11-14 RX ORDER — EPINEPHRINE 0.3 MG/.3ML
0.3 INJECTION SUBCUTANEOUS PRN
COMMUNITY
Start: 2023-02-14 | End: 2024-05-10

## 2023-11-14 RX ORDER — USTEKINUMAB 45 MG/.5ML
45 INJECTION, SOLUTION SUBCUTANEOUS ONCE
COMMUNITY
Start: 2023-10-09 | End: 2024-05-10

## 2023-11-14 RX ORDER — OXYCODONE HYDROCHLORIDE 10 MG/1
10 TABLET ORAL
COMMUNITY
Start: 2023-11-09

## 2023-11-14 RX ORDER — MOMETASONE FUROATE AND FORMOTEROL FUMARATE DIHYDRATE 200; 5 UG/1; UG/1
1 AEROSOL RESPIRATORY (INHALATION) DAILY
COMMUNITY
Start: 2022-01-05 | End: 2024-05-10

## 2023-11-14 RX ORDER — FLUTICASONE FUROATE AND VILANTEROL 200; 25 UG/1; UG/1
1 POWDER RESPIRATORY (INHALATION) DAILY
COMMUNITY
Start: 2023-11-02 | End: 2024-05-10

## 2023-11-14 RX ORDER — LANCETS 33 GAUGE
EACH MISCELLANEOUS
COMMUNITY
Start: 2023-09-06

## 2023-11-14 RX ORDER — METHYLPREDNISOLONE 4 MG
TABLET, DOSE PACK ORAL
Qty: 21 TABLET | Refills: 0 | Status: SHIPPED | OUTPATIENT
Start: 2023-11-14 | End: 2024-05-10

## 2023-11-14 NOTE — TELEPHONE ENCOUNTER
METHYLPREDNISOLONE 4 MG TABLET   Last Written Prescription Date:  11/10/2023  Last Fill Quantity: 21,   # refills: 0  Last Office Visit : 10/5/2023  Future Office visit:  None  Routing refill request to provider for review/approval because:  Please send over Brand new order.  This not on protocol to fill and this is for regular tabs taking 4 Tabs daily.      Belem Farley RN  Central Triage Red Flags/Med Refills

## 2023-11-15 ENCOUNTER — THERAPY VISIT (OUTPATIENT)
Dept: OCCUPATIONAL THERAPY | Facility: CLINIC | Age: 48
End: 2023-11-15
Attending: INTERNAL MEDICINE
Payer: MEDICARE

## 2023-11-15 ENCOUNTER — VIRTUAL VISIT (OUTPATIENT)
Dept: ENDOCRINOLOGY | Facility: CLINIC | Age: 48
End: 2023-11-15
Payer: MEDICARE

## 2023-11-15 DIAGNOSIS — I89.0 LYMPHEDEMA: Primary | ICD-10-CM

## 2023-11-15 DIAGNOSIS — R89.9 ABNORMAL LABORATORY TEST: Primary | ICD-10-CM

## 2023-11-15 DIAGNOSIS — E11.9 TYPE 2 DIABETES MELLITUS WITHOUT COMPLICATION, WITH LONG-TERM CURRENT USE OF INSULIN (H): ICD-10-CM

## 2023-11-15 DIAGNOSIS — Z79.4 TYPE 2 DIABETES MELLITUS WITHOUT COMPLICATION, WITH LONG-TERM CURRENT USE OF INSULIN (H): ICD-10-CM

## 2023-11-15 PROCEDURE — 97140 MANUAL THERAPY 1/> REGIONS: CPT | Mod: GO | Performed by: OCCUPATIONAL THERAPIST

## 2023-11-15 PROCEDURE — 99205 OFFICE O/P NEW HI 60 MIN: CPT | Mod: VID | Performed by: INTERNAL MEDICINE

## 2023-11-15 NOTE — LETTER
11/15/2023         RE: Laxmi Ya  [address] [place] [id number]        Dear Colleague,    Thank you for referring your patient, Laxmi Ya, to the [facility] [place]. Please see a copy of my visit note below.    Virtual Visit Details    Type of service:  Video Visit   Video Start Time: 1:59 PM  Video End Time:2:37 PM    Originating Location (pt. Location): Home  Distant Location (provider location):  Off-site  Platform used for Video Visit: PaperV        Name: Laxmi Ya is a 48 year old woman, self referred for evaluation of     CC:  Abnormal adrenal tests    HPI:  Recent issues:  Here for evaluation of abnormal adrenal tests  She states her [facility] physician advised seeing an endocrinologist for abnormal adrenal tests ~2020  Reviewed medical history from patient and Epic chart record, but no details of these [place] tests available        Previous health history includes sero negative rheumatoid arthritis, asthma, T2DM  She has seen health care providers including [facility], [facility], Critical access hospital, and [facility]  Had also seen Dr. Alan Saleh/[facility] rheumatology  Med evaluations with Dr. Susan Pena/[facility], evaluations focused on management of recurrent cystitis, vaginitis  Treatment with antifungal and antibiotic medications  Patient recalls having lab tests done per Dr. Pena ~2020 showing abnormal adrenal results   Advised to see an endocrinologist for further evaluation   Unfortunately, details of these tests, results, concerns not available today    Additional medical evaluations with other providers, including Dr. No Fitzgerald/ OBGYN, Dr. Oswaldo Lemus/[facility] rheumatology  RA treatment with periodic Actemra infusions (w/ methylprednisolone infusion)    2020.Diagnosis of diabetes mellitus  Has used metformin in the past  3/2023 Started insulin medication  Has seen her PCP Dr. Amol Juares and diabetes  educators ([facility]) for management    ~9/2023. Labs ([facility]) hgbA1c 5.7% per patient  Previous [facility] labs include:      Previous [facility] hgbA1c trends include:     Lab Test 02/06/23  1805   A1C 7.0*     Current DM medications:  Humalog Kwikpen  6U subcutaneous premeal, 3U before coffee    mg/dl  No correction   151-200 mg/dl  +1 units   201-250 mg/dl  +2 units   251-300 mg/dl  +3 units   301-350 mg/dl  +4 units   350-400 mg/dl  +5 units   401-450 mg/dl  +6 units and call physician   Yvonne Solostar  17U subcutaneous in morning  NPH Kwkipen   40U subcutaneous before Actemra, then 20U that evening      Dosing with Solumedrol 125 mg monthly    Blood glucose (BG) meter:  One Touch Verio Reflex   Tests BGM 2-3x/day   No recent glucose trend data available today    Fam Hx Diabetes: father  Recent [facility] labs include:  Lab Results   Component Value Date    A1C 7.0 (H) 02/06/2023     10/04/2023    POTASSIUM 4.0 10/04/2023    CHLORIDE 104 10/04/2023    CO2 23 10/04/2023    ANIONGAP 12 10/04/2023     (H) 10/04/2023    BUN 14.4 10/04/2023    CR 0.87 10/04/2023    GFRESTIMATED 82 10/04/2023    GFRESTBLACK >90 04/01/2020    CARMEN 9.4 10/04/2023    CHOL 270 (H) 07/13/2023    TRIG 220 (H) 07/13/2023    HDL 59 07/13/2023     (H) 07/13/2023    NHDL 211 (H) 07/13/2023    UCRR 415.0 02/06/2023    MICROL <12.0 02/06/2023    UMALCR  02/06/2023      Comment:      Unable to calculate, urine albumin and/or urine creatinine is outside detectable limits.  Microalbuminuria is defined as an albumin:creatinine ratio of 17 to 299 for males and 25 to 299 for females. A ratio of albumin:creatinine of 300 or higher is indicative of overt proteinuria.  Due to biologic variability, positive results should be confirmed by a second, first-morning random or 24-hour timed urine specimen. If there is discrepancy, a third specimen is recommended. When 2 out of 3 results are in the microalbuminuria range, this  is evidence for incipient nephropathy and warrants increased efforts at glucose control, blood pressure control, and institution of therapy with an angiotensin-converting-enzyme (ACE) inhibitor (if the patient can tolerate it).       DM Complications:  none reported      Lives in [place], [place]  Sees Dr. Amol Juares/[facility] for general medicine evaluations.  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     Obesity      Pyelonephritis, unspecified 1992    abstract     Rheumatoid arteritis (H)      Seronegative inflammatory arthritis 06/17/2022     Type 2 diabetes mellitus (H)      Unspecified symptom associated with female genital organs 05/07/1999    chronic pelvic pain abstract     Past Surgical History:   Procedure Laterality Date     CHOLECYSTECTOMY       COLON SURGERY      Left hemicolectomy for diverticulosis     CYSTOSCOPY,+URETEROSCOPY      abstract     ZZHC EXCIS/CURET BENIGN ELBOW LESN  10/26/1987    (R) humerus abstract       Family Hx:  Family History   Problem Relation Age of Onset     Hypertension Father         abstract     Cancer Paternal Aunt         gallbladder cancer abstract         Social Hx:  Social History     Socioeconomic History     Marital status: Single     Spouse name: Not on file     Number of children: Not on file     Years of education: Not on file     Highest education level: Not on file   Occupational History      Not on file   Tobacco Use     Smoking status: Never     Smokeless tobacco: Never   Substance and Sexual Activity     Alcohol use: Yes     Drug use: No     Sexual activity: Not on file   Other Topics Concern      Service Not Asked     Blood Transfusions Not Asked     Caffeine Concern Not Asked     Occupational Exposure Not Asked     Hobby Hazards Not Asked     Sleep Concern Not Asked     Stress Concern Not Asked     Weight Concern Not Asked     Special Diet Not Asked     Back Care Not Asked     Exercise No     Bike Helmet Not Asked     Seat Belt No     Self-Exams No   Social History Narrative    Womens Health Kit given.         Social Determinants of Health     Financial Resource Strain: Not on file   Food Insecurity: Not on file   Transportation Needs: Not on file   Physical Activity: Not on file   Stress: Not on file   Social Connections: Not on file   Interpersonal Safety: Not on file   Housing Stability: Not on file          MEDICATIONS:  has a current medication list which includes the following prescription(s): albuterol, soothe xp, epinephrine, fluticasone-vilanterol, onetouch delica plus [id number], dulera, oxycodone ir, stelara, atenolol, benzoyl peroxide, blood glucose, blood glucose, blood glucose monitoring, diclofenac, dupixent, fexofenadine, fidaxomicin, insulin glargine, insulin lispro, humulin n kwikpen, lidocaine, losartan, methylprednisolone, methylprednisolone, mometasone, montelukast sodium, multivitamin w/minerals, norethindrone, nystatin, pataday, omeprazole, rifaximin, rosuvastatin, spironolactone-hctz, and actemra, and the following Facility-Administered Medications: lidocaine (pf), lidocaine (pf), lidocaine (pf), lidocaine (pf), lidocaine (pf), lidocaine (pf), lidocaine (pf), triamcinolone, triamcinolone, triamcinolone, triamcinolone, triamcinolone, triamcinolone, and triamcinolone.    ROS:     ROS: 10 point ROS neg other than the symptoms noted above in the HPI.    GENERAL: fatigue,  wt stable; denies fevers, chills, night sweats.   HEENT: no dysphagia, odonophagia, diplopia, neck pain  THYROID:  no apparent hyper or hypothyroid symptoms  CV: no chest pain, pressure, palpitations  LUNGS: no SOB, SERNA, cough, wheezing   ABDOMEN: no diarrhea, constipation, abdominal pain  EXTREMITIES: no rashes, ulcers, edema  NEUROLOGY: no headaches, denies changes in vision, tingling, extremitiy numbness   MSK: polyarthralgias- pains at neck, hands, hips, ankles; generalized muscle weakness  SKIN: no rashes or lesions  : no menses with daily [place]   PSYCH:  stable mood, no significant anxiety or depression  ENDOCRINE: no heat or cold intolerance    Physical Exam (visual exam)  VS:  no vital signs taken for video visit  CONSTITUTIONAL: healthy, alert and NAD, well dressed, answering questions appropriately  ENT: no nose swelling or nasal discharge, mouth redness or gum changes.  EYES: eyes grossly normal to inspection, conjunctivae and sclerae normal, no exophthalmos or proptosis  THYROID:  no apparent nodules or goiter  LUNGS: no audible wheeze, cough or visible cyanosis, no visible retractions or increased work of breathing  ABDOMEN: abdomen not evaluated  EXTREMITIES: no hand tremors, limited exam  NEUROLOGY: CN grossly intact, mentation intact and speech normal   SKIN:  no apparent skin lesions, rash, or edema with visualized skin appearance  PSYCH: mentation appears normal, affect normal/bright, judgement and insight intact,   normal speech and appearance well groomed      LABS:    All pertinent notes, labs, and images personally reviewed by me.     A/P:  Encounter Diagnoses   Name Primary?     Abnormal laboratory test Yes     Type 2 diabetes mellitus without complication, with long-term current use of insulin (H)      Comments:  Reviewed the complicated health history and general endocrine issues.  Difficult to understand the endocrine concern by her [facility] provider from 3-yrs ago  Reported  glycemic control and overall T2DM management going well    Plan:  Reviewed the adrenal gland, general anatomy and hormone secretion  We discussed adrenal lab testing   Reviewed the hyperglycemia effects of steroid medication use  Discussed general issues with the diabetes diagnosis and management    Recommend:  Would be helpful if Dr. Pena would send a summary message with hormone testing concerns  Plan repeat lab testing, when not on steroid medication for at least 1-week  Arrange nonfasting lab testing soon   Testing at [facility] or other clinic   Lab orders placed  Monitor for symptom changes  No adrenal gland imaging needed at this time    Reasonable to continue the Humalog and Lantus insulin treatment plan  Use pre and post IV steroid NPH dosing, as previously  Reviewed using the Humalog mealtime base dose and also sscale if hyperglycemia  Would benefit from use of a CGM sensor such as Freestyle Libre2 or Dexcom  Repeat hgbA1c lab test every 3-4 months  Keep focus on diet, exercise, weight management.  Advise having fasting lipid panel testing and dilated eye examination, at least annually  She will arrange follow-up with Dr. Juares and the [place] diabetes educators for diabetes followup    Addressed patient questions today    There are no Patient Instructions on file for this visit.    Future labs ordered today:   Orders Placed This Encounter   Procedures     Cortisol free urine     Metanephrine random or 24 hr urine     Cortisol     Adrenal corticotropin     Aldosterone Renin Ratio     Radiology/Consults ordered today: None    Total time spent on day of encounter:  78 min    Follow-up:  CONCEPCION Leroy MD, MS  Endocrinology  [facility]    CC: Amol Juares and Care Team        Again, thank you for allowing me to participate in the care of your patient.        Sincerely,        Mainor Leroy MD

## 2023-11-15 NOTE — PROGRESS NOTES
Virtual Visit Details    Type of service:  Video Visit   Video Start Time: 1:59 PM  Video End Time:2:37 PM    Originating Location (pt. Location): Home  Distant Location (provider location):  Off-site  Platform used for Video Visit: Howard        Name: Laxmi Ya is a 48 year old woman, self referred for evaluation of     CC:  Abnormal adrenal tests    HPI:  Recent issues:  Here for evaluation of abnormal adrenal tests  She states her [facility] physician advised seeing an endocrinologist for abnormal adrenal tests ~2020  Reviewed medical history from patient and Epic chart record, but no details of these [place] tests available        Previous health history includes sero negative rheumatoid arthritis, asthma, T2DM  She has seen health care providers including [facility], [facility], Critical access hospital, and [facility]  Had also seen Dr. Alan Saleh/[facility] rheumatology  Med evaluations with Dr. Susan Pena/[facility], evaluations focused on management of recurrent cystitis, vaginitis  Treatment with antifungal and antibiotic medications  Patient recalls having lab tests done per Dr. Pena ~2020 showing abnormal adrenal results   Advised to see an endocrinologist for further evaluation   Unfortunately, details of these tests, results, concerns not available today    Additional medical evaluations with other providers, including Dr. No Fitzgerald/ OBGYN, Dr. Oswaldo Lemus/[facility] rheumatology  RA treatment with periodic Actemra infusions (w/ methylprednisolone infusion)    2020.Diagnosis of diabetes mellitus  Has used metformin in the past  3/2023 Started insulin medication  Has seen her PCP Dr. Amol Juares and diabetes educators ([facility]) for management    ~9/2023. Labs ([facility]) hgbA1c 5.7% per patient  Previous [facility] labs include:      Previous [facility] hgbA1c trends include:     Lab Test 02/06/23  1805   A1C 7.0*     Current DM  medications:  Humalog Kwikpen  6U subcutaneous premeal, 3U before coffee    mg/dl  No correction   151-200 mg/dl  +1 units   201-250 mg/dl  +2 units   251-300 mg/dl  +3 units   301-350 mg/dl  +4 units   350-400 mg/dl  +5 units   401-450 mg/dl  +6 units and call physician   Yvonne Chisholmar  17U subcutaneous in morning  NPH Kwkipen   40U subcutaneous before Actemra, then 20U that evening      Dosing with Solumedrol 125 mg monthly    Blood glucose (BG) meter:  One Touch Verio Reflex   Tests BGM 2-3x/day   No recent glucose trend data available today    Fam Hx Diabetes: father  Recent [facility] labs include:  Lab Results   Component Value Date    A1C 7.0 (H) 02/06/2023     10/04/2023    POTASSIUM 4.0 10/04/2023    CHLORIDE 104 10/04/2023    CO2 23 10/04/2023    ANIONGAP 12 10/04/2023     (H) 10/04/2023    BUN 14.4 10/04/2023    CR 0.87 10/04/2023    GFRESTIMATED 82 10/04/2023    GFRESTBLACK >90 04/01/2020    CARMEN 9.4 10/04/2023    CHOL 270 (H) 07/13/2023    TRIG 220 (H) 07/13/2023    HDL 59 07/13/2023     (H) 07/13/2023    NHDL 211 (H) 07/13/2023    UCRR 415.0 02/06/2023    MICROL <12.0 02/06/2023    UMALCR  02/06/2023      Comment:      Unable to calculate, urine albumin and/or urine creatinine is outside detectable limits.  Microalbuminuria is defined as an albumin:creatinine ratio of 17 to 299 for males and 25 to 299 for females. A ratio of albumin:creatinine of 300 or higher is indicative of overt proteinuria.  Due to biologic variability, positive results should be confirmed by a second, first-morning random or 24-hour timed urine specimen. If there is discrepancy, a third specimen is recommended. When 2 out of 3 results are in the microalbuminuria range, this is evidence for incipient nephropathy and warrants increased efforts at glucose control, blood pressure control, and institution of therapy with an angiotensin-converting-enzyme (ACE) inhibitor (if the patient can tolerate it).       DM  Complications:  none reported      Lives in [place], [place]  Sees Dr. Amol Juares/[facility] for general medicine evaluations.  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    Obesity     Pyelonephritis, unspecified 1992    abstract    Rheumatoid arteritis (H)     Seronegative inflammatory arthritis 06/17/2022    Type 2 diabetes mellitus (H)     Unspecified symptom associated with female genital organs 05/07/1999    chronic pelvic pain abstract     Past Surgical History:   Procedure Laterality Date    CHOLECYSTECTOMY      COLON SURGERY      Left hemicolectomy for diverticulosis    CYSTOSCOPY,+URETEROSCOPY      abstract    ZZHC EXCIS/CURET BENIGN ELBOW LESN  10/26/1987    (R) humerus abstract       Family Hx:  Family History   Problem Relation Age of Onset    Hypertension Father         abstract    Cancer Paternal Aunt         gallbladder cancer abstract         Social Hx:  Social History     Socioeconomic History    Marital status: Single     Spouse name: Not on file    Number of children: Not on file    Years of education: Not on file    Highest education level: Not on file   Occupational History    Not on file   Tobacco Use    Smoking status: Never    Smokeless tobacco: Never   Substance and Sexual Activity    Alcohol use: Yes    Drug use: No    Sexual activity: Not on file   Other Topics Concern     Service Not Asked    Blood Transfusions Not Asked     Caffeine Concern Not Asked    Occupational Exposure Not Asked    Hobby Hazards Not Asked    Sleep Concern Not Asked    Stress Concern Not Asked    Weight Concern Not Asked    Special Diet Not Asked    Back Care Not Asked    Exercise No    Bike Helmet Not Asked    Seat Belt No    Self-Exams No   Social History Narrative    Womens Health Kit given.         Social Determinants of Health     Financial Resource Strain: Not on file   Food Insecurity: Not on file   Transportation Needs: Not on file   Physical Activity: Not on file   Stress: Not on file   Social Connections: Not on file   Interpersonal Safety: Not on file   Housing Stability: Not on file          MEDICATIONS:  has a current medication list which includes the following prescription(s): albuterol, soothe xp, epinephrine, fluticasone-vilanterol, onetouch delica plus [id number], dulera, oxycodone ir, stelara, atenolol, benzoyl peroxide, blood glucose, blood glucose, blood glucose monitoring, diclofenac, dupixent, fexofenadine, fidaxomicin, insulin glargine, insulin lispro, humulin n kwikpen, lidocaine, losartan, methylprednisolone, methylprednisolone, mometasone, montelukast sodium, multivitamin w/minerals, norethindrone, nystatin, pataday, omeprazole, rifaximin, rosuvastatin, spironolactone-hctz, and actemra, and the following Facility-Administered Medications: lidocaine (pf), lidocaine (pf), lidocaine (pf), lidocaine (pf), lidocaine (pf), lidocaine (pf), lidocaine (pf), triamcinolone, triamcinolone, triamcinolone, triamcinolone, triamcinolone, triamcinolone, and triamcinolone.    ROS:     ROS: 10 point ROS neg other than the symptoms noted above in the HPI.    GENERAL: fatigue, wt stable; denies fevers, chills, night sweats.   HEENT: no dysphagia, odonophagia, diplopia, neck pain  THYROID:  no apparent hyper or hypothyroid symptoms  CV: no chest pain, pressure, palpitations  LUNGS: no SOB, SERNA, cough, wheezing   ABDOMEN: no diarrhea, constipation, abdominal  pain  EXTREMITIES: no rashes, ulcers, edema  NEUROLOGY: no headaches, denies changes in vision, tingling, extremitiy numbness   MSK: polyarthralgias- pains at neck, hands, hips, ankles; generalized muscle weakness  SKIN: no rashes or lesions  : no menses with daily [place]   PSYCH:  stable mood, no significant anxiety or depression  ENDOCRINE: no heat or cold intolerance    Physical Exam (visual exam)  VS:  no vital signs taken for video visit  CONSTITUTIONAL: healthy, alert and NAD, well dressed, answering questions appropriately  ENT: no nose swelling or nasal discharge, mouth redness or gum changes.  EYES: eyes grossly normal to inspection, conjunctivae and sclerae normal, no exophthalmos or proptosis  THYROID:  no apparent nodules or goiter  LUNGS: no audible wheeze, cough or visible cyanosis, no visible retractions or increased work of breathing  ABDOMEN: abdomen not evaluated  EXTREMITIES: no hand tremors, limited exam  NEUROLOGY: CN grossly intact, mentation intact and speech normal   SKIN:  no apparent skin lesions, rash, or edema with visualized skin appearance  PSYCH: mentation appears normal, affect normal/bright, judgement and insight intact,   normal speech and appearance well groomed      LABS:    All pertinent notes, labs, and images personally reviewed by me.     A/P:  Encounter Diagnoses   Name Primary?    Abnormal laboratory test Yes    Type 2 diabetes mellitus without complication, with long-term current use of insulin (H)      Comments:  Reviewed the complicated health history and general endocrine issues.  Difficult to understand the endocrine concern by her [facility] provider from 3-yrs ago  Reported glycemic control and overall T2DM management going well    Plan:  Reviewed the adrenal gland, general anatomy and hormone secretion  We discussed adrenal lab testing   Reviewed the hyperglycemia effects of steroid medication use  Discussed general issues with the diabetes diagnosis and  management    Recommend:  Would be helpful if Dr. Pena would send a summary message with hormone testing concerns  Plan repeat lab testing, when not on steroid medication for at least 1-week  Arrange nonfasting lab testing soon   Testing at [facility] or other clinic   Lab orders placed  Monitor for symptom changes  No adrenal gland imaging needed at this time    Reasonable to continue the Humalog and Lantus insulin treatment plan  Use pre and post IV steroid NPH dosing, as previously  Reviewed using the Humalog mealtime base dose and also sscale if hyperglycemia  Would benefit from use of a CGM sensor such as Freestyle Libre2 or Dexcom  Repeat hgbA1c lab test every 3-4 months  Keep focus on diet, exercise, weight management.  Advise having fasting lipid panel testing and dilated eye examination, at least annually  She will arrange follow-up with Dr. Juares and the [place] diabetes educators for diabetes followup    Addressed patient questions today    There are no Patient Instructions on file for this visit.    Future labs ordered today:   Orders Placed This Encounter   Procedures    Cortisol free urine    Metanephrine random or 24 hr urine    Cortisol    Adrenal corticotropin    Aldosterone Renin Ratio     Radiology/Consults ordered today: None    Total time spent on day of encounter:  78 min    Follow-up:  CONCEPCION Leroy MD, MS  Endocrinology  [facility]    CC: Amol Juares and Care Team

## 2023-11-16 ENCOUNTER — THERAPY VISIT (OUTPATIENT)
Dept: PHYSICAL THERAPY | Facility: CLINIC | Age: 48
End: 2023-11-16
Payer: MEDICARE

## 2023-11-16 ENCOUNTER — THERAPY VISIT (OUTPATIENT)
Dept: OCCUPATIONAL THERAPY | Facility: CLINIC | Age: 48
End: 2023-11-16
Attending: INTERNAL MEDICINE
Payer: MEDICARE

## 2023-11-16 DIAGNOSIS — G89.29 CHRONIC BILATERAL LOW BACK PAIN WITHOUT SCIATICA: ICD-10-CM

## 2023-11-16 DIAGNOSIS — M54.50 CHRONIC BILATERAL LOW BACK PAIN WITHOUT SCIATICA: ICD-10-CM

## 2023-11-16 DIAGNOSIS — I89.0 LYMPHEDEMA: Primary | ICD-10-CM

## 2023-11-16 DIAGNOSIS — M54.2 NECK PAIN: ICD-10-CM

## 2023-11-16 DIAGNOSIS — R10.2 PELVIC PAIN IN FEMALE: Primary | ICD-10-CM

## 2023-11-16 PROCEDURE — 97140 MANUAL THERAPY 1/> REGIONS: CPT | Mod: GP | Performed by: PHYSICAL THERAPIST

## 2023-11-16 PROCEDURE — 97140 MANUAL THERAPY 1/> REGIONS: CPT | Mod: GO | Performed by: OCCUPATIONAL THERAPIST

## 2023-11-19 ENCOUNTER — HEALTH MAINTENANCE LETTER (OUTPATIENT)
Age: 48
End: 2023-11-19

## 2023-11-21 ENCOUNTER — THERAPY VISIT (OUTPATIENT)
Dept: PHYSICAL THERAPY | Facility: CLINIC | Age: 48
End: 2023-11-21
Payer: MEDICARE

## 2023-11-21 ENCOUNTER — THERAPY VISIT (OUTPATIENT)
Dept: OCCUPATIONAL THERAPY | Facility: CLINIC | Age: 48
End: 2023-11-21
Attending: INTERNAL MEDICINE
Payer: MEDICARE

## 2023-11-21 DIAGNOSIS — G89.29 CHRONIC BILATERAL LOW BACK PAIN WITHOUT SCIATICA: ICD-10-CM

## 2023-11-21 DIAGNOSIS — R10.2 PELVIC PAIN IN FEMALE: Primary | ICD-10-CM

## 2023-11-21 DIAGNOSIS — M54.2 NECK PAIN: ICD-10-CM

## 2023-11-21 DIAGNOSIS — M54.50 CHRONIC BILATERAL LOW BACK PAIN WITHOUT SCIATICA: ICD-10-CM

## 2023-11-21 DIAGNOSIS — I89.0 LYMPHEDEMA: Primary | ICD-10-CM

## 2023-11-21 PROCEDURE — 97140 MANUAL THERAPY 1/> REGIONS: CPT | Mod: GO | Performed by: OCCUPATIONAL THERAPIST

## 2023-11-21 PROCEDURE — 97140 MANUAL THERAPY 1/> REGIONS: CPT | Mod: GP | Performed by: PHYSICAL THERAPIST

## 2023-11-22 ENCOUNTER — THERAPY VISIT (OUTPATIENT)
Dept: OCCUPATIONAL THERAPY | Facility: CLINIC | Age: 48
End: 2023-11-22
Attending: INTERNAL MEDICINE
Payer: MEDICARE

## 2023-11-22 DIAGNOSIS — I89.0 LYMPHEDEMA: Primary | ICD-10-CM

## 2023-11-22 PROCEDURE — 97140 MANUAL THERAPY 1/> REGIONS: CPT | Mod: GO | Performed by: OCCUPATIONAL THERAPIST

## 2023-11-26 NOTE — TELEPHONE ENCOUNTER
DIAGNOSIS: ORTHOTICS CONSULT   APPOINTMENT DATE: 01/22/2024   NOTES STATUS DETAILS   OFFICE NOTE from referring provider SELF    MEDICATION LIST Internal  Care Everywhere    EMG (for Spine) Care Everywhere 01/14/2020, 02/13/2015 - [facility]

## 2023-11-28 ENCOUNTER — THERAPY VISIT (OUTPATIENT)
Dept: PHYSICAL THERAPY | Facility: CLINIC | Age: 48
End: 2023-11-28
Payer: MEDICARE

## 2023-11-28 ENCOUNTER — THERAPY VISIT (OUTPATIENT)
Dept: OCCUPATIONAL THERAPY | Facility: CLINIC | Age: 48
End: 2023-11-28
Attending: INTERNAL MEDICINE
Payer: MEDICARE

## 2023-11-28 DIAGNOSIS — G89.29 CHRONIC BILATERAL LOW BACK PAIN WITHOUT SCIATICA: ICD-10-CM

## 2023-11-28 DIAGNOSIS — R10.2 PELVIC PAIN IN FEMALE: Primary | ICD-10-CM

## 2023-11-28 DIAGNOSIS — M54.2 NECK PAIN: ICD-10-CM

## 2023-11-28 DIAGNOSIS — I89.0 LYMPHEDEMA: Primary | ICD-10-CM

## 2023-11-28 DIAGNOSIS — M54.50 CHRONIC BILATERAL LOW BACK PAIN WITHOUT SCIATICA: ICD-10-CM

## 2023-11-28 PROCEDURE — 97140 MANUAL THERAPY 1/> REGIONS: CPT | Mod: GO | Performed by: OCCUPATIONAL THERAPIST

## 2023-11-28 PROCEDURE — 97140 MANUAL THERAPY 1/> REGIONS: CPT | Mod: GP | Performed by: PHYSICAL THERAPIST

## 2023-11-29 ENCOUNTER — ANCILLARY PROCEDURE (OUTPATIENT)
Dept: MAMMOGRAPHY | Facility: CLINIC | Age: 48
End: 2023-11-29
Attending: INTERNAL MEDICINE
Payer: MEDICARE

## 2023-11-29 ENCOUNTER — TELEPHONE (OUTPATIENT)
Dept: RHEUMATOLOGY | Facility: CLINIC | Age: 48
End: 2023-11-29

## 2023-11-29 ENCOUNTER — TELEPHONE (OUTPATIENT)
Dept: OTHER | Facility: CLINIC | Age: 48
End: 2023-11-29

## 2023-11-29 ENCOUNTER — INFUSION THERAPY VISIT (OUTPATIENT)
Dept: INFUSION THERAPY | Facility: CLINIC | Age: 48
End: 2023-11-29
Attending: INTERNAL MEDICINE
Payer: MEDICARE

## 2023-11-29 VITALS
SYSTOLIC BLOOD PRESSURE: 125 MMHG | BODY MASS INDEX: 33.83 KG/M2 | WEIGHT: 209.6 LBS | OXYGEN SATURATION: 96 % | TEMPERATURE: 97.6 F | RESPIRATION RATE: 18 BRPM | DIASTOLIC BLOOD PRESSURE: 85 MMHG | HEART RATE: 69 BPM

## 2023-11-29 DIAGNOSIS — Z12.31 VISIT FOR SCREENING MAMMOGRAM: ICD-10-CM

## 2023-11-29 DIAGNOSIS — M13.80 SERONEGATIVE INFLAMMATORY ARTHRITIS: Primary | ICD-10-CM

## 2023-11-29 DIAGNOSIS — R89.9 ABNORMAL LABORATORY TEST: ICD-10-CM

## 2023-11-29 LAB
ALBUMIN SERPL BCG-MCNC: 4.3 G/DL (ref 3.5–5.2)
ALBUMIN UR-MCNC: 10 MG/DL
ALP SERPL-CCNC: 44 U/L (ref 40–150)
ALT SERPL W P-5'-P-CCNC: 52 U/L (ref 0–50)
ANION GAP SERPL CALCULATED.3IONS-SCNC: 9 MMOL/L (ref 7–15)
APPEARANCE UR: CLEAR
AST SERPL W P-5'-P-CCNC: 37 U/L (ref 0–45)
BACTERIA #/AREA URNS HPF: ABNORMAL /HPF
BASOPHILS # BLD AUTO: 0 10E3/UL (ref 0–0.2)
BASOPHILS NFR BLD AUTO: 1 %
BILIRUB SERPL-MCNC: 0.9 MG/DL
BILIRUB UR QL STRIP: NEGATIVE
BUN SERPL-MCNC: 13.4 MG/DL (ref 6–20)
CALCIUM SERPL-MCNC: 9.1 MG/DL (ref 8.6–10)
CHLORIDE SERPL-SCNC: 107 MMOL/L (ref 98–107)
COLOR UR AUTO: ABNORMAL
CORTIS SERPL-MCNC: 0.5 UG/DL
CREAT SERPL-MCNC: 0.91 MG/DL (ref 0.51–0.95)
DEPRECATED HCO3 PLAS-SCNC: 22 MMOL/L (ref 22–29)
EGFRCR SERPLBLD CKD-EPI 2021: 78 ML/MIN/1.73M2
EOSINOPHIL # BLD AUTO: 0.1 10E3/UL (ref 0–0.7)
EOSINOPHIL NFR BLD AUTO: 2 %
ERYTHROCYTE [DISTWIDTH] IN BLOOD BY AUTOMATED COUNT: 12.4 % (ref 10–15)
GLUCOSE SERPL-MCNC: 179 MG/DL (ref 70–99)
GLUCOSE UR STRIP-MCNC: NEGATIVE MG/DL
HCT VFR BLD AUTO: 43.7 % (ref 35–47)
HGB BLD-MCNC: 14.9 G/DL (ref 11.7–15.7)
HGB UR QL STRIP: NEGATIVE
IMM GRANULOCYTES # BLD: 0 10E3/UL
IMM GRANULOCYTES NFR BLD: 1 %
KETONES UR STRIP-MCNC: NEGATIVE MG/DL
LEUKOCYTE ESTERASE UR QL STRIP: NEGATIVE
LYMPHOCYTES # BLD AUTO: 2 10E3/UL (ref 0.8–5.3)
LYMPHOCYTES NFR BLD AUTO: 23 %
MCH RBC QN AUTO: 29.8 PG (ref 26.5–33)
MCHC RBC AUTO-ENTMCNC: 34.1 G/DL (ref 31.5–36.5)
MCV RBC AUTO: 87 FL (ref 78–100)
MONOCYTES # BLD AUTO: 0.8 10E3/UL (ref 0–1.3)
MONOCYTES NFR BLD AUTO: 9 %
MUCOUS THREADS #/AREA URNS LPF: PRESENT /LPF
NEUTROPHILS # BLD AUTO: 5.6 10E3/UL (ref 1.6–8.3)
NEUTROPHILS NFR BLD AUTO: 64 %
NITRATE UR QL: NEGATIVE
NRBC # BLD AUTO: 0 10E3/UL
NRBC BLD AUTO-RTO: 0 /100
PH UR STRIP: 6.5 [PH] (ref 5–7)
PLATELET # BLD AUTO: 336 10E3/UL (ref 150–450)
POTASSIUM SERPL-SCNC: 4.2 MMOL/L (ref 3.4–5.3)
PROT SERPL-MCNC: 6.7 G/DL (ref 6.4–8.3)
RBC # BLD AUTO: 5 10E6/UL (ref 3.8–5.2)
RBC URINE: 3 /HPF
SODIUM SERPL-SCNC: 138 MMOL/L (ref 135–145)
SP GR UR STRIP: 1.02 (ref 1–1.03)
SQUAMOUS EPITHELIAL: 4 /HPF
UROBILINOGEN UR STRIP-MCNC: NORMAL MG/DL
WBC # BLD AUTO: 8.5 10E3/UL (ref 4–11)
WBC URINE: 3 /HPF

## 2023-11-29 PROCEDURE — 84244 ASSAY OF RENIN: CPT

## 2023-11-29 PROCEDURE — 82024 ASSAY OF ACTH: CPT

## 2023-11-29 PROCEDURE — 96375 TX/PRO/DX INJ NEW DRUG ADDON: CPT

## 2023-11-29 PROCEDURE — 250N000011 HC RX IP 250 OP 636: Mod: JZ | Performed by: INTERNAL MEDICINE

## 2023-11-29 PROCEDURE — 82530 CORTISOL FREE: CPT

## 2023-11-29 PROCEDURE — 36415 COLL VENOUS BLD VENIPUNCTURE: CPT

## 2023-11-29 PROCEDURE — 82088 ASSAY OF ALDOSTERONE: CPT

## 2023-11-29 PROCEDURE — 96365 THER/PROPH/DIAG IV INF INIT: CPT

## 2023-11-29 PROCEDURE — 82533 TOTAL CORTISOL: CPT

## 2023-11-29 PROCEDURE — 80053 COMPREHEN METABOLIC PANEL: CPT | Performed by: INTERNAL MEDICINE

## 2023-11-29 PROCEDURE — 85025 COMPLETE CBC W/AUTO DIFF WBC: CPT | Performed by: INTERNAL MEDICINE

## 2023-11-29 PROCEDURE — 250N000013 HC RX MED GY IP 250 OP 250 PS 637: Performed by: INTERNAL MEDICINE

## 2023-11-29 PROCEDURE — 83835 ASSAY OF METANEPHRINES: CPT

## 2023-11-29 PROCEDURE — 77067 SCR MAMMO BI INCL CAD: CPT | Performed by: RADIOLOGY

## 2023-11-29 PROCEDURE — 77063 BREAST TOMOSYNTHESIS BI: CPT | Performed by: RADIOLOGY

## 2023-11-29 PROCEDURE — 81001 URINALYSIS AUTO W/SCOPE: CPT | Performed by: INTERNAL MEDICINE

## 2023-11-29 PROCEDURE — 258N000003 HC RX IP 258 OP 636: Performed by: INTERNAL MEDICINE

## 2023-11-29 RX ORDER — METHYLPREDNISOLONE SODIUM SUCCINATE 125 MG/2ML
125 INJECTION, POWDER, LYOPHILIZED, FOR SOLUTION INTRAMUSCULAR; INTRAVENOUS ONCE
Status: COMPLETED | OUTPATIENT
Start: 2023-11-29 | End: 2023-11-29

## 2023-11-29 RX ORDER — ACETAMINOPHEN 325 MG/1
650 TABLET ORAL ONCE
Status: CANCELLED | OUTPATIENT
Start: 2023-11-30

## 2023-11-29 RX ORDER — MEPERIDINE HYDROCHLORIDE 25 MG/ML
25 INJECTION INTRAMUSCULAR; INTRAVENOUS; SUBCUTANEOUS EVERY 30 MIN PRN
Status: CANCELLED | OUTPATIENT
Start: 2023-11-30

## 2023-11-29 RX ORDER — ALBUTEROL SULFATE 90 UG/1
1-2 AEROSOL, METERED RESPIRATORY (INHALATION)
Status: CANCELLED
Start: 2023-11-30

## 2023-11-29 RX ORDER — HEPARIN SODIUM,PORCINE 10 UNIT/ML
5 VIAL (ML) INTRAVENOUS
Status: CANCELLED | OUTPATIENT
Start: 2023-11-30

## 2023-11-29 RX ORDER — METHYLPREDNISOLONE SODIUM SUCCINATE 125 MG/2ML
125 INJECTION, POWDER, LYOPHILIZED, FOR SOLUTION INTRAMUSCULAR; INTRAVENOUS
Status: CANCELLED
Start: 2023-11-30

## 2023-11-29 RX ORDER — DIPHENHYDRAMINE HYDROCHLORIDE 50 MG/ML
50 INJECTION INTRAMUSCULAR; INTRAVENOUS
Status: CANCELLED
Start: 2023-11-30

## 2023-11-29 RX ORDER — ALBUTEROL SULFATE 0.83 MG/ML
2.5 SOLUTION RESPIRATORY (INHALATION)
Status: CANCELLED | OUTPATIENT
Start: 2023-11-30

## 2023-11-29 RX ORDER — HEPARIN SODIUM (PORCINE) LOCK FLUSH IV SOLN 100 UNIT/ML 100 UNIT/ML
5 SOLUTION INTRAVENOUS
Status: CANCELLED | OUTPATIENT
Start: 2023-11-30

## 2023-11-29 RX ORDER — METHYLPREDNISOLONE SODIUM SUCCINATE 125 MG/2ML
125 INJECTION, POWDER, LYOPHILIZED, FOR SOLUTION INTRAMUSCULAR; INTRAVENOUS ONCE
Status: CANCELLED | OUTPATIENT
Start: 2023-11-30

## 2023-11-29 RX ORDER — ACETAMINOPHEN 325 MG/1
650 TABLET ORAL ONCE
Status: COMPLETED | OUTPATIENT
Start: 2023-11-29 | End: 2023-11-29

## 2023-11-29 RX ORDER — EPINEPHRINE 1 MG/ML
0.3 INJECTION, SOLUTION INTRAMUSCULAR; SUBCUTANEOUS EVERY 5 MIN PRN
Status: CANCELLED | OUTPATIENT
Start: 2023-11-30

## 2023-11-29 RX ADMIN — SODIUM CHLORIDE 250 ML: 9 INJECTION, SOLUTION INTRAVENOUS at 14:04

## 2023-11-29 RX ADMIN — TOCILIZUMAB 800 MG: 20 INJECTION, SOLUTION, CONCENTRATE INTRAVENOUS at 13:03

## 2023-11-29 RX ADMIN — ACETAMINOPHEN 650 MG: 325 TABLET ORAL at 12:50

## 2023-11-29 RX ADMIN — METHYLPREDNISOLONE SODIUM SUCCINATE 125 MG: 125 INJECTION, POWDER, FOR SOLUTION INTRAMUSCULAR; INTRAVENOUS at 12:50

## 2023-11-29 ASSESSMENT — PAIN SCALES - GENERAL: PAINLEVEL: WORST PAIN (10)

## 2023-11-29 NOTE — TELEPHONE ENCOUNTER
Patient states she needs to follow-up with Dr. Lemus, but would like to schedule with Dr. Hernández. I informed patient that I will need to check with the nursing staff.

## 2023-11-29 NOTE — TELEPHONE ENCOUNTER
Please schedule new patient consult with Dr. Bonds at next available.    Appt Note:   Referred by lymphedema therapist to Dr. Bonds.

## 2023-11-29 NOTE — PATIENT INSTRUCTIONS
Dear Laxmi Ya    Thank you for choosing [facility] ([facility]) for your infusion.  The following information is a summary of our appointment as well as important reminders.      EDUCATION POST BIOLOGICAL/CHEMOTHERAPY INFUSION  Call the triage nurse at your clinic or seek medical attention if you have chills and/or temperature greater than or equal to 100.5, uncontrolled nausea/vomiting, diarrhea, constipation, dizziness, shortness of breath, chest pain, heart palpitations, weakness or any other new or concerning symptoms, questions or concerns.  You can not have any live virus vaccines prior to or during treatment or up to 6 months post infusion.  If you have an upcoming surgery, medical procedure or dental procedure during treatment, this should be discussed with your ordering physician and your surgeon/dentist.  If you are having any concerning symptom, if you are unsure if you should get your next infusion or wish to speak to a provider before your next infusion, please call your care coordinator or triage nurse at your clinic to notify them so we can adequately serve you.     If you are a transplant patient and require transplant education, please click on this link: [email or web address].    We look forward in seeing you on your next appointment here at  Infusion and Procedure Center ([facility]).  Please don t hesitate to call us at [phone] to reschedule any of your appointments or to speak with one of the [facility] registered nurses.  It was a pleasure taking care of you today.    Sincerely,    [facility]  [address] [place] [address]  [id number]  Phone:  [phone]

## 2023-11-29 NOTE — TELEPHONE ENCOUNTER
Future Appointments   Date Time Provider Department Center                                             12/13/2023  2:00 PM Annette Bonds MD [facility]

## 2023-11-29 NOTE — TELEPHONE ENCOUNTER
[facility]    Who is the name of the provider?:  RAMAKRISHNA MALDONADO   What is the location you see this provider at/preferred location?: [place]  Person calling / Facility: Laxmi Ya  Phone number:  [phone] (home)  Nurse call back needed:  Yes     Reason for call:  Has an Auto immune disorder  - lots of swelling in abdomen/pelvic area - lots of inflammation- lymph drainage occupational physical therapist referred Laxmi to us- she is being seen for lymphedema - would like to schedule with Dr Velazquez     Pharmacy location:     Outside Imaging: n/a   Can we leave a detailed message on this number?  YES

## 2023-11-29 NOTE — PROGRESS NOTES
Infusion Nursing Note:  Laxmi Ya presents today for monthly Actemra.    Patient seen by provider today: No   present during visit today: Not Applicable.    Note:   Premeds: Tylenol and Solumedrol.  Actemra infused over 60 minutes.  250 ml NS flushed after infusion, per orders.     Intravenous Access:  Labs drawn without difficulty.  Peripheral [facility] by [facility].    Treatment Conditions:  Biological Infusion Checklist:  ~~~ NOTE: If the patient answers yes to any of the questions below, hold the infusion and contact ordering provider or on-call provider.    Have you recently had an elevated temperature, fever, chills, productive cough, coughing for 3 weeks or longer or hemoptysis,  abnormal vital signs, night sweats,  chest pain or have you noticed a decrease in your appetite, unexplained weight loss or fatigue? No  Do you have any open wounds or new incisions? No  Do you have any upcoming hospitalizations or surgeries? Does not include esophagogastroduodenoscopy, colonoscopy, endoscopic retrograde cholangiopancreatography (ERCP), endoscopic ultrasound (EUS), dental procedures or joint aspiration/steroid injections No  Do you currently have any signs of illness or infection or are you on any antibiotics? No  Have you had any new, sudden or worsening abdominal pain? No  Have you or anyone in your household received a live vaccination in the past 4 weeks? Please note: No live vaccines while on biologic/chemotherapy until 6 months after the last treatment. Patient can receive the flu vaccine (shot only), pneumovax and the Covid vaccine. It is optimal for the patient to get these vaccines mid cycle, but they can be given at any time as long as it is not on the day of the infusion. No  Have you recently been diagnosed with any new nervous system diseases (ie. Multiple sclerosis, Guillain [place], seizures, neurological changes) or cancer diagnosis? Are you on any form of radiation or chemotherapy? No  Are  you pregnant or breast feeding or do you have plans of pregnancy in the future? No  Have there been any other new onset medical symptoms? No    Administrations This Visit       acetaminophen (TYLENOL) tablet 650 mg       Admin Date  11/29/2023 Action  $Given Dose  650 mg Route  Oral Documented By  Charmaine Mason RN              methylPREDNISolone sodium succinate (solu-MEDROL) injection 125 mg       Admin Date  11/29/2023 Action  $Given Dose  125 mg Route  Intravenous Documented By  Charmaine Mason RN              sodium chloride 0.9% BOLUS 250 mL       Admin Date  11/29/2023 Action  $New Bag Dose  250 mL Route  Intravenous Documented By  Charmaine Mason RN              tocilizumab (ACTEMRA) 800 mg in sodium chloride 0.9 % 150 mL infusion       Admin Date  11/29/2023 Action  $New Bag Dose  800 mg Rate  150 mL/hr Route  Intravenous Documented By  Charmaine Mason RN                  /80 (BP Location: Left arm, Patient Position: Semi-Stephens's, Cuff Size: Adult Large)   Pulse 69   Temp 97.6  F (36.4  C) (Oral)   Resp 18   Wt 95.1 kg (209 lb 9.6 oz)   SpO2 96%   BMI 33.83 kg/m      Post Infusion Assessment:  Patient tolerated infusion without incident.  Blood return noted pre and post infusion.  Site patent and intact, free from redness, edema or discomfort.  No evidence of extravasations.  Access discontinued per protocol.  Biologic Infusion Post Education: Call the triage nurse at your clinic or seek medical attention if you have chills and/or temperature greater than or equal to 100.5, uncontrolled nausea/vomiting, diarrhea, constipation, dizziness, shortness of breath, chest pain, heart palpitations, weakness or any other new or concerning symptoms, questions or concerns.  You cannot have any live virus vaccines prior to or during treatment or up to 6 months post infusion.  If you have an upcoming surgery, medical procedure or dental procedure during treatment, this should be discussed with your  ordering physician and your surgeon/dentist.  If you are having any concerning symptom, if you are unsure if you should get your next infusion or wish to speak to a provider before your next infusion, please call your care coordinator or triage nurse at your clinic to notify them so we can adequately serve you.       Discharge Plan:   Discharge instructions reviewed with: Patient.  Patient and/or family verbalized understanding of discharge instructions and all questions answered.  AVS to patient via [employer].  Patient will return 12/27 for next appointment.   Patient discharged in stable condition accompanied by: self.  Departure Mode: Ambulatory.      Charmaine Mason RN

## 2023-11-30 ENCOUNTER — THERAPY VISIT (OUTPATIENT)
Dept: OCCUPATIONAL THERAPY | Facility: CLINIC | Age: 48
End: 2023-11-30
Attending: INTERNAL MEDICINE
Payer: MEDICARE

## 2023-11-30 ENCOUNTER — THERAPY VISIT (OUTPATIENT)
Dept: PHYSICAL THERAPY | Facility: CLINIC | Age: 48
End: 2023-11-30
Payer: MEDICARE

## 2023-11-30 DIAGNOSIS — M54.2 NECK PAIN: ICD-10-CM

## 2023-11-30 DIAGNOSIS — M54.50 CHRONIC BILATERAL LOW BACK PAIN WITHOUT SCIATICA: ICD-10-CM

## 2023-11-30 DIAGNOSIS — I89.0 LYMPHEDEMA: Primary | ICD-10-CM

## 2023-11-30 DIAGNOSIS — R10.2 PELVIC PAIN IN FEMALE: Primary | ICD-10-CM

## 2023-11-30 DIAGNOSIS — G89.29 CHRONIC BILATERAL LOW BACK PAIN WITHOUT SCIATICA: ICD-10-CM

## 2023-11-30 LAB — ACTH PLAS-MCNC: <10 PG/ML

## 2023-11-30 PROCEDURE — 97530 THERAPEUTIC ACTIVITIES: CPT | Mod: GP | Performed by: PHYSICAL THERAPIST

## 2023-11-30 PROCEDURE — 97140 MANUAL THERAPY 1/> REGIONS: CPT | Mod: GP | Performed by: PHYSICAL THERAPIST

## 2023-11-30 PROCEDURE — 97140 MANUAL THERAPY 1/> REGIONS: CPT | Mod: GO | Performed by: OCCUPATIONAL THERAPIST

## 2023-12-01 ENCOUNTER — TELEPHONE (OUTPATIENT)
Dept: RHEUMATOLOGY | Facility: CLINIC | Age: 48
End: 2023-12-01
Payer: MEDICARE

## 2023-12-01 ENCOUNTER — TELEPHONE (OUTPATIENT)
Dept: ORTHOPEDICS | Facility: CLINIC | Age: 48
End: 2023-12-01
Payer: MEDICARE

## 2023-12-01 LAB — ALDOST SERPL-MCNC: 23.3 NG/DL (ref 0–31)

## 2023-12-01 NOTE — TELEPHONE ENCOUNTER
Patient Contacted for the patient to call back and schedule the following:    Appointment type: MTM RTN  Provider: EMILE  Return date: 01/02/2024  Specialty phone number: [phone]  Additional appointment(s) needed: N/A  Additonal Notes: N/A

## 2023-12-02 LAB
CREAT 24H UR-MRATE: NORMAL MG/D
CREAT UR-MCNC: 140 MG/DL
METANEPH 24H UR-MCNC: 63 UG/L
METANEPH 24H UR-MRATE: NORMAL UG/D
METANEPH+NORMETANEPH UR-IMP: NORMAL
METANEPH/CREAT 24H UR: 45 UG/G CRT
NORMETANEPHRINE 24H UR-MCNC: 240 UG/L
NORMETANEPHRINE 24H UR-MRATE: NORMAL UG/D
NORMETANEPHRINE/CREAT 24H UR: 171 UG/G CRT

## 2023-12-03 LAB
ANNOTATION COMMENT IMP: NORMAL
CORTIS F 24H UR HPLC-MCNC: <1 UG/L
CORTIS F 24H UR-MRATE: NORMAL UG/D
CORTIS F/CREAT 24H UR: <1 UG/G CRT
CREAT 24H UR-MRATE: NORMAL MG/D
CREAT UR-MCNC: 138 MG/DL

## 2023-12-04 ENCOUNTER — TELEPHONE (OUTPATIENT)
Dept: ALLERGY | Facility: CLINIC | Age: 48
End: 2023-12-04
Payer: MEDICARE

## 2023-12-04 LAB
ALDOST/RENIN PLAS-RTO: 0.2 {RATIO} (ref 0–25)
RENIN PLAS-CCNC: 132 NG/ML/HR

## 2023-12-04 NOTE — TELEPHONE ENCOUNTER
FREE DRUG APPLICATION INITIATED    Medication: DUPILUMAB 300 MG/2ML SC SOPN  Free Drug Program Name:  [facility]  Date Submitted: 12/4/2023  3:27 PM  Phone #:   Fax #:   Additional Information:   Sent MyC message with link to electronic renewal

## 2023-12-06 ENCOUNTER — OFFICE VISIT (OUTPATIENT)
Dept: OBGYN | Facility: CLINIC | Age: 48
End: 2023-12-06
Attending: STUDENT IN AN ORGANIZED HEALTH CARE EDUCATION/TRAINING PROGRAM
Payer: MEDICARE

## 2023-12-06 VITALS
WEIGHT: 208 LBS | HEART RATE: 89 BPM | SYSTOLIC BLOOD PRESSURE: 128 MMHG | DIASTOLIC BLOOD PRESSURE: 87 MMHG | HEIGHT: 66 IN | BODY MASS INDEX: 33.43 KG/M2

## 2023-12-06 DIAGNOSIS — M79.645 BILATERAL THUMB PAIN: Primary | ICD-10-CM

## 2023-12-06 DIAGNOSIS — N83.209 SIMPLE OVARIAN CYST: Primary | ICD-10-CM

## 2023-12-06 DIAGNOSIS — N90.89 VULVAR LESION: ICD-10-CM

## 2023-12-06 DIAGNOSIS — M79.644 BILATERAL THUMB PAIN: Primary | ICD-10-CM

## 2023-12-06 DIAGNOSIS — N89.9 NODULE OF VAGINA: ICD-10-CM

## 2023-12-06 PROCEDURE — 99214 OFFICE O/P EST MOD 30 MIN: CPT | Performed by: OBSTETRICS & GYNECOLOGY

## 2023-12-06 PROCEDURE — G0463 HOSPITAL OUTPT CLINIC VISIT: HCPCS | Performed by: OBSTETRICS & GYNECOLOGY

## 2023-12-06 RX ORDER — ACETAMINOPHEN 325 MG/1
975 TABLET ORAL ONCE
Status: CANCELLED | OUTPATIENT
Start: 2023-12-06 | End: 2023-12-06

## 2023-12-06 ASSESSMENT — PAIN SCALES - GENERAL: PAINLEVEL: WORST PAIN (10)

## 2023-12-06 NOTE — PROGRESS NOTES
Women's Health Appointment [facility]:     Subjective:   Laxmi is a 47 y/o  who presents at recommendation of dermatology for potential bartholin cyst. PMH of seronegative RA on Actemra IV infusions with occasional medrol dose packs, IBS, carpal tunnel, and DM2.     Labial lesion  Was seen by Dr. Shen of dermatology on 23 and referred for management. Had subsequent limited pelvic US which demonstrated two lesions.  Patient states that she has swelling of the right labia.  The swelling gets worse if she is walking or the area is rubbing in someway or another.  Notes when in the shower she notices bleeding which she states is significant.  No drainage into the vaginal opening.  No other drainage noted with the exception of occasional bleeding.    The first, was lateral to the right labia at the area of interest, there is soft  tissue edema with a 1.7 x 2.4 x 0.5 cm collection without internal  flow on Doppler. This is nonspecific.    There is a 0.8 x 1.0 x 0.5 cm well-circumscribed hypoechoic lesion  to the right of the anus without internal vascularity, likely benign  although indeterminate. Given this appears to be a complex cyst, this  may represent a sebaceous cyst.    Additionally with finding of . 3.2 x 2.6 x 2.8 cm simple right ovarian cyst, which does not  require follow-up in a premenopausal patient which pt is concerned about.     Pelvic pain  Additionally reports pelvic pain. Has been seeing pelvic floor PT for a while with additional lymphedema of the pubis which causes swelling.  Patient describes pain mostly in the lumbar/sacral region of her spine which radiates to the left side.  Has been seeing [facility] with MRIs for joint pain for several years.  Patient states that pain improves with prednisone and improves slightly with Actemra infusions.  Pain has been present for the last 6 months.    Pt had MRI at [facility] sine 20[facility]. Subsequent MR pelvic MSK on  "5/2023 with following findings per radiology read also done at [facility]. Bilateral degenerative labral tears and mild degenerative changes of the hips. No evidence of high-grade chondromalacia. Small region of signal abnormality on the right gluteus cally can be seen with a subtle focal muscle strain.     Objective:   Vitals:    12/06/23 1122   BP: 128/87   Pulse: 89   Weight: 94.3 kg (208 lb)   Height: 1.676 m (5' 6\")     General: Pt sitting comfortably in exam room. No acute distress.  Cushingoid features.  : Patient with small area of swelling with small pimple looking lesion on lateral labia majora slightly tender to palpation.  At time of exam no inflammation or swelling palpated no fluctuance or signs of overt infection.  Several small round blue lesions on medial labia majora look to be venous. Significant edema at the pubis with boggy, edematous feeling tissue.   Lower extremities: No lower extremity edema present bilaterally.   Neuro: Alert and oriented to person, place, and time.   Psych: Appropriate mood and affect.     Imaging:     Pelvic US 9/14/23:   1. Lateral to the right labia at the area of interest, there is soft  tissue edema with a 1.7 x 2.4 x 0.5 cm collection without internal  flow on Doppler. This is nonspecific. No infectious symptoms as per  clinic note 9/14/2023. Patient is scheduled for gynecologic surgery  evaluation.  2. There is a 0.8 x 1.0 x 0.5 cm well-circumscribed hypoechoic lesion  to the right of the anus without internal vascularity, likely benign  although indeterminate. Given this appears to be a complex cyst, this  may represent a sebaceous cyst.    MRI pelvis 5/15/23  FINDINGS:  MRI of the entire pelvis and both hips was performed at 1.5 [facility] without intravenous   contrast. Additional dedicated imaging of the left hip was obtained utilizing a narrowed   field-of-view. Degenerative change, tear and extensive ossification of both acetabula lobito.   Scattered " chondromalacia without subchondral cystic change or edema to indicate full-thickness   cartilage loss.     No stress fracture or osseous stress reaction. Scattered small benign pelvic bone islands.     Mild bilateral gluteal tendinopathy. Normal appearance of the bilateral proximal hamstring complex,   iliopsoas tendons and proximal rectus femoris tendons. 1.2 cm region of featheryT2 hyperintense   intramuscular signal abnormality in the peripheral right gluteus cally. Left L5-S1 laminectomy and pars defect. Degenerative changes L5 interspace.       Assessment and Plan:     Sebaceous cyst right labia:  -On examination, nothing to indicate acute infection of what is likely a sebaceous cyst on the right labia.  Patient wished to proceed with surgery to attempt to remove the cyst sac.  Discussed risks and benefits including risk of poor surgical site healing in the setting of marked labial lymphedema. Patient is committed to having this lesion removed.  Will require coordination with rheumatology on relation of surgery and Actemra infusion.    Simple ovarian cyst:  -Assured patient that ovarian cyst likely related to ovulation, and nothing to worry about.  Patient would like follow-up ultrasound for evaluation in 6 weeks.    Pelvic pain:   -Patient describes pelvic pain for the last 6 months.  Does have significant lymphedema in the pelvic region demonstrated on exam.  Seeing pelvic floor PT though persistent.  Does improve with steroids and Actemra infusions.  No evidence for pelvic congestion syndrome.     Follow up: Will schedule surgical removal of sebaceous cyst when convenient for her.       Belgica Morgan DO   Internal Medicine PGY2  [facility]    I have seen and examined the patient with the resident. I have reviewed, edited, and agree with the note.   My findings are: small minimal skin lesion on right labia. Given patient's intermittent symptoms and strong desire to remove the roots of the  recurrent bothersome lesion excision is offered. She was counseled about risks of poor healing in the setting of immunosuppression and chronic lymphedema.   33 mins spent in chart review, discussion, and exam on the day of service.   Alanis Pate MD

## 2023-12-06 NOTE — LETTER
2023       RE: Laxmi Ya  [address] [facility] [id number]     Dear Colleague,    Thank you for referring your patient, Laxmi Ya, to the [facility] [place] [facility]. Please see a copy of my visit note below.    Women's Health Appointment [facility]:     Subjective:   Laxmi is a 47 y/o  who presents at recommendation of dermatology for potential bartholin cyst. PMH of seronegative RA on Actemra IV infusions with occasional medrol dose packs, IBS, carpal tunnel, and DM2.     Labial lesion  Was seen by Dr. Shen of dermatology on 23 and referred for management. Had subsequent limited pelvic US which demonstrated two lesions.  Patient states that she has swelling of the right labia.  The swelling gets worse if she is walking or the area is rubbing in someway or another.  Notes when in the shower she notices bleeding which she states is significant.  No drainage into the vaginal opening.  No other drainage noted with the exception of occasional bleeding.    The first, was lateral to the right labia at the area of interest, there is soft  tissue edema with a 1.7 x 2.4 x 0.5 cm collection without internal  flow on Doppler. This is nonspecific.    There is a 0.8 x 1.0 x 0.5 cm well-circumscribed hypoechoic lesion  to the right of the anus without internal vascularity, likely benign  although indeterminate. Given this appears to be a complex cyst, this  may represent a sebaceous cyst.    Additionally with finding of . 3.2 x 2.6 x 2.8 cm simple right ovarian cyst, which does not  require follow-up in a premenopausal patient which pt is concerned about.     Pelvic pain  Additionally reports pelvic pain. Has been seeing pelvic floor PT for a while with additional lymphedema of the pubis which causes swelling.  Patient describes pain mostly in the lumbar/sacral region of her spine which radiates to the left side.   "Has been seeing [facility] with MRIs for joint pain for several years.  Patient states that pain improves with prednisone and improves slightly with Actemra infusions.  Pain has been present for the last 6 months.    Pt had MRI at [facility] March of 2022. Subsequent MR pelvic MSK on 5/2023 with following findings per radiology read also done at [facility]. Bilateral degenerative labral tears and mild degenerative changes of the hips. No evidence of high-grade chondromalacia. Small region of signal abnormality on the right gluteus cally can be seen with a subtle focal muscle strain.     Objective:   Vitals:    12/06/23 1122   BP: 128/87   Pulse: 89   Weight: 94.3 kg (208 lb)   Height: 1.676 m (5' 6\")     General: Pt sitting comfortably in exam room. No acute distress.  Cushingoid features.  : Patient with small area of swelling with small pimple looking lesion on lateral labia majora slightly tender to palpation.  At time of exam no inflammation or swelling palpated no fluctuance or signs of overt infection.  Several small round blue lesions on medial labia majora look to be venous. Significant edema at the pubis with boggy, edematous feeling tissue.   Lower extremities: No lower extremity edema present bilaterally.   Neuro: Alert and oriented to person, place, and time.   Psych: Appropriate mood and affect.     Imaging:     Pelvic US 9/14/23:   1. Lateral to the right labia at the area of interest, there is soft  tissue edema with a 1.7 x 2.4 x 0.5 cm collection without internal  flow on Doppler. This is nonspecific. No infectious symptoms as per  clinic note 9/14/2023. Patient is scheduled for gynecologic surgery  evaluation.  2. There is a 0.8 x 1.0 x 0.5 cm well-circumscribed hypoechoic lesion  to the right of the anus without internal vascularity, likely benign  although indeterminate. Given this appears to be a complex cyst, this  may represent a sebaceous cyst.    MRI pelvis " 5/15/23  FINDINGS:  MRI of the entire pelvis and both hips was performed at 1.5 [facility] without intravenous   contrast. Additional dedicated imaging of the left hip was obtained utilizing a narrowed   field-of-view. Degenerative change, tear and extensive ossification of both acetabula lobito.   Scattered chondromalacia without subchondral cystic change or edema to indicate full-thickness   cartilage loss.     No stress fracture or osseous stress reaction. Scattered small benign pelvic bone islands.     Mild bilateral gluteal tendinopathy. Normal appearance of the bilateral proximal hamstring complex,   iliopsoas tendons and proximal rectus femoris tendons. 1.2 cm region of featheryT2 hyperintense   intramuscular signal abnormality in the peripheral right gluteus cally. Left L5-S1 laminectomy and pars defect. Degenerative changes L5 interspace.       Assessment and Plan:     Sebaceous cyst right labia:  -On examination, nothing to indicate acute infection of what is likely a sebaceous cyst on the right labia.  Patient wished to proceed with surgery to attempt to remove the cyst sac.  Discussed risks and benefits including risk of poor surgical site healing in the setting of marked labial lymphedema. Patient is committed to having this lesion removed.  Will require coordination with rheumatology on relation of surgery and Actemra infusion.    Simple ovarian cyst:  -Assured patient that ovarian cyst likely related to ovulation, and nothing to worry about.  Patient would like follow-up ultrasound for evaluation in 6 weeks.    Pelvic pain:   -Patient describes pelvic pain for the last 6 months.  Does have significant lymphedema in the pelvic region demonstrated on exam.  Seeing pelvic floor PT though persistent.  Does improve with steroids and Actemra infusions.  No evidence for pelvic congestion syndrome.     Follow up: Will schedule surgical removal of sebaceous cyst when convenient for her.       Belgica Morgan DO    Internal Medicine PGY2  [facility]    I have seen and examined the patient with the resident. I have reviewed, edited, and agree with the note.   My findings are: small minimal skin lesion on right labia. Given patient's intermittent symptoms and strong desire to remove the roots of the recurrent bothersome lesion excision is offered. She was counseled about risks of poor healing in the setting of immunosuppression and chronic lymphedema.   33 mins spent in chart review, discussion, and exam on the day of service.   Alanis Pate MD

## 2023-12-07 ENCOUNTER — THERAPY VISIT (OUTPATIENT)
Dept: OCCUPATIONAL THERAPY | Facility: CLINIC | Age: 48
End: 2023-12-07
Attending: INTERNAL MEDICINE
Payer: MEDICARE

## 2023-12-07 DIAGNOSIS — I89.0 LYMPHEDEMA: Primary | ICD-10-CM

## 2023-12-07 PROCEDURE — 97140 MANUAL THERAPY 1/> REGIONS: CPT | Mod: GO | Performed by: OCCUPATIONAL THERAPIST

## 2023-12-07 NOTE — PROGRESS NOTES
12/07/23 0500   Goals   OT Goals 1;2;3;4   OT Goal 1   Goal Identifier 1   Goal Description In order to improve functional mobility for activities of living, by the completion of intensive treatment, patient and/or caregiver will;      Verbalize and/or demonstrate understanding of techniques to independently manage their lymphedema at home   Rationale In order to maximize safety and independence with performance of self-care activities   Target Date 11/30/23   OT Goal 2   Goal Identifier 1a   Goal Description demonstrate independence in performing prescribed exercises/self MLD to facilate the lymph system   Rationale In order to maximize safety and independence with performance of self-care activities   Goal Progress pt has trouble as hands and movements are painful   Target Date 11/30/23   OT Goal 3   Goal Identifier 1b   Goal Description be independent in donning/doffing, wearing schedule, and care of compression garments   Rationale In order to maximize safety and independence with performance of self-care activities   Goal Progress Made appt for José Miguel 3.   Target Date 11/30/23   OT Goal 4   Goal Identifier 2   Goal Description Pt will decrease the suprapubic swelling to perform hygiene/shave with a normal effort.   Rationale In order to maximize safety and independence with performance of self-care activities   Goal Progress Pt reduced 1.5c R and .8c L   Target Date 11/30/23         [facility]                                                                                   OUTPATIENT OCCUPATIONAL THERAPY    PLAN OF TREATMENT FOR OUTPATIENT REHABILITATION   Patient's Last Name, First Name, Laxmi Coleman YOB: 1975   Provider's Name   [facility]   Medical Record No.  [id number]     Onset Date: 09/07/22 Start of Care Date: 09/07/23     Medical Diagnosis:  lymphedema      OT Treatment Diagnosis:  lymphedema Plan of  Treatment  Frequency/Duration:0x/week for 6 weeks, then 2x/week for 5 weeks    Certification date from 12/1/23   To 2/26/24     See note for plan of treatment details and functional goals     Alanis Schilling OT                         I CERTIFY THE NEED FOR THESE SERVICES FURNISHED UNDER        THIS PLAN OF TREATMENT AND WHILE UNDER MY CARE .             Physician Signature               Date    X_____________________________________________________                  Referring Provider:  Amol Juares    Initial Assessment  See Epic Evaluation- 09/07/23

## 2023-12-13 ENCOUNTER — TELEPHONE (OUTPATIENT)
Dept: RHEUMATOLOGY | Facility: CLINIC | Age: 48
End: 2023-12-13
Payer: MEDICARE

## 2023-12-13 NOTE — TELEPHONE ENCOUNTER
[facility] Call Center    Phone Message    May a detailed message be left on voicemail: yes     Reason for Call: Pt schedule for an in person visit with Dr. Lemus 1/15/2023 would like to know if able to switch to vv. Recently had a fall and would be more convenient if able to do vv. Please follow up with pt.     Action Taken: Other: rheum    Travel Screening: Not Applicable

## 2023-12-14 ENCOUNTER — VIRTUAL VISIT (OUTPATIENT)
Dept: GASTROENTEROLOGY | Facility: CLINIC | Age: 48
End: 2023-12-14
Payer: MEDICARE

## 2023-12-14 VITALS — WEIGHT: 209 LBS | BODY MASS INDEX: 33.59 KG/M2 | HEIGHT: 66 IN

## 2023-12-14 DIAGNOSIS — K76.0 NAFLD (NONALCOHOLIC FATTY LIVER DISEASE): Primary | ICD-10-CM

## 2023-12-14 DIAGNOSIS — E66.01 MORBID OBESITY (H): ICD-10-CM

## 2023-12-14 DIAGNOSIS — E11.9 TYPE 2 DIABETES MELLITUS WITHOUT COMPLICATION, WITH LONG-TERM CURRENT USE OF INSULIN (H): ICD-10-CM

## 2023-12-14 DIAGNOSIS — Z79.4 TYPE 2 DIABETES MELLITUS WITHOUT COMPLICATION, WITH LONG-TERM CURRENT USE OF INSULIN (H): ICD-10-CM

## 2023-12-14 PROCEDURE — 99215 OFFICE O/P EST HI 40 MIN: CPT | Mod: VID | Performed by: PHYSICIAN ASSISTANT

## 2023-12-14 NOTE — PROGRESS NOTES
Virtual Visit Details    Type of service:  Video Visit   Video time: 4:05 pm - 4:39 pm   Originating Location (pt. Location): Home  Distant Location (provider location):  Off-site  Platform used for Video Visit: [facility]      Hepatology Follow-up Clinic note  Laxmi Ya   Date of Birth 1975  Date of Service 12/14/2023    Reason for follow-up: MAFLD         Assessment/plan:   Laxmi Ya is a 48 year old female with History of elevated ALT/AST and imaging findings of hepatic steatosis. Risk factors for fatty liver disease includes: obesity, HTN, hyperlipidemia, hypothryoidism, diabetes. Transaminases have been historically mildly elevated. Liver function also normal without stigmata of advanced liver disease.     Last MR Elastography showing 2020 showing normal readings (essentially Stage 0-1).     # If methotrexate should be indicated for optimization of seronoegative RA, recommend first reassessing for any fibrosis development. If no fibrosis, then certainly reasonable to restart methotrexate to improve symptoms as this will increase likelihood of more movement.     #Metabolic associated fatty liver disease (MALFD):   - Patient has multiple risk factors increasing risk for on-going fibrosis. We reviewed natural history of nonalcoholic fatty liver disease, as not all individuals develop fibrosis.   - Will obtain an updated fibrosis assessment to evaluate any fibrosis, which is important in risk stratification of likelihood of on-going fibrosis without weight loss.     - Continue aggressive risk optimization. Screen for sleep apnea as needed.   - Recommend FIB-4 yearly with PCP. FIB-4 Calculation: 0.72 at   - Recommend slow gradual weight loss. Recommend discussing GLP-1 inhibitor with your primary care provider for both insulin resistance and help with weight loss  - Maintain good control of cholesterol (No contraindication to starting a statin with LFT elevations)   - Optimize blood glucose as needed.  "  - Improve nutrition: continue limiting carbohydrates, especially simple carbohydrates, and following Mediterranean eating patten  - Increase physical activity as able, ideally 150 minutes weekly    - Recommend follow up with non-invasive elastography or FibroScan in 3-5 years moving forward    - Follow-up in clinic in six months     Jennifer Canales PA-C   [facility]    Total time for E/M services performed on the date of the encounter 60 minutes.  This included review of previous: clinic visits, hospital records, lab results, imaging studies, and procedural documentation. Time also includes patient visit, documentation and discussion with other providers.  The findings from this review are summarized in the above note.   -----------------------------------------------------       HPI:   Laxmi aY is a 48 year old female presenting for follow-up.     Dx: MAFLD   Risk factors: obesity, HTN, hyperlipidemia, hypothryoidism, diabetes  MRE - 2020, 2.1 kilopascals normal   Bx:  no     Patient was last seen by Dr. Cristobal on 12/13/2022.     Within the past six months. 218 down to 209 lbs. Non-inflammation. Reduce red meat. Repots meeting with dietitian regularly.     Stools improved now, typically three daily.    Lymphedema in things extra fluid. Pelvic adomen.     Patient denies jaundice, lower extremity edema, abdominal distension or confusion.  Patient also denies melena, hematochezia or hematemesis.    Previous work-up:   Lab Results   Component Value Date    HEPBANG Nonreactive 03/14/2023    HCVRNA Not Detected 03/14/2023     07/06/2022    CHOL 270 (H) 07/13/2023    HDL 59 07/13/2023     (H) 07/13/2023    TRIG 220 (H) 07/13/2023    A1C 7.0 (H) 02/06/2023      No results found for: \"SPECDES\", \"LDRESULTS\"    A1AT - 139 MM   Last HB surface antibody - 405   TTG <3 0 201    Recent Labs   Lab Test 11/29/23  1232 11/01/23  1303 10/04/23  1256 09/06/23  1249 08/10/23  1451 " 07/13/23  1412 06/07/23  1249 04/11/23  1257 02/06/23  1222 01/12/23  1301 12/08/22  1211   ALKPHOS 44 45 46 47 44  --  42 41 35 42 43   ALT 52* 41 82* 52* 64* 56* 65* 54* 39* 32 47*   AST 37 32 63* 39 51* 46* 50* 33 26 19 33   BILITOTAL 0.9 0.8 0.9 0.8 0.8  --  0.8 0.7 0.7 0.5 0.9     Medical hx Surgical hx   Past Medical History:   Diagnosis Date    Asthma     Atrial fibrillation (H)     CTS (carpal tunnel syndrome)     Diarrhea 08/11/2000    Diverticular disease of colon     Dry eye syndrome     Fibromyalgia     GERD (gastroesophageal reflux disease)     Hematuria 08/11/2000    HTN (hypertension)     COLON (nonalcoholic steatohepatitis)     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    Obesity     Pyelonephritis, unspecified 1992    Rheumatoid arteritis (H)     Seronegative inflammatory arthritis 06/17/2022    Type 2 diabetes mellitus (H)     Unspecified symptom associated with female genital organs 05/07/1999    Past Surgical History:   Procedure Laterality Date    CHOLECYSTECTOMY      COLON SURGERY      Left hemicolectomy for diverticulosis    CYSTOSCOPY,+URETEROSCOPY      abstract    ZZ EXCIS/CURET BENIGN ELBOW LESN  10/26/1987    (R) humerus abstract                 Medications:     Current Outpatient Medications   Medication    albuterol (PROAIR HFA/PROVENTIL HFA/VENTOLIN HFA) 108 (90 Base) MCG/ACT inhaler    Artificial Tear Solution (SOOTHE XP) SOLN    atenolol (TENORMIN) 50 MG tablet    benzoyl peroxide 5 % external liquid    blood glucose (NO BRAND SPECIFIED) lancets standard    blood glucose (NO BRAND SPECIFIED) test strip    blood glucose monitoring (NO BRAND SPECIFIED) meter device kit    diclofenac (VOLTAREN) 1 % topical gel    dupilumab (DUPIXENT) 300 MG/2ML prefilled pen    EPINEPHrine (ANY BX GENERIC EQUIV) 0.3 MG/0.3ML injection 2-pack    fexofenadine (ALLEGRA) 180 MG tablet    fidaxomicin (DIFICID) 200 MG tablet    fluticasone-vilanterol (BREO ELLIPTA) 200-25  MCG/ACT inhaler    insulin glargine (LANTUS PEN) 100 UNIT/ML pen    insulin lispro (HUMALOG KWIKPEN) 100 UNIT/ML (1 unit dial) KWIKPEN    insulin NPH (HUMULIN N KWIKPEN) 100 UNIT/ML injection    Lancets (ONETOUCH DELICA PLUS [id number]) MISC    lidocaine (LIDODERM) 5 % patch    losartan (COZAAR) 100 MG tablet    methylPREDNISolone (MEDROL DOSEPAK) 4 MG tablet therapy pack    methylPREDNISolone (MEDROL DOSEPAK) 4 MG tablet therapy pack    mometasone (NASONEX) 50 MCG/ACT nasal spray    mometasone-formoterol (DULERA) 200-5 MCG/ACT inhaler    Montelukast Sodium (SINGULAIR PO)    multivitamin w/minerals (THERA-VIT-M) tablet    nystatin (MYCOSTATIN) 232868 UNIT/ML suspension    olopatadine (PATADAY) 0.7 % ophthalmic solution    OMEPRAZOLE PO    oxyCODONE IR (ROXICODONE) 10 MG tablet    rifaximin (XIFAXAN) 550 MG TABS tablet    rosuvastatin (CRESTOR) 5 MG tablet    spironolactone-HCTZ (ALDACTAZIDE) 25-25 MG tablet    STELARA 45 MG/0.5ML SOSY    tocilizumab (ACTEMRA) 400 MG/20ML    norethindrone (MICRONOR) 0.35 MG tablet     Current Facility-Administered Medications   Medication    lidocaine (PF) (XYLOCAINE) 1 % injection 1 mL    lidocaine (PF) (XYLOCAINE) 1 % injection 1 mL    lidocaine (PF) (XYLOCAINE) 1 % injection 2 mL    lidocaine (PF) (XYLOCAINE) 1 % injection 2 mL    lidocaine (PF) (XYLOCAINE) 1 % injection 2 mL    lidocaine (PF) (XYLOCAINE) 1 % injection 2 mL    lidocaine (PF) (XYLOCAINE) 1 % injection 3 mL    triamcinolone (KENALOG-40) injection 20 mg    triamcinolone (KENALOG-40) injection 20 mg    triamcinolone (KENALOG-40) injection 40 mg    triamcinolone (KENALOG-40) injection 40 mg    triamcinolone (KENALOG-40) injection 40 mg    triamcinolone (KENALOG-40) injection 40 mg    triamcinolone (KENALOG-40) injection 40 mg            Allergies:     Allergies   Allergen Reactions    Polyethylene Glycol Rash    Codeine      Other reaction(s): Gastrointestinal, GI intolerance, Vomiting  Vomiting      Gadolinium  "Dizziness and Nausea    Hydrocodone-Acetaminophen Nausea and Vomiting     Other reaction(s): GI intolerance    Iodine      abstract    Sulfasalazine      Other reaction(s): GI intolerance  Has tried and had nausa.    Tramadol Nausea and Vomiting     Other reaction(s): Gastrointestinal, Other (see comments)  Nausea and vomiting   unknown      Acetaminophen Nausea     Nausea and vomiting  Nausea and vomiting  Nausea and vomiting  Nausea and vomiting      Gluten Meal Other (See Comments) and Rash     Other reaction(s): Gastrointestinal, GI intolerance  Dizziness, tired, rash, stomach cramps, thrush, mouth sores  Dizziness, tired, rash, stomach cramps, thrush, mouth sores      Propylene Glycol Dizziness, Nausea and Vomiting, Nausea and Rash            Review of Systems:   10 points ROS was obtained and highlighted in the HPI, otherwise negative.          Physical Exam:   VS:  Ht 1.676 m (5' 6\")   Wt 94.8 kg (209 lb)   BMI 33.73 kg/m        GENERAL: healthy, alert and no distress  EYES: Eyes grossly normal to inspection, conjunctivae and sclerae normal  RESP: no audible wheeze, cough, or visible cyanosis.  No visible retractions or increased work of breathing.  Able to speak fully in complete sentences  NEURO: Cranial nerves grossly intact, mentation intact and speech normal  PSYCH: menttion appears normal, affect normal/bright, judgement and insight intact, normal speech and appearance well-groomed           Data:   Reviewed in person and significant for:    Lab Results   Component Value Date     11/29/2023     04/01/2020      Lab Results   Component Value Date    POTASSIUM 4.2 11/29/2023    POTASSIUM 3.7 11/17/2022    POTASSIUM 4.2 04/01/2020     Lab Results   Component Value Date    CHLORIDE 107 11/29/2023    CHLORIDE 106 11/17/2022    CHLORIDE 105 04/01/2020     Lab Results   Component Value Date    CO2 22 11/29/2023    CO2 22 11/17/2022    CO2 24 04/01/2020     Lab Results   Component Value Date    BUN " "13.4 11/29/2023    BUN 10 11/17/2022    BUN 11 04/01/2020     Lab Results   Component Value Date    CR 0.91 11/29/2023    CR 0.73 04/01/2020       Lab Results   Component Value Date    WBC 8.5 11/29/2023    WBC 9.3 04/01/2020     Lab Results   Component Value Date    HGB 14.9 11/29/2023    HGB 15.8 04/01/2020     Lab Results   Component Value Date    HCT 43.7 11/29/2023    HCT 47.3 04/01/2020     Lab Results   Component Value Date    MCV 87 11/29/2023    MCV 85 04/01/2020     Lab Results   Component Value Date     11/29/2023     04/01/2020       Lab Results   Component Value Date    AST 37 11/29/2023    AST 24 04/01/2020     Lab Results   Component Value Date    ALT 52 11/29/2023    ALT 36 04/01/2020     No results found for: \"BILICONJ\"   Lab Results   Component Value Date    BILITOTAL 0.9 11/29/2023    BILITOTAL 0.6 04/01/2020       Lab Results   Component Value Date    ALBUMIN 4.3 11/29/2023    ALBUMIN 3.7 11/17/2022    ALBUMIN 3.9 04/01/2020     Lab Results   Component Value Date    PROTTOTAL 6.7 11/29/2023    PROTTOTAL 8.1 04/01/2020      Lab Results   Component Value Date    ALKPHOS 44 11/29/2023    ALKPHOS 82 04/01/2020       Lab Results   Component Value Date    INR 0.95 12/08/2022    INR 0.93 04/01/2020       EXAM:  MR LIVER ELASTOGRAM ONLY WITHOUT IV CONTRAST     3D maximum intensity projections/volume renderings were created on an   independent workstation as ordered by the treating provider and reviewed by the   radiologist for determination of hepatic parenchymal stiffness.     COMPARISON:  CT Abdomen/Pelvis 08/04/2020, MR Enterography 06/12/2013     FINDINGS:   Diffuse parenchymal hepatic steatosis. Hepatic fat fraction 22 %.   Cholecystectomy. The liver, pancreas, spleen, adrenal glands, and kidneys are   normal. Splenule. No abdominal lymphadenopathy.     Mean liver stiffness:  2.1 kPa   Range:  2.0-2.2 kPa     Interpretation of MRE results:   <2.5kPa = Normal   2.5 to 3.0 kPa = Normal " or inflammation     Increased liver stiffness under appropriate clinical and laboratory findings is   compatible with liver fibrosis as below:   3.0 to 3.5 kPa = Stage 1-2 fibrosis   3.5 to 4 kPa = Stage 2-3 fibrosis   4 to 5 kPa = Stage 3-4 fibrosis   >5 kPa = Stage 4 fibrosis

## 2023-12-14 NOTE — NURSING NOTE
Is the patient currently in the state of [place]? YES    Visit mode:VIDEO    If the visit is dropped, the patient can be reconnected by: VIDEO VISIT: Text to cell phone:   Telephone Information:   Mobile [phone]       Will anyone else be joining the visit? NO  (If patient encounters technical issues they should call [phone]    How would you like to obtain your AVS? MyChart    Are changes needed to the allergy or medication list? No    Reason for visit: JESUS CROWLEY

## 2023-12-14 NOTE — TELEPHONE ENCOUNTER
Called [employer], they have not received Renewal forms yet.  LVM with patient offering assistance if needed to complete forms.

## 2023-12-15 ENCOUNTER — TELEPHONE (OUTPATIENT)
Dept: OBGYN | Facility: CLINIC | Age: 48
End: 2023-12-15
Payer: MEDICARE

## 2023-12-15 ENCOUNTER — VIRTUAL VISIT (OUTPATIENT)
Dept: RHEUMATOLOGY | Facility: CLINIC | Age: 48
End: 2023-12-15
Attending: INTERNAL MEDICINE
Payer: MEDICARE

## 2023-12-15 VITALS — BODY MASS INDEX: 33.73 KG/M2 | WEIGHT: 209 LBS

## 2023-12-15 DIAGNOSIS — E23.0 PARTIAL HYPOPITUITARISM (H): ICD-10-CM

## 2023-12-15 DIAGNOSIS — R79.89 LOW SERUM CORTISOL LEVEL: Primary | ICD-10-CM

## 2023-12-15 DIAGNOSIS — M13.0 SERONEGATIVE POLYARTHRITIS: Primary | ICD-10-CM

## 2023-12-15 PROCEDURE — 99214 OFFICE O/P EST MOD 30 MIN: CPT | Mod: VID | Performed by: INTERNAL MEDICINE

## 2023-12-15 ASSESSMENT — PAIN SCALES - GENERAL: PAINLEVEL: WORST PAIN (10)

## 2023-12-15 NOTE — PATIENT INSTRUCTIONS
Dx  Seronegative inflammatory arthritis, improved with higher dose actemra.  I recommend continuing Actemra for inflammatory arthritis  Osteoarthritis  Carpal tunnel syndrome  Pelvis pain: I recommend following through with recommended cyst removal surgery per gynecology, and then pursuing pain clinic recommendations for sacral injection prior to considering immunomodulatory therapy change (such as adding methotrexate).    Plan:  Continue actemra IV. Plan 750 mg IV every 28 days; reduce pre-med methylprednisolone from 125 to 80 mg to reduce effect on blood sugar.  For flaring pain despite Actemra, use Medrol 16 mg (4 tabs) with taper (3 tablets daily for 1 week, then 2 tablets daily for 1 week) over several weeks for active arthritis flares. I recommend avoiding use of Medrol burst and taper greater than once out of every 2 months.  I do not recommend chronic low-dose corticosteroids.  Follow-up with Dr. Petit regarding thumb and carpal tunnel syndrome symptoms  Follow-up with Dr. Thompson regarding bowel disease  Follow-up with gastroenterology for monitoring liver fibrosis and nonalcoholic fatty liver disease  Pain service for management of chronic pelvic pain  Gynecology to address pelvic cyst.  I recommend having surgery at the end of a Actemra dosing cycle, and resuming infusions within 7 to 14 days.

## 2023-12-15 NOTE — LETTER
12/15/2023       RE: Laxmi Ya  [address] [place] [id number]     Dear Colleague,    Thank you for referring your patient, Laxmi Ya, to the [facility] [place] at [facility]. Please see a copy of my visit note below.    Type of service:  Video Visit   Video Start Time: 2:14 PM  Video End Time: 2:45 PM    Originating Location (pt. Location): Home  Distant Location (provider location):  On-site  Platform used for Video Visit: [facility]    Rheumatology Clinic Visit  [facility]  Oswaldo Lemus M.D.     Laxmi Ya MRN# [id number]   YOB: 1975 Age: 48 year old   Date of Visit:12/15/2023    Primary care provider: Artur Marsh          Assessment and Plan:     Seronegative inflammatory (rheumatoid) arthritis:  Chronic small joint predominant stiffness and pain continue improved modestly since substitution of Actemra IV infusions (for Orencia) in June 2022.  Video exam shows no gross swelling at MCP, wrists, or fingers.    Data: Lab work  showed comprehensive metabolic panel normal except for < 2X ULN elevation ALT; cholesterol high at 256; CBC normal; urinalysis clear.  In 2021, DIEGO/FAVIOLA panel/CCP/RF/ ANCA all negative.      Discussion:  1.  Seronegative inflammatory arthritis remains significantly improved after starting Actemra intravenous infusions in August 2022.  Improved symptoms are focused primarily in the small joints of the hands and feet. Abdominal and pelvic pain persist, but I do not think these pain generators are due to the same systemic pathologic process as inflammatory arthritis in the hands and feet. I recommend continuing Actemra 750 mg IV every 28 days.    2. Carpal tunnel syndrome; stable  3. Chronic pelvic pain: Symptom generator has not been definitively identified.  Patient has been following with the pain service, and with gynecology.  Resection of pelvic cyst is planned in  the next several weeks.  If removal of the cyst does not result in significant amelioration of pelvic pain, I am in agreement with proposed plan for trial of pain relieving injection.  We discussed potential for augmenting immunomodulatory therapy if other avenues to relief of pain are not fruitful.  Additional imaging with cross-sectional approach may be needed prior to consideration of add back of methotrexate, since combination methotrexate and Actemra is expected to result in more significant immunosuppression.    Plan:  Continue actemra IV. Plan 750 mg IV every 28 days; reduce pre-med methylprednisolone from 125 to 80 mg to reduce effect on blood sugar.  For flaring pain despite Actemra, use Medrol 16 mg (4 tabs) with taper (3 tablets daily for 1 week, then 2 tablets daily for 1 week) over several weeks for active arthritis flares. I recommend avoiding use of Medrol burst and taper greater than once out of every 2 months.  I do not recommend chronic low-dose corticosteroids.  Follow-up with Dr. Petit regarding thumb and carpal tunnel syndrome symptoms  Follow-up with Dr. Thompson regarding bowel disease  Follow-up with gastroenterology for monitoring liver fibrosis and nonalcoholic fatty liver disease  Pain service for management of chronic pelvic pain  Gynecology to address pelvic cyst.  I recommend having surgery at the end of a Actemra dosing cycle, and resuming infusions within 7 to 14 days    RTC 7 mos    Oswaldo Lemus M.D.  Staff Rheumatologist, [facility]  Pager [phone]           History of Present Illness:   Laxmi Ya with history of COLON, hypertension, diagnosis of seronegative rheumatoid arthritis with tenosynovitis presents for follow-up.  She was last seen in 6-2023 when actemra was continued at 750 mg/m2 every 28 days for stable inflammatory arthritis.    Background; Seronegative rheumatoid arthritis with predominant tenosynovitis:   In 2015, rheumatoid factor, cyclic citrullinated  "peptide antibodies of extractable nuclear antigen panel, and anti-DNA antibodies were negative. Patient was initially treated for undifferentiated mixed connective tissue disease with Cellcept/Plaquenil for a prior Hx of weakly positive DIEGO. Then she established care with Dr. Saleh at [facility] in 2018, initially disease thought to be non-inflammatory, later diagnosed with inflammatory arthritis, rheumatoid syndrome and then seronegative RA. Patient was initially on Humira for a year starting in 2018, then discontinued due to failure to improve her joint symptoms, received a trial of Embrel which led to infections including sinus/ UTIs, then patient was on Remicade for 2 yrs but it did not work for all the joints like her back and she started having siginficant GI issues like diarrhea and abdominal pain, but did not necessarily think that it failed completely to releive her symptoms. Patient was started on Symponi in Oct 2021, but developed serious reaction to it reporting an area of severe erythema in her intestinal area, along with abdominal pain/ nausea/vomiting so she was switched to Orencia in Nov 2021, received one infusion but then developed serious sinus infections/ Covid in Jan 2022. Orencia infusions restarted in 2-2022, held in 6-2022. Actemra started 7-2022, continued through November 2022, dose increased to 750 mg       Interval history December 15, 2023    She fell downstairs last week. Her knee is swollen and hurting. She is scheduled for an MRI. Taking 800 ibu bid with some relief, using ice.  Before the fall, she noted decent control of hand and foot pain. Former sharp/shooting pains and burning pain in her hands and feet much better.  She gets regular injections of carpal tunnel and base of thumbs every 2 months  Feet are sore/stiff in the mornings, but they loosen up through the day.  Early morning stiffness is still many hours.    Actemra continues, she thinks it is helping \"quite a bit\". No " AE, getting monthly infusions.  She took a medrol dose pack in 11-23 and 8-23, but has otherwise not used corticosteroids other than Actemra premedication since the last visit.    She is concerned about ongoing swelling in the abdomen and face; she is seeing therapy for lymphedema--getting massage and expecting to use compression garments.    She has chronic pelvic pain; sometimes it is hard to walk. + swelling in the pubic area--swelling does go down at time of infusion.  Consequently, she wonders whether additional immunomodulatory therapy would help.  She is working with pelvic floor therapist, but benefit lasts only 4-5 days. Gyn eval showed a cyst; a surgery has been proposed.  She has been following with the pain service; a sacral injection has been proposed.    Interval history June 30, 2023    She had I/D of a blister on her R great toe that had started 3-4 weeks ago. She was started on abx, but the toe remains throbbing and painful. It awakens her at night. She is taking alternating ibuprofen and acetaminophen, still having significant pain. Also using cliobetasol on blisters on several toes.    Since increasing actemra dose in 4-2023, she notes big improvement in control of diffuse joint pain. Former dosing cycle symptoms are also reduced now. She has a little more joint pain for 1 week before scheduled actemra.  She has required much less frequent medrol courses since April 2023.  Still has thumb base pain; still gettnig intermittent injections from Dr. Petit. She has impression that surgical fusion may be needed. She is using wrist/thumb braces made by occupational therapy.    She still has chronic hip girdle and pain; she is working with a pelvic  weekly; there is modest improvement with low back stretching/manipulation.    Interval history November 25, 2022     Burning pain in hands and fingers is much better over all.   She attributes improvement to actemra infusions, but she still  has pain in neck, rib cage daily.   She gets relief with intermittent medrol 2-3 week courses, and has used 3 times since June.  She is dealing with Staph overgrowth. She has marked blepharitis, treating with tobradex. She has been prescribed doxy 100 mg bid  She just had cataract surgerios; now she is treating for constant mattering  Doxy seems to have also helped with sharp abdominal pains asst'd with Cibo, in the last 3 months.  She started injections for thumb bases with Dr. Petit in 6-2022. She has been injected q 4 mos in both thumb bases. She still has a strong sense of swelling/burning through all the fingers.Former wrist pain has improved with steroid injections by Dr. Sullivan; however she had marked muscle pain/weakness after dexamathosone (not kenalog) injection. More injections are planned in 2 weeks.    Interval history June 16, 2022    She reports improved pain in back/thoracic area and ankles, but she notes little improvement in the hands. Constant achy pain in hands/fingers/wrists persists. Showering is a chore with grasping bath objects.  Recent course of Medrol did not help her hand pain.  She will see Dr. Petit regarding possible restart injections in the hands/thumbs. She had been getting carpal tunnel and thumb injections every 2 months at [place]; last injection was 6 months ago.  Abdominal pain/symptoms improved with use of xifaxin.    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 Saleh. Initially did not think it to be inflammatory. later diagnosed with inflammatory arthritis, rheumatoid syndrome and then seronegative RA. Patient has been disabled Patient was initially on Humira for a year starting in 2018, then discontinued due to failure to improve her joint  symptoms after some time, then received a dose of Embrel leading to infections including sinus/ UTIs, then patient was on Remicade for 2 yrs but it did not work for all the joints like her back and she started having siginficant GI issues like diarrhea and abdominal pain, but did not necessarily think that it failed completely to relieve her symptoms. Patient was started on Symponi in Oct 2021, but developed serious reaction to it reporting an an area of severe erythema in her intestinal area, along with abdominal pain/ nausea/vomiting so she was switched to Orencia in Nov 2021, received one infusion but then developed serious sinus infections/ Covid in Jan 2022 so it was held during that entire time and loading dose restarted in March 2022, has had 2 infusions 2 weeks apart at [facility] and will receive her next one on March 15. Patient feels some improvement in her lower extremity joints and her fatigue has been better. She still has significant morning stiffness for more than 1 hour, and pain in hands/wrists/neck for which she gets occipital blocks. When asked about steroids she thinks she may have an adverse reaction to prednisone while in the hospital but that could be due to her SIBO. She has not used much for oral steroids in all these years but has tolerated Solu-medrol okay in the past.  Small joints of hands, hips and her neck bother her the most with stiffness/pain and swelling.  It is very painful in the wrist, feels hands are swollen. Also reports some numbness/ tingling right side of arm. Feet also get painful. Neck gets stiff and painful, with shooting pain going down to thoracic area.  She can not stand for very long without getting pain in both hips. Also notices stiffness. Constantly changing positions helps. Oxycodone also helps sometimes when she has severe pain. She tries not to take it often. She gets steroid injections with Dr. Dodson in wrists and thumbs alternating every 2 mths. She feels Orencia  helps with legs and her fatigue, but has not been helping with other joints.  She received one dose in Nov 2021, but then she had sinus infections and infected with COVID in Jan 2021, restarted in March, for every 2 week infusions at [facility] to start with, has received 2 infusions so far.   She wakes up at 9am and experiences morning stiffness till 4 pm in the afternoon.  For her ADLs, reports either her boyfriend cooks or she orders food.  She has difficulty buttoning shirts.Her shoulders get painful and weak.   She has low grade fevers consistently and night sweats. Feels lymph nodes in neck get swollen sometimes. Orencia has helped with mouth ulcers but she does get them periodically.  She has been losing some hair in last 6 mths.    She has been having trouble lately with her eyes for years, they get infected. Sometimes her vision gets strained. she sees eye doctor for that. She is being prescribed eye drops for it.  She periodically has hard time breathing with exertion with some pains in the central chest area. She does get heartburn. She takes omeprazole. She does complain of skin rashes.  She sees derm for it. They are usually open sores on legs/abdomen. Every 2-3 mths.  Lost 21 lbs in last 3 mths unintentionally/  No appetite changes. Abd pain has gone away since being started on Xifaxin started by GI for her SIBO. No Raynaud's phenomenon. No other acute issues.              Review of Systems:     12-point ROS performed. Negative except for pertinent positives in HPI.          Active Problem List:     Patient Active Problem List    Diagnosis Date Noted     Diabetes mellitus, type 2 (H) 04/12/2023     Priority: Medium     Morbid obesity (H) 04/12/2023     Priority: Medium     Neck pain 03/09/2023     Priority: Medium     Bilateral low back pain without sciatica 03/09/2023     Priority: Medium     Pelvic pain in female 03/09/2023     Priority: Medium     Seronegative inflammatory arthritis 06/17/2022      Priority: Medium            Past Medical History:     Past Medical History:   Diagnosis Date     Asthma      Atrial fibrillation (H)      CTS (carpal tunnel syndrome)      Diarrhea 08/11/2000    travelers' abstract     Diverticular disease of colon      Dry eye syndrome      Fibromyalgia      GERD (gastroesophageal reflux disease)      Hematuria 08/11/2000    abstract     HTN (hypertension)      COLON (nonalcoholic steatohepatitis)      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     Obesity      Pyelonephritis, unspecified 1992    abstract     Rheumatoid arteritis (H)      Seronegative inflammatory arthritis 06/17/2022     Type 2 diabetes mellitus (H)      Unspecified symptom associated with female genital organs 05/07/1999    chronic pelvic pain abstract     Past Surgical History:   Procedure Laterality Date     CHOLECYSTECTOMY       COLON SURGERY      Left hemicolectomy for diverticulosis     CYSTOSCOPY,+URETEROSCOPY      abstract     ZZHC EXCIS/CURET BENIGN ELBOW LESN  10/26/1987    (R) humerus abstract     1.Seronegative rheumatoid arthritis with predominant tenosynovitis:   In 2015, rheumatoid factor, cyclic citrullinated peptide antibodies of extractable nuclear antigen panel, and anti-DNA antibodies were negative. Patient was initially treated for undifferentiated mixed connective tissue disease with Cellcept/Plaquenil for a prior Hx of weakly positive DIEGO. Then she established care with Dr. Saleh at [facility] in 2018, initially disease thought to be non-inflammatory, later diagnosed with inflammatory arthritis, rheumatoid syndrome and then seronegative RA. Patient was initially on Humira for a year starting in 2018, then discontinued due to failure to improve her joint symptoms, received a trial of Embrel which led to infections including sinus/ UTIs, then patient was on Remicade for 2 yrs but it did not work for all the joints like  her back and she started having siginficant GI issues like diarrhea and abdominal pain, but did not necessarily think that it failed completely to releive her symptoms. Patient was started on Symponi in Oct 2021, but developed serious reaction to it reporting an area of severe erythema in her intestinal area, along with abdominal pain/ nausea/vomiting so she was switched to Orencia in Nov 2021, received one infusion but then developed serious sinus infections/ Covid in Jan 2022. Orencia infusions restarted in 2-2022, held in 6-2022. Actemra started 7-2022:   - Previous Rx:                  Humira- did not have good response              Infliximab - did not have good response              Embrel- had infections following one dose              Golimumab - red swollen plaque, rt abdomen after first infusion and then stopped   Orencia--not effective   Actemra started 2022  She has reacted to sulfasalazine reported to be nausea/abd discomfort. She thinks methotrexate can not be used for fatty liver.   2. Mild persistent asthma, unspecified whether complicated   3. Bacterial overgrowth syndrome with diarrhea   - Diagnosed   4. Nonalcoholic steatohepatitis  5. Hx of clostridium difficile x2  6. Multiple orthopedic surgeries              Carpal tunnel surgery b/l               Discectomy and laminectomy, lumbar and cervical spine  7. Recurrent sinusitis: Myeloperoxidase and proteinase 3 were negative in October 2021.  8. DMII  9. Blepharitis  10: Hx of diverticulosis and recurrent diverticulitis s/p partial colectomy; Bacterial overgrowth syndrome with diarrhea   11. Hx of dermatographism       Social History:     Social History     Socioeconomic History     Marital status: Single     Spouse name: Not on file     Number of children: Not on file     Years of education: Not on file     Highest education level: Not on file   Occupational History     Not on file   Tobacco Use     Smoking status: Never     Smokeless tobacco:  Never   Substance and Sexual Activity     Alcohol use: Yes     Drug use: No     Sexual activity: Not on file   Other Topics Concern      Service Not Asked     Blood Transfusions Not Asked     Caffeine Concern Not Asked     Occupational Exposure Not Asked     Hobby Hazards Not Asked     Sleep Concern Not Asked     Stress Concern Not Asked     Weight Concern Not Asked     Special Diet Not Asked     Back Care Not Asked     Exercise No     Bike Helmet Not Asked     Seat Belt No     Self-Exams No   Social History Narrative    Womens Health Kit given.         Social Determinants of Health     Financial Resource Strain: Not on file   Food Insecurity: Not on file   Transportation Needs: Not on file   Physical Activity: Not on file   Stress: Not on file   Social Connections: Not on file   Interpersonal Safety: Not on file   Housing Stability: Not on file   FHA/, currently on disability       Family History:     Family History   Problem Relation Age of Onset     Hypertension Father         abstract     Cancer Paternal Aunt         gallbladder cancer abstract            Allergies:     Allergies   Allergen Reactions     Polyethylene Glycol Rash     Codeine      Other reaction(s): Gastrointestinal, GI intolerance, Vomiting  Vomiting       Gadolinium Dizziness and Nausea     Hydrocodone-Acetaminophen Nausea and Vomiting     Other reaction(s): GI intolerance     Iodine      abstract     Sulfasalazine      Other reaction(s): GI intolerance  Has tried and had nausa.     Tramadol Nausea and Vomiting     Other reaction(s): Gastrointestinal, Other (see comments)  Nausea and vomiting   unknown       Acetaminophen Nausea     Nausea and vomiting  Nausea and vomiting  Nausea and vomiting  Nausea and vomiting       Gluten Meal Other (See Comments) and Rash     Other reaction(s): Gastrointestinal, GI intolerance  Dizziness, tired, rash, stomach cramps, thrush, mouth sores  Dizziness, tired, rash, stomach cramps, thrush,  mouth sores       Propylene Glycol Dizziness, Nausea and Vomiting, Nausea and Rash            Medications:     Current Outpatient Medications   Medication Sig Dispense Refill     albuterol (PROAIR HFA/PROVENTIL HFA/VENTOLIN HFA) 108 (90 Base) MCG/ACT inhaler Inhale 2 puffs into the lungs every 6 hours as needed for shortness of breath, wheezing or cough       Artificial Tear Solution (SOOTHE XP) SOLN as needed       atenolol (TENORMIN) 50 MG tablet Take 50 mg by mouth daily       benzoyl peroxide 5 % external liquid Use daily as directed. Preferred bdrand: Medpura 236 mL 11     blood glucose (NO BRAND SPECIFIED) lancets standard Use to test blood sugar 2 times daily or as directed. 100 lancet. 1     blood glucose (NO BRAND SPECIFIED) test strip Use to test blood sugar 2 times daily or as directed. 100 strip 1     blood glucose monitoring (NO BRAND SPECIFIED) meter device kit Use to test blood sugar 2 times daily or as directed. 1 kit 0     diclofenac (VOLTAREN) 1 % topical gel Apply topically 4 times daily as needed       dupilumab (DUPIXENT) 300 MG/2ML prefilled pen Inject 2 mLs (300 mg) Subcutaneous every 14 days 4 mL 5     EPINEPHrine (ANY BX GENERIC EQUIV) 0.3 MG/0.3ML injection 2-pack Inject 0.3 mg into the muscle as needed       fexofenadine (ALLEGRA) 180 MG tablet Take 1 tablet (180 mg) by mouth daily 90 tablet 2     fidaxomicin (DIFICID) 200 MG tablet Take 200 mg by mouth as needed       fluticasone-vilanterol (BREO ELLIPTA) 200-25 MCG/ACT inhaler Inhale 1 puff into the lungs daily       insulin glargine (LANTUS PEN) 100 UNIT/ML pen Inject 20 Units Subcutaneous daily       insulin lispro (HUMALOG KWIKPEN) 100 UNIT/ML (1 unit dial) KWIKPEN Inject 5 Units Subcutaneous 3 times daily (before meals) Plus 1:25 correction scale if BG >150       insulin NPH (HUMULIN N KWIKPEN) 100 UNIT/ML injection Inject 40 Units Subcutaneous every evening       Lancets (ONETOUCH DELICA PLUS XEZPPT20X) MISC CHECK BLOOD GLUCOSE 3  TIMES DAILY, ADJUSTING MEDICATION, DX CODE E 11.9, ON INSULIN       lidocaine (LIDODERM) 5 % patch as needed   1     losartan (COZAAR) 100 MG tablet Take 100 mg by mouth daily       methylPREDNISolone (MEDROL DOSEPAK) 4 MG tablet therapy pack Take per package instructions. Take once a day by mouth 21 tablet 0     methylPREDNISolone (MEDROL DOSEPAK) 4 MG tablet therapy pack Take per package instructions. Take once a day by mouth 21 tablet 0     mometasone (NASONEX) 50 MCG/ACT nasal spray Spray 2 sprays into both nostrils daily 17 g 11     mometasone-formoterol (DULERA) 200-5 MCG/ACT inhaler Inhale 1 puff into the lungs daily       Montelukast Sodium (SINGULAIR PO) Take 10 mg by mouth At Bedtime        multivitamin w/minerals (THERA-VIT-M) tablet Take 1 tablet by mouth daily       nystatin (MYCOSTATIN) 514399 UNIT/ML suspension        olopatadine (PATADAY) 0.7 % ophthalmic solution Apply 1 drop to eye daily PRN       OMEPRAZOLE PO Take 40 mg by mouth 2 times daily        oxyCODONE IR (ROXICODONE) 10 MG tablet Take 10 mg by mouth       rifaximin (XIFAXAN) 550 MG TABS tablet Take 200 mg by mouth 3 times daily Take for 10 days, has on had as needed for small bowel bacteria overgrowh       rosuvastatin (CRESTOR) 5 MG tablet Take 2 tablets by mouth daily       spironolactone-HCTZ (ALDACTAZIDE) 25-25 MG tablet Take 1 tablet by mouth daily       STELARA 45 MG/0.5ML SOSY Inject 45 mg Subcutaneous once       tocilizumab (ACTEMRA) 400 MG/20ML Inject 800 mg into the vein every 28 days       norethindrone (MICRONOR) 0.35 MG tablet Take 0.35 mg by mouth              Physical Exam:   Weight 94.8 kg (209 lb).  Wt Readings from Last 6 Encounters:   12/15/23 94.8 kg (209 lb)   12/14/23 94.8 kg (209 lb)   12/06/23 94.3 kg (208 lb)   11/29/23 95.1 kg (209 lb 9.6 oz)   10/04/23 95.9 kg (211 lb 8 oz)   09/06/23 96.7 kg (213 lb 1.6 oz)     Constitutional: well-developed, appearing stated age; cooperative  Eyes: nl EOM, PERRLA,conjunctiva,  sclera  ENT: nl external ears, nose, hearing, lips, teeth, gums, throat  No mucous membrane lesions, normal saliva pool  Resp: breathing unlabored  MS: Former fusiform swelling in the fingers and hands and weak  strength have resolved.  Fingers now show full extension at all DIPs and PIPs.  Skin: no nail pitting, alopecia, rash  Neuro: nl cranial nerves, strength, sensation, DTRs.   Psych: nl judgement, orientation, memory, affect.         Data:     @      Latest Ref Rng & Units 10/4/2023    12:56 PM 2023     1:03 PM 2023    12:32 PM   RHEUM RESULTS   Albumin 3.5 - 5.2 g/dL 4.6  4.2  4.3    ALT 0 - 50 U/L 82  41  52    AST 0 - 45 U/L 63  32  37    Creatinine 0.51 - 0.95 mg/dL 0.87   0.91    GFR Estimate >60 mL/min/1.73m2 82   78    Hematocrit 35.0 - 47.0 % 46.2   43.7    Hemoglobin 11.7 - 15.7 g/dL 16.1   14.9    WBC 4.0 - 11.0 10e3/uL 9.1   8.5    RBC Count 3.80 - 5.20 10e6/uL 5.35   5.00    RDW 10.0 - 15.0 % 12.4   12.4    MCHC 31.5 - 36.5 g/dL 34.8   34.1    MCV 78 - 100 fL 86   87    Platelet Count 150 - 450 10e3/uL 348   336      MRI thoracic spine 21 with mild multilevel spondylosis with specific findings according to level includin. Small disc bulge/protrusion at T1-2, T11-12, T12-L1  2. No cord deformity/centralspinal canal stenosis. The foramen appears patent    MRI lumbar spine 2013  1. L3-L4 mild anterior degenerative disc disease and degeneration of   the left facet joint.   2. L4-L5 mild degenerative disc disease with small central posterior   disc protrusion and high intensity zone. Slight impression on the   thecal sac.   3. L5-S1 small central posterior disc protrusion extending below the   disc level. IMPRESSION:     1. L3-L4 mild anterior degenerative disc disease and degeneration of   the left facet joint.   2. L4-L5 mild degenerative disc disease with small central posterior   disc protrusion and high intensity zone. Slight impression on the   thecal sac.   3. L5-S1  small central posterior disc protrusion extending below the   disc level.     MRI Radius Ulna left 3/12/21  1. Redemonstrated mild flexor tenosynovitis within the left carpal tunnel.   2. Remainder is otherwise unremarkable for inflammatory process within the left   Forearm.    MRI Radius Ulna Right 3/12/21  1. Redemonstrated are moderate inflammatory flexor tenosynovitis within the   right wrist which is not significantly changed from prior dedicated MRI   examination.   2. Remainder of the exam is unremarkable.    DX Lumbar spine 1/26/21  Postoperative changes if an L5-S1 laminectomy. Slight disk space   narrowing at L5. Lower lumbar facet arthritis. Low-grade lumbar subluxations. No   gross instability on flexion and extension. Slight wedge deformity of the L4   vertebral body.        Again, thank you for allowing me to participate in the care of your patient.      Sincerely,    Oswaldo Lemus MD

## 2023-12-15 NOTE — PROGRESS NOTES
Type of service:  Video Visit   Video Start Time: 2:14 PM  Video End Time: 2:45 PM    Originating Location (pt. Location): Home  Distant Location (provider location):  On-site  Platform used for Video Visit: [facility]    Rheumatology Clinic Visit  [facility]  Oswaldo Lemus M.D.     Laxmi Ya MRN# [id number]   YOB: 1975 Age: 48 year old   Date of Visit:12/15/2023    Primary care provider: Artur Marsh          Assessment and Plan:     Seronegative inflammatory (rheumatoid) arthritis:  Chronic small joint predominant stiffness and pain continue improved modestly since substitution of Actemra IV infusions (for Orencia) in June 2022.  Video exam shows no gross swelling at MCP, wrists, or fingers.    Data: Lab work  showed comprehensive metabolic panel normal except for < 2X ULN elevation ALT; cholesterol high at 256; CBC normal; urinalysis clear.  In 2021, DIEGO/FAVIOLA panel/CCP/RF/ ANCA all negative.      Discussion:  1.  Seronegative inflammatory arthritis remains significantly improved after starting Actemra intravenous infusions in August 2022.  Improved symptoms are focused primarily in the small joints of the hands and feet. Abdominal and pelvic pain persist, but I do not think these pain generators are due to the same systemic pathologic process as inflammatory arthritis in the hands and feet. I recommend continuing Actemra 750 mg IV every 28 days.    2. Carpal tunnel syndrome; stable  3. Chronic pelvic pain: Symptom generator has not been definitively identified.  Patient has been following with the pain service, and with gynecology.  Resection of pelvic cyst is planned in the next several weeks.  If removal of the cyst does not result in significant amelioration of pelvic pain, I am in agreement with proposed plan for trial of pain relieving injection.  We discussed potential for augmenting immunomodulatory therapy if other avenues to relief of pain are not fruitful.   Additional imaging with cross-sectional approach may be needed prior to consideration of add back of methotrexate, since combination methotrexate and Actemra is expected to result in more significant immunosuppression.    Plan:  Continue actemra IV. Plan 750 mg IV every 28 days; reduce pre-med methylprednisolone from 125 to 80 mg to reduce effect on blood sugar.  For flaring pain despite Actemra, use Medrol 16 mg (4 tabs) with taper (3 tablets daily for 1 week, then 2 tablets daily for 1 week) over several weeks for active arthritis flares. I recommend avoiding use of Medrol burst and taper greater than once out of every 2 months.  I do not recommend chronic low-dose corticosteroids.  Follow-up with Dr. Petit regarding thumb and carpal tunnel syndrome symptoms  Follow-up with Dr. Thompson regarding bowel disease  Follow-up with gastroenterology for monitoring liver fibrosis and nonalcoholic fatty liver disease  Pain service for management of chronic pelvic pain  Gynecology to address pelvic cyst.  I recommend having surgery at the end of a Actemra dosing cycle, and resuming infusions within 7 to 14 days    RTC 7 mos    Oswaldo Lemus M.D.  Staff Rheumatologist, [facility]  Pager [phone]           History of Present Illness:   Laxmi Ya with history of COLON, hypertension, diagnosis of seronegative rheumatoid arthritis with tenosynovitis presents for follow-up.  She was last seen in 6-2023 when actemra was continued at 750 mg/m2 every 28 days for stable inflammatory arthritis.    Background; Seronegative rheumatoid arthritis with predominant tenosynovitis:   In 2015, rheumatoid factor, cyclic citrullinated peptide antibodies of extractable nuclear antigen panel, and anti-DNA antibodies were negative. Patient was initially treated for undifferentiated mixed connective tissue disease with Cellcept/Plaquenil for a prior Hx of weakly positive DIEGO. Then she established care with Dr. Saleh at [facility] in 2018,  "initially disease thought to be non-inflammatory, later diagnosed with inflammatory arthritis, rheumatoid syndrome and then seronegative RA. Patient was initially on Humira for a year starting in 2018, then discontinued due to failure to improve her joint symptoms, received a trial of Embrel which led to infections including sinus/ UTIs, then patient was on Remicade for 2 yrs but it did not work for all the joints like her back and she started having siginficant GI issues like diarrhea and abdominal pain, but did not necessarily think that it failed completely to releive her symptoms. Patient was started on Symponi in Oct 2021, but developed serious reaction to it reporting an area of severe erythema in her intestinal area, along with abdominal pain/ nausea/vomiting so she was switched to Orencia in Nov 2021, received one infusion but then developed serious sinus infections/ Covid in Jan 2022. Orencia infusions restarted in 2-2022, held in 6-2022. Actemra started 7-2022, continued through November 2022, dose increased to 750 mg       Interval history December 15, 2023    She fell downstairs last week. Her knee is swollen and hurting. She is scheduled for an MRI. Taking 800 ibu bid with some relief, using ice.  Before the fall, she noted decent control of hand and foot pain. Former sharp/shooting pains and burning pain in her hands and feet much better.  She gets regular injections of carpal tunnel and base of thumbs every 2 months  Feet are sore/stiff in the mornings, but they loosen up through the day.  Early morning stiffness is still many hours.    Actemra continues, she thinks it is helping \"quite a bit\". No AE, getting monthly infusions.  She took a medrol dose pack in 11-23 and 8-23, but has otherwise not used corticosteroids other than Actemra premedication since the last visit.    She is concerned about ongoing swelling in the abdomen and face; she is seeing therapy for lymphedema--getting massage and " expecting to use compression garments.    She has chronic pelvic pain; sometimes it is hard to walk. + swelling in the pubic area--swelling does go down at time of infusion.  Consequently, she wonders whether additional immunomodulatory therapy would help.  She is working with pelvic floor therapist, but benefit lasts only 4-5 days. Gyn eval showed a cyst; a surgery has been proposed.  She has been following with the pain service; a sacral injection has been proposed.    Interval history June 30, 2023    She had I/D of a blister on her R great toe that had started 3-4 weeks ago. She was started on abx, but the toe remains throbbing and painful. It awakens her at night. She is taking alternating ibuprofen and acetaminophen, still having significant pain. Also using cliobetasol on blisters on several toes.    Since increasing actemra dose in 4-2023, she notes big improvement in control of diffuse joint pain. Former dosing cycle symptoms are also reduced now. She has a little more joint pain for 1 week before scheduled actemra.  She has required much less frequent medrol courses since April 2023.  Still has thumb base pain; still gettnig intermittent injections from Dr. Petit. She has impression that surgical fusion may be needed. She is using wrist/thumb braces made by occupational therapy.    She still has chronic hip girdle and pain; she is working with a pelvic  weekly; there is modest improvement with low back stretching/manipulation.    Interval history November 25, 2022     Burning pain in hands and fingers is much better over all.   She attributes improvement to actemra infusions, but she still has pain in neck, rib cage daily.   She gets relief with intermittent medrol 2-3 week courses, and has used 3 times since June.  She is dealing with Staph overgrowth. She has marked blepharitis, treating with tobradex. She has been prescribed doxy 100 mg bid  She just had cataract surgerios; now she  is treating for constant mattering  Doxy seems to have also helped with sharp abdominal pains asst'd with Cibo, in the last 3 months.  She started injections for thumb bases with Dr. Petit in 6-2022. She has been injected q 4 mos in both thumb bases. She still has a strong sense of swelling/burning through all the fingers.Former wrist pain has improved with steroid injections by Dr. Sullivan; however she had marked muscle pain/weakness after dexamathosone (not kenalog) injection. More injections are planned in 2 weeks.    Interval history June 16, 2022    She reports improved pain in back/thoracic area and ankles, but she notes little improvement in the hands. Constant achy pain in hands/fingers/wrists persists. Showering is a chore with grasping bath objects.  Recent course of Medrol did not help her hand pain.  She will see Dr. Petit regarding possible restart injections in the hands/thumbs. She had been getting carpal tunnel and thumb injections every 2 months at [facility]; last injection was 6 months ago.  Abdominal pain/symptoms improved with use of xifaxin.    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 Saleh. Initially did not think it to be inflammatory. later diagnosed with inflammatory arthritis, rheumatoid syndrome and then seronegative RA. Patient has been disabled Patient was initially on Humira for a year starting in 2018, then discontinued due to failure to improve her joint symptoms after some time, then received a dose of Embrel leading to infections including sinus/ UTIs, then patient was on Remicade for 2 yrs but it did not work for all the joints like her back and she started having siginficant GI issues like diarrhea and abdominal pain, but did not necessarily  think that it failed completely to relieve her symptoms. Patient was started on Symponi in Oct 2021, but developed serious reaction to it reporting an an area of severe erythema in her intestinal area, along with abdominal pain/ nausea/vomiting so she was switched to Orencia in Nov 2021, received one infusion but then developed serious sinus infections/ Covid in Jan 2022 so it was held during that entire time and loading dose restarted in March 2022, has had 2 infusions 2 weeks apart at [facility] and will receive her next one on March 15. Patient feels some improvement in her lower extremity joints and her fatigue has been better. She still has significant morning stiffness for more than 1 hour, and pain in hands/wrists/neck for which she gets occipital blocks. When asked about steroids she thinks she may have an adverse reaction to prednisone while in the hospital but that could be due to her SIBO. She has not used much for oral steroids in all these years but has tolerated Solu-medrol okay in the past.  Small joints of hands, hips and her neck bother her the most with stiffness/pain and swelling.  It is very painful in the wrist, feels hands are swollen. Also reports some numbness/ tingling right side of arm. Feet also get painful. Neck gets stiff and painful, with shooting pain going down to thoracic area.  She can not stand for very long without getting pain in both hips. Also notices stiffness. Constantly changing positions helps. Oxycodone also helps sometimes when she has severe pain. She tries not to take it often. She gets steroid injections with Dr. Dodson in wrists and thumbs alternating every 2 mths. She feels Orencia helps with legs and her fatigue, but has not been helping with other joints.  She received one dose in Nov 2021, but then she had sinus infections and infected with COVID in Jan 2021, restarted in March, for every 2 week infusions at [facility] to start with, has received 2 infusions so far.   She  wakes up at 9am and experiences morning stiffness till 4 pm in the afternoon.  For her ADLs, reports either her boyfriend cooks or she orders food.  She has difficulty buttoning shirts.Her shoulders get painful and weak.   She has low grade fevers consistently and night sweats. Feels lymph nodes in neck get swollen sometimes. Orencia has helped with mouth ulcers but she does get them periodically.  She has been losing some hair in last 6 mths.    She has been having trouble lately with her eyes for years, they get infected. Sometimes her vision gets strained. she sees eye doctor for that. She is being prescribed eye drops for it.  She periodically has hard time breathing with exertion with some pains in the central chest area. She does get heartburn. She takes omeprazole. She does complain of skin rashes.  She sees derm for it. They are usually open sores on legs/abdomen. Every 2-3 mths.  Lost 21 lbs in last 3 mths unintentionally/  No appetite changes. Abd pain has gone away since being started on Xifaxin started by GI for her SIBO. No Raynaud's phenomenon. No other acute issues.              Review of Systems:     12-point ROS performed. Negative except for pertinent positives in HPI.          Active Problem List:     Patient Active Problem List    Diagnosis Date Noted    Diabetes mellitus, type 2 (H) 04/12/2023     Priority: Medium    Morbid obesity (H) 04/12/2023     Priority: Medium    Neck pain 03/09/2023     Priority: Medium    Bilateral low back pain without sciatica 03/09/2023     Priority: Medium    Pelvic pain in female 03/09/2023     Priority: Medium    Seronegative inflammatory arthritis 06/17/2022     Priority: Medium            Past Medical History:     Past Medical History:   Diagnosis Date    Asthma     Atrial fibrillation (H)     CTS (carpal tunnel syndrome)     Diarrhea 08/11/2000    travelers' abstract    Diverticular disease of colon     Dry eye syndrome     Fibromyalgia     GERD  (gastroesophageal reflux disease)     Hematuria 08/11/2000    abstract    HTN (hypertension)     COLON (nonalcoholic steatohepatitis)     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    Obesity     Pyelonephritis, unspecified 1992    abstract    Rheumatoid arteritis (H)     Seronegative inflammatory arthritis 06/17/2022    Type 2 diabetes mellitus (H)     Unspecified symptom associated with female genital organs 05/07/1999    chronic pelvic pain abstract     Past Surgical History:   Procedure Laterality Date    CHOLECYSTECTOMY      COLON SURGERY      Left hemicolectomy for diverticulosis    CYSTOSCOPY,+URETEROSCOPY      abstract    ZZHC EXCIS/CURET BENIGN ELBOW LESN  10/26/1987    (R) humerus abstract     1.Seronegative rheumatoid arthritis with predominant tenosynovitis:   In 2015, rheumatoid factor, cyclic citrullinated peptide antibodies of extractable nuclear antigen panel, and anti-DNA antibodies were negative. Patient was initially treated for undifferentiated mixed connective tissue disease with Cellcept/Plaquenil for a prior Hx of weakly positive DIEGO. Then she established care with Dr. Saleh at [facility] in 2018, initially disease thought to be non-inflammatory, later diagnosed with inflammatory arthritis, rheumatoid syndrome and then seronegative RA. Patient was initially on Humira for a year starting in 2018, then discontinued due to failure to improve her joint symptoms, received a trial of Embrel which led to infections including sinus/ UTIs, then patient was on Remicade for 2 yrs but it did not work for all the joints like her back and she started having siginficant GI issues like diarrhea and abdominal pain, but did not necessarily think that it failed completely to releive her symptoms. Patient was started on Symponi in Oct 2021, but developed serious reaction to it reporting an area of severe erythema in her intestinal area,  along with abdominal pain/ nausea/vomiting so she was switched to Orencia in Nov 2021, received one infusion but then developed serious sinus infections/ Covid in Jan 2022. Orencia infusions restarted in 2-2022, held in 6-2022. Actemra started 7-2022:   - Previous Rx:                  Humira- did not have good response              Infliximab - did not have good response              Embrel- had infections following one dose              Golimumab - red swollen plaque, rt abdomen after first infusion and then stopped   Orencia--not effective   Actemra started 2022  She has reacted to sulfasalazine reported to be nausea/abd discomfort. She thinks methotrexate can not be used for fatty liver.   2. Mild persistent asthma, unspecified whether complicated   3. Bacterial overgrowth syndrome with diarrhea   - Diagnosed   4. Nonalcoholic steatohepatitis  5. Hx of clostridium difficile x2  6. Multiple orthopedic surgeries              Carpal tunnel surgery b/l               Discectomy and laminectomy, lumbar and cervical spine  7. Recurrent sinusitis: Myeloperoxidase and proteinase 3 were negative in October 2021.  8. DMII  9. Blepharitis  10: Hx of diverticulosis and recurrent diverticulitis s/p partial colectomy; Bacterial overgrowth syndrome with diarrhea   11. Hx of dermatographism       Social History:     Social History     Socioeconomic History    Marital status: Single     Spouse name: Not on file    Number of children: Not on file    Years of education: Not on file    Highest education level: Not on file   Occupational History    Not on file   Tobacco Use    Smoking status: Never    Smokeless tobacco: Never   Substance and Sexual Activity    Alcohol use: Yes    Drug use: No    Sexual activity: Not on file   Other Topics Concern     Service Not Asked    Blood Transfusions Not Asked    Caffeine Concern Not Asked    Occupational Exposure Not Asked    Hobby Hazards Not Asked    Sleep Concern Not Asked    Stress  Concern Not Asked    Weight Concern Not Asked    Special Diet Not Asked    Back Care Not Asked    Exercise No    Bike Helmet Not Asked    Seat Belt No    Self-Exams No   Social History Narrative    Womens Health Kit given.         Social Determinants of Health     Financial Resource Strain: Not on file   Food Insecurity: Not on file   Transportation Needs: Not on file   Physical Activity: Not on file   Stress: Not on file   Social Connections: Not on file   Interpersonal Safety: Not on file   Housing Stability: Not on file   FHA/, currently on disability       Family History:     Family History   Problem Relation Age of Onset    Hypertension Father         abstract    Cancer Paternal Aunt         gallbladder cancer abstract            Allergies:     Allergies   Allergen Reactions    Polyethylene Glycol Rash    Codeine      Other reaction(s): Gastrointestinal, GI intolerance, Vomiting  Vomiting      Gadolinium Dizziness and Nausea    Hydrocodone-Acetaminophen Nausea and Vomiting     Other reaction(s): GI intolerance    Iodine      abstract    Sulfasalazine      Other reaction(s): GI intolerance  Has tried and had nausa.    Tramadol Nausea and Vomiting     Other reaction(s): Gastrointestinal, Other (see comments)  Nausea and vomiting   unknown      Acetaminophen Nausea     Nausea and vomiting  Nausea and vomiting  Nausea and vomiting  Nausea and vomiting      Gluten Meal Other (See Comments) and Rash     Other reaction(s): Gastrointestinal, GI intolerance  Dizziness, tired, rash, stomach cramps, thrush, mouth sores  Dizziness, tired, rash, stomach cramps, thrush, mouth sores      Propylene Glycol Dizziness, Nausea and Vomiting, Nausea and Rash            Medications:     Current Outpatient Medications   Medication Sig Dispense Refill    albuterol (PROAIR HFA/PROVENTIL HFA/VENTOLIN HFA) 108 (90 Base) MCG/ACT inhaler Inhale 2 puffs into the lungs every 6 hours as needed for shortness of breath, wheezing or  cough      Artificial Tear Solution (SOOTHE XP) SOLN as needed      atenolol (TENORMIN) 50 MG tablet Take 50 mg by mouth daily      benzoyl peroxide 5 % external liquid Use daily as directed. Preferred bdrand: Medpura 236 mL 11    blood glucose (NO BRAND SPECIFIED) lancets standard Use to test blood sugar 2 times daily or as directed. 100 lancet. 1    blood glucose (NO BRAND SPECIFIED) test strip Use to test blood sugar 2 times daily or as directed. 100 strip 1    blood glucose monitoring (NO BRAND SPECIFIED) meter device kit Use to test blood sugar 2 times daily or as directed. 1 kit 0    diclofenac (VOLTAREN) 1 % topical gel Apply topically 4 times daily as needed      dupilumab (DUPIXENT) 300 MG/2ML prefilled pen Inject 2 mLs (300 mg) Subcutaneous every 14 days 4 mL 5    EPINEPHrine (ANY BX GENERIC EQUIV) 0.3 MG/0.3ML injection 2-pack Inject 0.3 mg into the muscle as needed      fexofenadine (ALLEGRA) 180 MG tablet Take 1 tablet (180 mg) by mouth daily 90 tablet 2    fidaxomicin (DIFICID) 200 MG tablet Take 200 mg by mouth as needed      fluticasone-vilanterol (BREO ELLIPTA) 200-25 MCG/ACT inhaler Inhale 1 puff into the lungs daily      insulin glargine (LANTUS PEN) 100 UNIT/ML pen Inject 20 Units Subcutaneous daily      insulin lispro (HUMALOG KWIKPEN) 100 UNIT/ML (1 unit dial) KWIKPEN Inject 5 Units Subcutaneous 3 times daily (before meals) Plus 1:25 correction scale if BG >150      insulin NPH (HUMULIN N KWIKPEN) 100 UNIT/ML injection Inject 40 Units Subcutaneous every evening      Lancets (ONETOUCH DELICA PLUS KUHPTQ80K) MISC CHECK BLOOD GLUCOSE 3 TIMES DAILY, ADJUSTING MEDICATION, DX CODE E 11.9, ON INSULIN      lidocaine (LIDODERM) 5 % patch as needed   1    losartan (COZAAR) 100 MG tablet Take 100 mg by mouth daily      methylPREDNISolone (MEDROL DOSEPAK) 4 MG tablet therapy pack Take per package instructions. Take once a day by mouth 21 tablet 0    methylPREDNISolone (MEDROL DOSEPAK) 4 MG tablet therapy  pack Take per package instructions. Take once a day by mouth 21 tablet 0    mometasone (NASONEX) 50 MCG/ACT nasal spray Spray 2 sprays into both nostrils daily 17 g 11    mometasone-formoterol (DULERA) 200-5 MCG/ACT inhaler Inhale 1 puff into the lungs daily      Montelukast Sodium (SINGULAIR PO) Take 10 mg by mouth At Bedtime       multivitamin w/minerals (THERA-VIT-M) tablet Take 1 tablet by mouth daily      nystatin (MYCOSTATIN) 701403 UNIT/ML suspension       olopatadine (PATADAY) 0.7 % ophthalmic solution Apply 1 drop to eye daily PRN      OMEPRAZOLE PO Take 40 mg by mouth 2 times daily       oxyCODONE IR (ROXICODONE) 10 MG tablet Take 10 mg by mouth      rifaximin (XIFAXAN) 550 MG TABS tablet Take 200 mg by mouth 3 times daily Take for 10 days, has on had as needed for small bowel bacteria overgrowh      rosuvastatin (CRESTOR) 5 MG tablet Take 2 tablets by mouth daily      spironolactone-HCTZ (ALDACTAZIDE) 25-25 MG tablet Take 1 tablet by mouth daily      STELARA 45 MG/0.5ML SOSY Inject 45 mg Subcutaneous once      tocilizumab (ACTEMRA) 400 MG/20ML Inject 800 mg into the vein every 28 days      norethindrone (MICRONOR) 0.35 MG tablet Take 0.35 mg by mouth              Physical Exam:   Weight 94.8 kg (209 lb).  Wt Readings from Last 6 Encounters:   12/15/23 94.8 kg (209 lb)   12/14/23 94.8 kg (209 lb)   12/06/23 94.3 kg (208 lb)   11/29/23 95.1 kg (209 lb 9.6 oz)   10/04/23 95.9 kg (211 lb 8 oz)   09/06/23 96.7 kg (213 lb 1.6 oz)     Constitutional: well-developed, appearing stated age; cooperative  Eyes: nl EOM, PERRLA,conjunctiva, sclera  ENT: nl external ears, nose, hearing, lips, teeth, gums, throat  No mucous membrane lesions, normal saliva pool  Resp: breathing unlabored  MS: Former fusiform swelling in the fingers and hands and weak  strength have resolved.  Fingers now show full extension at all DIPs and PIPs.  Skin: no nail pitting, alopecia, rash  Neuro: nl cranial nerves, strength, sensation,  DTRs.   Psych: nl judgement, orientation, memory, affect.         Data:     @      Latest Ref Rng & Units 10/4/2023    12:56 PM 2023     1:03 PM 2023    12:32 PM   RHEUM RESULTS   Albumin 3.5 - 5.2 g/dL 4.6  4.2  4.3    ALT 0 - 50 U/L 82  41  52    AST 0 - 45 U/L 63  32  37    Creatinine 0.51 - 0.95 mg/dL 0.87   0.91    GFR Estimate >60 mL/min/1.73m2 82   78    Hematocrit 35.0 - 47.0 % 46.2   43.7    Hemoglobin 11.7 - 15.7 g/dL 16.1   14.9    WBC 4.0 - 11.0 10e3/uL 9.1   8.5    RBC Count 3.80 - 5.20 10e6/uL 5.35   5.00    RDW 10.0 - 15.0 % 12.4   12.4    MCHC 31.5 - 36.5 g/dL 34.8   34.1    MCV 78 - 100 fL 86   87    Platelet Count 150 - 450 10e3/uL 348   336      MRI thoracic spine 21 with mild multilevel spondylosis with specific findings according to level includin. Small disc bulge/protrusion at T1-2, T11-12, T12-L1  2. No cord deformity/centralspinal canal stenosis. The foramen appears patent    MRI lumbar spine 2013  1. L3-L4 mild anterior degenerative disc disease and degeneration of   the left facet joint.   2. L4-L5 mild degenerative disc disease with small central posterior   disc protrusion and high intensity zone. Slight impression on the   thecal sac.   3. L5-S1 small central posterior disc protrusion extending below the   disc level. IMPRESSION:     1. L3-L4 mild anterior degenerative disc disease and degeneration of   the left facet joint.   2. L4-L5 mild degenerative disc disease with small central posterior   disc protrusion and high intensity zone. Slight impression on the   thecal sac.   3. L5-S1 small central posterior disc protrusion extending below the   disc level.     MRI Radius Ulna left 3/12/21  1. Redemonstrated mild flexor tenosynovitis within the left carpal tunnel.   2. Remainder is otherwise unremarkable for inflammatory process within the left   Forearm.    MRI Radius Ulna Right 3/12/21  1. Redemonstrated are moderate inflammatory flexor tenosynovitis within  the   right wrist which is not significantly changed from prior dedicated MRI   examination.   2. Remainder of the exam is unremarkable.    DX Lumbar spine 1/26/21  Postoperative changes if an L5-S1 laminectomy. Slight disk space   narrowing at L5. Lower lumbar facet arthritis. Low-grade lumbar subluxations. No   gross instability on flexion and extension. Slight wedge deformity of the L4   vertebral body.

## 2023-12-15 NOTE — NURSING NOTE
Is the patient currently in the state of [place]? YES    Visit mode:VIDEO    If the visit is dropped, the patient can be reconnected by: VIDEO VISIT: Text to cell phone:   Telephone Information:   Mobile [phone]       Will anyone else be joining the visit? NO  (If patient encounters technical issues they should call [phone]    How would you like to obtain your AVS? MyChart    Are changes needed to the allergy or medication list? No    Reason for visit: Video Visit (Recheck)    Valery CROWLEY

## 2023-12-16 RX ORDER — METHYLPREDNISOLONE SODIUM SUCCINATE 125 MG/2ML
80 INJECTION, POWDER, LYOPHILIZED, FOR SOLUTION INTRAMUSCULAR; INTRAVENOUS ONCE
Status: CANCELLED | OUTPATIENT
Start: 2023-12-27

## 2023-12-20 ENCOUNTER — TELEPHONE (OUTPATIENT)
Dept: OBGYN | Facility: CLINIC | Age: 48
End: 2023-12-20

## 2023-12-20 ENCOUNTER — THERAPY VISIT (OUTPATIENT)
Dept: PHYSICAL THERAPY | Facility: CLINIC | Age: 48
End: 2023-12-20
Payer: MEDICARE

## 2023-12-20 ENCOUNTER — LAB (OUTPATIENT)
Dept: LAB | Facility: CLINIC | Age: 48
End: 2023-12-20
Payer: MEDICARE

## 2023-12-20 DIAGNOSIS — M54.2 NECK PAIN: ICD-10-CM

## 2023-12-20 DIAGNOSIS — E11.9 TYPE 2 DIABETES MELLITUS WITHOUT COMPLICATION, WITH LONG-TERM CURRENT USE OF INSULIN (H): ICD-10-CM

## 2023-12-20 DIAGNOSIS — Z79.4 TYPE 2 DIABETES MELLITUS WITHOUT COMPLICATION, WITH LONG-TERM CURRENT USE OF INSULIN (H): ICD-10-CM

## 2023-12-20 DIAGNOSIS — M54.50 CHRONIC BILATERAL LOW BACK PAIN WITHOUT SCIATICA: ICD-10-CM

## 2023-12-20 DIAGNOSIS — R79.89 LOW SERUM CORTISOL LEVEL: ICD-10-CM

## 2023-12-20 DIAGNOSIS — E66.01 MORBID OBESITY (H): ICD-10-CM

## 2023-12-20 DIAGNOSIS — K76.0 NAFLD (NONALCOHOLIC FATTY LIVER DISEASE): ICD-10-CM

## 2023-12-20 DIAGNOSIS — R10.2 PELVIC PAIN IN FEMALE: Primary | ICD-10-CM

## 2023-12-20 DIAGNOSIS — G89.29 CHRONIC BILATERAL LOW BACK PAIN WITHOUT SCIATICA: ICD-10-CM

## 2023-12-20 LAB
CORTIS SERPL-MCNC: 2.6 UG/DL
FSH SERPL IRP2-ACNC: 7.4 MIU/ML
PROLACTIN SERPL 3RD IS-MCNC: 11 NG/ML (ref 5–23)
T4 FREE SERPL-MCNC: 1.32 NG/DL (ref 0.9–1.7)

## 2023-12-20 PROCEDURE — 84146 ASSAY OF PROLACTIN: CPT | Performed by: INTERNAL MEDICINE

## 2023-12-20 PROCEDURE — 84439 ASSAY OF FREE THYROXINE: CPT | Mod: GA | Performed by: PATHOLOGY

## 2023-12-20 PROCEDURE — 99000 SPECIMEN HANDLING OFFICE-LAB: CPT | Performed by: PATHOLOGY

## 2023-12-20 PROCEDURE — 82533 TOTAL CORTISOL: CPT | Performed by: INTERNAL MEDICINE

## 2023-12-20 PROCEDURE — 97140 MANUAL THERAPY 1/> REGIONS: CPT | Mod: GP | Performed by: PHYSICAL THERAPIST

## 2023-12-20 PROCEDURE — 82024 ASSAY OF ACTH: CPT | Performed by: INTERNAL MEDICINE

## 2023-12-20 PROCEDURE — 83001 ASSAY OF GONADOTROPIN (FSH): CPT | Performed by: INTERNAL MEDICINE

## 2023-12-20 PROCEDURE — 97110 THERAPEUTIC EXERCISES: CPT | Mod: GP | Performed by: PHYSICAL THERAPIST

## 2023-12-20 PROCEDURE — 86258 DGP ANTIBODY EACH IG CLASS: CPT | Mod: 59 | Performed by: PHYSICIAN ASSISTANT

## 2023-12-20 PROCEDURE — 36415 COLL VENOUS BLD VENIPUNCTURE: CPT | Performed by: PATHOLOGY

## 2023-12-21 ENCOUNTER — OFFICE VISIT (OUTPATIENT)
Dept: ORTHOPEDICS | Facility: CLINIC | Age: 48
End: 2023-12-21
Payer: MEDICARE

## 2023-12-21 ENCOUNTER — ANCILLARY PROCEDURE (OUTPATIENT)
Dept: GENERAL RADIOLOGY | Facility: CLINIC | Age: 48
End: 2023-12-21
Attending: ORTHOPAEDIC SURGERY
Payer: MEDICARE

## 2023-12-21 DIAGNOSIS — E11.9 TYPE 2 DIABETES MELLITUS WITHOUT COMPLICATION, WITH LONG-TERM CURRENT USE OF INSULIN (H): ICD-10-CM

## 2023-12-21 DIAGNOSIS — M79.644 BILATERAL THUMB PAIN: Primary | ICD-10-CM

## 2023-12-21 DIAGNOSIS — E66.01 MORBID OBESITY (H): ICD-10-CM

## 2023-12-21 DIAGNOSIS — M79.645 BILATERAL THUMB PAIN: ICD-10-CM

## 2023-12-21 DIAGNOSIS — Z79.4 TYPE 2 DIABETES MELLITUS WITHOUT COMPLICATION, WITH LONG-TERM CURRENT USE OF INSULIN (H): ICD-10-CM

## 2023-12-21 DIAGNOSIS — K76.0 NAFLD (NONALCOHOLIC FATTY LIVER DISEASE): ICD-10-CM

## 2023-12-21 DIAGNOSIS — M13.80 SERONEGATIVE INFLAMMATORY ARTHRITIS: ICD-10-CM

## 2023-12-21 DIAGNOSIS — M79.645 BILATERAL THUMB PAIN: Primary | ICD-10-CM

## 2023-12-21 DIAGNOSIS — M79.644 BILATERAL THUMB PAIN: ICD-10-CM

## 2023-12-21 LAB
ACTH PLAS-MCNC: <10 PG/ML
GLIADIN IGA SER-ACNC: 1.8 U/ML
GLIADIN IGG SER-ACNC: <0.6 U/ML

## 2023-12-21 PROCEDURE — 20605 DRAIN/INJ JOINT/BURSA W/O US: CPT | Mod: 50 | Performed by: ORTHOPAEDIC SURGERY

## 2023-12-21 PROCEDURE — 91200 LIVER ELASTOGRAPHY: CPT | Mod: 26 | Performed by: PHYSICIAN ASSISTANT

## 2023-12-21 PROCEDURE — 77002 NEEDLE LOCALIZATION BY XRAY: CPT | Performed by: ORTHOPAEDIC SURGERY

## 2023-12-21 RX ORDER — TRIAMCINOLONE ACETONIDE 40 MG/ML
40 INJECTION, SUSPENSION INTRA-ARTICULAR; INTRAMUSCULAR
Status: DISCONTINUED | OUTPATIENT
Start: 2023-12-21 | End: 2024-01-07

## 2023-12-21 RX ORDER — LIDOCAINE HYDROCHLORIDE 10 MG/ML
3 INJECTION, SOLUTION EPIDURAL; INFILTRATION; INTRACAUDAL; PERINEURAL
Status: DISCONTINUED | OUTPATIENT
Start: 2023-12-21 | End: 2024-01-07

## 2023-12-21 RX ADMIN — LIDOCAINE HYDROCHLORIDE 3 ML: 10 INJECTION, SOLUTION EPIDURAL; INFILTRATION; INTRACAUDAL; PERINEURAL at 12:13

## 2023-12-21 RX ADMIN — TRIAMCINOLONE ACETONIDE 40 MG: 40 INJECTION, SUSPENSION INTRA-ARTICULAR; INTRAMUSCULAR at 12:13

## 2023-12-21 NOTE — PROGRESS NOTES
Hand / Upper Extremity Injection/Arthrocentesis    Date/Time: 2023 12:13 PM    Performed by: Twila Petit MD  Authorized by: Twila Petit MD    Indications:  Pain  Needle Size:  25 G  Guidance: fluoroscopy    Condition: osteoarthritis    Location:  Thumb   Location comment:  Bilateral thumb STT joint    Medications:  40 mg triamcinolone 40 MG/ML; 3 mL lidocaine (PF) 1 %  Outcome:  Tolerated well, no immediate complications  Procedure discussed: discussed risks, benefits, and alternatives    Consent Given by:  Patient  Timeout: timeout called immediately prior to procedure    Prep: patient was prepped and draped in usual sterile fashion        [facility] [address]  [address] [facility] [id number] [phone]  Dept: [phone]  ______________________________________________________________________________    Patient: Laxmi Ya   : 1975   MRN: [id number]   2023    INVASIVE PROCEDURE SAFETY CHECKLIST    Date: 2023   Procedure:Bilateral thumb STT Joint steroid injections.  Patient Name: Laxmi Ya  MRN: [id number]  YOB: 1975    Action: Complete sections as appropriate. Any discrepancy results in a HARD COPY until resolved.     PRE PROCEDURE:  Patient ID verified with 2 identifiers (name and  or MRN): Yes  Procedure and site verified with patient/designee (when able): Yes  Accurate consent documentation in medical record: Yes  H&P (or appropriate assessment) documented in medical record: Yes  H&P must be up to 20 days prior to procedure and updates within 24 hours of procedure as applicable: Yes  Relevant diagnostic and radiology test results appropriately labeled and displayed as applicable: NA  Procedure site(s) marked with provider initials: NA    TIMEOUT:  Time-Out performed immediately prior to starting procedure, including verbal and active participation of all team  members addressing the following:Yes  * Correct patient identify  * Confirmed that the correct side and site are marked  * An accurate procedure consent form  * Agreement on the procedure to be done  * Correct patient position  * Relevant images and results are properly labeled and appropriately displayed  * The need to administer antibiotics or fluids for irrigation purposes during the procedure as applicable   * Safety precautions based on patient history or medication use    DURING PROCEDURE: Verification of correct person, site, and procedures any time the responsibility for care of the patient is transferred to another member of the care team.       The following medications were given:         Prior to injection, verified patient identity using patient's name and date of birth.  Due to injection administration, patient instructed to remain in clinic for 15 minutes  afterwards, and to report any adverse reaction to me immediately.    Joint injection was performed.    Medication Name: Kenalog 40mg/mL 20 mg per joint NDC [address]-7  Drug Amount Wasted:  None.  Vial/Syringe: Single dose vial  Expiration Date:  7/1/25    Medication Name Lidocaine 1% NDC 27394-842-14    Scribed by DUY RESENDEZ, ATC for Dr. Petit on December 21, 2023 at 12:17p based on the provider's statements to me.     DUY RESENDEZ, ATC

## 2023-12-21 NOTE — PROGRESS NOTES
12/20/23 1351   Appointment Info   Signing clinician's name / credentials Sofi Lemons PT, OCS   Total/Authorized Visits E&T 02/03/23   Visits Used 26   Medical Diagnosis Lower back pain with mobility deficits, lower back pain w/ referred pain, neck pain w/ headache, neck pain w/ mobility deficits, pelvic pain   PT Tx Diagnosis Lower back pain with mobility deficits, lower back pain w/ referred pain, neck pain w/ headache, neck pain w/ mobility deficits   Precautions/Limitations 8 min early today   Other pertinent information verbal ok for internal vaginal pelvic floor muscle assessment   Quick Adds Certification   Progress Note/Certification   Start of Care Date 03/08/23   Onset of illness/injury or Date of Surgery 02/03/23   Therapy Frequency 1x week   Predicted Duration 8 weeks   Certification date from 12/12/23   Certification date to 03/14/24   Progress Note Due Date 12/21/23   Progress Note Completed Date 12/21/23   GOALS   PT Goals 3   PT Goal 1   Goal Identifier ambulation   Goal Description Minutes patient will be able to  walk 20-30 minutes   Rationale to maximize safety and independence with performance of ADLs and functional tasks;to maximize safety and independence within the home;to maximize safety and independence within the community;to maximize safety and independence with transportation;to maximize safety and independence with self cares   Goal Progress flared up with severe L knee pain from fall   Target Date 03/14/24   PT Goal 2   Goal Identifier headaches   Goal Description HA 2x month or less with intensity 2/10 or less   Rationale to maximize safety and independence with performance of ADLs and functional tasks;to maximize safety and independence within the home;to maximize safety and independence within the community;to maximize safety and independence with transportation;to maximize safety and independence with self cares   Goal Progress 3/10   Target Date 08/10/23   Date Met  08/09/23   PT Goal 3   Goal Identifier pelvic pain   Goal Description with walking/intimacy/urinary urgency 2/10 or less   Rationale to maximize safety and independence with performance of ADLs and functional tasks;to maximize safety and independence within the home;to maximize safety and independence with transportation;to maximize safety and independence with self cares;to maximize safety and independence within the community   Goal Progress currently 5/10   Target Date 03/14/24   Subjective Report   Subjective Report Patient suffered a fall down stairs ~ 1 week ago. Injured R knee. Awaiting MRI next week. Painful with walking, WB and medial knee. Difficulty straightening knee. Reports also has a bladder infection right now so prefers no internal pelvic floor work today. Overall HA/neck pain have been better than the pelvis/hip pain. Also some bladder urgency occasionally.   Objective Measures   Objective Measures Objective Measure 1;Objective Measure 2   Objective Measure 1   Objective Measure Impaired gait: R knee in ~10 deg flexion when walking. Very uncomfortable to try to straighten R knee. Poor quad strength.   Objective Measure 2   Objective Measure TTP along B sacrum/paraspinals/piriformis/glutes   Treatment Interventions (PT)   Interventions Manual Therapy;Therapeutic Procedure/Exercise   Therapeutic Procedure/Exercise   Therapeutic Procedures: strength, endurance, ROM, flexibillity minutes (95917) 25   Ther Proc 1 glute myofascial full arc   Ther Proc 1 - Details x20   Skilled Intervention patient needed manual and verbal cues to engage proper gluteal contraction   Patient Response/Progress improved glute contraction   Ther Proc 2 isometric quad set 5 sec x 20   Ther Proc 2 - Details keep knee moving throughout day   Therapeutic Procedures Ther Proc 2   Therapeutic Activity   Ther Act 1 discussed updated attendance policy and importance of being to appt on time.   Ther Act 1 - Details Patient agreed and  signed policy (scanned in chart)   Manual Therapy   Manual Therapy: Mobilization, MFR, MLD, friction massage minutes (22788) 15   Manual Therapy Manual Therapy 2   Manual Therapy 2 prone to external sacrum/B glutes/B lumbar paraspinals   Skilled Intervention to decrease pain/tone in B low back/pelvis   Patient Response/Progress less tension in PF post treatment   Education   Learner/Method Patient;No Barriers to Learning   Plan   Home program PTRX   Plan for next session monitor knee pain   Total Session Time   Timed Code Treatment Minutes 40   Total Treatment Time (sum of timed and untimed services) 40         [facility]                                                                                   OUTPATIENT PHYSICAL THERAPY    PLAN OF TREATMENT FOR OUTPATIENT REHABILITATION   Patient's Last Name, First Name, Laxmi Coleman YOB: 1975   Provider's Name   [facility]   Medical Record No.  [id number]     Onset Date: 02/03/23  Start of Care Date: 03/08/23     Medical Diagnosis:  Lower back pain with mobility deficits, lower back pain w/ referred pain, neck pain w/ headache, neck pain w/ mobility deficits, pelvic pain      PT Treatment Diagnosis:  Lower back pain with mobility deficits, lower back pain w/ referred pain, neck pain w/ headache, neck pain w/ mobility deficits Plan of Treatment  Frequency/Duration: 1x week/ 8 weeks    Certification date from 12/12/23 to 03/14/24         See note for plan of treatment details and functional goals     Peng George, PT                         I CERTIFY THE NEED FOR THESE SERVICES FURNISHED UNDER        THIS PLAN OF TREATMENT AND WHILE UNDER MY CARE     (Physician attestation of this document indicates review and certification of the therapy plan).              Referring Provider:  Ingrid Leach PA-C    Initial Assessment  See Epic Evaluation- Start of Care Date:  03/08/23

## 2023-12-21 NOTE — PROGRESS NOTES
Chief Complaint:   Chief Complaint   Patient presents with    RECHECK     Bilateral thumb and wrist pain repeat carpal tunnel steroid injecitons       Referring Physician: No ref. provider found    Diagnosis: bilateral thumb CMC/STT arthritis, bilateral carpal tunnel syndrome  Treatment:   12/27/2023: bilateral carpal tunnel steroid injection  12/21/2023: bilateral STT steroid injection (Dr. Petit)  11/1/2023: bilateral carpal tunnel steroid injection  10/18/2023: Bilateral thumb CMC steroid injection  9/20/2023: bilateral STT steroid injection  5/4/2023: bilateral carpal tunnel steroid injection (Dr. Petit)  2/16/2023: bilateral STT steroid injection (Dr. Petit)  12/13/2022: bilateral carpal tunnel steroid injection (Dr. Sullivan)  10/27/2022: bilateral STT steroid injections (Dr. Petit)  9/13/2022: bilateral carpal tunnel steroid injection (Dr. Sullivan)  6/30/2022: bilateral STT steroid injections (Dr. Petit)  Bilateral carpal tunnel steroid injections (outside)    History of Present Illness: Laxmi Ya is a 48 year old RHD female presenting for evaluation of bilateral hand and wrist pain. She's had history of carpal tunnel steroid injections and releases, she continues to feel some numbness/tingling in her fingers. Today she'd like to have steroid injections into the STT region.    Clinical documentation by Dr. Petit on 5/4/2023 was reviewed.    10/18/2023: reports the last STT injections helped.  She presents today for bilateral thumb CMC steroid injections.  She continues to have pain in bilateral thumbs.  She also continues to have numbness/tingling in the median nerve distribution.    11/1/2023: reports the last thumb CMC steroid injections helped. Presents today for bilateral carpal tunnel steroid injections.    12/27/2023: recent STT injections with Dr. Petit which she says were helpful. Last carpal tunnel steroid injections were helpful and she would like to do this again.  Bilateral thumb CMCs feel good and the steroid injections have not worn off yet.    Physical Examination:  There were no vitals filed for this visit.  There is no height or weight on file to calculate BMI.    Well appearing, well nourished  Alert and oriented x 3, cooperative with exam     Right Upper Extremity:  TTP CMC joint: yes   TTP STT joint: yes   Grind test:yes   MP hyper-extension: no   Thenar atrophy: no   Tinel's sign (volar wrist): positive   Durkan's test: negative   Pain with lacertus fibrosus compression  Motor Exam:   Abductor pollicis brevis strength: 4/5    1st dorsal interosseous strength: 5/5   - Flexor pollicis longus strength: 4/5   Intact thumbs up  Vascular Exam:   2+ radial pulse, < 2 sec capillary refill    Left Upper Extremity:  TTP CMC joint: yes   TTP STT joint: yes   Grind test:yes   MP hyper-extension: no   Thenar atrophy: no   Tinel's sign (volar wrist): positive   Durkan's test: negative   Pain with lacertus fibrosis compression  Motor Exam:   Abductor pollicis brevis strength: 4/5    1st dorsal interosseous strength: 5/5   - Flexor pollicis longus strength: 4/5   Intact thumbs up  Vascular Exam:   2+ radial pulse, < 2 sec capillary refill      Imaging/Studies:  XR (3 views) of the  bilateral  hand was obtained  9/20/2023 .  I reviewed the images with the patient.  The imaging study shows bilateral thumb CMC and STT arthritis with possible chondrocalcinosis on radial side.    Assessment: Laxmi Ya is a 48 year old female with bilateral STT, thumb CMC, median neuropathy.    Plan:   I had a discussion with the patient regarding my clinical findings, diagnosis, and treatment plan. I reviewed the treatment options for basal joint arthritis, carpal tunnel (observation, bracing, steroid injection, occupational therapy, surgery, etc.) as well as the risks and benefits of each.  Today she elects bilateral carpal tunnel steroid injection.  If symptoms are persistent at the time of  follow-up, surgical intervention may be indicated.  The patient understands and agrees to the treatment plan.  All questions answered.      Continue bracing   Steroid injection given today.  See procedure note below.   - Ultrasound (previously ordered) to evaluate bilateral median nerves in region of lacertus fibrosus -- scheduled for 2/13/2024    Procedure Note: After a discussion with Laxmi Ya regarding treatment options, we decided to proceed with a steroid injection to the bilateral carpal tunnels.  Risks of the procedure including transient hyperglycemia, fat atrophy, skin depigmentation tendon/ligament weakening, and infection were discussed prior to injection and verbal consent from Laxmi Ya was obtained. The area was prepped with alcohol and 1% lidocaine was used to anesthetize the skin.  20  mg Kenalog and 0.5 mL of 1% lidocaine was injected into each site for a total of 40mg.  Laxmi J Ya tolerated the injection well. A clean dressing was placed over the site of the injection. Laxmi Ya was given post injection instructions.       Next Visit:   Follow-up: after ultrasound   Imaging: None   Plan: Symptom check.       WANDA STARKS MD

## 2023-12-21 NOTE — LETTER
2023         RE: Laxmi Ya  [address] [place] [id number]        Dear Colleague,    Thank you for referring your patient, Laxmi Ya, to the [facility]. Please see a copy of my visit note below.    Hand / Upper Extremity Injection/Arthrocentesis    Date/Time: 2023 12:13 PM    Performed by: Twila Petit MD  Authorized by: Twila Petit MD    Indications:  Pain  Needle Size:  25 G  Guidance: fluoroscopy    Condition: osteoarthritis    Location:  Thumb   Location comment:  Bilateral thumb STT joint    Medications:  40 mg triamcinolone 40 MG/ML; 3 mL lidocaine (PF) 1 %  Outcome:  Tolerated well, no immediate complications  Procedure discussed: discussed risks, benefits, and alternatives    Consent Given by:  Patient  Timeout: timeout called immediately prior to procedure    Prep: patient was prepped and draped in usual sterile fashion        [facility] [address] [id number] [phone]  Dept: [phone]  ______________________________________________________________________________    Patient: Laxmi Ya   : 1975   MRN: [id number]   2023    INVASIVE PROCEDURE SAFETY CHECKLIST    Date: 2023   Procedure:Bilateral thumb STT Joint steroid injections.  Patient Name: Laxmi Ya  MRN: [id number]  YOB: 1975    Action: Complete sections as appropriate. Any discrepancy results in a HARD COPY until resolved.     PRE PROCEDURE:  Patient ID verified with 2 identifiers (name and  or MRN): Yes  Procedure and site verified with patient/designee (when able): Yes  Accurate consent documentation in medical record: Yes  H&P (or appropriate assessment) documented in medical record: Yes  H&P must be up to 20 days prior to procedure and updates within 24 hours of procedure as applicable: Yes  Relevant diagnostic and  radiology test results appropriately labeled and displayed as applicable: NA  Procedure site(s) marked with provider initials: NA    TIMEOUT:  Time-Out performed immediately prior to starting procedure, including verbal and active participation of all team members addressing the following:Yes  * Correct patient identify  * Confirmed that the correct side and site are marked  * An accurate procedure consent form  * Agreement on the procedure to be done  * Correct patient position  * Relevant images and results are properly labeled and appropriately displayed  * The need to administer antibiotics or fluids for irrigation purposes during the procedure as applicable   * Safety precautions based on patient history or medication use    DURING PROCEDURE: Verification of correct person, site, and procedures any time the responsibility for care of the patient is transferred to another member of the care team.       The following medications were given:         Prior to injection, verified patient identity using patient's name and date of birth.  Due to injection administration, patient instructed to remain in clinic for 15 minutes  afterwards, and to report any adverse reaction to me immediately.    Joint injection was performed.    Medication Name: Kenalog 40mg/mL 20 mg per joint NDC [address]-3  Drug Amount Wasted:  None.  Vial/Syringe: Single dose vial  Expiration Date:  7/1/25    Medication Name Lidocaine 1% NDC 89295-586-32    Scribed by DUY RESENDEZ, ATC for Dr. Petit on December 21, 2023 at 12:17p based on the provider's statements to me.     DUY RESENDEZ, ATC      Diagnosis: bilateral thumb CMC/STT arthritis, bilateral carpal tunnel syndrome  Treatment:   11/1/2023: bilateral carpal tunnel steroid injection (Dr. Eligio Pickering)  10/18/2023: Bilateral thumb CMC steroid injection (Dr. Eligio Pickering)  9/20/2023: bilateral STT steroid injection (Dr. Eligio Pickering)  5/4/2023: bilateral carpal tunnel steroid injection  (Dr. Petit)  2/16/2023: bilateral STT steroid injection (Dr. Petit)  12/13/2022: bilateral carpal tunnel steroid injection (Dr. Sullivan)  10/27/2022: bilateral STT steroid injections (Dr. Petit)  9/13/2022: bilateral carpal tunnel steroid injection (Dr. Sullivan)  6/30/2022: bilateral STT steroid injections (Dr. Petit)  Bilateral carpal tunnel steroid injections (outside)     History of Present Illness: Laxmi Ya is a 48 year old RHD female presenting for evaluation of bilateral hand and wrist pain. She's had history of carpal tunnel steroid injections and releases, she continues to feel some numbness/tingling in her fingers. Today she'd like to have steroid injections into the STT region.     Clinical documentation by (Dr. Eligio Pickering)was reviewed.     She reports the last STT injections helped.  She presents today for repeat bilateral thumb CMC injections.  She reports that her pain is 10 out of 10 at rest.  She said that when she gets that shot it will go down to about 5 out of 10 at rest.  Her right thumb is equal to her left thumb.  She continues to be followed by her rheumatologist, Dr. Trey Lemus who is making adjustments in her medical management.    I reviewed her physical exam regarding the site of maximum tenderness was localizes to the STT joint at the base of the thumb near the wrist.    She wishes to proceed with injection consent was obtained.        PROCEDURE:  After written consent, verifying site and side, a sterile Chlora- prep was performed for the injection site.  C-arm guidance was used for difficult placement of a 25-gauge needle into the right thumb STT joint with 1% Lidocaine.  One half mL of Kenalog (20 mg) and 1 mL of lidocaine was injected under fluoroscopic guidance with good relief of pain.  Identical procedure was carried out on the contralateral left thumb patient tolerated the procedure well.  No complications.  Follow up instructions were given.      Twila  Trino Aguilar MD   Hand and Upper Extremity Specialist  [facility]

## 2023-12-21 NOTE — NURSING NOTE
Reason For Visit:   Chief Complaint   Patient presents with    RECHECK     Follow-up Bilateral thumb pain STT joint repeat injection       Primary MD: Amol Juares  Ref. MD: Katja    Age: 48 year old    ?  No      There were no vitals taken for this visit.      Pain Assessment  Patient Currently in Pain: Yes  0-10 Pain Scale: 10  Primary Pain Location: Finger (Comment which one) (both thumbs)  Pain Descriptors: Constant, Aching    Hand Dominance Evaluation  Hand Dominance: Right      force  R hand pincher force: 2.268 kg (5 lb)  R hand  level 2 force: 9.072 kg (20 lb)  L hand pincher force: 2.722 kg (6 lb)  L hand  level  2 force: 7.258 kg (16 lb)    QuickDASH Assessment      9/20/2023     3:59 PM   QuickDASH Main   1. Open a tight or new jar Severe difficulty   2. Do heavy household chores (e.g., wash walls, floors) Severe difficulty   3. Carry a shopping bag or briefcase Severe difficulty   4. Wash your back Severe difficulty   5. Use a knife to cut food Severe difficulty   6. Recreational activities in which you take some force or impact through your arm, shoulder or hand (e.g., golf, hammering, tennis, etc.) Unable   7. During the past week, to what extent has your arm, shoulder or hand problem interfered with your normal social activities with family, friends, neighbours or groups Extremely   8. During the past week, were you limited in your work or other regular daily activities as a result of your arm, shoulder or hand problem Unable   9. Arm, shoulder or hand pain Extreme   10.Tingling (pins and needles) in your arm,shoulder or hand Moderate   11. During the past week, how much difficulty have you had sleeping because of the pain in your arm, shoulder or hand Severe difficulty   Quickdash Ability Score 81.82          Current Outpatient Medications   Medication Sig Dispense Refill    albuterol (PROAIR HFA/PROVENTIL HFA/VENTOLIN HFA) 108 (90 Base) MCG/ACT inhaler Inhale 2 puffs into  the lungs every 6 hours as needed for shortness of breath, wheezing or cough      Artificial Tear Solution (SOOTHE XP) SOLN as needed      atenolol (TENORMIN) 50 MG tablet Take 50 mg by mouth daily      benzoyl peroxide 5 % external liquid Use daily as directed. Preferred bdrand: Medpura 236 mL 11    blood glucose (NO BRAND SPECIFIED) lancets standard Use to test blood sugar 2 times daily or as directed. 100 lancet. 1    blood glucose (NO BRAND SPECIFIED) test strip Use to test blood sugar 2 times daily or as directed. 100 strip 1    blood glucose monitoring (NO BRAND SPECIFIED) meter device kit Use to test blood sugar 2 times daily or as directed. 1 kit 0    diclofenac (VOLTAREN) 1 % topical gel Apply topically 4 times daily as needed      dupilumab (DUPIXENT) 300 MG/2ML prefilled pen Inject 2 mLs (300 mg) Subcutaneous every 14 days 4 mL 5    EPINEPHrine (ANY BX GENERIC EQUIV) 0.3 MG/0.3ML injection 2-pack Inject 0.3 mg into the muscle as needed      fexofenadine (ALLEGRA) 180 MG tablet Take 1 tablet (180 mg) by mouth daily 90 tablet 2    fidaxomicin (DIFICID) 200 MG tablet Take 200 mg by mouth as needed      fluticasone-vilanterol (BREO ELLIPTA) 200-25 MCG/ACT inhaler Inhale 1 puff into the lungs daily      insulin glargine (LANTUS PEN) 100 UNIT/ML pen Inject 20 Units Subcutaneous daily      insulin lispro (HUMALOG KWIKPEN) 100 UNIT/ML (1 unit dial) KWIKPEN Inject 5 Units Subcutaneous 3 times daily (before meals) Plus 1:25 correction scale if BG >150      insulin NPH (HUMULIN N KWIKPEN) 100 UNIT/ML injection Inject 40 Units Subcutaneous every evening      Lancets (ONETOUCH DELICA PLUS ESAQJA53Z) MISC CHECK BLOOD GLUCOSE 3 TIMES DAILY, ADJUSTING MEDICATION, DX CODE E 11.9, ON INSULIN      lidocaine (LIDODERM) 5 % patch as needed   1    losartan (COZAAR) 100 MG tablet Take 100 mg by mouth daily      methylPREDNISolone (MEDROL DOSEPAK) 4 MG tablet therapy pack Take per package instructions. Take once a day by mouth  21 tablet 0    methylPREDNISolone (MEDROL DOSEPAK) 4 MG tablet therapy pack Take per package instructions. Take once a day by mouth 21 tablet 0    mometasone (NASONEX) 50 MCG/ACT nasal spray Spray 2 sprays into both nostrils daily 17 g 11    mometasone-formoterol (DULERA) 200-5 MCG/ACT inhaler Inhale 1 puff into the lungs daily      Montelukast Sodium (SINGULAIR PO) Take 10 mg by mouth At Bedtime       multivitamin w/minerals (THERA-VIT-M) tablet Take 1 tablet by mouth daily      norethindrone (MICRONOR) 0.35 MG tablet Take 0.35 mg by mouth      nystatin (MYCOSTATIN) 141521 UNIT/ML suspension       olopatadine (PATADAY) 0.7 % ophthalmic solution Apply 1 drop to eye daily PRN      OMEPRAZOLE PO Take 40 mg by mouth 2 times daily       oxyCODONE IR (ROXICODONE) 10 MG tablet Take 10 mg by mouth      rifaximin (XIFAXAN) 550 MG TABS tablet Take 200 mg by mouth 3 times daily Take for 10 days, has on had as needed for small bowel bacteria overgrowh      rosuvastatin (CRESTOR) 5 MG tablet Take 2 tablets by mouth daily      spironolactone-HCTZ (ALDACTAZIDE) 25-25 MG tablet Take 1 tablet by mouth daily      STELARA 45 MG/0.5ML SOSY Inject 45 mg Subcutaneous once      tocilizumab (ACTEMRA) 400 MG/20ML Inject 800 mg into the vein every 28 days         Allergies   Allergen Reactions    Polyethylene Glycol Rash    Codeine      Other reaction(s): Gastrointestinal, GI intolerance, Vomiting  Vomiting      Gadolinium Dizziness and Nausea    Hydrocodone-Acetaminophen Nausea and Vomiting     Other reaction(s): GI intolerance    Iodine      abstract    Sulfasalazine      Other reaction(s): GI intolerance  Has tried and had nausa.    Tramadol Nausea and Vomiting     Other reaction(s): Gastrointestinal, Other (see comments)  Nausea and vomiting   unknown      Acetaminophen Nausea     Nausea and vomiting  Nausea and vomiting  Nausea and vomiting  Nausea and vomiting      Gluten Meal Other (See Comments) and Rash     Other reaction(s):  Gastrointestinal, GI intolerance  Dizziness, tired, rash, stomach cramps, thrush, mouth sores  Dizziness, tired, rash, stomach cramps, thrush, mouth sores      Propylene Glycol Dizziness, Nausea and Vomiting, Nausea and Rash       DUY RESENDEZ, ATC

## 2023-12-21 NOTE — PROGRESS NOTES
Diagnosis: bilateral thumb CMC/STT arthritis, bilateral carpal tunnel syndrome  Treatment:   11/1/2023: bilateral carpal tunnel steroid injection (Dr. Eligio Pickering)  10/18/2023: Bilateral thumb CMC steroid injection (Dr. Eligio Pickering)  9/20/2023: bilateral STT steroid injection (Dr. Eligio Pickering)  5/4/2023: bilateral carpal tunnel steroid injection (Dr. Petit)  2/16/2023: bilateral STT steroid injection (Dr. Petit)  12/13/2022: bilateral carpal tunnel steroid injection (Dr. Sullivan)  10/27/2022: bilateral STT steroid injections (Dr. Petit)  9/13/2022: bilateral carpal tunnel steroid injection (Dr. Sullivan)  6/30/2022: bilateral STT steroid injections (Dr. Petit)  Bilateral carpal tunnel steroid injections (outside)     History of Present Illness: Laxmi Ya is a 48 year old RHD female presenting for evaluation of bilateral hand and wrist pain. She's had history of carpal tunnel steroid injections and releases, she continues to feel some numbness/tingling in her fingers. Today she'd like to have steroid injections into the STT region.     Clinical documentation by (Dr. Eligio Pickering)was reviewed.     She reports the last STT injections helped.  She presents today for repeat bilateral thumb CMC injections.  She reports that her pain is 10 out of 10 at rest.  She said that when she gets that shot it will go down to about 5 out of 10 at rest.  Her right thumb is equal to her left thumb.  She continues to be followed by her rheumatologist, Dr. Trey Lemus who is making adjustments in her medical management.    I reviewed her physical exam regarding the site of maximum tenderness was localizes to the STT joint at the base of the thumb near the wrist.    She wishes to proceed with injection consent was obtained.        PROCEDURE:  After written consent, verifying site and side, a sterile Chlora- prep was performed for the injection site.  C-arm guidance was used for difficult placement of a  25-gauge needle into the right thumb STT joint with 1% Lidocaine.  One half mL of Kenalog (20 mg) and 1 mL of lidocaine was injected under fluoroscopic guidance with good relief of pain.  Identical procedure was carried out on the contralateral left thumb patient tolerated the procedure well.  No complications.  Follow up instructions were given.      Twila Petit MD   Hand and Upper Extremity Specialist  [facility]

## 2023-12-22 ENCOUNTER — ANCILLARY PROCEDURE (OUTPATIENT)
Dept: MRI IMAGING | Facility: CLINIC | Age: 48
End: 2023-12-22
Attending: PHYSICIAN ASSISTANT
Payer: MEDICARE

## 2023-12-22 ENCOUNTER — TELEPHONE (OUTPATIENT)
Dept: OBGYN | Facility: CLINIC | Age: 48
End: 2023-12-22

## 2023-12-22 DIAGNOSIS — S79.911A INJURY OF RIGHT HIP, INITIAL ENCOUNTER: ICD-10-CM
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
## 2023-12-22 DIAGNOSIS — S89.91XA KNEE INJURY, RIGHT, INITIAL ENCOUNTER: ICD-10-CM

## 2023-12-22 PROCEDURE — 73721 MRI JNT OF LWR EXTRE W/O DYE: CPT | Mod: RT | Performed by: RADIOLOGY

## 2023-12-22 NOTE — TELEPHONE ENCOUNTER
[facility] Call Center    Phone Message    May a detailed message be left on voicemail: yes     Reason for Call: Other: Pt recently got a urine screening done at [facility]. She was prescribed Macrobid for the infection; however, it has not worked for her in the numerous times that she has tried it. The doctor who prescribed it is out of clinic for the holidays. She is hoping that maybe she can get an alternative in the next line of defense provided by the culture of this testing since it is close to the holidays and she is going out of the country soon. Please contact pt asap.     Action Taken: Message routed to:  Other: [facility]    Travel Screening: Not Applicable

## 2023-12-22 NOTE — TELEPHONE ENCOUNTER
Called Laxmi back to recommend she reach back out to the original prescriber's office to see if there was any on-call provider that could look at her case. Pt verbalized understanding and will reach out to them.

## 2023-12-24 DIAGNOSIS — R79.89 LOW SERUM CORTISOL LEVEL: Primary | ICD-10-CM

## 2023-12-27 ENCOUNTER — OFFICE VISIT (OUTPATIENT)
Dept: ORTHOPEDICS | Facility: CLINIC | Age: 48
End: 2023-12-27
Payer: MEDICARE

## 2023-12-27 ENCOUNTER — INFUSION THERAPY VISIT (OUTPATIENT)
Dept: INFUSION THERAPY | Facility: CLINIC | Age: 48
End: 2023-12-27
Attending: INTERNAL MEDICINE
Payer: MEDICARE

## 2023-12-27 VITALS
SYSTOLIC BLOOD PRESSURE: 117 MMHG | DIASTOLIC BLOOD PRESSURE: 75 MMHG | WEIGHT: 210.2 LBS | RESPIRATION RATE: 15 BRPM | HEART RATE: 72 BPM | OXYGEN SATURATION: 94 % | TEMPERATURE: 97.8 F | BODY MASS INDEX: 33.93 KG/M2

## 2023-12-27 DIAGNOSIS — M13.80 SERONEGATIVE INFLAMMATORY ARTHRITIS: Primary | ICD-10-CM

## 2023-12-27 DIAGNOSIS — G56.03 BILATERAL CARPAL TUNNEL SYNDROME: Primary | ICD-10-CM

## 2023-12-27 PROCEDURE — 96375 TX/PRO/DX INJ NEW DRUG ADDON: CPT

## 2023-12-27 PROCEDURE — 250N000013 HC RX MED GY IP 250 OP 250 PS 637: Performed by: INTERNAL MEDICINE

## 2023-12-27 PROCEDURE — 20526 THER INJECTION CARP TUNNEL: CPT | Mod: 50 | Performed by: STUDENT IN AN ORGANIZED HEALTH CARE EDUCATION/TRAINING PROGRAM

## 2023-12-27 PROCEDURE — 250N000011 HC RX IP 250 OP 636: Mod: JZ | Performed by: INTERNAL MEDICINE

## 2023-12-27 PROCEDURE — 96365 THER/PROPH/DIAG IV INF INIT: CPT

## 2023-12-27 PROCEDURE — 258N000003 HC RX IP 258 OP 636: Performed by: INTERNAL MEDICINE

## 2023-12-27 PROCEDURE — 96366 THER/PROPH/DIAG IV INF ADDON: CPT

## 2023-12-27 PROCEDURE — 99213 OFFICE O/P EST LOW 20 MIN: CPT | Mod: 25 | Performed by: STUDENT IN AN ORGANIZED HEALTH CARE EDUCATION/TRAINING PROGRAM

## 2023-12-27 RX ORDER — METHYLPREDNISOLONE SODIUM SUCCINATE 125 MG/2ML
80 INJECTION, POWDER, LYOPHILIZED, FOR SOLUTION INTRAMUSCULAR; INTRAVENOUS ONCE
Status: COMPLETED | OUTPATIENT
Start: 2023-12-27 | End: 2023-12-27

## 2023-12-27 RX ORDER — DIPHENHYDRAMINE HYDROCHLORIDE 50 MG/ML
50 INJECTION INTRAMUSCULAR; INTRAVENOUS
Status: CANCELLED
Start: 2024-01-24

## 2023-12-27 RX ORDER — ACETAMINOPHEN 325 MG/1
650 TABLET ORAL ONCE
Status: COMPLETED | OUTPATIENT
Start: 2023-12-27 | End: 2023-12-27

## 2023-12-27 RX ORDER — ALBUTEROL SULFATE 0.83 MG/ML
2.5 SOLUTION RESPIRATORY (INHALATION)
Status: CANCELLED | OUTPATIENT
Start: 2024-01-24

## 2023-12-27 RX ORDER — METHYLPREDNISOLONE SODIUM SUCCINATE 125 MG/2ML
80 INJECTION, POWDER, LYOPHILIZED, FOR SOLUTION INTRAMUSCULAR; INTRAVENOUS ONCE
Status: CANCELLED | OUTPATIENT
Start: 2024-01-24

## 2023-12-27 RX ORDER — EPINEPHRINE 1 MG/ML
0.3 INJECTION, SOLUTION INTRAMUSCULAR; SUBCUTANEOUS EVERY 5 MIN PRN
Status: CANCELLED | OUTPATIENT
Start: 2024-01-24

## 2023-12-27 RX ORDER — HEPARIN SODIUM,PORCINE 10 UNIT/ML
5 VIAL (ML) INTRAVENOUS
Status: CANCELLED | OUTPATIENT
Start: 2024-01-24

## 2023-12-27 RX ORDER — ALBUTEROL SULFATE 90 UG/1
1-2 AEROSOL, METERED RESPIRATORY (INHALATION)
Status: CANCELLED
Start: 2024-01-24

## 2023-12-27 RX ORDER — LIDOCAINE HYDROCHLORIDE 10 MG/ML
2 INJECTION, SOLUTION EPIDURAL; INFILTRATION; INTRACAUDAL; PERINEURAL
Status: DISCONTINUED | OUTPATIENT
Start: 2023-12-27 | End: 2024-07-01

## 2023-12-27 RX ORDER — TRIAMCINOLONE ACETONIDE 40 MG/ML
20 INJECTION, SUSPENSION INTRA-ARTICULAR; INTRAMUSCULAR
Status: DISCONTINUED | OUTPATIENT
Start: 2023-12-27 | End: 2024-07-01

## 2023-12-27 RX ORDER — HEPARIN SODIUM (PORCINE) LOCK FLUSH IV SOLN 100 UNIT/ML 100 UNIT/ML
5 SOLUTION INTRAVENOUS
Status: CANCELLED | OUTPATIENT
Start: 2024-01-24

## 2023-12-27 RX ORDER — ACETAMINOPHEN 325 MG/1
650 TABLET ORAL ONCE
Status: CANCELLED | OUTPATIENT
Start: 2024-01-24

## 2023-12-27 RX ORDER — METHYLPREDNISOLONE SODIUM SUCCINATE 125 MG/2ML
125 INJECTION, POWDER, LYOPHILIZED, FOR SOLUTION INTRAMUSCULAR; INTRAVENOUS
Status: CANCELLED
Start: 2024-01-24

## 2023-12-27 RX ORDER — MEPERIDINE HYDROCHLORIDE 25 MG/ML
25 INJECTION INTRAMUSCULAR; INTRAVENOUS; SUBCUTANEOUS EVERY 30 MIN PRN
Status: CANCELLED | OUTPATIENT
Start: 2024-01-24

## 2023-12-27 RX ADMIN — LIDOCAINE HYDROCHLORIDE 2 ML: 10 INJECTION, SOLUTION EPIDURAL; INFILTRATION; INTRACAUDAL; PERINEURAL at 16:14

## 2023-12-27 RX ADMIN — TRIAMCINOLONE ACETONIDE 20 MG: 40 INJECTION, SUSPENSION INTRA-ARTICULAR; INTRAMUSCULAR at 16:14

## 2023-12-27 RX ADMIN — SODIUM CHLORIDE 250 ML: 9 INJECTION, SOLUTION INTRAVENOUS at 15:21

## 2023-12-27 RX ADMIN — METHYLPREDNISOLONE SODIUM SUCCINATE 81.25 MG: 125 INJECTION, POWDER, FOR SOLUTION INTRAMUSCULAR; INTRAVENOUS at 13:24

## 2023-12-27 RX ADMIN — ACETAMINOPHEN 650 MG: 325 TABLET ORAL at 13:04

## 2023-12-27 RX ADMIN — TOCILIZUMAB 800 MG: 20 INJECTION, SOLUTION, CONCENTRATE INTRAVENOUS at 13:38

## 2023-12-27 NOTE — PROGRESS NOTES
Hand / Upper Extremity Injection/Arthrocentesis: bilateral carpal tunnel    Date/Time: 2023 4:14 PM    Performed by: Hyacinth Morrison MD  Authorized by: Hyacinth Morrison MD    Indications:  Pain  Needle Size:  25 G  Guidance: landmark    Condition: carpal tunnel    Laterality:  Bilateral    Site:  Bilateral carpal tunnel  Medications (Right):  20 mg triamcinolone 40 MG/ML; 2 mL lidocaine (PF) 1 %  Medications (Left):  20 mg triamcinolone 40 MG/ML; 2 mL lidocaine (PF) 1 %  Outcome:  Tolerated well, no immediate complications  Procedure discussed: discussed risks, benefits, and alternatives    Consent Given by:  Patient  Timeout: timeout called immediately prior to procedure    Prep: patient was prepped and draped in usual sterile fashion        [facility] [address] [address]  [phone]  Dept: [phone]  ______________________________________________________________________________    Patient: Laxmi Ya   : 1975   MRN: [id number]   2023    INVASIVE PROCEDURE SAFETY CHECKLIST    Date: 2023   Procedure:Bilateral Carpal tunnel steroid injection  Patient Name: Laxmi Ya  MRN: [id number]  YOB: 1975    Action: Complete sections as appropriate. Any discrepancy results in a HARD COPY until resolved.     PRE PROCEDURE:  Patient ID verified with 2 identifiers (name and  or MRN): Yes  Procedure and site verified with patient/designee (when able): Yes  Accurate consent documentation in medical record: Yes  H&P (or appropriate assessment) documented in medical record: Yes  H&P must be up to 20 days prior to procedure and updates within 24 hours of procedure as applicable: Yes  Relevant diagnostic and radiology test results appropriately labeled and displayed as applicable: NA  Procedure site(s) marked with provider initials: NA    TIMEOUT:  Time-Out performed immediately prior to  starting procedure, including verbal and active participation of all team members addressing the following:Yes  * Correct patient identify  * Confirmed that the correct side and site are marked  * An accurate procedure consent form  * Agreement on the procedure to be done  * Correct patient position  * Relevant images and results are properly labeled and appropriately displayed  * The need to administer antibiotics or fluids for irrigation purposes during the procedure as applicable   * Safety precautions based on patient history or medication use    DURING PROCEDURE: Verification of correct person, site, and procedures any time the responsibility for care of the patient is transferred to another member of the care team.       The following medications were given:         Prior to injection, verified patient identity using patient's name and date of birth.  Due to injection administration, patient instructed to remain in clinic for 15 minutes  afterwards, and to report any adverse reaction to me immediately.    Tendon sheath injection was performed.   Medication Name: Kenalog 40 mg/mL NDC [address]-0  Drug Amount Wasted:  None.  Vial/Syringe: Single dose vial  Expiration Date:  7/1/25    Medication Name Lidocaine 1% NDC 70414-651-95    Scribed by DUY RESENDEZ, TIM for Dr. Jo on December 27, 2023 at 4:18p based on the provider's statements to me.     DUY RESENDEZ, ATC

## 2023-12-27 NOTE — PROGRESS NOTES
Post Infusion Assessment:  Patient tolerated infusion without incident.  Blood return noted pre and post infusion.  Site patent and intact, free from redness, edema or discomfort.  No evidence of extravasations.  Access discontinued per protocol.     Discharge Plan:   Discharge instructions reviewed with: Patient.  Patient and/or family verbalized understanding of discharge instructions and all questions answered.  AVS to patient via TweetminsterT.      Future Appointments   Date Time Provider Department Center   1/24/2024 12:00 PM [facility] INFUSION NURSE [facility]     Patient discharged in stable condition accompanied by: self.  Departure Mode: Ambulatory.      Kaley Handley RN

## 2023-12-27 NOTE — LETTER
12/27/2023         RE: Laxmi Ya  [address] [facility] [id number]        Dear Colleague,    Thank you for referring your patient, Laxmi Ya, to the [facility] [place]. Please see a copy of my visit note below.    Chief Complaint:   Chief Complaint   Patient presents with    RECHECK     Bilateral thumb and wrist pain repeat carpal tunnel steroid injecitons       Referring Physician: No ref. provider found    Diagnosis: bilateral thumb CMC/STT arthritis, bilateral carpal tunnel syndrome  Treatment:   12/27/2023: bilateral carpal tunnel steroid injection  12/21/2023: bilateral STT steroid injection (Dr. Petit)  11/1/2023: bilateral carpal tunnel steroid injection  10/18/2023: Bilateral thumb CMC steroid injection  9/20/2023: bilateral STT steroid injection  5/4/2023: bilateral carpal tunnel steroid injection (Dr. Petit)  2/16/2023: bilateral STT steroid injection (Dr. Petit)  12/13/2022: bilateral carpal tunnel steroid injection (Dr. Sullivan)  10/27/2022: bilateral STT steroid injections (Dr. Petit)  9/13/2022: bilateral carpal tunnel steroid injection (Dr. Sullivan)  6/30/2022: bilateral STT steroid injections (Dr. Petit)  Bilateral carpal tunnel steroid injections (outside)    History of Present Illness: Laxmi Ya is a 48 year old RHD female presenting for evaluation of bilateral hand and wrist pain. She's had history of carpal tunnel steroid injections and releases, she continues to feel some numbness/tingling in her fingers. Today she'd like to have steroid injections into the STT region.    Clinical documentation by Dr. Petit on 5/4/2023 was reviewed.    10/18/2023: reports the last STT injections helped.  She presents today for bilateral thumb CMC steroid injections.  She continues to have pain in bilateral thumbs.  She also continues to have numbness/tingling in the median nerve distribution.    11/1/2023: reports the last thumb CMC  steroid injections helped. Presents today for bilateral carpal tunnel steroid injections.    12/27/2023: recent STT injections with Dr. Petit which she says were helpful. Last carpal tunnel steroid injections were helpful and she would like to do this again. Bilateral thumb CMCs feel good and the steroid injections have not worn off yet.    Physical Examination:  There were no vitals filed for this visit.  There is no height or weight on file to calculate BMI.    Well appearing, well nourished  Alert and oriented x 3, cooperative with exam     Right Upper Extremity:  TTP CMC joint: yes   TTP STT joint: yes   Grind test:yes   MP hyper-extension: no   Thenar atrophy: no   Tinel's sign (volar wrist): positive   Durkan's test: negative   Pain with lacertus fibrosus compression  Motor Exam:   Abductor pollicis brevis strength: 4/5    1st dorsal interosseous strength: 5/5   - Flexor pollicis longus strength: 4/5   Intact thumbs up  Vascular Exam:   2+ radial pulse, < 2 sec capillary refill    Left Upper Extremity:  TTP CMC joint: yes   TTP STT joint: yes   Grind test:yes   MP hyper-extension: no   Thenar atrophy: no   Tinel's sign (volar wrist): positive   Durkan's test: negative   Pain with lacertus fibrosis compression  Motor Exam:   Abductor pollicis brevis strength: 4/5    1st dorsal interosseous strength: 5/5   - Flexor pollicis longus strength: 4/5   Intact thumbs up  Vascular Exam:   2+ radial pulse, < 2 sec capillary refill      Imaging/Studies:  XR (3 views) of the  bilateral  hand was obtained  9/20/2023 .  I reviewed the images with the patient.  The imaging study shows bilateral thumb CMC and STT arthritis with possible chondrocalcinosis on radial side.    Assessment: Laxmi Ya is a 48 year old female with bilateral STT, thumb CMC, median neuropathy.    Plan:   I had a discussion with the patient regarding my clinical findings, diagnosis, and treatment plan. I reviewed the treatment options for basal  joint arthritis, carpal tunnel (observation, bracing, steroid injection, occupational therapy, surgery, etc.) as well as the risks and benefits of each.  Today she elects bilateral carpal tunnel steroid injection.  If symptoms are persistent at the time of follow-up, surgical intervention may be indicated.  The patient understands and agrees to the treatment plan.  All questions answered.      Continue bracing   Steroid injection given today.  See procedure note below.   - Ultrasound (previously ordered) to evaluate bilateral median nerves in region of lacertus fibrosus -- scheduled for 2/13/2024    Procedure Note: After a discussion with Laxmi Ya regarding treatment options, we decided to proceed with a steroid injection to the bilateral carpal tunnels.  Risks of the procedure including transient hyperglycemia, fat atrophy, skin depigmentation tendon/ligament weakening, and infection were discussed prior to injection and verbal consent from Laxmi Ya was obtained. The area was prepped with alcohol and 1% lidocaine was used to anesthetize the skin.  20  mg Kenalog and 0.5 mL of 1% lidocaine was injected into each site for a total of 40mg.  Laxmi Ya tolerated the injection well. A clean dressing was placed over the site of the injection. Laxmi J Ya was given post injection instructions.       Next Visit:   Follow-up: after ultrasound   Imaging: None   Plan: Symptom check.       HYACINTH STARKS MD    Hand / Upper Extremity Injection/Arthrocentesis: bilateral carpal tunnel    Date/Time: 12/27/2023 4:14 PM    Performed by: Hyacinth Starks MD  Authorized by: Hyacinth Starks MD    Indications:  Pain  Needle Size:  25 G  Guidance: landmark    Condition: carpal tunnel    Laterality:  Bilateral    Site:  Bilateral carpal tunnel  Medications (Right):  20 mg triamcinolone 40 MG/ML; 2 mL lidocaine (PF) 1 %  Medications (Left):  20 mg triamcinolone 40 MG/ML; 2 mL lidocaine (PF) 1 %  Outcome:   Tolerated well, no immediate complications  Procedure discussed: discussed risks, benefits, and alternatives    Consent Given by:  Patient  Timeout: timeout called immediately prior to procedure    Prep: patient was prepped and draped in usual sterile fashion        [facility] [address]  [address] [facility] [address] [phone]  Dept: [phone]  ______________________________________________________________________________    Patient: Laxmi Ya   : 1975   MRN: [id number]   2023    INVASIVE PROCEDURE SAFETY CHECKLIST    Date: 2023   Procedure:Bilateral Carpal tunnel steroid injection  Patient Name: Laxmi Ya  MRN: [id number]  YOB: 1975    Action: Complete sections as appropriate. Any discrepancy results in a HARD COPY until resolved.     PRE PROCEDURE:  Patient ID verified with 2 identifiers (name and  or MRN): Yes  Procedure and site verified with patient/designee (when able): Yes  Accurate consent documentation in medical record: Yes  H&P (or appropriate assessment) documented in medical record: Yes  H&P must be up to 20 days prior to procedure and updates within 24 hours of procedure as applicable: Yes  Relevant diagnostic and radiology test results appropriately labeled and displayed as applicable: NA  Procedure site(s) marked with provider initials: NA    TIMEOUT:  Time-Out performed immediately prior to starting procedure, including verbal and active participation of all team members addressing the following:Yes  * Correct patient identify  * Confirmed that the correct side and site are marked  * An accurate procedure consent form  * Agreement on the procedure to be done  * Correct patient position  * Relevant images and results are properly labeled and appropriately displayed  * The need to administer antibiotics or fluids for irrigation purposes during the procedure as applicable   * Safety  precautions based on patient history or medication use    DURING PROCEDURE: Verification of correct person, site, and procedures any time the responsibility for care of the patient is transferred to another member of the care team.       The following medications were given:         Prior to injection, verified patient identity using patient's name and date of birth.  Due to injection administration, patient instructed to remain in clinic for 15 minutes  afterwards, and to report any adverse reaction to me immediately.    Tendon sheath injection was performed.   Medication Name: Kenalog 40 mg/mL NDC [address]-2  Drug Amount Wasted:  None.  Vial/Syringe: Single dose vial  Expiration Date:  7/1/25    Medication Name Lidocaine 1% NDC 32808-351-83    Scribed by DUY RESENDEZ ATC for Dr. Jo on December 27, 2023 at 4:18p based on the provider's statements to me.     DUY RESENDEZ ATC VENUS VAKHSHORI, MD

## 2023-12-27 NOTE — NURSING NOTE
Reason For Visit:   Chief Complaint   Patient presents with    RECHECK     Bilateral thumb and wrist pain repeat carpal tunnel steroid injecitons       Primary MD: Amol Juares  Ref. MD: Katja    Age: 48 year old    ?  No      There were no vitals taken for this visit.      Pain Assessment  Patient Currently in Pain: Yes  0-10 Pain Scale: 10  Primary Pain Location: Wrist (Bilateral)  Pain Descriptors: Constant, Sharp, Aching    Hand Dominance Evaluation  Hand Dominance: Right          QuickDASH Assessment      12/27/2023     3:51 PM   QuickDASH Main   1. Open a tight or new jar Unable   2. Do heavy household chores (e.g., wash walls, floors) Unable   3. Carry a shopping bag or briefcase Unable   4. Wash your back Severe difficulty   5. Use a knife to cut food Unable   6. Recreational activities in which you take some force or impact through your arm, shoulder or hand (e.g., golf, hammering, tennis, etc.) Unable   7. During the past week, to what extent has your arm, shoulder or hand problem interfered with your normal social activities with family, friends, neighbours or groups Extremely   8. During the past week, were you limited in your work or other regular daily activities as a result of your arm, shoulder or hand problem Unable   9. Arm, shoulder or hand pain Severe   10.Tingling (pins and needles) in your arm,shoulder or hand Severe   11. During the past week, how much difficulty have you had sleeping because of the pain in your arm, shoulder or hand Severe difficulty   Quickdash Ability Score 90.91          Current Outpatient Medications   Medication Sig Dispense Refill    albuterol (PROAIR HFA/PROVENTIL HFA/VENTOLIN HFA) 108 (90 Base) MCG/ACT inhaler Inhale 2 puffs into the lungs every 6 hours as needed for shortness of breath, wheezing or cough      Artificial Tear Solution (SOOTHE XP) SOLN as needed      atenolol (TENORMIN) 50 MG tablet Take 50 mg by mouth daily      benzoyl peroxide 5 %  external liquid Use daily as directed. Preferred bdrand: Medpura 236 mL 11    blood glucose (NO BRAND SPECIFIED) lancets standard Use to test blood sugar 2 times daily or as directed. 100 lancet. 1    blood glucose (NO BRAND SPECIFIED) test strip Use to test blood sugar 2 times daily or as directed. 100 strip 1    blood glucose monitoring (NO BRAND SPECIFIED) meter device kit Use to test blood sugar 2 times daily or as directed. 1 kit 0    diclofenac (VOLTAREN) 1 % topical gel Apply topically 4 times daily as needed      dupilumab (DUPIXENT) 300 MG/2ML prefilled pen Inject 2 mLs (300 mg) Subcutaneous every 14 days 4 mL 5    EPINEPHrine (ANY BX GENERIC EQUIV) 0.3 MG/0.3ML injection 2-pack Inject 0.3 mg into the muscle as needed      fexofenadine (ALLEGRA) 180 MG tablet Take 1 tablet (180 mg) by mouth daily 90 tablet 2    fidaxomicin (DIFICID) 200 MG tablet Take 200 mg by mouth as needed      fluticasone-vilanterol (BREO ELLIPTA) 200-25 MCG/ACT inhaler Inhale 1 puff into the lungs daily      insulin glargine (LANTUS PEN) 100 UNIT/ML pen Inject 20 Units Subcutaneous daily      insulin lispro (HUMALOG KWIKPEN) 100 UNIT/ML (1 unit dial) KWIKPEN Inject 5 Units Subcutaneous 3 times daily (before meals) Plus 1:25 correction scale if BG >150      insulin NPH (HUMULIN N KWIKPEN) 100 UNIT/ML injection Inject 40 Units Subcutaneous every evening      Lancets (ONETOUCH DELICA PLUS DEIIWA10F) MISC CHECK BLOOD GLUCOSE 3 TIMES DAILY, ADJUSTING MEDICATION, DX CODE E 11.9, ON INSULIN      lidocaine (LIDODERM) 5 % patch as needed   1    losartan (COZAAR) 100 MG tablet Take 100 mg by mouth daily      methylPREDNISolone (MEDROL DOSEPAK) 4 MG tablet therapy pack Take per package instructions. Take once a day by mouth 21 tablet 0    methylPREDNISolone (MEDROL DOSEPAK) 4 MG tablet therapy pack Take per package instructions. Take once a day by mouth 21 tablet 0    mometasone (NASONEX) 50 MCG/ACT nasal spray Spray 2 sprays into both nostrils  daily 17 g 11    mometasone-formoterol (DULERA) 200-5 MCG/ACT inhaler Inhale 1 puff into the lungs daily (Patient not taking: Reported on 12/27/2023)      Montelukast Sodium (SINGULAIR PO) Take 10 mg by mouth At Bedtime       multivitamin w/minerals (THERA-VIT-M) tablet Take 1 tablet by mouth daily      norethindrone (MICRONOR) 0.35 MG tablet Take 0.35 mg by mouth      nystatin (MYCOSTATIN) 861027 UNIT/ML suspension       olopatadine (PATADAY) 0.7 % ophthalmic solution Apply 1 drop to eye daily PRN      OMEPRAZOLE PO Take 40 mg by mouth 2 times daily       oxyCODONE IR (ROXICODONE) 10 MG tablet Take 10 mg by mouth      rifaximin (XIFAXAN) 550 MG TABS tablet Take 200 mg by mouth 3 times daily Take for 10 days, has on had as needed for small bowel bacteria overgrowh      rosuvastatin (CRESTOR) 5 MG tablet Take 2 tablets by mouth daily      spironolactone-HCTZ (ALDACTAZIDE) 25-25 MG tablet Take 1 tablet by mouth daily      STELARA 45 MG/0.5ML SOSY Inject 45 mg Subcutaneous once (Patient not taking: Reported on 12/27/2023)      tocilizumab (ACTEMRA) 400 MG/20ML Inject 800 mg into the vein every 28 days         Allergies   Allergen Reactions    Polyethylene Glycol Rash    Codeine      Other reaction(s): Gastrointestinal, GI intolerance, Vomiting  Vomiting      Gadolinium Dizziness and Nausea    Hydrocodone-Acetaminophen Nausea and Vomiting     Other reaction(s): GI intolerance    Iodine      abstract    Sulfasalazine      Other reaction(s): GI intolerance  Has tried and had nausa.    Tramadol Nausea and Vomiting     Other reaction(s): Gastrointestinal, Other (see comments)  Nausea and vomiting   unknown      Acetaminophen Nausea     Nausea and vomiting  Nausea and vomiting  Nausea and vomiting  Nausea and vomiting      Gluten Meal Other (See Comments) and Rash     Other reaction(s): Gastrointestinal, GI intolerance  Dizziness, tired, rash, stomach cramps, thrush, mouth sores  Dizziness, tired, rash, stomach cramps,  thrush, mouth sores      Propylene Glycol Dizziness, Nausea and Vomiting, Nausea and Rash       DUY RESENDEZ, ATC

## 2023-12-27 NOTE — PROGRESS NOTES
Infusion Nursing Note:  Laxmi Ya presents today for Actemra.    Patient seen by provider today: No   present during visit today: Not Applicable.    Note: Acterma infusion given over 1 hour with pre-medications as ordered on treatment plan. No labs ordered for today. 250ml NS bolus given after Actemra infusion completed.      Intravenous Access:  Peripheral IV placed b VAT. First IV occluded, 2nd IV placed and infused without difficulty.    Treatment Conditions:  Biological Infusion Checklist:  ~~~ NOTE: If the patient answers yes to any of the questions below, hold the infusion and contact ordering provider or on-call provider.    Have you recently had an elevated temperature, fever, chills, productive cough, coughing for 3 weeks or longer or hemoptysis,  abnormal vital signs, night sweats,  chest pain or have you noticed a decrease in your appetite, unexplained weight loss or fatigue? No  Do you have any open wounds or new incisions? No  Do you have any upcoming hospitalizations or surgeries? Does not include esophagogastroduodenoscopy, colonoscopy, endoscopic retrograde cholangiopancreatography (ERCP), endoscopic ultrasound (EUS), dental procedures or joint aspiration/steroid injections No  Do you currently have any signs of illness or infection or are you on any antibiotics? Yes, taking Macrobid for UTI. Per xena Yoder for infusion today if vitals stable . Pt taking Diflucan as well, uses as needed for symptoms.  Have you had any new, sudden or worsening abdominal pain? No  Have you or anyone in your household received a live vaccination in the past 4 weeks? Please note: No live vaccines while on biologic/chemotherapy until 6 months after the last treatment. Patient can receive the flu vaccine (shot only), pneumovax and the Covid vaccine. It is optimal for the patient to get these vaccines mid cycle, but they can be given at any time as long as it is not on the day of the infusion. No  Have  you recently been diagnosed with any new nervous system diseases (ie. Multiple sclerosis, Guillain [place], seizures, neurological changes) or cancer diagnosis? Are you on any form of radiation or chemotherapy? No  Are you pregnant or breast feeding or do you have plans of pregnancy in the future? No  Have you been having any signs of worsening depression or suicidal ideations?  (benlysta only) N/A  Have there been any other new onset medical symptoms? No  Have you had any new blood clots? (IVIG only) N/A  /86 (BP Location: Left arm, Patient Position: Semi-Stephens's, Cuff Size: Adult Large)   Pulse 80   Temp 97.8  F (36.6  C) (Oral)   Resp 16   Wt 95.3 kg (210 lb 3.2 oz)   SpO2 95%   BMI 33.93 kg/m    Administrations This Visit       acetaminophen (TYLENOL) tablet 650 mg       Admin Date  12/27/2023 Action  $Given Dose  650 mg Route  Oral Documented By  Beverly Lyle RN              methylPREDNISolone sodium succinate (solu-MEDROL) injection 81.25 mg       Admin Date  12/27/2023 Action  $Given Dose  81.25 mg Route  Intravenous Documented By  Beverly Lyle RN              sodium chloride 0.9% BOLUS 250 mL       Admin Date  12/27/2023 Action  $New Bag Dose  250 mL Route  Intravenous Documented By  Beverly Lyle RN              tocilizumab (ACTEMRA) 800 mg in sodium chloride 0.9 % 150 mL infusion       Admin Date  12/27/2023 Action  $New Bag Dose  800 mg Rate  150 mL/hr Route  Intravenous Documented By  Beverly Lyle RN                  Drug Name: Solu-medrol  Dose: 81mg  Route administered: IV  NDC #: 5988-0619-11  Amount of waste(mL):0.7ml  Reason for waste: Single use vial      Beverly Lyle RN

## 2023-12-28 ENCOUNTER — OFFICE VISIT (OUTPATIENT)
Dept: OTHER | Facility: CLINIC | Age: 48
End: 2023-12-28
Attending: INTERNAL MEDICINE
Payer: MEDICARE

## 2023-12-28 ENCOUNTER — TELEPHONE (OUTPATIENT)
Dept: RHEUMATOLOGY | Facility: CLINIC | Age: 48
End: 2023-12-28
Payer: MEDICARE

## 2023-12-28 VITALS
HEART RATE: 86 BPM | OXYGEN SATURATION: 94 % | HEIGHT: 66 IN | SYSTOLIC BLOOD PRESSURE: 123 MMHG | BODY MASS INDEX: 33.82 KG/M2 | WEIGHT: 210.4 LBS | DIASTOLIC BLOOD PRESSURE: 88 MMHG

## 2023-12-28 DIAGNOSIS — I89.0 LYMPHEDEMA: Primary | ICD-10-CM

## 2023-12-28 DIAGNOSIS — E11.29 TYPE 2 DIABETES MELLITUS WITH OTHER DIABETIC KIDNEY COMPLICATION, WITH LONG-TERM CURRENT USE OF INSULIN (H): ICD-10-CM

## 2023-12-28 DIAGNOSIS — M13.80 SERONEGATIVE INFLAMMATORY ARTHRITIS: ICD-10-CM

## 2023-12-28 DIAGNOSIS — I10 BENIGN ESSENTIAL HYPERTENSION: ICD-10-CM

## 2023-12-28 DIAGNOSIS — M13.80 SERONEGATIVE INFLAMMATORY ARTHRITIS: Primary | ICD-10-CM

## 2023-12-28 DIAGNOSIS — R10.2 PELVIC PAIN IN FEMALE: ICD-10-CM

## 2023-12-28 DIAGNOSIS — Z79.4 TYPE 2 DIABETES MELLITUS WITH OTHER DIABETIC KIDNEY COMPLICATION, WITH LONG-TERM CURRENT USE OF INSULIN (H): ICD-10-CM

## 2023-12-28 PROCEDURE — G0463 HOSPITAL OUTPT CLINIC VISIT: HCPCS | Performed by: INTERNAL MEDICINE

## 2023-12-28 PROCEDURE — 99205 OFFICE O/P NEW HI 60 MIN: CPT | Performed by: INTERNAL MEDICINE

## 2023-12-28 RX ORDER — EPINEPHRINE 1 MG/ML
0.3 INJECTION, SOLUTION, CONCENTRATE INTRAVENOUS EVERY 5 MIN PRN
Status: CANCELLED | OUTPATIENT
Start: 2023-12-28

## 2023-12-28 RX ORDER — MEPERIDINE HYDROCHLORIDE 25 MG/ML
25 INJECTION INTRAMUSCULAR; INTRAVENOUS; SUBCUTANEOUS EVERY 30 MIN PRN
Status: CANCELLED | OUTPATIENT
Start: 2023-12-28

## 2023-12-28 RX ORDER — DIPHENHYDRAMINE HYDROCHLORIDE 50 MG/ML
50 INJECTION INTRAMUSCULAR; INTRAVENOUS
Status: CANCELLED
Start: 2023-12-28

## 2023-12-28 RX ORDER — METHYLPREDNISOLONE SODIUM SUCCINATE 125 MG/2ML
125 INJECTION, POWDER, LYOPHILIZED, FOR SOLUTION INTRAMUSCULAR; INTRAVENOUS
Status: CANCELLED
Start: 2023-12-28

## 2023-12-28 RX ORDER — ACETAMINOPHEN 325 MG/1
650 TABLET ORAL ONCE
Status: CANCELLED | OUTPATIENT
Start: 2023-12-28

## 2023-12-28 RX ORDER — METHYLPREDNISOLONE SODIUM SUCCINATE 125 MG/2ML
81.25 INJECTION, POWDER, LYOPHILIZED, FOR SOLUTION INTRAMUSCULAR; INTRAVENOUS ONCE
Status: CANCELLED | OUTPATIENT
Start: 2023-12-28

## 2023-12-28 RX ORDER — HEPARIN SODIUM (PORCINE) LOCK FLUSH IV SOLN 100 UNIT/ML 100 UNIT/ML
5 SOLUTION INTRAVENOUS
Status: CANCELLED | OUTPATIENT
Start: 2023-12-28

## 2023-12-28 RX ORDER — ALBUTEROL SULFATE 90 UG/1
1-2 AEROSOL, METERED RESPIRATORY (INHALATION)
Status: CANCELLED
Start: 2023-12-28

## 2023-12-28 RX ORDER — HEPARIN SODIUM,PORCINE 10 UNIT/ML
5-20 VIAL (ML) INTRAVENOUS DAILY PRN
Status: CANCELLED | OUTPATIENT
Start: 2023-12-28

## 2023-12-28 RX ORDER — ALBUTEROL SULFATE 0.83 MG/ML
2.5 SOLUTION RESPIRATORY (INHALATION)
Status: CANCELLED | OUTPATIENT
Start: 2023-12-28

## 2023-12-28 NOTE — TELEPHONE ENCOUNTER
New therapy plan per Dr Lemus's direction for 9 months.    .Jeanine Velez RN  [facility]

## 2023-12-28 NOTE — PATIENT INSTRUCTIONS
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

## 2023-12-28 NOTE — PROGRESS NOTES
[facility] INITIAL VASCULAR MEDICINE CONSULT    ( New patient visit)     PRIMARY HEALTH CARE PROVIDER:  Amol Juares MD      REFERRING HEALTH CARE PROVIDER; Alanis Schilling OT     REASON FOR CONSULT: Evaluation and management of lymphedema all over the body involving lower abdomen thighs legs underarms etc.      HPI: Laxmi Ya is a 48 year old very pleasant female with complex and complicated past medical history including diabetes mellitus on insulin, FREDERICK, fibromyalgia, chronic pelvic pain, recurrent fungal urinary tract infection specifically Candida glabrata, seronegative to inflammatory arthritis predominantly involving small joint stiffness closely following up with rheumatologist and she is getting Actemra IV infusions for more than a year currently following up with edema therapist and here for further evaluation.  She also noted cyst in the pelvic area planning to undergo surgery in few months.  She has a chronic pelvic pain going for therapy and currently seeing gynecologist.  She has no personal history of malignancy.  Whenever she receives infusion with Actemra her symptoms get better for a week then followed by legs are swollen last dose was yesterday her legs are better today.  Due to her chronic pelvic pain she is having issues to walk and also she is obese with a BMI close to 34.  She is working with the pelvic floor therapist which is helping some extent but not completely resolved the pain  She also has a history of bacterial overgrowth syndrome with diarrhea seeing GI specialist and she has a history of Frederick and Clostridium difficult x 2 treated, she has a history of diverticulosis and recurrent diverticulitis underwent partial colectomy and multiple other issues    6 months ago echocardiogram normal LV function and RV function  CTA chest 6 months ago unremarkable    She gets most of her care at [facility] and [facility]  [place]  She is disabled used to work as an     She has no history of a DVT or PE  She has a history of hypertension taking multiple medications and well-controlled    She is new to me reviewed available records in the epic and updated chart        PAST MEDICAL HISTORY  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    Obesity     Pyelonephritis, unspecified 1992    abstract    Rheumatoid arteritis (H)     Seronegative inflammatory arthritis 06/17/2022    Type 2 diabetes mellitus (H)     Unspecified symptom associated with female genital organs 05/07/1999    chronic pelvic pain abstract       CURRENT MEDICATIONS  albuterol (PROAIR HFA/PROVENTIL HFA/VENTOLIN HFA) 108 (90 Base) MCG/ACT inhaler, Inhale 2 puffs into the lungs every 6 hours as needed for shortness of breath, wheezing or cough  Artificial Tear Solution (SOOTHE XP) SOLN, as needed  atenolol (TENORMIN) 50 MG tablet, Take 50 mg by mouth daily  benzoyl peroxide 5 % external liquid, Use daily as directed. Preferred bdrand: Medpura  blood glucose (NO BRAND SPECIFIED) lancets standard, Use to test blood sugar 2 times daily or as directed.  blood glucose (NO BRAND SPECIFIED) test strip, Use to test blood sugar 2 times daily or as directed.  blood glucose monitoring (NO BRAND SPECIFIED) meter device kit, Use to test blood sugar 2 times daily or as directed.  diclofenac (VOLTAREN) 1 % topical gel, Apply topically 4 times daily as needed  dupilumab (DUPIXENT) 300 MG/2ML prefilled pen, Inject 2 mLs (300 mg)  Subcutaneous every 14 days  EPINEPHrine (ANY BX GENERIC EQUIV) 0.3 MG/0.3ML injection 2-pack, Inject 0.3 mg into the muscle as needed  fexofenadine (ALLEGRA) 180 MG tablet, Take 1 tablet (180 mg) by mouth daily  fidaxomicin (DIFICID) 200 MG tablet, Take 200 mg by mouth as needed  fluticasone-vilanterol (BREO ELLIPTA) 200-25 MCG/ACT inhaler, Inhale 1 puff into the lungs daily  insulin glargine (LANTUS PEN) 100 UNIT/ML pen, Inject 20 Units Subcutaneous daily  insulin lispro (HUMALOG KWIKPEN) 100 UNIT/ML (1 unit dial) KWIKPEN, Inject 5 Units Subcutaneous 3 times daily (before meals) Plus 1:25 correction scale if BG >150  insulin NPH (HUMULIN N KWIKPEN) 100 UNIT/ML injection, Inject 40 Units Subcutaneous every evening  Lancets (ONETOUCH DELICA PLUS GECKBH34J) MISC, CHECK BLOOD GLUCOSE 3 TIMES DAILY, ADJUSTING MEDICATION, DX CODE E 11.9, ON INSULIN  lidocaine (LIDODERM) 5 % patch, as needed   losartan (COZAAR) 100 MG tablet, Take 100 mg by mouth daily  methylPREDNISolone (MEDROL DOSEPAK) 4 MG tablet therapy pack, Take per package instructions. Take once a day by mouth  methylPREDNISolone (MEDROL DOSEPAK) 4 MG tablet therapy pack, Take per package instructions. Take once a day by mouth  mometasone (NASONEX) 50 MCG/ACT nasal spray, Spray 2 sprays into both nostrils daily  mometasone-formoterol (DULERA) 200-5 MCG/ACT inhaler, Inhale 1 puff into the lungs daily  Montelukast Sodium (SINGULAIR PO), Take 10 mg by mouth At Bedtime   multivitamin w/minerals (THERA-VIT-M) tablet, Take 1 tablet by mouth daily  nystatin (MYCOSTATIN) 183376 UNIT/ML suspension,   olopatadine (PATADAY) 0.7 % ophthalmic solution, Apply 1 drop to eye daily PRN  OMEPRAZOLE PO, Take 40 mg by mouth 2 times daily   oxyCODONE IR (ROXICODONE) 10 MG tablet, Take 10 mg by mouth  rifaximin (XIFAXAN) 550 MG TABS tablet, Take 200 mg by mouth 3 times daily Take for 10 days, has on had as needed for small bowel bacteria overgrowh  rosuvastatin (CRESTOR) 5 MG tablet,  Take 2 tablets by mouth daily  spironolactone-HCTZ (ALDACTAZIDE) 25-25 MG tablet, Take 1 tablet by mouth daily  STELARA 45 MG/0.5ML SOSY, Inject 45 mg Subcutaneous once  tocilizumab (ACTEMRA) 400 MG/20ML, Inject 800 mg into the vein every 28 days  norethindrone (MICRONOR) 0.35 MG tablet, Take 0.35 mg by mouth    lidocaine (PF) (XYLOCAINE) 1 % injection 1 mL  lidocaine (PF) (XYLOCAINE) 1 % injection 1 mL  lidocaine (PF) (XYLOCAINE) 1 % injection 2 mL  lidocaine (PF) (XYLOCAINE) 1 % injection 2 mL  lidocaine (PF) (XYLOCAINE) 1 % injection 2 mL  lidocaine (PF) (XYLOCAINE) 1 % injection 2 mL  lidocaine (PF) (XYLOCAINE) 1 % injection 2 mL  lidocaine (PF) (XYLOCAINE) 1 % injection 2 mL  lidocaine (PF) (XYLOCAINE) 1 % injection 3 mL  lidocaine (PF) (XYLOCAINE) 1 % injection 3 mL  triamcinolone (KENALOG-40) injection 20 mg  triamcinolone (KENALOG-40) injection 20 mg  triamcinolone (KENALOG-40) injection 20 mg  triamcinolone (KENALOG-40) injection 20 mg  triamcinolone (KENALOG-40) injection 40 mg  triamcinolone (KENALOG-40) injection 40 mg  triamcinolone (KENALOG-40) injection 40 mg  triamcinolone (KENALOG-40) injection 40 mg  triamcinolone (KENALOG-40) injection 40 mg  triamcinolone (KENALOG-40) injection 40 mg        PAST SURGICAL HISTORY:  Past Surgical History:   Procedure Laterality Date    CHOLECYSTECTOMY      COLON SURGERY      Left hemicolectomy for diverticulosis    CYSTOSCOPY,+URETEROSCOPY      abstract    [facility] ELB[facility] LESN  10/26/1987    (R) humerus abstract       ALLERGIES     Allergies   Allergen Reactions    Polyethylene Glycol Rash    Codeine      Other reaction(s): Gastrointestinal, GI intolerance, Vomiting  Vomiting      Gadolinium Dizziness and Nausea    Hydrocodone-Acetaminophen Nausea and Vomiting     Other reaction(s): GI intolerance    Iodine      abstract    Sulfasalazine      Other reaction(s): GI intolerance  Has tried and had nausa.    Tramadol Nausea and Vomiting     Other  reaction(s): Gastrointestinal, Other (see comments)  Nausea and vomiting   unknown      Acetaminophen Nausea     Nausea and vomiting  Nausea and vomiting  Nausea and vomiting  Nausea and vomiting      Gluten Meal Other (See Comments) and Rash     Other reaction(s): Gastrointestinal, GI intolerance  Dizziness, tired, rash, stomach cramps, thrush, mouth sores  Dizziness, tired, rash, stomach cramps, thrush, mouth sores      Propylene Glycol Dizziness, Nausea and Vomiting, Nausea and Rash       FAMILY HISTORY  Family History   Problem Relation Age of Onset    Hypertension Father         abstract    Cancer Paternal Aunt         gallbladder cancer abstract       VASCULAR FAMILY HISTORY  1st order relative with atherosclerotic PAD: No  1st order relative with AAA: No  Family history of Familial Hyperlipidemia No  Family History of Hypercoagulable state:No    VASCULAR RISK FACTORS  1. Diabetes: Yes  controlled  2. Smoking: has never smoked.  3. HTN: controlled  4.Hyperlipidemia: Yes - controlled      SOCIAL HISTORY  Social History     Socioeconomic History    Marital status: Single     Spouse name: Not on file    Number of children: Not on file    Years of education: Not on file    Highest education level: Not on file   Occupational History    Not on file   Tobacco Use    Smoking status: Never    Smokeless tobacco: Never   Substance and Sexual Activity    Alcohol use: Yes     Comment: RARE    Drug use: No    Sexual activity: Not on file   Other Topics Concern     Service Not Asked    Blood Transfusions Not Asked    Caffeine Concern Not Asked    Occupational Exposure Not Asked    Hobby Hazards Not Asked    Sleep Concern Not Asked    Stress Concern Not Asked    Weight Concern Not Asked    Special Diet Not Asked    Back Care Not Asked    Exercise No    Bike Helmet Not Asked    Seat Belt No    Self-Exams No   Social History Narrative    Womens Health Kit given.         Social Determinants of Health     Financial  "Resource Strain: Not on file   Food Insecurity: Not on file   Transportation Needs: Not on file   Physical Activity: Not on file   Stress: Not on file   Social Connections: Not on file   Interpersonal Safety: Not on file   Housing Stability: Not on file       ROS:   General: No change in weight, sleep or appetite.  Normal energy.  No fever or chills  Eyes: Negative for vision changes or eye problems  ENT: No problems with ears, nose or throat.  No difficulty swallowing.  Resp: No coughing, wheezing or shortness of breath  CV: No chest pains or palpitations  GI: No nausea, vomiting,  heartburn, abdominal pain, diarrhea, constipation or change in bowel habits  : No urinary frequency or dysuria, bladder or kidney problems  Musculoskeletal: No significant muscle or joint pains  Neurologic: No headaches, numbness, tingling, weakness, problems with balance or coordination  Psychiatric: No problems with anxiety, depression or mental health  Heme/immune/allergy: No history of bleeding or clotting problems or anemia.  No allergies or immune system problems  Endocrine: No history of thyroid disease, diabetes or other endocrine disorders  Skin: No rashes,worrisome lesions or skin problems  Vascular: Edema all over the body with complex and complicated past medical and past surgical history with chronic pelvic pain, prominent lower abdominal wall with thickened skin area and recurrent Candida glabrata infections and UTIs , having issues to ambulate  Getting edema therapy    EXAM:  /88 (BP Location: Left arm, Patient Position: Chair, Cuff Size: Adult Large)   Pulse 86   Ht 5' 6\" (1.676 m)   Wt 210 lb 6.4 oz (95.4 kg)   SpO2 94%   BMI 33.96 kg/m    In general, the patient is a pleasant female in no apparent distress.    HEENT: NC/AT.  PERRLA.  EOMI.  Sclerae white, not injected.  Nares clear.  Pharynx without erythema or exudate.  Dentition intact.    Neck: No adenopathy.  No thyromegaly. Carotids +2/2 bilaterally " without bruits.  No jugular venous distension.   Heart: RRR. Normal S1, S2 splits physiologically. No murmur, rub, click, or gallop. The PMI is in the 5th ICS in the midclavicular line. There is no heave.    Lungs: CTA.  No ronchi, wheezes, rales.  No dullness to percussion.   Abdomen: Soft, nontender, nondistended. No organomegaly. No AAA.  No bruits.   Extremities: Vascular: Prominent lower abdominal wall with edema and injection related bruises noted, less prominent lymphedema features on the legs.  No evidence of arterial insufficiency or venous insufficiency        Labs:  LIPID RESULTS:  Lab Results   Component Value Date    CHOL 270 (H) 07/13/2023    CHOL 187 10/29/2001    HDL 59 07/13/2023     (H) 07/13/2023    TRIG 220 (H) 07/13/2023       LIVER ENZYME RESULTS:  Lab Results   Component Value Date    AST 37 11/29/2023    AST 24 04/01/2020    ALT 52 (H) 11/29/2023    ALT 36 04/01/2020       CBC RESULTS:  Lab Results   Component Value Date    WBC 8.5 11/29/2023    WBC 9.3 04/01/2020    RBC 5.00 11/29/2023    RBC 5.59 (H) 04/01/2020    HGB 14.9 11/29/2023    HGB 15.8 (H) 04/01/2020    HCT 43.7 11/29/2023    HCT 47.3 (H) 04/01/2020    MCV 87 11/29/2023    MCV 85 04/01/2020    MCH 29.8 11/29/2023    MCH 28.3 04/01/2020    MCHC 34.1 11/29/2023    MCHC 33.4 04/01/2020    RDW 12.4 11/29/2023    RDW 13.4 04/01/2020     11/29/2023     04/01/2020       BMP RESULTS:  Lab Results   Component Value Date     11/29/2023     04/01/2020    POTASSIUM 4.2 11/29/2023    POTASSIUM 3.7 11/17/2022    POTASSIUM 4.2 04/01/2020    CHLORIDE 107 11/29/2023    CHLORIDE 106 11/17/2022    CHLORIDE 105 04/01/2020    CO2 22 11/29/2023    CO2 22 11/17/2022    CO2 24 04/01/2020    ANIONGAP 9 11/29/2023    ANIONGAP 13 11/17/2022    ANIONGAP 7 04/01/2020     (H) 11/29/2023     11/17/2022     (H) 04/01/2020    BUN 13.4 11/29/2023    BUN 10 11/17/2022    BUN 11 04/01/2020    CR 0.91 11/29/2023     CR 0.73 04/01/2020    GFRESTIMATED 78 11/29/2023    GFRESTIMATED >90 04/01/2020    GFRESTBLACK >90 04/01/2020    CARMEN 9.1 11/29/2023    CARMEN 9.2 04/01/2020        A1C RESULTS:  Lab Results   Component Value Date    A1C 7.0 (H) 02/06/2023         Procedures:     Reviewed available extensive records and imaging studies in the epic    Assessment and Plan:     1. Lymphedema    2. Pelvic pain in female    3. Seronegative inflammatory arthritis    4. Type 2 diabetes mellitus with other diabetic kidney complication, with long-term current use of insulin (H)    5. Benign essential hypertension    6.  History of Candida glabrata UTI    This is a very pleasant 48-year-old female with complex and complicated past medical and past surgical history as delineated above dealing with edema/lymphedema in the lower abdominal area thighs and other parts of the body started getting edema therapy, she has a seronegative inflammatory arthritis closely followed by rheumatologist and getting infusion therapy whenever she gets the infusion for a week her edema symptoms get better.  She also dealing with chronic pelvic pain with the pelvic cyst in the process of going for surgery.  She has recurrent UTI and most recently Candida glabrata and also history of Frederick, bacterial overgrowth syndrome etc.    Reviewed chart, given her atypical presentation with complex and complicated history I will obtain nuclear medicine lymphoscintigram    Suggested patient to see infectious disease specialist for treatment of Candida glabrata urinary infection    Follow-up with gynecologist and continue pelvic floor exercises    Tight control of the diabetes    Follow-up with the edema therapist    Suggested taking lymphatic formula 1000 one  pill twice a day for a month then followed by 1 pill daily  Information given    Reviewed medication list none of the blood pressure medications contributing or lymphedema.  Her blood pressure is decent control with current  regimen continue the same    Tight control of the diabetes  Follow-up with rheumatologist as planned    Return to clinic in 6 months      60 minutes spent on the date of the encounter doing chart review, history and exam, documentation, and further activities as noted above.  She is new to this clinic reviewed available extensive records in the epic and updated chart  Thank you for the consultation  Copy of this note to primary care physician and referring provider  This note was dictated by utilizing dragon software  She had a lot of questions all of them were answered and AVS with written instructions given    Annette Bonds MD,FAHA,FSVM,FNLA, FACP  Vascular Medicine  Clinical Hypertension Specialist   Clinical Lipidologist

## 2023-12-28 NOTE — PROGRESS NOTES
"Patient is here to discuss consult    /87 (BP Location: Right arm, Patient Position: Chair, Cuff Size: Adult Large)   Pulse 86   Ht 5' 6\" (1.676 m)   Wt 210 lb 6.4 oz (95.4 kg)   SpO2 94%   BMI 33.96 kg/m      Questions patient would like addressed today are: N/A.    Refills are needed: No    Has homecare services and agency name:  Wilda BUCKLEY    "

## 2023-12-29 ENCOUNTER — TELEPHONE (OUTPATIENT)
Dept: ALLERGY | Facility: CLINIC | Age: 48
End: 2023-12-29
Payer: MEDICARE

## 2023-12-29 ENCOUNTER — TELEPHONE (OUTPATIENT)
Dept: OTHER | Facility: CLINIC | Age: 48
End: 2023-12-29
Payer: MEDICARE

## 2023-12-29 DIAGNOSIS — I89.0 LYMPHEDEMA: Primary | ICD-10-CM

## 2023-12-29 NOTE — TELEPHONE ENCOUNTER
FAXED Md forms to complete to [phone]    Please complete and fax to HERMES Mcgraw, [facility]  Specialty Pharmacy Clinic Liaison     [facility]    [email or web address]     Phone: [phone]  Fax: [phone]

## 2023-12-29 NOTE — TELEPHONE ENCOUNTER
Patient needs to be scheduled for NM Lymphoscintigraphy Edema at next available. We will MyChart results.    Yandy MCRAE, RN    [facility]  Office: [phone]  Fax: [phone]

## 2023-12-29 NOTE — TELEPHONE ENCOUNTER
LM to call and get scheduled for Follow-up and elsy in February. Dr Gann is booking into February now. We have refill request for Dupixent and Dr Gann will send enough refills to get her through to end of February if there is an appointment on the books.

## 2024-01-02 ENCOUNTER — TRANSCRIBE ORDERS (OUTPATIENT)
Dept: OTHER | Age: 49
End: 2024-01-02

## 2024-01-02 ENCOUNTER — VIRTUAL VISIT (OUTPATIENT)
Dept: RHEUMATOLOGY | Facility: CLINIC | Age: 49
End: 2024-01-02
Payer: MEDICARE

## 2024-01-02 DIAGNOSIS — Z87.440 HISTORY OF RECURRENT UTIS: ICD-10-CM

## 2024-01-02 DIAGNOSIS — M33.20 POLYMYOSITIS (H): Primary | ICD-10-CM

## 2024-01-02 DIAGNOSIS — Z86.19 H/O TRAVELER'S DIARRHEA: ICD-10-CM

## 2024-01-02 DIAGNOSIS — K63.89 INTESTINAL BACTERIAL OVERGROWTH: ICD-10-CM

## 2024-01-02 DIAGNOSIS — M06.09 SERONEGATIVE RHEUMATOID ARTHRITIS OF MULTIPLE SITES (H): Primary | ICD-10-CM

## 2024-01-02 NOTE — Clinical Note
Nishant Hair continues to have hyperglycemia even on methylprednisolone 80 mg prior to Actemra infusions. Would you be open to further decreasing to 40 mg? She tolerated infusion well on 80 mg last month.  Thanks! Yuliya

## 2024-01-02 NOTE — Clinical Note
"1/2/2024       RE: Laxmi Ya  [address] [place] [id number]     Dear Colleague,    Thank you for referring your patient, Laxmi Ya, to the [facility] [place] [facility]. Please see a copy of my visit note below.    Medication Therapy Management (MTM) Encounter    ASSESSMENT:                            Medication Adherence/Access: {adherencechoices:786698}    ***:  ***      PLAN:                            ***    Follow-up: {followuptest2:548462}    SUBJECTIVE/OBJECTIVE:                          Laxmi Ya is a 48 year old female called for a follow-up visit from 10/5/23.       Reason for visit: Questions about travelers diarrhea prevention for upcoming trip    Allergies/ADRs: Reviewed in chart  Past Medical History: Reviewed in chart  Tobacco: She reports that she has never smoked. She has never used smokeless tobacco.  Alcohol: Less than 1 beverages / week    Medication Adherence/Access: no issues reported    Rheumatoid Arthritis:   Actemra 800 mg infusion monthly (last infusion 12/27/23)   Diclofenac 1% gel as needed (uses once per day a few times per week)     Mentions ovarian/groin cysts that she may need surgically removed. Asked that she let us know as she will need to hold/delay Actemra for surgery for at least 4 weeks before and 2 weeks after procedure. Discussed Actemra would not likely be the cause of the cysts themselves, but could contribute to infection of cysts.    Travelers Diarrhea Prevention/SIBO:    Today's Vitals: There were no vitals taken for this visit.  ----------------  {JOANN?:074590}    I spent {mtm total time 3:134047} with this patient today. { :003433}. A copy of the visit note was provided to the patient's provider(s).    A summary of these recommendations {GIVEN/NOT GIVEN:413326}.    ***    Telemedicine Visit Details  Type of service:  {telemedvisitmtm:822847::\"Telephone visit\"}  Start Time: " {video/phone visit start time:984989}  End Time: {video/phone visit end time:516061}     Medication Therapy Recommendations  No medication therapy recommendations to display       Medication Therapy Management (MTM) Encounter    ASSESSMENT:                            Medication Adherence/Access: No issues identified    Rheumatoid Arthritis: Discussed further dose decrease of methylprednisolone to 40 mg as pre-medication for Actemra infusion due to continued hyperglycemia post-infusion. Will discuss potential dose decrease with rheumatologist.    Travelers Diarrhea Prevention/SIBO: Reviewed options for prevention of travelers diarrhea including rifaximin 200 mg three times daily for duration of travel or Bismuth subsalicylate 30 mL or two tablets four times daily with meals. Discussed since patient is planning to take rifaximin for SIBO and has correct dosage for travelers diarrhea prevention, would recommend using rifaximin for prevention after finishing current course of antibiotics.    PLAN:                            1. I will talk to Dr. Lemus about decreasing the methylprednisolone dose prior to your Actemra infusion down further to 40 mg.    2. Start Xifaxan 200 mg three times daily after you finish your Keflex to prevent travelers diarrhea and treat the SIBO.    Follow-up: Return in about 6 months (around 7/2/2024) for MTM Pharmacist Visit.    SUBJECTIVE/OBJECTIVE:                          Laxmi Ya is a 48 year old female called for a follow-up visit from 10/5/23.       Reason for visit: Questions about travelers diarrhea prevention for upcoming trip    Allergies/ADRs: Reviewed in chart  Past Medical History: Reviewed in chart  Tobacco: She reports that she has never smoked. She has never used smokeless tobacco.  Alcohol: Less than 1 beverages / week    Medication Adherence/Access: no issues reported    Rheumatoid Arthritis:   Actemra 800 mg infusion monthly (last infusion 12/27/23)   Diclofenac 1%  gel as needed (uses once per day a few times per week)     Reports she and Dr. Lemus discussed and decided to stay on Actemra infusions. Did decrease methylprednisolone pre-med dose to 80 mg to help with hyperglycemia. Notes this was implemented for her when she got her December infusion. Still had hyperglycemia after infusion despite decrease (reports BG in 300s after infusion) but tolerated infusion well. Wondering if the steroid dose can be lowered any further.    Liver Function Studies -   Recent Labs   Lab Test 11/29/23  1232   PROTTOTAL 6.7   ALBUMIN 4.3   BILITOTAL 0.9   ALKPHOS 44   AST 37   ALT 52*     CBC RESULTS:   Recent Labs   Lab Test 11/29/23  1232   WBC 8.5   RBC 5.00   HGB 14.9   HCT 43.7   MCV 87   MCH 29.8   MCHC 34.1   RDW 12.4        Travelers Diarrhea Prevention/SIBO:  Xifaxan 200 mg three times daily x 10 days as needed     Reports she is traveling to [place] next week and is concerned about travelers diarrhea - has gotten this before on other trips abroad. Would like to know options to prevent travelers diarrhea on this trip. [place] and [place] this increases risk. Also feels that SIBO is starting to worsen again - considering starting xifaxan again after finishing current antibiotics on Monday (Keflex 500 mg twice daily x 10 days).    Today's Vitals: There were no vitals taken for this visit.  ----------------    I spent 30 minutes with this patient today. All changes were made via collaborative practice agreement with Oswaldo Lemus. A copy of the visit note was provided to the patient's provider(s).    A summary of these recommendations was sent via [employer].    Yuliya Kaplan, PharmD  Medication Therapy Management Pharmacist  [facility]  Phone: [phone]    Telemedicine Visit Details  Type of service:  Telephone visit  Start Time: 11:30 AM  End Time: 12:00 PM     Medication Therapy Recommendations  No  medication therapy recommendations to display         Again, thank you for allowing me to participate in the care of your patient.      Sincerely,    DOMINGUEZ BAPTISTE RPH

## 2024-01-02 NOTE — TELEPHONE ENCOUNTER
Provided patient with phone contact info for Alexis Benitez. Asked that she call back if any issues with setting this up.

## 2024-01-03 ENCOUNTER — THERAPY VISIT (OUTPATIENT)
Dept: PHYSICAL THERAPY | Facility: CLINIC | Age: 49
End: 2024-01-03
Payer: MEDICARE

## 2024-01-03 DIAGNOSIS — G89.29 CHRONIC BILATERAL LOW BACK PAIN WITHOUT SCIATICA: ICD-10-CM

## 2024-01-03 DIAGNOSIS — M54.50 CHRONIC BILATERAL LOW BACK PAIN WITHOUT SCIATICA: ICD-10-CM

## 2024-01-03 DIAGNOSIS — M54.2 NECK PAIN: ICD-10-CM

## 2024-01-03 DIAGNOSIS — R10.2 PELVIC PAIN IN FEMALE: Primary | ICD-10-CM

## 2024-01-03 PROCEDURE — 97110 THERAPEUTIC EXERCISES: CPT | Mod: GP | Performed by: PHYSICAL THERAPIST

## 2024-01-03 PROCEDURE — 97140 MANUAL THERAPY 1/> REGIONS: CPT | Mod: GP | Performed by: PHYSICAL THERAPIST

## 2024-01-04 ENCOUNTER — TELEPHONE (OUTPATIENT)
Dept: INFECTIOUS DISEASES | Facility: CLINIC | Age: 49
End: 2024-01-04

## 2024-01-04 NOTE — TELEPHONE ENCOUNTER
Spoke with [employer] they still do not have MD forms or PT forms        HERMES Gonzalez, [facility]  Specialty Pharmacy Clinic Liaison     [facility]    [email or web address]     Phone: [phone]  Fax: [phone]

## 2024-01-04 NOTE — TELEPHONE ENCOUNTER
Patient called asked us to sign for her     Signed forms for [employer] and sent along       HERMES Gonzalez, [facility]  Specialty Pharmacy Clinic Liaison     [facility]    [email or web address]     Phone: [phone]  Fax: [phone]

## 2024-01-04 NOTE — TELEPHONE ENCOUNTER
Health Call Center    Phone Message    May a detailed message be left on voicemail: yes     Reason for Call: Other: Pt would like to transfer care to Dr Hall. Pt states two of her providers are recommending that she see's Dr Hall. Pt does not wish to continue with Dr Gonzales as pt states their personalities clashed. Sending tele encounter per protocol for pt to transfer care to Dr Hall.      Action Taken: Message routed to:  [facility] ID    Travel Screening: Not Applicable

## 2024-01-08 ENCOUNTER — TELEPHONE (OUTPATIENT)
Dept: ORTHOPEDICS | Facility: CLINIC | Age: 49
End: 2024-01-08
Payer: MEDICARE

## 2024-01-08 NOTE — PATIENT INSTRUCTIONS
"Recommendations from today's MTM visit:                                                       1. I will talk to Dr. Lemus about decreasing the methylprednisolone dose prior to your Actemra infusion down further to 40 mg.    2. Start Xifaxan 200 mg three times daily after you finish your Keflex to prevent travelers diarrhea and treat the SIBO.    Follow-up: Return in about 6 months (around 7/2/2024) for MTM Pharmacist Visit.    It was great speaking with you today.  I value your experience and would be very thankful for your time in providing feedback in our clinic survey. In the next few days, you may receive an email or text message from [facility] [employer] with a link to a survey related to your  clinical pharmacist.\"     To schedule another MTM appointment, please call the clinic directly or you may call the MTM scheduling line at [phone].    My Clinical Pharmacist's contact information:                                                      Please feel free to contact me with any questions or concerns you have.      Yuliya Kaplan, PharmD  Medication Therapy Management Pharmacist  [facility]  Phone: [phone]     "

## 2024-01-08 NOTE — PROGRESS NOTES
Medication Therapy Management (MTM) Encounter    ASSESSMENT:                            Medication Adherence/Access: No issues identified    Rheumatoid Arthritis: Discussed further dose decrease of methylprednisolone to 40 mg as pre-medication for Actemra infusion due to continued hyperglycemia post-infusion. Will discuss potential dose decrease with rheumatologist.    Travelers Diarrhea Prevention/SIBO: Reviewed options for prevention of travelers diarrhea including rifaximin 200 mg three times daily for duration of travel or Bismuth subsalicylate 30 mL or two tablets four times daily with meals. Discussed since patient is planning to take rifaximin for SIBO and has correct dosage for travelers diarrhea prevention, would recommend using rifaximin for prevention after finishing current course of antibiotics.    PLAN:                            1. I will talk to Dr. Lemus about decreasing the methylprednisolone dose prior to your Actemra infusion down further to 40 mg.    2. Start Xifaxan 200 mg three times daily after you finish your Keflex to prevent travelers diarrhea and treat the SIBO.    Follow-up: Return in about 6 months (around 7/2/2024) for MTM Pharmacist Visit.    SUBJECTIVE/OBJECTIVE:                          Laxmi Ya is a 48 year old female called for a follow-up visit from 10/5/23.       Reason for visit: Questions about travelers diarrhea prevention for upcoming trip    Allergies/ADRs: Reviewed in chart  Past Medical History: Reviewed in chart  Tobacco: She reports that she has never smoked. She has never used smokeless tobacco.  Alcohol: Less than 1 beverages / week    Medication Adherence/Access: no issues reported    Rheumatoid Arthritis:   Actemra 800 mg infusion monthly (last infusion 12/27/23)   Diclofenac 1% gel as needed (uses once per day a few times per week)     Reports she and Dr. Lemus discussed and decided to stay on Actemra infusions. Did decrease methylprednisolone pre-med  dose to 80 mg to help with hyperglycemia. Notes this was implemented for her when she got her December infusion. Still had hyperglycemia after infusion despite decrease (reports BG in 300s after infusion) but tolerated infusion well. Wondering if the steroid dose can be lowered any further.    Liver Function Studies -   Recent Labs   Lab Test 11/29/23  1232   PROTTOTAL 6.7   ALBUMIN 4.3   BILITOTAL 0.9   ALKPHOS 44   AST 37   ALT 52*     CBC RESULTS:   Recent Labs   Lab Test 11/29/23  1232   WBC 8.5   RBC 5.00   HGB 14.9   HCT 43.7   MCV 87   MCH 29.8   MCHC 34.1   RDW 12.4        Travelers Diarrhea Prevention/SIBO:  Xifaxan 200 mg three times daily x 10 days as needed     Reports she is traveling to [place] next week and is concerned about travelers diarrhea - has gotten this before on other trips abroad. Would like to know options to prevent travelers diarrhea on this trip. [place] and [place] this increases risk. Also feels that SIBO is starting to worsen again - considering starting xifaxan again after finishing current antibiotics on Monday (Keflex 500 mg twice daily x 10 days).    Today's Vitals: There were no vitals taken for this visit.  ----------------    I spent 30 minutes with this patient today. All changes were made via collaborative practice agreement with Oswaldo Lemus. A copy of the visit note was provided to the patient's provider(s).    A summary of these recommendations was sent via [employer].    Yuliya Kaplan, PharmD  Medication Therapy Management Pharmacist  [facility]  Phone: [phone]    Telemedicine Visit Details  Type of service:  Telephone visit  Start Time: 11:30 AM  End Time: 12:00 PM     Medication Therapy Recommendations  Intestinal bacterial overgrowth    Current Medication: rifaximin (XIFAXAN) 550 MG TABS tablet   Rationale: Does not understand instructions - Adherence - Adherence   Recommendation:  Provide Education - Use 200 mg three times daily while in [place] after finishing Keflex   Status: Patient Agreed - Adherence/Education         Seronegative rheumatoid arthritis of multiple sites (H)    Current Medication: tocilizumab (ACTEMRA) 400 MG/20ML   Rationale: Undesirable effect - Adverse medication event - Safety   Recommendation: Change Medication - Decrease methylprednisolone pre-med to 40 mg   Status: Contact Provider - Awaiting Response

## 2024-01-12 ENCOUNTER — VIRTUAL VISIT (OUTPATIENT)
Dept: RHEUMATOLOGY | Facility: CLINIC | Age: 49
End: 2024-01-12
Attending: INTERNAL MEDICINE
Payer: MEDICARE

## 2024-01-12 DIAGNOSIS — M06.09 SERONEGATIVE RHEUMATOID ARTHRITIS OF MULTIPLE SITES (H): Primary | ICD-10-CM

## 2024-01-12 DIAGNOSIS — J18.9 PNEUMONIA DUE TO INFECTIOUS ORGANISM, UNSPECIFIED LATERALITY, UNSPECIFIED PART OF LUNG: ICD-10-CM

## 2024-01-12 NOTE — TELEPHONE ENCOUNTER
FREE DRUG APPLICATION APPROVED    Medication: DUPILUMAB 300 MG/2ML SC SOPN  Program Name:  [employer] MyWay  Effective Date: 1/12/2024  Expiration Date: 12/31/2024  Pharmacy Filling the Rx: [facility] [address]  Patient Notified: yes  Additional Information:     Spoke to [employer] approved sending fax to provider      HERMES Gonzalez, [facility]  Specialty Pharmacy Clinic Liaison     [facility]    [email or web address]     Phone: [phone]  Fax: [phone]

## 2024-01-12 NOTE — Clinical Note
1/12/2024       RE: Laxmi Ya  [address] [place] [id number]     Dear Colleague,    Thank you for referring your patient, Laxmi Ya, to the [facility] [place] [facility]. Please see a copy of my visit note below.    Medication Therapy Management (MTM) Encounter    ASSESSMENT:                            Medication Adherence/Access: {adherencechoices:416423}    ***:  ***      PLAN:                            ***    Follow-up: {followuptest2:165100}    SUBJECTIVE/OBJECTIVE:                          Laxmi Ya is a 48 year old female called for a follow-up visit from 1/2/24.       Reason for visit: Questions about new antibiotics for pneumonia infection, wondering what to do about upcoming infusion    Allergies/ADRs: Reviewed in chart  Past Medical History: Reviewed in chart  Tobacco: She reports that she has never smoked. She has never used smokeless tobacco.  Alcohol: Less than 1 beverages / week    Medication Adherence/Access: {fumedadherence:358420}    Pneumonia:   ***    Rheumatoid Arthritis:   Actemra 800 mg infusion monthly (last infusion 12/27/23)   Diclofenac 1% gel as needed (uses once per day a few times per week)     Reports she and Dr. Lemus discussed and decided to stay on Actemra infusions. Did decrease methylprednisolone pre-med dose to 80 mg to help with hyperglycemia. Notes this was implemented for her when she got her December infusion. Still had hyperglycemia after infusion despite decrease (reports BG in 300s after infusion) but tolerated infusion well. Wondering if the steroid dose can be lowered any further.     Liver Function Studies -       Recent Labs   Lab Test 11/29/23  1232   PROTTOTAL 6.7   ALBUMIN 4.3   BILITOTAL 0.9   ALKPHOS 44   AST 37   ALT 52*      CBC RESULTS:       Recent Labs   Lab Test 11/29/23  1232   WBC 8.5   RBC 5.00   HGB 14.9   HCT 43.7   MCV 87   MCH 29.8   MCHC 34.1   RDW  "12.4          Today's Vitals: There were no vitals taken for this visit.  ----------------  {JOANN?:268368}    I spent {mtm total time 3:955171} with this patient today. { :945940}. A copy of the visit note was provided to the patient's provider(s).    A summary of these recommendations {GIVEN/NOT GIVEN:906892}.    ***    Telemedicine Visit Details  Type of service:  {telemedvisitmtm:085740::\"Telephone visit\"}  Start Time: {video/phone visit start time:152948}  End Time: {video/phone visit end time:152948}     Medication Therapy Recommendations  No medication therapy recommendations to display       Medication Therapy Management (MTM) Encounter    ASSESSMENT:                            Medication Adherence/Access: No issues identified    Pneumonia: Provided education on levofloxacin and Rocephin today including dosing, general administration, side effects (both common/serious), precautions, monitoring and time to efficacy. Advised patient take medications as prescribed and if symptoms do not improve or resolve at the end of therapy, she should follow up with PCP.    Rheumatoid Arthritis: Recommend patient keep her infusion appointment for now and message MTM if her symptoms do not resolve a few days prior to infusion. Discussed if she is still feeling unwell a few days before infusion, we can postpone infusion at that time. Recommended decreasing methylprednisolone pre-infusion dose to 40 mg to further reduce impact on blood sugars at last visit. Patient declines this option at this time.    PLAN:                            1. Continue your Medrol dose pack and levofloxacin as prescribed. If you symptoms do not improve follow up with your family practice doctor.    2. Please let me know if you are not feeling better next week and we will postpone your infusion. If you symptoms improve you can complete your infusion on 1/24/24.    Follow-up: Return in about 6 months (around 7/2/2024) for MTM Pharmacist Visit. "     SUBJECTIVE/OBJECTIVE:                          Laxmi Ya is a 48 year old female called for a follow-up visit from 1/2/24.       Reason for visit: Questions about new antibiotics for pneumonia infection, wondering what to do about upcoming infusion    Allergies/ADRs: Reviewed in chart  Past Medical History: Reviewed in chart  Tobacco: She reports that she has never smoked. She has never used smokeless tobacco.  Alcohol: Less than 1 beverages / week    Medication Adherence/Access: no issues reported    Pneumonia:   Levofloxacin 750 mg x 5 days  Rocephin 2g injection (completed 1/11/24)  Methylprednisolone dose pack    Reports she started feeling unwell after being in [place] a few days and flew back early due to symptoms worsening. Was seen yesterday at [facility] and was diagnosed with pneumonia, was also called today and told she tested positive for influenza as well. Clinic did send in Tamiflu but she has not picked this up yet. Feeling a little better today but still has a cough, muscle pains, congestion, and wheezing. Wondering if she is on the correct antibiotics as she feels 5 days is too short of a course to resolve these infections.    Rheumatoid Arthritis:   Actemra 800 mg infusion monthly (last infusion 12/27/23)   Diclofenac 1% gel as needed (uses once per day a few times per week)     Reports the lower methylprednisolone dose did not seem to help lower blood sugars post infusion. Still had readings in the 300s post infusion. Next infusion scheduled for 1/24/24 - wondering if she needs to postpone her infusion due to her illness.     Liver Function Studies -   Recent Labs   Lab Test 11/29/23  1232   PROTTOTAL 6.7   ALBUMIN 4.3   BILITOTAL 0.9   ALKPHOS 44   AST 37   ALT 52*     CBC RESULTS:   Recent Labs   Lab Test 11/29/23  1232   WBC 8.5   RBC 5.00   HGB 14.9   HCT 43.7   MCV 87   MCH 29.8   MCHC 34.1   RDW 12.4        Today's Vitals: There were no vitals taken for this  visit.  ----------------    I spent 30 minutes with this patient today. All changes were made via collaborative practice agreement with Oswaldo Lemus MD. A copy of the visit note was provided to the patient's provider(s).    A summary of these recommendations was sent via [employer].    Augustine ConnerD  Medication Therapy Management Pharmacist  [facility]  Phone: [phone]    Telemedicine Visit Details  Type of service:  Telephone visit  Start Time: 12:30 PM  End Time: 1:00 PM     Medication Therapy Recommendations  Seronegative rheumatoid arthritis of multiple sites (H)    Current Medication: tocilizumab (ACTEMRA) 400 MG/20ML   Rationale: Undesirable effect - Adverse medication event - Safety   Recommendation: Change Medication - Decrease methylprednisolone pre-med to 40 mg   Status: Declined per Patient                  Again, thank you for allowing me to participate in the care of your patient.      Sincerely,    DOMINGUEZ BAPTISTE RPH

## 2024-01-12 NOTE — TELEPHONE ENCOUNTER
DIAGNOSIS:     torn meniscus, referring provider Dr Petit, MRI 12/22      APPOINTMENT DATE: 01/15/2024   NOTES STATUS DETAILS   OFFICE NOTE from referring provider Internal Rosetta Redmond  12/22/2023   OFFICE NOTE from other specialist Internal Trino Aguilar for thumb pain  12/21/2023   MEDICATION LIST Internal    MRI Internal Mri of right knee and hip  12/22/23

## 2024-01-15 ENCOUNTER — PRE VISIT (OUTPATIENT)
Dept: ORTHOPEDICS | Facility: CLINIC | Age: 49
End: 2024-01-15
Payer: MEDICARE

## 2024-01-18 DIAGNOSIS — M25.561 RIGHT KNEE PAIN, UNSPECIFIED CHRONICITY: Primary | ICD-10-CM

## 2024-01-18 DIAGNOSIS — J32.4 CHRONIC PANSINUSITIS: ICD-10-CM

## 2024-01-18 DIAGNOSIS — J33.9 NASAL POLYPS: ICD-10-CM

## 2024-01-19 RX ORDER — DUPILUMAB 300 MG/2ML
INJECTION, SOLUTION SUBCUTANEOUS
Qty: 4 ML | Refills: 0 | Status: SHIPPED | OUTPATIENT
Start: 2024-01-19 | End: 2024-01-22

## 2024-01-22 ENCOUNTER — TELEPHONE (OUTPATIENT)
Dept: ALLERGY | Facility: CLINIC | Age: 49
End: 2024-01-22

## 2024-01-22 ENCOUNTER — PRE VISIT (OUTPATIENT)
Dept: ORTHOPEDICS | Facility: CLINIC | Age: 49
End: 2024-01-22

## 2024-01-22 DIAGNOSIS — J32.4 CHRONIC PANSINUSITIS: ICD-10-CM

## 2024-01-22 DIAGNOSIS — J33.9 NASAL POLYPS: ICD-10-CM

## 2024-01-22 RX ORDER — DUPILUMAB 300 MG/2ML
300 INJECTION, SOLUTION SUBCUTANEOUS
Qty: 4 ML | Refills: 2 | Status: SHIPPED | OUTPATIENT
Start: 2024-01-22 | End: 2024-03-04

## 2024-01-22 NOTE — TELEPHONE ENCOUNTER
[facility]    Phone Message    May a detailed message be left on voicemail: no     Reason for Call: Other: Laxmi Ortiz calling to have Refill of: DUPIXENT 300 MG/2ML prefilled pen  90 day supply/ Call into: [phone] [address]  [facility] [address].  Laxmi would also like a sooner appt, than March if possible.  Call [phone] if sooner option available in [place]     Action Taken: Other: [place] [facility] [place]    Travel Screening: Not Applicable

## 2024-01-23 ENCOUNTER — MYC MEDICAL ADVICE (OUTPATIENT)
Dept: RHEUMATOLOGY | Facility: CLINIC | Age: 49
End: 2024-01-23

## 2024-01-23 NOTE — PROGRESS NOTES
Medication Therapy Management (MTM) Encounter    ASSESSMENT:                            Medication Adherence/Access: No issues identified    Pneumonia: Provided education on levofloxacin and Rocephin today including dosing, general administration, side effects (both common/serious), precautions, monitoring and time to efficacy. Advised patient take medications as prescribed and if symptoms do not improve or resolve at the end of therapy, she should follow up with PCP.    Rheumatoid Arthritis: Recommend patient keep her infusion appointment for now and message MTM if her symptoms do not resolve a few days prior to infusion. Discussed if she is still feeling unwell a few days before infusion, we can postpone infusion at that time. Recommended decreasing methylprednisolone pre-infusion dose to 40 mg to further reduce impact on blood sugars at last visit. Patient declines this option at this time.    PLAN:                            1. Continue your Medrol dose pack and levofloxacin as prescribed. If you symptoms do not improve follow up with your family practice doctor.    2. Please let me know if you are not feeling better next week and we will postpone your infusion. If you symptoms improve you can complete your infusion on 1/24/24.    Follow-up: Return in about 6 months (around 7/2/2024) for MTM Pharmacist Visit.     SUBJECTIVE/OBJECTIVE:                          Laxmi Ya is a 48 year old female called for a follow-up visit from 1/2/24.       Reason for visit: Questions about new antibiotics for pneumonia infection, wondering what to do about upcoming infusion    Allergies/ADRs: Reviewed in chart  Past Medical History: Reviewed in chart  Tobacco: She reports that she has never smoked. She has never used smokeless tobacco.  Alcohol: Less than 1 beverages / week    Medication Adherence/Access: no issues reported    Pneumonia:   Levofloxacin 750 mg x 5 days  Rocephin 2g injection (completed  1/11/24)  Methylprednisolone dose pack    Reports she started feeling unwell after being in [place] a few days and flew back early due to symptoms worsening. Was seen yesterday at [facility] and was diagnosed with pneumonia, was also called today and told she tested positive for influenza as well. Clinic did send in Tamiflu but she has not picked this up yet. Feeling a little better today but still has a cough, muscle pains, congestion, and wheezing. Wondering if she is on the correct antibiotics as she feels 5 days is too short of a course to resolve these infections.    Rheumatoid Arthritis:   Actemra 800 mg infusion monthly (last infusion 12/27/23)   Diclofenac 1% gel as needed (uses once per day a few times per week)     Reports the lower methylprednisolone dose did not seem to help lower blood sugars post infusion. Still had readings in the 300s post infusion. Next infusion scheduled for 1/24/24 - wondering if she needs to postpone her infusion due to her illness.     Liver Function Studies -   Recent Labs   Lab Test 11/29/23  1232   PROTTOTAL 6.7   ALBUMIN 4.3   BILITOTAL 0.9   ALKPHOS 44   AST 37   ALT 52*     CBC RESULTS:   Recent Labs   Lab Test 11/29/23  1232   WBC 8.5   RBC 5.00   HGB 14.9   HCT 43.7   MCV 87   MCH 29.8   MCHC 34.1   RDW 12.4        Today's Vitals: There were no vitals taken for this visit.  ----------------    I spent 30 minutes with this patient today. All changes were made via collaborative practice agreement with Oswaldo Lemus MD. A copy of the visit note was provided to the patient's provider(s).    A summary of these recommendations was sent via [place].    Yuliya Kaplan, PharmD  Medication Therapy Management Pharmacist  [facility]  Phone: [phone]    Telemedicine Visit Details  Type of service:  Telephone visit  Start Time: 12:30 PM  End Time: 1:00 PM     Medication Therapy Recommendations  Seronegative rheumatoid arthritis of multiple  sites (H)    Current Medication: tocilizumab (ACTEMRA) 400 MG/20ML   Rationale: Undesirable effect - Adverse medication event - Safety   Recommendation: Change Medication - Decrease methylprednisolone pre-med to 40 mg   Status: Declined per Patient

## 2024-01-24 NOTE — PATIENT INSTRUCTIONS
"Recommendations from today's MTM visit:                                                       1. Continue your Medrol dose pack and levofloxacin as prescribed. If you symptoms do not improve follow up with your family practice doctor.    2. Please let me know if you are not feeling better next week and we will postpone your infusion. If you symptoms improve you can complete your infusion on 1/24/24.    Follow-up: Return in about 6 months (around 7/2/2024) for MTM Pharmacist Visit.    It was great speaking with you today.  I value your experience and would be very thankful for your time in providing feedback in our clinic survey. In the next few days, you may receive an email or text message from [facility] [employer] with a link to a survey related to your  clinical pharmacist.\"     To schedule another MTM appointment, please call the clinic directly or you may call the MTM scheduling line at [phone].    My Clinical Pharmacist's contact information:                                                      Please feel free to contact me with any questions or concerns you have.      Yuliya Kaplan, PharmD  Medication Therapy Management Pharmacist  [facility]  Phone: [phone]     "

## 2024-01-27 NOTE — TELEPHONE ENCOUNTER
DIAGNOSIS: ORTHOTICS CONSULT   APPOINTMENT DATE: 01/22/2024   NOTES STATUS DETAILS    OFFICE NOTE from referring provider SELF      MEDICATION LIST Internal  Care Everywhere      [facility] (for Spine) Care Everywhere 01/14/2020, 02/13/2015 - [facility]

## 2024-01-28 ENCOUNTER — HEALTH MAINTENANCE LETTER (OUTPATIENT)
Age: 49
End: 2024-01-28

## 2024-01-30 ENCOUNTER — INFUSION THERAPY VISIT (OUTPATIENT)
Dept: INFUSION THERAPY | Facility: CLINIC | Age: 49
End: 2024-01-30
Attending: INTERNAL MEDICINE
Payer: MEDICARE

## 2024-01-30 VITALS
WEIGHT: 208 LBS | DIASTOLIC BLOOD PRESSURE: 84 MMHG | SYSTOLIC BLOOD PRESSURE: 128 MMHG | HEART RATE: 73 BPM | TEMPERATURE: 97.6 F | OXYGEN SATURATION: 100 % | BODY MASS INDEX: 33.57 KG/M2 | RESPIRATION RATE: 16 BRPM

## 2024-01-30 DIAGNOSIS — M13.80 SERONEGATIVE INFLAMMATORY ARTHRITIS: Primary | ICD-10-CM

## 2024-01-30 LAB
ALBUMIN SERPL BCG-MCNC: 4 G/DL (ref 3.5–5.2)
ALBUMIN UR-MCNC: NEGATIVE MG/DL
ALP SERPL-CCNC: 45 U/L (ref 40–150)
ALT SERPL W P-5'-P-CCNC: 48 U/L (ref 0–50)
ANION GAP SERPL CALCULATED.3IONS-SCNC: 11 MMOL/L (ref 7–15)
APPEARANCE UR: CLEAR
AST SERPL W P-5'-P-CCNC: 25 U/L (ref 0–45)
BASOPHILS # BLD AUTO: 0 10E3/UL (ref 0–0.2)
BASOPHILS NFR BLD AUTO: 0 %
BILIRUB SERPL-MCNC: 1 MG/DL
BILIRUB UR QL STRIP: NEGATIVE
BUN SERPL-MCNC: 19.7 MG/DL (ref 6–20)
CALCIUM SERPL-MCNC: 8.9 MG/DL (ref 8.6–10)
CHLORIDE SERPL-SCNC: 105 MMOL/L (ref 98–107)
COLOR UR AUTO: YELLOW
CREAT SERPL-MCNC: 0.82 MG/DL (ref 0.51–0.95)
DEPRECATED HCO3 PLAS-SCNC: 22 MMOL/L (ref 22–29)
EGFRCR SERPLBLD CKD-EPI 2021: 88 ML/MIN/1.73M2
EOSINOPHIL # BLD AUTO: 0.1 10E3/UL (ref 0–0.7)
EOSINOPHIL NFR BLD AUTO: 0 %
ERYTHROCYTE [DISTWIDTH] IN BLOOD BY AUTOMATED COUNT: 13.1 % (ref 10–15)
GLUCOSE SERPL-MCNC: 148 MG/DL (ref 70–99)
GLUCOSE UR STRIP-MCNC: NEGATIVE MG/DL
HCT VFR BLD AUTO: 41.7 % (ref 35–47)
HGB BLD-MCNC: 14.3 G/DL (ref 11.7–15.7)
HGB UR QL STRIP: NEGATIVE
IMM GRANULOCYTES # BLD: 0.2 10E3/UL
IMM GRANULOCYTES NFR BLD: 2 %
KETONES UR STRIP-MCNC: NEGATIVE MG/DL
LEUKOCYTE ESTERASE UR QL STRIP: NEGATIVE
LYMPHOCYTES # BLD AUTO: 2.6 10E3/UL (ref 0.8–5.3)
LYMPHOCYTES NFR BLD AUTO: 21 %
MCH RBC QN AUTO: 30 PG (ref 26.5–33)
MCHC RBC AUTO-ENTMCNC: 34.3 G/DL (ref 31.5–36.5)
MCV RBC AUTO: 88 FL (ref 78–100)
MONOCYTES # BLD AUTO: 1.1 10E3/UL (ref 0–1.3)
MONOCYTES NFR BLD AUTO: 9 %
MUCOUS THREADS #/AREA URNS LPF: PRESENT /LPF
NEUTROPHILS # BLD AUTO: 8.5 10E3/UL (ref 1.6–8.3)
NEUTROPHILS NFR BLD AUTO: 68 %
NITRATE UR QL: NEGATIVE
NRBC # BLD AUTO: 0 10E3/UL
NRBC BLD AUTO-RTO: 0 /100
PH UR STRIP: 6 [PH] (ref 5–7)
PLATELET # BLD AUTO: 367 10E3/UL (ref 150–450)
POTASSIUM SERPL-SCNC: 3.6 MMOL/L (ref 3.4–5.3)
PROT SERPL-MCNC: 6 G/DL (ref 6.4–8.3)
RBC # BLD AUTO: 4.76 10E6/UL (ref 3.8–5.2)
RBC URINE: 1 /HPF
SODIUM SERPL-SCNC: 138 MMOL/L (ref 135–145)
SP GR UR STRIP: 1.02 (ref 1–1.03)
SQUAMOUS EPITHELIAL: 2 /HPF
UROBILINOGEN UR STRIP-MCNC: NORMAL MG/DL
WBC # BLD AUTO: 12.5 10E3/UL (ref 4–11)
WBC URINE: 1 /HPF

## 2024-01-30 PROCEDURE — 96375 TX/PRO/DX INJ NEW DRUG ADDON: CPT

## 2024-01-30 PROCEDURE — 80053 COMPREHEN METABOLIC PANEL: CPT | Performed by: INTERNAL MEDICINE

## 2024-01-30 PROCEDURE — 96365 THER/PROPH/DIAG IV INF INIT: CPT

## 2024-01-30 PROCEDURE — 81001 URINALYSIS AUTO W/SCOPE: CPT | Performed by: INTERNAL MEDICINE

## 2024-01-30 PROCEDURE — 85025 COMPLETE CBC W/AUTO DIFF WBC: CPT | Performed by: INTERNAL MEDICINE

## 2024-01-30 PROCEDURE — 250N000011 HC RX IP 250 OP 636: Mod: JZ | Performed by: INTERNAL MEDICINE

## 2024-01-30 PROCEDURE — 258N000003 HC RX IP 258 OP 636: Performed by: INTERNAL MEDICINE

## 2024-01-30 PROCEDURE — 36415 COLL VENOUS BLD VENIPUNCTURE: CPT | Performed by: INTERNAL MEDICINE

## 2024-01-30 PROCEDURE — 250N000013 HC RX MED GY IP 250 OP 250 PS 637: Performed by: INTERNAL MEDICINE

## 2024-01-30 RX ORDER — DIPHENHYDRAMINE HYDROCHLORIDE 50 MG/ML
50 INJECTION INTRAMUSCULAR; INTRAVENOUS
Status: CANCELLED
Start: 2024-02-26

## 2024-01-30 RX ORDER — METHYLPREDNISOLONE SODIUM SUCCINATE 125 MG/2ML
81.25 INJECTION, POWDER, LYOPHILIZED, FOR SOLUTION INTRAMUSCULAR; INTRAVENOUS ONCE
Status: COMPLETED | OUTPATIENT
Start: 2024-01-30 | End: 2024-01-30

## 2024-01-30 RX ORDER — METHYLPREDNISOLONE SODIUM SUCCINATE 125 MG/2ML
125 INJECTION, POWDER, LYOPHILIZED, FOR SOLUTION INTRAMUSCULAR; INTRAVENOUS
Status: CANCELLED
Start: 2024-02-26

## 2024-01-30 RX ORDER — ACETAMINOPHEN 325 MG/1
650 TABLET ORAL ONCE
Status: COMPLETED | OUTPATIENT
Start: 2024-01-30 | End: 2024-01-30

## 2024-01-30 RX ORDER — ACETAMINOPHEN 325 MG/1
650 TABLET ORAL ONCE
Status: CANCELLED | OUTPATIENT
Start: 2024-02-26

## 2024-01-30 RX ORDER — EPINEPHRINE 1 MG/ML
0.3 INJECTION, SOLUTION INTRAMUSCULAR; SUBCUTANEOUS EVERY 5 MIN PRN
Status: CANCELLED | OUTPATIENT
Start: 2024-02-26

## 2024-01-30 RX ORDER — METHYLPREDNISOLONE SODIUM SUCCINATE 125 MG/2ML
81.25 INJECTION, POWDER, LYOPHILIZED, FOR SOLUTION INTRAMUSCULAR; INTRAVENOUS ONCE
Status: CANCELLED | OUTPATIENT
Start: 2024-02-26

## 2024-01-30 RX ORDER — MEPERIDINE HYDROCHLORIDE 25 MG/ML
25 INJECTION INTRAMUSCULAR; INTRAVENOUS; SUBCUTANEOUS EVERY 30 MIN PRN
Status: CANCELLED | OUTPATIENT
Start: 2024-02-26

## 2024-01-30 RX ORDER — HEPARIN SODIUM (PORCINE) LOCK FLUSH IV SOLN 100 UNIT/ML 100 UNIT/ML
5 SOLUTION INTRAVENOUS
Status: CANCELLED | OUTPATIENT
Start: 2024-02-26

## 2024-01-30 RX ORDER — HEPARIN SODIUM,PORCINE 10 UNIT/ML
5-20 VIAL (ML) INTRAVENOUS DAILY PRN
Status: CANCELLED | OUTPATIENT
Start: 2024-02-26

## 2024-01-30 RX ORDER — ALBUTEROL SULFATE 90 UG/1
1-2 AEROSOL, METERED RESPIRATORY (INHALATION)
Status: CANCELLED
Start: 2024-02-26

## 2024-01-30 RX ORDER — ALBUTEROL SULFATE 0.83 MG/ML
2.5 SOLUTION RESPIRATORY (INHALATION)
Status: CANCELLED | OUTPATIENT
Start: 2024-02-26

## 2024-01-30 RX ADMIN — SODIUM CHLORIDE 250 ML: 9 INJECTION, SOLUTION INTRAVENOUS at 14:45

## 2024-01-30 RX ADMIN — TOCILIZUMAB 800 MG: 20 INJECTION, SOLUTION, CONCENTRATE INTRAVENOUS at 13:45

## 2024-01-30 RX ADMIN — ACETAMINOPHEN 650 MG: 325 TABLET ORAL at 13:19

## 2024-01-30 RX ADMIN — METHYLPREDNISOLONE SODIUM SUCCINATE 81.25 MG: 125 INJECTION, POWDER, FOR SOLUTION INTRAMUSCULAR; INTRAVENOUS at 13:43

## 2024-01-30 ASSESSMENT — PAIN SCALES - GENERAL: PAINLEVEL: EXTREME PAIN (8)

## 2024-01-30 NOTE — PROGRESS NOTES
Infusion Nursing Note:  Laxmi Ya presents today for actemra.    Patient seen by provider today: No   present during visit today: Not Applicable.    Note: Patient self administers home insulin 30 minutes prior to solu-medrol pre-med.  250ml NS bolus given post actemra per orders     Intravenous Access:  Peripheral IV placed by [facility]  Labs drawn    Treatment Conditions:  Biological Infusion Checklist:  ~~~ NOTE: If the patient answers yes to any of the questions below, hold the infusion and contact ordering provider or on-call provider.    Have you recently had an elevated temperature, fever, chills, productive cough, coughing for 3 weeks or longer or hemoptysis,  abnormal vital signs, night sweats,  chest pain or have you noticed a decrease in your appetite, unexplained weight loss or fatigue? No  Do you have any open wounds or new incisions? No  Do you have any upcoming hospitalizations or surgeries? Does not include esophagogastroduodenoscopy, colonoscopy, endoscopic retrograde cholangiopancreatography (ERCP), endoscopic ultrasound (EUS), dental procedures or joint aspiration/steroid injections No  Do you currently have any signs of illness or infection or are you on any antibiotics? No  Have you had any new, sudden or worsening abdominal pain? No  Have you or anyone in your household received a live vaccination in the past 4 weeks? Please note: No live vaccines while on biologic/chemotherapy until 6 months after the last treatment. Patient can receive the flu vaccine (shot only), pneumovax and the Covid vaccine. It is optimal for the patient to get these vaccines mid cycle, but they can be given at any time as long as it is not on the day of the infusion. No  Have you recently been diagnosed with any new nervous system diseases (ie. Multiple sclerosis, Guillain Pony, seizures, neurological changes) or cancer diagnosis? Are you on any form of radiation or chemotherapy? No  Are you pregnant or breast  feeding or do you have plans of pregnancy in the future? No  Have you been having any signs of worsening depression or suicidal ideations?  (benlysta only) N/A  Have there been any other new onset medical symptoms? No  Have you had any new blood clots? (IVIG only) N/A    Post Infusion Assessment:  Patient tolerated infusion without incident.  Blood return noted pre and post infusion.  Site patent and intact, free from redness, edema or discomfort.  No evidence of extravasations.  Access discontinued per protocol.     POST-INFUSION OF BIOLOGICAL MEDICATION:     Reviewed with patient.  Given biologic medication or medication hand-out. Inform patient if any fever, chills or signs of infection, new symptoms, abdominal pain, heart palpitations, shortness of breath, reaction, weakness, neurological changes, seek medical attention immediately and should not receive infusions. No live virus vaccines prior to or during treatment or up to 6 months post infusion. If the patient has an upcoming procedure or surgery, this should be discussed with the rheumatologist and surgeon or provider.    Discharge Plan:   Discharge instructions reviewed with: Patient.  Patient and/or family verbalized understanding of discharge instructions and all questions answered.  AVS to patient via [facility].  Patient will return 2/27/24 for next appointment.   Patient discharged in stable condition accompanied by: self.  Departure Mode: Ambulatory.    Administrations This Visit       acetaminophen (TYLENOL) tablet 650 mg       Admin Date  01/30/2024 Action  $Given Dose  650 mg Route  Oral Documented By  Jacquelyn Nicole, RN              methylPREDNISolone sodium succinate (solu-MEDROL) injection 81.25 mg       Admin Date  01/30/2024 Action  $Given Dose  81.25 mg Route  Intravenous Documented By  Arsalan Phillip, RN              sodium chloride 0.9% BOLUS 250 mL       Admin Date  01/30/2024 Action  $New Bag Dose  250 mL Route  Intravenous Documented  By  Jacquelyn Nicole, LEXI              tocilizumab (ACTEMRA) 800 mg in sodium chloride 0.9 % 150 mL infusion       Admin Date  01/30/2024 Action  $New Bag Dose  800 mg Rate  150 mL/hr Route  Intravenous Documented By  Arsalan Phillip RN Julie Backhaus, RN

## 2024-01-30 NOTE — PATIENT INSTRUCTIONS
Dear Laxmi Ya    Thank you for choosing [facility] ([facility]) for your infusion.  The following information is a summary of our appointment as well as important reminders.      EDUCATION POST BIOLOGICAL/CHEMOTHERAPY INFUSION  Call the triage nurse at your clinic or seek medical attention if you have chills and/or temperature greater than or equal to 100.5, uncontrolled nausea/vomiting, diarrhea, constipation, dizziness, shortness of breath, chest pain, heart palpitations, weakness or any other new or concerning symptoms, questions or concerns.  You can not have any live virus vaccines prior to or during treatment or up to 6 months post infusion.  If you have an upcoming surgery, medical procedure or dental procedure during treatment, this should be discussed with your ordering physician and your surgeon/dentist.  If you are having any concerning symptom, if you are unsure if you should get your next infusion or wish to speak to a provider before your next infusion, please call your care coordinator or triage nurse at your clinic to notify them so we can adequately serve you.     If you are a transplant patient and require transplant education, please click on this link: [email or web address].    We look forward in seeing you on your next appointment here at [facility] ([facility]).  Please don t hesitate to call us at [phone] to reschedule any of your appointments or to speak with one of the [facility] registered nurses.  It was a pleasure taking care of you today.    Sincerely,      [facility]  [address] [place] [address]  [id number]  Phone:  [phone]

## 2024-02-05 ENCOUNTER — ANCILLARY PROCEDURE (OUTPATIENT)
Dept: GENERAL RADIOLOGY | Facility: CLINIC | Age: 49
End: 2024-02-05
Attending: ORTHOPAEDIC SURGERY
Payer: MEDICARE

## 2024-02-05 ENCOUNTER — OFFICE VISIT (OUTPATIENT)
Dept: ORTHOPEDICS | Facility: CLINIC | Age: 49
End: 2024-02-05
Payer: MEDICARE

## 2024-02-05 ENCOUNTER — TELEPHONE (OUTPATIENT)
Dept: ORTHOPEDICS | Facility: CLINIC | Age: 49
End: 2024-02-05

## 2024-02-05 VITALS — HEIGHT: 66 IN | WEIGHT: 208 LBS | BODY MASS INDEX: 33.43 KG/M2

## 2024-02-05 DIAGNOSIS — M25.561 RIGHT KNEE PAIN, UNSPECIFIED CHRONICITY: ICD-10-CM

## 2024-02-05 DIAGNOSIS — M25.561 RIGHT KNEE PAIN, UNSPECIFIED CHRONICITY: Primary | ICD-10-CM

## 2024-02-05 PROCEDURE — 99214 OFFICE O/P EST MOD 30 MIN: CPT | Performed by: ORTHOPAEDIC SURGERY

## 2024-02-05 PROCEDURE — 73562 X-RAY EXAM OF KNEE 3: CPT | Mod: RT | Performed by: RADIOLOGY

## 2024-02-05 NOTE — PATIENT INSTRUCTIONS
Orthotics and Prosthetics    [place]  [facility]  [address]   [address]  [place] [address]  Phone [phone]  Fax: [phone]    [facility]  [address]  [place] [address]  Phone [phone]  Fax: [phone]    [facility]  [address]  [address]  [place] [address]  Phone [phone]  Fax: [phone]    [facility]  [address] [address]  Phone [phone]  Fax: [phone]    [facility]  [address]  [address] (located in the Wound Clinic)  [place] [address]  Phone [phone]  Fax: [phone]    [facility]  (Please use Check In A)  [address] [place] [address]  Phone [phone]  Fax: [phone]    [facility]  [address]  [address]  [place] [address]  Phone [phone]  Fax: [phone]    [facility]   [address] [place] [place]  Phone [phone]  Fax: [phone]    [facility]  [address]  [place] [address]  Phone [phone]  Fax: [phone]    [facility]  [address]  [address]  [place] [address]  Phone [phone]  Fax: [phone]    [facility]   [place]  [address] [place] [address]  Phone [phone]  Fax: [phone]

## 2024-02-05 NOTE — LETTER
2/5/2024         RE: Laxmi Ya  [address] [place] [id number]        Dear Colleague,    Thank you for referring your patient, Laxmi Ya, to the [facility] [place]. Please see a copy of my visit note below.    CHIEF CONCERN: Right knee pain following fall downstairs    HISTORY:   Pleasant 48-year-old female.  Sees me today in clinic.  I see on request of Dr. Petit who has been treating her for hand pathology.  She is a pleasant woman with a history of rheumatoid arthritis.  She sustained a fall down some stairs.  This happened approximately 6 weeks ago.  She had medial sided pain since that visit.  She started on some prednisone for an upper respiratory infection exudates this is helped her knee pain some.  She has been using a wrap but no specific knee brace.  She has not seen physical therapy.    PAST MEDICAL HISTORY: (Reviewed with the patient and in the [facility] medical record)  Rheumatoid arthritis  Recent history of potentially influenza and a upper respiratory infection    PAST SURGICAL HISTORY: (Reviewed with the patient and in the [facility] medical record)  No knee surgery    MEDICATIONS: (Reviewed with the patient and in the [facility] medical record)    Notable medications include: No blood thinners.  She is completing oral steroid taperEpic reports that she was given an oxycodone prescription in November 2023    ALLERGIES: (Reviewed with the patient and in the [facility] medical record)  Multiple as listed in [facility]      SOCIAL HISTORY: (Reviewed with the patient and in the medical record)  --Tobacco: Non-smoker  --Occupation: Disabled  --Avocation/Sport: Would like to be more active that she is    FAMILY HISTORY: (Reviewed with the patient and in the medical record)  -- No family history of bleeding, clotting, or difficulty with anesthesia    REVIEW OF SYSTEMS: (Reviewed with the patient and on the health intake form)  -- A comprehensive 10 point review of systems was  conducted and is negative except as noted in the HPI    EXAM:     General: Awake, Alert and Oriented, No acute Distress. Articulate and Interactive    Body mass index is 33.57 kg/m .    Right lower extremity :  Skin is Warm and Well perfused, no suggestion of infection  Range of motion is from 5 to 115 degrees  Stable to varus valgus stress testing with no opening to valgus at 0 or 30 degrees  Lachman 0, no pivot shift  1 quadrant lateral translation of patella, tilt to neutral  No opening to varus  EHL/FHL/TA/GS 5/5  Sensation intact L3-S1  2+ Dorsalis Pedis Pulse    IMAGING:    Radiographs of the right knee from today were independently reviewed by me and findings were discussed with the patient today. The imaging demonstrates no fractures dislocations well-preserved joint space.    MRI of the right knee from 12/22/2023 were independently reviewed by me and findings were discussed with the patient today. The imaging demonstrates intact medial lateral meniscus.  Intact ACL and PCL, intact medial and lateral collateral ligaments.  Some edema along the medial collateral ligament though clearly no significant tearing and overall the course of the MCL is intact.  Articular cartilage is intact with just some early edema of the lateral tibial plateau.    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  Reviewed the diagnosis and treatment options.  At this time I recommend oral anti-inflammatories, physical therapy, brace utilization.  Certainly no role for surgical intervention for her knee.  I do think that this will likely improve.  Could consider corticosteroid injection management if her symptoms do not improve.      Again, thank you for allowing me to participate in the care of your patient.        Sincerely,        Chico Vazquez MD

## 2024-02-05 NOTE — PROGRESS NOTES
CHIEF CONCERN: Right knee pain following fall downstairs    HISTORY:   Pleasant 48-year-old female.  Sees me today in clinic.  I see on request of Dr. Petit who has been treating her for hand pathology.  She is a pleasant woman with a history of rheumatoid arthritis.  She sustained a fall down some stairs.  This happened approximately 6 weeks ago.  She had medial sided pain since that visit.  She started on some prednisone for an upper respiratory infection exudates this is helped her knee pain some.  She has been using a wrap but no specific knee brace.  She has not seen physical therapy.    PAST MEDICAL HISTORY: (Reviewed with the patient and in the [facility] medical record)  Rheumatoid arthritis  Recent history of potentially influenza and a upper respiratory infection    PAST SURGICAL HISTORY: (Reviewed with the patient and in the [facility] medical record)  No knee surgery    MEDICATIONS: (Reviewed with the patient and in the [facility] medical record)    Notable medications include: No blood thinners.  She is completing oral steroid taperEpic reports that she was given an oxycodone prescription in November 2023    ALLERGIES: (Reviewed with the patient and in the [facility] medical record)  Multiple as listed in [facility]      SOCIAL HISTORY: (Reviewed with the patient and in the medical record)  --Tobacco: Non-smoker  --Occupation: Disabled  --Avocation/Sport: Would like to be more active that she is    FAMILY HISTORY: (Reviewed with the patient and in the medical record)  -- No family history of bleeding, clotting, or difficulty with anesthesia    REVIEW OF SYSTEMS: (Reviewed with the patient and on the health intake form)  -- A comprehensive 10 point review of systems was conducted and is negative except as noted in the HPI    EXAM:     General: Awake, Alert and Oriented, No acute Distress. Articulate and Interactive    Body mass index is 33.57 kg/m .    Right lower extremity :  Skin is Warm and Well perfused, no suggestion of  infection  Range of motion is from 5 to 115 degrees  Stable to varus valgus stress testing with no opening to valgus at 0 or 30 degrees  Lachman 0, no pivot shift  1 quadrant lateral translation of patella, tilt to neutral  No opening to varus  EHL/FHL/TA/GS 5/5  Sensation intact L3-S1  2+ Dorsalis Pedis Pulse    IMAGING:    Radiographs of the right knee from today were independently reviewed by me and findings were discussed with the patient today. The imaging demonstrates no fractures dislocations well-preserved joint space.    MRI of the right knee from 12/22/2023 were independently reviewed by me and findings were discussed with the patient today. The imaging demonstrates intact medial lateral meniscus.  Intact ACL and PCL, intact medial and lateral collateral ligaments.  Some edema along the medial collateral ligament though clearly no significant tearing and overall the course of the MCL is intact.  Articular cartilage is intact with just some early edema of the lateral tibial plateau.    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  Reviewed the diagnosis and treatment options.  At this time I recommend oral anti-inflammatories, physical therapy, brace utilization.  Certainly no role for surgical intervention for her knee.  I do think that this will likely improve.  Could consider corticosteroid injection management if her symptoms do not improve.

## 2024-02-05 NOTE — TELEPHONE ENCOUNTER
XR will be completed at the time of her appointment. No additional steps needed at this time.    TIM Kebede

## 2024-02-05 NOTE — TELEPHONE ENCOUNTER
[facility] Call Center    Phone Message    May a detailed message be left on voicemail: yes     Reason for Call: Other: LupeLaxmi needs her xray moved to later today before her appt at 12:20. Please call her.     Action Taken: Other: CSC    Travel Screening: Not Applicable

## 2024-02-07 NOTE — PROGRESS NOTES
Chief Complaint:   Chief Complaint   Patient presents with    RECHECK     Follow up on bilateral median neuropathy and thumb pain after US       Referring Physician: No ref. provider found    Diagnosis: bilateral thumb CMC/STT arthritis, bilateral carpal tunnel syndrome  Treatment:   12/27/2023: bilateral carpal tunnel steroid injection  12/21/2023: bilateral STT steroid injection (Dr. Petit)  11/1/2023: bilateral carpal tunnel steroid injection  10/18/2023: Bilateral thumb CMC steroid injection  9/20/2023: bilateral STT steroid injection  5/4/2023: bilateral carpal tunnel steroid injection (Dr. Petit)  2/16/2023: bilateral STT steroid injection (Dr. Petit)  12/13/2022: bilateral carpal tunnel steroid injection (Dr. Sullivan)  10/27/2022: bilateral STT steroid injections (Dr. Petit)  9/13/2022: bilateral carpal tunnel steroid injection (Dr. Sullivan)  6/30/2022: bilateral STT steroid injections (Dr. Petit)  Bilateral carpal tunnel steroid injections (outside)    History of Present Illness: Laxmi Ya is a 48 year old RHD female presenting for evaluation of bilateral hand and wrist pain. She's had history of carpal tunnel steroid injections and releases, she continues to feel some numbness/tingling in her fingers. Today she'd like to have steroid injections into the STT region.    Clinical documentation by Dr. Petit on 5/4/2023 was reviewed.    10/18/2023: reports the last STT injections helped.  She presents today for bilateral thumb CMC steroid injections.  She continues to have pain in bilateral thumbs.  She also continues to have numbness/tingling in the median nerve distribution.    11/1/2023: reports the last thumb CMC steroid injections helped. Presents today for bilateral carpal tunnel steroid injections.    12/27/2023: recent STT injections with Dr. Petit which she says were helpful. Last carpal tunnel steroid injections were helpful and she would like to do this again.  Bilateral thumb CMCs feel good and the steroid injections have not worn off yet.    2/13/2024: recently had pneumonia. Right hand is feeling pretty good but left hand is having more carpal tunnel symptoms. Both hands are having mild pain in the STT region again. Focused on the left side today.    Physical Examination:  There were no vitals filed for this visit.  There is no height or weight on file to calculate BMI.    Well appearing, well nourished  Alert and oriented x 3, cooperative with exam     Left Upper Extremity:  Healed carpal tunnel scar  TTP STT joint: yes   MP hyper-extension: no   Thenar atrophy: no   Tinel's sign (volar wrist): positive   Durkan's test: positive   Negative Phalen test, positive reverse Phalen test  Motor Exam:   Abductor pollicis brevis strength: 4/5    1st dorsal interosseous strength: 5/5   - Flexor pollicis longus strength: 4/5   Intact thumbs up  Vascular Exam:   2+ radial pulse, < 2 sec capillary refill      Imaging/Studies:  Ultrasound bilateral upper extremities obtained 2/13/2024 shows:  Areas of the median nerves (millimeters squared):  Left:  Distal forearm: 7.6, 7.9  Wrist crease: 11.4, 10.3     Right:  Distal forearm: 9.4, 9.0  Wrist crease: 9.9, 8.6     Poorly visualized median nerve the region of the lacertus fibrosis  bilaterally.                                                              Impression:   1. Borderline enlargement of the left median nerve at the level of the  wrist crease.  2. No substantial right median nerve enlargement at the wrist crease.    XR (3 views) of the  bilateral  hand was obtained  9/20/2023 .  I reviewed the images with the patient.  The imaging study shows bilateral thumb CMC and STT arthritis with possible chondrocalcinosis on radial side.    Assessment: Laxmi Ya is a 48 year old female with bilateral STT, thumb CMC, median neuropathy.    Plan:   I had a discussion with the patient regarding my clinical findings, diagnosis, and  treatment plan. I reviewed the treatment options for basal joint arthritis, carpal tunnel (observation, bracing, steroid injection, occupational therapy, surgery, etc.) as well as the risks and benefits of each.  Today she elects observation. Ultrasound did not show the median nerve in the lacertus region well. Recommend repeat at outside location, also evaluate ulnar nerves for compression. She has an appointment scheduled with Dr. Petit in 2 weeks for STT injections. The patient understands and agrees to the treatment plan.  All questions answered.      Continue bracing    Next Visit:   Follow-up: as needed   Imaging: None   Plan: Symptom check.       WANDA STARKS MD

## 2024-02-13 ENCOUNTER — OFFICE VISIT (OUTPATIENT)
Dept: ORTHOPEDICS | Facility: CLINIC | Age: 49
End: 2024-02-13
Payer: MEDICARE

## 2024-02-13 ENCOUNTER — ANCILLARY PROCEDURE (OUTPATIENT)
Dept: ULTRASOUND IMAGING | Facility: CLINIC | Age: 49
End: 2024-02-13
Attending: PHYSICIAN ASSISTANT
Payer: MEDICARE

## 2024-02-13 DIAGNOSIS — M25.531 BILATERAL WRIST PAIN: ICD-10-CM

## 2024-02-13 DIAGNOSIS — R29.898 OTHER SYMPTOMS AND SIGNS INVOLVING THE MUSCULOSKELETAL SYSTEM: ICD-10-CM

## 2024-02-13 DIAGNOSIS — G56.03 BILATERAL CARPAL TUNNEL SYNDROME: Primary | ICD-10-CM

## 2024-02-13 DIAGNOSIS — G56.10 MEDIAN NERVE NEUROPATHY, UNSPECIFIED LATERALITY: ICD-10-CM

## 2024-02-13 DIAGNOSIS — M25.532 BILATERAL WRIST PAIN: ICD-10-CM

## 2024-02-13 PROCEDURE — 99213 OFFICE O/P EST LOW 20 MIN: CPT | Performed by: STUDENT IN AN ORGANIZED HEALTH CARE EDUCATION/TRAINING PROGRAM

## 2024-02-13 PROCEDURE — 76881 US COMPL JOINT R-T W/IMG: CPT | Performed by: RADIOLOGY

## 2024-02-13 NOTE — NURSING NOTE
Reason For Visit:   Chief Complaint   Patient presents with    RECHECK     Follow up on bilateral median neuropathy and thumb pain after US       Primary MD: Amol Juares  Ref. MD: salazar    Age: 48 year old    ?  No      There were no vitals taken for this visit.      Pain Assessment  Patient Currently in Pain: Yes  0-10 Pain Scale: 0  Primary Pain Location: Hand (bilateral, left is worse)  Pain Descriptors: Numbness, Tingling, Constant    Hand Dominance Evaluation  Hand Dominance: Right          QuickDASH Assessment      2/13/2024     1:59 PM   QuickDASH Main   1. Open a tight or new jar Unable   2. Do heavy household chores (e.g., wash walls, floors) Unable   3. Carry a shopping bag or briefcase Unable   4. Wash your back Severe difficulty   5. Use a knife to cut food Unable   6. Recreational activities in which you take some force or impact through your arm, shoulder or hand (e.g., golf, hammering, tennis, etc.) Unable   7. During the past week, to what extent has your arm, shoulder or hand problem interfered with your normal social activities with family, friends, neighbours or groups Extremely   8. During the past week, were you limited in your work or other regular daily activities as a result of your arm, shoulder or hand problem Unable   9. Arm, shoulder or hand pain Extreme   10.Tingling (pins and needles) in your arm,shoulder or hand Extreme   11. During the past week, how much difficulty have you had sleeping because of the pain in your arm, shoulder or hand Severe difficulty   Quickdash Ability Score 95.45          Current Outpatient Medications   Medication Sig Dispense Refill    albuterol (PROAIR HFA/PROVENTIL HFA/VENTOLIN HFA) 108 (90 Base) MCG/ACT inhaler Inhale 2 puffs into the lungs every 6 hours as needed for shortness of breath, wheezing or cough      Artificial Tear Solution (SOOTHE XP) SOLN as needed      atenolol (TENORMIN) 50 MG tablet Take 50 mg by mouth daily      benzoyl  peroxide 5 % external liquid Use daily as directed. Preferred bdrand: Medpura 236 mL 11    blood glucose (NO BRAND SPECIFIED) lancets standard Use to test blood sugar 2 times daily or as directed. 100 lancet. 1    blood glucose (NO BRAND SPECIFIED) test strip Use to test blood sugar 2 times daily or as directed. 100 strip 1    blood glucose monitoring (NO BRAND SPECIFIED) meter device kit Use to test blood sugar 2 times daily or as directed. 1 kit 0    diclofenac (VOLTAREN) 1 % topical gel Apply topically 4 times daily as needed      dupilumab (DUPIXENT) 300 MG/2ML prefilled pen Inject 2 mLs (300 mg) Subcutaneous every 14 days 4 mL 2    EPINEPHrine (ANY BX GENERIC EQUIV) 0.3 MG/0.3ML injection 2-pack Inject 0.3 mg into the muscle as needed      fexofenadine (ALLEGRA) 180 MG tablet Take 1 tablet (180 mg) by mouth daily 90 tablet 2    fidaxomicin (DIFICID) 200 MG tablet Take 200 mg by mouth as needed      fluticasone-vilanterol (BREO ELLIPTA) 200-25 MCG/ACT inhaler Inhale 1 puff into the lungs daily      insulin glargine (LANTUS PEN) 100 UNIT/ML pen Inject 20 Units Subcutaneous daily      insulin lispro (HUMALOG KWIKPEN) 100 UNIT/ML (1 unit dial) KWIKPEN Inject 5 Units Subcutaneous 3 times daily (before meals) Plus 1:25 correction scale if BG >150      insulin NPH (HUMULIN N KWIKPEN) 100 UNIT/ML injection Inject 40 Units Subcutaneous every evening      Lancets (ONETOUCH DELICA PLUS [id number]) MISC CHECK BLOOD GLUCOSE 3 TIMES DAILY, ADJUSTING MEDICATION, DX CODE E 11.9, ON INSULIN      lidocaine (LIDODERM) 5 % patch as needed   1    losartan (COZAAR) 100 MG tablet Take 100 mg by mouth daily      methylPREDNISolone (MEDROL DOSEPAK) 4 MG tablet therapy pack Take per package instructions. Take once a day by mouth 21 tablet 0    methylPREDNISolone (MEDROL DOSEPAK) 4 MG tablet therapy pack Take per package instructions. Take once a day by mouth 21 tablet 0    mometasone (NASONEX) 50 MCG/ACT nasal spray Spray 2 sprays into  both nostrils daily 17 g 11    mometasone-formoterol (DULERA) 200-5 MCG/ACT inhaler Inhale 1 puff into the lungs daily      Montelukast Sodium (SINGULAIR PO) Take 10 mg by mouth At Bedtime       multivitamin w/minerals (THERA-VIT-M) tablet Take 1 tablet by mouth daily      norethindrone (MICRONOR) 0.35 MG tablet Take 0.35 mg by mouth      nystatin (MYCOSTATIN) 703612 UNIT/ML suspension       olopatadine (PATADAY) 0.7 % ophthalmic solution Apply 1 drop to eye daily PRN      OMEPRAZOLE PO Take 40 mg by mouth 2 times daily       oxyCODONE IR (ROXICODONE) 10 MG tablet Take 10 mg by mouth      rifaximin (XIFAXAN) 550 MG TABS tablet Take 200 mg by mouth 3 times daily Take for 10 days, has on had as needed for small bowel bacteria overgrowh      rosuvastatin (CRESTOR) 5 MG tablet Take 2 tablets by mouth daily      spironolactone-HCTZ (ALDACTAZIDE) 25-25 MG tablet Take 1 tablet by mouth daily      STELARA 45 MG/0.5ML SOSY Inject 45 mg Subcutaneous once      tocilizumab (ACTEMRA) 400 MG/20ML Inject 800 mg into the vein every 28 days         Allergies   Allergen Reactions    Polyethylene Glycol Rash    Codeine      Other reaction(s): Gastrointestinal, GI intolerance, Vomiting  Vomiting      Gadolinium Dizziness and Nausea    Hydrocodone-Acetaminophen Nausea and Vomiting     Other reaction(s): GI intolerance    Iodine      abstract    Sulfasalazine      Other reaction(s): GI intolerance  Has tried and had nausa.    Tramadol Nausea and Vomiting     Other reaction(s): Gastrointestinal, Other (see comments)  Nausea and vomiting   unknown      Acetaminophen Nausea     Nausea and vomiting  Nausea and vomiting  Nausea and vomiting  Nausea and vomiting      Gluten Meal Other (See Comments) and Rash     Other reaction(s): Gastrointestinal, GI intolerance  Dizziness, tired, rash, stomach cramps, thrush, mouth sores  Dizziness, tired, rash, stomach cramps, thrush, mouth sores      Propylene Glycol Dizziness, Nausea and Vomiting, Nausea  and Jannie Lao, ATC

## 2024-02-13 NOTE — LETTER
2/13/2024         RE: Laxmi Ya  [address] [id number]        Dear Colleague,    Thank you for referring your patient, Laxmi Ya, to the [facility] [place]. Please see a copy of my visit note below.    Chief Complaint:   Chief Complaint   Patient presents with    RECHECK     Follow up on bilateral median neuropathy and thumb pain after US       Referring Physician: No ref. provider found    Diagnosis: bilateral thumb CMC/STT arthritis, bilateral carpal tunnel syndrome  Treatment:   12/27/2023: bilateral carpal tunnel steroid injection  12/21/2023: bilateral STT steroid injection (Dr. Petit)  11/1/2023: bilateral carpal tunnel steroid injection  10/18/2023: Bilateral thumb CMC steroid injection  9/20/2023: bilateral STT steroid injection  5/4/2023: bilateral carpal tunnel steroid injection (Dr. Petit)  2/16/2023: bilateral STT steroid injection (Dr. Petit)  12/13/2022: bilateral carpal tunnel steroid injection (Dr. Sullivan)  10/27/2022: bilateral STT steroid injections (Dr. Petit)  9/13/2022: bilateral carpal tunnel steroid injection (Dr. Sullivan)  6/30/2022: bilateral STT steroid injections (Dr. Petit)  Bilateral carpal tunnel steroid injections (outside)    History of Present Illness: Laxmi Ya is a 48 year old RHD female presenting for evaluation of bilateral hand and wrist pain. She's had history of carpal tunnel steroid injections and releases, she continues to feel some numbness/tingling in her fingers. Today she'd like to have steroid injections into the STT region.    Clinical documentation by Dr. Petit on 5/4/2023 was reviewed.    10/18/2023: reports the last STT injections helped.  She presents today for bilateral thumb CMC steroid injections.  She continues to have pain in bilateral thumbs.  She also continues to have numbness/tingling in the median nerve distribution.    11/1/2023: reports the last thumb CMC steroid  injections helped. Presents today for bilateral carpal tunnel steroid injections.    12/27/2023: recent STT injections with Dr. Petit which she says were helpful. Last carpal tunnel steroid injections were helpful and she would like to do this again. Bilateral thumb CMCs feel good and the steroid injections have not worn off yet.    2/13/2024: recently had pneumonia. Right hand is feeling pretty good but left hand is having more carpal tunnel symptoms. Both hands are having mild pain in the STT region again. Focused on the left side today.    Physical Examination:  There were no vitals filed for this visit.  There is no height or weight on file to calculate BMI.    Well appearing, well nourished  Alert and oriented x 3, cooperative with exam     Left Upper Extremity:  Healed carpal tunnel scar  TTP STT joint: yes   MP hyper-extension: no   Thenar atrophy: no   Tinel's sign (volar wrist): positive   Durkan's test: positive   Negative Phalen test, positive reverse Phalen test  Motor Exam:   Abductor pollicis brevis strength: 4/5    1st dorsal interosseous strength: 5/5   - Flexor pollicis longus strength: 4/5   Intact thumbs up  Vascular Exam:   2+ radial pulse, < 2 sec capillary refill      Imaging/Studies:  Ultrasound bilateral upper extremities obtained 2/13/2024 shows:  Areas of the median nerves (millimeters squared):  Left:  Distal forearm: 7.6, 7.9  Wrist crease: 11.4, 10.3     Right:  Distal forearm: 9.4, 9.0  Wrist crease: 9.9, 8.6     Poorly visualized median nerve the region of the lacertus fibrosis  bilaterally.                                                              Impression:   1. Borderline enlargement of the left median nerve at the level of the  wrist crease.  2. No substantial right median nerve enlargement at the wrist crease.    XR (3 views) of the  bilateral  hand was obtained  9/20/2023 .  I reviewed the images with the patient.  The imaging study shows bilateral thumb CMC and STT  arthritis with possible chondrocalcinosis on radial side.    Assessment: Laxmi Ya is a 48 year old female with bilateral STT, thumb CMC, median neuropathy.    Plan:   I had a discussion with the patient regarding my clinical findings, diagnosis, and treatment plan. I reviewed the treatment options for basal joint arthritis, carpal tunnel (observation, bracing, steroid injection, occupational therapy, surgery, etc.) as well as the risks and benefits of each.  Today she elects observation. Ultrasound did not show the median nerve in the lacertus region well. Recommend repeat at outside location, also evaluate ulnar nerves for compression. She has an appointment scheduled with Dr. Petit in 2 weeks for STT injections. The patient understands and agrees to the treatment plan.  All questions answered.      Continue bracing    Next Visit:   Follow-up: as needed   Imaging: None   Plan: Symptom check.       WANDA STARKS MD

## 2024-02-14 ENCOUNTER — THERAPY VISIT (OUTPATIENT)
Dept: PHYSICAL THERAPY | Facility: CLINIC | Age: 49
End: 2024-02-14
Payer: MEDICARE

## 2024-02-14 DIAGNOSIS — M54.2 NECK PAIN: ICD-10-CM

## 2024-02-14 DIAGNOSIS — G89.29 CHRONIC BILATERAL LOW BACK PAIN WITHOUT SCIATICA: ICD-10-CM

## 2024-02-14 DIAGNOSIS — M54.50 CHRONIC BILATERAL LOW BACK PAIN WITHOUT SCIATICA: ICD-10-CM

## 2024-02-14 DIAGNOSIS — R10.2 PELVIC PAIN IN FEMALE: Primary | ICD-10-CM

## 2024-02-14 PROCEDURE — 97110 THERAPEUTIC EXERCISES: CPT | Mod: GP | Performed by: PHYSICAL THERAPIST

## 2024-02-14 PROCEDURE — 97012 MECHANICAL TRACTION THERAPY: CPT | Mod: GP | Performed by: PHYSICAL THERAPIST

## 2024-02-14 PROCEDURE — 97140 MANUAL THERAPY 1/> REGIONS: CPT | Mod: GP | Performed by: PHYSICAL THERAPIST

## 2024-02-15 ENCOUNTER — THERAPY VISIT (OUTPATIENT)
Dept: OCCUPATIONAL THERAPY | Facility: CLINIC | Age: 49
End: 2024-02-15
Attending: INTERNAL MEDICINE
Payer: MEDICARE

## 2024-02-15 DIAGNOSIS — I89.0 LYMPHEDEMA: Primary | ICD-10-CM

## 2024-02-15 PROCEDURE — 97140 MANUAL THERAPY 1/> REGIONS: CPT | Mod: GO | Performed by: OCCUPATIONAL THERAPIST

## 2024-02-15 NOTE — PROGRESS NOTES
02/14/24 0500   Appointment Info   Signing clinician's name / credentials Sofi Lemons PT, OCS   Total/Authorized Visits [employer] 02/03/23, requested add orders from Ingrid Leach   Visits Used 28   Medical Diagnosis Lower back pain with mobility deficits, lower back pain w/ referred pain, neck pain w/ headache, neck pain w/ mobility deficits, pelvic pain   PT Tx Diagnosis Lower back pain with mobility deficits, lower back pain w/ referred pain, neck pain w/ headache, neck pain w/ mobility deficits   Other pertinent information autoimmune diseases, hx lumbar discectomy   Quick Adds Certification   Progress Note/Certification   Start of Care Date 03/08/23   Onset of illness/injury or Date of Surgery 02/03/23   Therapy Frequency 1x week   Predicted Duration 8 weeks   Certification date from 12/12/23   Certification date to 03/14/24   Progress Note Due Date 02/14/24   Progress Note Completed Date 02/14/24   GOALS   PT Goals 3   PT Goal 1   Goal Identifier ambulation   Goal Description Minutes patient will be able to  walk 20-30 minutes   Rationale to maximize safety and independence with performance of ADLs and functional tasks;to maximize safety and independence within the home;to maximize safety and independence within the community;to maximize safety and independence with transportation;to maximize safety and independence with self cares   Goal Progress flared up with severe L knee pain from fall   Target Date 03/14/24   PT Goal 2   Goal Identifier headaches   Goal Description HA 2x month or less with intensity 2/10 or less   Rationale to maximize safety and independence with performance of ADLs and functional tasks;to maximize safety and independence within the home;to maximize safety and independence within the community;to maximize safety and independence with transportation;to maximize safety and independence with self cares   Goal Progress currently daily   Target Date 04/10/24   PT Goal 3   Goal  Identifier pelvic pain   Goal Description with walking/intimacy/urinary urgency 2/10 or less   Rationale to maximize safety and independence with performance of ADLs and functional tasks;to maximize safety and independence within the home;to maximize safety and independence with transportation;to maximize safety and independence with self cares;to maximize safety and independence within the community   Goal Progress currently 5/10   Target Date 03/14/24   Subjective Report   Subjective Report Had to come early from [place] due to severe illness. Was almost hospitalized due to influenza but didn't have to be. Had to be on steroids for a month. Finally starting to feel better over the past 2 weeks.Steroids may have helped the pelvis some. R>L hip pain. Also L>R carpal tunnel flared up. Did get orders for R knee pain. Having more HA since being sick. Getting massage 1x week.Also some crepitus in sacral area.Also feels like neck is very compressed.   Objective Measures   Objective Measures Objective Measure 1;Objective Measure 2   Objective Measure 1   Objective Measure CROM flexion min loss, extension min loss, B rot/B SB min loss.   Objective Measure 2   Objective Measure TTP R SIJ >L.   Mechanical Traction   Mechanical Traction, Minutes (61690) 12   Traction -Type Cervical   Position supine   Type Intermittent   Duration 12 min   Max poundage 26   Min poundage 0   Hold Time 60   Rest Time 10   Steps ramping up 1   Steps ramping down 1   Speed 100%   Treatment Interventions (PT)   Interventions Manual Therapy;Therapeutic Procedure/Exercise   Therapeutic Procedure/Exercise   Therapeutic Procedures: strength, endurance, ROM, flexibillity minutes (72061) 15   Therapeutic Procedures Ther Proc 2;Ther Proc 3   Ther Proc 1 glute myofascial full arc   Ther Proc 1 - Details x20   Ther Proc 2 isometric quad set 5 sec x 20   Ther Proc 2 - Details keep knee moving throughout day   Skilled Intervention to decrease pain in  hip/pelvis   Patient Response/Progress less R hip pain afterwards   Ther Proc 3 PROM by PT for R hip mobility: knee extension, hamstrings, ER/IR hips   Manual Therapy   Manual Therapy: Mobilization, MFR, MLD, friction massage minutes (56024) 15   Manual Therapy Manual Therapy 2   Manual Therapy 1 prone to B upper glutes/piriformis   Manual Therapy 1 - Details Graston 3   Skilled Intervention to decrease tension to glutes to improve mm firing   Patient Response/Progress :lighting up noted around SIJ's   Education   Learner/Method Patient;No Barriers to Learning   Plan   Home program PTRX   Total Session Time   Timed Code Treatment Minutes 30   Total Treatment Time (sum of timed and untimed services) 42       PLAN  Continue therapy per current plan of care.    Beginning/End Dates of Progress Note Reporting Period:  02/14/24 to 02/14/2024    Referring Provider:  Kimberlee Vasquez

## 2024-02-20 ENCOUNTER — VIRTUAL VISIT (OUTPATIENT)
Dept: ENDOCRINOLOGY | Facility: CLINIC | Age: 49
End: 2024-02-20
Payer: MEDICARE

## 2024-02-20 DIAGNOSIS — R79.89 LOW SERUM CORTISOL LEVEL: Primary | ICD-10-CM

## 2024-02-20 DIAGNOSIS — T38.0X5D ADVERSE EFFECT OF CORTICOSTEROIDS, SUBSEQUENT ENCOUNTER: ICD-10-CM

## 2024-02-20 PROCEDURE — 99214 OFFICE O/P EST MOD 30 MIN: CPT | Mod: 95 | Performed by: INTERNAL MEDICINE

## 2024-02-20 NOTE — Clinical Note
"    2/20/2024         RE: Laxmi Ya  [address] [place] [id number]        Dear Colleague,    Thank you for referring your patient, Laxmi Ya, to the [facility] [place]. Please see a copy of my visit note below.    Virtual Visit Details    Type of service:  Video Visit   Video Start Time: {video visit start/end time for provider to select:656588}  Video End Time:{video visit start/end time for provider to select:193583}    Originating Location (pt. Location): {video visit patient location:767808::\"Home\"}  {PROVIDER LOCATION On-site should be selected for visits conducted from your clinic location or adjoining [facility] hospital, academic office, or other nearby [facility] building. Off-site should be selected for all other provider locations, including home:[id number]}  Distant Location (provider location):  {virtual location provider:300119}  Platform used for Video Visit: {Virtual Visit Platforms:386274::\"[employer]\"}        Recent issues:  Adrenal follow-up evaluation  Had UTI and Abx in 12/2023, then sinusitis in 12/2023-1/2023 then Abx + steroids, also influenza A   Took Medtrol medication for ~10 days, completed ~2/7/24  Reviewed medical history from patient and Epic chart record        Previous health history includes sero negative rheumatoid arthritis, asthma, T2DM  She has seen health care providers including [facility], [facility], [facility], and [facility]  Had also seen Dr. Alan Saleh/[facility] rheumatology  Med evaluations with Dr. Susan Pena/[facility], evaluations focused on management of recurrent cystitis, vaginitis  Treatment with antifungal and antibiotic medications  Patient recalls having lab tests done per Dr. Pena ~2020 showing abnormal adrenal results   Advised to see an endocrinologist for further evaluation   Unfortunately, details of these tests, results, concerns not available today    Additional medical evaluations with other " providers, including Dr. No Fitzgerald/ [facility], Dr. Oswaldo Lemus/[facility]  RA treatment with periodic Actemra infusions (w/ methylprednisolone infusion)  Previous [facility] labs include:   Latest Reference Range & Units 11/29/23 12:36 12/20/23 14:28   Cortisol Serum ug/dL 0.5 2.6      Latest Reference Range & Units 11/29/23 12:36 12/20/23 14:28   Adrenal Corticotropin <47 pg/mL <10 <10      Latest Reference Range & Units 11/29/23 12:45   Cortisol Free Urine Random ug/L <1.00         2020.Diagnosis of diabetes mellitus  Has used metformin in the past  3/2023 Started insulin medication  Has seen her PCP Dr. Amol Juares and diabetes educators ([facility]) for management    ~9/2023. Labs ([facility]) hgbA1c 5.7% per patient  Previous [facility] labs include:      Previous [facility] hgbA1c trends include:     Lab Test 02/06/23  1805   A1C 7.0*     DM medications:  Humalog Kwikpen  6U subcutaneous premeal, 3U before coffee    mg/dl  No correction   151-200 mg/dl  +1 units   201-250 mg/dl  +2 units   251-300 mg/dl  +3 units   301-350 mg/dl  +4 units   350-400 mg/dl  +5 units   401-450 mg/dl  +6 units and call physician   Yvonne Solostar  17U subcutaneous in morning  NPH Kwkipen   40U subcutaneous before Actemra, then 20U that evening      Dosing with Solumedrol 125 mg monthly    Blood glucose (BG) meter:  One Touch Verio Reflex   Tests BGM 2-3x/day   No recent glucose trend data available today  Fam Hx Diabetes: father  DM Complications:  none reported        Lives in [place], [place]  Sees Dr. Amol Juares/[facility] for general medicine evaluations.  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    Obesity     Pyelonephritis, unspecified 1992    abstract    Rheumatoid arteritis (H)     Seronegative inflammatory arthritis 06/17/2022    Type 2 diabetes mellitus (H)     Unspecified symptom associated with female genital organs 05/07/1999    chronic pelvic pain abstract     Past Surgical History:   Procedure Laterality Date    CHOLECYSTECTOMY      COLON SURGERY      Left hemicolectomy for diverticulosis    CYSTOSCOPY,+URETEROSCOPY      abstract    ZZHC EXCIS/CURET BENIGN ELBOW LESN  10/26/1987    (R) humerus abstract       Family Hx:  Family History   Problem Relation Age of Onset    Hypertension Father         abstract    Cancer Paternal Aunt         gallbladder cancer abstract         Social Hx:  Social History     Socioeconomic History    Marital status: Single     Spouse name: Not on file    Number of children: Not on file    Years of education: Not on file    Highest education level: Not on file   Occupational History    Not on file   Tobacco Use    Smoking status: Never    Smokeless tobacco: Never   Substance and Sexual Activity    Alcohol use: Yes     Comment: RARE    Drug use: No    Sexual activity: Not on file   Other Topics Concern     Service Not Asked    Blood Transfusions Not Asked    Caffeine Concern Not Asked    Occupational Exposure Not Asked    Hobby Hazards Not Asked    Sleep Concern Not Asked    Stress Concern Not Asked    Weight Concern Not Asked    Special Diet Not Asked    Back Care Not Asked    Exercise No    Bike Helmet Not Asked    Seat Belt No    Self-Exams No   Social History Narrative    Womens Health Kit given.         Social Determinants of Health     Financial Resource Strain: Not on file   Food Insecurity: Not on file    Transportation Needs: Not on file   Physical Activity: Not on file   Stress: Not on file   Social Connections: Not on file   Interpersonal Safety: Not on file   Housing Stability: Not on file          MEDICATIONS:  has a current medication list which includes the following prescription(s): albuterol, soothe xp, atenolol, benzoyl peroxide, blood glucose, blood glucose, blood glucose monitoring, diclofenac, dupixent, epinephrine, fexofenadine, fidaxomicin, fluticasone-vilanterol, insulin glargine, insulin lispro, humulin n kwikpen, onetouch delica plus [id number], lidocaine, losartan, methylprednisolone, methylprednisolone, mometasone, dulera, montelukast sodium, multivitamin w/minerals, norethindrone, nystatin, pataday, omeprazole, oxycodone ir, rifaximin, rosuvastatin, spironolactone-hctz, stelara, and actemra, and the following Facility-Administered Medications: lidocaine (pf), lidocaine (pf), triamcinolone, and triamcinolone.    ROS:     ROS: 10 point ROS neg other than the symptoms noted above in the HPI.    GENERAL: fatigue, wt stable; denies fevers, chills, night sweats.   HEENT: no dysphagia, odonophagia, diplopia, neck pain  THYROID:  no apparent hyper or hypothyroid symptoms  CV: no chest pain, pressure, palpitations  LUNGS: no SOB, SERNA, cough, wheezing   ABDOMEN: no diarrhea, constipation, abdominal pain  EXTREMITIES: no rashes, ulcers, edema  NEUROLOGY: no headaches, denies changes in vision, tingling, extremitiy numbness   MSK: polyarthralgias- pains at neck, hands, hips, ankles; generalized muscle weakness  SKIN: no rashes or lesions  : no menses with daily Minipill   PSYCH:  stable mood, no significant anxiety or depression  ENDOCRINE: no heat or cold intolerance    Physical Exam (visual exam)  VS:  no vital signs taken for video visit  CONSTITUTIONAL: healthy, alert and NAD, well dressed, answering questions appropriately  ENT: no nose swelling or nasal discharge, mouth redness or gum  changes.  EYES: eyes grossly normal to inspection, conjunctivae and sclerae normal, no exophthalmos or proptosis  THYROID:  no apparent nodules or goiter  LUNGS: no audible wheeze, cough or visible cyanosis, no visible retractions or increased work of breathing  ABDOMEN: abdomen not evaluated  EXTREMITIES: no hand tremors, limited exam  NEUROLOGY: CN grossly intact, mentation intact and speech normal   SKIN:  no apparent skin lesions, rash, or edema with visualized skin appearance  PSYCH: mentation appears normal, affect normal/bright, judgement and insight intact,   normal speech and appearance well groomed      LABS:    All pertinent notes, labs, and images personally reviewed by me.     A/P:  No diagnosis found.    Comments:  Reviewed the complicated health history and general endocrine issues.  Difficult to understand the endocrine concern by her [facility] provider from 3-yrs ago  Reported glycemic control and overall T2DM management going well    Plan:  Reviewed the adrenal gland, general anatomy and hormone secretion  We discussed adrenal lab testing   Reviewed the hyperglycemia effects of steroid medication use  Discussed general issues with the diabetes diagnosis and management    Recommend:  Would be helpful if Dr. Pena would send a summary message with hormone testing concerns  Plan repeat lab testing, when not on steroid medication for at least 1-week  Arrange nonfasting lab testing soon   Testing at [facility] or other clinic   Lab orders placed  Monitor for symptom changes  No adrenal gland imaging needed at this time        Labs 2/27/24  Consider future steroid med treatment      Reasonable to continue the Humalog and Lantus insulin treatment plan  Use pre and post IV steroid NPH dosing, as previously  Reviewed using the Humalog mealtime base dose and also sscale if hyperglycemia  Would benefit from use of a CGM sensor such as Freestyle Libre2 or Dexcom  Repeat hgbA1c lab test  every 3-4 months  Keep focus on diet, exercise, weight management.  Advise having fasting lipid panel testing and dilated eye examination, at least annually  She will arrange follow-up with Dr. Juares and the [place] diabetes educators for diabetes followup    Addressed patient questions today    There are no Patient Instructions on file for this visit.    Future labs ordered today: No orders of the defined types were placed in this encounter.    Radiology/Consults ordered today: None    Total time spent on day of encounter:  ***    Follow-up:  ***    MCKAY Leroy MD, MS  Endocrinology  [facility]    CC: Amol Juares and Care Team        Virtual Visit Details    Type of service:  Video Visit   Video Start Time: 2:15 PM  Video End Time: 2:28 PM    Originating Location (pt. Location): Home  Distant Location (provider location):  Off-site  Platform used for Video Visit: [employer]        Recent issues:  Adrenal follow-up evaluation  Previous low cortisol levels, presumed due to concurrent or prior dosing of steroid medication  Had UTI and Abx in 12/2023, then sinusitis in 12/2023-1/2023 then Abx + steroids, also influenza A   Took Medrol medication for ~10 days, completed ~2/7/24   Feeling better recently        Previous health history includes sero negative rheumatoid arthritis, asthma, T2DM  She has seen health care providers including [facility], [facility], [facility], and [facility]  Had also seen Dr. Alan Saleh/[facility] rheumatology  Med evaluations with Dr. Susan Pena/[facility] OBGYN, evaluations focused on management of recurrent cystitis, vaginitis  Treatment with antifungal and antibiotic medications  Patient recalls having lab tests done per Dr. Pena ~2020 showing abnormal adrenal results   Advised to see an endocrinologist for further evaluation   Unfortunately, details of these tests, results, concerns not available today    Additional medical evaluations with  other providers, including Dr. No Fitzgerald/ [facility], Dr. Oswaldo Lemus/[facility]  RA treatment with periodic Actemra infusions (w/ methylprednisolone infusion)  Previous [facility] labs include:   Latest Reference Range & Units 11/29/23 12:36 12/20/23 14:28   Cortisol Serum ug/dL 0.5 2.6      Latest Reference Range & Units 11/29/23 12:36 12/20/23 14:28   Adrenal Corticotropin <47 pg/mL <10 <10      Latest Reference Range & Units 11/29/23 12:45   Cortisol Free Urine Random ug/L <1.00     ~2/7/24. Last Medtrol steroid course completed      Lives in [place], [place]  Sees Dr. Amol [facility]ares/[facility] for general medicine evaluations.  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     Obesity      Pyelonephritis, unspecified 1992    abstract     Rheumatoid arteritis (H)      Seronegative inflammatory arthritis 06/17/2022     Type 2 diabetes mellitus (H)      Unspecified symptom associated with female genital organs 05/07/1999    chronic pelvic pain abstract     Past Surgical History:   Procedure Laterality Date     CHOLECYSTECTOMY       COLON SURGERY      Left hemicolectomy for diverticulosis     CYSTOSCOPY,+URETEROSCOPY      abstract     ZZHC EXCIS/CURET BENIGN ELBOW LESN  10/26/1987    (R) humerus abstract       Family Hx:  Family History   Problem Relation Age of Onset     Hypertension Father         abstract     Cancer Paternal Aunt         gallbladder  cancer abstract         Social Hx:  Social History     Socioeconomic History     Marital status: Single     Spouse name: Not on file     Number of children: Not on file     Years of education: Not on file     Highest education level: Not on file   Occupational History     Not on file   Tobacco Use     Smoking status: Never     Smokeless tobacco: Never   Substance and Sexual Activity     Alcohol use: Yes     Comment: RARE     Drug use: No     Sexual activity: Not on file   Other Topics Concern      Service Not Asked     Blood Transfusions Not Asked     Caffeine Concern Not Asked     Occupational Exposure Not Asked     Hobby Hazards Not Asked     Sleep Concern Not Asked     Stress Concern Not Asked     Weight Concern Not Asked     Special Diet Not Asked     Back Care Not Asked     Exercise No     Bike Helmet Not Asked     Seat Belt No     Self-Exams No   Social History Narrative    Womens Health Kit given.         Social Determinants of Health     Financial Resource Strain: Not on file   Food Insecurity: Not on file   Transportation Needs: Not on file   Physical Activity: Not on file   Stress: Not on file   Social Connections: Not on file   Interpersonal Safety: Not on file   Housing Stability: Not on file          MEDICATIONS:  has a current medication list which includes the following prescription(s): albuterol, soothe xp, atenolol, benzoyl peroxide, blood glucose, blood glucose, blood glucose monitoring, diclofenac, dupixent, epinephrine, fexofenadine, fidaxomicin, fluticasone-vilanterol, insulin glargine, insulin lispro, humulin n kwikpen, onetouch delica plus [id number], lidocaine, losartan, methylprednisolone, methylprednisolone, mometasone, dulera, montelukast sodium, multivitamin w/minerals, norethindrone, nystatin, pataday, omeprazole, oxycodone ir, rifaximin, rosuvastatin, spironolactone-hctz, stelara, and actemra, and the following Facility-Administered Medications: lidocaine (pf), lidocaine (pf),  triamcinolone, and triamcinolone.    ROS:     ROS: 10 point ROS neg other than the symptoms noted above in the HPI.    GENERAL: some fatigue, wt stable; denies fevers, chills, night sweats.   HEENT: no dysphagia, odonophagia, diplopia, neck pain  THYROID:  no apparent hyper or hypothyroid symptoms  CV: no chest pain, pressure, palpitations  LUNGS: no SOB, SERNA, cough, wheezing   ABDOMEN: no diarrhea, constipation, abdominal pain  EXTREMITIES: no rashes, ulcers, edema  NEUROLOGY: no headaches, denies changes in vision, tingling, extremitiy numbness   MSK: polyarthralgias- pains at neck, hands, hips, ankles; generalized muscle weakness  SKIN: no rashes or lesions  : no menses with daily Minipill   PSYCH:  stable mood, no significant anxiety or depression  ENDOCRINE: no heat or cold intolerance    Physical Exam (visual exam)  VS:  no vital signs taken for video visit  CONSTITUTIONAL: healthy, alert and NAD, well dressed, answering questions appropriately  ENT: no nose swelling or nasal discharge, mouth redness or gum changes.  EYES: eyes grossly normal to inspection, conjunctivae and sclerae normal, no exophthalmos or proptosis  THYROID:  no apparent nodules or goiter  LUNGS: no audible wheeze, cough or visible cyanosis, no visible retractions or increased work of breathing  ABDOMEN: abdomen not evaluated  EXTREMITIES: no hand tremors, limited exam  NEUROLOGY: CN grossly intact, mentation intact and speech normal   SKIN:  no apparent skin lesions, rash, or edema with visualized skin appearance  PSYCH: mentation appears normal, affect normal/bright, judgement and insight intact,   normal speech and appearance well groomed      LABS:    All pertinent notes, labs, and images personally reviewed by me.     A/P:  Encounter Diagnoses   Name Primary?     Low serum cortisol level Yes     Adverse effect of corticosteroids, subsequent encounter        Comments:  Reviewed the complicated health history and general endocrine  issues.  Difficult to understand the endocrine concern by her [facility] provider from 3-yrs ago  Low cortisol levels likely due to transient vs chronic suppression of pituitary-adrenal hormone axis    Plan:  Reviewed the adrenal gland, general anatomy and hormone secretion  We discussed adrenal lab testing     Recommend:  Advise repeat adrenal testing   Plan [facility] lab testing prior to her next infusion dose 2/27/24   Lab orders placed  If suppressed cortisol levels (without additional steroid med use)   Advise trial of low dose Prednisone vs hydrocortisone medication daily   Would need gradual wean as pituitary-adrenal axis recovers  Monitor for symptom changes  No adrenal gland imaging needed at this time  Keep focus on diet, exercise (as tolerated) and weight management  Consider dietician evaluation  Diabetes follow-up with Dr. Juares and the [place] diabetes educators    Addressed patient questions today    There are no Patient Instructions on file for this visit.    Future labs ordered today:   Orders Placed This Encounter   Procedures     Adrenal corticotropin     Cortisol     Radiology/Consults ordered today: None    Total time spent on day of encounter:  32 min    Follow-up:  CONCEPCION Leroy MD, MS  Endocrinology  [facility]    CC: Amol Juares and Care Team          Again, thank you for allowing me to participate in the care of your patient.        Sincerely,        Mainor Leroy MD

## 2024-02-20 NOTE — PROGRESS NOTES
Virtual Visit Details    Type of service:  Video Visit   Video Start Time: 2:15 PM  Video End Time: 2:28 PM    Originating Location (pt. Location): Home  Distant Location (provider location):  Off-site  Platform used for Video Visit: Howard        Recent issues:  Adrenal follow-up evaluation  Previous low cortisol levels, presumed due to concurrent or prior dosing of steroid medication  Had UTI and Abx in 12/2023, then sinusitis in 12/2023-1/2023 then Abx + steroids, also influenza A   Took Medrol medication for ~10 days, completed ~2/7/24   Feeling better recently        Previous health history includes sero negative rheumatoid arthritis, asthma, T2DM  She has seen health care providers including [facility], [facility], [facility], and [facility]  Had also seen Dr. Alan Saleh/[facility] rheumatology  Med evaluations with Dr. Susan Pena/[facility] OBGYN, evaluations focused on management of recurrent cystitis, vaginitis  Treatment with antifungal and antibiotic medications  Patient recalls having lab tests done per Dr. Pena ~2020 showing abnormal adrenal results   Advised to see an endocrinologist for further evaluation   Unfortunately, details of these tests, results, concerns not available today    Additional medical evaluations with other providers, including Dr. No Fitzgerald/ [facility], Dr. Oswaldo Lemus/[facility] Rheumatology  RA treatment with periodic Actemra infusions (w/ methylprednisolone infusion)  Previous [facility] labs include:   Latest Reference Range & Units 11/29/23 12:36 12/20/23 14:28   Cortisol Serum ug/dL 0.5 2.6      Latest Reference Range & Units 11/29/23 12:36 12/20/23 14:28   Adrenal Corticotropin <47 pg/mL <10 <10      Latest Reference Range & Units 11/29/23 12:45   Cortisol Free Urine Random ug/L <1.00     ~2/7/24. Last Medtrol steroid course completed      Lives in [place], [place]  Sees Dr. Amol Juares/ [facility] for general medicine evaluations.  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    Obesity     Pyelonephritis, unspecified 1992    abstract    Rheumatoid arteritis (H)     Seronegative inflammatory arthritis 06/17/2022    Type 2 diabetes mellitus (H)     Unspecified symptom associated with female genital organs 05/07/1999    chronic pelvic pain abstract     Past Surgical History:   Procedure Laterality Date    CHOLECYSTECTOMY      COLON SURGERY      Left hemicolectomy for diverticulosis    CYSTOSCOPY,+URETEROSCOPY      abstract    ZZHC EXCIS/CURET BENIGN ELBOW LESN  10/26/1987    (R) humerus abstract       Family Hx:  Family History   Problem Relation Age of Onset    Hypertension Father         abstract    Cancer Paternal Aunt         gallbladder cancer abstract         Social Hx:  Social History     Socioeconomic History    Marital status: Single     Spouse name: Not on file    Number of children: Not on file    Years of education: Not on file    Highest education level: Not on file   Occupational History    Not on file   Tobacco Use    Smoking status: Never    Smokeless tobacco: Never   Substance and Sexual Activity    Alcohol use: Yes     Comment: RARE    Drug use: No    Sexual activity: Not on file   Other Topics Concern     Service Not Asked    Blood Transfusions Not Asked    Caffeine Concern Not Asked    Occupational Exposure Not Asked    Hobby Hazards Not Asked    Sleep Concern Not Asked    Stress Concern  Not Asked    Weight Concern Not Asked    Special Diet Not Asked    Back Care Not Asked    Exercise No    Bike Helmet Not Asked    Seat Belt No    Self-Exams No   Social History Narrative    Womens Health Kit given.         Social Determinants of Health     Financial Resource Strain: Not on file   Food Insecurity: Not on file   Transportation Needs: Not on file   Physical Activity: Not on file   Stress: Not on file   Social Connections: Not on file   Interpersonal Safety: Not on file   Housing Stability: Not on file          MEDICATIONS:  has a current medication list which includes the following prescription(s): albuterol, soothe xp, atenolol, benzoyl peroxide, blood glucose, blood glucose, blood glucose monitoring, diclofenac, dupixent, epinephrine, fexofenadine, fidaxomicin, fluticasone-vilanterol, insulin glargine, insulin lispro, humulin n kwikpen, onetouch delica plus [id number], lidocaine, losartan, methylprednisolone, methylprednisolone, mometasone, dulera, montelukast sodium, multivitamin w/minerals, norethindrone, nystatin, pataday, omeprazole, oxycodone ir, rifaximin, rosuvastatin, spironolactone-hctz, stelara, and actemra, and the following Facility-Administered Medications: lidocaine (pf), lidocaine (pf), triamcinolone, and triamcinolone.    ROS:     ROS: 10 point ROS neg other than the symptoms noted above in the HPI.    GENERAL: some fatigue, wt stable; denies fevers, chills, night sweats.   HEENT: no dysphagia, odonophagia, diplopia, neck pain  THYROID:  no apparent hyper or hypothyroid symptoms  CV: no chest pain, pressure, palpitations  LUNGS: no SOB, SERNA, cough, wheezing   ABDOMEN: no diarrhea, constipation, abdominal pain  EXTREMITIES: no rashes, ulcers, edema  NEUROLOGY: no headaches, denies changes in vision, tingling, extremitiy numbness   MSK: polyarthralgias- pains at neck, hands, hips, ankles; generalized muscle weakness  SKIN: no rashes or lesions  : no menses with daily [place]   PSYCH:   stable mood, no significant anxiety or depression  ENDOCRINE: no heat or cold intolerance    Physical Exam (visual exam)  VS:  no vital signs taken for video visit  CONSTITUTIONAL: healthy, alert and NAD, well dressed, answering questions appropriately  ENT: no nose swelling or nasal discharge, mouth redness or gum changes.  EYES: eyes grossly normal to inspection, conjunctivae and sclerae normal, no exophthalmos or proptosis  THYROID:  no apparent nodules or goiter  LUNGS: no audible wheeze, cough or visible cyanosis, no visible retractions or increased work of breathing  ABDOMEN: abdomen not evaluated  EXTREMITIES: no hand tremors, limited exam  NEUROLOGY: CN grossly intact, mentation intact and speech normal   SKIN:  no apparent skin lesions, rash, or edema with visualized skin appearance  PSYCH: mentation appears normal, affect normal/bright, judgement and insight intact,   normal speech and appearance well groomed      LABS:    All pertinent notes, labs, and images personally reviewed by me.     A/P:  Encounter Diagnoses   Name Primary?    Low serum cortisol level Yes    Adverse effect of corticosteroids, subsequent encounter        Comments:  Reviewed the complicated health history and general endocrine issues.  Difficult to understand the endocrine concern by her [facility] provider from 3-yrs ago  Low cortisol levels likely due to transient vs chronic suppression of pituitary-adrenal hormone axis    Plan:  Reviewed the adrenal gland, general anatomy and hormone secretion  We discussed adrenal lab testing     Recommend:  Advise repeat adrenal testing   Plan [facility] lab testing prior to her next infusion dose 2/27/24   Lab orders placed  If suppressed cortisol levels (without additional steroid med use)   Advise trial of low dose Prednisone vs hydrocortisone medication daily   Would need gradual wean as pituitary-adrenal axis recovers  Monitor for symptom changes  No adrenal gland imaging needed at this  time  Keep focus on diet, exercise (as tolerated) and weight management  Consider dietician evaluation  Diabetes follow-up with Dr. Juares and the [place] diabetes educators    Addressed patient questions today    There are no Patient Instructions on file for this visit.    Future labs ordered today:   Orders Placed This Encounter   Procedures    Adrenal corticotropin    Cortisol     Radiology/Consults ordered today: None    Total time spent on day of encounter:  32 min    Follow-up:  CONCEPCION Leroy MD, MS  Endocrinology  [facility]    CC: Amol Juares and Care Team

## 2024-02-21 DIAGNOSIS — M79.644 BILATERAL THUMB PAIN: Primary | ICD-10-CM

## 2024-02-21 DIAGNOSIS — M79.645 BILATERAL THUMB PAIN: Primary | ICD-10-CM

## 2024-02-26 ENCOUNTER — TELEPHONE (OUTPATIENT)
Dept: INFUSION THERAPY | Facility: CLINIC | Age: 49
End: 2024-02-26
Payer: MEDICARE

## 2024-02-26 NOTE — PROGRESS NOTES
Pt scheduled to come into the infusion center for an Actemra infusion tomorrow.    It was reported to me that pt is consistently showing up late for her infusion appts which puts the clinic behind.   Appts reviewed to October 2023.  It appears that pt is between 15-25 min late x 4 appts and 9 min late x 1 appt.      Writer called pt and discussed the importance of being on time for her infusions and the implications for the clinic when she is late.    Pt verbalized understanding and said that she would be on time going forward for her infusions.

## 2024-02-27 ENCOUNTER — INFUSION THERAPY VISIT (OUTPATIENT)
Dept: INFUSION THERAPY | Facility: CLINIC | Age: 49
End: 2024-02-27
Attending: INTERNAL MEDICINE
Payer: MEDICARE

## 2024-02-27 VITALS
OXYGEN SATURATION: 99 % | WEIGHT: 210 LBS | HEART RATE: 68 BPM | BODY MASS INDEX: 33.89 KG/M2 | DIASTOLIC BLOOD PRESSURE: 69 MMHG | SYSTOLIC BLOOD PRESSURE: 117 MMHG

## 2024-02-27 DIAGNOSIS — M13.80 SERONEGATIVE INFLAMMATORY ARTHRITIS: Primary | ICD-10-CM

## 2024-02-27 DIAGNOSIS — R79.89 LOW SERUM CORTISOL LEVEL: ICD-10-CM

## 2024-02-27 DIAGNOSIS — T38.0X5D ADVERSE EFFECT OF CORTICOSTEROIDS, SUBSEQUENT ENCOUNTER: ICD-10-CM

## 2024-02-27 LAB
ANION GAP SERPL CALCULATED.3IONS-SCNC: 14 MMOL/L (ref 7–15)
BUN SERPL-MCNC: 13.2 MG/DL (ref 6–20)
CALCIUM SERPL-MCNC: 9.1 MG/DL (ref 8.6–10)
CHLORIDE SERPL-SCNC: 104 MMOL/L (ref 98–107)
CORTIS SERPL-MCNC: 0.5 UG/DL
CREAT SERPL-MCNC: 0.83 MG/DL (ref 0.51–0.95)
DEPRECATED HCO3 PLAS-SCNC: 20 MMOL/L (ref 22–29)
EGFRCR SERPLBLD CKD-EPI 2021: 87 ML/MIN/1.73M2
GLUCOSE SERPL-MCNC: 226 MG/DL (ref 70–99)
POTASSIUM SERPL-SCNC: 3.6 MMOL/L (ref 3.4–5.3)
SODIUM SERPL-SCNC: 138 MMOL/L (ref 135–145)

## 2024-02-27 PROCEDURE — 250N000011 HC RX IP 250 OP 636: Performed by: INTERNAL MEDICINE

## 2024-02-27 PROCEDURE — 80048 BASIC METABOLIC PNL TOTAL CA: CPT

## 2024-02-27 PROCEDURE — 250N000013 HC RX MED GY IP 250 OP 250 PS 637: Performed by: INTERNAL MEDICINE

## 2024-02-27 PROCEDURE — 258N000003 HC RX IP 258 OP 636: Performed by: INTERNAL MEDICINE

## 2024-02-27 PROCEDURE — 82024 ASSAY OF ACTH: CPT

## 2024-02-27 PROCEDURE — 82533 TOTAL CORTISOL: CPT

## 2024-02-27 PROCEDURE — 36415 COLL VENOUS BLD VENIPUNCTURE: CPT

## 2024-02-27 PROCEDURE — 96375 TX/PRO/DX INJ NEW DRUG ADDON: CPT

## 2024-02-27 PROCEDURE — 96365 THER/PROPH/DIAG IV INF INIT: CPT

## 2024-02-27 RX ORDER — METHYLPREDNISOLONE SODIUM SUCCINATE 125 MG/2ML
81.25 INJECTION, POWDER, LYOPHILIZED, FOR SOLUTION INTRAMUSCULAR; INTRAVENOUS ONCE
Status: COMPLETED | OUTPATIENT
Start: 2024-02-27 | End: 2024-02-27

## 2024-02-27 RX ORDER — DIPHENHYDRAMINE HYDROCHLORIDE 50 MG/ML
50 INJECTION INTRAMUSCULAR; INTRAVENOUS
Status: CANCELLED
Start: 2024-03-25

## 2024-02-27 RX ORDER — METHYLPREDNISOLONE SODIUM SUCCINATE 125 MG/2ML
125 INJECTION, POWDER, LYOPHILIZED, FOR SOLUTION INTRAMUSCULAR; INTRAVENOUS
Status: CANCELLED
Start: 2024-03-25

## 2024-02-27 RX ORDER — ALBUTEROL SULFATE 90 UG/1
1-2 AEROSOL, METERED RESPIRATORY (INHALATION)
Status: CANCELLED
Start: 2024-03-25

## 2024-02-27 RX ORDER — ACETAMINOPHEN 325 MG/1
650 TABLET ORAL ONCE
Status: CANCELLED | OUTPATIENT
Start: 2024-03-25

## 2024-02-27 RX ORDER — HEPARIN SODIUM,PORCINE 10 UNIT/ML
5-20 VIAL (ML) INTRAVENOUS DAILY PRN
Status: CANCELLED | OUTPATIENT
Start: 2024-03-25

## 2024-02-27 RX ORDER — EPINEPHRINE 1 MG/ML
0.3 INJECTION, SOLUTION INTRAMUSCULAR; SUBCUTANEOUS EVERY 5 MIN PRN
Status: CANCELLED | OUTPATIENT
Start: 2024-03-25

## 2024-02-27 RX ORDER — ALBUTEROL SULFATE 0.83 MG/ML
2.5 SOLUTION RESPIRATORY (INHALATION)
Status: CANCELLED | OUTPATIENT
Start: 2024-03-25

## 2024-02-27 RX ORDER — MEPERIDINE HYDROCHLORIDE 25 MG/ML
25 INJECTION INTRAMUSCULAR; INTRAVENOUS; SUBCUTANEOUS EVERY 30 MIN PRN
Status: CANCELLED | OUTPATIENT
Start: 2024-03-25

## 2024-02-27 RX ORDER — HEPARIN SODIUM (PORCINE) LOCK FLUSH IV SOLN 100 UNIT/ML 100 UNIT/ML
5 SOLUTION INTRAVENOUS
Status: CANCELLED | OUTPATIENT
Start: 2024-03-25

## 2024-02-27 RX ORDER — ACETAMINOPHEN 325 MG/1
650 TABLET ORAL ONCE
Status: COMPLETED | OUTPATIENT
Start: 2024-02-27 | End: 2024-02-27

## 2024-02-27 RX ORDER — METHYLPREDNISOLONE SODIUM SUCCINATE 125 MG/2ML
81.25 INJECTION, POWDER, LYOPHILIZED, FOR SOLUTION INTRAMUSCULAR; INTRAVENOUS ONCE
Status: CANCELLED | OUTPATIENT
Start: 2024-03-25

## 2024-02-27 RX ADMIN — TOCILIZUMAB 800 MG: 20 INJECTION, SOLUTION, CONCENTRATE INTRAVENOUS at 12:17

## 2024-02-27 RX ADMIN — SODIUM CHLORIDE 250 ML: 9 INJECTION, SOLUTION INTRAVENOUS at 14:37

## 2024-02-27 RX ADMIN — ACETAMINOPHEN 650 MG: 325 TABLET ORAL at 11:16

## 2024-02-27 RX ADMIN — METHYLPREDNISOLONE SODIUM SUCCINATE 81.25 MG: 125 INJECTION, POWDER, FOR SOLUTION INTRAMUSCULAR; INTRAVENOUS at 12:04

## 2024-02-27 NOTE — PROGRESS NOTES
Infusion Nursing Note:  Laxmi Ya presents today for actemra.    Patient seen by provider today: No   present during visit today: Not Applicable.    Note: Patient self administers home insulin 30 minutes prior to solu-medrol pre-med.  250ml NS bolus given post actemra per orders     Intravenous Access:  [facility]ipheral [facility] by [facility]  Cortisol and Adrenal Corticotropin, BMP drawn per open orders.     Treatment Conditions:  Biological Infusion Checklist:  ~~~ NOTE: If the patient answers yes to any of the questions below, hold the infusion and contact ordering provider or on-call provider.    Have you recently had an elevated temperature, fever, chills, productive cough, coughing for 3 weeks or longer or hemoptysis,  abnormal vital signs, night sweats,  chest pain or have you noticed a decrease in your appetite, unexplained weight loss or fatigue? No  Do you have any open wounds or new incisions? No  Do you have any upcoming hospitalizations or surgeries? Does not include esophagogastroduodenoscopy, colonoscopy, endoscopic retrograde cholangiopancreatography (ERCP), endoscopic ultrasound (EUS), dental procedures or joint aspiration/steroid injections No  Do you currently have any signs of illness or infection or are you on any antibiotics? No  Have you had any new, sudden or worsening abdominal pain? No  Have you or anyone in your household received a live vaccination in the past 4 weeks? Please note: No live vaccines while on biologic/chemotherapy until 6 months after the last treatment. Patient can receive the flu vaccine (shot only), pneumovax and the Covid vaccine. It is optimal for the patient to get these vaccines mid cycle, but they can be given at any time as long as it is not on the day of the infusion. No  Have you recently been diagnosed with any new nervous system diseases (ie. Multiple sclerosis, Guillain [place], seizures, neurological changes) or cancer diagnosis? Are you on any form of  radiation or chemotherapy? No  Are you pregnant or breast feeding or do you have plans of pregnancy in the future? No  Have you been having any signs of worsening depression or suicidal ideations?  (benlysta only) N/A  Have there been any other new onset medical symptoms? No  Have you had any new blood clots? (IVIG only) N/A    Post Infusion Assessment:  Patient tolerated infusion without incident.  Blood return noted pre and post infusion.  Site patent and intact, free from redness, edema or discomfort.  No evidence of extravasations.  Access discontinued per protocol.     POST-INFUSION OF BIOLOGICAL MEDICATION:     Reviewed with patient.  Given biologic medication or medication hand-out. Inform patient if any fever, chills or signs of infection, new symptoms, abdominal pain, heart palpitations, shortness of breath, reaction, weakness, neurological changes, seek medical attention immediately and should not receive infusions. No live virus vaccines prior to or during treatment or up to 6 months post infusion. If the patient has an upcoming procedure or surgery, this should be discussed with the rheumatologist and surgeon or provider.    Discharge Plan:   Discharge instructions reviewed with: Patient.  Patient and/or family verbalized understanding of discharge instructions and all questions answered.  AVS to patient via INNFOCUS.  Patient will return   Future Appointments   Date Time Provider Department Center   3/26/2024  1:00 PM [facility] INFUSION NURSE [facility]      for next appointment.   Patient discharged in stable condition accompanied by: self.  Departure Mode: Ambulatory.    Administrations This Visit       acetaminophen (TYLENOL) tablet 650 mg       Admin Date  02/27/2024 Action  $Given Dose  650 mg Route  Oral Documented By  Rashmi Domingo, RN              methylPREDNISolone sodium succinate (solu-MEDROL) injection 81.25 mg       Admin Date  02/27/2024 Action  $Given Dose  81.25 mg Route  Intravenous  Documented By  Rashmi Domingo RN              sodium chloride 0.9% BOLUS 250 mL       Admin Date  02/27/2024 Action  $New Bag Dose  250 mL Route  Intravenous Documented By  Rashmi Domingo RN              tocilizumab (ACTEMRA) 800 mg in sodium chloride 0.9 % 150 mL infusion       Admin Date  02/27/2024 Action  $New Bag Dose  800 mg Rate  150 mL/hr Route  Intravenous Documented By  Rashmi Domingo RN                  /81 (BP Location: Left arm, Patient Position: Semi-Stephens's, Cuff Size: Adult Small)   Pulse 81   Wt 95.3 kg (210 lb)   SpO2 99%   BMI 33.89 kg/m      Rashmi Domingo RN on 2/27/2024 at 11:54 AM

## 2024-02-27 NOTE — PATIENT INSTRUCTIONS
Dear Laxmi Ya    Thank you for choosing [facility] ([facility]) for your infusion.  The following information is a summary of our appointment as well as important reminders.        If you are a transplant patient and require transplant education, please click on this link: [email or web address].    We look forward in seeing you on your next appointment here at [facility] ([facility]).  Please don t hesitate to call us at [phone] to reschedule any of your appointments or to speak with one of the [facility] registered nurses.  It was a pleasure taking care of you today.    Sincerely,    [facility]  [address] [place] [address]  [id number]  Phone:  [phone]

## 2024-02-28 ENCOUNTER — THERAPY VISIT (OUTPATIENT)
Dept: PHYSICAL THERAPY | Facility: CLINIC | Age: 49
End: 2024-02-28
Payer: MEDICARE

## 2024-02-28 DIAGNOSIS — M54.50 CHRONIC BILATERAL LOW BACK PAIN WITHOUT SCIATICA: ICD-10-CM

## 2024-02-28 DIAGNOSIS — R10.2 PELVIC PAIN IN FEMALE: Primary | ICD-10-CM

## 2024-02-28 DIAGNOSIS — M54.2 NECK PAIN: ICD-10-CM

## 2024-02-28 DIAGNOSIS — G89.29 CHRONIC BILATERAL LOW BACK PAIN WITHOUT SCIATICA: ICD-10-CM

## 2024-02-28 LAB — ACTH PLAS-MCNC: <10 PG/ML

## 2024-02-28 PROCEDURE — 97140 MANUAL THERAPY 1/> REGIONS: CPT | Mod: GP | Performed by: PHYSICAL THERAPIST

## 2024-02-28 PROCEDURE — 97110 THERAPEUTIC EXERCISES: CPT | Mod: GP | Performed by: PHYSICAL THERAPIST

## 2024-02-28 PROCEDURE — 97012 MECHANICAL TRACTION THERAPY: CPT | Mod: GP | Performed by: PHYSICAL THERAPIST

## 2024-02-29 ENCOUNTER — OFFICE VISIT (OUTPATIENT)
Dept: ORTHOPEDICS | Facility: CLINIC | Age: 49
End: 2024-02-29
Payer: MEDICARE

## 2024-02-29 DIAGNOSIS — G56.03 BILATERAL CARPAL TUNNEL SYNDROME: Primary | ICD-10-CM

## 2024-02-29 PROCEDURE — 99207 PR NO BILLABLE SERVICE THIS VISIT: CPT | Performed by: ORTHOPAEDIC SURGERY

## 2024-02-29 PROCEDURE — 20526 THER INJECTION CARP TUNNEL: CPT | Mod: 50 | Performed by: ORTHOPAEDIC SURGERY

## 2024-02-29 RX ORDER — LIDOCAINE HYDROCHLORIDE 10 MG/ML
2 INJECTION, SOLUTION EPIDURAL; INFILTRATION; INTRACAUDAL; PERINEURAL
Status: DISCONTINUED | OUTPATIENT
Start: 2024-02-29 | End: 2024-07-01

## 2024-02-29 RX ORDER — TRIAMCINOLONE ACETONIDE 40 MG/ML
20 INJECTION, SUSPENSION INTRA-ARTICULAR; INTRAMUSCULAR
Status: DISCONTINUED | OUTPATIENT
Start: 2024-02-29 | End: 2024-07-01

## 2024-02-29 RX ADMIN — LIDOCAINE HYDROCHLORIDE 2 ML: 10 INJECTION, SOLUTION EPIDURAL; INFILTRATION; INTRACAUDAL; PERINEURAL at 11:22

## 2024-02-29 RX ADMIN — TRIAMCINOLONE ACETONIDE 20 MG: 40 INJECTION, SUSPENSION INTRA-ARTICULAR; INTRAMUSCULAR at 11:22

## 2024-02-29 NOTE — NURSING NOTE
Reason For Visit:   Chief Complaint   Patient presents with    RECHECK     Bilateral carpal tunnel repeat injections       Primary MD: Amol Juares  Ref. MD: Katja    Age: 48 year old    ?  No      There were no vitals taken for this visit.      Pain Assessment  Patient Currently in Pain: Yes  0-10 Pain Scale: 10  Primary Pain Location: Wrist (Left > Right)  Pain Descriptors: Constant, Aching, Dull, Tingling, Numbness, Sharp, Intermittent  Alleviating Factors: [place] steroid    Hand Dominance Evaluation  Hand Dominance: Right      force  R hand pincher force: 4.99 kg (11 lb)  R hand  level 2 force: 8.165 kg (18 lb)  L hand pincher force: 4.99 kg (11 lb)  L hand  level  2 force: 9.072 kg (20 lb)    QuickDASH Assessment      2/13/2024     1:59 PM   QuickDASH Main   1. Open a tight or new jar Unable   2. Do heavy household chores (e.g., wash walls, floors) Unable   3. Carry a shopping bag or briefcase Unable   4. Wash your back Severe difficulty   5. Use a knife to cut food Unable   6. Recreational activities in which you take some force or impact through your arm, shoulder or hand (e.g., golf, hammering, tennis, etc.) Unable   7. During the past week, to what extent has your arm, shoulder or hand problem interfered with your normal social activities with family, friends, neighbours or groups Extremely   8. During the past week, were you limited in your work or other regular daily activities as a result of your arm, shoulder or hand problem Unable   9. Arm, shoulder or hand pain Extreme   10.Tingling (pins and needles) in your arm,shoulder or hand Extreme   11. During the past week, how much difficulty have you had sleeping because of the pain in your arm, shoulder or hand Severe difficulty   Quickdash Ability Score 95.45          Current Outpatient Medications   Medication Sig Dispense Refill    albuterol (PROAIR HFA/PROVENTIL HFA/VENTOLIN HFA) 108 (90 Base) MCG/ACT inhaler Inhale 2 puffs  into the lungs every 6 hours as needed for shortness of breath, wheezing or cough      Artificial Tear Solution (SOOTHE XP) SOLN as needed      atenolol (TENORMIN) 50 MG tablet Take 50 mg by mouth daily      benzoyl peroxide 5 % external liquid Use daily as directed. Preferred bdrand: Medpura 236 mL 11    blood glucose (NO BRAND SPECIFIED) lancets standard Use to test blood sugar 2 times daily or as directed. 100 lancet. 1    blood glucose (NO BRAND SPECIFIED) test strip Use to test blood sugar 2 times daily or as directed. 100 strip 1    blood glucose monitoring (NO BRAND SPECIFIED) meter device kit Use to test blood sugar 2 times daily or as directed. 1 kit 0    diclofenac (VOLTAREN) 1 % topical gel Apply topically 4 times daily as needed      dupilumab (DUPIXENT) 300 MG/2ML prefilled pen Inject 2 mLs (300 mg) Subcutaneous every 14 days 4 mL 2    EPINEPHrine (ANY BX GENERIC EQUIV) 0.3 MG/0.3ML injection 2-pack Inject 0.3 mg into the muscle as needed      fexofenadine (ALLEGRA) 180 MG tablet Take 1 tablet (180 mg) by mouth daily 90 tablet 2    fidaxomicin (DIFICID) 200 MG tablet Take 200 mg by mouth as needed      fluticasone-vilanterol (BREO ELLIPTA) 200-25 MCG/ACT inhaler Inhale 1 puff into the lungs daily      insulin glargine (LANTUS PEN) 100 UNIT/ML pen Inject 20 Units Subcutaneous daily      insulin lispro (HUMALOG KWIKPEN) 100 UNIT/ML (1 unit dial) KWIKPEN Inject 5 Units Subcutaneous 3 times daily (before meals) Plus 1:25 correction scale if BG >150      insulin NPH (HUMULIN N KWIKPEN) 100 UNIT/ML injection Inject 40 Units Subcutaneous every evening      Lancets (ONETOUCH DELICA PLUS [id number]) MISC CHECK BLOOD GLUCOSE 3 TIMES DAILY, ADJUSTING MEDICATION, DX CODE E 11.9, ON INSULIN      lidocaine (LIDODERM) 5 % patch as needed   1    losartan (COZAAR) 100 MG tablet Take 100 mg by mouth daily      methylPREDNISolone (MEDROL DOSEPAK) 4 MG tablet therapy pack Take per package instructions. Take once a day by  mouth 21 tablet 0    methylPREDNISolone (MEDROL DOSEPAK) 4 MG tablet therapy pack Take per package instructions. Take once a day by mouth 21 tablet 0    mometasone (NASONEX) 50 MCG/ACT nasal spray Spray 2 sprays into both nostrils daily 17 g 11    mometasone-formoterol (DULERA) 200-5 MCG/ACT inhaler Inhale 1 puff into the lungs daily      Montelukast Sodium (SINGULAIR PO) Take 10 mg by mouth At Bedtime       multivitamin w/minerals (THERA-VIT-M) tablet Take 1 tablet by mouth daily      norethindrone (MICRONOR) 0.35 MG tablet Take 0.35 mg by mouth      nystatin (MYCOSTATIN) 558761 UNIT/ML suspension       olopatadine (PATADAY) 0.7 % ophthalmic solution Apply 1 drop to eye daily PRN      OMEPRAZOLE PO Take 40 mg by mouth 2 times daily       oxyCODONE IR (ROXICODONE) 10 MG tablet Take 10 mg by mouth      rifaximin (XIFAXAN) 550 MG TABS tablet Take 200 mg by mouth 3 times daily Take for 10 days, has on had as needed for small bowel bacteria overgrowh      rosuvastatin (CRESTOR) 5 MG tablet Take 2 tablets by mouth daily      spironolactone-HCTZ (ALDACTAZIDE) 25-25 MG tablet Take 1 tablet by mouth daily      STELARA 45 MG/0.5ML SOSY Inject 45 mg Subcutaneous once      tocilizumab (ACTEMRA) 400 MG/20ML Inject 800 mg into the vein every 28 days         Allergies   Allergen Reactions    Polyethylene Glycol Rash    Codeine      Other reaction(s): Gastrointestinal, GI intolerance, Vomiting  Vomiting      Hydrocodone Nausea and Vomiting    Morphine Nausea and Vomiting    Sulfasalazine      Other reaction(s): GI intolerance  Has tried and had nausa.    Tramadol Nausea and Vomiting     Other reaction(s): Gastrointestinal, Other (see comments)  Nausea and vomiting   unknown      Gluten Meal Other (See Comments) and Rash     Other reaction(s): Gastrointestinal, GI intolerance  Dizziness, tired, rash, stomach cramps, thrush, mouth sores  Dizziness, tired, rash, stomach cramps, thrush, mouth sores      Propylene Glycol Dizziness,  Nausea and Vomiting, Nausea and Rash       DUY RESENDEZ, ATC

## 2024-02-29 NOTE — PROGRESS NOTES
Hand / Upper Extremity Injection/Arthrocentesis: bilateral carpal tunnel    Date/Time: 2024 11:22 AM    Performed by: Twila Petit MD  Authorized by: Twila Petit MD    Indications:  Pain  Needle Size:  25 G  Guidance: landmark    Condition: carpal tunnel    Laterality:  Bilateral    Site:  Bilateral carpal tunnel  Medications (Right):  20 mg triamcinolone 40 MG/ML; 2 mL lidocaine (PF) 1 %  Medications (Left):  20 mg triamcinolone 40 MG/ML; 2 mL lidocaine (PF) 1 %  Outcome:  Tolerated well, no immediate complications  Procedure discussed: discussed risks, benefits, and alternatives    Consent Given by:  Patient  Timeout: timeout called immediately prior to procedure    Prep: patient was prepped and draped in usual sterile fashion        [facility] [address] [address]  [phone]  Dept: [phone]  ______________________________________________________________________________    Patient: Laxmi Ya   : 1975   MRN: [id number]   2024    INVASIVE PROCEDURE SAFETY CHECKLIST    Date: 2024   Procedure:Bilateral Carpal Tunnel Steroid injections  Patient Name: Laxmi Ya  MRN: [id number]  YOB: 1975    Action: Complete sections as appropriate. Any discrepancy results in a HARD COPY until resolved.     PRE PROCEDURE:  Patient ID verified with 2 identifiers (name and  or MRN): Yes  Procedure and site verified with patient/designee (when able): Yes  Accurate consent documentation in medical record: Yes  H&P (or appropriate assessment) documented in medical record: Yes  H&P must be up to 20 days prior to procedure and updates within 24 hours of procedure as applicable: Yes  Relevant diagnostic and radiology test results appropriately labeled and displayed as applicable: NA  Procedure site(s) marked with provider initials: NA    TIMEOUT:  Time-Out performed  immediately prior to starting procedure, including verbal and active participation of all team members addressing the following:Yes  * Correct patient identify  * Confirmed that the correct side and site are marked  * An accurate procedure consent form  * Agreement on the procedure to be done  * Correct patient position  * Relevant images and results are properly labeled and appropriately displayed  * The need to administer antibiotics or fluids for irrigation purposes during the procedure as applicable   * Safety precautions based on patient history or medication use    DURING PROCEDURE: Verification of correct person, site, and procedures any time the responsibility for care of the patient is transferred to another member of the care team.       The following medications were given:         Prior to injection, verified patient identity using patient's name and date of birth.  Due to injection administration, patient instructed to remain in clinic for 15 minutes  afterwards, and to report any adverse reaction to me immediately.    Tendon sheath injection was performed.   Medication Name: Kenalog 40mg/mL 20mg used x2 injections NDC [address]-3  Drug Amount Wasted:  Yes: 40 mg/ml   Vial/Syringe: Single dose vial  Expiration Date:  11/1/25    Medication Name Lidocaine1% NDC 69433-693-12    Scribed by DUY RESENDEZ, ATC for Dr. Petit on February 29, 2024 at 11:28a based on the provider's statements to me.     DUY RESENDEZ, ATC

## 2024-02-29 NOTE — LETTER
2024         RE: Laxmi Ya  [address] [place] [id number]        Dear Colleague,    Thank you for referring your patient, Laxmi Ya, to the [facility]. Please see a copy of my visit note below.    Hand / Upper Extremity Injection/Arthrocentesis: bilateral carpal tunnel    Date/Time: 2024 11:22 AM    Performed by: Twila Petit MD  Authorized by: Twila Petit MD    Indications:  Pain  Needle Size:  25 G  Guidance: landmark    Condition: carpal tunnel    Laterality:  Bilateral    Site:  Bilateral carpal tunnel  Medications (Right):  20 mg triamcinolone 40 MG/ML; 2 mL lidocaine (PF) 1 %  Medications (Left):  20 mg triamcinolone 40 MG/ML; 2 mL lidocaine (PF) 1 %  Outcome:  Tolerated well, no immediate complications  Procedure discussed: discussed risks, benefits, and alternatives    Consent Given by:  Patient  Timeout: timeout called immediately prior to procedure    Prep: patient was prepped and draped in usual sterile fashion        [facility] [address] [id number] [phone]  Dept: [phone]  ______________________________________________________________________________    Patient: Laxmi Ya   : 1975   MRN: [id number]   2024    INVASIVE PROCEDURE SAFETY CHECKLIST    Date: 2024   Procedure:Bilateral Carpal Tunnel Steroid injections  Patient Name: Laxmi Ya  MRN: [id number]  YOB: 1975    Action: Complete sections as appropriate. Any discrepancy results in a HARD COPY until resolved.     PRE PROCEDURE:  Patient ID verified with 2 identifiers (name and  or MRN): Yes  Procedure and site verified with patient/designee (when able): Yes  Accurate consent documentation in medical record: Yes  H&P (or appropriate assessment) documented in medical record: Yes  H&P must be up to 20 days prior to procedure  and updates within 24 hours of procedure as applicable: Yes  Relevant diagnostic and radiology test results appropriately labeled and displayed as applicable: NA  Procedure site(s) marked with provider initials: NA    TIMEOUT:  Time-Out performed immediately prior to starting procedure, including verbal and active participation of all team members addressing the following:Yes  * Correct patient identify  * Confirmed that the correct side and site are marked  * An accurate procedure consent form  * Agreement on the procedure to be done  * Correct patient position  * Relevant images and results are properly labeled and appropriately displayed  * The need to administer antibiotics or fluids for irrigation purposes during the procedure as applicable   * Safety precautions based on patient history or medication use    DURING PROCEDURE: Verification of correct person, site, and procedures any time the responsibility for care of the patient is transferred to another member of the care team.       The following medications were given:         Prior to injection, verified patient identity using patient's name and date of birth.  Due to injection administration, patient instructed to remain in clinic for 15 minutes  afterwards, and to report any adverse reaction to me immediately.    Tendon sheath injection was performed.   Medication Name: Kenalog 40mg/mL 20mg used x2 injections NDC [address]-9  Drug Amount Wasted:  Yes: 40 mg/ml   Vial/Syringe: Single dose vial  Expiration Date:  11/1/25    Medication Name Lidocaine1% NDC 37971-145-53    Scribed by DUY RESENDEZ, ATC for Dr. Petit on February 29, 2024 at 11:28a based on the provider's statements to me.     DUY RESENDEZ, ATC      Diagnosis: bilateral thumb CMC/STT arthritis, bilateral carpal tunnel syndrome  Treatment:   12/27/23 bilateral carpal tunnel steroid injection 20 mg (Dr Hyacinth Pickering)  12/21/23 bilateral STT injections  11/1/2023: bilateral carpal tunnel  steroid injection (Dr. Eligio Pickering)  10/18/2023: Bilateral thumb CMC steroid injection (Dr. Eligio Pickering)  9/20/2023: bilateral STT steroid injection (Dr. Eligio Pickering)  5/4/2023: bilateral carpal tunnel steroid injection (Dr. Petit)  2/16/2023: bilateral STT steroid injection (Dr. Petit)  12/13/2022: bilateral carpal tunnel steroid injection (Dr. Sullivan)  10/27/2022: bilateral STT steroid injections (Dr. Petit)  9/13/2022: bilateral carpal tunnel steroid injection (Dr. Sullivan)  6/30/2022: bilateral STT steroid injections (Dr. Petit)  Bilateral carpal tunnel steroid injections (outside)     Discussion was held with the patient regarding her multiple injections and her present symptoms.  She feels that her symptoms are worse for carpal tunnel and requests a repeat injection.  Her history and physical exam is consistent with tingling and numbness in her digits.  We did review the ultrasound which showed an increase in size to 11 mm on the left median nerve and 9 mm on the right.  We will proceed with bilateral carpal tunnel injections using a half dose.    PROCEDURE:  After written consent, verifying site and side, a sterile Chlora- prep was performed for the injection site.  Palpation was used for placement of a 25-gauge needle into the right  carpal tunnel with 1% Lidocaine with no dysesthesias and full range of motion of the flexor tendons.  1/2 mL of Kenalog (20 mg) and 1 mL of lidocaine was injected using dynamic technique with good relief of pain. Identical procedure was carried out on the left carpal tunnel. Patient tolerated the procedure well.  No complications.    Twila Petit MD   Hand and Upper Extremity Specialist  [facility]

## 2024-02-29 NOTE — PROGRESS NOTES
Diagnosis: bilateral thumb CMC/STT arthritis, bilateral carpal tunnel syndrome  Treatment:   12/27/23 bilateral carpal tunnel steroid injection 20 mg (Dr Hyacinth Pickering)  12/21/23 bilateral STT injections  11/1/2023: bilateral carpal tunnel steroid injection (Dr. Eligio Pickering)  10/18/2023: Bilateral thumb CMC steroid injection (Dr. Eligio Pickering)  9/20/2023: bilateral STT steroid injection (Dr. Eligio Pickering)  5/4/2023: bilateral carpal tunnel steroid injection (Dr. Petit)  2/16/2023: bilateral STT steroid injection (Dr. Petit)  12/13/2022: bilateral carpal tunnel steroid injection (Dr. Sullivan)  10/27/2022: bilateral STT steroid injections (Dr. Petit)  9/13/2022: bilateral carpal tunnel steroid injection (Dr. Sullivan)  6/30/2022: bilateral STT steroid injections (Dr. Petit)  Bilateral carpal tunnel steroid injections (outside)     Discussion was held with the patient regarding her multiple injections and her present symptoms.  She feels that her symptoms are worse for carpal tunnel and requests a repeat injection.  Her history and physical exam is consistent with tingling and numbness in her digits.  We did review the ultrasound which showed an increase in size to 11 mm on the left median nerve and 9 mm on the right.  We will proceed with bilateral carpal tunnel injections using a half dose.    PROCEDURE:  After written consent, verifying site and side, a sterile Chlora- prep was performed for the injection site.  Palpation was used for placement of a 25-gauge needle into the right  carpal tunnel with 1% Lidocaine with no dysesthesias and full range of motion of the flexor tendons.  1/2 mL of Kenalog (20 mg) and 1 mL of lidocaine was injected using dynamic technique with good relief of pain. Identical procedure was carried out on the left carpal tunnel. Patient tolerated the procedure well.  No complications.    Twila Petit MD   Hand and Upper Extremity Specialist  [facility]

## 2024-03-03 DIAGNOSIS — E27.49 SECONDARY ADRENAL INSUFFICIENCY (H): Primary | ICD-10-CM

## 2024-03-03 RX ORDER — HYDROCORTISONE 5 MG/1
5 TABLET ORAL 2 TIMES DAILY
Qty: 60 TABLET | Refills: 3 | Status: SHIPPED | OUTPATIENT
Start: 2024-03-03 | End: 2024-05-10

## 2024-03-04 ENCOUNTER — OFFICE VISIT (OUTPATIENT)
Dept: ALLERGY | Facility: CLINIC | Age: 49
End: 2024-03-04
Payer: MEDICARE

## 2024-03-04 VITALS
HEART RATE: 87 BPM | BODY MASS INDEX: 34.55 KG/M2 | OXYGEN SATURATION: 98 % | WEIGHT: 215 LBS | RESPIRATION RATE: 16 BRPM | HEIGHT: 66 IN

## 2024-03-04 DIAGNOSIS — J32.4 CHRONIC PANSINUSITIS: ICD-10-CM

## 2024-03-04 DIAGNOSIS — J33.9 NASAL POLYPS: ICD-10-CM

## 2024-03-04 DIAGNOSIS — M25.532 BILATERAL WRIST PAIN: Primary | ICD-10-CM

## 2024-03-04 DIAGNOSIS — M25.531 BILATERAL WRIST PAIN: Primary | ICD-10-CM

## 2024-03-04 DIAGNOSIS — J45.40 MODERATE PERSISTENT ASTHMA WITHOUT COMPLICATION: Primary | ICD-10-CM

## 2024-03-04 DIAGNOSIS — J38.3 VOCAL CORD DYSFUNCTION: ICD-10-CM

## 2024-03-04 LAB
FEF 25/75: NORMAL
FEV-1: NORMAL
FEV1/FVC: NORMAL
FVC: NORMAL

## 2024-03-04 PROCEDURE — 94010 BREATHING CAPACITY TEST: CPT | Performed by: ALLERGY & IMMUNOLOGY

## 2024-03-04 PROCEDURE — 99214 OFFICE O/P EST MOD 30 MIN: CPT | Mod: 25 | Performed by: ALLERGY & IMMUNOLOGY

## 2024-03-04 RX ORDER — DUPILUMAB 300 MG/2ML
300 INJECTION, SOLUTION SUBCUTANEOUS
Qty: 4 ML | Refills: 5 | Status: SHIPPED | OUTPATIENT
Start: 2024-03-04 | End: 2024-08-05

## 2024-03-04 RX ORDER — FLUTICASONE FUROATE, UMECLIDINIUM BROMIDE AND VILANTEROL TRIFENATATE 200; 62.5; 25 UG/1; UG/1; UG/1
1 POWDER RESPIRATORY (INHALATION) DAILY
Qty: 1 EACH | Refills: 5 | Status: SHIPPED | OUTPATIENT
Start: 2024-03-04 | End: 2024-04-11

## 2024-03-04 ASSESSMENT — ASTHMA QUESTIONNAIRES
ACT_TOTALSCORE: 21
QUESTION_4 LAST FOUR WEEKS HOW OFTEN HAVE YOU USED YOUR RESCUE INHALER OR NEBULIZER MEDICATION (SUCH AS ALBUTEROL): ONCE A WEEK OR LESS
QUESTION_2 LAST FOUR WEEKS HOW OFTEN HAVE YOU HAD SHORTNESS OF BREATH: ONCE OR TWICE A WEEK
QUESTION_3 LAST FOUR WEEKS HOW OFTEN DID YOUR ASTHMA SYMPTOMS (WHEEZING, COUGHING, SHORTNESS OF BREATH, CHEST TIGHTNESS OR PAIN) WAKE YOU UP AT NIGHT OR EARLIER THAN USUAL IN THE MORNING: NOT AT ALL
QUESTION_5 LAST FOUR WEEKS HOW WOULD YOU RATE YOUR ASTHMA CONTROL: WELL CONTROLLED
ACT_TOTALSCORE: 21
QUESTION_1 LAST FOUR WEEKS HOW MUCH OF THE TIME DID YOUR ASTHMA KEEP YOU FROM GETTING AS MUCH DONE AT WORK, SCHOOL OR AT HOME: A LITTLE OF THE TIME

## 2024-03-04 NOTE — LETTER
"    3/4/2024         RE: Laxmi Ya  [address] [place] [id number]        Dear Colleague,    Thank you for referring your patient, Laxmi Ya, to the [facility]. Please see a copy of my visit note below.          Subjective  Laxmi is a 48 year old, presenting for the following health issues:  No chief complaint on file.    HPI     Chief complaint: Follow-up asthma    History of present illness: This is a 48-year-old woman with a history of asthma as well as chronic sinusitis with nasal polyps currently using Dupixent 300 mg every 2 weeks.  She became ill with influenza and states that she was diagnosed with a pneumonia.  I do not see a chest x-ray but exam was consistent with pneumonia so they treated her for influenza pneumonia.  She states she had a lot of shortness of breath.  She feels she is been recovering over the last 4 weeks.  She was diagnosed with beginning of January.  She is been using Breo 200-25 1 puff daily.  Needing her albuterol inhaler a few times per week.  She still feels short of breath but is not waking her up from sleep.  She was told in the past and [facility] that she had a \"narrow airways.She would like to see pulmonary to discuss this.  I do not have record of this.  She also has been diagnosed with adrenal insufficiency since last I saw her.  She reports that she has had more congestion and drainage and thinks this may be secondary to the adrenal insufficiency.  She also did miss a dose of Dupixent when she was ill.  She has now been using it consistently over the last month.  No problems with Dupixent, eye irritation.  Eosinophils previously have been normal.  Most recently had a CBC with differential a month ago which was normal.          Objective   Pulse 87   Resp 16   Ht 1.676 m (5' 6\")   Wt 97.5 kg (215 lb)   SpO2 98%   BMI 34.70 kg/m    Body mass index is 34.7 kg/m .  Physical Exam   Gen: Pleasant female not in acute " distress  HEENT: Eyes no erythema of the bulbar or palpebral conjunctiva, no edema.   Skin: No rashes or lesions  Psych: Alert and oriented times 3    Spirometry was performed.  FEV1 to FVC ratio 84%.  FEV1 2.58 L or 84% of predicted.  FVC 3.06 L or 80% of predicted.  Normal baseline spirometry.  Inspiratory loop is flattened.    Impression report and plan:  1.  Moderate persistent asthma  2.  History of recent influenza  3.  Chronic sinusitis with nasal polyps  4.  Vocal cord dysfunction    Suspect some of her breathlessness is secondary to vocal cord dysfunction.  I do suspect some deconditioning as well.  She asked about Trelegy and it is reasonable to try given her shortness of breath but I think her asthma is actually well-controlled.  Recommended referral to the [facility] voice clinic.  Continue with Dupixent for now for her chronic sinusitis.  Follow in 6 months.  Patient asked for pulmonary referral to talk about her narrowed airways.  Referral placed but I am not sure what she is referring to in this regard.    Signed Electronically by: Nelia CAMPOS MD  {Email feedback regarding this note to [email or web address]   [id number]}    Laxmi Ya was seen in the Allergy Clinic at {Allergyclinics:[id number]}.    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  abstract     Obesity      Pyelonephritis, unspecified 1992    abstract     Rheumatoid arteritis (H)      Seronegative inflammatory arthritis 06/17/2022     Type 2 diabetes mellitus (H)      Unspecified symptom associated with female genital organs 05/07/1999    chronic pelvic pain abstract     Family History   Problem Relation Age of Onset     Hypertension Father         abstract     Cancer Paternal Aunt         gallbladder cancer abstract     Past Surgical History:   Procedure Laterality Date     CHOLECYSTECTOMY       COLON SURGERY      Left hemicolectomy for diverticulosis     CYSTOSCOPY,+URETEROSCOPY      abstract     ZZHC EXCIS/CURET BENIGN ELBOW LESN  10/26/1987    (R) humerus abstract         Current Outpatient Medications:      albuterol (PROAIR HFA/PROVENTIL HFA/VENTOLIN HFA) 108 (90 Base) MCG/ACT inhaler, Inhale 2 puffs into the lungs every 6 hours as needed for shortness of breath, wheezing or cough, Disp: , Rfl:      Artificial Tear Solution (SOOTHE XP) SOLN, as needed, Disp: , Rfl:      atenolol (TENORMIN) 50 MG tablet, Take 50 mg by mouth daily, Disp: , Rfl:      benzoyl peroxide 5 % external liquid, Use daily as directed. Preferred bdrand: Medpura, Disp: 236 mL, Rfl: 11     blood glucose (NO BRAND SPECIFIED) lancets standard, Use to test blood sugar 2 times daily or as directed., Disp: 100 lancet., Rfl: 1     blood glucose (NO BRAND SPECIFIED) test strip, Use to test blood sugar 2 times daily or as directed., Disp: 100 strip, Rfl: 1     blood glucose monitoring (NO BRAND SPECIFIED) meter device kit, Use to test blood sugar 2 times daily or as directed., Disp: 1 kit, Rfl: 0     diclofenac (VOLTAREN) 1 % topical gel, Apply topically 4 times daily as needed, Disp: , Rfl:      dupilumab (DUPIXENT) 300 MG/2ML prefilled pen, Inject 2 mLs (300 mg) Subcutaneous every 14 days, Disp: 4 mL, Rfl: 2     EPINEPHrine (ANY BX GENERIC EQUIV) 0.3 MG/0.3ML injection 2-pack, Inject 0.3 mg into the muscle as needed, Disp:  , Rfl:      fexofenadine (ALLEGRA) 180 MG tablet, Take 1 tablet (180 mg) by mouth daily, Disp: 90 tablet, Rfl: 2     fidaxomicin (DIFICID) 200 MG tablet, Take 200 mg by mouth as needed, Disp: , Rfl:      fluticasone-vilanterol (BREO ELLIPTA) 200-25 MCG/ACT inhaler, Inhale 1 puff into the lungs daily, Disp: , Rfl:      hydrocortisone (CORTEF) 5 MG tablet, Take 1 tablet (5 mg) by mouth 2 times daily, Disp: 60 tablet, Rfl: 3     insulin glargine (LANTUS PEN) 100 UNIT/ML pen, Inject 20 Units Subcutaneous daily, Disp: , Rfl:      insulin lispro (HUMALOG KWIKPEN) 100 UNIT/ML (1 unit dial) KWIKPEN, Inject 5 Units Subcutaneous 3 times daily (before meals) Plus 1:25 correction scale if BG >150, Disp: , Rfl:      insulin NPH (HUMULIN N KWIKPEN) 100 UNIT/ML injection, Inject 40 Units Subcutaneous every evening, Disp: , Rfl:      Lancets (ONETOUCH DELICA PLUS [id number]) MISC, CHECK BLOOD GLUCOSE 3 TIMES DAILY, ADJUSTING MEDICATION, DX CODE E 11.9, ON INSULIN, Disp: , Rfl:      lidocaine (LIDODERM) 5 % patch, as needed , Disp: , Rfl: 1     losartan (COZAAR) 100 MG tablet, Take 100 mg by mouth daily, Disp: , Rfl:      methylPREDNISolone (MEDROL DOSEPAK) 4 MG tablet therapy pack, Take per package instructions. Take once a day by mouth, Disp: 21 tablet, Rfl: 0     methylPREDNISolone (MEDROL DOSEPAK) 4 MG tablet therapy pack, Take per package instructions. Take once a day by mouth, Disp: 21 tablet, Rfl: 0     mometasone (NASONEX) 50 MCG/ACT nasal spray, Spray 2 sprays into both nostrils daily, Disp: 17 g, Rfl: 11     mometasone-formoterol (DULERA) 200-5 MCG/ACT inhaler, Inhale 1 puff into the lungs daily, Disp: , Rfl:      Montelukast Sodium (SINGULAIR PO), Take 10 mg by mouth At Bedtime , Disp: , Rfl:      multivitamin w/minerals (THERA-VIT-M) tablet, Take 1 tablet by mouth daily, Disp: , Rfl:      norethindrone (MICRONOR) 0.35 MG tablet, Take 0.35 mg by mouth, Disp: , Rfl:      nystatin (MYCOSTATIN) 094205 UNIT/ML suspension, ,  Disp: , Rfl:      olopatadine (PATADAY) 0.7 % ophthalmic solution, Apply 1 drop to eye daily PRN, Disp: , Rfl:      OMEPRAZOLE PO, Take 40 mg by mouth 2 times daily , Disp: , Rfl:      oxyCODONE IR (ROXICODONE) 10 MG tablet, Take 10 mg by mouth, Disp: , Rfl:      rifaximin (XIFAXAN) 550 MG TABS tablet, Take 200 mg by mouth 3 times daily Take for 10 days, has on had as needed for small bowel bacteria overgrowh, Disp: , Rfl:      rosuvastatin (CRESTOR) 5 MG tablet, Take 2 tablets by mouth daily, Disp: , Rfl:      spironolactone-HCTZ (ALDACTAZIDE) 25-25 MG tablet, Take 1 tablet by mouth daily, Disp: , Rfl:      STELARA 45 MG/0.5ML SOSY, Inject 45 mg Subcutaneous once, Disp: , Rfl:      tocilizumab (ACTEMRA) 400 MG/20ML, Inject 800 mg into the vein every 28 days, Disp: , Rfl:     Current Facility-Administered Medications:      lidocaine (PF) (XYLOCAINE) 1 % injection 2 mL, 2 mL, , , Twila Petit MD, 2 mL at 02/29/24 1122     lidocaine (PF) (XYLOCAINE) 1 % injection 2 mL, 2 mL, , , Twila Petit MD, 2 mL at 02/29/24 1122     lidocaine (PF) (XYLOCAINE) 1 % injection 2 mL, 2 mL, , , Hyacinth Morrison MD, 2 mL at 12/27/23 1614     lidocaine (PF) (XYLOCAINE) 1 % injection 2 mL, 2 mL, , , Hyacinth Morrison MD, 2 mL at 12/27/23 1614     triamcinolone (KENALOG-40) injection 20 mg, 20 mg, , , Twila Petit MD, 20 mg at 02/29/24 1122     triamcinolone (KENALOG-40) injection 20 mg, 20 mg, , , Twila Petit MD, 20 mg at 02/29/24 1122     triamcinolone (KENALOG-40) injection 20 mg, 20 mg, , , Hyacinth Morrison MD, 20 mg at 12/27/23 1614     triamcinolone (KENALOG-40) injection 20 mg, 20 mg, , , Hyacinth Morrison MD, 20 mg at 12/27/23 1614  Allergies   Allergen Reactions     Polyethylene Glycol Rash     Codeine      Other reaction(s): Gastrointestinal, GI intolerance, Vomiting  Vomiting       Hydrocodone Nausea and Vomiting     Morphine Nausea and Vomiting     Sulfasalazine      Other  "reaction(s): GI intolerance  Has tried and had nausa.     Tramadol Nausea and Vomiting     Other reaction(s): Gastrointestinal, Other (see comments)  Nausea and vomiting   unknown       Gluten Meal Other (See Comments) and Rash     Other reaction(s): Gastrointestinal, GI intolerance  Dizziness, tired, rash, stomach cramps, thrush, mouth sores  Dizziness, tired, rash, stomach cramps, thrush, mouth sores       Propylene Glycol Dizziness, Nausea and Vomiting, Nausea and Rash         EXAM:   Pulse 87   Resp 16   Ht 1.676 m (5' 6\")   Wt 97.5 kg (215 lb)   SpO2 98%   BMI 34.70 kg/m      Physical Exam    WORKUP: {ALLERGYWORKUP:276912}    ASSESSMENT/PLAN:  Laxmi Ya is a 48 year old female ***    ***    Follow-up in ***      Thank you for allowing me to participate in the care of Laxmi Ya.      I spent *** minutes on the date of the encounter doing chart review, history and exam, documentation and further coordination as noted above exclusive of separately reported interpretations    Michelle Gutierrez PA-C  [facility]      Again, thank you for allowing me to participate in the care of your patient.        Sincerely,        Nelia CAMPOS MD  "

## 2024-03-04 NOTE — PROGRESS NOTES
"      Daria Hair is a 48 year old, presenting for the following health issues:  No chief complaint on file.    HPI     Chief complaint: Follow-up asthma    History of present illness: This is a 48-year-old woman with a history of asthma as well as chronic sinusitis with nasal polyps currently using Dupixent 300 mg every 2 weeks.  She became ill with influenza and states that she was diagnosed with a pneumonia.  I do not see a chest x-ray but exam was consistent with pneumonia so they treated her for influenza pneumonia.  She states she had a lot of shortness of breath.  She feels she is been recovering over the last 4 weeks.  She was diagnosed with beginning of January.  She is been using Breo 200-25 1 puff daily.  Needing her albuterol inhaler a few times per week.  She still feels short of breath but is not waking her up from sleep.  She was told in the past and [facility] that she had a \"narrow airways.She would like to see pulmonary to discuss this.  I do not have record of this.  She also has been diagnosed with adrenal insufficiency since last I saw her.  She reports that she has had more congestion and drainage and thinks this may be secondary to the adrenal insufficiency.  She also did miss a dose of Dupixent when she was ill.  She has now been using it consistently over the last month.  No problems with Dupixent, eye irritation.  Eosinophils previously have been normal.  Most recently had a CBC with differential a month ago which was normal.          Objective    Pulse 87   Resp 16   Ht 1.676 m (5' 6\")   Wt 97.5 kg (215 lb)   SpO2 98%   BMI 34.70 kg/m    Body mass index is 34.7 kg/m .  Physical Exam   Gen: Pleasant female not in acute distress  HEENT: Eyes no erythema of the bulbar or palpebral conjunctiva, no edema.   Skin: No rashes or lesions  Psych: Alert and oriented times 3    Spirometry was performed.  FEV1 to FVC ratio 84%.  FEV1 2.58 L or 84% of predicted.  FVC 3.06 L or 80% of " predicted.  Normal baseline spirometry.  Inspiratory loop is flattened.    Impression report and plan:  1.  Moderate persistent asthma  2.  History of recent influenza  3.  Chronic sinusitis with nasal polyps  4.  Vocal cord dysfunction    Suspect some of her breathlessness is secondary to vocal cord dysfunction.  I do suspect some deconditioning as well.  She asked about Trelegy and it is reasonable to try given her shortness of breath but I think her asthma is actually well-controlled.  Recommended referral to the [facility] voice clinic.  Continue with Dupixent for now for her chronic sinusitis.  Follow in 6 months.  Patient asked for pulmonary referral to talk about her narrowed airways.  Referral placed but I am not sure what she is referring to in this regard.    Signed Electronically by: Nelia CAMPOS MD

## 2024-03-04 NOTE — PROGRESS NOTES
Laxmi Ya is being seen today for allergy shot follow up.    Ordering Provider:   Start date:   Maintenance shot date:  Reaction history:  Premedication requirements:                    Responsiveness to treatment: ***/10 improvement,    Current allergy symptom regime:     Proposed duration of continued maintenance:     Questions/Concerns:    Patient was made aware of continued risk for allergic reaction/anaphylaxis during the duration of treatment.     *** yearly acknowledgment forms have been signed today.

## 2024-03-05 ENCOUNTER — VIRTUAL VISIT (OUTPATIENT)
Dept: ENDOCRINOLOGY | Facility: CLINIC | Age: 49
End: 2024-03-05
Payer: MEDICARE

## 2024-03-05 ENCOUNTER — TELEPHONE (OUTPATIENT)
Dept: ENDOCRINOLOGY | Facility: CLINIC | Age: 49
End: 2024-03-05

## 2024-03-05 DIAGNOSIS — E11.9 TYPE 2 DIABETES MELLITUS WITHOUT COMPLICATION, WITH LONG-TERM CURRENT USE OF INSULIN (H): ICD-10-CM

## 2024-03-05 DIAGNOSIS — Z79.4 TYPE 2 DIABETES MELLITUS WITHOUT COMPLICATION, WITH LONG-TERM CURRENT USE OF INSULIN (H): ICD-10-CM

## 2024-03-05 DIAGNOSIS — E27.49 SECONDARY ADRENAL INSUFFICIENCY (H): Primary | ICD-10-CM

## 2024-03-05 PROCEDURE — G2211 COMPLEX E/M VISIT ADD ON: HCPCS | Mod: 95 | Performed by: INTERNAL MEDICINE

## 2024-03-05 PROCEDURE — 99214 OFFICE O/P EST MOD 30 MIN: CPT | Mod: 95 | Performed by: INTERNAL MEDICINE

## 2024-03-05 NOTE — PROGRESS NOTES
Virtual Visit Details    Type of service:  Video Visit   Video Start Time: 3:05 PM  Video End Time:3:24 PM    Originating Location (pt. Location): Home  Distant Location (provider location):  Off-site  Platform used for Video Visit: Howard        Recent issues:  Adrenal follow-up evaluation  Previous low cortisol levels, presumed due to concurrent or prior dosing of steroid medication  Recent repeat labs showed low cortisol and low ACTH levels, indicating secondary adrenal insufficiency        Previous health history includes sero negative rheumatoid arthritis, asthma, T2DM  She has seen health care providers including [facility], [facility], [facility], and [facility]  Had also seen Dr. Alan Saleh/[facility] rheumatology  Med evaluations with Dr. Susan Pena/[facility] OBGYN, evaluations focused on management of recurrent cystitis, vaginitis  Treatment with antifungal and antibiotic medications  Patient recalls having lab tests done per Dr. Pena ~2020 showing abnormal adrenal results   Advised to see an endocrinologist for further evaluation   Unfortunately, details of these tests, results, concerns not available today    Additional medical evaluations with other providers, including Dr. No Fitzgerald/ [facility], Dr. Oswaldo Lemus/[facility] Rheumatology  RA treatment with periodic Actemra infusions (w/ methylprednisolone infusion)  Has used Medrol dose packs (5d) for flares of her RA or asthma or pneumonia  Previous [facility] labs include:   Latest Reference Range & Units 11/29/23 12:36 12/20/23 14:28   Cortisol Serum ug/dL 0.5 2.6      Latest Reference Range & Units 11/29/23 12:36 12/20/23 14:28   Adrenal Corticotropin <47 pg/mL <10 <10      Latest Reference Range & Units 11/29/23 12:45   Cortisol Free Urine Random ug/L <1.00     ~2/7/24. Last Medtrol steroid course completed      Lives in [place], [place]  Sees Dr. Amol Juares/ [facility] for general medicine evaluations.  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    Obesity     Pyelonephritis, unspecified 1992    abstract    Rheumatoid arteritis (H)     Seronegative inflammatory arthritis 06/17/2022    Type 2 diabetes mellitus (H)     Unspecified symptom associated with female genital organs 05/07/1999    chronic pelvic pain abstract     Past Surgical History:   Procedure Laterality Date    CHOLECYSTECTOMY      COLON SURGERY      Left hemicolectomy for diverticulosis    CYSTOSCOPY,+URETEROSCOPY      abstract    ZZHC EXCIS/CURET BENIGN ELBOW LESN  10/26/1987    (R) humerus abstract       Family Hx:  Family History   Problem Relation Age of Onset    Hypertension Father         abstract    Cancer Paternal Aunt         gallbladder cancer abstract         Social Hx:  Social History     Socioeconomic History    Marital status: Single     Spouse name: Not on file    Number of children: Not on file    Years of education: Not on file    Highest education level: Not on file   Occupational History    Not on file   Tobacco Use    Smoking status: Never    Smokeless tobacco: Never   Substance and Sexual Activity    Alcohol use: Yes     Comment: RARE    Drug use: No    Sexual activity: Not on file   Other Topics Concern     Service Not Asked    Blood Transfusions Not Asked    Caffeine Concern Not Asked    Occupational Exposure Not Asked    Hobby Hazards Not Asked    Sleep Concern Not Asked    Stress Concern Not  Asked    Weight Concern Not Asked    Special Diet Not Asked    Back Care Not Asked    Exercise No    Bike Helmet Not Asked    Seat Belt No    Self-Exams No   Social History Narrative    Womens Health Kit given.         Social Determinants of Health     Financial Resource Strain: Not on file   Food Insecurity: Not on file   Transportation Needs: Not on file   Physical Activity: Not on file   Stress: Not on file   Social Connections: Not on file   Interpersonal Safety: Not on file   Housing Stability: Not on file          MEDICATIONS:  has a current medication list which includes the following prescription(s): albuterol, soothe xp, atenolol, benzoyl peroxide, blood glucose, blood glucose, blood glucose monitoring, diclofenac, dupixent, epinephrine, fexofenadine, fidaxomicin, trelegy ellipta, fluticasone-vilanterol, hydrocortisone, insulin glargine, insulin lispro, humulin n kwikpen, onetouch delica plus [id number], lidocaine, losartan, methylprednisolone, methylprednisolone, mometasone, dulera, montelukast sodium, multivitamin w/minerals, norethindrone, nystatin, pataday, omeprazole, oxycodone ir, rifaximin, rosuvastatin, spironolactone-hctz, stelara, and actemra, and the following Facility-Administered Medications: lidocaine (pf), lidocaine (pf), lidocaine (pf), lidocaine (pf), triamcinolone, triamcinolone, triamcinolone, and triamcinolone.    ROS:     ROS: 10 point ROS neg other than the symptoms noted above in the HPI.    GENERAL: some fatigue, wt stable; denies fevers, chills, night sweats.   HEENT: sinus drainage; no dysphagia, odonophagia, diplopia, neck pain  THYROID:  no apparent hyper or hypothyroid symptoms  CV: no chest pain, pressure, palpitations  LUNGS: some dyspnea; no SOB, SERNA, cough, wheezing   ABDOMEN: no diarrhea, constipation, abdominal pain  EXTREMITIES: no rashes, ulcers, edema  NEUROLOGY: no headaches, denies changes in vision, tingling, extremitiy numbness   MSK: polyarthralgias- pains at neck,  hands, hips, ankles; generalized muscle weakness  SKIN: no rashes or lesions  : no menses with daily [place]   PSYCH:  stable mood, no significant anxiety or depression  ENDOCRINE: no heat or cold intolerance    Physical Exam (visual exam)  VS:  no vital signs taken for video visit  CONSTITUTIONAL: healthy, alert and NAD, well dressed, answering questions appropriately  ENT: no nose swelling or nasal discharge, mouth redness or gum changes.  EYES: eyes grossly normal to inspection, conjunctivae and sclerae normal, no exophthalmos or proptosis  THYROID:  no apparent nodules or goiter  LUNGS: no audible wheeze, cough or visible cyanosis, no visible retractions or increased work of breathing  ABDOMEN: abdomen not evaluated  EXTREMITIES: no hand tremors, limited exam  NEUROLOGY: CN grossly intact, mentation intact and speech normal   SKIN:  no apparent skin lesions, rash, or edema with visualized skin appearance  PSYCH: mentation appears normal, affect normal/bright, judgement and insight intact,   normal speech and appearance well groomed      LABS:    All pertinent notes, labs, and images personally reviewed by me.     A/P:  Encounter Diagnoses   Name Primary?    Secondary adrenal insufficiency (H24) Yes    Type 2 diabetes mellitus without complication, with long-term current use of insulin (H)      Comments:  Reviewed the complicated health history and general endocrine issues.  Difficult to understand the endocrine concern by her [facility] provider from 3-yrs ago  Low cortisol levels likely due to transient vs chronic suppression of pituitary-adrenal hormone axis, or hypopituitarism    Plan:  Reviewed the adrenal gland, general anatomy and hormone secretion  We discussed adrenal lab testing   Discussed steroid medication options, dosing    Recommend:  Reviewed steroid medications and dosing options  Start low dose hydrocortisone as 5 mg po BID  If future glucocorticoid med treatment for asthma or arthritis, may  use Medrol, Pred, or HC   Will defer respiratory and rheumatology management to her other specialists  Monitor for symptom changes  Advise repeat pituitary hormone testing   Plan [facility] lab testing prior to her next infusion dose    Lab orders placed  Needs head MRI pituitary   Scan at [facility] radiology dept on 3/11/24 or 3/14/24   Procedure order placed  No adrenal gland imaging needed at this time  Keep focus on diet, exercise (as tolerated) and weight management  Consider dietician evaluation  Diabetes follow-up with Dr. Juares and the [place] diabetes educators    Addressed patient questions today    The longitudinal plan of care for the endocrine problem(s) were addressed during this visit.  Due to added complexity of care,   we will continue to support the patient and the subsequent management of this condition with ongoing continuity of care.    There are no Patient Instructions on file for this visit.    Future labs ordered today:   Orders Placed This Encounter   Procedures    MR Pituitary w/o & w Contrast    Follicle stimulating hormone    Luteinizing Hormone    Prolactin    Insulin Growth Factor 1 by Immunoassay     Radiology/Consults ordered today: MR PITUITARY W/O & W CONTRAST    Total time spent on day of encounter:  34 min    Follow-up:  work-in clinic appt in 3/2024 or early 4/2024, CONCEPCION Leroy MD, MS  Endocrinology  [facility]    CC: Care Team

## 2024-03-05 NOTE — TELEPHONE ENCOUNTER
sent PT allyDVMhart message to schedule f/u appt with Dr Leroy   this appt spot is on hold for this pt, ok to schedule per Malika Singh 4/18 in clinic at 10:00 am

## 2024-03-06 ENCOUNTER — THERAPY VISIT (OUTPATIENT)
Dept: PHYSICAL THERAPY | Facility: CLINIC | Age: 49
End: 2024-03-06
Payer: MEDICARE

## 2024-03-06 DIAGNOSIS — G89.29 CHRONIC BILATERAL LOW BACK PAIN WITHOUT SCIATICA: ICD-10-CM

## 2024-03-06 DIAGNOSIS — R10.2 PELVIC PAIN IN FEMALE: Primary | ICD-10-CM

## 2024-03-06 DIAGNOSIS — M54.50 CHRONIC BILATERAL LOW BACK PAIN WITHOUT SCIATICA: ICD-10-CM

## 2024-03-06 DIAGNOSIS — M54.2 NECK PAIN: ICD-10-CM

## 2024-03-06 PROCEDURE — 97012 MECHANICAL TRACTION THERAPY: CPT | Mod: GP | Performed by: PHYSICAL THERAPIST

## 2024-03-06 PROCEDURE — 97140 MANUAL THERAPY 1/> REGIONS: CPT | Mod: GP | Performed by: PHYSICAL THERAPIST

## 2024-03-07 NOTE — PROGRESS NOTES
24 1227   Appointment Info   Signing clinician's name / credentials Sofi Lemons PT, OCS   Total/Authorized Visits orders  24, called 2x to get new orders from [facility]. Not rec'd yet but cert signed.   Visits Used 30   Medical Diagnosis Lower back pain with mobility deficits, lower back pain w/ referred pain, neck pain w/ headache, neck pain w/ mobility deficits, pelvic pain   PT Tx Diagnosis Lower back pain with mobility deficits, lower back pain w/ referred pain, neck pain w/ headache, neck pain w/ mobility deficits   Other pertinent information autoimmune diseases, hx lumbar discectomy   Quick Adds Certification   Progress Note/Certification   Start of Care Date 23   Onset of illness/injury or Date of Surgery 23   Therapy Frequency 2x month   Predicted Duration 12 weeks   Certification date from 03/15/24   Certification date to 24   Progress Note Due Date 03/15/24   Progress Note Completed Date 24   GOALS   PT Goals 3   PT Goal 1   Goal Identifier ambulation   Goal Description Minutes patient will be able to  walk 20-30 minutes   Rationale to maximize safety and independence with performance of ADLs and functional tasks;to maximize safety and independence within the home;to maximize safety and independence within the community;to maximize safety and independence with transportation;to maximize safety and independence with self cares   Goal Progress currently ~10 minutes   Target Date 24   PT Goal 2   Goal Identifier headaches   Goal Description HA 1x week or less with intensity 2/10 or less   Rationale to maximize safety and independence with performance of ADLs and functional tasks;to maximize safety and independence within the home;to maximize safety and independence within the community;to maximize safety and independence with transportation;to maximize safety and independence with self cares   Goal Progress currently daily   Target Date  05/30/24   PT Goal 3   Goal Identifier pelvic pain   Goal Description with walking/intimacy/urinary urgency 2/10 or less   Rationale to maximize safety and independence with performance of ADLs and functional tasks;to maximize safety and independence within the home;to maximize safety and independence with transportation;to maximize safety and independence with self cares;to maximize safety and independence within the community   Goal Progress currently 5/10   Target Date 05/30/24   Subjective Report   Subjective Report Patient is currently wearing compression garments from lymphedema therapy. Reports her B hip/pelvic pain is slightly better with walking after last visit . Still limited to about 10 minutes of walking. Hasn't had too much trouble with urgency the past 1-2 weeks.Continues to have HA and neck pain on a daily basis.Feels like the traction helps a lot with this.   Objective Measures   Objective Measures Objective Measure 1;Objective Measure 2   Objective Measure 1   Objective Measure TTP internal pelvic floor to LA/OI, L>R today. Kegel strength 3.   Objective Measure 2   Objective Measure CROM: flexion WNL, extension mod loss, B SB mod loss, B rot mod loss.   Mechanical Traction   Mechanical Traction, Minutes (37348) 15   Traction -Type Cervical   Position supine   Type Intermittent   Duration 15 min   Max poundage 26   Min poundage 0   Hold Time 60   Rest Time 10   Steps ramping up 1   Steps ramping down 1   Speed 100%   Treatment Interventions (PT)   Interventions Manual Therapy   Therapeutic Procedure/Exercise   Therapeutic Procedures Ther Proc 2;Ther Proc 3;Ther Proc 4   Ther Proc 1 glute myofascial full arc   Ther Proc 1 - Details x20   Ther Proc 2 isometric quad set 5 sec x 20   Ther Proc 2 - Details keep knee moving throughout day   Ther Proc 3 PROM by PT for R hip mobility: knee extension, hamstrings, ER/IR hips   Ther Proc 3 - Details roll in's hooklying gentle 5 sec x 10, 2 x day   Ther Proc 4  diaphragmatic breathing 5 min, 2 xday   Skilled Intervention improve strength and mobility to pelvic floor   Patient Response/Progress good understanding   Manual Therapy   Manual Therapy: Mobilization, MFR, MLD, friction massage minutes (24305) 25   Manual Therapy Manual Therapy 2   Manual Therapy 1 supine MFR to internal PF (LA/OI R>L)   Manual Therapy 1 - Details IR/ER x10 each side   Skilled Intervention to decrease tone in PF   Patient Response/Progress good tolerance   Education   Learner/Method Patient;No Barriers to Learning   Plan   Home program PTRX   Plan for next session assess MFR to pelvic floor   Total Session Time   Timed Code Treatment Minutes 25   Total Treatment Time (sum of timed and untimed services) 40         [facility]                                                                                   OUTPATIENT PHYSICAL THERAPY    PLAN OF TREATMENT FOR OUTPATIENT REHABILITATION   Patient's Last Name, First Name, Laxmi Coleman YOB: 1975   Provider's Name   [facility]   Medical Record No.  [id number]     Onset Date: 02/03/23  Start of Care Date: 03/08/23     Medical Diagnosis:  Lower back pain with mobility deficits, lower back pain w/ referred pain, neck pain w/ headache, neck pain w/ mobility deficits, pelvic pain      PT Treatment Diagnosis:  Lower back pain with mobility deficits, lower back pain w/ referred pain, neck pain w/ headache, neck pain w/ mobility deficits Plan of Treatment  Frequency/Duration: 2x month/ 12 weeks    Certification date from 03/15/24 to 05/30/24         See note for plan of treatment details and functional goals     Peng George, PT                         I CERTIFY THE NEED FOR THESE SERVICES FURNISHED UNDER        THIS PLAN OF TREATMENT AND WHILE UNDER MY CARE .             Physician Signature                Date    X_____________________________________________________                  Referring Provider:  Ingrid Wilson PA-C    Initial Assessment  See Epic Evaluation- Start of Care Date: 03/08/23

## 2024-03-11 ENCOUNTER — TRANSFERRED RECORDS (OUTPATIENT)
Dept: HEALTH INFORMATION MANAGEMENT | Facility: CLINIC | Age: 49
End: 2024-03-11

## 2024-03-11 ENCOUNTER — LAB (OUTPATIENT)
Dept: LAB | Facility: CLINIC | Age: 49
End: 2024-03-11
Payer: MEDICARE

## 2024-03-11 ENCOUNTER — PRE VISIT (OUTPATIENT)
Dept: ORTHOPEDICS | Facility: CLINIC | Age: 49
End: 2024-03-11

## 2024-03-11 ENCOUNTER — OFFICE VISIT (OUTPATIENT)
Dept: ORTHOPEDICS | Facility: CLINIC | Age: 49
End: 2024-03-11
Attending: INTERNAL MEDICINE
Payer: MEDICARE

## 2024-03-11 DIAGNOSIS — E27.49 SECONDARY ADRENAL INSUFFICIENCY (H): ICD-10-CM

## 2024-03-11 DIAGNOSIS — E11.69 TYPE 2 DIABETES MELLITUS WITH DIABETIC FOOT DEFORMITY (H): ICD-10-CM

## 2024-03-11 DIAGNOSIS — B35.1 ONYCHOMYCOSIS: Primary | ICD-10-CM

## 2024-03-11 DIAGNOSIS — M21.969 TYPE 2 DIABETES MELLITUS WITH DIABETIC FOOT DEFORMITY (H): ICD-10-CM

## 2024-03-11 LAB
FSH SERPL IRP2-ACNC: 5.2 MIU/ML
LH SERPL-ACNC: 0.8 MIU/ML
PROLACTIN SERPL 3RD IS-MCNC: 9 NG/ML (ref 5–23)

## 2024-03-11 PROCEDURE — 83002 ASSAY OF GONADOTROPIN (LH): CPT | Performed by: INTERNAL MEDICINE

## 2024-03-11 PROCEDURE — 99000 SPECIMEN HANDLING OFFICE-LAB: CPT | Performed by: PATHOLOGY

## 2024-03-11 PROCEDURE — 83001 ASSAY OF GONADOTROPIN (FSH): CPT | Performed by: INTERNAL MEDICINE

## 2024-03-11 PROCEDURE — 36415 COLL VENOUS BLD VENIPUNCTURE: CPT | Performed by: PATHOLOGY

## 2024-03-11 PROCEDURE — 99204 OFFICE O/P NEW MOD 45 MIN: CPT | Performed by: PODIATRIST

## 2024-03-11 PROCEDURE — 84146 ASSAY OF PROLACTIN: CPT | Performed by: INTERNAL MEDICINE

## 2024-03-11 PROCEDURE — 84305 ASSAY OF SOMATOMEDIN: CPT | Performed by: INTERNAL MEDICINE

## 2024-03-11 RX ORDER — CICLOPIROX OLAMINE 7.7 MG/G
CREAM TOPICAL 2 TIMES DAILY
Qty: 90 G | Refills: 5 | Status: SHIPPED | OUTPATIENT
Start: 2024-03-11

## 2024-03-11 NOTE — PROGRESS NOTES
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    Obesity     Pyelonephritis, unspecified 1992    abstract    Rheumatoid arteritis (H)     Seronegative inflammatory arthritis 06/17/2022    Type 2 diabetes mellitus (H)     Unspecified symptom associated with female genital organs 05/07/1999    chronic pelvic pain abstract     Patient Active Problem List   Diagnosis    Seronegative inflammatory arthritis    Neck pain    Bilateral low back pain without sciatica    Pelvic pain in female    Diabetes mellitus, type 2 (H)    Morbid obesity (H)     Past Surgical History:   Procedure Laterality Date    CHOLECYSTECTOMY      COLON SURGERY      Left hemicolectomy for diverticulosis    CYSTOSCOPY,+URETEROSCOPY      abstract    ZZHC EXCIS/CURET BENIGN ELBOW LESN  10/26/1987    (R) humerus abstract     Social History     Socioeconomic History    Marital status: Single     Spouse name: Not on file    Number of children: Not on file    Years of education: Not on file    Highest education level: Not on file   Occupational History    Not on file   Tobacco Use    Smoking status: Never    Smokeless tobacco: Never   Substance and Sexual Activity    Alcohol use: Yes     Comment: RARE    Drug use: No    Sexual activity: Not on file   Other Topics Concern     Service Not Asked    Blood Transfusions Not Asked    Caffeine Concern Not Asked    Occupational Exposure Not Asked    Hobby Hazards Not Asked    Sleep Concern Not Asked    Stress Concern Not Asked    Weight Concern Not Asked    Special Diet  Not Asked    Back Care Not Asked    Exercise No    Bike Helmet Not Asked    Seat Belt No    Self-Exams No   Social History Narrative    Womens Health Kit given.         Social Determinants of Health     Financial Resource Strain: Not on file   Food Insecurity: Not on file   Transportation Needs: Not on file   Physical Activity: Not on file   Stress: Not on file   Social Connections: Not on file   Interpersonal Safety: Not on file   Housing Stability: Not on file     Family History   Problem Relation Age of Onset    Hypertension Father         abstract    Cancer Paternal Aunt         gallbladder cancer abstract           Lab Results   Component Value Date    A1C 7.0 02/06/2023   Last Comprehensive Metabolic Panel:  Lab Results   Component Value Date     02/27/2024    POTASSIUM 3.6 02/27/2024    CHLORIDE 104 02/27/2024    CO2 20 (L) 02/27/2024    ANIONGAP 14 02/27/2024     (H) 02/27/2024    BUN 13.2 02/27/2024    CR 0.83 02/27/2024    GFRESTIMATED 87 02/27/2024    CARMEN 9.1 02/27/2024             Subjective findings- 48-year-old presents for foot insoles.  Relates she is Diabetic, does get some tingling in her feet, no ulcers or sores in the past, does not wear Diabetic shoes, relates to using moisturizing lotion on her feet, relates to no injuries.  She relates she has discoloration of her big toenails and had the  try scrape some of it off but is still there.    Objective findings- DP and PT are 2 out of 4 bilaterally.  Has dorsally contracted digits 2 through 5 bilaterally, has dorsal lateral fifth MPJ prominence with cavovarus fifth toe bilaterally.  Has discoloration and dystrophy of the Hallux nails bilaterally and minimally on the right fifth toenail.  There is no erythema, no drainage, no odor, no calor, no pain on palpation, no gross tendon voids bilaterally.  Has decreased ankle joint dorsiflexion right versus left.  Sharp/dull is intact with 5.07 Symes Raina monofilament  bilaterally, proprioception is intact bilaterally, deep tendon reflexes are intact bilaterally.    Assessment and plan- Diabetes with Hammertoes, Neuropathy symptoms with protective sensation intact.  Onychomycosis.  Diagnosis and treatment options discussed with the patient.  Patient is advised on stretching.  Prescription for Diabetic shoes and inserts given and patient is given the phone number address to orthotics and prosthetics lab to get these.  Prescription for Loprox cream given use discussed with the patient.  Continue moisturizing lotion.  Return to clinic and see me in 3 months.                            Moderate level of medical decision making.

## 2024-03-12 ENCOUNTER — VIRTUAL VISIT (OUTPATIENT)
Dept: RHEUMATOLOGY | Facility: CLINIC | Age: 49
End: 2024-03-12
Attending: INTERNAL MEDICINE
Payer: MEDICARE

## 2024-03-12 ENCOUNTER — THERAPY VISIT (OUTPATIENT)
Dept: PHYSICAL THERAPY | Facility: CLINIC | Age: 49
End: 2024-03-12
Payer: MEDICARE

## 2024-03-12 ENCOUNTER — DOCUMENTATION ONLY (OUTPATIENT)
Dept: ENDOCRINOLOGY | Facility: CLINIC | Age: 49
End: 2024-03-12

## 2024-03-12 DIAGNOSIS — F41.9 ANXIETY: ICD-10-CM

## 2024-03-12 DIAGNOSIS — E27.40 ADRENAL INSUFFICIENCY (H): ICD-10-CM

## 2024-03-12 DIAGNOSIS — R10.2 PELVIC PAIN IN FEMALE: Primary | ICD-10-CM

## 2024-03-12 DIAGNOSIS — M54.50 CHRONIC BILATERAL LOW BACK PAIN WITHOUT SCIATICA: ICD-10-CM

## 2024-03-12 DIAGNOSIS — M06.09 SERONEGATIVE RHEUMATOID ARTHRITIS OF MULTIPLE SITES (H): Primary | ICD-10-CM

## 2024-03-12 DIAGNOSIS — M54.2 NECK PAIN: ICD-10-CM

## 2024-03-12 DIAGNOSIS — G89.29 CHRONIC BILATERAL LOW BACK PAIN WITHOUT SCIATICA: ICD-10-CM

## 2024-03-12 LAB — IGF-I BLD-MCNC: 159 NG/ML (ref 62–243)

## 2024-03-12 PROCEDURE — 97012 MECHANICAL TRACTION THERAPY: CPT | Mod: GP | Performed by: PHYSICAL THERAPIST

## 2024-03-12 PROCEDURE — 97140 MANUAL THERAPY 1/> REGIONS: CPT | Mod: GP | Performed by: PHYSICAL THERAPIST

## 2024-03-12 NOTE — Clinical Note
3/12/2024       RE: Laxmi Ya  [address] [place] [id number]     Dear Colleague,    Thank you for referring your patient, Laxmi Ya, to the [facility] [place] at [facility]. Please see a copy of my visit note below.    Medication Therapy Management (MTM) Encounter    ASSESSMENT:                            Medication Adherence/Access: {adherencechoices:570070}    ***:  ***      PLAN:                            ***    Follow-up: {followuptest2:517289}    SUBJECTIVE/OBJECTIVE:                          Laxmi Ya is a 48 year old female called for a follow-up visit.       Reason for visit: Questions about anxiety medications    Allergies/ADRs: Reviewed in chart  Past Medical History: Reviewed in chart  Tobacco: She reports that she has never smoked. She has never used smokeless tobacco.  Alcohol: {ALCOHOL CONSUMPTION HX:222082}  {Social and Goals:287777}    Medication Adherence/Access: {fumedadherence:855687}    Rheumatoid Arthritis:   Actemra 800 mg infusion monthly (last infusion 12/27/23)   Diclofenac 1% gel as needed (uses once per day a few times per week)     Reports the lower methylprednisolone dose did not seem to help lower blood sugars post infusion. Still had readings in the 300s post infusion. Next infusion scheduled for 1/24/24 - wondering if she needs to postpone her infusion due to her illness.     Liver Function Studies -   Recent Labs   Lab Test 01/30/24  1321   PROTTOTAL 6.0*   ALBUMIN 4.0   BILITOTAL 1.0   ALKPHOS 45   AST 25   ALT 48     CBC RESULTS:   Recent Labs   Lab Test 01/30/24  1321   WBC 12.5*   RBC 4.76   HGB 14.3   HCT 41.7   MCV 88   MCH 30.0   MCHC 34.3   RDW 13.1        Adrenal Insufficiency:  Hydrocortisone 5 mg twice daily     Anxiety:  No current medications    Today's Vitals: There were no vitals taken for this visit.  ----------------  {JOANN?:496867}    I spent {mtm total time  "3:268407} with this patient today. { :259400}. A copy of the visit note was provided to the patient's provider(s).    A summary of these recommendations {GIVEN/NOT GIVEN:423686}.    ***    Telemedicine Visit Details  Type of service:  {telemedvisitmtm:489036::\"Telephone visit\"}  Start Time: {video/phone visit start time:152948}  End Time: {video/phone visit end time:152948}     Medication Therapy Recommendations  No medication therapy recommendations to display       Medication Therapy Management (MTM) Encounter    ASSESSMENT:                            Medication Adherence/Access: No issues identified    Rheumatoid Arthritis: Advised patient to contact rheumatology clinic about ongoing symptoms prior to starting medrol pack. Would benefit from continuing Actemra infusions monthly.    Adrenal Insufficiency: Would benefit from continued follow up with endocrinology to monitor hydrocortisone.    Anxiety: Discussed drug interaction between benzodiazepines and opioids and risk of respiratory depression with this combination. Do not recommend restarting low dose lorazepam. Instead recommend discussing hydroxyzine as needed for anxiety symptoms as needed.    PLAN:                            1. Continue monthly Actemra infusions. Please contact the rheumatology clinic about your new joint pain symptoms for treatment recommendations.    2. Talk to your primary care doctor about using hydroxyzine as needed for your anxiety symptoms. I do not recommend restarting low dose lorazepam.    Follow-up: {followuptest2:397778}    SUBJECTIVE/OBJECTIVE:                          Laxmi Ya is a 48 year old female called for a follow-up visit.       Reason for visit: Questions about anxiety medications    Allergies/ADRs: Reviewed in chart  Past Medical History: Reviewed in chart  Tobacco: She reports that she has never smoked. She has never used smokeless tobacco.  Alcohol: Less than 1 beverages / week    Medication Adherence/Access: " no issues reported    Rheumatoid Arthritis:   Actemra 800 mg infusion monthly (last infusion 2/27/24)   Diclofenac 1% gel as needed (uses once per day a few times per week)  Oxycodone 10 mg 2-4 times daily as needed     Reports she is having some increased pain in her back, hands, neck, and knees. Feels these areas are also swollen. Thinks she may need another Medrol dose pack - thinks she has a refill for that at the pharmacy. Tolerating Actemra well - no side effects noted.     Liver Function Studies -   Recent Labs   Lab Test 01/30/24  1321   PROTTOTAL 6.0*   ALBUMIN 4.0   BILITOTAL 1.0   ALKPHOS 45   AST 25   ALT 48     CBC RESULTS:   Recent Labs   Lab Test 01/30/24  1321   WBC 12.5*   RBC 4.76   HGB 14.3   HCT 41.7   MCV 88   MCH 30.0   MCHC 34.3   RDW 13.1        Adrenal Insufficiency:  Hydrocortisone 5 mg twice daily     Recently started this medication - has not noticed much of a difference yet but is hopeful it may help with fatigue. No side effects noted.    Anxiety:  No current medications    Reports she has been having more feelings of anxiety and had an anxiety attack last week with SOB and feeling claustrophobic. Wondering what she could take to help with anxiety that would be safe with her other medications. Previously used low dose lorazepam for anxiety - hoping to use this as needed.    Today's Vitals: There were no vitals taken for this visit.  ----------------    I spent 30 minutes with this patient today. All changes were made via collaborative practice agreement with Oswaldo Lemus MD. A copy of the visit note was provided to the patient's provider(s).    A summary of these recommendations was sent via [employer] (TMG).    Yuliya Kaplan, Frances  Medication Therapy Management Pharmacist  [facility]  Phone: [phone]    Telemedicine Visit Details  Type of service:  Telephone visit  Start Time: 11:30 AM  End Time: 12:00 PM     Medication Therapy  Recommendations  Anxiety    Rationale: Untreated condition - Needs additional medication therapy - Indication   Recommendation: Start Medication - hydrOXYzine HCl 10 MG tablet - Discuss with PCP about using as needed for anxiety   Status: Patient Agreed - Adherence/Education                  Again, thank you for allowing me to participate in the care of your patient.      Sincerely,    DOMINGUEZ BAPTISTE [facility]

## 2024-03-12 NOTE — PROGRESS NOTES
Medicare requires that the MD/DO treating the patient for Diabetes answers all of the questions and signs the pended order for the patient to qualify for Diabetic shoes. Once the order is completed, please route the encounter back to sender.    Thank you for your help with this,  Franca Najera

## 2024-03-13 DIAGNOSIS — M13.80 SERONEGATIVE INFLAMMATORY ARTHRITIS: Primary | ICD-10-CM

## 2024-03-13 RX ORDER — METHYLPREDNISOLONE 4 MG
TABLET, DOSE PACK ORAL
Qty: 21 TABLET | Refills: 1 | Status: SHIPPED | OUTPATIENT
Start: 2024-03-13

## 2024-03-13 NOTE — TELEPHONE ENCOUNTER
[facility] Call Center    Phone Message    May a detailed message be left on voicemail: yes     Reason for Call: Medication Question or concern regarding medication   Prescription Clarification    Name of Medication:   methylPREDNISolone (MEDROL DOSEPAK) 4 MG tablet therapy pack     Prescribing Provider: Allan     Pharmacy:  [facility]/PHARMACY #[id number] - [place], [place] - [address]      What on the order needs clarification? Patient states she has R.A and is having a flare up. Patient states she is wanting to get a prescription put in for the medication listed above. Patient is wanting to get a call back if this can be done.  Please advise.       Action Taken: Message routed to:  [facility] Rheum    Travel Screening: Not Applicable

## 2024-03-13 NOTE — TELEPHONE ENCOUNTER
Follow up call to patient to update patient that Dr. Lemus sent a medrol pack to her pharmacy.    Patient verbalized understanding.    JESSICA MoraN, RN  RN Care Coordinator Rheumatology

## 2024-03-13 NOTE — TELEPHONE ENCOUNTER
Follow up call to patient. She discussed that her flare symptoms have been worsening over the last 3 weeks. All her joints hurt-knees, hips, hands.   Her next Actemra infusion is 3/26.    Patient is hoping Dr. Lemus would order a medrol pack.    JESSICA MoraN, RN  RN Care Coordinator Rheumatology

## 2024-03-13 NOTE — TELEPHONE ENCOUNTER
Symptoms are compatible with a flare of inflammatory arthritis.  Recommend tapering Medrol course.

## 2024-03-14 ENCOUNTER — ANCILLARY PROCEDURE (OUTPATIENT)
Dept: GENERAL RADIOLOGY | Facility: CLINIC | Age: 49
End: 2024-03-14
Attending: ORTHOPAEDIC SURGERY
Payer: MEDICARE

## 2024-03-14 ENCOUNTER — OFFICE VISIT (OUTPATIENT)
Dept: ORTHOPEDICS | Facility: CLINIC | Age: 49
End: 2024-03-14
Payer: MEDICARE

## 2024-03-14 DIAGNOSIS — G56.03 BILATERAL CARPAL TUNNEL SYNDROME: Primary | ICD-10-CM

## 2024-03-14 DIAGNOSIS — M79.644 BILATERAL THUMB PAIN: ICD-10-CM

## 2024-03-14 DIAGNOSIS — M25.532 BILATERAL WRIST PAIN: ICD-10-CM

## 2024-03-14 DIAGNOSIS — M25.531 BILATERAL WRIST PAIN: ICD-10-CM

## 2024-03-14 DIAGNOSIS — M79.645 BILATERAL THUMB PAIN: ICD-10-CM

## 2024-03-14 PROCEDURE — 20605 DRAIN/INJ JOINT/BURSA W/O US: CPT | Mod: 50 | Performed by: ORTHOPAEDIC SURGERY

## 2024-03-14 PROCEDURE — 77002 NEEDLE LOCALIZATION BY XRAY: CPT | Performed by: ORTHOPAEDIC SURGERY

## 2024-03-14 PROCEDURE — 99207 SMALL JOINT INJECTION/ARTHROCENTESIS: CPT | Mod: LT | Performed by: ORTHOPAEDIC SURGERY

## 2024-03-14 RX ADMIN — LIDOCAINE HYDROCHLORIDE 1 ML: 10 INJECTION, SOLUTION EPIDURAL; INFILTRATION; INTRACAUDAL; PERINEURAL at 11:32

## 2024-03-14 RX ADMIN — TRIAMCINOLONE ACETONIDE 20 MG: 40 INJECTION, SUSPENSION INTRA-ARTICULAR; INTRAMUSCULAR at 11:32

## 2024-03-14 RX ADMIN — LIDOCAINE HYDROCHLORIDE 1 ML: 10 INJECTION, SOLUTION EPIDURAL; INFILTRATION; INTRACAUDAL; PERINEURAL at 11:33

## 2024-03-14 RX ADMIN — TRIAMCINOLONE ACETONIDE 20 MG: 40 INJECTION, SUSPENSION INTRA-ARTICULAR; INTRAMUSCULAR at 11:33

## 2024-03-14 NOTE — NURSING NOTE
Reason For Visit:   Chief Complaint   Patient presents with    RECHECK     Follow-up Bilateral thumb STT joint steroid injections       Primary MD: Amol Juares  Ref. MD: Katja    Age: 48 year old    ?  No      There were no vitals taken for this visit.      Pain Assessment  Patient Currently in Pain: Yes  0-10 Pain Scale: 10  Primary Pain Location: Finger (Comment which one) (Bilateral thumbs)  Pain Descriptors: Constant, Aching, Dull, Sharp    Hand Dominance Evaluation  Hand Dominance: Right      force  R hand pincher force: 4.082 kg (9 lb)  R hand  level 2 force: 10.4 kg (23 lb)  L hand pincher force: 4.082 kg (9 lb)  L hand  level  2 force: 4.536 kg (10 lb)    QuickDASH Assessment      2/13/2024     1:59 PM   QuickDASH Main   1. Open a tight or new jar Unable   2. Do heavy household chores (e.g., wash walls, floors) Unable   3. Carry a shopping bag or briefcase Unable   4. Wash your back Severe difficulty   5. Use a knife to cut food Unable   6. Recreational activities in which you take some force or impact through your arm, shoulder or hand (e.g., golf, hammering, tennis, etc.) Unable   7. During the past week, to what extent has your arm, shoulder or hand problem interfered with your normal social activities with family, friends, neighbours or groups Extremely   8. During the past week, were you limited in your work or other regular daily activities as a result of your arm, shoulder or hand problem Unable   9. Arm, shoulder or hand pain Extreme   10.Tingling (pins and needles) in your arm,shoulder or hand Extreme   11. During the past week, how much difficulty have you had sleeping because of the pain in your arm, shoulder or hand Severe difficulty   Quickdash Ability Score 95.45          Current Outpatient Medications   Medication Sig Dispense Refill    albuterol (PROAIR HFA/PROVENTIL HFA/VENTOLIN HFA) 108 (90 Base) MCG/ACT inhaler Inhale 2 puffs into the lungs every 6 hours as  needed for shortness of breath, wheezing or cough      Artificial Tear Solution (SOOTHE XP) SOLN as needed      atenolol (TENORMIN) 50 MG tablet Take 50 mg by mouth daily      benzoyl peroxide 5 % external liquid Use daily as directed. Preferred bdrand: Medpura 236 mL 11    blood glucose (NO BRAND SPECIFIED) lancets standard Use to test blood sugar 2 times daily or as directed. 100 lancet. 1    blood glucose (NO BRAND SPECIFIED) test strip Use to test blood sugar 2 times daily or as directed. 100 strip 1    blood glucose monitoring (NO BRAND SPECIFIED) meter device kit Use to test blood sugar 2 times daily or as directed. 1 kit 0    ciclopirox (LOPROX) 0.77 % cream Apply topically 2 times daily To affected toenails. 90 g 5    diclofenac (VOLTAREN) 1 % topical gel Apply topically 4 times daily as needed      dupilumab (DUPIXENT) 300 MG/2ML prefilled pen Inject 2 mLs (300 mg) Subcutaneous every 14 days 4 mL 5    EPINEPHrine (ANY BX GENERIC EQUIV) 0.3 MG/0.3ML injection 2-pack Inject 0.3 mg into the muscle as needed      fexofenadine (ALLEGRA) 180 MG tablet Take 1 tablet (180 mg) by mouth daily 90 tablet 2    fidaxomicin (DIFICID) 200 MG tablet Take 200 mg by mouth as needed      Fluticasone-Umeclidin-Vilanterol (TRELEGY ELLIPTA) 200-62.5-25 MCG/ACT oral inhaler Inhale 1 puff into the lungs daily 1 each 5    fluticasone-vilanterol (BREO ELLIPTA) 200-25 MCG/ACT inhaler Inhale 1 puff into the lungs daily      hydrocortisone (CORTEF) 5 MG tablet Take 1 tablet (5 mg) by mouth 2 times daily 60 tablet 3    insulin glargine (LANTUS PEN) 100 UNIT/ML pen Inject 20 Units Subcutaneous daily      insulin lispro (HUMALOG KWIKPEN) 100 UNIT/ML (1 unit dial) KWIKPEN Inject 5 Units Subcutaneous 3 times daily (before meals) Plus 1:25 correction scale if BG >150      insulin NPH (HUMULIN N KWIKPEN) 100 UNIT/ML injection Inject 40 Units Subcutaneous every evening      Lancets (ONETOUCH DELICA PLUS [id number]) MISC CHECK BLOOD GLUCOSE 3  TIMES DAILY, ADJUSTING MEDICATION, DX CODE E 11.9, ON INSULIN      lidocaine (LIDODERM) 5 % patch as needed   1    losartan (COZAAR) 100 MG tablet Take 100 mg by mouth daily      methylPREDNISolone (MEDROL DOSEPAK) 4 MG tablet therapy pack Take per package instructions. Take once a day by mouth 21 tablet 1    methylPREDNISolone (MEDROL DOSEPAK) 4 MG tablet therapy pack Take per package instructions. Take once a day by mouth 21 tablet 0    methylPREDNISolone (MEDROL DOSEPAK) 4 MG tablet therapy pack Take per package instructions. Take once a day by mouth 21 tablet 0    mometasone (NASONEX) 50 MCG/ACT nasal spray Spray 2 sprays into both nostrils daily 17 g 11    mometasone-formoterol (DULERA) 200-5 MCG/ACT inhaler Inhale 1 puff into the lungs daily      Montelukast Sodium (SINGULAIR PO) Take 10 mg by mouth At Bedtime       multivitamin w/minerals (THERA-VIT-M) tablet Take 1 tablet by mouth daily      norethindrone (MICRONOR) 0.35 MG tablet Take 0.35 mg by mouth      nystatin (MYCOSTATIN) 123199 UNIT/ML suspension       olopatadine (PATADAY) 0.7 % ophthalmic solution Apply 1 drop to eye daily PRN      OMEPRAZOLE PO Take 40 mg by mouth 2 times daily       oxyCODONE IR (ROXICODONE) 10 MG tablet Take 10 mg by mouth      rifaximin (XIFAXAN) 550 MG TABS tablet Take 200 mg by mouth 3 times daily Take for 10 days, has on had as needed for small bowel bacteria overgrowh      rosuvastatin (CRESTOR) 5 MG tablet Take 2 tablets by mouth daily      spironolactone-HCTZ (ALDACTAZIDE) 25-25 MG tablet Take 1 tablet by mouth daily      STELARA 45 MG/0.5ML SOSY Inject 45 mg Subcutaneous once      tocilizumab (ACTEMRA) 400 MG/20ML Inject 800 mg into the vein every 28 days         Allergies   Allergen Reactions    Polyethylene Glycol Rash    Codeine      Other reaction(s): Gastrointestinal, GI intolerance, Vomiting  Vomiting      Hydrocodone Nausea and Vomiting    Morphine Nausea and Vomiting    Sulfasalazine      Other reaction(s): GI  intolerance  Has tried and had nausa.    Tramadol Nausea and Vomiting     Other reaction(s): Gastrointestinal, Other (see comments)  Nausea and vomiting   unknown      Gluten Meal Other (See Comments) and Rash     Other reaction(s): Gastrointestinal, GI intolerance  Dizziness, tired, rash, stomach cramps, thrush, mouth sores  Dizziness, tired, rash, stomach cramps, thrush, mouth sores      Propylene Glycol Dizziness, Nausea and Vomiting, Nausea and Rash       DUY RESENDEZ, ATC

## 2024-03-14 NOTE — PROGRESS NOTES
Office Visit    March 14, 2024  [facility] [place]     Twila Petit MD  Orthopaedic Surgery Bilateral STT arthritis  Dx RECHECK   Reason for Visit     Progress Notes  Twila Petit MD (Physician)  Orthopaedic Surgery  Diagnosis: bilateral thumb CMC/STT arthritis, bilateral carpal tunnel syndrome    Treatment:   February 29, 2024 bilateral carpal tunnel steroid injection 20 mg  12/27/23 bilateral carpal tunnel steroid injection 20 mg (Dr Hyacinth Pickering)  12/21/23 bilateral STT injections  11/1/2023: bilateral carpal tunnel steroid injection (Dr. Eligio Pickering)  10/18/2023: Bilateral thumb CMC steroid injection (Dr. Eligio Pickering)  9/20/2023: bilateral STT steroid injection (Dr. Eligio Pickering)  5/4/2023: bilateral carpal tunnel steroid injection (Dr. Petit)  2/16/2023: bilateral STT steroid injection (Dr. Petit)  12/13/2022: bilateral carpal tunnel steroid injection (Dr. Sullivan)  10/27/2022: bilateral STT steroid injections (Dr. Petit)  9/13/2022: bilateral carpal tunnel steroid injection (Dr. Sullivan)  6/30/2022: bilateral STT steroid injections (Dr. Petit)  Bilateral carpal tunnel steroid injections (outside)     Discussion was held with the patient regarding her multiple injections and her present symptoms.  She feels that her symptoms are worse for bilateral thumbs and requests a repeat injection.  I saw her several weeks ago and did a carpal tunnel injection.  This has helped with the tingling and numbness.  She continues with her rheumatoid infusion treatments.  She also will be starting a Medrol Dosepak.  She feels her thumb pain at the base is worse and would like to have injections done today if possible.          PROCEDURE:  After written consent, verifying site and side, a sterile Chlora- prep was performed for the injection site.  C-arm guidance was used for placement of a 25-gauge needle into the right thumb STT joint with 1%  Lidocaine.  One half mL of Kenalog (20 mg) and 1 mL of lidocaine was injected under fluoroscopic guidance with good relief of pain. Patient tolerated the procedure well.  Identical procedure was carried out on the left thumb STT joint.  No complications.  Follow up instructions were given.           Twila Petit MD   Hand and Upper Extremity Specialist  [facility]

## 2024-03-14 NOTE — LETTER
3/14/2024         RE: Laxmi Ya  [address] [facility] [address]        Dear Colleague,    Thank you for referring your patient, Laxmi Ya, to the [facility]. Please see a copy of my visit note below.    Office Visit    March 14, 2024  [facility]     Twila Petit MD  Orthopaedic Surgery Bilateral STT arthritis  Dx RECHECK   Reason for Visit     Progress Notes  Twila Petit MD (Physician)  Orthopaedic Surgery  Diagnosis: bilateral thumb CMC/STT arthritis, bilateral carpal tunnel syndrome    Treatment:   February 29, 2024 bilateral carpal tunnel steroid injection 20 mg  12/27/23 bilateral carpal tunnel steroid injection 20 mg (Dr Hyacinth Pickering)  12/21/23 bilateral STT injections  11/1/2023: bilateral carpal tunnel steroid injection (Dr. Eligio Pickering)  10/18/2023: Bilateral thumb CMC steroid injection (Dr. Eligio Pickering)  9/20/2023: bilateral STT steroid injection (Dr. Eligio Pickering)  5/4/2023: bilateral carpal tunnel steroid injection (Dr. Petit)  2/16/2023: bilateral STT steroid injection (Dr. Petit)  12/13/2022: bilateral carpal tunnel steroid injection (Dr. Sullivan)  10/27/2022: bilateral STT steroid injections (Dr. Petit)  9/13/2022: bilateral carpal tunnel steroid injection (Dr. Sullivan)  6/30/2022: bilateral STT steroid injections (Dr. Petit)  Bilateral carpal tunnel steroid injections (outside)     Discussion was held with the patient regarding her multiple injections and her present symptoms.  She feels that her symptoms are worse for bilateral thumbs and requests a repeat injection.  I saw her several weeks ago and did a carpal tunnel injection.  This has helped with the tingling and numbness.  She continues with her rheumatoid infusion treatments.  She also will be starting a Medrol Dosepak.  She feels her thumb pain at the base is worse and would like to have injections  done today if possible.          PROCEDURE:  After written consent, verifying site and side, a sterile Chlora- prep was performed for the injection site.  C-arm guidance was used for placement of a 25-gauge needle into the right thumb STT joint with 1% Lidocaine.  One half mL of Kenalog (20 mg) and 1 mL of lidocaine was injected under fluoroscopic guidance with good relief of pain. Patient tolerated the procedure well.  Identical procedure was carried out on the left thumb STT joint.  No complications.  Follow up instructions were given.           Twila Petit MD   Hand and Upper Extremity Specialist  [facility]

## 2024-03-14 NOTE — PROGRESS NOTES
Small Joint Injection/Arthrocentesis    Date/Time: 3/14/2024 11:32 AM    Performed by: Twila Petit MD  Authorized by: Twila Petit MD  Indications:  Pain  Needle Size:  25 G  Guidance: fluoroscopy       Location:  Thumb   Location comment:  Right Thumb STT joint                      Medications:  20 mg triamcinolone 40 MG/ML; 1 mL lidocaine (PF) 1 %        Outcome:  Tolerated well, no immediate complications  Procedure discussed: discussed risks, benefits, and alternatives    Consent Given by:  Patient  Timeout: timeout called immediately prior to procedure    Prep: patient was prepped and draped in usual sterile fashion      Small Joint Injection/Arthrocentesis    Date/Time: 3/14/2024 11:33 AM    Performed by: Twila Petit MD  Authorized by: Twila Petit MD  Indications:  Pain  Needle Size:  25 G  Guidance: fluoroscopy       Location:  Thumb   Location comment:  Left thumb STT joint                       Medications:  1 mL lidocaine (PF) 1 %; 20 mg triamcinolone 40 MG/ML        Outcome:  Tolerated well, no immediate complications  Procedure discussed: discussed risks, benefits, and alternatives    Consent Given by:  Patient  Timeout: timeout called immediately prior to procedure    Prep: patient was prepped and draped in usual sterile fashion          [facility] [address] [id number] [phone]  Dept: [phone]  ______________________________________________________________________________    Patient: Laxmi Ya   : 1975   MRN: [id number]   2024    INVASIVE PROCEDURE SAFETY CHECKLIST    Date: 3/14/2024   Procedure:Bilateral Thumb STT Joint Steroid injection  Patient Name: Laxmi Ya  MRN: [id number]  YOB: 1975    Action: Complete sections as appropriate. Any discrepancy results in a HARD COPY until resolved.     PRE PROCEDURE:  Patient ID  verified with 2 identifiers (name and  or MRN): Yes  Procedure and site verified with patient/designee (when able): Yes  Accurate consent documentation in medical record: Yes  H&P (or appropriate assessment) documented in medical record: Yes  H&P must be up to 20 days prior to procedure and updates within 24 hours of procedure as applicable: Yes  Relevant diagnostic and radiology test results appropriately labeled and displayed as applicable: NA  Procedure site(s) marked with provider initials: NA    TIMEOUT:  Time-Out performed immediately prior to starting procedure, including verbal and active participation of all team members addressing the following:Yes  * Correct patient identify  * Confirmed that the correct side and site are marked  * An accurate procedure consent form  * Agreement on the procedure to be done  * Correct patient position  * Relevant images and results are properly labeled and appropriately displayed  * The need to administer antibiotics or fluids for irrigation purposes during the procedure as applicable   * Safety precautions based on patient history or medication use    DURING PROCEDURE: Verification of correct person, site, and procedures any time the responsibility for care of the patient is transferred to another member of the care team.       The following medications were given:         Prior to injection, verified patient identity using patient's name and date of birth.  Due to injection administration, patient instructed to remain in clinic for 15 minutes  afterwards, and to report any adverse reaction to me immediately.    Joint injection was performed.    Medication Name: Kenalog 40mg/mL 20 mg used x2 (2 separate 1mL vials used) NDC [address]-4  Drug Amount Wasted:  Yes: 40 mg/ml   Vial/Syringe: Single dose vial  Expiration Date:  25    Medication Name Lidocaine 1% NDC 72249-569-82    Scribed by DUY RESENDEZ, TIM for Dr. Petit on 2024 at 11:38a based on the  provider's statements to me.     DUY RESENDEZ, ATC

## 2024-03-15 NOTE — PROGRESS NOTES
I have seen this patient for adrenal evaluations, but have not been active with her diabetes management.  This has been done by her PCP Dr. Amol Juares/[facility].  Please route the diabetic shoes (or orthotics) Rx request to him.    MCKAY Leroy MD, MS  Endocrinology  [facility]

## 2024-03-18 DIAGNOSIS — K76.0 NAFLD (NONALCOHOLIC FATTY LIVER DISEASE): Primary | ICD-10-CM

## 2024-03-18 NOTE — PATIENT INSTRUCTIONS
"Recommendations from today's MTM visit:                                                       1. Continue monthly Actemra infusions. Please contact the rheumatology clinic about your new joint pain symptoms for treatment recommendations.    2. Talk to your primary care doctor about using hydroxyzine as needed for your anxiety symptoms. I do not recommend restarting low dose lorazepam.    Follow-up: Return in about 3 months (around 6/12/2024) for MTM Pharmacist Visit.    It was great speaking with you today.  I value your experience and would be very thankful for your time in providing feedback in our clinic survey. In the next few days, you may receive an email or text message from [employer] with a link to a survey related to your  clinical pharmacist.\"     To schedule another MTM appointment, please call the clinic directly or you may call the MTM scheduling line at [phone].    My Clinical Pharmacist's contact information:                                                      Please feel free to contact me with any questions or concerns you have.      Yuliya Kaplan, Frances  Medication Therapy Management Pharmacist  [facility]  Phone: [phone]     "

## 2024-03-18 NOTE — PROGRESS NOTES
Medication Therapy Management (MTM) Encounter    ASSESSMENT:                            Medication Adherence/Access: No issues identified    Rheumatoid Arthritis: Advised patient to contact rheumatology clinic about ongoing symptoms prior to starting medrol pack. Would benefit from continuing Actemra infusions monthly.    Adrenal Insufficiency: Would benefit from continued follow up with endocrinology to monitor hydrocortisone.    Anxiety: Discussed drug interaction between benzodiazepines and opioids and risk of respiratory depression with this combination. Do not recommend restarting low dose lorazepam. Instead recommend discussing hydroxyzine as needed for anxiety symptoms as needed.    PLAN:                            1. Continue monthly Actemra infusions. Please contact the rheumatology clinic about your new joint pain symptoms for treatment recommendations.    2. Talk to your primary care doctor about using hydroxyzine as needed for your anxiety symptoms. I do not recommend restarting low dose lorazepam.    Follow-up: Return in about 3 months (around 6/12/2024) for MTM Pharmacist Visit.    SUBJECTIVE/OBJECTIVE:                          Laxmi Ya is a 48 year old female called for a follow-up visit.       Reason for visit: Questions about anxiety medications    Allergies/ADRs: Reviewed in chart  Past Medical History: Reviewed in chart  Tobacco: She reports that she has never smoked. She has never used smokeless tobacco.  Alcohol: Less than 1 beverages / week    Medication Adherence/Access: no issues reported    Rheumatoid Arthritis:   Actemra 800 mg infusion monthly (last infusion 2/27/24)   Diclofenac 1% gel as needed (uses once per day a few times per week)  Oxycodone 10 mg 2-4 times daily as needed     Reports she is having some increased pain in her back, hands, neck, and knees. Feels these areas are also swollen. Thinks she may need another Medrol dose pack - thinks she has a refill for that at the  pharmacy. Tolerating Actemra well - no side effects noted.     Liver Function Studies -   Recent Labs   Lab Test 01/30/24  1321   PROTTOTAL 6.0*   ALBUMIN 4.0   BILITOTAL 1.0   ALKPHOS 45   AST 25   ALT 48     CBC RESULTS:   Recent Labs   Lab Test 01/30/24  1321   WBC 12.5*   RBC 4.76   HGB 14.3   HCT 41.7   MCV 88   MCH 30.0   MCHC 34.3   RDW 13.1        Adrenal Insufficiency:  Hydrocortisone 5 mg twice daily     Recently started this medication - has not noticed much of a difference yet but is hopeful it may help with fatigue. No side effects noted.    Anxiety:  No current medications    Reports she has been having more feelings of anxiety and had an anxiety attack last week with SOB and feeling claustrophobic. Wondering what she could take to help with anxiety that would be safe with her other medications. Previously used low dose lorazepam for anxiety - hoping to use this as needed.    Today's Vitals: There were no vitals taken for this visit.  ----------------    I spent 30 minutes with this patient today. All changes were made via collaborative practice agreement with Oswaldo Lemus MD. A copy of the visit note was provided to the patient's provider(s).    A summary of these recommendations was sent via [employer].    Augustine ConnerD  Medication Therapy Management Pharmacist  [facility]  Phone: [phone]    Telemedicine Visit Details  Type of service:  Telephone visit  Start Time: 11:30 AM  End Time: 12:00 PM     Medication Therapy Recommendations  Anxiety    Rationale: Untreated condition - Needs additional medication therapy - Indication   Recommendation: Start Medication - hydrOXYzine HCl 10 MG tablet - Discuss with PCP about using as needed for anxiety   Status: Patient Agreed - Adherence/Education

## 2024-03-21 ENCOUNTER — THERAPY VISIT (OUTPATIENT)
Dept: OCCUPATIONAL THERAPY | Facility: CLINIC | Age: 49
End: 2024-03-21
Payer: MEDICARE

## 2024-03-21 DIAGNOSIS — M79.645 BILATERAL THUMB PAIN: ICD-10-CM

## 2024-03-21 DIAGNOSIS — M79.644 BILATERAL THUMB PAIN: ICD-10-CM

## 2024-03-21 DIAGNOSIS — G56.03 BILATERAL CARPAL TUNNEL SYNDROME: ICD-10-CM

## 2024-03-21 PROBLEM — M79.641 BILATERAL HAND PAIN: Status: ACTIVE | Noted: 2023-06-14

## 2024-03-21 PROBLEM — M79.642 BILATERAL HAND PAIN: Status: ACTIVE | Noted: 2023-06-14

## 2024-03-21 PROCEDURE — 97165 OT EVAL LOW COMPLEX 30 MIN: CPT | Mod: GO | Performed by: OCCUPATIONAL THERAPIST

## 2024-03-21 PROCEDURE — 97140 MANUAL THERAPY 1/> REGIONS: CPT | Mod: GO | Performed by: OCCUPATIONAL THERAPIST

## 2024-03-21 PROCEDURE — 97110 THERAPEUTIC EXERCISES: CPT | Mod: GO | Performed by: OCCUPATIONAL THERAPIST

## 2024-03-21 NOTE — PROGRESS NOTES
OCCUPATIONAL THERAPY EVALUATION  Type of Visit: Evaluation    See electronic medical record for Abuse and Falls Screening details.    Subjective      Presenting condition or subjective complaint: B thumbs and wrists  Date of onset: 03/14/24    Relevant medical history: Arthritis; Asthma; Diabetes; High blood pressure; Incontinence; Overweight; Pain at night or rest; Rheumatoid arthritis; Significant weakness; Severe headaches   Dates & types of surgery:      Prior diagnostic imaging/testing results: X-ray ultrasound   Prior therapy history for the same diagnosis, illness or injury: Yes      Prior Level of Function  Transfers: Independent  Ambulation: Independent  ADL: Independent      Living Environment  Social support:     Type of home: House; 1 level   Stairs to enter the home: No       Ramp: No   Stairs inside the home: No       Help at home:    Equipment owned:       Employment: No Disabled  Hobbies/Interests:      Patient goals for therapy: Grasping a washcloth, holding toothbrush, holding shower nozzle, wiping counters      Objective:  Right hand dominant  Patient reports symptoms of pain, stiffness/loss of motion, weakness/loss of strength, edema, numbness, and tingling     Pain Level (Scale 0-10):   3/21/2024   At Rest 9/10   With Use 10/10     Pain Description:  Date 3/21/2024   Location B CMC, wrists, B palms and into IF, and MF, up B forearms through extensors up to elbows   Pain Quality Aching, Burning, Numb, Sharp, and Tingling   Frequency constant     Pain is worst  daytime, nighttime, or Wakes few times per week   Exacerbated by  Grasping items, using the hands   Relieved by rest and injections, splints   Progression Worsening     Posture  Normal    Edema  Mild  Through B CMCs    Sensation  Decreased Median Nerve distribution per pt report and Decreased Dorsal Radial Sensory Nerve distribution per pt report bilaterally    ROM  Pain Report: - none  + mild    ++ moderate    +++ severe   Wrist 3/21/2024  3/21/2024   AROM (PROM) R L   Extension 45 50   Flexion 20 30   RD 15 8   UD 20 25     Special Tests  Pain Report:  - none    + mild    ++ moderate    +++ severe    3/21/2024   Median Nerve Compression at Pronator -   Carpal Compression Test--Durkan Test (30 sec) L + 5 sec  R + 10 sec   Barragan Test for Lumbrical Incursion  (fist x 30 secs) + 25 sec Bilat   Tinels at Carpal Tunnel NT   Phalens + 25 sec bilat.     Tenderness:  Date 3/21/2024   R CMC of the Thumb NT d/t recent injection   L CMC of the Thumb NT d/t recent injection     Appearance:  Date 3/21/2024   Side B   Shoulder deformity is present over the CMC    Volar subluxation present    Edema over the CMC joint B mild   Noted collapse of MP into hyperextension during pinch      Thumb Range of Motion:  AROM (PROM)  Date 3/21/2024 3/21/2024   Side: R L   MCP ext 13 10   MCP flex 53 43   IP ext 0 0   IP flex 34 34   RABD     PABD         Neural Tension Testing  MNT: Median Neurodynamic Test (based on NARENDRA Bender's ULNT)   3/21/2024   0-5 Scale NT   Position:   0/5: Arm across abdomen in coronal plane  1/5: Depress shoulder, ER to neutral ABD shoulder to 45 degrees  2/5: ER shoulder to end range, keep elbow at 90 degrees  3/5: Extend elbow to 0 degrees  4/5: Fully supinate forearm  5/5: Extend wrist, fingers and thumb  Notes:  (+) indicates beyond grade level but less than MCC to next level  (-) indicates over MCC to level  S1  onset/change of patient's symptoms  S2 definite stop point based on patient's discomfort level    Strength   (Measured in pounds)  Pain Report: - none  + mild    ++ moderate    +++ severe    3/21/2024 3/21/2024   Trials R L   1  2  3 13+ 12+   Average       Lat Pinch 3/21/2024 3/21/2024   Trials R L   1  2  3 6.5+ 5+   Average       3 Pt Pinch 3/21/2024 3/21/2024   Trials R L   1  2  3 6+ 5+   Average       Palpation   Pain Report:  - none    + mild    ++ moderate    +++ severe    3/21/2024 3/21/2024   Side R L   LEP 10/10 10/10    Extensor wad 10/10 10/10   MEP 8/10 8/10   Flexor wad 8/10 8/10   Spiral groove 0/10 0/10   PIN 10/10 10/10   DSRN Tender to touch Tender to touch       Assessment & Plan   CLINICAL IMPRESSIONS  Medical Diagnosis: CMC OA, B CTS    Treatment Diagnosis: CMC OA, B CTS    Impression/Assessment: Pt is a 48 year old female presenting to Occupational Therapy due to B hand and arm pain.  The following significant findings have been identified: Impaired activity tolerance, Impaired ROM, Impaired sensation, Impaired strength, and Pain.  These identified deficits interfere with their ability to perform self care tasks, recreational activities, household chores, driving , household mobility,  yard work, and meal planning and preparation as compared to previous level of function.   Patient's limitations or Problem List includes: Pain, Decreased ROM/motion, Increased edema, Weakness, Sensory disturbance, Decreased , Decreased pinch, and Tightness in musculature of the bilateral elbow, wrist, hand, thumb, index finger, long finger, and ring finger which interferes with the patient's ability to perform Self Care Tasks (dressing, eating, bathing, hygiene/toileting), Sleep Patterns, Recreational Activities, Household Chores, and Driving  as compared to previous level of function.    Clinical Decision Making (Complexity):  Assessment of Occupational Performance: 5 or more Performance Deficits  Occupational Performance Limitations: bathing/showering, toileting, dressing, health management and maintenance, home establishment and management, meal preparation and cleanup, shopping, sleep, and leisure activities  Clinical Decision Making (Complexity): Low complexity    PLAN OF CARE  Treatment Interventions:  Modalities:  US, Paraffin, and Laser Light  Therapeutic Exercise:  AROM, PROM, Tendon Gliding, Blocking, Isotonics, Isometrics, and Stabilization  Neuromuscular re-education:  Nerve Gliding  Manual Techniques:  Joint  mobilization, Friction massage, Myofascial release, and Manual edema mobilization  Orthotic Fabrication:  Static    Long Term Goals   OT Goal 1  Goal Identifier: Household chores  Goal Description: Pt will  to open a tight new jar with mild difficulty  Rationale: In order to maximize safety and independence with performance of self-care activities  Target Date: 05/02/24      Frequency of Treatment: 1x per week  Duration of Treatment: 6 weeks     Education Assessment:       Risks and benefits of evaluation/treatment have been explained.   Patient/Family/caregiver agrees with Plan of Care.     Evaluation Time:    OT Eval, Low Complexity Minutes (84041): 25     Signing Clinician: SOL Bass [facility]                                                                                   OUTPATIENT OCCUPATIONAL THERAPY      PLAN OF TREATMENT FOR OUTPATIENT REHABILITATION   Patient's Last Name, First Name, LEONELABelenJENNBelen  FeltonLaxmi  JUDITH YOB: 1975   Provider's Name   LEONELA [facility]   Medical Record No.  [id number]     Onset Date: 03/14/24 Start of Care Date: 03/21/24     Medical Diagnosis:  CMC OA, B CTS      OT Treatment Diagnosis:  CMC OA, B CTS Plan of Treatment  Frequency/Duration:1x per week/6 weeks    Certification date from 03/21/24   To 05/02/24        See note for plan of treatment details and functional goals     Bettye Dalton OT                         I CERTIFY THE NEED FOR THESE SERVICES FURNISHED UNDER        THIS PLAN OF TREATMENT AND WHILE UNDER MY CARE     (Physician attestation of this document indicates review and certification of the therapy plan).              Referring Provider:  Twila Petit    Initial Assessment  See Epic Evaluation- 03/21/24

## 2024-03-22 ENCOUNTER — TELEPHONE (OUTPATIENT)
Dept: INFECTIOUS DISEASES | Facility: CLINIC | Age: 49
End: 2024-03-22
Payer: MEDICARE

## 2024-03-22 NOTE — TELEPHONE ENCOUNTER
[facility] Call Center    Phone Message    May a detailed message be left on voicemail: yes     Reason for Call: Call received from pt wondering if her 3/26/24 appt could be completed virtually instead of in-person? She has to drive from very far and is concerned about the snow.   Pt also noted on her [employer] account that this appt was scheduled as a follow-up appt and not as a new pt appt, and she wonders if this will leave enough time to discuss everything? She reports that not only is she a new pt to Dr. Hall, but she is coming in for a new issue that she has developed since she last saw Dr. Gonzales.     Action Taken: Other: Infectious Disease    Travel Screening: Not Applicable

## 2024-03-23 ENCOUNTER — LAB (OUTPATIENT)
Dept: LAB | Facility: CLINIC | Age: 49
End: 2024-03-23
Payer: MEDICARE

## 2024-03-23 DIAGNOSIS — R79.89 LOW SERUM CORTISOL LEVEL: ICD-10-CM

## 2024-03-23 DIAGNOSIS — K76.0 NAFLD (NONALCOHOLIC FATTY LIVER DISEASE): ICD-10-CM

## 2024-03-23 LAB
ALBUMIN SERPL BCG-MCNC: 4.4 G/DL (ref 3.5–5.2)
ALP SERPL-CCNC: 49 U/L (ref 40–150)
ALT SERPL W P-5'-P-CCNC: 58 U/L (ref 0–50)
ANION GAP SERPL CALCULATED.3IONS-SCNC: 12 MMOL/L (ref 7–15)
AST SERPL W P-5'-P-CCNC: 33 U/L (ref 0–45)
BILIRUB SERPL-MCNC: 0.6 MG/DL
BUN SERPL-MCNC: 11.6 MG/DL (ref 6–20)
CALCIUM SERPL-MCNC: 9.3 MG/DL (ref 8.6–10)
CHLORIDE SERPL-SCNC: 104 MMOL/L (ref 98–107)
CREAT SERPL-MCNC: 0.77 MG/DL (ref 0.51–0.95)
DEPRECATED HCO3 PLAS-SCNC: 23 MMOL/L (ref 22–29)
EGFRCR SERPLBLD CKD-EPI 2021: >90 ML/MIN/1.73M2
ERYTHROCYTE [DISTWIDTH] IN BLOOD BY AUTOMATED COUNT: 12.8 % (ref 10–15)
GLUCOSE SERPL-MCNC: 134 MG/DL (ref 70–99)
HCT VFR BLD AUTO: 44 % (ref 35–47)
HGB BLD-MCNC: 15.2 G/DL (ref 11.7–15.7)
INR PPP: 0.98 (ref 0.85–1.15)
MCH RBC QN AUTO: 30.2 PG (ref 26.5–33)
MCHC RBC AUTO-ENTMCNC: 34.5 G/DL (ref 31.5–36.5)
MCV RBC AUTO: 88 FL (ref 78–100)
PLATELET # BLD AUTO: 317 10E3/UL (ref 150–450)
POTASSIUM SERPL-SCNC: 3.9 MMOL/L (ref 3.4–5.3)
PROT SERPL-MCNC: 6.7 G/DL (ref 6.4–8.3)
RBC # BLD AUTO: 5.03 10E6/UL (ref 3.8–5.2)
SODIUM SERPL-SCNC: 139 MMOL/L (ref 135–145)
WBC # BLD AUTO: 10.3 10E3/UL (ref 4–11)

## 2024-03-23 PROCEDURE — 85027 COMPLETE CBC AUTOMATED: CPT | Performed by: PATHOLOGY

## 2024-03-23 PROCEDURE — 80053 COMPREHEN METABOLIC PANEL: CPT | Performed by: PATHOLOGY

## 2024-03-23 PROCEDURE — 36415 COLL VENOUS BLD VENIPUNCTURE: CPT | Performed by: PATHOLOGY

## 2024-03-23 PROCEDURE — 85610 PROTHROMBIN TIME: CPT | Performed by: PATHOLOGY

## 2024-03-25 ENCOUNTER — TELEPHONE (OUTPATIENT)
Dept: INFECTIOUS DISEASES | Facility: CLINIC | Age: 49
End: 2024-03-25

## 2024-03-25 ENCOUNTER — MYC MEDICAL ADVICE (OUTPATIENT)
Dept: INFECTIOUS DISEASES | Facility: CLINIC | Age: 49
End: 2024-03-25

## 2024-03-25 ENCOUNTER — VIRTUAL VISIT (OUTPATIENT)
Dept: GASTROENTEROLOGY | Facility: CLINIC | Age: 49
End: 2024-03-25
Attending: INTERNAL MEDICINE
Payer: MEDICARE

## 2024-03-25 DIAGNOSIS — K76.0 NAFLD (NONALCOHOLIC FATTY LIVER DISEASE): Primary | ICD-10-CM

## 2024-03-25 DIAGNOSIS — E66.01 MORBID OBESITY (H): ICD-10-CM

## 2024-03-25 PROCEDURE — 99213 OFFICE O/P EST LOW 20 MIN: CPT | Mod: 95 | Performed by: INTERNAL MEDICINE

## 2024-03-25 NOTE — TELEPHONE ENCOUNTER
Sent [employer] (1st Attempt) for the patient to call back and schedule the following:    Appointment type: Return  Provider: Janet  Return date: 03/27/2024  Specialty phone number: [phone]  Additional appointment(s) needed: na  Additonal Notes: Jeremy Alfaro   Put with Janet Wednesday 3/27 at 530 virtual not Hall on 3/26

## 2024-03-25 NOTE — TELEPHONE ENCOUNTER
Patient states she was told the appt 03/26/2024 cannot be done via video. Patient is wanting to get a call back to know why the appt cannot be done via video. Please advise.

## 2024-03-25 NOTE — TELEPHONE ENCOUNTER
No Answer for the patient to call back and schedule the following:    Appointment type: Return  Provider: Iasbel (3/26) or Janet (3/27)  Return date: See above  Specialty phone number: [phone]  Additional appointment(s) needed: na  Additonal Notes: Per Dominic   ID providers request that first visits are in person and we are really trying to do visits in person for continuity of care. Virtual appts are not the best way to do a visit.      Please inform the patient that it is recommended to do in person and that is just how our clinic prefers to do it with new visits.

## 2024-03-25 NOTE — PROGRESS NOTES
[facility]       Hepatology Clinic    Date of Service: 3/25/2024       Primary Care Provider: Amol Juares    History of Present Illness     Ms. Ya presents for follow-up of MASLD.  This was a video visit.    She has an existing diagnosis of MASLD in the setting of obesity and type 2 diabetes.  A prior MRI elastography and recent FibroScan did not show evidence of significant fibrosis.  She rarely drinks alcohol.  She reports no recent GI or hepatic symptoms.      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    Obesity     Pyelonephritis, unspecified 1992    abstract    Rheumatoid arteritis (H)     Seronegative inflammatory arthritis 06/17/2022    Type 2 diabetes mellitus (H)     Unspecified symptom associated with female genital organs 05/07/1999    chronic pelvic pain abstract       Patient Active Problem List   Diagnosis    Seronegative inflammatory arthritis    Neck pain    Bilateral low back pain without sciatica    Pelvic pain in female    Diabetes mellitus, type 2 (H)    Morbid obesity (H)    Bilateral hand pain       Surgical History:  Past Surgical History:   Procedure Laterality Date    CHOLECYSTECTOMY      COLON SURGERY      Left hemicolectomy for diverticulosis    CYSTOSCOPY,+URETEROSCOPY      abstract    ZZHC EXCIS/CURET BENIGN ELBOW LESN  10/26/1987    (R) humerus abstract       Social History:  Social History     Tobacco Use    Smoking status: Never    Smokeless tobacco: Never   Substance Use  Topics    Alcohol use: Yes     Comment: RARE    Drug use: No       Family History:  Family History   Problem Relation Age of Onset    Hypertension Father         abstract    Cancer Paternal Aunt         gallbladder cancer abstract       Medications:  Current Outpatient Medications   Medication    albuterol (PROAIR HFA/PROVENTIL HFA/VENTOLIN HFA) 108 (90 Base) MCG/ACT inhaler    Artificial Tear Solution (SOOTHE XP) SOLN    atenolol (TENORMIN) 50 MG tablet    benzoyl peroxide 5 % external liquid    blood glucose (NO BRAND SPECIFIED) lancets standard    blood glucose (NO BRAND SPECIFIED) test strip    blood glucose monitoring (NO BRAND SPECIFIED) meter device kit    ciclopirox (LOPROX) 0.77 % cream    diclofenac (VOLTAREN) 1 % topical gel    dupilumab (DUPIXENT) 300 MG/2ML prefilled pen    EPINEPHrine (ANY BX GENERIC EQUIV) 0.3 MG/0.3ML injection 2-pack    fexofenadine (ALLEGRA) 180 MG tablet    fidaxomicin (DIFICID) 200 MG tablet    Fluticasone-Umeclidin-Vilanterol (TRELEGY ELLIPTA) 200-62.5-25 MCG/ACT oral inhaler    fluticasone-vilanterol (BREO ELLIPTA) 200-25 MCG/ACT inhaler    hydrocortisone (CORTEF) 5 MG tablet    insulin glargine (LANTUS PEN) 100 UNIT/ML pen    insulin lispro (HUMALOG KWIKPEN) 100 UNIT/ML (1 unit dial) KWIKPEN    insulin NPH (HUMULIN N KWIKPEN) 100 UNIT/ML injection    Lancets (ONETOUCH DELICA PLUS [id number]) MISC    lidocaine (LIDODERM) 5 % patch    losartan (COZAAR) 100 MG tablet    methylPREDNISolone (MEDROL DOSEPAK) 4 MG tablet therapy pack    methylPREDNISolone (MEDROL DOSEPAK) 4 MG tablet therapy pack    methylPREDNISolone (MEDROL DOSEPAK) 4 MG tablet therapy pack    mometasone (NASONEX) 50 MCG/ACT nasal spray    mometasone-formoterol (DULERA) 200-5 MCG/ACT inhaler    Montelukast Sodium (SINGULAIR PO)    multivitamin w/minerals (THERA-VIT-M) tablet    norethindrone (MICRONOR) 0.35 MG tablet    nystatin (MYCOSTATIN) 046754 UNIT/ML suspension    olopatadine (PATADAY) 0.7 % ophthalmic  solution    OMEPRAZOLE PO    oxyCODONE IR (ROXICODONE) 10 MG tablet    rifaximin (XIFAXAN) 550 MG TABS tablet    rosuvastatin (CRESTOR) 5 MG tablet    spironolactone-HCTZ (ALDACTAZIDE) 25-25 MG tablet    STELARA 45 MG/0.5ML SOSY    tocilizumab (ACTEMRA) 400 MG/20ML     Current Facility-Administered Medications   Medication    lidocaine (PF) (XYLOCAINE) 1 % injection 2 mL    lidocaine (PF) (XYLOCAINE) 1 % injection 2 mL    lidocaine (PF) (XYLOCAINE) 1 % injection 2 mL    lidocaine (PF) (XYLOCAINE) 1 % injection 2 mL    triamcinolone (KENALOG-40) injection 20 mg    triamcinolone (KENALOG-40) injection 20 mg    triamcinolone (KENALOG-40) injection 20 mg    triamcinolone (KENALOG-40) injection 20 mg         Objective:         There were no vitals filed for this visit.  There is no height or weight on file to calculate BMI.     Physical Exam  Constitutional: Well-developed, well-nourished, in no apparent distress.    Psychiatric: Normal mood and affect. Behavior is normal.  This was a video visit.    Labs and Diagnostic tests:  Lab Results   Component Value Date     03/23/2024     04/01/2020    POTASSIUM 3.9 03/23/2024    POTASSIUM 3.7 11/17/2022    POTASSIUM 4.2 04/01/2020    CHLORIDE 104 03/23/2024    CHLORIDE 106 11/17/2022    CHLORIDE 105 04/01/2020    CO2 23 03/23/2024    CO2 22 11/17/2022    CO2 24 04/01/2020    BUN 11.6 03/23/2024    BUN 10 11/17/2022    BUN 11 04/01/2020    CR 0.77 03/23/2024    CR 0.73 04/01/2020     Lab Results   Component Value Date    BILITOTAL 0.6 03/23/2024    BILITOTAL 0.6 04/01/2020    ALT 58 03/23/2024    ALT 36 04/01/2020    AST 33 03/23/2024    AST 24 04/01/2020    ALKPHOS 49 03/23/2024    ALKPHOS 82 04/01/2020     Lab Results   Component Value Date    ALBUMIN 4.4 03/23/2024    ALBUMIN 3.7 11/17/2022    ALBUMIN 3.9 04/01/2020    PROTTOTAL 6.7 03/23/2024    PROTTOTAL 8.1 04/01/2020      Lab Results   Component Value Date    WBC 10.3 03/23/2024    WBC 9.3 04/01/2020    HGB  15.2 03/23/2024    HGB 15.8 04/01/2020    MCV 88 03/23/2024    MCV 85 04/01/2020     03/23/2024     04/01/2020     Lab Results   Component Value Date    INR 0.98 03/23/2024    INR 0.93 04/01/2020     FIB-4 Calculation: 0.64 at 3/23/2024 11:51 AM  Calculated from:  SGOT/AST: 33 U/L at 3/23/2024 11:51 AM  SGPT/ALT: 58 U/L at 3/23/2024 11:51 AM  Platelets: 317 10e3/uL at 3/23/2024 11:51 AM  Age: 48 years     Fibroscan 12-28-23  1. Estimated liver fibrosis is Stage 0-1   2. Steatosis Grade S3       Assessment and Plan:    MASLD without evidence of significant hepatic fibrosis.  We discussed the fact that she does not have significant fibrosis, which is a good sign.  Over the long run, there is some risk of developing hepatic fibrosis and complications of liver disease, but we would not expect this in the intermediate term.  In regard to treatment, sustained weight loss is the mainstay of therapy.  I suggested that she talk with her endocrinology and primary care providers about the possibility of starting on a GLP-1 agonist to see if this assists with weight loss (as well as diabetes control).  Another option would be bariatric surgery if she was interested.    At this point, I do not think we need to reschedule hepatology clinic follow-up.  It would be reasonable to repeat a FibroScan in 3 to 5 years, which could be done through a referral to our clinic at that time.    Wondering if she could start a systemic antifungal for nail fungus.  I explained that there is some risk of hepatotoxicity from these agents.  I suspect that many patients with MASLD (which affects about one third of adults) have been treated safely with these medications.  If she is treated, I recommend that her providers follow her liver tests.  I am aware of no specific literature specifying the risk of hepatotoxicity with these agents in patients with non-- fibrotic MASLD.    Her questions were answered.    The video visit time was  approximately 4:15 - 4:32    About 22 minutes spent today with patient, reviewing results, and coordinating care.    This note was created with voice recognition software, and while reviewed for accuracy, typos may remain.        Chico Caballero MD  Professor of Medicine  [facility]  Division of Gastroenterology, Hepatology, and Nutrition

## 2024-03-25 NOTE — TELEPHONE ENCOUNTER
ID providers request that first visits are in person and we are really trying to do visits in person for continuity of care. Virtual appts are not the best way to do a visit.     Please inform the patient that it is recommended to do in person and that is just how our clinic prefers to do it with new visits.       Dominic Rob RN  Infectious Disease on 3/25/2024 at 2:05 PM

## 2024-03-25 NOTE — NURSING NOTE
Is the patient currently in the state of [place]? YES    Visit mode:VIDEO    If the visit is dropped, the patient can be reconnected by: VIDEO VISIT: Text to cell phone:   Telephone Information:   Mobile [phone]       Will anyone else be joining the visit? NO  (If patient encounters technical issues they should call [phone]    How would you like to obtain your AVS? MyChart    Are changes needed to the allergy or medication list? No    Reason for visit: RECHECK    Colby CROWLEY

## 2024-03-25 NOTE — LETTER
3/25/2024         RE: Laxmi Ya  [address] [place] [id number]        Dear Colleague,    Thank you for referring your patient, Laxmi Ya, to the [facility] [place]. Please see a copy of my visit note below.    [facility]       Hepatology Clinic    Date of Service: 3/25/2024       Primary Care Provider: Amol Juares    History of Present Illness     Ms. Ya presents for follow-up of MASLD.  This was a video visit.    She has an existing diagnosis of MASLD in the setting of obesity and type 2 diabetes.  A prior MRI elastography and recent FibroScan did not show evidence of significant fibrosis.  She rarely drinks alcohol.  She reports no recent GI or hepatic symptoms.      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     Obesity      Pyelonephritis, unspecified 1992    abstract     Rheumatoid arteritis (H)      Seronegative inflammatory arthritis 06/17/2022     Type 2 diabetes mellitus (H)      Unspecified symptom associated with female genital organs 05/07/1999    chronic pelvic pain abstract       Patient Active Problem List   Diagnosis     Seronegative inflammatory arthritis     Neck pain     Bilateral low back pain without sciatica     Pelvic pain in female     Diabetes mellitus, type 2 (H)     Morbid obesity (H)     Bilateral hand pain       Surgical History:  Past Surgical History:   Procedure Laterality Date     CHOLECYSTECTOMY       COLON  SURGERY      Left hemicolectomy for diverticulosis     CYSTOSCOPY,+URETEROSCOPY      abstract     ZZHC EXCIS/CURET BENIGN ELBOW LESN  10/26/1987    (R) humerus abstract       Social History:  Social History     Tobacco Use     Smoking status: Never     Smokeless tobacco: Never   Substance Use Topics     Alcohol use: Yes     Comment: RARE     Drug use: No       Family History:  Family History   Problem Relation Age of Onset     Hypertension Father         abstract     Cancer Paternal Aunt         gallbladder cancer abstract       Medications:  Current Outpatient Medications   Medication     albuterol (PROAIR HFA/PROVENTIL HFA/VENTOLIN HFA) 108 (90 Base) MCG/ACT inhaler     Artificial Tear Solution (SOOTHE XP) SOLN     atenolol (TENORMIN) 50 MG tablet     benzoyl peroxide 5 % external liquid     blood glucose (NO BRAND SPECIFIED) lancets standard     blood glucose (NO BRAND SPECIFIED) test strip     blood glucose monitoring (NO BRAND SPECIFIED) meter device kit     ciclopirox (LOPROX) 0.77 % cream     diclofenac (VOLTAREN) 1 % topical gel     dupilumab (DUPIXENT) 300 MG/2ML prefilled pen     EPINEPHrine (ANY BX GENERIC EQUIV) 0.3 MG/0.3ML injection 2-pack     fexofenadine (ALLEGRA) 180 MG tablet     fidaxomicin (DIFICID) 200 MG tablet     Fluticasone-Umeclidin-Vilanterol (TRELEGY ELLIPTA) 200-62.5-25 MCG/ACT oral inhaler     fluticasone-vilanterol (BREO ELLIPTA) 200-25 MCG/ACT inhaler     hydrocortisone (CORTEF) 5 MG tablet     insulin glargine (LANTUS PEN) 100 UNIT/ML pen     insulin lispro (HUMALOG KWIKPEN) 100 UNIT/ML (1 unit dial) KWIKPEN     insulin NPH (HUMULIN N KWIKPEN) 100 UNIT/ML injection     Lancets (ONETOUCH DELICA PLUS [id number]) MISC     lidocaine (LIDODERM) 5 % patch     losartan (COZAAR) 100 MG tablet     methylPREDNISolone (MEDROL DOSEPAK) 4 MG tablet therapy pack     methylPREDNISolone (MEDROL DOSEPAK) 4 MG tablet therapy pack     methylPREDNISolone (MEDROL DOSEPAK) 4 MG tablet therapy pack      mometasone (NASONEX) 50 MCG/ACT nasal spray     mometasone-formoterol (DULERA) 200-5 MCG/ACT inhaler     Montelukast Sodium (SINGULAIR PO)     multivitamin w/minerals (THERA-VIT-M) tablet     norethindrone (MICRONOR) 0.35 MG tablet     nystatin (MYCOSTATIN) 869913 UNIT/ML suspension     olopatadine (PATADAY) 0.7 % ophthalmic solution     OMEPRAZOLE PO     oxyCODONE IR (ROXICODONE) 10 MG tablet     rifaximin (XIFAXAN) 550 MG TABS tablet     rosuvastatin (CRESTOR) 5 MG tablet     spironolactone-HCTZ (ALDACTAZIDE) 25-25 MG tablet     STELARA 45 MG/0.5ML SOSY     tocilizumab (ACTEMRA) 400 MG/20ML     Current Facility-Administered Medications   Medication     lidocaine (PF) (XYLOCAINE) 1 % injection 2 mL     lidocaine (PF) (XYLOCAINE) 1 % injection 2 mL     lidocaine (PF) (XYLOCAINE) 1 % injection 2 mL     lidocaine (PF) (XYLOCAINE) 1 % injection 2 mL     triamcinolone (KENALOG-40) injection 20 mg     triamcinolone (KENALOG-40) injection 20 mg     triamcinolone (KENALOG-40) injection 20 mg     triamcinolone (KENALOG-40) injection 20 mg         Objective:         There were no vitals filed for this visit.  There is no height or weight on file to calculate BMI.     Physical Exam  Constitutional: Well-developed, well-nourished, in no apparent distress.    Psychiatric: Normal mood and affect. Behavior is normal.  This was a video visit.    Labs and Diagnostic tests:  Lab Results   Component Value Date     03/23/2024     04/01/2020    POTASSIUM 3.9 03/23/2024    POTASSIUM 3.7 11/17/2022    POTASSIUM 4.2 04/01/2020    CHLORIDE 104 03/23/2024    CHLORIDE 106 11/17/2022    CHLORIDE 105 04/01/2020    CO2 23 03/23/2024    CO2 22 11/17/2022    CO2 24 04/01/2020    BUN 11.6 03/23/2024    BUN 10 11/17/2022    BUN 11 04/01/2020    CR 0.77 03/23/2024    CR 0.73 04/01/2020     Lab Results   Component Value Date    BILITOTAL 0.6 03/23/2024    BILITOTAL 0.6 04/01/2020    ALT 58 03/23/2024    ALT 36 04/01/2020    AST 33  03/23/2024    AST 24 04/01/2020    ALKPHOS 49 03/23/2024    ALKPHOS 82 04/01/2020     Lab Results   Component Value Date    ALBUMIN 4.4 03/23/2024    ALBUMIN 3.7 11/17/2022    ALBUMIN 3.9 04/01/2020    PROTTOTAL 6.7 03/23/2024    PROTTOTAL 8.1 04/01/2020      Lab Results   Component Value Date    WBC 10.3 03/23/2024    WBC 9.3 04/01/2020    HGB 15.2 03/23/2024    HGB 15.8 04/01/2020    MCV 88 03/23/2024    MCV 85 04/01/2020     03/23/2024     04/01/2020     Lab Results   Component Value Date    INR 0.98 03/23/2024    INR 0.93 04/01/2020     FIB-4 Calculation: 0.64 at 3/23/2024 11:51 AM  Calculated from:  SGOT/AST: 33 U/L at 3/23/2024 11:51 AM  SGPT/ALT: 58 U/L at 3/23/2024 11:51 AM  Platelets: 317 10e3/uL at 3/23/2024 11:51 AM  Age: 48 years     Fibroscan 12-28-23  1. Estimated liver fibrosis is Stage 0-1   2. Steatosis Grade S3       Assessment and Plan:    MASLD without evidence of significant hepatic fibrosis.  We discussed the fact that she does not have significant fibrosis, which is a good sign.  Over the long run, there is some risk of developing hepatic fibrosis and complications of liver disease, but we would not expect this in the intermediate term.  In regard to treatment, sustained weight loss is the mainstay of therapy.  I suggested that she talk with her endocrinology and primary care providers about the possibility of starting on a GLP-1 agonist to see if this assists with weight loss (as well as diabetes control).  Another option would be bariatric surgery if she was interested.    At this point, I do not think we need to reschedule hepatology clinic follow-up.  It would be reasonable to repeat a FibroScan in 3 to 5 years, which could be done through a referral to our clinic at that time.    Wondering if she could start a systemic antifungal for nail fungus.  I explained that there is some risk of hepatotoxicity from these agents.  I suspect that many patients with MASLD (which affects  about one third of adults) have been treated safely with these medications.  If she is treated, I recommend that her providers follow her liver tests.  I am aware of no specific literature specifying the risk of hepatotoxicity with these agents in patients with non-- fibrotic MASLD.    Her questions were answered.    The video visit time was approximately 4:15 - 4:32    About 22 minutes spent today with patient, reviewing results, and coordinating care.    This note was created with voice recognition software, and while reviewed for accuracy, typos may remain.        Chico Caballero MD  Professor of Medicine  [facility]  Division of Gastroenterology, Hepatology, and Nutrition

## 2024-03-26 ENCOUNTER — VIRTUAL VISIT (OUTPATIENT)
Dept: INFECTIOUS DISEASES | Facility: CLINIC | Age: 49
End: 2024-03-26
Attending: INTERNAL MEDICINE
Payer: MEDICARE

## 2024-03-26 ENCOUNTER — TELEPHONE (OUTPATIENT)
Dept: OBGYN | Facility: CLINIC | Age: 49
End: 2024-03-26
Payer: MEDICARE

## 2024-03-26 DIAGNOSIS — D84.9 IMMUNODEFICIENCY, UNSPECIFIED (H): ICD-10-CM

## 2024-03-26 DIAGNOSIS — Z87.440 HISTORY OF RECURRENT UTIS: ICD-10-CM

## 2024-03-26 DIAGNOSIS — B37.9 CANDIDA GLABRATA INFECTION: ICD-10-CM

## 2024-03-26 DIAGNOSIS — B99.9 RECURRENT INFECTIONS: Primary | ICD-10-CM

## 2024-03-26 DIAGNOSIS — Z86.19 HISTORY OF THRUSH: ICD-10-CM

## 2024-03-26 DIAGNOSIS — M33.20 POLYMYOSITIS (H): ICD-10-CM

## 2024-03-26 DIAGNOSIS — M06.9 RHEUMATOID ARTHRITIS INVOLVING MULTIPLE SITES, UNSPECIFIED WHETHER RHEUMATOID FACTOR PRESENT (H): ICD-10-CM

## 2024-03-26 DIAGNOSIS — B37.31 RECURRENT CANDIDIASIS OF VAGINA: ICD-10-CM

## 2024-03-26 PROCEDURE — 99214 OFFICE O/P EST MOD 30 MIN: CPT | Mod: 95 | Performed by: INTERNAL MEDICINE

## 2024-03-26 RX ORDER — LIDOCAINE HYDROCHLORIDE 10 MG/ML
1 INJECTION, SOLUTION EPIDURAL; INFILTRATION; INTRACAUDAL; PERINEURAL
Status: DISCONTINUED | OUTPATIENT
Start: 2024-03-14 | End: 2024-07-01

## 2024-03-26 RX ORDER — TRIAMCINOLONE ACETONIDE 40 MG/ML
20 INJECTION, SUSPENSION INTRA-ARTICULAR; INTRAMUSCULAR
Status: DISCONTINUED | OUTPATIENT
Start: 2024-03-14 | End: 2024-07-01

## 2024-03-26 NOTE — NURSING NOTE
Patient confirms medications and allergies are accurate via patients echeck in completion, and or denies any changes since last reviewed/verified.     Patient was seen yesterday most items were already completed did not go through meds and allergies again with her       Is the patient currently in the state of [place]? YES    Visit mode:VIDEO    If the visit is dropped, the patient can be reconnected by: VIDEO VISIT: Text to cell phone:   Telephone Information:   Mobile [phone]     Will anyone else be joining the visit? NO  (If patient encounters technical issues they should call [phone] :[phone])    How would you like to obtain your AVS? MyChart    Are changes needed to the allergy or medication list? No    Reason for visit: Consult    Lucía GARCIAF

## 2024-03-26 NOTE — TELEPHONE ENCOUNTER
Called and spoke with Laxmi to go over her symptoms of a yeast infection with her.       Laxmi stated that she is having burning, clumpy white discharge and odor. Laxmi stated that she has had recurrent candidiasis of the vagina, history of thrush, and candida glabrata. Laxmi stated that she has been on Nystatin suppositories in the past and now this does not work anymore.     Laxmi had a video visit with the infectious disease doctor today 3-26-24 and was instructed to have a mouth swab and vaginal swab done to determine what antibiotic will work. The infectious disease doctor will be putting in orders for this and the patient was instructed to come for these swabs. This is what the note from the visit with the infectious disease doctor wrote in her note:    I recommend that we get swabs for yeast cultures from the patient's vagina and mouth to tst and see if Candida glabrata is still present or not. If we are able to grow Candida glabrata or another yeast on the cultures then I would ask our mycology lab to set up antifungal susceptibility testing to see if if another oral azole other than fluconazole would help the patients.       I did help her to get scheduled for tomorrow(3-27-24) with Dr. Kumar at 1315 for these swabs for yeast.     All questions were answered.

## 2024-03-26 NOTE — LETTER
3/26/2024       RE: Laxmi Ya  [address] [place] [id number]     Dear Colleague,    Thank you for referring your patient, Laxmi Ya, to the [facility] [place] at [facility]. Please see a copy of my visit note below.    Virtual Visit Details    Type of service:  Video Visit   Video Start Time: 11:30 AM  Video End Time:12:18 PM  Total time in patient care day of visit =49 minutes  Originating Location (pt. Location): Home    Platform used for Video Visit: Howard Hair is a 49 year old who is being evaluated via a billable video visit.    How would you like to obtain your AVS? MyChart  Will anyone else be joining your video visit? No      Assessment & Plan    Review of prior external note(s) from [facility] information from [facility]  and [place] reviewed      Patient Active Problem List   Diagnosis    Seronegative inflammatory arthritis    Neck pain    Bilateral low back pain without sciatica    Pelvic pain in female    Diabetes mellitus, type 2 (H)    Morbid obesity (H)    Bilateral hand pain     Laxmi was seen today for consult.    Diagnoses and all orders for this visit:    Recurrent infections    Immunodeficiency, unspecified (H24)    Rheumatoid arthritis involving multiple sites, unspecified whether rheumatoid factor present (H)    Recurrent candidiasis of vagina    History of thrush    Candida glabrata infection      I recommend that we get swabs for yeast cultures from the patient's vagina and mouth to test and see if Candida glabrata is still present or not. If we are able to grow Candida glabrata or another yeast on the cultures then I would ask our mycology lab to set up antifungal susceptibility testing to see if if another oral azole other than fluconazole would help the patients.     I have also advised that she stop the chronic doxycycline that she has been on for prevention of recurrent  conjunctivitis, as the chronic doxycycline use could be setting her up for frequent yeast infections.    Patient will call her OB/GYN clinic to [facility] a visit for vagina cultures and if possible mouth cultures as well both for yeast. I will place orders for these labs.     Schedule follow-up in person visit with me in mid May.       Kaley Hall MD      Daria Hair is a 49 year old, presenting for the following health issues: Recurrent Candida glabrata infections.    Consult      HPI     Patient has seen a few different doctors for her recurrent vaginal yeast infections. She developed Candida glabrata overgrowth and it was resistant to fluconazole. She has been treated with compounded nystatin suppositories but feels those are starting to lose their effectiveness and they can't eradicate the Martina and it keeps coming back again shortly after she completes the supposities. She also has recurrent episodes of oral thrush. She sees Dr. Marisa Lemus in Rheumatology clinic for seronegative RA and is treated with Actira IV q 28 days. Her problems with yeast infections started when she was a teenager. She had problems with frequent UTIs and was treated with several rounds of sulfa and cipro pills. Then she would get a yeast infection. Currently she takes daily doxycycline to prevent recurrent eye infections. She saw Dr. Yudith Pate for a vulvar cyst that needs excision. Right bow her symptoms of the yeast vaginitis and thrush are not very strong.       Review of Systems  Constitutional, HEENT, cardiovascular, pulmonary, gi and gu systems are negative, except as otherwise noted.      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    Obesity     Pyelonephritis, unspecified 1992    abstract    Rheumatoid arteritis (H)     Seronegative inflammatory arthritis 06/17/2022    Type 2 diabetes mellitus (H)     Unspecified symptom associated with female genital organs 05/07/1999    chronic pelvic pain abstract     Past Surgical History:   Procedure Laterality Date    CHOLECYSTECTOMY      COLON SURGERY      Left hemicolectomy for diverticulosis    CYSTOSCOPY,+URETEROSCOPY      abstract    ZZHC EXCIS/CURET BENIGN ELBOW LESN  10/26/1987    (R) humerus abstract        Allergies   Allergen Reactions    Polyethylene Glycol Rash    Codeine      Other reaction(s): Gastrointestinal, GI intolerance, Vomiting  Vomiting      Hydrocodone Nausea and Vomiting    Morphine Nausea and Vomiting    Sulfasalazine      Other reaction(s): GI intolerance  Has tried and had nausa.    Tramadol Nausea and Vomiting     Other reaction(s): Gastrointestinal, Other (see comments)  Nausea and vomiting   unknown      Gluten Meal Other (See Comments) and Rash     Other reaction(s): Gastrointestinal, GI intolerance  Dizziness, tired, rash, stomach cramps, thrush, mouth sores  Dizziness, tired, rash, stomach cramps, thrush, mouth sores      Propylene Glycol Dizziness, Nausea and Vomiting, Nausea and Rash       Current Outpatient Medications:     albuterol (PROAIR HFA/PROVENTIL HFA/VENTOLIN HFA) 108 (90 Base) MCG/ACT inhaler, Inhale 2 puffs into the lungs every 6 hours as needed for shortness of breath, wheezing or cough, Disp: , Rfl:     Artificial Tear Solution (SOOTHE XP) SOLN, as needed, Disp: , Rfl:     atenolol (TENORMIN) 50 MG tablet, Take 50 mg by mouth daily, Disp: , Rfl:     benzoyl peroxide 5 % external liquid, Use daily as directed. Preferred bdrand: [employer], Disp: 236 mL, Rfl: 11    blood glucose (NO BRAND SPECIFIED)  lancets standard, Use to test blood sugar 2 times daily or as directed., Disp: 100 lancet., Rfl: 1    blood glucose (NO BRAND SPECIFIED) test strip, Use to test blood sugar 2 times daily or as directed., Disp: 100 strip, Rfl: 1    blood glucose monitoring (NO BRAND SPECIFIED) meter device kit, Use to test blood sugar 2 times daily or as directed., Disp: 1 kit, Rfl: 0    ciclopirox (LOPROX) 0.77 % cream, Apply topically 2 times daily To affected toenails., Disp: 90 g, Rfl: 5    diclofenac (VOLTAREN) 1 % topical gel, Apply topically 4 times daily as needed, Disp: , Rfl:     dupilumab (DUPIXENT) 300 MG/2ML prefilled pen, Inject 2 mLs (300 mg) Subcutaneous every 14 days, Disp: 4 mL, Rfl: 5    EPINEPHrine (ANY BX GENERIC EQUIV) 0.3 MG/0.3ML injection 2-pack, Inject 0.3 mg into the muscle as needed, Disp: , Rfl:     fexofenadine (ALLEGRA) 180 MG tablet, Take 1 tablet (180 mg) by mouth daily, Disp: 90 tablet, Rfl: 2    fidaxomicin (DIFICID) 200 MG tablet, Take 200 mg by mouth as needed, Disp: , Rfl:     Fluticasone-Umeclidin-Vilanterol (TRELEGY ELLIPTA) 200-62.5-25 MCG/ACT oral inhaler, Inhale 1 puff into the lungs daily, Disp: 1 each, Rfl: 5    fluticasone-vilanterol (BREO ELLIPTA) 200-25 MCG/ACT inhaler, Inhale 1 puff into the lungs daily, Disp: , Rfl:     hydrocortisone (CORTEF) 5 MG tablet, Take 1 tablet (5 mg) by mouth 2 times daily, Disp: 60 tablet, Rfl: 3    insulin glargine (LANTUS PEN) 100 UNIT/ML pen, Inject 20 Units Subcutaneous daily, Disp: , Rfl:     insulin lispro (HUMALOG KWIKPEN) 100 UNIT/ML (1 unit dial) KWIKPEN, Inject 5 Units Subcutaneous 3 times daily (before meals) Plus 1:25 correction scale if BG >150, Disp: , Rfl:     insulin NPH (HUMULIN N KWIKPEN) 100 UNIT/ML injection, Inject 40 Units Subcutaneous every evening, Disp: , Rfl:     Lancets (ONETOUCH DELICA PLUS [id number]) MISC, CHECK BLOOD GLUCOSE 3 TIMES DAILY, ADJUSTING MEDICATION, DX CODE E 11.9, ON INSULIN, Disp: , Rfl:     lidocaine (LIDODERM) 5  % patch, as needed , Disp: , Rfl: 1    losartan (COZAAR) 100 MG tablet, Take 100 mg by mouth daily, Disp: , Rfl:     methylPREDNISolone (MEDROL DOSEPAK) 4 MG tablet therapy pack, Take per package instructions. Take once a day by mouth, Disp: 21 tablet, Rfl: 1    methylPREDNISolone (MEDROL DOSEPAK) 4 MG tablet therapy pack, Take per package instructions. Take once a day by mouth, Disp: 21 tablet, Rfl: 0    methylPREDNISolone (MEDROL DOSEPAK) 4 MG tablet therapy pack, Take per package instructions. Take once a day by mouth, Disp: 21 tablet, Rfl: 0    mometasone (NASONEX) 50 MCG/ACT nasal spray, Spray 2 sprays into both nostrils daily, Disp: 17 g, Rfl: 11    mometasone-formoterol (DULERA) 200-5 MCG/ACT inhaler, Inhale 1 puff into the lungs daily, Disp: , Rfl:     Montelukast Sodium (SINGULAIR PO), Take 10 mg by mouth At Bedtime , Disp: , Rfl:     multivitamin w/minerals (THERA-VIT-M) tablet, Take 1 tablet by mouth daily, Disp: , Rfl:     norethindrone (MICRONOR) 0.35 MG tablet, Take 0.35 mg by mouth, Disp: , Rfl:     nystatin (MYCOSTATIN) 284827 UNIT/ML suspension, , Disp: , Rfl:     olopatadine (PATADAY) 0.7 % ophthalmic solution, Apply 1 drop to eye daily PRN, Disp: , Rfl:     OMEPRAZOLE PO, Take 40 mg by mouth 2 times daily , Disp: , Rfl:     oxyCODONE IR (ROXICODONE) 10 MG tablet, Take 10 mg by mouth, Disp: , Rfl:     rifaximin (XIFAXAN) 550 MG TABS tablet, Take 200 mg by mouth 3 times daily Take for 10 days, has on had as needed for small bowel bacteria overgrowh, Disp: , Rfl:     rosuvastatin (CRESTOR) 5 MG tablet, Take 2 tablets by mouth daily, Disp: , Rfl:     spironolactone-HCTZ (ALDACTAZIDE) 25-25 MG tablet, Take 1 tablet by mouth daily, Disp: , Rfl:     STELARA 45 MG/0.5ML SOSY, Inject 45 mg Subcutaneous once, Disp: , Rfl:     tocilizumab (ACTEMRA) 400 MG/20ML, Inject 800 mg into the vein every 28 days, Disp: , Rfl:     Current Facility-Administered Medications:     lidocaine (PF) (XYLOCAINE) 1 % injection 1  mL, 1 mL, , , Twila Petit MD, 1 mL at 03/14/24 1132    lidocaine (PF) (XYLOCAINE) 1 % injection 1 mL, 1 mL, , , Twila Petit MD, 1 mL at 03/14/24 1133    lidocaine (PF) (XYLOCAINE) 1 % injection 2 mL, 2 mL, , , Twila Petit MD, 2 mL at 02/29/24 1122    lidocaine (PF) (XYLOCAINE) 1 % injection 2 mL, 2 mL, , , Twila Petit MD, 2 mL at 02/29/24 1122    lidocaine (PF) (XYLOCAINE) 1 % injection 2 mL, 2 mL, , , Hyacinth Morrison MD, 2 mL at 12/27/23 1614    lidocaine (PF) (XYLOCAINE) 1 % injection 2 mL, 2 mL, , , Hyacinth Morrison MD, 2 mL at 12/27/23 1614    triamcinolone (KENALOG-40) injection 20 mg, 20 mg, , , Twila Petit MD, 20 mg at 03/14/24 1132    triamcinolone (KENALOG-40) injection 20 mg, 20 mg, , , Twila Petit MD, 20 mg at 03/14/24 1133    triamcinolone (KENALOG-40) injection 20 mg, 20 mg, , , Twila Petit MD, 20 mg at 02/29/24 1122    triamcinolone (KENALOG-40) injection 20 mg, 20 mg, , , Twila Petit MD, 20 mg at 02/29/24 1122    triamcinolone (KENALOG-40) injection 20 mg, 20 mg, , , Hyaicnth Morrison MD, 20 mg at 12/27/23 1614    triamcinolone (KENALOG-40) injection 20 mg, 20 mg, , , Hyacinth Morrison MD, 20 mg at 12/27/23 1614  Family History   Problem Relation Age of Onset    Hypertension Father         abstract    Cancer Paternal Aunt         gallbladder cancer abstract           Objective            Physical Exam   GENERAL: alert and no distress  EYES: Eyes grossly normal to inspection.  No discharge or erythema, or obvious scleral/conjunctival abnormalities.  RESP: No audible wheeze, cough, or visible cyanosis.    SKIN: Visible skin clear. No significant rash, abnormal pigmentation or lesions.  NEURO: Cranial nerves grossly intact.  Mentation and speech appropriate for age.  PSYCH: Appropriate affect, tone, and pace of words    Lab on 03/23/2024   Component Date Value Ref Range Status    INR  03/23/2024 0.98  0.85 - 1.15 Final    WBC Count 03/23/2024 10.3  4.0 - 11.0 10e3/uL Final    RBC Count 03/23/2024 5.03  3.80 - 5.20 10e6/uL Final    Hemoglobin 03/23/2024 15.2  11.7 - 15.7 g/dL Final    Hematocrit 03/23/2024 44.0  35.0 - 47.0 % Final    MCV 03/23/2024 88  78 - 100 fL Final    MCH 03/23/2024 30.2  26.5 - 33.0 pg Final    MCHC 03/23/2024 34.5  31.5 - 36.5 g/dL Final    RDW 03/23/2024 12.8  10.0 - 15.0 % Final    Platelet Count 03/23/2024 317  150 - 450 10e3/uL Final    Sodium 03/23/2024 139  135 - 145 mmol/L Final    Reference intervals for this test were updated on 09/26/2023 to more accurately reflect our healthy population. There may be differences in the flagging of prior results with similar values performed with this method. Interpretation of those prior results can be made in the context of the updated reference intervals.     Potassium 03/23/2024 3.9  3.4 - 5.3 mmol/L Final    Carbon Dioxide (CO2) 03/23/2024 23  22 - 29 mmol/L Final    Anion Gap 03/23/2024 12  7 - 15 mmol/L Final    Urea Nitrogen 03/23/2024 11.6  6.0 - 20.0 mg/dL Final    Creatinine 03/23/2024 0.77  0.51 - 0.95 mg/dL Final    GFR Estimate 03/23/2024 >90  >60 mL/min/1.73m2 Final    Calcium 03/23/2024 9.3  8.6 - 10.0 mg/dL Final    Chloride 03/23/2024 104  98 - 107 mmol/L Final    Glucose 03/23/2024 134 (H)  70 - 99 mg/dL Final    Alkaline Phosphatase 03/23/2024 49  40 - 150 U/L Final    Reference intervals for this test were updated on 11/14/2023 to more accurately reflect our healthy population. There may be differences in the flagging of prior results with similar values performed with this method. Interpretation of those prior results can be made in the context of the updated reference intervals.    AST 03/23/2024 33  0 - 45 U/L Final    Reference intervals for this test were updated on 6/12/2023 to more accurately reflect our healthy population. There may be differences in the flagging of prior results with similar values  performed with this method. Interpretation of those prior results can be made in the context of the updated reference intervals.    ALT 03/23/2024 58 (H)  0 - 50 U/L Final    Reference intervals for this test were updated on 6/12/2023 to more accurately reflect our healthy population. There may be differences in the flagging of prior results with similar values performed with this method. Interpretation of those prior results can be made in the context of the updated reference intervals.      Protein Total 03/23/2024 6.7  6.4 - 8.3 g/dL Final    Albumin 03/23/2024 4.4  3.5 - 5.2 g/dL Final    Bilirubin Total 03/23/2024 0.6  <=1.2 mg/dL Final           Signed Electronically by: MD Kaley Deutsch MD

## 2024-03-26 NOTE — TELEPHONE ENCOUNTER
[facility] Call Center    Phone Message    May a detailed message be left on voicemail: yes     Reason for Call: Other: . Pt is calling, she has an appt on 5/10 with , pt is reporting yeast infection symptoms, writer also added Laxmi to the wait list and offered to check other providers, pt declined seeing anybody else and requested a message to get sent to the care team, please reach out to Laxmi, thank you     Action Taken: Message routed to:  Other: obgyn    Travel Screening: Not Applicable

## 2024-03-26 NOTE — PROGRESS NOTES
Virtual Visit Details    Type of service:  Video Visit   Video Start Time: 11:30 AM  Video End Time:12:18 PM  Total time in patient care day of visit =49 minutes  Originating Location (pt. Location): Home    Platform used for Video Visit: Howard Hair is a 49 year old who is being evaluated via a billable video visit.    How would you like to obtain your AVS? MyChart  Will anyone else be joining your video visit? No      Assessment & Plan     Review of prior external note(s) from - [facility] information from [facility]  and [place] reviewed      Patient Active Problem List   Diagnosis    Seronegative inflammatory arthritis    Neck pain    Bilateral low back pain without sciatica    Pelvic pain in female    Diabetes mellitus, type 2 (H)    Morbid obesity (H)    Bilateral hand pain     Laxmi was seen today for consult.    Diagnoses and all orders for this visit:    Recurrent infections    Immunodeficiency, unspecified (H24)    Rheumatoid arthritis involving multiple sites, unspecified whether rheumatoid factor present (H)    Recurrent candidiasis of vagina    History of thrush    Candida glabrata infection      I recommend that we get swabs for yeast cultures from the patient's vagina and mouth to test and see if Candida glabrata is still present or not. If we are able to grow Candida glabrata or another yeast on the cultures then I would ask our mycology lab to set up antifungal susceptibility testing to see if if another oral azole other than fluconazole would help the patients.     I have also advised that she stop the chronic doxycycline that she has been on for prevention of recurrent conjunctivitis, as the chronic doxycycline use could be setting her up for frequent yeast infections.    Patient will call her OB/GYN clinic to [facility] a visit for vagina cultures and if possible mouth cultures as well both for yeast. I will place orders for these labs.     Schedule follow-up in person visit with me in mid  May.       Kaley Hall MD      Daria Hair is a 49 year old, presenting for the following health issues: Recurrent Candida glabrata infections.    Consult      HPI     Patient has seen a few different doctors for her recurrent vaginal yeast infections. She developed Candida glabrata overgrowth and it was resistant to fluconazole. She has been treated with compounded nystatin suppositories but feels those are starting to lose their effectiveness and they can't eradicate the Martina and it keeps coming back again shortly after she completes the supposities. She also has recurrent episodes of oral thrush. She sees Dr. Marisa Lemus in Rheumatology clinic for seronegative RA and is treated with Actira IV q 28 days. Her problems with yeast infections started when she was a teenager. She had problems with frequent UTIs and was treated with several rounds of sulfa and cipro pills. Then she would get a yeast infection. Currently she takes daily doxycycline to prevent recurrent eye infections. She saw Dr. Yudith Pate for a vulvar cyst that needs excision. Right bow her symptoms of the yeast vaginitis and thrush are not very strong.       Review of Systems  Constitutional, HEENT, cardiovascular, pulmonary, gi and gu systems are negative, except as otherwise noted.      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    Obesity     Pyelonephritis, unspecified 1992    abstract    Rheumatoid arteritis (H)     Seronegative inflammatory arthritis 06/17/2022    Type 2 diabetes  mellitus (H)     Unspecified symptom associated with female genital organs 05/07/1999    chronic pelvic pain abstract     Past Surgical History:   Procedure Laterality Date    CHOLECYSTECTOMY      COLON SURGERY      Left hemicolectomy for diverticulosis    CYSTOSCOPY,+URETEROSCOPY      abstract    ZZ EXCIS/CURET BENIGN ELBOW LESN  10/26/1987    (R) humerus abstract        Allergies   Allergen Reactions    Polyethylene Glycol Rash    Codeine      Other reaction(s): Gastrointestinal, GI intolerance, Vomiting  Vomiting      Hydrocodone Nausea and Vomiting    Morphine Nausea and Vomiting    Sulfasalazine      Other reaction(s): GI intolerance  Has tried and had nausa.    Tramadol Nausea and Vomiting     Other reaction(s): Gastrointestinal, Other (see comments)  Nausea and vomiting   unknown      Gluten Meal Other (See Comments) and Rash     Other reaction(s): Gastrointestinal, GI intolerance  Dizziness, tired, rash, stomach cramps, thrush, mouth sores  Dizziness, tired, rash, stomach cramps, thrush, mouth sores      Propylene Glycol Dizziness, Nausea and Vomiting, Nausea and Rash       Current Outpatient Medications:     albuterol (PROAIR HFA/PROVENTIL HFA/VENTOLIN HFA) 108 (90 Base) MCG/ACT inhaler, Inhale 2 puffs into the lungs every 6 hours as needed for shortness of breath, wheezing or cough, Disp: , Rfl:     Artificial Tear Solution (SOOTHE XP) SOLN, as needed, Disp: , Rfl:     atenolol (TENORMIN) 50 MG tablet, Take 50 mg by mouth daily, Disp: , Rfl:     benzoyl peroxide 5 % external liquid, Use daily as directed. Preferred bdrand: [employer]pura, Disp: 236 mL, Rfl: 11    blood glucose (NO BRAND SPECIFIED) lancets standard, Use to test blood sugar 2 times daily or as directed., Disp: 100 lancet., Rfl: 1    blood glucose (NO BRAND SPECIFIED) test strip, Use to test blood sugar 2 times daily or as directed., Disp: 100 strip, Rfl: 1    blood glucose monitoring (NO BRAND SPECIFIED) meter device kit, Use to test blood sugar  2 times daily or as directed., Disp: 1 kit, Rfl: 0    ciclopirox (LOPROX) 0.77 % cream, Apply topically 2 times daily To affected toenails., Disp: 90 g, Rfl: 5    diclofenac (VOLTAREN) 1 % topical gel, Apply topically 4 times daily as needed, Disp: , Rfl:     dupilumab (DUPIXENT) 300 MG/2ML prefilled pen, Inject 2 mLs (300 mg) Subcutaneous every 14 days, Disp: 4 mL, Rfl: 5    EPINEPHrine (ANY BX GENERIC EQUIV) 0.3 MG/0.3ML injection 2-pack, Inject 0.3 mg into the muscle as needed, Disp: , Rfl:     fexofenadine (ALLEGRA) 180 MG tablet, Take 1 tablet (180 mg) by mouth daily, Disp: 90 tablet, Rfl: 2    fidaxomicin (DIFICID) 200 MG tablet, Take 200 mg by mouth as needed, Disp: , Rfl:     Fluticasone-Umeclidin-Vilanterol (TRELEGY ELLIPTA) 200-62.5-25 MCG/ACT oral inhaler, Inhale 1 puff into the lungs daily, Disp: 1 each, Rfl: 5    fluticasone-vilanterol (BREO ELLIPTA) 200-25 MCG/ACT inhaler, Inhale 1 puff into the lungs daily, Disp: , Rfl:     hydrocortisone (CORTEF) 5 MG tablet, Take 1 tablet (5 mg) by mouth 2 times daily, Disp: 60 tablet, Rfl: 3    insulin glargine (LANTUS PEN) 100 UNIT/ML pen, Inject 20 Units Subcutaneous daily, Disp: , Rfl:     insulin lispro (HUMALOG KWIKPEN) 100 UNIT/ML (1 unit dial) KWIKPEN, Inject 5 Units Subcutaneous 3 times daily (before meals) Plus 1:25 correction scale if BG >150, Disp: , Rfl:     insulin NPH (HUMULIN N KWIKPEN) 100 UNIT/ML injection, Inject 40 Units Subcutaneous every evening, Disp: , Rfl:     Lancets (ONETOUCH DELICA PLUS [id number]) MISC, CHECK BLOOD GLUCOSE 3 TIMES DAILY, ADJUSTING MEDICATION, DX CODE E 11.9, ON INSULIN, Disp: , Rfl:     lidocaine (LIDODERM) 5 % patch, as needed , Disp: , Rfl: 1    losartan (COZAAR) 100 MG tablet, Take 100 mg by mouth daily, Disp: , Rfl:     methylPREDNISolone (MEDROL DOSEPAK) 4 MG tablet therapy pack, Take per package instructions. Take once a day by mouth, Disp: 21 tablet, Rfl: 1    methylPREDNISolone (MEDROL DOSEPAK) 4 MG tablet therapy  pack, Take per package instructions. Take once a day by mouth, Disp: 21 tablet, Rfl: 0    methylPREDNISolone (MEDROL DOSEPAK) 4 MG tablet therapy pack, Take per package instructions. Take once a day by mouth, Disp: 21 tablet, Rfl: 0    mometasone (NASONEX) 50 MCG/ACT nasal spray, Spray 2 sprays into both nostrils daily, Disp: 17 g, Rfl: 11    mometasone-formoterol (DULERA) 200-5 MCG/ACT inhaler, Inhale 1 puff into the lungs daily, Disp: , Rfl:     Montelukast Sodium (SINGULAIR PO), Take 10 mg by mouth At Bedtime , Disp: , Rfl:     multivitamin w/minerals (THERA-VIT-M) tablet, Take 1 tablet by mouth daily, Disp: , Rfl:     norethindrone (MICRONOR) 0.35 MG tablet, Take 0.35 mg by mouth, Disp: , Rfl:     nystatin (MYCOSTATIN) 860170 UNIT/ML suspension, , Disp: , Rfl:     olopatadine (PATADAY) 0.7 % ophthalmic solution, Apply 1 drop to eye daily PRN, Disp: , Rfl:     OMEPRAZOLE PO, Take 40 mg by mouth 2 times daily , Disp: , Rfl:     oxyCODONE IR (ROXICODONE) 10 MG tablet, Take 10 mg by mouth, Disp: , Rfl:     rifaximin (XIFAXAN) 550 MG TABS tablet, Take 200 mg by mouth 3 times daily Take for 10 days, has on had as needed for small bowel bacteria overgrowh, Disp: , Rfl:     rosuvastatin (CRESTOR) 5 MG tablet, Take 2 tablets by mouth daily, Disp: , Rfl:     spironolactone-HCTZ (ALDACTAZIDE) 25-25 MG tablet, Take 1 tablet by mouth daily, Disp: , Rfl:     STELARA 45 MG/0.5ML SOSY, Inject 45 mg Subcutaneous once, Disp: , Rfl:     tocilizumab (ACTEMRA) 400 MG/20ML, Inject 800 mg into the vein every 28 days, Disp: , Rfl:     Current Facility-Administered Medications:     lidocaine (PF) (XYLOCAINE) 1 % injection 1 mL, 1 mL, , , Twila Petit MD, 1 mL at 03/14/24 1132    lidocaine (PF) (XYLOCAINE) 1 % injection 1 mL, 1 mL, , , Twila Petit MD, 1 mL at 03/14/24 1133    lidocaine (PF) (XYLOCAINE) 1 % injection 2 mL, 2 mL, , , Twila Petit MD, 2 mL at 02/29/24 1122    lidocaine (PF)  (XYLOCAINE) 1 % injection 2 mL, 2 mL, , , Twila Petit MD, 2 mL at 02/29/24 1122    lidocaine (PF) (XYLOCAINE) 1 % injection 2 mL, 2 mL, , , Hyacinth Morrison MD, 2 mL at 12/27/23 1614    lidocaine (PF) (XYLOCAINE) 1 % injection 2 mL, 2 mL, , , Hyacinth Morrison MD, 2 mL at 12/27/23 1614    triamcinolone (KENALOG-40) injection 20 mg, 20 mg, , , Twila Petit MD, 20 mg at 03/14/24 1132    triamcinolone (KENALOG-40) injection 20 mg, 20 mg, , , Twila Petit MD, 20 mg at 03/14/24 1133    triamcinolone (KENALOG-40) injection 20 mg, 20 mg, , , Twila Petit MD, 20 mg at 02/29/24 1122    triamcinolone (KENALOG-40) injection 20 mg, 20 mg, , , Twila Petit MD, 20 mg at 02/29/24 1122    triamcinolone (KENALOG-40) injection 20 mg, 20 mg, , , Hyacinth Morrison MD, 20 mg at 12/27/23 1614    triamcinolone (KENALOG-40) injection 20 mg, 20 mg, , , Hyacinth Morrison MD, 20 mg at 12/27/23 1614  Family History   Problem Relation Age of Onset    Hypertension Father         abstract    Cancer Paternal Aunt         gallbladder cancer abstract           Objective             Physical Exam   GENERAL: alert and no distress  EYES: Eyes grossly normal to inspection.  No discharge or erythema, or obvious scleral/conjunctival abnormalities.  RESP: No audible wheeze, cough, or visible cyanosis.    SKIN: Visible skin clear. No significant rash, abnormal pigmentation or lesions.  NEURO: Cranial nerves grossly intact.  Mentation and speech appropriate for age.  PSYCH: Appropriate affect, tone, and pace of words    Lab on 03/23/2024   Component Date Value Ref Range Status    INR 03/23/2024 0.98  0.85 - 1.15 Final    WBC Count 03/23/2024 10.3  4.0 - 11.0 10e3/uL Final    RBC Count 03/23/2024 5.03  3.80 - 5.20 10e6/uL Final    Hemoglobin 03/23/2024 15.2  11.7 - 15.7 g/dL Final    Hematocrit 03/23/2024 44.0  35.0 - 47.0 % Final    MCV 03/23/2024 88  78 - 100 fL Final    MCH 03/23/2024 30.2   26.5 - 33.0 pg Final    MCHC 03/23/2024 34.5  31.5 - 36.5 g/dL Final    RDW 03/23/2024 12.8  10.0 - 15.0 % Final    Platelet Count 03/23/2024 317  150 - 450 10e3/uL Final    Sodium 03/23/2024 139  135 - 145 mmol/L Final    Reference intervals for this test were updated on 09/26/2023 to more accurately reflect our healthy population. There may be differences in the flagging of prior results with similar values performed with this method. Interpretation of those prior results can be made in the context of the updated reference intervals.     Potassium 03/23/2024 3.9  3.4 - 5.3 mmol/L Final    Carbon Dioxide (CO2) 03/23/2024 23  22 - 29 mmol/L Final    Anion Gap 03/23/2024 12  7 - 15 mmol/L Final    Urea Nitrogen 03/23/2024 11.6  6.0 - 20.0 mg/dL Final    Creatinine 03/23/2024 0.77  0.51 - 0.95 mg/dL Final    GFR Estimate 03/23/2024 >90  >60 mL/min/1.73m2 Final    Calcium 03/23/2024 9.3  8.6 - 10.0 mg/dL Final    Chloride 03/23/2024 104  98 - 107 mmol/L Final    Glucose 03/23/2024 134 (H)  70 - 99 mg/dL Final    Alkaline Phosphatase 03/23/2024 49  40 - 150 U/L Final    Reference intervals for this test were updated on 11/14/2023 to more accurately reflect our healthy population. There may be differences in the flagging of prior results with similar values performed with this method. Interpretation of those prior results can be made in the context of the updated reference intervals.    AST 03/23/2024 33  0 - 45 U/L Final    Reference intervals for this test were updated on 6/12/2023 to more accurately reflect our healthy population. There may be differences in the flagging of prior results with similar values performed with this method. Interpretation of those prior results can be made in the context of the updated reference intervals.    ALT 03/23/2024 58 (H)  0 - 50 U/L Final    Reference intervals for this test were updated on 6/12/2023 to more accurately reflect our healthy population. There may be differences in  the flagging of prior results with similar values performed with this method. Interpretation of those prior results can be made in the context of the updated reference intervals.      Protein Total 03/23/2024 6.7  6.4 - 8.3 g/dL Final    Albumin 03/23/2024 4.4  3.5 - 5.2 g/dL Final    Bilirubin Total 03/23/2024 0.6  <=1.2 mg/dL Final           Signed Electronically by: Kaley Hall MD

## 2024-03-27 ENCOUNTER — TELEPHONE (OUTPATIENT)
Dept: OBGYN | Facility: CLINIC | Age: 49
End: 2024-03-27

## 2024-03-27 NOTE — TELEPHONE ENCOUNTER
[facility] Call Center    Phone Message    May a detailed message be left on voicemail: yes     Reason for Call: Other: Pt won't be able to make the appointment that was scheduled for today due to not feeling good. Pt is wondering if she can get squeezed in next week. Please contact pt as writer didn't see any openings.      Action Taken: Message routed to:  Other: [facility]    Travel Screening: Not Applicable

## 2024-03-29 ENCOUNTER — INFUSION THERAPY VISIT (OUTPATIENT)
Dept: INFUSION THERAPY | Facility: CLINIC | Age: 49
End: 2024-03-29
Attending: INTERNAL MEDICINE
Payer: MEDICARE

## 2024-03-29 VITALS
SYSTOLIC BLOOD PRESSURE: 124 MMHG | BODY MASS INDEX: 35.02 KG/M2 | RESPIRATION RATE: 16 BRPM | HEART RATE: 71 BPM | TEMPERATURE: 98.5 F | DIASTOLIC BLOOD PRESSURE: 75 MMHG | WEIGHT: 217 LBS | OXYGEN SATURATION: 96 %

## 2024-03-29 DIAGNOSIS — M13.80 SERONEGATIVE INFLAMMATORY ARTHRITIS: Primary | ICD-10-CM

## 2024-03-29 LAB
ALBUMIN SERPL BCG-MCNC: 4.2 G/DL (ref 3.5–5.2)
ALBUMIN UR-MCNC: NEGATIVE MG/DL
ALP SERPL-CCNC: 47 U/L (ref 40–150)
ALT SERPL W P-5'-P-CCNC: 66 U/L (ref 0–50)
ANION GAP SERPL CALCULATED.3IONS-SCNC: 13 MMOL/L (ref 7–15)
APPEARANCE UR: CLEAR
AST SERPL W P-5'-P-CCNC: 41 U/L (ref 0–45)
BACTERIA #/AREA URNS HPF: ABNORMAL /HPF
BASOPHILS # BLD AUTO: 0.1 10E3/UL (ref 0–0.2)
BASOPHILS NFR BLD AUTO: 1 %
BILIRUB SERPL-MCNC: 0.6 MG/DL
BILIRUB UR QL STRIP: NEGATIVE
BUN SERPL-MCNC: 18.5 MG/DL (ref 6–20)
CALCIUM SERPL-MCNC: 9 MG/DL (ref 8.6–10)
CHLORIDE SERPL-SCNC: 104 MMOL/L (ref 98–107)
COLOR UR AUTO: ABNORMAL
CREAT SERPL-MCNC: 0.87 MG/DL (ref 0.51–0.95)
DEPRECATED HCO3 PLAS-SCNC: 20 MMOL/L (ref 22–29)
EGFRCR SERPLBLD CKD-EPI 2021: 82 ML/MIN/1.73M2
EOSINOPHIL # BLD AUTO: 0.2 10E3/UL (ref 0–0.7)
EOSINOPHIL NFR BLD AUTO: 2 %
ERYTHROCYTE [DISTWIDTH] IN BLOOD BY AUTOMATED COUNT: 12.9 % (ref 10–15)
GLUCOSE SERPL-MCNC: 155 MG/DL (ref 70–99)
GLUCOSE UR STRIP-MCNC: NEGATIVE MG/DL
HCT VFR BLD AUTO: 41 % (ref 35–47)
HGB BLD-MCNC: 14.2 G/DL (ref 11.7–15.7)
HGB UR QL STRIP: NEGATIVE
IMM GRANULOCYTES # BLD: 0.1 10E3/UL
IMM GRANULOCYTES NFR BLD: 1 %
KETONES UR STRIP-MCNC: NEGATIVE MG/DL
LEUKOCYTE ESTERASE UR QL STRIP: NEGATIVE
LYMPHOCYTES # BLD AUTO: 1.9 10E3/UL (ref 0.8–5.3)
LYMPHOCYTES NFR BLD AUTO: 19 %
MCH RBC QN AUTO: 30.6 PG (ref 26.5–33)
MCHC RBC AUTO-ENTMCNC: 34.6 G/DL (ref 31.5–36.5)
MCV RBC AUTO: 88 FL (ref 78–100)
MONOCYTES # BLD AUTO: 1.2 10E3/UL (ref 0–1.3)
MONOCYTES NFR BLD AUTO: 12 %
NEUTROPHILS # BLD AUTO: 6.5 10E3/UL (ref 1.6–8.3)
NEUTROPHILS NFR BLD AUTO: 65 %
NITRATE UR QL: NEGATIVE
NRBC # BLD AUTO: 0 10E3/UL
NRBC BLD AUTO-RTO: 0 /100
PH UR STRIP: 5.5 [PH] (ref 5–7)
PLATELET # BLD AUTO: 335 10E3/UL (ref 150–450)
POTASSIUM SERPL-SCNC: 3.7 MMOL/L (ref 3.4–5.3)
PROT SERPL-MCNC: 6.3 G/DL (ref 6.4–8.3)
RBC # BLD AUTO: 4.64 10E6/UL (ref 3.8–5.2)
RBC URINE: <1 /HPF
SODIUM SERPL-SCNC: 137 MMOL/L (ref 135–145)
SP GR UR STRIP: 1.01 (ref 1–1.03)
UROBILINOGEN UR STRIP-MCNC: NORMAL MG/DL
WBC # BLD AUTO: 9.8 10E3/UL (ref 4–11)
WBC URINE: <1 /HPF

## 2024-03-29 PROCEDURE — 36415 COLL VENOUS BLD VENIPUNCTURE: CPT | Performed by: INTERNAL MEDICINE

## 2024-03-29 PROCEDURE — 250N000011 HC RX IP 250 OP 636: Performed by: INTERNAL MEDICINE

## 2024-03-29 PROCEDURE — 85025 COMPLETE CBC W/AUTO DIFF WBC: CPT | Performed by: INTERNAL MEDICINE

## 2024-03-29 PROCEDURE — 96365 THER/PROPH/DIAG IV INF INIT: CPT

## 2024-03-29 PROCEDURE — 250N000013 HC RX MED GY IP 250 OP 250 PS 637: Performed by: INTERNAL MEDICINE

## 2024-03-29 PROCEDURE — 81001 URINALYSIS AUTO W/SCOPE: CPT | Performed by: INTERNAL MEDICINE

## 2024-03-29 PROCEDURE — 80053 COMPREHEN METABOLIC PANEL: CPT | Performed by: INTERNAL MEDICINE

## 2024-03-29 PROCEDURE — 258N000003 HC RX IP 258 OP 636: Performed by: INTERNAL MEDICINE

## 2024-03-29 PROCEDURE — 96375 TX/PRO/DX INJ NEW DRUG ADDON: CPT

## 2024-03-29 RX ORDER — HEPARIN SODIUM,PORCINE 10 UNIT/ML
5-20 VIAL (ML) INTRAVENOUS DAILY PRN
Status: CANCELLED | OUTPATIENT
Start: 2024-04-22

## 2024-03-29 RX ORDER — DIPHENHYDRAMINE HYDROCHLORIDE 50 MG/ML
50 INJECTION INTRAMUSCULAR; INTRAVENOUS
Status: CANCELLED
Start: 2024-04-22

## 2024-03-29 RX ORDER — MEPERIDINE HYDROCHLORIDE 25 MG/ML
25 INJECTION INTRAMUSCULAR; INTRAVENOUS; SUBCUTANEOUS EVERY 30 MIN PRN
Status: CANCELLED | OUTPATIENT
Start: 2024-04-22

## 2024-03-29 RX ORDER — ALBUTEROL SULFATE 0.83 MG/ML
2.5 SOLUTION RESPIRATORY (INHALATION)
Status: CANCELLED | OUTPATIENT
Start: 2024-04-22

## 2024-03-29 RX ORDER — METHYLPREDNISOLONE SODIUM SUCCINATE 125 MG/2ML
81.25 INJECTION, POWDER, LYOPHILIZED, FOR SOLUTION INTRAMUSCULAR; INTRAVENOUS ONCE
Status: COMPLETED | OUTPATIENT
Start: 2024-03-29 | End: 2024-03-29

## 2024-03-29 RX ORDER — ACETAMINOPHEN 325 MG/1
650 TABLET ORAL ONCE
Status: CANCELLED | OUTPATIENT
Start: 2024-04-22

## 2024-03-29 RX ORDER — METHYLPREDNISOLONE SODIUM SUCCINATE 125 MG/2ML
125 INJECTION, POWDER, LYOPHILIZED, FOR SOLUTION INTRAMUSCULAR; INTRAVENOUS
Status: CANCELLED
Start: 2024-04-22

## 2024-03-29 RX ORDER — EPINEPHRINE 1 MG/ML
0.3 INJECTION, SOLUTION INTRAMUSCULAR; SUBCUTANEOUS EVERY 5 MIN PRN
Status: CANCELLED | OUTPATIENT
Start: 2024-04-22

## 2024-03-29 RX ORDER — ACETAMINOPHEN 325 MG/1
650 TABLET ORAL ONCE
Status: COMPLETED | OUTPATIENT
Start: 2024-03-29 | End: 2024-03-29

## 2024-03-29 RX ORDER — ALBUTEROL SULFATE 90 UG/1
1-2 AEROSOL, METERED RESPIRATORY (INHALATION)
Status: CANCELLED
Start: 2024-04-22

## 2024-03-29 RX ORDER — HEPARIN SODIUM (PORCINE) LOCK FLUSH IV SOLN 100 UNIT/ML 100 UNIT/ML
5 SOLUTION INTRAVENOUS
Status: CANCELLED | OUTPATIENT
Start: 2024-04-22

## 2024-03-29 RX ORDER — METHYLPREDNISOLONE SODIUM SUCCINATE 125 MG/2ML
81.25 INJECTION, POWDER, LYOPHILIZED, FOR SOLUTION INTRAMUSCULAR; INTRAVENOUS ONCE
Status: CANCELLED | OUTPATIENT
Start: 2024-04-22

## 2024-03-29 RX ADMIN — ACETAMINOPHEN 650 MG: 325 TABLET ORAL at 14:46

## 2024-03-29 RX ADMIN — METHYLPREDNISOLONE SODIUM SUCCINATE 81.25 MG: 125 INJECTION, POWDER, FOR SOLUTION INTRAMUSCULAR; INTRAVENOUS at 15:05

## 2024-03-29 RX ADMIN — TOCILIZUMAB 800 MG: 20 INJECTION, SOLUTION, CONCENTRATE INTRAVENOUS at 15:08

## 2024-03-29 NOTE — PROGRESS NOTES
Nursing Note  Laxmi Ya presents today [facility] for:   Chief Complaint   Patient presents with    Infusion     Actemra       During today's [facility] appointment, orders from Dr. Lemus were completed.  Frequency: every 28 days    Progress note:  Patient identification verified by name and date of birth.  Assessment completed.  Vitals recorded in Doc Flowsheets.  Patient was provided with education regarding medication/procedure and possible side effects.  Patient verbalized understanding.     present during visit today: Not Applicable.    Treatment Conditions: ~~~ NOTE: If the patient answers yes to any of the questions below, hold the infusion and contact ordering provider or on-call provider.    Have you recently had an elevated temperature, fever, chills, productive cough, coughing for 3 weeks or longer or hemoptysis,  abnormal vital signs, night sweats,  chest pain or have you noticed a decrease in your appetite, unexplained weight loss or fatigue? No  Do you have any open wounds or new incisions? No  Do you have any upcoming hospitalizations or surgeries? Does not include esophagogastroduodenoscopy, colonoscopy, endoscopic retrograde cholangiopancreatography (ERCP), endoscopic ultrasound (EUS), dental procedures or joint aspiration/steroid injections No  Do you currently have any signs of illness or infection or are you on any antibiotics? No  Have you had any new, sudden or worsening abdominal pain? No  Have you or anyone in your household received a live vaccination in the past 4 weeks? Please note: No live vaccines while on biologic/chemotherapy until 6 months after the last treatment. Patient can receive the flu vaccine (shot only), pneumovax and the Covid vaccine. It is optimal for the patient to get these vaccines mid cycle, but they can be given at any time as long as it is not on the day of the infusion. No  Have you  recently been diagnosed with any new nervous system diseases (ie. Multiple sclerosis, Guillain [place], seizures, neurological changes) or cancer diagnosis? Are you on any form of radiation or chemotherapy? No  Are you pregnant or breast feeding or do you have plans of pregnancy in the future? No  Have there been any other new onset medical symptoms? No    Premedications: administered per order.    Drug Waste Record: No    Infusion length and rate:  infusion given over approximately  60 minutes    Labs: were drawn per orders.     Vascular access: peripheral IV was placed by vascular access nurse.    Is the next appt scheduled? yes    Post Infusion Assessment:  Patient tolerated infusion without incident.     Discharge Plan:   Follow up plan of care with: ongoing infusions at [facility]. and ordering provider as scheduled.  Discharge instructions were reviewed with patient.  Patient/representative verbalized understanding of discharge instructions and all questions answered.  Patient discharged from [facility] in stable condition.    Shawanda Davidson RN    Administrations This Visit       acetaminophen (TYLENOL) tablet 650 mg       Admin Date  03/29/2024 Action  $Given Dose  650 mg Route  Oral Documented By  Shawanda Davidson RN              methylPREDNISolone sodium succinate (solu-MEDROL) injection 81.25 mg       Admin Date  03/29/2024 Action  $Given Dose  81.25 mg Route  Intravenous Documented By  Shawanda Davidson RN              tocilizumab (ACTEMRA) 800 mg in sodium chloride 0.9 % 150 mL infusion       Admin Date  03/29/2024 Action  $New Bag Dose  800 mg Rate  150 mL/hr Route  Intravenous Documented By  Shawanda Davidson RN                      /75 (BP Location: Right arm, Patient Position: Semi-Stephens's, Cuff Size: Adult Regular)   Pulse 71   Temp 98.5  F (36.9  C) (Oral)   Resp 16   Wt 98.4 kg (217 lb)   SpO2 96%   BMI 35.02 kg/m

## 2024-04-02 ENCOUNTER — TELEPHONE (OUTPATIENT)
Dept: ENDOCRINOLOGY | Facility: CLINIC | Age: 49
End: 2024-04-02
Payer: MEDICARE

## 2024-04-02 NOTE — TELEPHONE ENCOUNTER
pt didn't answer the phone, so I sent a mcm to see if pt will move their appt from 4/11 to 4/8 at 10:30, this spot is on hold. ok to schedule per Vanessa

## 2024-04-09 ENCOUNTER — THERAPY VISIT (OUTPATIENT)
Dept: OCCUPATIONAL THERAPY | Facility: CLINIC | Age: 49
End: 2024-04-09
Attending: INTERNAL MEDICINE
Payer: MEDICARE

## 2024-04-09 ENCOUNTER — THERAPY VISIT (OUTPATIENT)
Dept: PHYSICAL THERAPY | Facility: CLINIC | Age: 49
End: 2024-04-09
Payer: MEDICARE

## 2024-04-09 DIAGNOSIS — I89.0 LYMPHEDEMA: Primary | ICD-10-CM

## 2024-04-09 DIAGNOSIS — G89.29 CHRONIC BILATERAL LOW BACK PAIN WITHOUT SCIATICA: ICD-10-CM

## 2024-04-09 DIAGNOSIS — M54.2 NECK PAIN: ICD-10-CM

## 2024-04-09 DIAGNOSIS — M54.50 CHRONIC BILATERAL LOW BACK PAIN WITHOUT SCIATICA: ICD-10-CM

## 2024-04-09 DIAGNOSIS — R10.2 PELVIC PAIN IN FEMALE: Primary | ICD-10-CM

## 2024-04-09 PROCEDURE — 97140 MANUAL THERAPY 1/> REGIONS: CPT | Mod: GP | Performed by: PHYSICAL THERAPIST

## 2024-04-09 PROCEDURE — 97012 MECHANICAL TRACTION THERAPY: CPT | Mod: GP | Performed by: PHYSICAL THERAPIST

## 2024-04-09 PROCEDURE — 97140 MANUAL THERAPY 1/> REGIONS: CPT | Mod: GO | Performed by: OCCUPATIONAL THERAPIST

## 2024-04-09 NOTE — PROGRESS NOTES
04/09/24 0500   Progress Note/Certification   Start Of Care Date 09/07/23   Onset of Illness/Injury or Date of Surgery 09/07/22   Therapy Frequency then 2x/wee for   Predicted Duration 5 weeks   Certification date from 02/26/24   Certification date to 05/22/24   KX Modifier Statement I certify the need for these services furnished under this plan of treatment and while under my care.  (Physician co-signature of this document indicates review and certification of the therapy plan)   Progress Note Due Date   (10 visits)   Goals   OT Goals 1;2;3;4;5   OT Goal 1   Goal Identifier 1   Goal Description In order to improve functional mobility for activities of living, by the completion of intensive treatment, patient and/or caregiver will;      Verbalize and/or demonstrate understanding of techniques to independently manage their lymphedema at home   Rationale In order to maximize safety and independence with performance of self-care activities   Target Date 05/22/24   OT Goal 2   Goal Identifier 1a   Goal Description demonstrate independence in performing prescribed exercises/self MLD to facilate the lymph system   Rationale In order to maximize safety and independence with performance of self-care activities   Target Date 05/22/24   OT Goal 3   Goal Identifier 1b   Goal Description be independent in donning/doffing, wearing schedule, and care of compression garments   Rationale In order to maximize safety and independence with performance of self-care activities   Target Date 11/30/23   Date Met 02/15/24   OT Goal 4   Goal Identifier 2   Goal Description Pt will decrease the suprapubic swelling to perform hygiene/shave with a normal effort.   Rationale In order to maximize safety and independence with performance of self-care activities   Goal Progress Pt has reduced 2c R and 1.8c L   Target Date 11/30/23   OT Goal 5   Goal Description Pt oracio improve movement of shoulders and trunk to perform toilet hygiene.    Rationale In order to maximize safety and independence with performance of self-care activities   Target Date 05/22/24         [facility]                                                                                   OUTPATIENT OCCUPATIONAL THERAPY    PLAN OF TREATMENT FOR OUTPATIENT REHABILITATION   Patient's Last Name, First Name, Laxmi Coleman YOB: 1975   Provider's Name   [facility]   Medical Record No.  [id number]     Onset Date: (P) 09/07/22 Start of Care Date: (P) 09/07/23     Medical Diagnosis:   lymphedema      OT Treatment Diagnosis:   lymphedema Plan of Treatment  Frequency/Duration:(P) then 2x/wee for/(P) 5 weeks    Certification date from (P) 02/26/24   To (P) 05/22/24        See note for plan of treatment details and functional goals     Alanis Schilling OT                         I CERTIFY THE NEED FOR THESE SERVICES FURNISHED UNDER        THIS PLAN OF TREATMENT AND WHILE UNDER MY CARE .             Physician Signature               Date    X_____________________________________________________                  Referring Provider:  Amol Juares    Initial Assessment  See Epic Evaluation- (P) 09/07/23

## 2024-04-11 DIAGNOSIS — J45.40 MODERATE PERSISTENT ASTHMA WITHOUT COMPLICATION: ICD-10-CM

## 2024-04-11 RX ORDER — FLUTICASONE FUROATE, UMECLIDINIUM BROMIDE AND VILANTEROL TRIFENATATE 200; 62.5; 25 UG/1; UG/1; UG/1
1 POWDER RESPIRATORY (INHALATION) DAILY
Qty: 90 EACH | Refills: 1 | Status: SHIPPED | OUTPATIENT
Start: 2024-04-11

## 2024-04-12 ENCOUNTER — TELEPHONE (OUTPATIENT)
Dept: ENDOCRINOLOGY | Facility: CLINIC | Age: 49
End: 2024-04-12
Payer: MEDICARE

## 2024-04-12 NOTE — TELEPHONE ENCOUNTER
[facility] & [facility] for PT to call [phone] or reply to me about scheduling a f/u appt with Dr Coronado. I offered 5/13 @3, 5/16 @ 2:30 or 5/16 @ 3:00, ok per Vanessa

## 2024-04-16 ENCOUNTER — THERAPY VISIT (OUTPATIENT)
Dept: OCCUPATIONAL THERAPY | Facility: CLINIC | Age: 49
End: 2024-04-16
Attending: INTERNAL MEDICINE
Payer: MEDICARE

## 2024-04-16 DIAGNOSIS — I89.0 LYMPHEDEMA: Primary | ICD-10-CM

## 2024-04-16 PROCEDURE — 97140 MANUAL THERAPY 1/> REGIONS: CPT | Mod: GO | Performed by: OCCUPATIONAL THERAPIST

## 2024-04-17 ENCOUNTER — THERAPY VISIT (OUTPATIENT)
Dept: PHYSICAL THERAPY | Facility: CLINIC | Age: 49
End: 2024-04-17
Payer: MEDICARE

## 2024-04-17 DIAGNOSIS — G89.29 CHRONIC BILATERAL LOW BACK PAIN WITHOUT SCIATICA: ICD-10-CM

## 2024-04-17 DIAGNOSIS — M54.2 NECK PAIN: ICD-10-CM

## 2024-04-17 DIAGNOSIS — R10.2 PELVIC PAIN IN FEMALE: Primary | ICD-10-CM

## 2024-04-17 DIAGNOSIS — M54.50 CHRONIC BILATERAL LOW BACK PAIN WITHOUT SCIATICA: ICD-10-CM

## 2024-04-17 PROCEDURE — 97140 MANUAL THERAPY 1/> REGIONS: CPT | Mod: GP | Performed by: PHYSICAL THERAPIST

## 2024-04-17 PROCEDURE — 97012 MECHANICAL TRACTION THERAPY: CPT | Mod: GP | Performed by: PHYSICAL THERAPIST

## 2024-04-18 ENCOUNTER — THERAPY VISIT (OUTPATIENT)
Dept: OCCUPATIONAL THERAPY | Facility: CLINIC | Age: 49
End: 2024-04-18
Attending: INTERNAL MEDICINE
Payer: MEDICARE

## 2024-04-18 DIAGNOSIS — I89.0 LYMPHEDEMA: Primary | ICD-10-CM

## 2024-04-18 PROCEDURE — 97140 MANUAL THERAPY 1/> REGIONS: CPT | Mod: GO | Performed by: OCCUPATIONAL THERAPIST

## 2024-04-23 ENCOUNTER — TELEPHONE (OUTPATIENT)
Dept: ORTHOPEDICS | Facility: CLINIC | Age: 49
End: 2024-04-23
Payer: MEDICARE

## 2024-04-23 NOTE — TELEPHONE ENCOUNTER
Patient Returning Call    Reason for call:  pt needing for order to be revised and changed to add radial nerve due to causing problems with back of hand and forarm pt stats PT Lorena is requesting this requesting callback.     Information relayed to patient:  te sent to clinic     Patient has additional questions:  No      [facility] or would you prefer to receive a phone call?:   Patient would prefer a phone call   Okay to leave a detailed message?: Yes at Cell number on file:    Telephone Information:   Mobile [phone]

## 2024-04-23 NOTE — TELEPHONE ENCOUNTER
Dr Morrison approved adding on the radial nerve to the ultrasound order. Patient informed. Order updated. Heather Spivey RN    Attempted to addend the original order but it did not allow further editing.  It is already linked to her scheduled appointment so did not allow for changes.  Contacted the radiology tech, and they were able to add the radial nerve to the notes on the current order. Haether Spivey RN

## 2024-04-23 NOTE — TELEPHONE ENCOUNTER
Spoke with patient. She is going to be having an ultrasound on 5-9-24 and her hand OT noticed that she is having symptoms associated with the radial nerve.  Patient is requesting that they add the radial nerve to the current ultrasound order.  Right now the ultrasound is to evaluate the bilateral median and ulnar nerves.   Will need to get an approval from MD to add this to the test, then if OK will add to current orders. Patient agreeable. Heather Spivey RN

## 2024-04-25 ENCOUNTER — INFUSION THERAPY VISIT (OUTPATIENT)
Dept: INFUSION THERAPY | Facility: CLINIC | Age: 49
End: 2024-04-25
Attending: INTERNAL MEDICINE
Payer: MEDICARE

## 2024-04-25 VITALS
SYSTOLIC BLOOD PRESSURE: 110 MMHG | HEART RATE: 71 BPM | DIASTOLIC BLOOD PRESSURE: 73 MMHG | TEMPERATURE: 97.7 F | OXYGEN SATURATION: 95 % | RESPIRATION RATE: 20 BRPM

## 2024-04-25 DIAGNOSIS — M13.80 SERONEGATIVE INFLAMMATORY ARTHRITIS: Primary | ICD-10-CM

## 2024-04-25 PROCEDURE — 250N000011 HC RX IP 250 OP 636: Performed by: INTERNAL MEDICINE

## 2024-04-25 PROCEDURE — 258N000003 HC RX IP 258 OP 636: Performed by: INTERNAL MEDICINE

## 2024-04-25 PROCEDURE — 96365 THER/PROPH/DIAG IV INF INIT: CPT

## 2024-04-25 PROCEDURE — 96375 TX/PRO/DX INJ NEW DRUG ADDON: CPT

## 2024-04-25 PROCEDURE — 250N000013 HC RX MED GY IP 250 OP 250 PS 637: Performed by: INTERNAL MEDICINE

## 2024-04-25 RX ORDER — METHYLPREDNISOLONE SODIUM SUCCINATE 125 MG/2ML
81.25 INJECTION, POWDER, LYOPHILIZED, FOR SOLUTION INTRAMUSCULAR; INTRAVENOUS ONCE
Status: CANCELLED | OUTPATIENT
Start: 2024-04-26

## 2024-04-25 RX ORDER — ALBUTEROL SULFATE 0.83 MG/ML
2.5 SOLUTION RESPIRATORY (INHALATION)
Status: CANCELLED | OUTPATIENT
Start: 2024-04-26

## 2024-04-25 RX ORDER — ACETAMINOPHEN 325 MG/1
650 TABLET ORAL ONCE
Status: CANCELLED | OUTPATIENT
Start: 2024-04-26

## 2024-04-25 RX ORDER — HEPARIN SODIUM (PORCINE) LOCK FLUSH IV SOLN 100 UNIT/ML 100 UNIT/ML
5 SOLUTION INTRAVENOUS
Status: CANCELLED | OUTPATIENT
Start: 2024-04-26

## 2024-04-25 RX ORDER — ALBUTEROL SULFATE 90 UG/1
1-2 AEROSOL, METERED RESPIRATORY (INHALATION)
Status: CANCELLED
Start: 2024-04-26

## 2024-04-25 RX ORDER — HEPARIN SODIUM,PORCINE 10 UNIT/ML
5-20 VIAL (ML) INTRAVENOUS DAILY PRN
Status: CANCELLED | OUTPATIENT
Start: 2024-04-26

## 2024-04-25 RX ORDER — EPINEPHRINE 1 MG/ML
0.3 INJECTION, SOLUTION INTRAMUSCULAR; SUBCUTANEOUS EVERY 5 MIN PRN
Status: CANCELLED | OUTPATIENT
Start: 2024-04-26

## 2024-04-25 RX ORDER — METHYLPREDNISOLONE SODIUM SUCCINATE 125 MG/2ML
81.25 INJECTION, POWDER, LYOPHILIZED, FOR SOLUTION INTRAMUSCULAR; INTRAVENOUS ONCE
Status: COMPLETED | OUTPATIENT
Start: 2024-04-25 | End: 2024-04-25

## 2024-04-25 RX ORDER — MEPERIDINE HYDROCHLORIDE 25 MG/ML
25 INJECTION INTRAMUSCULAR; INTRAVENOUS; SUBCUTANEOUS EVERY 30 MIN PRN
Status: CANCELLED | OUTPATIENT
Start: 2024-04-26

## 2024-04-25 RX ORDER — METHYLPREDNISOLONE SODIUM SUCCINATE 125 MG/2ML
125 INJECTION, POWDER, LYOPHILIZED, FOR SOLUTION INTRAMUSCULAR; INTRAVENOUS
Status: CANCELLED
Start: 2024-04-26

## 2024-04-25 RX ORDER — DIPHENHYDRAMINE HYDROCHLORIDE 50 MG/ML
50 INJECTION INTRAMUSCULAR; INTRAVENOUS
Status: CANCELLED
Start: 2024-04-26

## 2024-04-25 RX ORDER — ACETAMINOPHEN 325 MG/1
650 TABLET ORAL ONCE
Status: COMPLETED | OUTPATIENT
Start: 2024-04-25 | End: 2024-04-25

## 2024-04-25 RX ADMIN — TOCILIZUMAB 800 MG: 20 INJECTION, SOLUTION, CONCENTRATE INTRAVENOUS at 15:45

## 2024-04-25 RX ADMIN — ACETAMINOPHEN 650 MG: 325 TABLET ORAL at 15:29

## 2024-04-25 RX ADMIN — SODIUM CHLORIDE 250 ML: 9 INJECTION, SOLUTION INTRAVENOUS at 16:40

## 2024-04-25 RX ADMIN — METHYLPREDNISOLONE SODIUM SUCCINATE 81.25 MG: 125 INJECTION, POWDER, FOR SOLUTION INTRAMUSCULAR; INTRAVENOUS at 15:31

## 2024-04-25 ASSESSMENT — PAIN SCALES - GENERAL: PAINLEVEL: WORST PAIN (10)

## 2024-04-25 NOTE — PATIENT INSTRUCTIONS
Dear Laxmi Ya    Thank you for choosing [facility] ([facility]) for your infusion.  The following information is a summary of our appointment as well as important reminders.      Last dose of Tylenol was 3:30pm.    EDUCATION POST BIOLOGICAL/CHEMOTHERAPY INFUSION  Call the triage nurse at your clinic or seek medical attention if you have chills and/or temperature greater than or equal to 100.5, uncontrolled nausea/vomiting, diarrhea, constipation, dizziness, shortness of breath, chest pain, heart palpitations, weakness or any other new or concerning symptoms, questions or concerns.  You can not have any live virus vaccines prior to or during treatment or up to 6 months post infusion.  If you have an upcoming surgery, medical procedure or dental procedure during treatment, this should be discussed with your ordering physician and your surgeon/dentist.  If you are having any concerning symptom, if you are unsure if you should get your next infusion or wish to speak to a provider before your next infusion, please call your care coordinator or triage nurse at your clinic to notify them so we can adequately serve you.     We look forward in seeing you on your next appointment here at [facility] ([facility]).  Please don t hesitate to call us at [phone] to reschedule any of your appointments or to speak with one of the [facility] registered nurses.  It was a pleasure taking care of you today.    Sincerely,    [facility]  [address] [place]  [address]  Phone:  [phone]

## 2024-04-25 NOTE — PROGRESS NOTES
Infusion Nursing Note:  Laxmi Ya presents today for Actemra.    Patient seen by provider today: No   present during visit today: Not Applicable.    Note: Patient self administered home insulin 30 minutes prior to solu-medrol pre-med. Solu-medrol tylenol administered as premeds. Actemra infused over 60 minutes. 250ml NS bolus given post actemra per orders. No labs due to be drawn at this visit, will be drawn next visit.     Intravenous Access:  Peripheral IV placed by vascular access.    Treatment Conditions:  Biological Infusion Checklist:  ~~~ NOTE: If the patient answers yes to any of the questions below, hold the infusion and contact ordering provider or on-call provider.    Have you recently had an elevated temperature, fever, chills, productive cough, coughing for 3 weeks or longer or hemoptysis,  abnormal vital signs, night sweats,  chest pain or have you noticed a decrease in your appetite, unexplained weight loss or fatigue? No  Do you have any open wounds or new incisions? No  Do you have any upcoming hospitalizations or surgeries? Does not include esophagogastroduodenoscopy, colonoscopy, endoscopic retrograde cholangiopancreatography (ERCP), endoscopic ultrasound (EUS), dental procedures or joint aspiration/steroid injections No  Do you currently have any signs of illness or infection or are you on any antibiotics? No  Have you had any new, sudden or worsening abdominal pain? No  Have you or anyone in your household received a live vaccination in the past 4 weeks? Please note: No live vaccines while on biologic/chemotherapy until 6 months after the last treatment. Patient can receive the flu vaccine (shot only), pneumovax and the Covid vaccine. It is optimal for the patient to get these vaccines mid cycle, but they can be given at any time as long as it is not on the day of the infusion. No  Have you recently been diagnosed with any new nervous system diseases (ie. Multiple sclerosis,  Guillain [place], seizures, neurological changes) or cancer diagnosis? Are you on any form of radiation or chemotherapy? No  Are you pregnant or breast feeding or do you have plans of pregnancy in the future? No  Have you been having any signs of worsening depression or suicidal ideations?  (benlysta only) No  Have there been any other new onset medical symptoms? No  Have you had any new blood clots? (IVIG only) No      Post Infusion Assessment:  Patient tolerated infusion without incident.  Blood return noted pre and post infusion.  Site patent and intact, free from redness, edema or discomfort.  No evidence of extravasations.  Access discontinued per protocol.  Biologic Infusion Post Education: Call the triage nurse at your clinic or seek medical attention if you have chills and/or temperature greater than or equal to 100.5, uncontrolled nausea/vomiting, diarrhea, constipation, dizziness, shortness of breath, chest pain, heart palpitations, weakness or any other new or concerning symptoms, questions or concerns.  You cannot have any live virus vaccines prior to or during treatment or up to 6 months post infusion.  If you have an upcoming surgery, medical procedure or dental procedure during treatment, this should be discussed with your ordering physician and your surgeon/dentist.  If you are having any concerning symptom, if you are unsure if you should get your next infusion or wish to speak to a provider before your next infusion, please call your care coordinator or triage nurse at your clinic to notify them so we can adequately serve you.       Discharge Plan:   Discharge instructions reviewed with: Patient.  Patient and/or family verbalized understanding of discharge instructions and all questions answered.  AVS to patient via Circle.  Patient will return 5/21/24 for next appointment.   Patient discharged in stable condition accompanied by: self.  Departure Mode: Ambulatory.    Administrations This Visit        acetaminophen (TYLENOL) tablet 650 mg       Admin Date  04/25/2024 Action  $Given Dose  650 mg Route  Oral Documented By  Katina Hitchcock, LEXI              methylPREDNISolone sodium succinate (solu-MEDROL) injection 81.25 mg       Admin Date  04/25/2024 Action  $Given Dose  81.25 mg Route  Intravenous Documented By  Katina Hitchcock, LEXI              sodium chloride 0.9% BOLUS 250 mL       Admin Date  04/25/2024 Action  $New Bag Dose  250 mL Route  Intravenous Documented By  Jacquelyn Nicole, LEXI              tocilizumab (ACTEMRA) 800 mg in sodium chloride 0.9 % 150 mL infusion       Admin Date  04/25/2024 Action  $New Bag Dose  800 mg Rate  150 mL/hr Route  Intravenous Documented By  Katina Hitchcock RN                  BP 97/66 (BP Location: Right arm, Patient Position: Sitting, Cuff Size: Adult Regular)   Pulse 71   Temp 97.7  F (36.5  C) (Oral)   Resp 20   SpO2 95%     Care transferred to [facility] nurse at 1610.     Katina Hitchcock, RN

## 2024-04-29 ENCOUNTER — TELEPHONE (OUTPATIENT)
Dept: ENDOCRINOLOGY | Facility: CLINIC | Age: 49
End: 2024-04-29
Payer: MEDICARE

## 2024-04-29 NOTE — TELEPHONE ENCOUNTER
[facility] Call Center    Phone Message    May a detailed message be left on voicemail: yes     Reason for Call: Order(s): Other: MRI sent to [facility] Fax- [phone]   Reason for requested: MRI patient stated was supposed to be done in may.  Date needed: asap  Provider name: Rad    Action Taken: Message routed to:  [facility] endo    Travel Screening: Not Applicable

## 2024-05-01 ENCOUNTER — THERAPY VISIT (OUTPATIENT)
Dept: OCCUPATIONAL THERAPY | Facility: CLINIC | Age: 49
End: 2024-05-01
Attending: INTERNAL MEDICINE
Payer: MEDICARE

## 2024-05-01 DIAGNOSIS — I89.0 LYMPHEDEMA: Primary | ICD-10-CM

## 2024-05-01 PROCEDURE — 97140 MANUAL THERAPY 1/> REGIONS: CPT | Mod: GO | Performed by: OCCUPATIONAL THERAPIST

## 2024-05-02 ENCOUNTER — THERAPY VISIT (OUTPATIENT)
Dept: PHYSICAL THERAPY | Facility: CLINIC | Age: 49
End: 2024-05-02
Payer: MEDICARE

## 2024-05-02 DIAGNOSIS — G89.29 CHRONIC BILATERAL LOW BACK PAIN WITHOUT SCIATICA: ICD-10-CM

## 2024-05-02 DIAGNOSIS — R10.2 PELVIC PAIN IN FEMALE: Primary | ICD-10-CM

## 2024-05-02 DIAGNOSIS — M54.50 CHRONIC BILATERAL LOW BACK PAIN WITHOUT SCIATICA: ICD-10-CM

## 2024-05-02 DIAGNOSIS — M54.2 NECK PAIN: ICD-10-CM

## 2024-05-02 PROCEDURE — 97140 MANUAL THERAPY 1/> REGIONS: CPT | Mod: GP | Performed by: PHYSICAL THERAPIST

## 2024-05-02 PROCEDURE — 97012 MECHANICAL TRACTION THERAPY: CPT | Mod: GP | Performed by: PHYSICAL THERAPIST

## 2024-05-03 NOTE — TELEPHONE ENCOUNTER
FUTURE VISIT INFORMATION      FUTURE VISIT INFORMATION:  Date: 8/1/24  Time: 1:00pm  [place] [facility] INFORMATION:  Referring provider:  Nelia Gann MD   Referring providers clinic:  [facility]   Reason for visit/diagnosis  VCD    RECORDS REQUESTED FROM:       Clinic name Comments Records Status Imaging Status   [facility]  Ov/referral 3/4/24  Ov/notes 3/4/24-12/23/14 [facility]    [facility] OV/notes 4/12/23-5/25/22 EPIC

## 2024-05-07 ENCOUNTER — THERAPY VISIT (OUTPATIENT)
Dept: OCCUPATIONAL THERAPY | Facility: CLINIC | Age: 49
End: 2024-05-07
Attending: INTERNAL MEDICINE
Payer: MEDICARE

## 2024-05-07 ENCOUNTER — THERAPY VISIT (OUTPATIENT)
Dept: PHYSICAL THERAPY | Facility: CLINIC | Age: 49
End: 2024-05-07
Payer: MEDICARE

## 2024-05-07 DIAGNOSIS — G89.29 CHRONIC BILATERAL LOW BACK PAIN WITHOUT SCIATICA: ICD-10-CM

## 2024-05-07 DIAGNOSIS — I89.0 LYMPHEDEMA: Primary | ICD-10-CM

## 2024-05-07 DIAGNOSIS — M54.50 CHRONIC BILATERAL LOW BACK PAIN WITHOUT SCIATICA: ICD-10-CM

## 2024-05-07 DIAGNOSIS — M54.2 NECK PAIN: ICD-10-CM

## 2024-05-07 DIAGNOSIS — R10.2 PELVIC PAIN IN FEMALE: Primary | ICD-10-CM

## 2024-05-07 PROCEDURE — 97012 MECHANICAL TRACTION THERAPY: CPT | Mod: GP | Performed by: PHYSICAL THERAPIST

## 2024-05-07 PROCEDURE — 97140 MANUAL THERAPY 1/> REGIONS: CPT | Mod: GP | Performed by: PHYSICAL THERAPIST

## 2024-05-07 PROCEDURE — 97140 MANUAL THERAPY 1/> REGIONS: CPT | Mod: GO | Performed by: OCCUPATIONAL THERAPIST

## 2024-05-08 ENCOUNTER — THERAPY VISIT (OUTPATIENT)
Dept: OCCUPATIONAL THERAPY | Facility: CLINIC | Age: 49
End: 2024-05-08
Attending: INTERNAL MEDICINE
Payer: MEDICARE

## 2024-05-08 DIAGNOSIS — I89.0 LYMPHEDEMA: Primary | ICD-10-CM

## 2024-05-08 PROCEDURE — 97140 MANUAL THERAPY 1/> REGIONS: CPT | Mod: GO | Performed by: OCCUPATIONAL THERAPIST

## 2024-05-08 NOTE — PROGRESS NOTES
24 0500   Appointment Info   Signing clinician's name / credentials Sofi Lemons PT, OCS   Total/Authorized Visits orders  24, called 2x to get new orders from YESENIA Cortez. Not rec'd yet but cert signed.   Visits Used 35   Medical Diagnosis Lower back pain with mobility deficits, lower back pain w/ referred pain, neck pain w/ headache, neck pain w/ mobility deficits, pelvic pain   PT Tx Diagnosis Lower back pain with mobility deficits, lower back pain w/ referred pain, neck pain w/ headache, neck pain w/ mobility deficits   Other pertinent information autoimmune diseases, hx lumbar discectomy   Quick Adds Certification   Progress Note/Certification   Start of Care Date 23   Onset of illness/injury or Date of Surgery 23   Therapy Frequency 2x month   Predicted Duration 12 weeks   Certification date from 24   Certification date to 24   Progress Note Due Date 24   Progress Note Completed Date 24   GOALS   PT Goals 3   PT Goal 1   Goal Identifier ambulation   Goal Description Minutes patient will be able to  walk 20-30 minutes   Rationale to maximize safety and independence with performance of ADLs and functional tasks;to maximize safety and independence within the home;to maximize safety and independence within the community;to maximize safety and independence with transportation;to maximize safety and independence with self cares   Goal Progress currently ~10 minutes   Target Date 24   PT Goal 2   Goal Identifier headaches   Goal Description HA 1x week or less with intensity 2/10 or less   Rationale to maximize safety and independence with performance of ADLs and functional tasks;to maximize safety and independence within the home;to maximize safety and independence within the community;to maximize safety and independence with transportation;to maximize safety and independence with self cares   Goal Progress a few times a week HA, not daily but  still can be intense   Target Date 08/29/24   PT Goal 3   Goal Identifier pelvic pain   Goal Description with walking/intimacy/urinary urgency 2/10 or less   Rationale to maximize safety and independence with performance of ADLs and functional tasks;to maximize safety and independence within the home;to maximize safety and independence with transportation;to maximize safety and independence with self cares;to maximize safety and independence within the community   Goal Progress currently 4/10   Target Date 08/29/24   Subjective Report   Subjective Report Thoracic spine MRI coming up next week. Overall holding steady with pelvic/hip/low back pain. Difficulty with all functional mobility, can only do very short lived tasks. Reports it takes her ~ 2 hours to get ready for the day and only can shower 1-2 x week b/c is very exhausting. Walking limit ~ 10 minutes, bladder urgency/frequency pretty good right now, no saddle parasthesias.   Objective Measures   Objective Measures Objective Measure 1;Objective Measure 2   Objective Measure 1   Objective Measure Anitha to toe walk with poor to fair coordination, heel walk slight drop L foot. Lumbar AROM: flexion min loss, extension mod loss. CROM flexion min loss, extension max loss, R rot min loss, L rot mod loss. B SB mod loss.   Objective Measure 2   Objective Measure Kegel strength 3-, TTP R>L LA/OI today.   Details Tightness B adductors.   Mechanical Traction   Mechanical Traction, Minutes (64763) 15   Traction -Type Cervical   Position supine   Type Intermittent   Duration 15 min   Max poundage 27   Min poundage 0   Hold Time 60   Rest Time 10   Steps ramping up 1   Steps ramping down 1   Speed 100%   Treatment Interventions (PT)   Interventions Manual Therapy   Therapeutic Procedure/Exercise   Therapeutic Procedures Ther Proc 2;Ther Proc 3;Ther Proc 4   Ther Proc 1 glute myofascial full arc   Ther Proc 1 - Details x20   Ther Proc 2 isometric quad set 5 sec x 20   Ther Proc  2 - Details keep knee moving throughout day   Ther Proc 3 PROM by PT for R hip mobility: knee extension, hamstrings, ER/IR hips   Ther Proc 3 - Details roll in's hooklying gentle 5 sec x 10, 2 x day   Ther Proc 4 diaphragmatic breathing 5 min, 2 xday   Skilled Intervention improve strength and mobility to pelvic floor   Patient Response/Progress good understanding   Manual Therapy   Manual Therapy: Mobilization, MFR, MLD, friction massage minutes (79498) 30   Manual Therapy Manual Therapy 2   Manual Therapy 1 supine MFR to internal PF (LA/OI L>R)   Manual Therapy 1 - Details IR/ER x10 each side   Manual Therapy 2 also external to B hip abductors in butterfly   Skilled Intervention to decrease tone in PF   Patient Response/Progress less tightness to hip abductors post treatment   Education   Learner/Method Patient;No Barriers to Learning   Plan   Home program PTRX   Plan for next session check on how thoracic MRI went, patient may come with new orders for thoracic orders.   Total Session Time   Timed Code Treatment Minutes 30   Total Treatment Time (sum of timed and untimed services) 45       [facility]                                                                                   OUTPATIENT PHYSICAL THERAPY    PLAN OF TREATMENT FOR OUTPATIENT REHABILITATION   Patient's Last Name, First Name, Laxmi Coleman YOB: 1975   Provider's Name   [facility]   Medical Record No.  [id number]     Onset Date: 02/03/23  Start of Care Date: 03/08/23     Medical Diagnosis:  Lower back pain with mobility deficits, lower back pain w/ referred pain, neck pain w/ headache, neck pain w/ mobility deficits, pelvic pain      PT Treatment Diagnosis:  Lower back pain with mobility deficits, lower back pain w/ referred pain, neck pain w/ headache, neck pain w/ mobility deficits Plan of Treatment  Frequency/Duration: 2x month/ 12 weeks    Certification date  from 05/31/24 to 08/29/24         See note for plan of treatment details and functional goals     Peng George, PT                         I CERTIFY THE NEED FOR THESE SERVICES FURNISHED UNDER        THIS PLAN OF TREATMENT AND WHILE UNDER MY CARE .             Physician Signature               Date    X_____________________________________________________                  Referring Provider:  Kimberlee Vasquez    Initial Assessment  See Epic Evaluation- Start of Care Date: 03/08/23

## 2024-05-09 ENCOUNTER — ANCILLARY PROCEDURE (OUTPATIENT)
Dept: ULTRASOUND IMAGING | Facility: CLINIC | Age: 49
End: 2024-05-09
Attending: STUDENT IN AN ORGANIZED HEALTH CARE EDUCATION/TRAINING PROGRAM
Payer: MEDICARE

## 2024-05-09 DIAGNOSIS — M25.531 BILATERAL WRIST PAIN: ICD-10-CM

## 2024-05-09 DIAGNOSIS — M25.532 BILATERAL WRIST PAIN: ICD-10-CM

## 2024-05-09 DIAGNOSIS — G56.03 BILATERAL CARPAL TUNNEL SYNDROME: ICD-10-CM

## 2024-05-09 PROCEDURE — 76882 US LMTD JT/FCL EVL NVASC XTR: CPT | Mod: GC | Performed by: RADIOLOGY

## 2024-05-10 ENCOUNTER — OFFICE VISIT (OUTPATIENT)
Dept: OBGYN | Facility: CLINIC | Age: 49
End: 2024-05-10
Attending: OBSTETRICS & GYNECOLOGY
Payer: MEDICARE

## 2024-05-10 VITALS
HEART RATE: 75 BPM | BODY MASS INDEX: 35.99 KG/M2 | DIASTOLIC BLOOD PRESSURE: 87 MMHG | WEIGHT: 223 LBS | SYSTOLIC BLOOD PRESSURE: 122 MMHG

## 2024-05-10 DIAGNOSIS — N83.201 CYST OF RIGHT OVARY: Primary | ICD-10-CM

## 2024-05-10 DIAGNOSIS — B37.31 CANDIDIASIS OF VAGINA: ICD-10-CM

## 2024-05-10 PROCEDURE — 87106 FUNGI IDENTIFICATION YEAST: CPT | Performed by: OBSTETRICS & GYNECOLOGY

## 2024-05-10 PROCEDURE — G0463 HOSPITAL OUTPT CLINIC VISIT: HCPCS | Performed by: OBSTETRICS & GYNECOLOGY

## 2024-05-10 PROCEDURE — 99214 OFFICE O/P EST MOD 30 MIN: CPT | Performed by: OBSTETRICS & GYNECOLOGY

## 2024-05-10 NOTE — PATIENT INSTRUCTIONS
Thank you for trusting us with your care!     If you need to contact us for questions about:  Symptoms, Scheduling & Medical Questions; Non-urgent (2-3 day response) Romeo message, Urgent (needing response today) [phone] (if after 3:30pm next day response)   Prescriptions: Please call your Pharmacy   Billing: [facility] [phone] or [facility]:[phone]

## 2024-05-10 NOTE — LETTER
5/10/2024       RE: Laxmi Ya  [address] [facility] [place] [address]     Dear Colleague,    Thank you for referring your patient, Laxmi Ya, to the [facility] [place] at [facility]. Please see a copy of my visit note below.    SUBJECTIVE: 49 year old   female here to discuss ovarian cyst.    Last seen by Dr. Pate 2023. Presented at recommendation from dermatology at the time for potential bartholin cyst, subsequent pelvic ultrasound showed 2 small lesions adjacent to the right labia and right anus - suspected sebaceous cysts, and an additional simple R ovarian cyst. Pt has Hx pelvic pain, followed by pelvic floor PT in the past and improved with steroids and Actemra infusions thru [place].    Plan was for possible surgical removal of R labial subcutaneous cyst of uncertain etiology.     Currently patient is most bothered by significant mons pubis edema/adipose which she wonders if it is a part of the bilateral labial subcutaneous cysts (mucoceles? NOT Bartholin's) or another issue. She is undergoing lymphedema treatment for abdomen and extremities.     Ovarian cyst was not followed up from last year.   Uncertain best radiologic imaging for labial cysts.      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    Obesity     Pyelonephritis, unspecified     abstract    Rheumatoid arteritis (H)     Seronegative inflammatory arthritis 2022    Type 2 diabetes  mellitus (H)     Unspecified symptom associated with female genital organs 05/07/1999    chronic pelvic pain abstract     Past Surgical History:   Procedure Laterality Date    CHOLECYSTECTOMY      COLON SURGERY      Left hemicolectomy for diverticulosis    CYSTOSCOPY,+URETEROSCOPY      abstract    ZZHC EXCIS/CURET BENIGN ELBOW LESN  10/26/1987    (R) humerus abstract     Family History   Problem Relation Age of Onset    Hypertension Father         abstract    Cancer Paternal Aunt         gallbladder cancer abstract     Social History     Socioeconomic History    Marital status: Single     Spouse name: Not on file    Number of children: Not on file    Years of education: Not on file    Highest education level: Not on file   Occupational History    Not on file   Tobacco Use    Smoking status: Never    Smokeless tobacco: Never   Substance and Sexual Activity    Alcohol use: Yes     Comment: RARE    Drug use: No    Sexual activity: Not on file   Other Topics Concern     Service Not Asked    Blood Transfusions Not Asked    Caffeine Concern Not Asked    Occupational Exposure Not Asked    Hobby Hazards Not Asked    Sleep Concern Not Asked    Stress Concern Not Asked    Weight Concern Not Asked    Special Diet Not Asked    Back Care Not Asked    Exercise No    Bike Helmet Not Asked    Seat Belt No    Self-Exams No   Social History Narrative    Womens Health Kit given.         Social Determinants of Health     Financial Resource Strain: Medium Risk (3/13/2024)    [facility], [employer] [facility]    Financial Resource Strain     Difficulty of Paying Living Expenses: 2     Difficulty of Paying Living Expenses: 1   Food Insecurity: No Food Insecurity (3/13/2024)    [facility], [employer] [facility]    Food Insecurity     Worried About Running Out of Food in the Last Year: 1    Transportation Needs: No Transportation Needs (3/13/2024)    [facility], [facility]    Transportation Needs     Lack of Transportation (Medical): 1   Physical Activity: Inactive (2/24/2023)    Received from [facility]    Exercise Vital Sign     Days of Exercise per Week: 0 days     Minutes of Exercise per Session: 0 min   Stress: No Stress Concern Present (2/24/2023)    Received from [facility], [facility]    [employer] - Occupational Stress Questionnaire     Feeling of Stress : Not at all   Social Connections: Socially Integrated (3/13/2024)    Received from [employer], [employer] [facility]    Social Connections     Frequency of Communication with Friends and Family: 0   Interpersonal Safety: Not At Risk (2/24/2023)    Received from [facility]    Humiliation, Afraid, Rape, and Kick questionnaire     Fear of Current or Ex-Partner: No     Emotionally Abused: No     Physically Abused: No     Sexually Abused: No   Housing Stability: Low Risk  (3/13/2024)    [email or web address], [facility]    Housing Stability     Unable to Pay for Housing in the Last Year: 1       Polyethylene glycol, Codeine, Hydrocodone, Morphine, Sulfasalazine, Tramadol, Gluten meal, and Propylene glycol  Current Outpatient Medications   Medication Sig Dispense Refill    albuterol (PROAIR HFA/PROVENTIL HFA/VENTOLIN HFA) 108 (90 Base) MCG/ACT inhaler Inhale 2 puffs into the lungs every 6 hours as needed for shortness of breath, wheezing or cough      Artificial Tear Solution (SOOTHE XP) SOLN as needed      atenolol (TENORMIN) 50 MG tablet Take 50 mg by mouth daily      benzoyl peroxide 5 % external liquid Use daily as directed. Preferred bdrand: Medpura 236 mL 11    blood glucose  (NO BRAND SPECIFIED) lancets standard Use to test blood sugar 2 times daily or as directed. 100 lancet. 1    blood glucose (NO BRAND SPECIFIED) test strip Use to test blood sugar 2 times daily or as directed. 100 strip 1    blood glucose monitoring (NO BRAND SPECIFIED) meter device kit Use to test blood sugar 2 times daily or as directed. 1 kit 0    ciclopirox (LOPROX) 0.77 % cream Apply topically 2 times daily To affected toenails. 90 g 5    diclofenac (VOLTAREN) 1 % topical gel Apply topically 4 times daily as needed      dupilumab (DUPIXENT) 300 MG/2ML prefilled pen Inject 2 mLs (300 mg) Subcutaneous every 14 days 4 mL 5    EPINEPHrine (ANY BX GENERIC EQUIV) 0.3 MG/0.3ML injection 2-pack Inject 0.3 mg into the muscle as needed      fexofenadine (ALLEGRA) 180 MG tablet Take 1 tablet (180 mg) by mouth daily 90 tablet 2    fidaxomicin (DIFICID) 200 MG tablet Take 200 mg by mouth as needed      Fluticasone-Umeclidin-Vilanterol (TRELEGY ELLIPTA) 200-62.5-25 MCG/ACT oral inhaler Inhale 1 puff into the lungs daily 90 each 1    fluticasone-vilanterol (BREO ELLIPTA) 200-25 MCG/ACT inhaler Inhale 1 puff into the lungs daily      hydrocortisone (CORTEF) 5 MG tablet Take 1 tablet (5 mg) by mouth 2 times daily 60 tablet 3    insulin glargine (LANTUS PEN) 100 UNIT/ML pen Inject 20 Units Subcutaneous daily      insulin lispro (HUMALOG KWIKPEN) 100 UNIT/ML (1 unit dial) KWIKPEN Inject 5 Units Subcutaneous 3 times daily (before meals) Plus 1:25 correction scale if BG >150      insulin NPH (HUMULIN N KWIKPEN) 100 UNIT/ML injection Inject 40 Units Subcutaneous every evening      Lancets (ONETOUCH DELICA PLUS [id number]) MISC CHECK BLOOD GLUCOSE 3 TIMES DAILY, ADJUSTING MEDICATION, DX CODE E 11.9, ON INSULIN      lidocaine (LIDODERM) 5 % patch as needed   1    losartan (COZAAR) 100 MG tablet Take 100 mg by mouth daily      methylPREDNISolone (MEDROL DOSEPAK) 4 MG tablet therapy pack Take per package instructions. Take once a day by  mouth 21 tablet 1    methylPREDNISolone (MEDROL DOSEPAK) 4 MG tablet therapy pack Take per package instructions. Take once a day by mouth 21 tablet 0    methylPREDNISolone (MEDROL DOSEPAK) 4 MG tablet therapy pack Take per package instructions. Take once a day by mouth 21 tablet 0    mometasone (NASONEX) 50 MCG/ACT nasal spray Spray 2 sprays into both nostrils daily 17 g 11    mometasone-formoterol (DULERA) 200-5 MCG/ACT inhaler Inhale 1 puff into the lungs daily      Montelukast Sodium (SINGULAIR PO) Take 10 mg by mouth At Bedtime       multivitamin w/minerals (THERA-VIT-M) tablet Take 1 tablet by mouth daily      norethindrone (MICRONOR) 0.35 MG tablet Take 0.35 mg by mouth      nystatin (MYCOSTATIN) 736751 UNIT/ML suspension       olopatadine (PATADAY) 0.7 % ophthalmic solution Apply 1 drop to eye daily PRN      OMEPRAZOLE PO Take 40 mg by mouth 2 times daily       oxyCODONE IR (ROXICODONE) 10 MG tablet Take 10 mg by mouth      rifaximin (XIFAXAN) 550 MG TABS tablet Take 200 mg by mouth 3 times daily Take for 10 days, has on had as needed for small bowel bacteria overgrowh      rosuvastatin (CRESTOR) 5 MG tablet Take 2 tablets by mouth daily      spironolactone-HCTZ (ALDACTAZIDE) 25-25 MG tablet Take 1 tablet by mouth daily      STELARA 45 MG/0.5ML SOSY Inject 45 mg Subcutaneous once      tocilizumab (ACTEMRA) 400 MG/20ML Inject 800 mg into the vein every 28 days         OBJECTIVE:    Exam:  /87   Pulse 75   Wt 101.2 kg (223 lb)   BMI 35.99 kg/m    Constitutional: alert and mild distress  Neck: Neck supple. No adenopathy. Thyroid symmetric, normal size,, Carotids without bruits.  Cardiovascular: negative, PMI normal. No lifts, heaves, or thrills. RRR. No murmurs, clicks gallops or rub  Respiratory: negative, Percussion normal. Good diaphragmatic excursion. Lungs clear  Breast Exam: Not done..  Gastrointestinal: Abdomen soft, non-tender. BS normal. No masses, organomegaly, protuberant  Pelvic Exam - [facility]  is markedly enlarged with soft subcutaneous tissue, no distinct edema is palpable. Bilaterally each labium contains a deep cyst which is nontender with palpation. The right labium cyst is about 3x2 cm and the left is 4x2 cm.   Yeast culture is obtained per Dr. Hall's directions  Skin: no suspicious lesions or rashes  Psychiatric: mentation appears normal and affect normal/bright      Pelvic US 9/14/23:   1. Lateral to the right labia at the area of interest, there is soft  tissue edema with a 1.7 x 2.4 x 0.5 cm collection without internal  flow on Doppler. This is nonspecific. No infectious symptoms as per  clinic note 9/14/2023. Patient is scheduled for gynecologic surgery  evaluation.  2. There is a 0.8 x 1.0 x 0.5 cm well-circumscribed hypoechoic lesion  to the right of the anus without internal vascularity, likely benign  although indeterminate. Given this appears to be a complex cyst, this  may represent a sebaceous cyst.    Assessment:  48 yo with   Bilateral labial subcutaneous cysts possibly mucoceles versus embryologic remnants  Ovarian cyst needing followup  No current sebaceous cysts or abscesses      PLAN:     Imaging TBD after discussion with radiology  Pelvic ultrasound for ovarian cyst  Yeast cultures sent.     Alanis Pate MD     Patient Needs Assistance to Leave Residence...

## 2024-05-10 NOTE — PROGRESS NOTES
SUBJECTIVE: 49 year old   female here to discuss ovarian cyst.    Last seen by Dr. Pate 2023. Presented at recommendation from dermatology at the time for potential bartholin cyst, subsequent pelvic ultrasound showed 2 small lesions adjacent to the right labia and right anus - suspected sebaceous cysts, and an additional simple R ovarian cyst. Pt has Hx pelvic pain, followed by pelvic floor PT in the past and improved with steroids and Actemra infusions thru [place].    Plan was for possible surgical removal of R labial subcutaneous cyst of uncertain etiology.     Currently patient is most bothered by significant mons pubis edema/adipose which she wonders if it is a part of the bilateral labial subcutaneous cysts (mucoceles? NOT Bartholin's) or another issue. She is undergoing lymphedema treatment for abdomen and extremities.     Ovarian cyst was not followed up from last year.   Uncertain best radiologic imaging for labial cysts.      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    Obesity     Pyelonephritis, unspecified     abstract    Rheumatoid arteritis (H)     Seronegative inflammatory arthritis 2022    Type 2 diabetes mellitus (H)     Unspecified symptom associated with female genital organs 1999    chronic pelvic pain abstract     Past Surgical History:   Procedure Laterality Date    CHOLECYSTECTOMY      COLON SURGERY      Left hemicolectomy for diverticulosis    CYSTOSCOPY,+URETEROSCOPY      abstract    ZZHC  EXCIS/CURET BENIGN ELBOW LESN  10/26/1987    (R) humerus abstract     Family History   Problem Relation Age of Onset    Hypertension Father         abstract    Cancer Paternal Aunt         gallbladder cancer abstract     Social History     Socioeconomic History    Marital status: Single     Spouse name: Not on file    Number of children: Not on file    Years of education: Not on file    Highest education level: Not on file   Occupational History    Not on file   Tobacco Use    Smoking status: Never    Smokeless tobacco: Never   Substance and Sexual Activity    Alcohol use: Yes     Comment: RARE    Drug use: No    Sexual activity: Not on file   Other Topics Concern     Service Not Asked    Blood Transfusions Not Asked    Caffeine Concern Not Asked    Occupational Exposure Not Asked    Hobby Hazards Not Asked    Sleep Concern Not Asked    Stress Concern Not Asked    Weight Concern Not Asked    Special Diet Not Asked    Back Care Not Asked    Exercise No    Bike Helmet Not Asked    Seat Belt No    Self-Exams No   Social History Narrative    Womens Health Kit given.         Social Determinants of Health     Financial Resource Strain: Medium Risk (3/13/2024)    Received from [employer] [facility], [facility]    Financial Resource Strain     Difficulty of Paying Living Expenses: 2     Difficulty of Paying Living Expenses: 1   Food Insecurity: No Food Insecurity (3/13/2024)    Received from [employer] [facility], [facility]    Food Insecurity     Worried About Running Out of Food in the Last Year: 1   Transportation Needs: No Transportation Needs (3/13/2024)    Received from [employer] [facility], [facility]    Transportation Needs     Lack of Transportation (Medical): 1   Physical Activity: Inactive (2/24/2023)    Received from [facility]    Exercise Vital Sign     Days of Exercise per Week: 0 days     Minutes of Exercise per Session: 0 min   Stress: No Stress Concern Present (2/24/2023)    Received from [facility], [facility]    [employer] [place] [employer] - Occupational Stress Questionnaire     Feeling of Stress : Not at all   Social Connections: Socially Integrated (3/13/2024)    Received from "[employer] [facility], [employer]  [facility]    Social Connections     Frequency of Communication with Friends and Family: 0   Interpersonal Safety: Not At Risk (2/24/2023)    Received from [facility], [facility]    Humiliation, Afraid, Rape, and Kick questionnaire     Fear of Current or Ex-Partner: No     Emotionally Abused: No     Physically Abused: No     Sexually Abused: No   Housing Stability: Low Risk  (3/13/2024)    Received from "[employer] [facility], "[employer] [facility]    Housing Stability     Unable to Pay for Housing in the Last Year: 1       Polyethylene glycol, Codeine, Hydrocodone, Morphine, Sulfasalazine, Tramadol, Gluten meal, and Propylene glycol  Current Outpatient Medications   Medication Sig Dispense Refill    albuterol (PROAIR HFA/PROVENTIL HFA/VENTOLIN HFA) 108 (90 Base) MCG/ACT inhaler Inhale 2 puffs into the lungs every 6 hours as needed for shortness of breath, wheezing or cough      Artificial Tear Solution (SOOTHE XP) SOLN as needed      atenolol (TENORMIN) 50 MG tablet Take 50 mg by mouth daily      benzoyl peroxide 5 % external liquid Use daily as directed. Preferred bdrand: Medpura 236 mL 11    blood glucose (NO BRAND SPECIFIED) lancets standard Use to test blood sugar 2 times daily or as directed. 100 lancet. 1    blood glucose (NO BRAND SPECIFIED) test strip Use to test blood sugar 2 times daily or as directed. 100 strip 1    blood glucose monitoring (NO BRAND SPECIFIED) meter device kit Use to test blood  sugar 2 times daily or as directed. 1 kit 0    ciclopirox (LOPROX) 0.77 % cream Apply topically 2 times daily To affected toenails. 90 g 5    diclofenac (VOLTAREN) 1 % topical gel Apply topically 4 times daily as needed      dupilumab (DUPIXENT) 300 MG/2ML prefilled pen Inject 2 mLs (300 mg) Subcutaneous every 14 days 4 mL 5    EPINEPHrine (ANY BX GENERIC EQUIV) 0.3 MG/0.3ML injection 2-pack Inject 0.3 mg into the muscle as needed      fexofenadine (ALLEGRA) 180 MG tablet Take 1 tablet (180 mg) by mouth daily 90 tablet 2    fidaxomicin (DIFICID) 200 MG tablet Take 200 mg by mouth as needed      Fluticasone-Umeclidin-Vilanterol (TRELEGY ELLIPTA) 200-62.5-25 MCG/ACT oral inhaler Inhale 1 puff into the lungs daily 90 each 1    fluticasone-vilanterol (BREO ELLIPTA) 200-25 MCG/ACT inhaler Inhale 1 puff into the lungs daily      hydrocortisone (CORTEF) 5 MG tablet Take 1 tablet (5 mg) by mouth 2 times daily 60 tablet 3    insulin glargine (LANTUS PEN) 100 UNIT/ML pen Inject 20 Units Subcutaneous daily      insulin lispro (HUMALOG KWIKPEN) 100 UNIT/ML (1 unit dial) KWIKPEN Inject 5 Units Subcutaneous 3 times daily (before meals) Plus 1:25 correction scale if BG >150      insulin NPH (HUMULIN N KWIKPEN) 100 UNIT/ML injection Inject 40 Units Subcutaneous every evening      Lancets (ONETOUCH DELICA PLUS [id number]) MISC CHECK BLOOD GLUCOSE 3 TIMES DAILY, ADJUSTING MEDICATION, DX CODE E 11.9, ON INSULIN      lidocaine (LIDODERM) 5 % patch as needed   1    losartan (COZAAR) 100 MG tablet Take 100 mg by mouth daily      methylPREDNISolone (MEDROL DOSEPAK) 4 MG tablet therapy pack Take per package instructions. Take once a day by mouth 21 tablet 1    methylPREDNISolone (MEDROL DOSEPAK) 4 MG tablet therapy pack Take per package instructions. Take once a day by mouth 21 tablet 0    methylPREDNISolone (MEDROL DOSEPAK) 4 MG tablet therapy pack Take per package instructions. Take once a day by mouth 21 tablet 0    mometasone (NASONEX)  50 MCG/ACT nasal spray Spray 2 sprays into both nostrils daily 17 g 11    mometasone-formoterol (DULERA) 200-5 MCG/ACT inhaler Inhale 1 puff into the lungs daily      Montelukast Sodium (SINGULAIR PO) Take 10 mg by mouth At Bedtime       multivitamin w/minerals (THERA-VIT-M) tablet Take 1 tablet by mouth daily      norethindrone (MICRONOR) 0.35 MG tablet Take 0.35 mg by mouth      nystatin (MYCOSTATIN) 425841 UNIT/ML suspension       olopatadine (PATADAY) 0.7 % ophthalmic solution Apply 1 drop to eye daily PRN      OMEPRAZOLE PO Take 40 mg by mouth 2 times daily       oxyCODONE IR (ROXICODONE) 10 MG tablet Take 10 mg by mouth      rifaximin (XIFAXAN) 550 MG TABS tablet Take 200 mg by mouth 3 times daily Take for 10 days, has on had as needed for small bowel bacteria overgrowh      rosuvastatin (CRESTOR) 5 MG tablet Take 2 tablets by mouth daily      spironolactone-HCTZ (ALDACTAZIDE) 25-25 MG tablet Take 1 tablet by mouth daily      STELARA 45 MG/0.5ML SOSY Inject 45 mg Subcutaneous once      tocilizumab (ACTEMRA) 400 MG/20ML Inject 800 mg into the vein every 28 days         OBJECTIVE:    Exam:  /87   Pulse 75   Wt 101.2 kg (223 lb)   BMI 35.99 kg/m    Constitutional: alert and mild distress  Neck: Neck supple. No adenopathy. Thyroid symmetric, normal size,, Carotids without bruits.  Cardiovascular: negative, PMI normal. No lifts, heaves, or thrills. RRR. No murmurs, clicks gallops or rub  Respiratory: negative, Percussion normal. Good diaphragmatic excursion. Lungs clear  Breast Exam: Not done..  Gastrointestinal: Abdomen soft, non-tender. BS normal. No masses, organomegaly, protuberant  Pelvic Exam - mons is markedly enlarged with soft subcutaneous tissue, no distinct edema is palpable. Bilaterally each labium contains a deep cyst which is nontender with palpation. The right labium cyst is about 3x2 cm and the left is 4x2 cm.   Yeast culture is obtained per Dr. Hall's directions  Skin: no suspicious  lesions or rashes  Psychiatric: mentation appears normal and affect normal/bright      Pelvic US 9/14/23:   1. Lateral to the right labia at the area of interest, there is soft  tissue edema with a 1.7 x 2.4 x 0.5 cm collection without internal  flow on Doppler. This is nonspecific. No infectious symptoms as per  clinic note 9/14/2023. Patient is scheduled for gynecologic surgery  evaluation.  2. There is a 0.8 x 1.0 x 0.5 cm well-circumscribed hypoechoic lesion  to the right of the anus without internal vascularity, likely benign  although indeterminate. Given this appears to be a complex cyst, this  may represent a sebaceous cyst.    Assessment:  48 yo with   Bilateral labial subcutaneous cysts possibly mucoceles versus embryologic remnants  Ovarian cyst needing followup  No current sebaceous cysts or abscesses      PLAN:     Imaging TBD after discussion with radiology  Pelvic ultrasound for ovarian cyst  Yeast cultures sent.     Alanis Pate MD

## 2024-05-13 ENCOUNTER — TRANSFERRED RECORDS (OUTPATIENT)
Dept: HEALTH INFORMATION MANAGEMENT | Facility: CLINIC | Age: 49
End: 2024-05-13
Payer: MEDICARE

## 2024-05-14 ENCOUNTER — THERAPY VISIT (OUTPATIENT)
Dept: OCCUPATIONAL THERAPY | Facility: CLINIC | Age: 49
End: 2024-05-14
Payer: MEDICARE

## 2024-05-14 DIAGNOSIS — M79.642 BILATERAL HAND PAIN: Primary | ICD-10-CM

## 2024-05-14 DIAGNOSIS — M79.641 BILATERAL HAND PAIN: Primary | ICD-10-CM

## 2024-05-14 LAB — BACTERIA SPEC CULT: NO GROWTH

## 2024-05-14 PROCEDURE — 97112 NEUROMUSCULAR REEDUCATION: CPT | Mod: GO | Performed by: OCCUPATIONAL THERAPIST

## 2024-05-14 PROCEDURE — 97110 THERAPEUTIC EXERCISES: CPT | Mod: GO | Performed by: OCCUPATIONAL THERAPIST

## 2024-05-14 PROCEDURE — 97140 MANUAL THERAPY 1/> REGIONS: CPT | Mod: GO | Performed by: OCCUPATIONAL THERAPIST

## 2024-05-14 NOTE — PROGRESS NOTES
05/14/24 0500   Appointment Info   Treating Provider Bettye Dalton OTR/L, CHT   Total/Authorized Visits 6 (POC)   Visits Used 2   Medical Diagnosis CMC OA, B CTS   OT Tx Diagnosis CMC OA, B CTS   Quick Add  Certification   Progress Note/Certification   Start Of Care Date 03/21/24   Onset of Illness/Injury or Date of Surgery 03/14/24   Therapy Frequency 2x per month   Predicted Duration 4 months   Certification date from 05/02/24   Certification date to 07/30/24   Progress Note Due Date 05/02/24   Progress Note Completed Date 05/14/24   Goals   OT Goals 1   OT Goal 1   Goal Identifier Household chores   Goal Description Pt will  to open a tight new jar with mild difficulty   Rationale In order to maximize safety and independence with performance of self-care activities   Goal Progress no change   Target Date 09/14/24   Subjective Report   Subjective Report Similar to before. Continued pain through radial nerve pathway. I also get pain up the pinky side.   Objective Measures   Objective Measures Hand Obj Measures;Objective Measure 1   Hand Objective Measures ROM;Strength;Special Tests;Palpation   Palpation Elbow Palpation;Radial Nerve Palpation   ROM Wrist ROM   Special Tests CTS Special Tests;Radial Nerve Special Tests   Strength ;3 Point Pinch;Lateral Pinch   Objective Measure 1   Objective Measure Pain   Details 9-10/10   Wrist ROM    Extension R 45 L 55   Flexion R 20 L 27   Radial Deviation (RD) NT   Ulnar Deviation (UD) NT   CTS Special Tests   Median Nerve Compression at Pronator -   Carpal Compression Test-Durkan Test (30 sec) immediate B   Barragan Test for Lumbrical Incursion (fist x30 sec) NT   Tinel's at Carpal Tunnel NT   Phalen's Sign B soreness immediate    (measured in pounds)    Average Strength R 13+ L 11+   Lateral Pinch (measured in pounds)   Lateral Pinch Average Strength NT   3 Point Pinch (measured in pounds)   3 Point Pinch Average Strength NT   Radial Nerve Palpation    Posterior Interosseous Nerve (PIN) NT   Dorsal Sensory Radial Nerve (DSRN) NT   Elbow Palpation   Lateral Epicondyle NT   Extensor Wad NT   Medial Epicondyle NT   Flexor Wad NT   Treatment Interventions (OT)   Interventions Manual Therapy;Therapeutic Procedure/Exercise;Neuromuscular Re-education   Neuromuscular Re-education   PTRx Neuro Re-ed 1 Nerve Gliding Proximal Median   PTRx Neuro Re-ed 1 - Details 5 reps   PTRx Neuro Re-ed 2 Nerve Gliding Proximal Radial   PTRx Neuro Re-ed 2 - Details 5 reps   PTRx Neuro Re-ed 3 Nerve Gliding Distal Ulnar    PTRx Neuro Re-ed 3 - Details 1 rep   Neuromuscular Re-ed Minutes (70133) 14   Skilled Intervention Passive nerve glides to decrease nerve tension and pain   Patient Response/Progress good   Neuro Re-ed 1 Passive nerve gliding done in clinic   Neuro Re-ed 1 - Details pt seated, wrist and hand   Therapeutic Procedure/Exercise   Therapeutic Procedure: strength, endurance, ROM, flexibillity minutes (67158) 15   Ther Proc 1 active proximal median and radial nerve glides   Ther Proc 1 - Details 5 reps   Skilled Intervention Education and demonstration for performing HEP   PTRx Ther Proc 1 Scapular Retraction/Depression   PTRx Ther Proc 1 - Details 3 reps   Patient Response/Progress good   Therapeutic Activity   PTRx Ther Act 1 Ball Massage to Extensors   PTRx Ther Act 1 - Details No Notes   Manual Therapy   Manual Therapy Minutes (80281) 30   Manual Therapy 1 STM   Manual Therapy 1 - Details through B flexors and extensors   Skilled Intervention Moderate pressure applied through flexors and extensors for decreasing muscle tension   Total Session Time   Timed Code Treatment Minutes 59   Total Treatment Time (sum of timed and untimed services) 59       [facility]                                                                                   OUTPATIENT OCCUPATIONAL THERAPY    PLAN OF TREATMENT FOR OUTPATIENT REHABILITATION   Patient's Last Name,  First Name, NÉSTORJENNBelen  Laxmi Ya YOB: 1975   Provider's Name   Psychiatric   Medical Record No.  [id number]     Onset Date: 03/14/24 Start of Care Date: 03/21/24     Medical Diagnosis:  CMC OA, B CTS      OT Treatment Diagnosis:  CMC OA, B CTS Plan of Treatment  Frequency/Duration:2x per month/4 months    Certification date from 05/02/24   To 07/30/24        See note for plan of treatment details and functional goals     Bettye Dalton OT                         I CERTIFY THE NEED FOR THESE SERVICES FURNISHED UNDER        THIS PLAN OF TREATMENT AND WHILE UNDER MY CARE     (Physician attestation of this document indicates review and certification of the therapy plan).              Referring Provider:  Twila Petit    Initial Assessment  See Epic Evaluation- 03/21/24          PLAN  Continue therapy per current plan of care.  2x per month for 4 months    Beginning/End Dates of Progress Note Reporting Period:  3/21/24 to 05/14/2024    Referring Provider:  Twila Petit

## 2024-05-14 NOTE — PROGRESS NOTES
Chief Complaint:   Chief Complaint   Patient presents with    RECHECK     Review 5/9/24 ultrasound results       Referring Physician: No ref. provider found    Diagnosis: bilateral thumb CMC/STT arthritis, bilateral carpal tunnel syndrome  Treatment:   5/21/20024: bilateral cubital tunnel steroid injections  12/27/2023: bilateral carpal tunnel steroid injection  12/21/2023: bilateral STT steroid injection (Dr. Petit)  11/1/2023: bilateral carpal tunnel steroid injection  10/18/2023: Bilateral thumb CMC steroid injection  9/20/2023: bilateral STT steroid injection  5/4/2023: bilateral carpal tunnel steroid injection (Dr. Petit)  2/16/2023: bilateral STT steroid injection (Dr. Petit)  12/13/2022: bilateral carpal tunnel steroid injection (Dr. Sullivan)  10/27/2022: bilateral STT steroid injections (Dr. Petit)  9/13/2022: bilateral carpal tunnel steroid injection (Dr. Sullivan)  6/30/2022: bilateral STT steroid injections (Dr. Petit)  Bilateral carpal tunnel steroid injections (outside)    History of Present Illness: Laxmi Ya is a 48 year old RHD female presenting for evaluation of bilateral hand and wrist pain. She's had history of carpal tunnel steroid injections and releases, she continues to feel some numbness/tingling in her fingers. Today she'd like to have steroid injections into the STT region.    Clinical documentation by Dr. Petit on 5/4/2023 was reviewed.    10/18/2023: reports the last STT injections helped.  She presents today for bilateral thumb CMC steroid injections.  She continues to have pain in bilateral thumbs.  She also continues to have numbness/tingling in the median nerve distribution.    11/1/2023: reports the last thumb CMC steroid injections helped. Presents today for bilateral carpal tunnel steroid injections.    12/27/2023: recent STT injections with Dr. Petit which she says were helpful. Last carpal tunnel steroid injections were helpful and she would like  to do this again. Bilateral thumb CMCs feel good and the steroid injections have not worn off yet.    2/13/2024: recently had pneumonia. Right hand is feeling pretty good but left hand is having more carpal tunnel symptoms. Both hands are having mild pain in the STT region again. Focused on the left side today.    5/21/2024: today her symptoms are focused on the ulnar nerves, left worse than right. She has pain when leaning on the medial elbows, and this pain and tingling goes down the volar/ulnar side of her hand    Physical Examination:  There were no vitals filed for this visit.  There is no height or weight on file to calculate BMI.    Well appearing, well nourished  Alert and oriented x 3, cooperative with exam     Bilateral Upper Extremity:  Healed carpal tunnel scar  Thenar atrophy: no   Positive Tinel signs at bilateral cubital tunnels, positive elbow flexion bilaterally  Motor Exam:   Abductor pollicis brevis strength: 4/5    1st dorsal interosseous strength: 5/5   - Flexor pollicis longus strength: 4/5   Intact thumbs up  Vascular Exam:   2+ radial pulse, < 2 sec capillary refill      Imaging/Studies:  Repeat ultrasound obtained 5/9/2024 shows:  Ulnar nerve  Distal upper arm: 6 mm  Proximal to cubital tunnel: 9 mm   Within the cubital tunnel: 8 mm  Distal to cubital tunnel:  6 mm  Wrist: 6 mm      Median nerve  Lacertus fibrosis: 6 mm  Wrist crease: 10 mm      The radial nerve is visualized at the level of the distal upper arm  through the bifurcation of the superficial and deep branches.                                                                   Impression:   1. Normal size and appearance of the left ulnar nerve at the elbow and  wrist.  2. Borderline enlarged left median nerve at the wrist crease.  3. Grossly normal appearance of the left radial nerve visualized at  the distal upper arm through the bifurcation into the superficial and  deep branches. Unable to confidently evaluate the radial nerve  beyond  the bifurcation.  ___  Ultrasound bilateral upper extremities obtained 2/13/2024 shows:  Areas of the median nerves (millimeters squared):  Left:  Distal forearm: 7.6, 7.9  Wrist crease: 11.4, 10.3     Right:  Distal forearm: 9.4, 9.0  Wrist crease: 9.9, 8.6     Poorly visualized median nerve the region of the lacertus fibrosis  bilaterally.                                                              Impression:   1. Borderline enlargement of the left median nerve at the level of the  wrist crease.  2. No substantial right median nerve enlargement at the wrist crease.  ___  XR (3 views) of the  bilateral  hand was obtained  9/20/2023 .  I reviewed the images with the patient.  The imaging study shows bilateral thumb CMC and STT arthritis with possible chondrocalcinosis on radial side.    Assessment: Laxmi Ya is a 48 year old female with bilateral STT, thumb CMC, median neuropathy.    Plan:   I had a discussion with the patient regarding my clinical findings, diagnosis, and treatment plan. I reviewed the treatment options for cubital tunnel syndrome (observation, bracing, steroid injection, occupational therapy, surgery, etc.) as well as the risks and benefits of each. Her ultrasound shows normal ulnar nerves, borderline enlarged left median nerve at the wrist crease which may be suggestive of recurrent left carpal tunnel syndrome. She elects steroid injections and obtaining and EMG and MRIs of both elbows to further evaluate her ulnar nerves. She has an appointment scheduled with Dr. Petit in 1 week for repeat injections. The patient understands and agrees to the treatment plan.  All questions answered.      Steroid injections provided  - EMG bilateral upper extremities  - MRI bilateral elbows    Next Visit:   Follow-up: after EMG/MRI   Imaging: None   Plan: review tests    Procedure Note: After a discussion with Laxmi Ya regarding treatment options, we decided to proceed with a steroid  injection to the bilateral cubital tunnels.  Risks of the procedure including hyperglycemia, fat atrophy, skin depigmentation and infection were discussed prior to injection and verbal consent from Laxmi Ya was obtained. The area was prepped with alcohol. 1ml of 1% lidocaine was infiltrated into the skin in each site. 20 mg Kenalog and 0.5 mL of 1% lidocaine was injected into each site for a TOTAL of 40mg Kenalog and 3ml of 1% lidocaine.  Laxmi Freitason tolerated the injection well. A clean dressing was placed over the site of the injection. Laxmi Ya was given post injection instructions.       WANDA STARKS MD

## 2024-05-15 ENCOUNTER — THERAPY VISIT (OUTPATIENT)
Dept: OCCUPATIONAL THERAPY | Facility: CLINIC | Age: 49
End: 2024-05-15
Attending: INTERNAL MEDICINE
Payer: MEDICARE

## 2024-05-15 ENCOUNTER — THERAPY VISIT (OUTPATIENT)
Dept: PHYSICAL THERAPY | Facility: CLINIC | Age: 49
End: 2024-05-15
Payer: MEDICARE

## 2024-05-15 DIAGNOSIS — R10.2 PELVIC PAIN IN FEMALE: Primary | ICD-10-CM

## 2024-05-15 DIAGNOSIS — M54.2 NECK PAIN: ICD-10-CM

## 2024-05-15 DIAGNOSIS — M54.50 CHRONIC BILATERAL LOW BACK PAIN WITHOUT SCIATICA: ICD-10-CM

## 2024-05-15 DIAGNOSIS — I89.0 LYMPHEDEMA: Primary | ICD-10-CM

## 2024-05-15 DIAGNOSIS — G89.29 CHRONIC BILATERAL LOW BACK PAIN WITHOUT SCIATICA: ICD-10-CM

## 2024-05-15 PROCEDURE — 97140 MANUAL THERAPY 1/> REGIONS: CPT | Mod: GP | Performed by: PHYSICAL THERAPIST

## 2024-05-15 PROCEDURE — 97140 MANUAL THERAPY 1/> REGIONS: CPT | Mod: GO | Performed by: OCCUPATIONAL THERAPIST

## 2024-05-15 PROCEDURE — 97012 MECHANICAL TRACTION THERAPY: CPT | Mod: GP | Performed by: PHYSICAL THERAPIST

## 2024-05-15 NOTE — PROGRESS NOTES
10 session progress note.   05/15/24 0500   Progress Note/Certification   Start Of Care Date 09/07/23   Onset of Illness/Injury or Date of Surgery 09/07/22   Therapy Frequency then 2x/wee for   Predicted Duration 12  weeks with decreasing frequency as needed.   Certification date from 05/23/24   Certification date to 08/07/24   KX Modifier Statement I certify the need for these services furnished under this plan of treatment and while under my care.  (Physician co-signature of this document indicates review and certification of the therapy plan)   Progress Note Due Date   (10 visits)   Goals   OT Goals 1;2;3;4;5   OT Goal 1   Goal Identifier 1   Goal Description In order to improve functional mobility for activities of living, by the completion of intensive treatment, patient and/or caregiver will;      Verbalize and/or demonstrate understanding of techniques to independently manage their lymphedema at home   Rationale In order to maximize safety and independence with performance of self-care activities   Target Date 05/22/24   OT Goal 2   Goal Identifier 1a   Goal Description demonstrate independence in performing prescribed exercises/self MLD to facilate the lymph system   Rationale In order to maximize safety and independence with performance of self-care activities   Target Date 08/07/24   OT Goal 3   Goal Identifier 1b   Goal Description be independent in donning/doffing, wearing schedule, and care of compression garments   Rationale In order to maximize safety and independence with performance of self-care activities   Target Date 11/30/23   Date Met 02/15/24   OT Goal 4   Goal Identifier 2   Goal Description Pt will decrease the suprapubic swelling to perform hygiene/shave with a normal effort.   Rationale In order to maximize safety and independence with performance of self-care activities   Target Date 08/07/24   OT Goal 5   Goal Description Pt oracio improve movement of shoulders and trunk to perform toilet  hygiene.   Rationale In order to maximize safety and independence with performance of self-care activities   Target Date 05/22/24   Date Met 05/15/24   Subjective Report   Subjective Report Pt reporting head/neck and trunkal swelling have been most bothersome lately.   Objective Measures   Objective Measures Objective Measure 1   Objective Measure 1   Objective Measure girth last   Details Pt reports better ROM i shoulders and neck which lasts for 3 days.   Treatment Interventions (OT)   Interventions Manual Therapy   Manual Therapy   Manual Therapy Minutes (30223) 53   Manual Therapy 1 - Details Pt may have cysts removed and tx for yeast upcoming which could impact the suprapubic area.  She is reporting better shoulder motion with the MLD and has not been able to access the compression as much as she would like.  She will regroup and return for 2x/week  in the future to see if she can have more lasting results that the 3 day improvement.  Will cont to aim for goal to get better hygiene at the suprapubic area.  MLD to the neck, deep abdomen, B inguinals and suprapubic drainage, then paraspinals, quadratus lumborum and buttocks/thighs.  Rinsed [place] and neck.   Skilled Intervention ed, MLD   Patient Response/Progress Pt reports feeling less fullness/heaviness following MLD         [facility]                                                                                   OUTPATIENT OCCUPATIONAL THERAPY    PLAN OF TREATMENT FOR OUTPATIENT REHABILITATION   Patient's Last Name, First Name, Laxmi Coleman YOB: 1975   Provider's Name   [facility]   Medical Record No.  [id number]     Onset Date: (P) 09/07/22 Start of Care Date: (P) 09/07/23     Medical Diagnosis:         OT Treatment Diagnosis:    Plan of Treatment  Frequency/Duration:(P) then 2x/wee for/(P) 12  weeks with decreasing frequency as needed.    Certification date from (P)  05/23/24   To (P) 08/07/24        See note for plan of treatment details and functional goals     Alanis Schilling OT                         I CERTIFY THE NEED FOR THESE SERVICES FURNISHED UNDER        THIS PLAN OF TREATMENT AND WHILE UNDER MY CARE .             Physician Signature               Date    X_____________________________________________________                  Referring Provider:  Amol Juares    Initial Assessment  See Epic Evaluation- (P) 09/07/23          PLAN  Continue therapy per current plan of care.    Beginning/End Dates of Progress Note Reporting Period:    to 05/15/2024    Referring Provider:  Amol Juares

## 2024-05-16 ENCOUNTER — VIRTUAL VISIT (OUTPATIENT)
Dept: ENDOCRINOLOGY | Facility: CLINIC | Age: 49
End: 2024-05-16
Payer: MEDICARE

## 2024-05-16 ENCOUNTER — TRANSFERRED RECORDS (OUTPATIENT)
Dept: HEALTH INFORMATION MANAGEMENT | Facility: CLINIC | Age: 49
End: 2024-05-16

## 2024-05-16 ENCOUNTER — MYC MEDICAL ADVICE (OUTPATIENT)
Dept: OBGYN | Facility: CLINIC | Age: 49
End: 2024-05-16

## 2024-05-16 DIAGNOSIS — E27.49 SECONDARY ADRENAL INSUFFICIENCY (H): Primary | ICD-10-CM

## 2024-05-16 DIAGNOSIS — N90.89 VULVAR LESION: Primary | ICD-10-CM

## 2024-05-16 PROCEDURE — 99214 OFFICE O/P EST MOD 30 MIN: CPT | Mod: 95 | Performed by: INTERNAL MEDICINE

## 2024-05-16 PROCEDURE — G2211 COMPLEX E/M VISIT ADD ON: HCPCS | Mod: 95 | Performed by: INTERNAL MEDICINE

## 2024-05-16 RX ORDER — PREDNISONE 5 MG/1
5 TABLET ORAL DAILY
Qty: 30 TABLET | Refills: 1 | Status: SHIPPED | OUTPATIENT
Start: 2024-05-16 | End: 2024-07-05

## 2024-05-16 NOTE — PROGRESS NOTES
Virtual Visit Details    Type of service:  Video Visit   Video Start Time: 2:40 pM  Video End Time:  3:02 PM    Originating Location (pt. Location): Home  Distant Location (provider location):  Off-site  Platform used for Video Visit: Howard        Recent issues:  Adrenal follow-up evaluation  Previous low cortisol levels, presumed due to concurrent or prior dosing of steroid medication  Had repeat labs showed low cortisol and low ACTH levels, indicating secondary adrenal insufficiency.   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   In 3/2024, I advised she start hydrocortisone 5 mg BID... she states she started this medication but felt jittery and stopped it  She reports having head MRI scan at [facility], results pending        Previous health history includes sero negative rheumatoid arthritis, asthma, T2DM  She has seen health care providers including [facility], [facility], [facility], and [facility]  Had also seen Dr. Alan Saleh/[facility] rheumatology  Med evaluations with Dr. Susan Pena/[facility] OBGYN, evaluations focused on management of recurrent cystitis, vaginitis  Treatment with antifungal and antibiotic medications  Patient recalls having lab tests done per Dr. Pena ~2020 showing abnormal adrenal results   Advised to see an endocrinologist for further evaluation   Unfortunately, details of these tests, results, concerns not available today    Additional medical evaluations with other providers, including Dr. No Fitzgerald/ OBGYN, Dr. Oswaldo Lemus/[facility] Rheumatology  RA treatment with periodic Actemra infusions (w/ methylprednisolone infusion)  Has used Medrol dose packs (5d) for flares of her RA or asthma or pneumonia  Previous [facility] labs include:   Latest Reference Range & Units 11/29/23 12:36 12/20/23 14:28   Cortisol Serum ug/dL 0.5 2.6      Latest Reference Range &  Units 11/29/23 12:36 12/20/23 14:28   Adrenal Corticotropin <47 pg/mL <10 <10      Latest Reference Range & Units 11/29/23 12:45   Cortisol Free Urine Random ug/L <1.00     ~2/7/24. Last Medtrol steroid course completed        Lives in [place], [place]  Sees Dr. Amol Juares/[facility] Internal Medicine for general medicine evaluations.  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    Obesity     Pyelonephritis, unspecified 1992    abstract    Rheumatoid arteritis (H)     Seronegative inflammatory arthritis 06/17/2022    Type 2 diabetes mellitus (H)     Unspecified symptom associated with female genital organs 05/07/1999    chronic pelvic pain abstract     Past Surgical History:   Procedure Laterality Date    CHOLECYSTECTOMY      COLON SURGERY      Left hemicolectomy for diverticulosis    CYSTOSCOPY,+URETEROSCOPY      abstract    ZZHC EXCIS/CURET BENIGN ELBOW LESN  10/26/1987    (R) humerus abstract       Family Hx:  Family History   Problem Relation Age of Onset    Hypertension Father         abstract    Cancer Paternal Aunt         gallbladder cancer abstract         Social Hx:  Social History     Socioeconomic History    Marital status: Single     Spouse name: Not on file    Number of children: Not on file    Years of education: Not on file    Highest education level: Not on file   Occupational History    Not on file   Tobacco Use    Smoking status: Never    Smokeless tobacco:  Never   Substance and Sexual Activity    Alcohol use: Yes     Comment: RARE    Drug use: No    Sexual activity: Not on file   Other Topics Concern     Service Not Asked    Blood Transfusions Not Asked    Caffeine Concern Not Asked    Occupational Exposure Not Asked    Hobby Hazards Not Asked    Sleep Concern Not Asked    Stress Concern Not Asked    Weight Concern Not Asked    Special Diet Not Asked    Back Care Not Asked    Exercise No    Bike Helmet Not Asked    Seat Belt No    Self-Exams No   Social History Narrative    Womens Health Kit given.         Social Determinants of Health     Financial Resource Strain: Medium Risk (3/13/2024)    [facility]    Financial Resource Strain     Difficulty of Paying Living Expenses: 2     Difficulty of Paying Living Expenses: 1   Food Insecurity: No Food Insecurity (3/13/2024)    Received from [facility], [facility] [employer] [facility] [employer]    Food Insecurity     Worried About Running Out of Food in the Last Year: 1   Transportation Needs: No Transportation Needs (3/13/2024)    [facility], [facility]    Transportation Needs     Lack of Transportation (Medical): 1   Physical Activity: Inactive (2/24/2023)    [facility]    Exercise Vital Sign     Days of Exercise per Week: 0 days     Minutes of Exercise per Session: 0 min   Stress: No Stress Concern Present (2/24/2023)    Received from [facility], [facility]    [employer] - Occupational Stress Questionnaire     Feeling of Stress : Not at all   Social Connections: Socially Integrated (3/13/2024)    Received from [facility], [facility] [employer]    Social Connections     Frequency of  Communication with Friends and Family: 0   Interpersonal Safety: Not At Risk (2/24/2023)    [facility]    Humiliation, Afraid, Rape, and Kick questionnaire     Fear of Current or Ex-Partner: No     Emotionally Abused: No     Physically Abused: No     Sexually Abused: No   Housing Stability: Low Risk  (3/13/2024)    [facility], [facility]    Housing Stability     Unable to Pay for Housing in the Last Year: 1          MEDICATIONS:  has a current medication list which includes the following prescription(s): albuterol, soothe xp, atenolol, diclofenac, dupixent, fexofenadine, fidaxomicin, trelegy ellipta, insulin glargine, insulin lispro, humulin n kwikpen, losartan, mometasone, montelukast sodium, multivitamin w/minerals, nystatin, pataday, omeprazole, oxycodone ir, prednisone, rifaximin, rosuvastatin, spironolactone-hctz, actemra, benzoyl peroxide, blood glucose, blood glucose monitoring, ciclopirox, onetouch delica plus qrnady78s, lidocaine, methylprednisolone, methylprednisolone, and norethindrone, and the following Facility-Administered Medications: lidocaine (pf), lidocaine (pf), lidocaine (pf), lidocaine (pf), lidocaine (pf), lidocaine (pf), triamcinolone, triamcinolone, triamcinolone, triamcinolone, triamcinolone, and triamcinolone.    ROS:     ROS: 10 point ROS neg other than the symptoms noted above in the HPI.    GENERAL: some fatigue, wt stable; denies fevers, chills, night sweats.   HEENT: sinus drainage; no dysphagia, odonophagia, diplopia, neck pain  THYROID:  no apparent hyper or hypothyroid symptoms  CV: no chest pain, pressure, palpitations  LUNGS: some dyspnea; no SOB, SERNA, cough, wheezing   ABDOMEN: no diarrhea, constipation, abdominal pain  EXTREMITIES: no rashes, ulcers, edema  NEUROLOGY: no headaches, denies changes in vision, tingling, extremitiy numbness   MSK: polyarthralgias- pains at  neck, hands, hips, ankles; generalized muscle weakness  SKIN: no rashes or lesions  : no menses with daily [place]   PSYCH:  stable mood, no significant anxiety or depression  ENDOCRINE: no heat or cold intolerance    Physical Exam (visual exam)  VS:  no vital signs taken for video visit  CONSTITUTIONAL: healthy, alert and NAD, well dressed, answering questions appropriately  ENT: no nose swelling or nasal discharge, mouth redness or gum changes.  EYES: eyes grossly normal to inspection, conjunctivae and sclerae normal, no exophthalmos or proptosis  THYROID:  no apparent nodules or goiter  LUNGS: no audible wheeze, cough or visible cyanosis, no visible retractions or increased work of breathing  ABDOMEN: abdomen not evaluated  EXTREMITIES: no hand tremors, limited exam  NEUROLOGY: CN grossly intact, mentation intact and speech normal   SKIN:  no apparent skin lesions, rash, or edema with visualized skin appearance  PSYCH: mentation appears normal, affect normal/bright, judgement and insight intact,   normal speech and appearance well groomed    LABS:    All pertinent notes, labs, and images personally reviewed by me.     A/P:  Encounter Diagnosis   Name Primary?    Secondary adrenal insufficiency (H24) Yes     Comments:  Reviewed the complicated health history and general endocrine issues.  Difficult to understand the endocrine concern by her [facility] provider from 3-yrs ago  Low cortisol levels likely due to transient vs chronic suppression of pituitary-adrenal hormone axis, or hypopituitarism    Plan:  Reviewed the adrenal gland, general anatomy and hormone secretion  We discussed adrenal lab testing   Discussed steroid medication options, dosing    Recommend:  Reviewed steroid medications and dosing options  Her lack of symptom improvement with the hydrocortisone surprising, advised trying different steroid plan   Reviewed lower dose hydrocortisone vs low dose Prednisone options   Plan to start Prednisone 5  mg tablet daily, new Rx sent to her pharmacy  If future glucocorticoid med treatment for asthma or arthritis, may use Medrol, Pred, or HC   Will defer respiratory and rheumatology management to her other specialists  Monitor for symptom changes  Message me if questions or concerns about the Prednisone trial  Will contact [facility] for the head MRI report   She will send copy of letter for the FT4 test (denial?) and we will submit it to our billing dept  No adrenal gland imaging needed at this time  Keep focus on diet, exercise (as tolerated) and weight management  Consider dietician evaluation  Review the diabetes medication options with her PCP Dr. Juares... I have not been managing her T2DM   She is considering future use of Ozempic like medications    Addressed patient questions today    There are no Patient Instructions on file for this visit.    Future labs ordered today: No orders of the defined types were placed in this encounter.    Radiology/Consults ordered today: None    Total time spent on day of encounter:  37 min    Follow-up:  7/2024 or 8/2024    MCKAY Leroy MD, MS  Endocrinology  [facility]    CC: Care Team

## 2024-05-17 ENCOUNTER — TELEPHONE (OUTPATIENT)
Dept: INFECTIOUS DISEASES | Facility: CLINIC | Age: 49
End: 2024-05-17
Payer: MEDICARE

## 2024-05-17 DIAGNOSIS — M79.645 BILATERAL THUMB PAIN: Primary | ICD-10-CM

## 2024-05-17 DIAGNOSIS — B37.31 RECURRENT CANDIDIASIS OF VAGINA: Primary | ICD-10-CM

## 2024-05-17 DIAGNOSIS — Z86.19 HISTORY OF THRUSH: ICD-10-CM

## 2024-05-17 DIAGNOSIS — M79.644 BILATERAL THUMB PAIN: Primary | ICD-10-CM

## 2024-05-17 NOTE — TELEPHONE ENCOUNTER
Patient had problems with recurrent yeast vaginitis. Candida glabrata grew from two recent vaginal cultures. She does not current have active yeast vaginitis symptoms. The C. Glabrata is resistant to fluconazole, borderline susceptibility for voriconazole form outside lab . I asked our IDDL to set up antifungal susceptibility testing in our lab against the most recent C. Glabrata for available antifungal drugs.     I will check the results next week and call the patient back. I will also ask our ID clinic pharmacist to check for potential drug interactions with voriconazole and all the other patient's medications.   Kaley Hall MD

## 2024-05-20 LAB — BACTERIA SPEC CULT: ABNORMAL

## 2024-05-21 ENCOUNTER — INFUSION THERAPY VISIT (OUTPATIENT)
Dept: INFUSION THERAPY | Facility: CLINIC | Age: 49
End: 2024-05-21
Attending: INTERNAL MEDICINE
Payer: MEDICARE

## 2024-05-21 ENCOUNTER — OFFICE VISIT (OUTPATIENT)
Dept: ORTHOPEDICS | Facility: CLINIC | Age: 49
End: 2024-05-21
Payer: MEDICARE

## 2024-05-21 ENCOUNTER — MYC MEDICAL ADVICE (OUTPATIENT)
Dept: ENDOCRINOLOGY | Facility: CLINIC | Age: 49
End: 2024-05-21

## 2024-05-21 ENCOUNTER — DOCUMENTATION ONLY (OUTPATIENT)
Dept: INFECTIOUS DISEASES | Facility: CLINIC | Age: 49
End: 2024-05-21

## 2024-05-21 VITALS
RESPIRATION RATE: 18 BRPM | OXYGEN SATURATION: 96 % | TEMPERATURE: 98.3 F | WEIGHT: 219 LBS | BODY MASS INDEX: 35.35 KG/M2 | HEART RATE: 82 BPM | SYSTOLIC BLOOD PRESSURE: 120 MMHG | DIASTOLIC BLOOD PRESSURE: 78 MMHG

## 2024-05-21 DIAGNOSIS — M79.632 LEFT FOREARM PAIN: ICD-10-CM

## 2024-05-21 DIAGNOSIS — M25.521 ELBOW PAIN, RIGHT: ICD-10-CM

## 2024-05-21 DIAGNOSIS — M25.522 ELBOW PAIN, LEFT: Primary | ICD-10-CM

## 2024-05-21 DIAGNOSIS — M79.631 RIGHT FOREARM PAIN: ICD-10-CM

## 2024-05-21 DIAGNOSIS — M13.80 SERONEGATIVE INFLAMMATORY ARTHRITIS: Primary | ICD-10-CM

## 2024-05-21 DIAGNOSIS — B37.9 CANDIDA GLABRATA INFECTION: Primary | ICD-10-CM

## 2024-05-21 LAB
ALBUMIN SERPL BCG-MCNC: 4.2 G/DL (ref 3.5–5.2)
ALBUMIN UR-MCNC: NEGATIVE MG/DL
ALP SERPL-CCNC: 50 U/L (ref 40–150)
ALT SERPL W P-5'-P-CCNC: 72 U/L (ref 0–50)
ANION GAP SERPL CALCULATED.3IONS-SCNC: 12 MMOL/L (ref 7–15)
APPEARANCE UR: CLEAR
AST SERPL W P-5'-P-CCNC: 53 U/L (ref 0–45)
BASOPHILS # BLD AUTO: 0.1 10E3/UL (ref 0–0.2)
BASOPHILS NFR BLD AUTO: 1 %
BILIRUB SERPL-MCNC: 0.6 MG/DL
BILIRUB UR QL STRIP: NEGATIVE
BUN SERPL-MCNC: 13.6 MG/DL (ref 6–20)
CALCIUM SERPL-MCNC: 9.3 MG/DL (ref 8.6–10)
CHLORIDE SERPL-SCNC: 103 MMOL/L (ref 98–107)
COLOR UR AUTO: ABNORMAL
CREAT SERPL-MCNC: 0.78 MG/DL (ref 0.51–0.95)
DEPRECATED HCO3 PLAS-SCNC: 23 MMOL/L (ref 22–29)
EGFRCR SERPLBLD CKD-EPI 2021: >90 ML/MIN/1.73M2
EOSINOPHIL # BLD AUTO: 0.3 10E3/UL (ref 0–0.7)
EOSINOPHIL NFR BLD AUTO: 3 %
ERYTHROCYTE [DISTWIDTH] IN BLOOD BY AUTOMATED COUNT: 12.4 % (ref 10–15)
GLUCOSE SERPL-MCNC: 187 MG/DL (ref 70–99)
GLUCOSE UR STRIP-MCNC: NEGATIVE MG/DL
HCT VFR BLD AUTO: 43.4 % (ref 35–47)
HGB BLD-MCNC: 14.8 G/DL (ref 11.7–15.7)
HGB UR QL STRIP: NEGATIVE
IMM GRANULOCYTES # BLD: 0 10E3/UL
IMM GRANULOCYTES NFR BLD: 1 %
KETONES UR STRIP-MCNC: NEGATIVE MG/DL
LEUKOCYTE ESTERASE UR QL STRIP: NEGATIVE
LYMPHOCYTES # BLD AUTO: 2.1 10E3/UL (ref 0.8–5.3)
LYMPHOCYTES NFR BLD AUTO: 25 %
MCH RBC QN AUTO: 30.1 PG (ref 26.5–33)
MCHC RBC AUTO-ENTMCNC: 34.1 G/DL (ref 31.5–36.5)
MCV RBC AUTO: 88 FL (ref 78–100)
MONOCYTES # BLD AUTO: 0.7 10E3/UL (ref 0–1.3)
MONOCYTES NFR BLD AUTO: 9 %
MUCOUS THREADS #/AREA URNS LPF: PRESENT /LPF
NEUTROPHILS # BLD AUTO: 5.3 10E3/UL (ref 1.6–8.3)
NEUTROPHILS NFR BLD AUTO: 61 %
NITRATE UR QL: NEGATIVE
NRBC # BLD AUTO: 0 10E3/UL
NRBC BLD AUTO-RTO: 0 /100
PH UR STRIP: 6.5 [PH] (ref 5–7)
PLATELET # BLD AUTO: 307 10E3/UL (ref 150–450)
POTASSIUM SERPL-SCNC: 3.7 MMOL/L (ref 3.4–5.3)
PROT SERPL-MCNC: 6.2 G/DL (ref 6.4–8.3)
RBC # BLD AUTO: 4.92 10E6/UL (ref 3.8–5.2)
RBC URINE: 1 /HPF
SODIUM SERPL-SCNC: 138 MMOL/L (ref 135–145)
SP GR UR STRIP: 1.02 (ref 1–1.03)
SQUAMOUS EPITHELIAL: 3 /HPF
UROBILINOGEN UR STRIP-MCNC: NORMAL MG/DL
WBC # BLD AUTO: 8.5 10E3/UL (ref 4–11)
WBC URINE: 2 /HPF

## 2024-05-21 PROCEDURE — 82374 ASSAY BLOOD CARBON DIOXIDE: CPT | Performed by: INTERNAL MEDICINE

## 2024-05-21 PROCEDURE — 84075 ASSAY ALKALINE PHOSPHATASE: CPT | Performed by: INTERNAL MEDICINE

## 2024-05-21 PROCEDURE — 85025 COMPLETE CBC W/AUTO DIFF WBC: CPT | Performed by: INTERNAL MEDICINE

## 2024-05-21 PROCEDURE — 96365 THER/PROPH/DIAG IV INF INIT: CPT

## 2024-05-21 PROCEDURE — 99213 OFFICE O/P EST LOW 20 MIN: CPT | Mod: 25 | Performed by: STUDENT IN AN ORGANIZED HEALTH CARE EDUCATION/TRAINING PROGRAM

## 2024-05-21 PROCEDURE — 250N000013 HC RX MED GY IP 250 OP 250 PS 637: Performed by: INTERNAL MEDICINE

## 2024-05-21 PROCEDURE — 250N000011 HC RX IP 250 OP 636: Performed by: INTERNAL MEDICINE

## 2024-05-21 PROCEDURE — 64450 NJX AA&/STRD OTHER PN/BRANCH: CPT | Mod: 50 | Performed by: STUDENT IN AN ORGANIZED HEALTH CARE EDUCATION/TRAINING PROGRAM

## 2024-05-21 PROCEDURE — 36415 COLL VENOUS BLD VENIPUNCTURE: CPT | Performed by: INTERNAL MEDICINE

## 2024-05-21 PROCEDURE — 96375 TX/PRO/DX INJ NEW DRUG ADDON: CPT

## 2024-05-21 PROCEDURE — 81001 URINALYSIS AUTO W/SCOPE: CPT | Performed by: INTERNAL MEDICINE

## 2024-05-21 PROCEDURE — 258N000003 HC RX IP 258 OP 636: Performed by: INTERNAL MEDICINE

## 2024-05-21 RX ORDER — METHYLPREDNISOLONE SODIUM SUCCINATE 125 MG/2ML
81.25 INJECTION, POWDER, LYOPHILIZED, FOR SOLUTION INTRAMUSCULAR; INTRAVENOUS ONCE
Status: CANCELLED | OUTPATIENT
Start: 2024-05-23

## 2024-05-21 RX ORDER — ALBUTEROL SULFATE 0.83 MG/ML
2.5 SOLUTION RESPIRATORY (INHALATION)
Status: CANCELLED | OUTPATIENT
Start: 2024-05-23

## 2024-05-21 RX ORDER — METHYLPREDNISOLONE SODIUM SUCCINATE 125 MG/2ML
125 INJECTION, POWDER, LYOPHILIZED, FOR SOLUTION INTRAMUSCULAR; INTRAVENOUS
Status: CANCELLED
Start: 2024-05-23

## 2024-05-21 RX ORDER — EPINEPHRINE 1 MG/ML
0.3 INJECTION, SOLUTION INTRAMUSCULAR; SUBCUTANEOUS EVERY 5 MIN PRN
Status: CANCELLED | OUTPATIENT
Start: 2024-05-23

## 2024-05-21 RX ORDER — ALBUTEROL SULFATE 90 UG/1
1-2 AEROSOL, METERED RESPIRATORY (INHALATION)
Status: CANCELLED
Start: 2024-05-23

## 2024-05-21 RX ORDER — ACETAMINOPHEN 325 MG/1
650 TABLET ORAL ONCE
Status: COMPLETED | OUTPATIENT
Start: 2024-05-21 | End: 2024-05-21

## 2024-05-21 RX ORDER — TRIAMCINOLONE ACETONIDE 40 MG/ML
40 INJECTION, SUSPENSION INTRA-ARTICULAR; INTRAMUSCULAR
Status: DISCONTINUED | OUTPATIENT
Start: 2024-05-21 | End: 2024-07-01

## 2024-05-21 RX ORDER — HEPARIN SODIUM (PORCINE) LOCK FLUSH IV SOLN 100 UNIT/ML 100 UNIT/ML
5 SOLUTION INTRAVENOUS
Status: CANCELLED | OUTPATIENT
Start: 2024-05-23

## 2024-05-21 RX ORDER — DIPHENHYDRAMINE HYDROCHLORIDE 50 MG/ML
50 INJECTION INTRAMUSCULAR; INTRAVENOUS
Status: CANCELLED
Start: 2024-05-23

## 2024-05-21 RX ORDER — ACETAMINOPHEN 325 MG/1
650 TABLET ORAL ONCE
Status: CANCELLED | OUTPATIENT
Start: 2024-05-23

## 2024-05-21 RX ORDER — LIDOCAINE HYDROCHLORIDE 10 MG/ML
3 INJECTION, SOLUTION EPIDURAL; INFILTRATION; INTRACAUDAL; PERINEURAL
Status: DISCONTINUED | OUTPATIENT
Start: 2024-05-21 | End: 2024-07-01

## 2024-05-21 RX ORDER — METHYLPREDNISOLONE SODIUM SUCCINATE 125 MG/2ML
81.25 INJECTION, POWDER, LYOPHILIZED, FOR SOLUTION INTRAMUSCULAR; INTRAVENOUS ONCE
Status: COMPLETED | OUTPATIENT
Start: 2024-05-21 | End: 2024-05-21

## 2024-05-21 RX ORDER — METHYLPREDNISOLONE SODIUM SUCCINATE 125 MG/2ML
40 INJECTION, POWDER, LYOPHILIZED, FOR SOLUTION INTRAMUSCULAR; INTRAVENOUS ONCE
Status: CANCELLED | OUTPATIENT
Start: 2024-06-18

## 2024-05-21 RX ORDER — HEPARIN SODIUM,PORCINE 10 UNIT/ML
5-20 VIAL (ML) INTRAVENOUS DAILY PRN
Status: CANCELLED | OUTPATIENT
Start: 2024-05-23

## 2024-05-21 RX ORDER — MEPERIDINE HYDROCHLORIDE 25 MG/ML
25 INJECTION INTRAMUSCULAR; INTRAVENOUS; SUBCUTANEOUS EVERY 30 MIN PRN
Status: CANCELLED | OUTPATIENT
Start: 2024-05-23

## 2024-05-21 RX ADMIN — TRIAMCINOLONE ACETONIDE 40 MG: 40 INJECTION, SUSPENSION INTRA-ARTICULAR; INTRAMUSCULAR at 15:48

## 2024-05-21 RX ADMIN — LIDOCAINE HYDROCHLORIDE 3 ML: 10 INJECTION, SOLUTION EPIDURAL; INFILTRATION; INTRACAUDAL; PERINEURAL at 15:48

## 2024-05-21 RX ADMIN — METHYLPREDNISOLONE SODIUM SUCCINATE 40 MG: 125 INJECTION, POWDER, FOR SOLUTION INTRAMUSCULAR; INTRAVENOUS at 12:58

## 2024-05-21 RX ADMIN — SODIUM CHLORIDE 250 ML: 9 INJECTION, SOLUTION INTRAVENOUS at 13:06

## 2024-05-21 RX ADMIN — TOCILIZUMAB 800 MG: 20 INJECTION, SOLUTION, CONCENTRATE INTRAVENOUS at 13:06

## 2024-05-21 RX ADMIN — ACETAMINOPHEN 650 MG: 325 TABLET ORAL at 12:44

## 2024-05-21 NOTE — PROGRESS NOTES
Chief Complaint   Patient presents with    Infusion     IV Actemra, fluids     Infusion Nursing Note:  Laxmi Ya presents today for Actemra.    Patient seen by provider today: No   present during visit today: Not Applicable.    Note:   Patient dosed insulin as instructed by provider prior to premeds.  Premeds administered per orders. TORB received to decrease Solu-Medrol to 40 mg per pt request. Therapy plan update.  Actemra infused over 1 hour.  250 ml NS infused post Actemra per pt request/orders.    Intravenous Access:  Peripheral IV placed by VAT.  Labs drawn per orders.    Treatment Conditions:  Biological Infusion Checklist:  ~~~ NOTE: If the patient answers yes to any of the questions below, hold the infusion and contact ordering provider or on-call provider.    Have you recently had an elevated temperature, fever, chills, productive cough, coughing for 3 weeks or longer or hemoptysis,  abnormal vital signs, night sweats,  chest pain or have you noticed a decrease in your appetite, unexplained weight loss or fatigue? No  Do you have any open wounds or new incisions? No  Do you have any upcoming hospitalizations or surgeries? Does not include esophagogastroduodenoscopy, colonoscopy, endoscopic retrograde cholangiopancreatography (ERCP), endoscopic ultrasound (EUS), dental procedures or joint aspiration/steroid injections No  Do you currently have any signs of illness or infection or are you on any antibiotics? No  Have you had any new, sudden or worsening abdominal pain? No  Have you or anyone in your household received a live vaccination in the past 4 weeks? Please note: No live vaccines while on biologic/chemotherapy until 6 months after the last treatment. Patient can receive the flu vaccine (shot only), pneumovax and the Covid vaccine. It is optimal for the patient to get these vaccines mid cycle, but they can be given at any time as long as it is not on the day of the infusion. No  Have  you recently been diagnosed with any new nervous system diseases (ie. Multiple sclerosis, Guillain Carver, seizures, neurological changes) or cancer diagnosis? Are you on any form of radiation or chemotherapy? No  Are you pregnant or breast feeding or do you have plans of pregnancy in the future? No  Have you been having any signs of worsening depression or suicidal ideations?  (benlysta only) No  Have there been any other new onset medical symptoms? No  Have you had any new blood clots? (IVIG only) No      Post Infusion Assessment:  Patient tolerated infusion without incident.  Blood return noted pre and post infusion.  Site patent and intact, free from redness, edema or discomfort.  No evidence of extravasations.  Access discontinued per protocol.     POST-INFUSION OF BIOLOGICAL MEDICATION:  Reviewed with patient.  Given biologic medication or medication hand-out. Inform patient if any fever, chills or signs of infection, new symptoms, abdominal pain, heart palpitations, shortness of breath, reaction, weakness, neurological changes, seek medical attention immediately and should not receive infusions. No live virus vaccines prior to or during treatment or up to 6 months post infusion. If the patient has an upcoming procedure or surgery, this should be discussed with the rheumatologist and surgeon or provider.      Discharge Plan:   [facility]  Patient will return 6/18 for next appointment.   Patient discharged in stable condition accompanied by: self.  Departure Mode: Ambulatory.    Administrations This Visit       acetaminophen (TYLENOL) tablet 650 mg       Admin Date  05/21/2024 Action  $Given Dose  650 mg Route  Oral Documented By  Arsalan Phillip, RN              methylPREDNISolone sodium succinate (solu-MEDROL) injection 81.25 mg       Admin Date  05/21/2024 Action  $Given Dose  40 mg Route  Intravenous Documented By  Arsalan Phillip, LEXI              sodium chloride 0.9% BOLUS 250 mL        "Admin Date  05/21/2024 Action  $New Bag Dose  250 mL Route  Intravenous Documented By  Arsalan Phillip, RN              tocilizumab (ACTEMRA) 800 mg in sodium chloride 0.9 % 150 mL infusion       Admin Date  05/21/2024 Action  $New Bag Dose  800 mg Rate  150 mL/hr Route  Intravenous Documented By  Arsalan Phillip, RN                    Vital signs:  Temp: 98.3  F (36.8  C) Temp src: Oral BP: 128/82 Pulse: 82   Resp: 18 SpO2: 96 % O2 Device: None (Room air)     Weight: 99.3 kg (219 lb)  Estimated body mass index is 35.35 kg/m  as calculated from the following:    Height as of 3/4/24: 1.676 m (5' 6\").    Weight as of this encounter: 99.3 kg (219 lb).          "

## 2024-05-21 NOTE — PROGRESS NOTES
"Patient arrived to clinic requesting to speak to nurse. Would like Dr. Hall to call her regarding recent susceptibilities; reports she saw on MyChart she has a \"new updated result\" and is wondering if recent susceptibilities have come back for the C. Glabatra. Would like a call back from Dr. Hall to discuss. Patient reports she received a call from [facility] scheduling and will plan to call and make an appointment with Mary Aleman to discuss drug interactions.   "

## 2024-05-21 NOTE — PROGRESS NOTES
Hand / Upper Extremity Injection/Arthrocentesis    Date/Time: 2024 3:48 PM    Performed by: Hyacinth Morrison MD  Authorized by: Hyacinth Morrison MD    Indications:  Pain  Needle Size:  25 G  Guidance: landmark     Condition comment:  Bilateral cubital tunnel     Medications:  40 mg triamcinolone 40 MG/ML; 3 mL lidocaine (PF) 1 %  Outcome:  Tolerated well, no immediate complications  Procedure discussed: discussed risks, benefits, and alternatives    Consent Given by:  Patient  Timeout: timeout called immediately prior to procedure    Prep: patient was prepped and draped in usual sterile fashion        [facility] OR[facility] [address]  [address] [facility] [id number] [phone]  Dept: [phone]  ______________________________________________________________________________    Patient: Laxmi Ya   : 1975   MRN: [id number]   May 21, 2024    INVASIVE PROCEDURE SAFETY CHECKLIST    Date: 2024   Procedure:Bilateral cubital tunnel steroid injections  Patient Name: Laxmi Ya  MRN: [id number]  YOB: 1975    Action: Complete sections as appropriate. Any discrepancy results in a HARD COPY until resolved.     PRE PROCEDURE:  Patient ID verified with 2 identifiers (name and  or MRN): Yes  Procedure and site verified with patient/designee (when able): Yes  Accurate consent documentation in medical record: Yes  H&P (or appropriate assessment) documented in medical record: Yes  H&P must be up to 20 days prior to procedure and updates within 24 hours of procedure as applicable: Yes  Relevant diagnostic and radiology test results appropriately labeled and displayed as applicable: NA  Procedure site(s) marked with provider initials: NA    TIMEOUT:  Time-Out performed immediately prior to starting procedure, including verbal and active participation of all team members addressing the following:Yes  * Correct patient identify  * Confirmed  that the correct side and site are marked  * An accurate procedure consent form  * Agreement on the procedure to be done  * Correct patient position  * Relevant images and results are properly labeled and appropriately displayed  * The need to administer antibiotics or fluids for irrigation purposes during the procedure as applicable   * Safety precautions based on patient history or medication use    DURING PROCEDURE: Verification of correct person, site, and procedures any time the responsibility for care of the patient is transferred to another member of the care team.       The following medications were given:         Prior to injection, verified patient identity using patient's name and date of birth.  Due to injection administration, patient instructed to remain in clinic for 15 minutes  afterwards, and to report any adverse reaction to me immediately.    Tendon sheath injection was performed.   Medication Name: Kenalog 40 mg/mL 20 mg in each  NDC [address]-8  Drug Amount Wasted:  None.  Vial/Syringe: Single dose vial  Expiration Date:  1/1/26    Medication Name Lidopcaine 1% NDC 24458-046-77    Scribed by DUY RESENDEZ, TIM for Dr. Jo on May 21, 2024 at 3:52pm based on the provider's statements to me.     DUY RESENDEZ, ATC

## 2024-05-21 NOTE — NURSING NOTE
Reason For Visit:   Chief Complaint   Patient presents with    RECHECK     Review 5/9/24 ultrasound results       Primary MD: Amol Juares  Ref. MD: salazar    Age: 49 year old        There were no vitals taken for this visit.      Pain Assessment  Patient Currently in Pain: Yes  0-10 Pain Scale: 10  Primary Pain Location: Arm (hands, wrists, elbows, and arms)               QuickDASH Assessment      5/14/2024     4:44 PM   QuickDASH Main   1. Open a tight or new jar Unable   2. Do heavy household chores (e.g., wash walls, floors) Unable   3. Carry a shopping bag or briefcase Unable   4. Wash your back Severe difficulty   5. Use a knife to cut food Severe difficulty   6. Recreational activities in which you take some force or impact through your arm, shoulder or hand (e.g., golf, hammering, tennis, etc.) Unable   7. During the past week, to what extent has your arm, shoulder or hand problem interfered with your normal social activities with family, friends, neighbours or groups Extremely   8. During the past week, were you limited in your work or other regular daily activities as a result of your arm, shoulder or hand problem Unable   9. Arm, shoulder or hand pain Extreme   10.Tingling (pins and needles) in your arm,shoulder or hand Severe   11. During the past week, how much difficulty have you had sleeping because of the pain in your arm, shoulder or hand Severe difficulty   Quickdash Ability Score 90.91          Current Outpatient Medications   Medication Sig Dispense Refill    albuterol (PROAIR HFA/PROVENTIL HFA/VENTOLIN HFA) 108 (90 Base) MCG/ACT inhaler Inhale 2 puffs into the lungs every 6 hours as needed for shortness of breath, wheezing or cough      Artificial Tear Solution (SOOTHE XP) SOLN as needed      atenolol (TENORMIN) 50 MG tablet Take 50 mg by mouth daily      benzoyl peroxide 5 % external liquid Use daily as directed. Preferred bdrand: Medpura 236 mL 11    blood glucose (NO BRAND SPECIFIED) test  strip Use to test blood sugar 2 times daily or as directed. 100 strip 1    blood glucose monitoring (NO BRAND SPECIFIED) meter device kit Use to test blood sugar 2 times daily or as directed. 1 kit 0    ciclopirox (LOPROX) 0.77 % cream Apply topically 2 times daily To affected toenails. 90 g 5    diclofenac (VOLTAREN) 1 % topical gel Apply topically 4 times daily as needed      dupilumab (DUPIXENT) 300 MG/2ML prefilled pen Inject 2 mLs (300 mg) Subcutaneous every 14 days 4 mL 5    fexofenadine (ALLEGRA) 180 MG tablet Take 1 tablet (180 mg) by mouth daily 90 tablet 2    fidaxomicin (DIFICID) 200 MG tablet Take 200 mg by mouth as needed      Fluticasone-Umeclidin-Vilanterol (TRELEGY ELLIPTA) 200-62.5-25 MCG/ACT oral inhaler Inhale 1 puff into the lungs daily 90 each 1    insulin glargine (LANTUS PEN) 100 UNIT/ML pen Inject 20 Units Subcutaneous daily      insulin lispro (HUMALOG KWIKPEN) 100 UNIT/ML (1 unit dial) KWIKPEN Inject 5 Units Subcutaneous 3 times daily (before meals) Plus 1:25 correction scale if BG >150      insulin NPH (HUMULIN N KWIKPEN) 100 UNIT/ML injection Inject 40 Units Subcutaneous every evening      Lancets (ONETOUCH DELICA PLUS QZILDV94I) MISC CHECK BLOOD GLUCOSE 3 TIMES DAILY, ADJUSTING MEDICATION, DX CODE E 11.9, ON INSULIN      lidocaine (LIDODERM) 5 % patch as needed   1    losartan (COZAAR) 100 MG tablet Take 100 mg by mouth daily      methylPREDNISolone (MEDROL DOSEPAK) 4 MG tablet therapy pack Take per package instructions. Take once a day by mouth 21 tablet 1    methylPREDNISolone (MEDROL DOSEPAK) 4 MG tablet therapy pack Take per package instructions. Take once a day by mouth 21 tablet 0    mometasone (NASONEX) 50 MCG/ACT nasal spray Spray 2 sprays into both nostrils daily 17 g 11    Montelukast Sodium (SINGULAIR PO) Take 10 mg by mouth At Bedtime       multivitamin w/minerals (THERA-VIT-M) tablet Take 1 tablet by mouth daily      norethindrone (MICRONOR) 0.35 MG tablet Take 0.35 mg by  mouth      nystatin (MYCOSTATIN) 784865 UNIT/ML suspension       olopatadine (PATADAY) 0.7 % ophthalmic solution Apply 1 drop to eye daily PRN      OMEPRAZOLE PO Take 40 mg by mouth 2 times daily       oxyCODONE IR (ROXICODONE) 10 MG tablet Take 10 mg by mouth      predniSONE (DELTASONE) 5 MG tablet Take 1 tablet (5 mg) by mouth daily 30 tablet 1    rifaximin (XIFAXAN) 550 MG TABS tablet Take 200 mg by mouth 3 times daily Take for 10 days, has on had as needed for small bowel bacteria overgrowh      rosuvastatin (CRESTOR) 5 MG tablet Take 2 tablets by mouth daily      spironolactone-HCTZ (ALDACTAZIDE) 25-25 MG tablet Take 1 tablet by mouth daily      tocilizumab (ACTEMRA) 400 MG/20ML Inject 800 mg into the vein every 28 days         Allergies   Allergen Reactions    Polyethylene Glycol Rash    Codeine      Other reaction(s): Gastrointestinal, GI intolerance, Vomiting  Vomiting      Hydrocodone Nausea and Vomiting    Morphine Nausea and Vomiting    Sulfasalazine      Other reaction(s): GI intolerance  Has tried and had nausa.    Tramadol Nausea and Vomiting     Other reaction(s): Gastrointestinal, Other (see comments)  Nausea and vomiting   unknown      Gluten Meal Other (See Comments) and Rash     Other reaction(s): Gastrointestinal, GI intolerance  Dizziness, tired, rash, stomach cramps, thrush, mouth sores  Dizziness, tired, rash, stomach cramps, thrush, mouth sores      Hydroxyzine GI Disturbance     nausea, dry mouth    Propylene Glycol Dizziness, Nausea and Vomiting, Nausea and Rash       -Nhan ATC- [facility]

## 2024-05-21 NOTE — TELEPHONE ENCOUNTER
----- Message from Oswaldo Lemus MD sent at 5/21/2024  2:10 PM CDT -----  Regarding: RE: Solu-Medrol premed dose  I approve.  ----- Message -----  From: Arsalan Phillip RN  Sent: 5/21/2024   1:10 PM CDT  To: Oswaldo Lemus MD; [facility]-  Subject: Solu-Medrol premed dose                          Dr. Lemus     JENN    I had to change the Solu-Medrol premed dose to 37.5 mg from our discussed 40 mg due to vial dosage. Thank you      [facility]

## 2024-05-21 NOTE — LETTER
5/21/2024         RE: Laxmi Ya  [address] [id number]        Dear Colleague,    Thank you for referring your patient, Laxmi Ya, to the [facility] [place]. Please see a copy of my visit note below.    Chief Complaint:   Chief Complaint   Patient presents with    RECHECK     Review 5/9/24 ultrasound results       Referring Physician: No ref. provider found    Diagnosis: bilateral thumb CMC/STT arthritis, bilateral carpal tunnel syndrome  Treatment:   5/21/20024: bilateral cubital tunnel steroid injections  12/27/2023: bilateral carpal tunnel steroid injection  12/21/2023: bilateral STT steroid injection (Dr. Petit)  11/1/2023: bilateral carpal tunnel steroid injection  10/18/2023: Bilateral thumb CMC steroid injection  9/20/2023: bilateral STT steroid injection  5/4/2023: bilateral carpal tunnel steroid injection (Dr. Petit)  2/16/2023: bilateral STT steroid injection (Dr. Petit)  12/13/2022: bilateral carpal tunnel steroid injection (Dr. Sullivan)  10/27/2022: bilateral STT steroid injections (Dr. Petit)  9/13/2022: bilateral carpal tunnel steroid injection (Dr. Sullivan)  6/30/2022: bilateral STT steroid injections (Dr. Petit)  Bilateral carpal tunnel steroid injections (outside)    History of Present Illness: Laxmi Ya is a 48 year old RHD female presenting for evaluation of bilateral hand and wrist pain. She's had history of carpal tunnel steroid injections and releases, she continues to feel some numbness/tingling in her fingers. Today she'd like to have steroid injections into the STT region.    Clinical documentation by Dr. Petit on 5/4/2023 was reviewed.    10/18/2023: reports the last STT injections helped.  She presents today for bilateral thumb CMC steroid injections.  She continues to have pain in bilateral thumbs.  She also continues to have numbness/tingling in the median nerve distribution.    11/1/2023: reports the  last thumb CMC steroid injections helped. Presents today for bilateral carpal tunnel steroid injections.    12/27/2023: recent STT injections with Dr. Petit which she says were helpful. Last carpal tunnel steroid injections were helpful and she would like to do this again. Bilateral thumb CMCs feel good and the steroid injections have not worn off yet.    2/13/2024: recently had pneumonia. Right hand is feeling pretty good but left hand is having more carpal tunnel symptoms. Both hands are having mild pain in the STT region again. Focused on the left side today.    5/21/2024: today her symptoms are focused on the ulnar nerves, left worse than right. She has pain when leaning on the medial elbows, and this pain and tingling goes down the volar/ulnar side of her hand    Physical Examination:  There were no vitals filed for this visit.  There is no height or weight on file to calculate BMI.    Well appearing, well nourished  Alert and oriented x 3, cooperative with exam     Bilateral Upper Extremity:  Healed carpal tunnel scar  Thenar atrophy: no   Positive Tinel signs at bilateral cubital tunnels, positive elbow flexion bilaterally  Motor Exam:   Abductor pollicis brevis strength: 4/5    1st dorsal interosseous strength: 5/5   - Flexor pollicis longus strength: 4/5   Intact thumbs up  Vascular Exam:   2+ radial pulse, < 2 sec capillary refill      Imaging/Studies:  Repeat ultrasound obtained 5/9/2024 shows:  Ulnar nerve  Distal upper arm: 6 mm  Proximal to cubital tunnel: 9 mm   Within the cubital tunnel: 8 mm  Distal to cubital tunnel:  6 mm  Wrist: 6 mm      Median nerve  Lacertus fibrosis: 6 mm  Wrist crease: 10 mm      The radial nerve is visualized at the level of the distal upper arm  through the bifurcation of the superficial and deep branches.                                                                   Impression:   1. Normal size and appearance of the left ulnar nerve at the elbow and  wrist.  2.  Borderline enlarged left median nerve at the wrist crease.  3. Grossly normal appearance of the left radial nerve visualized at  the distal upper arm through the bifurcation into the superficial and  deep branches. Unable to confidently evaluate the radial nerve beyond  the bifurcation.  ___  Ultrasound bilateral upper extremities obtained 2/13/2024 shows:  Areas of the median nerves (millimeters squared):  Left:  Distal forearm: 7.6, 7.9  Wrist crease: 11.4, 10.3     Right:  Distal forearm: 9.4, 9.0  Wrist crease: 9.9, 8.6     Poorly visualized median nerve the region of the lacertus fibrosis  bilaterally.                                                              Impression:   1. Borderline enlargement of the left median nerve at the level of the  wrist crease.  2. No substantial right median nerve enlargement at the wrist crease.  ___  XR (3 views) of the  bilateral  hand was obtained  9/20/2023 .  I reviewed the images with the patient.  The imaging study shows bilateral thumb CMC and STT arthritis with possible chondrocalcinosis on radial side.    Assessment: Laxmi Ya is a 48 year old female with bilateral STT, thumb CMC, median neuropathy.    Plan:   I had a discussion with the patient regarding my clinical findings, diagnosis, and treatment plan. I reviewed the treatment options for cubital tunnel syndrome (observation, bracing, steroid injection, occupational therapy, surgery, etc.) as well as the risks and benefits of each. Her ultrasound shows normal ulnar nerves, borderline enlarged left median nerve at the wrist crease which may be suggestive of recurrent left carpal tunnel syndrome. She elects steroid injections and obtaining and EMG and MRIs of both elbows to further evaluate her ulnar nerves. She has an appointment scheduled with Dr. ePtit in 1 week for repeat injections. The patient understands and agrees to the treatment plan.  All questions answered.      Steroid injections  provided  - EMG bilateral upper extremities  - MRI bilateral elbows    Next Visit:   Follow-up: after EMG/MRI   Imaging: None   Plan: review tests    Procedure Note: After a discussion with Laxmi Ya regarding treatment options, we decided to proceed with a steroid injection to the bilateral cubital tunnels.  Risks of the procedure including hyperglycemia, fat atrophy, skin depigmentation and infection were discussed prior to injection and verbal consent from Laxmi Ya was obtained. The area was prepped with alcohol.  20 mg Kenalog and 0.5 mL of 1% lidocaine was injected.  Laxmi Ya tolerated the injection well. A clean dressing was placed over the site of the injection. Laxmi Ya was given post injection instructions.       HYACINTH STARKS MD    Hand / Upper Extremity Injection/Arthrocentesis    Date/Time: 2024 3:48 PM    Performed by: Hyacinth Starks MD  Authorized by: Hyacinth Starks MD    Indications:  Pain  Needle Size:  25 G  Guidance: landmark     Condition comment:  Bilateral cubital tunnel     Medications:  40 mg triamcinolone 40 MG/ML; 3 mL lidocaine (PF) 1 %  Outcome:  Tolerated well, no immediate complications  Procedure discussed: discussed risks, benefits, and alternatives    Consent Given by:  Patient  Timeout: timeout called immediately prior to procedure    Prep: patient was prepped and draped in usual sterile fashion        [facility] [address] [id number] [phone]  Dept: [phone]  ______________________________________________________________________________    Patient: Laxmi Ya   : 1975   MRN: [id number]   May 21, 2024    INVASIVE PROCEDURE SAFETY CHECKLIST    Date: 2024   Procedure:Bilateral cubital tunnel steroid injections  Patient Name: Laxmi Ya  MRN: [id number]  YOB: 1975    Action: Complete sections as appropriate. Any discrepancy  results in a HARD COPY until resolved.     PRE PROCEDURE:  Patient ID verified with 2 identifiers (name and  or MRN): Yes  Procedure and site verified with patient/designee (when able): Yes  Accurate consent documentation in medical record: Yes  H&P (or appropriate assessment) documented in medical record: Yes  H&P must be up to 20 days prior to procedure and updates within 24 hours of procedure as applicable: Yes  Relevant diagnostic and radiology test results appropriately labeled and displayed as applicable: NA  Procedure site(s) marked with provider initials: NA    TIMEOUT:  Time-Out performed immediately prior to starting procedure, including verbal and active participation of all team members addressing the following:Yes  * Correct patient identify  * Confirmed that the correct side and site are marked  * An accurate procedure consent form  * Agreement on the procedure to be done  * Correct patient position  * Relevant images and results are properly labeled and appropriately displayed  * The need to administer antibiotics or fluids for irrigation purposes during the procedure as applicable   * Safety precautions based on patient history or medication use    DURING PROCEDURE: Verification of correct person, site, and procedures any time the responsibility for care of the patient is transferred to another member of the care team.       The following medications were given:         Prior to injection, verified patient identity using patient's name and date of birth.  Due to injection administration, patient instructed to remain in clinic for 15 minutes  afterwards, and to report any adverse reaction to me immediately.    Tendon sheath injection was performed.   Medication Name: Kenalog 40 mg/mL 20 mg in each  NDC [address]-4  Drug Amount Wasted:  None.  Vial/Syringe: Single dose vial  Expiration Date:  26    Medication Name Lidopcaine 1% NDC 28573-029-70    Scribed by DUY RESENDEZ, TIM for Dr. Jo  on May 21, 2024 at 3:52pm based on the provider's statements to me.     DUY RESENDEZ, ATC

## 2024-05-21 NOTE — PATIENT INSTRUCTIONS
Dear Laxmi Ya    Thank you for choosing [facility] ([facility]) for your infusion.  The following information is a summary of our appointment as well as important reminders.      EDUCATION POST BIOLOGICAL/CHEMOTHERAPY INFUSION  Call the triage nurse at your clinic or seek medical attention if you have chills and/or temperature greater than or equal to 100.5, uncontrolled nausea/vomiting, diarrhea, constipation, dizziness, shortness of breath, chest pain, heart palpitations, weakness or any other new or concerning symptoms, questions or concerns.  You can not have any live virus vaccines prior to or during treatment or up to 6 months post infusion.  If you have an upcoming surgery, medical procedure or dental procedure during treatment, this should be discussed with your ordering physician and your surgeon/dentist.  If you are having any concerning symptom, if you are unsure if you should get your next infusion or wish to speak to a provider before your next infusion, please call your care coordinator or triage nurse at your clinic to notify them so we can adequately serve you.       We look forward in seeing you on your next appointment here at [facility] ([facility]).  Please don t hesitate to call us at [phone] to reschedule any of your appointments or to speak with one of the [facility] registered nurses.  It was a pleasure taking care of you today.    Sincerely,    [facility]  [address] [place] [address]  [address]  Phone:  [phone]

## 2024-05-22 ENCOUNTER — THERAPY VISIT (OUTPATIENT)
Dept: PHYSICAL THERAPY | Facility: CLINIC | Age: 49
End: 2024-05-22
Payer: MEDICARE

## 2024-05-22 ENCOUNTER — TELEPHONE (OUTPATIENT)
Dept: INFECTIOUS DISEASES | Facility: CLINIC | Age: 49
End: 2024-05-22

## 2024-05-22 ENCOUNTER — VIRTUAL VISIT (OUTPATIENT)
Dept: PHARMACY | Facility: CLINIC | Age: 49
End: 2024-05-22
Payer: COMMERCIAL

## 2024-05-22 DIAGNOSIS — M54.50 CHRONIC BILATERAL LOW BACK PAIN WITHOUT SCIATICA: Primary | ICD-10-CM

## 2024-05-22 DIAGNOSIS — R10.2 PELVIC PAIN IN FEMALE: Primary | ICD-10-CM

## 2024-05-22 DIAGNOSIS — G89.29 CHRONIC BILATERAL LOW BACK PAIN WITHOUT SCIATICA: Primary | ICD-10-CM

## 2024-05-22 DIAGNOSIS — B37.31 RECURRENT CANDIDIASIS OF VAGINA: ICD-10-CM

## 2024-05-22 DIAGNOSIS — G89.29 CHRONIC BILATERAL LOW BACK PAIN WITHOUT SCIATICA: ICD-10-CM

## 2024-05-22 DIAGNOSIS — M54.2 NECK PAIN: ICD-10-CM

## 2024-05-22 DIAGNOSIS — M54.50 CHRONIC BILATERAL LOW BACK PAIN WITHOUT SCIATICA: ICD-10-CM

## 2024-05-22 PROCEDURE — 97012 MECHANICAL TRACTION THERAPY: CPT | Mod: GP | Performed by: PHYSICAL THERAPIST

## 2024-05-22 PROCEDURE — 97140 MANUAL THERAPY 1/> REGIONS: CPT | Mod: GP | Performed by: PHYSICAL THERAPIST

## 2024-05-22 PROCEDURE — 99207 PR NO CHARGE LOS: CPT | Mod: 93 | Performed by: PHARMACIST

## 2024-05-22 NOTE — PROGRESS NOTES
Disease State Management Encounter:                          Laxmi Ya is a 49 year old female called for an initial visit. She was referred to me from Dr. Hall.    Reason for visit: voriconazole drug interaction check     Recurrent vaginal candidiasis:   No current symptoms     Candida glabrata grew from two recent vaginal cultures. She does not current have active yeast vaginitis symptoms. Culture from outside lab showed resistance to fluconazole, borderline susceptibility to voriconazole. Rechecked susceptibilities based on most recent cultures (see below). Planning to potentially start voriconazole. Need to check drug interactions first.       Susceptibility     Candida glabrata complex     MENG     Amphotericin B 0.5 ug/mL No interpretation available     Fluconazole 16 ug/mL Susceptible Dose Dependent     Micafungin <=0.03 ug/mL Susceptible     Voriconazole 0.5 ug/mL No interpretation available            Pain:   Takes oxycodone IR 10 mg every 6 hours - this is prescribed for as needed use but she currently needs consistently for pain management. No side effects.     Today's Vitals: There were no vitals taken for this visit.    Assessment/Plan:    Voriconazole is a an inhibitor of CFY0W93 (moderate), CYP2C9 (weak), and CY (strong)     Drug interactions:   Prednisone   Oxycodone  Omeprazole   Norethindrone   Mometasone nasal spray   Trelegy inhaler   Methylprednisolone (not currently taking)    Discussed drug interactions in detail. Due to short treatment course, do not need to reduce dose of steroids (prednisone, Trelegy, mometasone). Has history of GI ulcers on lower omeprazole dose so do not recommend reducing omeprazole dose.    Most concerning interaction is oxycodone. Coadministration of oxycodone with strong CY inhibitors has been shown to increase oxycodone exposure in several studies. The oxycodone AUC increased 3.6-fold with voriconazole. Recommend lowering the dose while taking  voriconazole and counseled on monitoring for side effects.     Recommend cutting oxycodone dose by 50% while taking voriconazole and for 5 days after finishing oxycodone due to unpredictable elimination. If she needs the full 10 mg dose due to inadequate pain control,  recommend extending dosing interval from every 6 hours to every 8 hours as needed for pain. She has a pain management appointment tomorrow and plans to discuss with them.    Follow-up: as needed     I spent 30 minutes with this patient today. All changes were made via collaborative practice agreement with Dr. Hall. A copy of the visit note was provided to the patient's provider(s).    A summary of these recommendations was sent via [employer].     Medication Therapy Recommendations  Bilateral low back pain without sciatica    Current Medication: oxyCODONE IR (ROXICODONE) 10 MG tablet   Rationale: Medication interaction - Dosage too high - Safety   Recommendation: Decrease Dose   Status: Patient Agreed - Adherence/Education

## 2024-05-22 NOTE — TELEPHONE ENCOUNTER
"I called the patient to discuss the results of the Candida glabrata susceptibility testing below.   I will talk with the clinic pharmacist after she has completed her drug interaction and consultation review and note. To get her input on potential drug drug interactions. Given the susceptibility patterns below I think voriconazole shows improved MENG compared to fluconazole and therefore may be more efficacious. For yeast vaginitis I think the oral option is preferable to an IV medication. I will call the patient back after I talk to the pharmacist and put a order in to the patients pharmacy and notify her of the plan. The patient states she is currently symptomatic or yeast vaginitis and would like to be treated.   Kaley Hall MD            Collected 5/10/2024  3:56 PM       Status: Edited Result - FINAL       Visible to patient: Yes (seen)       Dx: Candidiasis of vagina    Specimen Information: Vagina; Swab   0 Result Notes  Culture 2+ Candida glabrata complex Abnormal          Susceptibility testing requested by Kaley Hall MD. Office number is [phone] and pager number is [phone].        Resulting Agency: [facility]     Susceptibility     Candida glabrata complex     MENG     Amphotericin B 0.5 ug/mL No interpretation available     Fluconazole 16 ug/mL Susceptible Dose Dependent     Micafungin <=0.03 ug/mL Susceptible     Voriconazole 0.5 ug/mL No interpretation available              Susceptibility Comments    Candida glabrata complex   Antibiotics listed as \"No Interpretation\" have no regulatory guidelines for susceptibility/resistance available.  Testing for antifungal agents was validated in house by the [facility] ([facility]) [place], [place]                "

## 2024-05-23 DIAGNOSIS — B37.9 CANDIDA GLABRATA INFECTION: Primary | ICD-10-CM

## 2024-05-23 RX ORDER — VORICONAZOLE 200 MG/1
TABLET, FILM COATED ORAL
Qty: 30 TABLET | Refills: 0 | Status: SHIPPED | OUTPATIENT
Start: 2024-05-23

## 2024-05-23 NOTE — PROGRESS NOTES
Patient with Candida glabrata vaginitis. Will treat with oral voriconazole for 14 days, given high MENG with fluconazole. Patient has been cautioned about drug interaction with her oxycodone. She should take half her oxycodone dose to limit excessive sedation due to drug interaction with voriconazole.   I called the patient with these instruction sand sent the prescription to her pharmacy.   Kaley Hall MD

## 2024-05-24 ENCOUNTER — TELEPHONE (OUTPATIENT)
Dept: INFECTIOUS DISEASES | Facility: CLINIC | Age: 49
End: 2024-05-24

## 2024-05-24 NOTE — LETTER
May 30, 2024    To:   [Insurance Company]  [Address]    RE: Laxmi Ya  [address] [place] [id number]  : 1975  MRN: [id number]    To Whom It May Concern,    I am writing on behalf of my patient, Laxmi Ya to document the medical necessity of voriconazole for the treatment of recurrent Candida glabrata yeast infection. This letter provides information about the patient's medical history and diagnosis and a statement summarizing my treatment rationale.     Summary of Patient History and Diagnosis  Patient with recurrent vaginal yeast infections. She has taken fluconazole and compounded nystatin suppositories for yeast infections in the past but continued to have recurrent infections. She developed Candida glabrata overgrowth and it has been resistant to fluconazole or has a high MENG to fluconazole.     Susceptibilities from yeast culture 5/10:       Susceptibility     Candida glabrata complex     MENG     Amphotericin B 0.5 ug/mL No interpretation available     Fluconazole 16 ug/mL Susceptible Dose Dependent     Micafungin <=0.03 ug/mL Susceptible     Voriconazole 0.5 ug/mL No interpretation available              Treatment Rationale  Voriconazole is the best medication to treat her recurrent Candida glabrata yeast infection since voriconazole MENG is only 0.5 compared to 16 with fluconazole. She has failed fluconazole in the past.        Duration  14 days      Summary  In summary, voriconazole is medically necessary for this patient s medical condition. Please call my office at Infectious disease clinic scheduling number: [phone] if I can provide you with any additional information to approve my request. I look forward to receiving your timely response and approval of this request.     Sincerely,    Kaley Hall MD

## 2024-05-28 NOTE — PATIENT INSTRUCTIONS
Recommendations from today's disease management visit:                                                      Reduce oxycodone dose by 50% if you start voriconazole    Take oxycodone 5 mg every 6 hours as needed for pain. If pain is not controlled on this dose, could also try oxycodone 10 mg every 8 hours as needed for pain. Please discuss further with pain management.     Follow-up: as needed     To schedule another MTM appointment, please call the clinic directly or you may call the MTM scheduling line at [phone] or toll-free at [phone].     My Clinical Pharmacist's contact information:                                                      Please feel free to contact me with any questions or concerns you have.      Mary Aleman, PharmD, AAHIVP  Medication Therapy Management Pharmacist

## 2024-05-30 ENCOUNTER — OFFICE VISIT (OUTPATIENT)
Dept: ORTHOPEDICS | Facility: CLINIC | Age: 49
End: 2024-05-30
Payer: MEDICARE

## 2024-05-30 ENCOUNTER — ANCILLARY PROCEDURE (OUTPATIENT)
Dept: GENERAL RADIOLOGY | Facility: CLINIC | Age: 49
End: 2024-05-30
Attending: ORTHOPAEDIC SURGERY
Payer: MEDICARE

## 2024-05-30 DIAGNOSIS — M79.644 BILATERAL THUMB PAIN: ICD-10-CM

## 2024-05-30 DIAGNOSIS — M79.644 BILATERAL THUMB PAIN: Primary | ICD-10-CM

## 2024-05-30 DIAGNOSIS — M79.645 BILATERAL THUMB PAIN: ICD-10-CM

## 2024-05-30 DIAGNOSIS — M79.645 BILATERAL THUMB PAIN: Primary | ICD-10-CM

## 2024-05-30 PROCEDURE — 77002 NEEDLE LOCALIZATION BY XRAY: CPT | Performed by: ORTHOPAEDIC SURGERY

## 2024-05-30 PROCEDURE — 20605 DRAIN/INJ JOINT/BURSA W/O US: CPT | Mod: 50 | Performed by: ORTHOPAEDIC SURGERY

## 2024-05-30 PROCEDURE — 99207 PR NO BILLABLE SERVICE THIS VISIT: CPT | Performed by: ORTHOPAEDIC SURGERY

## 2024-05-30 RX ORDER — TRIAMCINOLONE ACETONIDE 40 MG/ML
40 INJECTION, SUSPENSION INTRA-ARTICULAR; INTRAMUSCULAR
Status: DISCONTINUED | OUTPATIENT
Start: 2024-05-30 | End: 2024-07-01

## 2024-05-30 RX ORDER — LIDOCAINE HYDROCHLORIDE 10 MG/ML
3 INJECTION, SOLUTION EPIDURAL; INFILTRATION; INTRACAUDAL; PERINEURAL
Status: DISCONTINUED | OUTPATIENT
Start: 2024-05-30 | End: 2024-07-01

## 2024-05-30 RX ADMIN — TRIAMCINOLONE ACETONIDE 40 MG: 40 INJECTION, SUSPENSION INTRA-ARTICULAR; INTRAMUSCULAR at 11:44

## 2024-05-30 RX ADMIN — LIDOCAINE HYDROCHLORIDE 3 ML: 10 INJECTION, SOLUTION EPIDURAL; INFILTRATION; INTRACAUDAL; PERINEURAL at 11:44

## 2024-05-30 NOTE — TELEPHONE ENCOUNTER
PRIOR AUTHORIZATION DENIED    Medication: VORICONAZOLE 200 MG PO TABS  Insurance Company: [employer] [place] - Phone [phone] Fax [phone]  Denial Date: 5/24/2024  Denial Reason(s):       Appeal Information:     Patient Notified: No

## 2024-05-30 NOTE — NURSING NOTE
Reason For Visit:   Chief Complaint   Patient presents with    RECHECK     Patient returns for recheck bilateral thumbs and to receive bilateral thumb joint injections.       Primary MD: Amol Juares  Ref. MD: salazar    Age: 49 year old    ?  No      There were no vitals taken for this visit.      Pain Assessment  Patient Currently in Pain: Yes  0-10 Pain Scale: 10  Primary Pain Location: Finger (Comment which one) (bilateral thumbs)    Hand Dominance Evaluation  Hand Dominance: Right          QuickDASH Assessment      5/14/2024     4:44 PM   QuickDASH Main   1. Open a tight or new jar Unable   2. Do heavy household chores (e.g., wash walls, floors) Unable   3. Carry a shopping bag or briefcase Unable   4. Wash your back Severe difficulty   5. Use a knife to cut food Severe difficulty   6. Recreational activities in which you take some force or impact through your arm, shoulder or hand (e.g., golf, hammering, tennis, etc.) Unable   7. During the past week, to what extent has your arm, shoulder or hand problem interfered with your normal social activities with family, friends, neighbours or groups Extremely   8. During the past week, were you limited in your work or other regular daily activities as a result of your arm, shoulder or hand problem Unable   9. Arm, shoulder or hand pain Extreme   10.Tingling (pins and needles) in your arm,shoulder or hand Severe   11. During the past week, how much difficulty have you had sleeping because of the pain in your arm, shoulder or hand Severe difficulty   Quickdash Ability Score 90.91          Current Outpatient Medications   Medication Sig Dispense Refill    albuterol (PROAIR HFA/PROVENTIL HFA/VENTOLIN HFA) 108 (90 Base) MCG/ACT inhaler Inhale 2 puffs into the lungs every 6 hours as needed for shortness of breath, wheezing or cough      Artificial Tear Solution (SOOTHE XP) SOLN as needed      atenolol (TENORMIN) 50 MG tablet Take 50 mg by mouth daily       benzoyl peroxide 5 % external liquid Use daily as directed. Preferred bdrand: Medpura 236 mL 11    blood glucose (NO BRAND SPECIFIED) test strip Use to test blood sugar 2 times daily or as directed. 100 strip 1    blood glucose monitoring (NO BRAND SPECIFIED) meter device kit Use to test blood sugar 2 times daily or as directed. 1 kit 0    ciclopirox (LOPROX) 0.77 % cream Apply topically 2 times daily To affected toenails. 90 g 5    diclofenac (VOLTAREN) 1 % topical gel Apply topically 4 times daily as needed      dupilumab (DUPIXENT) 300 MG/2ML prefilled pen Inject 2 mLs (300 mg) Subcutaneous every 14 days 4 mL 5    fexofenadine (ALLEGRA) 180 MG tablet Take 1 tablet (180 mg) by mouth daily 90 tablet 2    fidaxomicin (DIFICID) 200 MG tablet Take 200 mg by mouth as needed      Fluticasone-Umeclidin-Vilanterol (TRELEGY ELLIPTA) 200-62.5-25 MCG/ACT oral inhaler Inhale 1 puff into the lungs daily 90 each 1    insulin glargine (LANTUS PEN) 100 UNIT/ML pen Inject 20 Units Subcutaneous daily      insulin lispro (HUMALOG KWIKPEN) 100 UNIT/ML (1 unit dial) KWIKPEN Inject 5 Units Subcutaneous 3 times daily (before meals) Plus 1:25 correction scale if BG >150      Lancets (ONETOUCH DELICA PLUS IECYUE86C) MISC CHECK BLOOD GLUCOSE 3 TIMES DAILY, ADJUSTING MEDICATION, DX CODE E 11.9, ON INSULIN      lidocaine (LIDODERM) 5 % patch as needed   1    losartan (COZAAR) 100 MG tablet Take 100 mg by mouth daily      methylPREDNISolone (MEDROL DOSEPAK) 4 MG tablet therapy pack Take per package instructions. Take once a day by mouth 21 tablet 1    mometasone (NASONEX) 50 MCG/ACT nasal spray Spray 2 sprays into both nostrils daily 17 g 11    Montelukast Sodium (SINGULAIR PO) Take 10 mg by mouth At Bedtime       multivitamin w/minerals (THERA-VIT-M) tablet Take 1 tablet by mouth daily      norethindrone (MICRONOR) 0.35 MG tablet Take 0.35 mg by mouth      nystatin (MYCOSTATIN) 573075 UNIT/ML suspension       olopatadine (PATADAY) 0.7 %  ophthalmic solution Apply 1 drop to eye daily PRN      OMEPRAZOLE PO Take 40 mg by mouth 2 times daily       oxyCODONE IR (ROXICODONE) 10 MG tablet Take 10 mg by mouth      predniSONE (DELTASONE) 5 MG tablet Take 1 tablet (5 mg) by mouth daily 30 tablet 1    rifaximin (XIFAXAN) 550 MG TABS tablet Take 200 mg by mouth 3 times daily Take for 10 days, has on had as needed for small bowel bacteria overgrowh      rosuvastatin (CRESTOR) 5 MG tablet Take 2 tablets by mouth daily      spironolactone-HCTZ (ALDACTAZIDE) 25-25 MG tablet Take 1 tablet by mouth daily      tocilizumab (ACTEMRA) 400 MG/20ML Inject 800 mg into the vein every 28 days      voriconazole (VFEND) 200 MG tablet On day 1 take 2 tablets PO BID , then take 1 tab PO BID on Days 2-14. 30 tablet 0       Allergies   Allergen Reactions    Polyethylene Glycol Rash    Codeine      Other reaction(s): Gastrointestinal, GI intolerance, Vomiting  Vomiting      Hydrocodone Nausea and Vomiting    Morphine Nausea and Vomiting    Sulfasalazine      Other reaction(s): GI intolerance  Has tried and had nausa.    Tramadol Nausea and Vomiting     Other reaction(s): Gastrointestinal, Other (see comments)  Nausea and vomiting   unknown      Gluten Meal Other (See Comments) and Rash     Other reaction(s): Gastrointestinal, GI intolerance  Dizziness, tired, rash, stomach cramps, thrush, mouth sores  Dizziness, tired, rash, stomach cramps, thrush, mouth sores      Hydroxyzine GI Disturbance     nausea, dry mouth    Propylene Glycol Dizziness, Nausea and Vomiting, Nausea and Rash       Ziggy Villatoro, ATC

## 2024-05-30 NOTE — LETTER
2024         RE: Laxmi Ya  [address] [facility] [address]        Dear Colleague,    Thank you for referring your patient, Laxmi Ya, to the [facility]. Please see a copy of my visit note below.    Small Joint Injection/Arthrocentesis    Date/Time: 2024 11:44 AM    Performed by: Twila Petit MD  Authorized by: Twila Petit MD  Indications:  Pain  Needle Size:  25 G  Guidance: fluoroscopy       Location:  Thumb   Location comment:  Bilateral thumb STT                      Medications:  40 mg triamcinolone 40 MG/ML; 3 mL lidocaine (PF) 1 %        Outcome:  Tolerated well, no immediate complications  Procedure discussed: discussed risks, benefits, and alternatives    Consent Given by:  Patient  Timeout: timeout called immediately prior to procedure    Prep: patient was prepped and draped in usual sterile fashion          [facility] [address] [address] [phone]  Dept: [phone]  ______________________________________________________________________________    Patient: Laxmi Ya   : 1975   MRN: [id number]   May 30, 2024    INVASIVE PROCEDURE SAFETY CHECKLIST    Date: 2024   Procedure:Bilateral STT joint steroid injections  Patient Name: Laxmi Ya  MRN: [id number]  YOB: 1975    Action: Complete sections as appropriate. Any discrepancy results in a HARD COPY until resolved.     PRE PROCEDURE:  Patient ID verified with 2 identifiers (name and  or MRN): Yes  Procedure and site verified with patient/designee (when able): Yes  Accurate consent documentation in medical record: Yes  H&P (or appropriate assessment) documented in medical record: Yes  H&P must be up to 20 days prior to procedure and updates within 24 hours of procedure as applicable: Yes  Relevant diagnostic and radiology test results appropriately  labeled and displayed as applicable: NA  Procedure site(s) marked with provider initials: NA    TIMEOUT:  Time-Out performed immediately prior to starting procedure, including verbal and active participation of all team members addressing the following:Yes  * Correct patient identify  * Confirmed that the correct side and site are marked  * An accurate procedure consent form  * Agreement on the procedure to be done  * Correct patient position  * Relevant images and results are properly labeled and appropriately displayed  * The need to administer antibiotics or fluids for irrigation purposes during the procedure as applicable   * Safety precautions based on patient history or medication use    DURING PROCEDURE: Verification of correct person, site, and procedures any time the responsibility for care of the patient is transferred to another member of the care team.       The following medications were given:         Prior to injection, verified patient identity using patient's name and date of birth.  Due to injection administration, patient instructed to remain in clinic for 15 minutes  afterwards, and to report any adverse reaction to me immediately.    Joint injection was performed.    Medication Name: Kenalog 40 mg/mL 20mg used per joint for 40 mg total NDC [address]-9  Drug Amount Wasted:  None.  Vial/Syringe: Single dose vial  Expiration Date:  12/1/25    Medication Name Lidocaine 1% NDC 35421-698-81    Scribed by DUY RESENDEZ, TIM for Dr. Petit on May 30, 2024 at 11:47am based on the provider's statements to me.     DUY REESNDEZ, TIM      Progress Notes  Twila Petit MD (Physician)  Orthopaedic Surgery  Office Visit    March 14, 2024  [facility]     Twila Petit MD  Orthopaedic Surgery Bilateral STT arthritis  Dx RECHECK   Reason for Visit      Progress Notes  Twila Petit MD (Physician)  Orthopaedic Surgery  Diagnosis: bilateral  thumb CMC/STT arthritis, bilateral carpal tunnel syndrome     Treatment:   3/14/2024 Bilateral STT injections (Dr. Petit)  February 29, 2024 bilateral carpal tunnel steroid injection 20 mg  12/27/23 bilateral carpal tunnel steroid injection 20 mg (Dr Hyacinth Pickering)  12/21/23 bilateral STT injections  11/1/2023: bilateral carpal tunnel steroid injection (Dr. Eligio Pickering)  10/18/2023: Bilateral thumb CMC steroid injection (Dr. Eligio Pickering)  9/20/2023: bilateral STT steroid injection (Dr. Eligio Pickering)  5/4/2023: bilateral carpal tunnel steroid injection (Dr. Petit)  2/16/2023: bilateral STT steroid injection (Dr. Petit)  12/13/2022: bilateral carpal tunnel steroid injection (Dr. Sullivan)  10/27/2022: bilateral STT steroid injections (Dr. Petit)  9/13/2022: bilateral carpal tunnel steroid injection (Dr. Sullivan)  6/30/2022: bilateral STT steroid injections (Dr. Petit)  Bilateral carpal tunnel steroid injections (outside)     Discussion was held with the patient regarding her multiple injections and her present symptoms.  She feels that her symptoms are worse for bilateral thumbs and requests a repeat injection.   She continues on her medications including additional prednisone.         PROCEDURE:  After written consent, verifying site and side, a sterile Chlora- prep was performed for the injection site.  C-arm guidance was used for placement of a 25-gauge needle into the right thumb STT joint with 1% Lidocaine.  One half mL of Kenalog (20 mg) and 1 mL of lidocaine was injected under fluoroscopic guidance with good relief of pain. Patient tolerated the procedure well.  Identical procedure was carried out on the left thumb STT joint.  No complications.  Follow up instructions were given.             Twila Petit MD   Hand and Upper Extremity Specialist  [facility]

## 2024-05-30 NOTE — PROGRESS NOTES
Progress Notes  Twila Petit MD (Physician)  Orthopaedic Surgery  Office Visit    March 14, 2024  [facility] [place]     Twila Petit MD  Orthopaedic Surgery Bilateral STT arthritis  Dx RECHECK   Reason for Visit      Progress Notes  Twila Petit MD (Physician)  Orthopaedic Surgery  Diagnosis: bilateral thumb CMC/STT arthritis, bilateral carpal tunnel syndrome     Treatment:   3/14/2024 Bilateral STT injections (Dr. Petit)  February 29, 2024 bilateral carpal tunnel steroid injection 20 mg  12/27/23 bilateral carpal tunnel steroid injection 20 mg (Dr Hyacinth Pickering)  12/21/23 bilateral STT injections  11/1/2023: bilateral carpal tunnel steroid injection (Dr. Eligio Pickering)  10/18/2023: Bilateral thumb CMC steroid injection (Dr. Eligio Pickering)  9/20/2023: bilateral STT steroid injection (Dr. Eligio Pickering)  5/4/2023: bilateral carpal tunnel steroid injection (Dr. Petit)  2/16/2023: bilateral STT steroid injection (Dr. Petit)  12/13/2022: bilateral carpal tunnel steroid injection (Dr. Sullivan)  10/27/2022: bilateral STT steroid injections (Dr. Petit)  9/13/2022: bilateral carpal tunnel steroid injection (Dr. Sullivan)  6/30/2022: bilateral STT steroid injections (Dr. Petit)  Bilateral carpal tunnel steroid injections (outside)     Discussion was held with the patient regarding her multiple injections and her present symptoms.  She feels that her symptoms are worse for bilateral thumbs and requests a repeat injection.   She continues on her medications including additional prednisone.         PROCEDURE:  After written consent, verifying site and side, a sterile Chlora- prep was performed for the injection site.  C-arm guidance was used for placement of a 25-gauge needle into the right thumb STT joint with 1% Lidocaine.  One half mL of Kenalog (20 mg) and 1 mL of lidocaine was injected under fluoroscopic guidance with good relief of  pain. Patient tolerated the procedure well.  Identical procedure was carried out on the left thumb STT joint.  No complications.  Follow up instructions were given.             Twila Petit MD   Hand and Upper Extremity Specialist  [facility]

## 2024-05-30 NOTE — PROGRESS NOTES
Small Joint Injection/Arthrocentesis    Date/Time: 2024 11:44 AM    Performed by: Twila Petit MD  Authorized by: Twila Petit MD  Indications:  Pain  Needle Size:  25 G  Guidance: fluoroscopy       Location:  Thumb   Location comment:  Bilateral thumb STT                      Medications:  40 mg triamcinolone 40 MG/ML; 3 mL lidocaine (PF) 1 %        Outcome:  Tolerated well, no immediate complications  Procedure discussed: discussed risks, benefits, and alternatives    Consent Given by:  Patient  Timeout: timeout called immediately prior to procedure    Prep: patient was prepped and draped in usual sterile fashion          [facility] [address]  4TH FLOOR  [facility] [id number] [phone]  Dept: [phone]  ______________________________________________________________________________    Patient: Laxmi Ya   : 1975   MRN: [id number]   May 30, 2024    INVASIVE PROCEDURE SAFETY CHECKLIST    Date: 2024   Procedure:Bilateral STT joint steroid injections  Patient Name: Laxmi Ya  MRN: [id number]  YOB: 1975    Action: Complete sections as appropriate. Any discrepancy results in a HARD COPY until resolved.     PRE PROCEDURE:  Patient ID verified with 2 identifiers (name and  or MRN): Yes  Procedure and site verified with patient/designee (when able): Yes  Accurate consent documentation in medical record: Yes  H&P (or appropriate assessment) documented in medical record: Yes  H&P must be up to 20 days prior to procedure and updates within 24 hours of procedure as applicable: Yes  Relevant diagnostic and radiology test results appropriately labeled and displayed as applicable: NA  Procedure site(s) marked with provider initials: NA    TIMEOUT:  Time-Out performed immediately prior to starting procedure, including verbal and active participation of all team members addressing the following:Yes  *  Correct patient identify  * Confirmed that the correct side and site are marked  * An accurate procedure consent form  * Agreement on the procedure to be done  * Correct patient position  * Relevant images and results are properly labeled and appropriately displayed  * The need to administer antibiotics or fluids for irrigation purposes during the procedure as applicable   * Safety precautions based on patient history or medication use    DURING PROCEDURE: Verification of correct person, site, and procedures any time the responsibility for care of the patient is transferred to another member of the care team.       The following medications were given:         Prior to injection, verified patient identity using patient's name and date of birth.  Due to injection administration, patient instructed to remain in clinic for 15 minutes  afterwards, and to report any adverse reaction to me immediately.    Joint injection was performed.    Medication Name: Kenalog 40 mg/mL 20mg used per joint for 40 mg total NDC [address]-0  Drug Amount Wasted:  None.  Vial/Syringe: Single dose vial  Expiration Date:  12/1/25    Medication Name Lidocaine 1% NDC 69441-090-53    Scribed by DUY RESENDEZ, ATC for Dr. Petit on May 30, 2024 at 11:47am based on the provider's statements to me.     DUY RESENDEZ, ATC

## 2024-05-30 NOTE — TELEPHONE ENCOUNTER
Medication Appeal Request    Please initiate an appeal for the requested medication: VORICONAZOLE 200 MG PO TABS    Has a letter of medical necessity been completed in EPIC?   yes    Any additional lab values/information to include?    Would you like to include any research articles?              If yes please include the hyperlink(s) below or fax to    [phone] for Specialty/Retail               [phone] for Infusion/Clinic Administered.                Include the patients name and MRN on the fax cover sheet.

## 2024-06-01 NOTE — TELEPHONE ENCOUNTER
Medication Appeal Initiation    Medication: VORICONAZOLE 200 MG PO TABS  Appeal Start Date:  6/1/2024  Insurance Company: [employer] [place] - Phone [phone] Fax [phone]   Insurance Phone:   Insurance Fax: [phone]  Comments:

## 2024-06-03 NOTE — TELEPHONE ENCOUNTER
Mary Anne from the pt's insurance company is calling to follow-up with the care team about the PA for this medication. Please call back. Thank you.

## 2024-06-04 NOTE — TELEPHONE ENCOUNTER
Called Mary Anne back and answered questions regard species of candida and resistance.       Dominic Rob RN  Infectious Disease on 6/4/2024 at 11:10 AM

## 2024-06-06 ENCOUNTER — OFFICE VISIT (OUTPATIENT)
Dept: ORTHOPEDICS | Facility: CLINIC | Age: 49
End: 2024-06-06
Payer: MEDICARE

## 2024-06-06 DIAGNOSIS — G56.03 CARPAL TUNNEL SYNDROME, BILATERAL: ICD-10-CM

## 2024-06-06 DIAGNOSIS — G56.23 CUBITAL TUNNEL SYNDROME, BILATERAL: Primary | ICD-10-CM

## 2024-06-06 PROCEDURE — 99207 PR DROP WITH A PROCEDURE: CPT | Performed by: ORTHOPAEDIC SURGERY

## 2024-06-06 PROCEDURE — 20526 THER INJECTION CARP TUNNEL: CPT | Mod: 50 | Performed by: ORTHOPAEDIC SURGERY

## 2024-06-06 RX ORDER — LIDOCAINE HYDROCHLORIDE 10 MG/ML
2 INJECTION, SOLUTION EPIDURAL; INFILTRATION; INTRACAUDAL; PERINEURAL
Status: DISCONTINUED | OUTPATIENT
Start: 2024-06-06 | End: 2024-09-30

## 2024-06-06 RX ORDER — TRIAMCINOLONE ACETONIDE 40 MG/ML
40 INJECTION, SUSPENSION INTRA-ARTICULAR; INTRAMUSCULAR
Status: DISCONTINUED | OUTPATIENT
Start: 2024-06-06 | End: 2024-09-30

## 2024-06-06 RX ADMIN — LIDOCAINE HYDROCHLORIDE 2 ML: 10 INJECTION, SOLUTION EPIDURAL; INFILTRATION; INTRACAUDAL; PERINEURAL at 11:32

## 2024-06-06 RX ADMIN — TRIAMCINOLONE ACETONIDE 40 MG: 40 INJECTION, SUSPENSION INTRA-ARTICULAR; INTRAMUSCULAR at 11:32

## 2024-06-06 NOTE — PROGRESS NOTES
Hand / Upper Extremity Injection/Arthrocentesis: bilateral carpal tunnel    Date/Time: 2024 11:32 AM    Performed by: Twila Petit MD  Authorized by: Twila Petit MD    Indications:  Pain  Needle Size:  25 G  Guidance: landmark    Condition: carpal tunnel    Laterality:  Bilateral    Site:  Bilateral carpal tunnel  Medications (Right):  40 mg triamcinolone 40 MG/ML; 2 mL lidocaine (PF) 1 %  Medications (Left):  40 mg triamcinolone 40 MG/ML; 2 mL lidocaine (PF) 1 %  Outcome:  Tolerated well, no immediate complications  Procedure discussed: discussed risks, benefits, and alternatives    Consent Given by:  Patient  Timeout: timeout called immediately prior to procedure    Prep: patient was prepped and draped in usual sterile fashion        [facility] [address] [address] [phone]  Dept: [phone]  ______________________________________________________________________________    Patient: Laxmi Ya   : 1975   MRN: [id number]   2024    INVASIVE PROCEDURE SAFETY CHECKLIST    Date: 2024   Procedure:Bilateral carpal tunnel steroid injections.  Patient Name: Laxmi Ya  MRN: [id number]  YOB: 1975    Action: Complete sections as appropriate. Any discrepancy results in a HARD COPY until resolved.     PRE PROCEDURE:  Patient ID verified with 2 identifiers (name and  or MRN): Yes  Procedure and site verified with patient/designee (when able): Yes  Accurate consent documentation in medical record: Yes  H&P (or appropriate assessment) documented in medical record: Yes  H&P must be up to 20 days prior to procedure and updates within 24 hours of procedure as applicable: Yes  Relevant diagnostic and radiology test results appropriately labeled and displayed as applicable: NA  Procedure site(s) marked with provider initials: NA    TIMEOUT:  Time-Out performed immediately  prior to starting procedure, including verbal and active participation of all team members addressing the following:Yes  * Correct patient identify  * Confirmed that the correct side and site are marked  * An accurate procedure consent form  * Agreement on the procedure to be done  * Correct patient position  * Relevant images and results are properly labeled and appropriately displayed  * The need to administer antibiotics or fluids for irrigation purposes during the procedure as applicable   * Safety precautions based on patient history or medication use    DURING PROCEDURE: Verification of correct person, site, and procedures any time the responsibility for care of the patient is transferred to another member of the care team.       The following medications were given:         Prior to injection, verified patient identity using patient's name and date of birth.  Due to injection administration, patient instructed to remain in clinic for 15 minutes  afterwards, and to report any adverse reaction to me immediately.    Tendon sheath injection was performed.   Medication Name: Kenalog 40mg/mL x2 NDC [address]-9  Drug Amount Wasted:  None.  Vial/Syringe: Single dose vial  Expiration Date:  1/1/26     Medication Name Lidocaine 1% NDC 072602-177-59    Scribed by DUY RESENDEZ, TIM for Dr. Petit on June 6, 2024 at 11:56am based on the provider's statements to me.     DUY RESENDEZ, ATC

## 2024-06-06 NOTE — NURSING NOTE
Reason For Visit:   Chief Complaint   Patient presents with    RECHECK     Patient returns to clinic for bilateral carpal tunnel injections.       Primary MD: Amol Juares  Ref. MD: salazar    Age: 49 year old    ?  No      There were no vitals taken for this visit.      Pain Assessment  Patient Currently in Pain: Yes  0-10 Pain Scale: 10  Primary Pain Location: Hand    Hand Dominance Evaluation  Hand Dominance: Right          QuickDASH Assessment      5/14/2024     4:44 PM   QuickDASH Main   1. Open a tight or new jar Unable   2. Do heavy household chores (e.g., wash walls, floors) Unable   3. Carry a shopping bag or briefcase Unable   4. Wash your back Severe difficulty   5. Use a knife to cut food Severe difficulty   6. Recreational activities in which you take some force or impact through your arm, shoulder or hand (e.g., golf, hammering, tennis, etc.) Unable   7. During the past week, to what extent has your arm, shoulder or hand problem interfered with your normal social activities with family, friends, neighbours or groups Extremely   8. During the past week, were you limited in your work or other regular daily activities as a result of your arm, shoulder or hand problem Unable   9. Arm, shoulder or hand pain Extreme   10.Tingling (pins and needles) in your arm,shoulder or hand Severe   11. During the past week, how much difficulty have you had sleeping because of the pain in your arm, shoulder or hand Severe difficulty   Quickdash Ability Score 90.91          Current Outpatient Medications   Medication Sig Dispense Refill    albuterol (PROAIR HFA/PROVENTIL HFA/VENTOLIN HFA) 108 (90 Base) MCG/ACT inhaler Inhale 2 puffs into the lungs every 6 hours as needed for shortness of breath, wheezing or cough      Artificial Tear Solution (SOOTHE XP) SOLN as needed      atenolol (TENORMIN) 50 MG tablet Take 50 mg by mouth daily      benzoyl peroxide 5 % external liquid Use daily as directed. Preferred  bdrand: Medpura 236 mL 11    blood glucose (NO BRAND SPECIFIED) test strip Use to test blood sugar 2 times daily or as directed. 100 strip 1    blood glucose monitoring (NO BRAND SPECIFIED) meter device kit Use to test blood sugar 2 times daily or as directed. 1 kit 0    ciclopirox (LOPROX) 0.77 % cream Apply topically 2 times daily To affected toenails. 90 g 5    diclofenac (VOLTAREN) 1 % topical gel Apply topically 4 times daily as needed      dupilumab (DUPIXENT) 300 MG/2ML prefilled pen Inject 2 mLs (300 mg) Subcutaneous every 14 days 4 mL 5    fexofenadine (ALLEGRA) 180 MG tablet Take 1 tablet (180 mg) by mouth daily 90 tablet 2    fidaxomicin (DIFICID) 200 MG tablet Take 200 mg by mouth as needed      Fluticasone-Umeclidin-Vilanterol (TRELEGY ELLIPTA) 200-62.5-25 MCG/ACT oral inhaler Inhale 1 puff into the lungs daily 90 each 1    insulin glargine (LANTUS PEN) 100 UNIT/ML pen Inject 20 Units Subcutaneous daily      insulin lispro (HUMALOG KWIKPEN) 100 UNIT/ML (1 unit dial) KWIKPEN Inject 5 Units Subcutaneous 3 times daily (before meals) Plus 1:25 correction scale if BG >150      Lancets (ONETOUCH DELICA PLUS JMCQWZ53G) MISC CHECK BLOOD GLUCOSE 3 TIMES DAILY, ADJUSTING MEDICATION, DX CODE E 11.9, ON INSULIN      lidocaine (LIDODERM) 5 % patch as needed   1    losartan (COZAAR) 100 MG tablet Take 100 mg by mouth daily      methylPREDNISolone (MEDROL DOSEPAK) 4 MG tablet therapy pack Take per package instructions. Take once a day by mouth 21 tablet 1    mometasone (NASONEX) 50 MCG/ACT nasal spray Spray 2 sprays into both nostrils daily 17 g 11    Montelukast Sodium (SINGULAIR PO) Take 10 mg by mouth At Bedtime       multivitamin w/minerals (THERA-VIT-M) tablet Take 1 tablet by mouth daily      norethindrone (MICRONOR) 0.35 MG tablet Take 0.35 mg by mouth      nystatin (MYCOSTATIN) 826433 UNIT/ML suspension       olopatadine (PATADAY) 0.7 % ophthalmic solution Apply 1 drop to eye daily PRN      OMEPRAZOLE PO Take  40 mg by mouth 2 times daily       oxyCODONE IR (ROXICODONE) 10 MG tablet Take 10 mg by mouth      predniSONE (DELTASONE) 5 MG tablet Take 1 tablet (5 mg) by mouth daily 30 tablet 1    rifaximin (XIFAXAN) 550 MG TABS tablet Take 200 mg by mouth 3 times daily Take for 10 days, has on had as needed for small bowel bacteria overgrowh      rosuvastatin (CRESTOR) 5 MG tablet Take 2 tablets by mouth daily      spironolactone-HCTZ (ALDACTAZIDE) 25-25 MG tablet Take 1 tablet by mouth daily      tocilizumab (ACTEMRA) 400 MG/20ML Inject 800 mg into the vein every 28 days      voriconazole (VFEND) 200 MG tablet On day 1 take 2 tablets PO BID , then take 1 tab PO BID on Days 2-14. 30 tablet 0       Allergies   Allergen Reactions    Polyethylene Glycol Rash    Codeine      Other reaction(s): Gastrointestinal, GI intolerance, Vomiting  Vomiting      Hydrocodone Nausea and Vomiting    Morphine Nausea and Vomiting    Sulfasalazine      Other reaction(s): GI intolerance  Has tried and had nausa.    Tramadol Nausea and Vomiting     Other reaction(s): Gastrointestinal, Other (see comments)  Nausea and vomiting   unknown      Gluten Meal Other (See Comments) and Rash     Other reaction(s): Gastrointestinal, GI intolerance  Dizziness, tired, rash, stomach cramps, thrush, mouth sores  Dizziness, tired, rash, stomach cramps, thrush, mouth sores      Hydroxyzine GI Disturbance     nausea, dry mouth    Propylene Glycol Dizziness, Nausea and Vomiting, Nausea and Rash       Ziggy Villatoro, ATC

## 2024-06-06 NOTE — LETTER
2024      Laxmi Ya  [address] [place] [id number]      Dear Colleague,    Thank you for referring your patient, Laxmi Ya, to the [facility]. Please see a copy of my visit note below.    Hand / Upper Extremity Injection/Arthrocentesis: bilateral carpal tunnel    Date/Time: 2024 11:32 AM    Performed by: Twila Petit MD  Authorized by: Twila Petit MD    Indications:  Pain  Needle Size:  25 G  Guidance: landmark    Condition: carpal tunnel    Laterality:  Bilateral    Site:  Bilateral carpal tunnel  Medications (Right):  40 mg triamcinolone 40 MG/ML; 2 mL lidocaine (PF) 1 %  Medications (Left):  40 mg triamcinolone 40 MG/ML; 2 mL lidocaine (PF) 1 %  Outcome:  Tolerated well, no immediate complications  Procedure discussed: discussed risks, benefits, and alternatives    Consent Given by:  Patient  Timeout: timeout called immediately prior to procedure    Prep: patient was prepped and draped in usual sterile fashion        [facility] [address] [id number] [phone]  Dept: [phone]  ______________________________________________________________________________    Patient: Laxmi Ya   : 1975   MRN: [id number]   2024    INVASIVE PROCEDURE SAFETY CHECKLIST    Date: 2024   Procedure:Bilateral carpal tunnel steroid injections.  Patient Name: Laxmi Ya  MRN: [id number]  YOB: 1975    Action: Complete sections as appropriate. Any discrepancy results in a HARD COPY until resolved.     PRE PROCEDURE:  Patient ID verified with 2 identifiers (name and  or MRN): Yes  Procedure and site verified with patient/designee (when able): Yes  Accurate consent documentation in medical record: Yes  H&P (or appropriate assessment) documented in medical record: Yes  H&P must be up to 20 days prior to procedure and updates within  24 hours of procedure as applicable: Yes  Relevant diagnostic and radiology test results appropriately labeled and displayed as applicable: NA  Procedure site(s) marked with provider initials: NA    TIMEOUT:  Time-Out performed immediately prior to starting procedure, including verbal and active participation of all team members addressing the following:Yes  * Correct patient identify  * Confirmed that the correct side and site are marked  * An accurate procedure consent form  * Agreement on the procedure to be done  * Correct patient position  * Relevant images and results are properly labeled and appropriately displayed  * The need to administer antibiotics or fluids for irrigation purposes during the procedure as applicable   * Safety precautions based on patient history or medication use    DURING PROCEDURE: Verification of correct person, site, and procedures any time the responsibility for care of the patient is transferred to another member of the care team.       The following medications were given:         Prior to injection, verified patient identity using patient's name and date of birth.  Due to injection administration, patient instructed to remain in clinic for 15 minutes  afterwards, and to report any adverse reaction to me immediately.    Tendon sheath injection was performed.   Medication Name: Kenalog 40mg/mL x2 NDC [address]-0  Drug Amount Wasted:  None.  Vial/Syringe: Single dose vial  Expiration Date:  1/1/26     Medication Name Lidocaine 1% NDC 517585-706-77    Scribed by DUY RESENDEZ, ATC for Dr. Petit on June 6, 2024 at 11:56am based on the provider's statements to me.     DUY RESENDEZ, ATC      This 49-year-old right-hand-dominant female presents requesting bilateral carpal tunnel injections with a full dose.  Previously she had a partial dose and it gave her incomplete relief.  She has had previous other multiple injections and is aware of the risk benefits alternatives.  She continues  to have carpal tunnel symptoms with tingling numbness and sometimes nighttime wakening.  She does have carpal tunnel splints and will continue to wear these at night to assist with the carpal tunnel symptoms in addition to the injection.    Treatment:   5/30/2024 Bilateral STT injections (Dr. Petit)  3/14/2024 Bilateral STT injections (Dr. Petit)  February 29, 2024 bilateral carpal tunnel steroid injection 20 mg  12/27/23 bilateral carpal tunnel steroid injection 20 mg (Dr Hyacinth Pickering)  12/21/23 bilateral STT injections  11/1/2023: bilateral carpal tunnel steroid injection (Dr. Eligio Pickering)  10/18/2023: Bilateral thumb CMC steroid injection (Dr. Eligio Pickering)  9/20/2023: bilateral STT steroid injection (Dr. Eligio Pickering)  5/4/2023: bilateral carpal tunnel steroid injection (Dr. Petit)  2/16/2023: bilateral STT steroid injection (Dr. Petit)  12/13/2022: bilateral carpal tunnel steroid injection (Dr. Sullivan)  10/27/2022: bilateral STT steroid injections (Dr. Petit)  9/13/2022: bilateral carpal tunnel steroid injection (Dr. Sullivan)  6/30/2022: bilateral STT steroid injections (Dr. Petit)  Bilateral carpal tunnel steroid injections (outside)       PROCEDURE:  After written consent, verifying site and side, a sterile Chlora- prep was performed for the injection site.  Palpation was used for placement of a 25-gauge needle into the right carpal tunnel with 1% Lidocaine with no dysesthesias and full range of motion of the flexor tendons.  1 mL of Kenalog (40 mg) and 1 mL of lidocaine was injected using dynamic technique with good relief of pain.  Identical procedure was carried on the on the contralateral side.  Patient tolerated the procedure well.  No complications.    Twila Petit MD   Hand and Upper Extremity Specialist  [facility]

## 2024-06-06 NOTE — PROGRESS NOTES
This 49-year-old right-hand-dominant female presents requesting bilateral carpal tunnel injections with a full dose.  Previously she had a partial dose and it gave her incomplete relief.  She has had previous other multiple injections and is aware of the risk benefits alternatives.  She continues to have carpal tunnel symptoms with tingling numbness and sometimes nighttime wakening.  She does have carpal tunnel splints and will continue to wear these at night to assist with the carpal tunnel symptoms in addition to the injection.    Treatment:   5/30/2024 Bilateral STT injections (Dr. Petit)  3/14/2024 Bilateral STT injections (Dr. Petit)  February 29, 2024 bilateral carpal tunnel steroid injection 20 mg  12/27/23 bilateral carpal tunnel steroid injection 20 mg (Dr Hyacinth Pickering)  12/21/23 bilateral STT injections  11/1/2023: bilateral carpal tunnel steroid injection (Dr. Eligio Pickering)  10/18/2023: Bilateral thumb CMC steroid injection (Dr. Eligio Pickering)  9/20/2023: bilateral STT steroid injection (Dr. Eligio Pickering)  5/4/2023: bilateral carpal tunnel steroid injection (Dr. Petit)  2/16/2023: bilateral STT steroid injection (Dr. Petit)  12/13/2022: bilateral carpal tunnel steroid injection (Dr. Sullivan)  10/27/2022: bilateral STT steroid injections (Dr. Petit)  9/13/2022: bilateral carpal tunnel steroid injection (Dr. Sullivan)  6/30/2022: bilateral STT steroid injections (Dr. Petit)  Bilateral carpal tunnel steroid injections (outside)       PROCEDURE:  After written consent, verifying site and side, a sterile Chlora- prep was performed for the injection site.  Palpation was used for placement of a 25-gauge needle into the right carpal tunnel with 1% Lidocaine with no dysesthesias and full range of motion of the flexor tendons.  1 mL of Kenalog (40 mg) and 1 mL of lidocaine was injected using dynamic technique with good relief of pain.  Identical procedure was carried on the on the  contralateral side.  Patient tolerated the procedure well.  No complications.    Twila Petit MD   Hand and Upper Extremity Specialist  [facility]

## 2024-06-14 ENCOUNTER — MEDICAL CORRESPONDENCE (OUTPATIENT)
Dept: HEALTH INFORMATION MANAGEMENT | Facility: CLINIC | Age: 49
End: 2024-06-14
Payer: MEDICARE

## 2024-06-16 ENCOUNTER — HEALTH MAINTENANCE LETTER (OUTPATIENT)
Age: 49
End: 2024-06-16

## 2024-06-18 ENCOUNTER — THERAPY VISIT (OUTPATIENT)
Dept: OCCUPATIONAL THERAPY | Facility: CLINIC | Age: 49
End: 2024-06-18
Payer: MEDICARE

## 2024-06-18 ENCOUNTER — INFUSION THERAPY VISIT (OUTPATIENT)
Dept: INFUSION THERAPY | Facility: CLINIC | Age: 49
End: 2024-06-18
Attending: INTERNAL MEDICINE
Payer: MEDICARE

## 2024-06-18 VITALS
DIASTOLIC BLOOD PRESSURE: 92 MMHG | TEMPERATURE: 97.3 F | SYSTOLIC BLOOD PRESSURE: 148 MMHG | HEART RATE: 60 BPM | RESPIRATION RATE: 16 BRPM | OXYGEN SATURATION: 95 %

## 2024-06-18 DIAGNOSIS — M79.641 BILATERAL HAND PAIN: Primary | ICD-10-CM

## 2024-06-18 DIAGNOSIS — M13.80 SERONEGATIVE INFLAMMATORY ARTHRITIS: Primary | ICD-10-CM

## 2024-06-18 DIAGNOSIS — K75.81 NASH (NONALCOHOLIC STEATOHEPATITIS): ICD-10-CM

## 2024-06-18 DIAGNOSIS — M79.642 BILATERAL HAND PAIN: Primary | ICD-10-CM

## 2024-06-18 LAB — INR PPP: 1.03 (ref 0.85–1.15)

## 2024-06-18 PROCEDURE — 85610 PROTHROMBIN TIME: CPT

## 2024-06-18 PROCEDURE — 97110 THERAPEUTIC EXERCISES: CPT | Mod: GO | Performed by: OCCUPATIONAL THERAPIST

## 2024-06-18 PROCEDURE — 36415 COLL VENOUS BLD VENIPUNCTURE: CPT

## 2024-06-18 PROCEDURE — 96375 TX/PRO/DX INJ NEW DRUG ADDON: CPT

## 2024-06-18 PROCEDURE — 97140 MANUAL THERAPY 1/> REGIONS: CPT | Mod: GO | Performed by: OCCUPATIONAL THERAPIST

## 2024-06-18 PROCEDURE — 250N000011 HC RX IP 250 OP 636: Mod: JW | Performed by: INTERNAL MEDICINE

## 2024-06-18 PROCEDURE — 250N000013 HC RX MED GY IP 250 OP 250 PS 637: Performed by: INTERNAL MEDICINE

## 2024-06-18 PROCEDURE — 97530 THERAPEUTIC ACTIVITIES: CPT | Mod: GO | Performed by: OCCUPATIONAL THERAPIST

## 2024-06-18 PROCEDURE — 258N000003 HC RX IP 258 OP 636: Mod: JZ | Performed by: INTERNAL MEDICINE

## 2024-06-18 PROCEDURE — 96365 THER/PROPH/DIAG IV INF INIT: CPT

## 2024-06-18 RX ORDER — HEPARIN SODIUM (PORCINE) LOCK FLUSH IV SOLN 100 UNIT/ML 100 UNIT/ML
5 SOLUTION INTRAVENOUS
Status: CANCELLED | OUTPATIENT
Start: 2024-06-20

## 2024-06-18 RX ORDER — MEPERIDINE HYDROCHLORIDE 25 MG/ML
25 INJECTION INTRAMUSCULAR; INTRAVENOUS; SUBCUTANEOUS EVERY 30 MIN PRN
Status: CANCELLED | OUTPATIENT
Start: 2024-06-20

## 2024-06-18 RX ORDER — ALBUTEROL SULFATE 90 UG/1
1-2 AEROSOL, METERED RESPIRATORY (INHALATION)
Status: CANCELLED
Start: 2024-06-20

## 2024-06-18 RX ORDER — ALBUTEROL SULFATE 0.83 MG/ML
2.5 SOLUTION RESPIRATORY (INHALATION)
Status: CANCELLED | OUTPATIENT
Start: 2024-06-20

## 2024-06-18 RX ORDER — ACETAMINOPHEN 325 MG/1
650 TABLET ORAL ONCE
Status: CANCELLED | OUTPATIENT
Start: 2024-06-20

## 2024-06-18 RX ORDER — ACETAMINOPHEN 325 MG/1
650 TABLET ORAL ONCE
Status: COMPLETED | OUTPATIENT
Start: 2024-06-18 | End: 2024-06-18

## 2024-06-18 RX ORDER — METHYLPREDNISOLONE SODIUM SUCCINATE 125 MG/2ML
40 INJECTION, POWDER, LYOPHILIZED, FOR SOLUTION INTRAMUSCULAR; INTRAVENOUS ONCE
Status: COMPLETED | OUTPATIENT
Start: 2024-06-18 | End: 2024-06-18

## 2024-06-18 RX ORDER — HEPARIN SODIUM,PORCINE 10 UNIT/ML
5-20 VIAL (ML) INTRAVENOUS DAILY PRN
Status: CANCELLED | OUTPATIENT
Start: 2024-06-20

## 2024-06-18 RX ORDER — DIPHENHYDRAMINE HYDROCHLORIDE 50 MG/ML
50 INJECTION INTRAMUSCULAR; INTRAVENOUS
Status: CANCELLED
Start: 2024-06-20

## 2024-06-18 RX ORDER — METHYLPREDNISOLONE SODIUM SUCCINATE 125 MG/2ML
40 INJECTION, POWDER, LYOPHILIZED, FOR SOLUTION INTRAMUSCULAR; INTRAVENOUS ONCE
Status: CANCELLED | OUTPATIENT
Start: 2024-06-20

## 2024-06-18 RX ORDER — METHYLPREDNISOLONE SODIUM SUCCINATE 125 MG/2ML
125 INJECTION, POWDER, LYOPHILIZED, FOR SOLUTION INTRAMUSCULAR; INTRAVENOUS
Status: CANCELLED
Start: 2024-06-20

## 2024-06-18 RX ORDER — EPINEPHRINE 1 MG/ML
0.3 INJECTION, SOLUTION INTRAMUSCULAR; SUBCUTANEOUS EVERY 5 MIN PRN
Status: CANCELLED | OUTPATIENT
Start: 2024-06-20

## 2024-06-18 RX ADMIN — METHYLPREDNISOLONE SODIUM SUCCINATE 37.5 MG: 125 INJECTION, POWDER, FOR SOLUTION INTRAMUSCULAR; INTRAVENOUS at 12:52

## 2024-06-18 RX ADMIN — ACETAMINOPHEN 650 MG: 325 TABLET ORAL at 12:51

## 2024-06-18 RX ADMIN — TOCILIZUMAB 800 MG: 20 INJECTION, SOLUTION, CONCENTRATE INTRAVENOUS at 13:06

## 2024-06-18 RX ADMIN — SODIUM CHLORIDE 250 ML: 9 INJECTION, SOLUTION INTRAVENOUS at 14:12

## 2024-06-18 NOTE — PROGRESS NOTES
Infusion Nursing Note:  Laxmi Ya presents today for actemra.    Patient seen by provider today: No   present during visit today: Not Applicable.    Note: Patient self administers home insulin 30 minutes prior to solu-medrol pre-med.  250ml NS bolus given post actemra per orders     Intravenous Access:  [facility]ipheral IV [facility] by [facility]  INR drawn for [facility]     Treatment Conditions:  Biological Infusion Checklist:  ~~~ NOTE: If the patient answers yes to any of the questions below, hold the infusion and contact ordering provider or on-call provider.    Have you recently had an elevated temperature, fever, chills, productive cough, coughing for 3 weeks or longer or hemoptysis,  abnormal vital signs, night sweats,  chest pain or have you noticed a decrease in your appetite, unexplained weight loss or fatigue? No  Do you have any open wounds or new incisions? No  Do you have any upcoming hospitalizations or surgeries? Does not include esophagogastroduodenoscopy, colonoscopy, endoscopic retrograde cholangiopancreatography (ERCP), endoscopic ultrasound (EUS), dental procedures or joint aspiration/steroid injections No  Do you currently have any signs of illness or infection or are you on any antibiotics? No  Have you had any new, sudden or worsening abdominal pain? No  Have you or anyone in your household received a live vaccination in the past 4 weeks? Please note: No live vaccines while on biologic/chemotherapy until 6 months after the last treatment. Patient can receive the flu vaccine (shot only), pneumovax and the Covid vaccine. It is optimal for the patient to get these vaccines mid cycle, but they can be given at any time as long as it is not on the day of the infusion. No  Have you recently been diagnosed with any new nervous system diseases (ie. Multiple sclerosis, Guillain [place], seizures, neurological changes) or cancer diagnosis? Are you on any form of radiation or chemotherapy? No  Are you pregnant  or breast feeding or do you have plans of pregnancy in the future? No  Have you been having any signs of worsening depression or suicidal ideations?  (benlysta only) N/A  Have there been any other new onset medical symptoms? No  Have you had any new blood clots? (IVIG only) N/A    Post Infusion Assessment:  Patient tolerated infusion without incident.  Blood return noted pre and post infusion.  Site patent and intact, free from redness, edema or discomfort.  No evidence of extravasations.  Access discontinued per protocol.     POST-INFUSION OF BIOLOGICAL MEDICATION:     Reviewed with patient.  Given biologic medication or medication hand-out. Inform patient if any fever, chills or signs of infection, new symptoms, abdominal pain, heart palpitations, shortness of breath, reaction, weakness, neurological changes, seek medical attention immediately and should not receive infusions. No live virus vaccines prior to or during treatment or up to 6 months post infusion. If the patient has an upcoming procedure or surgery, this should be discussed with the rheumatologist and surgeon or provider.    Discharge Plan:   Discharge instructions reviewed with: Patient.  Patient and/or family verbalized understanding of discharge instructions and all questions answered.  AVS to patient via [employer].  Patient will return   Future Appointments   Date Time Provider Department Center   3/26/2024  1:00 PM [facility]      for next appointment.   Patient discharged in stable condition accompanied by: self.  Departure Mode: Ambulatory.    Administrations This Visit       acetaminophen (TYLENOL) tablet 650 mg       Admin Date  06/18/2024 Action  $Given Dose  650 mg Route  Oral Documented By  Rashmi Domingo, RN              methylPREDNISolone sodium succinate (solu-MEDROL) injection 37.5 mg       Admin Date  06/18/2024 Action  $Given Dose  37.5 mg Route  Intravenous Documented By  Rashmi Domingo, RN               sodium chloride 0.9% BOLUS 250 mL       Admin Date  06/18/2024 Action  $New Bag Dose  250 mL Route  Intravenous Documented By  Kaley Palacio RN              tocilizumab (ACTEMRA) 800 mg in sodium chloride 0.9 % 150 mL infusion       Admin Date  06/18/2024 Action  $New Bag Dose  800 mg Rate  150 mL/hr Route  Intravenous Documented By  Rashmi Domingo RN                  BP (!) 148/92 (BP Location: Right arm, Patient Position: Semi-Stephens's, Cuff Size: Adult Regular)   Pulse 60   Temp 97.3  F (36.3  C) (Oral)   Resp 16   SpO2 95%     Rashmi Domingo RN on 2/27/2024 at 11:54 AM

## 2024-06-19 ENCOUNTER — VIRTUAL VISIT (OUTPATIENT)
Dept: RHEUMATOLOGY | Facility: CLINIC | Age: 49
End: 2024-06-19
Attending: INTERNAL MEDICINE
Payer: MEDICARE

## 2024-06-19 DIAGNOSIS — M06.09 SERONEGATIVE RHEUMATOID ARTHRITIS OF MULTIPLE SITES (H): Primary | ICD-10-CM

## 2024-06-19 DIAGNOSIS — F41.9 ANXIETY: ICD-10-CM

## 2024-06-19 DIAGNOSIS — E27.40 ADRENAL INSUFFICIENCY (H): ICD-10-CM

## 2024-06-19 NOTE — Clinical Note
6/19/2024       RE: Laxmi Ya  [address] [place] [id number]     Dear Colleague,    Thank you for referring your patient, Laxmi Ya, to the [facility] [place] [facility]. Please see a copy of my visit note below.    Medication Therapy Management (MTM) Encounter    ASSESSMENT:                            Medication Adherence/Access: No issues identified    Rheumatoid Arthritis: Patient tolerating and finding good efficacy to Actemra. Would benefit from continuing monthly infusions.    Adrenal Insufficiency: Stable. Reviewed Mounjaro may help with both blood sugars and weight loss. Encouraged patient to pursue prior authorization through PCP.     Anxiety: Discussed interaction between oxycodone and lorazepam including risks of breathing difficulties. Advised to avoid taking these two medications together. Reviewed buspirone and quetiapine can be helpful for anxiety however quetiapine also has sedation side effects. Advised patient to discuss options with pain management and start process to switch to a more safe anxiety medication.    PLAN:                            1. Continue Actemra infusions monthly.    2. Discuss starting buspirone for anxiety with pain management specialist.    Follow-up: Return in about 6 months (around 12/19/2024) for MTM Pharmacist Visit.    SUBJECTIVE/OBJECTIVE:                          Laxmi Ya is a 49 year old female seen for a follow-up visit.       Reason for visit: Questions about potential drug interaction, Actemra follow up    Allergies/ADRs: Reviewed in chart  Past Medical History: Reviewed in chart  Tobacco: She reports that she has never smoked. She has never used smokeless tobacco.  Alcohol: Less than 1 beverages / week    Medication Adherence/Access: no issues reported    Rheumatoid Arthritis:   Actemra 800 mg infusion monthly (last infusion 6/18/24)   Diclofenac 1% gel as needed  (uses once per day a few times per week)  Oxycodone 10 mg 2-4 times daily as needed     Reports she is overall doing well - still having some pain in her back, hands, neck, and knees. Tolerating Actemra well - no side effects noted. Does get high blood sugars from solu-medrol but is on lowest dose possible (40 mg).     Liver Function Studies -   Recent Labs   Lab Test 05/21/24  1246   PROTTOTAL 6.2*   ALBUMIN 4.2   BILITOTAL 0.6   ALKPHOS 50   AST 53*   ALT 72*     CBC RESULTS:   Recent Labs   Lab Test 05/21/24  1246   WBC 8.5   RBC 4.92   HGB 14.8   HCT 43.4   MCV 88   MCH 30.1   MCHC 34.1   RDW 12.4        Adrenal Insufficiency:  Prednisone 5 mg daily     Recently started this medication - feels like it is starting to work. Has noticed some additional weight gain with this medication but blood sugars seem to tolerate it without going very high. Planning to try getting authorization for Mounjaro to help with the weight gain.     Anxiety:  Lorazepam 0.25 mg three times daily as needed      Reports she has been working with a pain clinic who recommended against using lorazepam as needed due to the interaction with oxycodone. Notes they suggested trying buspirone or quetiapine. Would like to know more about this interaction and if either of the suggested medications would be appropriate to take.    Today's Vitals: There were no vitals taken for this visit.  ----------------    I spent 22 minutes with this patient today. All changes were made via collaborative practice agreement with Oswaldo Lemus MD. A copy of the visit note was provided to the patient's provider(s).    A summary of these recommendations was sent via [employer].    Yuliya Kaplan, Frances  Medication Therapy Management Pharmacist  [facility]  Phone: [phone]    Telemedicine Visit Details  Type of service:  Telephone visit  Start Time: 1:00 PM  End Time: 1:22 PM     Medication Therapy Recommendations  No medication  therapy recommendations to display         Again, thank you for allowing me to participate in the care of your patient.      Sincerely,    DOMINGUEZ BAPTISTE RPH

## 2024-07-01 RX ORDER — LORAZEPAM 0.5 MG/1
0.25 TABLET ORAL EVERY 8 HOURS PRN
COMMUNITY
Start: 2024-04-01

## 2024-07-02 NOTE — PATIENT INSTRUCTIONS
"Recommendations from today's MTM visit:                                                       1. Continue Actemra infusions monthly.    2. Discuss starting buspirone for anxiety with pain management specialist.    Follow-up: Return in about 6 months (around 12/19/2024) for MTM Pharmacist Visit.    It was great speaking with you today.  I value your experience and would be very thankful for your time in providing feedback in our clinic survey. In the next few days, you may receive an email or text message from [facility] with a link to a survey related to your  clinical pharmacist.\"     To schedule another MTM appointment, please call the clinic directly or you may call the MTM scheduling line at [phone].    My Clinical Pharmacist's contact information:                                                      Please feel free to contact me with any questions or concerns you have.      Yuliya Kaplan, PharmD  Medication Therapy Management Pharmacist  [facility]  Phone: [phone]     "

## 2024-07-02 NOTE — PROGRESS NOTES
Medication Therapy Management (MTM) Encounter    ASSESSMENT:                            Medication Adherence/Access: No issues identified    Rheumatoid Arthritis: Patient tolerating and finding good efficacy to Actemra. Would benefit from continuing monthly infusions.    Adrenal Insufficiency: Stable. Reviewed Mounjaro may help with both blood sugars and weight loss. Encouraged patient to pursue prior authorization through PCP.     Anxiety: Discussed interaction between oxycodone and lorazepam including risks of breathing difficulties. Advised to avoid taking these two medications together. Reviewed buspirone and quetiapine can be helpful for anxiety however quetiapine also has sedation side effects. Advised patient to discuss options with pain management and start process to switch to a more safe anxiety medication.    PLAN:                            1. Continue Actemra infusions monthly.    2. Discuss starting buspirone for anxiety with pain management specialist.    Follow-up: Return in about 6 months (around 12/19/2024) for MTM Pharmacist Visit.    SUBJECTIVE/OBJECTIVE:                          Laxmi Ya is a 49 year old female seen for a follow-up visit.       Reason for visit: Questions about potential drug interaction, Actemra follow up    Allergies/ADRs: Reviewed in chart  Past Medical History: Reviewed in chart  Tobacco: She reports that she has never smoked. She has never used smokeless tobacco.  Alcohol: Less than 1 beverages / week    Medication Adherence/Access: no issues reported    Rheumatoid Arthritis:   Actemra 800 mg infusion monthly (last infusion 6/18/24)   Diclofenac 1% gel as needed (uses once per day a few times per week)  Oxycodone 10 mg 2-4 times daily as needed     Reports she is overall doing well - still having some pain in her back, hands, neck, and knees. Tolerating Actemra well - no side effects noted. Does get high blood sugars from solu-medrol but is on lowest dose possible (40  mg).     Liver Function Studies -   Recent Labs   Lab Test 05/21/24  1246   PROTTOTAL 6.2*   ALBUMIN 4.2   BILITOTAL 0.6   ALKPHOS 50   AST 53*   ALT 72*     CBC RESULTS:   Recent Labs   Lab Test 05/21/24  1246   WBC 8.5   RBC 4.92   HGB 14.8   HCT 43.4   MCV 88   MCH 30.1   MCHC 34.1   RDW 12.4        Adrenal Insufficiency:  Prednisone 5 mg daily     Recently started this medication - feels like it is starting to work. Has noticed some additional weight gain with this medication but blood sugars seem to tolerate it without going very high. Planning to try getting authorization for Mounjaro to help with the weight gain.     Anxiety:  Lorazepam 0.25 mg three times daily as needed      Reports she has been working with a pain clinic who recommended against using lorazepam as needed due to the interaction with oxycodone. Notes they suggested trying buspirone or quetiapine. Would like to know more about this interaction and if either of the suggested medications would be appropriate to take.    Today's Vitals: There were no vitals taken for this visit.  ----------------    I spent 22 minutes with this patient today. All changes were made via collaborative practice agreement with Oswaldo Lemus MD. A copy of the visit note was provided to the patient's provider(s).    A summary of these recommendations was sent via [employer].    Yuliya Kaplan, PharmD  Medication Therapy Management Pharmacist  [facility]  Phone: [phone]    Telemedicine Visit Details  Type of service:  Telephone visit  Start Time: 1:00 PM  End Time: 1:22 PM     Medication Therapy Recommendations  No medication therapy recommendations to display

## 2024-07-03 NOTE — TELEPHONE ENCOUNTER
MEDICATION APPEAL APPROVED    Medication: VORICONAZOLE 200 MG PO TABS  Authorization Effective Date: 3/9/2024  Authorization Expiration Date: 12/27/2024  Approved Dose/Quantity:   Reference #:     Appeal Insurance Company: [employer] [place] - Phone [phone] Fax [phone]   Expected CoPay: $       CoPay Card Available:    Financial Assistance Needed:   Filling Pharmacy: [facility] - [place] [id number]  Comments:

## 2024-07-05 ENCOUNTER — MYC MEDICAL ADVICE (OUTPATIENT)
Dept: ENDOCRINOLOGY | Facility: CLINIC | Age: 49
End: 2024-07-05
Payer: MEDICARE

## 2024-07-05 DIAGNOSIS — E27.49 SECONDARY ADRENAL INSUFFICIENCY (H): ICD-10-CM

## 2024-07-05 RX ORDER — PREDNISONE 5 MG/1
5 TABLET ORAL DAILY
Qty: 30 TABLET | Refills: 1 | Status: SHIPPED | OUTPATIENT
Start: 2024-07-05 | End: 2024-08-12

## 2024-07-05 NOTE — TELEPHONE ENCOUNTER
"Last Written Prescription Date:  5/16/24  Last Fill Quantity: 30,  # refills: 1   Last office visit: 6/19/2023 ; last virtual visit: 5/16/2024 with prescribing provider:  Rad   Future Office Visit:  8/12/24    Per office notes, 5/16/24, \"Plan to start Prednisone 5 mg tablet daily, new Rx sent to her pharmacy.\"    Requested Prescriptions   Pending Prescriptions Disp Refills    predniSONE (DELTASONE) 5 MG tablet [Pharmacy Med Name: PREDNISONE 5 MG TABLET] 30 tablet 1     Sig: TAKE 1 TABLET BY MOUTH EVERY DAY       There is no refill protocol information for this order        Macy Sawyer RN            "

## 2024-07-06 NOTE — TELEPHONE ENCOUNTER
Message regarding Prednisone medication reviewed, updated Prednisone 5 mg Rx sent to pharmacy.  We will review the Prednisone taper plan when/if she schedules a follow-up endocrinology appointment.    MCKAY Leroy MD, MS  Endocrinology  [facility]

## 2024-07-16 ENCOUNTER — INFUSION THERAPY VISIT (OUTPATIENT)
Dept: INFUSION THERAPY | Facility: CLINIC | Age: 49
End: 2024-07-16
Attending: INTERNAL MEDICINE
Payer: MEDICARE

## 2024-07-16 VITALS
WEIGHT: 229 LBS | BODY MASS INDEX: 36.96 KG/M2 | OXYGEN SATURATION: 96 % | DIASTOLIC BLOOD PRESSURE: 72 MMHG | RESPIRATION RATE: 16 BRPM | TEMPERATURE: 98 F | HEART RATE: 74 BPM | SYSTOLIC BLOOD PRESSURE: 108 MMHG

## 2024-07-16 DIAGNOSIS — M13.80 SERONEGATIVE INFLAMMATORY ARTHRITIS: Primary | ICD-10-CM

## 2024-07-16 LAB
ALBUMIN SERPL BCG-MCNC: 4.4 G/DL (ref 3.5–5.2)
ALBUMIN UR-MCNC: NEGATIVE MG/DL
ALP SERPL-CCNC: 45 U/L (ref 40–150)
ALT SERPL W P-5'-P-CCNC: 57 U/L (ref 0–50)
ANION GAP SERPL CALCULATED.3IONS-SCNC: 13 MMOL/L (ref 7–15)
APPEARANCE UR: CLEAR
AST SERPL W P-5'-P-CCNC: 36 U/L (ref 0–45)
BACTERIA #/AREA URNS HPF: ABNORMAL /HPF
BASOPHILS # BLD AUTO: 0.1 10E3/UL (ref 0–0.2)
BASOPHILS NFR BLD AUTO: 1 %
BILIRUB SERPL-MCNC: 0.6 MG/DL
BILIRUB UR QL STRIP: NEGATIVE
BUN SERPL-MCNC: 20.4 MG/DL (ref 6–20)
CALCIUM SERPL-MCNC: 8.9 MG/DL (ref 8.8–10.4)
CHLORIDE SERPL-SCNC: 105 MMOL/L (ref 98–107)
COLOR UR AUTO: ABNORMAL
CREAT SERPL-MCNC: 1.07 MG/DL (ref 0.51–0.95)
EGFRCR SERPLBLD CKD-EPI 2021: 64 ML/MIN/1.73M2
EOSINOPHIL # BLD AUTO: 0.1 10E3/UL (ref 0–0.7)
EOSINOPHIL NFR BLD AUTO: 1 %
ERYTHROCYTE [DISTWIDTH] IN BLOOD BY AUTOMATED COUNT: 13 % (ref 10–15)
GLUCOSE SERPL-MCNC: 209 MG/DL (ref 70–99)
GLUCOSE UR STRIP-MCNC: 150 MG/DL
HCO3 SERPL-SCNC: 20 MMOL/L (ref 22–29)
HCT VFR BLD AUTO: 41.3 % (ref 35–47)
HGB BLD-MCNC: 14.5 G/DL (ref 11.7–15.7)
HGB UR QL STRIP: NEGATIVE
IMM GRANULOCYTES # BLD: 0.1 10E3/UL
IMM GRANULOCYTES NFR BLD: 1 %
KETONES UR STRIP-MCNC: NEGATIVE MG/DL
LEUKOCYTE ESTERASE UR QL STRIP: NEGATIVE
LYMPHOCYTES # BLD AUTO: 2.3 10E3/UL (ref 0.8–5.3)
LYMPHOCYTES NFR BLD AUTO: 21 %
MCH RBC QN AUTO: 30.3 PG (ref 26.5–33)
MCHC RBC AUTO-ENTMCNC: 35.1 G/DL (ref 31.5–36.5)
MCV RBC AUTO: 86 FL (ref 78–100)
MONOCYTES # BLD AUTO: 1.1 10E3/UL (ref 0–1.3)
MONOCYTES NFR BLD AUTO: 10 %
MUCOUS THREADS #/AREA URNS LPF: PRESENT /LPF
NEUTROPHILS # BLD AUTO: 7 10E3/UL (ref 1.6–8.3)
NEUTROPHILS NFR BLD AUTO: 66 %
NITRATE UR QL: NEGATIVE
NRBC # BLD AUTO: 0 10E3/UL
NRBC BLD AUTO-RTO: 0 /100
PH UR STRIP: 6.5 [PH] (ref 5–7)
PLATELET # BLD AUTO: 310 10E3/UL (ref 150–450)
POTASSIUM SERPL-SCNC: 3.6 MMOL/L (ref 3.4–5.3)
PROT SERPL-MCNC: 6.4 G/DL (ref 6.4–8.3)
RBC # BLD AUTO: 4.78 10E6/UL (ref 3.8–5.2)
RBC URINE: 0 /HPF
SODIUM SERPL-SCNC: 138 MMOL/L (ref 135–145)
SP GR UR STRIP: 1.02 (ref 1–1.03)
SQUAMOUS EPITHELIAL: 1 /HPF
UROBILINOGEN UR STRIP-MCNC: NORMAL MG/DL
WBC # BLD AUTO: 10.7 10E3/UL (ref 4–11)
WBC URINE: 2 /HPF

## 2024-07-16 PROCEDURE — 250N000013 HC RX MED GY IP 250 OP 250 PS 637: Performed by: INTERNAL MEDICINE

## 2024-07-16 PROCEDURE — 96365 THER/PROPH/DIAG IV INF INIT: CPT

## 2024-07-16 PROCEDURE — 258N000003 HC RX IP 258 OP 636: Performed by: INTERNAL MEDICINE

## 2024-07-16 PROCEDURE — 96375 TX/PRO/DX INJ NEW DRUG ADDON: CPT

## 2024-07-16 PROCEDURE — 80053 COMPREHEN METABOLIC PANEL: CPT | Performed by: INTERNAL MEDICINE

## 2024-07-16 PROCEDURE — 81001 URINALYSIS AUTO W/SCOPE: CPT | Performed by: INTERNAL MEDICINE

## 2024-07-16 PROCEDURE — 250N000011 HC RX IP 250 OP 636: Performed by: INTERNAL MEDICINE

## 2024-07-16 PROCEDURE — 85025 COMPLETE CBC W/AUTO DIFF WBC: CPT | Performed by: INTERNAL MEDICINE

## 2024-07-16 PROCEDURE — 36415 COLL VENOUS BLD VENIPUNCTURE: CPT | Performed by: INTERNAL MEDICINE

## 2024-07-16 RX ORDER — METHYLPREDNISOLONE SODIUM SUCCINATE 125 MG/2ML
125 INJECTION, POWDER, LYOPHILIZED, FOR SOLUTION INTRAMUSCULAR; INTRAVENOUS
Status: CANCELLED
Start: 2024-07-18

## 2024-07-16 RX ORDER — METHYLPREDNISOLONE SODIUM SUCCINATE 125 MG/2ML
40 INJECTION, POWDER, LYOPHILIZED, FOR SOLUTION INTRAMUSCULAR; INTRAVENOUS ONCE
Status: CANCELLED | OUTPATIENT
Start: 2024-07-18

## 2024-07-16 RX ORDER — METHYLPREDNISOLONE SODIUM SUCCINATE 125 MG/2ML
40 INJECTION, POWDER, LYOPHILIZED, FOR SOLUTION INTRAMUSCULAR; INTRAVENOUS ONCE
Status: COMPLETED | OUTPATIENT
Start: 2024-07-16 | End: 2024-07-16

## 2024-07-16 RX ORDER — ALBUTEROL SULFATE 0.83 MG/ML
2.5 SOLUTION RESPIRATORY (INHALATION)
Status: CANCELLED | OUTPATIENT
Start: 2024-07-18

## 2024-07-16 RX ORDER — DIPHENHYDRAMINE HYDROCHLORIDE 50 MG/ML
50 INJECTION INTRAMUSCULAR; INTRAVENOUS
Status: CANCELLED
Start: 2024-07-18

## 2024-07-16 RX ORDER — MEPERIDINE HYDROCHLORIDE 25 MG/ML
25 INJECTION INTRAMUSCULAR; INTRAVENOUS; SUBCUTANEOUS EVERY 30 MIN PRN
Status: CANCELLED | OUTPATIENT
Start: 2024-07-18

## 2024-07-16 RX ORDER — HEPARIN SODIUM (PORCINE) LOCK FLUSH IV SOLN 100 UNIT/ML 100 UNIT/ML
5 SOLUTION INTRAVENOUS
Status: CANCELLED | OUTPATIENT
Start: 2024-07-18

## 2024-07-16 RX ORDER — EPINEPHRINE 1 MG/ML
0.3 INJECTION, SOLUTION INTRAMUSCULAR; SUBCUTANEOUS EVERY 5 MIN PRN
Status: CANCELLED | OUTPATIENT
Start: 2024-07-18

## 2024-07-16 RX ORDER — ALBUTEROL SULFATE 90 UG/1
1-2 AEROSOL, METERED RESPIRATORY (INHALATION)
Status: CANCELLED
Start: 2024-07-18

## 2024-07-16 RX ORDER — HEPARIN SODIUM,PORCINE 10 UNIT/ML
5-20 VIAL (ML) INTRAVENOUS DAILY PRN
Status: CANCELLED | OUTPATIENT
Start: 2024-07-18

## 2024-07-16 RX ORDER — ACETAMINOPHEN 325 MG/1
650 TABLET ORAL ONCE
Status: COMPLETED | OUTPATIENT
Start: 2024-07-16 | End: 2024-07-16

## 2024-07-16 RX ORDER — ACETAMINOPHEN 325 MG/1
650 TABLET ORAL ONCE
Status: CANCELLED | OUTPATIENT
Start: 2024-07-18

## 2024-07-16 RX ADMIN — ACETAMINOPHEN 650 MG: 325 TABLET ORAL at 12:46

## 2024-07-16 RX ADMIN — METHYLPREDNISOLONE SODIUM SUCCINATE 37.5 MG: 125 INJECTION, POWDER, FOR SOLUTION INTRAMUSCULAR; INTRAVENOUS at 13:13

## 2024-07-16 RX ADMIN — SODIUM CHLORIDE 250 ML: 9 INJECTION, SOLUTION INTRAVENOUS at 14:20

## 2024-07-16 RX ADMIN — TOCILIZUMAB 800 MG: 20 INJECTION, SOLUTION, CONCENTRATE INTRAVENOUS at 13:25

## 2024-07-16 NOTE — PATIENT INSTRUCTIONS
Dear Laxmi Ya    Thank you for choosing [facility] ([facility] for your infusion.  The following information is a summary of our appointment as well as important reminders.      EDUCATION POST BIOLOGICAL/CHEMOTHERAPY INFUSION  Call the triage nurse at your clinic or seek medical attention if you have chills and/or temperature greater than or equal to 100.5, uncontrolled nausea/vomiting, diarrhea, constipation, dizziness, shortness of breath, chest pain, heart palpitations, weakness or any other new or concerning symptoms, questions or concerns.  You can not have any live virus vaccines prior to or during treatment or up to 6 months post infusion.  If you have an upcoming surgery, medical procedure or dental procedure during treatment, this should be discussed with your ordering physician and your surgeon/dentist.  If you are having any concerning symptom, if you are unsure if you should get your next infusion or wish to speak to a provider before your next infusion, please call your care coordinator or triage nurse at your clinic to notify them so we can adequately serve you.     If you have any questions on your upcoming Specialty Infusion appointments, please call scheduling at [phone].  It was a pleasure taking care of you today.    Sincerely,    [facility]  [address] [place]  [address]  Phone:  [phone]

## 2024-07-16 NOTE — PROGRESS NOTES
Chief Complaint   Patient presents with    Infusion     IV Actemra     Infusion Nursing Note:  Laxmi Ya presents today for IV Actemra. Every 28 days.  Patient seen by provider today: No   present during visit today: Not Applicable.    Note:   Premeds given per orders. Patient okay with shortening wait time after administering Solu-Medrol.  Actemra infused over 1 hour.  250 ml NS bolus infused post Actemra per orders.    Intravenous Access:  Labs drawn without difficulty.  Peripheral IV placed VAT.    Treatment Conditions:  Biological Infusion Checklist:  ~~~ NOTE: If the patient answers yes to any of the questions below, hold the infusion and contact ordering provider or on-call provider.    Have you recently had an elevated temperature, fever, chills, productive cough, coughing for 3 weeks or longer or hemoptysis,  abnormal vital signs, night sweats,  chest pain or have you noticed a decrease in your appetite, unexplained weight loss or fatigue? No  Do you have any open wounds or new incisions? No  Do you have any upcoming hospitalizations or surgeries? Does not include esophagogastroduodenoscopy, colonoscopy, endoscopic retrograde cholangiopancreatography (ERCP), endoscopic ultrasound (EUS), dental procedures or joint aspiration/steroid injections No  Do you currently have any signs of illness or infection or are you on any antibiotics? No  Have you had any new, sudden or worsening abdominal pain? No  Have you or anyone in your household received a live vaccination in the past 4 weeks? Please note: No live vaccines while on biologic/chemotherapy until 6 months after the last treatment. Patient can receive the flu vaccine (shot only), pneumovax and the Covid vaccine. It is optimal for the patient to get these vaccines mid cycle, but they can be given at any time as long as it is not on the day of the infusion. No  Have you recently been diagnosed with any new nervous system diseases (ie. Multiple  sclerosis, Guillain [place], seizures, neurological changes) or cancer diagnosis? Are you on any form of radiation or chemotherapy? No  Are you pregnant or breast feeding or do you have plans of pregnancy in the future? No  Have you been having any signs of worsening depression or suicidal ideations?  (benlysta only) No  Have there been any other new onset medical symptoms? No  Have you had any new blood clots? (IVIG only) No    Post Infusion Assessment:  Patient tolerated infusion without incident.  Blood return noted pre and post infusion.  Site patent and intact, free from redness, edema or discomfort.  No evidence of extravasations.  Access discontinued per protocol.    POST-INFUSION OF BIOLOGICAL MEDICATION:    Reviewed with patient.  Given biologic medication or medication hand-out. Inform patient if any fever, chills or signs of infection, new symptoms, abdominal pain, heart palpitations, shortness of breath, reaction, weakness, neurological changes, seek medical attention immediately and should not receive infusions. No live virus vaccines prior to or during treatment or up to 6 months post infusion. If the patient has an upcoming procedure or surgery, this should be discussed with the rheumatologist and surgeon or provider.    Discharge Plan:   [facility]  Patient will return 8/13 for next appointment.   Patient discharged in stable condition accompanied by: self.  Departure Mode: Ambulatory.    Administrations This Visit       acetaminophen (TYLENOL) tablet 650 mg       Admin Date  07/16/2024 Action  $Given Dose  650 mg Route  Oral Documented By  Arsalan Phillip, RN              methylPREDNISolone sodium succinate (solu-MEDROL) injection 37.5 mg       Admin Date  07/16/2024 Action  $Given Dose  37.5 mg Route  Intravenous Documented By  Arsalan Phillip, LEXI              tocilizumab (ACTEMRA) 800 mg in sodium chloride 0.9 % 150 mL infusion       Admin Date  07/16/2024 Action  $New Bag Dose  800  "mg Rate  150 mL/hr Route  Intravenous Documented By  Arsalan Phillip, RN                    Vital signs:  Temp: 98  F (36.7  C) Temp src: Oral BP: 122/74 Pulse: 84   Resp: 18 SpO2: 96 % O2 Device: None (Room air)     Weight: 103.9 kg (229 lb)  Estimated body mass index is 36.96 kg/m  as calculated from the following:    Height as of 3/4/24: 1.676 m (5' 6\").    Weight as of this encounter: 103.9 kg (229 lb).            "

## 2024-07-17 ENCOUNTER — THERAPY VISIT (OUTPATIENT)
Dept: OCCUPATIONAL THERAPY | Facility: CLINIC | Age: 49
End: 2024-07-17
Attending: INTERNAL MEDICINE
Payer: MEDICARE

## 2024-07-17 ENCOUNTER — THERAPY VISIT (OUTPATIENT)
Dept: PHYSICAL THERAPY | Facility: CLINIC | Age: 49
End: 2024-07-17
Payer: MEDICARE

## 2024-07-17 DIAGNOSIS — M54.2 NECK PAIN: ICD-10-CM

## 2024-07-17 DIAGNOSIS — R10.2 PELVIC PAIN IN FEMALE: Primary | ICD-10-CM

## 2024-07-17 DIAGNOSIS — I89.0 LYMPHEDEMA: Primary | ICD-10-CM

## 2024-07-17 DIAGNOSIS — M54.50 CHRONIC BILATERAL LOW BACK PAIN WITHOUT SCIATICA: ICD-10-CM

## 2024-07-17 DIAGNOSIS — G89.29 CHRONIC BILATERAL LOW BACK PAIN WITHOUT SCIATICA: ICD-10-CM

## 2024-07-17 PROCEDURE — 97012 MECHANICAL TRACTION THERAPY: CPT | Mod: GP | Performed by: PHYSICAL THERAPIST

## 2024-07-17 PROCEDURE — 97140 MANUAL THERAPY 1/> REGIONS: CPT | Mod: GO | Performed by: OCCUPATIONAL THERAPIST

## 2024-07-18 ENCOUNTER — THERAPY VISIT (OUTPATIENT)
Dept: OCCUPATIONAL THERAPY | Facility: CLINIC | Age: 49
End: 2024-07-18
Attending: INTERNAL MEDICINE
Payer: MEDICARE

## 2024-07-18 ENCOUNTER — VIRTUAL VISIT (OUTPATIENT)
Dept: RHEUMATOLOGY | Facility: CLINIC | Age: 49
End: 2024-07-18
Payer: MEDICARE

## 2024-07-18 DIAGNOSIS — M06.09 SERONEGATIVE RHEUMATOID ARTHRITIS OF MULTIPLE SITES (H): Primary | ICD-10-CM

## 2024-07-18 DIAGNOSIS — Z79.4 TYPE 2 DIABETES MELLITUS WITH OTHER DIABETIC KIDNEY COMPLICATION, WITH LONG-TERM CURRENT USE OF INSULIN (H): ICD-10-CM

## 2024-07-18 DIAGNOSIS — E11.29 TYPE 2 DIABETES MELLITUS WITH OTHER DIABETIC KIDNEY COMPLICATION, WITH LONG-TERM CURRENT USE OF INSULIN (H): ICD-10-CM

## 2024-07-18 DIAGNOSIS — I89.0 LYMPHEDEMA: Primary | ICD-10-CM

## 2024-07-18 DIAGNOSIS — R79.89 ELEVATED SERUM CREATININE: ICD-10-CM

## 2024-07-18 PROCEDURE — 97140 MANUAL THERAPY 1/> REGIONS: CPT | Mod: GO | Performed by: OCCUPATIONAL THERAPIST

## 2024-07-18 NOTE — PROGRESS NOTES
07/18/24 0500   Progress Note/Certification   Start Of Care Date 09/07/23   Onset of Illness/Injury or Date of Surgery 09/07/22   Therapy Frequency then 2x/wee for   Predicted Duration 12  weeks with decreasing frequency as needed.   Certification date from 07/18/24   Certification date to 10/10/24   KX Modifier Statement I certify the need for these services furnished under this plan of treatment and while under my care.  (Physician co-signature of this document indicates review and certification of the therapy plan)   Progress Note Due Date   (10 visits)   Goals   OT Goals 1;2;3;4;5   OT Goal 1   Goal Identifier 1   Goal Description In order to improve functional mobility for activities of living, by the completion of intensive treatment, patient and/or caregiver will;      Verbalize and/or demonstrate understanding of techniques to independently manage their lymphedema at home   Rationale In order to maximize safety and independence with performance of self-care activities   Target Date 05/22/24   OT Goal 2   Goal Identifier 1a   Goal Description demonstrate independence in performing prescribed exercises/self MLD to facilate the lymph system   Rationale In order to maximize safety and independence with performance of self-care activities   Target Date 08/07/24   OT Goal 3   Goal Identifier 1b   Goal Description be independent in donning/doffing, wearing schedule, and care of compression garments   Rationale In order to maximize safety and independence with performance of self-care activities   Target Date 11/30/23   Date Met 02/15/24   OT Goal 4   Goal Identifier 2   Goal Description Pt will decrease the suprapubic swelling to perform hygiene/shave with a normal effort.   Rationale In order to maximize safety and independence with performance of self-care activities   Target Date 08/07/24   OT Goal 5   Goal Description Pt oracio improve movement of shoulders and trunk to perform toilet hygiene.   Rationale In  order to maximize safety and independence with performance of self-care activities   Target Date 05/22/24   Date Met 05/15/24     20 session PN.  Pt was progressing in swelling, but notes with her insulin instability and large weight gain, she has worsened.  Will restart her intensive therapy with the suprapubic area.    [facility]                                                                                   OUTPATIENT OCCUPATIONAL THERAPY    PLAN OF TREATMENT FOR OUTPATIENT REHABILITATION   Patient's Last Name, First Name, Laxmi Coleman YOB: 1975   Provider's Name   [facility]   Medical Record No.  [id number]     Onset Date: 09/07/22 Start of Care Date: 09/07/23     Medical Diagnosis:  edema, will clarify to lymphedema      OT Treatment Diagnosis:  lymphedema Plan of Treatment  Frequency/Duration:then 2x/wee for/12  weeks with decreasing frequency as needed.    Certification date from (P) 07/18/24   To (P) 10/10/24        See note for plan of treatment details and functional goals     Alanis Schilling OT                         I CERTIFY THE NEED FOR THESE SERVICES FURNISHED UNDER        THIS PLAN OF TREATMENT AND WHILE UNDER MY CARE .             Physician Signature               Date    X_____________________________________________________                  Referring Provider:  Amol Juares    Initial Assessment  See Epic Evaluation- 09/07/23          PLAN  Continue therapy per current plan of care.    Beginning/End Dates of Progress Note Reporting Period:    to 07/18/2024    Referring Provider:  Amol Juares

## 2024-07-18 NOTE — Clinical Note
7/18/2024       RE: Laxmi Ya  [address] [place] [id number]     Dear Colleague,    Thank you for referring your patient, Laxmi Ya, to the [facility] [place] [facility]. Please see a copy of my visit note below.    Medication Therapy Management (MTM) Encounter    ASSESSMENT:                            Medication Adherence/Access: No issues identified    Rheumatoid Arthritis: Reviewed most recent labs including changes in creatinine and urea nitrogen. Agree with rheumatologist recommendation to recheck renal function in 7-10 days and placed lab order for patient to complete next week. Discussed potential to eliminate solu-medrol from infusion plan however due to concerns for infusion reaction will continue 40 mg with Actemra infusions.    Type 2 Diabetes: Provided education on Mounjaro today including dosing, general administration, side effects (both common/serious), precautions, monitoring and time to efficacy. Reviewed expectation that medication would likely help with both weight loss and lowering blood sugars to assist in lowering needed insulin dose. Encouraged patient to start Mounjaro 2.5 mg weekly in the next few days as able.    PLAN:                            1. Continue taking Actemra infusions monthly with solu-medrol to avoid infusion reaction. Continue to use NPH on infusion days to help with hyperglycemia.    2. Please schedule a lab appointment next week to recheck your kidney function. Please avoid any NSAIDs and be well hydrated prior to this appointment.    3. Start Mounjaro 2.5 mg weekly to help with weight loss and blood sugars as soon as able.    Follow-up: {followuptest2:[id number]}    SUBJECTIVE/OBJECTIVE:                          Laxmi Ya is a 49 year old female seen for a follow-up visit.       Reason for visit: Questions on recent labs, starting Mounjaro    Allergies/ADRs: Reviewed in  chart  Past Medical History: Reviewed in chart  Tobacco: She reports that she has never smoked. She has never used smokeless tobacco.  Alcohol: Less than 1 beverages / week    Medication Adherence/Access: no issues reported    Rheumatoid Arthritis:   Actemra 800 mg infusion monthly (last infusion 7/16/24)   Diclofenac 1% gel as needed (uses once per day a few times per week)  Oxycodone 10 mg 2-4 times daily as needed     Reports she is overall doing well - still having some pain in her back, hands, neck, and knees. Tolerating Actemra well - no side effects noted. Does get high blood sugars from solu-medrol but is on lowest dose possible (40 mg) - blood sugars usually spike to 300s. Wondering if she would be able to tolerate the infusion without any solu-medrol to help reduce blood sugar spike on day of infusion. Had labs done earlier this week and noted her kidney function was much lower than expected. Would like to know if anything needs to be done due to this. Did not take any NSAIDs, did not drink as much water as normal on day of labs.     Liver Function Studies -   Recent Labs   Lab Test 07/16/24  1247   PROTTOTAL 6.4   ALBUMIN 4.4   BILITOTAL 0.6   ALKPHOS 45   AST 36   ALT 57*     CBC RESULTS:   Recent Labs   Lab Test 07/16/24  1247   WBC 10.7   RBC 4.78   HGB 14.5   HCT 41.3   MCV 86   MCH 30.3   MCHC 35.1   RDW 13.0        Type 2 Diabetes:    Lantus 37 units daily  Humalog 16 units three times daily with meals and correction dose 1:25 for BG >120  NPH insulin 70 units at time of infusion and 20 units six hours post infusion, 20 units at bedtime day of infusion, 20 units on the first day post infusion, and 20 units for days two and three after the infusion   Mounjaro 2.5 mg weekly (not started yet)    Patient reports no current medication side effects.  Blood sugar monitoring: 3-6 time(s) daily - Follows closely with [facility] diabetes education for monitoring  Current diabetes symptoms: Frequent  hyperglycemia, polyuria after infusions or steroid tapers    Did receive Mounjaro 2.5 mg strength but has not started this yet. Would like to make sure that medication will not interact with any of her other medications. Also would like to review how it works and side effects prior to starting. Hoping the medication can help reduce the amount of insulin she has to take daily.    Today's Vitals: There were no vitals taken for this visit.  ----------------    I spent 30 minutes with this patient today. All changes were made via collaborative practice agreement with Oswaldo Lemus MD. A copy of the visit note was provided to the patient's provider(s).    A summary of these recommendations was sent via [employer].    Dominguez Kaplan PharmD  Medication Therapy Management Pharmacist  [facility]  Phone: [phone]    Telemedicine Visit Details  Type of service:  Telephone visit  Start Time: 9:00 AM  End Time: 9:30 AM     Medication Therapy Recommendations  Diabetes mellitus, type 2 (H)    Current Medication: tirzepatide (MOUNJARO) 2.5 MG/0.5ML pen   Rationale: Does not understand instructions - Adherence - Adherence   Recommendation: Provide Education   Status: Patient Agreed - Adherence/Education         Seronegative rheumatoid arthritis of multiple sites (H)    Current Medication: tocilizumab (ACTEMRA) 400 MG/20ML   Rationale: Medication requires monitoring - Needs additional monitoring - Safety   Recommendation: Order Lab - Repeat CMP   Status: Accepted per CPA                  Again, thank you for allowing me to participate in the care of your patient.      Sincerely,    DOMINGUEZ KAPLAN RPH

## 2024-07-21 NOTE — PROGRESS NOTES
Medication Therapy Management (MTM) Encounter    ASSESSMENT:                            Medication Adherence/Access: No issues identified    Rheumatoid Arthritis: Reviewed most recent labs including changes in creatinine and urea nitrogen. Agree with rheumatologist recommendation to recheck renal function in 7-10 days and placed lab order for patient to complete next week. Discussed potential to eliminate solu-medrol from infusion plan however due to concerns for infusion reaction will continue 40 mg with Actemra infusions.    Type 2 Diabetes: Provided education on Mounjaro today including dosing, general administration, side effects (both common/serious), precautions, monitoring and time to efficacy. Reviewed expectation that medication would likely help with both weight loss and lowering blood sugars to assist in lowering needed insulin dose. Encouraged patient to start Mounjaro 2.5 mg weekly in the next few days as able.    PLAN:                            1. Continue taking Actemra infusions monthly with solu-medrol to avoid infusion reaction. Continue to use NPH on infusion days to help with hyperglycemia.    2. Please schedule a lab appointment next week to recheck your kidney function. Please avoid any NSAIDs and be well hydrated prior to this appointment.    3. Start Mounjaro 2.5 mg weekly to help with weight loss and blood sugars as soon as able.    Follow-up: Return in about 6 months (around 1/18/2025) for MTM Pharmacist Visit.    SUBJECTIVE/OBJECTIVE:                          Laxmi Ya is a 49 year old female seen for a follow-up visit.       Reason for visit: Questions on recent labs, starting Mounjaro    Allergies/ADRs: Reviewed in chart  Past Medical History: Reviewed in chart  Tobacco: She reports that she has never smoked. She has never used smokeless tobacco.  Alcohol: Less than 1 beverages / week    Medication Adherence/Access: no issues reported    Rheumatoid Arthritis:   Actemra 800 mg  infusion monthly (last infusion 7/16/24)   Diclofenac 1% gel as needed (uses once per day a few times per week)  Oxycodone 10 mg 2-4 times daily as needed     Reports she is overall doing well - still having some pain in her back, hands, neck, and knees. Tolerating Actemra well - no side effects noted. Does get high blood sugars from solu-medrol but is on lowest dose possible (40 mg) - blood sugars usually spike to 300s. Wondering if she would be able to tolerate the infusion without any solu-medrol to help reduce blood sugar spike on day of infusion. Had labs done earlier this week and noted her kidney function was much lower than expected. Would like to know if anything needs to be done due to this. Did not take any NSAIDs, did not drink as much water as normal on day of labs.     Liver Function Studies -   Recent Labs   Lab Test 07/16/24  1247   PROTTOTAL 6.4   ALBUMIN 4.4   BILITOTAL 0.6   ALKPHOS 45   AST 36   ALT 57*     CBC RESULTS:   Recent Labs   Lab Test 07/16/24  1247   WBC 10.7   RBC 4.78   HGB 14.5   HCT 41.3   MCV 86   MCH 30.3   MCHC 35.1   RDW 13.0        Type 2 Diabetes:    Lantus 37 units daily  Humalog 16 units three times daily with meals and correction dose 1:25 for BG >120  NPH insulin 70 units at time of infusion and 20 units six hours post infusion, 20 units at bedtime day of infusion, 20 units on the first day post infusion, and 20 units for days two and three after the infusion   Mounjaro 2.5 mg weekly (not started yet)    Patient reports no current medication side effects.  Blood sugar monitoring: 3-6 time(s) daily - Follows closely with  diabetes education for monitoring  Current diabetes symptoms: Frequent hyperglycemia, polyuria after infusions or steroid tapers    Did receive Mounjaro 2.5 mg strength but has not started this yet. Would like to make sure that medication will not interact with any of her other medications. Also would like to review how it works and side effects  prior to starting. Hoping the medication can help reduce the amount of insulin she has to take daily.    Today's Vitals: There were no vitals taken for this visit.  ----------------    I spent 30 minutes with this patient today. All changes were made via collaborative practice agreement with Oswaldo Lemus MD. A copy of the visit note was provided to the patient's provider(s).    A summary of these recommendations was sent via [employer].    Augustine ConnerD  Medication Therapy Management Pharmacist  [facility]  Phone: [phone]    Telemedicine Visit Details  Type of service:  Telephone visit  Start Time: 9:00 AM  End Time: 9:30 AM     Medication Therapy Recommendations  Diabetes mellitus, type 2 (H)    Current Medication: tirzepatide (MOUNJARO) 2.5 MG/0.5ML pen   Rationale: Does not understand instructions - Adherence - Adherence   Recommendation: Provide Education   Status: Patient Agreed - Adherence/Education         Seronegative rheumatoid arthritis of multiple sites (H)    Current Medication: tocilizumab (ACTEMRA) 400 MG/20ML   Rationale: Medication requires monitoring - Needs additional monitoring - Safety   Recommendation: Order Lab - Repeat CMP   Status: Accepted per CPA

## 2024-07-21 NOTE — PATIENT INSTRUCTIONS
"Recommendations from today's MTM visit:                                                       1. Continue taking Actemra infusions monthly with solu-medrol to avoid infusion reaction. Continue to use NPH on infusion days to help with hyperglycemia.    2. Please schedule a lab appointment next week to recheck your kidney function. Please avoid any NSAIDs and be well hydrated prior to this appointment.    3. Start Mounjaro 2.5 mg weekly to help with weight loss and blood sugars as soon as able.    Follow-up: Return in about 6 months (around 1/18/2025) for MTM Pharmacist Visit.    It was great speaking with you today.  I value your experience and would be very thankful for your time in providing feedback in our clinic survey. In the next few days, you may receive an email or text message from [employer] with a link to a survey related to your  clinical pharmacist.\"     To schedule another MTM appointment, please call the clinic directly or you may call the MTM scheduling line at [phone].    My Clinical Pharmacist's contact information:                                                      Please feel free to contact me with any questions or concerns you have.      Yuliya Kaplan, PharmD  Medication Therapy Management Pharmacist  [facility]  Phone: [phone]     "

## 2024-07-22 ENCOUNTER — TELEPHONE (OUTPATIENT)
Dept: DERMATOLOGY | Facility: CLINIC | Age: 49
End: 2024-07-22
Payer: MEDICARE

## 2024-07-22 NOTE — TELEPHONE ENCOUNTER
FUTURE VISIT INFORMATION      FUTURE VISIT INFORMATION:  Date: 11/14/24  Time: 1:30pm  [place] [facility] [place]:  Referring provider:  Nelia Gann MD   Referring providers clinic:  [facility]   Reason for visit/diagnosis  VCD     RECORDS REQUESTED FROM:         Clinic name Comments Records Status Imaging Status   [facility]  Ov/referral 3/4/24  Ov/notes 3/4/24-12/23/14 [facility]     [facility] OV/notes 4/12/23-5/25/22 [facility]

## 2024-07-22 NOTE — TELEPHONE ENCOUNTER
Called pt to discuss below message. States she has seen Dr. Monique for a rash similar to this before, over a year ago. Rash is red/inflamed and painful. Pt declined appointments offered this week, [place] having surgery. Advised she go to an urgent care to have rash evaluated. Pt agreed with plan.  Huong Yun RN

## 2024-07-22 NOTE — TELEPHONE ENCOUNTER
Health Call Center    Phone Message    May a detailed message be left on voicemail: yes     Reason for Call: Symptoms or Concerns     If patient has red-flag symptoms, warm transfer to triage line    Current symptom or concern: pt has a painful rash above the butt crack - pt said she is an auto immune pt. This rash is inflamed as well.     Symptoms have been present for:  1 week(s)    Has patient previously been seen for this? Yes - in the past.    By : Dr. Monique     Date: 03/03/22    Are there any new or worsening symptoms? Yes: pt said she was given Rx before Clindamycin lotion. Pt is willing to take any Rx that is recommended. Pharmacy [facility] in [facility]. Please call pt back to discuss Sx. Thanks     Action Taken: Message routed to:  [facility] Derm    Travel Screening: Not Applicable     Date of Service:

## 2024-07-23 ENCOUNTER — TELEPHONE (OUTPATIENT)
Dept: OBGYN | Facility: CLINIC | Age: 49
End: 2024-07-23
Payer: MEDICARE

## 2024-07-23 ENCOUNTER — MYC MEDICAL ADVICE (OUTPATIENT)
Dept: DERMATOLOGY | Facility: CLINIC | Age: 49
End: 2024-07-23
Payer: MEDICARE

## 2024-07-23 ENCOUNTER — TRANSCRIBE ORDERS (OUTPATIENT)
Dept: OTHER | Age: 49
End: 2024-07-23

## 2024-07-23 DIAGNOSIS — A60.9 HSV (HERPES SIMPLEX VIRUS) ANOGENITAL INFECTION: Primary | ICD-10-CM

## 2024-07-23 DIAGNOSIS — I89.0 LYMPHEDEMA: Primary | ICD-10-CM

## 2024-07-23 DIAGNOSIS — M13.80 SERONEGATIVE INFLAMMATORY ARTHRITIS: Primary | ICD-10-CM

## 2024-07-23 PROBLEM — K58.9 IRRITABLE BOWEL SYNDROME: Status: ACTIVE | Noted: 2022-07-21

## 2024-07-23 PROBLEM — M79.7 FIBROMYALGIA: Status: ACTIVE | Noted: 2019-02-15

## 2024-07-23 PROBLEM — K52.9 ENTEROCOLITIS: Status: ACTIVE | Noted: 2022-02-15

## 2024-07-23 PROBLEM — J45.30 MILD PERSISTENT ASTHMA WITHOUT COMPLICATION: Status: ACTIVE | Noted: 2018-07-19

## 2024-07-23 PROBLEM — M65.931 TENOSYNOVITIS OF BOTH WRISTS: Status: ACTIVE | Noted: 2021-03-22

## 2024-07-23 PROBLEM — M79.18: Status: ACTIVE | Noted: 2024-07-23

## 2024-07-23 PROBLEM — D89.89 INFLAMMATORY AUTOIMMUNE DISORDER (H): Status: ACTIVE | Noted: 2024-07-23

## 2024-07-23 PROBLEM — J30.2 SEASONAL ALLERGIES: Status: ACTIVE | Noted: 2024-07-23

## 2024-07-23 PROBLEM — E11.9 TYPE 2 DIABETES MELLITUS WITHOUT COMPLICATION (H): Status: ACTIVE | Noted: 2021-06-02

## 2024-07-23 PROBLEM — M65.88: Status: ACTIVE | Noted: 2024-07-23

## 2024-07-23 PROBLEM — A04.71 RECURRENT CLOSTRIDIOIDES DIFFICILE DIARRHEA: Status: ACTIVE | Noted: 2022-02-15

## 2024-07-23 PROBLEM — E28.2 POLYCYSTIC OVARY SYNDROME: Status: ACTIVE | Noted: 2019-01-29

## 2024-07-23 PROBLEM — L29.9 PRURITIC DISORDER: Status: ACTIVE | Noted: 2024-07-23

## 2024-07-23 PROBLEM — Z83.3 FAMILY HISTORY OF DIABETES MELLITUS: Status: ACTIVE | Noted: 2024-07-23

## 2024-07-23 PROBLEM — E55.9 VITAMIN D DEFICIENCY: Status: ACTIVE | Noted: 2024-06-05

## 2024-07-23 PROBLEM — D25.9 UTERINE LEIOMYOMA: Status: ACTIVE | Noted: 2019-01-29

## 2024-07-23 PROBLEM — L08.9 STAPHYLOCOCCAL INFECTION OF SKIN: Status: ACTIVE | Noted: 2024-07-23

## 2024-07-23 PROBLEM — Z86.19 HISTORY OF THRUSH: Status: ACTIVE | Noted: 2024-07-23

## 2024-07-23 PROBLEM — M50.20 RUPTURED CERVICAL DISC: Status: ACTIVE | Noted: 2019-02-15

## 2024-07-23 PROBLEM — B95.8 STAPHYLOCOCCAL INFECTION OF SKIN: Status: ACTIVE | Noted: 2024-07-23

## 2024-07-23 PROBLEM — M47.814 THORACIC ARTHRITIS: Status: ACTIVE | Noted: 2024-07-23

## 2024-07-23 PROBLEM — M54.16 LUMBAR RADICULOPATHY: Status: ACTIVE | Noted: 2018-06-26

## 2024-07-23 PROBLEM — M65.939 TENOSYNOVITIS OF FOREARM: Status: ACTIVE | Noted: 2024-07-23

## 2024-07-23 PROBLEM — R59.0 LOCALIZED ENLARGED LYMPH NODES: Status: ACTIVE | Noted: 2021-04-20

## 2024-07-23 PROBLEM — E78.2 MIXED HYPERLIPIDEMIA: Status: ACTIVE | Noted: 2023-07-03

## 2024-07-23 PROBLEM — G93.32 CHRONIC FATIGUE SYNDROME: Status: ACTIVE | Noted: 2019-11-26

## 2024-07-23 PROBLEM — Z87.19 HISTORY OF DIVERTICULITIS: Status: ACTIVE | Noted: 2024-07-23

## 2024-07-23 PROBLEM — F41.9 ANXIETY: Status: ACTIVE | Noted: 2024-06-05

## 2024-07-23 PROBLEM — K57.90 DIVERTICULOSIS: Status: ACTIVE | Noted: 2024-07-23

## 2024-07-23 PROBLEM — K76.0 FATTY LIVER DISEASE, NONALCOHOLIC: Status: ACTIVE | Noted: 2019-03-18

## 2024-07-23 PROBLEM — K21.9 GASTROESOPHAGEAL REFLUX DISEASE: Status: ACTIVE | Noted: 2019-02-15

## 2024-07-23 PROBLEM — K63.8219 SMALL INTESTINAL BACTERIAL OVERGROWTH (SIBO): Status: ACTIVE | Noted: 2021-12-22

## 2024-07-23 PROBLEM — M06.09: Status: ACTIVE | Noted: 2024-07-23

## 2024-07-23 PROBLEM — J30.9 ALLERGIC RHINITIS: Status: ACTIVE | Noted: 2018-07-19

## 2024-07-23 PROBLEM — M47.22 DEGENERATIVE ARTHRITIS OF CERVICAL SPINE WITH NERVE COMPRESSION: Status: ACTIVE | Noted: 2024-07-23

## 2024-07-23 PROBLEM — J32.9 SINUSITIS, CHRONIC: Status: ACTIVE | Noted: 2024-07-23

## 2024-07-23 PROBLEM — H10.45 OTHER CHRONIC ALLERGIC CONJUNCTIVITIS: Status: ACTIVE | Noted: 2019-07-15

## 2024-07-23 PROBLEM — B35.1 ONYCHOMYCOSIS: Status: ACTIVE | Noted: 2024-06-05

## 2024-07-23 PROBLEM — M47.20 RADICULOPATHY DUE TO SPONDYLOSIS: Status: ACTIVE | Noted: 2024-07-23

## 2024-07-23 PROBLEM — H25.041 POSTERIOR SUBCAPSULAR POLAR AGE-RELATED CATARACT OF RIGHT EYE: Status: ACTIVE | Noted: 2022-08-11

## 2024-07-23 PROBLEM — M54.10 RADICULOPATHY: Status: ACTIVE | Noted: 2018-07-20

## 2024-07-23 PROBLEM — M65.932 TENOSYNOVITIS OF BOTH WRISTS: Status: ACTIVE | Noted: 2021-03-22

## 2024-07-23 PROBLEM — M47.816 DEGENERATIVE ARTHRITIS OF LUMBAR SPINE: Status: ACTIVE | Noted: 2024-07-23

## 2024-07-23 PROBLEM — K75.81 NONALCOHOLIC STEATOHEPATITIS (NASH): Status: ACTIVE | Noted: 2017-01-09

## 2024-07-23 PROBLEM — J45.909 ASTHMA: Status: ACTIVE | Noted: 2018-07-19

## 2024-07-23 PROBLEM — G89.29: Status: ACTIVE | Noted: 2024-07-23

## 2024-07-23 PROBLEM — T78.3XXA ANGIOEDEMA: Status: ACTIVE | Noted: 2019-03-18

## 2024-07-23 PROBLEM — M35.9 UNDIFFERENTIATED CONNECTIVE TISSUE DISEASE (H): Status: ACTIVE | Noted: 2017-05-02

## 2024-07-23 PROBLEM — M06.9 RHEUMATOID ARTHRITIS (H): Status: ACTIVE | Noted: 2024-03-13

## 2024-07-23 PROBLEM — R87.619 ABNORMAL CERVICAL PAPANICOLAOU SMEAR: Status: ACTIVE | Noted: 2017-04-04

## 2024-07-23 PROBLEM — Z90.49 H/O PARTIAL RESECTION OF COLON: Status: ACTIVE | Noted: 2024-07-23

## 2024-07-23 RX ORDER — VALACYCLOVIR HYDROCHLORIDE 1 G/1
2000 TABLET, FILM COATED ORAL 2 TIMES DAILY
Qty: 4 TABLET | Refills: 0 | Status: SHIPPED | OUTPATIENT
Start: 2024-07-23 | End: 2024-08-20 | Stop reason: ALTCHOICE

## 2024-07-23 NOTE — TELEPHONE ENCOUNTER
Laxmi called after being told by her derm that rash on her buttocks looks suspiciously like HSV.  She would like a culture to confirm this.  Derm is unable to get her in, so she is requesting visit here.    Scheduled for tomorrow with Maryellen.  She asked if she should wait to start valacyclovir until after culture.  Per Dr Kumar, ok to start now.

## 2024-07-23 NOTE — PROGRESS NOTES
"SUBJECTIVE:  Laxmi Ya is an 49 year old  Female, , who presents with complaints of blisters on her left buttocks.    Laxmi presents with a rash on her left buttocks, she is concerned this might be herpes simplex virus or herpes zoster  Noticed rash 3 days ago, left side of buttocks, blisters have been very painful, She has started Valtrex and the pain is improving some  Believes she was exposed to genital HSV when she was a teenager. She has had lesions on her mons pubis on and off for years, she is not sure if these sores are herpes or folliculitis from shaving    Laxmi has been seeing Dr. Pate for management of possible bartholin gland cysts vs sebaceous cysts.      History of abnormal pap smear: History:   2017: NILM, HPV negative   2018: NILM   : ASCUS, HPV negative   : NILM, HPV negative      Plan, per ASCCP guidelines: Pap and HPV co-test in 5 years ()     Menstrual History:       No data to display                Past Medical History:   Diagnosis Date    Asthma     Atrial fibrillation (H)     CTS (carpal tunnel syndrome)     Diarrhea 2000    travelers' abstract    Diverticular disease of colon     Dry eye syndrome     Fibromyalgia     GERD (gastroesophageal reflux disease)     Hematuria 2000    abstract    HTN (hypertension)     COLON (nonalcoholic steatohepatitis)     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  Abdomen: benign, soft, nontender, no masses.      Pelvic Exam:  EG/BUS: Antoine " pubis and genitalia appear swollen, no further examination of labia completed  Three healing lesions noted on Mons Pubis, appear to be healing folliculitis but could pissibly be ulcerative lesion  Left buttock: 2 cm cluster of crusted over ulcers noted. Attempted to break crusts and collect samples for varicella and HSV      ASSESSMENT:   Encounter Diagnosis   Name Primary?    Blistering rash Yes          PLAN:   Orders Placed This Encounter   Procedures    Herpes Simplex Virus 1 and 2 IgG    Herpes Simplex Virus 1&2 by PCR    Varicella zoster virus by PCR     Orders Placed This Encounter   Medications    norethindrone (MICRONOR) 0.35 MG tablet     Sig: Take 1 tablet (0.35 mg) by mouth daily     Dispense:  90 tablet     Refill:  3    valACYclovir (VALTREX) 1000 mg tablet     Sig: Take 1 tablet (1,000 mg) by mouth daily for 5 days     Dispense:  5 tablet     Refill:  0      -Reviewed with Laxmi that blisters are likely HSV and recommended 1,000mg Valtrex for 5 days. Discussed the option for prophylactic daily therapy to prevent further outbreaks. Given that Laxmi is immunocompromised and is also taking immunosuppressive medications for her RA, this would likely help prevent further lesions/pain  -recommended cold compress, tylenol and ibuprofen for pain  -Follow up PCR and bloodtest results    SUNG Haider CNM    Time spent:  Chart review/Pre-chartin min on day of service  Face-to-face visit: 20  Documentation: 5  Total time spent on the day of service: 30 minutes    SUNG Haider CNM

## 2024-07-24 ENCOUNTER — MYC MEDICAL ADVICE (OUTPATIENT)
Dept: OBGYN | Facility: CLINIC | Age: 49
End: 2024-07-24

## 2024-07-24 ENCOUNTER — LAB (OUTPATIENT)
Dept: LAB | Facility: CLINIC | Age: 49
End: 2024-07-24
Attending: ADVANCED PRACTICE MIDWIFE
Payer: MEDICARE

## 2024-07-24 ENCOUNTER — TELEPHONE (OUTPATIENT)
Dept: OBGYN | Facility: CLINIC | Age: 49
End: 2024-07-24

## 2024-07-24 ENCOUNTER — MYC MEDICAL ADVICE (OUTPATIENT)
Dept: RHEUMATOLOGY | Facility: CLINIC | Age: 49
End: 2024-07-24

## 2024-07-24 ENCOUNTER — OFFICE VISIT (OUTPATIENT)
Dept: OBGYN | Facility: CLINIC | Age: 49
End: 2024-07-24
Attending: ADVANCED PRACTICE MIDWIFE
Payer: MEDICARE

## 2024-07-24 VITALS
HEART RATE: 74 BPM | WEIGHT: 230 LBS | SYSTOLIC BLOOD PRESSURE: 121 MMHG | BODY MASS INDEX: 36.96 KG/M2 | DIASTOLIC BLOOD PRESSURE: 84 MMHG | HEIGHT: 66 IN

## 2024-07-24 DIAGNOSIS — B00.9 HSV-1 (HERPES SIMPLEX VIRUS 1) INFECTION: ICD-10-CM

## 2024-07-24 DIAGNOSIS — B00.89 HERPES SIMPLEX VIRUS (HSV) INFECTION OF BUTTOCK: ICD-10-CM

## 2024-07-24 DIAGNOSIS — R21 BLISTERING RASH: Primary | ICD-10-CM

## 2024-07-24 DIAGNOSIS — B00.9 HSV-2 (HERPES SIMPLEX VIRUS 2) INFECTION: ICD-10-CM

## 2024-07-24 DIAGNOSIS — R79.89 ELEVATED SERUM CREATININE: ICD-10-CM

## 2024-07-24 DIAGNOSIS — M06.09 SERONEGATIVE RHEUMATOID ARTHRITIS OF MULTIPLE SITES (H): ICD-10-CM

## 2024-07-24 LAB
ALBUMIN SERPL BCG-MCNC: 4.4 G/DL (ref 3.5–5.2)
ALP SERPL-CCNC: 49 U/L (ref 40–150)
ALT SERPL W P-5'-P-CCNC: 70 U/L (ref 0–50)
ANION GAP SERPL CALCULATED.3IONS-SCNC: 11 MMOL/L (ref 7–15)
AST SERPL W P-5'-P-CCNC: 48 U/L (ref 0–45)
BILIRUB SERPL-MCNC: 0.7 MG/DL
BUN SERPL-MCNC: 15.2 MG/DL (ref 6–20)
CALCIUM SERPL-MCNC: 9.8 MG/DL (ref 8.8–10.4)
CHLORIDE SERPL-SCNC: 103 MMOL/L (ref 98–107)
CREAT SERPL-MCNC: 0.83 MG/DL (ref 0.51–0.95)
EGFRCR SERPLBLD CKD-EPI 2021: 86 ML/MIN/1.73M2
GLUCOSE SERPL-MCNC: 131 MG/DL (ref 70–99)
HCO3 SERPL-SCNC: 25 MMOL/L (ref 22–29)
POTASSIUM SERPL-SCNC: 3.6 MMOL/L (ref 3.4–5.3)
PROT SERPL-MCNC: 6.6 G/DL (ref 6.4–8.3)
SODIUM SERPL-SCNC: 139 MMOL/L (ref 135–145)

## 2024-07-24 PROCEDURE — 87529 HSV DNA AMP PROBE: CPT | Mod: 59 | Performed by: ADVANCED PRACTICE MIDWIFE

## 2024-07-24 PROCEDURE — 36415 COLL VENOUS BLD VENIPUNCTURE: CPT

## 2024-07-24 PROCEDURE — 86696 HERPES SIMPLEX TYPE 2 TEST: CPT | Performed by: ADVANCED PRACTICE MIDWIFE

## 2024-07-24 PROCEDURE — 87798 DETECT AGENT NOS DNA AMP: CPT | Performed by: ADVANCED PRACTICE MIDWIFE

## 2024-07-24 PROCEDURE — 86695 HERPES SIMPLEX TYPE 1 TEST: CPT | Performed by: ADVANCED PRACTICE MIDWIFE

## 2024-07-24 PROCEDURE — 82040 ASSAY OF SERUM ALBUMIN: CPT

## 2024-07-24 PROCEDURE — G0463 HOSPITAL OUTPT CLINIC VISIT: HCPCS | Performed by: ADVANCED PRACTICE MIDWIFE

## 2024-07-24 PROCEDURE — 99214 OFFICE O/P EST MOD 30 MIN: CPT | Performed by: ADVANCED PRACTICE MIDWIFE

## 2024-07-24 RX ORDER — VALACYCLOVIR HYDROCHLORIDE 1 G/1
1000 TABLET, FILM COATED ORAL DAILY
Qty: 5 TABLET | Refills: 0 | Status: SHIPPED | OUTPATIENT
Start: 2024-07-24 | End: 2024-08-20 | Stop reason: ALTCHOICE

## 2024-07-24 RX ORDER — ACETAMINOPHEN AND CODEINE PHOSPHATE 120; 12 MG/5ML; MG/5ML
0.35 SOLUTION ORAL DAILY
Qty: 90 TABLET | Refills: 3 | Status: SHIPPED | OUTPATIENT
Start: 2024-07-24

## 2024-07-24 NOTE — LETTER
2024       RE: Laxmi Ya  [address] [place] [id number]     Dear Colleague,    Thank you for referring your patient, Laxmi Ya, to the [facility] [place] [facility]. Please see a copy of my visit note below.    SUBJECTIVE:  Laxmi Ya is an 49 year old  Female, , who presents with complaints of blisters on her left buttocks.    Laxmi presents with a rash on her left buttocks, she is concerned this might be herpes simplex virus or herpes zoster  Noticed rash 3 days ago, left side of buttocks, blisters have been very painful, She has started Valtrex and the pain is improving some  Believes she was exposed to genital HSV when she was a teenager. She has had lesions on her mons pubis on and off for years, she is not sure if these sores are herpes or folliculitis from shaving    Laxmi has been seeing Dr. Pate for management of possible bartholin gland cysts vs sebaceous cysts.      History of abnormal pap smear: History:   2017: NILM, HPV negative   2018: NILM   : ASCUS, HPV negative   : NILM, HPV negative      Plan, per ASCCP guidelines: Pap and HPV co-test in 5 years ()     Menstrual History:       No data to display                Past Medical History:   Diagnosis Date     Asthma      Atrial fibrillation (H)      CTS (carpal tunnel syndrome)      Diarrhea 2000    travelers' abstract     Diverticular disease of colon      Dry eye syndrome      Fibromyalgia      GERD (gastroesophageal reflux disease)      Hematuria 2000    abstract     HTN (hypertension)      COLON (nonalcoholic steatohepatitis)      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  Abdomen: benign, soft, nontender, no masses.      Pelvic Exam:  EG/BUS: Mons pubis and genitalia appear swollen, no further examination of labia completed  Three healing lesions noted on Mons Pubis, appear to be healing folliculitis but could pissibly be ulcerative lesion  Left buttock: 2 cm cluster of crusted over ulcers noted. Attempted to break crusts and collect samples for varicella and HSV      ASSESSMENT:   Encounter Diagnosis   Name Primary?     Blistering rash Yes          PLAN:   Orders Placed This Encounter   Procedures     Herpes Simplex Virus 1 and 2 IgG     Herpes Simplex Virus 1&2 by PCR     Varicella zoster virus by PCR     Orders Placed This Encounter   Medications     norethindrone (MICRONOR) 0.35 MG tablet     Sig: Take 1 tablet (0.35 mg) by mouth daily     Dispense:  90 tablet     Refill:  3     valACYclovir (VALTREX) 1000 mg tablet     Sig: Take 1 tablet (1,000 mg) by mouth daily for 5 days     Dispense:  5 tablet     Refill:  0      -Reviewed with Laxmi that blisters are likely HSV and recommended 1,000mg Valtrex for 5 days. Discussed the option for prophylactic daily therapy to prevent further outbreaks. Given that Laxmi is immunocompromised and is also taking immunosuppressive medications for her RA, this would likely help prevent further lesions/pain  -recommended cold compress, tylenol and ibuprofen for pain  -Follow up PCR and bloodtest results    SUNG Haider CNM    Time spent:  Chart review/Pre-chartin min on day of service  Face-to-face visit: 20  Documentation: 5  Total time spent on the day of service: 30 minutes    SUNG Haider CNM        Again, thank you for allowing me to participate in the care of " your patient.      Sincerely,    Maryellen Temple CNM

## 2024-07-24 NOTE — PATIENT INSTRUCTIONS
Thank you for trusting us with your care!   Please be aware, if you are on Mychart, you may see your results prior to your providers review. If labs are abnormal, we will call or message you on [employer] with a follow up plan.    If you need to contact us for questions about:  Symptoms, Scheduling & Medical Questions; Non-urgent (2-3 day response) Dataminr message, Urgent (needing response today) [phone] (if after 3:30pm next day response)   Prescriptions: Please call your Pharmacy   Billing: [facility] [phone] or [facility]:[phone]

## 2024-07-25 PROBLEM — B00.9 HSV-2 (HERPES SIMPLEX VIRUS 2) INFECTION: Status: ACTIVE | Noted: 2024-07-25

## 2024-07-25 PROBLEM — B00.9 HSV-1 (HERPES SIMPLEX VIRUS 1) INFECTION: Status: ACTIVE | Noted: 2024-07-25

## 2024-07-25 LAB
HSV1 DNA SPEC QL NAA+PROBE: NOT DETECTED
HSV1 IGG SERPL QL IA: 3.16 INDEX
HSV1 IGG SERPL QL IA: ABNORMAL
HSV2 DNA SPEC QL NAA+PROBE: DETECTED
HSV2 IGG SERPL QL IA: 10.6 INDEX
HSV2 IGG SERPL QL IA: ABNORMAL
VZV DNA SPEC QL NAA+PROBE: NOT DETECTED

## 2024-07-25 RX ORDER — VALACYCLOVIR HYDROCHLORIDE 1 G/1
1000 TABLET, FILM COATED ORAL DAILY
Qty: 90 TABLET | Refills: 3 | Status: SHIPPED | OUTPATIENT
Start: 2024-07-25 | End: 2024-08-20 | Stop reason: ALTCHOICE

## 2024-07-25 NOTE — TELEPHONE ENCOUNTER
[facility] Call Center    Phone Message    May a detailed message be left on voicemail: yes     Reason for Call: Medication Question or concern regarding medication   Prescription Clarification  Name of Medication:   valACYclovir (VALTREX) 1000 mg tablet       Prescribing Provider: Maryellen Temple CNM   Pharmacy: [facility] - Located in: [facility]  Address: [address] [place] [id number]  Hours:   Open ? Closes 6?PM  Phone: [phone]   What on the order needs clarification? Pt would like to redirect medication to this pharmacy. Pt request to be done asap as she is leaving out of town and [facility] states they don't have the med/Rx. Please contact pt once redirected. Thanks!      Action Taken: Other: KAT [facility] PAZ    Travel Screening: Not Applicable     Date of Service:                                                                      
Prescription was sent at 5pm yesterday (after this call).  
Yes

## 2024-07-29 ENCOUNTER — TELEPHONE (OUTPATIENT)
Dept: OBGYN | Facility: CLINIC | Age: 49
End: 2024-07-29
Payer: MEDICARE

## 2024-07-29 ENCOUNTER — MYC MEDICAL ADVICE (OUTPATIENT)
Dept: RHEUMATOLOGY | Facility: CLINIC | Age: 49
End: 2024-07-29
Payer: MEDICARE

## 2024-07-29 NOTE — TELEPHONE ENCOUNTER
TC: Called Laxmi, 48 yo G0 with multiple health issues including RA, fibromyalgia, type 2 DM who was seen in clinic recently with blisters on her left buttock that were swabbed and were shown to be HSV 2. 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   Laxmi was given 1,000mg Valtrex to take daily for suppressive therapy.   She is asking if she should be taking 2,000mg a day. She did take 2,000mg a day for two days initially.   Discussed that typically 1,000mg daily is enough to treat a recurrent outbreak, but that 2,000mg a day x 2 days may be needed for more severe outbreaks. Valtrex 1,000mg daily is what is typically given for suppressive therapy.    Laxmi has noticed more frequent outbreaks since she increased her immunosuppressive medication tocilizumab.     Laxmi is wondering if any of the cysts she has in her labia could be related to HSV. Discussed that HSV typically causes ulcers on the surface of the skin and not cysts deeper inside. Laxmi would like Dr. Pate to respond to this question. She also is wondering if Dr. Pate plans to test the cysts for HSV when she removes them. Will send a message to Dr. Pate per Laxmi's request.       SUNG Haider, LUIS

## 2024-07-30 ENCOUNTER — VIRTUAL VISIT (OUTPATIENT)
Dept: RHEUMATOLOGY | Facility: CLINIC | Age: 49
End: 2024-07-30
Attending: INTERNAL MEDICINE
Payer: MEDICARE

## 2024-07-30 DIAGNOSIS — Z79.4 TYPE 2 DIABETES MELLITUS WITH OTHER DIABETIC KIDNEY COMPLICATION, WITH LONG-TERM CURRENT USE OF INSULIN (H): ICD-10-CM

## 2024-07-30 DIAGNOSIS — B37.9 CANDIDA GLABRATA INFECTION: ICD-10-CM

## 2024-07-30 DIAGNOSIS — M06.09 SERONEGATIVE RHEUMATOID ARTHRITIS OF MULTIPLE SITES (H): Primary | ICD-10-CM

## 2024-07-30 DIAGNOSIS — B00.9 HSV-1 (HERPES SIMPLEX VIRUS 1) INFECTION: ICD-10-CM

## 2024-07-30 DIAGNOSIS — E11.29 TYPE 2 DIABETES MELLITUS WITH OTHER DIABETIC KIDNEY COMPLICATION, WITH LONG-TERM CURRENT USE OF INSULIN (H): ICD-10-CM

## 2024-07-30 NOTE — Clinical Note
7/30/2024       RE: Laxmi Ya  [address] [facility] [address]     Dear Colleague,    Thank you for referring your patient, Laxmi Ya, to the [facility] [place] [facility]. Please see a copy of my visit note below.    Medication Therapy Management (MTM) Encounter    ASSESSMENT:                            Medication Adherence/Access: {adherencechoices:044514}    ***:  ***      PLAN:                            ***    Follow-up: {followuptest2:681460}    SUBJECTIVE/OBJECTIVE:                          Laxmi Ya is a 49 year old female seen for a follow-up visit.       Reason for visit: Questions about starting valacyclovir     Allergies/ADRs: Reviewed in chart  Past Medical History: Reviewed in chart  Tobacco: She reports that she has never smoked. She has never used smokeless tobacco.  Alcohol: Less than 1 beverages / week    Medication Adherence/Access: {fumedadherence:209436}    Rheumatoid Arthritis:   Actemra 800 mg infusion monthly (last infusion 7/16/24)   Diclofenac 1% gel as needed (uses once per day a few times per week)  Oxycodone 10 mg 2-4 times daily as needed     Reports she is overall doing well - still having some pain in her back, hands, neck, and knees. Tolerating Actemra well - no side effects noted. Does get high blood sugars from solu-medrol but is on lowest dose possible (40 mg) - blood sugars usually spike to 300s. Wondering if she would be able to tolerate the infusion without any solu-medrol to help reduce blood sugar spike on day of infusion. Had labs done earlier this week and noted her kidney function was much lower than expected. Would like to know if anything needs to be done due to this. Did not take any NSAIDs, did not drink as much water as normal on day of labs.     Liver Function Studies -       Recent Labs   Lab Test 07/16/24  1247   PROTTOTAL 6.4   ALBUMIN 4.4   BILITOTAL 0.6   ALKPHOS 45  "  AST 36   ALT 57*      CBC RESULTS:       Recent Labs   Lab Test 07/16/24  1247   WBC 10.7   RBC 4.78   HGB 14.5   HCT 41.3   MCV 86   MCH 30.3   MCHC 35.1   RDW 13.0         Type 2 Diabetes:    Lantus 37 units daily  Humalog 16 units three times daily with meals and correction dose 1:25 for BG >120  NPH insulin 70 units at time of infusion and 20 units six hours post infusion, 20 units at bedtime day of infusion, 20 units on the first day post infusion, and 20 units for days two and three after the infusion   Mounjaro 2.5 mg weekly (not started yet)     Patient reports no current medication side effects.  Blood sugar monitoring: 3-6 time(s) daily - Follows closely with  diabetes education for monitoring  Current diabetes symptoms: Frequent hyperglycemia, polyuria after infusions or steroid tapers     Did receive Mounjaro 2.5 mg strength but has not started this yet. Would like to make sure that medication will not interact with any of her other medications. Also would like to review how it works and side effects prior to starting. Hoping the medication can help reduce the amount of insulin she has to take daily.     HSV:  Valacyclovir 1000 mg daily    Reports she had an HSV flare on her buttock and was placed on high dose valacyclovir (2000 mg) to treat this. Was now told that she should be taking a daily preventative.     Today's Vitals: There were no vitals taken for this visit.  ----------------  {JOANN?:020334}    I spent {mt total time 3:236502} with this patient today. { :989869}. A copy of the visit note was provided to the patient's provider(s).    A summary of these recommendations {GIVEN/NOT GIVEN:342680}.    ***    Telemedicine Visit Details  Type of service:  {telemedvisitmtm:050894::\"Telephone visit\"}  Start Time: {video/phone visit start time:152948}  End Time: {video/phone visit end time:152948}     Medication Therapy Recommendations  No medication therapy recommendations to display "       Medication Therapy Management (MTM) Encounter    ASSESSMENT:                            Medication Adherence/Access: No issues identified    Rheumatoid Arthritis: Stable.    Type 2 Diabetes: Discussed ideally starting Mounjaro as soon as able but due to patient preference to not start multiple medications at once, recommend starting Mounjaro the week of 8/12 to give her a full week on valacyclovir and voriconazole and ensure minimal or no side effects on this combination. Recommended administering Mounjaro on any day except Tuesdays to avoid taking on the same day as Actemra infusions.      HSV/Candida Glabrata Infection: Reviewed existing interactions between voriconazole and her current medications and reviewed there are no new interactions with the addition of Mounjaro and valacyclovir. Advised continuing valacyclovir 1000 mg daily through the weekend to ensure no side effects then starting 2 week course of voriconazole on 8/5.    PLAN:                            1. It is safe to take voriconazole, valacyclovir, and Mounjaro together - there is no drug interactions with this combination.    2. I recommend starting your new medications as follows:  - Continue valacyclovir 1000 mg daily  - Start voriconazole treatment on 8/5 for 2 weeks  - Start Mounjaro weekly during the week of 8/12 - take this any day except Tuesdays    3. Once you start the voriconazole decrease oxycodone dose by 50% and for 5 days after finishing voriconazole. If you need the full 10 mg dose due to increased pain, recommend extending dosing interval from every 6 hours to every 8 hours as needed for pain.    Follow-up: {followuptest2:349223}    SUBJECTIVE/OBJECTIVE:                          Laxmi Ya is a 49 year old female seen for a follow-up visit.       Reason for visit: Questions about starting valacyclovir     Allergies/ADRs: Reviewed in chart  Past Medical History: Reviewed in chart  Tobacco: She reports that she has never  smoked. She has never used smokeless tobacco.  Alcohol: Less than 1 beverages / week    Medication Adherence/Access: no issues reported    Rheumatoid Arthritis:   Actemra 800 mg infusion monthly (last infusion 7/16/24)   Diclofenac 1% gel as needed (uses once per day a few times per week)  Oxycodone 10 mg 2-4 times daily as needed     Reports she is overall doing well - still having some pain in her back, hands, neck, and knees. Tolerating Actemra well - no side effects noted. Renal function improved on lab recheck.     Liver Function Studies -   Recent Labs   Lab Test 07/24/24  1532   PROTTOTAL 6.6   ALBUMIN 4.4   BILITOTAL 0.7   ALKPHOS 49   AST 48*   ALT 70*     CBC RESULTS:   Recent Labs   Lab Test 07/16/24  1247   WBC 10.7   RBC 4.78   HGB 14.5   HCT 41.3   MCV 86   MCH 30.3   MCHC 35.1   RDW 13.0        Type 2 Diabetes:    Lantus 37 units daily  Humalog 16 units three times daily with meals and correction dose 1:25 for BG >120  NPH insulin 70 units at time of infusion and 20 units six hours post infusion, 20 units at bedtime day of infusion, 20 units on the first day post infusion, and 20 units for days two and three after the infusion   Mounjaro 2.5 mg weekly (not started yet)     Patient reports no current medication side effects.  Blood sugar monitoring: 3-6 time(s) daily - Follows closely with  diabetes education for monitoring  Current diabetes symptoms: Frequent hyperglycemia, polyuria after infusions or steroid tapers     Did receive Mounjaro 2.5 mg strength but has not started this yet. Concerned about when to start this now that she needs to start valacyclovir daily. Wants to avoid duplicative side effects - concerned about possible GI side effects.     HSV/Candida Glabrata Infection:  Valacyclovir 1000 mg daily  Voriconazole 400 mg twice daily day 1 then 200 mg twice daily days 2-14    Reports she had an HSV flare on her buttock and was placed on high dose valacyclovir (2000 mg) to treat  this. Was now told that she should be taking a daily preventative. Has not started voriconazole yet - wondering if it is safe to take with valacyclovir and when to start it now.    Saw Mary Aleman, AugustineD 25 who noted:    Discussed drug interactions in detail. Due to short treatment course, do not need to reduce dose of steroids (prednisone, Trelegy, mometasone). Has history of GI ulcers on lower omeprazole dose so do not recommend reducing omeprazole dose.     Most concerning interaction is oxycodone. Coadministration of oxycodone with strong CY inhibitors has been shown to increase oxycodone exposure in several studies. The oxycodone AUC increased 3.6-fold with voriconazole. Recommend lowering the dose while taking voriconazole and counseled on monitoring for side effects.      Recommend cutting oxycodone dose by 50% while taking voriconazole and for 5 days after finishing oxycodone due to unpredictable elimination. If she needs the full 10 mg dose due to inadequate pain control,  recommend extending dosing interval from every 6 hours to every 8 hours as needed for pain. She has a pain management appointment tomorrow and plans to discuss with them.    Today's Vitals: There were no vitals taken for this visit.  ----------------    I spent 30 minutes with this patient today. All changes were made via collaborative practice agreement with Oswaldo Lemus MD. A copy of the visit note was provided to the patient's provider(s).    A summary of these recommendations was sent via [employer].    Yuliya Kaplan, PharmD  Medication Therapy Management Pharmacist  [facility]  Phone: [phone]    Telemedicine Visit Details  Type of service:  Telephone visit  Start Time: 10:30 AM  End Time: 11:00 AM     Medication Therapy Recommendations  No medication therapy recommendations to display         Again, thank you for allowing me to participate in the care of your patient.       Sincerely,    DOMINGUEZ BAPTISTE, [facility]

## 2024-08-01 ENCOUNTER — PRE VISIT (OUTPATIENT)
Dept: OTOLARYNGOLOGY | Facility: CLINIC | Age: 49
End: 2024-08-01

## 2024-08-04 NOTE — PROGRESS NOTES
Medication Therapy Management (MTM) Encounter    ASSESSMENT:                            Medication Adherence/Access: No issues identified    Rheumatoid Arthritis: Stable.    Type 2 Diabetes: Discussed ideally starting Mounjaro as soon as able but due to patient preference to not start multiple medications at once, recommend starting Mounjaro the week of 8/12 to give her a full week on valacyclovir and voriconazole and ensure minimal or no side effects on this combination. Recommended administering Mounjaro on any day except Tuesdays to avoid taking on the same day as Actemra infusions.      HSV/Candida Glabrata Infection: Reviewed existing interactions between voriconazole and her current medications and reviewed there are no new interactions with the addition of Mounjaro and valacyclovir. Advised continuing valacyclovir 1000 mg daily through the weekend to ensure no side effects then starting 2 week course of voriconazole on 8/5.    PLAN:                            1. It is safe to take voriconazole, valacyclovir, and Mounjaro together - there is no drug interactions with this combination.    2. I recommend starting your new medications as follows:  - Continue valacyclovir 1000 mg daily  - Start voriconazole treatment on 8/5 for 2 weeks  - Start Mounjaro weekly during the week of 8/12 - take this any day except Tuesdays    3. Once you start the voriconazole decrease oxycodone dose by 50% and for 5 days after finishing voriconazole. If you need the full 10 mg dose due to increased pain, recommend extending dosing interval from every 6 hours to every 8 hours as needed for pain.    Follow-up: Return in about 6 months (around 1/30/2025) for MTM Pharmacist Visit.    SUBJECTIVE/OBJECTIVE:                          Laxmi Ya is a 49 year old female seen for a follow-up visit.       Reason for visit: Questions about starting valacyclovir     Allergies/ADRs: Reviewed in chart  Past Medical History: Reviewed in  chart  Tobacco: She reports that she has never smoked. She has never used smokeless tobacco.  Alcohol: Less than 1 beverages / week    Medication Adherence/Access: no issues reported    Rheumatoid Arthritis:   Actemra 800 mg infusion monthly (last infusion 7/16/24)   Diclofenac 1% gel as needed (uses once per day a few times per week)  Oxycodone 10 mg 2-4 times daily as needed     Reports she is overall doing well - still having some pain in her back, hands, neck, and knees. Tolerating Actemra well - no side effects noted. Renal function improved on lab recheck.     Liver Function Studies -   Recent Labs   Lab Test 07/24/24  1532   PROTTOTAL 6.6   ALBUMIN 4.4   BILITOTAL 0.7   ALKPHOS 49   AST 48*   ALT 70*     CBC RESULTS:   Recent Labs   Lab Test 07/16/24  1247   WBC 10.7   RBC 4.78   HGB 14.5   HCT 41.3   MCV 86   MCH 30.3   MCHC 35.1   RDW 13.0        Type 2 Diabetes:    Lantus 37 units daily  Humalog 16 units three times daily with meals and correction dose 1:25 for BG >120  NPH insulin 70 units at time of infusion and 20 units six hours post infusion, 20 units at bedtime day of infusion, 20 units on the first day post infusion, and 20 units for days two and three after the infusion   Mounjaro 2.5 mg weekly (not started yet)     Patient reports no current medication side effects.  Blood sugar monitoring: 3-6 time(s) daily - Follows closely with  diabetes education for monitoring  Current diabetes symptoms: Frequent hyperglycemia, polyuria after infusions or steroid tapers     Did receive Mounjaro 2.5 mg strength but has not started this yet. Concerned about when to start this now that she needs to start valacyclovir daily. Wants to avoid duplicative side effects - concerned about possible GI side effects.     HSV/Candida Glabrata Infection:  Valacyclovir 1000 mg daily  Voriconazole 400 mg twice daily day 1 then 200 mg twice daily days 2-14    Reports she had an HSV flare on her buttock and was placed  on high dose valacyclovir (2000 mg) to treat this. Was now told that she should be taking a daily preventative. Has not started voriconazole yet - wondering if it is safe to take with valacyclovir and when to start it now.    Saw Mary Aleman, AugustineD 25 who noted:    Discussed drug interactions in detail. Due to short treatment course, do not need to reduce dose of steroids (prednisone, Trelegy, mometasone). Has history of GI ulcers on lower omeprazole dose so do not recommend reducing omeprazole dose.     Most concerning interaction is oxycodone. Coadministration of oxycodone with strong CY inhibitors has been shown to increase oxycodone exposure in several studies. The oxycodone AUC increased 3.6-fold with voriconazole. Recommend lowering the dose while taking voriconazole and counseled on monitoring for side effects.      Recommend cutting oxycodone dose by 50% while taking voriconazole and for 5 days after finishing oxycodone due to unpredictable elimination. If she needs the full 10 mg dose due to inadequate pain control,  recommend extending dosing interval from every 6 hours to every 8 hours as needed for pain. She has a pain management appointment tomorrow and plans to discuss with them.    Today's Vitals: There were no vitals taken for this visit.  ----------------    I spent 30 minutes with this patient today. All changes were made via collaborative practice agreement with Oswaldo Lemus MD. A copy of the visit note was provided to the patient's provider(s).    A summary of these recommendations was sent via [employer].    Augustine ConnerD  Medication Therapy Management Pharmacist  [facility]  Phone: [phone]    Telemedicine Visit Details  Type of service:  Telephone visit  Start Time: 10:30 AM  End Time: 11:00 AM     Medication Therapy Recommendations  HSV-1 (herpes simplex virus 1) infection    Current Medication: valACYclovir (VALTREX) 1000 mg tablet    Rationale: Does not understand instructions - Adherence - Adherence   Recommendation: Provide Education - Take daily and start other medications as directed (see note)   Status: Patient Agreed - Adherence/Education

## 2024-08-04 NOTE — PATIENT INSTRUCTIONS
"Recommendations from today's MTM visit:                                                       1. It is safe to take voriconazole, valacyclovir, and Mounjaro together - there is no drug interactions with this combination.    2. I recommend starting your new medications as follows:  - Continue valacyclovir 1000 mg daily  - Start voriconazole treatment on 8/5 for 2 weeks  - Start Mounjaro weekly during the week of 8/12 - take this any day except Tuesdays    3. Once you start the voriconazole decrease oxycodone dose by 50% and for 5 days after finishing voriconazole. If you need the full 10 mg dose due to increased pain, recommend extending dosing interval from every 6 hours to every 8 hours as needed for pain.    Follow-up: Return in about 6 months (around 1/30/2025) for MTM Pharmacist Visit.    It was great speaking with you today.  I value your experience and would be very thankful for your time in providing feedback in our clinic survey. In the next few days, you may receive an email or text message from [employer] with a link to a survey related to your  clinical pharmacist.\"     To schedule another MTM appointment, please call the clinic directly or you may call the MTM scheduling line at [phone].    My Clinical Pharmacist's contact information:                                                      Please feel free to contact me with any questions or concerns you have.      Yuliya Kaplan, PharmD  Medication Therapy Management Pharmacist  [facility]  Phone: [phone]     "

## 2024-08-05 ENCOUNTER — TELEPHONE (OUTPATIENT)
Dept: OBGYN | Facility: CLINIC | Age: 49
End: 2024-08-05
Payer: MEDICARE

## 2024-08-05 DIAGNOSIS — B00.9 HSV-1 (HERPES SIMPLEX VIRUS 1) INFECTION: Primary | ICD-10-CM

## 2024-08-05 DIAGNOSIS — J33.9 NASAL POLYPS: ICD-10-CM

## 2024-08-05 DIAGNOSIS — J32.4 CHRONIC PANSINUSITIS: ICD-10-CM

## 2024-08-05 RX ORDER — ACYCLOVIR 400 MG/1
400 TABLET ORAL EVERY 12 HOURS
Qty: 180 TABLET | Refills: 3 | Status: SHIPPED | OUTPATIENT
Start: 2024-08-05 | End: 2024-08-20 | Stop reason: ALTCHOICE

## 2024-08-05 RX ORDER — DUPILUMAB 300 MG/2ML
INJECTION, SOLUTION SUBCUTANEOUS
Qty: 4 ML | Refills: 2 | Status: SHIPPED | OUTPATIENT
Start: 2024-08-05

## 2024-08-05 NOTE — CONFIDENTIAL NOTE
Pt calls w GI disturbance from daily suppression valtrex. Tolerated meds for 10 days, asking for an alternative med per RN triage.  Twila Jacome, DNP, APRN, CNM, FACNM

## 2024-08-05 NOTE — TELEPHONE ENCOUNTER
[facility]    Phone Message    May a detailed message be left on voicemail: yes     Reason for Call: Other: . Pt stated she has been taking Valtrex for about 10 days, she stopped taking it on 8/2 due to GI problems and severe headaches she was having. Pt is wondering if there is an alternative that could be prescribed, please reach out to Laxmi, thank you     Action Taken: Message routed to:  Other: obgyn    Travel Screening: Not Applicable     Date of Service:

## 2024-08-07 DIAGNOSIS — M79.644 BILATERAL THUMB PAIN: Primary | ICD-10-CM

## 2024-08-07 DIAGNOSIS — M79.645 BILATERAL THUMB PAIN: Primary | ICD-10-CM

## 2024-08-09 ENCOUNTER — TELEPHONE (OUTPATIENT)
Dept: OBGYN | Facility: CLINIC | Age: 49
End: 2024-08-09
Payer: MEDICARE

## 2024-08-12 ENCOUNTER — VIRTUAL VISIT (OUTPATIENT)
Dept: ENDOCRINOLOGY | Facility: CLINIC | Age: 49
End: 2024-08-12
Payer: MEDICARE

## 2024-08-12 DIAGNOSIS — E27.49 SECONDARY ADRENAL INSUFFICIENCY (H): ICD-10-CM

## 2024-08-12 PROCEDURE — G2211 COMPLEX E/M VISIT ADD ON: HCPCS | Mod: 95 | Performed by: INTERNAL MEDICINE

## 2024-08-12 PROCEDURE — 99214 OFFICE O/P EST MOD 30 MIN: CPT | Mod: 95 | Performed by: INTERNAL MEDICINE

## 2024-08-12 RX ORDER — PREDNISONE 5 MG/1
5 TABLET ORAL DAILY
Qty: 30 TABLET | Refills: 5 | Status: SHIPPED | OUTPATIENT
Start: 2024-08-12

## 2024-08-12 NOTE — PROGRESS NOTES
Virtual Visit Details    Type of service:  Video Visit   Video Start Time: 3:37 PM  Video End Time:3:52 PM    Originating Location (pt. Location): Home  Distant Location (provider location):  Off-site  Platform used for Video Visit: Howard      Recent issues:  Adrenal follow-up evaluation  Previous low cortisol levels, presumed due to concurrent or prior dosing of steroid medication  Had repeat labs showed low cortisol and low ACTH levels, indicating secondary adrenal insufficiency.  Started Prednisone in 5/2024, reports feeling much better energy and muscle strength  Had recent lower back/buttoch rash, then diagnosis of herpes rash and med treatment        Previous health history includes sero negative rheumatoid arthritis, asthma, T2DM  She has seen health care providers including [facility], [facility], [facility], [facility], and [facility]  Had also seen Dr. Alan Saleh/[facility] rheumatology  Med evaluations with Dr. Susan Pena/[facility], evaluations focused on management of recurrent cystitis, vaginitis  Treatment with antifungal and antibiotic medications  Patient recalls having lab tests done per Dr. Pena ~2020 showing abnormal adrenal results   Advised to see an endocrinologist for further evaluation   Unfortunately, details of these tests, results, concerns not available today    Additional medical evaluations with other providers, including Dr. No Fitzgerald/ OBGYN, Dr. Oswaldo Lemus/[facility]  RA treatment with periodic Actemra infusions (w/ methylprednisolone infusion)  Has used Medrol dose packs (5d) for flares of her RA or asthma or pneumonia  Previous [facility] labs include:   Latest Reference Range & Units 11/29/23 12:36 12/20/23 14:28   Cortisol Serum ug/dL 0.5 2.6      Latest Reference Range & Units 11/29/23 12:36 12/20/23 14:28   Adrenal Corticotropin <47 pg/mL <10 <10      Latest Reference Range & Units 11/29/23 12:45   Cortisol Free Urine Random ug/L <1.00     ~2/7/24.  Last Medtrol steroid course completed  3/2024, Started hydrocortisone 5 mg BID, but patient felt jittery and stopped it  5/13/24 Head MRI pituitary ([facility]):   Normal appearance of pituitary gland  5/2024. Started Prednisone 5 mg daily   Has noticed less fatigue, less muscle weakness/fatigue  Had rashes on back, buttock then testing reportedly showed herpes virus   Treatment with valcyclovir, the changed to acyclovir medication... rashes better  Recent [facility] labs include:  Lab Results   Component Value Date    FSH 5.2 03/11/2024    ACTH <10 02/27/2024    KAREY 0.5 02/27/2024    T4 1.32 12/20/2023    SG 1.019 07/16/2024      Current dose:  Prednisone 5 mg daily      Lives in [place], [place]  Sees Dr. Amol Juares/[facility] for general medicine evaluations.  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    Obesity     Pyelonephritis, unspecified 1992    abstract    Rheumatoid arteritis (H)     Seronegative inflammatory arthritis 06/17/2022    Type 2 diabetes mellitus (H)     Unspecified symptom associated with female genital organs 05/07/1999    chronic pelvic pain abstract     Past Surgical History:   Procedure Laterality Date    CHOLECYSTECTOMY      COLON SURGERY      Left hemicolectomy for diverticulosis    CYSTOSCOPY,+URETEROSCOPY      abstract    ZZHC EXCIS/CURET BENIGN ELBOW LESN  10/26/1987    (R) humerus abstract       Family Hx:  Family History   Problem  Relation Age of Onset    Hypertension Father         abstract    Cancer Paternal Aunt         gallbladder cancer abstract         Social Hx:  Social History     Socioeconomic History    Marital status: Single     Spouse name: Not on file    Number of children: Not on file    Years of education: Not on file    Highest education level: Not on file   Occupational History    Not on file   Tobacco Use    Smoking status: Never    Smokeless tobacco: Never   Substance and Sexual Activity    Alcohol use: Yes     Comment: RARE    Drug use: No    Sexual activity: Not on file   Other Topics Concern     Service Not Asked    Blood Transfusions Not Asked    Caffeine Concern Not Asked    Occupational Exposure Not Asked    Hobby Hazards Not Asked    Sleep Concern Not Asked    Stress Concern Not Asked    Weight Concern Not Asked    Special Diet Not Asked    Back Care Not Asked    Exercise No    Bike Helmet Not Asked    Seat Belt No    Self-Exams No   Social History Narrative    Womens Health Kit given.         Social Determinants of Health     Financial Resource Strain: Medium Risk (3/13/2024)    [facility]    Financial Resource Strain     Difficulty of Paying Living Expenses: 2     Difficulty of Paying Living Expenses: 1   Food Insecurity: No Food Insecurity (5/29/2024)    Received from [facility]    Hunger Vital Sign     Worried About Running Out of Food in the Last Year: Never true     Ran Out of Food in the Last Year: Never true   Transportation Needs: No Transportation Needs (5/29/2024)    Received from [facility]    PRAPARE - Transportation     Lack of Transportation (Medical): No     Lack of Transportation (Non-Medical): No   Physical Activity: Inactive (5/29/2024)    Received from [facility]    Exercise Vital Sign     Days of Exercise per Week: 0 days     Minutes of Exercise per Session: 0 min   Stress: No Stress Concern  Present (2/24/2023)    [facility], [facility]    [employer] - Occupational Stress Questionnaire     Feeling of Stress : Not at all   Social Connections: Socially Integrated (3/13/2024)    [facility], [facility]    Social Connections     Frequency of Communication with Friends and Family: 0   Interpersonal Safety: Not At Risk (2/24/2023)    Received from [facility], [facility]    Humiliation, Afraid, Rape, and Kick questionnaire     Fear of Current or Ex-Partner: No     Emotionally Abused: No     Physically Abused: No     Sexually Abused: No   Housing Stability: Low Risk  (5/29/2024)    Received from [facility]    Housing Stability     What is your living situation today?: I have a steady place to live          MEDICATIONS:  has a current medication list which includes the following prescription(s): acyclovir, albuterol, soothe xp, atenolol, benzoyl peroxide, diclofenac, dupixent, fexofenadine, fidaxomicin, trelegy ellipta, insulin glargine, insulin lispro, lidocaine, lorazepam, losartan, methylprednisolone, mometasone, montelukast sodium, multivitamin w/minerals, norethindrone, pataday, omeprazole, oxycodone ir, prednisone, rifaximin, spironolactone-hctz, actemra, blood glucose, blood glucose monitoring, ciclopirox, onetouch delica plus [id number], nystatin, rosuvastatin, tirzepatide, valacyclovir, valacyclovir, valacyclovir, and voriconazole, and the following Facility-Administered Medications: lidocaine (pf), lidocaine (pf), triamcinolone, and triamcinolone.    ROS:     ROS: 10 point ROS neg other than the symptoms noted above in the HPI.    GENERAL: some fatigue, wt stable; denies fevers, chills, night sweats.   HEENT: sinus drainage; no dysphagia, odonophagia, diplopia, neck pain  THYROID:  no apparent hyper or hypothyroid symptoms  CV: no chest pain, pressure, palpitations  LUNGS:  some dyspnea; no SOB, SERNA, cough, wheezing   ABDOMEN: no diarrhea, constipation, abdominal pain  EXTREMITIES: no rashes, ulcers, edema  NEUROLOGY: no headaches, denies changes in vision, tingling, extremitiy numbness   MSK: improved muscle strength, polyarthralgias- pains at neck, hands, hips, ankles  SKIN: lower back rash as noted, abdominal striae  : no menses with daily [place]   PSYCH:  stable mood, no significant anxiety or depression  ENDOCRINE: no heat or cold intolerance    Physical Exam (visual exam)  VS:  no vital signs taken for video visit  CONSTITUTIONAL: healthy, alert and NAD, well dressed, answering questions appropriately  ENT: no nose swelling or nasal discharge, mouth redness or gum changes.  EYES: eyes grossly normal to inspection, conjunctivae and sclerae normal, no exophthalmos or proptosis  THYROID:  no apparent nodules or goiter  LUNGS: no audible wheeze, cough or visible cyanosis, no visible retractions or increased work of breathing  ABDOMEN: abdomen not evaluated  EXTREMITIES: no hand tremors, limited exam  NEUROLOGY: CN grossly intact, mentation intact and speech normal   SKIN:  no apparent skin lesions, rash, or edema with visualized skin appearance  PSYCH: mentation appears normal, affect normal/bright, judgement and insight intact,   normal speech and appearance well groomed    LABS:    All pertinent notes, labs, and images personally reviewed by me.     A/P:  Encounter Diagnosis   Name Primary?    Secondary adrenal insufficiency (H24)        Comments:  Reviewed the complicated health history and general endocrine issues.  Difficult to understand the endocrine concern by her [facility] provider from 3-yrs ago  Low cortisol levels likely due to transient vs chronic suppression of pituitary-adrenal hormone axis, or hypopituitarism    Plan:  Reviewed the adrenal gland, general anatomy and hormone secretion  We discussed adrenal lab testing   Discussed steroid medication options,  dosing    Recommend:  Continue current Prednisone 5 mg daily dose plan  Consider future gradual dose reduction/taper, as tolerated  If future glucocorticoid med treatment for asthma or arthritis, may use Medrol, Pred, or HC   Will defer respiratory and rheumatology management to her other specialists  Monitor for symptom changes  No adrenal gland imaging needed at this time  Keep focus on diet, exercise (as tolerated) and weight management  Consider dietician evaluation  Follow-up diabetes evaluations with her PCP Dr. Juares   She is considering future use of Mounjaro-like medications    Addressed patient questions today    The longitudinal plan of care for the endocrine problem(s) were addressed during this visit.  Due to added complexity of care,   we will continue to support the patient and the subsequent management of this condition with ongoing continuity of care.    There are no Patient Instructions on file for this visit.    Future labs ordered today: No orders of the defined types were placed in this encounter.    Radiology/Consults ordered today: None    Total time spent on day of encounter:  26 min    Follow-up:  12/16/24 at 2pm, Ja Leroy MD, MS  Endocrinology  [facility]    CC: Care Team

## 2024-08-13 ENCOUNTER — INFUSION THERAPY VISIT (OUTPATIENT)
Dept: INFUSION THERAPY | Facility: CLINIC | Age: 49
End: 2024-08-13
Attending: INTERNAL MEDICINE
Payer: MEDICARE

## 2024-08-13 ENCOUNTER — TELEPHONE (OUTPATIENT)
Dept: OBGYN | Facility: CLINIC | Age: 49
End: 2024-08-13

## 2024-08-13 VITALS
RESPIRATION RATE: 16 BRPM | BODY MASS INDEX: 37.2 KG/M2 | DIASTOLIC BLOOD PRESSURE: 63 MMHG | HEART RATE: 61 BPM | WEIGHT: 230.5 LBS | SYSTOLIC BLOOD PRESSURE: 123 MMHG | OXYGEN SATURATION: 98 % | TEMPERATURE: 98.4 F

## 2024-08-13 DIAGNOSIS — M13.80 SERONEGATIVE INFLAMMATORY ARTHRITIS: Primary | ICD-10-CM

## 2024-08-13 PROCEDURE — 258N000003 HC RX IP 258 OP 636: Performed by: INTERNAL MEDICINE

## 2024-08-13 PROCEDURE — 250N000011 HC RX IP 250 OP 636: Performed by: INTERNAL MEDICINE

## 2024-08-13 PROCEDURE — 250N000013 HC RX MED GY IP 250 OP 250 PS 637: Performed by: INTERNAL MEDICINE

## 2024-08-13 PROCEDURE — 96365 THER/PROPH/DIAG IV INF INIT: CPT

## 2024-08-13 PROCEDURE — 96375 TX/PRO/DX INJ NEW DRUG ADDON: CPT

## 2024-08-13 RX ORDER — HEPARIN SODIUM,PORCINE 10 UNIT/ML
5-20 VIAL (ML) INTRAVENOUS DAILY PRN
Status: CANCELLED | OUTPATIENT
Start: 2024-08-15

## 2024-08-13 RX ORDER — METHYLPREDNISOLONE SODIUM SUCCINATE 40 MG/ML
40 INJECTION, POWDER, LYOPHILIZED, FOR SOLUTION INTRAMUSCULAR; INTRAVENOUS ONCE
Status: COMPLETED | OUTPATIENT
Start: 2024-08-13 | End: 2024-08-13

## 2024-08-13 RX ORDER — ALBUTEROL SULFATE 90 UG/1
1-2 AEROSOL, METERED RESPIRATORY (INHALATION)
Status: CANCELLED
Start: 2024-08-15

## 2024-08-13 RX ORDER — EPINEPHRINE 1 MG/ML
0.3 INJECTION, SOLUTION INTRAMUSCULAR; SUBCUTANEOUS EVERY 5 MIN PRN
Status: CANCELLED | OUTPATIENT
Start: 2024-08-15

## 2024-08-13 RX ORDER — ACETAMINOPHEN 325 MG/1
650 TABLET ORAL ONCE
Status: COMPLETED | OUTPATIENT
Start: 2024-08-13 | End: 2024-08-13

## 2024-08-13 RX ORDER — METHYLPREDNISOLONE SODIUM SUCCINATE 125 MG/2ML
125 INJECTION, POWDER, LYOPHILIZED, FOR SOLUTION INTRAMUSCULAR; INTRAVENOUS
Status: CANCELLED
Start: 2024-08-15

## 2024-08-13 RX ORDER — DIPHENHYDRAMINE HYDROCHLORIDE 50 MG/ML
50 INJECTION INTRAMUSCULAR; INTRAVENOUS
Status: CANCELLED
Start: 2024-08-15

## 2024-08-13 RX ORDER — MEPERIDINE HYDROCHLORIDE 25 MG/ML
25 INJECTION INTRAMUSCULAR; INTRAVENOUS; SUBCUTANEOUS EVERY 30 MIN PRN
Status: CANCELLED | OUTPATIENT
Start: 2024-08-15

## 2024-08-13 RX ORDER — METHYLPREDNISOLONE SODIUM SUCCINATE 125 MG/2ML
40 INJECTION, POWDER, LYOPHILIZED, FOR SOLUTION INTRAMUSCULAR; INTRAVENOUS ONCE
Status: CANCELLED | OUTPATIENT
Start: 2024-08-15

## 2024-08-13 RX ORDER — HEPARIN SODIUM (PORCINE) LOCK FLUSH IV SOLN 100 UNIT/ML 100 UNIT/ML
5 SOLUTION INTRAVENOUS
Status: CANCELLED | OUTPATIENT
Start: 2024-08-15

## 2024-08-13 RX ORDER — DOXYCYCLINE HYCLATE 100 MG/1
1 TABLET, DELAYED RELEASE ORAL 2 TIMES DAILY
COMMUNITY
End: 2024-08-28

## 2024-08-13 RX ORDER — ACETAMINOPHEN 325 MG/1
650 TABLET ORAL ONCE
Status: CANCELLED | OUTPATIENT
Start: 2024-08-15

## 2024-08-13 RX ORDER — ALBUTEROL SULFATE 0.83 MG/ML
2.5 SOLUTION RESPIRATORY (INHALATION)
Status: CANCELLED | OUTPATIENT
Start: 2024-08-15

## 2024-08-13 RX ADMIN — TOCILIZUMAB 800 MG: 20 INJECTION, SOLUTION, CONCENTRATE INTRAVENOUS at 13:21

## 2024-08-13 RX ADMIN — METHYLPREDNISOLONE SODIUM SUCCINATE 40 MG: 40 INJECTION INTRAMUSCULAR; INTRAVENOUS at 13:01

## 2024-08-13 RX ADMIN — ACETAMINOPHEN 650 MG: 325 TABLET ORAL at 13:00

## 2024-08-13 RX ADMIN — SODIUM CHLORIDE 250 ML: 9 INJECTION, SOLUTION INTRAVENOUS at 14:25

## 2024-08-13 ASSESSMENT — PAIN SCALES - GENERAL: PAINLEVEL: WORST PAIN (10)

## 2024-08-13 NOTE — PROGRESS NOTES
Infusion Nursing Note:  Laxmi JUDITH Felton presents today for Actemra.    Patient seen by provider today: No   present during visit today: Not Applicable.    Note: Pre-medications given as ordered. Pt self-administers home insulin prior to steroids. Actemra infused over 1 hour, then 250ml NS bolus infused over 20 mins per pt preference.    Intravenous Access:  Peripheral IV placed by VAT.    Treatment Conditions:  Biological Infusion Checklist:  ~~~ NOTE: If the patient answers yes to any of the questions below, hold the infusion and contact ordering provider or on-call provider.    Have you recently had an elevated temperature, fever, chills, productive cough, coughing for 3 weeks or longer or hemoptysis,  abnormal vital signs, night sweats,  chest pain or have you noticed a decrease in your appetite, unexplained weight loss or fatigue? No  Do you have any open wounds or new incisions? No  Do you have any upcoming hospitalizations or surgeries? Does not include esophagogastroduodenoscopy, colonoscopy, endoscopic retrograde cholangiopancreatography (ERCP), endoscopic ultrasound (EUS), dental procedures or joint aspiration/steroid injections No  Do you currently have any signs of illness or infection or are you on any antibiotics? Yes, Doxycycline prophylactic for eyes  Have you had any new, sudden or worsening abdominal pain? No  Have you or anyone in your household received a live vaccination in the past 4 weeks? Please note: No live vaccines while on biologic/chemotherapy until 6 months after the last treatment. Patient can receive the flu vaccine (shot only), pneumovax and the Covid vaccine. It is optimal for the patient to get these vaccines mid cycle, but they can be given at any time as long as it is not on the day of the infusion. No  Have you recently been diagnosed with any new nervous system diseases (ie. Multiple sclerosis, Guillain [place], seizures, neurological changes) or cancer diagnosis? Are  you on any form of radiation or chemotherapy? No  Are you pregnant or breast feeding or do you have plans of pregnancy in the future? No  Have you been having any signs of worsening depression or suicidal ideations?  (benlysta only) N/A  Have there been any other new onset medical symptoms? No  Have you had any new blood clots? (IVIG only) N/A    /83 (BP Location: Right arm, Patient Position: Semi-Stephens's, Cuff Size: Adult Regular)   Pulse 65   Temp 98.4  F (36.9  C) (Oral)   Resp 16   Wt 104.6 kg (230 lb 8 oz)   SpO2 98%   BMI 37.20 kg/m      Administrations This Visit       acetaminophen (TYLENOL) tablet 650 mg       Admin Date  08/13/2024 Action  $Given Dose  650 mg Route  Oral Documented By  Beverly Lyle RN              methylPREDNISolone sodium succinate (SOLU-MEDROL) injection 40 mg       Admin Date  08/13/2024 Action  $Given Dose  40 mg Route  Intravenous Documented By  Beverly Lyle RN              sodium chloride 0.9% BOLUS 250 mL       Admin Date  08/13/2024 Action  $New Bag Dose  250 mL Route  Intravenous Documented By  Beverly Lyle RN              tocilizumab (ACTEMRA) 800 mg in sodium chloride 0.9 % 150 mL infusion       Admin Date  08/13/2024 Action  $New Bag Dose  800 mg Rate  150 mL/hr Route  Intravenous Documented By  Beverly Lyle RN                  Post Infusion Assessment:  Patient tolerated infusion without incident.  Site patent and intact, free from redness, edema or discomfort.  No evidence of extravasations.  Access discontinued per protocol.     Discharge Plan:   AVS to patient via MYCHART.  Patient will return 9/10/24 for next appointment.   Patient discharged in stable condition accompanied by: self.  Departure Mode: Ambulatory.    Beverly Lyle RN

## 2024-08-13 NOTE — PATIENT INSTRUCTIONS
Dear Laxmi Ya    Thank you for choosing [facility] ([facility] for your infusion.  The following information is a summary of our appointment as well as important reminders.    If you have any questions on your upcoming Specialty Infusion appointments, please call scheduling at [phone].  It was a pleasure taking care of you today.    Sincerely,    [facility]  [facility]  [address] [place] [address]  [id number]  Phone:  [phone]

## 2024-08-13 NOTE — TELEPHONE ENCOUNTER
[facility] Call Center    Phone Message    May a detailed message be left on voicemail: yes     Reason for Call: Medication Question or concern regarding medication   Prescription Clarification  Name of Medication: acyclovir (ZOVIRAX) 400 MG tablet  Prescribing Provider: Maryellen ALMARAZ CNM   Pharmacy: [facility] #[id number] - [place], [place] - [address]    What on the order needs clarification? Pt is wondering if there is another medication she can change to because this one has been hard on her stomach and has been causing various symptoms      Action Taken: Message routed to:  Other: [facility]    Travel Screening: Not Applicable     Date of Service:

## 2024-08-14 NOTE — TELEPHONE ENCOUNTER
Pt was on daily suppressive valacyclovir and had GI symptoms -- acyclovir ordered on 8/5/24. Attempted to call pt and discuss w/ no answer; LVM and sent MyC message.

## 2024-08-15 ENCOUNTER — ANCILLARY PROCEDURE (OUTPATIENT)
Dept: GENERAL RADIOLOGY | Facility: CLINIC | Age: 49
End: 2024-08-15
Attending: ORTHOPAEDIC SURGERY
Payer: MEDICARE

## 2024-08-15 ENCOUNTER — OFFICE VISIT (OUTPATIENT)
Dept: ORTHOPEDICS | Facility: CLINIC | Age: 49
End: 2024-08-15
Payer: MEDICARE

## 2024-08-15 DIAGNOSIS — M79.644 BILATERAL THUMB PAIN: ICD-10-CM

## 2024-08-15 DIAGNOSIS — M79.645 BILATERAL THUMB PAIN: ICD-10-CM

## 2024-08-15 DIAGNOSIS — M18.0 OSTEOARTHRITIS OF CARPOMETACARPAL (CMC) JOINTS OF BOTH THUMBS, UNSPECIFIED OSTEOARTHRITIS TYPE: ICD-10-CM

## 2024-08-15 DIAGNOSIS — M13.80 SERONEGATIVE INFLAMMATORY ARTHRITIS: Primary | ICD-10-CM

## 2024-08-15 PROCEDURE — 77002 NEEDLE LOCALIZATION BY XRAY: CPT | Performed by: ORTHOPAEDIC SURGERY

## 2024-08-15 PROCEDURE — 99207 PR NO BILLABLE SERVICE THIS VISIT: CPT | Performed by: ORTHOPAEDIC SURGERY

## 2024-08-15 PROCEDURE — 20605 DRAIN/INJ JOINT/BURSA W/O US: CPT | Mod: 50 | Performed by: ORTHOPAEDIC SURGERY

## 2024-08-15 RX ADMIN — LIDOCAINE HYDROCHLORIDE 3 ML: 10 INJECTION, SOLUTION EPIDURAL; INFILTRATION; INTRACAUDAL; PERINEURAL at 11:36

## 2024-08-15 RX ADMIN — TRIAMCINOLONE ACETONIDE 40 MG: 40 INJECTION, SUSPENSION INTRA-ARTICULAR; INTRAMUSCULAR at 11:36

## 2024-08-15 NOTE — TELEPHONE ENCOUNTER
"Patient calling back to discuss continued GI symptoms since starting daily suppressive therapy of Valacyclovir and switching to Acyclovir. Pt reported the GI sx she had since starting on Valacyclovir are \"nausea, diarrhea, shooting headaches, feeling really tired and fatigued and having trouble staying awake. Pretty had a tingly sensation all over my body as well\". Pt reported that since switching to Acyclovir, \"my nausea and headache are mildly better but all the other symptoms are still there and the same\". Pt reports \"I am still able to eat and drink and stay hydrated. It's difficult but I have been and have been drinking protein shakes as well\". Pt is wondering if \"there is any other medication you would recommend that will work, I am open to what ever\". Pt also mentioned she has talked with Rheumatology Pharmacist Yuliya Kaplan about her medication management \"a lot and she is very familiar with my history and medication I am currently on\" (Yuliya's number: [phone]). Pt stated she talked with her Liver Specialist Dr. Cristobal \"he is still recommending that I stay on a long term antiviral medication I would just like to keep trying until I find one that my stomach likes\". Routing to on-call midwife as pt had last seen haylie REYNOSO) and this has been discussed with midwife team as well.         "

## 2024-08-15 NOTE — LETTER
8/15/2024      Laxmi Ya  [address] [place] [id number]      Dear Colleague,    Thank you for referring your patient, Laxmi Ya, to the [facility]. Please see a copy of my visit note below.    Small Joint Injection/Arthrocentesis    Date/Time: 8/15/2024 11:36 AM    Performed by: Twila Petit MD  Authorized by: Twila Petit MD  Indications:  Pain  Needle Size:  25 G  Guidance: fluoroscopy       Location:  Thumb   Location comment:  Bilateral STT joint                       Medications:  40 mg triamcinolone 40 MG/ML; 3 mL lidocaine (PF) 1 %        Outcome:  Tolerated well, no immediate complications  Procedure discussed: discussed risks, benefits, and alternatives    Consent Given by:  Patient  Timeout: timeout called immediately prior to procedure    Prep: patient was prepped and draped in usual sterile fashion          [facility] [address] [id number] [phone]  Dept: [phone]  ______________________________________________________________________________    Patient: Laxmi Ya   : 1975   MRN: [id number]   August 15, 2024    INVASIVE PROCEDURE SAFETY CHECKLIST    Date: 8/15/2024   Procedure:Bilateral thumb STT joint steroid injections  Patient Name: Laxmi Ya  MRN: [id number]  YOB: 1975    Action: Complete sections as appropriate. Any discrepancy results in a HARD COPY until resolved.     PRE PROCEDURE:  Patient ID verified with 2 identifiers (name and  or MRN): Yes  Procedure and site verified with patient/designee (when able): Yes  Accurate consent documentation in medical record: Yes  H&P (or appropriate assessment) documented in medical record: Yes  H&P must be up to 20 days prior to procedure and updates within 24 hours of procedure as applicable: Yes  Relevant diagnostic and radiology test results appropriately  labeled and displayed as applicable: NA  Procedure site(s) marked with provider initials: NA    TIMEOUT:  Time-Out performed immediately prior to starting procedure, including verbal and active participation of all team members addressing the following:Yes  * Correct patient identify  * Confirmed that the correct side and site are marked  * An accurate procedure consent form  * Agreement on the procedure to be done  * Correct patient position  * Relevant images and results are properly labeled and appropriately displayed  * The need to administer antibiotics or fluids for irrigation purposes during the procedure as applicable   * Safety precautions based on patient history or medication use    DURING PROCEDURE: Verification of correct person, site, and procedures any time the responsibility for care of the patient is transferred to another member of the care team.       The following medications were given:         Prior to injection, verified patient identity using patient's name and date of birth.  Due to injection administration, patient instructed to remain in clinic for 15 minutes  afterwards, and to report any adverse reaction to me immediately.    Joint injection was performed.    Medication Name: Kenalog 40mg/mL  20mg per joint NDC [address]-0  Drug Amount Wasted:  None.  Vial/Syringe: Single dose vial  Expiration Date:  4/1/26    Medication Name Lidocaine 1% NDC 97253-556-78    Scribed by DUY RESENDEZ ATC for Dr. Petit on August 15, 2024 at 11:44am based on the provider's statements to me.     DUY RESENDEZ ATC      Office Visit    August 16, 2024  [facility]     Progress Notes  Twila Petit MD (Physician)   Orthopaedic Surgery  This 49-year-old right-hand-dominant female presents requesting bilateral STT wrist injections with a partial dose.  She had previous injections in May and they worked well but are wearing off.   She has had previous other  multiple injections and is aware of the risk benefits alternatives.   She does have wrist thumb spica splints and will continue to wear these at night to assist with the multiple wrist symptoms in addition to the injection.     Treatment:   June, 2024  Bilateral Carpal tunnel injections (full dose, Trino Aguilar)  5/30/2024 Bilateral STT injections (Dr. Petit)  3/14/2024 Bilateral STT injections (Dr. Petit)  February 29, 2024 bilateral carpal tunnel steroid injection 20 mg  12/27/23 bilateral carpal tunnel steroid injection 20 mg (Dr Hyacinth Pickering)  12/21/23 bilateral STT injections  11/1/2023: bilateral carpal tunnel steroid injection (Dr. Eligio Pickering)  10/18/2023: Bilateral thumb [facility] steroid injection (Dr. Eligio Pickering)  9/20/2023: bilateral STT steroid injection (Dr. Eligio Pickering)  5/4/2023: bilateral carpal tunnel steroid injection (Dr. Petit)  2/16/2023: bilateral STT steroid injection (Dr. Petit)  12/13/2022: bilateral carpal tunnel steroid injection (Dr. Sullivan)  10/27/2022: bilateral STT steroid injections (Dr. Petit)  9/13/2022: bilateral carpal tunnel steroid injection (Dr. Sullivan)  6/30/2022: bilateral STT steroid injections (Dr. Petit)  Bilateral carpal tunnel steroid injections (outside)        PROCEDURE:  After written consent, verifying site and side, a sterile Chlora- prep was performed for the injection site.  C-arm guidance was used for difficult placement of a 25-gauge needle into the right thumb STT  joint with 1% Lidocaine.  1 mL of Kenalog (40 mg) and 1 mL of lidocaine was injected under fluoroscopic guidance with good relief of pain. Identical procedure was carried out on the left side. Patient tolerated the procedure well.  No complications.  Follow up instructions were given.                Again, thank you for allowing me to participate in the care of your patient.        Sincerely,        Twila Petit MD

## 2024-08-15 NOTE — PROGRESS NOTES
Small Joint Injection/Arthrocentesis    Date/Time: 8/15/2024 11:36 AM    Performed by: Twila Petit MD  Authorized by: Twila Petit MD  Indications:  Pain  Needle Size:  25 G  Guidance: fluoroscopy       Location:  Thumb   Location comment:  Bilateral STT joint                       Medications:  40 mg triamcinolone 40 MG/ML; 3 mL lidocaine (PF) 1 %        Outcome:  Tolerated well, no immediate complications  Procedure discussed: discussed risks, benefits, and alternatives    Consent Given by:  Patient  Timeout: timeout called immediately prior to procedure    Prep: patient was prepped and draped in usual sterile fashion          [facility] OR[facility] [address]  4TH FLOOR  [facility] [id number] [phone]  Dept: [phone]  ______________________________________________________________________________    Patient: Laxmi Ya   : 1975   MRN: [id number]   August 15, 2024    INVASIVE PROCEDURE SAFETY CHECKLIST    Date: 8/15/2024   Procedure:Bilateral thumb STT joint steroid injections  Patient Name: Laxmi Ya  MRN: [id number]  YOB: 1975    Action: Complete sections as appropriate. Any discrepancy results in a HARD COPY until resolved.     PRE PROCEDURE:  Patient ID verified with 2 identifiers (name and  or MRN): Yes  Procedure and site verified with patient/designee (when able): Yes  Accurate consent documentation in medical record: Yes  H&P (or appropriate assessment) documented in medical record: Yes  H&P must be up to 20 days prior to procedure and updates within 24 hours of procedure as applicable: Yes  Relevant diagnostic and radiology test results appropriately labeled and displayed as applicable: NA  Procedure site(s) marked with provider initials: NA    TIMEOUT:  Time-Out performed immediately prior to starting procedure, including verbal and active participation of all team members addressing the  following:Yes  * Correct patient identify  * Confirmed that the correct side and site are marked  * An accurate procedure consent form  * Agreement on the procedure to be done  * Correct patient position  * Relevant images and results are properly labeled and appropriately displayed  * The need to administer antibiotics or fluids for irrigation purposes during the procedure as applicable   * Safety precautions based on patient history or medication use    DURING PROCEDURE: Verification of correct person, site, and procedures any time the responsibility for care of the patient is transferred to another member of the care team.       The following medications were given:         Prior to injection, verified patient identity using patient's name and date of birth.  Due to injection administration, patient instructed to remain in clinic for 15 minutes  afterwards, and to report any adverse reaction to me immediately.    Joint injection was performed.    Medication Name: Kenalog 40mg/mL  20mg per joint NDC [address]-0  Drug Amount Wasted:  None.  Vial/Syringe: Single dose vial  Expiration Date:  4/1/26    Medication Name Lidocaine 1% NDC 03281-918-95    Scribed by DUY RESENDEZ, TIM for Dr. Petit on August 15, 2024 at 11:44am based on the provider's statements to me.     DUY RESENDEZ, ATC

## 2024-08-15 NOTE — TELEPHONE ENCOUNTER
"Per LUIS on-call Lashawn Nielsen \"Patient can follow up with pharmacist to discuss options for long term antiviral.  Might be best for her primary care provider to manage this\". Writer called patient to pass this information along to her. Pt stated she will get in contact with misti the pharmacist and also stated she has already talked with her primary care provider who said whoever diagnosed and prescribed the medication should follow up. Pt only wanting to talk with LUIS Bojorquez and Dr. Pate due to them knowing pt history and pt preference.   "

## 2024-08-15 NOTE — NURSING NOTE
Reason For Visit:   Chief Complaint   Patient presents with    RECHECK     Follow-up bilateral STT joint repeat injections       Primary MD: Amol Juares  Ref. MD: Katja    Age: 49 year old    ?  No      There were no vitals taken for this visit.      Pain Assessment  Patient Currently in Pain: Yes  0-10 Pain Scale: 10  Primary Pain Location: Finger (Comment which one) (Bilateral thumbs)  Pain Descriptors: Constant, Aching, Sharp  Alleviating Factors: Pain medication    Hand Dominance Evaluation  Hand Dominance: Right      force  R hand pincher force: 4.082 kg (9 lb)  R hand  level 2 force: 9.526 kg (21 lb)  L hand pincher force: 3.629 kg (8 lb)  L hand  level  2 force: 9.526 kg (21 lb)    QuickDASH Assessment      5/14/2024     4:44 PM   QuickDASH Main   1. Open a tight or new jar Unable   2. Do heavy household chores (e.g., wash walls, floors) Unable   3. Carry a shopping bag or briefcase Unable   4. Wash your back Severe difficulty   5. Use a knife to cut food Severe difficulty   6. Recreational activities in which you take some force or impact through your arm, shoulder or hand (e.g., golf, hammering, tennis, etc.) Unable   7. During the past week, to what extent has your arm, shoulder or hand problem interfered with your normal social activities with family, friends, neighbours or groups Extremely   8. During the past week, were you limited in your work or other regular daily activities as a result of your arm, shoulder or hand problem Unable   9. Arm, shoulder or hand pain Extreme   10.Tingling (pins and needles) in your arm,shoulder or hand Severe   11. During the past week, how much difficulty have you had sleeping because of the pain in your arm, shoulder or hand Severe difficulty   Quickdash Ability Score 90.91          Current Outpatient Medications   Medication Sig Dispense Refill    acyclovir (ZOVIRAX) 400 MG tablet Take 1 tablet (400 mg) by mouth every 12 hours 180 tablet 3     albuterol (PROAIR HFA/PROVENTIL HFA/VENTOLIN HFA) 108 (90 Base) MCG/ACT inhaler Inhale 2 puffs into the lungs every 6 hours as needed for shortness of breath, wheezing or cough      Artificial Tear Solution (SOOTHE XP) SOLN as needed      atenolol (TENORMIN) 50 MG tablet Take 50 mg by mouth daily      benzoyl peroxide 5 % external liquid Use daily as directed. Preferred bdrand: Medpura 236 mL 11    blood glucose (NO BRAND SPECIFIED) test strip Use to test blood sugar 2 times daily or as directed. 100 strip 1    blood glucose monitoring (NO BRAND SPECIFIED) meter device kit Use to test blood sugar 2 times daily or as directed. 1 kit 0    ciclopirox (LOPROX) 0.77 % cream Apply topically 2 times daily To affected toenails. (Patient not taking: Reported on 8/12/2024) 90 g 5    diclofenac (VOLTAREN) 1 % topical gel Apply topically 4 times daily as needed      Doxycycline Hyclate 100 MG TBEC Take 1 tablet by mouth 2 times daily      DUPIXENT 300 MG/2ML prefilled pen INJECT 300MG (1 INJECTION) SUBCUTANEOUSLY EVERY OTHER WEEK. 4 mL 2    fexofenadine (ALLEGRA) 180 MG tablet Take 1 tablet (180 mg) by mouth daily 90 tablet 2    fidaxomicin (DIFICID) 200 MG tablet Take 200 mg by mouth as needed      Fluticasone-Umeclidin-Vilanterol (TRELEGY ELLIPTA) 200-62.5-25 MCG/ACT oral inhaler Inhale 1 puff into the lungs daily 90 each 1    insulin glargine (LANTUS PEN) 100 UNIT/ML pen Inject 47 Units subcutaneously daily      insulin lispro (HUMALOG KWIKPEN) 100 UNIT/ML (1 unit dial) KWIKPEN Inject 5 Units subcutaneously 3 times daily (before meals) Plus 1:25 correction scale if BG >150    105 UNITS DAILY      Lancets (ONETOUCH DELICA PLUS QGDDVD13A) MISC CHECK BLOOD GLUCOSE 3 TIMES DAILY, ADJUSTING MEDICATION, DX CODE E 11.9, ON INSULIN      lidocaine (LIDODERM) 5 % patch as needed   1    LORazepam (ATIVAN) 0.5 MG tablet Take 0.25 mg by mouth every 8 hours as needed      losartan (COZAAR) 100 MG tablet Take 100 mg by mouth daily       methylPREDNISolone (MEDROL DOSEPAK) 4 MG tablet therapy pack Take per package instructions. Take once a day by mouth 21 tablet 1    mometasone (NASONEX) 50 MCG/ACT nasal spray Spray 2 sprays into both nostrils daily 17 g 11    Montelukast Sodium (SINGULAIR PO) Take 10 mg by mouth At Bedtime       multivitamin w/minerals (THERA-VIT-M) tablet Take 1 tablet by mouth daily      norethindrone (MICRONOR) 0.35 MG tablet Take 1 tablet (0.35 mg) by mouth daily 90 tablet 3    nystatin (MYCOSTATIN) 833104 UNIT/ML suspension       olopatadine (PATADAY) 0.7 % ophthalmic solution Apply 1 drop to eye daily PRN      OMEPRAZOLE PO Take 40 mg by mouth 2 times daily       oxyCODONE IR (ROXICODONE) 10 MG tablet Take 10 mg by mouth      predniSONE (DELTASONE) 5 MG tablet Take 1 tablet (5 mg) by mouth daily 30 tablet 5    rifaximin (XIFAXAN) 550 MG TABS tablet Take 200 mg by mouth 3 times daily Take for 10 days, has on had as needed for small bowel bacteria overgrowh      rosuvastatin (CRESTOR) 5 MG tablet Take 2 tablets by mouth daily      spironolactone-HCTZ (ALDACTAZIDE) 25-25 MG tablet Take 1 tablet by mouth daily      tirzepatide (MOUNJARO) 2.5 MG/0.5ML pen Inject 2.5 mg subcutaneously once a week (Patient not taking: Reported on 7/24/2024)      tocilizumab (ACTEMRA) 400 MG/20ML Inject 800 mg into the vein every 28 days      valACYclovir (VALTREX) 1000 mg tablet Take 1 tablet (1,000 mg) by mouth daily for 360 days (Patient not taking: Reported on 8/12/2024) 90 tablet 3    valACYclovir (VALTREX) 1000 mg tablet Take 1 tablet (1,000 mg) by mouth daily for 5 days 5 tablet 0    valACYclovir (VALTREX) 1000 mg tablet Take 2 tablets (2,000 mg) by mouth 2 times daily for 1 day 4 tablet 0    voriconazole (VFEND) 200 MG tablet On day 1 take 2 tablets PO BID , then take 1 tab PO BID on Days 2-14. (Patient not taking: Reported on 7/24/2024) 30 tablet 0       Allergies   Allergen Reactions    Polyethylene Glycol Rash    Codeine      Other  reaction(s): Gastrointestinal, GI intolerance, Vomiting    Vomiting    Hydrocodone Nausea and Vomiting    Morphine Nausea and Vomiting    Sulfasalazine      Other reaction(s): GI intolerance    Has tried and had nausa.    Tramadol Nausea and Vomiting     Other reaction(s): Gastrointestinal, Other (see comments)    Nausea and vomiting     unknown    Acetaminophen Nausea     Nausea and vomiting    Gluten Meal Other (See Comments) and Rash     Other reaction(s): Gastrointestinal, GI intolerance  Dizziness, tired, rash, stomach cramps, thrush, mouth sores  Dizziness, tired, rash, stomach cramps, thrush, mouth sores      Hydroxyzine GI Disturbance and Nausea     nausea, dry mouth    Propylene Glycol Dizziness, Nausea and Vomiting, Nausea and Rash       DUY RESENDEZ, ATC

## 2024-08-16 ENCOUNTER — VIRTUAL VISIT (OUTPATIENT)
Dept: RHEUMATOLOGY | Facility: CLINIC | Age: 49
End: 2024-08-16
Attending: INTERNAL MEDICINE
Payer: MEDICARE

## 2024-08-16 DIAGNOSIS — B00.9 HSV-1 (HERPES SIMPLEX VIRUS 1) INFECTION: Primary | ICD-10-CM

## 2024-08-16 RX ORDER — TRIAMCINOLONE ACETONIDE 40 MG/ML
40 INJECTION, SUSPENSION INTRA-ARTICULAR; INTRAMUSCULAR
Status: SHIPPED | OUTPATIENT
Start: 2024-08-15

## 2024-08-16 RX ORDER — LIDOCAINE HYDROCHLORIDE 10 MG/ML
3 INJECTION, SOLUTION EPIDURAL; INFILTRATION; INTRACAUDAL; PERINEURAL
Status: SHIPPED | OUTPATIENT
Start: 2024-08-15

## 2024-08-16 NOTE — PROGRESS NOTES
Office Visit    August 16, 2024  [facility]     Progress Notes  Twila Petit MD (Physician)  Orthopaedic Surgery  This 49-year-old right-hand-dominant female presents requesting bilateral STT wrist injections with a partial dose.  She had previous injections in May and they worked well but are wearing off.   She has had previous other multiple injections and is aware of the risk benefits alternatives.   She does have wrist thumb spica splints and will continue to wear these at night to assist with the multiple wrist symptoms in addition to the injection.     Treatment:   June, 2024  Bilateral Carpal tunnel injections (full dose, Trino Aguilar)  5/30/2024 Bilateral STT injections (Dr. Petit)  3/14/2024 Bilateral STT injections (Dr. Petit)  February 29, 2024 bilateral carpal tunnel steroid injection 20 mg  12/27/23 bilateral carpal tunnel steroid injection 20 mg (Dr Hyacinth Pickering)  12/21/23 bilateral STT injections  11/1/2023: bilateral carpal tunnel steroid injection (Dr. Eligio Pickering)  10/18/2023: Bilateral thumb CMC steroid injection (Dr. Eligio Pickering)  9/20/2023: bilateral STT steroid injection (Dr. Eligio Pickering)  5/4/2023: bilateral carpal tunnel steroid injection (Dr. Petit)  2/16/2023: bilateral STT steroid injection (Dr. Petit)  12/13/2022: bilateral carpal tunnel steroid injection (Dr. Sullivan)  10/27/2022: bilateral STT steroid injections (Dr. Petit)  9/13/2022: bilateral carpal tunnel steroid injection (Dr. Sullivan)  6/30/2022: bilateral STT steroid injections (Dr. Petit)  Bilateral carpal tunnel steroid injections (outside)        PROCEDURE:  After written consent, verifying site and side, a sterile Chlora- prep was performed for the injection site.  C-arm guidance was used for difficult placement of a 25-gauge needle into the right thumb STT  joint with 1% Lidocaine.  1/2 mL of Kenalog (20 mg) and 1/2 mL of lidocaine was injected  under fluoroscopic guidance with good relief of pain. Identical procedure was carried out on the left side. Patient tolerated the procedure well.  No complications.  Follow up instructions were given.

## 2024-08-16 NOTE — Clinical Note
8/16/2024       RE: Laxmi Ya  [address] [place] [id number]     Dear Colleague,    Thank you for referring your patient, Laxmi Ya, to the [facility] [place] at [facility]. Please see a copy of my visit note below.    Medication Therapy Management (MTM) Encounter    ASSESSMENT:                            Medication Adherence/Access: No issues identified    HSV Infection: Per uptodate, options to treat HSV infection include valacyclovir, acyclovir, and famciclovir. No topical options available. Discussed patient could try famciclovir 500 mg twice daily since other options have had bothersome side effects. Patient agreeable to this plan and will reach out to OBGYN team to coordinate prescription.    PLAN:                            1. I will message your OBGYN team and see if they are agreeable to you starting famciclovir 500 mg twice daily for HSV infection and prevention.    -- Maryellen Temple CNM agrees with plan and prescribed famciclovir 500 mg twice daily.    Follow-up: {[id number]}    SUBJECTIVE/OBJECTIVE:                          Laxmi Ya is a 49 year old female seen for a follow-up visit.       Reason for visit: GI side effects with acyclovir and valacyclovir    Allergies/ADRs: Reviewed in chart  Past Medical History: Reviewed in chart  Tobacco: She reports that she has never smoked. She has never used smokeless tobacco.  Alcohol: Less than 1 beverages / week    Medication Adherence/Access: no issues reported    HSV Infection:   Acyclovir 400 mg twice daily     Reports she had significant GI side effects to acyclovir by day 3 of taking the medication. Notes nausea, stomach pain, diarrhea, headaches, fatigue, and paresthesias while on this medication. Does think it was helping with HSV lesions before discontinuing it. Now has additional lesions forming on her vaginal and buttock areas. Also  tried valcyclovir - also had significant diarrhea on this medication and discontinued. Wondering what other options she has to treat this - would like a topical if available.     Creatinine   Date Value Ref Range Status   07/24/2024 0.83 0.51 - 0.95 mg/dL Final   04/01/2020 0.73 0.52 - 1.04 mg/dL Final      Today's Vitals: There were no vitals taken for this visit.  ----------------    I spent 30 minutes with this patient today. I offer these suggestions for consideration by Maryellen Temple CNM. A copy of the visit note was provided to the patient's provider(s).    A summary of these recommendations was sent via [employer].    Augustine ConnerD  Medication Therapy Management Pharmacist  [facility]  Phone: [phone]    Telemedicine Visit Details  Type of service:  Telephone visit  Start Time: 10:00 AM  End Time: 10:30 AM     Medication Therapy Recommendations  No medication therapy recommendations to display         Again, thank you for allowing me to participate in the care of your patient.      Sincerely,    DOMINGUEZ BAPTISTE [facility]

## 2024-08-20 ENCOUNTER — TELEPHONE (OUTPATIENT)
Dept: OBGYN | Facility: CLINIC | Age: 49
End: 2024-08-20
Payer: MEDICARE

## 2024-08-20 DIAGNOSIS — B00.9 HSV-2 (HERPES SIMPLEX VIRUS 2) INFECTION: Primary | ICD-10-CM

## 2024-08-20 DIAGNOSIS — B00.9 HSV-1 (HERPES SIMPLEX VIRUS 1) INFECTION: ICD-10-CM

## 2024-08-20 RX ORDER — FAMCICLOVIR 500 MG/1
500 TABLET ORAL 2 TIMES DAILY
Qty: 60 TABLET | Refills: 0 | Status: SHIPPED | OUTPATIENT
Start: 2024-08-20 | End: 2024-08-28

## 2024-08-20 RX ORDER — FAMCICLOVIR 500 MG/1
500 TABLET ORAL 2 TIMES DAILY
Qty: 180 TABLET | Refills: 3 | Status: SHIPPED | OUTPATIENT
Start: 2024-08-20 | End: 2024-08-28

## 2024-08-20 NOTE — PATIENT INSTRUCTIONS
"Recommendations from today's MTM visit:                                                       1. I will message your OBGYN team and see if they are agreeable to you starting famciclovir 500 mg twice daily for HSV infection and prevention.    Follow-up: Return in about 3 months (around 11/16/2024) for MTM Pharmacist Visit.    It was great speaking with you today.  I value your experience and would be very thankful for your time in providing feedback in our clinic survey. In the next few days, you may receive an email or text message from [facility] [employer] with a link to a survey related to your  clinical pharmacist.\"     To schedule another MTM appointment, please call the clinic directly or you may call the MTM scheduling line at [phone].    My Clinical Pharmacist's contact information:                                                      Please feel free to contact me with any questions or concerns you have.      Yuliya Kaplan, PharmD  Medication Therapy Management Pharmacist  [facility]  Phone: [phone]     "

## 2024-08-20 NOTE — TELEPHONE ENCOUNTER
TC: Called Laxmi, 50 yo G0 who was recently diagnosed with HSV 1 and 2. She initially started on Acyclovir suppressive therapy but then switched to Valacyclovir. She experienced nausea, diarrhea, shooting headaches, and fatigue on these medications.   Received a message from her pharmacist, Yuliya Kaplan, who suggested we try Famciclovir. Laxmi's hepatologist is recommending that she stay on suppressive therapy. Will try Famciclovir to see if she develops similar side effects.     Laxmi has also noticed more blisters and is wondering if these are HSV. She is experiencing discomfort showering and going to the bathroom and would like to have these examined. She has an appt with me later this week to evaluate these sores.     Will send Famciclovir 500mg BID to [facility] pharmacy    SUNG Haider CNM

## 2024-08-20 NOTE — PROGRESS NOTES
Medication Therapy Management (MTM) Encounter    ASSESSMENT:                            Medication Adherence/Access: No issues identified    HSV Infection: Per uptodate, options to treat HSV infection include valacyclovir, acyclovir, and famciclovir. No topical options available. Discussed patient could try famciclovir 500 mg twice daily since other options have had bothersome side effects. Patient agreeable to this plan and will reach out to OBGYN team to coordinate prescription.    PLAN:                            1. I will message your OBGYN team and see if they are agreeable to you starting famciclovir 500 mg twice daily for HSV infection and prevention.    -- Maryellen Temple CNM agrees with plan and prescribed famciclovir 500 mg twice daily.    Follow-up: Return in about 3 months (around 11/16/2024) for MTM Pharmacist Visit.    SUBJECTIVE/OBJECTIVE:                          Laxmi Ya is a 49 year old female seen for a follow-up visit.       Reason for visit: GI side effects with acyclovir and valacyclovir    Allergies/ADRs: Reviewed in chart  Past Medical History: Reviewed in chart  Tobacco: She reports that she has never smoked. She has never used smokeless tobacco.  Alcohol: Less than 1 beverages / week    Medication Adherence/Access: no issues reported    HSV Infection:   Acyclovir 400 mg twice daily     Reports she had significant GI side effects to acyclovir by day 3 of taking the medication. Notes nausea, stomach pain, diarrhea, headaches, fatigue, and paresthesias while on this medication. Does think it was helping with HSV lesions before discontinuing it. Now has additional lesions forming on her vaginal and buttock areas. Also tried valcyclovir - also had significant diarrhea on this medication and discontinued. Wondering what other options she has to treat this - would like a topical if available.     Creatinine   Date Value Ref Range Status   07/24/2024 0.83 0.51 - 0.95 mg/dL Final    04/01/2020 0.73 0.52 - 1.04 mg/dL Final      Today's Vitals: There were no vitals taken for this visit.  ----------------    I spent 30 minutes with this patient today. I offer these suggestions for consideration by Maryellen Temple CNM. A copy of the visit note was provided to the patient's provider(s).    A summary of these recommendations was sent via [employer].    Augustine ConnerD  Medication Therapy Management Pharmacist  [facility]  Phone: [phone]    Telemedicine Visit Details  Type of service:  Telephone visit  Start Time: 10:00 AM  End Time: 10:30 AM     Medication Therapy Recommendations  HSV-1 (herpes simplex virus 1) infection    Current Medication: acyclovir (ZOVIRAX) 400 MG tablet (Discontinued)   Rationale: Undesirable effect - Adverse medication event - Safety   Recommendation: Change Medication - famciclovir 500 MG tablet - 500 mg twice daily   Status: Accepted per Provider

## 2024-08-21 ENCOUNTER — TELEPHONE (OUTPATIENT)
Dept: OBGYN | Facility: CLINIC | Age: 49
End: 2024-08-21

## 2024-08-21 NOTE — TELEPHONE ENCOUNTER
[facility]    Phone Message    May a detailed message be left on voicemail: yes     Reason for Call: Other: Pt states that she has been off of the Acyclovir for a week but now she is experiencing an outbreak in the same spot and it is blistered. Pt is wondering if she should take a higher dose of the Famciclovir to help clear it up since it is causing a lot of pain and making it difficult to sleep. Please contact pt. Thank you!     Action Taken: Message routed to:  Other: [facility]    Travel Screening: Not Applicable     Date of Service:

## 2024-08-22 NOTE — TELEPHONE ENCOUNTER
Called Laxmi and relayed the CNM team's recommendation. She will monitor for improvement for the next week and reach out if she doesn't notice any change.

## 2024-08-23 ENCOUNTER — TELEPHONE (OUTPATIENT)
Dept: OBGYN | Facility: CLINIC | Age: 49
End: 2024-08-23
Payer: MEDICARE

## 2024-08-26 ENCOUNTER — TELEPHONE (OUTPATIENT)
Dept: OTOLARYNGOLOGY | Facility: CLINIC | Age: 49
End: 2024-08-26

## 2024-08-26 NOTE — TELEPHONE ENCOUNTER
Spoke with patient to offer appt from waitlist for NEW SLP VOICE visit with Ben MURPHY on 8/30/2024 @ 8 am for a 2 hour eval. [facility]. Slot is not held specifically for patient. Gave call center number to schedule if still available.    Pt requested to be removed from waitlist.

## 2024-08-27 ENCOUNTER — THERAPY VISIT (OUTPATIENT)
Dept: PHYSICAL THERAPY | Facility: CLINIC | Age: 49
End: 2024-08-27
Payer: MEDICARE

## 2024-08-27 DIAGNOSIS — G89.29 CHRONIC BILATERAL LOW BACK PAIN WITHOUT SCIATICA: ICD-10-CM

## 2024-08-27 DIAGNOSIS — R10.2 PELVIC PAIN IN FEMALE: Primary | ICD-10-CM

## 2024-08-27 DIAGNOSIS — M54.50 CHRONIC BILATERAL LOW BACK PAIN WITHOUT SCIATICA: ICD-10-CM

## 2024-08-27 DIAGNOSIS — M54.2 NECK PAIN: ICD-10-CM

## 2024-08-27 PROCEDURE — 97012 MECHANICAL TRACTION THERAPY: CPT | Mod: GP | Performed by: PHYSICAL THERAPIST

## 2024-08-27 PROCEDURE — 97140 MANUAL THERAPY 1/> REGIONS: CPT | Mod: GP | Performed by: PHYSICAL THERAPIST

## 2024-08-28 ENCOUNTER — MYC MEDICAL ADVICE (OUTPATIENT)
Dept: RHEUMATOLOGY | Facility: CLINIC | Age: 49
End: 2024-08-28

## 2024-08-28 ENCOUNTER — OFFICE VISIT (OUTPATIENT)
Dept: OBGYN | Facility: CLINIC | Age: 49
End: 2024-08-28
Attending: ADVANCED PRACTICE MIDWIFE
Payer: MEDICARE

## 2024-08-28 VITALS
HEIGHT: 66 IN | HEART RATE: 71 BPM | SYSTOLIC BLOOD PRESSURE: 121 MMHG | BODY MASS INDEX: 37.52 KG/M2 | WEIGHT: 233.5 LBS | DIASTOLIC BLOOD PRESSURE: 84 MMHG

## 2024-08-28 DIAGNOSIS — B00.9 HSV-1 (HERPES SIMPLEX VIRUS 1) INFECTION: ICD-10-CM

## 2024-08-28 DIAGNOSIS — K75.81 NONALCOHOLIC STEATOHEPATITIS (NASH): ICD-10-CM

## 2024-08-28 DIAGNOSIS — B00.9 HSV-2 (HERPES SIMPLEX VIRUS 2) INFECTION: Primary | ICD-10-CM

## 2024-08-28 PROCEDURE — 99214 OFFICE O/P EST MOD 30 MIN: CPT | Performed by: ADVANCED PRACTICE MIDWIFE

## 2024-08-28 PROCEDURE — G0463 HOSPITAL OUTPT CLINIC VISIT: HCPCS | Performed by: ADVANCED PRACTICE MIDWIFE

## 2024-08-28 RX ORDER — FLUCONAZOLE 150 MG/1
150 TABLET ORAL
COMMUNITY
Start: 2024-01-09

## 2024-08-28 RX ORDER — AZELASTINE HYDROCHLORIDE 137 UG/1
SPRAY, METERED NASAL
COMMUNITY
Start: 2024-07-31

## 2024-08-28 RX ORDER — CYCLOBENZAPRINE HCL 5 MG
5 TABLET ORAL
COMMUNITY
Start: 2024-04-30

## 2024-08-28 RX ORDER — FAMCICLOVIR 500 MG/1
500 TABLET ORAL 2 TIMES DAILY
Qty: 180 TABLET | Refills: 3 | Status: SHIPPED | OUTPATIENT
Start: 2024-08-28 | End: 2025-08-23

## 2024-08-28 RX ORDER — OMEPRAZOLE 40 MG/1
CAPSULE, DELAYED RELEASE ORAL
COMMUNITY
Start: 2024-05-31

## 2024-08-28 RX ORDER — DOXYCYCLINE 100 MG/1
100 CAPSULE ORAL 2 TIMES DAILY
COMMUNITY

## 2024-08-28 RX ORDER — TOBRAMYCIN AND DEXAMETHASONE 3; 1 MG/ML; MG/ML
SUSPENSION/ DROPS OPHTHALMIC
COMMUNITY

## 2024-08-28 RX ORDER — DEXAMETHASONE SODIUM PHOSPHATE 1 MG/ML
SOLUTION/ DROPS OPHTHALMIC
COMMUNITY
Start: 2024-07-16

## 2024-08-28 RX ORDER — TERBINAFINE HYDROCHLORIDE 250 MG/1
1 TABLET ORAL DAILY
COMMUNITY
Start: 2024-04-01

## 2024-08-28 RX ORDER — MONTELUKAST SODIUM 10 MG/1
10 TABLET ORAL DAILY
COMMUNITY
Start: 2024-06-17

## 2024-08-28 NOTE — PROGRESS NOTES
Office Problem Visit        Assessment/Plan:  Patient Active Problem List    Diagnosis Date Noted    HSV-1 (herpes simplex virus 1) infection 07/25/2024     Priority: Medium    HSV-2 (herpes simplex virus 2) infection 07/25/2024     Priority: Medium    Degenerative arthritis of cervical spine with nerve compression 07/23/2024     Priority: Medium    Chronic musculoskeletal pain due to persistent inflammatory disorder 07/23/2024     Priority: Medium    Diverticulosis 07/23/2024     Priority: Medium    Family history of diabetes mellitus 07/23/2024     Priority: Medium    H/O partial resection of colon 07/23/2024     Priority: Medium    History of diverticulitis 07/23/2024     Priority: Medium    History of thrush 07/23/2024     Priority: Medium    Pruritic disorder 07/23/2024     Priority: Medium     Formatting of this note might be different from the original.   Created by Conversion      Replacement Utility updated for latest IMO load      Radiculopathy due to spondylosis 07/23/2024     Priority: Medium    Seasonal allergies 07/23/2024     Priority: Medium    Staphylococcal infection of skin 07/23/2024     Priority: Medium    Tenosynovitis of forearm 07/23/2024     Priority: Medium    Tenosynovitis, rheumatoid 07/23/2024     Priority: Medium    Thoracic arthritis 07/23/2024     Priority: Medium    Sinusitis, chronic 07/23/2024     Priority: Medium     Formatting of this note might be different from the original.   Created by Conversion      Replacement Utility updated for latest IMO load      Inflammatory autoimmune disorder (H24) 07/23/2024     Priority: Medium    Anxiety 06/05/2024     Priority: Medium    Onychomycosis 06/05/2024     Priority: Medium    Vitamin D deficiency 06/05/2024     Priority: Medium    Rheumatoid arthritis (H) 03/13/2024     Priority: Medium    Mixed hyperlipidemia 07/03/2023     Priority: Medium    Bilateral hand pain 06/14/2023     Priority: Medium    Morbid obesity (H) 04/12/2023      Priority: Medium    Neck pain 03/09/2023     Priority: Medium    Bilateral low back pain without sciatica 03/09/2023     Priority: Medium    Pelvic pain in female 03/09/2023     Priority: Medium    Posterior subcapsular polar age-related cataract of right eye 08/11/2022     Priority: Medium     Formatting of this note might be different from the original.  Added automatically from request for surgery [id number] Formatting of this note might be different from the original.  Formatting of this note might be different from the original.   Added automatically from request for surgery [id number] Formatting of this note might be different from the original.  Added automatically from request for surgery [id number] Formatting of this note might be different from the original.  Formatting of this note might be different from the original.   Formatting of this note might be different from the original.    Added automatically from request for surgery [id number] Formatting of this note might be different from the original.  Formatting of this note might be different from the original.   Added automatically from request for surgery [id number]  Formatting of this note might be different from the original.   Added automatically from request for surgery [id number]  Formatting of this note might be different from the original.   Formatting of this note might be different from the original.    Formatting of this note might be different from the original.     Added automatically from request for surgery [id number]  Formatting of this note might be different from the original.   Formatting of this note might be different from the original.    Added automatically from request for surgery [id number]      Irritable bowel syndrome 07/21/2022     Priority: Medium     Formatting of this note might be different from the original.   GI local and at [place]. No clear evidence of Inflammatory or SIBO or micro or ulcerative colitis, crohns.    GI recommended health  diet.  Formatting of this note might be different from the original.   Formatting of this note might be different from the original.    GI local and at [place]. No clear evidence of Inflammatory or SIBO or micro or ulcerative colitis, crohns.     GI recommended health diet.      Seronegative inflammatory arthritis 06/17/2022     Priority: Medium    Enterocolitis 02/15/2022     Priority: Medium    Recurrent Clostridioides difficile diarrhea 02/15/2022     Priority: Medium    Small intestinal bacterial overgrowth (SIBO) 12/22/2021     Priority: Medium    Type 2 diabetes mellitus without complication (H) 06/02/2021     Priority: Medium    Localized enlarged lymph nodes 04/20/2021     Priority: Medium    Tenosynovitis of both wrists 03/22/2021     Priority: Medium     Formatting of this note might be different from the original.   Formatting of this note might be different from the original.   Added automatically from request for surgery [id number]  Formatting of this note might be different from the original.   Added automatically from request for surgery [id number]      Chronic fatigue syndrome 11/26/2019     Priority: Medium    Other chronic allergic conjunctivitis 07/15/2019     Priority: Medium    Angioedema 03/18/2019     Priority: Medium     Formatting of this note might be different from the original.   Created by Conversion      Replacement Utility updated for latest IMO load  Formatting of this note might be different from the original.   Formatting of this note might be different from the original.   Created by Conversion      Replacement Utility updated for latest IMO load   Formatting of this note might be different from the original.   Overview:    Created by Conversion      Replacement Utility updated for latest IMO load  Formatting of this note might be different from the original.   Overview:    Created by Conversion      Replacement Utility updated for latest IMO load      Gastroesophageal reflux disease  02/15/2019     Priority: Medium    Ruptured cervical disc 02/15/2019     Priority: Medium    Fibromyalgia 02/15/2019     Priority: Medium    Polycystic ovary syndrome 01/29/2019     Priority: Medium    Uterine leiomyoma 01/29/2019     Priority: Medium    Radiculopathy 07/20/2018     Priority: Medium    Allergic rhinitis 07/19/2018     Priority: Medium     Formatting of this note might be different from the original.   Created by Conversion      Replacement Utility updated for latest IMO load  Formatting of this note might be different from the original.   Formatting of this note might be different from the original.   Created by Conversion      Replacement Utility updated for latest IMO load   Formatting of this note might be different from the original.   Overview:    Created by Conversion      Replacement Utility updated for latest IMO load  Formatting of this note might be different from the original.   Overview:    Created by Conversion      Replacement Utility updated for latest IMO load  Formatting of this note might be different from the original.   Formatting of this note might be different from the original.   Formatting of this note might be different from the original.   Created by Conversion      Replacement Utility updated for latest IMO load   Formatting of this note might be different from the original.   Overview:    Created by Conversion      Replacement Utility updated for latest IMO load      Mild persistent asthma without complication 07/19/2018     Priority: Medium     Formatting of this note might be different from the original.   Created by Conversion  Formatting of this note might be different from the original.   Overview:    Created by Conversion      Lumbar radiculopathy 06/26/2018     Priority: Medium    Undifferentiated connective tissue disease (H24) 05/02/2017     Priority: Medium    Abnormal cervical Papanicolaou smear 04/04/2017     Priority: Medium     Formatting of this note might  be different from the original.   [facility] Review:      History:   2017: NILM, HPV negative   2018: NILM   2022: ASCUS, HPV negative   2023: NILM, HPV negative      Plan, per ASCCP guidelines: Pap and HPV co-test in 5 years (2028)  Formatting of this note might be different from the original.   Formatting of this note might be different from the original.    History:    2017 NILM, HPV negative    2018 NILM     2022 ASCUS, HPV negative - 47 y.o.       Plan, per ASCCP guidelines: Pap and HPV co-test in 3 years (11/2025)      Nonalcoholic steatohepatitis (COLON) 01/09/2017     Priority: Medium    Primary hypertension 09/05/2015     Priority: Medium    Carpal tunnel syndrome, bilateral 02/18/2015     Priority: Medium    Other specific arthropathies, not elsewhere classified, unspecified site 02/12/2015     Priority: Medium    Dry eye syndrome of bilateral lacrimal glands 06/10/2014     Priority: Medium    Tear film insufficiency 06/10/2014     Priority: Medium    Chronic constipation 12/09/2013     Priority: Medium    Pelvic floor dysfunction 12/09/2013     Priority: Medium    Non-celiac gluten sensitivity 03/09/2011     Priority: Medium       1)  50 yo G0 with HSV 1/HSV 2 recurrent lesions  -Initially treated with Acyclovir and Valacyclovir but developed diarrhea, nausea, headache  -Famcyclovir 500mg BID no side effectis  -HSV lesions resolving.   -Suggested she try aquaphor along gluteal cleft to help protect tissue when she wipes  2)  Non-alcoholic steatohepatitis   -repeat CMP  -Plans to take antifungal treatment to treat glabrata, but needs to make sure her liver function tests are reassuring first.   -Working with a Pharm D  -Will discuss option to increase Famcyclovir with PharmD if she has another outbreak - this typically would be my recommendation but given her complex medical history would like the PharmD to weigh in on this decision. Currently taking Famciclovir 500mg BID, could take TID during a recurrent  "outbreak with PharmD approval    Subjective:    Laxmi is a 49 year old [facility] female who presents for evaluation of vesicular lesions.      She is a former patient to the [facility].     Laxmi was recently diagnosed with HSV 1 and 2. She has a complicated medical history - immunocompromised, DM Type II, non-alcoholic fatty liver disease among others, see above.   She initially started on Acyclovir suppressive therapy but then switched to Valacyclovir. She experienced nausea, diarrhea, shooting headaches, and fatigue on these medications.   Received a message from her pharmacist, Yuliya Kaplan, who suggested we try Famciclovir. Laxmi's hepatologist is recommending that she stay on suppressive therapy.   Laxmi has now been on Famciclovir 500mg BID x 7 days. She has noticed some lesions in the same location and is also concerned that she might have lesions along the crack of her bottom. She has pain when she is wiping after a BM    Utilizing Micronor for birth control. Has noticed occasional spotting and is wondering if that is coming from the cysts that Dr. Pate is following up (subsequent pelvic ultrasound showed 2 small lesions adjacent to the right labia and right anus - suspected sebaceous cysts, and an additional simple R ovarian cyst.) Laxmi continues to have swelling of the R mons pubis/right labia majora    Review of Systems:  Also a 12 point comprehensive review of systems was negative except as noted.     Medical, Surgical, Social and Family history reviewed.       Objective:   Vitals:    Vitals:    08/28/24 1439   BP: 121/84   Pulse: 71   Weight: 105.9 kg (233 lb 8 oz)   Height: 1.676 m (5' 6\")       Physical Exam:  General: Pleasant, articulate, well-groomed, well-nourished female.  Not in any apparent distress.    External genitalia: Normal hair distribution, right mons pubis and right labia majora slightly edematous. Healing lesions currently scabbed noted on " mons pubis  Urethral opening: Without lesions, or tenderness.   Bladder: Without masses, or tenderness.  Buttocks: evidence of scabbing and scarring near cluster of ulcers noted at last visit (just left of upper buttock cleft), slight separation of tissue/possible healing ulcers noted along gluteal cleft     Time spent:  Chart review/Pre-chartin min on day of service  Face-to-face visit: 20   Documentation: 5  Total time spent on the day of service: 30 minutes          SUNG Haider, AMANDOM

## 2024-08-28 NOTE — PATIENT INSTRUCTIONS
Thank you for trusting us with your care!   Please be aware, if you are on Mychart, you may see your results prior to your providers review. If labs are abnormal, we will call or message you on [facility] with a follow up plan.    If you need to contact us for questions about:  Symptoms, Scheduling & Medical Questions; Non-urgent (2-3 day response) Veracode message, Urgent (needing response today) [phone] (if after 3:30pm next day response)   Prescriptions: Please call your Pharmacy   Billing: [facility] [phone] or [facility]:[phone]

## 2024-08-28 NOTE — LETTER
8/28/2024       RE: Laxmi Ya  [address] [address]     Dear Colleague,    Thank you for referring your patient, Laxmi Ya, to the [facility] [place] at [facility]. Please see a copy of my visit note below.    Office Problem Visit        Assessment/Plan:  Patient Active Problem List    Diagnosis Date Noted     HSV-1 (herpes simplex virus 1) infection 07/25/2024     Priority: Medium     HSV-2 (herpes simplex virus 2) infection 07/25/2024     Priority: Medium     Degenerative arthritis of cervical spine with nerve compression 07/23/2024     Priority: Medium     Chronic musculoskeletal pain due to persistent inflammatory disorder 07/23/2024     Priority: Medium     Diverticulosis 07/23/2024     Priority: Medium     Family history of diabetes mellitus 07/23/2024     Priority: Medium     H/O partial resection of colon 07/23/2024     Priority: Medium     History of diverticulitis 07/23/2024     Priority: Medium     History of thrush 07/23/2024     Priority: Medium     Pruritic disorder 07/23/2024     Priority: Medium     Formatting of this note might be different from the original.   Created by Conversion      Replacement Utility updated for latest IMO load       Radiculopathy due to spondylosis 07/23/2024     Priority: Medium     Seasonal allergies 07/23/2024     Priority: Medium     Staphylococcal infection of skin 07/23/2024     Priority: Medium     Tenosynovitis of forearm 07/23/2024     Priority: Medium     Tenosynovitis, rheumatoid 07/23/2024     Priority: Medium     Thoracic arthritis 07/23/2024     Priority: Medium     Sinusitis, chronic 07/23/2024     Priority: Medium     Formatting of this note might be different from the original.   Created by Conversion      Replacement Utility updated for latest IMO load       Inflammatory autoimmune disorder (H24) 07/23/2024     Priority: Medium     Anxiety 06/05/2024      Priority: Medium     Onychomycosis 06/05/2024     Priority: Medium     Vitamin D deficiency 06/05/2024     Priority: Medium     Rheumatoid arthritis (H) 03/13/2024     Priority: Medium     Mixed hyperlipidemia 07/03/2023     Priority: Medium     Bilateral hand pain 06/14/2023     Priority: Medium     Morbid obesity (H) 04/12/2023     Priority: Medium     Neck pain 03/09/2023     Priority: Medium     Bilateral low back pain without sciatica 03/09/2023     Priority: Medium     Pelvic pain in female 03/09/2023     Priority: Medium     Posterior subcapsular polar age-related cataract of right eye 08/11/2022     Priority: Medium     Formatting of this note might be different from the original.  Added automatically from request for surgery [id number] Formatting of this note might be different from the original.  Formatting of this note might be different from the original.   Added automatically from request for surgery [id number] Formatting of this note might be different from the original.  Added automatically from request for surgery [id number] Formatting of this note might be different from the original.  Formatting of this note might be different from the original.   Formatting of this note might be different from the original.    Added automatically from request for surgery [id number] Formatting of this note might be different from the original.  Formatting of this note might be different from the original.   Added automatically from request for surgery [id number]  Formatting of this note might be different from the original.   Added automatically from request for surgery [id number]  Formatting of this note might be different from the original.   Formatting of this note might be different from the original.    Formatting of this note might be different from the original.     Added automatically from request for surgery [id number]  Formatting of this note might be different from the original.   Formatting of this note might be different  from the original.    Added automatically from request for surgery [id number]       Irritable bowel syndrome 07/21/2022     Priority: Medium     Formatting of this note might be different from the original.   GI local and at [place]. No clear evidence of Inflammatory or SIBO or micro or ulcerative colitis, crohns.    GI recommended health diet.  Formatting of this note might be different from the original.   Formatting of this note might be different from the original.    GI local and at [place]. No clear evidence of Inflammatory or SIBO or micro or ulcerative colitis, crohns.     GI recommended health diet.       Seronegative inflammatory arthritis 06/17/2022     Priority: Medium     Enterocolitis 02/15/2022     Priority: Medium     Recurrent Clostridioides difficile diarrhea 02/15/2022     Priority: Medium     Small intestinal bacterial overgrowth (SIBO) 12/22/2021     Priority: Medium     Type 2 diabetes mellitus without complication (H) 06/02/2021     Priority: Medium     Localized enlarged lymph nodes 04/20/2021     Priority: Medium     Tenosynovitis of both wrists 03/22/2021     Priority: Medium     Formatting of this note might be different from the original.   Formatting of this note might be different from the original.   Added automatically from request for surgery [id number]  Formatting of this note might be different from the original.   Added automatically from request for surgery [id number]       Chronic fatigue syndrome 11/26/2019     Priority: Medium     Other chronic allergic conjunctivitis 07/15/2019     Priority: Medium     Angioedema 03/18/2019     Priority: Medium     Formatting of this note might be different from the original.   Created by Conversion      Replacement Utility updated for latest IMO load  Formatting of this note might be different from the original.   Formatting of this note might be different from the original.   Created by Conversion      Replacement Utility updated for latest IMO  load   Formatting of this note might be different from the original.   Overview:    Created by Conversion      Replacement Utility updated for latest IMO load  Formatting of this note might be different from the original.   Overview:    Created by Conversion      Replacement Utility updated for latest IMO load       Gastroesophageal reflux disease 02/15/2019     Priority: Medium     Ruptured cervical disc 02/15/2019     Priority: Medium     Fibromyalgia 02/15/2019     Priority: Medium     Polycystic ovary syndrome 01/29/2019     Priority: Medium     Uterine leiomyoma 01/29/2019     Priority: Medium     Radiculopathy 07/20/2018     Priority: Medium     Allergic rhinitis 07/19/2018     Priority: Medium     Formatting of this note might be different from the original.   Created by Conversion      Replacement Utility updated for latest IMO load  Formatting of this note might be different from the original.   Formatting of this note might be different from the original.   Created by Conversion      Replacement Utility updated for latest IMO load   Formatting of this note might be different from the original.   Overview:    Created by Conversion      Replacement Utility updated for latest IMO load  Formatting of this note might be different from the original.   Overview:    Created by Conversion      Replacement Utility updated for latest IMO load  Formatting of this note might be different from the original.   Formatting of this note might be different from the original.   Formatting of this note might be different from the original.   Created by Conversion      Replacement Utility updated for latest IMO load   Formatting of this note might be different from the original.   Overview:    Created by Conversion      Replacement Utility updated for latest IMO load       Mild persistent asthma without complication 07/19/2018     Priority: Medium     Formatting of this note might be different from the original.   Created by  Conversion  Formatting of this note might be different from the original.   Overview:    Created by Conversion       Lumbar radiculopathy 06/26/2018     Priority: Medium     Undifferentiated connective tissue disease (H24) 05/02/2017     Priority: Medium     Abnormal cervical Papanicolaou smear 04/04/2017     Priority: Medium     Formatting of this note might be different from the original.   [facility] Review:      History:   2017: NILM, HPV negative   2018: NILM   2022: ASCUS, HPV negative   2023: NILM, HPV negative      Plan, per ASCCP guidelines: Pap and HPV co-test in 5 years (2028)  Formatting of this note might be different from the original.   Formatting of this note might be different from the original.    History:    2017 NILM, HPV negative    2018 NILM     2022 ASCUS, HPV negative - 47 y.o.       Plan, per ASCCP guidelines: Pap and HPV co-test in 3 years (11/2025)       Nonalcoholic steatohepatitis (COLON) 01/09/2017     Priority: Medium     Primary hypertension 09/05/2015     Priority: Medium     Carpal tunnel syndrome, bilateral 02/18/2015     Priority: Medium     Other specific arthropathies, not elsewhere classified, unspecified site 02/12/2015     Priority: Medium     Dry eye syndrome of bilateral lacrimal glands 06/10/2014     Priority: Medium     Tear film insufficiency 06/10/2014     Priority: Medium     Chronic constipation 12/09/2013     Priority: Medium     Pelvic floor dysfunction 12/09/2013     Priority: Medium     Non-celiac gluten sensitivity 03/09/2011     Priority: Medium       1)  50 yo G0 with HSV 1/HSV 2 recurrent lesions  -Initially treated with Acyclovir and Valacyclovir but developed diarrhea, nausea, headache  -Famcyclovir 500mg BID no side effectis  -HSV lesions resolving.   -Suggested she try aquaphor along gluteal cleft to help protect tissue when she wipes  2)  Non-alcoholic steatohepatitis   -repeat CMP  -Plans to take antifungal treatment to treat glabrata, but needs to make sure  her liver function tests are reassuring first.   -Working with a Pharm D  -Will discuss option to increase Famcyclovir with PharmD if she has another outbreak - this typically would be my recommendation but given her complex medical history would like the PharmD to weigh in on this decision. Currently taking Famciclovir 500mg BID, could take TID during a recurrent outbreak with PharmD approval    Subjective:    Laxmi is a 49 year old [facility] female who presents for evaluation of vesicular lesions.      She is a former patient to the [facility].     Laxmi was recently diagnosed with HSV 1 and 2. She has a complicated medical history - immunocompromised, DM Type II, non-alcoholic fatty liver disease among others, see above.   She initially started on Acyclovir suppressive therapy but then switched to Valacyclovir. She experienced nausea, diarrhea, shooting headaches, and fatigue on these medications.   Received a message from her pharmacist, Yuliya Kaplan, who suggested we try Famciclovir. Laxmi's hepatologist is recommending that she stay on suppressive therapy.   Laxmi has now been on Famciclovir 500mg BID x 7 days. She has noticed some lesions in the same location and is also concerned that she might have lesions along the crack of her bottom. She has pain when she is wiping after a BM    Utilizing Micronor for birth control. Has noticed occasional spotting and is wondering if that is coming from the cysts that Dr. Pate is following up (subsequent pelvic ultrasound showed 2 small lesions adjacent to the right labia and right anus - suspected sebaceous cysts, and an additional simple R ovarian cyst.) Laxmi continues to have swelling of the R mons pubis/right labia majora    Review of Systems:  Also a 12 point comprehensive review of systems was negative except as noted.     Medical, Surgical, Social and Family history reviewed.       Objective:   Vitals:    Vitals:     "24 1439   BP: 121/84   Pulse: 71   Weight: 105.9 kg (233 lb 8 oz)   Height: 1.676 m (5' 6\")       Physical Exam:  General: Pleasant, articulate, well-groomed, well-nourished female.  Not in any apparent distress.    External genitalia: Normal hair distribution, right mons pubis and right labia majora slightly edematous. Healing lesions currently scabbed noted on mons pubis  Urethral opening: Without lesions, or tenderness.   Bladder: Without masses, or tenderness.  Buttocks: evidence of scabbing and scarring near cluster of ulcers noted at last visit (just left of upper buttock cleft), slight separation of tissue/possible healing ulcers noted along gluteal cleft     Time spent:  Chart review/Pre-chartin min on day of service  Face-to-face visit: 20   Documentation: 5  Total time spent on the day of service: 30 minutes          SUNG Haider CNM      Again, thank you for allowing me to participate in the care of your patient.      Sincerely,    Maryellen Temple CNM    "

## 2024-08-30 NOTE — PROGRESS NOTES
24 0500   Appointment Info   Signing clinician's name / credentials Sofi Lemons PT, OCS   Total/Authorized Visits orders  24, called 2x to get new orders from YESENIA Cortez. Not rec'd yet but cert signed.   Visits Used 39   Medical Diagnosis Lower back pain with mobility deficits, lower back pain w/ referred pain, neck pain w/ headache, neck pain w/ mobility deficits, pelvic pain   PT Tx Diagnosis Lower back pain with mobility deficits, lower back pain w/ referred pain, neck pain w/ headache, neck pain w/ mobility deficits   Other pertinent information autoimmune diseases, hx lumbar discectomy   Progress Note/Certification   Start of Care Date 23   Onset of illness/injury or Date of Surgery 23   Therapy Frequency 2x month   Predicted Duration 12 weeks   Certification date from 24   Certification date to 24   Progress Note Due Date 24   Progress Note Completed Date 24   GOALS   PT Goals 3   PT Goal 1   Goal Identifier ambulation   Goal Description Minutes patient will be able to  walk 20-30 minutes   Rationale to maximize safety and independence with performance of ADLs and functional tasks;to maximize safety and independence within the home;to maximize safety and independence within the community;to maximize safety and independence with transportation;to maximize safety and independence with self cares   Goal Progress currently ~10 minutes   Target Date 24   PT Goal 2   Goal Identifier headaches   Goal Description HA 1x week or less with intensity 2/10 or less   Rationale to maximize safety and independence with performance of ADLs and functional tasks;to maximize safety and independence within the home;to maximize safety and independence within the community;to maximize safety and independence with transportation;to maximize safety and independence with self cares   Target Date 24   PT Goal 3   Goal Identifier pelvic pain   Goal  Description with walking/intimacy/urinary urgency 2/10 or less   Rationale to maximize safety and independence with performance of ADLs and functional tasks;to maximize safety and independence within the home;to maximize safety and independence with transportation;to maximize safety and independence with self cares;to maximize safety and independence within the community   Goal Progress currently 8/10   Target Date 11/26/24   Subjective Report   Subjective Report Returns to PT (last visit 07/17). Reports her dog had to have surgery and was quite difficult caring for him. Continues to struggle with weight gain (30#+) recently due to medication changes. Having pain primarily in B SI's/lateral hips/anterior pelvis/groin. Worse with walking,being on feet prolonged. Also has occasional urinary urgency/frequency but usually able to delay for 1-2 hours. HA's ~ 4-5 x week, can be intense and especially in upper thoracic area.   Objective Measures   Objective Measures Objective Measure 1;Objective Measure 3   Objective Measure 1   Objective Measure Lumbar ROM: flexion to knees, extension mod loss, B SB min loss.   Objective Measure 2   Objective Measure L>R internal pelvic floor significant tightness/tenderness OI/LA.   Details Kegel strength 2+/5   Objective Measure 3   Objective Measure CROM: flexion WNL, extension mod loss, B rotation mod loss   Details Thoracic ROM: flexion WNL, extension max loss, B rotation mod loss. Hypomobility PA T2-10.   Cryotherapy   Ice -Type Pack   Location ice pack under B SIJ's while on cervical traction   Mechanical Traction   Mechanical Traction, Minutes (96555) 15   Traction -Type Cervical   Position supine   Type Intermittent   Duration 15 min   Max poundage 25   Min poundage 0   Hold Time 60   Rest Time 10   Steps ramping up 1   Steps ramping down 1   Speed 100%   Patient Response/Progress tolerates well, requests more poundage   Therapeutic Procedure/Exercise   Therapeutic Procedures  Ther Proc 2;Ther Proc 3;Ther Proc 4   Ther Proc 1 glute myofascial full arc   Ther Proc 1 - Details x20   Ther Proc 3 - Details roll in's hooklying gentle 5 sec x 10, 2 x day   Ther Proc 4 diaphragmatic breathing 5 min, 2 xday   Manual Therapy   Manual Therapy: Mobilization, MFR, MLD, friction massage minutes (65745) 25   Manual Therapy Manual Therapy 2   Manual Therapy 1 supine MFR to internal PF (LA/OI L>R)   Manual Therapy 1 - Details IR/ER x10 each side   Manual Therapy 2 NEXT: possible Graston to B SI area?   Skilled Intervention to decrease tone in PF   Patient Response/Progress very TTP start of treatment L>R, resolves to min tenderness by end of session   Education   Learner/Method Patient;No Barriers to Learning   Plan   Home program PTRX   Plan for next session consider graston to SI's and either Alter G OR traction   Total Session Time   Timed Code Treatment Minutes 25   Total Treatment Time (sum of timed and untimed services) 40         [facility]                                                                                   OUTPATIENT PHYSICAL THERAPY    PLAN OF TREATMENT FOR OUTPATIENT REHABILITATION   Patient's Last Name, First Name, Laxmi Coleman YOB: 1975   Provider's Name   [facility]   Medical Record No.  [id number]     Onset Date: 02/03/23  Start of Care Date: 03/08/23     Medical Diagnosis:  Lower back pain with mobility deficits, lower back pain w/ referred pain, neck pain w/ headache, neck pain w/ mobility deficits, pelvic pain      PT Treatment Diagnosis:  Lower back pain with mobility deficits, lower back pain w/ referred pain, neck pain w/ headache, neck pain w/ mobility deficits Plan of Treatment  Frequency/Duration: 2x month/ 12 weeks    Certification date from 08/30/24 to 11/26/24         See note for plan of treatment details and functional goals     Peng George, PT, OCS                         I CERTIFY THE NEED FOR THESE SERVICES FURNISHED UNDER        THIS PLAN OF TREATMENT AND WHILE UNDER MY CARE .             Physician Signature               Date    X_____________________________________________________                  Referring Provider:  Kimberlee Vasquez    Initial Assessment  See Epic Evaluation- Start of Care Date: 03/08/23

## 2024-09-10 ENCOUNTER — INFUSION THERAPY VISIT (OUTPATIENT)
Dept: INFUSION THERAPY | Facility: CLINIC | Age: 49
End: 2024-09-10
Attending: INTERNAL MEDICINE
Payer: MEDICARE

## 2024-09-10 VITALS
HEART RATE: 74 BPM | TEMPERATURE: 97.6 F | OXYGEN SATURATION: 100 % | SYSTOLIC BLOOD PRESSURE: 134 MMHG | DIASTOLIC BLOOD PRESSURE: 86 MMHG | BODY MASS INDEX: 36.64 KG/M2 | WEIGHT: 227 LBS | RESPIRATION RATE: 18 BRPM

## 2024-09-10 DIAGNOSIS — M13.80 SERONEGATIVE INFLAMMATORY ARTHRITIS: Primary | ICD-10-CM

## 2024-09-10 LAB
ALBUMIN SERPL BCG-MCNC: 4.3 G/DL (ref 3.5–5.2)
ALBUMIN UR-MCNC: 20 MG/DL
ALP SERPL-CCNC: 49 U/L (ref 40–150)
ALT SERPL W P-5'-P-CCNC: 94 U/L (ref 0–50)
ANION GAP SERPL CALCULATED.3IONS-SCNC: 12 MMOL/L (ref 7–15)
APPEARANCE UR: CLEAR
AST SERPL W P-5'-P-CCNC: 81 U/L (ref 0–45)
BACTERIA #/AREA URNS HPF: ABNORMAL /HPF
BASOPHILS # BLD AUTO: 0.1 10E3/UL (ref 0–0.2)
BASOPHILS NFR BLD AUTO: 1 %
BILIRUB SERPL-MCNC: 0.6 MG/DL
BILIRUB UR QL STRIP: NEGATIVE
BUN SERPL-MCNC: 13.5 MG/DL (ref 6–20)
CALCIUM SERPL-MCNC: 9.1 MG/DL (ref 8.8–10.4)
CHLORIDE SERPL-SCNC: 104 MMOL/L (ref 98–107)
COLOR UR AUTO: YELLOW
CREAT SERPL-MCNC: 0.82 MG/DL (ref 0.51–0.95)
EGFRCR SERPLBLD CKD-EPI 2021: 88 ML/MIN/1.73M2
EOSINOPHIL # BLD AUTO: 0.2 10E3/UL (ref 0–0.7)
EOSINOPHIL NFR BLD AUTO: 3 %
ERYTHROCYTE [DISTWIDTH] IN BLOOD BY AUTOMATED COUNT: 13.9 % (ref 10–15)
GLUCOSE SERPL-MCNC: 175 MG/DL (ref 70–99)
GLUCOSE UR STRIP-MCNC: NEGATIVE MG/DL
HCO3 SERPL-SCNC: 23 MMOL/L (ref 22–29)
HCT VFR BLD AUTO: 42.9 % (ref 35–47)
HGB BLD-MCNC: 14.7 G/DL (ref 11.7–15.7)
HGB UR QL STRIP: NEGATIVE
IMM GRANULOCYTES # BLD: 0.1 10E3/UL
IMM GRANULOCYTES NFR BLD: 1 %
KETONES UR STRIP-MCNC: NEGATIVE MG/DL
LEUKOCYTE ESTERASE UR QL STRIP: NEGATIVE
LYMPHOCYTES # BLD AUTO: 2.1 10E3/UL (ref 0.8–5.3)
LYMPHOCYTES NFR BLD AUTO: 28 %
MCH RBC QN AUTO: 30.2 PG (ref 26.5–33)
MCHC RBC AUTO-ENTMCNC: 34.3 G/DL (ref 31.5–36.5)
MCV RBC AUTO: 88 FL (ref 78–100)
MONOCYTES # BLD AUTO: 0.7 10E3/UL (ref 0–1.3)
MONOCYTES NFR BLD AUTO: 10 %
NEUTROPHILS # BLD AUTO: 4.4 10E3/UL (ref 1.6–8.3)
NEUTROPHILS NFR BLD AUTO: 57 %
NITRATE UR QL: NEGATIVE
NRBC # BLD AUTO: 0 10E3/UL
NRBC BLD AUTO-RTO: 0 /100
PH UR STRIP: 6 [PH] (ref 5–7)
PLATELET # BLD AUTO: 289 10E3/UL (ref 150–450)
POTASSIUM SERPL-SCNC: 3.4 MMOL/L (ref 3.4–5.3)
PROT SERPL-MCNC: 6.3 G/DL (ref 6.4–8.3)
RBC # BLD AUTO: 4.86 10E6/UL (ref 3.8–5.2)
RBC URINE: 2 /HPF
SODIUM SERPL-SCNC: 139 MMOL/L (ref 135–145)
SP GR UR STRIP: 1.02 (ref 1–1.03)
SQUAMOUS EPITHELIAL: 2 /HPF
UROBILINOGEN UR STRIP-MCNC: NORMAL MG/DL
WBC # BLD AUTO: 7.6 10E3/UL (ref 4–11)
WBC URINE: 4 /HPF

## 2024-09-10 PROCEDURE — 85041 AUTOMATED RBC COUNT: CPT | Performed by: INTERNAL MEDICINE

## 2024-09-10 PROCEDURE — 258N000003 HC RX IP 258 OP 636: Performed by: INTERNAL MEDICINE

## 2024-09-10 PROCEDURE — 36415 COLL VENOUS BLD VENIPUNCTURE: CPT | Performed by: INTERNAL MEDICINE

## 2024-09-10 PROCEDURE — 96365 THER/PROPH/DIAG IV INF INIT: CPT

## 2024-09-10 PROCEDURE — 250N000011 HC RX IP 250 OP 636: Performed by: INTERNAL MEDICINE

## 2024-09-10 PROCEDURE — 81001 URINALYSIS AUTO W/SCOPE: CPT | Performed by: INTERNAL MEDICINE

## 2024-09-10 PROCEDURE — 250N000013 HC RX MED GY IP 250 OP 250 PS 637: Performed by: INTERNAL MEDICINE

## 2024-09-10 PROCEDURE — 82247 BILIRUBIN TOTAL: CPT | Performed by: INTERNAL MEDICINE

## 2024-09-10 PROCEDURE — 96375 TX/PRO/DX INJ NEW DRUG ADDON: CPT

## 2024-09-10 RX ORDER — DIPHENHYDRAMINE HYDROCHLORIDE 50 MG/ML
50 INJECTION INTRAMUSCULAR; INTRAVENOUS
Status: CANCELLED
Start: 2024-09-12

## 2024-09-10 RX ORDER — ACETAMINOPHEN 325 MG/1
650 TABLET ORAL ONCE
Status: COMPLETED | OUTPATIENT
Start: 2024-09-10 | End: 2024-09-10

## 2024-09-10 RX ORDER — ALBUTEROL SULFATE 0.83 MG/ML
2.5 SOLUTION RESPIRATORY (INHALATION)
Status: CANCELLED | OUTPATIENT
Start: 2024-09-12

## 2024-09-10 RX ORDER — HEPARIN SODIUM,PORCINE 10 UNIT/ML
5-20 VIAL (ML) INTRAVENOUS DAILY PRN
Status: CANCELLED | OUTPATIENT
Start: 2024-09-12

## 2024-09-10 RX ORDER — METHYLPREDNISOLONE SODIUM SUCCINATE 125 MG/2ML
125 INJECTION, POWDER, LYOPHILIZED, FOR SOLUTION INTRAMUSCULAR; INTRAVENOUS
Status: CANCELLED
Start: 2024-09-12

## 2024-09-10 RX ORDER — MEPERIDINE HYDROCHLORIDE 25 MG/ML
25 INJECTION INTRAMUSCULAR; INTRAVENOUS; SUBCUTANEOUS EVERY 30 MIN PRN
Status: CANCELLED | OUTPATIENT
Start: 2024-09-12

## 2024-09-10 RX ORDER — HEPARIN SODIUM (PORCINE) LOCK FLUSH IV SOLN 100 UNIT/ML 100 UNIT/ML
5 SOLUTION INTRAVENOUS
Status: CANCELLED | OUTPATIENT
Start: 2024-09-12

## 2024-09-10 RX ORDER — METHYLPREDNISOLONE SODIUM SUCCINATE 125 MG/2ML
40 INJECTION, POWDER, LYOPHILIZED, FOR SOLUTION INTRAMUSCULAR; INTRAVENOUS ONCE
Status: CANCELLED | OUTPATIENT
Start: 2024-09-12

## 2024-09-10 RX ORDER — ALBUTEROL SULFATE 90 UG/1
1-2 AEROSOL, METERED RESPIRATORY (INHALATION)
Status: CANCELLED
Start: 2024-09-12

## 2024-09-10 RX ORDER — ACETAMINOPHEN 325 MG/1
650 TABLET ORAL ONCE
Status: CANCELLED | OUTPATIENT
Start: 2024-09-12

## 2024-09-10 RX ORDER — METHYLPREDNISOLONE SODIUM SUCCINATE 125 MG/2ML
40 INJECTION, POWDER, LYOPHILIZED, FOR SOLUTION INTRAMUSCULAR; INTRAVENOUS ONCE
Status: COMPLETED | OUTPATIENT
Start: 2024-09-10 | End: 2024-09-10

## 2024-09-10 RX ORDER — EPINEPHRINE 1 MG/ML
0.3 INJECTION, SOLUTION INTRAMUSCULAR; SUBCUTANEOUS EVERY 5 MIN PRN
Status: CANCELLED | OUTPATIENT
Start: 2024-09-12

## 2024-09-10 RX ADMIN — TOCILIZUMAB 800 MG: 20 INJECTION, SOLUTION, CONCENTRATE INTRAVENOUS at 13:09

## 2024-09-10 RX ADMIN — ACETAMINOPHEN 650 MG: 325 TABLET ORAL at 12:54

## 2024-09-10 RX ADMIN — METHYLPREDNISOLONE SODIUM SUCCINATE 37.5 MG: 125 INJECTION, POWDER, FOR SOLUTION INTRAMUSCULAR; INTRAVENOUS at 13:02

## 2024-09-10 RX ADMIN — SODIUM CHLORIDE 250 ML: 9 INJECTION, SOLUTION INTRAVENOUS at 14:15

## 2024-09-10 NOTE — PROGRESS NOTES
Nursing Note  Laxmi Ya presents today to [facility] for:   Chief Complaint   Patient presents with    Infusion     Actemra (tocilizumab)     During today's [facility] appointment, orders from Dr. Lemus were completed.  Frequency: monthly    Progress note:  Patient identification verified by name and date of birth.  Assessment completed.  Vitals recorded in Doc Flowsheets.  Patient was provided with education regarding medication/procedure and possible side effects.  Patient verbalized understanding.     present during visit today: Not Applicable.    Treatment Conditions: Patient denies fever, chills, signs of infection, recent illness, antibiotics use, productive cough or elevated temperature.  Premedications: administered per order.  Drug Waste Record: No  Infusion length and rate:  150 ml/hr., over one hour, 250 ml/hr flush over 15 minutes at end  Labs: were drawn per orders.   Vascular access: peripheral IV was placed by vascular access nurse.  Is the next appt scheduled? Yes    Post Infusion Assessment:  Patient tolerated infusion without incident.  Site patent and intact, free from redness, edema or discomfort.  No evidence of extravasations.  Access discontinued per protocol.     Discharge Plan:   Follow up plan of care with: ongoing infusions at [facility].  Discharge instructions were reviewed with patient.  Patient/representative verbalized understanding of discharge instructions and all questions answered.  Patient discharged from [facility] in stable condition.    Kaley Palacio RN    Administrations This Visit       acetaminophen (TYLENOL) tablet 650 mg       Admin Date  09/10/2024 Action  $Given Dose  650 mg Route  Oral Documented By  Kaley Palacio RN              methylPREDNISolone Na Suc (solu-MEDROL) injection 37.5 mg       Admin Date  09/10/2024  Action  $Given Dose  37.5 mg Route  Intravenous Documented By  Kaley Palacio RN              sodium chloride 0.9% BOLUS 250 mL       Admin Date  09/10/2024 Action  $New Bag Dose  250 mL Route  Intravenous Documented By  Kaley Palacio RN              tocilizumab (ACTEMRA) 800 mg in sodium chloride 0.9 % 150 mL infusion       Admin Date  09/10/2024 Action  $New Bag Dose  800 mg Rate  150 mL/hr Route  Intravenous Documented By  Kaley Palacio RN                    /86   Pulse 74   Temp 97.6  F (36.4  C) (Oral)   Resp 18   Wt 103 kg (227 lb)   SpO2 100%   BMI 36.64 kg/m         Treatment Conditions:  Biological Infusion Checklist:  ~~~ NOTE: If the patient answers yes to any of the questions below, hold the infusion and contact ordering provider or on-call provider.    Have you recently had an elevated temperature, fever, chills, productive cough, coughing for 3 weeks or longer or hemoptysis,  abnormal vital signs, night sweats,  chest pain or have you noticed a decrease in your appetite, unexplained weight loss or fatigue? No  Do you have any open wounds or new incisions? No  Do you have any upcoming hospitalizations or surgeries? Does not include esophagogastroduodenoscopy, colonoscopy, endoscopic retrograde cholangiopancreatography (ERCP), endoscopic ultrasound (EUS), dental procedures or joint aspiration/steroid injections No  Do you currently have any signs of illness or infection or are you on any antibiotics? No  Have you had any new, sudden or worsening abdominal pain? No  Have you or anyone in your household received a live vaccination in the past 4 weeks? Please note: No live vaccines while on biologic/chemotherapy until 6 months after the last treatment. Patient can receive the flu vaccine (shot only), pneumovax and the Covid vaccine. It is optimal for the patient to get these vaccines mid cycle, but they can be given at any time as long as it is not on the day of the infusion.  No  Have you recently been diagnosed with any new nervous system diseases (ie. Multiple sclerosis, Guillain McQueeney, seizures, neurological changes) or cancer diagnosis? Are you on any form of radiation or chemotherapy? No  Are you pregnant or breast feeding or do you have plans of pregnancy in the future? No  Have you been having any signs of worsening depression or suicidal ideations?  (benlysta only) N/A  Have there been any other new onset medical symptoms? No  Have you had any new blood clots? (IVIG only) N/A      Kaley Handley RN

## 2024-09-12 ENCOUNTER — TELEPHONE (OUTPATIENT)
Dept: RHEUMATOLOGY | Facility: CLINIC | Age: 49
End: 2024-09-12
Payer: MEDICARE

## 2024-09-12 DIAGNOSIS — M13.80 SERONEGATIVE INFLAMMATORY ARTHRITIS: ICD-10-CM

## 2024-09-12 DIAGNOSIS — M13.80 SERONEGATIVE INFLAMMATORY ARTHRITIS: Primary | ICD-10-CM

## 2024-09-12 NOTE — TELEPHONE ENCOUNTER
[facility] Call Center    Phone Message    May a detailed message be left on voicemail: yes     Reason for Call: Other: Pt call regarding her flare and would like a call back from care team ASAP. Per pt please follow-up. Thank you       Action Taken: Other: Rheum    Travel Screening: Not Applicable     Date of Service:

## 2024-09-13 ENCOUNTER — VIRTUAL VISIT (OUTPATIENT)
Dept: RHEUMATOLOGY | Facility: CLINIC | Age: 49
End: 2024-09-13
Attending: INTERNAL MEDICINE
Payer: MEDICARE

## 2024-09-13 DIAGNOSIS — E11.29 TYPE 2 DIABETES MELLITUS WITH OTHER DIABETIC KIDNEY COMPLICATION, WITH LONG-TERM CURRENT USE OF INSULIN (H): ICD-10-CM

## 2024-09-13 DIAGNOSIS — M06.09 SERONEGATIVE RHEUMATOID ARTHRITIS OF MULTIPLE SITES (H): Primary | ICD-10-CM

## 2024-09-13 DIAGNOSIS — Z79.4 TYPE 2 DIABETES MELLITUS WITH OTHER DIABETIC KIDNEY COMPLICATION, WITH LONG-TERM CURRENT USE OF INSULIN (H): ICD-10-CM

## 2024-09-13 DIAGNOSIS — B00.9 HSV-1 (HERPES SIMPLEX VIRUS 1) INFECTION: ICD-10-CM

## 2024-09-13 RX ORDER — METHYLPREDNISOLONE 4 MG
TABLET, DOSE PACK ORAL
Qty: 21 TABLET | Refills: 0 | Status: SHIPPED | OUTPATIENT
Start: 2024-09-13 | End: 2024-09-20

## 2024-09-13 NOTE — Clinical Note
9/13/2024       RE: Laxmi Ya  [address] [place] [id number]     Dear Colleague,    Thank you for referring your patient, Laxmi Ya, to the [facility] RHEUMATOLOGY [facility]INIC [place] at [facility]. Please see a copy of my visit note below.    Medication Therapy Management (MTM) Encounter    ASSESSMENT:                            Medication Adherence/Access: No issues identified    Rheumatoid Arthritis: Would benefit from continuing to work with the rheumatology clinic to manage flare symptoms. Discussed if Medrol pack is prescribed, steroid insulin dosing should be started.    HSV Infection: Discussed HSV lesions may be slower to heal due to active immunosuppression from RA medications. Advised continuing famciclovir as directed and contacting OBGYN if she feels symptoms are worsening. Discussed that if she does flare dose can be increased to 500 mg three times daily x 5 days safely.    Type 2 Diabetes: Recommended starting Mounjaro after current RA flare has been treated. Will tentatively plan to start Mounjaro 2.5 mg weekly ~9/30/24. Discussed in detail that Mounjaro will likely help with blood sugars and decrease insulin dose before helping with weight loss.     PLAN:                            1. Continue famciclovir 500 mg twice daily, it may take longer for the HSV lesions to heal due to some of your immunosuppressive medications. If you feel your HSV is flaring again, contact OBGYN and increase famciclovir to 500 mg three times daily x 5 days.    2. Please start Mounjaro 2.5 mg weekly once your RA flare has resolved. We discussed starting around 9/30/24.    3. The rheumatology clinic should reach out today with instructions on how to manage RA flare.    Follow-up: {[email or web address]:[id number]}    SUBJECTIVE/OBJECTIVE:                          Laxmi Ya is a 49 year old female seen for a follow-up visit.       Reason for visit: Questions on how  "to take famciclovir, when to start Mounjaro    Allergies/ADRs: Reviewed in chart  Past Medical History: Reviewed in chart  Tobacco: She reports that she has never smoked. She has never used smokeless tobacco.  Alcohol: Less than 1 beverages / week    Medication Adherence/Access: no issues reported    Rheumatoid Arthritis:   Actemra 800 mg infusion monthly (last infusion 9/10/24)   Diclofenac 1% gel as needed (uses once per day a few times per week)  Oxycodone 10 mg 2-4 times daily as needed     Reports she feels that she is having flare symptoms - pain all over her body, stiffness in joints, and difficulty with daily living tasks (dressing, walking, getting in and out of bed/car). Has already messaged rheumatology clinic about symptoms and is hoping to get Medrol dose pack.    Liver Function Studies -   Recent Labs   Lab Test 09/10/24  1256   PROTTOTAL 6.3*   ALBUMIN 4.3   BILITOTAL 0.6   ALKPHOS 49   AST 81*   ALT 94*     CBC RESULTS:   Recent Labs   Lab Test 09/10/24  1256   WBC 7.6   RBC 4.86   HGB 14.7   HCT 42.9   MCV 88   MCH 30.2   MCHC 34.3   RDW 13.9        HSV Infection:   Famciclovir 500 mg twice daily       Reports she has been tolerating famciclovir well - no GI side effects noted. Concerned that this medication may not be \"enough\" to treat her current HSV lesions. Did previously discuss with OBGYN that 500 mg three times daily x 5 days can be used if she has flare ups.     Creatinine   Date Value Ref Range Status   09/10/2024 0.82 0.51 - 0.95 mg/dL Final   04/01/2020 0.73 0.52 - 1.04 mg/dL Final      Type 2 Diabetes:    Lantus 37 units daily  Humalog 16 units three times daily with meals and correction dose 1:25 for BG >120  NPH insulin 70 units at time of infusion and 20 units six hours post infusion, 20 units at bedtime day of infusion, 20 units on the first day post infusion, and 20 units for days two and three after the infusion   Mounjaro 2.5 mg weekly (not started yet)     Patient reports " no current medication side effects.  Blood sugar monitoring: 3-6 time(s) daily - Follows closely with  diabetes education for monitoring  Current diabetes symptoms: Frequent hyperglycemia, polyuria after infusions or steroid tapers     Did receive Mounjaro 2.5 mg strength but has not started this yet. After several antiviral changes now is trying to figure out when to start this medication again. Notes weight loss is a priority of hers and she wants to start this as soon as possible put also avoid duplicative side effects from other medications.    Today's Vitals: There were no vitals taken for this visit.  ----------------    I spent 30 minutes with this patient today. All changes were made via collaborative practice agreement with Oswaldo Lemus MD. A copy of the visit note was provided to the patient's provider(s).    A summary of these recommendations was sent via [employer].    Dominguez Kaplan PharmD  Medication Therapy Management Pharmacist  [facility]  Phone: [phone]    Telemedicine Visit Details  The patient's medications can be safely assessed via a telemedicine encounter.  Type of service:  Telephone visit  Originating Location (pt. Location): Home  Distant Location (provider location):  On-site  Start Time: 11:00 AM  End Time: 11:30 AM     Medication Therapy Recommendations  No medication therapy recommendations to display         Again, thank you for allowing me to participate in the care of your patient.      Sincerely,    DOMINGUEZ KAPLAN RPH

## 2024-09-13 NOTE — TELEPHONE ENCOUNTER
Patient calling with flare for about the 2 weeks.  All over body pain and stiffness.  Difficulty sleeping, walking, dressing, and getting in and out of the car.  Currently on actemra 750mg monthly.  Asking for medrol dose pack.  RX is pended.     Macy Sawyer RN

## 2024-09-19 DIAGNOSIS — M13.80 SERONEGATIVE INFLAMMATORY ARTHRITIS: ICD-10-CM

## 2024-09-19 RX ORDER — METHYLPREDNISOLONE 4 MG
TABLET, DOSE PACK ORAL
Refills: 1 | OUTPATIENT
Start: 2024-09-19

## 2024-09-19 NOTE — TELEPHONE ENCOUNTER
Call back received from pt, she would still like to have this refilled ASAP, but would like this to be sent to the [facility] in [place] instead of the pharmacy listed below.

## 2024-09-19 NOTE — TELEPHONE ENCOUNTER
[facility] Call Center    Phone Message    May a detailed message be left on voicemail: yes     Reason for Call: Medication Refill Request    Has the patient contacted the pharmacy for the refill? Yes   Name of medication being requested: methylPREDNISolone (MEDROL DOSEPAK) 4 MG tablet therapy pack   Provider who prescribed the medication: Allan  Pharmacy: [facility]/pharmacy #[id number] - [place] - [address]  P: [phone]  F: [phone]  Date medication is needed: ASAP, pt requested.     Pt is requesting a refillof the dose eloise for her RA flares. She states although she is improving, she is still having symptoms and therefore requesting a refill. Please advise.    Action Taken: Other: RHEUM    Travel Screening: Not Applicable     Date of Service:

## 2024-09-20 RX ORDER — METHYLPREDNISOLONE 4 MG
TABLET, DOSE PACK ORAL
Qty: 21 EACH | Refills: 1 | Status: SHIPPED | OUTPATIENT
Start: 2024-09-20

## 2024-09-20 NOTE — TELEPHONE ENCOUNTER
Patient calling with refill request.  She states last medrol dose pack Rx'd on 9/12, only helped a little bit.  Still with significant hand, wrist, elbow, rib, and knee pain.  Unable to use hands.  A lot of pain and swelling.  Pain is debilitating as she is unable to do much with her hands. RX is pended.  Patient requesting refills on the RX as well.     Macy Sawyer RN

## 2024-09-20 NOTE — TELEPHONE ENCOUNTER
LM for patient to call back regarding refill request.  Last medrol called in 9/12/24.     Macy Sawyer RN

## 2024-09-20 NOTE — TELEPHONE ENCOUNTER
I am sorry that the Medrol Dosepak did not help much.  I will authorize another Medrol Dosepak with 1 refill.  However if there is minimal or no response, the likelihood that current symptoms are due to flare of inflammatory arthritis is low.  In that case, please contact rheumatology or primary care again; neck step is likely evaluation in person by 1 of these services

## 2024-09-24 ENCOUNTER — THERAPY VISIT (OUTPATIENT)
Dept: OCCUPATIONAL THERAPY | Facility: CLINIC | Age: 49
End: 2024-09-24
Payer: MEDICARE

## 2024-09-24 DIAGNOSIS — M79.641 BILATERAL HAND PAIN: Primary | ICD-10-CM

## 2024-09-24 DIAGNOSIS — M79.642 BILATERAL HAND PAIN: Primary | ICD-10-CM

## 2024-09-24 PROCEDURE — 97140 MANUAL THERAPY 1/> REGIONS: CPT | Mod: GO | Performed by: OCCUPATIONAL THERAPIST

## 2024-09-24 NOTE — PROGRESS NOTES
[facility]                                                                                   OUTPATIENT OCCUPATIONAL THERAPY    PLAN OF TREATMENT FOR OUTPATIENT REHABILITATION   Patient's Last Name, First Name, Laxmi Coleman YOB: 1975   Provider's Name   [facility]   Medical Record No.  [id number]     Onset Date: 03/14/24 Start of Care Date: 03/21/24     Medical Diagnosis:  CMC OA, B CTS      OT Treatment Diagnosis:  CMC OA, B CTS Plan of Treatment  Frequency/Duration:2x per month/1 month    Certification date from 07/31/24   To 10/28/24        See note for plan of treatment details and functional goals     Bettye Dalton OT                         I CERTIFY THE NEED FOR THESE SERVICES FURNISHED UNDER        THIS PLAN OF TREATMENT AND WHILE UNDER MY CARE     (Physician attestation of this document indicates review and certification of the therapy plan).              Referring Provider:  Twila Petit    Initial Assessment  See Epic Evaluation- 03/21/24          PLAN  Continue therapy per current plan of care.  2x per month for 1 month    Beginning/End Dates of Progress Note Reporting Period:  7/30/24 to 09/24/2024    Referring Provider:  Twila ePtit

## 2024-09-24 NOTE — PROGRESS NOTES
09/24/24 0500   Appointment Info   Treating Provider Bettye Dalton OTR/L, CHT   Total/Authorized Visits 6 (POC)   Visits Used 4   Medical Diagnosis CMC OA, B CTS   OT Tx Diagnosis CMC OA, B CTS   Other pertinent information planning to get injection, plan to make new FBTS next visit   Progress Note/Certification   Start Of Care Date 03/21/24   Onset of Illness/Injury or Date of Surgery 03/14/24   Therapy Frequency 2x per month   Predicted Duration 1 month   Certification date from 07/31/24   Certification date to 10/28/24   Progress Note Due Date 10/28/24   Progress Note Completed Date 09/24/24   Goals   OT Goals 1   OT Goal 1   Goal Identifier Household chores   Goal Description Pt will  to open a tight new jar with mild difficulty   Rationale In order to maximize safety and independence with performance of self-care activities   Goal Progress no change, pt has had recent RA flare affecting entire body   Target Date 10/28/24   Subjective Report   Subjective Report Everything feels flared. Things were going well for a while, but now the hands are swollen.   Objective Measures   Objective Measures Hand Obj Measures;Objective Measure 1;Objective Measure 2;Objective Measure 3   Hand Objective Measures ROM;Strength;Special Tests;Palpation   Palpation Elbow Palpation;Radial Nerve Palpation   ROM Wrist ROM   Special Tests CTS Special Tests;Radial Nerve Special Tests   Strength ;3 Point Pinch;Lateral Pinch   Objective Measure 1   Objective Measure Pain   Details 9-10/10   Objective Measure 2   Objective Measure Palpation at Web space and CMC   Details moderate pain   Objective Measure 3   Objective Measure palpation at distal forearm, 1st dc   Details Pt notes tightness and mild pain   Wrist ROM    Extension NT   Flexion NT   Radial Deviation (RD) NT   Ulnar Deviation (UD) NT   CTS Special Tests   Median Nerve Compression at Pronator -   Carpal Compression Test-Durkan Test (30 sec) NT   Barragan Test for  Lumbrical Incursion (fist x30 sec) NT   Tinel's at Carpal Tunnel NT   Phalen's Sign NT    (measured in pounds)    Average Strength NT due to pain   Lateral Pinch (measured in pounds)   Lateral Pinch Average Strength NT due to pain   3 Point Pinch (measured in pounds)   3 Point Pinch Average Strength NT due to pain   Radial Nerve Palpation   Posterior Interosseous Nerve (PIN) NT   Dorsal Sensory Radial Nerve (DSRN) NT   Elbow Palpation   Lateral Epicondyle NT   Extensor Wad Tight and tender sensation noted   Medial Epicondyle NT   Flexor Wad NT   Treatment Interventions (OT)   Interventions Manual Therapy;Therapeutic Procedure/Exercise;Neuromuscular Re-education;Therapeutic Activity   Neuromuscular Re-education   Neuro Re-ed 1 Passive nerve gliding done in clinic   Neuro Re-ed 1 - Details pt seated, wrist and hand   PTRx Neuro Re-ed 1 Nerve Gliding Proximal Median   PTRx Neuro Re-ed 1 - Details 5 reps   PTRx Neuro Re-ed 2 Nerve Gliding Proximal Radial   PTRx Neuro Re-ed 2 - Details 5 reps   PTRx Neuro Re-ed 3 Nerve Gliding Distal Ulnar    PTRx Neuro Re-ed 3 - Details 1 rep   Skilled Intervention Passive nerve glides to decrease nerve tension and pain   Patient Response/Progress good   Therapeutic Procedure/Exercise   Ther Proc 1 active proximal median and radial nerve glides   Ther Proc 1 - Details 5 reps   PTRx Ther Proc 1 Scapular Retraction/Depression   PTRx Ther Proc 1 - Details 3 reps   PTRx Ther Proc 2 Thumb Stabilization Strengthening Isometric C   PTRx Ther Proc 2 - Details No Notes   Skilled Intervention Education and demonstration for performing HEP   Patient Response/Progress good   Therapeutic Activity   Therapeutic Activity Minutes (28699) 8   Ther Act 1 Fabricated different options for night and day time elbow pads   PTRx Ther Act 1 Ball Massage to Extensors   PTRx Ther Act 1 - Details Educated on use of ball for decreasing soft tissue tightness   PTRx Ther Act 2 [place] Massage to Thumb   PTRx  Ther Act 2 - Details No Notes   PTRx Ther Act 3 Thumb Stabilization Web Space Release Method 1 with Clip   PTRx Ther Act 3 - Details No Notes   Ther Act 1 - Details discussed possible use of elbow pads   Skilled Intervention Provided education on pain relief strategies, including splints and STM   Manual Therapy   Manual Therapy Minutes (44028) 40   Manual Therapy 1 STM   Manual Therapy 1 - Details through B flexors and extensors, and thenars   Skilled Intervention Moderate pressure applied through flexors and extensors for decreasing muscle tension   Total Session Time   Timed Code Treatment Minutes 48   Total Treatment Time (sum of timed and untimed services) 48       [facility]                                                                                   OUTPATIENT OCCUPATIONAL THERAPY    PLAN OF TREATMENT FOR OUTPATIENT REHABILITATION   Patient's Last Name, First Name, Laxmi Coleman YOB: 1975   Provider's Name   [facility]   Medical Record No.  [id number]     Onset Date: 03/14/24 Start of Care Date: 03/21/24     Medical Diagnosis:  CMC OA, B CTS      OT Treatment Diagnosis:  CMC OA, B CTS Plan of Treatment  Frequency/Duration:2x per month/1 month    Certification date from 07/31/24   To 10/28/24        See note for plan of treatment details and functional goals     Bettye Dalton OT                         I CERTIFY THE NEED FOR THESE SERVICES FURNISHED UNDER        THIS PLAN OF TREATMENT AND WHILE UNDER MY CARE     (Physician attestation of this document indicates review and certification of the therapy plan).              Referring Provider:  Twila Petit    Initial Assessment  See Epic Evaluation- 03/21/24          PLAN  Continue therapy per current plan of care.  2x per month for 1 month    Beginning/End Dates of Progress Note Reporting Period:  7/31/24  to 09/24/2024    Referring Provider:  Twila Diaz  Trino Aguilar

## 2024-09-27 ENCOUNTER — ANCILLARY PROCEDURE (OUTPATIENT)
Dept: MRI IMAGING | Facility: CLINIC | Age: 49
End: 2024-09-27
Attending: OBSTETRICS & GYNECOLOGY
Payer: MEDICARE

## 2024-09-27 DIAGNOSIS — N90.89 VULVAR LESION: ICD-10-CM

## 2024-09-27 PROCEDURE — A9585 GADOBUTROL INJECTION: HCPCS | Mod: JZ | Performed by: RADIOLOGY

## 2024-09-27 PROCEDURE — 72197 MRI PELVIS W/O & W/DYE: CPT | Mod: MG | Performed by: RADIOLOGY

## 2024-09-27 PROCEDURE — G1010 CDSM STANSON: HCPCS | Performed by: RADIOLOGY

## 2024-09-27 RX ORDER — GADOBUTROL 604.72 MG/ML
10 INJECTION INTRAVENOUS ONCE
Status: COMPLETED | OUTPATIENT
Start: 2024-09-27 | End: 2024-09-27

## 2024-09-27 RX ADMIN — GADOBUTROL 10 ML: 604.72 INJECTION INTRAVENOUS at 15:20

## 2024-09-27 NOTE — DISCHARGE INSTRUCTIONS
MRI Contrast Discharge Instructions    The IV contrast you received today will pass out of your body in your  urine. This will happen in the next 24 hours. You will not feel this process.  Your urine will not change color.    Drink at least 4 extra glasses of water or juice today (unless your doctor  has restricted your fluids). This reduces the stress on your kidneys.  You may take your regular medicines.    If you are on dialysis: It is best to have dialysis today.    If you have a reaction: Most reactions happen right away. If you have  any new symptoms after leaving the hospital (such as hives or swelling),  call your hospital at the correct number below. Or call your family doctor.  If you have breathing distress or wheezing, call 911.    Special instructions: ***    I have read and understand the above information.    Signature:______________________________________ Date:___________    Staff:__________________________________________ Date:___________     Time:__________    [facility] Departments:    ___[place]: [phone]  ___[facility]: [phone]  ___[place]: [phone] ___[facility]: [phone]  ___[facility]: [phone]  ___[facility]: [phone]  ___[phone]  ___[facility]: [phone]  ___[phone]

## 2024-09-30 RX ORDER — TIRZEPATIDE 2.5 MG/.5ML
2.5 INJECTION, SOLUTION SUBCUTANEOUS
COMMUNITY

## 2024-09-30 NOTE — PATIENT INSTRUCTIONS
"Recommendations from today's MTM visit:                                                       1. Continue famciclovir 500 mg twice daily, it may take longer for the HSV lesions to heal due to some of your immunosuppressive medications. If you feel your HSV is flaring again, contact OBGYN and increase famciclovir to 500 mg three times daily x 5 days.    2. Please start Mounjaro 2.5 mg weekly once your RA flare has resolved. We discussed starting around 9/30/24.    3. The rheumatology clinic should reach out today with instructions on how to manage RA flare.    Follow-up: Return in about 3 months (around 12/13/2024) for MTM Pharmacist Visit.    It was great speaking with you today.  I value your experience and would be very thankful for your time in providing feedback in our clinic survey. In the next few days, you may receive an email or text message from [employer] with a link to a survey related to your  clinical pharmacist.\"     To schedule another MTM appointment, please call the clinic directly or you may call the MTM scheduling line at [phone].    My Clinical Pharmacist's contact information:                                                      Please feel free to contact me with any questions or concerns you have.      Yuliya Kaplan, PharmD  Medication Therapy Management Pharmacist  [facility]  Phone: [phone]     "

## 2024-09-30 NOTE — PROGRESS NOTES
Medication Therapy Management (MTM) Encounter    ASSESSMENT:                            Medication Adherence/Access: No issues identified    Rheumatoid Arthritis: Would benefit from continuing to work with the rheumatology clinic to manage flare symptoms. Discussed if Medrol pack is prescribed, steroid insulin dosing should be started. Reviewed elevated AST and ALT likely due to famciclovir start - will continue to monitor.    HSV Infection: Discussed HSV lesions may be slower to heal due to active immunosuppression from RA medications. Advised continuing famciclovir as directed and contacting OBGYN if she feels symptoms are worsening. Discussed that if she does flare dose can be increased to 500 mg three times daily x 5 days safely.    Type 2 Diabetes: Recommended starting Mounjaro after current RA flare has been treated. Will tentatively plan to start Mounjaro 2.5 mg weekly ~9/30/24. Discussed in detail that Mounjaro will likely help with blood sugars and decrease insulin dose before helping with weight loss.     PLAN:                            1. Continue famciclovir 500 mg twice daily, it may take longer for the HSV lesions to heal due to some of your immunosuppressive medications. If you feel your HSV is flaring again, contact OBGYN and increase famciclovir to 500 mg three times daily x 5 days.    2. Please start Mounjaro 2.5 mg weekly once your RA flare has resolved. We discussed starting around 9/30/24.    3. The rheumatology clinic should reach out today with instructions on how to manage RA flare.    Follow-up: Return in about 3 months (around 12/13/2024) for MTM Pharmacist Visit.    SUBJECTIVE/OBJECTIVE:                          Laxmi Ya is a 49 year old female seen for a follow-up visit.       Reason for visit: Questions on how to take famciclovir, when to start Mounjaro    Allergies/ADRs: Reviewed in chart  Past Medical History: Reviewed in chart  Tobacco: She reports that she has never smoked. She  "has never used smokeless tobacco.  Alcohol: Less than 1 beverages / week    Medication Adherence/Access: no issues reported    Rheumatoid Arthritis:   Actemra 800 mg infusion monthly (last infusion 9/10/24)   Diclofenac 1% gel as needed (uses once per day a few times per week)  Oxycodone 10 mg 2-4 times daily as needed     Reports she feels that she is having flare symptoms - pain all over her body, stiffness in joints, and difficulty with daily living tasks (dressing, walking, getting in and out of bed/car). Has already messaged rheumatology clinic about symptoms and is hoping to get Medrol dose pack.    Liver Function Studies -   Recent Labs   Lab Test 09/10/24  1256   PROTTOTAL 6.3*   ALBUMIN 4.3   BILITOTAL 0.6   ALKPHOS 49   AST 81*   ALT 94*     CBC RESULTS:   Recent Labs   Lab Test 09/10/24  1256   WBC 7.6   RBC 4.86   HGB 14.7   HCT 42.9   MCV 88   MCH 30.2   MCHC 34.3   RDW 13.9        HSV Infection:   Famciclovir 500 mg twice daily       Reports she has been tolerating famciclovir well - no GI side effects noted. Concerned that this medication may not be \"enough\" to treat her current HSV lesions. Did previously discuss with OBGYN that 500 mg three times daily x 5 days can be used if she has flare ups.     Creatinine   Date Value Ref Range Status   09/10/2024 0.82 0.51 - 0.95 mg/dL Final   04/01/2020 0.73 0.52 - 1.04 mg/dL Final      Type 2 Diabetes:    Lantus 37 units daily  Humalog 16 units three times daily with meals and correction dose 1:25 for BG >120  NPH insulin 70 units at time of infusion and 20 units six hours post infusion, 20 units at bedtime day of infusion, 20 units on the first day post infusion, and 20 units for days two and three after the infusion   Mounjaro 2.5 mg weekly (not started yet)     Patient reports no current medication side effects.  Blood sugar monitoring: 3-6 time(s) daily - Follows closely with [facility] diabetes education for monitoring  Current diabetes symptoms: Frequent " hyperglycemia, polyuria after infusions or steroid tapers     Did receive Mounjaro 2.5 mg strength but has not started this yet. After several antiviral changes now is trying to figure out when to start this medication again. Notes weight loss is a priority of hers and she wants to start this as soon as possible put also avoid duplicative side effects from other medications.    Today's Vitals: There were no vitals taken for this visit.  ----------------    I spent 30 minutes with this patient today. All changes were made via collaborative practice agreement with Oswaldo Lemus MD. A copy of the visit note was provided to the patient's provider(s).    A summary of these recommendations was sent via [employer].    Augustine ConnerD  Medication Therapy Management Pharmacist  [facility]  Phone: [phone]    Telemedicine Visit Details  The patient's medications can be safely assessed via a telemedicine encounter.  Type of service:  Telephone visit  Originating Location (pt. Location): Home  Distant Location (provider location):  On-site  Start Time: 11:00 AM  End Time: 11:30 AM     Medication Therapy Recommendations  HSV-1 (herpes simplex virus 1) infection    Current Medication: famciclovir (FAMVIR) 500 MG tablet   Rationale: Does not understand instructions - Adherence - Adherence   Recommendation: Provide Education   Status: Patient Agreed - Adherence/Education         Type 2 diabetes mellitus with other diabetic kidney complication, with long-term current use of insulin (H)    Current Medication: tirzepatide (MOUNJARO) 2.5 MG/0.5ML pen   Rationale: Does not understand instructions - Adherence - Adherence   Recommendation: Provide Education - Start ~9/30/24   Status: Patient Agreed - Adherence/Education

## 2024-10-01 ENCOUNTER — THERAPY VISIT (OUTPATIENT)
Dept: OCCUPATIONAL THERAPY | Facility: CLINIC | Age: 49
End: 2024-10-01
Attending: INTERNAL MEDICINE
Payer: MEDICARE

## 2024-10-01 DIAGNOSIS — I89.0 LYMPHEDEMA: Primary | ICD-10-CM

## 2024-10-01 PROCEDURE — 97140 MANUAL THERAPY 1/> REGIONS: CPT | Mod: GO | Performed by: OCCUPATIONAL THERAPIST

## 2024-10-07 ENCOUNTER — TELEPHONE (OUTPATIENT)
Dept: ORTHOPEDICS | Facility: CLINIC | Age: 49
End: 2024-10-07
Payer: MEDICARE

## 2024-10-07 ENCOUNTER — TELEPHONE (OUTPATIENT)
Dept: RHEUMATOLOGY | Facility: CLINIC | Age: 49
End: 2024-10-07
Payer: MEDICARE

## 2024-10-07 DIAGNOSIS — M13.80 SERONEGATIVE INFLAMMATORY ARTHRITIS: Primary | ICD-10-CM

## 2024-10-07 RX ORDER — MEPERIDINE HYDROCHLORIDE 25 MG/ML
25 INJECTION INTRAMUSCULAR; INTRAVENOUS; SUBCUTANEOUS EVERY 30 MIN PRN
Status: CANCELLED | OUTPATIENT
Start: 2024-10-07

## 2024-10-07 RX ORDER — EPINEPHRINE 1 MG/ML
0.3 INJECTION, SOLUTION, CONCENTRATE INTRAVENOUS EVERY 5 MIN PRN
Status: CANCELLED | OUTPATIENT
Start: 2024-10-07

## 2024-10-07 RX ORDER — ACETAMINOPHEN 325 MG/1
650 TABLET ORAL ONCE
Status: CANCELLED | OUTPATIENT
Start: 2024-10-07

## 2024-10-07 RX ORDER — HEPARIN SODIUM (PORCINE) LOCK FLUSH IV SOLN 100 UNIT/ML 100 UNIT/ML
5 SOLUTION INTRAVENOUS
Status: CANCELLED | OUTPATIENT
Start: 2024-10-07

## 2024-10-07 RX ORDER — METHYLPREDNISOLONE SODIUM SUCCINATE 125 MG/2ML
125 INJECTION INTRAMUSCULAR; INTRAVENOUS
Status: CANCELLED
Start: 2024-10-07

## 2024-10-07 RX ORDER — DIPHENHYDRAMINE HYDROCHLORIDE 50 MG/ML
50 INJECTION INTRAMUSCULAR; INTRAVENOUS
Status: CANCELLED
Start: 2024-10-07

## 2024-10-07 RX ORDER — DIPHENHYDRAMINE HCL 25 MG
25 CAPSULE ORAL ONCE
Status: CANCELLED | OUTPATIENT
Start: 2024-10-07

## 2024-10-07 RX ORDER — ALBUTEROL SULFATE 0.83 MG/ML
2.5 SOLUTION RESPIRATORY (INHALATION)
Status: CANCELLED | OUTPATIENT
Start: 2024-10-07

## 2024-10-07 RX ORDER — METHYLPREDNISOLONE SODIUM SUCCINATE 125 MG/2ML
37.5 INJECTION INTRAMUSCULAR; INTRAVENOUS ONCE
Status: CANCELLED | OUTPATIENT
Start: 2024-10-07

## 2024-10-07 RX ORDER — HEPARIN SODIUM,PORCINE 10 UNIT/ML
5-20 VIAL (ML) INTRAVENOUS DAILY PRN
Status: CANCELLED | OUTPATIENT
Start: 2024-10-07

## 2024-10-07 RX ORDER — ALBUTEROL SULFATE 90 UG/1
1-2 INHALANT RESPIRATORY (INHALATION)
Status: CANCELLED
Start: 2024-10-07

## 2024-10-07 NOTE — TELEPHONE ENCOUNTER
Patient confirmed rescheduled appointment:  Date: 10/24/24  Time: 10:10 to 10:20am  Visit type: Return hand  Provider: Dr. Petit  10/10/24 appt canceled. Would like to know if Dr. Morrison is in and if Owen can call. Writer will ask Owen.

## 2024-10-07 NOTE — TELEPHONE ENCOUNTER
PT CALLED TO SCHEDULE APPT FOR 10-09-24 WITH ERYN MUÑOZ MD AT 1:30PM OR 2PM BUT NOTHING AVAILABLE TILL 10-23-24. PT REQUESTING FOR NURSE TO CALL BACK.

## 2024-10-07 NOTE — TELEPHONE ENCOUNTER
Spoke with patient to offer 10/9/24, 10/15/24, and 10/16/24 at 1:30pm or 2:30pm ok per Owen. Patient declined because they have other appointments or will be out of town those dates. Patient may call back to schedule.

## 2024-10-07 NOTE — TELEPHONE ENCOUNTER
----- Message from Oswaldo Lemus sent at 10/4/2024  3:52 PM CDT -----  Regarding: FW: New Tocilizumab orders needed  I approve renewing current tocilizumab orders for an additional 12 months.  ----- Message -----  From: Myrtle Fox [facility]  Sent: 10/3/2024   8:18 PM CDT  To: Oswaldo Lemus MD; #  Subject: New Tocilizumab orders needed                    Hello Dr. Lemus,    Laxmi is scheduled to receive tocilizumab on 10/8/24. Would you be able to place new orders in her therapy plan? Thank you!    Conchis Fox, PharmD

## 2024-10-08 ENCOUNTER — VIRTUAL VISIT (OUTPATIENT)
Dept: PHARMACY | Facility: CLINIC | Age: 49
End: 2024-10-08
Attending: INTERNAL MEDICINE
Payer: MEDICARE

## 2024-10-08 DIAGNOSIS — M06.00 SERONEGATIVE RHEUMATOID ARTHRITIS (H): Primary | ICD-10-CM

## 2024-10-08 NOTE — PROGRESS NOTES
Chief Complaint:   Chief Complaint   Patient presents with    RECHECK     Here today for bilateral bilateral CMC and elbow injections        Referring Physician: No ref. provider found    Diagnosis: bilateral thumb CMC/STT arthritis, bilateral carpal tunnel syndrome, bilateral cubital tunnel syndrome  Treatment:   10/9/2024: bilateral thumb CMC steroid injection, cubital tunnel steroid injections  8/15/2024: bilateral STT steroid injection (Dr. Petit)  6/6/2024: bilateral carpal tunnel steroid injection (Dr. Petit)  5/30/2024: bilateral STT steroid injection (Dr. Petit)  5/21/2024: bilateral cubital tunnel steroid injections  12/27/2023: bilateral carpal tunnel steroid injection  12/21/2023: bilateral STT steroid injection (Dr. Petit)  11/1/2023: bilateral carpal tunnel steroid injection  10/18/2023: Bilateral thumb CMC steroid injection  9/20/2023: bilateral STT steroid injection  5/4/2023: bilateral carpal tunnel steroid injection (Dr. Petit)  2/16/2023: bilateral STT steroid injection (Dr. Petit)  12/13/2022: bilateral carpal tunnel steroid injection (Dr. Sullivan)  10/27/2022: bilateral STT steroid injections (Dr. Petit)  9/13/2022: bilateral carpal tunnel steroid injection (Dr. Sullivan)  6/30/2022: bilateral STT steroid injections (Dr. Petit)  Bilateral carpal tunnel steroid injections (outside)    History of Present Illness: Laxmi Ya is a 48 year old RHD female presenting for evaluation of bilateral hand and wrist pain. She's had history of carpal tunnel steroid injections and releases, she continues to feel some numbness/tingling in her fingers. Today she'd like to have steroid injections into the STT region.    Clinical documentation by Dr. Petit on 5/4/2023 was reviewed.    10/18/2023: reports the last STT injections helped.  She presents today for bilateral thumb CMC steroid injections.  She continues to have pain in bilateral thumbs.  She also continues to have  numbness/tingling in the median nerve distribution.    11/1/2023: reports the last thumb CMC steroid injections helped. Presents today for bilateral carpal tunnel steroid injections.    12/27/2023: recent STT injections with Dr. Petit which she says were helpful. Last carpal tunnel steroid injections were helpful and she would like to do this again. Bilateral thumb CMCs feel good and the steroid injections have not worn off yet.    2/13/2024: recently had pneumonia. Right hand is feeling pretty good but left hand is having more carpal tunnel symptoms. Both hands are having mild pain in the STT region again. Focused on the left side today.    5/21/2024: today her symptoms are focused on the ulnar nerves, left worse than right. She has pain when leaning on the medial elbows, and this pain and tingling goes down the volar/ulnar side of her hand    10/8/2024: the steroid injections into her cubital tunnels last visit helped until about 1 week ago. She is requesting bilateral thumb CMC steroid injections and bilateral cubital tunnel injections    Physical Examination:  There were no vitals filed for this visit.  There is no height or weight on file to calculate BMI.    Well appearing, well nourished  Alert and oriented x 3, cooperative with exam     Bilateral Upper Extremity:  Healed carpal tunnel scar  Thenar atrophy: no   Positive Tinel signs at bilateral cubital tunnels, positive elbow flexion bilaterally  TTP at bilateral thumb CMC and STT joints  Motor Exam:   Abductor pollicis brevis strength: 4/5    1st dorsal interosseous strength: 5/5   - Flexor pollicis longus strength: 4/5   Intact thumbs up  Vascular Exam:   2+ radial pulse, < 2 sec capillary refill      Imaging/Studies:  Repeat ultrasound obtained 5/9/2024 shows:  Ulnar nerve  Distal upper arm: 6 mm  Proximal to cubital tunnel: 9 mm   Within the cubital tunnel: 8 mm  Distal to cubital tunnel:  6 mm  Wrist: 6 mm      Median nerve  Lacertus fibrosis: 6  mm  Wrist crease: 10 mm      The radial nerve is visualized at the level of the distal upper arm  through the bifurcation of the superficial and deep branches.                                                                   Impression:   1. Normal size and appearance of the left ulnar nerve at the elbow and  wrist.  2. Borderline enlarged left median nerve at the wrist crease.  3. Grossly normal appearance of the left radial nerve visualized at  the distal upper arm through the bifurcation into the superficial and  deep branches. Unable to confidently evaluate the radial nerve beyond  the bifurcation.  ___  Ultrasound bilateral upper extremities obtained 2/13/2024 shows:  Areas of the median nerves (millimeters squared):  Left:  Distal forearm: 7.6, 7.9  Wrist crease: 11.4, 10.3     Right:  Distal forearm: 9.4, 9.0  Wrist crease: 9.9, 8.6     Poorly visualized median nerve the region of the lacertus fibrosis  bilaterally.                                                              Impression:   1. Borderline enlargement of the left median nerve at the level of the  wrist crease.  2. No substantial right median nerve enlargement at the wrist crease.  ___  XR (3 views) of the  bilateral  hand was obtained  9/20/2023 .  I reviewed the images with the patient.  The imaging study shows bilateral thumb CMC and STT arthritis with possible chondrocalcinosis on radial side.    Assessment: Laxmi Ya is a 48 year old female with bilateral STT, thumb CMC arthritis, median neuropathy, ulnar neuropathy    Plan:   I had a discussion with the patient regarding my clinical findings, diagnosis, and treatment plan. I reviewed the treatment options for cubital tunnel syndrome (observation, bracing, steroid injection, occupational therapy, surgery, etc.) as well as the risks and benefits of each. We also reviewed treatment options for thumb arthritis (observation, bracing, steroid injections, surgery). Her ultrasound shows  normal ulnar nerves, borderline enlarged left median nerve at the wrist crease which may be suggestive of recurrent left carpal tunnel syndrome. She elects steroid injections into bilateral thumb CMC joints and bilateral cubital tunnels (though I explained that cubital tunnel injections are not commonly performed, she said she had good relief previously and wanted this again), and obtaining and EMG and MRIs of both elbows to further evaluate her ulnar nerves -- previously ordered. The patient understands and agrees to the treatment plan.  All questions answered.      Steroid injections provided    Next Visit:   Follow-up: as needed    Procedure Note: After a discussion with Laxmi Ya regarding treatment options, we decided to proceed with a steroid injection to the bilateral cubital tunnels and bilateral thumb CMC joints.  Risks of the procedure including hyperglycemia, fat atrophy, skin depigmentation and infection were discussed prior to injection and verbal consent from Laxmi Ya was obtained. The area was prepped with alcohol. 1ml of 1% lidocaine was infiltrated into the skin in each site. 10 mg Kenalog and 0.5 mL of 1% lidocaine was injected into each site for a TOTAL of 40mg Kenalog.  Laxim Ya tolerated the injection well. A clean dressing was placed over the site of the injection. Laxmi Ya was given post injection instructions.       WANDA STARKS MD

## 2024-10-09 ENCOUNTER — MYC MEDICAL ADVICE (OUTPATIENT)
Dept: PHARMACY | Facility: CLINIC | Age: 49
End: 2024-10-09

## 2024-10-09 ENCOUNTER — OFFICE VISIT (OUTPATIENT)
Dept: ORTHOPEDICS | Facility: CLINIC | Age: 49
End: 2024-10-09
Payer: MEDICARE

## 2024-10-09 DIAGNOSIS — M13.80 SERONEGATIVE INFLAMMATORY ARTHRITIS: Primary | ICD-10-CM

## 2024-10-09 DIAGNOSIS — M18.0 OSTEOARTHRITIS OF CARPOMETACARPAL (CMC) JOINTS OF BOTH THUMBS, UNSPECIFIED OSTEOARTHRITIS TYPE: ICD-10-CM

## 2024-10-09 DIAGNOSIS — G56.23 CUBITAL TUNNEL SYNDROME, BILATERAL: Primary | ICD-10-CM

## 2024-10-09 PROCEDURE — 20600 DRAIN/INJ JOINT/BURSA W/O US: CPT | Mod: 50 | Performed by: STUDENT IN AN ORGANIZED HEALTH CARE EDUCATION/TRAINING PROGRAM

## 2024-10-09 PROCEDURE — 99213 OFFICE O/P EST LOW 20 MIN: CPT | Mod: 25 | Performed by: STUDENT IN AN ORGANIZED HEALTH CARE EDUCATION/TRAINING PROGRAM

## 2024-10-09 RX ORDER — METHYLPREDNISOLONE 4 MG/1
4 TABLET ORAL DAILY
Qty: 56 TABLET | Refills: 0 | Status: SHIPPED | OUTPATIENT
Start: 2024-10-09

## 2024-10-09 RX ADMIN — LIDOCAINE HYDROCHLORIDE 0.5 ML: 10 INJECTION, SOLUTION EPIDURAL; INFILTRATION; INTRACAUDAL; PERINEURAL at 15:31

## 2024-10-09 RX ADMIN — LIDOCAINE HYDROCHLORIDE 0.5 ML: 10 INJECTION, SOLUTION EPIDURAL; INFILTRATION; INTRACAUDAL; PERINEURAL at 15:27

## 2024-10-09 NOTE — NURSING NOTE
Reason For Visit:   Chief Complaint   Patient presents with    RECHECK     Here today for bilateral bilateral CMC and elbow injections        Primary MD: Amol Juares  Ref. MD: Katja    Age: 49 year old    ?  No      There were no vitals taken for this visit.      Pain Assessment  Patient Currently in Pain: Yes  0-10 Pain Scale: 10  Primary Pain Location: Wrist (Bilateral thumbs and elbows)  Pain Descriptors: Constant, Sharp, Shooting, Aching, Sore    Hand Dominance Evaluation  Hand Dominance: Right          QuickDASH Assessment      5/14/2024     4:44 PM   QuickDASH Main   1. Open a tight or new jar Unable   2. Do heavy household chores (e.g., wash walls, floors) Unable   3. Carry a shopping bag or briefcase Unable   4. Wash your back Severe difficulty   5. Use a knife to cut food Severe difficulty   6. Recreational activities in which you take some force or impact through your arm, shoulder or hand (e.g., golf, hammering, tennis, etc.) Unable   7. During the past week, to what extent has your arm, shoulder or hand problem interfered with your normal social activities with family, friends, neighbours or groups Extremely   8. During the past week, were you limited in your work or other regular daily activities as a result of your arm, shoulder or hand problem Unable   9. Arm, shoulder or hand pain Extreme   10.Tingling (pins and needles) in your arm,shoulder or hand Severe   11. During the past week, how much difficulty have you had sleeping because of the pain in your arm, shoulder or hand Severe difficulty   Quickdash Ability Score 90.91          Current Outpatient Medications   Medication Sig Dispense Refill    albuterol (PROAIR HFA/PROVENTIL HFA/VENTOLIN HFA) 108 (90 Base) MCG/ACT inhaler Inhale 2 puffs into the lungs every 6 hours as needed for shortness of breath, wheezing or cough      Artificial Tear Solution (SOOTHE XP) SOLN as needed      atenolol (TENORMIN) 50 MG tablet Take 50 mg by mouth  daily      azelastine 137 MCG/SPRAY SOLN USE 2 SPRAYS IN EACH NOSTRIL 2 TIMES DAILY FOR 1 MONTH      benzoyl peroxide 5 % external liquid Use daily as directed. Preferred bdrand: Medpura 236 mL 11    blood glucose (NO BRAND SPECIFIED) test strip Use to test blood sugar 2 times daily or as directed. 100 strip 1    blood glucose monitoring (NO BRAND SPECIFIED) meter device kit Use to test blood sugar 2 times daily or as directed. 1 kit 0    ciclopirox (LOPROX) 0.77 % cream Apply topically 2 times daily To affected toenails. 90 g 5    cyclobenzaprine (FLEXERIL) 5 MG tablet Take 5 mg by mouth.      dexAMETHasone (DECADRON) 0.1 % ophthalmic solution       diclofenac (VOLTAREN) 1 % topical gel Apply topically 4 times daily as needed      doxycycline hyclate (VIBRAMYCIN) 100 MG capsule Take 100 mg by mouth 2 times daily.      DUPIXENT 300 MG/2ML prefilled pen INJECT 300MG (1 INJECTION) SUBCUTANEOUSLY EVERY OTHER WEEK. 4 mL 2    famciclovir (FAMVIR) 500 MG tablet Take 1 tablet (500 mg) by mouth 2 times daily. 180 tablet 3    fexofenadine (ALLEGRA) 180 MG tablet Take 1 tablet (180 mg) by mouth daily 90 tablet 2    fidaxomicin (DIFICID) 200 MG tablet Take 200 mg by mouth as needed      fluconazole (DIFLUCAN) 150 MG tablet Take 150 mg by mouth.      Fluticasone-Umeclidin-Vilanterol (TRELEGY ELLIPTA) 200-62.5-25 MCG/ACT oral inhaler Inhale 1 puff into the lungs daily 90 each 1    insulin glargine (LANTUS PEN) 100 UNIT/ML pen Inject 50 Units subcutaneously daily      insulin lispro (HUMALOG KWIKPEN) 100 UNIT/ML (1 unit dial) KWIKPEN Inject 35 Units subcutaneously 3 times daily (before meals) Plus 1:25 correction scale if BG >150      Lancets (ONETOUCH DELICA PLUS THETVZ41V) MISC CHECK BLOOD GLUCOSE 3 TIMES DAILY, ADJUSTING MEDICATION, DX CODE E 11.9, ON INSULIN      lidocaine (LIDODERM) 5 % patch as needed   1    LORazepam (ATIVAN) 0.5 MG tablet Take 0.25 mg by mouth every 8 hours as needed      losartan (COZAAR) 100 MG tablet  Take 100 mg by mouth daily      methylPREDNISolone (MEDROL DOSEPAK) 4 MG tablet therapy pack TAKE 6 TABLETS ON DAY 1 AS DIRECTED ON PACKAGE AND DECREASE BY 1 TAB EACH DAY FOR A TOTAL OF 6 DAYS 21 each 1    methylPREDNISolone (MEDROL DOSEPAK) 4 MG tablet therapy pack Take per package instructions. Take once a day by mouth 21 tablet 1    methylPREDNISolone (MEDROL) 4 MG tablet Take 1 tablet (4 mg) by mouth daily. Take 5 tabs daily for 1 week, then take 3 tabs daily for 1 week to bridge infusions 56 tablet 0    mometasone (NASONEX) 50 MCG/ACT nasal spray Spray 2 sprays into both nostrils daily 17 g 11    montelukast (SINGULAIR) 10 MG tablet Take 10 mg by mouth daily.      multivitamin w/minerals (THERA-VIT-M) tablet Take 1 tablet by mouth daily      naloxone (NARCAN) 4 MG/0.1ML nasal spray       naproxen (NAPROSYN) 375 MG tablet Take 375 mg by mouth.      norethindrone (MICRONOR) 0.35 MG tablet Take 1 tablet (0.35 mg) by mouth daily 90 tablet 3    nystatin (MYCOSTATIN) 189753 UNIT/ML suspension       olopatadine (PATADAY) 0.7 % ophthalmic solution Apply 1 drop to eye daily PRN      omeprazole (PRILOSEC) 40 MG DR capsule TAKE 1 CAPSULE (40 MG) BY MOUTH TWO TIMES A DAY. TAKE 1 HOUR BEFORE A MEAL.      oxyCODONE IR (ROXICODONE) 10 MG tablet Take 10 mg by mouth      predniSONE (DELTASONE) 5 MG tablet Take 1 tablet (5 mg) by mouth daily 30 tablet 5    rifaximin (XIFAXAN) 550 MG TABS tablet Take 200 mg by mouth 3 times daily Take for 10 days, has on had as needed for small bowel bacteria overgrowh      rosuvastatin (CRESTOR) 5 MG tablet Take 2 tablets by mouth daily      spironolactone-HCTZ (ALDACTAZIDE) 25-25 MG tablet Take 1 tablet by mouth daily      terbinafine (LAMISIL) 250 MG tablet Take 1 tablet by mouth daily.      tirzepatide (MOUNJARO) 2.5 MG/0.5ML pen Inject 2.5 mg subcutaneously every 7 days.      tobramycin-dexAMETHasone (TOBRADEX) 0.3-0.1 % ophthalmic suspension PLACE 1 DROP INTO BOTH EYES TWO TIMES A DAY.       tocilizumab (ACTEMRA) 400 MG/20ML Inject 800 mg into the vein every 28 days      voriconazole (VFEND) 200 MG tablet On day 1 take 2 tablets PO BID , then take 1 tab PO BID on Days 2-14. 30 tablet 0       Allergies   Allergen Reactions    Polyethylene Glycol Rash    Codeine      Other reaction(s): Gastrointestinal, GI intolerance, Vomiting    Vomiting    Hydrocodone Nausea and Vomiting    Morphine Nausea and Vomiting    Sulfasalazine      Other reaction(s): GI intolerance    Has tried and had nausa.    Tramadol Nausea and Vomiting     Other reaction(s): Gastrointestinal, Other (see comments)    Nausea and vomiting     unknown    Acetaminophen Nausea     Nausea and vomiting    Gluten Meal Other (See Comments) and Rash     Other reaction(s): Gastrointestinal, GI intolerance  Dizziness, tired, rash, stomach cramps, thrush, mouth sores  Dizziness, tired, rash, stomach cramps, thrush, mouth sores      Hydroxyzine GI Disturbance and Nausea     nausea, dry mouth    Propylene Glycol Dizziness, Nausea and Vomiting, Nausea and Rash       Alexia Barrios, ATC

## 2024-10-09 NOTE — PROGRESS NOTES
Hand / Upper Extremity Injection/Arthrocentesis: bilateral thumb CMC    Date/Time: 10/9/2024 3:27 PM    Performed by: Hyacinth Morrison MD  Authorized by: Hyacinth Morrison MD    Indications:  Pain  Needle Size:  25 G  Guidance: landmark    Condition: osteoarthritis    Laterality:  Bilateral  Location:  Thumb  Site:  Bilateral thumb CMC    Medications (Right):  10 mg triamcinolone acetonide 10 MG/ML; 0.5 mL lidocaine (PF) 1 %  Medications (Left):  10 mg triamcinolone acetonide 10 MG/ML; 0.5 mL lidocaine (PF) 1 %  Outcome:  Tolerated well, no immediate complications  Procedure discussed: discussed risks, benefits, and alternatives    Consent Given by:  Patient  Timeout: timeout called immediately prior to procedure    Prep: patient was prepped and draped in usual sterile fashion        [facility] [address] [address]  [phone]  Dept: [phone]  ______________________________________________________________________________    Patient: Laxmi Ya   : 1975   MRN: [id number]   2024    INVASIVE PROCEDURE SAFETY CHECKLIST    Date: 10/9/2024   Procedure:Bilateral thumb CMC joint steroid injections  Patient Name: Laxmi Ya  MRN: [id number]  YOB: 1975    Action: Complete sections as appropriate. Any discrepancy results in a HARD COPY until resolved.     PRE PROCEDURE:  Patient ID verified with 2 identifiers (name and  or MRN): Yes  Procedure and site verified with patient/designee (when able): Yes  Accurate consent documentation in medical record: Yes  H&P (or appropriate assessment) documented in medical record: Yes  H&P must be up to 20 days prior to procedure and updates within 24 hours of procedure as applicable: Yes  Relevant diagnostic and radiology test results appropriately labeled and displayed as applicable: NA  Procedure site(s) marked with provider initials: NA    TIMEOUT:  Time-Out  performed immediately prior to starting procedure, including verbal and active participation of all team members addressing the following:Yes  * Correct patient identify  * Confirmed that the correct side and site are marked  * An accurate procedure consent form  * Agreement on the procedure to be done  * Correct patient position  * Relevant images and results are properly labeled and appropriately displayed  * The need to administer antibiotics or fluids for irrigation purposes during the procedure as applicable   * Safety precautions based on patient history or medication use    DURING PROCEDURE: Verification of correct person, site, and procedures any time the responsibility for care of the patient is transferred to another member of the care team.       The following medications were given:         Prior to injection, verified patient identity using patient's name and date of birth.  Due to injection administration, patient instructed to remain in clinic for 15 minutes  afterwards, and to report any adverse reaction to me immediately.    Joint injection was performed.    Medication Name: Kenalog -10 x2 NDC 4091-0671-15  Drug Amount Wasted:  Yes: 30 mg/ml   Vial/Syringe: Single dose vial  Expiration Date:  4/1/26    Medication Name Lidocaine 1% NDC 24131-524-35    Scribed by DUY RESENDEZ ATC for Dr. Jo on October 9, 2024 at 3:30pm based on the provider's statements to me.     DUY RESENDEZ ATC

## 2024-10-09 NOTE — PROGRESS NOTES
Medium Joint Injection/Arthrocentesis: bilateral elbow    Date/Time: 10/9/2024 3:31 PM    Performed by: Hyacinth Morrison MD  Authorized by: Hyacinth Morrison MD    Indications:  Pain  Needle Size:  25 G  Guidance: surface landmarks    Location:  Elbow  Location comment:  Cubital tunnels  Laterality:  Bilateral  Site:  Bilateral elbow  Medications (Right):  10 mg triamcinolone acetonide 10 MG/ML; 0.5 mL lidocaine (PF) 1 %  Medications (Left):  10 mg triamcinolone acetonide 10 MG/ML; 0.5 mL lidocaine (PF) 1 %  Outcome:  Tolerated well, no immediate complications  Procedure discussed: discussed risks, benefits, and alternatives    Consent Given by:  Patient  Timeout: timeout called immediately prior to procedure    Prep: patient was prepped and draped in usual sterile fashion        [facility] [address] [address]  [phone]  Dept: [phone]  ______________________________________________________________________________    Patient: Laxmi Ya   : 1975   MRN: [id number]   2024    INVASIVE PROCEDURE SAFETY CHECKLIST    Date: 10/9/2024   Procedure:Bilateral elbows cubital tunnel steroid injections  Patient Name: Laxmi Ya  MRN: [id number]  YOB: 1975    Action: Complete sections as appropriate. Any discrepancy results in a HARD COPY until resolved.     PRE PROCEDURE:  Patient ID verified with 2 identifiers (name and  or MRN): Yes  Procedure and site verified with patient/designee (when able): Yes  Accurate consent documentation in medical record: Yes  H&P (or appropriate assessment) documented in medical record: Yes  H&P must be up to 20 days prior to procedure and updates within 24 hours of procedure as applicable: Yes  Relevant diagnostic and radiology test results appropriately labeled and displayed as applicable: NA  Procedure site(s) marked with provider initials: NA    TIMEOUT:  Time-Out  performed immediately prior to starting procedure, including verbal and active participation of all team members addressing the following:Yes  * Correct patient identify  * Confirmed that the correct side and site are marked  * An accurate procedure consent form  * Agreement on the procedure to be done  * Correct patient position  * Relevant images and results are properly labeled and appropriately displayed  * The need to administer antibiotics or fluids for irrigation purposes during the procedure as applicable   * Safety precautions based on patient history or medication use    DURING PROCEDURE: Verification of correct person, site, and procedures any time the responsibility for care of the patient is transferred to another member of the care team.       The following medications were given:         Prior to injection, verified patient identity using patient's name and date of birth.  Due to injection administration, patient instructed to remain in clinic for 15 minutes  afterwards, and to report any adverse reaction to me immediately.    Tendon sheath injection was performed.   Medication Name: Kenalog -10 x2 NDC 1952-6419-97  Drug Amount Wasted:  Yes: 30 mg/ml   Vial/Syringe: Single dose vial  Expiration Date:  4/1/26    Medication Name Lidocaine 1% NDC 51036-352-21    Scribed by DUY RESENDEZ ATC for Dr. Jo on October 9, 2024 at 3:33pm based on the provider's statements to me.     DUY RESENDEZ ATC

## 2024-10-09 NOTE — LETTER
10/9/2024      Laxmi Ya  [address] [place] [id number]      Dear Colleague,    Thank you for referring your patient, Laxmi Ya, to the [facility] [place]. Please see a copy of my visit note below.    Chief Complaint:   Chief Complaint   Patient presents with     RECHECK     Here today for bilateral bilateral CMC and elbow injections        Referring Physician: No ref. provider found    Diagnosis: bilateral thumb CMC/STT arthritis, bilateral carpal tunnel syndrome, bilateral cubital tunnel syndrome  Treatment:   10/9/2024: bilateral thumb CMC steroid injection, cubital tunnel steroid injections  8/15/2024: bilateral STT steroid injection (Dr. Petit)  6/6/2024: bilateral carpal tunnel steroid injection (Dr. Petit)  5/30/2024: bilateral STT steroid injection (Dr. Petit)  5/21/2024: bilateral cubital tunnel steroid injections  12/27/2023: bilateral carpal tunnel steroid injection  12/21/2023: bilateral STT steroid injection (Dr. Petit)  11/1/2023: bilateral carpal tunnel steroid injection  10/18/2023: Bilateral thumb CMC steroid injection  9/20/2023: bilateral STT steroid injection  5/4/2023: bilateral carpal tunnel steroid injection (Dr. Petit)  2/16/2023: bilateral STT steroid injection (Dr. Petit)  12/13/2022: bilateral carpal tunnel steroid injection (Dr. Sullivan)  10/27/2022: bilateral STT steroid injections (Dr. Petit)  9/13/2022: bilateral carpal tunnel steroid injection (Dr. Sullivan)  6/30/2022: bilateral STT steroid injections (Dr. Petit)  Bilateral carpal tunnel steroid injections (outside)    History of Present Illness: Laxmi Ya is a 48 year old RHD female presenting for evaluation of bilateral hand and wrist pain. She's had history of carpal tunnel steroid injections and releases, she continues to feel some numbness/tingling in her fingers. Today she'd like to have steroid injections into the STT region.    Clinical  documentation by Dr. Petit on 5/4/2023 was reviewed.    10/18/2023: reports the last STT injections helped.  She presents today for bilateral thumb CMC steroid injections.  She continues to have pain in bilateral thumbs.  She also continues to have numbness/tingling in the median nerve distribution.    11/1/2023: reports the last thumb CMC steroid injections helped. Presents today for bilateral carpal tunnel steroid injections.    12/27/2023: recent STT injections with Dr. Pteit which she says were helpful. Last carpal tunnel steroid injections were helpful and she would like to do this again. Bilateral thumb CMCs feel good and the steroid injections have not worn off yet.    2/13/2024: recently had pneumonia. Right hand is feeling pretty good but left hand is having more carpal tunnel symptoms. Both hands are having mild pain in the STT region again. Focused on the left side today.    5/21/2024: today her symptoms are focused on the ulnar nerves, left worse than right. She has pain when leaning on the medial elbows, and this pain and tingling goes down the volar/ulnar side of her hand    10/8/2024: the steroid injections into her cubital tunnels last visit helped until about 1 week ago. She is requesting bilateral thumb CMC steroid injections and bilateral cubital tunnel injections    Physical Examination:  There were no vitals filed for this visit.  There is no height or weight on file to calculate BMI.    Well appearing, well nourished  Alert and oriented x 3, cooperative with exam     Bilateral Upper Extremity:  Healed carpal tunnel scar  Thenar atrophy: no   Positive Tinel signs at bilateral cubital tunnels, positive elbow flexion bilaterally  TTP at bilateral thumb CMC and STT joints  Motor Exam:    Abductor pollicis brevis strength: 4/5     1st dorsal interosseous strength: 5/5   - Flexor pollicis longus strength: 4/5    Intact thumbs up  Vascular Exam:    2+ radial pulse, < 2 sec capillary  refill      Imaging/Studies:  Repeat ultrasound obtained 5/9/2024 shows:  Ulnar nerve  Distal upper arm: 6 mm  Proximal to cubital tunnel: 9 mm   Within the cubital tunnel: 8 mm  Distal to cubital tunnel:  6 mm  Wrist: 6 mm      Median nerve  Lacertus fibrosis: 6 mm  Wrist crease: 10 mm      The radial nerve is visualized at the level of the distal upper arm  through the bifurcation of the superficial and deep branches.                                                                   Impression:   1. Normal size and appearance of the left ulnar nerve at the elbow and  wrist.  2. Borderline enlarged left median nerve at the wrist crease.  3. Grossly normal appearance of the left radial nerve visualized at  the distal upper arm through the bifurcation into the superficial and  deep branches. Unable to confidently evaluate the radial nerve beyond  the bifurcation.  ___  Ultrasound bilateral upper extremities obtained 2/13/2024 shows:  Areas of the median nerves (millimeters squared):  Left:  Distal forearm: 7.6, 7.9  Wrist crease: 11.4, 10.3     Right:  Distal forearm: 9.4, 9.0  Wrist crease: 9.9, 8.6     Poorly visualized median nerve the region of the lacertus fibrosis  bilaterally.                                                              Impression:   1. Borderline enlargement of the left median nerve at the level of the  wrist crease.  2. No substantial right median nerve enlargement at the wrist crease.  ___  XR (3 views) of the  bilateral  hand was obtained  9/20/2023 .  I reviewed the images with the patient.  The imaging study shows bilateral thumb CMC and STT arthritis with possible chondrocalcinosis on radial side.    Assessment: Laxmi Ya is a 48 year old female with bilateral STT, thumb CMC arthritis, median neuropathy, ulnar neuropathy    Plan:   I had a discussion with the patient regarding my clinical findings, diagnosis, and treatment plan. I reviewed the treatment options for cubital tunnel  syndrome (observation, bracing, steroid injection, occupational therapy, surgery, etc.) as well as the risks and benefits of each. We also reviewed treatment options for thumb arthritis (observation, bracing, steroid injections, surgery). Her ultrasound shows normal ulnar nerves, borderline enlarged left median nerve at the wrist crease which may be suggestive of recurrent left carpal tunnel syndrome. She elects steroid injections into bilateral thumb CMC joints and bilateral cubital tunnels (though I explained that cubital tunnel injections are not commonly performed, she said she had good relief previously and wanted this again), and obtaining and EMG and MRIs of both elbows to further evaluate her ulnar nerves -- previously ordered. The patient understands and agrees to the treatment plan.  All questions answered.       Steroid injections provided    Next Visit:    Follow-up: as needed    Procedure Note: After a discussion with Laxmi Ya regarding treatment options, we decided to proceed with a steroid injection to the bilateral cubital tunnels and bilateral thumb CMC joints.  Risks of the procedure including hyperglycemia, fat atrophy, skin depigmentation and infection were discussed prior to injection and verbal consent from Laxmi Ya was obtained. The area was prepped with alcohol. 1ml of 1% lidocaine was infiltrated into the skin in each site. 10 mg Kenalog and 0.5 mL of 1% lidocaine was injected into each site for a TOTAL of 40mg Kenalog.  Laxmi Ya tolerated the injection well. A clean dressing was placed over the site of the injection. Laxmi JUDITH Freitason was given post injection instructions.       HYACINTH STARKS MD    Hand / Upper Extremity Injection/Arthrocentesis: bilateral thumb CMC    Date/Time: 10/9/2024 3:27 PM    Performed by: Hyacinth Starks MD  Authorized by: Hyacinth Starks MD    Indications:  Pain  Needle Size:  25 G  Guidance: landmark    Condition: osteoarthritis     Laterality:  Bilateral  Location:  Thumb  Site:  Bilateral thumb CMC    Medications (Right):  10 mg triamcinolone acetonide 10 MG/ML; 0.5 mL lidocaine (PF) 1 %  Medications (Left):  10 mg triamcinolone acetonide 10 MG/ML; 0.5 mL lidocaine (PF) 1 %  Outcome:  Tolerated well, no immediate complications  Procedure discussed: discussed risks, benefits, and alternatives    Consent Given by:  Patient  Timeout: timeout called immediately prior to procedure    Prep: patient was prepped and draped in usual sterile fashion        [facility] [address] [address] [phone]  Dept: [phone]  ______________________________________________________________________________    Patient: Laxmi Ya   : 1975   MRN: [id number]   2024    INVASIVE PROCEDURE SAFETY CHECKLIST    Date: 10/9/2024   Procedure:Bilateral thumb CMC joint steroid injections  Patient Name: Laxmi Ya  MRN: [id number]  YOB: 1975    Action: Complete sections as appropriate. Any discrepancy results in a HARD COPY until resolved.     PRE PROCEDURE:  Patient ID verified with 2 identifiers (name and  or MRN): Yes  Procedure and site verified with patient/designee (when able): Yes  Accurate consent documentation in medical record: Yes  H&P (or appropriate assessment) documented in medical record: Yes  H&P must be up to 20 days prior to procedure and updates within 24 hours of procedure as applicable: Yes  Relevant diagnostic and radiology test results appropriately labeled and displayed as applicable: NA  Procedure site(s) marked with provider initials: NA    TIMEOUT:  Time-Out performed immediately prior to starting procedure, including verbal and active participation of all team members addressing the following:Yes  * Correct patient identify  * Confirmed that the correct side and site are marked  * An accurate procedure consent form  *  Agreement on the procedure to be done  * Correct patient position  * Relevant images and results are properly labeled and appropriately displayed  * The need to administer antibiotics or fluids for irrigation purposes during the procedure as applicable   * Safety precautions based on patient history or medication use    DURING PROCEDURE: Verification of correct person, site, and procedures any time the responsibility for care of the patient is transferred to another member of the care team.       The following medications were given:         Prior to injection, verified patient identity using patient's name and date of birth.  Due to injection administration, patient instructed to remain in clinic for 15 minutes  afterwards, and to report any adverse reaction to me immediately.    Joint injection was performed.    Medication Name: Kenalog -10 x2 NDC 4685-4140-06  Drug Amount Wasted:  Yes: 30 mg/ml   Vial/Syringe: Single dose vial  Expiration Date:  4/1/26    Medication Name Lidocaine 1% NDC 60934-168-69    Scribed by DUY RESENDEZ ATC for Dr. Jo on October 9, 2024 at 3:30pm based on the provider's statements to me.     DUY RESENDEZ ATC      Medium Joint Injection/Arthrocentesis: bilateral elbow    Date/Time: 10/9/2024 3:31 PM    Performed by: Hyacinth Morrison MD  Authorized by: Hyacinth Morrison MD    Indications:  Pain  Needle Size:  25 G  Guidance: surface landmarks    Location:  Elbow  Location comment:  Cubital tunnels  Laterality:  Bilateral  Site:  Bilateral elbow  Medications (Right):  10 mg triamcinolone acetonide 10 MG/ML; 0.5 mL lidocaine (PF) 1 %  Medications (Left):  10 mg triamcinolone acetonide 10 MG/ML; 0.5 mL lidocaine (PF) 1 %  Outcome:  Tolerated well, no immediate complications  Procedure discussed: discussed risks, benefits, and alternatives    Consent Given by:  Patient  Timeout: timeout called immediately prior to procedure    Prep: patient was prepped and draped in usual sterile fashion         [facility] [address] [address] [phone]  Dept: [phone]  ______________________________________________________________________________    Patient: Laxmi Ya   : 1975   MRN: [id number]   2024    INVASIVE PROCEDURE SAFETY CHECKLIST    Date: 10/9/2024   Procedure:Bilateral elbows cubital tunnel steroid injections  Patient Name: Laxmi Ya  MRN: [id number]  YOB: 1975    Action: Complete sections as appropriate. Any discrepancy results in a HARD COPY until resolved.     PRE PROCEDURE:  Patient ID verified with 2 identifiers (name and  or MRN): Yes  Procedure and site verified with patient/designee (when able): Yes  Accurate consent documentation in medical record: Yes  H&P (or appropriate assessment) documented in medical record: Yes  H&P must be up to 20 days prior to procedure and updates within 24 hours of procedure as applicable: Yes  Relevant diagnostic and radiology test results appropriately labeled and displayed as applicable: NA  Procedure site(s) marked with provider initials: NA    TIMEOUT:  Time-Out performed immediately prior to starting procedure, including verbal and active participation of all team members addressing the following:Yes  * Correct patient identify  * Confirmed that the correct side and site are marked  * An accurate procedure consent form  * Agreement on the procedure to be done  * Correct patient position  * Relevant images and results are properly labeled and appropriately displayed  * The need to administer antibiotics or fluids for irrigation purposes during the procedure as applicable   * Safety precautions based on patient history or medication use    DURING PROCEDURE: Verification of correct person, site, and procedures any time the responsibility for care of the patient is transferred to another member of the care team.       The following  medications were given:         Prior to injection, verified patient identity using patient's name and date of birth.  Due to injection administration, patient instructed to remain in clinic for 15 minutes  afterwards, and to report any adverse reaction to me immediately.    Tendon sheath injection was performed.   Medication Name: Kenalog -10 x2 NDC 3600-8952-81  Drug Amount Wasted:  Yes: 30 mg/ml   Vial/Syringe: Single dose vial  Expiration Date:  4/1/26    Medication Name Lidocaine 1% NDC 75418-065-26    Scribed by DUY RESENDEZ, ATC for Dr. Jo on October 9, 2024 at 3:33pm based on the provider's statements to me.     DUY RESENDEZ ATC      Again, thank you for allowing me to participate in the care of your patient.        Sincerely,        WANDA STARKS MD

## 2024-10-10 ENCOUNTER — TELEPHONE (OUTPATIENT)
Dept: RHEUMATOLOGY | Facility: CLINIC | Age: 49
End: 2024-10-10

## 2024-10-10 ENCOUNTER — VIRTUAL VISIT (OUTPATIENT)
Dept: PHARMACY | Facility: CLINIC | Age: 49
End: 2024-10-10
Attending: INTERNAL MEDICINE
Payer: MEDICARE

## 2024-10-10 ENCOUNTER — MYC MEDICAL ADVICE (OUTPATIENT)
Dept: PHARMACY | Facility: CLINIC | Age: 49
End: 2024-10-10

## 2024-10-10 DIAGNOSIS — M06.00 SERONEGATIVE RHEUMATOID ARTHRITIS (H): Primary | ICD-10-CM

## 2024-10-10 DIAGNOSIS — M79.644 BILATERAL THUMB PAIN: Primary | ICD-10-CM

## 2024-10-10 DIAGNOSIS — M79.645 BILATERAL THUMB PAIN: Primary | ICD-10-CM

## 2024-10-10 RX ORDER — LIDOCAINE HYDROCHLORIDE 10 MG/ML
0.5 INJECTION, SOLUTION EPIDURAL; INFILTRATION; INTRACAUDAL; PERINEURAL
Status: SHIPPED | OUTPATIENT
Start: 2024-10-09

## 2024-10-10 NOTE — TELEPHONE ENCOUNTER
PA Initiation    Medication: ACTEMRA 162 MG/0.9ML SC Exablox  Insurance Company: [employer] [place] - Phone [phone] Fax [phone]  Pharmacy Filling the Rx:    Filling Pharmacy Phone:    Filling Pharmacy Fax:    Start Date: 10/10/2024  ZAYNAB COLBERT (Key: [id number])      HERMES Gonzalez, [facility]  Specialty Pharmacy Clinic Liaison     [facility]    [email or web address]     Phone: [phone]  Fax: [phone]

## 2024-10-10 NOTE — TELEPHONE ENCOUNTER
Called patient and discussed current wait times to see a rheumatology provider. Advised patient to request being on the wait list and discussed patient may be able to be moved up if there is a cancellation on Dr. Lemus's schedule.

## 2024-10-11 ENCOUNTER — INFUSION THERAPY VISIT (OUTPATIENT)
Dept: INFUSION THERAPY | Facility: CLINIC | Age: 49
End: 2024-10-11
Attending: ORTHOPAEDIC SURGERY
Payer: MEDICARE

## 2024-10-11 VITALS
DIASTOLIC BLOOD PRESSURE: 86 MMHG | WEIGHT: 235.4 LBS | SYSTOLIC BLOOD PRESSURE: 133 MMHG | BODY MASS INDEX: 37.99 KG/M2 | RESPIRATION RATE: 18 BRPM | TEMPERATURE: 98.5 F | OXYGEN SATURATION: 94 % | HEART RATE: 75 BPM

## 2024-10-11 DIAGNOSIS — M13.80 SERONEGATIVE INFLAMMATORY ARTHRITIS: Primary | ICD-10-CM

## 2024-10-11 DIAGNOSIS — M06.00 SERONEGATIVE RHEUMATOID ARTHRITIS (H): ICD-10-CM

## 2024-10-11 LAB
ALBUMIN SERPL BCG-MCNC: 4.5 G/DL (ref 3.5–5.2)
ALBUMIN UR-MCNC: 20 MG/DL
ALP SERPL-CCNC: 57 U/L (ref 40–150)
ALT SERPL W P-5'-P-CCNC: 88 U/L (ref 0–50)
ANION GAP SERPL CALCULATED.3IONS-SCNC: 13 MMOL/L (ref 7–15)
APPEARANCE UR: CLEAR
AST SERPL W P-5'-P-CCNC: 33 U/L (ref 0–45)
BACTERIA #/AREA URNS HPF: ABNORMAL /HPF
BASOPHILS # BLD AUTO: 0 10E3/UL (ref 0–0.2)
BASOPHILS NFR BLD AUTO: 0 %
BILIRUB SERPL-MCNC: 0.6 MG/DL
BILIRUB UR QL STRIP: NEGATIVE
BUN SERPL-MCNC: 17.2 MG/DL (ref 6–20)
CALCIUM SERPL-MCNC: 9.5 MG/DL (ref 8.8–10.4)
CHLORIDE SERPL-SCNC: 102 MMOL/L (ref 98–107)
COLOR UR AUTO: ABNORMAL
CREAT SERPL-MCNC: 0.75 MG/DL (ref 0.51–0.95)
EGFRCR SERPLBLD CKD-EPI 2021: >90 ML/MIN/1.73M2
EOSINOPHIL # BLD AUTO: 0 10E3/UL (ref 0–0.7)
EOSINOPHIL NFR BLD AUTO: 0 %
ERYTHROCYTE [DISTWIDTH] IN BLOOD BY AUTOMATED COUNT: 12.4 % (ref 10–15)
GLUCOSE SERPL-MCNC: 229 MG/DL (ref 70–99)
GLUCOSE UR STRIP-MCNC: 300 MG/DL
HCO3 SERPL-SCNC: 22 MMOL/L (ref 22–29)
HCT VFR BLD AUTO: 42.6 % (ref 35–47)
HGB BLD-MCNC: 14.7 G/DL (ref 11.7–15.7)
HGB UR QL STRIP: NEGATIVE
IMM GRANULOCYTES # BLD: 0.1 10E3/UL
IMM GRANULOCYTES NFR BLD: 1 %
KETONES UR STRIP-MCNC: NEGATIVE MG/DL
LEUKOCYTE ESTERASE UR QL STRIP: NEGATIVE
LYMPHOCYTES # BLD AUTO: 1.6 10E3/UL (ref 0.8–5.3)
LYMPHOCYTES NFR BLD AUTO: 13 %
MCH RBC QN AUTO: 30.6 PG (ref 26.5–33)
MCHC RBC AUTO-ENTMCNC: 34.5 G/DL (ref 31.5–36.5)
MCV RBC AUTO: 89 FL (ref 78–100)
MONOCYTES # BLD AUTO: 1 10E3/UL (ref 0–1.3)
MONOCYTES NFR BLD AUTO: 8 %
MUCOUS THREADS #/AREA URNS LPF: PRESENT /LPF
NEUTROPHILS # BLD AUTO: 9.7 10E3/UL (ref 1.6–8.3)
NEUTROPHILS NFR BLD AUTO: 78 %
NITRATE UR QL: NEGATIVE
NRBC # BLD AUTO: 0 10E3/UL
NRBC BLD AUTO-RTO: 0 /100
PH UR STRIP: 6.5 [PH] (ref 5–7)
PLATELET # BLD AUTO: 258 10E3/UL (ref 150–450)
POTASSIUM SERPL-SCNC: 4 MMOL/L (ref 3.4–5.3)
PROT SERPL-MCNC: 6.6 G/DL (ref 6.4–8.3)
RBC # BLD AUTO: 4.8 10E6/UL (ref 3.8–5.2)
RBC URINE: 1 /HPF
SODIUM SERPL-SCNC: 137 MMOL/L (ref 135–145)
SP GR UR STRIP: 1.02 (ref 1–1.03)
SQUAMOUS EPITHELIAL: 2 /HPF
UROBILINOGEN UR STRIP-MCNC: NORMAL MG/DL
WBC # BLD AUTO: 12.4 10E3/UL (ref 4–11)
WBC URINE: 3 /HPF

## 2024-10-11 PROCEDURE — 81001 URINALYSIS AUTO W/SCOPE: CPT

## 2024-10-11 PROCEDURE — 250N000011 HC RX IP 250 OP 636: Performed by: INTERNAL MEDICINE

## 2024-10-11 PROCEDURE — 96375 TX/PRO/DX INJ NEW DRUG ADDON: CPT

## 2024-10-11 PROCEDURE — 36415 COLL VENOUS BLD VENIPUNCTURE: CPT

## 2024-10-11 PROCEDURE — 96365 THER/PROPH/DIAG IV INF INIT: CPT

## 2024-10-11 PROCEDURE — 85004 AUTOMATED DIFF WBC COUNT: CPT

## 2024-10-11 PROCEDURE — 250N000013 HC RX MED GY IP 250 OP 250 PS 637: Performed by: INTERNAL MEDICINE

## 2024-10-11 PROCEDURE — 82040 ASSAY OF SERUM ALBUMIN: CPT

## 2024-10-11 PROCEDURE — 258N000003 HC RX IP 258 OP 636: Performed by: INTERNAL MEDICINE

## 2024-10-11 RX ORDER — METHYLPREDNISOLONE SODIUM SUCCINATE 125 MG/2ML
37.5 INJECTION INTRAMUSCULAR; INTRAVENOUS ONCE
Status: COMPLETED | OUTPATIENT
Start: 2024-10-11 | End: 2024-10-11

## 2024-10-11 RX ORDER — METHYLPREDNISOLONE SODIUM SUCCINATE 125 MG/2ML
125 INJECTION INTRAMUSCULAR; INTRAVENOUS
Status: CANCELLED
Start: 2024-11-08

## 2024-10-11 RX ORDER — ACETAMINOPHEN 325 MG/1
650 TABLET ORAL ONCE
Status: COMPLETED | OUTPATIENT
Start: 2024-10-11 | End: 2024-10-11

## 2024-10-11 RX ORDER — MEPERIDINE HYDROCHLORIDE 25 MG/ML
25 INJECTION INTRAMUSCULAR; INTRAVENOUS; SUBCUTANEOUS EVERY 30 MIN PRN
Status: CANCELLED | OUTPATIENT
Start: 2024-11-08

## 2024-10-11 RX ORDER — ALBUTEROL SULFATE 0.83 MG/ML
2.5 SOLUTION RESPIRATORY (INHALATION)
Status: CANCELLED | OUTPATIENT
Start: 2024-11-08

## 2024-10-11 RX ORDER — HEPARIN SODIUM,PORCINE 10 UNIT/ML
5-20 VIAL (ML) INTRAVENOUS DAILY PRN
Status: CANCELLED | OUTPATIENT
Start: 2024-11-08

## 2024-10-11 RX ORDER — ALBUTEROL SULFATE 90 UG/1
1-2 INHALANT RESPIRATORY (INHALATION)
Status: CANCELLED
Start: 2024-11-08

## 2024-10-11 RX ORDER — DIPHENHYDRAMINE HCL 25 MG
25 CAPSULE ORAL ONCE
Status: CANCELLED | OUTPATIENT
Start: 2024-11-08

## 2024-10-11 RX ORDER — EPINEPHRINE 1 MG/ML
0.3 INJECTION, SOLUTION INTRAMUSCULAR; SUBCUTANEOUS EVERY 5 MIN PRN
Status: CANCELLED | OUTPATIENT
Start: 2024-11-08

## 2024-10-11 RX ORDER — ACETAMINOPHEN 325 MG/1
650 TABLET ORAL ONCE
Status: CANCELLED | OUTPATIENT
Start: 2024-11-08

## 2024-10-11 RX ORDER — METHYLPREDNISOLONE SODIUM SUCCINATE 125 MG/2ML
37.5 INJECTION INTRAMUSCULAR; INTRAVENOUS ONCE
Status: CANCELLED | OUTPATIENT
Start: 2024-11-08

## 2024-10-11 RX ORDER — DIPHENHYDRAMINE HYDROCHLORIDE 50 MG/ML
50 INJECTION INTRAMUSCULAR; INTRAVENOUS
Status: CANCELLED
Start: 2024-11-08

## 2024-10-11 RX ORDER — HEPARIN SODIUM (PORCINE) LOCK FLUSH IV SOLN 100 UNIT/ML 100 UNIT/ML
5 SOLUTION INTRAVENOUS
Status: CANCELLED | OUTPATIENT
Start: 2024-11-08

## 2024-10-11 RX ADMIN — METHYLPREDNISOLONE SODIUM SUCCINATE 37.5 MG: 125 INJECTION, POWDER, FOR SOLUTION INTRAMUSCULAR; INTRAVENOUS at 08:00

## 2024-10-11 RX ADMIN — TOCILIZUMAB 800 MG: 20 INJECTION, SOLUTION, CONCENTRATE INTRAVENOUS at 08:11

## 2024-10-11 RX ADMIN — ACETAMINOPHEN 650 MG: 325 TABLET ORAL at 07:37

## 2024-10-11 NOTE — PATIENT INSTRUCTIONS
Dear Laxmi Ya    Thank you for choosing [facility] ([facility] for your infusion.  The following information is a summary of our appointment as well as important reminders.      EDUCATION POST BIOLOGICAL/CHEMOTHERAPY INFUSION  Call the triage nurse at your clinic or seek medical attention if you have chills and/or temperature greater than or equal to 100.5, uncontrolled nausea/vomiting, diarrhea, constipation, dizziness, shortness of breath, chest pain, heart palpitations, weakness or any other new or concerning symptoms, questions or concerns.  You can not have any live virus vaccines prior to or during treatment or up to 6 months post infusion.  If you have an upcoming surgery, medical procedure or dental procedure during treatment, this should be discussed with your ordering physician and your surgeon/dentist.  If you are having any concerning symptom, if you are unsure if you should get your next infusion or wish to speak to a provider before your next infusion, please call your care coordinator or triage nurse at your clinic to notify them so we can adequately serve you.     If you are a transplant patient and require transplant education, please click on this link: [email or web address].    If you have any questions on your upcoming Specialty Infusion appointments, please call scheduling at [phone].  It was a pleasure taking care of you today.    Sincerely,    [facility]  [facility]  [address] [place] [address]  [id number]  Phone:  [phone]

## 2024-10-11 NOTE — PROGRESS NOTES
Nursing Note  Laxmi Ya [facility] for:   Chief Complaint   Patient presents with    Infusion     Actemra     During today's [facility] appointment, orders from Dr. Lemus were completed.  Frequency: every 28 days    Progress note:  Patient identification verified by name and date of birth.  Assessment completed.  Vitals recorded in Doc Flowsheets.  Patient was provided with education regarding medication/procedure and possible side effects.  Patient verbalized understanding.     present during visit today: Not Applicable.    Treatment Conditions: ~~~ NOTE: If the patient answers yes to any of the questions below, hold the infusion and contact ordering provider or on-call provider.    Have you recently had an elevated temperature, fever, chills, productive cough, coughing for 3 weeks or longer or hemoptysis,  abnormal vital signs, night sweats,  chest pain or have you noticed a decrease in your appetite, unexplained weight loss or fatigue? No  Do you have any open wounds or new incisions? No  Do you have any upcoming hospitalizations or surgeries? Does not include esophagogastroduodenoscopy, colonoscopy, endoscopic retrograde cholangiopancreatography (ERCP), endoscopic ultrasound (EUS), dental procedures or joint aspiration/steroid injections No  Do you currently have any signs of illness or infection or are you on any antibiotics? No  Have you had any new, sudden or worsening abdominal pain? No  Have you or anyone in your household received a live vaccination in the past 4 weeks? Please note: No live vaccines while on biologic/chemotherapy until 6 months after the last treatment. Patient can receive the flu vaccine (shot only), pneumovax and the Covid vaccine. It is optimal for the patient to get these vaccines mid cycle, but they can be given at any time as long as it is not on the day of the infusion. No  Have you  recently been diagnosed with any new nervous system diseases (ie. Multiple sclerosis, Guillain [place], seizures, neurological changes) or cancer diagnosis? Are you on any form of radiation or chemotherapy? No  Are you pregnant or breast feeding or do you have plans of pregnancy in the future? No  Have you been having any signs of worsening depression or suicidal ideations?  (benlysta only) No  Have there been any other new onset medical symptoms? No  Have you had any new blood clots? (IVIG only) No    Premedications: administered per order.    Drug Waste Record: Yes  Solumedrol  NDC: 7147-7201-87  Single use dose  Amount given: 37.5mg  Amount wasted: 1.4ml    Infusion length and rate:  infusion given over approximately  1 hours    Labs: were drawn per orders.     Vascular access: peripheral IV was placed by vascular access nurse.    Is the next appt scheduled? yes    Post Infusion Assessment:  Patient tolerated infusion without incident.     Discharge Plan:   Follow up plan of care with: ongoing infusions at [facility].  Discharge instructions were reviewed with patient.  Patient/representative verbalized understanding of discharge instructions and all questions answered.  Patient discharged from [facility] in stable condition.    Vanessa Gonzalez RN    Administrations This Visit       acetaminophen (TYLENOL) tablet 650 mg       Admin Date  10/11/2024 Action  $Given Dose  650 mg Route  Oral Documented By  Vanessa Gonzalez RN              methylPREDNISolone Na Suc (solu-MEDROL) injection 37.5 mg       Admin Date  10/11/2024 Action  $Given Dose  37.5 mg Route  Intravenous Documented By  Vanessa Gonzalez RN              tocilizumab (ACTEMRA) 800 mg in sodium chloride 0.9 % 150 mL infusion       Admin Date  10/11/2024 Action  $New Bag Dose  800 mg Rate  150 mL/hr Route  Intravenous Documented By  Vanessa Gonzalez RN                    /86 (BP Location:  Right arm)   Pulse 75   Temp 98.5  F (36.9  C) (Oral)   Resp 18   Wt 106.8 kg (235 lb 6.4 oz)   SpO2 94%   BMI 37.99 kg/m

## 2024-10-14 NOTE — TELEPHONE ENCOUNTER
Prior Authorization Approval    Medication: ACTEMRA 162 MG/0.9ML SC SOSY  Authorization Effective Date: 10/14/2024  Authorization Expiration Date: 10/10/2025  Approved Dose/Quantity: 3.6  Reference #: [id number]   Insurance Company: [employer] - Phone [phone] Fax [phone]  Expected CoPay: $ 915  CoPay Card Available:      Financial Assistance Needed: applied fpap  Which Pharmacy is filling the prescription:              HERMES Gonzalez, [facility]  Specialty Pharmacy Clinic Liaison     [facility]    [email or web address]     Phone: [phone]  Fax: [phone]

## 2024-10-14 NOTE — TELEPHONE ENCOUNTER
MAURY INITIATED    Medication: ACTEMRA 162 MG/0.9ML SC [facility] Name: fpap  Date submitted: 10/14/2024  9:12 AM   Submitted application for [facility]    Patient is on social security and disability     HERMES Gonzalez, [facility]  Specialty Pharmacy Clinic Liaison     [facility]    [email or web address]     Phone: [phone]  Fax: [phone]

## 2024-10-18 NOTE — TELEPHONE ENCOUNTER
MAURY APPROVED    Medication: ACTEMRA 162 MG/0.9ML SC SOSY  [facility] Name: [facility] Effective Date: 10/18/2024  Foundation Expiration Date: 12/31/2024

## 2024-10-22 NOTE — PROGRESS NOTES
Medication Therapy Management (MTM) Encounter    ASSESSMENT:                            Medication Adherence/Access: See below for considerations    Rheumatoid Arthritis: Reviewed current infusion guidance from system including cancellation of all non-oncology infusions until 10/11/24. Advised patient likely will not be able to complete infusion prior to leaving on her trip due to this. Will contact rheumatologist and determine if emergency methylprednisolone can be prescribed for patient to bridge while on her trip. Discussed infusions may still be delayed upon her return due to instability of shortage.    PLAN:                            1. Your Actemra infusion for this week will remain cancelled. We will get you rescheduled as soon as we know when non-oncology infusions will be allowed to be given again.    2. I will talk to Dr. Lemus about getting some Medrol you can take with you on your trip. This should prevent any flare symptoms while you are gone.    -- Dr. Lemus agrees with plan and prescribed 20 mg daily x 1 week then 12 mg daily x 1 week due to infusion delay. Prescription sent 10/9/24 and patient updated.    Follow-up: Return in about 2 weeks (around 10/22/2024) for MTM Pharmacist Visit.    SUBJECTIVE/OBJECTIVE:                          Laxmi Ya is a 49 year old female seen for a follow-up visit.       Reason for visit: Actemra infusion concerns    Allergies/ADRs: Reviewed in chart  Past Medical History: Reviewed in chart  Tobacco: She reports that she has never smoked. She has never used smokeless tobacco.  Alcohol: Less than 1 beverages / week    Medication Adherence/Access: Medication barriers: obtaining medication from pharmacy. Notes she got a call cancelling her Actemra infusion due to IV fluid shortage. Needs to know when she can get it to have this infusion completed as she will be leaving town and unable to get it next week.    Rheumatoid Arthritis:   Actemra 800 mg infusion monthly  (last infusion 9/10/24)   Diclofenac 1% gel as needed (uses once per day a few times per week)  Oxycodone 10 mg 2-4 times daily as needed     Reports she still feels some joint and pain and stiffness however this is typical in the 1-2 days prior to her Actemra infusion. Most concerned that Actemra infusion was cancelled. Planning to go on a trip next week and would like to get her infusion done prior to leaving. Unsure what to do since she does not want to flare while away.     Liver Function Studies -   Recent Labs   Lab Test 09/10/24  1256   PROTTOTAL 6.3*   ALBUMIN 4.3   BILITOTAL 0.6   ALKPHOS 49   AST 81*   ALT 94*      CBC RESULTS:   Recent Labs   Lab Test 09/10/24  1256   WBC 7.6   RBC 4.86   HGB 14.7   HCT 42.9   MCV 88   MCH 30.2   MCHC 34.3   RDW 13.9        Today's Vitals: There were no vitals taken for this visit.  ----------------    I spent 15 minutes with this patient today. All changes were made via collaborative practice agreement with Oswaldo Lemus MD. A copy of the visit note was provided to the patient's provider(s).    A summary of these recommendations was sent via [employer].    Augustine ConnerD  Medication Therapy Management Pharmacist  [facility]  Phone: [phone]    Telemedicine Visit Details  The patient's medications can be safely assessed via a telemedicine encounter.  Type of service:  Telephone visit  Originating Location (pt. Location): Home  Distant Location (provider location):  On-site  Start Time: 10:00 AM  End Time: 10:15 AM     Medication Therapy Recommendations  Rheumatoid arthritis (H)    Current Medication: tocilizumab (ACTEMRA) 400 MG/20ML   Rationale: Medication product not available - Adherence - Adherence   Recommendation: Start Medication - Medrol 2 MG Tabs - 8 mg daily while on vacation due to infusion delay   Status: Accepted per Provider

## 2024-10-22 NOTE — PATIENT INSTRUCTIONS
"Recommendations from today's MTM visit:                                                       1. Your Actemra infusion for this week will remain cancelled. We will get you rescheduled as soon as we know when non-oncology infusions will be allowed to be given again.    2. I will talk to Dr. Lemus about getting some Medrol you can take with you on your trip. This should prevent any flare symptoms while you are gone.    Follow-up: Return in about 2 weeks (around 10/22/2024) for MTM Pharmacist Visit.    It was great speaking with you today.  I value your experience and would be very thankful for your time in providing feedback in our clinic survey. In the next few days, you may receive an email or text message from [facility] [employer] with a link to a survey related to your  clinical pharmacist.\"     To schedule another MTM appointment, please call the clinic directly or you may call the MTM scheduling line at [phone].    My Clinical Pharmacist's contact information:                                                      Please feel free to contact me with any questions or concerns you have.      Yuliya Kaplan, PharmD  Medication Therapy Management Pharmacist  [facility]  Phone: [phone]     "

## 2024-10-24 ENCOUNTER — THERAPY VISIT (OUTPATIENT)
Dept: OCCUPATIONAL THERAPY | Facility: CLINIC | Age: 49
End: 2024-10-24
Attending: INTERNAL MEDICINE
Payer: MEDICARE

## 2024-10-24 ENCOUNTER — OFFICE VISIT (OUTPATIENT)
Dept: ORTHOPEDICS | Facility: CLINIC | Age: 49
End: 2024-10-24
Payer: MEDICARE

## 2024-10-24 ENCOUNTER — ANCILLARY PROCEDURE (OUTPATIENT)
Dept: GENERAL RADIOLOGY | Facility: CLINIC | Age: 49
End: 2024-10-24
Attending: ORTHOPAEDIC SURGERY
Payer: MEDICARE

## 2024-10-24 DIAGNOSIS — I89.0 LYMPHEDEMA: Primary | ICD-10-CM

## 2024-10-24 DIAGNOSIS — M13.80 SERONEGATIVE INFLAMMATORY ARTHRITIS: Primary | ICD-10-CM

## 2024-10-24 DIAGNOSIS — M21.969 TYPE 2 DIABETES MELLITUS WITH DIABETIC FOOT DEFORMITY (H): ICD-10-CM

## 2024-10-24 DIAGNOSIS — G56.03 BILATERAL CARPAL TUNNEL SYNDROME: ICD-10-CM

## 2024-10-24 DIAGNOSIS — M79.644 BILATERAL THUMB PAIN: ICD-10-CM

## 2024-10-24 DIAGNOSIS — M79.645 BILATERAL THUMB PAIN: ICD-10-CM

## 2024-10-24 DIAGNOSIS — E11.69 TYPE 2 DIABETES MELLITUS WITH DIABETIC FOOT DEFORMITY (H): ICD-10-CM

## 2024-10-24 PROCEDURE — 77002 NEEDLE LOCALIZATION BY XRAY: CPT | Performed by: ORTHOPAEDIC SURGERY

## 2024-10-24 PROCEDURE — 20526 THER INJECTION CARP TUNNEL: CPT | Mod: 50 | Performed by: ORTHOPAEDIC SURGERY

## 2024-10-24 PROCEDURE — 20605 DRAIN/INJ JOINT/BURSA W/O US: CPT | Mod: 50 | Performed by: ORTHOPAEDIC SURGERY

## 2024-10-24 PROCEDURE — 99213 OFFICE O/P EST LOW 20 MIN: CPT | Mod: 25 | Performed by: ORTHOPAEDIC SURGERY

## 2024-10-24 PROCEDURE — 97140 MANUAL THERAPY 1/> REGIONS: CPT | Mod: GO | Performed by: OCCUPATIONAL THERAPIST

## 2024-10-24 RX ADMIN — TRIAMCINOLONE ACETONIDE 40 MG: 40 INJECTION, SUSPENSION INTRA-ARTICULAR; INTRAMUSCULAR at 11:07

## 2024-10-24 RX ADMIN — LIDOCAINE HYDROCHLORIDE 2 ML: 10 INJECTION, SOLUTION EPIDURAL; INFILTRATION; INTRACAUDAL; PERINEURAL at 11:40

## 2024-10-24 RX ADMIN — TRIAMCINOLONE ACETONIDE 20 MG: 40 INJECTION, SUSPENSION INTRA-ARTICULAR; INTRAMUSCULAR at 11:40

## 2024-10-24 RX ADMIN — LIDOCAINE HYDROCHLORIDE 3 ML: 10 INJECTION, SOLUTION EPIDURAL; INFILTRATION; INTRACAUDAL; PERINEURAL at 11:07

## 2024-10-24 NOTE — LETTER
10/24/2024      Laxmi Ya  [address] [facility] [address]      Dear Colleague,    Thank you for referring your patient, Laxmi Ya, to the [facility]. Please see a copy of my visit note below.    Small Joint Injection/Arthrocentesis    Date/Time: 10/24/2024 11:07 AM    Performed by: Twila Petit MD  Authorized by: Twila Petit MD  Indications:  Pain  Needle Size:  25 G  Guidance: fluoroscopy       Location:  Thumb   Location comment:  Bilateral Thumb STT                       Medications:  40 mg triamcinolone 40 MG/ML; 3 mL lidocaine (PF) 1 %      Aspirate analysis: sent for lab analysis      Procedure discussed: discussed risks, benefits, and alternatives    Consent Given by:  Patient  Timeout: timeout called immediately prior to procedure    Prep: patient was prepped and draped in usual sterile fashion          [facility] [address] [address] [phone]  Dept: [phone]  ______________________________________________________________________________    Patient: Laxmi Ya   : 1975   MRN: [id number]   2024    INVASIVE PROCEDURE SAFETY CHECKLIST    Date: 10/24/2024   Procedure:Bilateral Thumb STT joint Steroid injections  Patient Name: Laxmi Ya  MRN: [id number]  YOB: 1975    Action: Complete sections as appropriate. Any discrepancy results in a HARD COPY until resolved.     PRE PROCEDURE:  Patient ID verified with 2 identifiers (name and  or MRN): Yes  Procedure and site verified with patient/designee (when able): Yes  Accurate consent documentation in medical record: Yes  H&P (or appropriate assessment) documented in medical record: Yes  H&P must be up to 20 days prior to procedure and updates within 24 hours of procedure as applicable: Yes  Relevant diagnostic and radiology test results appropriately labeled  and displayed as applicable: NA  Procedure site(s) marked with provider initials: NA    TIMEOUT:  Time-Out performed immediately prior to starting procedure, including verbal and active participation of all team members addressing the following:Yes  * Correct patient identify  * Confirmed that the correct side and site are marked  * An accurate procedure consent form  * Agreement on the procedure to be done  * Correct patient position  * Relevant images and results are properly labeled and appropriately displayed  * The need to administer antibiotics or fluids for irrigation purposes during the procedure as applicable   * Safety precautions based on patient history or medication use    DURING PROCEDURE: Verification of correct person, site, and procedures any time the responsibility for care of the patient is transferred to another member of the care team.       The following medications were given:         Prior to injection, verified patient identity using patient's name and date of birth.  Due to injection administration, patient instructed to remain in clinic for 15 minutes  afterwards, and to report any adverse reaction to me immediately.    Joint injection was performed.    Medication Name: Kenalog 40mg/mL  NDC [address]-2  Drug Amount Wasted:  None.  Vial/Syringe: Single dose vial  Expiration Date:  7/1/26    Medication Name Lidocaine 1% NDC 81048-207-04    Scribed by DUY RESENDEZ, TIM for Dr. Petit on October 24, 2024 at 11:10am based on the provider's statements to me.     DUY RESENDEZ ATC      Hand / Upper Extremity Injection/Arthrocentesis: bilateral carpal tunnel    Date/Time: 10/24/2024 11:40 AM    Performed by: Twila Petit MD  Authorized by: Twila Petit MD    Indications:  Pain  Needle Size:  27 G  Guidance: landmark    Condition: carpal tunnel    Laterality:  Bilateral    Site:  Bilateral carpal tunnel  Medications (Right):  20 mg triamcinolone 40 MG/ML; 2 mL lidocaine  (PF) 1 %  Medications (Left):  20 mg triamcinolone 40 MG/ML; 2 mL lidocaine (PF) 1 %  Outcome:  Tolerated well, no immediate complications  Procedure discussed: discussed risks, benefits, and alternatives    Consent Given by:  Patient  Timeout: timeout called immediately prior to procedure    Prep: patient was prepped and draped in usual sterile fashion        [facility] [address]  4TH FLOOR  [facility] [id number] [phone]  Dept: [phone]  ______________________________________________________________________________    Patient: Laxmi aY   : 1975   MRN: [id number]   2024    INVASIVE PROCEDURE SAFETY CHECKLIST    Date: 10/24/2024   Procedure:Bilateral Carpal Tunnel Steroid injections  Patient Name: Laxmi Ya  MRN: [id number]  YOB: 1975    Action: Complete sections as appropriate. Any discrepancy results in a HARD COPY until resolved.     PRE PROCEDURE:  Patient ID verified with 2 identifiers (name and  or MRN): Yes  Procedure and site verified with patient/designee (when able): Yes  Accurate consent documentation in medical record: Yes  H&P (or appropriate assessment) documented in medical record: Yes  H&P must be up to 20 days prior to procedure and updates within 24 hours of procedure as applicable: Yes  Relevant diagnostic and radiology test results appropriately labeled and displayed as applicable: NA  Procedure site(s) marked with provider initials: NA    TIMEOUT:  Time-Out performed immediately prior to starting procedure, including verbal and active participation of all team members addressing the following:Yes  * Correct patient identify  * Confirmed that the correct side and site are marked  * An accurate procedure consent form  * Agreement on the procedure to be done  * Correct patient position  * Relevant images and results are properly labeled and appropriately displayed  * The need to  administer antibiotics or fluids for irrigation purposes during the procedure as applicable   * Safety precautions based on patient history or medication use    DURING PROCEDURE: Verification of correct person, site, and procedures any time the responsibility for care of the patient is transferred to another member of the care team.       The following medications were given:         Prior to injection, verified patient identity using patient's name and date of birth.  Due to injection administration, patient instructed to remain in clinic for 15 minutes  afterwards, and to report any adverse reaction to me immediately.    Tendon sheath injection was performed.   Medication Name: Kenalog 40mg/mL NDC [address]-9  Drug Amount Wasted:  None.  Vial/Syringe: Single dose vial  Expiration Date:  7/1/26    Medication Name Lidocaine 1% NDC 54824-011-63    Scribed by DUY RESENDEZ, ATC for Dr. Petit on October 24, 2024 at 11:44am based on the provider's statements to me.     DUY RESENDEZ, ATC         Diagnosis: bilateral thumb CMC/STT arthritis, bilateral carpal tunnel syndrome, bilateral cubital tunnel syndrome    Treatment:   10/25/2024 bilateral thumb STT injection (20 mg) he bilateral carpal tunnel injections (20 mg) (Trino Aguilar)  10/9/2024: bilateral thumb CMC steroid injection, cubital tunnel steroid injections (Vakshori_  8/15/2024: bilateral STT steroid injection (Dr. Petit)  6/6/2024: bilateral carpal tunnel steroid injection (Dr. Petit)  5/30/2024: bilateral STT steroid injection (Dr. Petit)  5/21/2024: bilateral cubital tunnel steroid injections  12/27/2023: bilateral carpal tunnel steroid injection  12/21/2023: bilateral STT steroid injection (Dr. Petit)  11/1/2023: bilateral carpal tunnel steroid injection  10/18/2023: Bilateral thumb CMC steroid injection  9/20/2023: bilateral STT steroid injection  5/4/2023: bilateral carpal tunnel steroid injection (Dr. Petit)  2/16/2023: bilateral STT  steroid injection (Dr. Petit)  12/13/2022: bilateral carpal tunnel steroid injection (Dr. Sullivan)  10/27/2022: bilateral STT steroid injections (Dr. Petit)  9/13/2022: bilateral carpal tunnel steroid injection (Dr. Sullivan)  6/30/2022: bilateral STT steroid injections (Dr. Petit)  Bilateral carpal tunnel steroid injections (outside)     History of Present Illness: Laxmi Ya is a 48 year old RHD female with inflammatory rheumatoid arthritis as well as diabetes.  She has multiple joint complaints.  She did get injections into her cubital tunnels and reports that the pain that goes into the small finger is significantly helped since she saw Dr. Pickering.  Her thumbs continue to hurt at the base which she attributes to STT arthritis and have been helped in the past with that.  She also gets tingling and numbness with some pain consistent with a carpal tunnel symptoms.      10/18/2023: reports the last STT injections helped.  She presents today for bilateral thumb CMC steroid injections.  She continues to have pain in bilateral thumbs.  She also continues to have numbness/tingling in the median nerve distribution.     11/1/2023: reports the last thumb CMC steroid injections helped. Presents today for bilateral carpal tunnel steroid injections.     12/27/2023: recent STT injections with Dr. Petit which she says were helpful. Last carpal tunnel steroid injections were helpful and she would like to do this again. Bilateral thumb CMCs feel good and the steroid injections have not worn off yet.     2/13/2024: recently had pneumonia. Right hand is feeling pretty good but left hand is having more carpal tunnel symptoms. Both hands are having mild pain in the STT region again. Focused on the left side today.     5/21/2024: today her symptoms are focused on the ulnar nerves, left worse than right. She has pain when leaning on the medial elbows, and this pain and tingling goes down the volar/ulnar side of  her hand     10/8/2024: the steroid injections into her cubital tunnels last visit helped until about 1 week ago. She is requesting bilateral thumb CMC steroid injections and bilateral cubital tunnel injections     Physical Examination:  Vitals   There were no vitals filed for this visit.                                                                    Impression:   Rheumatoid arthritis  Bilateral carpal tunnel syndrome  Bilateral cubital tunnel syndrome  Bilateral thumb CMC arthritis  Bilateral STT arthritis    Plan:   Patient comes in today requesting STT injections and carpal tunnel injections.  We discussed that these would need to be half dose with multiple injections.  She also will need to check her sugars and may need to make diabetes adjustments according to her sugar levels.  She is full understanding and wished to proceed.  Patient discussed that she may want to undergo surgical treatment in the future but at this time the cortisone injections are continuing to help, provided she spaces them out appropriately.         PROCEDURE:  After written consent, verifying site and side, a sterile Chlora- prep was performed for the injection site.  C-arm guidance was used for placement of a 25-gauge needle into the right thumb STT joint with 1% Lidocaine.  1 /2mL of Kenalog (20 mg) and 1/2 mL of lidocaine was injected under fluoroscopic guidance with good relief of pain.  Identical procedure was carried out on the left side patient tolerated the procedure well.  No complications.  Follow up instructions were given.      PROCEDURE:  After written consent, verifying site and side, a sterile Chlora- prep was performed for the injection site.  Palpation was used for placement of a 25-gauge needle into the right carpal tunnel with 1% Lidocaine with no dysesthesias and full range of motion of the flexor tendons.  1/2 mL of Kenalog (20 mg) and 1/2 mL of lidocaine was injected using dynamic technique with good relief of  pain.  Identical procedure was carried out on the left side patient tolerated the procedure well.  No complications.        Again, thank you for allowing me to participate in the care of your patient.        Sincerely,        Twila Petit MD

## 2024-10-24 NOTE — PROGRESS NOTES
Hand / Upper Extremity Injection/Arthrocentesis: bilateral carpal tunnel    Date/Time: 10/24/2024 11:40 AM    Performed by: Twila Petit MD  Authorized by: Twila Petit MD    Indications:  Pain  Needle Size:  27 G  Guidance: landmark    Condition: carpal tunnel    Laterality:  Bilateral    Site:  Bilateral carpal tunnel  Medications (Right):  20 mg triamcinolone 40 MG/ML; 2 mL lidocaine (PF) 1 %  Medications (Left):  20 mg triamcinolone 40 MG/ML; 2 mL lidocaine (PF) 1 %  Outcome:  Tolerated well, no immediate complications  Procedure discussed: discussed risks, benefits, and alternatives    Consent Given by:  Patient  Timeout: timeout called immediately prior to procedure    Prep: patient was prepped and draped in usual sterile fashion        [facility] [address] [address]  [phone]  Dept: [phone]  ______________________________________________________________________________    Patient: Laxmi Ya   : 1975   MRN: [id number]   2024    INVASIVE PROCEDURE SAFETY CHECKLIST    Date: 10/24/2024   Procedure:Bilateral Carpal Tunnel Steroid injections  Patient Name: Laxmi Ya  MRN: [id number]  YOB: 1975    Action: Complete sections as appropriate. Any discrepancy results in a HARD COPY until resolved.     PRE PROCEDURE:  Patient ID verified with 2 identifiers (name and  or MRN): Yes  Procedure and site verified with patient/designee (when able): Yes  Accurate consent documentation in medical record: Yes  H&P (or appropriate assessment) documented in medical record: Yes  H&P must be up to 20 days prior to procedure and updates within 24 hours of procedure as applicable: Yes  Relevant diagnostic and radiology test results appropriately labeled and displayed as applicable: NA  Procedure site(s) marked with provider initials: NA    TIMEOUT:  Time-Out performed  immediately prior to starting procedure, including verbal and active participation of all team members addressing the following:Yes  * Correct patient identify  * Confirmed that the correct side and site are marked  * An accurate procedure consent form  * Agreement on the procedure to be done  * Correct patient position  * Relevant images and results are properly labeled and appropriately displayed  * The need to administer antibiotics or fluids for irrigation purposes during the procedure as applicable   * Safety precautions based on patient history or medication use    DURING PROCEDURE: Verification of correct person, site, and procedures any time the responsibility for care of the patient is transferred to another member of the care team.       The following medications were given:         Prior to injection, verified patient identity using patient's name and date of birth.  Due to injection administration, patient instructed to remain in clinic for 15 minutes  afterwards, and to report any adverse reaction to me immediately.    Tendon sheath injection was performed.   Medication Name: Kenalog 40mg/mL NDC [address]-7  Drug Amount Wasted:  None.  Vial/Syringe: Single dose vial  Expiration Date:  7/1/26    Medication Name Lidocaine 1% NDC 52667-904-70    Scribed by DUY RESENDEZ, TIM for Dr. Petit on October 24, 2024 at 11:44am based on the provider's statements to me.     DUY RESENDEZ, ATC

## 2024-10-24 NOTE — PROGRESS NOTES
10/24/24 0500   Progress Note/Certification   Start Of Care Date 09/07/23   Onset of Illness/Injury or Date of Surgery 09/07/22   Therapy Frequency then 2x/wee for   Predicted Duration 12  weeks with decreasing frequency as needed.   Certification date from 10/11/24   Certification date to 01/16/25   Progress Note Due Date   (10 visits)   Goals   OT Goals 1;2;3;4;5   OT Goal 1   Goal Identifier 1   Goal Description In order to improve functional mobility for activities of living, by the completion of intensive treatment, patient and/or caregiver will;      Verbalize and/or demonstrate understanding of techniques to independently manage their lymphedema at home   Rationale In order to maximize safety and independence with performance of self-care activities   Target Date 05/22/24   OT Goal 2   Goal Identifier 1a   Goal Description demonstrate independence in performing prescribed exercises/self MLD to facilate the lymph system   Rationale In order to maximize safety and independence with performance of self-care activities   Target Date 08/07/24   OT Goal 3   Goal Identifier 1b   Goal Description be independent in donning/doffing, wearing schedule, and care of compression garments   Rationale In order to maximize safety and independence with performance of self-care activities   Target Date 11/30/23   Date Met 02/15/24   OT Goal 4   Goal Identifier 2   Goal Description Pt will decrease the suprapubic swelling to perform hygiene/shave with a normal effort.   Rationale In order to maximize safety and independence with performance of self-care activities   Goal Progress Pt measured and has similar msmts to July   Target Date 08/07/24   OT Goal 5   Goal Description Pt oracio improve movement of shoulders and trunk to perform toilet hygiene.   Rationale In order to maximize safety and independence with performance of self-care activities   Target Date 05/22/24   Date Met 05/15/24   Subjective Report   Subjective Report Pt  reports suprapubic area has gotten so large that as she sits on a toilet, the suprapubic area will touch the toilet and is painful.   Objective Measures   Objective Measures Objective Measure 1   Objective Measure 1   Objective Measure girth last   Details suprapubic swelling @ 17.2c, L 13.5c, umbilicus 122 pain 10/10         [facility]                                                                                   OUTPATIENT OCCUPATIONAL THERAPY    PLAN OF TREATMENT FOR OUTPATIENT REHABILITATION   Patient's Last Name, First Name, Laxmi Coleman YOB: 1975   Provider's Name   [facility]   Medical Record No.  [id number]     Onset Date: 09/07/22 Start of Care Date: 09/07/23     Medical Diagnosis:  edema, will clarify to lymphedema      OT Treatment Diagnosis:  lymphedema Plan of Treatment  Frequency/Duration:then 2x/wee for/12  weeks with decreasing frequency as needed.    Certification date from 10/11/24   To 01/16/25        See note for plan of treatment details and functional goals     Alanis Schilling, OT                         I CERTIFY THE NEED FOR THESE SERVICES FURNISHED UNDER        THIS PLAN OF TREATMENT AND WHILE UNDER MY CARE .             Physician Signature               Date    X_____________________________________________________                  Referring Provider:  Amol Juares    Initial Assessment  See Epic Evaluation- 09/07/23

## 2024-10-24 NOTE — PATIENT INSTRUCTIONS
"Recommendations from today's MTM visit:                                                       1. We are working on obtaining insurance coverage for Actemra injections. Once this is approved the pharmacy will call you to set up delivery so you can start 162 mg (1 injection) weekly. If the fluid shortage resolves, we can switch back to infusions.     2. A common side effect of Actemra is injection site reactions (red, raised, itchy spot at injection site). You can use hydrocortisone cream and ice to treat these reactions if they occur.     3. Please make a lab appointment to complete monthly labs. I reordered what you usually have drawn at your infusion. If you have any issues getting this done please let me know.    Follow-up: Return in about 4 weeks (around 11/7/2024) for MTM Pharmacist Visit.    It was great speaking with you today.  I value your experience and would be very thankful for your time in providing feedback in our clinic survey. In the next few days, you may receive an email or text message from "[employer] with a link to a survey related to your  clinical pharmacist.\"     To schedule another MTM appointment, please call the clinic directly or you may call the MTM scheduling line at [phone].    My Clinical Pharmacist's contact information:                                                      Please feel free to contact me with any questions or concerns you have.      Yuliya Kaplan, PharmD  Medication Therapy Management Pharmacist  [facility]  Phone: [phone]     "

## 2024-10-24 NOTE — PROGRESS NOTES
Small Joint Injection/Arthrocentesis    Date/Time: 10/24/2024 11:07 AM    Performed by: Twila Petit MD  Authorized by: Twila Petit MD  Indications:  Pain  Needle Size:  25 G  Guidance: fluoroscopy       Location:  Thumb   Location comment:  Bilateral Thumb STT                       Medications:  40 mg triamcinolone 40 MG/ML; 3 mL lidocaine (PF) 1 %      Aspirate analysis: sent for lab analysis      Procedure discussed: discussed risks, benefits, and alternatives    Consent Given by:  Patient  Timeout: timeout called immediately prior to procedure    Prep: patient was prepped and draped in usual sterile fashion          [facility] [address]  4TH FLOOR  [facility] [id number] [phone]  Dept: [phone]  ______________________________________________________________________________    Patient: Laxmi Ya   : 1975   MRN: [id number]   2024    INVASIVE PROCEDURE SAFETY CHECKLIST    Date: 10/24/2024   Procedure:Bilateral Thumb STT joint Steroid injections  Patient Name: Laxmi Ya  MRN: [id number]  YOB: 1975    Action: Complete sections as appropriate. Any discrepancy results in a HARD COPY until resolved.     PRE PROCEDURE:  Patient ID verified with 2 identifiers (name and  or MRN): Yes  Procedure and site verified with patient/designee (when able): Yes  Accurate consent documentation in medical record: Yes  H&P (or appropriate assessment) documented in medical record: Yes  H&P must be up to 20 days prior to procedure and updates within 24 hours of procedure as applicable: Yes  Relevant diagnostic and radiology test results appropriately labeled and displayed as applicable: NA  Procedure site(s) marked with provider initials: NA    TIMEOUT:  Time-Out performed immediately prior to starting procedure, including verbal and active participation of all team members addressing the  following:Yes  * Correct patient identify  * Confirmed that the correct side and site are marked  * An accurate procedure consent form  * Agreement on the procedure to be done  * Correct patient position  * Relevant images and results are properly labeled and appropriately displayed  * The need to administer antibiotics or fluids for irrigation purposes during the procedure as applicable   * Safety precautions based on patient history or medication use    DURING PROCEDURE: Verification of correct person, site, and procedures any time the responsibility for care of the patient is transferred to another member of the care team.       The following medications were given:         Prior to injection, verified patient identity using patient's name and date of birth.  Due to injection administration, patient instructed to remain in clinic for 15 minutes  afterwards, and to report any adverse reaction to me immediately.    Joint injection was performed.    Medication Name: Kenalog 40mg/mL  NDC [address]-8  Drug Amount Wasted:  None.  Vial/Syringe: Single dose vial  Expiration Date:  7/1/26    Medication Name Lidocaine 1% NDC 83680-277-78    Scribed by DUY RESENDEZ, TIM for Dr. Petit on October 24, 2024 at 11:10am based on the provider's statements to me.     DUY RESENDEZ, ATC

## 2024-10-24 NOTE — NURSING NOTE
Reason For Visit:   Chief Complaint   Patient presents with    RECHECK     Follow-up Bilateral STT joints  repeat injection       Primary MD: Amol Juares  Ref. MD: Katja    Age: 49 year old    ?  No      There were no vitals taken for this visit.      Pain Assessment  Patient Currently in Pain: Yes  0-10 Pain Scale: 10  Primary Pain Location: Finger (Comment which one) (bilateral thumb)  Pain Descriptors: Dull, Aching, Sharp, Constant, Intermittent  Alleviating Factors: NSAIDS    Hand Dominance Evaluation  Hand Dominance: Right      force  R hand pincher force: 3.629 kg (8 lb)  R hand  level 2 force: 4.536 kg (10 lb)  L hand pincher force: 4.536 kg (10 lb)  L hand  level  2 force: 4.536 kg (10 lb)    QuickDASH Assessment      10/24/2024    10:33 AM   QuickDASH Main   1. Open a tight or new jar Unable   2. Do heavy household chores (e.g., wash walls, floors) Unable   3. Carry a shopping bag or briefcase Severe difficulty   4. Wash your back Severe difficulty   5. Use a knife to cut food Unable   6. Recreational activities in which you take some force or impact through your arm, shoulder or hand (e.g., golf, hammering, tennis, etc.) Unable   7. During the past week, to what extent has your arm, shoulder or hand problem interfered with your normal social activities with family, friends, neighbours or groups Extremely   8. During the past week, were you limited in your work or other regular daily activities as a result of your arm, shoulder or hand problem Unable   9. Arm, shoulder or hand pain Extreme   10.Tingling (pins and needles) in your arm,shoulder or hand Extreme   11. During the past week, how much difficulty have you had sleeping because of the pain in your arm, shoulder or hand Severe difficulty   Quickdash Ability Score 93.18          Current Outpatient Medications   Medication Sig Dispense Refill    albuterol (PROAIR HFA/PROVENTIL HFA/VENTOLIN HFA) 108 (90 Base) MCG/ACT inhaler  Inhale 2 puffs into the lungs every 6 hours as needed for shortness of breath, wheezing or cough      Artificial Tear Solution (SOOTHE XP) SOLN as needed      atenolol (TENORMIN) 50 MG tablet Take 50 mg by mouth daily      azelastine 137 MCG/SPRAY SOLN USE 2 SPRAYS IN EACH NOSTRIL 2 TIMES DAILY FOR 1 MONTH      benzoyl peroxide 5 % external liquid Use daily as directed. Preferred bdrand: Medpura 236 mL 11    blood glucose (NO BRAND SPECIFIED) test strip Use to test blood sugar 2 times daily or as directed. 100 strip 1    blood glucose monitoring (NO BRAND SPECIFIED) meter device kit Use to test blood sugar 2 times daily or as directed. 1 kit 0    ciclopirox (LOPROX) 0.77 % cream Apply topically 2 times daily To affected toenails. 90 g 5    cyclobenzaprine (FLEXERIL) 5 MG tablet Take 5 mg by mouth.      dexAMETHasone (DECADRON) 0.1 % ophthalmic solution       diclofenac (VOLTAREN) 1 % topical gel Apply topically 4 times daily as needed      doxycycline hyclate (VIBRAMYCIN) 100 MG capsule Take 100 mg by mouth 2 times daily.      DUPIXENT 300 MG/2ML prefilled pen INJECT 300MG (1 INJECTION) SUBCUTANEOUSLY EVERY OTHER WEEK. 4 mL 2    famciclovir (FAMVIR) 500 MG tablet Take 1 tablet (500 mg) by mouth 2 times daily. 180 tablet 3    fexofenadine (ALLEGRA) 180 MG tablet Take 1 tablet (180 mg) by mouth daily 90 tablet 2    fidaxomicin (DIFICID) 200 MG tablet Take 200 mg by mouth as needed      fluconazole (DIFLUCAN) 150 MG tablet Take 150 mg by mouth.      Fluticasone-Umeclidin-Vilanterol (TRELEGY ELLIPTA) 200-62.5-25 MCG/ACT oral inhaler Inhale 1 puff into the lungs daily 90 each 1    insulin glargine (LANTUS PEN) 100 UNIT/ML pen Inject 50 Units subcutaneously daily      insulin lispro (HUMALOG KWIKPEN) 100 UNIT/ML (1 unit dial) KWIKPEN Inject 35 Units subcutaneously 3 times daily (before meals) Plus 1:25 correction scale if BG >150      Lancets (ONETOUCH DELICA PLUS GHSUPX88H) MISC CHECK BLOOD GLUCOSE 3 TIMES DAILY,  ADJUSTING MEDICATION, DX CODE E 11.9, ON INSULIN      lidocaine (LIDODERM) 5 % patch as needed   1    LORazepam (ATIVAN) 0.5 MG tablet Take 0.25 mg by mouth every 8 hours as needed      losartan (COZAAR) 100 MG tablet Take 100 mg by mouth daily      methylPREDNISolone (MEDROL DOSEPAK) 4 MG tablet therapy pack TAKE 6 TABLETS ON DAY 1 AS DIRECTED ON PACKAGE AND DECREASE BY 1 TAB EACH DAY FOR A TOTAL OF 6 DAYS 21 each 1    methylPREDNISolone (MEDROL DOSEPAK) 4 MG tablet therapy pack Take per package instructions. Take once a day by mouth 21 tablet 1    methylPREDNISolone (MEDROL) 4 MG tablet Take 1 tablet (4 mg) by mouth daily. Take 5 tabs daily for 1 week, then take 3 tabs daily for 1 week to bridge infusions 56 tablet 0    mometasone (NASONEX) 50 MCG/ACT nasal spray Spray 2 sprays into both nostrils daily 17 g 11    montelukast (SINGULAIR) 10 MG tablet Take 10 mg by mouth daily.      multivitamin w/minerals (THERA-VIT-M) tablet Take 1 tablet by mouth daily      naloxone (NARCAN) 4 MG/0.1ML nasal spray       naproxen (NAPROSYN) 375 MG tablet Take 375 mg by mouth.      norethindrone (MICRONOR) 0.35 MG tablet Take 1 tablet (0.35 mg) by mouth daily 90 tablet 3    nystatin (MYCOSTATIN) 625219 UNIT/ML suspension       olopatadine (PATADAY) 0.7 % ophthalmic solution Apply 1 drop to eye daily PRN      omeprazole (PRILOSEC) 40 MG DR capsule TAKE 1 CAPSULE (40 MG) BY MOUTH TWO TIMES A DAY. TAKE 1 HOUR BEFORE A MEAL.      oxyCODONE IR (ROXICODONE) 10 MG tablet Take 10 mg by mouth      predniSONE (DELTASONE) 5 MG tablet Take 1 tablet (5 mg) by mouth daily 30 tablet 5    rifaximin (XIFAXAN) 550 MG TABS tablet Take 200 mg by mouth 3 times daily Take for 10 days, has on had as needed for small bowel bacteria overgrowh      rosuvastatin (CRESTOR) 5 MG tablet Take 2 tablets by mouth daily      spironolactone-HCTZ (ALDACTAZIDE) 25-25 MG tablet Take 1 tablet by mouth daily      terbinafine (LAMISIL) 250 MG tablet Take 1 tablet by mouth  daily.      tirzepatide (MOUNJARO) 2.5 MG/0.5ML pen Inject 2.5 mg subcutaneously every 7 days.      tobramycin-dexAMETHasone (TOBRADEX) 0.3-0.1 % ophthalmic suspension PLACE 1 DROP INTO BOTH EYES TWO TIMES A DAY.      tocilizumab (ACTEMRA) 400 MG/20ML Inject 800 mg into the vein every 28 days      Tocilizumab 162 MG/0.9ML SOAJ Inject 162 mg subcutaneously once a week. 3.6 mL 5    voriconazole (VFEND) 200 MG tablet On day 1 take 2 tablets PO BID , then take 1 tab PO BID on Days 2-14. 30 tablet 0       Allergies   Allergen Reactions    Polyethylene Glycol Rash    Codeine      Other reaction(s): Gastrointestinal, GI intolerance, Vomiting    Vomiting    Hydrocodone Nausea and Vomiting    Morphine Nausea and Vomiting    Sulfasalazine      Other reaction(s): GI intolerance    Has tried and had nausa.    Tramadol Nausea and Vomiting     Other reaction(s): Gastrointestinal, Other (see comments)    Nausea and vomiting     unknown    Acetaminophen Nausea     Nausea and vomiting    Gluten Meal Other (See Comments) and Rash     Other reaction(s): Gastrointestinal, GI intolerance  Dizziness, tired, rash, stomach cramps, thrush, mouth sores  Dizziness, tired, rash, stomach cramps, thrush, mouth sores      Hydroxyzine GI Disturbance and Nausea     nausea, dry mouth    Propylene Glycol Dizziness, Nausea and Vomiting, Nausea and Rash       DUY RESENDEZ, ATC

## 2024-10-24 NOTE — PROGRESS NOTES
Medication Therapy Management (MTM) Encounter    ASSESSMENT:                            Medication Adherence/Access: See below for considerations.    Rheumatoid Arthritis: Due to ongoing IV fluid shortage, unclear when patient will have access to complete monthly infusions again. Recommend switching to Actemra 162 mg injection weekly until shortage is resolved. Will send in orders today to start PA process and determine if financial assistance is needed. Sent new lab orders for scheduled monthly labs in infusion plan. Instructed patient to make a lab only appointment to complete these.    PLAN:                            1. We are working on obtaining insurance coverage for Actemra injections. Once this is approved the pharmacy will call you to set up delivery so you can start 162 mg (1 injection) weekly. If the fluid shortage resolves, we can switch back to infusions.     2. A common side effect of Actemra is injection site reactions (red, raised, itchy spot at injection site). You can use hydrocortisone cream and ice to treat these reactions if they occur.     3. Please make a lab appointment to complete monthly labs. I reordered what you usually have drawn at your infusion. If you have any issues getting this done please let me know.    Follow-up: Return in about 4 weeks (around 11/7/2024) for MTM Pharmacist Visit.    SUBJECTIVE/OBJECTIVE:                          Laxmi Ya is a 49 year old female seen for a follow-up visit.       Reason for visit: Continued infusion access concerns, would like to set up back up self injections    Allergies/ADRs: Reviewed in chart  Past Medical History: Reviewed in chart  Tobacco: She reports that she has never smoked. She has never used smokeless tobacco.  Alcohol: Less than 1 beverages / week    Medication Adherence/Access: Medication barriers: obtaining medication from pharmacy. Notes she got a call cancelling her Actemra infusion due to IV fluid shortage. Needs to know  when she can get it to have this infusion completed as she will be leaving town and unable to get it next week.     Rheumatoid Arthritis:   Actemra 800 mg infusion monthly (last infusion 9/10/24)   Diclofenac 1% gel as needed (uses once per day a few times per week)  Oxycodone 10 mg 2-4 times daily as needed     Reports she still feels some joint and pain and stiffness however this is typical in the 1-2 days prior to her Actemra infusion. Most concerned that Actemra infusion was cancelled. Has been told she cannot be rescheduled until after she returns from her trip - planning on taking methylprednisolone as prescribed while away. Would like to get Actemra self-injections set up since it is unclear when this shortage will resolve. Also concerned that she was set to have labs with her infusion but now unsure how to get these done - does not think she can go into the lab as they are order through her infusion plan.     Liver Function Studies -   Recent Labs   Lab Test 09/10/24  1256   PROTTOTAL 6.3*   ALBUMIN 4.3   BILITOTAL 0.6   ALKPHOS 49   AST 81*   ALT 94*      CBC RESULTS:   Recent Labs   Lab Test 09/10/24  1256   WBC 7.6   RBC 4.86   HGB 14.7   HCT 42.9   MCV 88   MCH 30.2   MCHC 34.3   RDW 13.9          Today's Vitals: There were no vitals taken for this visit.  ----------------    I spent 30 minutes with this patient today. All changes were made via collaborative practice agreement with Oswaldo Lemus. A copy of the visit note was provided to the patient's provider(s).    A summary of these recommendations was sent via "[employer].    Yuliya Kaplan, PharmD  Medication Therapy Management Pharmacist  [facility]  Phone: [phone]    Telemedicine Visit Details  The patient's medications can be safely assessed via a telemedicine encounter.  Type of service:  Telephone visit  Originating Location (pt. Location): Home    Distant Location (provider location):  On-site  Start  Time: 9:00 AM  End Time: 9:30 AM     Medication Therapy Recommendations  Rheumatoid arthritis (H)   1 Current Medication: tocilizumab (ACTEMRA) 400 MG/20ML   Current Medication Sig: Inject 800 mg into the vein every 28 days   Rationale: Medication product not available - Adherence - Adherence   Recommendation: Change Medication - Actemra ACTPen 162 MG/0.9ML Soaj - 162 mg weekly   Status: Accepted per CPA   Identified Date: 10/10/2024 Completed Date: 10/10/2024

## 2024-10-25 ENCOUNTER — MYC MEDICAL ADVICE (OUTPATIENT)
Dept: PHARMACY | Facility: CLINIC | Age: 49
End: 2024-10-25
Payer: MEDICARE

## 2024-10-25 ENCOUNTER — TELEPHONE (OUTPATIENT)
Dept: ALLERGY | Facility: CLINIC | Age: 49
End: 2024-10-25
Payer: MEDICARE

## 2024-10-25 RX ORDER — LIDOCAINE HYDROCHLORIDE 10 MG/ML
2 INJECTION, SOLUTION EPIDURAL; INFILTRATION; INTRACAUDAL; PERINEURAL
Status: COMPLETED | OUTPATIENT
Start: 2024-10-24 | End: 2024-10-24

## 2024-10-25 RX ORDER — TRIAMCINOLONE ACETONIDE 40 MG/ML
20 INJECTION, SUSPENSION INTRA-ARTICULAR; INTRAMUSCULAR
Status: COMPLETED | OUTPATIENT
Start: 2024-10-24 | End: 2024-10-24

## 2024-10-25 RX ORDER — TRIAMCINOLONE ACETONIDE 40 MG/ML
40 INJECTION, SUSPENSION INTRA-ARTICULAR; INTRAMUSCULAR
Status: COMPLETED | OUTPATIENT
Start: 2024-10-24 | End: 2024-10-24

## 2024-10-25 RX ORDER — LIDOCAINE HYDROCHLORIDE 10 MG/ML
3 INJECTION, SOLUTION EPIDURAL; INFILTRATION; INTRACAUDAL; PERINEURAL
Status: COMPLETED | OUTPATIENT
Start: 2024-10-24 | End: 2024-10-24

## 2024-10-25 NOTE — TELEPHONE ENCOUNTER
[facility] Call Center    Phone Message    May a detailed message be left on voicemail: yes     Reason for Call: Patient called - can 12/17 appt be switched to earlier so she doesn't lose her Dupixent - please call back [phone]    Action Taken: Other: MW ALL    Travel Screening: Not Applicable     Date of Service:                                                                      83.9

## 2024-10-25 NOTE — PROGRESS NOTES
Diagnosis: bilateral thumb CMC/STT arthritis, bilateral carpal tunnel syndrome, bilateral cubital tunnel syndrome    Treatment:   10/25/2024 bilateral thumb STT injection (20 mg) he bilateral carpal tunnel injections (20 mg) (Trino Aguilar)  10/9/2024: bilateral thumb CMC steroid injection, cubital tunnel steroid injections (Ladan_  8/15/2024: bilateral STT steroid injection (Dr. Petit)  6/6/2024: bilateral carpal tunnel steroid injection (Dr. Petit)  5/30/2024: bilateral STT steroid injection (Dr. Petit)  5/21/2024: bilateral cubital tunnel steroid injections  12/27/2023: bilateral carpal tunnel steroid injection  12/21/2023: bilateral STT steroid injection (Dr. Petit)  11/1/2023: bilateral carpal tunnel steroid injection  10/18/2023: Bilateral thumb CMC steroid injection  9/20/2023: bilateral STT steroid injection  5/4/2023: bilateral carpal tunnel steroid injection (Dr. Petit)  2/16/2023: bilateral STT steroid injection (Dr. Petit)  12/13/2022: bilateral carpal tunnel steroid injection (Dr. Sullivan)  10/27/2022: bilateral STT steroid injections (Dr. Petit)  9/13/2022: bilateral carpal tunnel steroid injection (Dr. Sullivan)  6/30/2022: bilateral STT steroid injections (Dr. Petit)  Bilateral carpal tunnel steroid injections (outside)     History of Present Illness: Laxmi Ya is a 48 year old RHD female with inflammatory rheumatoid arthritis as well as diabetes.  She has multiple joint complaints.  She did get injections into her cubital tunnels and reports that the pain that goes into the small finger is significantly helped since she saw Dr. Pickering.  Her thumbs continue to hurt at the base which she attributes to STT arthritis and have been helped in the past with that.  She also gets tingling and numbness with some pain consistent with a carpal tunnel symptoms.      10/18/2023: reports the last STT injections helped.  She presents today for bilateral thumb CMC steroid  injections.  She continues to have pain in bilateral thumbs.  She also continues to have numbness/tingling in the median nerve distribution.     11/1/2023: reports the last thumb CMC steroid injections helped. Presents today for bilateral carpal tunnel steroid injections.     12/27/2023: recent STT injections with Dr. Petit which she says were helpful. Last carpal tunnel steroid injections were helpful and she would like to do this again. Bilateral thumb CMCs feel good and the steroid injections have not worn off yet.     2/13/2024: recently had pneumonia. Right hand is feeling pretty good but left hand is having more carpal tunnel symptoms. Both hands are having mild pain in the STT region again. Focused on the left side today.     5/21/2024: today her symptoms are focused on the ulnar nerves, left worse than right. She has pain when leaning on the medial elbows, and this pain and tingling goes down the volar/ulnar side of her hand     10/8/2024: the steroid injections into her cubital tunnels last visit helped until about 1 week ago. She is requesting bilateral thumb CMC steroid injections and bilateral cubital tunnel injections     Physical Examination:  Vitals   There were no vitals filed for this visit.                                                                    Impression:   Rheumatoid arthritis  Bilateral carpal tunnel syndrome  Bilateral cubital tunnel syndrome  Bilateral thumb CMC arthritis  Bilateral STT arthritis    Plan:   Patient comes in today requesting STT injections and carpal tunnel injections.  We discussed that these would need to be half dose with multiple injections.  She also will need to check her sugars and may need to make diabetes adjustments according to her sugar levels.  She is full understanding and wished to proceed.  Patient discussed that she may want to undergo surgical treatment in the future but at this time the cortisone injections are continuing to help, provided she  spaces them out appropriately.         PROCEDURE:  After written consent, verifying site and side, a sterile Chlora- prep was performed for the injection site.  C-arm guidance was used for placement of a 25-gauge needle into the right thumb STT joint with 1% Lidocaine.  1 /2mL of Kenalog (20 mg) and 1/2 mL of lidocaine was injected under fluoroscopic guidance with good relief of pain.  Identical procedure was carried out on the left side patient tolerated the procedure well.  No complications.  Follow up instructions were given.      PROCEDURE:  After written consent, verifying site and side, a sterile Chlora- prep was performed for the injection site.  Palpation was used for placement of a 25-gauge needle into the right carpal tunnel with 1% Lidocaine with no dysesthesias and full range of motion of the flexor tendons.  1/2 mL of Kenalog (20 mg) and 1/2 mL of lidocaine was injected using dynamic technique with good relief of pain.  Identical procedure was carried out on the left side patient tolerated the procedure well.  No complications.

## 2024-10-30 ENCOUNTER — THERAPY VISIT (OUTPATIENT)
Dept: PHYSICAL THERAPY | Facility: CLINIC | Age: 49
End: 2024-10-30
Payer: MEDICARE

## 2024-10-30 ENCOUNTER — VIRTUAL VISIT (OUTPATIENT)
Dept: PHARMACY | Facility: CLINIC | Age: 49
End: 2024-10-30
Payer: MEDICARE

## 2024-10-30 ENCOUNTER — MYC MEDICAL ADVICE (OUTPATIENT)
Dept: PHARMACY | Facility: CLINIC | Age: 49
End: 2024-10-30

## 2024-10-30 DIAGNOSIS — M54.50 CHRONIC BILATERAL LOW BACK PAIN WITHOUT SCIATICA: ICD-10-CM

## 2024-10-30 DIAGNOSIS — M06.00 SERONEGATIVE RHEUMATOID ARTHRITIS (H): Primary | ICD-10-CM

## 2024-10-30 DIAGNOSIS — R10.2 PELVIC PAIN IN FEMALE: Primary | ICD-10-CM

## 2024-10-30 DIAGNOSIS — M54.2 NECK PAIN: ICD-10-CM

## 2024-10-30 DIAGNOSIS — Z71.85 VACCINE COUNSELING: ICD-10-CM

## 2024-10-30 DIAGNOSIS — G89.29 CHRONIC BILATERAL LOW BACK PAIN WITHOUT SCIATICA: ICD-10-CM

## 2024-10-30 PROCEDURE — 97012 MECHANICAL TRACTION THERAPY: CPT | Mod: GP | Performed by: PHYSICAL THERAPIST

## 2024-10-30 PROCEDURE — 97140 MANUAL THERAPY 1/> REGIONS: CPT | Mod: GP | Performed by: PHYSICAL THERAPIST

## 2024-10-30 NOTE — PROGRESS NOTES
Patient able to restart Actemra IV infusions. Cancelling self-injection prescription.    Yuliya Kaplan, PharmD  Medication Therapy Management Pharmacist  [facility]

## 2024-11-03 ENCOUNTER — HEALTH MAINTENANCE LETTER (OUTPATIENT)
Age: 49
End: 2024-11-03

## 2024-11-05 NOTE — PROGRESS NOTES
Medication Therapy Management (MTM) Encounter    ASSESSMENT:                            Medication Adherence/Access: Discussed information requested on disability forms can be accessed through her [employer] accounts. Recommended looking through her specific accounts for dates of appointments. Reviewed how to contact patient records if she requires an official copy of her medical records.    Rheumatoid Arthritis: Stable. Due to improvement in IV fluid shortage, will plan for patient to continue Actemra infusions monthly and discontinue self-injection prescriptions.     Vaccines: Per ACIP recommendations, eligible Tdap booster, yearly influenza vaccine, and updated covid booster. Per patient preference, will only obtain flu vaccine at this time. Reviewed [place] pharmacies have Fluzone brand vaccine and recommended she make an appointment to complete this at her desired location.     PLAN:                            1. Please make an appointment at a [facility] [place] [facility] to complete your yearly flu shot. They carry the Fluzone brand vaccine.    2. Continue Actemra infusions monthly. I will cancel the self-injections for now since you will not be using them.    3. You can use your [employer] accounts to look up any needed appointment dates for your disability paperwork.    Follow-up: Return in about 3 months (around 1/30/2025) for MTM Pharmacist Visit.    SUBJECTIVE/OBJECTIVE:                          Laxmi Ya is a 49 year old female seen for a follow-up visit.       Reason for visit: Questions about getting flu shot, needs some records for disability forms    Allergies/ADRs: Reviewed in chart  Past Medical History: Reviewed in chart  Tobacco: She reports that she has never smoked. She has never used smokeless tobacco.  Alcohol: Less than 1 beverages / week    Medication Adherence/Access: Received disability forms that require her to fill out information about past doctor visits and hospitalizations. Wondering best way  to look up this information.    Rheumatoid Arthritis:   Actemra 800 mg infusion monthly (last infusion 10/11/24)   Diclofenac 1% gel as needed (uses once per day a few times per week)  Oxycodone 10 mg 2-4 times daily as needed     Reports she is doing well after getting her Actemra infusion. Not planning on using self-injection unless IV option is not available again. Will adjust orders today. No side effects or concerns noted.     Liver Function Studies -   Recent Labs   Lab Test 10/11/24  0738   PROTTOTAL 6.6   ALBUMIN 4.5   BILITOTAL 0.6   ALKPHOS 57   AST 33   ALT 88*      CBC RESULTS:   Recent Labs   Lab Test 10/11/24  0738   WBC 12.4*   RBC 4.80   HGB 14.7   HCT 42.6   MCV 89   MCH 30.6   MCHC 34.5   RDW 12.4         Vaccines: Due for Tdap booster, yearly influenza vaccine, and updated covid booster. Would like to know where she could get a Fluzone brand flu shot - not interested in getting other vaccines currently.  Immunization History   Administered Date(s) Administered    COVID-19 Vaccine ([employer]) 04/01/2021    DT (PEDS <7y) 06/28/1983    Historical DTP/aP 1976, 1976, 1976, 09/27/1977    Influenza (High Dose) Trivalent,PF (Fluzone) 01/01/2012    Influenza (IIV3) PF 02/12/2007    Influenza Vaccine >6 months,quad, PF 01/07/2019, 10/30/2019, 11/02/2020    MMR 06/07/1977, 03/23/1993    Mantoux Tuberculin Skin Test 01/06/1977    OPV, trivalent, live 1976, 1976, 1976, 09/27/1977, 06/28/1983    Pneumo Conj 13-V (2010&after) 11/30/2018    Pneumococcal 20 valent Conjugate (Prevnar 20) 01/31/2024    Pneumococcal 23 valent 07/04/2013, 10/04/2016, 11/02/2020    TD,PF 7+ (Tenivac) 03/23/1993    TDAP (Adacel,Boostrix) 03/09/2011, 01/01/2012, 02/01/2014    Td (Adult), Adsorbed 03/23/1993    Twinrix A/B 01/16/2007, 10/29/2007, 05/05/2008, 01/01/2011    Typhoid IM 01/21/2011    Typhoid Oral 01/21/2011      Today's Vitals: There were no vitals taken for this  visit.  ----------------    I spent 24 minutes with this patient today. All changes were made via collaborative practice agreement with Oswaldo Lemus MD. A copy of the visit note was provided to the patient's provider(s).    A summary of these recommendations was sent via [employer].    Augustine ConnerD  Medication Therapy Management Pharmacist  [facility]  Phone: [phone]    Telemedicine Visit Details  The patient's medications can be safely assessed via a telemedicine encounter.  Type of service:  Video Conference via [employer]  Originating Location (pt. Location): Home    Distant Location (provider location):  Off-site  Start Time: 3:00 PM  End Time: 3:24 PM     Medication Therapy Recommendations  Vaccine counseling   1 Rationale: Preventive therapy - Needs additional medication therapy - Indication   Recommendation: Order Vaccine - Fluzone 0.5 ML Shakila   Status: Accepted - no CPA Needed   Identified Date: 10/30/2024 Completed Date: 10/30/2024

## 2024-11-05 NOTE — PATIENT INSTRUCTIONS
"Recommendations from today's MTM visit:                                                       1. Please make an appointment at a [facility] to complete your yearly flu shot. They carry the Fluzone brand vaccine.    2. Continue Actemra infusions monthly. I will cancel the self-injections for now since you will not be using them.    3. You can use your [employer] accounts to look up any needed appointment dates for your disability paperwork.    Follow-up: Return in about 3 months (around 1/30/2025) for MTM Pharmacist Visit.    It was great speaking with you today.  I value your experience and would be very thankful for your time in providing feedback in our clinic survey. In the next few days, you may receive an email or text message from [employer] with a link to a survey related to your  clinical pharmacist.\"     To schedule another MTM appointment, please call the clinic directly or you may call the MTM scheduling line at [phone].    My Clinical Pharmacist's contact information:                                                      Please feel free to contact me with any questions or concerns you have.      Yuliya Kaplan, PharmD  Medication Therapy Management Pharmacist  [facility]  Phone: [phone]     "

## 2024-11-06 ENCOUNTER — THERAPY VISIT (OUTPATIENT)
Dept: PHYSICAL THERAPY | Facility: CLINIC | Age: 49
End: 2024-11-06
Payer: MEDICARE

## 2024-11-06 ENCOUNTER — INFUSION THERAPY VISIT (OUTPATIENT)
Dept: INFUSION THERAPY | Facility: CLINIC | Age: 49
End: 2024-11-06
Attending: ORTHOPAEDIC SURGERY
Payer: MEDICARE

## 2024-11-06 VITALS
OXYGEN SATURATION: 98 % | DIASTOLIC BLOOD PRESSURE: 84 MMHG | HEART RATE: 82 BPM | RESPIRATION RATE: 16 BRPM | TEMPERATURE: 97.7 F | SYSTOLIC BLOOD PRESSURE: 119 MMHG

## 2024-11-06 DIAGNOSIS — M25.531 BILATERAL WRIST PAIN: Primary | ICD-10-CM

## 2024-11-06 DIAGNOSIS — M13.80 SERONEGATIVE INFLAMMATORY ARTHRITIS: Primary | ICD-10-CM

## 2024-11-06 DIAGNOSIS — R10.2 PELVIC PAIN IN FEMALE: Primary | ICD-10-CM

## 2024-11-06 DIAGNOSIS — M54.50 CHRONIC BILATERAL LOW BACK PAIN WITHOUT SCIATICA: ICD-10-CM

## 2024-11-06 DIAGNOSIS — M25.532 BILATERAL WRIST PAIN: Primary | ICD-10-CM

## 2024-11-06 DIAGNOSIS — G89.29 CHRONIC BILATERAL LOW BACK PAIN WITHOUT SCIATICA: ICD-10-CM

## 2024-11-06 DIAGNOSIS — M54.2 NECK PAIN: ICD-10-CM

## 2024-11-06 PROCEDURE — 97012 MECHANICAL TRACTION THERAPY: CPT | Mod: GP | Performed by: PHYSICAL THERAPIST

## 2024-11-06 PROCEDURE — 250N000011 HC RX IP 250 OP 636: Mod: JZ | Performed by: INTERNAL MEDICINE

## 2024-11-06 PROCEDURE — 258N000003 HC RX IP 258 OP 636: Performed by: INTERNAL MEDICINE

## 2024-11-06 PROCEDURE — 96365 THER/PROPH/DIAG IV INF INIT: CPT

## 2024-11-06 PROCEDURE — 96375 TX/PRO/DX INJ NEW DRUG ADDON: CPT

## 2024-11-06 PROCEDURE — 97140 MANUAL THERAPY 1/> REGIONS: CPT | Mod: GP | Performed by: PHYSICAL THERAPIST

## 2024-11-06 PROCEDURE — 250N000013 HC RX MED GY IP 250 OP 250 PS 637: Performed by: INTERNAL MEDICINE

## 2024-11-06 RX ORDER — HEPARIN SODIUM (PORCINE) LOCK FLUSH IV SOLN 100 UNIT/ML 100 UNIT/ML
5 SOLUTION INTRAVENOUS
OUTPATIENT
Start: 2024-11-08

## 2024-11-06 RX ORDER — ACETAMINOPHEN 325 MG/1
650 TABLET ORAL ONCE
Status: COMPLETED | OUTPATIENT
Start: 2024-11-06 | End: 2024-11-06

## 2024-11-06 RX ORDER — ALBUTEROL SULFATE 0.83 MG/ML
2.5 SOLUTION RESPIRATORY (INHALATION)
OUTPATIENT
Start: 2024-11-08

## 2024-11-06 RX ORDER — ACETAMINOPHEN 325 MG/1
650 TABLET ORAL ONCE
OUTPATIENT
Start: 2024-11-08

## 2024-11-06 RX ORDER — EPINEPHRINE 1 MG/ML
0.3 INJECTION, SOLUTION INTRAMUSCULAR; SUBCUTANEOUS EVERY 5 MIN PRN
OUTPATIENT
Start: 2024-11-08

## 2024-11-06 RX ORDER — MEPERIDINE HYDROCHLORIDE 25 MG/ML
25 INJECTION INTRAMUSCULAR; INTRAVENOUS; SUBCUTANEOUS EVERY 30 MIN PRN
OUTPATIENT
Start: 2024-11-08

## 2024-11-06 RX ORDER — METHYLPREDNISOLONE SODIUM SUCCINATE 125 MG/2ML
37.5 INJECTION INTRAMUSCULAR; INTRAVENOUS ONCE
OUTPATIENT
Start: 2024-11-08

## 2024-11-06 RX ORDER — DIPHENHYDRAMINE HCL 25 MG
25 CAPSULE ORAL ONCE
OUTPATIENT
Start: 2024-11-08

## 2024-11-06 RX ORDER — HEPARIN SODIUM,PORCINE 10 UNIT/ML
5-20 VIAL (ML) INTRAVENOUS DAILY PRN
OUTPATIENT
Start: 2024-11-08

## 2024-11-06 RX ORDER — ALBUTEROL SULFATE 90 UG/1
1-2 INHALANT RESPIRATORY (INHALATION)
Start: 2024-11-08

## 2024-11-06 RX ORDER — DIPHENHYDRAMINE HYDROCHLORIDE 50 MG/ML
50 INJECTION INTRAMUSCULAR; INTRAVENOUS
Start: 2024-11-08

## 2024-11-06 RX ORDER — METHYLPREDNISOLONE SODIUM SUCCINATE 125 MG/2ML
125 INJECTION INTRAMUSCULAR; INTRAVENOUS
Start: 2024-11-08

## 2024-11-06 RX ORDER — METHYLPREDNISOLONE SODIUM SUCCINATE 40 MG/ML
37.5 INJECTION INTRAMUSCULAR; INTRAVENOUS ONCE
Status: COMPLETED | OUTPATIENT
Start: 2024-11-06 | End: 2024-11-06

## 2024-11-06 RX ADMIN — ACETAMINOPHEN 650 MG: 325 TABLET ORAL at 12:28

## 2024-11-06 RX ADMIN — SODIUM CHLORIDE 250 ML: 9 INJECTION, SOLUTION INTRAVENOUS at 14:15

## 2024-11-06 RX ADMIN — METHYLPREDNISOLONE SODIUM SUCCINATE 37.5 MG: 40 INJECTION, POWDER, FOR SOLUTION INTRAMUSCULAR; INTRAVENOUS at 12:51

## 2024-11-06 RX ADMIN — TOCILIZUMAB 800 MG: 20 INJECTION, SOLUTION, CONCENTRATE INTRAVENOUS at 13:13

## 2024-11-06 ASSESSMENT — PAIN SCALES - GENERAL: PAINLEVEL_OUTOF10: WORST PAIN (10)

## 2024-11-06 NOTE — PROGRESS NOTES
Infusion Nursing Note:  Laxmi Ya presents today for   Chief Complaint   Patient presents with    Infusion     RHEUM - Tocilizumab (Actemra)        Patient seen by provider today: No   present during visit today: No    Note:   -Orders from Oswaldo Lemus MD completed. Frequency: every 4 weeks; patient's last infusion was 10/11/24.  -Labs are ordered every 8 weeks and were last drawn 10/11/24; they will be due next in December.  -Premeds:    650mg Tylenol PO   37.5mg Solu-medrol IVP  -800mg Actemra IV over 60min.  -250ml NS over ~15min following infusion per therapy plan and patient preference.    Intravenous Access:  PIV [facility] in Lt forearm by [facility].    Treatment Conditions:  Biological Infusion Checklist:  ~~~ NOTE: If the patient answers yes to any of the questions below, hold the infusion and contact ordering provider or on-call provider.    Have you recently had an elevated temperature, fever, chills, productive cough, coughing for 3 weeks or longer or hemoptysis,  abnormal vital signs, night sweats,  chest pain or have you noticed a decrease in your appetite, unexplained weight loss or fatigue? No  Do you have any open wounds or new incisions? No  Do you have any upcoming hospitalizations or surgeries? Does not include esophagogastroduodenoscopy, colonoscopy, endoscopic retrograde cholangiopancreatography (ERCP), endoscopic ultrasound (EUS), dental procedures or joint aspiration/steroid injections No  Do you currently have any signs of illness or infection or are you on any antibiotics? No  Have you had any new, sudden or worsening abdominal pain? No  Have you or anyone in your household received a live vaccination in the past 4 weeks? Please note: No live vaccines while on biologic/chemotherapy until 6 months after the last treatment. Patient can receive the flu vaccine (shot only), pneumovax and the Covid vaccine. It is optimal for the patient to get these vaccines mid cycle, but they can be  given at any time as long as it is not on the day of the infusion. No  Have you recently been diagnosed with any new nervous system diseases (ie. Multiple sclerosis, Guillain Fulda, seizures, neurological changes) or cancer diagnosis? Are you on any form of radiation or chemotherapy? No  Are you pregnant or breast feeding or do you have plans of pregnancy in the future? No  Have you been having any signs of worsening depression or suicidal ideations?  (benlysta only) N/A  Have there been any other new onset medical symptoms? No  Have you had any new blood clots? (IVIG only) N/A    Post Infusion Assessment:  Patient tolerated infusion without incident.  Blood return noted pre and post infusion.  Site patent and intact, free from redness, edema or discomfort.  No evidence of extravasations.  Access discontinued per protocol.     Discharge Plan:   Discharge instructions reviewed with: Patient.  Patient and/or family verbalized understanding of discharge instructions and all questions answered.  AVS to patient via MYCHART.    Patient discharged in stable condition accompanied by: self.  Departure Mode: Ambulatory.    Future Appointments   Date Time Provider Department Center   12/3/2024 12:00 PM  [facility]   12/31/2024 12:00 PM  [facility]   1/28/2025 12:00 PM  [facility]       Kaley Handley RN    /84   Pulse 82   Temp 97.7  F (36.5  C)   Resp 16   SpO2 98%     Administrations This Visit       acetaminophen (TYLENOL) tablet 650 mg       Admin Date  11/06/2024 Action  $Given Dose  650 mg Route  Oral Documented By  Kaley Handley, LEXI              methylPREDNISolone sodium succinate (SOLU-MEDROL) injection 37.5 mg       Admin Date  11/06/2024 Action  $Given Dose  37.5 mg Route  Intravenous Documented By  Kaley Handley, RN              sodium chloride 0.9% BOLUS 250 mL       Admin Date  11/06/2024 Action  $New Bag Dose  250 mL  Route  Intravenous Documented By  Kaley Handley, RN              tocilizumab (ACTEMRA) 800 mg in sodium chloride 0.9 % 150 mL infusion       Admin Date  11/06/2024 Action  $New Bag Dose  800 mg Rate  150 mL/hr Route  Intravenous Documented By  Kaley Handley, LEXI                  Drug Administration Record  Drug Name: Solu-medrol  Dose: 37.5mg  Route Administered: IVP  NDC#: 8603-8086-60  Amount of waste (mL): 0.06ml  Reason for waste: single dose vial

## 2024-11-08 ENCOUNTER — TELEPHONE (OUTPATIENT)
Dept: OTOLARYNGOLOGY | Facility: CLINIC | Age: 49
End: 2024-11-08
Payer: MEDICARE

## 2024-11-14 ENCOUNTER — TELEPHONE (OUTPATIENT)
Dept: ALLERGY | Facility: CLINIC | Age: 49
End: 2024-11-14

## 2024-11-14 ENCOUNTER — PRE VISIT (OUTPATIENT)
Dept: OTOLARYNGOLOGY | Facility: CLINIC | Age: 49
End: 2024-11-14

## 2024-11-14 DIAGNOSIS — J33.9 NASAL POLYPS: ICD-10-CM

## 2024-11-14 DIAGNOSIS — J32.4 CHRONIC PANSINUSITIS: ICD-10-CM

## 2024-11-14 RX ORDER — DUPILUMAB 300 MG/2ML
300 INJECTION, SOLUTION SUBCUTANEOUS
Qty: 4 ML | Refills: 0 | Status: SHIPPED | OUTPATIENT
Start: 2024-11-14

## 2024-11-17 ENCOUNTER — MYC MEDICAL ADVICE (OUTPATIENT)
Dept: PHARMACY | Facility: CLINIC | Age: 49
End: 2024-11-17
Payer: MEDICARE

## 2024-11-18 ENCOUNTER — TELEPHONE (OUTPATIENT)
Dept: RHEUMATOLOGY | Facility: CLINIC | Age: 49
End: 2024-11-18
Payer: MEDICARE

## 2024-11-18 DIAGNOSIS — M13.80 SERONEGATIVE INFLAMMATORY ARTHRITIS: Primary | ICD-10-CM

## 2024-11-18 NOTE — TELEPHONE ENCOUNTER
[facility] Call Center    Phone Message    May a detailed message be left on voicemail: yes     Reason for Call: Other: Laxmi is requesting a call back from nurse Macy. In regards to medication (Medrol  does pack) please contact asap thank you     Action Taken: Other: Rheum    Travel Screening: Not Applicable     Date of Service:

## 2024-11-18 NOTE — TELEPHONE ENCOUNTER
Patient calling with a flare.  Patient states achy pain started on Friday.  Pain in the feet, hips, knees, shoulders, and hands.  Swelling in the feet and hands.  Joints feeling warm.  States fingers and feet look red.  Difficulty walking and getting around.  Recently started voriconazole for a fungal infection and wondering if that could have precipitated the flare.  Currently taking actemra from a rheum standpoint.  Patient asking for medrol dose pack.  RX is pended.      Macy Sawyer RN

## 2024-11-19 DIAGNOSIS — J33.9 NASAL POLYPS: ICD-10-CM

## 2024-11-19 DIAGNOSIS — J32.4 CHRONIC PANSINUSITIS: ICD-10-CM

## 2024-11-19 RX ORDER — METHYLPREDNISOLONE 4 MG/1
TABLET ORAL
Qty: 21 TABLET | Refills: 0 | Status: SHIPPED | OUTPATIENT
Start: 2024-11-19

## 2024-11-19 NOTE — TELEPHONE ENCOUNTER
Called patient in response to mychart request. Notes she called rheumatology RN to report flare. Wondering if voriconazole could have caused this flare. Discussed this is unlikely as voriconazole would not have effected baseline immunologic activity. Reviewed she is tolerating voriconazole well - already feels vaginal itching and discharge is improving. Notes she is also on 4th dose of Mounjaro 0.25 mg weekly which is going well other than minor nausea 24-72 hours after dose. Reviewed this is a normal side effect and to contact PCP if it worsens. Would benefit from increasing to 5 mg weekly as planned and continuing all other medications as prescribed.

## 2024-11-20 ENCOUNTER — TELEPHONE (OUTPATIENT)
Dept: OBGYN | Facility: CLINIC | Age: 49
End: 2024-11-20
Payer: MEDICARE

## 2024-11-20 NOTE — TELEPHONE ENCOUNTER
"[facility] Call Center    Phone Message    May a detailed message be left on voicemail: yes     Reason for Call: Order(s): Other:   Reason for requested: Patient called stating Radiology asked her to get an updated US order as current one expires 12/5/24. Pt stated they are booking out far. Pt is requesting a new order \"Stat\" as her cysts are \"increased in fluid swollen, bulging, and it hurts her to walk as she has pinching feeling\" Patient is requesting to have both sides included and to be able to look for roots of the cysts. Please review and contact patient when order is placed. [phone].  Date needed: asap  Provider name: Dr. Pate    Action Taken: Other: [facility]    Travel Screening: Not Applicable                                                                       "

## 2024-11-21 RX ORDER — MOMETASONE FUROATE MONOHYDRATE 50 UG/1
2 SPRAY, METERED NASAL DAILY
Qty: 17 G | Refills: 0 | Status: SHIPPED | OUTPATIENT
Start: 2024-11-21

## 2024-11-21 NOTE — TELEPHONE ENCOUNTER
Called and left  for Laxmi.      She has a follow up appointment scheduled with Dr. Pate on 12/19 and a current order for a complete transvaginal ultrasound that expires 12/5.  We have availability in clinic to do this ultrasound next Tuesday, Wednesday, or Friday, which is well within the order timeframe.      She should be scheduled for this ultrasound prior to her visit with Dr. Pate.

## 2024-11-22 ENCOUNTER — TRANSFERRED RECORDS (OUTPATIENT)
Dept: HEALTH INFORMATION MANAGEMENT | Facility: CLINIC | Age: 49
End: 2024-11-22

## 2024-11-25 ENCOUNTER — THERAPY VISIT (OUTPATIENT)
Dept: PHYSICAL THERAPY | Facility: CLINIC | Age: 49
End: 2024-11-25
Payer: MEDICARE

## 2024-11-25 ENCOUNTER — OFFICE VISIT (OUTPATIENT)
Dept: ORTHOPEDICS | Facility: CLINIC | Age: 49
End: 2024-11-25
Payer: MEDICARE

## 2024-11-25 DIAGNOSIS — B35.1 ONYCHOMYCOSIS: ICD-10-CM

## 2024-11-25 DIAGNOSIS — J32.4 CHRONIC PANSINUSITIS: ICD-10-CM

## 2024-11-25 DIAGNOSIS — J33.9 NASAL POLYPS: ICD-10-CM

## 2024-11-25 DIAGNOSIS — G89.29 CHRONIC BILATERAL LOW BACK PAIN WITHOUT SCIATICA: ICD-10-CM

## 2024-11-25 DIAGNOSIS — M54.50 CHRONIC BILATERAL LOW BACK PAIN WITHOUT SCIATICA: ICD-10-CM

## 2024-11-25 DIAGNOSIS — M77.8 CAPSULITIS OF FOOT, LEFT: Primary | ICD-10-CM

## 2024-11-25 DIAGNOSIS — R10.2 PELVIC PAIN IN FEMALE: Primary | ICD-10-CM

## 2024-11-25 DIAGNOSIS — E11.49 TYPE II OR UNSPECIFIED TYPE DIABETES MELLITUS WITH NEUROLOGICAL MANIFESTATIONS, NOT STATED AS UNCONTROLLED(250.60) (H): ICD-10-CM

## 2024-11-25 DIAGNOSIS — M76.62 ACHILLES TENDINITIS OF LEFT LOWER EXTREMITY: ICD-10-CM

## 2024-11-25 DIAGNOSIS — M54.2 NECK PAIN: ICD-10-CM

## 2024-11-25 PROCEDURE — 99214 OFFICE O/P EST MOD 30 MIN: CPT | Performed by: PODIATRIST

## 2024-11-25 PROCEDURE — 97140 MANUAL THERAPY 1/> REGIONS: CPT | Mod: GP | Performed by: PHYSICAL THERAPIST

## 2024-11-25 PROCEDURE — 97012 MECHANICAL TRACTION THERAPY: CPT | Mod: GP | Performed by: PHYSICAL THERAPIST

## 2024-11-25 RX ORDER — DUPILUMAB 300 MG/2ML
300 INJECTION, SOLUTION SUBCUTANEOUS
Qty: 4 ML | Refills: 0 | Status: SHIPPED | OUTPATIENT
Start: 2024-11-25

## 2024-11-25 RX ORDER — ECONAZOLE NITRATE 10 MG/G
CREAM TOPICAL DAILY
Qty: 85 G | Refills: 5 | Status: SHIPPED | OUTPATIENT
Start: 2024-11-25

## 2024-11-25 NOTE — LETTER
11/25/2024      Laxmi Ya  [address] [place] [id number]      Dear Colleague,    Thank you for referring your patient, Laxmi Ya, to the [facility] [place]. Please see a copy of my visit note below.    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    Obesity     Pyelonephritis, unspecified 1992    abstract    Rheumatoid arteritis (H)     Seronegative inflammatory arthritis 06/17/2022    Type 2 diabetes mellitus (H)     Unspecified symptom associated with female genital organs 05/07/1999    chronic pelvic pain abstract     Patient Active Problem List   Diagnosis    Seronegative inflammatory arthritis    Neck pain    Bilateral low back pain without sciatica    Pelvic pain in female    Type 2 diabetes mellitus without complication (H)    Morbid obesity (H)    Carpal tunnel syndrome, bilateral    Bilateral hand pain    Abnormal cervical Papanicolaou smear    Allergic rhinitis    Angioedema    Anxiety    Degenerative arthritis of cervical spine with nerve compression    Lumbar radiculopathy    Radiculopathy    Chronic fatigue syndrome    Chronic musculoskeletal pain due to persistent inflammatory disorder    Diverticulosis    Dry eye syndrome of bilateral lacrimal glands    Enterocolitis    Family history of diabetes mellitus    Nonalcoholic steatohepatitis (COLON)    Gastroesophageal reflux disease    H/O partial resection of colon    History of diverticulitis    History of thrush    Chronic constipation    Irritable bowel syndrome     Localized enlarged lymph nodes    Mild persistent asthma without complication    Mixed hyperlipidemia    Non-celiac gluten sensitivity    Onychomycosis    Other chronic allergic conjunctivitis    Other specific arthropathies, not elsewhere classified, unspecified site    Polycystic ovary syndrome    Posterior subcapsular polar age-related cataract of right eye    Primary hypertension    Pruritic disorder    Radiculopathy due to spondylosis    Ruptured cervical disc    Recurrent Clostridioides difficile diarrhea    Seasonal allergies    Small intestinal bacterial overgrowth (SIBO)    Staphylococcal infection of skin    Tear film insufficiency    Tenosynovitis of both wrists    Tenosynovitis of forearm    Tenosynovitis, rheumatoid    Thoracic arthritis    Undifferentiated connective tissue disease (H)    Uterine leiomyoma    Vitamin D deficiency    Fibromyalgia    Pelvic floor dysfunction    Sinusitis, chronic    Inflammatory autoimmune disorder (H)    Rheumatoid arthritis (H)    HSV-1 (herpes simplex virus 1) infection    HSV-2 (herpes simplex virus 2) infection     Past Surgical History:   Procedure Laterality Date    CHOLECYSTECTOMY      COLON SURGERY      Left hemicolectomy for diverticulosis    CYSTOSCOPY,+URETEROSCOPY      abstract    ZZHC EXCIS/CURET BENIGN ELBOW LESN  10/26/1987    (R) humerus abstract     Social History     Socioeconomic History    Marital status: Single     Spouse name: Not on file    Number of children: Not on file    Years of education: Not on file    Highest education level: Not on file   Occupational History    Not on file   Tobacco Use    Smoking status: Never    Smokeless tobacco: Never   Substance and Sexual Activity    Alcohol use: Yes     Comment: RARE    Drug use: No    Sexual activity: Not on file   Other Topics Concern     Service Not Asked    Blood Transfusions Not Asked    Caffeine Concern Not Asked    Occupational Exposure Not Asked    Hobby Hazards Not Asked    Sleep  Concern Not Asked    Stress Concern Not Asked    Weight Concern Not Asked    Special Diet Not Asked    Back Care Not Asked    Exercise No    Bike Helmet Not Asked    Seat Belt No    Self-Exams No   Social History Narrative    Womens Health Kit given.         Social Drivers of Health     Financial Resource Strain: Medium Risk (3/13/2024)    Received from [facility], [facility]    Financial Resource Strain     Difficulty of Paying Living Expenses: 2     Difficulty of Paying Living Expenses: 1   Food Insecurity: No Food Insecurity (5/29/2024)    Received from [facility]    Hunger Vital Sign     Worried About Running Out of Food in the Last Year: Never true     Ran Out of Food in the Last Year: Never true   Transportation Needs: No Transportation Needs (5/29/2024)    Received from [facility]    PRAPARE - Transportation     Lack of Transportation (Medical): No     Lack of Transportation (Non-Medical): No   Physical Activity: Inactive (5/29/2024)    Received from [facility]    Exercise Vital Sign     Days of Exercise per Week: 0 days     Minutes of Exercise per Session: 0 min   Stress: No Stress Concern Present (2/24/2023)    Received from [facility], [facility]    [employer] - Occupational Stress Questionnaire     Feeling of Stress : Not at all   Social Connections: Socially Integrated (3/13/2024)    Received from [facility]    Social Connections     Do you often feel lonely or isolated from those around you?: 0   Interpersonal Safety: Not At Risk (2/24/2023)    Received from [facility], [facility]    Humiliation, Afraid, Rape, and Kick questionnaire     Fear of Current or Ex-Partner: No     Emotionally Abused: No     Physically Abused: No     Sexually Abused: No   Housing Stability: Low Risk  (5/29/2024)    Received from [facility]    Housing Stability     What is your living  "situation today?: I have a steady place to live     Family History   Problem Relation Age of Onset    Hypertension Father         abstract    Cancer Paternal Aunt         gallbladder cancer abstract       6/4/2024 hemoglobin A1c 6.4 as noted in the EMR.    Lab Results   Component Value Date    WBC 12.4 10/11/2024    WBC 9.3 04/01/2020     Lab Results   Component Value Date    RBC 4.80 10/11/2024    RBC 5.59 04/01/2020     Lab Results   Component Value Date    HGB 14.7 10/11/2024    HGB 15.8 04/01/2020     Lab Results   Component Value Date    HCT 42.6 10/11/2024    HCT 47.3 04/01/2020     No components found for: \"MCT\"  Lab Results   Component Value Date    MCV 89 10/11/2024    MCV 85 04/01/2020     Lab Results   Component Value Date    MCH 30.6 10/11/2024    MCH 28.3 04/01/2020     Lab Results   Component Value Date    MCHC 34.5 10/11/2024    MCHC 33.4 04/01/2020     Lab Results   Component Value Date    RDW 12.4 10/11/2024    RDW 13.4 04/01/2020     Lab Results   Component Value Date     10/11/2024     04/01/2020     Last Comprehensive Metabolic Panel:  Lab Results   Component Value Date     10/11/2024    POTASSIUM 4.0 10/11/2024    CHLORIDE 102 10/11/2024    CO2 22 10/11/2024    ANIONGAP 13 10/11/2024     (H) 10/11/2024    BUN 17.2 10/11/2024    CR 0.75 10/11/2024    GFRESTIMATED >90 10/11/2024    CARMEN 9.5 10/11/2024       Lab Results   Component Value Date    AST 33 10/11/2024    AST 24 04/01/2020     Lab Results   Component Value Date    ALT 88 10/11/2024    ALT 36 04/01/2020     No results found for: \"BILICONJ\"   Lab Results   Component Value Date    BILITOTAL 0.6 10/11/2024    BILITOTAL 0.6 04/01/2020     Lab Results   Component Value Date    ALBUMIN 4.5 10/11/2024    ALBUMIN 3.7 11/17/2022    ALBUMIN 3.9 04/01/2020     Lab Results   Component Value Date    PROTTOTAL 6.6 10/11/2024    PROTTOTAL 8.1 04/01/2020      Lab Results   Component Value Date    ALKPHOS 57 10/11/2024    ALKPHOS " 82 04/01/2020                 Subjective findings- 49-year-old returns clinic for diabetic foot cares.  Relates she is wearing her diabetic shoes and insoles, relates she has been getting pain in the ball the left foot that is gotten better with massage therapy, relates to no injuries, relates she does have numbness tingling and neuropathy in her feet, relates she has been getting some pain and foot cramping the pain goes up along the Achilles tendon on the left, she relates her swelling in her feet is better now because she just finished a course of prednisone for her left hip arthritis, relates she has had back surgery and back disease in the past but the pain in the left leg feels different than this, relates she finished a course of Lamisil in May for onychomycosis and feels it helped a little bit but now the nails getting worse again, she relates she did use the Loprox for a while but cannot quit using that, she relates she is on voriconazole for 2 weeks for a different problem and has half a week left, noted in epic that she started the Lamisil oral medication 4/1/2024, she relates she does get pain in the left hallux nail from the fungus at times.    Objective findings- DP and PT are 2 out of 4 bilaterally.  CFT is less than 3 seconds bilaterally.  She has dorsal contracted digits 2 through 5 bilaterally.  Sharp dull is intact with 5.07 Symes Raina monofilament bilaterally, deep tendon reflexes are intact bilaterally, proprioception is intact bilaterally, no tendon voids bilaterally.  She has mild pain on palpation of plantar second MPJ left foot.  She relates her pains along the Achilles tendon when it occurs on the left, she relates is better today because she was on the prednisone and that seemed to help.  There is no erythema, no edema, no drainage, no odor, no calor bilaterally.    Assessment and plan- Diabetes with peripheral Neuropathy with protective sensation intact today, Jemima with  capsulitis second and third MPJs left foot, Onychomycosis, Achilles tendinopathy left.  Diagnosis and treatment options discussed with the patient.  Advise she continue her diabetic shoes and insoles.  Advised her on stretching for the toes.  Prescription for physical therapy given and use discussed with the patient.  She opted for no referral to have her hip and back looked at to help rule out sciatica or radiculopathy contributing.  Prescription for Econazole cream given and use discussed with the patient.  We had used Loprox cream previously will discontinue this which she has already done.  Previous notes reviewed.  Return to clinic and see me in 3 months.      Moderate level of medical decision making.        Again, thank you for allowing me to participate in the care of your patient.      Sincerely,    Jonny Barbosa DPM

## 2024-11-25 NOTE — TELEPHONE ENCOUNTER
Hi team, So the manfucaturer shipped a damage box of dupixent to the patient. Laxmi called and informed them of the damage box and the  wont send a replacement without an active fill on file. Since there was no refills on the last order they need one more prescription sent to them .Please send ASAP as patient dose is tomorrow.

## 2024-11-25 NOTE — PROGRESS NOTES
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    Obesity     Pyelonephritis, unspecified 1992    abstract    Rheumatoid arteritis (H)     Seronegative inflammatory arthritis 06/17/2022    Type 2 diabetes mellitus (H)     Unspecified symptom associated with female genital organs 05/07/1999    chronic pelvic pain abstract     Patient Active Problem List   Diagnosis    Seronegative inflammatory arthritis    Neck pain    Bilateral low back pain without sciatica    Pelvic pain in female    Type 2 diabetes mellitus without complication (H)    Morbid obesity (H)    Carpal tunnel syndrome, bilateral    Bilateral hand pain    Abnormal cervical Papanicolaou smear    Allergic rhinitis    Angioedema    Anxiety    Degenerative arthritis of cervical spine with nerve compression    Lumbar radiculopathy    Radiculopathy    Chronic fatigue syndrome    Chronic musculoskeletal pain due to persistent inflammatory disorder    Diverticulosis    Dry eye syndrome of bilateral lacrimal glands    Enterocolitis    Family history of diabetes mellitus    Nonalcoholic steatohepatitis (COLON)    Gastroesophageal reflux disease    H/O partial resection of colon    History of diverticulitis    History of thrush    Chronic constipation    Irritable bowel syndrome    Localized enlarged lymph nodes    Mild persistent asthma without complication    Mixed hyperlipidemia    Non-celiac gluten sensitivity    Onychomycosis    Other chronic allergic conjunctivitis    Other specific arthropathies, not elsewhere  classified, unspecified site    Polycystic ovary syndrome    Posterior subcapsular polar age-related cataract of right eye    Primary hypertension    Pruritic disorder    Radiculopathy due to spondylosis    Ruptured cervical disc    Recurrent Clostridioides difficile diarrhea    Seasonal allergies    Small intestinal bacterial overgrowth (SIBO)    Staphylococcal infection of skin    Tear film insufficiency    Tenosynovitis of both wrists    Tenosynovitis of forearm    Tenosynovitis, rheumatoid    Thoracic arthritis    Undifferentiated connective tissue disease (H)    Uterine leiomyoma    Vitamin D deficiency    Fibromyalgia    Pelvic floor dysfunction    Sinusitis, chronic    Inflammatory autoimmune disorder (H)    Rheumatoid arthritis (H)    HSV-1 (herpes simplex virus 1) infection    HSV-2 (herpes simplex virus 2) infection     Past Surgical History:   Procedure Laterality Date    CHOLECYSTECTOMY      COLON SURGERY      Left hemicolectomy for diverticulosis    CYSTOSCOPY,+URETEROSCOPY      abstract    ZZHC EXCIS/CURET BENIGN ELBOW LESN  10/26/1987    (R) humerus abstract     Social History     Socioeconomic History    Marital status: Single     Spouse name: Not on file    Number of children: Not on file    Years of education: Not on file    Highest education level: Not on file   Occupational History    Not on file   Tobacco Use    Smoking status: Never    Smokeless tobacco: Never   Substance and Sexual Activity    Alcohol use: Yes     Comment: RARE    Drug use: No    Sexual activity: Not on file   Other Topics Concern     Service Not Asked    Blood Transfusions Not Asked    Caffeine Concern Not Asked    Occupational Exposure Not Asked    Hobby Hazards Not Asked    Sleep Concern Not Asked    Stress Concern Not Asked    Weight Concern Not Asked    Special Diet Not Asked    Back Care Not Asked    Exercise No    Bike Helmet Not Asked    Seat Belt No    Self-Exams No   Social History Narrative    Womens Health  Kit given.         Social Drivers of Health     Financial Resource Strain: Medium Risk (3/13/2024)    Received from [facility], [facility]    Financial Resource Strain     Difficulty of Paying Living Expenses: 2     Difficulty of Paying Living Expenses: 1   Food Insecurity: No Food Insecurity (5/29/2024)    Received from [facility]    Hunger Vital Sign     Worried About Running Out of Food in the Last Year: Never true     Ran Out of Food in the Last Year: Never true   Transportation Needs: No Transportation Needs (5/29/2024)    Received from [facility]    PRAPARE - Transportation     Lack of Transportation (Medical): No     Lack of Transportation (Non-Medical): No   Physical Activity: Inactive (5/29/2024)    Received from [facility]    Exercise Vital Sign     Days of Exercise per Week: 0 days     Minutes of Exercise per Session: 0 min   Stress: No Stress Concern Present (2/24/2023)    Received from [facility], [facility]    [employer] - Occupational Stress Questionnaire     Feeling of Stress : Not at all   Social Connections: Socially Integrated (3/13/2024)    Received from [facility]    Social Connections     Do you often feel lonely or isolated from those around you?: 0   Interpersonal Safety: Not At Risk (2/24/2023)    Received from [facility], [facility]    Humiliation, Afraid, Rape, and Kick questionnaire     Fear of Current or Ex-Partner: No     Emotionally Abused: No     Physically Abused: No     Sexually Abused: No   Housing Stability: Low Risk  (5/29/2024)    Received from [facility]    Housing Stability     What is your living situation today?: I have a steady place to live     Family History   Problem Relation Age of Onset    Hypertension Father         abstract    Cancer Paternal Aunt         gallbladder cancer abstract       6/4/2024 hemoglobin A1c 6.4 as noted in  "the EMR.    Lab Results   Component Value Date    WBC 12.4 10/11/2024    WBC 9.3 04/01/2020     Lab Results   Component Value Date    RBC 4.80 10/11/2024    RBC 5.59 04/01/2020     Lab Results   Component Value Date    HGB 14.7 10/11/2024    HGB 15.8 04/01/2020     Lab Results   Component Value Date    HCT 42.6 10/11/2024    HCT 47.3 04/01/2020     No components found for: \"MCT\"  Lab Results   Component Value Date    MCV 89 10/11/2024    MCV 85 04/01/2020     Lab Results   Component Value Date    MCH 30.6 10/11/2024    MCH 28.3 04/01/2020     Lab Results   Component Value Date    MCHC 34.5 10/11/2024    MCHC 33.4 04/01/2020     Lab Results   Component Value Date    RDW 12.4 10/11/2024    RDW 13.4 04/01/2020     Lab Results   Component Value Date     10/11/2024     04/01/2020     Last Comprehensive Metabolic Panel:  Lab Results   Component Value Date     10/11/2024    POTASSIUM 4.0 10/11/2024    CHLORIDE 102 10/11/2024    CO2 22 10/11/2024    ANIONGAP 13 10/11/2024     (H) 10/11/2024    BUN 17.2 10/11/2024    CR 0.75 10/11/2024    GFRESTIMATED >90 10/11/2024    CARMEN 9.5 10/11/2024       Lab Results   Component Value Date    AST 33 10/11/2024    AST 24 04/01/2020     Lab Results   Component Value Date    ALT 88 10/11/2024    ALT 36 04/01/2020     No results found for: \"BILICONJ\"   Lab Results   Component Value Date    BILITOTAL 0.6 10/11/2024    BILITOTAL 0.6 04/01/2020     Lab Results   Component Value Date    ALBUMIN 4.5 10/11/2024    ALBUMIN 3.7 11/17/2022    ALBUMIN 3.9 04/01/2020     Lab Results   Component Value Date    PROTTOTAL 6.6 10/11/2024    PROTTOTAL 8.1 04/01/2020      Lab Results   Component Value Date    ALKPHOS 57 10/11/2024    ALKPHOS 82 04/01/2020                 Subjective findings- 49-year-old returns clinic for diabetic foot cares.  Relates she is wearing her diabetic shoes and insoles, relates she has been getting pain in the ball the left foot that is gotten better " with massage therapy, relates to no injuries, relates she does have numbness tingling and neuropathy in her feet, relates she has been getting some pain and foot cramping the pain goes up along the Achilles tendon on the left, she relates her swelling in her feet is better now because she just finished a course of prednisone for her left hip arthritis, relates she has had back surgery and back disease in the past but the pain in the left leg feels different than this, relates she finished a course of Lamisil in May for onychomycosis and feels it helped a little bit but now the nails getting worse again, she relates she did use the Loprox for a while but cannot quit using that, she relates she is on voriconazole for 2 weeks for a different problem and has half a week left, noted in epic that she started the Lamisil oral medication 4/1/2024, she relates she does get pain in the left hallux nail from the fungus at times.    Objective findings- DP and PT are 2 out of 4 bilaterally.  CFT is less than 3 seconds bilaterally.  She has dorsal contracted digits 2 through 5 bilaterally.  Sharp dull is intact with 5.07 Symes Raina monofilament bilaterally, deep tendon reflexes are intact bilaterally, proprioception is intact bilaterally, no tendon voids bilaterally.  She has mild pain on palpation of plantar second MPJ left foot.  She relates her pains along the Achilles tendon when it occurs on the left, she relates is better today because she was on the prednisone and that seemed to help.  There is no erythema, no edema, no drainage, no odor, no calor bilaterally.    Assessment and plan- Diabetes with peripheral Neuropathy with protective sensation intact today, Hammertoes with capsulitis second and third MPJs left foot, Onychomycosis, Achilles tendinopathy left.  Diagnosis and treatment options discussed with the patient.  Advise she continue her diabetic shoes and insoles.  Advised her on stretching for the toes.   Prescription for physical therapy given and use discussed with the patient.  She opted for no referral to have her hip and back looked at to help rule out sciatica or radiculopathy contributing.  Prescription for Econazole cream given and use discussed with the patient.  We had used Loprox cream previously will discontinue this which she has already done.  Previous notes reviewed.  Return to clinic and see me in 3 months.                                                                                                                          Moderate level of medical decision making.

## 2024-11-27 ENCOUNTER — ANCILLARY PROCEDURE (OUTPATIENT)
Dept: ULTRASOUND IMAGING | Facility: CLINIC | Age: 49
End: 2024-11-27
Attending: OBSTETRICS & GYNECOLOGY
Payer: MEDICARE

## 2024-11-27 DIAGNOSIS — N83.201 CYST OF RIGHT OVARY: ICD-10-CM

## 2024-11-27 PROCEDURE — 76856 US EXAM PELVIC COMPLETE: CPT | Mod: GC | Performed by: RADIOLOGY

## 2024-11-27 PROCEDURE — 76830 TRANSVAGINAL US NON-OB: CPT | Mod: GC | Performed by: RADIOLOGY

## 2024-12-03 ENCOUNTER — INFUSION THERAPY VISIT (OUTPATIENT)
Dept: INFUSION THERAPY | Facility: CLINIC | Age: 49
End: 2024-12-03
Attending: INTERNAL MEDICINE
Payer: MEDICARE

## 2024-12-03 VITALS
OXYGEN SATURATION: 97 % | SYSTOLIC BLOOD PRESSURE: 122 MMHG | WEIGHT: 226.4 LBS | TEMPERATURE: 98.1 F | HEART RATE: 76 BPM | BODY MASS INDEX: 36.54 KG/M2 | DIASTOLIC BLOOD PRESSURE: 80 MMHG | RESPIRATION RATE: 16 BRPM

## 2024-12-03 DIAGNOSIS — M13.80 SERONEGATIVE INFLAMMATORY ARTHRITIS: Primary | ICD-10-CM

## 2024-12-03 LAB
ALBUMIN SERPL BCG-MCNC: 4.3 G/DL (ref 3.5–5.2)
ALBUMIN UR-MCNC: ABNORMAL MG/DL
ALP SERPL-CCNC: 44 U/L (ref 40–150)
ALT SERPL W P-5'-P-CCNC: 139 U/L (ref 0–50)
ANION GAP SERPL CALCULATED.3IONS-SCNC: 11 MMOL/L (ref 7–15)
APPEARANCE UR: CLEAR
AST SERPL W P-5'-P-CCNC: 79 U/L (ref 0–45)
BACTERIA #/AREA URNS HPF: ABNORMAL /HPF
BASOPHILS # BLD AUTO: 0.1 10E3/UL (ref 0–0.2)
BASOPHILS NFR BLD AUTO: 1 %
BILIRUB SERPL-MCNC: 0.4 MG/DL
BILIRUB UR QL STRIP: NEGATIVE
BUN SERPL-MCNC: 15.3 MG/DL (ref 6–20)
CALCIUM SERPL-MCNC: 9.3 MG/DL (ref 8.8–10.4)
CHLORIDE SERPL-SCNC: 106 MMOL/L (ref 98–107)
COLOR UR AUTO: YELLOW
CREAT SERPL-MCNC: 0.91 MG/DL (ref 0.51–0.95)
EGFRCR SERPLBLD CKD-EPI 2021: 77 ML/MIN/1.73M2
EOSINOPHIL # BLD AUTO: 0.1 10E3/UL (ref 0–0.7)
EOSINOPHIL NFR BLD AUTO: 1 %
ERYTHROCYTE [DISTWIDTH] IN BLOOD BY AUTOMATED COUNT: 13.1 % (ref 10–15)
GLUCOSE SERPL-MCNC: 84 MG/DL (ref 70–99)
GLUCOSE UR STRIP-MCNC: NEGATIVE MG/DL
HCO3 SERPL-SCNC: 22 MMOL/L (ref 22–29)
HCT VFR BLD AUTO: 42.4 % (ref 35–47)
HGB BLD-MCNC: 14.6 G/DL (ref 11.7–15.7)
HGB UR QL STRIP: NEGATIVE
IMM GRANULOCYTES # BLD: 0.2 10E3/UL
IMM GRANULOCYTES NFR BLD: 2 %
KETONES UR STRIP-MCNC: NEGATIVE MG/DL
LEUKOCYTE ESTERASE UR QL STRIP: ABNORMAL
LYMPHOCYTES # BLD AUTO: 2.1 10E3/UL (ref 0.8–5.3)
LYMPHOCYTES NFR BLD AUTO: 21 %
MCH RBC QN AUTO: 30.4 PG (ref 26.5–33)
MCHC RBC AUTO-ENTMCNC: 34.4 G/DL (ref 31.5–36.5)
MCV RBC AUTO: 88 FL (ref 78–100)
MONOCYTES # BLD AUTO: 0.8 10E3/UL (ref 0–1.3)
MONOCYTES NFR BLD AUTO: 8 %
NEUTROPHILS # BLD AUTO: 6.5 10E3/UL (ref 1.6–8.3)
NEUTROPHILS NFR BLD AUTO: 67 %
NITRATE UR QL: NEGATIVE
NRBC # BLD AUTO: 0 10E3/UL
NRBC BLD AUTO-RTO: 0 /100
PH UR STRIP: 6 [PH] (ref 5–7)
PLATELET # BLD AUTO: 344 10E3/UL (ref 150–450)
POTASSIUM SERPL-SCNC: 4.1 MMOL/L (ref 3.4–5.3)
PROT SERPL-MCNC: 6.4 G/DL (ref 6.4–8.3)
RBC # BLD AUTO: 4.81 10E6/UL (ref 3.8–5.2)
RBC URINE: 2 /HPF
SODIUM SERPL-SCNC: 139 MMOL/L (ref 135–145)
SP GR UR STRIP: 1.02 (ref 1–1.03)
SQUAMOUS EPITHELIAL: 4 /HPF
UROBILINOGEN UR STRIP-MCNC: NORMAL MG/DL
WBC # BLD AUTO: 9.7 10E3/UL (ref 4–11)
WBC URINE: 6 /HPF

## 2024-12-03 PROCEDURE — 96365 THER/PROPH/DIAG IV INF INIT: CPT

## 2024-12-03 PROCEDURE — 85004 AUTOMATED DIFF WBC COUNT: CPT | Performed by: INTERNAL MEDICINE

## 2024-12-03 PROCEDURE — 82040 ASSAY OF SERUM ALBUMIN: CPT | Performed by: INTERNAL MEDICINE

## 2024-12-03 PROCEDURE — 258N000003 HC RX IP 258 OP 636: Performed by: INTERNAL MEDICINE

## 2024-12-03 PROCEDURE — 85048 AUTOMATED LEUKOCYTE COUNT: CPT | Performed by: INTERNAL MEDICINE

## 2024-12-03 PROCEDURE — 36415 COLL VENOUS BLD VENIPUNCTURE: CPT | Performed by: INTERNAL MEDICINE

## 2024-12-03 PROCEDURE — 250N000011 HC RX IP 250 OP 636: Mod: JZ | Performed by: INTERNAL MEDICINE

## 2024-12-03 PROCEDURE — 250N000013 HC RX MED GY IP 250 OP 250 PS 637: Performed by: INTERNAL MEDICINE

## 2024-12-03 PROCEDURE — 96375 TX/PRO/DX INJ NEW DRUG ADDON: CPT

## 2024-12-03 PROCEDURE — 81001 URINALYSIS AUTO W/SCOPE: CPT | Performed by: INTERNAL MEDICINE

## 2024-12-03 RX ORDER — ACETAMINOPHEN 325 MG/1
650 TABLET ORAL ONCE
Status: COMPLETED | OUTPATIENT
Start: 2024-12-03 | End: 2024-12-03

## 2024-12-03 RX ORDER — DIPHENHYDRAMINE HYDROCHLORIDE 50 MG/ML
50 INJECTION INTRAMUSCULAR; INTRAVENOUS
Start: 2024-12-06

## 2024-12-03 RX ORDER — ALBUTEROL SULFATE 90 UG/1
1-2 INHALANT RESPIRATORY (INHALATION)
Start: 2024-12-06

## 2024-12-03 RX ORDER — METHYLPREDNISOLONE SODIUM SUCCINATE 40 MG/ML
40 INJECTION INTRAMUSCULAR; INTRAVENOUS
Start: 2024-12-06

## 2024-12-03 RX ORDER — HEPARIN SODIUM,PORCINE 10 UNIT/ML
5-20 VIAL (ML) INTRAVENOUS DAILY PRN
OUTPATIENT
Start: 2024-12-06

## 2024-12-03 RX ORDER — METHYLPREDNISOLONE SODIUM SUCCINATE 125 MG/2ML
37.5 INJECTION INTRAMUSCULAR; INTRAVENOUS ONCE
Status: COMPLETED | OUTPATIENT
Start: 2024-12-03 | End: 2024-12-03

## 2024-12-03 RX ORDER — DIPHENHYDRAMINE HCL 25 MG
25 CAPSULE ORAL ONCE
OUTPATIENT
Start: 2024-12-06

## 2024-12-03 RX ORDER — ALBUTEROL SULFATE 0.83 MG/ML
2.5 SOLUTION RESPIRATORY (INHALATION)
OUTPATIENT
Start: 2024-12-06

## 2024-12-03 RX ORDER — DIPHENHYDRAMINE HYDROCHLORIDE 50 MG/ML
25 INJECTION INTRAMUSCULAR; INTRAVENOUS
Start: 2024-12-06

## 2024-12-03 RX ORDER — HEPARIN SODIUM (PORCINE) LOCK FLUSH IV SOLN 100 UNIT/ML 100 UNIT/ML
5 SOLUTION INTRAVENOUS
OUTPATIENT
Start: 2024-12-06

## 2024-12-03 RX ORDER — EPINEPHRINE 1 MG/ML
0.3 INJECTION, SOLUTION INTRAMUSCULAR; SUBCUTANEOUS EVERY 5 MIN PRN
OUTPATIENT
Start: 2024-12-06

## 2024-12-03 RX ORDER — MEPERIDINE HYDROCHLORIDE 25 MG/ML
25 INJECTION INTRAMUSCULAR; INTRAVENOUS; SUBCUTANEOUS
OUTPATIENT
Start: 2024-12-06

## 2024-12-03 RX ORDER — METHYLPREDNISOLONE SODIUM SUCCINATE 125 MG/2ML
37.5 INJECTION INTRAMUSCULAR; INTRAVENOUS ONCE
OUTPATIENT
Start: 2024-12-06

## 2024-12-03 RX ORDER — ACETAMINOPHEN 325 MG/1
650 TABLET ORAL ONCE
OUTPATIENT
Start: 2024-12-06

## 2024-12-03 RX ADMIN — METHYLPREDNISOLONE SODIUM SUCCINATE 37.5 MG: 125 INJECTION, POWDER, FOR SOLUTION INTRAMUSCULAR; INTRAVENOUS at 13:00

## 2024-12-03 RX ADMIN — SODIUM CHLORIDE 250 ML: 9 INJECTION, SOLUTION INTRAVENOUS at 14:37

## 2024-12-03 RX ADMIN — ACETAMINOPHEN 650 MG: 325 TABLET ORAL at 12:48

## 2024-12-03 RX ADMIN — TOCILIZUMAB 800 MG: 20 INJECTION, SOLUTION, CONCENTRATE INTRAVENOUS at 13:16

## 2024-12-03 ASSESSMENT — PAIN SCALES - GENERAL: PAINLEVEL_OUTOF10: WORST PAIN (10)

## 2024-12-03 NOTE — PATIENT INSTRUCTIONS
Dear Laxmi Ya    Thank you for choosing [facility] ([facility] for your infusion.  The following information is a summary of our appointment as well as important reminders.        If you are a transplant patient and require transplant education, please click on this link: [email or web address].    If you have any questions on your upcoming Specialty Infusion appointments, please call scheduling at [phone].  It was a pleasure taking care of you today.    Sincerely,    [facility]  [address] [place] [address]  [id number]  Phone:  [phone]

## 2024-12-03 NOTE — PROGRESS NOTES
Infusion Nursing Note:  Laxmi Ya presents today for Actemra.    Patient seen by provider today: No   present during visit today: Not Applicable.    Note: Pt received Actemra over an hour without incident, followed by 250 ml NS flush per order.      Intravenous Access:  Labs drawn without difficulty.  Peripheral IV placed.    Treatment Conditions:  Biological Infusion Checklist:  ~~~ NOTE: If the patient answers yes to any of the questions below, hold the infusion and contact ordering provider or on-call provider.    Have you recently had an elevated temperature, fever, chills, productive cough, coughing for 3 weeks or longer or hemoptysis,  abnormal vital signs, night sweats,  chest pain or have you noticed a decrease in your appetite, unexplained weight loss or fatigue? No  Do you have any open wounds or new incisions? No  Do you have any upcoming hospitalizations or surgeries? Does not include esophagogastroduodenoscopy, colonoscopy, endoscopic retrograde cholangiopancreatography (ERCP), endoscopic ultrasound (EUS), dental procedures or joint aspiration/steroid injections No  Do you currently have any signs of illness or infection or are you on any antibiotics? No  Have you had any new, sudden or worsening abdominal pain? No  Have you or anyone in your household received a live vaccination in the past 4 weeks? Please note: No live vaccines while on biologic/chemotherapy until 6 months after the last treatment. Patient can receive the flu vaccine (shot only), pneumovax and the Covid vaccine. It is optimal for the patient to get these vaccines mid cycle, but they can be given at any time as long as it is not on the day of the infusion. No  Have you recently been diagnosed with any new nervous system diseases (ie. Multiple sclerosis, Guillain [place], seizures, neurological changes) or cancer diagnosis? Are you on any form of radiation or chemotherapy? No  Are you pregnant or breast feeding or do you  have plans of pregnancy in the future? No  Have you been having any signs of worsening depression or suicidal ideations?  (benlysta only)   Have there been any other new onset medical symptoms? No  Have you had any new blood clots? (IVIG only)       Post Infusion Assessment:  Patient tolerated infusion without incident.  Blood return noted pre and post infusion.  Site patent and intact, free from redness, edema or discomfort.  No evidence of extravasations.  Access discontinued per protocol.       Discharge Plan:   Discharge instructions reviewed with: Patient.  Patient and/or family verbalized understanding of discharge instructions and all questions answered.  AVS to patient via [employer].  Patient will return 12/31 for next appointment.   Patient discharged in stable condition accompanied by: self.  Departure Mode: Ambulatory.    Administrations This Visit       acetaminophen (TYLENOL) tablet 650 mg       Admin Date  12/03/2024 Action  $Given Dose  650 mg Route  Oral Documented By  Yandy Dias RN              methylPREDNISolone Na Suc (solu-MEDROL) injection 37.5 mg       Admin Date  12/03/2024 Action  $Given Dose  37.5 mg Route  Intravenous Documented By  Yandy Dias RN              sodium chloride 0.9% BOLUS 250 mL       Admin Date  12/03/2024 Action  $New Bag Dose  250 mL Route  Intravenous Documented By  Yandy Dias RN              tocilizumab (ACTEMRA) 800 mg in sodium chloride 0.9 % 150 mL infusion       Admin Date  12/03/2024 Action  $New Bag Dose  800 mg Rate  150 mL/hr Route  Intravenous Documented By  Yandy Dias RN Carissa L. LEXI Dias

## 2024-12-06 ENCOUNTER — APPOINTMENT (OUTPATIENT)
Dept: PHARMACY | Facility: CLINIC | Age: 49
End: 2024-12-06
Attending: INTERNAL MEDICINE
Payer: MEDICARE

## 2024-12-06 DIAGNOSIS — B37.31 RECURRENT CANDIDIASIS OF VAGINA: ICD-10-CM

## 2024-12-06 DIAGNOSIS — B00.9 HSV-1 (HERPES SIMPLEX VIRUS 1) INFECTION: ICD-10-CM

## 2024-12-06 DIAGNOSIS — M06.00 SERONEGATIVE RHEUMATOID ARTHRITIS (H): Primary | ICD-10-CM

## 2024-12-09 ENCOUNTER — TELEPHONE (OUTPATIENT)
Dept: RHEUMATOLOGY | Facility: CLINIC | Age: 49
End: 2024-12-09
Payer: MEDICARE

## 2024-12-09 DIAGNOSIS — M13.80 SERONEGATIVE INFLAMMATORY ARTHRITIS: Primary | ICD-10-CM

## 2024-12-09 RX ORDER — METHYLPREDNISOLONE 4 MG/1
TABLET ORAL
Qty: 21 TABLET | Refills: 0 | Status: SHIPPED | OUTPATIENT
Start: 2024-12-09

## 2024-12-09 NOTE — TELEPHONE ENCOUNTER
Impression: Frequent flares of inflammatory arhtritis.  Rec: Medrol dose pack.  Place on short-notice cancellation list for moving appointment up from 6-2025.

## 2024-12-09 NOTE — TELEPHONE ENCOUNTER
[facility] Call Center    Phone Message    May a detailed message be left on voicemail: yes     Reason for Call: Symptoms or Concerns     Current symptom or concern: flare up- pain and swelling all over in multiple joints. The worst is in pt's neck, hips, and hands.     Symptoms have been present for:  3 day(s)    Are there any new or worsening symptoms? Yes: Pt states pain and swelling is getting worse everyday.     Action Taken: Message routed to:  [facility] ([facility] Rheum    Travel Screening: Not Applicable     Date of Service: 12/9

## 2024-12-09 NOTE — TELEPHONE ENCOUNTER
Patient calling with flare for the past 3 days.  States pain is achy to stabbing all over her entire body steve in the neck, low back, pelvis, hands, and hips.  Swelling in the hands.  Difficulty getting around.  Also taking actemra 800mg IV infusion monthly.  Last flare 11/18.  See phone message for further details.  Medrol dose pack is pended.     Macy Sawyer RN

## 2024-12-10 ENCOUNTER — OFFICE VISIT (OUTPATIENT)
Dept: INFECTIOUS DISEASES | Facility: CLINIC | Age: 49
End: 2024-12-10
Attending: INTERNAL MEDICINE
Payer: MEDICARE

## 2024-12-10 ENCOUNTER — ANCILLARY PROCEDURE (OUTPATIENT)
Dept: MAMMOGRAPHY | Facility: CLINIC | Age: 49
End: 2024-12-10
Attending: INTERNAL MEDICINE
Payer: MEDICARE

## 2024-12-10 ENCOUNTER — THERAPY VISIT (OUTPATIENT)
Dept: PHYSICAL THERAPY | Facility: CLINIC | Age: 49
End: 2024-12-10
Payer: MEDICARE

## 2024-12-10 ENCOUNTER — LAB (OUTPATIENT)
Dept: LAB | Facility: CLINIC | Age: 49
End: 2024-12-10
Payer: MEDICARE

## 2024-12-10 VITALS
WEIGHT: 225 LBS | BODY MASS INDEX: 36.16 KG/M2 | SYSTOLIC BLOOD PRESSURE: 112 MMHG | OXYGEN SATURATION: 96 % | DIASTOLIC BLOOD PRESSURE: 77 MMHG | HEIGHT: 66 IN | HEART RATE: 92 BPM

## 2024-12-10 DIAGNOSIS — M13.80 SERONEGATIVE INFLAMMATORY ARTHRITIS: ICD-10-CM

## 2024-12-10 DIAGNOSIS — R10.2 PELVIC PAIN IN FEMALE: Primary | ICD-10-CM

## 2024-12-10 DIAGNOSIS — Z12.31 VISIT FOR SCREENING MAMMOGRAM: ICD-10-CM

## 2024-12-10 DIAGNOSIS — G89.29 CHRONIC BILATERAL LOW BACK PAIN WITHOUT SCIATICA: ICD-10-CM

## 2024-12-10 DIAGNOSIS — M54.2 NECK PAIN: ICD-10-CM

## 2024-12-10 DIAGNOSIS — M54.50 CHRONIC BILATERAL LOW BACK PAIN WITHOUT SCIATICA: ICD-10-CM

## 2024-12-10 DIAGNOSIS — B37.9 CANDIDA GLABRATA INFECTION: Primary | ICD-10-CM

## 2024-12-10 LAB
ALBUMIN SERPL BCG-MCNC: 4.4 G/DL (ref 3.5–5.2)
ALP SERPL-CCNC: 45 U/L (ref 40–150)
ALT SERPL W P-5'-P-CCNC: 165 U/L (ref 0–50)
ANION GAP SERPL CALCULATED.3IONS-SCNC: 11 MMOL/L (ref 7–15)
AST SERPL W P-5'-P-CCNC: 62 U/L (ref 0–45)
BILIRUB SERPL-MCNC: 0.7 MG/DL
BUN SERPL-MCNC: 12.6 MG/DL (ref 6–20)
CALCIUM SERPL-MCNC: 9.4 MG/DL (ref 8.8–10.4)
CHLORIDE SERPL-SCNC: 102 MMOL/L (ref 98–107)
CREAT SERPL-MCNC: 0.87 MG/DL (ref 0.51–0.95)
EGFRCR SERPLBLD CKD-EPI 2021: 82 ML/MIN/1.73M2
GLUCOSE SERPL-MCNC: 107 MG/DL (ref 70–99)
HCO3 SERPL-SCNC: 27 MMOL/L (ref 22–29)
POTASSIUM SERPL-SCNC: 3.8 MMOL/L (ref 3.4–5.3)
PROT SERPL-MCNC: 6.8 G/DL (ref 6.4–8.3)
SODIUM SERPL-SCNC: 140 MMOL/L (ref 135–145)

## 2024-12-10 PROCEDURE — 77063 BREAST TOMOSYNTHESIS BI: CPT

## 2024-12-10 PROCEDURE — G0463 HOSPITAL OUTPT CLINIC VISIT: HCPCS | Performed by: INTERNAL MEDICINE

## 2024-12-10 PROCEDURE — 97140 MANUAL THERAPY 1/> REGIONS: CPT | Mod: GP | Performed by: PHYSICAL THERAPIST

## 2024-12-10 PROCEDURE — 97012 MECHANICAL TRACTION THERAPY: CPT | Mod: GP | Performed by: PHYSICAL THERAPIST

## 2024-12-10 PROCEDURE — 99215 OFFICE O/P EST HI 40 MIN: CPT | Performed by: INTERNAL MEDICINE

## 2024-12-10 PROCEDURE — 77067 SCR MAMMO BI INCL CAD: CPT

## 2024-12-10 PROCEDURE — 36415 COLL VENOUS BLD VENIPUNCTURE: CPT | Performed by: PATHOLOGY

## 2024-12-10 PROCEDURE — 80053 COMPREHEN METABOLIC PANEL: CPT | Performed by: PATHOLOGY

## 2024-12-10 ASSESSMENT — PAIN SCALES - GENERAL: PAINLEVEL_OUTOF10: WORST PAIN (10)

## 2024-12-10 NOTE — PROGRESS NOTES
Infectious Disease Clinic: PROGRESS NOTE     Patient:  Laxmi Ya, Date of birth 1975, Medical record number [id number]  Date of Visit:  12/10/2024          Assessment and Recommendations:   Assessment:  History of Candida glabrata yeast vaginitis and recurrent oral thrush.  History of recurrent UTIs.  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 I would advise reserving voriconazole for documented yeast vaginitis due to fluconazole resistant Candida. Increase in AST/ALT could be due to voriconazole. Planned repeat of AST/ALT today.   Follow-up with GYN clinic as scheduled.  Follow=-up in ID clinic on a prn basis.      I have reviewed today's Medications, Vital Signs, Labs, and EMR. Total time in patient care day of visit = 40 minutes.      Kaley Hall MD  ID Staff Physician  Pager [phone]    [facility]  [phone]        Interval History:   12/10/2024 the patient presents for follow-up of Candida glabrata vaginitis symptoms. Patient has rheumatoid arthritis which affects her back and sacral joints she says she gets steroid injections in the joints at the [facility]. She is also currently on a Medrol dose pack for a current RA flare. She had a recent HSV flare which presented with blisters on her left buttock. She did not start taking the voriconazole I had prescribed months earlier because she was dealing with the HSV flares and treatment for that. The voriconazole cleared up her vaginal yeast symptoms and her oral thrush. Sghe finished the voriconazole  on 11/29/2024.            Review of Systems:   CV: NEGATIVE for chest pain, palpitations or peripheral edema  C: NEGATIVE for fever, chills, change in weight  E/M: NEGATIVE for ear, mouth and throat problems  R: NEGATIVE for significant cough or SOB  Patient denies nausea, vomiting, diarrhea, and abdominal pain.           Current Medications       Current Outpatient Medications:     albuterol (PROAIR HFA/PROVENTIL HFA/VENTOLIN HFA) 108 (90 Base) MCG/ACT inhaler, Inhale 2 puffs into the lungs every 6 hours as needed for shortness of breath, wheezing or cough, Disp: , Rfl:     Artificial Tear Solution (SOOTHE XP) SOLN, as needed, Disp: , Rfl:     atenolol (TENORMIN) 50 MG tablet, Take 50 mg by mouth daily, Disp: , Rfl:     azelastine 137 MCG/SPRAY SOLN, USE 2 SPRAYS IN EACH NOSTRIL 2 TIMES DAILY FOR 1 MONTH, Disp: , Rfl:     benzoyl peroxide 5 % external liquid, Use daily as directed. Preferred bdrand: [employer]ura, Disp: 236 mL, Rfl: 11    blood glucose (NO BRAND SPECIFIED) test strip, Use to test blood sugar 2 times daily or as directed., Disp: 100 strip, Rfl: 1    blood glucose monitoring (NO BRAND SPECIFIED) meter device kit, Use to test blood sugar 2 times daily or as directed., Disp: 1 kit, Rfl: 0    ciclopirox (LOPROX) 0.77 % cream, Apply topically 2 times daily To affected toenails., Disp: 90 g, Rfl: 5    cyclobenzaprine (FLEXERIL) 5 MG tablet, Take 5 mg by mouth., Disp: , Rfl:     dexAMETHasone (DECADRON) 0.1 % ophthalmic solution, , Disp: , Rfl:     diclofenac (VOLTAREN) 1 % topical gel, Apply topically 4 times daily as needed, Disp: , Rfl:     doxycycline hyclate (VIBRAMYCIN) 100 MG capsule, Take 100 mg by mouth 2 times daily., Disp: , Rfl:     dupilumab (DUPIXENT) 300 MG/2ML prefilled pen, Inject 2 mLs (300 mg) subcutaneously every 14 days., Disp: 4 mL, Rfl: 0    econazole nitrate 1 % external cream, Apply topically daily. To affected toenails., Disp: 85 g, Rfl: 5    famciclovir (FAMVIR) 500 MG tablet, Take 1 tablet (500 mg) by mouth 2 times daily., Disp: 180 tablet, Rfl: 3    fexofenadine (ALLEGRA) 180 MG tablet, Take 1 tablet (180 mg) by mouth daily, Disp: 90 tablet, Rfl: 2    fidaxomicin (DIFICID) 200 MG tablet, Take 200 mg by mouth as needed, Disp: , Rfl:     fluconazole (DIFLUCAN) 150 MG tablet, Take 150 mg by mouth.,  Disp: , Rfl:     Fluticasone-Umeclidin-Vilanterol (TRELEGY ELLIPTA) 200-62.5-25 MCG/ACT oral inhaler, Inhale 1 puff into the lungs daily, Disp: 90 each, Rfl: 1    insulin glargine (LANTUS PEN) 100 UNIT/ML pen, Inject 50 Units subcutaneously daily, Disp: , Rfl:     insulin lispro (HUMALOG KWIKPEN) 100 UNIT/ML (1 unit dial) KWIKPEN, Inject 35 Units subcutaneously 3 times daily (before meals) Plus 1:25 correction scale if BG >150, Disp: , Rfl:     Lancets (ONETOUCH DELICA PLUS POIQXO26I) MISC, CHECK BLOOD GLUCOSE 3 TIMES DAILY, ADJUSTING MEDICATION, DX CODE E 11.9, ON INSULIN, Disp: , Rfl:     lidocaine (LIDODERM) 5 % patch, as needed , Disp: , Rfl: 1    LORazepam (ATIVAN) 0.5 MG tablet, Take 0.25 mg by mouth every 8 hours as needed, Disp: , Rfl:     losartan (COZAAR) 100 MG tablet, Take 100 mg by mouth daily, Disp: , Rfl:     methylPREDNISolone (MEDROL DOSEPAK) 4 MG tablet therapy pack, Follow Package Directions, Disp: 21 tablet, Rfl: 0    methylPREDNISolone (MEDROL DOSEPAK) 4 MG tablet therapy pack, Follow Package Directions, Disp: 21 tablet, Rfl: 0    methylPREDNISolone (MEDROL DOSEPAK) 4 MG tablet therapy pack, TAKE 6 TABLETS ON DAY 1 AS DIRECTED ON PACKAGE AND DECREASE BY 1 TAB EACH DAY FOR A TOTAL OF 6 DAYS, Disp: 21 each, Rfl: 1    methylPREDNISolone (MEDROL DOSEPAK) 4 MG tablet therapy pack, Take per package instructions. Take once a day by mouth, Disp: 21 tablet, Rfl: 1    methylPREDNISolone (MEDROL) 4 MG tablet, Take 1 tablet (4 mg) by mouth daily. Take 5 tabs daily for 1 week, then take 3 tabs daily for 1 week to bridge infusions, Disp: 56 tablet, Rfl: 0    mometasone (NASONEX) 50 MCG/ACT nasal spray, INSTILL 2 SPRAYS INTO BOTH NOSTRILS DAILY, Disp: 17 g, Rfl: 0    montelukast (SINGULAIR) 10 MG tablet, Take 10 mg by mouth daily., Disp: , Rfl:     multivitamin w/minerals (THERA-VIT-M) tablet, Take 1 tablet by mouth daily, Disp: , Rfl:     naloxone (NARCAN) 4 MG/0.1ML nasal spray, , Disp: , Rfl:     naproxen  (NAPROSYN) 375 MG tablet, Take 375 mg by mouth., Disp: , Rfl:     norethindrone (MICRONOR) 0.35 MG tablet, Take 1 tablet (0.35 mg) by mouth daily, Disp: 90 tablet, Rfl: 3    nystatin (MYCOSTATIN) 814016 UNIT/ML suspension, , Disp: , Rfl:     olopatadine (PATADAY) 0.7 % ophthalmic solution, Apply 1 drop to eye daily PRN, Disp: , Rfl:     omeprazole (PRILOSEC) 40 MG DR capsule, TAKE 1 CAPSULE (40 MG) BY MOUTH TWO TIMES A DAY. TAKE 1 HOUR BEFORE A MEAL., Disp: , Rfl:     oxyCODONE IR (ROXICODONE) 10 MG tablet, Take 10 mg by mouth, Disp: , Rfl:     predniSONE (DELTASONE) 5 MG tablet, Take 1 tablet (5 mg) by mouth daily, Disp: 30 tablet, Rfl: 5    rifaximin (XIFAXAN) 550 MG TABS tablet, Take 200 mg by mouth 3 times daily Take for 10 days, has on had as needed for small bowel bacteria overgrowh, Disp: , Rfl:     spironolactone-HCTZ (ALDACTAZIDE) 25-25 MG tablet, Take 1 tablet by mouth daily, Disp: , Rfl:     terbinafine (LAMISIL) 250 MG tablet, Take 1 tablet by mouth daily., Disp: , Rfl:     tirzepatide (MOUNJARO) 2.5 MG/0.5ML pen, Inject 2.5 mg subcutaneously every 7 days., Disp: , Rfl:     tobramycin-dexAMETHasone (TOBRADEX) 0.3-0.1 % ophthalmic suspension, PLACE 1 DROP INTO BOTH EYES TWO TIMES A DAY., Disp: , Rfl:     tocilizumab (ACTEMRA) 400 MG/20ML, Inject 800 mg into the vein every 28 days, Disp: , Rfl:     voriconazole (VFEND) 200 MG tablet, On day 1 take 2 tablets PO BID , then take 1 tab PO BID on Days 2-14., Disp: 30 tablet, Rfl: 0    rosuvastatin (CRESTOR) 5 MG tablet, Take 2 tablets by mouth daily, Disp: , Rfl:     Current Facility-Administered Medications:     lidocaine (PF) (XYLOCAINE) 1 % injection 0.5 mL, 0.5 mL, , , , 0.5 mL at 10/09/24 1527    lidocaine (PF) (XYLOCAINE) 1 % injection 0.5 mL, 0.5 mL, , , , 0.5 mL at 10/09/24 1527    lidocaine (PF) (XYLOCAINE) 1 % injection 0.5 mL, 0.5 mL, , , , 0.5 mL at 10/09/24 1531    lidocaine (PF) (XYLOCAINE) 1 % injection 0.5 mL, 0.5 mL, , , , 0.5 mL at 10/09/24  1531    lidocaine (PF) (XYLOCAINE) 1 % injection 3 mL, 3 mL, , , , 3 mL at 08/15/24 1136    triamcinolone (KENALOG-40) injection 40 mg, 40 mg, , , , 40 mg at 08/15/24 1136    triamcinolone acetonide (KENALOG-10) injection 10 mg, 10 mg, , , , 10 mg at 10/09/24 1527    triamcinolone acetonide (KENALOG-10) injection 10 mg, 10 mg, , , , 10 mg at 10/09/24 1527    triamcinolone acetonide (KENALOG-10) injection 10 mg, 10 mg, , , , 10 mg at 10/09/24 1531    triamcinolone acetonide (KENALOG-10) injection 10 mg, 10 mg, , , , 10 mg at 10/09/24 1531           Physical Exam:   Ranges for vital signs:    Pulse:  [92] 92  BP: (112)/(77) 112/77  SpO2:  [96 %] 96 %    General: Alert and Oriented, NAD, Cushingoid appearance.   HEENT: Head: atraumatic, normocephalic   Oropharynx: No mucosal lesions no white patches on the oral mucosa.   Neck: supple, no cervical adenopathy  Chest: Clear to auscultation  CV: Regular rate and rhythm, S1S2, without mumur  Abdomen: +BS, soft, non-tender, no hepatosplenomegaly, no masses, no guarding or rebound. Obese abdominal stria  Skin: No rash  Neuro: Alert and Oriented, CN II-XII intact, grossly non-focal , moves all 4 extremities with normal strength  Mood: Stable           Laboratory Data:     Infusion Therapy Visit on 12/03/2024   Component Date Value Ref Range Status    Sodium 12/03/2024 139  135 - 145 mmol/L Final    Potassium 12/03/2024 4.1  3.4 - 5.3 mmol/L Final    Carbon Dioxide (CO2) 12/03/2024 22  22 - 29 mmol/L Final    Anion Gap 12/03/2024 11  7 - 15 mmol/L Final    Urea Nitrogen 12/03/2024 15.3  6.0 - 20.0 mg/dL Final    Creatinine 12/03/2024 0.91  0.51 - 0.95 mg/dL Final    GFR Estimate 12/03/2024 77  >60 mL/min/1.73m2 Final    eGFR calculated using 2021 CKD-EPI equation.    Calcium 12/03/2024 9.3  8.8 - 10.4 mg/dL Final    Reference intervals for this test were updated on 7/16/2024 to reflect our healthy population more accurately. There may be differences in the flagging of prior  results with similar values performed with this method. Those prior results can be interpreted in the context of the updated reference intervals.    Chloride 12/03/2024 106  98 - 107 mmol/L Final    Glucose 12/03/2024 84  70 - 99 mg/dL Final    Alkaline Phosphatase 12/03/2024 44  40 - 150 U/L Final    AST 12/03/2024 79 (H)  0 - 45 U/L Final    ALT 12/03/2024 139 (H)  0 - 50 U/L Final    Protein Total 12/03/2024 6.4  6.4 - 8.3 g/dL Final    Albumin 12/03/2024 4.3  3.5 - 5.2 g/dL Final    Bilirubin Total 12/03/2024 0.4  <=1.2 mg/dL Final    Color Urine 12/03/2024 Yellow  Colorless, Straw, Light Yellow, Yellow Final    Appearance Urine 12/03/2024 Clear  Clear Final    Glucose Urine 12/03/2024 Negative  Negative mg/dL Final    Bilirubin Urine 12/03/2024 Negative  Negative Final    Ketones Urine 12/03/2024 Negative  Negative mg/dL Final    Specific Gravity Urine 12/03/2024 1.025  1.003 - 1.035 Final    Blood Urine 12/03/2024 Negative  Negative Final    pH Urine 12/03/2024 6.0  5.0 - 7.0 Final    Protein Albumin Urine 12/03/2024 Trace (A)  Negative mg/dL Final    Urobilinogen Urine 12/03/2024 Normal  Normal, 2.0 mg/dL Final    Nitrite Urine 12/03/2024 Negative  Negative Final    Leukocyte Esterase Urine 12/03/2024 Trace (A)  Negative Final    Bacteria Urine 12/03/2024 Few (A)  None Seen /HPF Final    RBC Urine 12/03/2024 2  <=2 /HPF Final    WBC Urine 12/03/2024 6 (H)  <=5 /HPF Final    Squamous Epithelials Urine 12/03/2024 4 (H)  <=1 /HPF Final    WBC Count 12/03/2024 9.7  4.0 - 11.0 10e3/uL Final    RBC Count 12/03/2024 4.81  3.80 - 5.20 10e6/uL Final    Hemoglobin 12/03/2024 14.6  11.7 - 15.7 g/dL Final    Hematocrit 12/03/2024 42.4  35.0 - 47.0 % Final    MCV 12/03/2024 88  78 - 100 fL Final    MCH 12/03/2024 30.4  26.5 - 33.0 pg Final    MCHC 12/03/2024 34.4  31.5 - 36.5 g/dL Final    RDW 12/03/2024 13.1  10.0 - 15.0 % Final    Platelet Count 12/03/2024 344  150 - 450 10e3/uL Final    % Neutrophils 12/03/2024 67  %  Final    % Lymphocytes 12/03/2024 21  % Final    % Monocytes 12/03/2024 8  % Final    % Eosinophils 12/03/2024 1  % Final    % Basophils 12/03/2024 1  % Final    % Immature Granulocytes 12/03/2024 2  % Final    NRBCs per 100 WBC 12/03/2024 0  <1 /100 Final    Absolute Neutrophils 12/03/2024 6.5  1.6 - 8.3 10e3/uL Final    Absolute Lymphocytes 12/03/2024 2.1  0.8 - 5.3 10e3/uL Final    Absolute Monocytes 12/03/2024 0.8  0.0 - 1.3 10e3/uL Final    Absolute Eosinophils 12/03/2024 0.1  0.0 - 0.7 10e3/uL Final    Absolute Basophils 12/03/2024 0.1  0.0 - 0.2 10e3/uL Final    Absolute Immature Granulocytes 12/03/2024 0.2  <=0.4 10e3/uL Final    Absolute NRBCs 12/03/2024 0.0  10e3/uL Final        Culture data:  7-Day Micro Results       No results found for the last 168 hours.                  Imaging:   US Pelvic Complete with Transvaginal  Narrative: EXAMINATION: US PELVIC TRANSABDOMINAL AND TRANSVAGINAL, 11/27/2024  4:24 PM     COMPARISON: Pelvic ultrasound 9/14/2023    HISTORY: Cyst of right ovary    TECHNIQUE: The pelvis was scanned in standard fashion with  transabdominal and transvaginal transducer(s) using both grey scale  and limited color Doppler techniques.    FINDINGS:  The uterus measures 5.4 x 3.4 x 2.4 cm. No focal uterine lesion.  The  endometrium is within normal limits and measures 3 mm. There is no  free fluid in the pelvis.    The right ovary measures 2.9 x 1.8 x 2.0 centimeters and the left  ovary measures 2.4 cm x 2.7 cm x 1.7 cm. There is no suspicious  adnexal mass. There is arterial blood flow to the ovaries.  Impression: IMPRESSION:   1. No suspicious abnormality of the soft tissue or evidence of fluid  collection or mass.   2. Normal pelvic ultrasound.    I have personally reviewed the examination and initial interpretation  and I agree with the findings.    SAUL LOPEZ MD         SYSTEM ID:  [id number]

## 2024-12-10 NOTE — NURSING NOTE
"Chief Complaint   Patient presents with    RECHECK     recurrent infection     /77 (BP Location: Right arm)   Pulse 92   Ht 1.676 m (5' 6\")   Wt 102.1 kg (225 lb)   SpO2 96%   BMI 36.32 kg/m    Hudson Suarez CMA on 12/10/2024 at 12:25 PM    "

## 2024-12-10 NOTE — LETTER
12/10/2024       RE: Laxmi Ya  [address] [place] [id number]     Dear Colleague,    Thank you for referring your patient, Laxmi Ya, to the [facility] INFECTIOUS DISEASE [facility]IN[facility] [place] at [facility]. Please see a copy of my visit note below.      Infectious Disease Clinic: PROGRESS NOTE     Patient:  Laxmi Ya, Date of birth 1975, Medical record number [id number]  Date of Visit:  12/10/2024          Assessment and Recommendations:   Assessment:  History of Candida glabrata yeast vaginitis and recurrent oral thrush.  History of recurrent UTIs.  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 I would advise reserving voriconazole for documented yeast vaginitis due to fluconazole resistant Candida. Increase in AST/ALT could be due to voriconazole. Planned repeat of AST/ALT today.   Follow-up with GYN clinic as scheduled.  Follow=-up in ID clinic on a prn basis.      I have reviewed today's Medications, Vital Signs, Labs, and EMR. Total time in patient care day of visit = 40 minutes.      Kaley Hall MD  ID Staff Physician  Pager [phone]    [facility]  [phone]        Interval History:   12/10/2024 the patient presents for follow-up of Candida glabrata vaginitis symptoms. Patient has rheumatoid arthritis which affects her back and sacral joints she says she gets steroid injections in the joints at the [facility]. She is also currently on a Medrol dose pack for a current RA flare. She had a recent HSV flare which presented with blisters on her left buttock. She did not start taking the voriconazole I had prescribed months earlier because she was dealing with the HSV flares and treatment for that. The voriconazole cleared up her vaginal yeast symptoms and her oral thrush. Sghe finished the voriconazole   on 11/29/2024.            Review of Systems:   CV: NEGATIVE for chest pain, palpitations or peripheral edema  C: NEGATIVE for fever, chills, change in weight  E/M: NEGATIVE for ear, mouth and throat problems  R: NEGATIVE for significant cough or SOB  Patient denies nausea, vomiting, diarrhea, and abdominal pain.          Current Medications       Current Outpatient Medications:      albuterol (PROAIR HFA/PROVENTIL HFA/VENTOLIN HFA) 108 (90 Base) MCG/ACT inhaler, Inhale 2 puffs into the lungs every 6 hours as needed for shortness of breath, wheezing or cough, Disp: , Rfl:      Artificial Tear Solution (SOOTHE XP) SOLN, as needed, Disp: , Rfl:      atenolol (TENORMIN) 50 MG tablet, Take 50 mg by mouth daily, Disp: , Rfl:      azelastine 137 MCG/SPRAY SOLN, USE 2 SPRAYS IN EACH NOSTRIL 2 TIMES DAILY FOR 1 MONTH, Disp: , Rfl:      benzoyl peroxide 5 % external liquid, Use daily as directed. Preferred bdrand: Medpura, Disp: 236 mL, Rfl: 11     blood glucose (NO BRAND SPECIFIED) test strip, Use to test blood sugar 2 times daily or as directed., Disp: 100 strip, Rfl: 1     blood glucose monitoring (NO BRAND SPECIFIED) meter device kit, Use to test blood sugar 2 times daily or as directed., Disp: 1 kit, Rfl: 0     ciclopirox (LOPROX) 0.77 % cream, Apply topically 2 times daily To affected toenails., Disp: 90 g, Rfl: 5     cyclobenzaprine (FLEXERIL) 5 MG tablet, Take 5 mg by mouth., Disp: , Rfl:      dexAMETHasone (DECADRON) 0.1 % ophthalmic solution, , Disp: , Rfl:      diclofenac (VOLTAREN) 1 % topical gel, Apply topically 4 times daily as needed, Disp: , Rfl:      doxycycline hyclate (VIBRAMYCIN) 100 MG capsule, Take 100 mg by mouth 2 times daily., Disp: , Rfl:      dupilumab (DUPIXENT) 300 MG/2ML prefilled pen, Inject 2 mLs (300 mg) subcutaneously every 14 days., Disp: 4 mL, Rfl: 0     econazole nitrate 1 % external cream, Apply topically daily. To affected toenails., Disp: 85 g, Rfl: 5     famciclovir (FAMVIR) 500 MG  tablet, Take 1 tablet (500 mg) by mouth 2 times daily., Disp: 180 tablet, Rfl: 3     fexofenadine (ALLEGRA) 180 MG tablet, Take 1 tablet (180 mg) by mouth daily, Disp: 90 tablet, Rfl: 2     fidaxomicin (DIFICID) 200 MG tablet, Take 200 mg by mouth as needed, Disp: , Rfl:      fluconazole (DIFLUCAN) 150 MG tablet, Take 150 mg by mouth., Disp: , Rfl:      Fluticasone-Umeclidin-Vilanterol (TRELEGY ELLIPTA) 200-62.5-25 MCG/ACT oral inhaler, Inhale 1 puff into the lungs daily, Disp: 90 each, Rfl: 1     insulin glargine (LANTUS PEN) 100 UNIT/ML pen, Inject 50 Units subcutaneously daily, Disp: , Rfl:      insulin lispro (HUMALOG KWIKPEN) 100 UNIT/ML (1 unit dial) KWIKPEN, Inject 35 Units subcutaneously 3 times daily (before meals) Plus 1:25 correction scale if BG >150, Disp: , Rfl:      Lancets (ONETOUCH DELICA PLUS NPCRNJ15T) MISC, CHECK BLOOD GLUCOSE 3 TIMES DAILY, ADJUSTING MEDICATION, DX CODE E 11.9, ON INSULIN, Disp: , Rfl:      lidocaine (LIDODERM) 5 % patch, as needed , Disp: , Rfl: 1     LORazepam (ATIVAN) 0.5 MG tablet, Take 0.25 mg by mouth every 8 hours as needed, Disp: , Rfl:      losartan (COZAAR) 100 MG tablet, Take 100 mg by mouth daily, Disp: , Rfl:      methylPREDNISolone (MEDROL DOSEPAK) 4 MG tablet therapy pack, Follow Package Directions, Disp: 21 tablet, Rfl: 0     methylPREDNISolone (MEDROL DOSEPAK) 4 MG tablet therapy pack, Follow Package Directions, Disp: 21 tablet, Rfl: 0     methylPREDNISolone (MEDROL DOSEPAK) 4 MG tablet therapy pack, TAKE 6 TABLETS ON DAY 1 AS DIRECTED ON PACKAGE AND DECREASE BY 1 TAB EACH DAY FOR A TOTAL OF 6 DAYS, Disp: 21 each, Rfl: 1     methylPREDNISolone (MEDROL DOSEPAK) 4 MG tablet therapy pack, Take per package instructions. Take once a day by mouth, Disp: 21 tablet, Rfl: 1     methylPREDNISolone (MEDROL) 4 MG tablet, Take 1 tablet (4 mg) by mouth daily. Take 5 tabs daily for 1 week, then take 3 tabs daily for 1 week to bridge infusions, Disp: 56 tablet, Rfl: 0      mometasone (NASONEX) 50 MCG/ACT nasal spray, INSTILL 2 SPRAYS INTO BOTH NOSTRILS DAILY, Disp: 17 g, Rfl: 0     montelukast (SINGULAIR) 10 MG tablet, Take 10 mg by mouth daily., Disp: , Rfl:      multivitamin w/minerals (THERA-VIT-M) tablet, Take 1 tablet by mouth daily, Disp: , Rfl:      naloxone (NARCAN) 4 MG/0.1ML nasal spray, , Disp: , Rfl:      naproxen (NAPROSYN) 375 MG tablet, Take 375 mg by mouth., Disp: , Rfl:      norethindrone (MICRONOR) 0.35 MG tablet, Take 1 tablet (0.35 mg) by mouth daily, Disp: 90 tablet, Rfl: 3     nystatin (MYCOSTATIN) 074898 UNIT/ML suspension, , Disp: , Rfl:      olopatadine (PATADAY) 0.7 % ophthalmic solution, Apply 1 drop to eye daily PRN, Disp: , Rfl:      omeprazole (PRILOSEC) 40 MG DR capsule, TAKE 1 CAPSULE (40 MG) BY MOUTH TWO TIMES A DAY. TAKE 1 HOUR BEFORE A MEAL., Disp: , Rfl:      oxyCODONE IR (ROXICODONE) 10 MG tablet, Take 10 mg by mouth, Disp: , Rfl:      predniSONE (DELTASONE) 5 MG tablet, Take 1 tablet (5 mg) by mouth daily, Disp: 30 tablet, Rfl: 5     rifaximin (XIFAXAN) 550 MG TABS tablet, Take 200 mg by mouth 3 times daily Take for 10 days, has on had as needed for small bowel bacteria overgrowh, Disp: , Rfl:      spironolactone-HCTZ (ALDACTAZIDE) 25-25 MG tablet, Take 1 tablet by mouth daily, Disp: , Rfl:      terbinafine (LAMISIL) 250 MG tablet, Take 1 tablet by mouth daily., Disp: , Rfl:      tirzepatide (MOUNJARO) 2.5 MG/0.5ML pen, Inject 2.5 mg subcutaneously every 7 days., Disp: , Rfl:      tobramycin-dexAMETHasone (TOBRADEX) 0.3-0.1 % ophthalmic suspension, PLACE 1 DROP INTO BOTH EYES TWO TIMES A DAY., Disp: , Rfl:      tocilizumab (ACTEMRA) 400 MG/20ML, Inject 800 mg into the vein every 28 days, Disp: , Rfl:      voriconazole (VFEND) 200 MG tablet, On day 1 take 2 tablets PO BID , then take 1 tab PO BID on Days 2-14., Disp: 30 tablet, Rfl: 0     rosuvastatin (CRESTOR) 5 MG tablet, Take 2 tablets by mouth daily, Disp: , Rfl:     Current  Facility-Administered Medications:      lidocaine (PF) (XYLOCAINE) 1 % injection 0.5 mL, 0.5 mL, , , , 0.5 mL at 10/09/24 1527     lidocaine (PF) (XYLOCAINE) 1 % injection 0.5 mL, 0.5 mL, , , , 0.5 mL at 10/09/24 1527     lidocaine (PF) (XYLOCAINE) 1 % injection 0.5 mL, 0.5 mL, , , , 0.5 mL at 10/09/24 1531     lidocaine (PF) (XYLOCAINE) 1 % injection 0.5 mL, 0.5 mL, , , , 0.5 mL at 10/09/24 1531     lidocaine (PF) (XYLOCAINE) 1 % injection 3 mL, 3 mL, , , , 3 mL at 08/15/24 1136     triamcinolone (KENALOG-40) injection 40 mg, 40 mg, , , , 40 mg at 08/15/24 1136     triamcinolone acetonide (KENALOG-10) injection 10 mg, 10 mg, , , , 10 mg at 10/09/24 1527     triamcinolone acetonide (KENALOG-10) injection 10 mg, 10 mg, , , , 10 mg at 10/09/24 1527     triamcinolone acetonide (KENALOG-10) injection 10 mg, 10 mg, , , , 10 mg at 10/09/24 1531     triamcinolone acetonide (KENALOG-10) injection 10 mg, 10 mg, , , , 10 mg at 10/09/24 1531           Physical Exam:   Ranges for vital signs:    Pulse:  [92] 92  BP: (112)/(77) 112/77  SpO2:  [96 %] 96 %    General: Alert and Oriented, NAD, Cushingoid appearance.   HEENT: Head: atraumatic, normocephalic   Oropharynx: No mucosal lesions no white patches on the oral mucosa.   Neck: supple, no cervical adenopathy  Chest: Clear to auscultation  CV: Regular rate and rhythm, S1S2, without mumur  Abdomen: +BS, soft, non-tender, no hepatosplenomegaly, no masses, no guarding or rebound. Obese abdominal stria  Skin: No rash  Neuro: Alert and Oriented, CN II-XII intact, grossly non-focal , moves all 4 extremities with normal strength  Mood: Stable           Laboratory Data:     Infusion Therapy Visit on 12/03/2024   Component Date Value Ref Range Status     Sodium 12/03/2024 139  135 - 145 mmol/L Final     Potassium 12/03/2024 4.1  3.4 - 5.3 mmol/L Final     Carbon Dioxide (CO2) 12/03/2024 22  22 - 29 mmol/L Final     Anion Gap 12/03/2024 11  7 - 15 mmol/L Final     Urea Nitrogen  12/03/2024 15.3  6.0 - 20.0 mg/dL Final     Creatinine 12/03/2024 0.91  0.51 - 0.95 mg/dL Final     GFR Estimate 12/03/2024 77  >60 mL/min/1.73m2 Final    eGFR calculated using 2021 CKD-EPI equation.     Calcium 12/03/2024 9.3  8.8 - 10.4 mg/dL Final    Reference intervals for this test were updated on 7/16/2024 to reflect our healthy population more accurately. There may be differences in the flagging of prior results with similar values performed with this method. Those prior results can be interpreted in the context of the updated reference intervals.     Chloride 12/03/2024 106  98 - 107 mmol/L Final     Glucose 12/03/2024 84  70 - 99 mg/dL Final     Alkaline Phosphatase 12/03/2024 44  40 - 150 U/L Final     AST 12/03/2024 79 (H)  0 - 45 U/L Final     ALT 12/03/2024 139 (H)  0 - 50 U/L Final     Protein Total 12/03/2024 6.4  6.4 - 8.3 g/dL Final     Albumin 12/03/2024 4.3  3.5 - 5.2 g/dL Final     Bilirubin Total 12/03/2024 0.4  <=1.2 mg/dL Final     Color Urine 12/03/2024 Yellow  Colorless, Straw, Light Yellow, Yellow Final     Appearance Urine 12/03/2024 Clear  Clear Final     Glucose Urine 12/03/2024 Negative  Negative mg/dL Final     Bilirubin Urine 12/03/2024 Negative  Negative Final     Ketones Urine 12/03/2024 Negative  Negative mg/dL Final     Specific Gravity Urine 12/03/2024 1.025  1.003 - 1.035 Final     Blood Urine 12/03/2024 Negative  Negative Final     pH Urine 12/03/2024 6.0  5.0 - 7.0 Final     Protein Albumin Urine 12/03/2024 Trace (A)  Negative mg/dL Final     Urobilinogen Urine 12/03/2024 Normal  Normal, 2.0 mg/dL Final     Nitrite Urine 12/03/2024 Negative  Negative Final     Leukocyte Esterase Urine 12/03/2024 Trace (A)  Negative Final     Bacteria Urine 12/03/2024 Few (A)  None Seen /HPF Final     RBC Urine 12/03/2024 2  <=2 /HPF Final     WBC Urine 12/03/2024 6 (H)  <=5 /HPF Final     Squamous Epithelials Urine 12/03/2024 4 (H)  <=1 /HPF Final     WBC Count 12/03/2024 9.7  4.0 - 11.0  10e3/uL Final     RBC Count 12/03/2024 4.81  3.80 - 5.20 10e6/uL Final     Hemoglobin 12/03/2024 14.6  11.7 - 15.7 g/dL Final     Hematocrit 12/03/2024 42.4  35.0 - 47.0 % Final     MCV 12/03/2024 88  78 - 100 fL Final     MCH 12/03/2024 30.4  26.5 - 33.0 pg Final     MCHC 12/03/2024 34.4  31.5 - 36.5 g/dL Final     RDW 12/03/2024 13.1  10.0 - 15.0 % Final     Platelet Count 12/03/2024 344  150 - 450 10e3/uL Final     % Neutrophils 12/03/2024 67  % Final     % Lymphocytes 12/03/2024 21  % Final     % Monocytes 12/03/2024 8  % Final     % Eosinophils 12/03/2024 1  % Final     % Basophils 12/03/2024 1  % Final     % Immature Granulocytes 12/03/2024 2  % Final     NRBCs per 100 WBC 12/03/2024 0  <1 /100 Final     Absolute Neutrophils 12/03/2024 6.5  1.6 - 8.3 10e3/uL Final     Absolute Lymphocytes 12/03/2024 2.1  0.8 - 5.3 10e3/uL Final     Absolute Monocytes 12/03/2024 0.8  0.0 - 1.3 10e3/uL Final     Absolute Eosinophils 12/03/2024 0.1  0.0 - 0.7 10e3/uL Final     Absolute Basophils 12/03/2024 0.1  0.0 - 0.2 10e3/uL Final     Absolute Immature Granulocytes 12/03/2024 0.2  <=0.4 10e3/uL Final     Absolute NRBCs 12/03/2024 0.0  10e3/uL Final        Culture data:  7-Day Micro Results       No results found for the last 168 hours.                  Imaging:   US Pelvic Complete with Transvaginal  Narrative: EXAMINATION: US PELVIC TRANSABDOMINAL AND TRANSVAGINAL, 11/27/2024  4:24 PM     COMPARISON: Pelvic ultrasound 9/14/2023    HISTORY: Cyst of right ovary    TECHNIQUE: The pelvis was scanned in standard fashion with  transabdominal and transvaginal transducer(s) using both grey scale  and limited color Doppler techniques.    FINDINGS:  The uterus measures 5.4 x 3.4 x 2.4 cm. No focal uterine lesion.  The  endometrium is within normal limits and measures 3 mm. There is no  free fluid in the pelvis.    The right ovary measures 2.9 x 1.8 x 2.0 centimeters and the left  ovary measures 2.4 cm x 2.7 cm x 1.7 cm. There is no  suspicious  adnexal mass. There is arterial blood flow to the ovaries.  Impression: IMPRESSION:   1. No suspicious abnormality of the soft tissue or evidence of fluid  collection or mass.   2. Normal pelvic ultrasound.    I have personally reviewed the examination and initial interpretation  and I agree with the findings.    SAUL LOPEZ MD         SYSTEM ID:  [id number]         Again, thank you for allowing me to participate in the care of your patient.      Sincerely,    Kaley Hall MD

## 2024-12-12 DIAGNOSIS — M79.644 BILATERAL THUMB PAIN: Primary | ICD-10-CM

## 2024-12-12 DIAGNOSIS — M79.645 BILATERAL THUMB PAIN: Primary | ICD-10-CM

## 2024-12-12 NOTE — RESULT ENCOUNTER NOTE
Ms. Ya,    Liver tests are slightly worse. Medication could be playing a role. Although medrol should not be a problem, I recommend reviewing other medications on your list with Rheumatology. Avoid use of NSAIDs like naproxen. Recheck AST, ALT in 10 days please.    Please let me know if you have questions.    Sincerely,    Oswaldo Lemus MD  Rheumatologist, [facility]

## 2024-12-13 ENCOUNTER — TELEPHONE (OUTPATIENT)
Dept: RHEUMATOLOGY | Facility: CLINIC | Age: 49
End: 2024-12-13
Payer: MEDICARE

## 2024-12-13 DIAGNOSIS — M13.80 SERONEGATIVE INFLAMMATORY ARTHRITIS: Primary | ICD-10-CM

## 2024-12-13 RX ORDER — METHYLPREDNISOLONE 4 MG/1
TABLET ORAL
Qty: 30 TABLET | Refills: 0 | Status: SHIPPED | OUTPATIENT
Start: 2024-12-13 | End: 2024-12-23

## 2024-12-13 NOTE — TELEPHONE ENCOUNTER
"Patient started medrol dose pack 12/9.  Medication was helping when she was on 3 tablets daily, see phone message dated 12/9 for reference, but now that she dropped to 2 tablets she is miserable.  She states the pelvic pain and hip pain is so extreme that she cannot walk.  Unable to take NSAIDS due to elevated LFT's.  Was seen by PCP today for possible UTI.  Notes states UA was negative, culture is pending.  Patient states she knows she has a UTI and they \"refused to treat her\".  She is afraid she will get \"septic\".  Patient requesting to stay on 3 tablets until her joint pain subsides.  Uses [facility].     Macy Sawyer RN    "

## 2024-12-13 NOTE — TELEPHONE ENCOUNTER
[facility] Call Center    Phone Message    May a detailed message be left on voicemail: yes     Reason for Call: Pt has been using the Medrol Dose pack that was prescribed by Dr. Lemus on 12/9/24-just reduced the dosage from 3 tablets yesterday (12/12/24) to two tablets today (12/13/24). She felt ok yesterday and like it was really helping, but feels like the pain is too intense now that she has dropped it down to two tablets today. She feels like she would need to be on at least 3 tablets daily for now to keep her pain under control-especially since she is no longer allowed to take NSAIDS.  Pt is hoping for a call back from [facility] to discuss as soon as possible.     Pt will be available for the rest of the day today (12/13/24) because she would really like to get this addressed before going into the weekend, although we did schedule her for a telephone visit with Yuliya Kaplan RPH from 2:30-3:00 PM today    Action Taken: Other: Rheum    Travel Screening: Not Applicable

## 2024-12-13 NOTE — TELEPHONE ENCOUNTER
I recommend that patient stay on 12 mg medrol daily for 10 days, then completing the medrol dose pack taper. Rx sent.

## 2024-12-16 ENCOUNTER — THERAPY VISIT (OUTPATIENT)
Dept: PHYSICAL THERAPY | Facility: CLINIC | Age: 49
End: 2024-12-16
Payer: MEDICARE

## 2024-12-16 ENCOUNTER — TELEPHONE (OUTPATIENT)
Dept: RHEUMATOLOGY | Facility: CLINIC | Age: 49
End: 2024-12-16

## 2024-12-16 ENCOUNTER — VIRTUAL VISIT (OUTPATIENT)
Dept: ENDOCRINOLOGY | Facility: CLINIC | Age: 49
End: 2024-12-16
Payer: MEDICARE

## 2024-12-16 DIAGNOSIS — G89.29 CHRONIC BILATERAL LOW BACK PAIN WITHOUT SCIATICA: ICD-10-CM

## 2024-12-16 DIAGNOSIS — R25.2 MUSCLE CRAMPS: ICD-10-CM

## 2024-12-16 DIAGNOSIS — R10.2 PELVIC PAIN IN FEMALE: Primary | ICD-10-CM

## 2024-12-16 DIAGNOSIS — M54.50 CHRONIC BILATERAL LOW BACK PAIN WITHOUT SCIATICA: ICD-10-CM

## 2024-12-16 DIAGNOSIS — M54.2 NECK PAIN: ICD-10-CM

## 2024-12-16 DIAGNOSIS — E27.49 SECONDARY ADRENAL INSUFFICIENCY (H): Primary | ICD-10-CM

## 2024-12-16 DIAGNOSIS — E55.9 VITAMIN D DEFICIENCY: ICD-10-CM

## 2024-12-16 PROCEDURE — G2211 COMPLEX E/M VISIT ADD ON: HCPCS | Mod: 95 | Performed by: INTERNAL MEDICINE

## 2024-12-16 PROCEDURE — 97012 MECHANICAL TRACTION THERAPY: CPT | Mod: GP | Performed by: PHYSICAL THERAPIST

## 2024-12-16 PROCEDURE — 99214 OFFICE O/P EST MOD 30 MIN: CPT | Mod: 95 | Performed by: INTERNAL MEDICINE

## 2024-12-16 NOTE — TELEPHONE ENCOUNTER
LM for the patient to call back.  Did see get the new RX sent on Friday with the increased dose of Medrol?    Macy Sawyer RN

## 2024-12-16 NOTE — PROGRESS NOTES
Virtual Visit Details    Type of service:  Video Visit   Video Start Time: 2:05 PM  Video End Time:  2:25 PM    Originating Location (pt. Location): Home  Distant Location (provider location):  Off-site  Platform used for Video Visit: Howard      Recent issues:  Adrenal follow-up evaluation  Previous low cortisol levels, presumed due to concurrent or prior dosing of steroid medication  Had repeat labs showed low cortisol and low ACTH levels, indicating secondary adrenal insufficiency.  Taking the Prednisone 5 mg every day, also reports getting Medrol dose packs Q 4-6 weeks  Has had courses of Medrol for RA flare of pelvic pain, started course 4 days ago and now on 3-tabs daily  Planning pelvis steroid injections at [facility] tomorrow          Previous health history includes sero negative rheumatoid arthritis, asthma, T2DM  She has seen health care providers including [facility], [facility], [facility], and [facility]  Had also seen Dr. Alan Saleh/[facility] rheumatology  Med evaluations with Dr. Susan Pena/[facility], evaluations focused on management of recurrent cystitis, vaginitis  Treatment with antifungal and antibiotic medications  Patient recalls having lab tests done per Dr. Pena ~2020 showing abnormal adrenal results   Advised to see an endocrinologist for further evaluation   Unfortunately, details of these tests, results, concerns not available today  Additional medical evaluations with other providers, including Dr. No Fitzgerald/ OBGYN, Dr. Oswaldo Lemus/[facility] Rheumatology  RA treatment with periodic Actemra infusions (w/ methylprednisolone infusion)  Has used Medrol dose packs (5d) for flares of her RA or asthma or pneumonia  Previous [facility] labs include:   Latest Reference Range & Units 11/29/23 12:36 12/20/23 14:28   Cortisol Serum ug/dL 0.5 2.6      Latest Reference Range & Units 11/29/23 12:36 12/20/23 14:28   Adrenal Corticotropin <47 pg/mL <10 <10      Latest  Reference Range & Units 11/29/23 12:45   Cortisol Free Urine Random ug/L <1.00     ~2/7/24. Last Medtrol steroid course completed  3/2024, Started hydrocortisone 5 mg BID, but patient felt jittery and stopped it  5/13/24 Head MRI pituitary ([facility]):   Normal appearance of pituitary gland  5/2024. Started Prednisone 5 mg daily   Has noticed less fatigue, less muscle weakness/fatigue  Had rashes on back, buttock then testing reportedly showed herpes virus   Treatment with valcyclovir, the changed to acyclovir medication... rashes better  Recent [facility] labs include:  Lab Results   Component Value Date    FSH 5.2 03/11/2024    ACTH <10 02/27/2024    KAREY 0.5 02/27/2024    T4 1.32 12/20/2023    SG 1.025 12/03/2024      Current dose:  Prednisone 5 mg daily      Lives in [place], [place]  Sees Dr. Amol Juares/[facility] for general medicine evaluations.  Also sees Jazmine Lemus/[facility] ([facility]), No Fitzgerald/ OBGYN   Dr. 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    Obesity     Pyelonephritis, unspecified 1992    abstract    Rheumatoid arteritis (H)     Seronegative inflammatory arthritis 06/17/2022    Type 2 diabetes mellitus (H)     Unspecified symptom associated with female genital organs 05/07/1999    chronic pelvic pain abstract     Past Surgical History:   Procedure Laterality Date    CHOLECYSTECTOMY      COLON SURGERY      Left hemicolectomy for diverticulosis    CYSTOSCOPY,+URETEROSCOPY       abstract    ZZHC EXCIS/CURET BENIGN ELBOW LESN  10/26/1987    (R) humerus abstract       Family Hx:  Family History   Problem Relation Age of Onset    Hypertension Father         abstract    Cancer Paternal Aunt         gallbladder cancer abstract         Social Hx:  Social History     Socioeconomic History    Marital status: Single     Spouse name: Not on file    Number of children: Not on file    Years of education: Not on file    Highest education level: Not on file   Occupational History    Not on file   Tobacco Use    Smoking status: Never    Smokeless tobacco: Never   Substance and Sexual Activity    Alcohol use: Yes     Comment: RARE    Drug use: No    Sexual activity: Not on file   Other Topics Concern     Service Not Asked    Blood Transfusions Not Asked    Caffeine Concern Not Asked    Occupational Exposure Not Asked    Hobby Hazards Not Asked    Sleep Concern Not Asked    Stress Concern Not Asked    Weight Concern Not Asked    Special Diet Not Asked    Back Care Not Asked    Exercise No    Bike Helmet Not Asked    Seat Belt No    Self-Exams No   Social History Narrative    Womens Health Kit given.         Social Drivers of Health     Financial Resource Strain: Medium Risk (3/13/2024)    [facility]    Financial Resource Strain     Difficulty of Paying Living Expenses: 2     Difficulty of Paying Living Expenses: 1   Food Insecurity: No Food Insecurity (5/29/2024)    Received from [facility]    Hunger Vital Sign     Worried About Running Out of Food in the Last Year: Never true     Ran Out of Food in the Last Year: Never true   Transportation Needs: No Transportation Needs (5/29/2024)    Received from [facility]    PRAPARE - Transportation     Lack of Transportation (Medical): No     Lack of Transportation (Non-Medical): No   Physical Activity: Inactive (5/29/2024)    Received from [facility]    Exercise  Vital Sign     Days of Exercise per Week: 0 days     Minutes of Exercise per Session: 0 min   Stress: No Stress Concern Present (2/24/2023)    Received from [facility], [facility]    [employer] - Occupational Stress Questionnaire     Feeling of Stress : Not at all   Social Connections: Socially Integrated (3/13/2024)    Received from [facility] [employer] [facility]    Social Connections     Do you often feel lonely or isolated from those around you?: 0   Interpersonal Safety: Not At Risk (2/24/2023)    Received from [facility], [facility]    Humiliation, Afraid, Rape, and Kick questionnaire     Fear of Current or Ex-Partner: No     Emotionally Abused: No     Physically Abused: No     Sexually Abused: No   Housing Stability: Low Risk  (5/29/2024)    Received from [facility]    Housing Stability     What is your living situation today?: I have a steady place to live          MEDICATIONS:  has a current medication list which includes the following prescription(s): albuterol, dupixent, famciclovir, fexofenadine, trelegy ellipta, methylprednisolone, norethindrone, prednisone, voriconazole, soothe xp, atenolol, azelastine, benzoyl peroxide, blood glucose, blood glucose monitoring, ciclopirox, cyclobenzaprine, dexamethasone, diclofenac, doxycycline hyclate, econazole nitrate, fidaxomicin, fluconazole, insulin glargine, insulin lispro, onetouch delica plus [id number], lidocaine, lorazepam, losartan, methylprednisolone, methylprednisolone, methylprednisolone, methylprednisolone, methylprednisolone, mometasone, montelukast, multivitamin w/minerals, naloxone, naproxen, nystatin, pataday, omeprazole, oxycodone ir, rifaximin, rosuvastatin, spironolactone-hctz, terbinafine, mounjaro, tobramycin-dexamethasone, and actemra, and the following Facility-Administered Medications: lidocaine (pf), lidocaine (pf), lidocaine (pf), lidocaine (pf), lidocaine (pf), triamcinolone, triamcinolone  acetonide, triamcinolone acetonide, triamcinolone acetonide, and triamcinolone acetonide.    ROS:     ROS: 10 point ROS neg other than the symptoms noted above in the HPI.    GENERAL: some fatigue, wt stable; denies fevers, chills, night sweats.   HEENT: sinus drainage; no dysphagia, odonophagia, diplopia, neck pain  THYROID:  no apparent hyper or hypothyroid symptoms  CV: no chest pain, pressure, palpitations  LUNGS: some dyspnea; no SOB, SERNA, cough, wheezing   ABDOMEN: no diarrhea, constipation, abdominal pain  EXTREMITIES: no rashes, ulcers, edema  NEUROLOGY: no headaches, denies changes in vision, tingling, extremitiy numbness   MSK: improved muscle strength, polyarthralgias- pains at neck, hands, hips, ankles, leg muscle twitching vs cramps  SKIN: lower back rash as noted, abdominal striae  : no menses with daily [place]   PSYCH:  stable mood, no significant anxiety or depression  ENDOCRINE: no heat or cold intolerance    Physical Exam (visual exam)  VS:  no vital signs taken for video visit  CONSTITUTIONAL: healthy, alert and NAD, well dressed, answering questions appropriately  ENT: no nose swelling or nasal discharge, mouth redness or gum changes.  EYES: eyes grossly normal to inspection, conjunctivae and sclerae normal, no exophthalmos or proptosis  THYROID:  no apparent nodules or goiter  LUNGS: no audible wheeze, cough or visible cyanosis, no visible retractions or increased work of breathing  ABDOMEN: abdomen not evaluated  EXTREMITIES: no hand tremors, limited exam  NEUROLOGY: CN grossly intact, mentation intact and speech normal   SKIN:  no apparent skin lesions, rash, or edema with visualized skin appearance  PSYCH: mentation appears normal, affect normal/bright, judgement and insight intact,   normal speech and appearance well groomed    LABS:    All pertinent notes, labs, and images personally reviewed by me.     A/P:  Encounter Diagnoses   Name Primary?    Secondary adrenal insufficiency (H) Yes     Muscle cramps     Vitamin D deficiency        Comments:  Reviewed the complicated health history and general endocrine issues.  Difficult to understand the endocrine concern by her [facility] provider from 3-yrs ago  Low cortisol levels likely due to transient vs chronic suppression of pituitary-adrenal hormone axis, or hypopituitarism    Plan:  Reviewed the adrenal gland, general anatomy and hormone secretion  We discussed adrenal lab testing   Discussed steroid medication options, dosing    Recommend:  Continue current Prednisone 5 mg daily dose plan  If future glucocorticoid med treatment for asthma or arthritis, may use Medrol, Pred, or HC   Will defer respiratory and rheumatology management to her other specialists  Monitor for symptom changes  No adrenal gland imaging needed at this time  Keep focus on diet, exercise (as tolerated) and weight management  Consider dietician evaluation  Plan to repeat testing for calcium, Mg, vit D, TSH with next [facility] blood testing  Follow-up diabetes evaluations with her PCP Dr. Juares    Addressed patient questions today    The longitudinal plan of care for the endocrine problem(s) were addressed during this visit.  Due to added complexity of care,   we will continue to support the patient and the subsequent management of this condition with ongoing continuity of care.    There are no Patient Instructions on file for this visit.    Future labs ordered today:   Orders Placed This Encounter   Procedures    Vitamin D Deficiency    Magnesium    TSH with free T4 reflex     Radiology/Consults ordered today: None    Total time spent on day of encounter:  22 min    Follow-up: 6/2025 or 7/2025, Return      MCKAY Leroy MD, MS  Endocrinology  [facility]    CC: Care Team

## 2024-12-16 NOTE — TELEPHONE ENCOUNTER
[facility] Call Center    Phone Message    May a detailed message be left on voicemail: yes     Reason for Call: Other: patient was calling and returning a missed call and patient said she can call her back she said she did get the medication and also wants to say thank you.       Action Taken: Message routed to:  [facility] rheum    Travel Screening: Not Applicable     Date of Service: 12/16/24

## 2024-12-17 ENCOUNTER — OFFICE VISIT (OUTPATIENT)
Dept: ALLERGY | Facility: CLINIC | Age: 49
End: 2024-12-17
Payer: MEDICARE

## 2024-12-17 VITALS — RESPIRATION RATE: 20 BRPM | HEART RATE: 95 BPM | OXYGEN SATURATION: 95 %

## 2024-12-17 DIAGNOSIS — J33.9 NASAL POLYPS: ICD-10-CM

## 2024-12-17 DIAGNOSIS — L20.89 OTHER ATOPIC DERMATITIS: Primary | ICD-10-CM

## 2024-12-17 DIAGNOSIS — J32.4 CHRONIC PANSINUSITIS: ICD-10-CM

## 2024-12-17 DIAGNOSIS — J45.40 MODERATE PERSISTENT ASTHMA WITHOUT COMPLICATION: ICD-10-CM

## 2024-12-17 PROCEDURE — 99213 OFFICE O/P EST LOW 20 MIN: CPT | Performed by: ALLERGY & IMMUNOLOGY

## 2024-12-17 RX ORDER — DUPILUMAB 300 MG/2ML
300 INJECTION, SOLUTION SUBCUTANEOUS
Qty: 4 ML | Refills: 5 | Status: SHIPPED | OUTPATIENT
Start: 2024-12-17

## 2024-12-17 RX ORDER — FLUTICASONE FUROATE, UMECLIDINIUM BROMIDE AND VILANTEROL TRIFENATATE 200; 62.5; 25 UG/1; UG/1; UG/1
1 POWDER RESPIRATORY (INHALATION) DAILY
Qty: 90 EACH | Refills: 1 | Status: SHIPPED | OUTPATIENT
Start: 2024-12-17

## 2024-12-17 RX ORDER — MOMETASONE FUROATE MONOHYDRATE 50 UG/1
2 SPRAY, METERED NASAL DAILY
Qty: 51 G | Refills: 1 | Status: SHIPPED | OUTPATIENT
Start: 2024-12-17

## 2024-12-17 ASSESSMENT — ASTHMA QUESTIONNAIRES
ACT_TOTALSCORE: 22
QUESTION_3 LAST FOUR WEEKS HOW OFTEN DID YOUR ASTHMA SYMPTOMS (WHEEZING, COUGHING, SHORTNESS OF BREATH, CHEST TIGHTNESS OR PAIN) WAKE YOU UP AT NIGHT OR EARLIER THAN USUAL IN THE MORNING: NOT AT ALL
ACT_TOTALSCORE: 22
ACUTE_EXACERBATION_TODAY: NO
QUESTION_4 LAST FOUR WEEKS HOW OFTEN HAVE YOU USED YOUR RESCUE INHALER OR NEBULIZER MEDICATION (SUCH AS ALBUTEROL): ONCE A WEEK OR LESS
QUESTION_1 LAST FOUR WEEKS HOW MUCH OF THE TIME DID YOUR ASTHMA KEEP YOU FROM GETTING AS MUCH DONE AT WORK, SCHOOL OR AT HOME: A LITTLE OF THE TIME
QUESTION_2 LAST FOUR WEEKS HOW OFTEN HAVE YOU HAD SHORTNESS OF BREATH: ONCE OR TWICE A WEEK
QUESTION_5 LAST FOUR WEEKS HOW WOULD YOU RATE YOUR ASTHMA CONTROL: COMPLETELY CONTROLLED

## 2024-12-17 NOTE — LETTER
12/17/2024      Laxmi Ya  [address] [place] [id number]      Dear Colleague,    Thank you for referring your patient, Laxmi Ya, to the [facility]. Please see a copy of my visit note below.          Subjective  Laxmi is a 49 year old, presenting for the following health issues:  No chief complaint on file.    HPI     Chief complaint: Follow-up asthma, chronic sinus disease and nasal polyps, eczema    History of present illness: This is a pleasant 49-year-old woman with a history of chronic sinus disease with nasal polyps, eczema and asthma here today for follow-up visit.  Currently using Dupixent 300 mg every 2 weeks.  She reports that with this her eczema is well-controlled.  Her asthma is controlled.  She is also on Trelegy 1 puff daily.  She notes that her sinus symptoms continue to be well-controlled on Dupixent as well.  She does use Nasonex.  Denies any eye irritation other skin rashes numbness and tingling in her limbs.  Recent eosinophil count was normal.  Of note she is currently on a Medrol dose pack for her rheumatoid arthritis.          Objective   There were no vitals taken for this visit.  There is no height or weight on file to calculate BMI.  Physical Exam     Gen: Pleasant female not in acute distress  HEENT: Eyes no erythema of the bulbar or palpebral conjunctiva, no edema Nose: No congestion, mucosa normal. Mouth: Throat clear, no lip or tongue edema.   Respiratory: Clear to auscultation bilaterally, no adventitious breath sounds    Psych: Alert and oriented times 3    Impression report and plan:  1.  Chronic sinus disease with nasal polyps  2.  Moderate persistent asthma  3.  Eczema    Continue Dupixent 3 mg every 2 weeks.  Follow in 6 months. reviewed risk of eye irritation and eosinophilic granulomatous polyangitis and cutaneous t-cell lymphoma .  Patient is currently on immunosuppressant for rheumatoid arthritis, stated we do not understand the drug-drug  interactions between these 2 medications and she has understands this and agrees to continue.            Signed Electronically by: Nelia Gann MD        Again, thank you for allowing me to participate in the care of your patient.        Sincerely,        Nelia Gann MD

## 2024-12-17 NOTE — PROGRESS NOTES
Subjective   Laxmi is a 49 year old, presenting for the following health issues:  No chief complaint on file.    HPI     Chief complaint: Follow-up asthma, chronic sinus disease and nasal polyps, eczema    History of present illness: This is a pleasant 49-year-old woman with a history of chronic sinus disease with nasal polyps, eczema and asthma here today for follow-up visit.  Currently using Dupixent 300 mg every 2 weeks.  She reports that with this her eczema is well-controlled.  Her asthma is controlled.  She is also on Trelegy 1 puff daily.  She notes that her sinus symptoms continue to be well-controlled on Dupixent as well.  She does use Nasonex.  Denies any eye irritation other skin rashes numbness and tingling in her limbs.  Recent eosinophil count was normal.  Of note she is currently on a Medrol dose pack for her rheumatoid arthritis.          Objective    There were no vitals taken for this visit.  There is no height or weight on file to calculate BMI.  Physical Exam     Gen: Pleasant female not in acute distress  HEENT: Eyes no erythema of the bulbar or palpebral conjunctiva, no edema Nose: No congestion, mucosa normal. Mouth: Throat clear, no lip or tongue edema.   Respiratory: Clear to auscultation bilaterally, no adventitious breath sounds    Psych: Alert and oriented times 3    Impression report and plan:  1.  Chronic sinus disease with nasal polyps  2.  Moderate persistent asthma  3.  Eczema    Continue Dupixent 3 mg every 2 weeks.  Follow in 6 months. reviewed risk of eye irritation and eosinophilic granulomatous polyangitis and cutaneous t-cell lymphoma .  Patient is currently on immunosuppressant for rheumatoid arthritis, stated we do not understand the drug-drug interactions between these 2 medications and she has understands this and agrees to continue.            Signed Electronically by: Nelia Gann MD

## 2024-12-18 ENCOUNTER — MYC MEDICAL ADVICE (OUTPATIENT)
Dept: ORTHOPEDICS | Facility: CLINIC | Age: 49
End: 2024-12-18

## 2024-12-23 NOTE — TELEPHONE ENCOUNTER
"Patient asking for a refill on medrol 4mg - 3 tablets per day.  She states the pelvic and hip pain is better, but not gone.  Achy pain going down into her legs. She is hoping to get a refill to \"get her thru the holidays\".  RX is pended.     Macy Sawyer RN    "

## 2024-12-23 NOTE — TELEPHONE ENCOUNTER
Patient requesting a call back from Macy. She would like to discuss if another refill would be possible? Please call back to advise. Thank you.    *methylPREDNISolone (MEDROL) 4 MG tablet     [facility]Y #[id number] - [place], [place] - [address]

## 2024-12-24 RX ORDER — METHYLPREDNISOLONE 4 MG/1
TABLET ORAL
Qty: 30 TABLET | Refills: 0 | Status: SHIPPED | OUTPATIENT
Start: 2024-12-24

## 2024-12-30 NOTE — TELEPHONE ENCOUNTER
Other: Patient is requesting call back.      Could we send this information to you in [employer] or would you prefer to receive a phone call?:   Patient would prefer a phone call   Okay to leave a detailed message?: Yes at Cell number on file:    Telephone Information:   Mobile [phone]

## 2024-12-31 ENCOUNTER — INFUSION THERAPY VISIT (OUTPATIENT)
Dept: INFUSION THERAPY | Facility: CLINIC | Age: 49
End: 2024-12-31
Attending: INTERNAL MEDICINE
Payer: MEDICARE

## 2024-12-31 VITALS
HEART RATE: 88 BPM | TEMPERATURE: 97.7 F | DIASTOLIC BLOOD PRESSURE: 85 MMHG | SYSTOLIC BLOOD PRESSURE: 124 MMHG | RESPIRATION RATE: 16 BRPM | OXYGEN SATURATION: 96 %

## 2024-12-31 DIAGNOSIS — M06.00 SERONEGATIVE RHEUMATOID ARTHRITIS (H): ICD-10-CM

## 2024-12-31 DIAGNOSIS — E55.9 VITAMIN D DEFICIENCY: ICD-10-CM

## 2024-12-31 DIAGNOSIS — R25.2 MUSCLE CRAMPS: ICD-10-CM

## 2024-12-31 DIAGNOSIS — E27.49 SECONDARY ADRENAL INSUFFICIENCY (H): ICD-10-CM

## 2024-12-31 DIAGNOSIS — M13.80 SERONEGATIVE INFLAMMATORY ARTHRITIS: Primary | ICD-10-CM

## 2024-12-31 LAB
ALBUMIN SERPL BCG-MCNC: 4.5 G/DL (ref 3.5–5.2)
ALP SERPL-CCNC: 46 U/L (ref 40–150)
ALT SERPL W P-5'-P-CCNC: 105 U/L (ref 0–50)
ANION GAP SERPL CALCULATED.3IONS-SCNC: 14 MMOL/L (ref 7–15)
AST SERPL W P-5'-P-CCNC: 36 U/L (ref 0–45)
BILIRUB SERPL-MCNC: 1 MG/DL
BUN SERPL-MCNC: 20.3 MG/DL (ref 6–20)
CALCIUM SERPL-MCNC: 9.6 MG/DL (ref 8.8–10.4)
CHLORIDE SERPL-SCNC: 102 MMOL/L (ref 98–107)
CREAT SERPL-MCNC: 0.86 MG/DL (ref 0.51–0.95)
EGFRCR SERPLBLD CKD-EPI 2021: 83 ML/MIN/1.73M2
GLUCOSE SERPL-MCNC: 118 MG/DL (ref 70–99)
HCO3 SERPL-SCNC: 23 MMOL/L (ref 22–29)
MAGNESIUM SERPL-MCNC: 1.9 MG/DL (ref 1.7–2.3)
POTASSIUM SERPL-SCNC: 3.9 MMOL/L (ref 3.4–5.3)
PROT SERPL-MCNC: 6.8 G/DL (ref 6.4–8.3)
SODIUM SERPL-SCNC: 139 MMOL/L (ref 135–145)
TSH SERPL DL<=0.005 MIU/L-ACNC: 2.42 UIU/ML (ref 0.3–4.2)
VIT D+METAB SERPL-MCNC: 57 NG/ML (ref 20–50)

## 2024-12-31 PROCEDURE — 96365 THER/PROPH/DIAG IV INF INIT: CPT

## 2024-12-31 PROCEDURE — 258N000003 HC RX IP 258 OP 636: Performed by: INTERNAL MEDICINE

## 2024-12-31 PROCEDURE — 250N000013 HC RX MED GY IP 250 OP 250 PS 637: Performed by: INTERNAL MEDICINE

## 2024-12-31 PROCEDURE — 83735 ASSAY OF MAGNESIUM: CPT

## 2024-12-31 PROCEDURE — 250N000011 HC RX IP 250 OP 636: Performed by: INTERNAL MEDICINE

## 2024-12-31 PROCEDURE — 84443 ASSAY THYROID STIM HORMONE: CPT

## 2024-12-31 PROCEDURE — 96375 TX/PRO/DX INJ NEW DRUG ADDON: CPT

## 2024-12-31 PROCEDURE — 82306 VITAMIN D 25 HYDROXY: CPT

## 2024-12-31 PROCEDURE — 36415 COLL VENOUS BLD VENIPUNCTURE: CPT

## 2024-12-31 RX ORDER — HEPARIN SODIUM (PORCINE) LOCK FLUSH IV SOLN 100 UNIT/ML 100 UNIT/ML
5 SOLUTION INTRAVENOUS
OUTPATIENT
Start: 2025-01-03

## 2024-12-31 RX ORDER — HEPARIN SODIUM,PORCINE 10 UNIT/ML
5-20 VIAL (ML) INTRAVENOUS DAILY PRN
OUTPATIENT
Start: 2025-01-03

## 2024-12-31 RX ORDER — EPINEPHRINE 1 MG/ML
0.3 INJECTION, SOLUTION INTRAMUSCULAR; SUBCUTANEOUS EVERY 5 MIN PRN
OUTPATIENT
Start: 2025-01-03

## 2024-12-31 RX ORDER — DIPHENHYDRAMINE HYDROCHLORIDE 50 MG/ML
50 INJECTION INTRAMUSCULAR; INTRAVENOUS
Start: 2025-01-03

## 2024-12-31 RX ORDER — ALBUTEROL SULFATE 90 UG/1
1-2 INHALANT RESPIRATORY (INHALATION)
Start: 2025-01-03

## 2024-12-31 RX ORDER — METHYLPREDNISOLONE SODIUM SUCCINATE 125 MG/2ML
37.5 INJECTION INTRAMUSCULAR; INTRAVENOUS ONCE
Status: COMPLETED | OUTPATIENT
Start: 2024-12-31 | End: 2024-12-31

## 2024-12-31 RX ORDER — ACETAMINOPHEN 325 MG/1
650 TABLET ORAL ONCE
Status: COMPLETED | OUTPATIENT
Start: 2024-12-31 | End: 2024-12-31

## 2024-12-31 RX ORDER — DIPHENHYDRAMINE HCL 25 MG
25 CAPSULE ORAL ONCE
OUTPATIENT
Start: 2025-01-03

## 2024-12-31 RX ORDER — DIPHENHYDRAMINE HYDROCHLORIDE 50 MG/ML
25 INJECTION INTRAMUSCULAR; INTRAVENOUS
Start: 2025-01-03

## 2024-12-31 RX ORDER — METHYLPREDNISOLONE SODIUM SUCCINATE 125 MG/2ML
37.5 INJECTION INTRAMUSCULAR; INTRAVENOUS ONCE
OUTPATIENT
Start: 2025-01-03

## 2024-12-31 RX ORDER — ALBUTEROL SULFATE 0.83 MG/ML
2.5 SOLUTION RESPIRATORY (INHALATION)
OUTPATIENT
Start: 2025-01-03

## 2024-12-31 RX ORDER — ACETAMINOPHEN 325 MG/1
650 TABLET ORAL ONCE
OUTPATIENT
Start: 2025-01-03

## 2024-12-31 RX ORDER — MEPERIDINE HYDROCHLORIDE 25 MG/ML
25 INJECTION INTRAMUSCULAR; INTRAVENOUS; SUBCUTANEOUS
OUTPATIENT
Start: 2025-01-03

## 2024-12-31 RX ORDER — METHYLPREDNISOLONE SODIUM SUCCINATE 40 MG/ML
40 INJECTION INTRAMUSCULAR; INTRAVENOUS
Start: 2025-01-03

## 2024-12-31 RX ADMIN — ACETAMINOPHEN 650 MG: 325 TABLET ORAL at 12:45

## 2024-12-31 RX ADMIN — METHYLPREDNISOLONE SODIUM SUCCINATE 37.5 MG: 125 INJECTION, POWDER, FOR SOLUTION INTRAMUSCULAR; INTRAVENOUS at 12:45

## 2024-12-31 RX ADMIN — TOCILIZUMAB 800 MG: 20 INJECTION, SOLUTION, CONCENTRATE INTRAVENOUS at 13:12

## 2024-12-31 RX ADMIN — SODIUM CHLORIDE 250 ML: 9 INJECTION, SOLUTION INTRAVENOUS at 14:10

## 2024-12-31 ASSESSMENT — PAIN SCALES - GENERAL: PAINLEVEL_OUTOF10: WORST PAIN (10)

## 2024-12-31 NOTE — TELEPHONE ENCOUNTER
Spoke with patient. Assisted in scheduling her for the next 3 injection sessions.      Patient informed that on the 1/23/25 date, Dr Petit has a very tight schedule and has to be done by 7:40 AM.  If patient is not able to be here by 7:15 AM that day she might not be able to see Dr Petit.    Patient states it is difficult for her to get here that early but she verbalizes understanding and wishes to take that appointment slot. Heather Spivey RN

## 2024-12-31 NOTE — PROGRESS NOTES
Infusion Nursing Note:  Laxmi Ya presents today for   Chief Complaint   Patient presents with    Infusion     RHEUM - Tocilizumab (Actemra)       Patient seen by provider today: No   present during visit today: No    Note:   -Orders from Oswaldo Lemus MD completed. Frequency: every 4 weeks; patient's last infusion was 12/3/24.  -[facility] by [facility]  -Premeds:    650mg Tylenol   37.5mg Solu-medrol IVP, given ~20min after patient took her NPH insulin  -800mg Actemra IV over 60min.  -250ml NS IV over 30min following Actemra.    Intravenous Access:  PIV placed in Lt forearm by [facility].    Treatment Conditions:  Biological Infusion Checklist:  ~~~ NOTE: If the patient answers yes to any of the questions below, hold the infusion and contact ordering provider or on-call provider.    Have you recently had an elevated temperature, fever, chills, productive cough, coughing for 3 weeks or longer or hemoptysis,  abnormal vital signs, night sweats,  chest pain or have you noticed a decrease in your appetite, unexplained weight loss or fatigue? No  Do you have any open wounds or new incisions? No  Do you have any upcoming hospitalizations or surgeries? Does not include esophagogastroduodenoscopy, colonoscopy, endoscopic retrograde cholangiopancreatography (ERCP), endoscopic ultrasound (EUS), dental procedures or joint aspiration/steroid injections No  Do you currently have any signs of illness or infection or are you on any antibiotics? No  Have you had any new, sudden or worsening abdominal pain? No  Have you or anyone in your household received a live vaccination in the past 4 weeks? Please note: No live vaccines while on biologic/chemotherapy until 6 months after the last treatment. Patient can receive the flu vaccine (shot only), pneumovax and the Covid vaccine. It is optimal for the patient to get these vaccines mid cycle, but they can be given at any time as long as it is not on the day of the  infusion. No  Have you recently been diagnosed with any new nervous system diseases (ie. Multiple sclerosis, Guillain [place], seizures, neurological changes) or cancer diagnosis? Are you on any form of radiation or chemotherapy? No  Are you pregnant or breast feeding or do you have plans of pregnancy in the future? No  Have you been having any signs of worsening depression or suicidal ideations?  (benlysta only) N/A  Have there been any other new onset medical symptoms? No  Have you had any new blood clots? (IVIG only) N/A    Post Infusion Assessment:  Patient tolerated infusion without incident.  Blood return noted pre and post infusion.  Site patent and intact, free from redness, edema or discomfort.  No evidence of extravasations.  Access discontinued per protocol.     Discharge Plan:   Discharge instructions reviewed with: Patient.  Patient and/or family verbalized understanding of discharge instructions and all questions answered.  AVS to patient via MYCHART.    Patient discharged in stable condition accompanied by: self.  Departure Mode: Ambulatory.    Future Appointments   Date Time Provider Department Center   1/28/2025 12:00 PM  [facility] INFUSION NURSE [facility]   2/25/2025 12:00 PM  [facility] INFUSION NURSE [facility]   3/25/2025 12:00 PM [facility] INFUSION NURSE [facility]       Kaley Handley, RN    /85   Pulse 88   Temp 97.7  F (36.5  C)   Resp 16   SpO2 96%     Administrations This Visit       acetaminophen (TYLENOL) tablet 650 mg       Admin Date  12/31/2024 Action  $Given Dose  650 mg Route  Oral Documented By  Jacquelyn Nicole, LEXI              methylPREDNISolone Na Suc (solu-MEDROL) injection 37.5 mg       Admin Date  12/31/2024 Action  $Given Dose  37.5 mg Route  Intravenous Documented By  Jacquelyn Nicole, LEXI              sodium chloride 0.9% BOLUS 250 mL       Admin Date  12/31/2024 Action  $New Bag Dose  250 mL Rate  500 mL/hr Route  Intravenous Documented By  Rashmi Domingo RN               tocilizumab (ACTEMRA) 800 mg in sodium chloride 0.9 % 150 mL infusion       Admin Date  12/31/2024 Action  $New Bag Dose  800 mg Rate  150 mL/hr Route  Intravenous Documented By  Kaley Handley, LEXI                  Drug Administration Record  Drug Name: Solu-medrol  Dose: 37.5mg  Route Administered: IVP  NDC#: 1715-2642-00  Amount of waste (mL): 1.4ml  Reason for waste: single dose vial

## 2025-01-02 ENCOUNTER — THERAPY VISIT (OUTPATIENT)
Dept: PHYSICAL THERAPY | Facility: CLINIC | Age: 50
End: 2025-01-02
Payer: MEDICARE

## 2025-01-02 DIAGNOSIS — R10.2 PELVIC PAIN IN FEMALE: Primary | ICD-10-CM

## 2025-01-02 DIAGNOSIS — M54.2 NECK PAIN: ICD-10-CM

## 2025-01-02 DIAGNOSIS — M54.50 CHRONIC BILATERAL LOW BACK PAIN WITHOUT SCIATICA: ICD-10-CM

## 2025-01-02 DIAGNOSIS — G89.29 CHRONIC BILATERAL LOW BACK PAIN WITHOUT SCIATICA: ICD-10-CM

## 2025-01-02 NOTE — PATIENT INSTRUCTIONS
"Recommendations from today's MTM visit:                                                       1. Continue all medications as prescribed. We discussed the combination of Actemra, famciclovir and voriconazole is likely causing the increase in your LFTs.    2. Repeat liver labs in 1 week.    Follow-up: Return in about 3 months (around 3/6/2025) for MTM Pharmacist Visit.    It was great speaking with you today.  I value your experience and would be very thankful for your time in providing feedback in our clinic survey. In the next few days, you may receive an email or text message from [employer] with a link to a survey related to your  clinical pharmacist.\"     To schedule another MTM appointment, please call the clinic directly or you may call the MTM scheduling line at [phone].    My Clinical Pharmacist's contact information:                                                      Please feel free to contact me with any questions or concerns you have.      Yuliya Kaplan, PharmD  Medication Therapy Management Pharmacist  [facility]  Phone: [phone]     "

## 2025-01-02 NOTE — PROGRESS NOTES
Medication Therapy Management (MTM) Encounter    ASSESSMENT:                            Medication Adherence/Access: No issues identified.    Rheumatoid Arthritis: Discussed recently LFT elevations are unlikely to be due to Actemra as she has tolerated this medication well previously. Recommend continuing Actemra infusions every 28 days.    HSV Infection/Candida Glabrata Infection: Reviewed 12/3 LFTs in depth and discussed that even though they are higher than the lab assigned normal range they are not 3x the normal limit and does not require medication changes right now. Recommend continuing famciclovir and voriconazole as prescribed. Will order a repeat CMP to be completed in a week per patient preference.    PLAN:                            1. Continue all medications as prescribed. We discussed the combination of Actemra, famciclovir and voriconazole is likely causing the increase in your LFTs.    2. Repeat liver labs in 1 week.    Follow-up: Return in about 3 months (around 3/6/2025) for MTM Pharmacist Visit.    SUBJECTIVE/OBJECTIVE:                          Laxmi Ya is a 49 year old female seen for a follow-up visit.       Reason for visit: Concerned about elevated LFTs on 12/3 labs    Allergies/ADRs: Reviewed in chart  Past Medical History: Reviewed in chart  Tobacco: She reports that she has never smoked. She has never used smokeless tobacco.  Alcohol: Less than 1 beverages / week    Medication Adherence/Access: no issues reported.    Rheumatoid Arthritis:   Actemra 800 mg infusion monthly (last infusion 12/03/24)   Diclofenac 1% gel as needed (uses once per day a few times per week)  Oxycodone 10 mg 2-4 times daily as needed     Reports she is doing well after getting her Actemra infusion. Not planning on using self-injection unless IV option is not available again. No side effects or concerns noted. Most concerned about recent LFT elevations. Does not think it is due to Actemra as LFTs has been stable  all other times she has taken this medication.     Liver Function Studies -   Recent Labs   Lab Test 12/03/24  1249   PROTTOTAL 6.4   ALBUMIN 4.3   BILITOTAL 0.4   ALKPHOS 44   AST 79*   *      CBC RESULTS:   Recent Labs   Lab Test 12/03/24  1249   WBC 9.7   RBC 4.81   HGB 14.6   HCT 42.4   MCV 88   MCH 30.4   MCHC 34.4   RDW 13.1         HSV Infection/Candida Glabrata Infection:   Famciclovir 500 mg twice daily    Voriconazole 400 mg twice daily day 1 then 200 mg twice daily days 2-14      Reports she has been tolerating famciclovir well - no GI side effects noted. Does have flare dosing if needed. Recently started voriconazole - feels it is working very well, has already noticed improvement in itching symptoms. Concerned it is increasing her LFTs.     Creatinine   Date Value Ref Range Status   12/03/2024 0.91 0.51 - 0.95 mg/dL Final   04/01/2020 0.73 0.52 - 1.04 mg/dL Final      Today's Vitals: There were no vitals taken for this visit.  ----------------    I spent 19 minutes with this patient today. All changes were made via collaborative practice agreement with Oswaldo Lemus.     A summary of these recommendations was sent via [employer].    Yuliya Kaplan, PharmD  Medication Therapy Management Pharmacist  [facility]  Phone: [phone]    Telemedicine Visit Details  The patient's medications can be safely assessed via a telemedicine encounter.  Type of service:  Telephone visit  Originating Location (pt. Location): Home    Distant Location (provider location):  On-site  Start Time: 2:00 PM  End Time: 2:19 PM     Medication Therapy Recommendations  HSV-1 (herpes simplex virus 1) infection   1 Current Medication: famciclovir (FAMVIR) 500 MG tablet   Current Medication Sig: Take 1 tablet (500 mg) by mouth 2 times daily.   Rationale: Medication requires monitoring - Needs additional monitoring - Safety   Recommendation: Order Lab - Repeat CMP   Status: Accepted per CPA    Identified Date: 12/6/2024 Completed Date: 12/6/2024

## 2025-01-03 DIAGNOSIS — J32.4 CHRONIC PANSINUSITIS: ICD-10-CM

## 2025-01-03 DIAGNOSIS — J33.9 NASAL POLYPS: ICD-10-CM

## 2025-01-03 NOTE — TELEPHONE ENCOUNTER
Can we get a new refill send to [facility] since patient is now filling with them. Patient is due for an inject on Tuesday so we need a new prescription today please and thank you.

## 2025-01-06 DIAGNOSIS — M06.09 SERONEGATIVE RHEUMATOID ARTHRITIS OF MULTIPLE SITES (H): Primary | ICD-10-CM

## 2025-01-06 RX ORDER — METHYLPREDNISOLONE 4 MG/1
12 TABLET ORAL DAILY
Qty: 15 TABLET | Refills: 0 | Status: SHIPPED | OUTPATIENT
Start: 2025-01-06

## 2025-01-06 RX ORDER — DUPILUMAB 300 MG/2ML
300 INJECTION, SOLUTION SUBCUTANEOUS
Qty: 4 ML | Refills: 5 | Status: SHIPPED | OUTPATIENT
Start: 2025-01-06

## 2025-01-06 NOTE — TELEPHONE ENCOUNTER
"Patient returned call.  She finished medrol 12mg yesterday.  See 12/13/24 telephone message for reference.  Per notes,   \"For flaring arthritis, plan medrol 12 mg daily for 10 days, then off.\" Patient is requesting to be tapered off the medication over 5 days.  States she cannot just stop the medication as she will \"be sleeping all day\".  She notes that her pain is better.  Going to follow up with pain medicine and OB/GYN.  Uses [facility].  RX is pended, please add sig if appropriate.     Macy Sawyer RN    "

## 2025-01-06 NOTE — TELEPHONE ENCOUNTER
[facility] Call Center    Phone Message    May a detailed message be left on voicemail: yes     Reason for Call: Medication Refill Request    Has the patient contacted the pharmacy for the refill? Yes   Name of medication being requested: Prednisone  Provider who prescribed the medication: Dr. Oswaldo Lemus  Pharmacy: [facility]  Date medication is needed: ASAP    Pt calling to update Dr. Lemus/Macy that she is feeling better now, but she did have to use the entire Rx of prednisone that she was given. She is now looking for a refill with instructions for tapering down.   Pt is completely out now, so is hoping this can be taken care of as soon as possible.     Action Taken: Other: Rheum    Travel Screening: Not Applicable

## 2025-01-06 NOTE — TELEPHONE ENCOUNTER
"Message left for the patient, per 12/13/24 telephone note, \"For flaring arthritis, plan medrol 12 mg daily for 10 days, then off.\"  Advised there is no taper and that she should finish the 10 day course.    Macy Sawyer RN      "

## 2025-01-07 ENCOUNTER — THERAPY VISIT (OUTPATIENT)
Dept: PHYSICAL THERAPY | Facility: CLINIC | Age: 50
End: 2025-01-07
Payer: MEDICARE

## 2025-01-07 DIAGNOSIS — G89.29 CHRONIC BILATERAL LOW BACK PAIN WITHOUT SCIATICA: ICD-10-CM

## 2025-01-07 DIAGNOSIS — R10.2 PELVIC PAIN IN FEMALE: Primary | ICD-10-CM

## 2025-01-07 DIAGNOSIS — M54.50 CHRONIC BILATERAL LOW BACK PAIN WITHOUT SCIATICA: ICD-10-CM

## 2025-01-07 DIAGNOSIS — M54.2 NECK PAIN: ICD-10-CM

## 2025-01-07 PROCEDURE — 97012 MECHANICAL TRACTION THERAPY: CPT | Mod: GP | Performed by: PHYSICAL THERAPIST

## 2025-01-07 PROCEDURE — 97140 MANUAL THERAPY 1/> REGIONS: CPT | Mod: GP | Performed by: PHYSICAL THERAPIST

## 2025-01-08 DIAGNOSIS — E27.49 SECONDARY ADRENAL INSUFFICIENCY: ICD-10-CM

## 2025-01-08 RX ORDER — PREDNISONE 5 MG/1
5 TABLET ORAL DAILY
Qty: 90 TABLET | Refills: 1 | Status: SHIPPED | OUTPATIENT
Start: 2025-01-08

## 2025-01-08 NOTE — TELEPHONE ENCOUNTER
Last Written Prescription Date:  8/12/24  Last Fill Quantity: 30,  # refills: 5   Last office visit: 6/19/2023 ; last virtual visit: 12/16/2024 with prescribing provider:  Dr. Leroy   Future Office Visit: 6/16/25     Requested Prescriptions   Pending Prescriptions Disp Refills    predniSONE (DELTASONE) 5 MG tablet [Pharmacy Med Name: PREDNISONE 5 MG TABLET] 90 tablet 1     Sig: TAKE 1 TABLET BY MOUTH EVERY DAY       There is no refill protocol information for this order        Refills sent  Kristy Jama RN

## 2025-01-09 DIAGNOSIS — M79.645 BILATERAL THUMB PAIN: Primary | ICD-10-CM

## 2025-01-09 DIAGNOSIS — M79.644 BILATERAL THUMB PAIN: Primary | ICD-10-CM

## 2025-01-16 ENCOUNTER — THERAPY VISIT (OUTPATIENT)
Dept: OCCUPATIONAL THERAPY | Facility: CLINIC | Age: 50
End: 2025-01-16
Payer: MEDICARE

## 2025-01-16 DIAGNOSIS — M79.641 BILATERAL HAND PAIN: Primary | ICD-10-CM

## 2025-01-16 DIAGNOSIS — M79.642 BILATERAL HAND PAIN: Primary | ICD-10-CM

## 2025-01-22 ENCOUNTER — THERAPY VISIT (OUTPATIENT)
Dept: OCCUPATIONAL THERAPY | Facility: CLINIC | Age: 50
End: 2025-01-22
Attending: INTERNAL MEDICINE
Payer: MEDICARE

## 2025-01-22 ENCOUNTER — THERAPY VISIT (OUTPATIENT)
Dept: PHYSICAL THERAPY | Facility: CLINIC | Age: 50
End: 2025-01-22
Payer: MEDICARE

## 2025-01-22 DIAGNOSIS — I89.0 LYMPHEDEMA: Primary | ICD-10-CM

## 2025-01-22 DIAGNOSIS — M54.2 NECK PAIN: ICD-10-CM

## 2025-01-22 DIAGNOSIS — R10.2 PELVIC PAIN IN FEMALE: Primary | ICD-10-CM

## 2025-01-22 DIAGNOSIS — M54.50 CHRONIC BILATERAL LOW BACK PAIN WITHOUT SCIATICA: ICD-10-CM

## 2025-01-22 DIAGNOSIS — G89.29 CHRONIC BILATERAL LOW BACK PAIN WITHOUT SCIATICA: ICD-10-CM

## 2025-01-22 PROCEDURE — 97140 MANUAL THERAPY 1/> REGIONS: CPT | Mod: GO | Performed by: OCCUPATIONAL THERAPIST

## 2025-01-22 PROCEDURE — 97140 MANUAL THERAPY 1/> REGIONS: CPT | Mod: GP | Performed by: PHYSICAL THERAPIST

## 2025-01-22 NOTE — PROGRESS NOTES
01/22/25 0500   Progress Note/Certification   Start Of Care Date 09/07/23   Onset of Illness/Injury or Date of Surgery 09/07/22   Therapy Frequency 2x/week   Predicted Duration 12 weeks   Certification date from 01/17/25   Certification date to 04/09/25   Progress Note Due Date   (10 visits)   Goals   OT Goals 1;2;3;4;5;6   OT Goal 1   Goal Identifier 1   Goal Description In order to improve functional mobility for activities of living, by the completion of intensive treatment, patient and/or caregiver will;      Verbalize and/or demonstrate understanding of techniques to independently manage their lymphedema at home   Rationale In order to maximize safety and independence with performance of self-care activities   Target Date 05/22/24   OT Goal 2   Goal Identifier 1a   Goal Description demonstrate independence in performing prescribed exercises/self MLD to facilate the lymph system   Rationale In order to maximize safety and independence with performance of self-care activities   Target Date 08/07/24   OT Goal 3   Goal Identifier 1b   Goal Description be independent in donning/doffing, wearing schedule, and care of compression garments   Rationale In order to maximize safety and independence with performance of self-care activities   Target Date 11/30/23   Date Met 02/15/24   OT Goal 4   Goal Identifier 2   Goal Description Pt will decrease the suprapubic swelling to perform hygiene/shave with a normal effort.   Rationale In order to maximize safety and independence with performance of self-care activities   Target Date 08/07/24   Date Met 01/22/25   OT Goal 5   Goal Identifier 3   Goal Description Pt oracio improve movement of shoulders and trunk to perform toilet hygiene.   Rationale In order to maximize safety and independence with performance of self-care activities   Target Date 05/22/24   Date Met 05/15/24   OT Goal 6   Goal Identifier 4   Goal Description Pt wishes to walk without the pinching of her  swelling in the groin/inguinals that creates pain 10/10 with every step.   Rationale In order to maximize safety and independence with performance of self-care activities   Target Date 04/09/25   Subjective Report   Subjective Report Pt reports suprapubic area has gotten so large that as she sits on a toilet, the suprapubic area will touch the toilet and is painful.         [facility]                                                                                   OUTPATIENT OCCUPATIONAL THERAPY    PLAN OF TREATMENT FOR OUTPATIENT REHABILITATION   Patient's Last Name, First Name, Laxmi Coleman YOB: 1975   Provider's Name   [facility]   Medical Record No.  [id number]     Onset Date: 09/07/22 Start of Care Date: 09/07/23     Medical Diagnosis:  edema, will clarify to lymphedema      OT Treatment Diagnosis:  lymphedema Plan of Treatment  Frequency/Duration:2x/week/12 weeks    Certification date from 01/17/25   To 04/09/25        See note for plan of treatment details and functional goals     Alanis Schilling OT                         I CERTIFY THE NEED FOR THESE SERVICES FURNISHED UNDER        THIS PLAN OF TREATMENT AND WHILE UNDER MY CARE .             Physician Signature               Date    X_____________________________________________________                  Referring Provider:  Dr. Amol Blake    Initial Assessment  See Epic Evaluation- 09/07/23

## 2025-01-22 NOTE — PROGRESS NOTES
Chief Complaint:   Chief Complaint   Patient presents with    RECHECK     Bilateral repeat cubital tunnel injections       Referring Physician: No ref. provider found    Diagnosis: bilateral thumb CMC/STT arthritis, bilateral carpal tunnel syndrome, bilateral cubital tunnel syndrome  Treatment:   1/28/2025: bilateral carpal tunnel steroid injections (Dr. Morrison)  1/23/2025: bilateral thumb CMC and STT injections (Dr. Petit)  10/24/2024: bilateral thumb CMC and STT steroid injections (Dr. Petit)  10/9/2024: bilateral thumb CMC steroid injection, cubital tunnel steroid injections  8/15/2024: bilateral STT steroid injection (Dr. Petit)  6/6/2024: bilateral carpal tunnel steroid injection (Dr. Petit)  5/30/2024: bilateral STT steroid injection (Dr. Petit)  5/21/2024: bilateral cubital tunnel steroid injections  12/27/2023: bilateral carpal tunnel steroid injection  12/21/2023: bilateral STT steroid injection (Dr. Petit)  11/1/2023: bilateral carpal tunnel steroid injection  10/18/2023: Bilateral thumb CMC steroid injection  9/20/2023: bilateral STT steroid injection  5/4/2023: bilateral carpal tunnel steroid injection (Dr. Petit)  2/16/2023: bilateral STT steroid injection (Dr. Petit)  12/13/2022: bilateral carpal tunnel steroid injection (Dr. Sullivan)  10/27/2022: bilateral STT steroid injections (Dr. Petit)  9/13/2022: bilateral carpal tunnel steroid injection (Dr. Sullivan)  6/30/2022: bilateral STT steroid injections (Dr. Petit)  Bilateral carpal tunnel steroid injections (outside)    History of Present Illness: Laxmi Ya is a 48 year old RHD female presenting for evaluation of bilateral hand and wrist pain. She's had history of carpal tunnel steroid injections and releases, she continues to feel some numbness/tingling in her fingers. Today she'd like to have steroid injections into the STT region.    Clinical documentation by Dr. Petit on 5/4/2023 was  reviewed.    10/18/2023: reports the last STT injections helped.  She presents today for bilateral thumb CMC steroid injections.  She continues to have pain in bilateral thumbs.  She also continues to have numbness/tingling in the median nerve distribution.    11/1/2023: reports the last thumb CMC steroid injections helped. Presents today for bilateral carpal tunnel steroid injections.    12/27/2023: recent STT injections with Dr. Petit which she says were helpful. Last carpal tunnel steroid injections were helpful and she would like to do this again. Bilateral thumb CMCs feel good and the steroid injections have not worn off yet.    2/13/2024: recently had pneumonia. Right hand is feeling pretty good but left hand is having more carpal tunnel symptoms. Both hands are having mild pain in the STT region again. Focused on the left side today.    5/21/2024: today her symptoms are focused on the ulnar nerves, left worse than right. She has pain when leaning on the medial elbows, and this pain and tingling goes down the volar/ulnar side of her hand    10/8/2024: the steroid injections into her cubital tunnels last visit helped until about 1 week ago. She is requesting bilateral thumb CMC steroid injections and bilateral cubital tunnel injections    1/28/2025: 49F presenting for follow up. The last steroid injections by Dr. Petit have been helping. She is concerned regarding persistent pain of her CMC and wants to discuss surgical options. The last steroid injections into her cubital tunnels continue to help. She is interested in bilateral carpal tunnel injections today.    Physical Examination:  There were no vitals filed for this visit.  There is no height or weight on file to calculate BMI.    Well appearing, well nourished  Alert and oriented x 3, cooperative with exam     Bilateral Upper Extremity:  Healed carpal tunnel scar  Thenar atrophy: no   Pain with palpation along the left CMC, much reduced since  recent steroid injections  Motor Exam:   Abductor pollicis brevis strength: 4/5    1st dorsal interosseous strength: 5/5   - Flexor pollicis longus strength: 4/5   Intact thumbs up  Vascular Exam:   2+ radial pulse, < 2 sec capillary refill      Imaging/Studies:  Repeat ultrasound obtained 5/9/2024 shows:  Ulnar nerve  Distal upper arm: 6 mm  Proximal to cubital tunnel: 9 mm   Within the cubital tunnel: 8 mm  Distal to cubital tunnel:  6 mm  Wrist: 6 mm      Median nerve  Lacertus fibrosis: 6 mm  Wrist crease: 10 mm      The radial nerve is visualized at the level of the distal upper arm  through the bifurcation of the superficial and deep branches.                                                                   Impression:   1. Normal size and appearance of the left ulnar nerve at the elbow and  wrist.  2. Borderline enlarged left median nerve at the wrist crease.  3. Grossly normal appearance of the left radial nerve visualized at  the distal upper arm through the bifurcation into the superficial and  deep branches. Unable to confidently evaluate the radial nerve beyond  the bifurcation.  ___  Ultrasound bilateral upper extremities obtained 2/13/2024 shows:  Areas of the median nerves (millimeters squared):  Left:  Distal forearm: 7.6, 7.9  Wrist crease: 11.4, 10.3     Right:  Distal forearm: 9.4, 9.0  Wrist crease: 9.9, 8.6     Poorly visualized median nerve the region of the lacertus fibrosis  bilaterally.                                                              Impression:   1. Borderline enlargement of the left median nerve at the level of the  wrist crease.  2. No substantial right median nerve enlargement at the wrist crease.  ___  XR (3 views) of the  bilateral  hand was obtained  9/20/2023 .  I reviewed the images with the patient.  The imaging study shows bilateral thumb CMC and STT arthritis with possible chondrocalcinosis on radial side.    Assessment: Laxmi Ya is a 49 year old female with  bilateral STT, thumb CMC arthritis, median neuropathy, ulnar neuropathy.    Plan:   I had a discussion with the patient regarding my clinical findings, diagnosis, and treatment plan. I reviewed the treatment options for carpal tunnel syndrome (observation, bracing, steroid injection, occupational therapy, surgery, etc.) as well as the risks and benefits of each. We also reviewed treatment options for thumb arthritis (observation, bracing, steroid injections, surgery including LTRI, suture suspension, and fibrolock arthroplasty). Her ultrasound shows normal ulnar nerves, borderline enlarged left median nerve at the wrist crease which may be suggestive of recurrent left carpal tunnel syndrome. She elects steroid injections into bilateral carpal tunnel. She patient understands and agrees to the treatment plan.  All questions answered.      Steroid injections at bilateral carpal tunnel     Next Visit:   Follow-up: 6 weeks for bilateral cubital tunnel injections   - Discuss surgical options for CMC arthritis, patient will need to be off immunosuppressants prior to surgery     Procedure Note: After a discussion with Laxmi Ya regarding treatment options, we decided to proceed with a steroid injection to the bilateral carpal tunnel.  Risks of the procedure including hyperglycemia, fat atrophy, skin depigmentation and infection were discussed prior to injection and verbal consent from Laxmi Ya was obtained. The area was prepped with alcohol. 1ml of 1% lidocaine was infiltrated into the skin in each site. Subsequent injection with 20 mg Kenalog at each site for a total of 40mg. Laxmi JUDITH Freitason tolerated the injection well. A clean dressing was placed over the site of the injection. Laxmi Ya was given post injection instructions.     Lia Roberts MD    I have seen and evaluated this patient myself and agree with the documentation.  I performed the injections in their entirety.  WANDA STARKS MD

## 2025-01-23 ENCOUNTER — OFFICE VISIT (OUTPATIENT)
Dept: ORTHOPEDICS | Facility: CLINIC | Age: 50
End: 2025-01-23
Payer: MEDICARE

## 2025-01-23 DIAGNOSIS — M18.0 PRIMARY OSTEOARTHRITIS OF BOTH FIRST CARPOMETACARPAL JOINTS: Primary | ICD-10-CM

## 2025-01-23 DIAGNOSIS — M79.645 BILATERAL THUMB PAIN: ICD-10-CM

## 2025-01-23 DIAGNOSIS — M79.644 BILATERAL THUMB PAIN: ICD-10-CM

## 2025-01-23 RX ORDER — TRIAMCINOLONE ACETONIDE 40 MG/ML
20 INJECTION, SUSPENSION INTRA-ARTICULAR; INTRAMUSCULAR
Status: COMPLETED | OUTPATIENT
Start: 2025-01-23 | End: 2025-01-23

## 2025-01-23 RX ORDER — LIDOCAINE HYDROCHLORIDE 10 MG/ML
2 INJECTION, SOLUTION EPIDURAL; INFILTRATION; INTRACAUDAL; PERINEURAL
Status: COMPLETED | OUTPATIENT
Start: 2025-01-23 | End: 2025-01-23

## 2025-01-23 RX ORDER — TRIAMCINOLONE ACETONIDE 40 MG/ML
40 INJECTION, SUSPENSION INTRA-ARTICULAR; INTRAMUSCULAR
Status: COMPLETED | OUTPATIENT
Start: 2025-01-23 | End: 2025-01-23

## 2025-01-23 RX ADMIN — LIDOCAINE HYDROCHLORIDE 2 ML: 10 INJECTION, SOLUTION EPIDURAL; INFILTRATION; INTRACAUDAL; PERINEURAL at 08:36

## 2025-01-23 RX ADMIN — LIDOCAINE HYDROCHLORIDE 2 ML: 10 INJECTION, SOLUTION EPIDURAL; INFILTRATION; INTRACAUDAL; PERINEURAL at 08:32

## 2025-01-23 RX ADMIN — TRIAMCINOLONE ACETONIDE 20 MG: 40 INJECTION, SUSPENSION INTRA-ARTICULAR; INTRAMUSCULAR at 08:32

## 2025-01-23 RX ADMIN — TRIAMCINOLONE ACETONIDE 40 MG: 40 INJECTION, SUSPENSION INTRA-ARTICULAR; INTRAMUSCULAR at 08:36

## 2025-01-23 NOTE — PROGRESS NOTES
Small Joint Injection/Arthrocentesis: bilateral thumb CMC    Date/Time: 2025 8:32 AM    Performed by: Twila Petit MD  Authorized by: Twila Petit MD  Indications:  Pain  Needle Size:  27 G  Guidance: fluoroscopy       Location:  Thumb  Laterality:  Bilateral  Site:  Bilateral thumb CMC    Medications (Right):  20 mg triamcinolone 40 MG/ML; 2 mL lidocaine (PF) 1 %        Medications (Left):  20 mg triamcinolone 40 MG/ML; 2 mL lidocaine (PF) 1 %                Outcome:  Tolerated well, no immediate complications  Procedure discussed: discussed risks, benefits, and alternatives    Consent Given by:  Patient  Timeout: timeout called immediately prior to procedure    Prep: patient was prepped and draped in usual sterile fashion      Small Joint Injection/Arthrocentesis    Date/Time: 2025 8:36 AM    Performed by: Twila Petit MD  Authorized by: Twila Petit MD  Indications:  Pain  Needle Size:  27 G  Guidance: fluoroscopy          Location comment:  Bilateral STT joint injection                       Medications:  40 mg triamcinolone 40 MG/ML; 2 mL lidocaine (PF) 1 %        Outcome:  Tolerated well, no immediate complications  Procedure discussed: discussed risks, benefits, and alternatives    Consent Given by:  Patient  Timeout: timeout called immediately prior to procedure    Prep: patient was prepped and draped in usual sterile fashion          [facility] [address] [address] [phone]  Dept: [phone]  ______________________________________________________________________________    Patient: Laxmi Ya   : 1975   MRN: [id number]   2025    INVASIVE PROCEDURE SAFETY CHECKLIST    Date: 2025   Procedure: Bilateral STT joint injection and bilateral thumb CMC joint injections  Patient Name: Laxmi Ya  MRN: [id number]  Date of birth:  1975    Action: Complete sections as appropriate. Any discrepancy results in a HARD COPY until resolved.     PRE PROCEDURE:  Patient ID verified with 2 identifiers (name and  or MRN): Yes  Procedure and site verified with patient/designee (when able): Yes  Accurate consent documentation in medical record: Yes  H&P (or appropriate assessment) documented in medical record: Yes  H&P must be up to 20 days prior to procedure and updates within 24 hours of procedure as applicable: Yes  Relevant diagnostic and radiology test results appropriately labeled and displayed as applicable: NA  Procedure site(s) marked with provider initials: NA    TIMEOUT:  Time-Out performed immediately prior to starting procedure, including verbal and active participation of all team members addressing the following:Yes  * Correct patient identify  * Confirmed that the correct side and site are marked  * An accurate procedure consent form  * Agreement on the procedure to be done  * Correct patient position  * Relevant images and results are properly labeled and appropriately displayed  * The need to administer antibiotics or fluids for irrigation purposes during the procedure as applicable   * Safety precautions based on patient history or medication use    DURING PROCEDURE: Verification of correct person, site, and procedures any time the responsibility for care of the patient is transferred to another member of the care team.       The following medications were given:         Prior to injection, verified patient identity using patient's name and date of birth.  Due to injection administration, patient instructed to remain in clinic for 15 minutes  afterwards, and to report any adverse reaction to me immediately.    Joint injection was performed.    Medication Name: Kenalog 40 mg/ml x2 NDC [address]-9  Drug Amount Wasted:  None.  Vial/Syringe: Single dose vial  Expiration Date:  26    Medication Name Lidocaine 1% NDC  22126-757-70    Scribed by Alexia Barrios ATC for Dr. Petit on January 23, 2025 at 8:45am based on the provider's statements to me.     Alexia Barrios ATC

## 2025-01-23 NOTE — PROGRESS NOTES
Diagnosis: bilateral thumb CMC/STT arthritis, bilateral carpal tunnel syndrome, bilateral cubital tunnel syndrome.  Patient comes in today requesting bilateral thumb CMC and STT cortisone injections.  She reports that her pain has been 10 out of 10 bilaterally.  It has been quite difficult for her most recently.  She is considering whether or not she should undergo surgical intervention.  I discussed with her again a trapezium resection arthroplasty and how this should help both the CMC and STT joints.  At this time she would like to proceed with repeat injections of both joints in each thumb, using a 20 mg dose.    Treatment:   January 23, 2025 bilateral thumb STT (20 mg) and CMC (20 mg) injections (Trino Aguilar)  10/25/2024 bilateral thumb STT injection (20 mg) he bilateral carpal tunnel injections (20 mg) (Trino Aguilar)  10/9/2024: bilateral thumb CMC steroid injection, cubital tunnel steroid injections (Vakshori_  8/15/2024: bilateral STT steroid injection (Dr. Petit)  6/6/2024: bilateral carpal tunnel steroid injection (Dr. Petit)  5/30/2024: bilateral STT steroid injection (Dr. Petit)  5/21/2024: bilateral cubital tunnel steroid injections  12/27/2023: bilateral carpal tunnel steroid injection  12/21/2023: bilateral STT steroid injection (Dr. Petit)  11/1/2023: bilateral carpal tunnel steroid injection  10/18/2023: Bilateral thumb CMC steroid injection  9/20/2023: bilateral STT steroid injection  5/4/2023: bilateral carpal tunnel steroid injection (Dr. Petit)  2/16/2023: bilateral STT steroid injection (Dr. Petit)  12/13/2022: bilateral carpal tunnel steroid injection (Dr. Sullivan)  10/27/2022: bilateral STT steroid injections (Dr. Petit)  9/13/2022: bilateral carpal tunnel steroid injection (Dr. Sullivan)  6/30/2022: bilateral STT steroid injections (Dr. Petit)  Bilateral carpal tunnel steroid injections (outside)     History of Present Illness: Laxmi Ya is a 49 year old RHD  female with inflammatory arthritis as well as diabetes.  She has multiple joint complaints.  She did get injections into her cubital tunnels and she will be seeing Dr. Pickering.  Next week requesting repeat injections.  As noted above, her pain is 10 out of 10 for both thumbs.  Her thumbs continue to hurt at the base which she attributes to STT and CMC arthritis and have been helped in the past with that.         Physical Examination:  Vitals   There were no vitals filed for this visit.            Patient localizes her pain to the radial side of the hand at the base of the thumb with a positive grind test.  She reports her pain is 10 out of 10 at rest as well as with any provocative maneuvers.  She denies tingling and numbness in the median nerve distribution but does report she has small finger numbness bilaterally.                                                          Impression:   Rheumatoid arthritis  Bilateral carpal tunnel syndrome  Bilateral cubital tunnel syndrome  Bilateral thumb CMC arthritis  Bilateral STT arthritis    Plan:   Patient comes in today requesting STT injections and CMC injections.  We discussed that these would need to be half dose with multiple injections.  She also will need to check her sugars and may need to make diabetes adjustments according to her sugar levels.  She is full understanding and wished to proceed.  Patient discussed that she may want to undergo surgical treatment in the future but at this time the cortisone injections are continuing to help, provided she spaces them out appropriately.         PROCEDURE:  After written consent, verifying site and side, a sterile Chlora- prep was performed for the injection site.  C-arm guidance was used for placement of a 25-gauge needle into the right thumb STT joint with 1% Lidocaine.  1 /2mL of Kenalog (20 mg) and 1/2 mL of lidocaine was injected under fluoroscopic guidance with good relief of pain.  C-arm guidance was then used for  placement of a 25-gauge needle into the right thumb CMC joint with 1% lidocaine.  1/2 mL of Kenalog (20 mg (and 1/2 mL of lidocaine was injected under fluoroscopic guidance with good relief of pain.  Identical procedure was carried out on the left side patient for both the STT joint and CMC joint.  Tolerated the procedure well.  No complications.  Follow up instructions were given.  She is wearing her splints and they are well-fitting.  She reports that she has had some bruising on the dorsum of the hand.  There is no bruising present today before or after the procedure.    Twila Petit MD   Hand and Upper Extremity Specialist  [facility]

## 2025-01-23 NOTE — NURSING NOTE
Reason For Visit:   Chief Complaint   Patient presents with    RECHECK     Follow-up Bilateral STT joints repeat injection           Primary MD: Amol Juares  Ref. MD: Katja    Age: 49 year old    ?  No      There were no vitals taken for this visit.      Pain Assessment  Patient Currently in Pain: Yes  0-10 Pain Scale: 10  Primary Pain Location: Hand (Bilateral hands and wrists)  Pain Descriptors: Constant, Shooting, Throbbing    Hand Dominance Evaluation  Hand Dominance: Right      force  R hand pincher force: 3.629 kg (8 lb)  R hand  level 2 force: 13.6 kg (30 lb)  L hand pincher force: 2.722 kg (6 lb)  L hand  level  2 force: 11.3 kg (25 lb)    QuickDASH Assessment      1/23/2025     7:13 AM   QuickDASH Main   1. Open a tight or new jar Unable   2. Do heavy household chores (e.g., wash walls, floors) Unable   3. Carry a shopping bag or briefcase Unable   4. Wash your back Unable   5. Use a knife to cut food Unable   6. Recreational activities in which you take some force or impact through your arm, shoulder or hand (e.g., golf, hammering, tennis, etc.) Unable   7. During the past week, to what extent has your arm, shoulder or hand problem interfered with your normal social activities with family, friends, neighbours or groups Extremely   8. During the past week, were you limited in your work or other regular daily activities as a result of your arm, shoulder or hand problem Unable   9. Arm, shoulder or hand pain Extreme   10.Tingling (pins and needles) in your arm,shoulder or hand Severe   11. During the past week, how much difficulty have you had sleeping because of the pain in your arm, shoulder or hand Severe difficulty   Quickdash Ability Score 95.45          Current Outpatient Medications   Medication Sig Dispense Refill    albuterol (PROAIR HFA/PROVENTIL HFA/VENTOLIN HFA) 108 (90 Base) MCG/ACT inhaler Inhale 2 puffs into the lungs every 6 hours as needed for shortness of breath,  wheezing or cough      Artificial Tear Solution (SOOTHE XP) SOLN as needed      atenolol (TENORMIN) 50 MG tablet Take 50 mg by mouth daily      azelastine 137 MCG/SPRAY SOLN USE 2 SPRAYS IN EACH NOSTRIL 2 TIMES DAILY FOR 1 MONTH      benzoyl peroxide 5 % external liquid Use daily as directed. Preferred bdrand: Medpura 236 mL 11    blood glucose (NO BRAND SPECIFIED) test strip Use to test blood sugar 2 times daily or as directed. 100 strip 1    blood glucose monitoring (NO BRAND SPECIFIED) meter device kit Use to test blood sugar 2 times daily or as directed. 1 kit 0    ciclopirox (LOPROX) 0.77 % cream Apply topically 2 times daily To affected toenails. 90 g 5    cyclobenzaprine (FLEXERIL) 5 MG tablet Take 5 mg by mouth.      dexAMETHasone (DECADRON) 0.1 % ophthalmic solution       diclofenac (VOLTAREN) 1 % topical gel Apply topically 4 times daily as needed      doxycycline hyclate (VIBRAMYCIN) 100 MG capsule Take 100 mg by mouth 2 times daily.      dupilumab (DUPIXENT) 300 MG/2ML prefilled pen Inject 2 mLs (300 mg) subcutaneously every 14 days. 4 mL 5    econazole nitrate 1 % external cream Apply topically daily. To affected toenails. 85 g 5    famciclovir (FAMVIR) 500 MG tablet Take 1 tablet (500 mg) by mouth 2 times daily. 180 tablet 3    fexofenadine (ALLEGRA) 180 MG tablet Take 1 tablet (180 mg) by mouth daily 90 tablet 2    fidaxomicin (DIFICID) 200 MG tablet Take 200 mg by mouth as needed      fluconazole (DIFLUCAN) 150 MG tablet Take 150 mg by mouth.      Fluticasone-Umeclidin-Vilanterol (TRELEGY ELLIPTA) 200-62.5-25 MCG/ACT oral inhaler Inhale 1 puff into the lungs daily. 90 each 1    insulin glargine (LANTUS PEN) 100 UNIT/ML pen Inject 50 Units subcutaneously daily      insulin lispro (HUMALOG KWIKPEN) 100 UNIT/ML (1 unit dial) KWIKPEN Inject 35 Units subcutaneously 3 times daily (before meals) Plus 1:25 correction scale if BG >150      Lancets (ONETOUCH DELICA PLUS WQFRKH13S) MISC CHECK BLOOD GLUCOSE 3  TIMES DAILY, ADJUSTING MEDICATION, DX CODE E 11.9, ON INSULIN      lidocaine (LIDODERM) 5 % patch as needed   1    LORazepam (ATIVAN) 0.5 MG tablet Take 0.25 mg by mouth every 8 hours as needed      losartan (COZAAR) 100 MG tablet Take 100 mg by mouth daily      methylPREDNISolone (MEDROL DOSEPAK) 4 MG tablet therapy pack Follow Package Directions 21 tablet 0    methylPREDNISolone (MEDROL DOSEPAK) 4 MG tablet therapy pack Follow Package Directions 21 tablet 0    methylPREDNISolone (MEDROL DOSEPAK) 4 MG tablet therapy pack TAKE 6 TABLETS ON DAY 1 AS DIRECTED ON PACKAGE AND DECREASE BY 1 TAB EACH DAY FOR A TOTAL OF 6 DAYS 21 each 1    methylPREDNISolone (MEDROL DOSEPAK) 4 MG tablet therapy pack Take per package instructions. Take once a day by mouth 21 tablet 1    methylPREDNISolone (MEDROL) 4 MG tablet Take 3 tablets (12 mg) by mouth daily. 15 tablet 0    methylPREDNISolone (MEDROL) 4 MG tablet Take 3 tabs daily for 10 days as directed 30 tablet 0    methylPREDNISolone (MEDROL) 4 MG tablet Take 1 tablet (4 mg) by mouth daily. Take 5 tabs daily for 1 week, then take 3 tabs daily for 1 week to bridge infusions 56 tablet 0    mometasone (NASONEX) 50 MCG/ACT nasal spray Spray 2 sprays into both nostrils daily. 51 g 1    montelukast (SINGULAIR) 10 MG tablet Take 10 mg by mouth daily.      multivitamin w/minerals (THERA-VIT-M) tablet Take 1 tablet by mouth daily      naloxone (NARCAN) 4 MG/0.1ML nasal spray       naproxen (NAPROSYN) 375 MG tablet Take 375 mg by mouth.      norethindrone (MICRONOR) 0.35 MG tablet Take 1 tablet (0.35 mg) by mouth daily 90 tablet 3    nystatin (MYCOSTATIN) 129318 UNIT/ML suspension       olopatadine (PATADAY) 0.7 % ophthalmic solution Apply 1 drop to eye daily PRN      omeprazole (PRILOSEC) 40 MG DR capsule TAKE 1 CAPSULE (40 MG) BY MOUTH TWO TIMES A DAY. TAKE 1 HOUR BEFORE A MEAL.      oxyCODONE IR (ROXICODONE) 10 MG tablet Take 10 mg by mouth      predniSONE (DELTASONE) 5 MG tablet TAKE 1  TABLET BY MOUTH EVERY DAY 90 tablet 1    rifaximin (XIFAXAN) 550 MG TABS tablet Take 200 mg by mouth 3 times daily Take for 10 days, has on had as needed for small bowel bacteria overgrowh      rosuvastatin (CRESTOR) 5 MG tablet Take 2 tablets by mouth daily      spironolactone-HCTZ (ALDACTAZIDE) 25-25 MG tablet Take 1 tablet by mouth daily      terbinafine (LAMISIL) 250 MG tablet Take 1 tablet by mouth daily.      tirzepatide (MOUNJARO) 2.5 MG/0.5ML pen Inject 2.5 mg subcutaneously every 7 days.      tobramycin-dexAMETHasone (TOBRADEX) 0.3-0.1 % ophthalmic suspension PLACE 1 DROP INTO BOTH EYES TWO TIMES A DAY.      tocilizumab (ACTEMRA) 400 MG/20ML Inject 800 mg into the vein every 28 days         Allergies   Allergen Reactions    Polyethylene Glycol Rash    Codeine      Other reaction(s): Gastrointestinal, GI intolerance, Vomiting    Vomiting    Hydrocodone Nausea and Vomiting    Morphine Nausea and Vomiting    Sulfasalazine      Other reaction(s): GI intolerance    Has tried and had nausa.    Tramadol Nausea and Vomiting     Other reaction(s): Gastrointestinal, Other (see comments)    Nausea and vomiting     unknown    Acetaminophen Nausea     Nausea and vomiting    Gluten Meal Other (See Comments) and Rash     Other reaction(s): Gastrointestinal, GI intolerance  Dizziness, tired, rash, stomach cramps, thrush, mouth sores  Dizziness, tired, rash, stomach cramps, thrush, mouth sores      Hydroxyzine GI Disturbance and Nausea     nausea, dry mouth    Propylene Glycol Dizziness, Nausea and Vomiting, Nausea and Rash       Alexia Barrios, ATC

## 2025-01-23 NOTE — LETTER
2025      Laxmi Ya  [address] [place] [id number]      Dear Colleague,    Thank you for referring your patient, Laxmi Ya, to the [facility]. Please see a copy of my visit note below.    Small Joint Injection/Arthrocentesis: bilateral thumb CMC    Date/Time: 2025 8:32 AM    Performed by: Twila Petit MD  Authorized by: Twila Petit MD  Indications:  Pain  Needle Size:  27 G  Guidance: fluoroscopy       Location:  Thumb  Laterality:  Bilateral  Site:  Bilateral thumb CMC    Medications (Right):  20 mg triamcinolone 40 MG/ML; 2 mL lidocaine (PF) 1 %        Medications (Left):  20 mg triamcinolone 40 MG/ML; 2 mL lidocaine (PF) 1 %                Outcome:  Tolerated well, no immediate complications  Procedure discussed: discussed risks, benefits, and alternatives    Consent Given by:  Patient  Timeout: timeout called immediately prior to procedure    Prep: patient was prepped and draped in usual sterile fashion      Small Joint Injection/Arthrocentesis    Date/Time: 2025 8:36 AM    Performed by: Twila Petit MD  Authorized by: Twila Petit MD  Indications:  Pain  Needle Size:  27 G  Guidance: fluoroscopy          Location comment:  Bilateral STT joint injection                       Medications:  40 mg triamcinolone 40 MG/ML; 2 mL lidocaine (PF) 1 %        Outcome:  Tolerated well, no immediate complications  Procedure discussed: discussed risks, benefits, and alternatives    Consent Given by:  Patient  Timeout: timeout called immediately prior to procedure    Prep: patient was prepped and draped in usual sterile fashion          [facility] [address] [address]  [phone]  Dept: [phone]  ______________________________________________________________________________    Patient: Laxmi Ya   : 1975   MRN:  [id number]   2025    INVASIVE PROCEDURE SAFETY CHECKLIST    Date: 2025   Procedure: Bilateral STT joint injection and bilateral thumb CMC joint injections  Patient Name: Laxmi Ya  MRN: [id number]  YOB: 1975    Action: Complete sections as appropriate. Any discrepancy results in a HARD COPY until resolved.     PRE PROCEDURE:  Patient ID verified with 2 identifiers (name and  or MRN): Yes  Procedure and site verified with patient/designee (when able): Yes  Accurate consent documentation in medical record: Yes  H&P (or appropriate assessment) documented in medical record: Yes  H&P must be up to 20 days prior to procedure and updates within 24 hours of procedure as applicable: Yes  Relevant diagnostic and radiology test results appropriately labeled and displayed as applicable: NA  Procedure site(s) marked with provider initials: NA    TIMEOUT:  Time-Out performed immediately prior to starting procedure, including verbal and active participation of all team members addressing the following:Yes  * Correct patient identify  * Confirmed that the correct side and site are marked  * An accurate procedure consent form  * Agreement on the procedure to be done  * Correct patient position  * Relevant images and results are properly labeled and appropriately displayed  * The need to administer antibiotics or fluids for irrigation purposes during the procedure as applicable   * Safety precautions based on patient history or medication use    DURING PROCEDURE: Verification of correct person, site, and procedures any time the responsibility for care of the patient is transferred to another member of the care team.       The following medications were given:         Prior to injection, verified patient identity using patient's name and date of birth.  Due to injection administration, patient instructed to remain in clinic for 15 minutes  afterwards, and to report any adverse reaction to me  immediately.    Joint injection was performed.    Medication Name: Kenalog 40 mg/ml x2 NDC [address]-5  Drug Amount Wasted:  None.  Vial/Syringe: Single dose vial  Expiration Date:  9/1/26    Medication Name Lidocaine 1% NDC 28431-052-03    Scribed by Alexia Barrios ATC for Dr. Petit on January 23, 2025 at 8:45am based on the provider's statements to me.     Alexia Barrios ATC         Diagnosis: bilateral thumb CMC/STT arthritis, bilateral carpal tunnel syndrome, bilateral cubital tunnel syndrome.  Patient comes in today requesting bilateral thumb CMC and STT cortisone injections.  She reports that her pain has been 10 out of 10 bilaterally.  It has been quite difficult for her most recently.  She is considering whether or not she should undergo surgical intervention.  I discussed with her again a trapezium resection arthroplasty and how this should help both the CMC and STT joints.  At this time she would like to proceed with repeat injections of both joints in each thumb, using a 20 mg dose.    Treatment:   January 23, 2025 bilateral thumb STT (20 mg) and CMC (20 mg) injections (Trino Aguilar)  10/25/2024 bilateral thumb STT injection (20 mg) he bilateral carpal tunnel injections (20 mg) (Trino Aguilar)  10/9/2024: bilateral thumb CMC steroid injection, cubital tunnel steroid injections (Vakshori_  8/15/2024: bilateral STT steroid injection (Dr. Petit)  6/6/2024: bilateral carpal tunnel steroid injection (Dr. Petit)  5/30/2024: bilateral STT steroid injection (Dr. Petit)  5/21/2024: bilateral cubital tunnel steroid injections  12/27/2023: bilateral carpal tunnel steroid injection  12/21/2023: bilateral STT steroid injection (Dr. Petit)  11/1/2023: bilateral carpal tunnel steroid injection  10/18/2023: Bilateral thumb CMC steroid injection  9/20/2023: bilateral STT steroid injection  5/4/2023: bilateral carpal tunnel steroid injection (Dr. Petit)  2/16/2023: bilateral STT steroid injection (  Trino Aguilar)  12/13/2022: bilateral carpal tunnel steroid injection (Dr. Sullivan)  10/27/2022: bilateral STT steroid injections (Dr. Petit)  9/13/2022: bilateral carpal tunnel steroid injection (Dr. Sullivan)  6/30/2022: bilateral STT steroid injections (Dr. Petit)  Bilateral carpal tunnel steroid injections (outside)     History of Present Illness: Laxmi Ya is a 49 year old RHD female with inflammatory arthritis as well as diabetes.  She has multiple joint complaints.  She did get injections into her cubital tunnels and she will be seeing Dr. Pickering.  Next week requesting repeat injections.  As noted above, her pain is 10 out of 10 for both thumbs.  Her thumbs continue to hurt at the base which she attributes to STT and CMC arthritis and have been helped in the past with that.         Physical Examination:  Vitals   There were no vitals filed for this visit.            Patient localizes her pain to the radial side of the hand at the base of the thumb with a positive grind test.  She reports her pain is 10 out of 10 at rest as well as with any provocative maneuvers.  She denies tingling and numbness in the median nerve distribution but does report she has small finger numbness bilaterally.                                                          Impression:   Rheumatoid arthritis  Bilateral carpal tunnel syndrome  Bilateral cubital tunnel syndrome  Bilateral thumb CMC arthritis  Bilateral STT arthritis    Plan:   Patient comes in today requesting STT injections and CMC injections.  We discussed that these would need to be half dose with multiple injections.  She also will need to check her sugars and may need to make diabetes adjustments according to her sugar levels.  She is full understanding and wished to proceed.  Patient discussed that she may want to undergo surgical treatment in the future but at this time the cortisone injections are continuing to help, provided she spaces them out  appropriately.         PROCEDURE:  After written consent, verifying site and side, a sterile Chlora- prep was performed for the injection site.  C-arm guidance was used for placement of a 25-gauge needle into the right thumb STT joint with 1% Lidocaine.  1 /2mL of Kenalog (20 mg) and 1/2 mL of lidocaine was injected under fluoroscopic guidance with good relief of pain.  C-arm guidance was then used for placement of a 25-gauge needle into the right thumb CMC joint with 1% lidocaine.  1/2 mL of Kenalog (20 mg (and 1/2 mL of lidocaine was injected under fluoroscopic guidance with good relief of pain.  Identical procedure was carried out on the left side patient for both the STT joint and CMC joint.  Tolerated the procedure well.  No complications.  Follow up instructions were given.  She is wearing her splints and they are well-fitting.  She reports that she has had some bruising on the dorsum of the hand.  There is no bruising present today before or after the procedure.    Twila Petit MD   Hand and Upper Extremity Specialist  [facility]            Again, thank you for allowing me to participate in the care of your patient.        Sincerely,        Twila Petit MD    Electronically signed

## 2025-01-28 ENCOUNTER — MYC MEDICAL ADVICE (OUTPATIENT)
Dept: PHARMACY | Facility: CLINIC | Age: 50
End: 2025-01-28

## 2025-01-28 ENCOUNTER — THERAPY VISIT (OUTPATIENT)
Dept: PHYSICAL THERAPY | Facility: CLINIC | Age: 50
End: 2025-01-28
Payer: MEDICARE

## 2025-01-28 ENCOUNTER — OFFICE VISIT (OUTPATIENT)
Dept: ORTHOPEDICS | Facility: CLINIC | Age: 50
End: 2025-01-28
Payer: MEDICARE

## 2025-01-28 ENCOUNTER — INFUSION THERAPY VISIT (OUTPATIENT)
Dept: INFUSION THERAPY | Facility: CLINIC | Age: 50
End: 2025-01-28
Attending: INTERNAL MEDICINE
Payer: MEDICARE

## 2025-01-28 VITALS
SYSTOLIC BLOOD PRESSURE: 129 MMHG | WEIGHT: 220.8 LBS | DIASTOLIC BLOOD PRESSURE: 84 MMHG | BODY MASS INDEX: 35.64 KG/M2 | RESPIRATION RATE: 16 BRPM | HEART RATE: 90 BPM | TEMPERATURE: 98.3 F | OXYGEN SATURATION: 98 %

## 2025-01-28 DIAGNOSIS — R10.2 PELVIC PAIN IN FEMALE: Primary | ICD-10-CM

## 2025-01-28 DIAGNOSIS — M54.50 CHRONIC BILATERAL LOW BACK PAIN WITHOUT SCIATICA: ICD-10-CM

## 2025-01-28 DIAGNOSIS — M54.2 NECK PAIN: ICD-10-CM

## 2025-01-28 DIAGNOSIS — G56.10 MEDIAN NERVE NEUROPATHY, UNSPECIFIED LATERALITY: Primary | ICD-10-CM

## 2025-01-28 DIAGNOSIS — M13.80 SERONEGATIVE INFLAMMATORY ARTHRITIS: Primary | ICD-10-CM

## 2025-01-28 DIAGNOSIS — G89.29 CHRONIC BILATERAL LOW BACK PAIN WITHOUT SCIATICA: ICD-10-CM

## 2025-01-28 LAB
ALBUMIN SERPL BCG-MCNC: 4.6 G/DL (ref 3.5–5.2)
ALBUMIN UR-MCNC: 50 MG/DL
ALP SERPL-CCNC: 50 U/L (ref 40–150)
ALT SERPL W P-5'-P-CCNC: 92 U/L (ref 0–50)
ANION GAP SERPL CALCULATED.3IONS-SCNC: 12 MMOL/L (ref 7–15)
APPEARANCE UR: CLEAR
AST SERPL W P-5'-P-CCNC: 55 U/L (ref 0–45)
BACTERIA #/AREA URNS HPF: ABNORMAL /HPF
BASOPHILS # BLD AUTO: 0.1 10E3/UL (ref 0–0.2)
BASOPHILS NFR BLD AUTO: 1 %
BILIRUB SERPL-MCNC: 0.9 MG/DL
BILIRUB UR QL STRIP: NEGATIVE
BUN SERPL-MCNC: 14.9 MG/DL (ref 6–20)
CALCIUM SERPL-MCNC: 10.1 MG/DL (ref 8.8–10.4)
CHLORIDE SERPL-SCNC: 105 MMOL/L (ref 98–107)
COLOR UR AUTO: YELLOW
CREAT SERPL-MCNC: 0.81 MG/DL (ref 0.51–0.95)
EGFRCR SERPLBLD CKD-EPI 2021: 89 ML/MIN/1.73M2
EOSINOPHIL # BLD AUTO: 0.2 10E3/UL (ref 0–0.7)
EOSINOPHIL NFR BLD AUTO: 2 %
ERYTHROCYTE [DISTWIDTH] IN BLOOD BY AUTOMATED COUNT: 13.6 % (ref 10–15)
GLUCOSE SERPL-MCNC: 119 MG/DL (ref 70–99)
GLUCOSE UR STRIP-MCNC: NEGATIVE MG/DL
HCO3 SERPL-SCNC: 21 MMOL/L (ref 22–29)
HCT VFR BLD AUTO: 45.3 % (ref 35–47)
HGB BLD-MCNC: 16 G/DL (ref 11.7–15.7)
HGB UR QL STRIP: NEGATIVE
HYALINE CASTS: 3 /LPF
IMM GRANULOCYTES # BLD: 0.3 10E3/UL
IMM GRANULOCYTES NFR BLD: 2 %
KETONES UR STRIP-MCNC: ABNORMAL MG/DL
LEUKOCYTE ESTERASE UR QL STRIP: NEGATIVE
LYMPHOCYTES # BLD AUTO: 2.5 10E3/UL (ref 0.8–5.3)
LYMPHOCYTES NFR BLD AUTO: 18 %
MCH RBC QN AUTO: 30.8 PG (ref 26.5–33)
MCHC RBC AUTO-ENTMCNC: 35.3 G/DL (ref 31.5–36.5)
MCV RBC AUTO: 87 FL (ref 78–100)
MONOCYTES # BLD AUTO: 1.3 10E3/UL (ref 0–1.3)
MONOCYTES NFR BLD AUTO: 9 %
MUCOUS THREADS #/AREA URNS LPF: PRESENT /LPF
NEUTROPHILS # BLD AUTO: 9.8 10E3/UL (ref 1.6–8.3)
NEUTROPHILS NFR BLD AUTO: 69 %
NITRATE UR QL: NEGATIVE
NRBC # BLD AUTO: 0 10E3/UL
NRBC BLD AUTO-RTO: 0 /100
PH UR STRIP: 6.5 [PH] (ref 5–7)
PLATELET # BLD AUTO: 357 10E3/UL (ref 150–450)
POTASSIUM SERPL-SCNC: 3.7 MMOL/L (ref 3.4–5.3)
PROT SERPL-MCNC: 6.9 G/DL (ref 6.4–8.3)
RBC # BLD AUTO: 5.19 10E6/UL (ref 3.8–5.2)
RBC URINE: 1 /HPF
SODIUM SERPL-SCNC: 138 MMOL/L (ref 135–145)
SP GR UR STRIP: 1.02 (ref 1–1.03)
SQUAMOUS EPITHELIAL: 1 /HPF
UROBILINOGEN UR STRIP-MCNC: 2 MG/DL
WBC # BLD AUTO: 14.2 10E3/UL (ref 4–11)
WBC URINE: 3 /HPF

## 2025-01-28 PROCEDURE — 96365 THER/PROPH/DIAG IV INF INIT: CPT

## 2025-01-28 PROCEDURE — 258N000003 HC RX IP 258 OP 636: Performed by: INTERNAL MEDICINE

## 2025-01-28 PROCEDURE — 85048 AUTOMATED LEUKOCYTE COUNT: CPT | Performed by: INTERNAL MEDICINE

## 2025-01-28 PROCEDURE — 36415 COLL VENOUS BLD VENIPUNCTURE: CPT | Performed by: INTERNAL MEDICINE

## 2025-01-28 PROCEDURE — 82040 ASSAY OF SERUM ALBUMIN: CPT | Performed by: INTERNAL MEDICINE

## 2025-01-28 PROCEDURE — 20526 THER INJECTION CARP TUNNEL: CPT | Mod: 50 | Performed by: STUDENT IN AN ORGANIZED HEALTH CARE EDUCATION/TRAINING PROGRAM

## 2025-01-28 PROCEDURE — 81001 URINALYSIS AUTO W/SCOPE: CPT | Performed by: INTERNAL MEDICINE

## 2025-01-28 PROCEDURE — 97140 MANUAL THERAPY 1/> REGIONS: CPT | Mod: GP | Performed by: PHYSICAL THERAPIST

## 2025-01-28 PROCEDURE — 250N000011 HC RX IP 250 OP 636: Performed by: INTERNAL MEDICINE

## 2025-01-28 PROCEDURE — 99213 OFFICE O/P EST LOW 20 MIN: CPT | Mod: 25 | Performed by: STUDENT IN AN ORGANIZED HEALTH CARE EDUCATION/TRAINING PROGRAM

## 2025-01-28 PROCEDURE — 85004 AUTOMATED DIFF WBC COUNT: CPT | Performed by: INTERNAL MEDICINE

## 2025-01-28 PROCEDURE — 96375 TX/PRO/DX INJ NEW DRUG ADDON: CPT

## 2025-01-28 PROCEDURE — 250N000013 HC RX MED GY IP 250 OP 250 PS 637: Performed by: INTERNAL MEDICINE

## 2025-01-28 RX ORDER — METHYLPREDNISOLONE SODIUM SUCCINATE 125 MG/2ML
37.5 INJECTION INTRAMUSCULAR; INTRAVENOUS ONCE
OUTPATIENT
Start: 2025-01-31

## 2025-01-28 RX ORDER — MEPERIDINE HYDROCHLORIDE 25 MG/ML
25 INJECTION INTRAMUSCULAR; INTRAVENOUS; SUBCUTANEOUS
OUTPATIENT
Start: 2025-01-31

## 2025-01-28 RX ORDER — TRIAMCINOLONE ACETONIDE 40 MG/ML
20 INJECTION, SUSPENSION INTRA-ARTICULAR; INTRAMUSCULAR
Status: COMPLETED | OUTPATIENT
Start: 2025-01-28 | End: 2025-01-28

## 2025-01-28 RX ORDER — ACETAMINOPHEN 325 MG/1
650 TABLET ORAL ONCE
Status: COMPLETED | OUTPATIENT
Start: 2025-01-28 | End: 2025-01-28

## 2025-01-28 RX ORDER — LIDOCAINE HYDROCHLORIDE 10 MG/ML
1 INJECTION, SOLUTION EPIDURAL; INFILTRATION; INTRACAUDAL; PERINEURAL
Status: COMPLETED | OUTPATIENT
Start: 2025-01-28 | End: 2025-01-28

## 2025-01-28 RX ORDER — HEPARIN SODIUM (PORCINE) LOCK FLUSH IV SOLN 100 UNIT/ML 100 UNIT/ML
5 SOLUTION INTRAVENOUS
OUTPATIENT
Start: 2025-01-31

## 2025-01-28 RX ORDER — ALBUTEROL SULFATE 90 UG/1
1-2 INHALANT RESPIRATORY (INHALATION)
Start: 2025-01-31

## 2025-01-28 RX ORDER — METHYLPREDNISOLONE SODIUM SUCCINATE 125 MG/2ML
37.5 INJECTION INTRAMUSCULAR; INTRAVENOUS ONCE
Status: COMPLETED | OUTPATIENT
Start: 2025-01-28 | End: 2025-01-28

## 2025-01-28 RX ORDER — DIPHENHYDRAMINE HYDROCHLORIDE 50 MG/ML
50 INJECTION INTRAMUSCULAR; INTRAVENOUS
Start: 2025-01-31

## 2025-01-28 RX ORDER — ACETAMINOPHEN 325 MG/1
650 TABLET ORAL ONCE
OUTPATIENT
Start: 2025-01-31

## 2025-01-28 RX ORDER — DIPHENHYDRAMINE HCL 25 MG
25 CAPSULE ORAL ONCE
OUTPATIENT
Start: 2025-01-31

## 2025-01-28 RX ORDER — METHYLPREDNISOLONE SODIUM SUCCINATE 40 MG/ML
40 INJECTION INTRAMUSCULAR; INTRAVENOUS
Start: 2025-01-31

## 2025-01-28 RX ORDER — HEPARIN SODIUM,PORCINE 10 UNIT/ML
5-20 VIAL (ML) INTRAVENOUS DAILY PRN
OUTPATIENT
Start: 2025-01-31

## 2025-01-28 RX ORDER — ALBUTEROL SULFATE 0.83 MG/ML
2.5 SOLUTION RESPIRATORY (INHALATION)
OUTPATIENT
Start: 2025-01-31

## 2025-01-28 RX ORDER — DIPHENHYDRAMINE HYDROCHLORIDE 50 MG/ML
25 INJECTION INTRAMUSCULAR; INTRAVENOUS
Start: 2025-01-31

## 2025-01-28 RX ORDER — EPINEPHRINE 1 MG/ML
0.3 INJECTION, SOLUTION INTRAMUSCULAR; SUBCUTANEOUS EVERY 5 MIN PRN
OUTPATIENT
Start: 2025-01-31

## 2025-01-28 RX ADMIN — ACETAMINOPHEN 650 MG: 325 TABLET ORAL at 12:39

## 2025-01-28 RX ADMIN — TRIAMCINOLONE ACETONIDE 20 MG: 40 INJECTION, SUSPENSION INTRA-ARTICULAR; INTRAMUSCULAR at 15:27

## 2025-01-28 RX ADMIN — METHYLPREDNISOLONE SODIUM SUCCINATE 37.5 MG: 125 INJECTION, POWDER, FOR SOLUTION INTRAMUSCULAR; INTRAVENOUS at 12:59

## 2025-01-28 RX ADMIN — TOCILIZUMAB 800 MG: 20 INJECTION, SOLUTION, CONCENTRATE INTRAVENOUS at 13:12

## 2025-01-28 RX ADMIN — LIDOCAINE HYDROCHLORIDE 1 ML: 10 INJECTION, SOLUTION EPIDURAL; INFILTRATION; INTRACAUDAL; PERINEURAL at 15:27

## 2025-01-28 RX ADMIN — SODIUM CHLORIDE 250 ML: 9 INJECTION, SOLUTION INTRAVENOUS at 14:07

## 2025-01-28 NOTE — PROGRESS NOTES
Hand / Upper Extremity Injection/Arthrocentesis: bilateral carpal tunnel    Date/Time: 2025 3:27 PM    Performed by: Hyacinth Morrison MD  Authorized by: Hyacinth Morrison MD    Indications:  Pain  Needle Size:  27 G  Guidance: landmark    Condition: carpal tunnel    Laterality:  Bilateral    Site:  Bilateral carpal tunnel  Medications (Right):  20 mg triamcinolone 40 MG/ML; 1 mL lidocaine (PF) 1 %  Medications (Left):  20 mg triamcinolone 40 MG/ML; 1 mL lidocaine (PF) 1 %  Outcome:  Tolerated well, no immediate complications  Procedure discussed: discussed risks, benefits, and alternatives    Consent Given by:  Patient  Timeout: timeout called immediately prior to procedure    Prep: patient was prepped and draped in usual sterile fashion        [facility] [address] [address]  [phone]  Dept: [phone]  ______________________________________________________________________________    Patient: Laxmi Ya   : 1975   MRN: [id number]   2025    INVASIVE PROCEDURE SAFETY CHECKLIST    Date: 2025   Procedure:Bilateral carpal tunnel steroid injections  Patient Name: Lamxi Ya  MRN: [id number]  YOB: 1975    Action: Complete sections as appropriate. Any discrepancy results in a HARD COPY until resolved.     PRE PROCEDURE:  Patient ID verified with 2 identifiers (name and  or MRN): Yes  Procedure and site verified with patient/designee (when able): Yes  Accurate consent documentation in medical record: Yes  H&P (or appropriate assessment) documented in medical record: Yes  H&P must be up to 20 days prior to procedure and updates within 24 hours of procedure as applicable: Yes  Relevant diagnostic and radiology test results appropriately labeled and displayed as applicable: NA  Procedure site(s) marked with provider initials: NA    TIMEOUT:  Time-Out performed immediately prior to  starting procedure, including verbal and active participation of all team members addressing the following:Yes  * Correct patient identify  * Confirmed that the correct side and site are marked  * An accurate procedure consent form  * Agreement on the procedure to be done  * Correct patient position  * Relevant images and results are properly labeled and appropriately displayed  * The need to administer antibiotics or fluids for irrigation purposes during the procedure as applicable   * Safety precautions based on patient history or medication use    DURING PROCEDURE: Verification of correct person, site, and procedures any time the responsibility for care of the patient is transferred to another member of the care team.       The following medications were given:         Prior to injection, verified patient identity using patient's name and date of birth.  Due to injection administration, patient instructed to remain in clinic for 15 minutes  afterwards, and to report any adverse reaction to me immediately.    Tendon sheath injection was performed.   Medication Name: Kenalog 40 mg/mL NDC [address]-2  Drug Amount Wasted:  None.  Vial/Syringe: Single dose vial  Expiration Date:  9/1/26    Medication Name Lidocaine 1% NDC 31741-272-83    Scribed by DUY RESENDEZ, TIM for Dr. Jo on January 28, 2025 at 3:31pm based on the provider's statements to me.     DUY RESENDEZ, ATC

## 2025-01-28 NOTE — PROGRESS NOTES
Infusion Nursing Note:  Laxmi Ya presents today for IV infusion; Actemra.    Patient seen by provider today: No   present during visit today: Not Applicable.    Note:   Administrations This Visit       acetaminophen (TYLENOL) tablet 650 mg       Admin Date  01/28/2025 Action  $Given Dose  650 mg Route  Oral Documented By  Heri Paris RN              methylPREDNISolone Na Suc (solu-MEDROL) injection 37.5 mg       Admin Date  01/28/2025 Action  $Given Dose  37.5 mg Route  Intravenous Documented By  Heri Paris RN              sodium chloride 0.9% BOLUS 250 mL       Admin Date  01/28/2025 Action  $New Bag Dose  250 mL Route  Intravenous Documented By  Heri Paris, LEXI              tocilizumab (ACTEMRA) 800 mg in sodium chloride 0.9 % 150 mL infusion       Admin Date  01/28/2025 Action  $New Bag Dose  800 mg Rate  150 mL/hr Route  Intravenous Documented By  Rashmi Domingo RN                  Temp: 97.6  F (36.4  C) Temp src: Oral BP: (!) 144/101 Pulse: 107   Resp: 16 SpO2: 98 % O2 Device: None (Room air)      220 lbs 12.8 oz    Intravenous Access:  Labs drawn without difficulty.  Peripheral IV placed by the vascular access team.    Treatment Conditions:  Biological Infusion Checklist:  ~~~ NOTE: If the patient answers yes to any of the questions below, hold the infusion and contact ordering provider or on-call provider.    Have you recently had an elevated temperature, fever, chills, productive cough, coughing for 3 weeks or longer or hemoptysis, normal vital signs, night sweats, chest pain or have you noticed a decrease in your appetite, unexplained weight loss or fatigue? No  Do you have any open wounds or new incisions? No  Do you have any upcoming hospitalizations or surgeries? Does not include esophagogastroduodenoscopy, colonoscopy, endoscopic retrograde cholangiopancreatography (ERCP), endoscopic ultrasound (EUS), dental procedures or joint aspiration/steroid injections No  Do you  currently have any signs of illness or infection or are you on any antibiotics? No  Have you had any new, sudden or worsening abdominal pain? No  Have you or anyone in your household received a live vaccination in the past 4 weeks? Please note: No live vaccines while on biologic/chemotherapy until 6 months after the last treatment. Patient can receive the flu vaccine (shot only), pneumovax and the Covid vaccine. It is optimal for the patient to get these vaccines mid cycle, but they can be given at any time as long as it is not on the day of the infusion. No  Have you recently been diagnosed with any new nervous system diseases (ie. Multiple sclerosis, Guillain Goodrich, seizures, neurological changes) or cancer diagnosis? Are you on any form of radiation or chemotherapy? No  Are you pregnant or breast feeding or do you have plans of pregnancy in the future? No  Have you been having any signs of worsening depression or suicidal ideations?  (benlysta only) N/A  Have there been any other new onset medical symptoms? No  Have you had any new blood clots? (IVIG only) N/A    Post Infusion Assessment:  Patient tolerated infusion without incident.  Blood return noted pre and post infusion.  Site patent and intact, free from redness, edema or discomfort.  No evidence of extravasations.  Access discontinued per protocol.     Discharge Plan:   Patient and/or family verbalized understanding of discharge instructions and all questions answered.  AVS to patient via [employer].  Patient will return 02/25/2025 at 12:00pm for next appointment.   Patient discharged in stable condition accompanied by: self.  Departure Mode: Ambulatory.    Heri Paris RN

## 2025-01-28 NOTE — PROGRESS NOTES
01/28/25 0500   Appointment Info   Signing clinician's name / credentials Sofi Lemons PT, OCS   Total/Authorized Visits orders faxed today   Visits Used 50   Medical Diagnosis Lower back pain with mobility deficits, lower back pain w/ referred pain, neck pain w/ headache, neck pain w/ mobility deficits, pelvic pain   PT Tx Diagnosis Lower back pain with mobility deficits, lower back pain w/ referred pain, neck pain w/ headache, neck pain w/ mobility deficits   Other pertinent information autoimmune diseases, hx lumbar discectomy   Progress Note/Certification   Start of Care Date 03/08/23   Onset of illness/injury or Date of Surgery 02/03/23   Therapy Frequency 2x month   Predicted Duration 12 weeks   Certification date from 11/27/24   Certification date to 02/14/25   Progress Note Due Date 01/28/25   Progress Note Completed Date 11/22/24   GOALS   PT Goals 3   PT Goal 1   Goal Identifier ambulation   Goal Description Minutes patient will be able to  walk 20-30 minutes   Rationale to maximize safety and independence with performance of ADLs and functional tasks;to maximize safety and independence within the home;to maximize safety and independence within the community;to maximize safety and independence with transportation;to maximize safety and independence with self cares   Goal Progress currently ~5 minutes due to flare up of L pelvis   Target Date 02/14/25   PT Goal 2   Goal Identifier headaches   Goal Description HA 1x week or less with intensity 2/10 or less   Rationale to maximize safety and independence with performance of ADLs and functional tasks;to maximize safety and independence within the home;to maximize safety and independence within the community;to maximize safety and independence with transportation;to maximize safety and independence with self cares   Target Date 02/14/25   PT Goal 3   Goal Identifier pelvic pain   Goal Description with walking/intimacy/urinary urgency 2/10 or less    Rationale to maximize safety and independence with performance of ADLs and functional tasks;to maximize safety and independence within the home;to maximize safety and independence with transportation;to maximize safety and independence with self cares;to maximize safety and independence within the community   Goal Progress currently 8/10   Target Date 02/14/25   Subjective Report   Subjective Report Will be having infusion today. Overall feels like labial swelling was done since last visit. Less pinching with walking. Still some burning/heat around sacral area.   Objective Measures   Objective Measures Objective Measure 1;Objective Measure 3   Objective Measure 1   Objective Measure Less swollen labia at start of treatment.   Details Initiated Graston/MFR to B lower lumbar and sacral area.   Treatment Interventions (PT)   Interventions Manual Therapy   Therapeutic Procedure/Exercise   Therapeutic Procedures Ther Proc 2;Ther Proc 3;Ther Proc 4   Ther Proc 1 glute myofascial full arc   Ther Proc 1 - Details x20   Ther Proc 2 - Details kegels 3 sec x 3 rest, 10-15 x daily   Ther Proc 3 bent knee fall out x 10 each leg   Ther Proc 3 - Details roll in's hooklying gentle 5 sec x 10, 2 x day   Ther Proc 4 diaphragmatic breathing 5 min, 2 xday   Skilled Intervention to improve strength of kegels   Patient Response/Progress good understanding   Manual Therapy   Manual Therapy: Mobilization, MFR, MLD, friction massage minutes (57153) 40   Manual Therapy Manual Therapy 2   Manual Therapy 1 supine MFR to internal PF (LA/OI L>R)   Manual Therapy 1 - Details IR/ER x10 each side   Manual Therapy 2 supine to B labia (for swelling/fluid movement)   Manual Therapy 2 - Details prone Graston 3 and manual PT to sacral/lumbar area   Skilled Intervention to decrease tone in PF and improve swelling in labia   Patient Response/Progress less swelling in B labia post treatment   Education   Learner/Method Patient;No Barriers to Learning    Plan   Home program PTRX   Plan for next session check on sacral pain/infusion.         PLAN  Continue therapy per current plan of care.    Beginning/End Dates of Progress Note Reporting Period:  11/22/24 to 01/28/2025    Referring Provider:  No ref. provider found

## 2025-01-28 NOTE — PATIENT INSTRUCTIONS
Dear Laxmi Ya    Thank you for choosing [facility] ([facility] for your infusion.  The following information is a summary of our appointment as well as important reminders.    EDUCATION POST BIOLOGICAL/CHEMOTHERAPY INFUSION  Call the triage nurse at your clinic or seek medical attention if you have chills and/or temperature greater than or equal to 100.5, uncontrolled nausea/vomiting, diarrhea, constipation, dizziness, shortness of breath, chest pain, heart palpitations, weakness or any other new or concerning symptoms, questions or concerns.  You can not have any live virus vaccines prior to or during treatment or up to 6 months post infusion.  If you have an upcoming surgery, medical procedure or dental procedure during treatment, this should be discussed with your ordering physician and your surgeon/dentist.  If you are having any concerning symptom, if you are unsure if you should get your next infusion or wish to speak to a provider before your next infusion, please call your care coordinator or triage nurse at your clinic to notify them so we can adequately serve you.   If you have any questions on your upcoming Specialty Infusion appointments, please call scheduling at [phone].  It was a pleasure taking care of you today.    Sincerely,    [facility]  [address] [place]  [address]  Phone:  [phone]

## 2025-01-28 NOTE — LETTER
1/28/2025      Laxmi Ya  [address] [place] [id number]      Dear Colleague,    Thank you for referring your patient, Laxmi Ya, to the [facility]. Please see a copy of my visit note below.    Chief Complaint:   Chief Complaint   Patient presents with     RECHECK     Bilateral repeat cubital tunnel injections       Referring Physician: No ref. provider found    Diagnosis: bilateral thumb CMC/STT arthritis, bilateral carpal tunnel syndrome, bilateral cubital tunnel syndrome  Treatment:   1/28/2025: bilateral carpal tunnel steroid injections (Dr. Morrison)  1/23/2025: bilateral thumb CMC and STT injections (Dr. Petit)  10/24/2024: bilateral thumb CMC and STT steroid injections (Dr. Petit)  10/9/2024: bilateral thumb CMC steroid injection, cubital tunnel steroid injections  8/15/2024: bilateral STT steroid injection (Dr. Petit)  6/6/2024: bilateral carpal tunnel steroid injection (Dr. Petit)  5/30/2024: bilateral STT steroid injection (Dr. Petit)  5/21/2024: bilateral cubital tunnel steroid injections  12/27/2023: bilateral carpal tunnel steroid injection  12/21/2023: bilateral STT steroid injection (Dr. Petit)  11/1/2023: bilateral carpal tunnel steroid injection  10/18/2023: Bilateral thumb CMC steroid injection  9/20/2023: bilateral STT steroid injection  5/4/2023: bilateral carpal tunnel steroid injection (Dr. Petit)  2/16/2023: bilateral STT steroid injection (Dr. Petit)  12/13/2022: bilateral carpal tunnel steroid injection (Dr. Sullivan)  10/27/2022: bilateral STT steroid injections (Dr. Petit)  9/13/2022: bilateral carpal tunnel steroid injection (Dr. Sullivan)  6/30/2022: bilateral STT steroid injections (Dr. Petit)  Bilateral carpal tunnel steroid injections (outside)    History of Present Illness: Laxmi Ya is a 48 year old RHD female presenting for evaluation of bilateral hand and wrist pain. She's had history  of carpal tunnel steroid injections and releases, she continues to feel some numbness/tingling in her fingers. Today she'd like to have steroid injections into the STT region.    Clinical documentation by Dr. Petit on 5/4/2023 was reviewed.    10/18/2023: reports the last STT injections helped.  She presents today for bilateral thumb CMC steroid injections.  She continues to have pain in bilateral thumbs.  She also continues to have numbness/tingling in the median nerve distribution.    11/1/2023: reports the last thumb CMC steroid injections helped. Presents today for bilateral carpal tunnel steroid injections.    12/27/2023: recent STT injections with Dr. Petit which she says were helpful. Last carpal tunnel steroid injections were helpful and she would like to do this again. Bilateral thumb CMCs feel good and the steroid injections have not worn off yet.    2/13/2024: recently had pneumonia. Right hand is feeling pretty good but left hand is having more carpal tunnel symptoms. Both hands are having mild pain in the STT region again. Focused on the left side today.    5/21/2024: today her symptoms are focused on the ulnar nerves, left worse than right. She has pain when leaning on the medial elbows, and this pain and tingling goes down the volar/ulnar side of her hand    10/8/2024: the steroid injections into her cubital tunnels last visit helped until about 1 week ago. She is requesting bilateral thumb CMC steroid injections and bilateral cubital tunnel injections    1/28/2025: 49F presenting for follow up. The last steroid injections by Dr. Petit have been helping. She is concerned regarding persistent pain of her CMC and wants to discuss surgical options. The last steroid injections into her cubital tunnels continue to help. She is interested in bilateral carpal tunnel injections today.    Physical Examination:  There were no vitals filed for this visit.  There is no height or weight on file to  calculate BMI.    Well appearing, well nourished  Alert and oriented x 3, cooperative with exam     Bilateral Upper Extremity:  Healed carpal tunnel scar  Thenar atrophy: no   Pain with palpation along the left CMC, much reduced since recent steroid injections  Motor Exam:    Abductor pollicis brevis strength: 4/5     1st dorsal interosseous strength: 5/5   - Flexor pollicis longus strength: 4/5    Intact thumbs up  Vascular Exam:    2+ radial pulse, < 2 sec capillary refill      Imaging/Studies:  Repeat ultrasound obtained 5/9/2024 shows:  Ulnar nerve  Distal upper arm: 6 mm  Proximal to cubital tunnel: 9 mm   Within the cubital tunnel: 8 mm  Distal to cubital tunnel:  6 mm  Wrist: 6 mm      Median nerve  Lacertus fibrosis: 6 mm  Wrist crease: 10 mm      The radial nerve is visualized at the level of the distal upper arm  through the bifurcation of the superficial and deep branches.                                                                   Impression:   1. Normal size and appearance of the left ulnar nerve at the elbow and  wrist.  2. Borderline enlarged left median nerve at the wrist crease.  3. Grossly normal appearance of the left radial nerve visualized at  the distal upper arm through the bifurcation into the superficial and  deep branches. Unable to confidently evaluate the radial nerve beyond  the bifurcation.  ___  Ultrasound bilateral upper extremities obtained 2/13/2024 shows:  Areas of the median nerves (millimeters squared):  Left:  Distal forearm: 7.6, 7.9  Wrist crease: 11.4, 10.3     Right:  Distal forearm: 9.4, 9.0  Wrist crease: 9.9, 8.6     Poorly visualized median nerve the region of the lacertus fibrosis  bilaterally.                                                              Impression:   1. Borderline enlargement of the left median nerve at the level of the  wrist crease.  2. No substantial right median nerve enlargement at the wrist crease.  ___  XR (3 views) of the  bilateral  hand  was obtained  9/20/2023 .  I reviewed the images with the patient.  The imaging study shows bilateral thumb CMC and STT arthritis with possible chondrocalcinosis on radial side.    Assessment: Laxmi Ya is a 49 year old female with bilateral STT, thumb CMC arthritis, median neuropathy, ulnar neuropathy.    Plan:   I had a discussion with the patient regarding my clinical findings, diagnosis, and treatment plan. I reviewed the treatment options for carpal tunnel syndrome (observation, bracing, steroid injection, occupational therapy, surgery, etc.) as well as the risks and benefits of each. We also reviewed treatment options for thumb arthritis (observation, bracing, steroid injections, surgery including LTRI, suture suspension, and fibrolock arthroplasty). Her ultrasound shows normal ulnar nerves, borderline enlarged left median nerve at the wrist crease which may be suggestive of recurrent left carpal tunnel syndrome. She elects steroid injections into bilateral carpal tunnel. She patient understands and agrees to the treatment plan.  All questions answered.       Steroid injections at bilateral carpal tunnel     Next Visit:    Follow-up: 6 weeks for bilateral cubital tunnel injections   - Discuss surgical options for CMC arthritis, patient will need to be off immunosuppressants prior to surgery     Procedure Note: After a discussion with Laxmi Ya regarding treatment options, we decided to proceed with a steroid injection to the bilateral carpal tunnel.  Risks of the procedure including hyperglycemia, fat atrophy, skin depigmentation and infection were discussed prior to injection and verbal consent from Laxmi Ya was obtained. The area was prepped with alcohol. 1ml of 1% lidocaine was infiltrated into the skin in each site. Subsequent injection with 20 mg Kenalog at each site for a total of 40mg. Laxmi Ya tolerated the injection well. A clean dressing was placed over the site of the  injection. Laxmi Ya was given post injection instructions.     Lia Roberts MD    I have seen and evaluated this patient myself and agree with the documentation.  I performed the injections in their entirety.  HYACINTH STARKS MD    Hand / Upper Extremity Injection/Arthrocentesis: bilateral carpal tunnel    Date/Time: 2025 3:27 PM    Performed by: Hyacinth Starks MD  Authorized by: Hyacinth Starks MD    Indications:  Pain  Needle Size:  27 G  Guidance: landmark    Condition: carpal tunnel    Laterality:  Bilateral    Site:  Bilateral carpal tunnel  Medications (Right):  20 mg triamcinolone 40 MG/ML; 1 mL lidocaine (PF) 1 %  Medications (Left):  20 mg triamcinolone 40 MG/ML; 1 mL lidocaine (PF) 1 %  Outcome:  Tolerated well, no immediate complications  Procedure discussed: discussed risks, benefits, and alternatives    Consent Given by:  Patient  Timeout: timeout called immediately prior to procedure    Prep: patient was prepped and draped in usual sterile fashion        [facility] [address] [address] [phone]  Dept: [phone]  ______________________________________________________________________________    Patient: Laxmi Ya   : 1975   MRN: [id number]   2025    INVASIVE PROCEDURE SAFETY CHECKLIST    Date: 2025   Procedure:Bilateral carpal tunnel steroid injections  Patient Name: Laxmi Ya  MRN: [id number]  YOB: 1975    Action: Complete sections as appropriate. Any discrepancy results in a HARD COPY until resolved.     PRE PROCEDURE:  Patient ID verified with 2 identifiers (name and  or MRN): Yes  Procedure and site verified with patient/designee (when able): Yes  Accurate consent documentation in medical record: Yes  H&P (or appropriate assessment) documented in medical record: Yes  H&P must be up to 20 days prior to procedure and updates within 24 hours of  procedure as applicable: Yes  Relevant diagnostic and radiology test results appropriately labeled and displayed as applicable: NA  Procedure site(s) marked with provider initials: NA    TIMEOUT:  Time-Out performed immediately prior to starting procedure, including verbal and active participation of all team members addressing the following:Yes  * Correct patient identify  * Confirmed that the correct side and site are marked  * An accurate procedure consent form  * Agreement on the procedure to be done  * Correct patient position  * Relevant images and results are properly labeled and appropriately displayed  * The need to administer antibiotics or fluids for irrigation purposes during the procedure as applicable   * Safety precautions based on patient history or medication use    DURING PROCEDURE: Verification of correct person, site, and procedures any time the responsibility for care of the patient is transferred to another member of the care team.       The following medications were given:         Prior to injection, verified patient identity using patient's name and date of birth.  Due to injection administration, patient instructed to remain in clinic for 15 minutes  afterwards, and to report any adverse reaction to me immediately.    Tendon sheath injection was performed.   Medication Name: Kenalog 40 mg/mL NDC [address]-6  Drug Amount Wasted:  None.  Vial/Syringe: Single dose vial  Expiration Date:  9/1/26    Medication Name Lidocaine 1% NDC 66669-364-21    Scribed by DUY RESENDEZ, ATC for Dr. Jo on January 28, 2025 at 3:31pm based on the provider's statements to me.     DUY RESENDEZ ATC      Again, thank you for allowing me to participate in the care of your patient.        Sincerely,        WANDA STARKS MD    Electronically signed

## 2025-01-28 NOTE — NURSING NOTE
Reason For Visit:   Chief Complaint   Patient presents with    RECHECK     Bilateral repeat cubital tunnel injections       Primary MD: Amol Juares  Ref. MD: Katja    Age: 49 year old    ?  No      There were no vitals taken for this visit.      Pain Assessment  Patient Currently in Pain: Yes  0-10 Pain Scale: 10  Primary Pain Location: Wrist (Bilateral wrists, hands and elbows)  Pain Descriptors: Intermittent, Constant, Sharp, Aching, Dull    Hand Dominance Evaluation  Hand Dominance: Right          QuickDASH Assessment      1/23/2025     7:13 AM   QuickDASH Main   1. Open a tight or new jar Unable   2. Do heavy household chores (e.g., wash walls, floors) Unable   3. Carry a shopping bag or briefcase Unable   4. Wash your back Unable   5. Use a knife to cut food Unable   6. Recreational activities in which you take some force or impact through your arm, shoulder or hand (e.g., golf, hammering, tennis, etc.) Unable   7. During the past week, to what extent has your arm, shoulder or hand problem interfered with your normal social activities with family, friends, neighbours or groups Extremely   8. During the past week, were you limited in your work or other regular daily activities as a result of your arm, shoulder or hand problem Unable   9. Arm, shoulder or hand pain Extreme   10.Tingling (pins and needles) in your arm,shoulder or hand Severe   11. During the past week, how much difficulty have you had sleeping because of the pain in your arm, shoulder or hand Severe difficulty   Quickdash Ability Score 95.45          Current Outpatient Medications   Medication Sig Dispense Refill    albuterol (PROAIR HFA/PROVENTIL HFA/VENTOLIN HFA) 108 (90 Base) MCG/ACT inhaler Inhale 2 puffs into the lungs every 6 hours as needed for shortness of breath, wheezing or cough      Artificial Tear Solution (SOOTHE XP) SOLN as needed      atenolol (TENORMIN) 50 MG tablet Take 50 mg by mouth daily      azelastine 137  MCG/SPRAY SOLN USE 2 SPRAYS IN EACH NOSTRIL 2 TIMES DAILY FOR 1 MONTH      benzoyl peroxide 5 % external liquid Use daily as directed. Preferred bdrand: Medpura 236 mL 11    blood glucose (NO BRAND SPECIFIED) test strip Use to test blood sugar 2 times daily or as directed. 100 strip 1    blood glucose monitoring (NO BRAND SPECIFIED) meter device kit Use to test blood sugar 2 times daily or as directed. 1 kit 0    ciclopirox (LOPROX) 0.77 % cream Apply topically 2 times daily To affected toenails. 90 g 5    cyclobenzaprine (FLEXERIL) 5 MG tablet Take 5 mg by mouth.      dexAMETHasone (DECADRON) 0.1 % ophthalmic solution       diclofenac (VOLTAREN) 1 % topical gel Apply topically 4 times daily as needed      doxycycline hyclate (VIBRAMYCIN) 100 MG capsule Take 100 mg by mouth 2 times daily.      dupilumab (DUPIXENT) 300 MG/2ML prefilled pen Inject 2 mLs (300 mg) subcutaneously every 14 days. 4 mL 5    econazole nitrate 1 % external cream Apply topically daily. To affected toenails. 85 g 5    famciclovir (FAMVIR) 500 MG tablet Take 1 tablet (500 mg) by mouth 2 times daily. 180 tablet 3    fexofenadine (ALLEGRA) 180 MG tablet Take 1 tablet (180 mg) by mouth daily 90 tablet 2    fidaxomicin (DIFICID) 200 MG tablet Take 200 mg by mouth as needed      fluconazole (DIFLUCAN) 150 MG tablet Take 150 mg by mouth.      Fluticasone-Umeclidin-Vilanterol (TRELEGY ELLIPTA) 200-62.5-25 MCG/ACT oral inhaler Inhale 1 puff into the lungs daily. 90 each 1    insulin glargine (LANTUS PEN) 100 UNIT/ML pen Inject 50 Units subcutaneously daily      insulin lispro (HUMALOG KWIKPEN) 100 UNIT/ML (1 unit dial) KWIKPEN Inject 35 Units subcutaneously 3 times daily (before meals) Plus 1:25 correction scale if BG >150      Lancets (ONETOUCH DELICA PLUS NTBAVB21U) MISC CHECK BLOOD GLUCOSE 3 TIMES DAILY, ADJUSTING MEDICATION, DX CODE E 11.9, ON INSULIN      lidocaine (LIDODERM) 5 % patch as needed   1    LORazepam (ATIVAN) 0.5 MG tablet Take 0.25 mg  by mouth every 8 hours as needed      losartan (COZAAR) 100 MG tablet Take 100 mg by mouth daily      methylPREDNISolone (MEDROL DOSEPAK) 4 MG tablet therapy pack Follow Package Directions 21 tablet 0    methylPREDNISolone (MEDROL DOSEPAK) 4 MG tablet therapy pack Follow Package Directions 21 tablet 0    methylPREDNISolone (MEDROL DOSEPAK) 4 MG tablet therapy pack TAKE 6 TABLETS ON DAY 1 AS DIRECTED ON PACKAGE AND DECREASE BY 1 TAB EACH DAY FOR A TOTAL OF 6 DAYS 21 each 1    methylPREDNISolone (MEDROL DOSEPAK) 4 MG tablet therapy pack Take per package instructions. Take once a day by mouth 21 tablet 1    methylPREDNISolone (MEDROL) 4 MG tablet Take 3 tablets (12 mg) by mouth daily. 15 tablet 0    methylPREDNISolone (MEDROL) 4 MG tablet Take 3 tabs daily for 10 days as directed 30 tablet 0    methylPREDNISolone (MEDROL) 4 MG tablet Take 1 tablet (4 mg) by mouth daily. Take 5 tabs daily for 1 week, then take 3 tabs daily for 1 week to bridge infusions 56 tablet 0    mometasone (NASONEX) 50 MCG/ACT nasal spray Spray 2 sprays into both nostrils daily. 51 g 1    montelukast (SINGULAIR) 10 MG tablet Take 10 mg by mouth daily.      multivitamin w/minerals (THERA-VIT-M) tablet Take 1 tablet by mouth daily      naloxone (NARCAN) 4 MG/0.1ML nasal spray       naproxen (NAPROSYN) 375 MG tablet Take 375 mg by mouth.      norethindrone (MICRONOR) 0.35 MG tablet Take 1 tablet (0.35 mg) by mouth daily 90 tablet 3    nystatin (MYCOSTATIN) 741291 UNIT/ML suspension       olopatadine (PATADAY) 0.7 % ophthalmic solution Apply 1 drop to eye daily PRN      omeprazole (PRILOSEC) 40 MG DR capsule TAKE 1 CAPSULE (40 MG) BY MOUTH TWO TIMES A DAY. TAKE 1 HOUR BEFORE A MEAL.      oxyCODONE IR (ROXICODONE) 10 MG tablet Take 10 mg by mouth      predniSONE (DELTASONE) 5 MG tablet TAKE 1 TABLET BY MOUTH EVERY DAY 90 tablet 1    rifaximin (XIFAXAN) 550 MG TABS tablet Take 200 mg by mouth 3 times daily Take for 10 days, has on had as needed for  small bowel bacteria overgrowh      rosuvastatin (CRESTOR) 5 MG tablet Take 2 tablets by mouth daily      spironolactone-HCTZ (ALDACTAZIDE) 25-25 MG tablet Take 1 tablet by mouth daily      terbinafine (LAMISIL) 250 MG tablet Take 1 tablet by mouth daily.      tirzepatide (MOUNJARO) 2.5 MG/0.5ML pen Inject 2.5 mg subcutaneously every 7 days.      tobramycin-dexAMETHasone (TOBRADEX) 0.3-0.1 % ophthalmic suspension PLACE 1 DROP INTO BOTH EYES TWO TIMES A DAY.      tocilizumab (ACTEMRA) 400 MG/20ML Inject 800 mg into the vein every 28 days         Allergies   Allergen Reactions    Polyethylene Glycol Rash    Codeine      Other reaction(s): Gastrointestinal, GI intolerance, Vomiting    Vomiting    Hydrocodone Nausea and Vomiting    Morphine Nausea and Vomiting    Sulfasalazine      Other reaction(s): GI intolerance    Has tried and had nausa.    Tramadol Nausea and Vomiting     Other reaction(s): Gastrointestinal, Other (see comments)    Nausea and vomiting     unknown    Acetaminophen Nausea     Nausea and vomiting    Gluten Meal Other (See Comments) and Rash     Other reaction(s): Gastrointestinal, GI intolerance  Dizziness, tired, rash, stomach cramps, thrush, mouth sores  Dizziness, tired, rash, stomach cramps, thrush, mouth sores      Hydroxyzine GI Disturbance and Nausea     nausea, dry mouth    Propylene Glycol Dizziness, Nausea and Vomiting, Nausea and Rash       DUY RESENDEZ, ATC

## 2025-01-29 ENCOUNTER — VIRTUAL VISIT (OUTPATIENT)
Dept: PHARMACY | Facility: CLINIC | Age: 50
End: 2025-01-29
Attending: INTERNAL MEDICINE
Payer: MEDICARE

## 2025-01-29 DIAGNOSIS — B00.9 HSV-1 (HERPES SIMPLEX VIRUS 1) INFECTION: ICD-10-CM

## 2025-01-29 DIAGNOSIS — M06.00 SERONEGATIVE RHEUMATOID ARTHRITIS (H): Primary | ICD-10-CM

## 2025-01-29 DIAGNOSIS — B37.31 RECURRENT CANDIDIASIS OF VAGINA: ICD-10-CM

## 2025-02-03 ENCOUNTER — THERAPY VISIT (OUTPATIENT)
Dept: PHYSICAL THERAPY | Facility: CLINIC | Age: 50
End: 2025-02-03
Payer: MEDICARE

## 2025-02-03 ENCOUNTER — TELEPHONE (OUTPATIENT)
Dept: OBGYN | Facility: CLINIC | Age: 50
End: 2025-02-03

## 2025-02-03 DIAGNOSIS — G89.29 CHRONIC BILATERAL LOW BACK PAIN WITHOUT SCIATICA: ICD-10-CM

## 2025-02-03 DIAGNOSIS — M54.2 NECK PAIN: ICD-10-CM

## 2025-02-03 DIAGNOSIS — M54.50 CHRONIC BILATERAL LOW BACK PAIN WITHOUT SCIATICA: ICD-10-CM

## 2025-02-03 DIAGNOSIS — R10.2 PELVIC PAIN IN FEMALE: Primary | ICD-10-CM

## 2025-02-03 PROCEDURE — 97140 MANUAL THERAPY 1/> REGIONS: CPT | Mod: GP | Performed by: PHYSICAL THERAPIST

## 2025-02-03 NOTE — TELEPHONE ENCOUNTER
[facility] Call Center    Phone Message    May a detailed message be left on voicemail: yes     Reason for Call: Symptoms or Concerns     If patient has red-flag symptoms, warm transfer to triage line    Current symptom or concern: Patient is calling to see this provider ASAP, because she is having a herpes outbreak and the medicine is not working.     Please call the patient back to discuss and possibly schedule sooner. She would like to be seen this week and her first available was March 12th which the patient is already scheduled for a different concern.    Action Taken: Other: obgyn    Travel Screening: Not Applicable     Date of Service:

## 2025-02-04 NOTE — PROGRESS NOTES
Medication Therapy Management (MTM) Encounter    ASSESSMENT:                            Medication Adherence/Access: No issues identified.    Rheumatoid Arthritis: Would benefit from meeting with cardiologist as planned to discuss concerns with labs.     HSV Infection/Candida Glabrata Infection: Discussed it may take a few weeks to see the change of LFTs from discontinuation of famciclovir. If patient is not having active HSV symptoms frequently, reasonable to move to as needed famciclovir for flares. Advised discussing with provider at next OB appointment.    PLAN:                            1. Please discuss moving to as needed famciclovir with your OB at your next appointment. It may take a few weeks to see a change in LFTs from stopping this medication.    2. Complete appointment with cardiology as scheduled.    Follow-up: Return in about 3 months (around 4/29/2025) for MTM Pharmacist Visit.    SUBJECTIVE/OBJECTIVE:                          Laxmi Ya is a 49 year old female seen for a follow-up visit.       Reason for visit: Questions about labs drawn yesterday    Allergies/ADRs: Reviewed in chart  Past Medical History: Reviewed in chart  Tobacco: She reports that she has never smoked. She has never used smokeless tobacco.  Alcohol: Less than 1 beverages / week    Medication Adherence/Access: no issues reported.    Rheumatoid Arthritis:   Actemra 800 mg infusion monthly (last infusion 1/28/25)   Diclofenac 1% gel as needed (uses once per day a few times per week)  Oxycodone 10 mg 2-4 times daily as needed     Reports she is doing well after getting her Actemra infusion. Not planning on using self-injection unless IV option is not available again. No side effects or concerns noted. Most concerned about continued LFT elevations. Also planning on seeing a cardiologist due to new heart palpitations. Feels her labs also reflect a potential cardiac condition as her potassium would be low without a supplement and  her Co2 is low.     Liver Function Studies -   Recent Labs   Lab Test 01/28/25  1228   PROTTOTAL 6.9   ALBUMIN 4.6   BILITOTAL 0.9   ALKPHOS 50   AST 55*   ALT 92*      CBC RESULTS:   Recent Labs   Lab Test 01/28/25  1228   WBC 14.2*   RBC 5.19   HGB 16.0*   HCT 45.3   MCV 87   MCH 30.8   MCHC 35.3   RDW 13.6         HSV Infection/Candida Glabrata Infection:   No current medications     Reports she stopped famciclovir due to LFT elevations. Has not had any new HSV flares so feels it is under control. Concerned that LFTs are still elevated even off the famciclovir.     Creatinine   Date Value Ref Range Status   01/28/2025 0.81 0.51 - 0.95 mg/dL Final   04/01/2020 0.73 0.52 - 1.04 mg/dL Final      Today's Vitals: There were no vitals taken for this visit.  ----------------    I spent 30 minutes with this patient today. All changes were made via collaborative practice agreement with Oswaldo Lemus.     A summary of these recommendations was sent via [employer].    Yuliya Kaplan, PharmD  Medication Therapy Management Pharmacist  [facility]  Phone: [phone]    Telemedicine Visit Details  The patient's medications can be safely assessed via a telemedicine encounter.  Type of service:  Telephone visit  Originating Location (pt. Location): Home    Distant Location (provider location):  Off-site  Start Time: 9:30 AM  End Time: 10:00 AM     Medication Therapy Recommendations  No medication therapy recommendations to display

## 2025-02-04 NOTE — PATIENT INSTRUCTIONS
"Recommendations from today's MTM visit:                                                       1. Please discuss moving to as needed famciclovir with your OB at your next appointment. It may take a few weeks to see a change in LFTs from stopping this medication.    2. Complete appointment with cardiology as scheduled.    Follow-up: Return in about 3 months (around 4/29/2025) for MTM Pharmacist Visit.    It was great speaking with you today.  I value your experience and would be very thankful for your time in providing feedback in our clinic survey. In the next few days, you may receive an email or text message from [employer] with a link to a survey related to your  clinical pharmacist.\"     To schedule another MTM appointment, please call the clinic directly or you may call the MTM scheduling line at [phone].    My Clinical Pharmacist's contact information:                                                      Please feel free to contact me with any questions or concerns you have.      Yuliya Kaplan, PharmD  Medication Therapy Management Pharmacist  [facility]  Phone: [phone]     "

## 2025-02-05 ENCOUNTER — MYC MEDICAL ADVICE (OUTPATIENT)
Dept: OBGYN | Facility: CLINIC | Age: 50
End: 2025-02-05

## 2025-02-05 ENCOUNTER — TRANSCRIBE ORDERS (OUTPATIENT)
Dept: OTHER | Age: 50
End: 2025-02-05

## 2025-02-05 DIAGNOSIS — M54.2 NECK PAIN: ICD-10-CM

## 2025-02-05 DIAGNOSIS — M54.50 LOW BACK PAIN: Primary | ICD-10-CM

## 2025-02-05 DIAGNOSIS — R10.2 PELVIC PAIN: ICD-10-CM

## 2025-02-05 NOTE — TELEPHONE ENCOUNTER
[facility] Call [facility]    Phone Message    May a detailed message be left on voicemail: yes     Reason for Call: Other: Pt was returning a call to care team. See messages below. Pt was not aware that her chart said no VM. Pt chart has been updated and pt Ok'd any voicemail's from care team for future reference. Please call pt back to discuss.      Action Taken: Other: OBGYN    Travel Screening: Not Applicable     Date of Service:

## 2025-02-14 NOTE — TELEPHONE ENCOUNTER
Health Call Center    Phone Message    May a detailed message be left on voicemail: yes     Reason for Call: Other: Patient is being referred to Dr. Thompson by Dr. Farley for diverticulitis, SIBO and abdominal pain. Per our scheduling guidelines Dr. Thompson does not see for any of these dxs. Please review as patient is demanding to see Dr. Thompson. Thanks!      Action Taken: Message routed to:  [facility] GI    Travel Screening: Not Applicable                                                                       Reports generalized weakness with poor appetite over past several weeks. Reports weight loss and fatigue. Presents awake, alert. Dried blood noted to lips but denies hematemesis - reports h/o perforated ulcer with GIB in the past. Denies ETOH. Disheveled and unkept. Denies pain.

## 2025-02-17 ENCOUNTER — THERAPY VISIT (OUTPATIENT)
Dept: OCCUPATIONAL THERAPY | Facility: CLINIC | Age: 50
End: 2025-02-17
Attending: INTERNAL MEDICINE
Payer: MEDICARE

## 2025-02-17 DIAGNOSIS — I89.0 LYMPHEDEMA: Primary | ICD-10-CM

## 2025-02-17 PROCEDURE — 97140 MANUAL THERAPY 1/> REGIONS: CPT | Mod: GO | Performed by: OCCUPATIONAL THERAPIST

## 2025-02-18 ENCOUNTER — THERAPY VISIT (OUTPATIENT)
Dept: PHYSICAL THERAPY | Facility: CLINIC | Age: 50
End: 2025-02-18
Payer: MEDICARE

## 2025-02-18 DIAGNOSIS — G89.29 CHRONIC BILATERAL LOW BACK PAIN WITHOUT SCIATICA: ICD-10-CM

## 2025-02-18 DIAGNOSIS — M54.2 NECK PAIN: ICD-10-CM

## 2025-02-18 DIAGNOSIS — R10.2 PELVIC PAIN IN FEMALE: Primary | ICD-10-CM

## 2025-02-18 DIAGNOSIS — M54.50 CHRONIC BILATERAL LOW BACK PAIN WITHOUT SCIATICA: ICD-10-CM

## 2025-02-18 PROCEDURE — 97140 MANUAL THERAPY 1/> REGIONS: CPT | Mod: GP | Performed by: PHYSICAL THERAPIST

## 2025-02-18 NOTE — PROGRESS NOTES
Chief Complaint:   Chief Complaint   Patient presents with    RECHECK     Follow-up bilateral elbow pain repeat cubital tunnel injections       Referring Physician: No ref. provider found    Diagnosis: bilateral thumb CMC/STT arthritis, bilateral carpal tunnel syndrome, bilateral cubital tunnel syndrome  Treatment:   2/25/2025: bilateral cubital tunnel steroid injections (Tamiko)  1/28/2025: bilateral carpal tunnel steroid injections (Dr. Morrison)  1/23/2025: bilateral thumb CMC and STT injections (Dr. Petit)  10/24/2024: bilateral thumb CMC and STT steroid injections (Dr. Petit)  10/9/2024: bilateral thumb CMC steroid injection, cubital tunnel steroid injections  8/15/2024: bilateral STT steroid injection (Dr. Petit)  6/6/2024: bilateral carpal tunnel steroid injection (Dr. Petit)  5/30/2024: bilateral STT steroid injection (Dr. Petit)  5/21/2024: bilateral cubital tunnel steroid injections  12/27/2023: bilateral carpal tunnel steroid injection  12/21/2023: bilateral STT steroid injection (Dr. Petit)  11/1/2023: bilateral carpal tunnel steroid injection  10/18/2023: Bilateral thumb CMC steroid injection  9/20/2023: bilateral STT steroid injection  5/4/2023: bilateral carpal tunnel steroid injection (Dr. Petit)  2/16/2023: bilateral STT steroid injection (Dr. Petit)  12/13/2022: bilateral carpal tunnel steroid injection (Dr. Sullivan)  10/27/2022: bilateral STT steroid injections (Dr. Petit)  9/13/2022: bilateral carpal tunnel steroid injection (Dr. Sullivan)  6/30/2022: bilateral STT steroid injections (Dr. Petit)  Bilateral carpal tunnel steroid injections (outside)    History of Present Illness: Laxmi Ya is a 48 year old RHD female presenting for evaluation of bilateral hand and wrist pain. She's had history of carpal tunnel steroid injections and releases, she continues to feel some numbness/tingling in her fingers. Today she'd like to have steroid injections  into the STT region.    Clinical documentation by Dr. Petit on 5/4/2023 was reviewed.    10/18/2023: reports the last STT injections helped.  She presents today for bilateral thumb CMC steroid injections.  She continues to have pain in bilateral thumbs.  She also continues to have numbness/tingling in the median nerve distribution.    11/1/2023: reports the last thumb CMC steroid injections helped. Presents today for bilateral carpal tunnel steroid injections.    12/27/2023: recent STT injections with Dr. Petit which she says were helpful. Last carpal tunnel steroid injections were helpful and she would like to do this again. Bilateral thumb CMCs feel good and the steroid injections have not worn off yet.    2/13/2024: recently had pneumonia. Right hand is feeling pretty good but left hand is having more carpal tunnel symptoms. Both hands are having mild pain in the STT region again. Focused on the left side today.    5/21/2024: today her symptoms are focused on the ulnar nerves, left worse than right. She has pain when leaning on the medial elbows, and this pain and tingling goes down the volar/ulnar side of her hand    10/8/2024: the steroid injections into her cubital tunnels last visit helped until about 1 week ago. She is requesting bilateral thumb CMC steroid injections and bilateral cubital tunnel injections    1/28/2025: 49F presenting for follow up. The last steroid injections by Dr. Petit have been helping. She is concerned regarding persistent pain of her CMC and wants to discuss surgical options. The last steroid injections into her cubital tunnels continue to help. She is interested in bilateral carpal tunnel injections today.    2/25/2025: presents requesting injections into bilateral cubital tunnels. She said the last injections were very helpful but symptoms have returned. She gets numbness/tingling into the bilateral small fingers.    Physical Examination:  There were no vitals filed for  this visit.  There is no height or weight on file to calculate BMI.    Well appearing, well nourished  Alert and oriented x 3, cooperative with exam     Bilateral Upper Extremity:  Healed carpal tunnel scar  Thenar/hypothenar atrophy: no   +elbow flexion compression test, +tinel at cubital tunnel  Motor Exam:   Abductor pollicis brevis strength: 4/5    1st dorsal interosseous strength: 5/5   - Flexor pollicis longus strength: 4/5   Intact thumbs up  Vascular Exam:   2+ radial pulse, < 2 sec capillary refill      Imaging/Studies:  Repeat ultrasound obtained 5/9/2024 shows:  Ulnar nerve  Distal upper arm: 6 mm  Proximal to cubital tunnel: 9 mm   Within the cubital tunnel: 8 mm  Distal to cubital tunnel:  6 mm  Wrist: 6 mm      Median nerve  Lacertus fibrosis: 6 mm  Wrist crease: 10 mm      The radial nerve is visualized at the level of the distal upper arm  through the bifurcation of the superficial and deep branches.                                                                   Impression:   1. Normal size and appearance of the left ulnar nerve at the elbow and  wrist.  2. Borderline enlarged left median nerve at the wrist crease.  3. Grossly normal appearance of the left radial nerve visualized at  the distal upper arm through the bifurcation into the superficial and  deep branches. Unable to confidently evaluate the radial nerve beyond  the bifurcation.  ___  Ultrasound bilateral upper extremities obtained 2/13/2024 shows:  Areas of the median nerves (millimeters squared):  Left:  Distal forearm: 7.6, 7.9  Wrist crease: 11.4, 10.3     Right:  Distal forearm: 9.4, 9.0  Wrist crease: 9.9, 8.6     Poorly visualized median nerve the region of the lacertus fibrosis  bilaterally.                                                              Impression:   1. Borderline enlargement of the left median nerve at the level of the  wrist crease.  2. No substantial right median nerve enlargement at the wrist crease.  ___  XR  (3 views) of the  bilateral  hand was obtained  9/20/2023 .  I reviewed the images with the patient.  The imaging study shows bilateral thumb CMC and STT arthritis with possible chondrocalcinosis on radial side.    Assessment: Laxmi Ya is a 49 year old female with bilateral STT, thumb CMC arthritis, median neuropathy, ulnar neuropathy.    Plan:   I had a discussion with the patient regarding my clinical findings, diagnosis, and treatment plan. I reviewed the treatment options for carpal/cubital tunnel syndrome (observation, bracing, steroid injection, occupational therapy, surgery, etc.) as well as the risks and benefits of each. We also reviewed treatment options for thumb arthritis (observation, bracing, steroid injections, surgery including LTRI, suture suspension, and fiberlock arthroplasty). Her ultrasound shows normal ulnar nerves, borderline enlarged left median nerve at the wrist crease which may be suggestive of recurrent left carpal tunnel syndrome. She elects steroid injections into the bilateral cubital tunnels, with the understanding that these are not commonly performed injections and risk injury to the ulnar nerve. The patient understands and agrees to the treatment plan.  All questions answered.      Steroid injections at bilateral cubital tunnel     Next Visit:   Follow-up: as needed  - Discuss surgical options for CMC arthritis, patient will need to be off immunosuppressants prior to surgery     Procedure Note: After a discussion with Laxmi Ya regarding treatment options, we decided to proceed with a steroid injection to the bilateral cubital tunnel.  Risks of the procedure including hyperglycemia, fat atrophy, skin depigmentation and infection were discussed prior to injection and verbal consent from Laxmi Ya was obtained. The area was prepped with alcohol. 1ml of 1% lidocaine was infiltrated into the skin in each site. Subsequent injection with 20 mg Kenalog at each site for  a total of 40mg. Laxmi Ya tolerated the injection well. A clean dressing was placed over the site of the injection. Laxmi Ya was given post injection instructions.       WANDA STARKS MD

## 2025-02-19 ENCOUNTER — MYC MEDICAL ADVICE (OUTPATIENT)
Dept: OBGYN | Facility: CLINIC | Age: 50
End: 2025-02-19

## 2025-02-19 ENCOUNTER — THERAPY VISIT (OUTPATIENT)
Dept: OCCUPATIONAL THERAPY | Facility: CLINIC | Age: 50
End: 2025-02-19
Attending: INTERNAL MEDICINE
Payer: MEDICARE

## 2025-02-19 DIAGNOSIS — I89.0 LYMPHEDEMA: Primary | ICD-10-CM

## 2025-02-19 PROCEDURE — 97140 MANUAL THERAPY 1/> REGIONS: CPT | Mod: GO | Performed by: OCCUPATIONAL THERAPIST

## 2025-02-20 NOTE — TELEPHONE ENCOUNTER
"Pt reports appt with Dr. Pate was cancelled.    Was referred from outside ob/gyn for complex medical hx and need for endometrial biopsy.    Patient last seen 12/12/2024 at [facility] who noted: \"Discussed with Laxmi that her medical history is very complex, she has multiple specialists involved in her care at the [facility]. It would be my recommendation for her to have her Gyn Care that the [facility] with the specialists there rather than in [place] because of her complex medical history. This would allow collaboration in her care between her specialists. Endometrial biopsy or other evaluation of the endometrium best done at the [facility]. With her immunodeficiency, antibiotic treatment best managed by her infectious disease specialist. I am not comfortable giving the large number of Cipro tablets the patient requests to treat possible yeast infections.\"    Has current HSV breakout on upper buttocks. Photos included on [employer] message.    Scheduled with Dr. Beckham 2/21 at 0800.  "

## 2025-02-21 ENCOUNTER — TELEPHONE (OUTPATIENT)
Dept: OBGYN | Facility: CLINIC | Age: 50
End: 2025-02-21
Payer: MEDICARE

## 2025-02-21 NOTE — TELEPHONE ENCOUNTER
Health Call Center    Phone Message    May a detailed message be left on voicemail: yes     Reason for Call: Other: Patient is calling she would [facility] a [facility] She said she wanted to schedule with Dr. aPte or Dr. Beckham but I dont see any available openings with either one. Please  call her back to discuss further. Thank you.      Action Taken: Other: obgyn    Travel Screening: Not Applicable     Date of Service:

## 2025-02-24 ENCOUNTER — THERAPY VISIT (OUTPATIENT)
Dept: OCCUPATIONAL THERAPY | Facility: CLINIC | Age: 50
End: 2025-02-24
Attending: INTERNAL MEDICINE
Payer: MEDICARE

## 2025-02-24 DIAGNOSIS — I89.0 LYMPHEDEMA: Primary | ICD-10-CM

## 2025-02-24 PROCEDURE — 97140 MANUAL THERAPY 1/> REGIONS: CPT | Mod: GO | Performed by: OCCUPATIONAL THERAPIST

## 2025-02-25 ENCOUNTER — OFFICE VISIT (OUTPATIENT)
Dept: ORTHOPEDICS | Facility: CLINIC | Age: 50
End: 2025-02-25
Payer: MEDICARE

## 2025-02-25 DIAGNOSIS — G90.9 AUTONOMIC NEUROPATHY DUE TO DISORDER OF IMMUNE FUNCTION: Primary | ICD-10-CM

## 2025-02-25 DIAGNOSIS — G56.23 CUBITAL TUNNEL SYNDROME, BILATERAL: ICD-10-CM

## 2025-02-25 DIAGNOSIS — D89.9 AUTONOMIC NEUROPATHY DUE TO DISORDER OF IMMUNE FUNCTION: Primary | ICD-10-CM

## 2025-02-25 RX ORDER — LIDOCAINE HYDROCHLORIDE 10 MG/ML
2 INJECTION, SOLUTION EPIDURAL; INFILTRATION; INTRACAUDAL; PERINEURAL
Status: COMPLETED | OUTPATIENT
Start: 2025-02-25 | End: 2025-02-25

## 2025-02-25 RX ADMIN — LIDOCAINE HYDROCHLORIDE 2 ML: 10 INJECTION, SOLUTION EPIDURAL; INFILTRATION; INTRACAUDAL; PERINEURAL at 15:21

## 2025-02-25 NOTE — TELEPHONE ENCOUNTER
Unfortunately, no timely appts with Dr. Beckham/Dr. Pate. Dr. Kumar's 2/27 schedule now open; held 10:15 AM appt slot. Attempted to call Laxmi to offer this to her -- straight to [facility] and responded to [facility] message.

## 2025-02-25 NOTE — PROGRESS NOTES
Medium Joint Injection/Arthrocentesis: bilateral elbow    Date/Time: 2025 3:21 PM    Performed by: Hyacinth Morrison MD  Authorized by: Hyacinth Morrison MD    Indications:  Pain  Needle Size:  25 G  Guidance: surface landmarks    Location:  Elbow  Laterality:  Bilateral  Site:  Bilateral elbow  Medications (Right):  10 mg triamcinolone acetonide 10 MG/ML; 2 mL lidocaine (PF) 1 %  Medications (Left):  10 mg triamcinolone acetonide 10 MG/ML; 2 mL lidocaine (PF) 1 %  Outcome:  Tolerated well, no immediate complications  Procedure discussed: discussed risks, benefits, and alternatives    Consent Given by:  Patient  Timeout: timeout called immediately prior to procedure    .    [facility] [address] [address] [phone]  Dept: [phone]  ______________________________________________________________________________    Patient: Laxmi Ya   : 1975   MRN: [id number]   2025    INVASIVE PROCEDURE SAFETY CHECKLIST    Date: 2025   Procedure:Bilateral elbows cubital tunnel steroid injections  Patient Name: Laxmi Ya  MRN: [id number]  YOB: 1975    Action: Complete sections as appropriate. Any discrepancy results in a HARD COPY until resolved.     PRE PROCEDURE:  Patient ID verified with 2 identifiers (name and  or MRN): Yes  Procedure and site verified with patient/designee (when able): Yes  Accurate consent documentation in medical record: Yes  H&P (or appropriate assessment) documented in medical record: Yes  H&P must be up to 20 days prior to procedure and updates within 24 hours of procedure as applicable: Yes  Relevant diagnostic and radiology test results appropriately labeled and displayed as applicable: NA  Procedure site(s) marked with provider initials: NA    TIMEOUT:  Time-Out performed immediately prior to starting procedure, including verbal and active participation of all  team members addressing the following:Yes  * Correct patient identify  * Confirmed that the correct side and site are marked  * An accurate procedure consent form  * Agreement on the procedure to be done  * Correct patient position  * Relevant images and results are properly labeled and appropriately displayed  * The need to administer antibiotics or fluids for irrigation purposes during the procedure as applicable   * Safety precautions based on patient history or medication use    DURING PROCEDURE: Verification of correct person, site, and procedures any time the responsibility for care of the patient is transferred to another member of the care team.       The following medications were given:         Prior to injection, verified patient identity using patient's name and date of birth.  Due to injection administration, patient instructed to remain in clinic for 15 minutes  afterwards, and to report any adverse reaction to me immediately.    Tendon sheath injection was performed.   Medication Name: Kenalog -10 x2 NDC 8404-0067-18  Drug Amount Wasted:  Yes: 30 mg/ml   Vial/Syringe: Single dose vial  Expiration Date:  5/1/27    Medication Name Lidocaine 1% NDC 80620-232-07    Scribed by DUY RESENDEZ, ATC for Dr. Jo on February 25, 2025 at 3:25pm based on the provider's statements to me.     DUY RESENDEZ, ATC

## 2025-02-25 NOTE — LETTER
2/25/2025      Laxmi Ya  [address] [place] [id number]      Dear Colleague,    Thank you for referring your patient, Laxmi Ya, to the [facility]. Please see a copy of my visit note below.    Chief Complaint:   Chief Complaint   Patient presents with     RECHECK     Follow-up bilateral elbow pain repeat cubital tunnel injections       Referring Physician: No ref. provider found    Diagnosis: bilateral thumb CMC/STT arthritis, bilateral carpal tunnel syndrome, bilateral cubital tunnel syndrome  Treatment:   2/25/2025: bilateral cubital tunnel steroid injections (Tamiko)  1/28/2025: bilateral carpal tunnel steroid injections (Dr. Morrison)  1/23/2025: bilateral thumb CMC and STT injections (Dr. Petit)  10/24/2024: bilateral thumb CMC and STT steroid injections (Dr. Petit)  10/9/2024: bilateral thumb CMC steroid injection, cubital tunnel steroid injections  8/15/2024: bilateral STT steroid injection (Dr. Petit)  6/6/2024: bilateral carpal tunnel steroid injection (Dr. Petit)  5/30/2024: bilateral STT steroid injection (Dr. Petit)  5/21/2024: bilateral cubital tunnel steroid injections  12/27/2023: bilateral carpal tunnel steroid injection  12/21/2023: bilateral STT steroid injection (Dr. Petit)  11/1/2023: bilateral carpal tunnel steroid injection  10/18/2023: Bilateral thumb CMC steroid injection  9/20/2023: bilateral STT steroid injection  5/4/2023: bilateral carpal tunnel steroid injection (Dr. Petit)  2/16/2023: bilateral STT steroid injection (Dr. Petit)  12/13/2022: bilateral carpal tunnel steroid injection (Dr. Sullivan)  10/27/2022: bilateral STT steroid injections (Dr. Petit)  9/13/2022: bilateral carpal tunnel steroid injection (Dr. Sullivan)  6/30/2022: bilateral STT steroid injections (Dr. Petit)  Bilateral carpal tunnel steroid injections (outside)    History of Present Illness: Laxmi Ya is a 48 year old  RHD female presenting for evaluation of bilateral hand and wrist pain. She's had history of carpal tunnel steroid injections and releases, she continues to feel some numbness/tingling in her fingers. Today she'd like to have steroid injections into the STT region.    Clinical documentation by Dr. Petit on 5/4/2023 was reviewed.    10/18/2023: reports the last STT injections helped.  She presents today for bilateral thumb CMC steroid injections.  She continues to have pain in bilateral thumbs.  She also continues to have numbness/tingling in the median nerve distribution.    11/1/2023: reports the last thumb CMC steroid injections helped. Presents today for bilateral carpal tunnel steroid injections.    12/27/2023: recent STT injections with Dr. Petit which she says were helpful. Last carpal tunnel steroid injections were helpful and she would like to do this again. Bilateral thumb CMCs feel good and the steroid injections have not worn off yet.    2/13/2024: recently had pneumonia. Right hand is feeling pretty good but left hand is having more carpal tunnel symptoms. Both hands are having mild pain in the STT region again. Focused on the left side today.    5/21/2024: today her symptoms are focused on the ulnar nerves, left worse than right. She has pain when leaning on the medial elbows, and this pain and tingling goes down the volar/ulnar side of her hand    10/8/2024: the steroid injections into her cubital tunnels last visit helped until about 1 week ago. She is requesting bilateral thumb CMC steroid injections and bilateral cubital tunnel injections    1/28/2025: 49F presenting for follow up. The last steroid injections by Dr. Petit have been helping. She is concerned regarding persistent pain of her CMC and wants to discuss surgical options. The last steroid injections into her cubital tunnels continue to help. She is interested in bilateral carpal tunnel injections today.    2/25/2025: presents  requesting injections into bilateral cubital tunnels. She said the last injections were very helpful but symptoms have returned. She gets numbness/tingling into the bilateral small fingers.    Physical Examination:  There were no vitals filed for this visit.  There is no height or weight on file to calculate BMI.    Well appearing, well nourished  Alert and oriented x 3, cooperative with exam     Bilateral Upper Extremity:  Healed carpal tunnel scar  Thenar/hypothenar atrophy: no   +elbow flexion compression test, +tinel at cubital tunnel  Motor Exam:    Abductor pollicis brevis strength: 4/5     1st dorsal interosseous strength: 5/5   - Flexor pollicis longus strength: 4/5    Intact thumbs up  Vascular Exam:    2+ radial pulse, < 2 sec capillary refill      Imaging/Studies:  Repeat ultrasound obtained 5/9/2024 shows:  Ulnar nerve  Distal upper arm: 6 mm  Proximal to cubital tunnel: 9 mm   Within the cubital tunnel: 8 mm  Distal to cubital tunnel:  6 mm  Wrist: 6 mm      Median nerve  Lacertus fibrosis: 6 mm  Wrist crease: 10 mm      The radial nerve is visualized at the level of the distal upper arm  through the bifurcation of the superficial and deep branches.                                                                   Impression:   1. Normal size and appearance of the left ulnar nerve at the elbow and  wrist.  2. Borderline enlarged left median nerve at the wrist crease.  3. Grossly normal appearance of the left radial nerve visualized at  the distal upper arm through the bifurcation into the superficial and  deep branches. Unable to confidently evaluate the radial nerve beyond  the bifurcation.  ___  Ultrasound bilateral upper extremities obtained 2/13/2024 shows:  Areas of the median nerves (millimeters squared):  Left:  Distal forearm: 7.6, 7.9  Wrist crease: 11.4, 10.3     Right:  Distal forearm: 9.4, 9.0  Wrist crease: 9.9, 8.6     Poorly visualized median nerve the region of the lacertus  fibrosis  bilaterally.                                                              Impression:   1. Borderline enlargement of the left median nerve at the level of the  wrist crease.  2. No substantial right median nerve enlargement at the wrist crease.  ___  XR (3 views) of the  bilateral  hand was obtained  9/20/2023 .  I reviewed the images with the patient.  The imaging study shows bilateral thumb CMC and STT arthritis with possible chondrocalcinosis on radial side.    Assessment: Laxmi Ya is a 49 year old female with bilateral STT, thumb CMC arthritis, median neuropathy, ulnar neuropathy.    Plan:   I had a discussion with the patient regarding my clinical findings, diagnosis, and treatment plan. I reviewed the treatment options for carpal/cubital tunnel syndrome (observation, bracing, steroid injection, occupational therapy, surgery, etc.) as well as the risks and benefits of each. We also reviewed treatment options for thumb arthritis (observation, bracing, steroid injections, surgery including LTRI, suture suspension, and fiberlock arthroplasty). Her ultrasound shows normal ulnar nerves, borderline enlarged left median nerve at the wrist crease which may be suggestive of recurrent left carpal tunnel syndrome. She elects steroid injections into the bilateral cubital tunnels, with the understanding that these are not commonly performed injections and risk injury to the ulnar nerve. The patient understands and agrees to the treatment plan.  All questions answered.       Steroid injections at bilateral cubital tunnel     Next Visit:    Follow-up: as needed  - Discuss surgical options for CMC arthritis, patient will need to be off immunosuppressants prior to surgery     Procedure Note: After a discussion with Laxmi Ya regarding treatment options, we decided to proceed with a steroid injection to the bilateral cubital tunnel.  Risks of the procedure including hyperglycemia, fat atrophy, skin  depigmentation and infection were discussed prior to injection and verbal consent from Laxmi Ya was obtained. The area was prepped with alcohol. 1ml of 1% lidocaine was infiltrated into the skin in each site. Subsequent injection with 20 mg Kenalog at each site for a total of 40mg. Laxmi Ya tolerated the injection well. A clean dressing was placed over the site of the injection. Laxmi Ya was given post injection instructions.       HYACINTH STARKS MD    Medium Joint Injection/Arthrocentesis: bilateral elbow    Date/Time: 2025 3:21 PM    Performed by: Hyacinth Starks MD  Authorized by: Hyacinth Starks MD    Indications:  Pain  Needle Size:  25 G  Guidance: surface landmarks    Location:  Elbow  Laterality:  Bilateral  Site:  Bilateral elbow  Medications (Right):  10 mg triamcinolone acetonide 10 MG/ML; 2 mL lidocaine (PF) 1 %  Medications (Left):  10 mg triamcinolone acetonide 10 MG/ML; 2 mL lidocaine (PF) 1 %  Outcome:  Tolerated well, no immediate complications  Procedure discussed: discussed risks, benefits, and alternatives    Consent Given by:  Patient  Timeout: timeout called immediately prior to procedure    .    [facility] [address] [address] [phone]  Dept: [phone]  ______________________________________________________________________________    Patient: Laxmi Ya   : 1975   MRN: [id number]   2025    INVASIVE PROCEDURE SAFETY CHECKLIST    Date: 2025   Procedure:Bilateral elbows cubital tunnel steroid injections  Patient Name: Laxmi Ya  MRN: [id number]  YOB: 1975    Action: Complete sections as appropriate. Any discrepancy results in a HARD COPY until resolved.     PRE PROCEDURE:  Patient ID verified with 2 identifiers (name and  or MRN): Yes  Procedure and site verified with patient/designee (when able): Yes  Accurate consent  documentation in medical record: Yes  H&P (or appropriate assessment) documented in medical record: Yes  H&P must be up to 20 days prior to procedure and updates within 24 hours of procedure as applicable: Yes  Relevant diagnostic and radiology test results appropriately labeled and displayed as applicable: NA  Procedure site(s) marked with provider initials: NA    TIMEOUT:  Time-Out performed immediately prior to starting procedure, including verbal and active participation of all team members addressing the following:Yes  * Correct patient identify  * Confirmed that the correct side and site are marked  * An accurate procedure consent form  * Agreement on the procedure to be done  * Correct patient position  * Relevant images and results are properly labeled and appropriately displayed  * The need to administer antibiotics or fluids for irrigation purposes during the procedure as applicable   * Safety precautions based on patient history or medication use    DURING PROCEDURE: Verification of correct person, site, and procedures any time the responsibility for care of the patient is transferred to another member of the care team.       The following medications were given:         Prior to injection, verified patient identity using patient's name and date of birth.  Due to injection administration, patient instructed to remain in clinic for 15 minutes  afterwards, and to report any adverse reaction to me immediately.    Tendon sheath injection was performed.   Medication Name: Kenalog -10 x2 NDC 9084-1334-63  Drug Amount Wasted:  Yes: 30 mg/ml   Vial/Syringe: Single dose vial  Expiration Date:  5/1/27    Medication Name Lidocaine 1% NDC 79862-900-31    Scribed by DUY RESENDEZ, ATC for Dr. Jo on February 25, 2025 at 3:25pm based on the provider's statements to me.     DUY RESENDEZ ATC      Again, thank you for allowing me to participate in the care of your patient.        Sincerely,        WANDA STARKS  MD    Electronically signed

## 2025-02-25 NOTE — NURSING NOTE
Reason For Visit:   Chief Complaint   Patient presents with    RECHECK     Follow-up bilateral elbow pain repeat cubital tunnel injections       Primary MD: Amol Juares  Ref. MD: Katja    Age: 49 year old    ?  No      There were no vitals taken for this visit.      Pain Assessment  Patient Currently in Pain: Yes  0-10 Pain Scale: 10  Primary Pain Location: Elbow (Bilateral elbows into both hands)  Pain Descriptors: Constant    Hand Dominance Evaluation  Hand Dominance: Right          QuickDASH Assessment      2/25/2025    11:55 AM   QuickDASH Main   1. Open a tight or new jar Unable   2. Do heavy household chores (e.g., wash walls, floors) Unable   3. Carry a shopping bag or briefcase Unable   4. Wash your back Severe difficulty   5. Use a knife to cut food Unable   6. Recreational activities in which you take some force or impact through your arm, shoulder or hand (e.g., golf, hammering, tennis, etc.) Unable   7. During the past week, to what extent has your arm, shoulder or hand problem interfered with your normal social activities with family, friends, neighbours or groups Extremely   8. During the past week, were you limited in your work or other regular daily activities as a result of your arm, shoulder or hand problem Unable   9. Arm, shoulder or hand pain Extreme   10.Tingling (pins and needles) in your arm,shoulder or hand Extreme   11. During the past week, how much difficulty have you had sleeping because of the pain in your arm, shoulder or hand So much difficulty that I can't sleep   Quickdash Ability Score 97.73          Current Outpatient Medications   Medication Sig Dispense Refill    albuterol (PROAIR HFA/PROVENTIL HFA/VENTOLIN HFA) 108 (90 Base) MCG/ACT inhaler Inhale 2 puffs into the lungs every 6 hours as needed for shortness of breath, wheezing or cough      Artificial Tear Solution (SOOTHE XP) SOLN as needed      atenolol (TENORMIN) 50 MG tablet Take 50 mg by mouth daily       azelastine 137 MCG/SPRAY SOLN USE 2 SPRAYS IN EACH NOSTRIL 2 TIMES DAILY FOR 1 MONTH      benzoyl peroxide 5 % external liquid Use daily as directed. Preferred bdrand: Medpura 236 mL 11    blood glucose (NO BRAND SPECIFIED) test strip Use to test blood sugar 2 times daily or as directed. 100 strip 1    blood glucose monitoring (NO BRAND SPECIFIED) meter device kit Use to test blood sugar 2 times daily or as directed. 1 kit 0    ciclopirox (LOPROX) 0.77 % cream Apply topically 2 times daily To affected toenails. 90 g 5    cyclobenzaprine (FLEXERIL) 5 MG tablet Take 5 mg by mouth.      dexAMETHasone (DECADRON) 0.1 % ophthalmic solution       diclofenac (VOLTAREN) 1 % topical gel Apply topically 4 times daily as needed      doxycycline hyclate (VIBRAMYCIN) 100 MG capsule Take 100 mg by mouth 2 times daily.      dupilumab (DUPIXENT) 300 MG/2ML prefilled pen Inject 2 mLs (300 mg) subcutaneously every 14 days. 4 mL 5    econazole nitrate 1 % external cream Apply topically daily. To affected toenails. 85 g 5    famciclovir (FAMVIR) 500 MG tablet Take 1 tablet (500 mg) by mouth 2 times daily. 180 tablet 3    fexofenadine (ALLEGRA) 180 MG tablet Take 1 tablet (180 mg) by mouth daily 90 tablet 2    fidaxomicin (DIFICID) 200 MG tablet Take 200 mg by mouth as needed      fluconazole (DIFLUCAN) 150 MG tablet Take 150 mg by mouth.      Fluticasone-Umeclidin-Vilanterol (TRELEGY ELLIPTA) 200-62.5-25 MCG/ACT oral inhaler Inhale 1 puff into the lungs daily. 90 each 1    insulin glargine (LANTUS PEN) 100 UNIT/ML pen Inject 50 Units subcutaneously daily      insulin lispro (HUMALOG KWIKPEN) 100 UNIT/ML (1 unit dial) KWIKPEN Inject 35 Units subcutaneously 3 times daily (before meals) Plus 1:25 correction scale if BG >150      Lancets (ONETOUCH DELICA PLUS CMVFGP52V) MISC CHECK BLOOD GLUCOSE 3 TIMES DAILY, ADJUSTING MEDICATION, DX CODE E 11.9, ON INSULIN      lidocaine (LIDODERM) 5 % patch as needed   1    LORazepam (ATIVAN) 0.5 MG  tablet Take 0.25 mg by mouth every 8 hours as needed      losartan (COZAAR) 100 MG tablet Take 100 mg by mouth daily      methylPREDNISolone (MEDROL) 4 MG tablet Take 3 tablets (12 mg) by mouth daily. 15 tablet 0    methylPREDNISolone (MEDROL) 4 MG tablet Take 3 tabs daily for 10 days as directed 30 tablet 0    mometasone (NASONEX) 50 MCG/ACT nasal spray Spray 2 sprays into both nostrils daily. 51 g 1    montelukast (SINGULAIR) 10 MG tablet Take 10 mg by mouth daily.      multivitamin w/minerals (THERA-VIT-M) tablet Take 1 tablet by mouth daily      naloxone (NARCAN) 4 MG/0.1ML nasal spray       naproxen (NAPROSYN) 375 MG tablet Take 375 mg by mouth.      norethindrone (MICRONOR) 0.35 MG tablet Take 1 tablet (0.35 mg) by mouth daily 90 tablet 3    nystatin (MYCOSTATIN) 591005 UNIT/ML suspension       olopatadine (PATADAY) 0.7 % ophthalmic solution Apply 1 drop to eye daily PRN      omeprazole (PRILOSEC) 40 MG DR capsule TAKE 1 CAPSULE (40 MG) BY MOUTH TWO TIMES A DAY. TAKE 1 HOUR BEFORE A MEAL.      oxyCODONE IR (ROXICODONE) 10 MG tablet Take 10 mg by mouth      predniSONE (DELTASONE) 5 MG tablet TAKE 1 TABLET BY MOUTH EVERY DAY 90 tablet 1    rifaximin (XIFAXAN) 550 MG TABS tablet Take 200 mg by mouth 3 times daily Take for 10 days, has on had as needed for small bowel bacteria overgrowh      rosuvastatin (CRESTOR) 5 MG tablet Take 2 tablets by mouth daily      spironolactone-HCTZ (ALDACTAZIDE) 25-25 MG tablet Take 1 tablet by mouth daily      terbinafine (LAMISIL) 250 MG tablet Take 1 tablet by mouth daily.      tirzepatide (MOUNJARO) 2.5 MG/0.5ML pen Inject 2.5 mg subcutaneously every 7 days.      tobramycin-dexAMETHasone (TOBRADEX) 0.3-0.1 % ophthalmic suspension PLACE 1 DROP INTO BOTH EYES TWO TIMES A DAY.      tocilizumab (ACTEMRA) 400 MG/20ML Inject 800 mg into the vein every 28 days         Allergies   Allergen Reactions    Polyethylene Glycol Rash    Codeine      Other reaction(s): Gastrointestinal, GI  intolerance, Vomiting    Vomiting    Hydrocodone Nausea and Vomiting    Morphine Nausea and Vomiting    Sulfasalazine      Other reaction(s): GI intolerance    Has tried and had nausa.    Tramadol Nausea and Vomiting     Other reaction(s): Gastrointestinal, Other (see comments)    Nausea and vomiting     unknown    Acetaminophen Nausea     Nausea and vomiting    Gluten Meal Other (See Comments) and Rash     Other reaction(s): Gastrointestinal, GI intolerance  Dizziness, tired, rash, stomach cramps, thrush, mouth sores  Dizziness, tired, rash, stomach cramps, thrush, mouth sores      Hydroxyzine GI Disturbance and Nausea     nausea, dry mouth    Propylene Glycol Dizziness, Nausea and Vomiting, Nausea and Rash       DUY RESENDEZ, ATC

## 2025-02-26 ENCOUNTER — LAB (OUTPATIENT)
Dept: LAB | Facility: CLINIC | Age: 50
End: 2025-02-26
Payer: MEDICARE

## 2025-02-26 ENCOUNTER — THERAPY VISIT (OUTPATIENT)
Dept: OCCUPATIONAL THERAPY | Facility: CLINIC | Age: 50
End: 2025-02-26
Attending: INTERNAL MEDICINE
Payer: MEDICARE

## 2025-02-26 ENCOUNTER — TRANSFERRED RECORDS (OUTPATIENT)
Dept: HEALTH INFORMATION MANAGEMENT | Facility: CLINIC | Age: 50
End: 2025-02-26

## 2025-02-26 DIAGNOSIS — I89.0 LYMPHEDEMA: Primary | ICD-10-CM

## 2025-02-26 DIAGNOSIS — K75.81 METABOLIC DYSFUNCTION-ASSOCIATED STEATOHEPATITIS (MASH): ICD-10-CM

## 2025-02-26 LAB
ALBUMIN SERPL BCG-MCNC: 4.7 G/DL (ref 3.5–5.2)
ALP SERPL-CCNC: 48 U/L (ref 40–150)
ALT SERPL W P-5'-P-CCNC: 77 U/L (ref 0–50)
ANION GAP SERPL CALCULATED.3IONS-SCNC: 13 MMOL/L (ref 7–15)
AST SERPL W P-5'-P-CCNC: 45 U/L (ref 0–45)
BILIRUB SERPL-MCNC: 0.7 MG/DL
BUN SERPL-MCNC: 13.8 MG/DL (ref 6–20)
CALCIUM SERPL-MCNC: 9.9 MG/DL (ref 8.8–10.4)
CHLORIDE SERPL-SCNC: 104 MMOL/L (ref 98–107)
CREAT SERPL-MCNC: 0.82 MG/DL (ref 0.51–0.95)
EGFRCR SERPLBLD CKD-EPI 2021: 88 ML/MIN/1.73M2
ERYTHROCYTE [DISTWIDTH] IN BLOOD BY AUTOMATED COUNT: 12.7 % (ref 10–15)
GLUCOSE SERPL-MCNC: 127 MG/DL (ref 70–99)
HCO3 SERPL-SCNC: 21 MMOL/L (ref 22–29)
HCT VFR BLD AUTO: 45.9 % (ref 35–47)
HGB BLD-MCNC: 16.2 G/DL (ref 11.7–15.7)
MCH RBC QN AUTO: 31 PG (ref 26.5–33)
MCHC RBC AUTO-ENTMCNC: 35.3 G/DL (ref 31.5–36.5)
MCV RBC AUTO: 88 FL (ref 78–100)
PLATELET # BLD AUTO: 341 10E3/UL (ref 150–450)
POTASSIUM SERPL-SCNC: 4.1 MMOL/L (ref 3.4–5.3)
PROT SERPL-MCNC: 7 G/DL (ref 6.4–8.3)
RBC # BLD AUTO: 5.22 10E6/UL (ref 3.8–5.2)
SODIUM SERPL-SCNC: 138 MMOL/L (ref 135–145)
WBC # BLD AUTO: 13.5 10E3/UL (ref 4–11)

## 2025-02-26 PROCEDURE — 97140 MANUAL THERAPY 1/> REGIONS: CPT | Mod: GO | Performed by: OCCUPATIONAL THERAPIST

## 2025-02-26 PROCEDURE — 80053 COMPREHEN METABOLIC PANEL: CPT | Performed by: PATHOLOGY

## 2025-02-26 PROCEDURE — 85027 COMPLETE CBC AUTOMATED: CPT | Performed by: PATHOLOGY

## 2025-02-26 PROCEDURE — 36415 COLL VENOUS BLD VENIPUNCTURE: CPT | Performed by: PATHOLOGY

## 2025-02-27 ENCOUNTER — THERAPY VISIT (OUTPATIENT)
Dept: OCCUPATIONAL THERAPY | Facility: CLINIC | Age: 50
End: 2025-02-27
Attending: INTERNAL MEDICINE
Payer: MEDICARE

## 2025-02-27 DIAGNOSIS — I89.0 LYMPHEDEMA: Primary | ICD-10-CM

## 2025-02-27 PROCEDURE — 97140 MANUAL THERAPY 1/> REGIONS: CPT | Mod: GO | Performed by: OCCUPATIONAL THERAPIST

## 2025-02-27 NOTE — CONFIDENTIAL NOTE
[facility] offering patient 2/28 visit with Dr. Pate.    Informed patient that we don't know if Dr. Pate will be available on other days other than that one.

## 2025-02-27 NOTE — PROGRESS NOTES
02/27/25 0500   Progress Note/Certification   Start Of Care Date 09/07/23   Onset of Illness/Injury or Date of Surgery 09/07/22   Therapy Frequency 3x/week   Predicted Duration 6 weeks   Certification date from 01/17/25   Certification date to 04/09/25   Progress Note Due Date   (10 visits)   Goals   OT Goals 1;2;3;4;5;6   OT Goal 1   Goal Identifier 1   Goal Description In order to improve functional mobility for activities of living, by the completion of intensive treatment, patient and/or caregiver will;      Verbalize and/or demonstrate understanding of techniques to independently manage their lymphedema at home   Rationale In order to maximize safety and independence with performance of self-care activities   Target Date 05/22/24   OT Goal 2   Goal Identifier 1a   Goal Description demonstrate independence in performing prescribed exercises/self MLD to facilate the lymph system   Rationale In order to maximize safety and independence with performance of self-care activities   Goal Progress Is doing neck frequently - reminded to do the abdomen more.   Target Date 08/07/24   OT Goal 3   Goal Identifier 1b   Goal Description be independent in donning/doffing, wearing schedule, and care of compression garments   Rationale In order to maximize safety and independence with performance of self-care activities   Goal Progress back brace was rubbing on inguinals.   Target Date 11/30/23   Date Met 02/15/24   OT Goal 4   Goal Identifier 2   Goal Description Pt will decrease the suprapubic swelling to perform hygiene/shave with a normal effort.   Rationale In order to maximize safety and independence with performance of self-care activities   Target Date 08/07/24   Date Met 01/22/25   OT Goal 5   Goal Identifier 3   Goal Description Pt oracio improve movement of shoulders and trunk to perform toilet hygiene.   Rationale In order to maximize safety and independence with performance of self-care activities   Target Date  05/22/24   Date Met 05/15/24   OT Goal 6   Goal Identifier 4   Goal Description Pt wishes to walk without the pinching of her swelling in the groin/inguinals that creates pain 10/10 with every step.   Rationale In order to maximize safety and independence with performance of self-care activities   Target Date 04/09/25         [facility]                                                                                   OUTPATIENT OCCUPATIONAL THERAPY    PLAN OF TREATMENT FOR OUTPATIENT REHABILITATION   Patient's Last Name, First Name, LEONELAMARLI YaLaxmi  JUDITH YOB: 1975   Provider's Name   [facility]   Medical Record No.  [id number]     Onset Date: (P) 09/07/22 Start of Care Date: (P) 09/07/23     Medical Diagnosis:         OT Treatment Diagnosis:    Plan of Treatment  Frequency/Duration:(P) 3x/week/(P) 6 weeks    Certification date from (P) 01/17/25   To (P) 04/09/25        See note for plan of treatment details and functional goals     Alanis Schilling OT                         I CERTIFY THE NEED FOR THESE SERVICES FURNISHED UNDER        THIS PLAN OF TREATMENT AND WHILE UNDER MY CARE .             Physician Signature               Date    X_____________________________________________________                  Referring Provider:  Amol Juares    Initial Assessment  See Epic Evaluation- (P) 09/07/23

## 2025-03-02 ENCOUNTER — TRANSFERRED RECORDS (OUTPATIENT)
Dept: HEALTH INFORMATION MANAGEMENT | Facility: CLINIC | Age: 50
End: 2025-03-02
Payer: MEDICARE

## 2025-03-02 ENCOUNTER — HEALTH MAINTENANCE LETTER (OUTPATIENT)
Age: 50
End: 2025-03-02

## 2025-03-03 ENCOUNTER — INFUSION THERAPY VISIT (OUTPATIENT)
Dept: INFUSION THERAPY | Facility: CLINIC | Age: 50
End: 2025-03-03
Attending: INTERNAL MEDICINE
Payer: MEDICARE

## 2025-03-03 ENCOUNTER — THERAPY VISIT (OUTPATIENT)
Dept: OCCUPATIONAL THERAPY | Facility: CLINIC | Age: 50
End: 2025-03-03
Attending: INTERNAL MEDICINE
Payer: COMMERCIAL

## 2025-03-03 VITALS
RESPIRATION RATE: 16 BRPM | BODY MASS INDEX: 35.61 KG/M2 | HEART RATE: 94 BPM | DIASTOLIC BLOOD PRESSURE: 80 MMHG | WEIGHT: 220.6 LBS | TEMPERATURE: 97.8 F | OXYGEN SATURATION: 96 % | SYSTOLIC BLOOD PRESSURE: 118 MMHG

## 2025-03-03 DIAGNOSIS — I89.0 LYMPHEDEMA: Primary | ICD-10-CM

## 2025-03-03 DIAGNOSIS — M13.80 SERONEGATIVE INFLAMMATORY ARTHRITIS: Primary | ICD-10-CM

## 2025-03-03 PROCEDURE — 250N000013 HC RX MED GY IP 250 OP 250 PS 637: Performed by: INTERNAL MEDICINE

## 2025-03-03 PROCEDURE — 96365 THER/PROPH/DIAG IV INF INIT: CPT

## 2025-03-03 PROCEDURE — 96375 TX/PRO/DX INJ NEW DRUG ADDON: CPT

## 2025-03-03 PROCEDURE — 97140 MANUAL THERAPY 1/> REGIONS: CPT | Mod: GO | Performed by: OCCUPATIONAL THERAPIST

## 2025-03-03 PROCEDURE — 258N000003 HC RX IP 258 OP 636: Performed by: INTERNAL MEDICINE

## 2025-03-03 PROCEDURE — 250N000011 HC RX IP 250 OP 636: Mod: JZ | Performed by: INTERNAL MEDICINE

## 2025-03-03 RX ORDER — ACETAMINOPHEN 325 MG/1
650 TABLET ORAL ONCE
Status: COMPLETED | OUTPATIENT
Start: 2025-03-03 | End: 2025-03-03

## 2025-03-03 RX ORDER — METHYLPREDNISOLONE SODIUM SUCCINATE 40 MG/ML
40 INJECTION INTRAMUSCULAR; INTRAVENOUS
Start: 2025-03-25

## 2025-03-03 RX ORDER — METHYLPREDNISOLONE SODIUM SUCCINATE 125 MG/2ML
37.5 INJECTION INTRAMUSCULAR; INTRAVENOUS ONCE
OUTPATIENT
Start: 2025-03-25

## 2025-03-03 RX ORDER — DIPHENHYDRAMINE HCL 25 MG
25 CAPSULE ORAL ONCE
OUTPATIENT
Start: 2025-03-25

## 2025-03-03 RX ORDER — ACETAMINOPHEN 325 MG/1
650 TABLET ORAL ONCE
OUTPATIENT
Start: 2025-03-25

## 2025-03-03 RX ORDER — METHYLPREDNISOLONE SODIUM SUCCINATE 125 MG/2ML
37.5 INJECTION INTRAMUSCULAR; INTRAVENOUS ONCE
Status: COMPLETED | OUTPATIENT
Start: 2025-03-03 | End: 2025-03-03

## 2025-03-03 RX ORDER — HEPARIN SODIUM (PORCINE) LOCK FLUSH IV SOLN 100 UNIT/ML 100 UNIT/ML
5 SOLUTION INTRAVENOUS
OUTPATIENT
Start: 2025-03-25

## 2025-03-03 RX ORDER — ALBUTEROL SULFATE 0.83 MG/ML
2.5 SOLUTION RESPIRATORY (INHALATION)
OUTPATIENT
Start: 2025-03-25

## 2025-03-03 RX ORDER — MEPERIDINE HYDROCHLORIDE 25 MG/ML
25 INJECTION INTRAMUSCULAR; INTRAVENOUS; SUBCUTANEOUS
OUTPATIENT
Start: 2025-03-25

## 2025-03-03 RX ORDER — ALBUTEROL SULFATE 90 UG/1
1-2 INHALANT RESPIRATORY (INHALATION)
Start: 2025-03-25

## 2025-03-03 RX ORDER — HEPARIN SODIUM,PORCINE 10 UNIT/ML
5-20 VIAL (ML) INTRAVENOUS DAILY PRN
OUTPATIENT
Start: 2025-03-25

## 2025-03-03 RX ORDER — DIPHENHYDRAMINE HYDROCHLORIDE 50 MG/ML
50 INJECTION, SOLUTION INTRAMUSCULAR; INTRAVENOUS
Start: 2025-03-25

## 2025-03-03 RX ORDER — EPINEPHRINE 1 MG/ML
0.3 INJECTION, SOLUTION INTRAMUSCULAR; SUBCUTANEOUS EVERY 5 MIN PRN
OUTPATIENT
Start: 2025-03-25

## 2025-03-03 RX ORDER — DIPHENHYDRAMINE HYDROCHLORIDE 50 MG/ML
25 INJECTION, SOLUTION INTRAMUSCULAR; INTRAVENOUS
Start: 2025-03-25

## 2025-03-03 RX ADMIN — ACETAMINOPHEN 650 MG: 325 TABLET ORAL at 15:11

## 2025-03-03 RX ADMIN — SODIUM CHLORIDE 250 ML: 9 INJECTION, SOLUTION INTRAVENOUS at 16:34

## 2025-03-03 RX ADMIN — TOCILIZUMAB 800 MG: 20 INJECTION, SOLUTION, CONCENTRATE INTRAVENOUS at 15:49

## 2025-03-03 RX ADMIN — METHYLPREDNISOLONE SODIUM SUCCINATE 37.5 MG: 125 INJECTION, POWDER, FOR SOLUTION INTRAMUSCULAR; INTRAVENOUS at 15:32

## 2025-03-03 ASSESSMENT — PAIN SCALES - GENERAL: PAINLEVEL_OUTOF10: SEVERE PAIN (10)

## 2025-03-03 NOTE — PROGRESS NOTES
Infusion Nursing Note:    Note: Patient discharged in stable condition.    /80 (BP Location: Left arm, Patient Position: Semi-Stephens's, Cuff Size: Adult Large)   Pulse 94   Temp 97.8  F (36.6  C) (Oral)   Resp 16   Wt 100.1 kg (220 lb 9.6 oz)   SpO2 96%   BMI 35.61 kg/m         Carolina Osborn RN

## 2025-03-03 NOTE — PROGRESS NOTES
Infusion Nursing Note:  Laxmi Ya presents today for Actemra.    Patient seen by provider today: No   present during visit today: Not Applicable.    Note: Patient took tylenol and methylprednisolone as premedications. Actemra infused over 60 min. 250 ml NS infused after actemra over 30 min.    Intravenous Access:  [facility]    Treatment Conditions:  Biological Infusion Checklist:  ~~~ NOTE: If the patient answers yes to any of the questions below, hold the infusion and contact ordering provider or on-call provider.    Have you recently had an elevated temperature, fever, chills, productive cough, coughing for 3 weeks or longer or hemoptysis,  abnormal vital signs, night sweats,  chest pain or have you noticed a decrease in your appetite, unexplained weight loss or fatigue? No  Do you have any open wounds or new incisions? No  Do you have any upcoming hospitalizations or surgeries? Does not include esophagogastroduodenoscopy, colonoscopy, endoscopic retrograde cholangiopancreatography (ERCP), endoscopic ultrasound (EUS), dental procedures or joint aspiration/steroid injections No  Do you currently have any signs of illness or infection or are you on any antibiotics? No  Have you had any new, sudden or worsening abdominal pain? No  Have you or anyone in your household received a live vaccination in the past 4 weeks? Please note: No live vaccines while on biologic/chemotherapy until 6 months after the last treatment. Patient can receive the flu vaccine (shot only), pneumovax and the Covid vaccine. It is optimal for the patient to get these vaccines mid cycle, but they can be given at any time as long as it is not on the day of the infusion. No  Have you recently been diagnosed with any new nervous system diseases (ie. Multiple sclerosis, Guillain [place], seizures, neurological changes) or cancer diagnosis? Are you on any form of radiation or chemotherapy? No  Are you pregnant or breast  feeding or do you have plans of pregnancy in the future? No  Have you been having any signs of worsening depression or suicidal ideations?  (benlysta only) NA  Have there been any other new onset medical symptoms? No  Have you had any new blood clots? (IVIG only) NA    Report given to [facility] late nurse at 1615. Care transferred at this time.     Post Infusion Assessment:  Patient tolerated infusion without incident.  Site patent and intact, free from redness, edema or discomfort.  Access discontinued per protocol.     Discharge Plan:   Patient and/or family verbalized understanding of discharge instructions and all questions answered.  Patient discharged in stable condition accompanied by: self.    Administrations This Visit       acetaminophen (TYLENOL) tablet 650 mg       Admin Date  03/03/2025 Action  $Given Dose  650 mg Route  Oral Documented By  Lara Aldana RN              methylPREDNISolone Na Suc (solu-MEDROL) injection 37.5 mg       Admin Date  03/03/2025 Action  $Given Dose  37.5 mg Route  Intravenous Documented By  Lara Aldana RN              tocilizumab (ACTEMRA) 800 mg in sodium chloride 0.9 % 150 mL infusion       Admin Date  03/03/2025 Action  $New Bag Dose  800 mg Rate  150 mL/hr Route  Intravenous Documented By  Lara Aldana RN                  /80 (BP Location: Left arm, Patient Position: Semi-Stephens's, Cuff Size: Adult Large)   Pulse 93   Temp 97.8  F (36.6  C) (Oral)   Resp 16   Wt 100.1 kg (220 lb 9.6 oz)   SpO2 96%   BMI 35.61 kg/m      Lara Aldana RN    Drug Name: solumedrol  Dose: 37.5 mg (0.6 ml)  Route administered: IV  NDC #:  [id number]  Amount of waste(mL): 1.4 ml  Reason for waste: Single use vial

## 2025-03-10 ENCOUNTER — THERAPY VISIT (OUTPATIENT)
Dept: PHYSICAL THERAPY | Facility: CLINIC | Age: 50
End: 2025-03-10
Payer: MEDICARE

## 2025-03-10 ENCOUNTER — THERAPY VISIT (OUTPATIENT)
Dept: OCCUPATIONAL THERAPY | Facility: CLINIC | Age: 50
End: 2025-03-10
Attending: INTERNAL MEDICINE
Payer: COMMERCIAL

## 2025-03-10 DIAGNOSIS — I89.0 LYMPHEDEMA: Primary | ICD-10-CM

## 2025-03-10 DIAGNOSIS — M54.2 NECK PAIN: ICD-10-CM

## 2025-03-10 DIAGNOSIS — G89.29 CHRONIC BILATERAL LOW BACK PAIN WITHOUT SCIATICA: ICD-10-CM

## 2025-03-10 DIAGNOSIS — M54.50 CHRONIC BILATERAL LOW BACK PAIN WITHOUT SCIATICA: ICD-10-CM

## 2025-03-10 DIAGNOSIS — R10.2 PELVIC PAIN IN FEMALE: Primary | ICD-10-CM

## 2025-03-10 PROCEDURE — 97012 MECHANICAL TRACTION THERAPY: CPT | Mod: GP | Performed by: PHYSICAL THERAPIST

## 2025-03-10 PROCEDURE — 97140 MANUAL THERAPY 1/> REGIONS: CPT | Mod: GP | Performed by: PHYSICAL THERAPIST

## 2025-03-10 PROCEDURE — 97140 MANUAL THERAPY 1/> REGIONS: CPT | Mod: GO | Performed by: OCCUPATIONAL THERAPIST

## 2025-03-10 NOTE — PROGRESS NOTES
03/10/25 0500   Appointment Info   OT Tx Diagnosis lymphedema   Precautions/Limitations none   Other pertinent information RA, CTS, DM,chronic pain, post laminectomy syndrome, She is on a long term does of antibiotic as she has an underlying bacterial infection of unclear etiology.   Progress Note/Certification   Start Of Care Date 09/07/23   Onset of Illness/Injury or Date of Surgery 09/07/22   Therapy Frequency 3x/week   Predicted Duration 6 weeks   Certification date from 01/17/25   Certification date to 04/09/25   Progress Note Due Date   (10 visits)   Goals   OT Goals 1;2;3;4;5;6   OT Goal 1   Goal Identifier 1   Goal Description In order to improve functional mobility for activities of living, by the completion of intensive treatment, patient and/or caregiver will;      Verbalize and/or demonstrate understanding of techniques to independently manage their lymphedema at home   Rationale In order to maximize safety and independence with performance of self-care activities   Target Date 05/22/24   OT Goal 2   Goal Identifier 1a   Goal Description demonstrate independence in performing prescribed exercises/self MLD to facilate the lymph system   Rationale In order to maximize safety and independence with performance of self-care activities   Goal Progress Is doing neck frequently - reminded to do the abdomen more.   Target Date 08/07/24   Date Met 03/10/25   OT Goal 3   Goal Identifier 1b   Goal Description be independent in donning/doffing, wearing schedule, and care of compression garments   Rationale In order to maximize safety and independence with performance of self-care activities   Goal Progress She has appt to see fitter to get into better garments.   Target Date 11/30/23   Date Met 02/15/24   OT Goal 4   Goal Identifier 2   Goal Description Pt will decrease the suprapubic swelling to perform hygiene/shave with a normal effort.   Rationale In order to maximize safety and independence with performance  of self-care activities   Target Date 08/07/24   Date Met 01/22/25   OT Goal 5   Goal Identifier 3   Goal Description Pt oracio improve movement of shoulders and trunk to perform toilet hygiene.   Rationale In order to maximize safety and independence with performance of self-care activities   Target Date 05/22/24   Date Met 05/15/24   OT Goal 6   Goal Identifier 4   Goal Description Pt wishes to walk without the pinching of her swelling in the groin/inguinals that creates pain 10/10 with every step.   Rationale In order to maximize safety and independence with performance of self-care activities   Goal Progress [place] is now 5/10 wt walking   Target Date 04/09/25   Objective Measures   Objective Measures Objective Measure 1   Objective Measure 1   Objective Measure girth last   Details Umbilicus is 18.8, R suprapubic area 18.8c and L is 14  ( this is before infustion tomorrow, will retake at best after infusion)     Pt is losing weight and noted with belly flatter, softer, easier to press into for the abdomen work.  She has reduced in the suprapubic swelling.  Her pinching pain on walking is now 5/10, an improvement from 10/10. She has started the intensive therapy and able to really see the progress as compared to when she would attend a couple of stop gap sessions then illness would prevent her from returning for a bit.  She has appt for fitting of garments which will be smaller than her previous set.    PLAN  Continue therapy per current plan of care.    Beginning/End Dates of Progress Note Reporting Period: ( 10 sessions)    7/18/23 to 03/10/2025    Referring Provider:  Amol Juares

## 2025-03-11 ENCOUNTER — TELEPHONE (OUTPATIENT)
Dept: RHEUMATOLOGY | Facility: CLINIC | Age: 50
End: 2025-03-11
Payer: MEDICARE

## 2025-03-11 DIAGNOSIS — M06.09 SERONEGATIVE RHEUMATOID ARTHRITIS OF MULTIPLE SITES (H): Primary | ICD-10-CM

## 2025-03-11 NOTE — TELEPHONE ENCOUNTER
Health Call Center    Phone Message    May a detailed message be left on voicemail: yes     Reason for Call: Symptoms or Concerns     If patient has red-flag symptoms, warm transfer to triage line    Current symptom or concern: Flare up, joints hurting, red, swollen, lot of pain.    Symptoms have been present for: 1.5 week(s)    Are there any new or worsening symptoms? Yes: joints hurting, red, swollen, lot of pain. Patient is requesting a call back from a nurse and a refill for the MEDROL. Please advise patient at [phone].    Action Taken: Message routed to:  [facility] ([facility]    Travel Screening: Not Applicable     Date of Service: 3/11/25

## 2025-03-12 ENCOUNTER — THERAPY VISIT (OUTPATIENT)
Dept: OCCUPATIONAL THERAPY | Facility: CLINIC | Age: 50
End: 2025-03-12
Attending: INTERNAL MEDICINE
Payer: COMMERCIAL

## 2025-03-12 DIAGNOSIS — I89.0 LYMPHEDEMA: Primary | ICD-10-CM

## 2025-03-12 PROCEDURE — 97140 MANUAL THERAPY 1/> REGIONS: CPT | Mod: GO | Performed by: OCCUPATIONAL THERAPIST

## 2025-03-12 RX ORDER — METHYLPREDNISOLONE 4 MG/1
TABLET ORAL
Qty: 35 TABLET | Refills: 0 | Status: SHIPPED | OUTPATIENT
Start: 2025-03-12

## 2025-03-12 NOTE — TELEPHONE ENCOUNTER
Patient with a flare in her neck, hands, hips and feet.  Hands and feet are swollen.  L foot > right.  Achy pain.  Difficulty walking and getting around.  Asking for medrol 12mg daily for 14 days as the last medrol dose pack did not do much for her last flair.  Currently on actemra 800mg IV monthly.  Recently seen at [facility] for second opinion and had a lot of xrays and CT's done on hands, elbows, neck, feet, and hips.  CareEverywhere updated.  RX is pended.  Please review and sign if appropriate.     Macy Sawyer RN

## 2025-03-12 NOTE — TELEPHONE ENCOUNTER
Thank you for the update.  For flare of inflammatory arthritis, I recommend Medrol 12 mg daily for 1 week, then 8 mg daily for 1 week.

## 2025-03-12 NOTE — TELEPHONE ENCOUNTER
Call back received from pt RN unavailable at the moment. Pt reports she will be unavailable from 12:45-1:45 PM today, would like a call back outside of those times.

## 2025-03-19 DIAGNOSIS — M79.645 BILATERAL THUMB PAIN: Primary | ICD-10-CM

## 2025-03-19 DIAGNOSIS — M79.644 BILATERAL THUMB PAIN: Primary | ICD-10-CM

## 2025-03-25 DIAGNOSIS — M18.0 OSTEOARTHRITIS OF CARPOMETACARPAL (CMC) JOINTS OF BOTH THUMBS, UNSPECIFIED OSTEOARTHRITIS TYPE: Primary | ICD-10-CM

## 2025-03-26 ENCOUNTER — THERAPY VISIT (OUTPATIENT)
Dept: PHYSICAL THERAPY | Facility: CLINIC | Age: 50
End: 2025-03-26
Payer: MEDICARE

## 2025-03-26 DIAGNOSIS — M54.2 NECK PAIN: ICD-10-CM

## 2025-03-26 DIAGNOSIS — M54.50 CHRONIC BILATERAL LOW BACK PAIN WITHOUT SCIATICA: ICD-10-CM

## 2025-03-26 DIAGNOSIS — G89.29 CHRONIC BILATERAL LOW BACK PAIN WITHOUT SCIATICA: ICD-10-CM

## 2025-03-26 DIAGNOSIS — R10.2 PELVIC PAIN IN FEMALE: Primary | ICD-10-CM

## 2025-03-26 PROCEDURE — 97012 MECHANICAL TRACTION THERAPY: CPT | Mod: GP | Performed by: PHYSICAL THERAPIST

## 2025-03-26 PROCEDURE — 97140 MANUAL THERAPY 1/> REGIONS: CPT | Mod: GP | Performed by: PHYSICAL THERAPIST

## 2025-04-01 ENCOUNTER — INFUSION THERAPY VISIT (OUTPATIENT)
Dept: INFUSION THERAPY | Facility: CLINIC | Age: 50
End: 2025-04-01
Attending: INTERNAL MEDICINE
Payer: MEDICARE

## 2025-04-01 VITALS
OXYGEN SATURATION: 97 % | DIASTOLIC BLOOD PRESSURE: 82 MMHG | TEMPERATURE: 97.8 F | RESPIRATION RATE: 16 BRPM | HEART RATE: 103 BPM | SYSTOLIC BLOOD PRESSURE: 115 MMHG

## 2025-04-01 DIAGNOSIS — M13.80 SERONEGATIVE INFLAMMATORY ARTHRITIS: Primary | ICD-10-CM

## 2025-04-01 LAB
ALBUMIN SERPL BCG-MCNC: 3.5 G/DL (ref 3.5–5.2)
ALBUMIN UR-MCNC: NEGATIVE MG/DL
ALP SERPL-CCNC: 38 U/L (ref 40–150)
ALT SERPL W P-5'-P-CCNC: 66 U/L (ref 0–50)
ANION GAP SERPL CALCULATED.3IONS-SCNC: 10 MMOL/L (ref 7–15)
APPEARANCE UR: CLEAR
AST SERPL W P-5'-P-CCNC: 36 U/L (ref 0–45)
BACTERIA #/AREA URNS HPF: ABNORMAL /HPF
BASOPHILS # BLD AUTO: 0.1 10E3/UL (ref 0–0.2)
BASOPHILS NFR BLD AUTO: 1 %
BILIRUB SERPL-MCNC: 0.6 MG/DL
BILIRUB UR QL STRIP: NEGATIVE
BUN SERPL-MCNC: 11.8 MG/DL (ref 6–20)
CALCIUM SERPL-MCNC: 7.5 MG/DL (ref 8.8–10.4)
CHLORIDE SERPL-SCNC: 113 MMOL/L (ref 98–107)
COLOR UR AUTO: ABNORMAL
CREAT SERPL-MCNC: 0.73 MG/DL (ref 0.51–0.95)
EGFRCR SERPLBLD CKD-EPI 2021: >90 ML/MIN/1.73M2
EOSINOPHIL # BLD AUTO: 0.2 10E3/UL (ref 0–0.7)
EOSINOPHIL NFR BLD AUTO: 2 %
ERYTHROCYTE [DISTWIDTH] IN BLOOD BY AUTOMATED COUNT: 12.3 % (ref 10–15)
GLUCOSE SERPL-MCNC: 94 MG/DL (ref 70–99)
GLUCOSE UR STRIP-MCNC: NEGATIVE MG/DL
HCO3 SERPL-SCNC: 19 MMOL/L (ref 22–29)
HCT VFR BLD AUTO: 43.1 % (ref 35–47)
HGB BLD-MCNC: 15 G/DL (ref 11.7–15.7)
HGB UR QL STRIP: NEGATIVE
IMM GRANULOCYTES # BLD: 0.1 10E3/UL
IMM GRANULOCYTES NFR BLD: 1 %
KETONES UR STRIP-MCNC: NEGATIVE MG/DL
LEUKOCYTE ESTERASE UR QL STRIP: NEGATIVE
LYMPHOCYTES # BLD AUTO: 1.9 10E3/UL (ref 0.8–5.3)
LYMPHOCYTES NFR BLD AUTO: 18 %
MCH RBC QN AUTO: 30.8 PG (ref 26.5–33)
MCHC RBC AUTO-ENTMCNC: 34.8 G/DL (ref 31.5–36.5)
MCV RBC AUTO: 89 FL (ref 78–100)
MONOCYTES # BLD AUTO: 1 10E3/UL (ref 0–1.3)
MONOCYTES NFR BLD AUTO: 10 %
NEUTROPHILS # BLD AUTO: 7.3 10E3/UL (ref 1.6–8.3)
NEUTROPHILS NFR BLD AUTO: 69 %
NITRATE UR QL: NEGATIVE
NRBC # BLD AUTO: 0 10E3/UL
NRBC BLD AUTO-RTO: 0 /100
PH UR STRIP: 7 [PH] (ref 5–7)
PLATELET # BLD AUTO: 325 10E3/UL (ref 150–450)
POTASSIUM SERPL-SCNC: 2.8 MMOL/L (ref 3.4–5.3)
PROT SERPL-MCNC: 4.9 G/DL (ref 6.4–8.3)
RBC # BLD AUTO: 4.87 10E6/UL (ref 3.8–5.2)
RBC URINE: 0 /HPF
SODIUM SERPL-SCNC: 142 MMOL/L (ref 135–145)
SP GR UR STRIP: 1.01 (ref 1–1.03)
SQUAMOUS EPITHELIAL: 1 /HPF
UROBILINOGEN UR STRIP-MCNC: NORMAL MG/DL
WBC # BLD AUTO: 10.6 10E3/UL (ref 4–11)
WBC URINE: 1 /HPF

## 2025-04-01 PROCEDURE — 96365 THER/PROPH/DIAG IV INF INIT: CPT

## 2025-04-01 PROCEDURE — 36415 COLL VENOUS BLD VENIPUNCTURE: CPT | Performed by: INTERNAL MEDICINE

## 2025-04-01 PROCEDURE — 80053 COMPREHEN METABOLIC PANEL: CPT | Performed by: INTERNAL MEDICINE

## 2025-04-01 PROCEDURE — 85004 AUTOMATED DIFF WBC COUNT: CPT | Performed by: INTERNAL MEDICINE

## 2025-04-01 PROCEDURE — 82040 ASSAY OF SERUM ALBUMIN: CPT | Performed by: INTERNAL MEDICINE

## 2025-04-01 PROCEDURE — 250N000011 HC RX IP 250 OP 636: Mod: JW | Performed by: INTERNAL MEDICINE

## 2025-04-01 PROCEDURE — 81001 URINALYSIS AUTO W/SCOPE: CPT | Performed by: INTERNAL MEDICINE

## 2025-04-01 PROCEDURE — 258N000003 HC RX IP 258 OP 636: Performed by: INTERNAL MEDICINE

## 2025-04-01 PROCEDURE — 250N000013 HC RX MED GY IP 250 OP 250 PS 637: Performed by: INTERNAL MEDICINE

## 2025-04-01 PROCEDURE — 96375 TX/PRO/DX INJ NEW DRUG ADDON: CPT

## 2025-04-01 RX ORDER — EPINEPHRINE 1 MG/ML
0.3 INJECTION, SOLUTION INTRAMUSCULAR; SUBCUTANEOUS EVERY 5 MIN PRN
OUTPATIENT
Start: 2025-04-22

## 2025-04-01 RX ORDER — DIPHENHYDRAMINE HYDROCHLORIDE 50 MG/ML
25 INJECTION, SOLUTION INTRAMUSCULAR; INTRAVENOUS
Start: 2025-04-22

## 2025-04-01 RX ORDER — ACETAMINOPHEN 325 MG/1
650 TABLET ORAL ONCE
OUTPATIENT
Start: 2025-04-22

## 2025-04-01 RX ORDER — MEPERIDINE HYDROCHLORIDE 25 MG/ML
25 INJECTION INTRAMUSCULAR; INTRAVENOUS; SUBCUTANEOUS
OUTPATIENT
Start: 2025-04-22

## 2025-04-01 RX ORDER — DIPHENHYDRAMINE HCL 25 MG
25 CAPSULE ORAL ONCE
OUTPATIENT
Start: 2025-04-22

## 2025-04-01 RX ORDER — DIPHENHYDRAMINE HYDROCHLORIDE 50 MG/ML
50 INJECTION, SOLUTION INTRAMUSCULAR; INTRAVENOUS
Start: 2025-04-22

## 2025-04-01 RX ORDER — METHYLPREDNISOLONE SODIUM SUCCINATE 125 MG/2ML
37.5 INJECTION INTRAMUSCULAR; INTRAVENOUS ONCE
Status: COMPLETED | OUTPATIENT
Start: 2025-04-01 | End: 2025-04-01

## 2025-04-01 RX ORDER — ALBUTEROL SULFATE 0.83 MG/ML
2.5 SOLUTION RESPIRATORY (INHALATION)
OUTPATIENT
Start: 2025-04-22

## 2025-04-01 RX ORDER — HEPARIN SODIUM,PORCINE 10 UNIT/ML
5-20 VIAL (ML) INTRAVENOUS DAILY PRN
OUTPATIENT
Start: 2025-04-22

## 2025-04-01 RX ORDER — METHYLPREDNISOLONE SODIUM SUCCINATE 125 MG/2ML
37.5 INJECTION INTRAMUSCULAR; INTRAVENOUS ONCE
OUTPATIENT
Start: 2025-04-22

## 2025-04-01 RX ORDER — METHYLPREDNISOLONE SODIUM SUCCINATE 40 MG/ML
40 INJECTION INTRAMUSCULAR; INTRAVENOUS
Start: 2025-04-22

## 2025-04-01 RX ORDER — HEPARIN SODIUM (PORCINE) LOCK FLUSH IV SOLN 100 UNIT/ML 100 UNIT/ML
5 SOLUTION INTRAVENOUS
OUTPATIENT
Start: 2025-04-22

## 2025-04-01 RX ORDER — ACETAMINOPHEN 325 MG/1
650 TABLET ORAL ONCE
Status: COMPLETED | OUTPATIENT
Start: 2025-04-01 | End: 2025-04-01

## 2025-04-01 RX ORDER — ALBUTEROL SULFATE 90 UG/1
1-2 INHALANT RESPIRATORY (INHALATION)
Start: 2025-04-22

## 2025-04-01 RX ADMIN — TOCILIZUMAB 800 MG: 20 INJECTION, SOLUTION, CONCENTRATE INTRAVENOUS at 15:06

## 2025-04-01 RX ADMIN — ACETAMINOPHEN 650 MG: 325 TABLET ORAL at 14:44

## 2025-04-01 RX ADMIN — METHYLPREDNISOLONE SODIUM SUCCINATE 37.5 MG: 125 INJECTION, POWDER, FOR SOLUTION INTRAMUSCULAR; INTRAVENOUS at 14:44

## 2025-04-01 RX ADMIN — SODIUM CHLORIDE 250 ML: 9 INJECTION, SOLUTION INTRAVENOUS at 15:59

## 2025-04-01 ASSESSMENT — PAIN SCALES - GENERAL: PAINLEVEL_OUTOF10: SEVERE PAIN (10)

## 2025-04-01 NOTE — PATIENT INSTRUCTIONS
Dear Laxmi Ya    Thank you for choosing [facility] ([facility] for your infusion.  The following information is a summary of our appointment as well as important reminders.      EDUCATION POST BIOLOGICAL/CHEMOTHERAPY INFUSION  Call the triage nurse at your clinic or seek medical attention if you have chills and/or temperature greater than or equal to 100.5, uncontrolled nausea/vomiting, diarrhea, constipation, dizziness, shortness of breath, chest pain, heart palpitations, weakness or any other new or concerning symptoms, questions or concerns.  You can not have any live virus vaccines prior to or during treatment or up to 6 months post infusion.  If you have an upcoming surgery, medical procedure or dental procedure during treatment, this should be discussed with your ordering physician and your surgeon/dentist.  If you are having any concerning symptom, if you are unsure if you should get your next infusion or wish to speak to a provider before your next infusion, please call your care coordinator or triage nurse at your clinic to notify them so we can adequately serve you.     If you are a transplant patient and require transplant education, please click on this link: [email or web address].    If you have any questions on your upcoming Specialty Infusion appointments, please call scheduling at [phone].  It was a pleasure taking care of you today.    Sincerely,    [facility]  [address] [place] [address]  [id number]  Phone:  [phone]

## 2025-04-01 NOTE — PROGRESS NOTES
Infusion Nursing Note:  Laxmi Ya presents today for actemra.    Patient seen by provider today: No   present during visit today: Not Applicable.    Note:   250ml NS infused/30 minutes post infusion per orders    Intravenous Access:  Labs drawn without difficulty.  [facility]    Treatment Conditions:  ~~~ NOTE: If the patient answers yes to any of the questions below, hold the infusion and contact ordering provider or on-call provider.    Do you currently have any signs of illness or infection or are you on any antibiotics? No  Have you recently had an elevated temperature, fever, chills, productive cough, coughing for 3 weeks or longer or hemoptysis, abnormal vital signs, night sweats, chest pain or have you noticed a decrease in your appetite, unexplained weight loss or fatigue? No  Have you had any new, sudden, or worsening abdominal pain? No  Do you have any open wounds or new incisions? (exclude for patients with hidradenitis suppurativa) No  Have you recently been diagnosed with any new nervous system diseases (ie. Multiple sclerosis, Guillain [place], seizures, neurological changes) or cancer diagnosis? Are you on any form of radiation or chemotherapy? No  Have there been any other new onset medical symptoms? No  Are you pregnant or breast feeding or do you have plans of pregnancy in the future? No; N/A  Do you have any upcoming hospitalizations or surgeries? Does not include esophagogastroduodenoscopy, colonoscopy, endoscopic retrograde cholangiopancreatography (ERCP), endoscopic ultrasound (EUS), dental procedures (including cleanings, fillings, implants, extractions)  or joint aspiration/steroid injections No  Have you or anyone in your household received a live vaccination in the past 4 weeks? Please note: No live vaccines while on biologic/chemotherapy until 6 months after the last treatment. Patient can receive the flu vaccine (shot only).  It is optimal for the patient to  get it mid cycle, but it can be given at any time as long as it is not on the day of the infusion. No  Patients receiving belimumab (Benlysta): Have you been having any signs of worsening depression or suicidal ideations? N/A    Post Infusion Assessment:  Patient tolerated infusion without incident.  Blood return noted pre and post infusion.  Site patent and intact, free from redness, edema or discomfort.  No evidence of extravasations.  Access discontinued per protocol.     POST-INFUSION OF BIOLOGICAL MEDICATION:     Reviewed with patient.  Given biologic medication or medication hand-out. Inform patient if any fever, chills or signs of infection, new symptoms, abdominal pain, heart palpitations, shortness of breath, reaction, weakness, neurological changes, seek medical attention immediately and should not receive infusions. No live virus vaccines prior to or during treatment or up to 6 months post infusion. If the patient has an upcoming procedure or surgery, this should be discussed with the rheumatologist and surgeon or provider.    Discharge Plan:   Discharge instructions reviewed with: Patient.  Patient and/or family verbalized understanding of discharge instructions and all questions answered.  AVS to patient via [employer].  Patient will return 4/25/25 for next appointment.   Patient discharged in stable condition accompanied by: self.  Departure Mode: Ambulatory.    Administrations This Visit       acetaminophen (TYLENOL) tablet 650 mg       Admin Date  04/01/2025 Action  $Given Dose  650 mg Route  Oral Documented By  Jacquelyn Nicole RN              methylPREDNISolone Na Suc (solu-MEDROL) injection 37.5 mg       Admin Date  04/01/2025 Action  $Given Dose  37.5 mg Route  Intravenous Documented By  Jacquelyn Nicole RN              sodium chloride 0.9% BOLUS 250 mL       Admin Date  04/01/2025 Action  $New Bag Dose  250 mL Route  Intravenous Documented By  Jacquelyn Nicole, LEXI              tocilizumab (ACTEMRA)  800 mg in sodium chloride 0.9 % 150 mL infusion       Admin Date  04/01/2025 Action  $New Bag Dose  800 mg Rate  150 mL/hr Route  Intravenous Documented By  Jacquelyn Nicole, RN                    Jacquelyn Nicole, RN

## 2025-04-07 ENCOUNTER — TELEPHONE (OUTPATIENT)
Dept: RHEUMATOLOGY | Facility: CLINIC | Age: 50
End: 2025-04-07

## 2025-04-07 NOTE — TELEPHONE ENCOUNTER
[facility] Call Center    Phone Message    May a detailed message be left on voicemail: yes     Reason for Call: Other: Pt is calling to speak to a nurse about getting her infusion 4 days early. Due to being off schedule when pt was not feeling well. Pt would like to get them back on schedule. Because 3 days early per orders would make it on a Saturday per pt. Please call pt back at ph: [phone].       Action Taken: Message routed to:  Other: [facility]    Travel Screening: Not Applicable     Date of Service: 4/7

## 2025-04-07 NOTE — TELEPHONE ENCOUNTER
Pt calling back to check on the status of this message, says she has other things she would like to speak with Macy about as well, so she was really hoping to hear back before the end of the day today (4/7/25).

## 2025-04-07 NOTE — TELEPHONE ENCOUNTER
Returned call to the patient.  She states the infusion scheduling called her and said Dr Lemus agreed to a 1 time exception.  Patient is worried about billing and if insurance will approve it.  Advised patient to call billing and inquire.  Phone number provided. Just making sure, did you ok the 1 time exception to infuse early?    Patient also asking you to weigh in on recent [place] notes.  Last seen 3/12/25. Notes are in epic.  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  She is scheduled for hand surgery and looking for medications to try prior, to possibly forgo the surgery.  She is not scheduled to see you until 6/20/25.     Macy Sawyer RN

## 2025-04-08 NOTE — TELEPHONE ENCOUNTER
Pt returning call, will be in and out of appointments all day if unable to get a hold of her, per pt okay to leave detail VM.

## 2025-04-09 ENCOUNTER — MYC MEDICAL ADVICE (OUTPATIENT)
Dept: OBGYN | Facility: CLINIC | Age: 50
End: 2025-04-09

## 2025-04-10 ENCOUNTER — OFFICE VISIT (OUTPATIENT)
Dept: ORTHOPEDICS | Facility: CLINIC | Age: 50
End: 2025-04-10
Payer: MEDICARE

## 2025-04-10 DIAGNOSIS — M79.644 BILATERAL THUMB PAIN: Primary | ICD-10-CM

## 2025-04-10 DIAGNOSIS — M79.645 BILATERAL THUMB PAIN: Primary | ICD-10-CM

## 2025-04-10 DIAGNOSIS — G56.03 BILATERAL CARPAL TUNNEL SYNDROME: Primary | ICD-10-CM

## 2025-04-10 RX ORDER — LIDOCAINE HYDROCHLORIDE 10 MG/ML
1 INJECTION, SOLUTION EPIDURAL; INFILTRATION; INTRACAUDAL; PERINEURAL
Status: COMPLETED | OUTPATIENT
Start: 2025-04-10 | End: 2025-04-10

## 2025-04-10 RX ORDER — TRIAMCINOLONE ACETONIDE 40 MG/ML
20 INJECTION, SUSPENSION INTRA-ARTICULAR; INTRAMUSCULAR
Status: COMPLETED | OUTPATIENT
Start: 2025-04-10 | End: 2025-04-10

## 2025-04-10 RX ADMIN — TRIAMCINOLONE ACETONIDE 20 MG: 40 INJECTION, SUSPENSION INTRA-ARTICULAR; INTRAMUSCULAR at 11:40

## 2025-04-10 RX ADMIN — LIDOCAINE HYDROCHLORIDE 1 ML: 10 INJECTION, SOLUTION EPIDURAL; INFILTRATION; INTRACAUDAL; PERINEURAL at 11:40

## 2025-04-10 NOTE — NURSING NOTE
Reason For Visit:   Chief Complaint   Patient presents with    RECHECK     Bilateral wrist carpal tunnel steroid injection       Primary MD: Amol Juares  Ref. MD: Katja    Age: 50 year old    ?  No      There were no vitals taken for this visit.      Pain Assessment  Patient Currently in Pain: Yes  0-10 Pain Scale: 10  Primary Pain Location: Wrist (Bilateral)  Pain Descriptors: Constant, Shooting, Tingling    Hand Dominance Evaluation  Hand Dominance: Right          QuickDASH Assessment      4/10/2025    11:01 AM   QuickDASH Main   1. Open a tight or new jar Unable   2. Do heavy household chores (e.g., wash walls, floors) Unable   3. Carry a shopping bag or briefcase Unable   4. Wash your back Severe difficulty   5. Use a knife to cut food Unable   6. Recreational activities in which you take some force or impact through your arm, shoulder or hand (e.g., golf, hammering, tennis, etc.) Unable   7. During the past week, to what extent has your arm, shoulder or hand problem interfered with your normal social activities with family, friends, neighbours or groups Extremely   8. During the past week, were you limited in your work or other regular daily activities as a result of your arm, shoulder or hand problem Unable   9. Arm, shoulder or hand pain Extreme   10.Tingling (pins and needles) in your arm,shoulder or hand Extreme   11. During the past week, how much difficulty have you had sleeping because of the pain in your arm, shoulder or hand Severe difficulty   Quickdash Ability Score 95.45          Current Outpatient Medications   Medication Sig Dispense Refill    albuterol (PROAIR HFA/PROVENTIL HFA/VENTOLIN HFA) 108 (90 Base) MCG/ACT inhaler Inhale 2 puffs into the lungs every 6 hours as needed for shortness of breath, wheezing or cough      Artificial Tear Solution (SOOTHE XP) SOLN as needed      atenolol (TENORMIN) 50 MG tablet Take 50 mg by mouth daily      azelastine 137 MCG/SPRAY SOLN USE 2 SPRAYS  IN EACH NOSTRIL 2 TIMES DAILY FOR 1 MONTH      benzoyl peroxide 5 % external liquid Use daily as directed. Preferred bdrand: Medpura 236 mL 11    blood glucose (NO BRAND SPECIFIED) test strip Use to test blood sugar 2 times daily or as directed. 100 strip 1    blood glucose monitoring (NO BRAND SPECIFIED) meter device kit Use to test blood sugar 2 times daily or as directed. 1 kit 0    ciclopirox (LOPROX) 0.77 % cream Apply topically 2 times daily To affected toenails. 90 g 5    cyclobenzaprine (FLEXERIL) 5 MG tablet Take 5 mg by mouth.      dexAMETHasone (DECADRON) 0.1 % ophthalmic solution       diclofenac (VOLTAREN) 1 % topical gel Apply topically 4 times daily as needed      doxycycline hyclate (VIBRAMYCIN) 100 MG capsule Take 100 mg by mouth 2 times daily.      dupilumab (DUPIXENT) 300 MG/2ML prefilled pen Inject 2 mLs (300 mg) subcutaneously every 14 days. 4 mL 5    econazole nitrate 1 % external cream Apply topically daily. To affected toenails. 85 g 5    famciclovir (FAMVIR) 500 MG tablet Take 1 tablet (500 mg) by mouth 2 times daily. 180 tablet 3    fexofenadine (ALLEGRA) 180 MG tablet Take 1 tablet (180 mg) by mouth daily 90 tablet 2    fidaxomicin (DIFICID) 200 MG tablet Take 200 mg by mouth as needed      fluconazole (DIFLUCAN) 150 MG tablet Take 150 mg by mouth.      Fluticasone-Umeclidin-Vilanterol (TRELEGY ELLIPTA) 200-62.5-25 MCG/ACT oral inhaler Inhale 1 puff into the lungs daily. 90 each 1    insulin glargine (LANTUS PEN) 100 UNIT/ML pen Inject 50 Units subcutaneously daily      insulin lispro (HUMALOG KWIKPEN) 100 UNIT/ML (1 unit dial) KWIKPEN Inject 35 Units subcutaneously 3 times daily (before meals) Plus 1:25 correction scale if BG >150      Lancets (ONETOUCH DELICA PLUS UXXJDJ03W) MISC CHECK BLOOD GLUCOSE 3 TIMES DAILY, ADJUSTING MEDICATION, DX CODE E 11.9, ON INSULIN      lidocaine (LIDODERM) 5 % patch as needed   1    LORazepam (ATIVAN) 0.5 MG tablet Take 0.25 mg by mouth every 8 hours as  needed      losartan (COZAAR) 100 MG tablet Take 100 mg by mouth daily      mometasone (NASONEX) 50 MCG/ACT nasal spray Spray 2 sprays into both nostrils daily. 51 g 1    montelukast (SINGULAIR) 10 MG tablet Take 10 mg by mouth daily.      multivitamin w/minerals (THERA-VIT-M) tablet Take 1 tablet by mouth daily      naloxone (NARCAN) 4 MG/0.1ML nasal spray       naproxen (NAPROSYN) 375 MG tablet Take 375 mg by mouth.      norethindrone (MICRONOR) 0.35 MG tablet Take 1 tablet (0.35 mg) by mouth daily 90 tablet 3    nystatin (MYCOSTATIN) 473946 UNIT/ML suspension       olopatadine (PATADAY) 0.7 % ophthalmic solution Apply 1 drop to eye daily PRN      omeprazole (PRILOSEC) 40 MG DR capsule TAKE 1 CAPSULE (40 MG) BY MOUTH TWO TIMES A DAY. TAKE 1 HOUR BEFORE A MEAL.      oxyCODONE IR (ROXICODONE) 10 MG tablet Take 10 mg by mouth      predniSONE (DELTASONE) 5 MG tablet TAKE 1 TABLET BY MOUTH EVERY DAY 90 tablet 1    rifaximin (XIFAXAN) 550 MG TABS tablet Take 200 mg by mouth 3 times daily Take for 10 days, has on had as needed for small bowel bacteria overgrowh      rosuvastatin (CRESTOR) 5 MG tablet Take 2 tablets by mouth daily      spironolactone-HCTZ (ALDACTAZIDE) 25-25 MG tablet Take 1 tablet by mouth daily      terbinafine (LAMISIL) 250 MG tablet Take 1 tablet by mouth daily.      tirzepatide (MOUNJARO) 2.5 MG/0.5ML pen Inject 2.5 mg subcutaneously every 7 days.      tobramycin-dexAMETHasone (TOBRADEX) 0.3-0.1 % ophthalmic suspension PLACE 1 DROP INTO BOTH EYES TWO TIMES A DAY.      tocilizumab (ACTEMRA) 400 MG/20ML Inject 800 mg into the vein every 28 days         Allergies   Allergen Reactions    Polyethylene Glycol Rash    Codeine      Other reaction(s): Gastrointestinal, GI intolerance, Vomiting    Vomiting    Hydrocodone Nausea and Vomiting    Morphine Nausea and Vomiting    Sulfasalazine      Other reaction(s): GI intolerance    Has tried and had nausa.    Tramadol Nausea and Vomiting     Other reaction(s):  Gastrointestinal, Other (see comments)    Nausea and vomiting     unknown    Acetaminophen Nausea     Nausea and vomiting    Gluten Meal Other (See Comments) and Rash     Other reaction(s): Gastrointestinal, GI intolerance  Dizziness, tired, rash, stomach cramps, thrush, mouth sores  Dizziness, tired, rash, stomach cramps, thrush, mouth sores      Hydroxyzine GI Disturbance and Nausea     nausea, dry mouth    Propylene Glycol Dizziness, Nausea and Vomiting, Nausea and Rash       DUY RESENDEZ, ATC  Est

## 2025-04-10 NOTE — PROGRESS NOTES
Hand / Upper Extremity Injection/Arthrocentesis: bilateral carpal tunnel    Date/Time: 4/10/2025 11:40 AM    Performed by: Twila Petit MD  Authorized by: Twila Petit MD    Indications:  Pain  Needle Size:  25 G  Guidance: landmark    Condition: carpal tunnel    Laterality:  Bilateral    Site:  Bilateral carpal tunnel  Medications (Right):  20 mg triamcinolone 40 MG/ML; 1 mL lidocaine (PF) 1 %  Medications (Left):  20 mg triamcinolone 40 MG/ML; 1 mL lidocaine (PF) 1 %  Outcome:  Tolerated well, no immediate complications  Procedure discussed: discussed risks, benefits, and alternatives    Consent Given by:  Patient  Timeout: timeout called immediately prior to procedure    Prep: patient was prepped and draped in usual sterile fashion        [facility] [address] [address]  [phone]  Dept: [phone]  ______________________________________________________________________________    Patient: Laxmi Ya   : 1975   MRN: [id number]   April 10, 2025    INVASIVE PROCEDURE SAFETY CHECKLIST    Date: 4/10/2025   Procedure:Bilateral wrist carpal tunnel steroid injections  Patient Name: Laxmi Ya  MRN: [id number]  YOB: 1975    Action: Complete sections as appropriate. Any discrepancy results in a HARD COPY until resolved.     PRE PROCEDURE:  Patient ID verified with 2 identifiers (name and  or MRN): Yes  Procedure and site verified with patient/designee (when able): Yes  Accurate consent documentation in medical record: Yes  H&P (or appropriate assessment) documented in medical record: Yes  H&P must be up to 20 days prior to procedure and updates within 24 hours of procedure as applicable: Yes  Relevant diagnostic and radiology test results appropriately labeled and displayed as applicable: NA  Procedure site(s) marked with provider initials: NA    TIMEOUT:  Time-Out performed  immediately prior to starting procedure, including verbal and active participation of all team members addressing the following:Yes  * Correct patient identify  * Confirmed that the correct side and site are marked  * An accurate procedure consent form  * Agreement on the procedure to be done  * Correct patient position  * Relevant images and results are properly labeled and appropriately displayed  * The need to administer antibiotics or fluids for irrigation purposes during the procedure as applicable   * Safety precautions based on patient history or medication use    DURING PROCEDURE: Verification of correct person, site, and procedures any time the responsibility for care of the patient is transferred to another member of the care team.       The following medications were given:         Prior to injection, verified patient identity using patient's name and date of birth.  Due to injection administration, patient instructed to remain in clinic for 15 minutes  afterwards, and to report any adverse reaction to me immediately.    Tendon sheath injection was performed.   Medication Name: Kenalog 40mg/mL NDC [address]-7  Drug Amount Wasted:  None.  Vial/Syringe: Single dose vial  Expiration Date:  11/1/26    Medication Name Lidocaine 1% NDC 45580-782-71    Scribed by DUY RESENDEZ, ATC for Dr. Petit on April 10, 2025 at 11:43am based on the provider's statements to me.     DUY RESENDEZ, ATC  \

## 2025-04-10 NOTE — PROGRESS NOTES
Diagnosis: bilateral thumb CMC/STT arthritis, bilateral carpal tunnel syndrome, bilateral cubital tunnel syndrome.  Patient comes in today requesting bilateral carpal tunnel cortisone injections.  She reports that her pain has been 10 out of 10 bilaterally.  She gets tingling and numbness especially at night.  She did go back to [facility] and see Dr. Kwabena Dodson.  He recommended that she follow-up with a rheumatologist as most recent x-rays showed pseudogout.      Treatments:   April 10, 2025 bilateral carpal tunnel injections 20 mg Trino Aguilar  January 23, 2025 bilateral thumb STT (20 mg) and CMC (20 mg) injections (Trino Aguilar)  10/25/2024 bilateral thumb STT injection (20 mg) he bilateral carpal tunnel injections (20 mg) (Trino Aguilar)  10/9/2024: bilateral thumb CMC steroid injection, cubital tunnel steroid injections (Vakshori_  8/15/2024: bilateral STT steroid injection (Dr. Petit)  6/6/2024: bilateral carpal tunnel steroid injection (Dr. Petit)  5/30/2024: bilateral STT steroid injection (Dr. Petit)  5/21/2024: bilateral cubital tunnel steroid injections  12/27/2023: bilateral carpal tunnel steroid injection  12/21/2023: bilateral STT steroid injection (Dr. Petit)  11/1/2023: bilateral carpal tunnel steroid injection  10/18/2023: Bilateral thumb CMC steroid injection  9/20/2023: bilateral STT steroid injection  5/4/2023: bilateral carpal tunnel steroid injection (Dr. ePtit)  2/16/2023: bilateral STT steroid injection (Dr. Petit)  12/13/2022: bilateral carpal tunnel steroid injection (Dr. Sullivan)  10/27/2022: bilateral STT steroid injections (Dr. Petit)  9/13/2022: bilateral carpal tunnel steroid injection (Dr. Sullivan)  6/30/2022: bilateral STT steroid injections (Dr. Petit)  Bilateral carpal tunnel steroid injections (outside)         PROCEDURE:  PROCEDURE:  After written consent, verifying site and side, a sterile Chlora- prep was performed for the injection site.  Palpation  was used for placement of a 25-gauge needle into the carpal tunnel with 1% Lidocaine with no dysesthesias and full range of motion of the flexor tendons.  1 mL of Kenalog (40 mg) and 1 mL of lidocaine was injected using dynamic technique with good relief of pain. Patient tolerated the procedure well.  No complications.    Follow up instructions were given.  She is wearing her splints and they are well-fitting.  She has also had symptoms in her great toe.  She wanted to have injections into the STT joints today but the fluoroscopy room was booked and she was an hour late for the appointment.  We will reschedule that in 2 to 4 weeks.    We discussed that if the rheumatologist can find an intravenous injectable or oral medication that provides effective anti-inflammatory activity against all of the joints which are problematic for her, this would be ideal as the injections that we have been performing would be unnecessary.  She will keep us updated as her rheumatology workup continues.    Twila Petit MD   Hand and Upper Extremity Specialist  [facility]

## 2025-04-10 NOTE — LETTER
4/10/2025      Laxmi Ya  [address] [place] [id number]      Dear Colleague,    Thank you for referring your patient, Laxmi aY, to the [facility] [place]. Please see a copy of my visit note below.       Diagnosis: bilateral thumb CMC/STT arthritis, bilateral carpal tunnel syndrome, bilateral cubital tunnel syndrome.  Patient comes in today requesting bilateral carpal tunnel cortisone injections.  She reports that her pain has been 10 out of 10 bilaterally.  She gets tingling and numbness especially at night.  She did go back to [facility] and see Dr. Kwabena Dodson.  He recommended that she follow-up with a rheumatologist as most recent x-rays showed pseudogout.      Treatments:   April 10, 2025 bilateral carpal tunnel injections 20 mg Trino Augilar  January 23, 2025 bilateral thumb STT (20 mg) and CMC (20 mg) injections (Trino Aguilar)  10/25/2024 bilateral thumb STT injection (20 mg) he bilateral carpal tunnel injections (20 mg) (Trino Aguilar)  10/9/2024: bilateral thumb CMC steroid injection, cubital tunnel steroid injections (Vakshori_  8/15/2024: bilateral STT steroid injection (Dr. Petit)  6/6/2024: bilateral carpal tunnel steroid injection (Dr. Petit)  5/30/2024: bilateral STT steroid injection (Dr. Petit)  5/21/2024: bilateral cubital tunnel steroid injections  12/27/2023: bilateral carpal tunnel steroid injection  12/21/2023: bilateral STT steroid injection (Dr. Petit)  11/1/2023: bilateral carpal tunnel steroid injection  10/18/2023: Bilateral thumb CMC steroid injection  9/20/2023: bilateral STT steroid injection  5/4/2023: bilateral carpal tunnel steroid injection (Dr. Petit)  2/16/2023: bilateral STT steroid injection (Dr. Petit)  12/13/2022: bilateral carpal tunnel steroid injection (Dr. Sullivan)  10/27/2022: bilateral STT steroid injections (Dr. Petit)  9/13/2022: bilateral carpal tunnel steroid injection (Dr. Sullivan)  6/30/2022: bilateral  STT steroid injections (Dr. Petit)  Bilateral carpal tunnel steroid injections (outside)         PROCEDURE:  PROCEDURE:  After written consent, verifying site and side, a sterile Chlora- prep was performed for the injection site.  Palpation was used for placement of a 25-gauge needle into the carpal tunnel with 1% Lidocaine with no dysesthesias and full range of motion of the flexor tendons.  1 mL of Kenalog (40 mg) and 1 mL of lidocaine was injected using dynamic technique with good relief of pain. Patient tolerated the procedure well.  No complications.    Follow up instructions were given.  She is wearing her splints and they are well-fitting.  She has also had symptoms in her great toe.  She wanted to have injections into the STT joints today but the fluoroscopy room was booked and she was an hour late for the appointment.  We will reschedule that in 2 to 4 weeks.    We discussed that if the rheumatologist can find an intravenous injectable or oral medication that provides effective anti-inflammatory activity against all of the joints which are problematic for her, this would be ideal as the injections that we have been performing would be unnecessary.  She will keep us updated as her rheumatology workup continues.    Twila Petit MD   Hand and Upper Extremity Specialist  [facility]          Hand / Upper Extremity Injection/Arthrocentesis: bilateral carpal tunnel    Date/Time: 4/10/2025 11:40 AM    Performed by: Twila Petit MD  Authorized by: Twila Petit MD    Indications:  Pain  Needle Size:  25 G  Guidance: landmark    Condition: carpal tunnel    Laterality:  Bilateral    Site:  Bilateral carpal tunnel  Medications (Right):  20 mg triamcinolone 40 MG/ML; 1 mL lidocaine (PF) 1 %  Medications (Left):  20 mg triamcinolone 40 MG/ML; 1 mL lidocaine (PF) 1 %  Outcome:  Tolerated well, no immediate complications  Procedure discussed: discussed risks, benefits, and  alternatives    Consent Given by:  Patient  Timeout: timeout called immediately prior to procedure    Prep: patient was prepped and draped in usual sterile fashion        [facility] [address]  [address] [facility] [id number] [phone]  Dept: [phone]  ______________________________________________________________________________    Patient: Laxmi Ya   : 1975   MRN: [id number]   April 10, 2025    INVASIVE PROCEDURE SAFETY CHECKLIST    Date: 4/10/2025   Procedure:Bilateral wrist carpal tunnel steroid injections  Patient Name: Laxmi Ya  MRN: [id number]  YOB: 1975    Action: Complete sections as appropriate. Any discrepancy results in a HARD COPY until resolved.     PRE PROCEDURE:  Patient ID verified with 2 identifiers (name and  or MRN): Yes  Procedure and site verified with patient/designee (when able): Yes  Accurate consent documentation in medical record: Yes  H&P (or appropriate assessment) documented in medical record: Yes  H&P must be up to 20 days prior to procedure and updates within 24 hours of procedure as applicable: Yes  Relevant diagnostic and radiology test results appropriately labeled and displayed as applicable: NA  Procedure site(s) marked with provider initials: NA    TIMEOUT:  Time-Out performed immediately prior to starting procedure, including verbal and active participation of all team members addressing the following:Yes  * Correct patient identify  * Confirmed that the correct side and site are marked  * An accurate procedure consent form  * Agreement on the procedure to be done  * Correct patient position  * Relevant images and results are properly labeled and appropriately displayed  * The need to administer antibiotics or fluids for irrigation purposes during the procedure as applicable   * Safety precautions based on patient history or medication use    DURING PROCEDURE: Verification of  correct person, site, and procedures any time the responsibility for care of the patient is transferred to another member of the care team.       The following medications were given:         Prior to injection, verified patient identity using patient's name and date of birth.  Due to injection administration, patient instructed to remain in clinic for 15 minutes  afterwards, and to report any adverse reaction to me immediately.    Tendon sheath injection was performed.   Medication Name: Kenalog 40mg/mL NDC [address]-7  Drug Amount Wasted:  None.  Vial/Syringe: Single dose vial  Expiration Date:  11/1/26    Medication Name Lidocaine 1% NDC 78857-902-61    Scribed by DUY RESENDEZ, TIM for Dr. Petit on April 10, 2025 at 11:43am based on the provider's statements to me.     DUY RESENDEZ ATC  \      Again, thank you for allowing me to participate in the care of your patient.        Sincerely,        Twila Petit MD    Electronically signed

## 2025-04-10 NOTE — CONFIDENTIAL NOTE
"Attempted to call patient again. Phone did not ring, sent straight to voice mail. M for patient to reschedule Dr. Pate appointment.  First available with Dr. Pate will be 5/8 or 5/9 in held \"Personal\" time   "

## 2025-04-15 ENCOUNTER — THERAPY VISIT (OUTPATIENT)
Dept: OCCUPATIONAL THERAPY | Facility: CLINIC | Age: 50
End: 2025-04-15
Attending: INTERNAL MEDICINE
Payer: MEDICARE

## 2025-04-15 DIAGNOSIS — I89.0 LYMPHEDEMA: Primary | ICD-10-CM

## 2025-04-15 PROCEDURE — 97140 MANUAL THERAPY 1/> REGIONS: CPT | Mod: GO | Performed by: OCCUPATIONAL THERAPIST

## 2025-04-15 NOTE — PROGRESS NOTES
04/15/25 0500   Progress Note/Certification   Certification date from 04/10/25   Certification date to 04/16/25   Progress Note Due Date   (10 visits)   Goals   OT Goals 1;2;3;4;5;6   OT Goal 1   Goal Identifier 1   Goal Description In order to improve functional mobility for activities of living, by the completion of intensive treatment, patient and/or caregiver will;      Verbalize and/or demonstrate understanding of techniques to independently manage their lymphedema at home   Rationale In order to maximize safety and independence with performance of self-care activities   Target Date 05/22/24   Date Met 04/15/25   OT Goal 2   Goal Identifier 1a   Goal Description demonstrate independence in performing prescribed exercises/self MLD to facilate the lymph system   Rationale In order to maximize safety and independence with performance of self-care activities   Goal Progress Is doing neck frequently - reminded to do the abdomen more.   Target Date 08/07/24   Date Met 03/10/25   OT Goal 3   Goal Identifier 1b   Goal Description be independent in donning/doffing, wearing schedule, and care of compression garments   Rationale In order to maximize safety and independence with performance of self-care activities   Goal Progress She has appt to see fitter to get into better garments.   Target Date 11/30/23   Date Met 02/15/24   OT Goal 4   Goal Identifier 2   Goal Description Pt will decrease the suprapubic swelling to perform hygiene/shave with a normal effort.   Rationale In order to maximize safety and independence with performance of self-care activities   Target Date 08/07/24   Date Met 01/22/25   OT Goal 5   Goal Identifier 3   Goal Description Pt oracio improve movement of shoulders and trunk to perform toilet hygiene.   Rationale In order to maximize safety and independence with performance of self-care activities   Target Date 05/22/24   Date Met 05/15/24   OT Goal 6   Goal Identifier 4   Goal Description Pt  wishes to walk without the pinching of her swelling in the groin/inguinals that creates pain 10/10 with every step.   Rationale In order to maximize safety and independence with performance of self-care activities   Goal Progress Pinching is 8/10   Target Date 04/09/25   Date Met 04/15/25         [facility]                                                                                   OUTPATIENT OCCUPATIONAL THERAPY    PLAN OF TREATMENT FOR OUTPATIENT REHABILITATION   Patient's Last Name, First Name, Laxmi Coleman YOB: 1975   Provider's Name   [facility]   Medical Record No.  [id number]     Onset Date: 09/07/22 Start of Care Date: 09/07/23     Medical Diagnosis:  edema, will clarify to lymphedema      OT Treatment Diagnosis:  lymphedema Plan of Treatment  Frequency/Duration:3x/week/6 weeks    Certification date from 04/10/25   To 04/16/25        See note for plan of treatment details and functional goals     Alanis Schilling OT                         I CERTIFY THE NEED FOR THESE SERVICES FURNISHED UNDER        THIS PLAN OF TREATMENT AND WHILE UNDER MY CARE .             Physician Signature               Date    X_____________________________________________________                  Referring Provider:  Dr. Juares    Initial Assessment  See Epic Evaluation- 09/07/23          DISCHARGE  Reason for Discharge: Patient has met all goals.    Equipment Issued: garments, but pt has lost weight and will get new garments end of month    Discharge Plan: Patient to continue home program.    Referring Provider:  Kimberlee Vasquez

## 2025-04-24 ENCOUNTER — ANCILLARY PROCEDURE (OUTPATIENT)
Dept: GENERAL RADIOLOGY | Facility: CLINIC | Age: 50
End: 2025-04-24
Attending: ORTHOPAEDIC SURGERY
Payer: MEDICARE

## 2025-04-24 ENCOUNTER — OFFICE VISIT (OUTPATIENT)
Dept: ORTHOPEDICS | Facility: CLINIC | Age: 50
End: 2025-04-24
Payer: MEDICARE

## 2025-04-24 DIAGNOSIS — M79.644 BILATERAL THUMB PAIN: ICD-10-CM

## 2025-04-24 DIAGNOSIS — M79.645 BILATERAL THUMB PAIN: ICD-10-CM

## 2025-04-24 DIAGNOSIS — M79.644 BILATERAL THUMB PAIN: Primary | ICD-10-CM

## 2025-04-24 DIAGNOSIS — M79.645 BILATERAL THUMB PAIN: Primary | ICD-10-CM

## 2025-04-24 DIAGNOSIS — M18.0 PRIMARY OSTEOARTHRITIS OF BOTH FIRST CARPOMETACARPAL JOINTS: Primary | ICD-10-CM

## 2025-04-24 RX ORDER — TRIAMCINOLONE ACETONIDE 40 MG/ML
20 INJECTION, SUSPENSION INTRA-ARTICULAR; INTRAMUSCULAR
Status: COMPLETED | OUTPATIENT
Start: 2025-04-24 | End: 2025-04-24

## 2025-04-24 RX ORDER — LIDOCAINE HYDROCHLORIDE 10 MG/ML
2 INJECTION, SOLUTION EPIDURAL; INFILTRATION; INTRACAUDAL; PERINEURAL
Status: COMPLETED | OUTPATIENT
Start: 2025-04-24 | End: 2025-04-24

## 2025-04-24 RX ADMIN — TRIAMCINOLONE ACETONIDE 20 MG: 40 INJECTION, SUSPENSION INTRA-ARTICULAR; INTRAMUSCULAR at 10:53

## 2025-04-24 RX ADMIN — LIDOCAINE HYDROCHLORIDE 2 ML: 10 INJECTION, SOLUTION EPIDURAL; INFILTRATION; INTRACAUDAL; PERINEURAL at 10:53

## 2025-04-24 NOTE — NURSING NOTE
Reason For Visit:   Chief Complaint   Patient presents with    RECHECK     Bilateral Thumb CMC joints repeat injections       Primary MD: Amol Juares  Ref. MD: Katja    Age: 50 year old    ?  No      There were no vitals taken for this visit.      Pain Assessment  Patient Currently in Pain: Yes  0-10 Pain Scale: 10  Primary Pain Location: Finger (Comment which one) (bilateral thumb)  Pain Descriptors: Constant, Sharp    Hand Dominance Evaluation  Hand Dominance: Right          QuickDASH Assessment      4/24/2025    10:00 AM   QuickDASH Main   1. Open a tight or new jar Unable   2. Do heavy household chores (e.g., wash walls, floors) Unable   3. Carry a shopping bag or briefcase Unable   4. Wash your back Severe difficulty   5. Use a knife to cut food Unable   6. Recreational activities in which you take some force or impact through your arm, shoulder or hand (e.g., golf, hammering, tennis, etc.) Unable   7. During the past week, to what extent has your arm, shoulder or hand problem interfered with your normal social activities with family, friends, neighbours or groups Extremely   8. During the past week, were you limited in your work or other regular daily activities as a result of your arm, shoulder or hand problem Unable   9. Arm, shoulder or hand pain Extreme   10.Tingling (pins and needles) in your arm,shoulder or hand Severe   11. During the past week, how much difficulty have you had sleeping because of the pain in your arm, shoulder or hand So much difficulty that I can't sleep   Quickdash Ability Score 95.45          Current Outpatient Medications   Medication Sig Dispense Refill    albuterol (PROAIR HFA/PROVENTIL HFA/VENTOLIN HFA) 108 (90 Base) MCG/ACT inhaler Inhale 2 puffs into the lungs every 6 hours as needed for shortness of breath, wheezing or cough      Artificial Tear Solution (SOOTHE XP) SOLN as needed      atenolol (TENORMIN) 50 MG tablet Take 50 mg by mouth daily      azelastine  137 MCG/SPRAY SOLN USE 2 SPRAYS IN EACH NOSTRIL 2 TIMES DAILY FOR 1 MONTH      benzoyl peroxide 5 % external liquid Use daily as directed. Preferred bdrand: Medpura 236 mL 11    blood glucose (NO BRAND SPECIFIED) test strip Use to test blood sugar 2 times daily or as directed. 100 strip 1    blood glucose monitoring (NO BRAND SPECIFIED) meter device kit Use to test blood sugar 2 times daily or as directed. 1 kit 0    ciclopirox (LOPROX) 0.77 % cream Apply topically 2 times daily To affected toenails. 90 g 5    cyclobenzaprine (FLEXERIL) 5 MG tablet Take 5 mg by mouth.      dexAMETHasone (DECADRON) 0.1 % ophthalmic solution       diclofenac (VOLTAREN) 1 % topical gel Apply topically 4 times daily as needed      doxycycline hyclate (VIBRAMYCIN) 100 MG capsule Take 100 mg by mouth 2 times daily.      dupilumab (DUPIXENT) 300 MG/2ML prefilled pen Inject 2 mLs (300 mg) subcutaneously every 14 days. 4 mL 5    econazole nitrate 1 % external cream Apply topically daily. To affected toenails. 85 g 5    famciclovir (FAMVIR) 500 MG tablet Take 1 tablet (500 mg) by mouth 2 times daily. 180 tablet 3    fexofenadine (ALLEGRA) 180 MG tablet Take 1 tablet (180 mg) by mouth daily 90 tablet 2    fidaxomicin (DIFICID) 200 MG tablet Take 200 mg by mouth as needed      fluconazole (DIFLUCAN) 150 MG tablet Take 150 mg by mouth.      Fluticasone-Umeclidin-Vilanterol (TRELEGY ELLIPTA) 200-62.5-25 MCG/ACT oral inhaler Inhale 1 puff into the lungs daily. 90 each 1    insulin glargine (LANTUS PEN) 100 UNIT/ML pen Inject 50 Units subcutaneously daily      insulin lispro (HUMALOG KWIKPEN) 100 UNIT/ML (1 unit dial) KWIKPEN Inject 35 Units subcutaneously 3 times daily (before meals) Plus 1:25 correction scale if BG >150      Lancets (ONETOUCH DELICA PLUS HVRBVL71M) MISC CHECK BLOOD GLUCOSE 3 TIMES DAILY, ADJUSTING MEDICATION, DX CODE E 11.9, ON INSULIN      lidocaine (LIDODERM) 5 % patch as needed   1    LORazepam (ATIVAN) 0.5 MG tablet Take 0.25  mg by mouth every 8 hours as needed      losartan (COZAAR) 100 MG tablet Take 100 mg by mouth daily      mometasone (NASONEX) 50 MCG/ACT nasal spray Spray 2 sprays into both nostrils daily. 51 g 1    montelukast (SINGULAIR) 10 MG tablet Take 10 mg by mouth daily.      multivitamin w/minerals (THERA-VIT-M) tablet Take 1 tablet by mouth daily      naloxone (NARCAN) 4 MG/0.1ML nasal spray       naproxen (NAPROSYN) 375 MG tablet Take 375 mg by mouth.      norethindrone (MICRONOR) 0.35 MG tablet Take 1 tablet (0.35 mg) by mouth daily 90 tablet 3    nystatin (MYCOSTATIN) 437583 UNIT/ML suspension       olopatadine (PATADAY) 0.7 % ophthalmic solution Apply 1 drop to eye daily PRN      omeprazole (PRILOSEC) 40 MG DR capsule TAKE 1 CAPSULE (40 MG) BY MOUTH TWO TIMES A DAY. TAKE 1 HOUR BEFORE A MEAL.      oxyCODONE IR (ROXICODONE) 10 MG tablet Take 10 mg by mouth      predniSONE (DELTASONE) 5 MG tablet TAKE 1 TABLET BY MOUTH EVERY DAY 90 tablet 1    rifaximin (XIFAXAN) 550 MG TABS tablet Take 200 mg by mouth 3 times daily Take for 10 days, has on had as needed for small bowel bacteria overgrowh      rosuvastatin (CRESTOR) 5 MG tablet Take 2 tablets by mouth daily      spironolactone-HCTZ (ALDACTAZIDE) 25-25 MG tablet Take 1 tablet by mouth daily      terbinafine (LAMISIL) 250 MG tablet Take 1 tablet by mouth daily.      tirzepatide (MOUNJARO) 2.5 MG/0.5ML pen Inject 2.5 mg subcutaneously every 7 days.      tobramycin-dexAMETHasone (TOBRADEX) 0.3-0.1 % ophthalmic suspension PLACE 1 DROP INTO BOTH EYES TWO TIMES A DAY.      tocilizumab (ACTEMRA) 400 MG/20ML Inject 800 mg into the vein every 28 days         Allergies   Allergen Reactions    Polyethylene Glycol Rash    Codeine      Other reaction(s): Gastrointestinal, GI intolerance, Vomiting    Vomiting    Hydrocodone Nausea and Vomiting    Morphine Nausea and Vomiting    Sulfasalazine      Other reaction(s): GI intolerance    Has tried and had nausa.    Tramadol Nausea and  Vomiting     Other reaction(s): Gastrointestinal, Other (see comments)    Nausea and vomiting     unknown    Acetaminophen Nausea     Nausea and vomiting    Gluten Meal Other (See Comments) and Rash     Other reaction(s): Gastrointestinal, GI intolerance  Dizziness, tired, rash, stomach cramps, thrush, mouth sores  Dizziness, tired, rash, stomach cramps, thrush, mouth sores      Hydroxyzine GI Disturbance and Nausea     nausea, dry mouth    Propylene Glycol Dizziness, Nausea and Vomiting, Nausea and Rash       DUY RESENDEZ, ATC

## 2025-04-24 NOTE — PROGRESS NOTES
Diagnosis: bilateral thumb CMC/STT arthritis, bilateral carpal tunnel syndrome, bilateral cubital tunnel syndrome.  Patient comes in today requesting bilateral thumb CMC cortisone injections.  She reports that her pain has been severe bilaterally.   She did go back to [facility] and see Dr. Kwabena Dodson.  He recommended that she follow-up with a rheumatologist as most recent x-rays showed pseudogout which is scheduled in late June 2025.       Treatments:   April 24, 2025 bilateral thumb CMC injections 20 mg Trino Aguilar  April 10, 2025 bilateral carpal tunnel injections 20 mg Trino Aguilar  January 23, 2025 bilateral thumb STT (20 mg) and CMC (20 mg) injections (Trino Aguilar)  10/25/2024 bilateral thumb STT injection (20 mg) he bilateral carpal tunnel injections (20 mg) (Trino Aguilar)  10/9/2024: bilateral thumb CMC steroid injection, cubital tunnel steroid injections (Vakshori_  8/15/2024: bilateral STT steroid injection (Dr. Petit)  6/6/2024: bilateral carpal tunnel steroid injection (Dr. Petit)  5/30/2024: bilateral STT steroid injection (Dr. Petit)  5/21/2024: bilateral cubital tunnel steroid injections  12/27/2023: bilateral carpal tunnel steroid injection  12/21/2023: bilateral STT steroid injection (Dr. Petit)  11/1/2023: bilateral carpal tunnel steroid injection  10/18/2023: Bilateral thumb CMC steroid injection  9/20/2023: bilateral STT steroid injection  5/4/2023: bilateral carpal tunnel steroid injection (Dr. Petit)  2/16/2023: bilateral STT steroid injection (Dr. Petit)  12/13/2022: bilateral carpal tunnel steroid injection (Dr. Sullivan)  10/27/2022: bilateral STT steroid injections (Dr. Petit)  9/13/2022: bilateral carpal tunnel steroid injection (Dr. Sullivan)  6/30/2022: bilateral STT steroid injections (Dr. Petit)  Bilateral carpal tunnel steroid injections (outside)         PROCEDURE:  PROCEDURE:      PROCEDURE:  After written consent, verifying site and side, a sterile Chlora-  prep was performed for the injection site.  C-arm guidance was used for difficult placement of a 25-gauge needle into the right thumb CMC joint with 1% Lidocaine.  1/2 mL of Kenalog (20 mg) and 1 mL of lidocaine was injected under fluoroscopic guidance with good relief of pain.Identical procedure was carried out on the left thumb. Patient tolerated the procedure well.  No complications.  Follow up instructions were given.      We discussed that if the rheumatologist can find an intravenous injectable or oral medication that provides effective anti-inflammatory activity against all of the joints which are problematic for her, this would be ideal as the injections that we have been performing would be unnecessary.  She will keep us updated as her rheumatology workup continues.    wTila Petit MD   Hand and Upper Extremity Specialist  [facility]

## 2025-04-24 NOTE — PROGRESS NOTES
Hand / Upper Extremity Injection/Arthrocentesis: bilateral thumb CMC    Date/Time: 2025 10:53 AM    Performed by: Twila Petit MD  Authorized by: Twila Petit MD    Indications:  Pain  Needle Size:  25 G  Guidance: fluoroscopy    Condition: osteoarthritis    Laterality:  Bilateral  Location:  Thumb  Site:  Bilateral thumb CMC    Medications (Right):  20 mg triamcinolone 40 MG/ML; 2 mL lidocaine (PF) 1 %  Medications (Left):  20 mg triamcinolone 40 MG/ML; 2 mL lidocaine (PF) 1 %  Outcome:  Tolerated well, no immediate complications  Procedure discussed: discussed risks, benefits, and alternatives    Consent Given by:  Patient  Timeout: timeout called immediately prior to procedure    Prep: patient was prepped and draped in usual sterile fashion        [facility] [address] [address]  [phone]  Dept: [phone]  ______________________________________________________________________________    Patient: Laxmi Ya   : 1975   MRN: [id number]   2025    INVASIVE PROCEDURE SAFETY CHECKLIST    Date: 2025   Procedure:Bilateral thumb CMC joint injections  Patient Name: Laxmi Ya  MRN: [id number]  YOB: 1975    Action: Complete sections as appropriate. Any discrepancy results in a HARD COPY until resolved.     PRE PROCEDURE:  Patient ID verified with 2 identifiers (name and  or MRN): Yes  Procedure and site verified with patient/designee (when able): Yes  Accurate consent documentation in medical record: Yes  H&P (or appropriate assessment) documented in medical record: Yes  H&P must be up to 20 days prior to procedure and updates within 24 hours of procedure as applicable: Yes  Relevant diagnostic and radiology test results appropriately labeled and displayed as applicable: NA  Procedure site(s) marked with provider initials: NA    TIMEOUT:  Time-Out performed  immediately prior to starting procedure, including verbal and active participation of all team members addressing the following:Yes  * Correct patient identify  * Confirmed that the correct side and site are marked  * An accurate procedure consent form  * Agreement on the procedure to be done  * Correct patient position  * Relevant images and results are properly labeled and appropriately displayed  * The need to administer antibiotics or fluids for irrigation purposes during the procedure as applicable   * Safety precautions based on patient history or medication use    DURING PROCEDURE: Verification of correct person, site, and procedures any time the responsibility for care of the patient is transferred to another member of the care team.       The following medications were given:         Prior to injection, verified patient identity using patient's name and date of birth.  Due to injection administration, patient instructed to remain in clinic for 15 minutes  afterwards, and to report any adverse reaction to me immediately.    Joint injection was performed.    Medication Name: Kenalog 40mg/mL NDC [address]-3  Drug Amount Wasted:  None.  Vial/Syringe: Single dose vial  Expiration Date:  11/1/26    Medication Name Lidocaine 1% NDC 22894-016-71    Scribed by DUY RESENDEZ, TIM for Dr. Petit on April 24, 2025 at 10:57am based on the provider's statements to me.     DUY RESENDEZ, ATC

## 2025-04-24 NOTE — LETTER
2025      Laxmi Ya  [address] [place] [id number]      Dear Colleague,    Thank you for referring your patient, Laxmi Ya, to the [facility]. Please see a copy of my visit note below.    Hand / Upper Extremity Injection/Arthrocentesis: bilateral thumb CMC    Date/Time: 2025 10:53 AM    Performed by: Twila Petit MD  Authorized by: Twila Petit MD    Indications:  Pain  Needle Size:  25 G  Guidance: fluoroscopy    Condition: osteoarthritis    Laterality:  Bilateral  Location:  Thumb  Site:  Bilateral thumb CMC    Medications (Right):  20 mg triamcinolone 40 MG/ML; 2 mL lidocaine (PF) 1 %  Medications (Left):  20 mg triamcinolone 40 MG/ML; 2 mL lidocaine (PF) 1 %  Outcome:  Tolerated well, no immediate complications  Procedure discussed: discussed risks, benefits, and alternatives    Consent Given by:  Patient  Timeout: timeout called immediately prior to procedure    Prep: patient was prepped and draped in usual sterile fashion        [facility] [address] [id number] [phone]  Dept: [phone]  ______________________________________________________________________________    Patient: Laxmi Ya   : 1975   MRN: [id number]   2025    INVASIVE PROCEDURE SAFETY CHECKLIST    Date: 2025   Procedure:Bilateral thumb CMC joint injections  Patient Name: Laxmi Ya  MRN: [id number]  YOB: 1975    Action: Complete sections as appropriate. Any discrepancy results in a HARD COPY until resolved.     PRE PROCEDURE:  Patient ID verified with 2 identifiers (name and  or MRN): Yes  Procedure and site verified with patient/designee (when able): Yes  Accurate consent documentation in medical record: Yes  H&P (or appropriate assessment) documented in medical record: Yes  H&P must be up to 20 days prior to procedure and  updates within 24 hours of procedure as applicable: Yes  Relevant diagnostic and radiology test results appropriately labeled and displayed as applicable: NA  Procedure site(s) marked with provider initials: NA    TIMEOUT:  Time-Out performed immediately prior to starting procedure, including verbal and active participation of all team members addressing the following:Yes  * Correct patient identify  * Confirmed that the correct side and site are marked  * An accurate procedure consent form  * Agreement on the procedure to be done  * Correct patient position  * Relevant images and results are properly labeled and appropriately displayed  * The need to administer antibiotics or fluids for irrigation purposes during the procedure as applicable   * Safety precautions based on patient history or medication use    DURING PROCEDURE: Verification of correct person, site, and procedures any time the responsibility for care of the patient is transferred to another member of the care team.       The following medications were given:         Prior to injection, verified patient identity using patient's name and date of birth.  Due to injection administration, patient instructed to remain in clinic for 15 minutes  afterwards, and to report any adverse reaction to me immediately.    Joint injection was performed.    Medication Name: Kenalog 40mg/mL NDC [address]-0  Drug Amount Wasted:  None.  Vial/Syringe: Single dose vial  Expiration Date:  11/1/26    Medication Name Lidocaine 1% NDC 46769-431-22    Scribed by DUY RESENDEZ, ATC for Dr. Petit on April 24, 2025 at 10:57am based on the provider's statements to me.     DUY RESENDEZ, ATC         Diagnosis: bilateral thumb CMC/STT arthritis, bilateral carpal tunnel syndrome, bilateral cubital tunnel syndrome.  Patient comes in today requesting bilateral thumb CMC cortisone injections.  She reports that her pain has been severe bilaterally.   She did go back to [facility] and see  Dr. Kwabena Dodson.  He recommended that she follow-up with a rheumatologist as most recent x-rays showed pseudogout which is scheduled in late June 2025.       Treatments:   April 24, 2025 bilateral thumb CMC injections 20 mg Trino Aguilar  April 10, 2025 bilateral carpal tunnel injections 20 mg Trino Aguilar  January 23, 2025 bilateral thumb STT (20 mg) and CMC (20 mg) injections (Trino Aguilar)  10/25/2024 bilateral thumb STT injection (20 mg) he bilateral carpal tunnel injections (20 mg) (Trino Aguilar)  10/9/2024: bilateral thumb CMC steroid injection, cubital tunnel steroid injections (Vakshori_  8/15/2024: bilateral STT steroid injection (Dr. Petit)  6/6/2024: bilateral carpal tunnel steroid injection (Dr. Petit)  5/30/2024: bilateral STT steroid injection (Dr. Petit)  5/21/2024: bilateral cubital tunnel steroid injections  12/27/2023: bilateral carpal tunnel steroid injection  12/21/2023: bilateral STT steroid injection (Dr. Petit)  11/1/2023: bilateral carpal tunnel steroid injection  10/18/2023: Bilateral thumb CMC steroid injection  9/20/2023: bilateral STT steroid injection  5/4/2023: bilateral carpal tunnel steroid injection (Dr. Petit)  2/16/2023: bilateral STT steroid injection (Dr. Petit)  12/13/2022: bilateral carpal tunnel steroid injection (Dr. Sullivan)  10/27/2022: bilateral STT steroid injections (Dr. Petit)  9/13/2022: bilateral carpal tunnel steroid injection (Dr. Sullivan)  6/30/2022: bilateral STT steroid injections (Dr. Petit)  Bilateral carpal tunnel steroid injections (outside)         PROCEDURE:  PROCEDURE:      PROCEDURE:  After written consent, verifying site and side, a sterile Chlora- prep was performed for the injection site.  C-arm guidance was used for difficult placement of a 25-gauge needle into the right thumb CMC joint with 1% Lidocaine.  1/2 mL of Kenalog (20 mg) and 1 mL of lidocaine was injected under fluoroscopic guidance with good relief of pain.Identical  procedure was carried out on the left thumb. Patient tolerated the procedure well.  No complications.  Follow up instructions were given.      We discussed that if the rheumatologist can find an intravenous injectable or oral medication that provides effective anti-inflammatory activity against all of the joints which are problematic for her, this would be ideal as the injections that we have been performing would be unnecessary.  She will keep us updated as her rheumatology workup continues.    Twila Petit MD   Hand and Upper Extremity Specialist  [facility]        Again, thank you for allowing me to participate in the care of your patient.        Sincerely,        Twila Petit MD    Electronically signed

## 2025-04-28 ENCOUNTER — MYC MEDICAL ADVICE (OUTPATIENT)
Dept: PHARMACY | Facility: CLINIC | Age: 50
End: 2025-04-28

## 2025-04-29 ENCOUNTER — INFUSION THERAPY VISIT (OUTPATIENT)
Dept: INFUSION THERAPY | Facility: CLINIC | Age: 50
End: 2025-04-29
Attending: INTERNAL MEDICINE
Payer: MEDICARE

## 2025-04-29 ENCOUNTER — VIRTUAL VISIT (OUTPATIENT)
Dept: PHARMACY | Facility: CLINIC | Age: 50
End: 2025-04-29
Attending: INTERNAL MEDICINE
Payer: MEDICARE

## 2025-04-29 VITALS
RESPIRATION RATE: 16 BRPM | OXYGEN SATURATION: 97 % | SYSTOLIC BLOOD PRESSURE: 111 MMHG | TEMPERATURE: 97.4 F | DIASTOLIC BLOOD PRESSURE: 76 MMHG | WEIGHT: 217 LBS | BODY MASS INDEX: 35.02 KG/M2 | HEART RATE: 83 BPM

## 2025-04-29 DIAGNOSIS — M06.00 SERONEGATIVE RHEUMATOID ARTHRITIS (H): Primary | ICD-10-CM

## 2025-04-29 DIAGNOSIS — M13.80 SERONEGATIVE INFLAMMATORY ARTHRITIS: Primary | ICD-10-CM

## 2025-04-29 PROCEDURE — 250N000013 HC RX MED GY IP 250 OP 250 PS 637: Performed by: INTERNAL MEDICINE

## 2025-04-29 PROCEDURE — 258N000003 HC RX IP 258 OP 636: Performed by: INTERNAL MEDICINE

## 2025-04-29 PROCEDURE — 96375 TX/PRO/DX INJ NEW DRUG ADDON: CPT

## 2025-04-29 PROCEDURE — 96365 THER/PROPH/DIAG IV INF INIT: CPT

## 2025-04-29 PROCEDURE — 250N000011 HC RX IP 250 OP 636: Performed by: INTERNAL MEDICINE

## 2025-04-29 RX ORDER — DIPHENHYDRAMINE HCL 25 MG
25 CAPSULE ORAL ONCE
OUTPATIENT
Start: 2025-05-20

## 2025-04-29 RX ORDER — DIPHENHYDRAMINE HYDROCHLORIDE 50 MG/ML
50 INJECTION, SOLUTION INTRAMUSCULAR; INTRAVENOUS
Start: 2025-05-20

## 2025-04-29 RX ORDER — ALBUTEROL SULFATE 0.83 MG/ML
2.5 SOLUTION RESPIRATORY (INHALATION)
OUTPATIENT
Start: 2025-05-20

## 2025-04-29 RX ORDER — METHYLPREDNISOLONE SODIUM SUCCINATE 125 MG/2ML
37.5 INJECTION INTRAMUSCULAR; INTRAVENOUS ONCE
OUTPATIENT
Start: 2025-05-20

## 2025-04-29 RX ORDER — HEPARIN SODIUM,PORCINE 10 UNIT/ML
5-20 VIAL (ML) INTRAVENOUS DAILY PRN
OUTPATIENT
Start: 2025-05-20

## 2025-04-29 RX ORDER — ACETAMINOPHEN 325 MG/1
650 TABLET ORAL ONCE
Status: COMPLETED | OUTPATIENT
Start: 2025-04-29 | End: 2025-04-29

## 2025-04-29 RX ORDER — HEPARIN SODIUM (PORCINE) LOCK FLUSH IV SOLN 100 UNIT/ML 100 UNIT/ML
5 SOLUTION INTRAVENOUS
OUTPATIENT
Start: 2025-05-20

## 2025-04-29 RX ORDER — METHYLPREDNISOLONE SODIUM SUCCINATE 40 MG/ML
40 INJECTION INTRAMUSCULAR; INTRAVENOUS
Start: 2025-05-20

## 2025-04-29 RX ORDER — DIPHENHYDRAMINE HYDROCHLORIDE 50 MG/ML
25 INJECTION, SOLUTION INTRAMUSCULAR; INTRAVENOUS
Start: 2025-05-20

## 2025-04-29 RX ORDER — MEPERIDINE HYDROCHLORIDE 25 MG/ML
25 INJECTION INTRAMUSCULAR; INTRAVENOUS; SUBCUTANEOUS
OUTPATIENT
Start: 2025-05-20

## 2025-04-29 RX ORDER — EPINEPHRINE 1 MG/ML
0.3 INJECTION, SOLUTION INTRAMUSCULAR; SUBCUTANEOUS EVERY 5 MIN PRN
OUTPATIENT
Start: 2025-05-20

## 2025-04-29 RX ORDER — METHYLPREDNISOLONE SODIUM SUCCINATE 125 MG/2ML
37.5 INJECTION INTRAMUSCULAR; INTRAVENOUS ONCE
Status: COMPLETED | OUTPATIENT
Start: 2025-04-29 | End: 2025-04-29

## 2025-04-29 RX ORDER — ACETAMINOPHEN 325 MG/1
650 TABLET ORAL ONCE
OUTPATIENT
Start: 2025-05-20

## 2025-04-29 RX ORDER — ALBUTEROL SULFATE 90 UG/1
1-2 INHALANT RESPIRATORY (INHALATION)
Start: 2025-05-20

## 2025-04-29 RX ADMIN — SODIUM CHLORIDE 250 ML: 0.9 INJECTION, SOLUTION INTRAVENOUS at 16:49

## 2025-04-29 RX ADMIN — ACETAMINOPHEN 650 MG: 325 TABLET ORAL at 15:29

## 2025-04-29 RX ADMIN — METHYLPREDNISOLONE SODIUM SUCCINATE 37.5 MG: 125 INJECTION, POWDER, FOR SOLUTION INTRAMUSCULAR; INTRAVENOUS at 15:36

## 2025-04-29 RX ADMIN — TOCILIZUMAB 800 MG: 20 INJECTION, SOLUTION, CONCENTRATE INTRAVENOUS at 15:53

## 2025-04-29 NOTE — PATIENT INSTRUCTIONS
Dear Laxmi Ya    Thank you for choosing [facility] ([facility] for your infusion.  The following information is a summary of our appointment as well as important reminders.          EDUCATION POST BIOLOGICAL/CHEMOTHERAPY INFUSION  Call the triage nurse at your clinic or seek medical attention if you have chills and/or temperature greater than or equal to 100.5, uncontrolled nausea/vomiting, diarrhea, constipation, dizziness, shortness of breath, chest pain, heart palpitations, weakness or any other new or concerning symptoms, questions or concerns.  You can not have any live virus vaccines prior to or during treatment or up to 6 months post infusion.  If you have an upcoming surgery, medical procedure or dental procedure during treatment, this should be discussed with your ordering physician and your surgeon/dentist.  If you are having any concerning symptom, if you are unsure if you should get your next infusion or wish to speak to a provider before your next infusion, please call your care coordinator or triage nurse at your clinic to notify them so we can adequately serve you.     If you have any questions on your upcoming Specialty Infusion appointments, please call scheduling at [phone].  It was a pleasure taking care of you today.    Sincerely,    [facility]  [facility]  [address] [place]  [address]  Phone:  [phone]

## 2025-04-29 NOTE — PROGRESS NOTES
Infusion Nursing Note:  Laxmi Ya presents today for Actemra.    Patient seen by provider today: No   present during visit today: Not Applicable.    Note: Premeds below given as ordered.  Administrations This Visit       acetaminophen (TYLENOL) tablet 650 mg       Admin Date  04/29/2025 Action  $Given Dose  650 mg Route  Oral Documented By  Carolina Osborn, LEXI              methylPREDNISolone Na Suc (solu-MEDROL) injection 37.5 mg       Admin Date  04/29/2025 Action  $Given Dose  37.5 mg Route  Intravenous Documented By  Carolina Osborn, LEXI              sodium chloride 0.9% BOLUS 250 mL       Admin Date  04/29/2025 Action  $New Bag Dose  250 mL Route  Intravenous Documented By  Carolina Osborn RN              tocilizumab (ACTEMRA) 800 mg in sodium chloride 0.9 % 150 mL infusion       Admin Date  04/29/2025 Action  $New Bag Dose  800 mg Rate  150 mL/hr Route  Intravenous Documented By  Carolina Osborn RN                             Drug Name: Solu-medrol  Dose: 37.5 mg  Route administered: IV  NDC #:  [id number]  Amount of waste(mL):1.4 ml  Reason for waste: Single use vial    Intravenous Access:  [facility]ipheral IV [facility] by [facility]    Treatment Conditions:  Biological Infusion Checklist:  ~~~ NOTE: If the patient answers yes to any of the questions below, hold the infusion and contact ordering provider or on-call provider.    Have you recently had an elevated temperature, fever, chills, productive cough, coughing for 3 weeks or longer or hemoptysis,  abnormal vital signs, night sweats,  chest pain or have you noticed a decrease in your appetite, unexplained weight loss or fatigue? No  Do you have any open wounds or new incisions? No  Do you have any upcoming hospitalizations or surgeries? Does not include esophagogastroduodenoscopy, colonoscopy, endoscopic retrograde cholangiopancreatography (ERCP), endoscopic ultrasound (EUS), dental procedures or joint aspiration/steroid injections No  Do you currently have any signs of  illness or infection or are you on any antibiotics? No  Have you had any new, sudden or worsening abdominal pain? No  Have you or anyone in your household received a live vaccination in the past 4 weeks? Please note: No live vaccines while on biologic/chemotherapy until 6 months after the last treatment. Patient can receive the flu vaccine (shot only), pneumovax and the Covid vaccine. It is optimal for the patient to get these vaccines mid cycle, but they can be given at any time as long as it is not on the day of the infusion. No  Have you recently been diagnosed with any new nervous system diseases (ie. Multiple sclerosis, Guillain [place], seizures, neurological changes) or cancer diagnosis? Are you on any form of radiation or chemotherapy? No  Are you pregnant or breast feeding or do you have plans of pregnancy in the future? No  Have you been having any signs of worsening depression or suicidal ideations?  (benlysta only) No  Have there been any other new onset medical symptoms? No  Have you had any new blood clots? (IVIG only) No      Post Infusion Assessment:  Patient tolerated infusion without incident.  Blood return noted pre and post infusion.  Site patent and intact, free from redness, edema or discomfort.  No evidence of extravasations.  Access discontinued per protocol.  Biologic Infusion Post Education: Call the triage nurse at your clinic or seek medical attention if you have chills and/or temperature greater than or equal to 100.5, uncontrolled nausea/vomiting, diarrhea, constipation, dizziness, shortness of breath, chest pain, heart palpitations, weakness or any other new or concerning symptoms, questions or concerns.  You cannot have any live virus vaccines prior to or during treatment or up to 6 months post infusion.  If you have an upcoming surgery, medical procedure or dental procedure during treatment, this should be discussed with your ordering physician and your surgeon/dentist.  If you are  having any concerning symptom, if you are unsure if you should get your next infusion or wish to speak to a provider before your next infusion, please call your care coordinator or triage nurse at your clinic to notify them so we can adequately serve you.       Discharge Plan:   Discharge instructions reviewed with: Patient.  Patient and/or family verbalized understanding of discharge instructions and all questions answered.  AVS to patient via [employer].  Patient will return 5/27/25 for next appointment.   Patient discharged in stable condition accompanied by: self.  Departure Mode: Ambulatory.    /76 (BP Location: Right leg, Patient Position: Semi-Stephens's, Cuff Size: Adult Large)   Pulse 83   Temp 97.4  F (36.3  C) (Oral)   Resp 16   Wt 98.4 kg (217 lb)   SpO2 97%   BMI 35.02 kg/m       Carolina Osborn RN

## 2025-04-30 NOTE — PROGRESS NOTES
"Medication Therapy Management (MTM) Encounter    ASSESSMENT:                            Medication Adherence/Access: {adherencechoices:145159}    ***:  ***      PLAN:                            ***    Follow-up: {followuptest2:402919}    SUBJECTIVE/OBJECTIVE:                          Laxmi Ya is a 50 year old female seen for {mtmvisit:764226}     Reason for visit: ***.    Allergies/ADRs: {1/2/3/4/5:259620}  Past Medical History: {1/2/3/4/5:097410}  Tobacco: She reports that she has never smoked. She has never used smokeless tobacco.  Alcohol: {ALCOHOL CONSUMPTION HX:684798}  {Social and Goals:044110}    Medication Adherence/Access: {fumedadherence:908006}    ***:   ***    {MTM SUBJECTIVE (Optional):728395}      Today's Vitals: There were no vitals taken for this visit.  ----------------  {JOANN?:945081}    I spent {mtm total time 3:174116} with this patient today. { :524554}.     A summary of these recommendations {GIVEN/NOT GIVEN:709688}.    ***    Telemedicine Visit Details  The patient's medications can be safely assessed via a telemedicine encounter.  Type of service:  {telemedvisitmtm:890059::\"Telephone visit\"}  Originating Location (pt. Location): {video visit patient location:088696::\"Home\"}  {PROVIDER LOCATION On-site should be selected for visits conducted from your clinic location or adjoining [facility] hospital, academic office, or other nearby [facility] building. Off-site should be selected for all other provider locations, including home:2832[address]}  Distant Location (provider location):  {virtual location provider:575565}  Start Time: {video/phone visit start time:152948}  End Time: {video/phone visit end time:152948}     Medication Therapy Recommendations  No medication therapy recommendations to display     " orthopedics has noted that her joints are inflamed and rheumatology may have additional med options. Reports she is in significant pain daily - some days being unable to do tasks of daily living. No side effects noted.  Does have history of LFT elevation with certain drugs.  Planning on seeing [facility] rheumatologist on 6/23 for second opinion.    Last lab monitoring completed: 4/1/25    Today's Vitals: There were no vitals taken for this visit.  ----------------    I spent 30 minutes with this patient today. All changes were made via collaborative practice agreement with Oswaldo Lemus.     A summary of these recommendations was sent via [employer].    Yuliya Kaplan, PharmD  Medication Therapy Management Pharmacist  [facility]  Phone: [phone]    Telemedicine Visit Details  The patient's medications can be safely assessed via a telemedicine encounter.  Type of service:  Telephone visit  Originating Location (pt. Location): Home    Distant Location (provider location):  On-site  Start Time: 9:30 AM  End Time: 10:00 AM     Medication Therapy Recommendations  No medication therapy recommendations to display

## 2025-05-04 NOTE — PATIENT INSTRUCTIONS
"Recommendations from today's MTM visit:                                                       1.  I will reach out to Dr. Lemus to discuss what medication option he prefers we try for your symptoms. In the meantime please continue all medications as prescribed.    Follow-up: Return in about 6 months (around 10/29/2025) for MTM Pharmacist Visit.    It was great speaking with you today.  I value your experience and would be very thankful for your time in providing feedback in our clinic survey. In the next few days, you may receive an email or text message from [facility] [employer] with a link to a survey related to your  clinical pharmacist.\"     To schedule another MTM appointment, please call the clinic directly or you may call the MTM scheduling line at [phone].    My Clinical Pharmacist's contact information:                                                      Please feel free to contact me with any questions or concerns you have.      Yuliya Kaplan, PharmD  Medication Therapy Management Pharmacist  [facility]  Phone: [phone]     "

## 2025-05-07 ENCOUNTER — THERAPY VISIT (OUTPATIENT)
Dept: PHYSICAL THERAPY | Facility: CLINIC | Age: 50
End: 2025-05-07
Payer: MEDICARE

## 2025-05-07 ENCOUNTER — TELEPHONE (OUTPATIENT)
Dept: RHEUMATOLOGY | Facility: CLINIC | Age: 50
End: 2025-05-07

## 2025-05-07 DIAGNOSIS — G89.29 CHRONIC BILATERAL LOW BACK PAIN WITHOUT SCIATICA: ICD-10-CM

## 2025-05-07 DIAGNOSIS — M54.2 NECK PAIN: ICD-10-CM

## 2025-05-07 DIAGNOSIS — R10.2 PELVIC PAIN IN FEMALE: Primary | ICD-10-CM

## 2025-05-07 DIAGNOSIS — M54.50 CHRONIC BILATERAL LOW BACK PAIN WITHOUT SCIATICA: ICD-10-CM

## 2025-05-07 PROCEDURE — 97140 MANUAL THERAPY 1/> REGIONS: CPT | Mod: GP | Performed by: PHYSICAL THERAPIST

## 2025-05-07 PROCEDURE — 97012 MECHANICAL TRACTION THERAPY: CPT | Mod: GP | Performed by: PHYSICAL THERAPIST

## 2025-05-08 ENCOUNTER — OFFICE VISIT (OUTPATIENT)
Dept: ORTHOPEDICS | Facility: CLINIC | Age: 50
End: 2025-05-08
Payer: MEDICARE

## 2025-05-08 DIAGNOSIS — M25.532 BILATERAL WRIST PAIN: Primary | ICD-10-CM

## 2025-05-08 DIAGNOSIS — M25.531 BILATERAL WRIST PAIN: Primary | ICD-10-CM

## 2025-05-08 RX ORDER — LIDOCAINE HYDROCHLORIDE 10 MG/ML
4 INJECTION, SOLUTION EPIDURAL; INFILTRATION; INTRACAUDAL; PERINEURAL
Status: COMPLETED | OUTPATIENT
Start: 2025-05-08 | End: 2025-05-08

## 2025-05-08 RX ORDER — TRIAMCINOLONE ACETONIDE 40 MG/ML
40 INJECTION, SUSPENSION INTRA-ARTICULAR; INTRAMUSCULAR
Status: COMPLETED | OUTPATIENT
Start: 2025-05-08 | End: 2025-05-08

## 2025-05-08 RX ADMIN — LIDOCAINE HYDROCHLORIDE 4 ML: 10 INJECTION, SOLUTION EPIDURAL; INFILTRATION; INTRACAUDAL; PERINEURAL at 11:15

## 2025-05-08 RX ADMIN — TRIAMCINOLONE ACETONIDE 40 MG: 40 INJECTION, SUSPENSION INTRA-ARTICULAR; INTRAMUSCULAR at 11:15

## 2025-05-08 NOTE — LETTER
2025      Laxmi Ya  [address] [place] [id number]      Dear Colleague,    Thank you for referring your patient, Laxmi Ya, to the [facility]. Please see a copy of my visit note below.    Hand / Upper Extremity Injection/Arthrocentesis    Date/Time: 2025 11:15 AM    Performed by: Twila Petit MD  Authorized by: Twila Petit MD    Indications:  Pain  Needle Size:  26 G  Guidance: fluoroscopy    Condition: osteoarthritis    Location:  Thumb   Location comment:  Bilateral thumb STT joint    Medications:  40 mg triamcinolone 40 MG/ML; 4 mL lidocaine (PF) 1 %  Outcome:  Tolerated well, no immediate complications  Procedure discussed: discussed risks, benefits, and alternatives    Consent Given by:  Patient  Timeout: timeout called immediately prior to procedure    Prep: patient was prepped and draped in usual sterile fashion        [facility] [address] [id number] [phone]  Dept: [phone]  ______________________________________________________________________________    Patient: Laxmi Ya   : 1975   MRN: [id number]   May 8, 2025    INVASIVE PROCEDURE SAFETY CHECKLIST    Date: 2025   Procedure:Bilateral thumb STT joint steroid injections  Patient Name: Laxmi Ya  MRN: [id number]  YOB: 1975    Action: Complete sections as appropriate. Any discrepancy results in a HARD COPY until resolved.     PRE PROCEDURE:  Patient ID verified with 2 identifiers (name and  or MRN): Yes  Procedure and site verified with patient/designee (when able): Yes  Accurate consent documentation in medical record: Yes  H&P (or appropriate assessment) documented in medical record: Yes  H&P must be up to 20 days prior to procedure and updates within 24 hours of procedure as applicable: Yes  Relevant diagnostic and radiology test results  appropriately labeled and displayed as applicable: NA  Procedure site(s) marked with provider initials: NA    TIMEOUT:  Time-Out performed immediately prior to starting procedure, including verbal and active participation of all team members addressing the following:Yes  * Correct patient identify  * Confirmed that the correct side and site are marked  * An accurate procedure consent form  * Agreement on the procedure to be done  * Correct patient position  * Relevant images and results are properly labeled and appropriately displayed  * The need to administer antibiotics or fluids for irrigation purposes during the procedure as applicable   * Safety precautions based on patient history or medication use    DURING PROCEDURE: Verification of correct person, site, and procedures any time the responsibility for care of the patient is transferred to another member of the care team.       The following medications were given:         Prior to injection, verified patient identity using patient's name and date of birth.  Due to injection administration, patient instructed to remain in clinic for 15 minutes  afterwards, and to report any adverse reaction to me immediately.    Joint injection was performed.    Medication Name: Kenalog 40 mg/mL 20 mg per joint NDC [address]-1  Drug Amount Wasted:  None.  Vial/Syringe: Single dose vial  Expiration Date:  11/1/26    Medication Name Lidocaine 1% NDC 26792-942-575    Scribed by DUY RESENDEZ, ATC for Dr. Petit on May 8, 2025 at 11:25am based on the provider's statements to me.     DUY RESENDEZ, ATC         Diagnosis: bilateral thumb CMC/STT arthritis, bilateral carpal tunnel syndrome, bilateral cubital tunnel syndrome.  Patient comes in today requesting bilateral thumb STT cortisone injections.  She reports that her pain has been severe bilaterally.   She has had a recent flare in all of her joints.  She is trying to get follow-up with her rheumatologist and will be seeing 1 in  June 2025.       Treatments:   May 8, 2022 5 bilateral thumb STT injections 20 mg each Trino Aguilar  April 24, 2025 bilateral thumb CMC injections 20 mg Trino Aguilar  April 10, 2025 bilateral carpal tunnel injections 20 mg Trino Aguilar  January 23, 2025 bilateral thumb STT (20 mg) and CMC (20 mg) injections (Trino Aguilar)  10/25/2024 bilateral thumb STT injection (20 mg) he bilateral carpal tunnel injections (20 mg) (Trino Aguilar)  10/9/2024: bilateral thumb CMC steroid injection, cubital tunnel steroid injections (Vakshori_  8/15/2024: bilateral STT steroid injection (Dr. Petit)  6/6/2024: bilateral carpal tunnel steroid injection (Dr. Petit)  5/30/2024: bilateral STT steroid injection (Dr. Petit)  5/21/2024: bilateral cubital tunnel steroid injections  12/27/2023: bilateral carpal tunnel steroid injection  12/21/2023: bilateral STT steroid injection (Dr. Petit)  11/1/2023: bilateral carpal tunnel steroid injection  10/18/2023: Bilateral thumb CMC steroid injection  9/20/2023: bilateral STT steroid injection  5/4/2023: bilateral carpal tunnel steroid injection (Dr. Petit)  2/16/2023: bilateral STT steroid injection (Dr. Petit)  12/13/2022: bilateral carpal tunnel steroid injection (Dr. Sullivan)  10/27/2022: bilateral STT steroid injections (Dr. Petit)  9/13/2022: bilateral carpal tunnel steroid injection (Dr. Sullivan)  6/30/2022: bilateral STT steroid injections (Dr. Petit)  Bilateral carpal tunnel steroid injections (outside)         PROCEDURE:  PROCEDURE:      PROCEDURE:  After written consent, verifying site and side, a sterile Chlora- prep was performed for the injection site.  C-arm guidance was used for difficult placement of a 25-gauge needle into the right thumb STT joint with 1% Lidocaine.  1/2 mL of Kenalog (20 mg) and 1 mL of lidocaine was injected under fluoroscopic guidance with good relief of pain.Identical procedure was carried out on the left thumb STT joint. Patient tolerated the  procedure well.  No complications.  Follow up instructions were given.      We discussed that if the rheumatologist can find an intravenous injectable or oral medication that provides effective anti-inflammatory activity against all of the joints which are problematic for her, this would be ideal as the injections that we have been performing would be unnecessary.  She will keep us updated as her rheumatology workup continues.    Twila Petit MD   Hand and Upper Extremity Specialist  [facility]        Again, thank you for allowing me to participate in the care of your patient.        Sincerely,        Twila Petit MD    Electronically signed

## 2025-05-08 NOTE — PROGRESS NOTES
Diagnosis: bilateral thumb CMC/STT arthritis, bilateral carpal tunnel syndrome, bilateral cubital tunnel syndrome.  Patient comes in today requesting bilateral thumb STT cortisone injections.  She reports that her pain has been severe bilaterally.   She has had a recent flare in all of her joints.  She is trying to get follow-up with her rheumatologist and will be seeing 1 in June 2025.       Treatments:   May 8, 2022 5 bilateral thumb STT injections 20 mg each Trino Aguilar  April 24, 2025 bilateral thumb CMC injections 20 mg Trino Aguilar  April 10, 2025 bilateral carpal tunnel injections 20 mg Trino Aguilar  January 23, 2025 bilateral thumb STT (20 mg) and CMC (20 mg) injections (Trino Aguilar)  10/25/2024 bilateral thumb STT injection (20 mg) he bilateral carpal tunnel injections (20 mg) (Trino Aguilar)  10/9/2024: bilateral thumb CMC steroid injection, cubital tunnel steroid injections (Vakshori_  8/15/2024: bilateral STT steroid injection (Dr. Petit)  6/6/2024: bilateral carpal tunnel steroid injection (Dr. Petit)  5/30/2024: bilateral STT steroid injection (Dr. Petit)  5/21/2024: bilateral cubital tunnel steroid injections  12/27/2023: bilateral carpal tunnel steroid injection  12/21/2023: bilateral STT steroid injection (Dr. Petit)  11/1/2023: bilateral carpal tunnel steroid injection  10/18/2023: Bilateral thumb CMC steroid injection  9/20/2023: bilateral STT steroid injection  5/4/2023: bilateral carpal tunnel steroid injection (Dr. Petit)  2/16/2023: bilateral STT steroid injection (Dr. Petit)  12/13/2022: bilateral carpal tunnel steroid injection (Dr. Sullivan)  10/27/2022: bilateral STT steroid injections (Dr. Petit)  9/13/2022: bilateral carpal tunnel steroid injection (Dr. Sullivan)  6/30/2022: bilateral STT steroid injections (Dr. Petit)  Bilateral carpal tunnel steroid injections (outside)         PROCEDURE:  PROCEDURE:      PROCEDURE:  After written consent, verifying site and side, a  sterile Chlora- prep was performed for the injection site.  C-arm guidance was used for difficult placement of a 25-gauge needle into the right thumb STT joint with 1% Lidocaine.  1/2 mL of Kenalog (20 mg) and 1 mL of lidocaine was injected under fluoroscopic guidance with good relief of pain.Identical procedure was carried out on the left thumb STT joint. Patient tolerated the procedure well.  No complications.  Follow up instructions were given.      We discussed that if the rheumatologist can find an intravenous injectable or oral medication that provides effective anti-inflammatory activity against all of the joints which are problematic for her, this would be ideal as the injections that we have been performing would be unnecessary.  She will keep us updated as her rheumatology workup continues.    Twila Petit MD   Hand and Upper Extremity Specialist  [facility]

## 2025-05-08 NOTE — PROGRESS NOTES
05/07/25 0500   Appointment Info   Signing clinician's name / credentials Sofi Lemons PT, OCS   Total/Authorized Visits orders rec'd from Kimberlee Vasquez PA-C on 02/05/25   Visits Used 56   Medical Diagnosis Lower back pain with mobility deficits, lower back pain w/ referred pain, neck pain w/ headache, neck pain w/ mobility deficits, pelvic pain   PT Tx Diagnosis Lower back pain with mobility deficits, lower back pain w/ referred pain, neck pain w/ headache, neck pain w/ mobility deficits   Other pertinent information autoimmune diseases, hx lumbar discectomy   Progress Note/Certification   Start of Care Date 03/08/23   Onset of illness/injury or Date of Surgery 02/03/23   Therapy Frequency 2x month   Predicted Duration 12 weeks   Certification date from 05/13/25   Certification date to 08/10/25   Progress Note Due Date 04/15/25   Progress Note Completed Date 02/14/25   GOALS   PT Goals 3   PT Goal 1   Goal Identifier ambulation   Goal Description Minutes patient will be able to  walk 20-30 minutes   Rationale to maximize safety and independence with performance of ADLs and functional tasks;to maximize safety and independence within the home;to maximize safety and independence within the community;to maximize safety and independence with transportation;to maximize safety and independence with self cares   Goal Progress slightly better , limited to 10 minutes   Target Date 08/10/25   PT Goal 2   Goal Identifier headaches   Goal Description HA 1x week or less with intensity 2/10 or less   Rationale to maximize safety and independence with performance of ADLs and functional tasks;to maximize safety and independence within the home;to maximize safety and independence within the community;to maximize safety and independence with transportation;to maximize safety and independence with self cares   Goal Progress current HA 6/10   Target Date 08/10/25   PT Goal 3   Goal Identifier pelvic pain   Goal Description  with walking/intimacy/urinary urgency 2/10 or less   Rationale to maximize safety and independence with performance of ADLs and functional tasks;to maximize safety and independence within the home;to maximize safety and independence with transportation;to maximize safety and independence with self cares;to maximize safety and independence within the community   Goal Progress currently 4/10   Target Date 08/10/25   Subjective Report   Subjective Report Had abdominal CT scan which showed fluid under skin so is holding a lot of retention. Wondering if do to RA.  A lot of swelling in lower pelvic as well. [place] in June 23rd for RA med change. Feels labia rubbing. Also increased thoracic pain, R>L and also neck pain/tightness. Bladder control pretty good, occasional leakage with urge but not all the time. Continues to have B groin pain in with walking. Having massage therapist  fairly regularly. Getting sacral shots next week.  Lymphedema  starting  in [place] in June 3rd.   Objective Measures   Objective Measures Objective Measure 1;Objective Measure 3;Objective Measure 4   Objective Measure 1   Objective Measure Significant swelling in suprapubic area, R>L and firm tissue on R.   Details B labia majora swelling   Objective Measure 2   Objective Measure R>L LA/OI TTP and tightness noted today   Details Kegel strength 3, good relaxation   Objective Measure 3   Objective Measure CROM: flexion min loss, extension mod loss, B rotation min loss   Objective Measure 4   Objective Measure hypomobile T4-10   Mechanical Traction   Mechanical Traction, Minutes (77850) 15   Traction -Type Cervical   Position supine   Type Intermittent   Duration 15 min   Max poundage 26   Min poundage 0   Hold Time 60   Rest Time 10   Steps ramping up 1   Steps ramping down 1   Speed 100%   Patient Response/Progress less thoracic pain   Treatment Interventions (PT)   Interventions Manual Therapy   Therapeutic Procedure/Exercise   Therapeutic Procedures  Ther Proc 2;Ther Proc 3;Ther Proc 4   Ther Proc 1 glute myofascial full arc   Ther Proc 1 - Details x20   Ther Proc 2 - Details kegels 3 sec x 3 rest, 10-15 x daily   Ther Proc 3 bent knee fall out x 10 each leg   Ther Proc 3 - Details roll in's hooklying gentle 5 sec x 10, 2 x day   Ther Proc 4 diaphragmatic breathing 5 min, 2 xday   Manual Therapy   Manual Therapy: Mobilization, MFR, MLD, friction massage minutes (22093) 30   Manual Therapy Manual Therapy 2   Manual Therapy 1 supine MFR to internal PF (LA/OI L>R)   Manual Therapy 2 supine to B labia/pubic area (for swelling/fluid movement)   Skilled Intervention to decrease swelling in pelvis and decrease PF tone   Patient Response/Progress feels better with walking/less pelvic pain post treatment   Education   Learner/Method Patient;No Barriers to Learning   Plan   Home program PTRX   Plan for next session check on sacral shots, review HEP   Total Session Time   Timed Code Treatment Minutes 30   Total Treatment Time (sum of timed and untimed services) 45         [facility]                                                                                   OUTPATIENT PHYSICAL THERAPY    PLAN OF TREATMENT FOR OUTPATIENT REHABILITATION   Patient's Last Name, First Name, Laxmi Coleman YOB: 1975   Provider's Name   [facility]   Medical Record No.  [id number]     Onset Date: 02/03/23  Start of Care Date: 03/08/23     Medical Diagnosis:  Lower back pain with mobility deficits, lower back pain w/ referred pain, neck pain w/ headache, neck pain w/ mobility deficits, pelvic pain      PT Treatment Diagnosis:  Lower back pain with mobility deficits, lower back pain w/ referred pain, neck pain w/ headache, neck pain w/ mobility deficits Plan of Treatment  Frequency/Duration: 2x month/ 12 weeks    Certification date from 05/13/25 to 08/10/25         See note for plan of treatment details and  functional goals     Sofi Lemons, PT,OCS                         I CERTIFY THE NEED FOR THESE SERVICES FURNISHED UNDER        THIS PLAN OF TREATMENT AND WHILE UNDER MY CARE .             Physician Signature               Date    X_____________________________________________________                  Referring Provider:  Kimberlee Vasquez    Initial Assessment  See Epic Evaluation- Start of Care Date: 03/08/23

## 2025-05-08 NOTE — NURSING NOTE
Reason For Visit:   Chief Complaint   Patient presents with    RECHECK     Follow-up bilateral thumb STT joint repeat injection       Primary MD: Amol Juares  Ref. MD: Katja    Age: 50 year old    ?  No      There were no vitals taken for this visit.      Pain Assessment  Patient Currently in Pain: Yes  0-10 Pain Scale: 10  Primary Pain Location: Finger (Comment which one) (Both thumb)  Pain Descriptors: Constant, Aching, Sharp    Hand Dominance Evaluation  Hand Dominance: Right          QuickDASH Assessment      5/8/2025    10:18 AM   QuickDASH Main   1. Open a tight or new jar Unable   2. Do heavy household chores (e.g., wash walls, floors) Unable   3. Carry a shopping bag or briefcase Unable   4. Wash your back Unable   5. Use a knife to cut food Unable   6. Recreational activities in which you take some force or impact through your arm, shoulder or hand (e.g., golf, hammering, tennis, etc.) Unable   7. During the past week, to what extent has your arm, shoulder or hand problem interfered with your normal social activities with family, friends, neighbours or groups Extremely   8. During the past week, were you limited in your work or other regular daily activities as a result of your arm, shoulder or hand problem Unable   9. Arm, shoulder or hand pain Extreme   10.Tingling (pins and needles) in your arm,shoulder or hand Extreme   11. During the past week, how much difficulty have you had sleeping because of the pain in your arm, shoulder or hand So much difficulty that I can't sleep   Quickdash Ability Score 100          Current Outpatient Medications   Medication Sig Dispense Refill    albuterol (PROAIR HFA/PROVENTIL HFA/VENTOLIN HFA) 108 (90 Base) MCG/ACT inhaler Inhale 2 puffs into the lungs every 6 hours as needed for shortness of breath, wheezing or cough      Artificial Tear Solution (SOOTHE XP) SOLN as needed      atenolol (TENORMIN) 50 MG tablet Take 50 mg by mouth daily      azelastine 137  MCG/SPRAY SOLN USE 2 SPRAYS IN EACH NOSTRIL 2 TIMES DAILY FOR 1 MONTH      benzoyl peroxide 5 % external liquid Use daily as directed. Preferred bdrand: Medpura 236 mL 11    blood glucose (NO BRAND SPECIFIED) test strip Use to test blood sugar 2 times daily or as directed. 100 strip 1    blood glucose monitoring (NO BRAND SPECIFIED) meter device kit Use to test blood sugar 2 times daily or as directed. 1 kit 0    ciclopirox (LOPROX) 0.77 % cream Apply topically 2 times daily To affected toenails. 90 g 5    cyclobenzaprine (FLEXERIL) 5 MG tablet Take 5 mg by mouth.      dexAMETHasone (DECADRON) 0.1 % ophthalmic solution       diclofenac (VOLTAREN) 1 % topical gel Apply topically 4 times daily as needed      doxycycline hyclate (VIBRAMYCIN) 100 MG capsule Take 100 mg by mouth 2 times daily.      dupilumab (DUPIXENT) 300 MG/2ML prefilled pen Inject 2 mLs (300 mg) subcutaneously every 14 days. 4 mL 5    econazole nitrate 1 % external cream Apply topically daily. To affected toenails. 85 g 5    famciclovir (FAMVIR) 500 MG tablet Take 1 tablet (500 mg) by mouth 2 times daily. 180 tablet 3    fexofenadine (ALLEGRA) 180 MG tablet Take 1 tablet (180 mg) by mouth daily 90 tablet 2    fidaxomicin (DIFICID) 200 MG tablet Take 200 mg by mouth as needed      fluconazole (DIFLUCAN) 150 MG tablet Take 150 mg by mouth.      Fluticasone-Umeclidin-Vilanterol (TRELEGY ELLIPTA) 200-62.5-25 MCG/ACT oral inhaler Inhale 1 puff into the lungs daily. 90 each 1    insulin glargine (LANTUS PEN) 100 UNIT/ML pen Inject 50 Units subcutaneously daily      insulin lispro (HUMALOG KWIKPEN) 100 UNIT/ML (1 unit dial) KWIKPEN Inject 35 Units subcutaneously 3 times daily (before meals) Plus 1:25 correction scale if BG >150      Lancets (ONETOUCH DELICA PLUS QIFQNC76Z) MISC CHECK BLOOD GLUCOSE 3 TIMES DAILY, ADJUSTING MEDICATION, DX CODE E 11.9, ON INSULIN      lidocaine (LIDODERM) 5 % patch as needed   1    LORazepam (ATIVAN) 0.5 MG tablet Take 0.25 mg  by mouth every 8 hours as needed      losartan (COZAAR) 100 MG tablet Take 100 mg by mouth daily      mometasone (NASONEX) 50 MCG/ACT nasal spray Spray 2 sprays into both nostrils daily. 51 g 1    montelukast (SINGULAIR) 10 MG tablet Take 10 mg by mouth daily.      multivitamin w/minerals (THERA-VIT-M) tablet Take 1 tablet by mouth daily      naloxone (NARCAN) 4 MG/0.1ML nasal spray       naproxen (NAPROSYN) 375 MG tablet Take 375 mg by mouth.      norethindrone (MICRONOR) 0.35 MG tablet Take 1 tablet (0.35 mg) by mouth daily 90 tablet 3    nystatin (MYCOSTATIN) 786870 UNIT/ML suspension       olopatadine (PATADAY) 0.7 % ophthalmic solution Apply 1 drop to eye daily PRN      omeprazole (PRILOSEC) 40 MG DR capsule TAKE 1 CAPSULE (40 MG) BY MOUTH TWO TIMES A DAY. TAKE 1 HOUR BEFORE A MEAL.      oxyCODONE IR (ROXICODONE) 10 MG tablet Take 10 mg by mouth      predniSONE (DELTASONE) 5 MG tablet TAKE 1 TABLET BY MOUTH EVERY DAY 90 tablet 1    rifaximin (XIFAXAN) 550 MG TABS tablet Take 200 mg by mouth 3 times daily Take for 10 days, has on had as needed for small bowel bacteria overgrowh      rosuvastatin (CRESTOR) 5 MG tablet Take 2 tablets by mouth daily      spironolactone-HCTZ (ALDACTAZIDE) 25-25 MG tablet Take 1 tablet by mouth daily      terbinafine (LAMISIL) 250 MG tablet Take 1 tablet by mouth daily.      tirzepatide (MOUNJARO) 2.5 MG/0.5ML pen Inject 2.5 mg subcutaneously every 7 days.      tobramycin-dexAMETHasone (TOBRADEX) 0.3-0.1 % ophthalmic suspension PLACE 1 DROP INTO BOTH EYES TWO TIMES A DAY.      tocilizumab (ACTEMRA) 400 MG/20ML Inject 800 mg into the vein every 28 days         Allergies   Allergen Reactions    Polyethylene Glycol Rash    Codeine      Other reaction(s): Gastrointestinal, GI intolerance, Vomiting    Vomiting    Hydrocodone Nausea and Vomiting    Morphine Nausea and Vomiting    Sulfasalazine      Other reaction(s): GI intolerance    Has tried and had nausa.    Tramadol Nausea and  Vomiting     Other reaction(s): Gastrointestinal, Other (see comments)    Nausea and vomiting     unknown    Acetaminophen Nausea     Nausea and vomiting    Gluten Meal Other (See Comments) and Rash     Other reaction(s): Gastrointestinal, GI intolerance  Dizziness, tired, rash, stomach cramps, thrush, mouth sores  Dizziness, tired, rash, stomach cramps, thrush, mouth sores      Hydroxyzine GI Disturbance and Nausea     nausea, dry mouth    Propylene Glycol Dizziness, Nausea and Vomiting, Nausea and Rash       DUY RESENDEZ, ATC

## 2025-05-08 NOTE — PROGRESS NOTES
Hand / Upper Extremity Injection/Arthrocentesis    Date/Time: 2025 11:15 AM    Performed by: Twila Petit MD  Authorized by: Twila Petit MD    Indications:  Pain  Needle Size:  26 G  Guidance: fluoroscopy    Condition: osteoarthritis    Location:  Thumb   Location comment:  Bilateral thumb STT joint    Medications:  40 mg triamcinolone 40 MG/ML; 4 mL lidocaine (PF) 1 %  Outcome:  Tolerated well, no immediate complications  Procedure discussed: discussed risks, benefits, and alternatives    Consent Given by:  Patient  Timeout: timeout called immediately prior to procedure    Prep: patient was prepped and draped in usual sterile fashion        [facility] [address]  [address] [facility] [id number] [phone]  Dept: [phone]  ______________________________________________________________________________    Patient: Laxmi Ya   : 1975   MRN: [id number]   May 8, 2025    INVASIVE PROCEDURE SAFETY CHECKLIST    Date: 2025   Procedure:Bilateral thumb STT joint steroid injections  Patient Name: Laxmi Ya  MRN: [id number]  YOB: 1975    Action: Complete sections as appropriate. Any discrepancy results in a HARD COPY until resolved.     PRE PROCEDURE:  Patient ID verified with 2 identifiers (name and  or MRN): Yes  Procedure and site verified with patient/designee (when able): Yes  Accurate consent documentation in medical record: Yes  H&P (or appropriate assessment) documented in medical record: Yes  H&P must be up to 20 days prior to procedure and updates within 24 hours of procedure as applicable: Yes  Relevant diagnostic and radiology test results appropriately labeled and displayed as applicable: NA  Procedure site(s) marked with provider initials: NA    TIMEOUT:  Time-Out performed immediately prior to starting procedure, including verbal and active participation of all team members  addressing the following:Yes  * Correct patient identify  * Confirmed that the correct side and site are marked  * An accurate procedure consent form  * Agreement on the procedure to be done  * Correct patient position  * Relevant images and results are properly labeled and appropriately displayed  * The need to administer antibiotics or fluids for irrigation purposes during the procedure as applicable   * Safety precautions based on patient history or medication use    DURING PROCEDURE: Verification of correct person, site, and procedures any time the responsibility for care of the patient is transferred to another member of the care team.       The following medications were given:         Prior to injection, verified patient identity using patient's name and date of birth.  Due to injection administration, patient instructed to remain in clinic for 15 minutes  afterwards, and to report any adverse reaction to me immediately.    Joint injection was performed.    Medication Name: Kenalog 40 mg/mL 20 mg per joint NDC [address]-9  Drug Amount Wasted:  None.  Vial/Syringe: Single dose vial  Expiration Date:  11/1/26    Medication Name Lidocaine 1% NDC 83726-638-574    Scribed by DUY RESENDEZ, ATC for Dr. Petit on May 8, 2025 at 11:25am based on the provider's statements to me.     DUY RESENDEZ, ATC

## 2025-05-14 ENCOUNTER — THERAPY VISIT (OUTPATIENT)
Dept: PHYSICAL THERAPY | Facility: CLINIC | Age: 50
End: 2025-05-14
Payer: MEDICARE

## 2025-05-14 DIAGNOSIS — M54.50 CHRONIC BILATERAL LOW BACK PAIN WITHOUT SCIATICA: ICD-10-CM

## 2025-05-14 DIAGNOSIS — M54.2 NECK PAIN: ICD-10-CM

## 2025-05-14 DIAGNOSIS — R10.2 PELVIC PAIN IN FEMALE: Primary | ICD-10-CM

## 2025-05-14 DIAGNOSIS — G89.29 CHRONIC BILATERAL LOW BACK PAIN WITHOUT SCIATICA: ICD-10-CM

## 2025-05-14 PROCEDURE — 97012 MECHANICAL TRACTION THERAPY: CPT | Mod: GP | Performed by: PHYSICAL THERAPIST

## 2025-05-14 PROCEDURE — 97140 MANUAL THERAPY 1/> REGIONS: CPT | Mod: GP | Performed by: PHYSICAL THERAPIST

## 2025-05-15 NOTE — PROGRESS NOTES
MEDICATION MANAGEMENT NOTE    Name: Bala Noriega      : 2014      MRN: [id number]  Encounter Provider: ESPERANZA Linares  Encounter Date: 5/15/2025   Encounter department: [facility] [place]    Insurance: [facility]or: [facility] [place] / Plan: [facility] MEDICAID / Product Type: Medicaid HMO /      Reason for Visit: No chief complaint on file.  :  Assessment & Plan  ADHD (attention deficit hyperactivity disorder), combined type  Ronnie continues to exhibit mild ADHD symptoms but they appear to be managed well with Adderall XR 10 mg daily and Adderall IR 5 mg daily in the afternoon. We will follow up in 3 months.       Separation anxiety disorder  Ronnie has not exhibited separation anxiety recently and has been managing his symptoms well. We will follow up in 3 months.        Autism spectrum disorder  Ronnie continues to exhibit mild to moderate ASD characteristics but has been managing them well. Ronnie has been without Risperdal for over a month now and has not had any aggressive episodes. We will discontinue the Risperdal at this time. We will follow up in 3 months.            Treatment Recommendations:    Educated about diagnosis and treatment modalities. Verbalizes understanding and agreement with the treatment plan.  Discussed self monitoring of symptoms, and symptom monitoring tools.  Discussed medications and if treatment adjustment was needed or desired.  Recommend follow up clerical staff will assist in scheduling follow up in approximately 3 months  Aware of need to follow up with family physician for medical issues  Aware of 24 hour and weekend coverage for urgent situations accessed by calling [facility] main practice number  I am scheduling this patient out for greater than 3 months: No    Medication Management:  Continue Adderall XR 10 mg daily for ADHD  Continue Adderall 5 mg daily in the afternoon for ADHD  Continue  Virtual Visit Details    Type of service:  Video Visit   Video Start Time: 11:24 AM  Video End Time: 1148    Originating Location (pt. Location): Home    Distant Location (provider location):  On-site  Platform used for Video Visit: [facility]     Rheumatology Clinic Visit  [facility]  Oswaldo Lemus M.D.     Laxmi Ya MRN# [id number]   YOB: 1975 Age: 48 year old   Date of Visit:06/30/2023    Primary care provider: Artur Mrash          Assessment and Plan:     Seronegative inflammatory (rheumatoid) arthritis:  Chronic small joint predominant stiffness and pain have improved modestly since substitution of Actemra IV infusions for Orencia after the last visit in June 2022.  Video exam shows no gross swelling at MCP, wrists, or fingers.    Data: Lab work on 6-2023 showed comprehensive metabolic panel normal except for < 2X ULN elevation AST and ALT; cholesterol high at 256; CBC normal; urinalysis clear. In 2021, DIEGO/FAVIOLA panel/CCP/RF/ ANCA all negative.      Impression:  1.  Seronegative inflammatory arthritis remains significantly improved after starting Actemra intravenous infusions in August 2022.  Improved joints are focused primarily in the small joints of the hands and feet. neck pain and rib cage pain persist, but I do not think these pain generators are due to the same systemic pathologic process as inflammatory arthritis in the hands and feet. I recommend renewing CRP and sedimentation rate with next blood draw, and monitoring hyperlipidemia due to the lipid elevating effects of Actemra.    2. Carpal tunnel syndrome; stable  3. Toe bullae, s/p unroofing, culture: plan complete course of antibiotics per primary care.    Plan:  1. Continue actemra IV. Plan 750 mg IV every 28 days.  2. For flaring pain despite Actemra, use Medrol 16 mg (4 tabs) with taper (3 tablets daily for 1 week, then 2 tablets daily for 1 week) over several weeks for active arthritis flares.  I recommend  Risperdal 1 mg daily in the morning for mood  Continue Risperdal 0.5 mg daily HS for mood.    Medications Risks/Benefits:      Risks, Benefits And Possible Side Effects Of Medications:    Risks, benefits, and possible side effects of medications explained to Bala and he (or legal representative) verbalizes understanding and agreement for treatment.    Controlled Medication Discussion:     Bala has been filling controlled prescriptions on time as prescribed according to [place] Prescription Drug Monitoring Program.  Discussed with Bala the risks of impairment of ability to drive and potential for abuse and addiction related to treatment with stimulant medications. He understands risk of treatment with stimulant medications, agrees to not drive if feels impaired and agrees to take medications as prescribed.  Bala is using medication appropriately.      History of Present Illness     CC: Bala presents today for follow up on ADHD, ASD, and separation anxiety      ADHD: Bala reports school is going well. He just finished state standaradized testing, which he did well with. He is now sitting with the regular classes for specials, lunch and recess, which the teacher reports he is doing well. The school is now suggesting that he can enter a regular class with emotional/behavioral support or he can join a class that involves the same level of restrictions/intensity but does not involve a doctor. Dad selected the latter option. Ronnie is doing better with not getting emotional at school.    ASD: Ronnie has been off Risperdal for a little over 1 month due to difficulty obtaining the medication due to issues with insurance, and has been doing well, no aggression. He does become frustrated at times because he doesn't want to go to bed or go to school at times, but does not become physically aggressive. Ronnie has been sleeping and eating well. No specific plans scheduled for the summer, but plan to take things day  avoiding use of Medrol burst and taper greater than once out of every 2 months.  I do not recommend chronic low-dose corticosteroids.  3. Follow-up with Dr. Petit regarding thumb and carpal tunnel syndrome symptoms  4. Follow-up with Dr. Thompson regarding bowel disease    RTC 6 mos    Oswaldo Lemus M.D.  Staff Rheumatologist, [facility]  Pager [phone]           History of Present Illness:   Laxmi Ya with history of COLON, hypertension, diagnosis of seronegative rheumatoid arthritis with tenosynovitis presents for follow-up.  She was last seen in , when actemra was continued at 750 mg/m2 every 28 days for inflammatory arthritis.    Background; Seronegative rheumatoid arthritis with predominant tenosynovitis:   In 2015, rheumatoid factor, cyclic citrullinated peptide antibodies of extractable nuclear antigen panel, and anti-DNA antibodies were negative. Patient was initially treated for undifferentiated mixed connective tissue disease with Cellcept/Plaquenil for a prior Hx of weakly positive DIEGO. Then she established care with Dr. Saleh at [facility] in 2018, initially disease thought to be non-inflammatory, later diagnosed with inflammatory arthritis, rheumatoid syndrome and then seronegative RA. Patient was initially on Humira for a year starting in 2018, then discontinued due to failure to improve her joint symptoms, received a trial of Embrel which led to infections including sinus/ UTIs, then patient was on Remicade for 2 yrs but it did not work for all the joints like her back and she started having siginficant GI issues like diarrhea and abdominal pain, but did not necessarily think that it failed completely to releive her symptoms. Patient was started on Symponi in Oct 2021, but developed serious reaction to it reporting an area of severe erythema in her intestinal area, along with abdominal pain/ nausea/vomiting so she was switched to Orencia in Nov 2021, received one infusion but then  by day.     Separation Anxiety:Ronnie has not exhibited any separation anxiety recently.     Med Compliance: yes    Since our last visit, overall symptoms have been stable.       HPI ROS:     Medication Side Effects: Denied  Depression: Denied  Anxiety: Denied  Safety concerns (SI, HI, others): Ronnie denied suicidal and homicidal ideation, plan, or intent. He denied thoughts to harm himself and denied psychosis symptoms.   Sleep: Adequate  Energy: Adequate  Appetite: Adequate  Weight Change: N/A    Bala denies any side effects from medications unless noted above.    Review Of Systems: A review of systems is obtained and is negative except for the pertinent positives listed in HPI/Subjective above.      Current Rating Scores:     None completed today.    Areas of Improvement: reviewed in HPI/Subjective Section and reviewed in Assessment and Plan Section      No past medical history on file.  No past surgical history on file.  Allergies: Allergies[1]    Current Outpatient Medications   Medication Instructions    amphetamine-dextroamphetamine (ADDERALL XR, 10MG,) 10 MG 24 hr capsule 10 mg, Oral, Daily    amphetamine-dextroamphetamine (ADDERALL) 5 MG tablet 5 mg, Oral, Daily after lunch, at 3 pm    cetirizine (ZYRTEC) 10 mg, Oral, Daily    Melatonin 5 mg, Daily at bedtime    Pediatric Multivitamins-Fl (Multivitamin/Fluoride) 0.5 MG CHEW Chew 1 tab po once daily    risperiDONE (RISPERDAL) 1 mg, Oral, Every morning    risperiDONE (RISPERDAL) 0.5 mg, Oral, Daily at bedtime        Substance Abuse History:    Tobacco, Alcohol and Drug Use History     Tobacco Use    Smoking status: Never     Passive exposure: Current    Smokeless tobacco: Never   Substance Use Topics    Alcohol use: Not on file    Drug use: Not on file          Social History:    Social History     Socioeconomic History    Marital status: Single     Spouse name: Not on file    Number of children: Not on file    Years of education: Not on file    Highest  developed serious sinus infections/ Covid in Jan 2022. Orencia infusions restarted in 2-2022, held in 6-2022. Actemra started 7-2022, continued through November 2022, dose increased to .    Interval history June 30, 2023    She had I/D of a blister on her R great toe that had started 3-4 weeks ago. She was started on abx, but the toe remains throbbing and painful. It awakens her at night. She is taking alternating ibuprofen and acetaminophen, still having significant pain. Also using cliobetasol on blisters on several toes.    Since increasing actemra dose in 4-2023, she notes big improvement in control of diffuse joint pain. Former dosing cycle symptoms are also reduced now. She has a little more joint pain for 1 week before scheduled actemra.  She has required much less frequent medrol courses since April 2023.  Still has thumb base pain; still gettnig intermittent injections from Dr. Petit. She has impression that surgical fusion may be needed. She is using wrist/thumb braces made by occupational therapy.  She still has chronic hip pain; she is working with a pelvic  weekly; there is modest improvement with low back stretching/manipulation.    Interval history November 25, 2022     Burning pain in hands and fingers is much better over all.   She attributes improvement to actemra infusions, but she still has pain in neck, rib cage daily.   She gets relief with intermittent medrol 2-3 week courses, and has used 3 times since June.  She is dealing with Staph overgrowth. She has marked blepharitis, treating with tobradex. She has been prescribed doxy 100 mg bid  She just had cataract surgerios; now she is treating for constant mattering  Doxy seems to have also helped with sharp abdominal pains asst'd with [facility], in the last 3 months.  She started injections for thumb bases with Dr. Petit in 6-2022. She has been injected q 4 mos in both thumb bases. She still has a strong sense of  education level: Not on file   Occupational History    Not on file   Other Topics Concern    Not on file   Social History Narrative    Not on file        Family Psychiatric History:     Family History   Problem Relation Age of Onset    No Known Problems Mother     No Known Problems Father     Mental illness Neg Hx     Substance Abuse Neg Hx        Medical History Reviewed by provider this encounter:          Objective   There were no vitals taken for this visit.     Mental Status Evaluation:    Appearance age appropriate, casually dressed   Behavior cooperative, calm   Speech normal rate, normal volume, normal pitch, spontaneous   Mood euthymic   Affect normal range and intensity, appropriate   Thought Processes organized, goal directed   Thought Content no overt delusions   Perceptual Disturbances: no auditory hallucinations, no visual hallucinations   Abnormal Thoughts  Risk Potential Suicidal ideation - None at present  Homicidal ideation - None at present  Potential for aggression - Not at present   Orientation oriented to person, place, time/date, and situation   Memory recent and remote memory grossly intact   Consciousness alert and awake   Attention Span Concentration Span attention span and concentration appear shorter than expected for age   Intellect appears to be of average intelligence   Insight intact   Judgement intact   Muscle Strength and  Gait normal muscle strength and normal muscle tone, normal gait and normal balance   Motor activity no abnormal movements   Language no difficulty naming common objects, no difficulty repeating a phrase, no difficulty writing a sentence   Fund of Knowledge adequate knowledge of current events  adequate fund of knowledge regarding past history  adequate fund of knowledge regarding vocabulary        Laboratory Results: I have personally reviewed all pertinent laboratory/tests results    Recent Labs (last 2 months):   No visits with results within 2 Month(s) from  swelling/burning through all the fingers.Former wrist pain has improved with steroid injections by Dr. Sullivan; however she had marked muscle pain/weakness after dexamathosone (not kenalog) injection. More injections are planned in 2 weeks.    Interval history June 16, 2022    She reports improved pain in back/thoracic area and ankles, but she notes little improvement in the hands. Constant achy pain in hands/fingers/wrists persists. Showering is a chore with grasping bath objects.  Recent course of Medrol did not help her hand pain.  She will see Dr. Petit regarding possible restart injections in the hands/thumbs. She had been getting carpal tunnel and thumb injections every 2 months at [place]; last injection was 6 months ago.  Abdominal pain/symptoms improved with use of xifaxin.    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 Saleh. Initially did not think it to be inflammatory. later diagnosed with inflammatory arthritis, rheumatoid syndrome and then seronegative RA. Patient has been disabled Patient was initially on Humira for a year starting in 2018, then discontinued due to failure to improve her joint symptoms after some time, then received a dose of Embrel leading to infections including sinus/ UTIs, then patient was on Remicade for 2 yrs but it did not work for all the joints like her back and she started having siginficant GI issues like diarrhea and abdominal pain, but did not necessarily think that it failed completely to relieve her symptoms. Patient was started on Symponi in Oct 2021, but developed serious reaction to it reporting an an area of severe erythema in her intestinal area, along with abdominal pain/ nausea/vomiting so she was switched to Orencia in Nov 2021,  received one infusion but then developed serious sinus infections/ Covid in Jan 2022 so it was held during that entire time and loading dose restarted in March 2022, has had 2 infusions 2 weeks apart at [place] and will receive her next one on March 15. Patient feels some improvement in her lower extremity joints and her fatigue has been better. She still has significant morning stiffness for more than 1 hour, and pain in hands/wrists/neck for which she gets occipital blocks. When asked about steroids she thinks she may have an adverse reaction to prednisone while in the hospital but that could be due to her SIBO. She has not used much for oral steroids in all these years but has tolerated Solu-medrol okay in the past.  Small joints of hands, hips and her neck bother her the most with stiffness/pain and swelling.  It is very painful in the wrist, feels hands are swollen. Also reports some numbness/ tingling right side of arm. Feet also get painful. Neck gets stiff and painful, with shooting pain going down to thoracic area.  She can not stand for very long without getting pain in both hips. Also notices stiffness. Constantly changing positions helps. Oxycodone also helps sometimes when she has severe pain. She tries not to take it often. She gets steroid injections with Dr. Dodson in wrists and thumbs alternating every 2 mths. She feels Orencia helps with legs and her fatigue, but has not been helping with other joints.  She received one dose in Nov 2021, but then she had sinus infections and infected with COVID in Jan 2021, restarted in March, for every 2 week infusions at [place] to start with, has received 2 infusions so far.   She wakes up at 9am and experiences morning stiffness till 4 pm in the afternoon.  For her ADLs, reports either her boyfriend cooks or she orders food.  She has difficulty buttoning shirts.Her shoulders get painful and weak.   She has low grade fevers consistently and night sweats. Feels lymph  this visit.   Latest known visit with results is:   Appointment on 02/25/2025   Component Date Value    Sodium 02/25/2025 140     Potassium 02/25/2025 4.2     Chloride 02/25/2025 103     CO2 02/25/2025 21     ANION GAP 02/25/2025 16 (H)     BUN 02/25/2025 11     Creatinine 02/25/2025 0.44     Glucose, Fasting 02/25/2025 86     Calcium 02/25/2025 10.1     AST 02/25/2025 36     ALT 02/25/2025 46 (H)     Alkaline Phosphatase 02/25/2025 175     Total Protein 02/25/2025 7.5     Albumin 02/25/2025 4.6     Total Bilirubin 02/25/2025 0.31     Cholesterol 02/25/2025 187 (H)     Triglycerides 02/25/2025 219 (H)     HDL, Direct 02/25/2025 43     LDL Calculated 02/25/2025 100     Non-HDL-Chol (CHOL-HDL) 02/25/2025 144        Suicide/Homicide Risk Assessment:    Risk of Harm to Self:  Based on today's assessment, Bala presents the following risk of harm to self: minimal    Risk of Harm to Others:  Based on today's assessment, Bala presents the following risk of harm to others: minimal    The following interventions are recommended: Continue medication management. Continue psychotherapy. Contracts for safety at present - agrees to call Crisis Intervention Service if feeling unsafe. Contracts for safety at present - agrees to go to ED/Urgent Care if feeling unsafe.    Psychotherapy Provided:     Individual psychotherapy provided: Yes    Counseling was provided during the session today for 16 minutes.  Medications, treatment progress and treatment plan reviewed with Bala.  Medication changes discussed with Bala.  Medication education provided to Bala.  Goals discussed during in session: continue improvement in anxiety, continue improvement in impulse control, continue improvement in ADHD symptoms, and continue improvement in behavior.  Coping skills including deep breaths and taking a break reviewed with Bala.   Importance of medication and treatment compliance reviewed with Bala.  Cognitive therapy was  nodes in neck get swollen sometimes. Orencia has helped with mouth ulcers but she does get them periodically.  She has been losing some hair in last 6 mths.    She has been having trouble lately with her eyes for years, they get infected. Sometimes her vision gets strained. she sees eye doctor for that. She is being prescribed eye drops for it.  She periodically has hard time breathing with exertion with some pains in the central chest area. She does get heartburn. She takes omeprazole. She does complain of skin rashes.  She sees derm for it. They are usually open sores on legs/abdomen. Every 2-3 mths.  Lost 21 lbs in last 3 mths unintentionally/  No appetite changes. Abd pain has gone away since being started on Xifaxin started by GI for her SIBO. No Raynaud's phenomenon. No other acute issues.              Review of Systems:     12-point ROS performed. Negative except for pertinent positives in HPI.          Active Problem List:     Patient Active Problem List    Diagnosis Date Noted     Bilateral carpal tunnel syndrome 06/14/2023     Priority: Medium     Bilateral hand pain 06/14/2023     Priority: Medium     Bilateral thumb pain 06/14/2023     Priority: Medium     Weakness of both hands 06/14/2023     Priority: Medium     Paresthesia of both hands 06/14/2023     Priority: Medium     Diabetes mellitus, type 2 (H) 04/12/2023     Priority: Medium     Morbid obesity (H) 04/12/2023     Priority: Medium     Neck pain 03/09/2023     Priority: Medium     Bilateral low back pain without sciatica 03/09/2023     Priority: Medium     Pelvic pain in female 03/09/2023     Priority: Medium     Seronegative inflammatory arthritis 06/17/2022     Priority: Medium            Past Medical History:     Past Medical History:   Diagnosis Date     Asthma      Diarrhea 08/11/2000    travelers' abstract     Fibromyalgia      GERD (gastroesophageal reflux disease)      Hematuria 08/11/2000    abstract     HTN (hypertension)      COLON  utilized during the session.  Reassurance and supportive therapy provided.     Treatment Plan:    Completed during the session: Yes - with Bala and family. Sent to [facility] for signature due to issues with signature.     Goals: Progress towards Treatment Plan goals - Yes, progressing, as evidenced by subjective findings in HPI/Subjective Section and in Assessment and Plan Section    Depression Follow-up Plan Completed: Not applicable    Note Share:    This note was shared with patient.    Administrative Statements   Administrative Statements   I have spent a total time of 25 minutes in caring for this patient on the day of the visit/encounter including Risks and benefits of tx options, Instructions for management, Patient and family education, Importance of tx compliance, Risk factor reductions, Counseling / Coordination of care, Documenting in the medical record, and Reviewing/placing orders in the medical record (including tests, medications, and/or procedures).    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     Obesity      Pyelonephritis, unspecified 1992    abstract     Rheumatoid arteritis (H)      Seronegative inflammatory arthritis 06/17/2022     Unspecified symptom associated with female genital organs 05/07/1999    chronic pelvic pain abstract     Past Surgical History:   Procedure Laterality Date     CHOLECYSTECTOMY       COLON SURGERY      Left hemicolectomy for diverticulosis     CYSTOSCOPY,+URETEROSCOPY      abstract     ZZHC EXCIS/CURET BENIGN ELBOW LESN  10/26/1987    (R) humerus abstract     1.Seronegative rheumatoid arthritis with predominant tenosynovitis:   In 2015, rheumatoid factor, cyclic citrullinated peptide antibodies of extractable nuclear antigen panel, and anti-DNA antibodies were negative. Patient was initially treated for undifferentiated mixed connective tissue disease with Cellcept/Plaquenil for a prior Hx of weakly positive DIEGO. Then she established care with Dr. Saleh at [facility] in 2018, initially disease thought to be non-inflammatory, later diagnosed with inflammatory arthritis, rheumatoid syndrome and then seronegative RA. Patient was initially on Humira for a year starting in 2018, then discontinued due to failure to improve her joint symptoms, received a trial of Embrel which led to infections including sinus/ UTIs, then patient was on Remicade for 2 yrs but it did not work for all the joints like her back and she started having siginficant GI issues like diarrhea and abdominal pain, but did not necessarily think that it failed completely to releive her symptoms. Patient was started on Symponi in Oct 2021, but developed serious reaction to it reporting an area of severe erythema in her intestinal area, along with abdominal pain/ nausea/vomiting so she was switched to Orencia in Nov 2021, received one infusion but then  developed serious sinus infections/ Covid in Jan 2022. Orencia infusions restarted in 2-2022, held in 6-2022. Actemra started 7-2022:   - Previous Rx:                  Humira- did not have good response              Infliximab - did not have good response              Embrel- had infections following one dose              Golimumab - red swollen plaque, rt abdomen after first infusion and then stopped   Orencia--not effective   Actemra started 2022  She has reacted to sulfasalazine reported to be nausea/abd discomfort. She thinks methotrexate can not be used for fatty liver.   2. Mild persistent asthma, unspecified whether complicated   3. Bacterial overgrowth syndrome with diarrhea   - Diagnosed   4. Nonalcoholic steatohepatitis  5. Hx of clostridium difficile x2  6. Multiple orthopedic surgeries              Carpal tunnel surgery b/l               Discectomy and laminectomy, lumbar and cervical spine  7. Recurrent sinusitis: Myeloperoxidase and proteinase 3 were negative in October 2021.  8. DMII  9. Blepharitis  10: Hx of diverticulosis and recurrent diverticulitis s/p partial colectomy; Bacterial overgrowth syndrome with diarrhea   11. Hx of dermatographism       Social History:     Social History     Socioeconomic History     Marital status: Single     Spouse name: Not on file     Number of children: Not on file     Years of education: Not on file     Highest education level: Not on file   Occupational History     Not on file   Tobacco Use     Smoking status: Never     Smokeless tobacco: Never   Substance and Sexual Activity     Alcohol use: Yes     Drug use: No     Sexual activity: Not on file   Other Topics Concern      Service Not Asked     Blood Transfusions Not Asked     Caffeine Concern Not Asked     Occupational Exposure Not Asked     Hobby Hazards Not Asked     Sleep Concern Not Asked     Stress Concern Not Asked     Weight Concern Not Asked     Special Diet Not Asked     Back Care Not Asked      Exercise No     Bike Helmet Not Asked     Seat Belt No     Self-Exams No   Social History Narrative    Womens Health Kit given.         Social Determinants of Health     Financial Resource Strain: Not on file   Food Insecurity: Not on file   Transportation Needs: Not on file   Physical Activity: Not on file   Stress: Not on file   Social Connections: Not on file   Intimate Partner Violence: Not on file   Housing Stability: Not on file   FHA/, currently on disability       Family History:     Family History   Problem Relation Age of Onset     Hypertension Father         abstract     Cancer Paternal Aunt         gallbladder cancer abstract            Allergies:     Allergies   Allergen Reactions     Polyethylene Glycol Rash     Codeine      Other reaction(s): Gastrointestinal, GI intolerance, Vomiting  Vomiting       Gadolinium Dizziness and Nausea     Hydrocodone-Acetaminophen Nausea and Vomiting     Other reaction(s): GI intolerance     Iodine      abstract     Sulfasalazine      Other reaction(s): GI intolerance  Has tried and had nausa.     Tramadol Nausea and Vomiting     Other reaction(s): Gastrointestinal, Other (see comments)  Nausea and vomiting   unknown       Acetaminophen Nausea     Nausea and vomiting  Nausea and vomiting  Nausea and vomiting  Nausea and vomiting       Gluten Meal Other (See Comments) and Rash     Other reaction(s): Gastrointestinal, GI intolerance  Dizziness, tired, rash, stomach cramps, thrush, mouth sores  Dizziness, tired, rash, stomach cramps, thrush, mouth sores       Propylene Glycol Dizziness, Nausea and Vomiting, Nausea and Rash            Medications:     Current Outpatient Medications   Medication Sig Dispense Refill     Albuterol Sulfate (PROAIR HFA IN) Inhale into the lungs as needed       alcohol swab prep pads Use to swab area of injection/pratima as directed. 100 each 3     Artificial Tear Solution (SOOTHE XP) SOLN as needed       atenolol (TENORMIN) 50 MG  tablet Take 50 mg by mouth daily       benzoyl peroxide 5 % external liquid Use daily as directed. Preferred bdrand: [employer] 236 mL 11     blood glucose (NO BRAND SPECIFIED) lancets standard Use to test blood sugar 2 times daily or as directed. 100 lancet. 1     blood glucose (NO BRAND SPECIFIED) test strip Use to test blood sugar 2 times daily or as directed. 100 strip 1     blood glucose monitoring (NO BRAND SPECIFIED) meter device kit Use to test blood sugar 2 times daily or as directed. 1 kit 0     cephALEXin (KEFLEX) 500 MG capsule Take 1 Capsule (500 mg) by mouth four times daily for 10 days       clindamycin (CLEOCIN T) 1 % external solution Apply twice daily as needed to active areas. MUST USE WITH BENZOYL PEROXIDE WASH  TO AVOID BACTERIAL RESISTANCE. 60 mL 2     diclofenac (VOLTAREN) 1 % topical gel Apply topically 4 times daily as needed       doxycycline hyclate (VIBRA-TABS) 100 MG tablet Take 150 mg by mouth 2 times daily       doxycycline hyclate (VIBRAMYCIN) 50 MG capsule Take 150 mg by mouth 2 times daily       dupilumab (DUPIXENT) 300 MG/2ML prefilled pen Inject 2 mLs (300 mg) Subcutaneous every 14 days 4 mL 0     fexofenadine (ALLEGRA) 180 MG tablet Take 1 tablet (180 mg) by mouth daily 90 tablet 2     fidaxomicin (DIFICID) 200 MG tablet Take 200 mg by mouth as needed       fluticasone-vilanterol (BREO ELLIPTA) 200-25 MCG/ACT inhaler Inhale 1 puff into the lungs daily 90 each 0     insulin glargine (LANTUS PEN) 100 UNIT/ML pen Inject 20 Units Subcutaneous daily       insulin lispro (HUMALOG KWIKPEN) 100 UNIT/ML (1 unit dial) KWIKPEN Inject 5 Units Subcutaneous 3 times daily (before meals) Plus 1:25 correction scale if BG >150       insulin NPH (HUMULIN N KWIKPEN) 100 UNIT/ML injection 30 units on day of infusion then 20 units daily x 2 days after infusion       lidocaine (LIDODERM) 5 % patch as needed   1     losartan (COZAAR) 100 MG tablet Take 100 mg by mouth daily       mometasone (NASONEX) 50  "MCG/ACT nasal spray INSTILL 2 SPRAYS INTO BOTH NOSTRILS DAILY. 17 g 3     Montelukast Sodium (SINGULAIR PO) Take 10 mg by mouth At Bedtime        olopatadine (PATADAY) 0.7 % ophthalmic solution Apply 1 drop to eye daily PRN       OMEPRAZOLE PO Take 40 mg by mouth 2 times daily        rifaximin (XIFAXAN) 550 MG TABS tablet Take 200 mg by mouth 3 times daily Take for 10 days, has on had as needed for small bowel bacteria overgrowh       Sharps Container MISC Use to dispose of sharps as directed 1 each 0     spironolactone-HCTZ (ALDACTAZIDE) 25-25 MG tablet Take 1 tablet by mouth daily       tacrolimus (PROTOPIC) 0.1 % external ointment Apply to itchy areas on eyelids twice a day as needed for itching and rash 30 g 3     tobramycin (TOBREX) 0.3 % ophthalmic solution 1-2 drops 2 times daily       tobramycin-dexamethasone (TOBRADEX) 0.3-0.1 % ophthalmic ointment 0.25 inches At Bedtime       fluticasone (FLONASE) 50 MCG/ACT nasal spray Spray 2 sprays into both nostrils 2 times daily  (Patient not taking: Reported on 6/30/2023)       norethindrone (MICRONOR) 0.35 MG tablet Take 0.35 mg by mouth       nystatin (MYCOSTATIN) 685348 UNIT/ML suspension TAKE 5ML BY MOUTH 4XDAILY FOR 14 DAYS. CONTINUE AT LEAST 2 DAYS AFTER SYMPTOMS RESOLVED (Patient not taking: Reported on 6/30/2023)       UNABLE TO FIND MEDICATION NAME: Fermented zinc complex (Patient not taking: Reported on 6/30/2023)              Physical Exam:   Blood pressure 109/70, height 1.676 m (5' 6\"), weight 95.7 kg (211 lb).  Wt Readings from Last 6 Encounters:   06/30/23 95.7 kg (211 lb)   06/07/23 96.2 kg (212 lb)   05/10/23 97.5 kg (215 lb)   05/03/23 98 kg (216 lb)   04/12/23 98.9 kg (218 lb)   04/11/23 99.2 kg (218 lb 11.2 oz)     Constitutional: well-developed, appearing stated age; cooperative  Eyes: nl EOM, PERRLA,conjunctiva, sclera  ENT: nl external ears, nose, hearing, lips, teeth, gums, throat  No mucous membrane lesions, normal saliva pool  Resp: " breathing unlabored  MS: Former fusiform swelling in the fingers and hands and weak  strength have resolved.  Fingers now show full extension at all DIPs and PIPs.  Skin: no nail pitting, alopecia, rash  Neuro: nl cranial nerves, strength, sensation, DTRs.   Psych: nl judgement, orientation, memory, affect.         Data:     @      Latest Ref Rng & Units 3/14/2023     3:42 PM 2023    12:57 PM 2023    12:49 PM   RHEUM RESULTS   Albumin 3.5 - 5.2 g/dL  4.4  4.4    ALT 10 - 35 U/L  54  65    AST 10 - 35 U/L  33  50    Creatinine 0.51 - 0.95 mg/dL  0.94  0.84    GFR Estimate >60 mL/min/1.73m2  75  86    Hematocrit 35.0 - 47.0 %  42.6  45.6    Hemoglobin 11.7 - 15.7 g/dL  14.8  15.7    Hep B Surface Agn Nonreactive Nonreactive      WBC 4.0 - 11.0 10e3/uL  6.7  7.0    RBC Count 3.80 - 5.20 10e6/uL  4.93  5.29    RDW 10.0 - 15.0 %  13.5  12.4    MCHC 31.5 - 36.5 g/dL  34.7  34.4    MCV 78 - 100 fL  86  86    Platelet Count 150 - 450 10e3/uL  320  313      MRI thoracic spine 21 with mild multilevel spondylosis with specific findings according to level includin. Small disc bulge/protrusion at T1-2, T11-12, T12-L1  2. No cord deformity/centralspinal canal stenosis. The foramen appears patent    MRI lumbar spine 2013  1. L3-L4 mild anterior degenerative disc disease and degeneration of   the left facet joint.   2. L4-L5 mild degenerative disc disease with small central posterior   disc protrusion and high intensity zone. Slight impression on the   thecal sac.   3. L5-S1 small central posterior disc protrusion extending below the   disc level. IMPRESSION:     1. L3-L4 mild anterior degenerative disc disease and degeneration of   the left facet joint.   2. L4-L5 mild degenerative disc disease with small central posterior   disc protrusion and high intensity zone. Slight impression on the   thecal sac.   3. L5-S1 small central posterior disc protrusion extending below the   disc level.     MRI Radius  Ulna left 3/12/21  1. Redemonstrated mild flexor tenosynovitis within the left carpal tunnel.   2. Remainder is otherwise unremarkable for inflammatory process within the left   Forearm.    MRI Radius Ulna Right 3/12/21  1. Redemonstrated are moderate inflammatory flexor tenosynovitis within the   right wrist which is not significantly changed from prior dedicated MRI   examination.   2. Remainder of the exam is unremarkable.    DX Lumbar spine 1/26/21  Postoperative changes if an L5-S1 laminectomy. Slight disk space   narrowing at L5. Lower lumbar facet arthritis. Low-grade lumbar subluxations. No   gross instability on flexion and extension. Slight wedge deformity of the L4   vertebral body.

## 2025-05-20 ENCOUNTER — TELEPHONE (OUTPATIENT)
Dept: RHEUMATOLOGY | Facility: CLINIC | Age: 50
End: 2025-05-20
Payer: MEDICARE

## 2025-05-20 NOTE — TELEPHONE ENCOUNTER
[facility]    Phone Message    May a detailed message be left on voicemail: yes     Reason for Call: Other: Patient said her appt Friday is to be video 5/23/25 and that her methylPREDNISolone (MEDROL) 4 MG tablet  is not working at 3 and she needs to have 4 a day. Can call back if needed. Patient said she had a missed call from Macy about this and wanted me to share.     Action Taken: Message routed to:  [facility] rheum    Travel Screening: Not Applicable     Date of Service: 5/20/25

## 2025-05-23 ENCOUNTER — MYC MEDICAL ADVICE (OUTPATIENT)
Dept: PHARMACY | Facility: CLINIC | Age: 50
End: 2025-05-23

## 2025-05-23 PROCEDURE — 99000 SPECIMEN HANDLING OFFICE-LAB: CPT | Performed by: PATHOLOGY

## 2025-05-23 PROCEDURE — 87340 HEPATITIS B SURFACE AG IA: CPT | Performed by: INTERNAL MEDICINE

## 2025-05-23 PROCEDURE — 86481 TB AG RESPONSE T-CELL SUSP: CPT | Performed by: INTERNAL MEDICINE

## 2025-05-23 PROCEDURE — 86704 HEP B CORE ANTIBODY TOTAL: CPT | Performed by: INTERNAL MEDICINE

## 2025-05-27 ENCOUNTER — INFUSION THERAPY VISIT (OUTPATIENT)
Dept: INFUSION THERAPY | Facility: CLINIC | Age: 50
End: 2025-05-27
Attending: INTERNAL MEDICINE
Payer: MEDICARE

## 2025-05-27 ENCOUNTER — MYC MEDICAL ADVICE (OUTPATIENT)
Dept: PHARMACY | Facility: CLINIC | Age: 50
End: 2025-05-27

## 2025-05-27 ENCOUNTER — VIRTUAL VISIT (OUTPATIENT)
Dept: PHARMACY | Facility: CLINIC | Age: 50
End: 2025-05-27
Attending: INTERNAL MEDICINE
Payer: MEDICARE

## 2025-05-27 ENCOUNTER — RESULTS FOLLOW-UP (OUTPATIENT)
Dept: RHEUMATOLOGY | Facility: CLINIC | Age: 50
End: 2025-05-27

## 2025-05-27 VITALS
OXYGEN SATURATION: 97 % | DIASTOLIC BLOOD PRESSURE: 86 MMHG | TEMPERATURE: 97.8 F | HEART RATE: 78 BPM | RESPIRATION RATE: 16 BRPM | SYSTOLIC BLOOD PRESSURE: 127 MMHG

## 2025-05-27 DIAGNOSIS — Z71.85 VACCINE COUNSELING: ICD-10-CM

## 2025-05-27 DIAGNOSIS — M13.80 SERONEGATIVE INFLAMMATORY ARTHRITIS: Primary | ICD-10-CM

## 2025-05-27 DIAGNOSIS — M06.9 RHEUMATOID ARTHRITIS, INVOLVING UNSPECIFIED SITE, UNSPECIFIED WHETHER RHEUMATOID FACTOR PRESENT (H): Primary | ICD-10-CM

## 2025-05-27 LAB
ALBUMIN SERPL BCG-MCNC: 4.1 G/DL (ref 3.5–5.2)
ALBUMIN UR-MCNC: NEGATIVE MG/DL
ALP SERPL-CCNC: 44 U/L (ref 40–150)
ALT SERPL W P-5'-P-CCNC: 87 U/L (ref 0–50)
ANION GAP SERPL CALCULATED.3IONS-SCNC: 15 MMOL/L (ref 7–15)
APPEARANCE UR: CLEAR
AST SERPL W P-5'-P-CCNC: 62 U/L (ref 0–45)
BACTERIA #/AREA URNS HPF: ABNORMAL /HPF
BASOPHILS # BLD AUTO: 0.1 10E3/UL (ref 0–0.2)
BASOPHILS NFR BLD AUTO: 1 %
BILIRUB SERPL-MCNC: 0.9 MG/DL
BILIRUB UR QL STRIP: NEGATIVE
BUN SERPL-MCNC: 20.5 MG/DL (ref 6–20)
CALCIUM SERPL-MCNC: 9 MG/DL (ref 8.8–10.4)
CHLORIDE SERPL-SCNC: 106 MMOL/L (ref 98–107)
COLOR UR AUTO: ABNORMAL
CREAT SERPL-MCNC: 0.86 MG/DL (ref 0.51–0.95)
EGFRCR SERPLBLD CKD-EPI 2021: 82 ML/MIN/1.73M2
EOSINOPHIL # BLD AUTO: 0.2 10E3/UL (ref 0–0.7)
EOSINOPHIL NFR BLD AUTO: 1 %
ERYTHROCYTE [DISTWIDTH] IN BLOOD BY AUTOMATED COUNT: 13.2 % (ref 10–15)
GLUCOSE SERPL-MCNC: 95 MG/DL (ref 70–99)
GLUCOSE UR STRIP-MCNC: NEGATIVE MG/DL
HCO3 SERPL-SCNC: 19 MMOL/L (ref 22–29)
HCT VFR BLD AUTO: 41.3 % (ref 35–47)
HGB BLD-MCNC: 14.2 G/DL (ref 11.7–15.7)
HGB UR QL STRIP: NEGATIVE
IMM GRANULOCYTES # BLD: 0.3 10E3/UL
IMM GRANULOCYTES NFR BLD: 3 %
KETONES UR STRIP-MCNC: NEGATIVE MG/DL
LEUKOCYTE ESTERASE UR QL STRIP: NEGATIVE
LYMPHOCYTES # BLD AUTO: 2.7 10E3/UL (ref 0.8–5.3)
LYMPHOCYTES NFR BLD AUTO: 20 %
MCH RBC QN AUTO: 30 PG (ref 26.5–33)
MCHC RBC AUTO-ENTMCNC: 34.4 G/DL (ref 31.5–36.5)
MCV RBC AUTO: 87 FL (ref 78–100)
MONOCYTES # BLD AUTO: 1.1 10E3/UL (ref 0–1.3)
MONOCYTES NFR BLD AUTO: 9 %
MUCOUS THREADS #/AREA URNS LPF: PRESENT /LPF
NEUTROPHILS # BLD AUTO: 8.8 10E3/UL (ref 1.6–8.3)
NEUTROPHILS NFR BLD AUTO: 67 %
NITRATE UR QL: NEGATIVE
NRBC # BLD AUTO: 0 10E3/UL
NRBC BLD AUTO-RTO: 0 /100
PH UR STRIP: 6.5 [PH] (ref 5–7)
PLATELET # BLD AUTO: 353 10E3/UL (ref 150–450)
POTASSIUM SERPL-SCNC: 3.5 MMOL/L (ref 3.4–5.3)
PROT SERPL-MCNC: 6 G/DL (ref 6.4–8.3)
RBC # BLD AUTO: 4.73 10E6/UL (ref 3.8–5.2)
RBC URINE: 1 /HPF
SODIUM SERPL-SCNC: 140 MMOL/L (ref 135–145)
SP GR UR STRIP: 1.01 (ref 1–1.03)
SQUAMOUS EPITHELIAL: <1 /HPF
UROBILINOGEN UR STRIP-MCNC: NORMAL MG/DL
WBC # BLD AUTO: 13.2 10E3/UL (ref 4–11)
WBC URINE: 1 /HPF

## 2025-05-27 PROCEDURE — 96375 TX/PRO/DX INJ NEW DRUG ADDON: CPT

## 2025-05-27 PROCEDURE — 258N000003 HC RX IP 258 OP 636: Performed by: INTERNAL MEDICINE

## 2025-05-27 PROCEDURE — 81001 URINALYSIS AUTO W/SCOPE: CPT | Performed by: INTERNAL MEDICINE

## 2025-05-27 PROCEDURE — 36415 COLL VENOUS BLD VENIPUNCTURE: CPT | Performed by: INTERNAL MEDICINE

## 2025-05-27 PROCEDURE — 82247 BILIRUBIN TOTAL: CPT | Performed by: INTERNAL MEDICINE

## 2025-05-27 PROCEDURE — 96365 THER/PROPH/DIAG IV INF INIT: CPT

## 2025-05-27 PROCEDURE — 250N000013 HC RX MED GY IP 250 OP 250 PS 637: Performed by: INTERNAL MEDICINE

## 2025-05-27 PROCEDURE — 250N000011 HC RX IP 250 OP 636: Performed by: INTERNAL MEDICINE

## 2025-05-27 PROCEDURE — 85025 COMPLETE CBC W/AUTO DIFF WBC: CPT | Performed by: INTERNAL MEDICINE

## 2025-05-27 RX ORDER — EPINEPHRINE 1 MG/ML
0.3 INJECTION, SOLUTION, CONCENTRATE INTRAVENOUS EVERY 5 MIN PRN
OUTPATIENT
Start: 2025-06-23

## 2025-05-27 RX ORDER — ACETAMINOPHEN 325 MG/1
650 TABLET ORAL ONCE
Status: COMPLETED | OUTPATIENT
Start: 2025-05-27 | End: 2025-05-27

## 2025-05-27 RX ORDER — METHYLPREDNISOLONE SODIUM SUCCINATE 125 MG/2ML
37.5 INJECTION INTRAMUSCULAR; INTRAVENOUS ONCE
Status: COMPLETED | OUTPATIENT
Start: 2025-05-27 | End: 2025-05-27

## 2025-05-27 RX ORDER — DIPHENHYDRAMINE HYDROCHLORIDE 50 MG/ML
25 INJECTION, SOLUTION INTRAMUSCULAR; INTRAVENOUS
Start: 2025-06-23

## 2025-05-27 RX ORDER — METHYLPREDNISOLONE SODIUM SUCCINATE 125 MG/2ML
125 INJECTION INTRAMUSCULAR; INTRAVENOUS ONCE
OUTPATIENT
Start: 2025-06-23

## 2025-05-27 RX ORDER — HEPARIN SODIUM (PORCINE) LOCK FLUSH IV SOLN 100 UNIT/ML 100 UNIT/ML
5 SOLUTION INTRAVENOUS
Status: CANCELLED | OUTPATIENT
Start: 2025-06-17

## 2025-05-27 RX ORDER — ALBUTEROL SULFATE 90 UG/1
1-2 INHALANT RESPIRATORY (INHALATION)
Start: 2025-06-23

## 2025-05-27 RX ORDER — DIPHENHYDRAMINE HCL 50 MG
50 CAPSULE ORAL ONCE
Start: 2025-06-23

## 2025-05-27 RX ORDER — METHYLPREDNISOLONE SODIUM SUCCINATE 40 MG/ML
40 INJECTION INTRAMUSCULAR; INTRAVENOUS
Status: CANCELLED
Start: 2025-06-17

## 2025-05-27 RX ORDER — DIPHENHYDRAMINE HYDROCHLORIDE 50 MG/ML
50 INJECTION, SOLUTION INTRAMUSCULAR; INTRAVENOUS
Status: CANCELLED
Start: 2025-06-17

## 2025-05-27 RX ORDER — HEPARIN SODIUM,PORCINE 10 UNIT/ML
5-20 VIAL (ML) INTRAVENOUS DAILY PRN
OUTPATIENT
Start: 2025-06-23

## 2025-05-27 RX ORDER — HEPARIN SODIUM,PORCINE 10 UNIT/ML
5-20 VIAL (ML) INTRAVENOUS DAILY PRN
Status: CANCELLED | OUTPATIENT
Start: 2025-06-17

## 2025-05-27 RX ORDER — HEPARIN SODIUM (PORCINE) LOCK FLUSH IV SOLN 100 UNIT/ML 100 UNIT/ML
5 SOLUTION INTRAVENOUS
OUTPATIENT
Start: 2025-06-23

## 2025-05-27 RX ORDER — DIPHENHYDRAMINE HYDROCHLORIDE 50 MG/ML
25 INJECTION, SOLUTION INTRAMUSCULAR; INTRAVENOUS
Status: CANCELLED
Start: 2025-06-17

## 2025-05-27 RX ORDER — ALBUTEROL SULFATE 0.83 MG/ML
2.5 SOLUTION RESPIRATORY (INHALATION)
OUTPATIENT
Start: 2025-06-23

## 2025-05-27 RX ORDER — ALBUTEROL SULFATE 0.83 MG/ML
2.5 SOLUTION RESPIRATORY (INHALATION)
Status: CANCELLED | OUTPATIENT
Start: 2025-06-17

## 2025-05-27 RX ORDER — ACETAMINOPHEN 325 MG/1
650 TABLET ORAL ONCE
Start: 2025-06-23

## 2025-05-27 RX ORDER — EPINEPHRINE 1 MG/ML
0.3 INJECTION, SOLUTION INTRAMUSCULAR; SUBCUTANEOUS EVERY 5 MIN PRN
Status: CANCELLED | OUTPATIENT
Start: 2025-06-17

## 2025-05-27 RX ORDER — ACETAMINOPHEN 325 MG/1
650 TABLET ORAL ONCE
Status: CANCELLED | OUTPATIENT
Start: 2025-06-17

## 2025-05-27 RX ORDER — METHYLPREDNISOLONE SODIUM SUCCINATE 40 MG/ML
40 INJECTION INTRAMUSCULAR; INTRAVENOUS
Start: 2025-06-23

## 2025-05-27 RX ORDER — ALBUTEROL SULFATE 90 UG/1
1-2 INHALANT RESPIRATORY (INHALATION)
Status: CANCELLED
Start: 2025-06-17

## 2025-05-27 RX ORDER — MEPERIDINE HYDROCHLORIDE 25 MG/ML
25 INJECTION INTRAMUSCULAR; INTRAVENOUS; SUBCUTANEOUS
Status: CANCELLED | OUTPATIENT
Start: 2025-06-17

## 2025-05-27 RX ORDER — DIPHENHYDRAMINE HCL 25 MG
25 CAPSULE ORAL ONCE
Status: CANCELLED | OUTPATIENT
Start: 2025-06-17

## 2025-05-27 RX ORDER — METHYLPREDNISOLONE SODIUM SUCCINATE 125 MG/2ML
37.5 INJECTION INTRAMUSCULAR; INTRAVENOUS ONCE
Status: CANCELLED | OUTPATIENT
Start: 2025-06-17

## 2025-05-27 RX ORDER — DIPHENHYDRAMINE HYDROCHLORIDE 50 MG/ML
50 INJECTION, SOLUTION INTRAMUSCULAR; INTRAVENOUS
Start: 2025-06-23

## 2025-05-27 RX ADMIN — METHYLPREDNISOLONE SODIUM SUCCINATE 37.5 MG: 125 INJECTION, POWDER, FOR SOLUTION INTRAMUSCULAR; INTRAVENOUS at 11:28

## 2025-05-27 RX ADMIN — SODIUM CHLORIDE 250 ML: 0.9 INJECTION, SOLUTION INTRAVENOUS at 12:53

## 2025-05-27 RX ADMIN — TOCILIZUMAB 800 MG: 20 INJECTION, SOLUTION, CONCENTRATE INTRAVENOUS at 12:00

## 2025-05-27 RX ADMIN — ACETAMINOPHEN 650 MG: 325 TABLET ORAL at 11:27

## 2025-05-27 ASSESSMENT — PAIN SCALES - GENERAL: PAINLEVEL_OUTOF10: SEVERE PAIN (10)

## 2025-05-27 NOTE — PROGRESS NOTES
Medication Therapy Management (MTM) Encounter    ASSESSMENT:                            Medication Adherence/Access: No issues identified.    Rheumatoid Arthritis: Provided education on rituximab today including dosing, general administration, side effects (both common/serious), precautions, monitoring and time to efficacy. Discussed data on malignancy and risk of serious infection in depth. Encouraged indicated non-live vaccines and avoidance of live vaccines. Discussed potential need to hold therapy in the setting of signs/symptoms of active infection. Encouraged her to contact the rheumatology clinic in the event she has questions on this. Briefly reviewed infusion scheduling and securing information. Will plan for patient to schedule rituximab infusion after completing Actemra infusion today. Would benefit from starting rituximab 1000 mg every 2 weeks x 2 doses in ~1 month.    Vaccines: Per ACIP recommendations, eligible for Tdap booster, Shingrix series, and covid booster. Prefers to defer any future covid vaccines, open to completing Tdap and Shingrix vaccines. Plans to go to primary care doctor to complete these. Discussed importance of completing initial Shingrix dose and Tdap booster prior to rituximab infusion or waiting 6 months to complete them for improved efficacy.    PLAN:                            1. Schedule your rituximab infusions after your Actemra infusion today. If you do not get to complete this, you can call the [facility] infusion center at [phone] to schedule your infusion appointments.    2. Consider completing your tetanus booster vaccine and Shingrix (shingles) vaccine prior to starting rituximab. You can complete these at your primary care doctor's office or a local pharmacy.     Follow-up: Return in about 3 months (around 8/27/2025) for MTM Pharmacist Visit.    SUBJECTIVE/OBJECTIVE:                          Laxmi Ya is a 50 year old female seen for  a follow-up visit.       Reason for visit: Rituximab education    Allergies/ADRs: Reviewed in chart  Past Medical History: Reviewed in chart  Tobacco: She reports that she has never smoked. She has never used smokeless tobacco.  Alcohol: Less than 1 beverages / week    Medication Adherence/Access: no issues reported.    Rheumatoid Arthritis:   Actemra 800 mg infusion monthly (last infusion 4/29/25)   Diclofenac 1% gel as needed (uses once per day a few times per week)  Oxycodone 10 mg 2-4 times daily as needed     Reports she met with Dr. Lemus on Friday and elected to switch Actemra to Rituximab after discussion of her ongoing joint pain. Still planning on completing Actemra today after brief discussion with RN/MTM Friday about inability to switch medication and get insurance coverage fast enough for rituximab to be started today. Would like to review rituximab now so she is ready for infusion in about a month. Has some questions about frequency and time to efficacy.    Pre-Biologic Screening:   Hep B Surface Antibody No record of completion    Hep B Core Antibody  Non-reactive (5/23/25)    Hep B Surface Antigen Non-reactive (5/23/25)    Hep C Antibody  Non-reactive (3/14/16)    HIV Antigen Antibody Non-reactive (2/14/20)    Quantiferon TB Gold Negative (5/23/25)      Last lab monitoring completed: 5/20/25    Vaccines:  Immunization History   Covid-19 vaccine (9914-1837 version)  Due to receive   Influenza (annual) Consider vaccine in 2025-26 season, avoid live FluMist   Pneumococcal  Prevnar-13: 11/30/18  Pneumovax-23: 11/2/20   Prevnar-20: 1/31/24 Up-to-date   Tetanus/Tdap  Due to receive   Shingrix Eligible to receive with immunomodulator start   All patients on biologics should avoid live vaccines (varicella/VZV, intranasal influenza, MMR, or yellow fever vaccine (if traveling))        Today's Vitals: There were no vitals taken for this visit.  ----------------    I spent 45 minutes with this patient today.  All changes were made via collaborative practice agreement with Oswaldo Lemus MD.     A summary of these recommendations was sent via [employer].    Augustine ConnerD  Medication Therapy Management Pharmacist  [facility]  Phone: [phone]    Telemedicine Visit Details  The patient's medications can be safely assessed via a telemedicine encounter.  Type of service:  Telephone visit  Originating Location (pt. Location): Home    Distant Location (provider location):  On-site  Start Time: 9:30 AM  End Time: 10:15 AM     Medication Therapy Recommendations  Rheumatoid arthritis (H)   1 Current Medication: tocilizumab (ACTEMRA) 800 mg in sodium chloride 0.9 % 150 mL infusion   Rationale: More effective medication available - Ineffective medication - Effectiveness   Recommendation: Change Medication - riTUXimab 100 MG/10ML Soln - 1000 mg every 2 weeks x 2 doses   Status: Accepted per Provider   Identified Date: 5/27/2025 Completed Date: 5/27/2025

## 2025-05-27 NOTE — PATIENT INSTRUCTIONS
"Recommendations from today's MTM visit:                                                       1. Schedule your rituximab infusions after your Actemra infusion today. If you do not get to complete this, you can call the [facility] infusion center at [phone] to schedule your infusion appointments.    2. Consider completing your tetanus booster vaccine and Shingrix (shingles) vaccine prior to starting rituximab. You can complete these at your primary care doctor's office or a local pharmacy.     Follow-up: Return in about 3 months (around 8/27/2025) for MTM Pharmacist Visit.    It was great speaking with you today.  I value your experience and would be very thankful for your time in providing feedback in our clinic survey. In the next few days, you may receive an email or text message from [employer] with a link to a survey related to your  clinical pharmacist.\"     To schedule another MTM appointment, please call the clinic directly or you may call the MTM scheduling line at [phone].    My Clinical Pharmacist's contact information:                                                      Please feel free to contact me with any questions or concerns you have.      Yuliya Kaplan, PharmD  Medication Therapy Management Pharmacist  [facility]  Phone: [phone]     "

## 2025-05-27 NOTE — PROGRESS NOTES
Infusion Nursing Note:  Laxmi Ya presents today for Actemra.    Patient seen by provider today: No   present during visit today: Not Applicable.    Note: Pt pre-medicated with tylenol and solumedrol. Actemra infused over 30 minutes.      Intravenous Access:  Labs drawn without difficulty. Urine collected.  Peripheral IV placed by VAT.    Treatment Conditions:  Biological Infusion Checklist:  ~~~ NOTE: If the patient answers yes to any of the questions below, hold the infusion and contact ordering provider or on-call provider.    Have you recently had an elevated temperature, fever, chills, productive cough, coughing for 3 weeks or longer or hemoptysis,  abnormal vital signs, night sweats,  chest pain or have you noticed a decrease in your appetite, unexplained weight loss or fatigue? No  Do you have any open wounds or new incisions? No  Do you have any upcoming hospitalizations or surgeries? Does not include esophagogastroduodenoscopy, colonoscopy, endoscopic retrograde cholangiopancreatography (ERCP), endoscopic ultrasound (EUS), dental procedures or joint aspiration/steroid injections No  Do you currently have any signs of illness or infection or are you on any antibiotics? No  Have you had any new, sudden or worsening abdominal pain? No  Have you or anyone in your household received a live vaccination in the past 4 weeks? Please note: No live vaccines while on biologic/chemotherapy until 6 months after the last treatment. Patient can receive the flu vaccine (shot only), pneumovax and the Covid vaccine. It is optimal for the patient to get these vaccines mid cycle, but they can be given at any time as long as it is not on the day of the infusion. No  Have you recently been diagnosed with any new nervous system diseases (ie. Multiple sclerosis, Guillain [place], seizures, neurological changes) or cancer diagnosis? Are you on any form of radiation or chemotherapy? No  Are you pregnant or breast  feeding or do you have plans of pregnancy in the future? No  Have you been having any signs of worsening depression or suicidal ideations?  (benlysta only) No  Have there been any other new onset medical symptoms? No  Have you had any new blood clots? (IVIG only) No      Post Infusion Assessment:  Patient tolerated infusion without incident.  Blood return noted pre and post infusion.  Site patent and intact, free from redness, edema or discomfort.  No evidence of extravasations.  Access discontinued per protocol.  Biologic Infusion Post Education: Call the triage nurse at your clinic or seek medical attention if you have chills and/or temperature greater than or equal to 100.5, uncontrolled nausea/vomiting, diarrhea, constipation, dizziness, shortness of breath, chest pain, heart palpitations, weakness or any other new or concerning symptoms, questions or concerns.  You cannot have any live virus vaccines prior to or during treatment or up to 6 months post infusion.  If you have an upcoming surgery, medical procedure or dental procedure during treatment, this should be discussed with your ordering physician and your surgeon/dentist.  If you are having any concerning symptom, if you are unsure if you should get your next infusion or wish to speak to a provider before your next infusion, please call your care coordinator or triage nurse at your clinic to notify them so we can adequately serve you.       Discharge Plan:   Discharge instructions reviewed with: Patient.  Patient and/or family verbalized understanding of discharge instructions and all questions answered.  AVS to patient via [employer].  Patient will return 6/24 for next appointment.   Patient discharged in stable condition accompanied by: self.  Departure Mode: Ambulatory.    Administrations This Visit       acetaminophen (TYLENOL) tablet 650 mg       Admin Date  05/27/2025 Action  $Given Dose  650 mg Route  Oral Documented By  Katina Hitchcock RN               methylPREDNISolone Na Suc (solu-MEDROL) injection 37.5 mg       Admin Date  05/27/2025 Action  $Given Dose  37.5 mg Route  Intravenous Documented By  Katina Hitchcock, LEXI              sodium chloride 0.9% BOLUS 250 mL       Admin Date  05/27/2025 Action  $New Bag Dose  250 mL Route  Intravenous Documented By  Katina Hitchcock, LEXI              tocilizumab (ACTEMRA) 800 mg in sodium chloride 0.9 % 150 mL infusion       Admin Date  05/27/2025 Action  $New Bag Dose  800 mg Rate  150 mL/hr Route  Intravenous Documented By  Katina Hitchcock, LEXI                    /86   Pulse 78   Temp 97.8  F (36.6  C) (Oral)   Resp 16   SpO2 97%       Katina Hitchcock, LEXI

## 2025-05-27 NOTE — PATIENT INSTRUCTIONS
Dear Laxmi Ya    Thank you for choosing [facility] ([facility] for your infusion.  The following information is a summary of our appointment as well as important reminders.      Last dose of Tylenol was  11:30am.    EDUCATION POST BIOLOGICAL/CHEMOTHERAPY INFUSION  Call the triage nurse at your clinic or seek medical attention if you have chills and/or temperature greater than or equal to 100.5, uncontrolled nausea/vomiting, diarrhea, constipation, dizziness, shortness of breath, chest pain, heart palpitations, weakness or any other new or concerning symptoms, questions or concerns.  You can not have any live virus vaccines prior to or during treatment or up to 6 months post infusion.  If you have an upcoming surgery, medical procedure or dental procedure during treatment, this should be discussed with your ordering physician and your surgeon/dentist.  If you are having any concerning symptom, if you are unsure if you should get your next infusion or wish to speak to a provider before your next infusion, please call your care coordinator or triage nurse at your clinic to notify them so we can adequately serve you.     If you have any questions on your upcoming Specialty Infusion appointments, please call scheduling at [phone].  It was a pleasure taking care of you today.    Sincerely,    [facility]  [address] [place] [address]  [id number]  Phone:  [phone]

## 2025-05-27 NOTE — TELEPHONE ENCOUNTER
Called patient on 5/23 - briefly discussed insurance coverage and longer 6 hour appointment will be unable to be coordinated over a holiday weekend. Recommend completing one additional Actemra infusion on 5/27 then switching to rituximab. MTM visit moved to 5/27 - see visit for additional recommendations.

## 2025-05-28 NOTE — TELEPHONE ENCOUNTER
Called and discussed recommendation with patient. Will cancel 6/24 Actemra infusion appointment. Provided alternate infusion center sites and scheduling phone numbers if patient would like to try and find an earlier appointment. Patient noted today that hematology may want to complete a bone marrow biopsy - would like to know if rituximab could skew the results. Advised contacting hematology to discuss if she can be on rituximab and complete this test.    Yuliya Kaplan, PharmD  Medication Therapy Management Pharmacist  [facility]

## 2025-05-28 NOTE — TELEPHONE ENCOUNTER
I do not recommend getting the scheduled June actemra; immunosuppression is already quite strong.  Medically, I have no objection to a proposed 12 day interval between infusions one and two.

## 2025-05-29 ENCOUNTER — MYC MEDICAL ADVICE (OUTPATIENT)
Dept: PHARMACY | Facility: CLINIC | Age: 50
End: 2025-05-29
Payer: MEDICARE

## 2025-05-29 NOTE — TELEPHONE ENCOUNTER
I read Hematology 4-23-25 note. My reading is that Hematology thinks that PV is highly UNLIKELY. Is there additional information from Hematology to review?  Please clarify with Hematology what the treatment plan will be for PV, if that is indeed the diagnosis.

## 2025-05-30 NOTE — TELEPHONE ENCOUNTER
Called patient, she is in a store and then has an appointment today.  She will call us back when she is available.    Will send the phone number over [employer].    Grace Loredo RN  Adult Rheumatology

## 2025-06-02 DIAGNOSIS — J33.9 NASAL POLYPS: ICD-10-CM

## 2025-06-02 DIAGNOSIS — J32.4 CHRONIC PANSINUSITIS: ICD-10-CM

## 2025-06-02 RX ORDER — DUPILUMAB 300 MG/2ML
INJECTION, SOLUTION SUBCUTANEOUS
Qty: 4 ML | Refills: 0 | Status: SHIPPED | OUTPATIENT
Start: 2025-06-02

## 2025-06-03 NOTE — TELEPHONE ENCOUNTER
Patient called back and aware.  She will follow up with hematology for clarification.     Macy Sawyer RN

## 2025-06-04 ENCOUNTER — THERAPY VISIT (OUTPATIENT)
Dept: PHYSICAL THERAPY | Facility: CLINIC | Age: 50
End: 2025-06-04
Payer: MEDICARE

## 2025-06-04 DIAGNOSIS — R10.2 PELVIC PAIN IN FEMALE: Primary | ICD-10-CM

## 2025-06-04 DIAGNOSIS — G89.29 CHRONIC BILATERAL LOW BACK PAIN WITHOUT SCIATICA: ICD-10-CM

## 2025-06-04 DIAGNOSIS — M54.50 CHRONIC BILATERAL LOW BACK PAIN WITHOUT SCIATICA: ICD-10-CM

## 2025-06-04 DIAGNOSIS — M54.2 NECK PAIN: ICD-10-CM

## 2025-06-04 PROCEDURE — 97140 MANUAL THERAPY 1/> REGIONS: CPT | Mod: GP | Performed by: PHYSICAL THERAPIST

## 2025-06-04 PROCEDURE — 97012 MECHANICAL TRACTION THERAPY: CPT | Mod: GP | Performed by: PHYSICAL THERAPIST

## 2025-06-10 ENCOUNTER — THERAPY VISIT (OUTPATIENT)
Dept: PHYSICAL THERAPY | Facility: CLINIC | Age: 50
End: 2025-06-10
Payer: MEDICARE

## 2025-06-10 DIAGNOSIS — M54.50 CHRONIC BILATERAL LOW BACK PAIN WITHOUT SCIATICA: ICD-10-CM

## 2025-06-10 DIAGNOSIS — M54.2 NECK PAIN: ICD-10-CM

## 2025-06-10 DIAGNOSIS — R10.2 PELVIC PAIN IN FEMALE: Primary | ICD-10-CM

## 2025-06-10 DIAGNOSIS — G89.29 CHRONIC BILATERAL LOW BACK PAIN WITHOUT SCIATICA: ICD-10-CM

## 2025-06-10 PROCEDURE — 97012 MECHANICAL TRACTION THERAPY: CPT | Mod: GP | Performed by: PHYSICAL THERAPIST

## 2025-06-10 PROCEDURE — 97140 MANUAL THERAPY 1/> REGIONS: CPT | Mod: GP | Performed by: PHYSICAL THERAPIST

## 2025-06-12 ENCOUNTER — TELEPHONE (OUTPATIENT)
Dept: RHEUMATOLOGY | Facility: CLINIC | Age: 50
End: 2025-06-12

## 2025-06-12 ENCOUNTER — VIRTUAL VISIT (OUTPATIENT)
Dept: ALLERGY | Facility: CLINIC | Age: 50
End: 2025-06-12
Payer: MEDICARE

## 2025-06-12 DIAGNOSIS — J33.9 NASAL POLYPS: ICD-10-CM

## 2025-06-12 DIAGNOSIS — J45.40 MODERATE PERSISTENT ASTHMA WITHOUT COMPLICATION: ICD-10-CM

## 2025-06-12 DIAGNOSIS — M13.80 SERONEGATIVE INFLAMMATORY ARTHRITIS: ICD-10-CM

## 2025-06-12 DIAGNOSIS — J32.4 CHRONIC PANSINUSITIS: ICD-10-CM

## 2025-06-12 RX ORDER — DUPILUMAB 300 MG/2ML
300 INJECTION, SOLUTION SUBCUTANEOUS
Qty: 4 ML | Refills: 5 | Status: SHIPPED | OUTPATIENT
Start: 2025-06-12

## 2025-06-12 RX ORDER — FLUTICASONE FUROATE, UMECLIDINIUM BROMIDE AND VILANTEROL TRIFENATATE 200; 62.5; 25 UG/1; UG/1; UG/1
1 POWDER RESPIRATORY (INHALATION) DAILY
Qty: 90 EACH | Refills: 3 | Status: SHIPPED | OUTPATIENT
Start: 2025-06-12

## 2025-06-12 RX ORDER — METHYLPREDNISOLONE 4 MG/1
TABLET ORAL
Qty: 49 TABLET | Refills: 0 | Status: SHIPPED | OUTPATIENT
Start: 2025-06-12

## 2025-06-12 RX ORDER — MOMETASONE FUROATE MONOHYDRATE 50 UG/1
2 SPRAY, METERED NASAL DAILY
Qty: 51 G | Refills: 3 | Status: SHIPPED | OUTPATIENT
Start: 2025-06-12

## 2025-06-12 ASSESSMENT — ASTHMA QUESTIONNAIRES
ACT_TOTALSCORE: 22
ACT_TOTALSCORE: 22
QUESTION_4 LAST FOUR WEEKS HOW OFTEN HAVE YOU USED YOUR RESCUE INHALER OR NEBULIZER MEDICATION (SUCH AS ALBUTEROL): ONCE A WEEK OR LESS
QUESTION_3 LAST FOUR WEEKS HOW OFTEN DID YOUR ASTHMA SYMPTOMS (WHEEZING, COUGHING, SHORTNESS OF BREATH, CHEST TIGHTNESS OR PAIN) WAKE YOU UP AT NIGHT OR EARLIER THAN USUAL IN THE MORNING: NOT AT ALL
QUESTION_1 LAST FOUR WEEKS HOW MUCH OF THE TIME DID YOUR ASTHMA KEEP YOU FROM GETTING AS MUCH DONE AT WORK, SCHOOL OR AT HOME: A LITTLE OF THE TIME
QUESTION_5 LAST FOUR WEEKS HOW WOULD YOU RATE YOUR ASTHMA CONTROL: COMPLETELY CONTROLLED
QUESTION_2 LAST FOUR WEEKS HOW OFTEN HAVE YOU HAD SHORTNESS OF BREATH: ONCE OR TWICE A WEEK

## 2025-06-12 NOTE — TELEPHONE ENCOUNTER
Please see encounter dated 6/12 for documentation.      Karrie TAYLOR RN  Adult Rheumatology Clinic

## 2025-06-12 NOTE — TELEPHONE ENCOUNTER
[facility] Call Center    Phone Message    May a detailed message be left on voicemail: yes     Reason for Call: Medication Refill Request    Has the patient contacted the pharmacy for the refill? Yes   Name of medication being requested: methylPREDNISolone (MEDROL) 4 MG tablet   Provider who prescribed the medication: Oswaldo Lemus MD  Pharmacy: [facility] #[id number] - [place], [place] - [address]  P: [phone]  F: [phone]  Date medication is needed: asap       Action Taken: Other: RHEUM    Travel Screening: Not Applicable     Date of Service:

## 2025-06-12 NOTE — TELEPHONE ENCOUNTER
[facility] Call Center    Phone Message    May a detailed message be left on voicemail: yes     Reason for Call: Pt states she is returning a call to ANGEL Cavazos to discuss her diagnosis: Primary Erthrocytosis from Ángel Luke NP Hematology @ [place] and whether or not provider will make changes to RA infusion. Please call pt back. P: [phone].    Action Taken: Other: RHEUM    Travel Screening: Not Applicable     Date of Service:

## 2025-06-12 NOTE — TELEPHONE ENCOUNTER
Called and spoke with patient. She was seen by [facility] and would like an update sent to Dr. Lemus. There are no detailed notes to pull in from [place] unfortunately.     She reports that she was told she does not have polycythemia vera, had Jak2 and blood testing. She did not get bone marrow testing done. She tells me she was diagnoses with erythrocytosis (unsure primary vs secondary) and she is doing further testing for this. She is not receiving any medication/treatment for this at this time.     She is nearly out of medrol and has been taking 4mg daily for the last few weeks. She would like another medrol rx sent in and would like a refill on it.       Plan:  -routing to Dr. Lemus and LEXI Cavazos to update  -pended another medrol rx (per previous with no refill) for provider to review.   -1st rituximab dose on 6/19.   -patient to update us with any new hematology updates.       Karrie TAYLOR RN  Adult Rheumatology Clinic

## 2025-06-12 NOTE — LETTER
"6/12/2025      Laxmi Ya  [address] [place] [id number]      Dear Colleague,    Thank you for referring your patient, Laxmi Ya, to the [facility]. Please see a copy of my visit note below.    Laxmi is a 50 year old who is being evaluated via a billable video visit.              Subjective  Laxmi is a 50 year old, presenting for the following health issues:  RECHECK (Follow up Dupixent use, going well)    Video Start Time: 1123    HPI      Chief complaint: Follow-up chronic sinusitis with nasal polyps    History of present illness: This is a pleasant 50-year-old woman with a history of chronic sinusitis with nasal polyps as well as asthma here today for her follow-up visit.  Currently using Dupixent 300 mg every 2 weeks.  Denies any eye irritation or skin rash with the medication.  Reports she still able to breathe well through her nose.  Continues on Nasonex as well.  For her asthma, using Trelegy.  She reports no breakthrough symptoms with Trelegy.  No exacerbations.  No need for her rescue inhaler.          Objective   Vitals - Patient Reported  Weight (Patient Reported): 97.5 kg (215 lb)  Height (Patient Reported): 167.6 cm (5' 6\")  BMI (Based on Pt Reported Ht/Wt): 34.7  Temperature (Patient Reported): 98.6  F (37  C)        Physical Exam   GENERAL: alert and no distress  EYES: Eyes grossly normal to inspection.  No discharge or erythema, or obvious scleral/conjunctival abnormalities.  RESP: No audible wheeze, cough, or visible cyanosis.    SKIN: Visible skin clear. No significant rash, abnormal pigmentation or lesions.  NEURO: Cranial nerves grossly intact.  Mentation and speech appropriate for age.  PSYCH: Appropriate affect, tone, and pace of words    Impression report and plan:  1.  Chronic sinusitis with nasal polyps  2.  Mild persistent asthma    Continue Dupixent 300 mg every 2 weeks.  Reviewed risk of eye irritation and eosinophilic granulomatous polyangitis " and cutaneous t-cell lymphoma.  Continue Trelegy at current dosing.  Follow yearly.  Consider spirometry at that time.      Video-Visit Details    Type of service:  Video Visit   Video End Time:11:33 AM  Originating Location (pt. Location): Home    Distant Location (provider location):  On-site  Platform used for Video Visit: Howard  Signed Electronically by: Nelia Gann MD      Again, thank you for allowing me to participate in the care of your patient.        Sincerely,        Nelia Gann MD    Electronically signed

## 2025-06-16 ENCOUNTER — VIRTUAL VISIT (OUTPATIENT)
Dept: ENDOCRINOLOGY | Facility: CLINIC | Age: 50
End: 2025-06-16
Payer: MEDICARE

## 2025-06-16 ENCOUNTER — MYC MEDICAL ADVICE (OUTPATIENT)
Dept: PHARMACY | Facility: CLINIC | Age: 50
End: 2025-06-16

## 2025-06-16 DIAGNOSIS — E24.2: ICD-10-CM

## 2025-06-16 DIAGNOSIS — E27.49 SECONDARY ADRENAL INSUFFICIENCY: Primary | ICD-10-CM

## 2025-06-16 PROCEDURE — 98006 SYNCH AUDIO-VIDEO EST MOD 30: CPT | Performed by: INTERNAL MEDICINE

## 2025-06-16 PROCEDURE — G2211 COMPLEX E/M VISIT ADD ON: HCPCS | Performed by: INTERNAL MEDICINE

## 2025-06-16 NOTE — PROGRESS NOTES
Virtual Visit Details    Type of service:  Video Visit   Video Start Time: 2:10 PM  Video End Time:  2:40 PM    Originating Location (pt. Location): Home  Distant Location (provider location):  Off-site  Platform used for Video Visit: Howard        Recent issues:  Adrenal follow-up evaluation  Previous low cortisol levels, presumed due to concurrent or prior dosing of steroid medication  Had repeat labs showed low cortisol and low ACTH levels, indicating secondary adrenal insufficiency.  Taking the Prednisone 5 mg every day, also reports getting methylpred (Medrol) dose packs Q 4-6 weeks  Still on treatment with high dose steroid medication for seroneg inflam arthritis    Recent rheumatology plan per Dr. AMAURI Lemus, stopped Actemra, plan trial of rituximab, and started methylpred 16 mg daily for 7d then taper   Now continues on methylpred 16 mg daily as well as the Prednisone 5 mg tablet daily  Has seen several physicians at [facility] including Dr. Dannielle Gutierrez/[facility] and Dr. Loc Gore/[facility]        Previous health history includes sero negative rheumatoid arthritis, asthma, T2DM  She has seen health care providers including [facility], [facility], [facility], and [facility]  Had also seen Dr. Alan Saleh/[facility] rheumatology  Med evaluations with Dr. Susan Pena/[facility] OBGYN, evaluations focused on management of recurrent cystitis, vaginitis  Treatment with antifungal and antibiotic medications  Patient recalls having lab tests done per Dr. Pena ~2020 showing abnormal adrenal results   Advised to see an endocrinologist for further evaluation   Unfortunately, details of these tests, results, concerns not available today  Additional medical evaluations with other providers, including Dr. No Fitzgerald/ OBGYN, Dr. Oswaldo Lemus/[facility] Rheumatology  RA treatment with periodic Actemra infusions (w/ methylprednisolone infusion)  Has used Medrol dose packs (5d) for  flares of her RA or asthma or pneumonia  Previous [facility] labs include:   Latest Reference Range & Units 11/29/23 12:36 12/20/23 14:28   Cortisol Serum ug/dL 0.5 2.6      Latest Reference Range & Units 11/29/23 12:36 12/20/23 14:28   Adrenal Corticotropin <47 pg/mL <10 <10      Latest Reference Range & Units 11/29/23 12:45   Cortisol Free Urine Random ug/L <1.00     ~2/7/24. Last Medtrol steroid course completed  3/2024, Started hydrocortisone 5 mg BID, but patient felt jittery and stopped it  5/13/24 Head MRI pituitary ([facility]):   Normal appearance of pituitary gland  5/2024. Started Prednisone 5 mg daily   Has noticed less fatigue, less muscle weakness/fatigue  Had rashes on back, buttock then testing reportedly showed herpes virus   Treatment with valcyclovir, the changed to acyclovir medication... rashes better  Recent [facility] labs include:  Lab Results   Component Value Date    FSH 5.2 03/11/2024    ACTH <10 02/27/2024    KAREY 0.5 02/27/2024    TSH 2.42 12/31/2024    T4 1.32 12/20/2023    SG 1.009 05/27/2025      Current dose:  Prednisone 5 mg daily, Methylprednisolone 16 mg daily      Lives in [place], [place]  Sees Dr. Amol Juares/[facility] for general medicine evaluations.  Also sees Jazmine Lemus/[facility] ([facility]), No Fitzgerald/ OBGYN   Dr. Real/[facility], Dr. Dannielle Gutierrez/[facility], Dr. 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    Obesity     Polycythemia      Pyelonephritis, unspecified 1992    abstract    Rheumatoid arteritis (H)     Seronegative inflammatory arthritis 06/17/2022    Type 2 diabetes mellitus (H)     Unspecified symptom associated with female genital organs 05/07/1999    chronic pelvic pain abstract     Past Surgical History:   Procedure Laterality Date    CHOLECYSTECTOMY      COLON SURGERY      Left hemicolectomy for diverticulosis    CYSTOSCOPY,+URETEROSCOPY      abstract    ZZHC EXCIS/CURET BENIGN ELBOW LESN  10/26/1987    (R) humerus abstract       Family Hx:  Family History   Problem Relation Age of Onset    Hypertension Father         abstract    Cancer Paternal Aunt         gallbladder cancer abstract         Social Hx:  Social History     Socioeconomic History    Marital status: Single     Spouse name: Not on file    Number of children: Not on file    Years of education: Not on file    Highest education level: Not on file   Occupational History    Not on file   Tobacco Use    Smoking status: Never    Smokeless tobacco: Never   Substance and Sexual Activity    Alcohol use: Yes     Comment: RARE    Drug use: No    Sexual activity: Not on file   Other Topics Concern     Service Not Asked    Blood Transfusions Not Asked    Caffeine Concern Not Asked    Occupational Exposure Not Asked    Hobby Hazards Not Asked    Sleep Concern Not Asked    Stress Concern Not Asked    Weight Concern Not Asked    Special Diet Not Asked    Back Care Not Asked    Exercise No    Bike Helmet Not Asked    Seat Belt No    Self-Exams No   Social History Narrative    Womens Health Kit given.         Social Drivers of Health     Financial Resource Strain: Medium Risk (3/13/2024)    Received from [facility]    Financial Resource Strain     Difficulty of Paying Living Expenses: 2     Difficulty of Paying Living Expenses: 1   Food Insecurity: No Food Insecurity (5/29/2024)    Received from [facility]    Hunger Vital Sign     Worried About  Running Out of Food in the Last Year: Never true     Ran Out of Food in the Last Year: Never true   Transportation Needs: No Transportation Needs (5/29/2024)    Received from [facility]    PRAPARE - Transportation     Lack of Transportation (Medical): No     Lack of Transportation (Non-Medical): No   Physical Activity: Inactive (5/29/2024)    Received from [facility]    Exercise Vital Sign     Days of Exercise per Week: 0 days     Minutes of Exercise per Session: 0 min   Stress: No Stress Concern Present (2/24/2023)    Received from [facility]    [employer] [place] [employer] - Occupational Stress Questionnaire     Feeling of Stress : Not at all   Social Connections: Socially Integrated (3/13/2024)    Received from [employer] [facility] [address] [facility]    Social Connections     Do you often feel lonely or isolated from those around you?: 0   Interpersonal Safety: Not At Risk (2/24/2023)    Received from [facility]    Humiliation, Afraid, Rape, and Kick questionnaire     Fear of Current or Ex-Partner: No     Emotionally Abused: No     Physically Abused: No     Sexually Abused: No   Housing Stability: Low Risk  (5/29/2024)    Received from [facility]    Housing Stability     What is your living situation today?: I have a steady place to live          MEDICATIONS:  has a current medication list which includes the following prescription(s): albuterol, soothe xp, atenolol, azelastine, benzoyl peroxide, blood glucose, blood glucose monitoring, ciclopirox, cyclobenzaprine, dexamethasone, diclofenac, doxycycline hyclate, dupixent, econazole nitrate, famciclovir, fexofenadine, fidaxomicin, fluconazole, trelegy ellipta, insulin glargine, insulin lispro, onetouch delica plus [id number], lidocaine, lorazepam, losartan, methylprednisolone, mometasone, montelukast, multivitamin w/minerals, naloxone, naproxen, norethindrone, nystatin, pataday, omeprazole, oxycodone ir, prednisone, rifaximin, rosuvastatin,  spironolactone-hctz, terbinafine, mounjaro, and tobramycin-dexamethasone.    ROS:     ROS: 10 point ROS neg other than the symptoms noted above in the HPI.    GENERAL: some fatigue, wt gain; denies fevers, chills, night sweats.   HEENT: sinus drainage; no dysphagia, odonophagia, diplopia, neck pain  THYROID:  no apparent hyper or hypothyroid symptoms  CV: no chest pain, pressure, palpitations  LUNGS: some dyspnea; no SOB, ESRNA, cough, wheezing   ABDOMEN: no diarrhea, constipation, abdominal pain  EXTREMITIES: leg edema; no rashes, ulcers  NEUROLOGY: no headaches, denies changes in vision, tingling, extremitiy numbness   MSK: improved muscle strength, polyarthralgias- pains at neck, hands, hips, ankles, leg muscle twitching vs cramps  SKIN: lower back rash as noted, abdominal striae  : no menses with daily [place]   PSYCH:  stable mood, no significant anxiety or depression  ENDOCRINE: no heat or cold intolerance    Physical Exam (visual exam)  VS:  no vital signs taken for video visit  CONSTITUTIONAL: healthy, alert and NAD, well dressed, answering questions appropriately  ENT: no nose swelling or nasal discharge, mouth redness or gum changes.  EYES: eyes grossly normal to inspection, conjunctivae and sclerae normal, no exophthalmos or proptosis  THYROID:  no apparent nodules or goiter  LUNGS: no audible wheeze, cough or visible cyanosis, no visible retractions or increased work of breathing  ABDOMEN: abdomen not evaluated  EXTREMITIES: no hand tremors, limited exam  NEUROLOGY: CN grossly intact, mentation intact and speech normal   SKIN:  no apparent skin lesions, rash, or edema with visualized skin appearance  PSYCH: mentation appears normal, affect normal/bright, judgement and insight intact,   normal speech and appearance well groomed    LABS:    All pertinent notes, labs, and images personally reviewed by me.     A/P:  Encounter Diagnoses   Name Primary?    Secondary adrenal insufficiency Yes    Drug-induced  Cushing syndrome        Comments:  Reviewed the complicated health history and general endocrine issues.  Difficult to understand the endocrine concern by her [facility] providers  Low cortisol levels likely due to transient vs chronic suppression of pituitary-adrenal hormone axis, or hypopituitarism,   cortisol lab testing not helpful while on glucocorticoid medication treatment    Plan:  Reviewed the adrenal gland, general anatomy and hormone secretion  We discussed adrenal lab testing   Discussed steroid medication options, dosing    Recommend:  Patient has Cushing's syndrome while on high dose steroid medication treatment  She plans to continue the methylprednisolone medication per rheumatology or pulmonology plan, expects dose taper  Suggested she stop the Prednisone while on concurrent methylprednisolone... Pred not beneficial in that setting  If advised to stop methylprednisolone, then resume Prednisone 5 mg tablet daily   Discussed future Prednisone dosing options as Prednisone 7.5 mg vs 10 mg daily  Monitor for symptom changes  No adrenal gland imaging needed at this time  Keep focus on diet, exercise (as tolerated) and weight management  Consider dietician evaluation  Testosterone medication treatment not FDA approved, would not advise taking testosterone medication   Suggested she ask her [facility] physicians to call me if they have strong opinions about her taking testosterone medication  Patient may be having a [facility] consultation, reasonable plan  Follow-up diabetes evaluations with her PCP Dr. Juares    Addressed patient questions today    The longitudinal plan of care for the endocrine problem(s) were addressed during this visit.  Due to added complexity of care,   we will continue to support the patient and the subsequent management of this condition with ongoing continuity of care.    There are no Patient Instructions on file for this visit.    Future labs ordered today: No orders of the  defined types were placed in this encounter.    Radiology/Consults ordered today: None    Total time spent on day of encounter:  35 min    Follow-up:  9/2025 or jimbo Leroy MD, MS  Endocrinology  [facility]    CC: Care Team

## 2025-06-17 ENCOUNTER — APPOINTMENT (OUTPATIENT)
Dept: PHARMACY | Facility: CLINIC | Age: 50
End: 2025-06-17
Attending: INTERNAL MEDICINE
Payer: MEDICARE

## 2025-06-17 DIAGNOSIS — Z53.9 ERRONEOUS ENCOUNTER--DISREGARD: Primary | ICD-10-CM

## 2025-06-19 ENCOUNTER — INFUSION THERAPY VISIT (OUTPATIENT)
Dept: INFUSION THERAPY | Facility: HOSPITAL | Age: 50
End: 2025-06-19
Attending: INTERNAL MEDICINE
Payer: MEDICARE

## 2025-06-19 VITALS
DIASTOLIC BLOOD PRESSURE: 79 MMHG | HEART RATE: 98 BPM | RESPIRATION RATE: 18 BRPM | OXYGEN SATURATION: 95 % | TEMPERATURE: 98.6 F | SYSTOLIC BLOOD PRESSURE: 131 MMHG

## 2025-06-19 DIAGNOSIS — M06.9 RHEUMATOID ARTHRITIS, INVOLVING UNSPECIFIED SITE, UNSPECIFIED WHETHER RHEUMATOID FACTOR PRESENT (H): Primary | ICD-10-CM

## 2025-06-19 LAB
CD19 B CELL COMMENT: NORMAL
CD19 CELLS # BLD: 216 CELLS/UL (ref 107–698)
CD19 CELLS NFR BLD: 11 % (ref 6–27)
IGA SERPL-MCNC: 110 MG/DL (ref 84–499)
IGG SERPL-MCNC: 488 MG/DL (ref 610–1616)
IGM SERPL-MCNC: 74 MG/DL (ref 35–242)

## 2025-06-19 PROCEDURE — 250N000011 HC RX IP 250 OP 636: Performed by: INTERNAL MEDICINE

## 2025-06-19 PROCEDURE — 250N000013 HC RX MED GY IP 250 OP 250 PS 637: Performed by: INTERNAL MEDICINE

## 2025-06-19 PROCEDURE — 36415 COLL VENOUS BLD VENIPUNCTURE: CPT | Performed by: INTERNAL MEDICINE

## 2025-06-19 PROCEDURE — 86355 B CELLS TOTAL COUNT: CPT | Performed by: INTERNAL MEDICINE

## 2025-06-19 PROCEDURE — 82784 ASSAY IGA/IGD/IGG/IGM EACH: CPT | Performed by: INTERNAL MEDICINE

## 2025-06-19 PROCEDURE — 258N000003 HC RX IP 258 OP 636: Performed by: INTERNAL MEDICINE

## 2025-06-19 RX ORDER — ALBUTEROL SULFATE 0.83 MG/ML
2.5 SOLUTION RESPIRATORY (INHALATION)
Status: DISCONTINUED | OUTPATIENT
Start: 2025-06-19 | End: 2025-06-19 | Stop reason: HOSPADM

## 2025-06-19 RX ORDER — ACETAMINOPHEN 325 MG/1
650 TABLET ORAL ONCE
Status: COMPLETED | OUTPATIENT
Start: 2025-06-19 | End: 2025-06-19

## 2025-06-19 RX ORDER — METHYLPREDNISOLONE SODIUM SUCCINATE 125 MG/2ML
125 INJECTION INTRAMUSCULAR; INTRAVENOUS ONCE
Status: COMPLETED | OUTPATIENT
Start: 2025-06-19 | End: 2025-06-19

## 2025-06-19 RX ORDER — METHYLPREDNISOLONE SODIUM SUCCINATE 40 MG/ML
40 INJECTION INTRAMUSCULAR; INTRAVENOUS
Status: DISCONTINUED | OUTPATIENT
Start: 2025-06-19 | End: 2025-06-19 | Stop reason: HOSPADM

## 2025-06-19 RX ORDER — DIPHENHYDRAMINE HYDROCHLORIDE 50 MG/ML
25 INJECTION, SOLUTION INTRAMUSCULAR; INTRAVENOUS
Status: DISCONTINUED | OUTPATIENT
Start: 2025-06-19 | End: 2025-06-19 | Stop reason: HOSPADM

## 2025-06-19 RX ORDER — DIPHENHYDRAMINE HYDROCHLORIDE 50 MG/ML
25 INJECTION, SOLUTION INTRAMUSCULAR; INTRAVENOUS
Start: 2025-07-03

## 2025-06-19 RX ORDER — ALBUTEROL SULFATE 90 UG/1
1-2 INHALANT RESPIRATORY (INHALATION)
Status: DISCONTINUED | OUTPATIENT
Start: 2025-06-19 | End: 2025-06-19 | Stop reason: HOSPADM

## 2025-06-19 RX ORDER — DIPHENHYDRAMINE HYDROCHLORIDE 50 MG/ML
50 INJECTION, SOLUTION INTRAMUSCULAR; INTRAVENOUS
Start: 2025-07-03

## 2025-06-19 RX ORDER — METHYLPREDNISOLONE SODIUM SUCCINATE 40 MG/ML
40 INJECTION INTRAMUSCULAR; INTRAVENOUS
Start: 2025-07-03

## 2025-06-19 RX ORDER — HEPARIN SODIUM,PORCINE 10 UNIT/ML
5-20 VIAL (ML) INTRAVENOUS DAILY PRN
OUTPATIENT
Start: 2025-07-03

## 2025-06-19 RX ORDER — ACETAMINOPHEN 325 MG/1
650 TABLET ORAL ONCE
Start: 2025-07-03

## 2025-06-19 RX ORDER — HEPARIN SODIUM (PORCINE) LOCK FLUSH IV SOLN 100 UNIT/ML 100 UNIT/ML
5 SOLUTION INTRAVENOUS
OUTPATIENT
Start: 2025-07-03

## 2025-06-19 RX ORDER — ALBUTEROL SULFATE 90 UG/1
1-2 INHALANT RESPIRATORY (INHALATION)
Start: 2025-07-03

## 2025-06-19 RX ORDER — ALBUTEROL SULFATE 0.83 MG/ML
2.5 SOLUTION RESPIRATORY (INHALATION)
OUTPATIENT
Start: 2025-07-03

## 2025-06-19 RX ORDER — METHYLPREDNISOLONE SODIUM SUCCINATE 125 MG/2ML
125 INJECTION INTRAMUSCULAR; INTRAVENOUS ONCE
OUTPATIENT
Start: 2025-07-03

## 2025-06-19 RX ORDER — EPINEPHRINE 1 MG/ML
0.3 INJECTION, SOLUTION INTRAMUSCULAR; SUBCUTANEOUS EVERY 5 MIN PRN
OUTPATIENT
Start: 2025-07-03

## 2025-06-19 RX ORDER — DIPHENHYDRAMINE HYDROCHLORIDE 50 MG/ML
50 INJECTION, SOLUTION INTRAMUSCULAR; INTRAVENOUS
Status: DISCONTINUED | OUTPATIENT
Start: 2025-06-19 | End: 2025-06-19 | Stop reason: HOSPADM

## 2025-06-19 RX ORDER — EPINEPHRINE 1 MG/ML
0.3 INJECTION, SOLUTION INTRAMUSCULAR; SUBCUTANEOUS EVERY 5 MIN PRN
Status: DISCONTINUED | OUTPATIENT
Start: 2025-06-19 | End: 2025-06-19 | Stop reason: HOSPADM

## 2025-06-19 RX ORDER — DIPHENHYDRAMINE HCL 50 MG
50 CAPSULE ORAL ONCE
Start: 2025-07-03

## 2025-06-19 RX ADMIN — DIPHENHYDRAMINE HYDROCHLORIDE 50 MG: 50 INJECTION, SOLUTION INTRAMUSCULAR; INTRAVENOUS at 09:27

## 2025-06-19 RX ADMIN — SODIUM CHLORIDE 250 ML: 0.9 INJECTION, SOLUTION INTRAVENOUS at 09:28

## 2025-06-19 RX ADMIN — ACETAMINOPHEN 650 MG: 325 TABLET ORAL at 09:23

## 2025-06-19 RX ADMIN — METHYLPREDNISOLONE SODIUM SUCCINATE 125 MG: 125 INJECTION INTRAMUSCULAR; INTRAVENOUS at 09:51

## 2025-06-19 RX ADMIN — SODIUM CHLORIDE 1000 MG: 0.9 INJECTION, SOLUTION INTRAVENOUS at 10:20

## 2025-06-19 ASSESSMENT — PAIN SCALES - GENERAL: PAINLEVEL_OUTOF10: SEVERE PAIN (10)

## 2025-06-19 NOTE — PROGRESS NOTES
Infusion Nursing Note:  Laxmi Ya presents today for C#1 D#1 Rituximab.    Patient seen by provider today: No   present during visit today: Not Applicable.    Note: Patient assessed and vital signs stable. Laxmi mentioned she did have an ingrown toe-nail removed and has not started the antibiotics yet. She said she did have an appointment with her provider, they are aware and they stated she can start it anytime.    Administered premedications and Rituxan (started at 50ml/hr and increased by 50ml/hr every 30 minutes to a max rate of 400 ml/hr). Laxmi did have dry mouth and some lightheadedness from the Benadryl. Explained next infusion we can do oral instead of IV. Infusion tolerated well.    Intravenous Access:  Labs drawn without difficulty.  Peripheral IV placed.    Treatment Conditions:  Not Applicable.    Post Infusion Assessment:  Patient tolerated infusion without incident.  Blood return noted pre and post infusion.  Site patent and intact, free from redness, edema or discomfort.  No evidence of extravasations.  Access discontinued per protocol.     Discharge Plan:   Patient verbalized understanding of discharge instructions and all questions answered.  Patient discharged in stable condition accompanied by: self.  Departure Mode: Ambulatory.      ADORE STERN RN  ]

## 2025-06-19 NOTE — PROGRESS NOTES
~~~ NOTE: If the patient answers yes to any of the questions below, hold the infusion and contact ordering provider or on-call provider.    Do you currently have any signs of illness or infection or are you on any antibiotics? No  Have you recently had an elevated temperature, fever, chills, productive cough, coughing for 3 weeks or longer or hemoptysis, abnormal vital signs, night sweats, chest pain or have you noticed a decrease in your appetite, unexplained weight loss or fatigue? No  Have you had any new, sudden, or worsening abdominal pain? No  Do you have any open wounds or new incisions? (exclude for patients with hidradenitis suppurativa) No  Have you recently been diagnosed with any new nervous system diseases (ie. Multiple sclerosis, Guillain [place], seizures, neurological changes) or cancer diagnosis? Are you on any form of radiation or chemotherapy? No  Have there been any other new onset medical symptoms? No  Are you pregnant or breast feeding or do you have plans of pregnancy in the future? No; N/A  Do you have any upcoming hospitalizations or surgeries? Does not include esophagogastroduodenoscopy, colonoscopy, endoscopic retrograde cholangiopancreatography (ERCP), endoscopic ultrasound (EUS), dental procedures (including cleanings, fillings, implants, extractions)  or joint aspiration/steroid injections No  Have you or anyone in your household received a live vaccination in the past 4 weeks? Please note: No live vaccines while on biologic/chemotherapy until 6 months after the last treatment. Patient can receive the flu vaccine (shot only).  It is optimal for the patient to get it mid cycle, but it can be given at any time as long as it is not on the day of the infusion. No  If applicable to prescribed medication, confirm negative PPD or quantiferon gold MTB. If positive, verify has negative chest x-ray or the patient is at least 4 weeks post initiation of INH/B6 therapy and have clearance from provider  before infusion (Y/N:[id number])  If applicable to prescribed medication, confirm negative hepatitis B surface antigen or hepatitis C. If positive, clearance from provider before infusion. (Y/N: [id number])  Rheumatology patients receiving tocilizumab (Actemra): If labs were drawn within the past week, hold dosing until cleared to infuse If AST/ALT > 2 X upper limit normal; ANC < 1.0. NO; N/A  Patients receiving belimumab (Benlysta): Have you been having any signs of worsening depression or suicidal ideations? No; N/A

## 2025-06-21 ENCOUNTER — HEALTH MAINTENANCE LETTER (OUTPATIENT)
Age: 50
End: 2025-06-21

## 2025-06-26 ENCOUNTER — OFFICE VISIT (OUTPATIENT)
Dept: ORTHOPEDICS | Facility: CLINIC | Age: 50
End: 2025-06-26
Payer: MEDICARE

## 2025-06-26 DIAGNOSIS — G56.03 BILATERAL CARPAL TUNNEL SYNDROME: Primary | ICD-10-CM

## 2025-06-26 RX ORDER — TRIAMCINOLONE ACETONIDE 40 MG/ML
20 INJECTION, SUSPENSION INTRA-ARTICULAR; INTRAMUSCULAR
Status: COMPLETED | OUTPATIENT
Start: 2025-06-26 | End: 2025-06-26

## 2025-06-26 RX ORDER — LIDOCAINE HYDROCHLORIDE 10 MG/ML
2 INJECTION, SOLUTION EPIDURAL; INFILTRATION; INTRACAUDAL; PERINEURAL
Status: COMPLETED | OUTPATIENT
Start: 2025-06-26 | End: 2025-06-26

## 2025-06-26 RX ADMIN — LIDOCAINE HYDROCHLORIDE 2 ML: 10 INJECTION, SOLUTION EPIDURAL; INFILTRATION; INTRACAUDAL; PERINEURAL at 10:46

## 2025-06-26 RX ADMIN — TRIAMCINOLONE ACETONIDE 20 MG: 40 INJECTION, SUSPENSION INTRA-ARTICULAR; INTRAMUSCULAR at 10:46

## 2025-06-26 NOTE — LETTER
2025      Laxmi Ya  [address] [place] [id number]      Dear Colleague,    Thank you for referring your patient, Laxmi Ya, to the [facility]. Please see a copy of my visit note below.    Hand / Upper Extremity Injection/Arthrocentesis: bilateral carpal tunnel    Date/Time: 2025 10:46 AM    Performed by: Twila Petit MD  Authorized by: Twila Petit MD    Indications:  Pain  Needle Size:  25 G  Guidance: landmark    Condition: carpal tunnel    Laterality:  Bilateral    Site:  Bilateral carpal tunnel  Medications (Right):  20 mg triamcinolone 40 MG/ML; 2 mL lidocaine (PF) 1 %  Medications (Left):  20 mg triamcinolone 40 MG/ML; 2 mL lidocaine (PF) 1 %  Outcome:  Tolerated well, no immediate complications  Procedure discussed: discussed risks, benefits, and alternatives    Consent Given by:  Patient  Timeout: timeout called immediately prior to procedure    Prep: patient was prepped and draped in usual sterile fashion        [facility] [address] [id number] [phone]  Dept: [phone]  ______________________________________________________________________________    Patient: Laxmi Ya   : 1975   MRN: [id number]   2025    INVASIVE PROCEDURE SAFETY CHECKLIST    Date: 2025   Procedure:Bilateral carpal tunnel steroid injections  Patient Name: Laxmi Ya  MRN: [id number]  YOB: 1975    Action: Complete sections as appropriate. Any discrepancy results in a HARD COPY until resolved.     PRE PROCEDURE:  Patient ID verified with 2 identifiers (name and  or MRN): Yes  Procedure and site verified with patient/designee (when able): Yes  Accurate consent documentation in medical record: Yes  H&P (or appropriate assessment) documented in medical record: Yes  H&P must be up to 20 days prior to procedure and updates  within 24 hours of procedure as applicable: Yes  Relevant diagnostic and radiology test results appropriately labeled and displayed as applicable: NA  Procedure site(s) marked with provider initials: NA    TIMEOUT:  Time-Out performed immediately prior to starting procedure, including verbal and active participation of all team members addressing the following:Yes  * Correct patient identify  * Confirmed that the correct side and site are marked  * An accurate procedure consent form  * Agreement on the procedure to be done  * Correct patient position  * Relevant images and results are properly labeled and appropriately displayed  * The need to administer antibiotics or fluids for irrigation purposes during the procedure as applicable   * Safety precautions based on patient history or medication use    DURING PROCEDURE: Verification of correct person, site, and procedures any time the responsibility for care of the patient is transferred to another member of the care team.       The following medications were given:         Prior to injection, verified patient identity using patient's name and date of birth.  Due to injection administration, patient instructed to remain in clinic for 15 minutes  afterwards, and to report any adverse reaction to me immediately.    Tendon sheath injection was performed.   Medication Name: Kenalog 40 mg/mL NDC [address]--4  Drug Amount Wasted:  None.  Vial/Syringe: Single dose vial  Expiration Date:  11/1/26    Medication Name Lidocaine 1% NDC 57175-494-10    Scribed by DUY RESENDEZ, ATC for Dr. Petit on June 26, 2025 at 11:00am based on the provider's statements to me.     DUY RESENDEZ, ATC         Diagnosis: bilateral thumb CMC/STT arthritis, bilateral carpal tunnel syndrome, bilateral cubital tunnel syndrome.  Patient comes in today requesting bilateral carpal tunnel cortisone injections.  She reports that her pain has been severe bilaterally with tingling and numbness down into  her fingers.  She has been seen by [facility] and they are starting a new medication,Rituxamab, for her inflammatory arthritis.  She also is getting a bone marrow biopsy.  She has quite a bit of medical diagnostic testing that has been done and is under their care and management.  She continues to get breakthrough symptoms and continues to request cortisone which will be delivered on an as-needed basis.    Treatments:   June 26, 2025 lateral carpal tunnel injections 20 mg each Trino Aguilar  May 8, 2025 bilateral thumb STT injections 20 mg each Trino Aguilar  April 24, 2025 bilateral thumb CMC injections 20 mg Trino Aguilar  April 10, 2025 bilateral carpal tunnel injections 20 mg Trino Aguilar  January 23, 2025 bilateral thumb STT (20 mg) and CMC (20 mg) injections (Trino Aguilar)  10/25/2024 bilateral thumb STT injection (20 mg) he bilateral carpal tunnel injections (20 mg) (Trino Aguilar)  10/9/2024: bilateral thumb CMC steroid injection, cubital tunnel steroid injections (Vakshori_  8/15/2024: bilateral STT steroid injection (Dr. Petit)  6/6/2024: bilateral carpal tunnel steroid injection (Dr. Petit)  5/30/2024: bilateral STT steroid injection (Dr. Petit)  5/21/2024: bilateral cubital tunnel steroid injections  12/27/2023: bilateral carpal tunnel steroid injection  12/21/2023: bilateral STT steroid injection (Dr. Petit)  11/1/2023: bilateral carpal tunnel steroid injection  10/18/2023: Bilateral thumb CMC steroid injection  9/20/2023: bilateral STT steroid injection  5/4/2023: bilateral carpal tunnel steroid injection (Dr. Petit)  2/16/2023: bilateral STT steroid injection (Dr. Petit)  12/13/2022: bilateral carpal tunnel steroid injection (Dr. Sullivan)  10/27/2022: bilateral STT steroid injections (Dr. Petit)  9/13/2022: bilateral carpal tunnel steroid injection (Dr. Sullivan)  6/30/2022: bilateral STT steroid injections (Dr. Petit)  Bilateral carpal tunnel steroid injections (outside)     PROCEDURE:   After written consent, verifying site and side, a sterile Chlora- prep was performed for the injection site.  Palpation was used for placement of a 25-gauge needle into the left carpal tunnel with 1% Lidocaine with no dysesthesias and full range of motion of the flexor tendons.  1 mL of Kenalog (20mg) and 1 mL of lidocaine was injected using dynamic technique with good relief of pain.  Identical procedure was carried out on the right carpal tunnel patient tolerated the procedure well.  No complications.    We discussed that if the rheumatologist can find an intravenous injectable or oral medication that provides effective anti-inflammatory activity against all of the joints which are problematic for her, this would be ideal as the injections that we have been performing would be unnecessary.  She will keep us updated as her medical workup continues.    Twila Petit MD   Hand and Upper Extremity Specialist  [facility]      Again, thank you for allowing me to participate in the care of your patient.        Sincerely,        Twila Petit MD    Electronically signed

## 2025-06-26 NOTE — NURSING NOTE
Reason For Visit:   Chief Complaint   Patient presents with    RECHECK     Follow-up Bilateral carpal tunnel repeat steroid injections       Primary MD: Amol Juares  Ref. MD: Katja    Age: 50 year old    ?  No      There were no vitals taken for this visit.      Pain Assessment  Patient Currently in Pain: Yes  0-10 Pain Scale: 10  Primary Pain Location: Hand (bilateral wrist and hands)  Pain Descriptors: Intermittent, Sharp, Shooting, Pins and needles  Alleviating Factors: Pain medication, NSAIDS    Hand Dominance Evaluation  Hand Dominance: Right      force  R hand pincher force: 3.629 kg (8 lb)  R hand  level 2 force: 13.2 kg (29 lb)  L hand pincher force: 3.629 kg (8 lb)  L hand  level  2 force: 9.526 kg (21 lb)    QuickDASH Assessment      6/26/2025    10:18 AM   QuickDASH Main   1. Open a tight or new jar Unable   2. Do heavy household chores (e.g., wash walls, floors) Unable   3. Carry a shopping bag or briefcase Unable   4. Wash your back Severe difficulty   5. Use a knife to cut food Unable   6. Recreational activities in which you take some force or impact through your arm, shoulder or hand (e.g., golf, hammering, tennis, etc.) Unable   7. During the past week, to what extent has your arm, shoulder or hand problem interfered with your normal social activities with family, friends, neighbours or groups Extremely   8. During the past week, were you limited in your work or other regular daily activities as a result of your arm, shoulder or hand problem Unable   9. Arm, shoulder or hand pain Extreme   10.Tingling (pins and needles) in your arm,shoulder or hand Severe   11. During the past week, how much difficulty have you had sleeping because of the pain in your arm, shoulder or hand Severe difficulty   Quickdash Ability Score 93.18          Current Outpatient Medications   Medication Sig Dispense Refill    albuterol (PROAIR HFA/PROVENTIL HFA/VENTOLIN HFA) 108 (90 Base) MCG/ACT  inhaler Inhale 2 puffs into the lungs every 6 hours as needed for shortness of breath, wheezing or cough      Artificial Tear Solution (SOOTHE XP) SOLN as needed      atenolol (TENORMIN) 50 MG tablet Take 50 mg by mouth daily      azelastine 137 MCG/SPRAY SOLN USE 2 SPRAYS IN EACH NOSTRIL 2 TIMES DAILY FOR 1 MONTH      benzoyl peroxide 5 % external liquid Use daily as directed. Preferred bdrand: Medpura 236 mL 11    blood glucose (NO BRAND SPECIFIED) test strip Use to test blood sugar 2 times daily or as directed. 100 strip 1    blood glucose monitoring (NO BRAND SPECIFIED) meter device kit Use to test blood sugar 2 times daily or as directed. 1 kit 0    ciclopirox (LOPROX) 0.77 % cream Apply topically 2 times daily To affected toenails. 90 g 5    cyclobenzaprine (FLEXERIL) 5 MG tablet Take 5 mg by mouth.      dexAMETHasone (DECADRON) 0.1 % ophthalmic solution       diclofenac (VOLTAREN) 1 % topical gel Apply topically 4 times daily as needed      doxycycline hyclate (VIBRAMYCIN) 100 MG capsule Take 100 mg by mouth 2 times daily.      dupilumab (DUPIXENT) 300 MG/2ML prefilled pen Inject 2 mLs (300 mg) subcutaneously every 14 days. 4 mL 5    econazole nitrate 1 % external cream Apply topically daily. To affected toenails. 85 g 5    famciclovir (FAMVIR) 500 MG tablet Take 1 tablet (500 mg) by mouth 2 times daily. 180 tablet 3    fexofenadine (ALLEGRA) 180 MG tablet Take 1 tablet (180 mg) by mouth daily 90 tablet 2    fidaxomicin (DIFICID) 200 MG tablet Take 200 mg by mouth as needed      fluconazole (DIFLUCAN) 150 MG tablet Take 150 mg by mouth.      Fluticasone-Umeclidin-Vilanterol (TRELEGY ELLIPTA) 200-62.5-25 MCG/ACT oral inhaler Inhale 1 puff into the lungs daily. 90 each 3    insulin glargine (LANTUS PEN) 100 UNIT/ML pen Inject 50 Units subcutaneously daily      insulin lispro (HUMALOG KWIKPEN) 100 UNIT/ML (1 unit dial) KWIKPEN Inject 35 Units subcutaneously 3 times daily (before meals) Plus 1:25 correction scale  if BG >150      Lancets (ONETOUCH DELICA PLUS [id number]) MISC CHECK BLOOD GLUCOSE 3 TIMES DAILY, ADJUSTING MEDICATION, DX CODE E 11.9, ON INSULIN      lidocaine (LIDODERM) 5 % patch as needed   1    LORazepam (ATIVAN) 0.5 MG tablet Take 0.25 mg by mouth every 8 hours as needed      losartan (COZAAR) 100 MG tablet Take 100 mg by mouth daily      methylPREDNISolone (MEDROL) 4 MG tablet Take 4 tabs daily for 1 week, then take 3 tabs daily for 1 week 49 tablet 0    mometasone (NASONEX) 50 MCG/ACT nasal spray Spray 2 sprays into both nostrils daily. 51 g 3    montelukast (SINGULAIR) 10 MG tablet Take 10 mg by mouth daily.      multivitamin w/minerals (THERA-VIT-M) tablet Take 1 tablet by mouth daily      naloxone (NARCAN) 4 MG/0.1ML nasal spray       naproxen (NAPROSYN) 375 MG tablet Take 375 mg by mouth.      norethindrone (MICRONOR) 0.35 MG tablet Take 1 tablet (0.35 mg) by mouth daily 90 tablet 3    nystatin (MYCOSTATIN) 379596 UNIT/ML suspension       olopatadine (PATADAY) 0.7 % ophthalmic solution Apply 1 drop to eye daily PRN      omeprazole (PRILOSEC) 40 MG DR capsule TAKE 1 CAPSULE (40 MG) BY MOUTH TWO TIMES A DAY. TAKE 1 HOUR BEFORE A MEAL.      oxyCODONE IR (ROXICODONE) 10 MG tablet Take 10 mg by mouth      predniSONE (DELTASONE) 5 MG tablet TAKE 1 TABLET BY MOUTH EVERY DAY 90 tablet 1    rifaximin (XIFAXAN) 550 MG TABS tablet Take 200 mg by mouth 3 times daily Take for 10 days, has on had as needed for small bowel bacteria overgrowh      rosuvastatin (CRESTOR) 5 MG tablet Take 2 tablets by mouth daily      spironolactone-HCTZ (ALDACTAZIDE) 25-25 MG tablet Take 1 tablet by mouth daily      terbinafine (LAMISIL) 250 MG tablet Take 1 tablet by mouth daily.      tirzepatide (MOUNJARO) 2.5 MG/0.5ML pen Inject 2.5 mg subcutaneously every 7 days.      tobramycin-dexAMETHasone (TOBRADEX) 0.3-0.1 % ophthalmic suspension PLACE 1 DROP INTO BOTH EYES TWO TIMES A DAY.         Allergies   Allergen Reactions    Polyethylene  Glycol Rash    Codeine      Other reaction(s): Gastrointestinal, GI intolerance, Vomiting    Vomiting    Hydrocodone Nausea and Vomiting    Morphine Nausea and Vomiting    Sulfasalazine      Other reaction(s): GI intolerance    Has tried and had nausa.    Tramadol Nausea and Vomiting     Other reaction(s): Gastrointestinal, Other (see comments)    Nausea and vomiting     unknown    Acetaminophen Nausea     Nausea and vomiting    Gluten Meal Other (See Comments) and Rash     Other reaction(s): Gastrointestinal, GI intolerance  Dizziness, tired, rash, stomach cramps, thrush, mouth sores  Dizziness, tired, rash, stomach cramps, thrush, mouth sores      Hydroxyzine GI Disturbance and Nausea     nausea, dry mouth    Propylene Glycol Dizziness, Nausea and Vomiting, Nausea and Rash       DUY RESENDEZ, ATC

## 2025-06-26 NOTE — PROGRESS NOTES
Diagnosis: bilateral thumb CMC/STT arthritis, bilateral carpal tunnel syndrome, bilateral cubital tunnel syndrome.  Patient comes in today requesting bilateral carpal tunnel cortisone injections.  She reports that her pain has been severe bilaterally with tingling and numbness down into her fingers.  She has been seen by [facility] and they are starting a new medication,Rituxamab, for her inflammatory arthritis.  She also is getting a bone marrow biopsy.  She has quite a bit of medical diagnostic testing that has been done and is under their care and management.  She continues to get breakthrough symptoms and continues to request cortisone which will be delivered on an as-needed basis.    Treatments:   June 26, 2025 lateral carpal tunnel injections 20 mg each Trino Aguilar  May 8, 2025 bilateral thumb STT injections 20 mg each Trino Aguilar  April 24, 2025 bilateral thumb CMC injections 20 mg Trino Aguilar  April 10, 2025 bilateral carpal tunnel injections 20 mg Trino Aguilar  January 23, 2025 bilateral thumb STT (20 mg) and CMC (20 mg) injections (Trino Aguilar)  10/25/2024 bilateral thumb STT injection (20 mg) he bilateral carpal tunnel injections (20 mg) (Trino Aguilar)  10/9/2024: bilateral thumb CMC steroid injection, cubital tunnel steroid injections (Vakshori_  8/15/2024: bilateral STT steroid injection (Dr. Petit)  6/6/2024: bilateral carpal tunnel steroid injection (Dr. Petit)  5/30/2024: bilateral STT steroid injection (Dr. Petit)  5/21/2024: bilateral cubital tunnel steroid injections  12/27/2023: bilateral carpal tunnel steroid injection  12/21/2023: bilateral STT steroid injection (Dr. Petit)  11/1/2023: bilateral carpal tunnel steroid injection  10/18/2023: Bilateral thumb CMC steroid injection  9/20/2023: bilateral STT steroid injection  5/4/2023: bilateral carpal tunnel steroid injection (Dr. Petit)  2/16/2023: bilateral STT steroid injection (Dr. Petit)  12/13/2022: bilateral carpal tunnel  steroid injection (Dr. Sullivan)  10/27/2022: bilateral STT steroid injections (Dr. Petit)  9/13/2022: bilateral carpal tunnel steroid injection (Dr. Sullivan)  6/30/2022: bilateral STT steroid injections (Dr. Petit)  Bilateral carpal tunnel steroid injections (outside)     PROCEDURE:  After written consent, verifying site and side, a sterile Chlora- prep was performed for the injection site.  Palpation was used for placement of a 25-gauge needle into the left carpal tunnel with 1% Lidocaine with no dysesthesias and full range of motion of the flexor tendons.  1 mL of Kenalog (20mg) and 1 mL of lidocaine was injected using dynamic technique with good relief of pain.  Identical procedure was carried out on the right carpal tunnel patient tolerated the procedure well.  No complications.    We discussed that if the rheumatologist can find an intravenous injectable or oral medication that provides effective anti-inflammatory activity against all of the joints which are problematic for her, this would be ideal as the injections that we have been performing would be unnecessary.  She will keep us updated as her medical workup continues.    Twila Petit MD   Hand and Upper Extremity Specialist  [facility]

## 2025-06-26 NOTE — PROGRESS NOTES
Hand / Upper Extremity Injection/Arthrocentesis: bilateral carpal tunnel    Date/Time: 2025 10:46 AM    Performed by: Twila Petit MD  Authorized by: Twila Petit MD    Indications:  Pain  Needle Size:  25 G  Guidance: landmark    Condition: carpal tunnel    Laterality:  Bilateral    Site:  Bilateral carpal tunnel  Medications (Right):  20 mg triamcinolone 40 MG/ML; 2 mL lidocaine (PF) 1 %  Medications (Left):  20 mg triamcinolone 40 MG/ML; 2 mL lidocaine (PF) 1 %  Outcome:  Tolerated well, no immediate complications  Procedure discussed: discussed risks, benefits, and alternatives    Consent Given by:  Patient  Timeout: timeout called immediately prior to procedure    Prep: patient was prepped and draped in usual sterile fashion        [facility] [address] [address]  [phone]  Dept: [phone]  ______________________________________________________________________________    Patient: Laxmi Ya   : 1975   MRN: [id number]   2025    INVASIVE PROCEDURE SAFETY CHECKLIST    Date: 2025   Procedure:Bilateral carpal tunnel steroid injections  Patient Name: Laxmi Ya  MRN: [id number]  YOB: 1975    Action: Complete sections as appropriate. Any discrepancy results in a HARD COPY until resolved.     PRE PROCEDURE:  Patient ID verified with 2 identifiers (name and  or MRN): Yes  Procedure and site verified with patient/designee (when able): Yes  Accurate consent documentation in medical record: Yes  H&P (or appropriate assessment) documented in medical record: Yes  H&P must be up to 20 days prior to procedure and updates within 24 hours of procedure as applicable: Yes  Relevant diagnostic and radiology test results appropriately labeled and displayed as applicable: NA  Procedure site(s) marked with provider initials: NA    TIMEOUT:  Time-Out performed immediately  prior to starting procedure, including verbal and active participation of all team members addressing the following:Yes  * Correct patient identify  * Confirmed that the correct side and site are marked  * An accurate procedure consent form  * Agreement on the procedure to be done  * Correct patient position  * Relevant images and results are properly labeled and appropriately displayed  * The need to administer antibiotics or fluids for irrigation purposes during the procedure as applicable   * Safety precautions based on patient history or medication use    DURING PROCEDURE: Verification of correct person, site, and procedures any time the responsibility for care of the patient is transferred to another member of the care team.       The following medications were given:         Prior to injection, verified patient identity using patient's name and date of birth.  Due to injection administration, patient instructed to remain in clinic for 15 minutes  afterwards, and to report any adverse reaction to me immediately.    Tendon sheath injection was performed.   Medication Name: Kenalog 40 mg/mL NDC [address]--0  Drug Amount Wasted:  None.  Vial/Syringe: Single dose vial  Expiration Date:  11/1/26    Medication Name Lidocaine 1% NDC 51475-980-13    Scribed by DUY RESENDEZ, TIM for Dr. Petit on June 26, 2025 at 11:00am based on the provider's statements to me.     DUY RESENDEZ, ATC

## 2025-06-28 DIAGNOSIS — E27.49 SECONDARY ADRENAL INSUFFICIENCY: ICD-10-CM

## 2025-06-30 RX ORDER — PREDNISONE 5 MG/1
5 TABLET ORAL DAILY
Qty: 90 TABLET | Refills: 1 | Status: SHIPPED | OUTPATIENT
Start: 2025-06-30

## 2025-06-30 NOTE — TELEPHONE ENCOUNTER
Requested Prescriptions   Pending Prescriptions Disp Refills    predniSONE (DELTASONE) 5 MG tablet [Pharmacy Med Name: PREDNISONE 5 MG TABLET] 90 tablet 1     Sig: TAKE 1 TABLET BY MOUTH EVERY DAY       There is no refill protocol information for this order        Last Written Prescription Date:  1/8/25  Last Fill Quantity: 90 tab,  # refills: 1   Last office visit: Visit date not found ; last virtual visit: 6/16/2025 with prescribing provider:  Dr Leroy   Future Office Visit:  follow-up 9/2025 or PRN    Refill sent  Ángel Fuentes RN

## 2025-07-01 ENCOUNTER — VIRTUAL VISIT (OUTPATIENT)
Dept: PHARMACY | Facility: CLINIC | Age: 50
End: 2025-07-01
Attending: INTERNAL MEDICINE
Payer: MEDICARE

## 2025-07-01 ENCOUNTER — INFUSION THERAPY VISIT (OUTPATIENT)
Dept: INFUSION THERAPY | Facility: HOSPITAL | Age: 50
End: 2025-07-01
Attending: INTERNAL MEDICINE
Payer: MEDICARE

## 2025-07-01 VITALS
OXYGEN SATURATION: 95 % | TEMPERATURE: 98.4 F | RESPIRATION RATE: 18 BRPM | DIASTOLIC BLOOD PRESSURE: 71 MMHG | SYSTOLIC BLOOD PRESSURE: 121 MMHG | HEART RATE: 81 BPM

## 2025-07-01 DIAGNOSIS — M06.9 RHEUMATOID ARTHRITIS, INVOLVING UNSPECIFIED SITE, UNSPECIFIED WHETHER RHEUMATOID FACTOR PRESENT (H): Primary | ICD-10-CM

## 2025-07-01 PROCEDURE — 250N000011 HC RX IP 250 OP 636: Mod: JZ | Performed by: INTERNAL MEDICINE

## 2025-07-01 PROCEDURE — 250N000013 HC RX MED GY IP 250 OP 250 PS 637: Performed by: INTERNAL MEDICINE

## 2025-07-01 PROCEDURE — 96375 TX/PRO/DX INJ NEW DRUG ADDON: CPT

## 2025-07-01 PROCEDURE — 96413 CHEMO IV INFUSION 1 HR: CPT

## 2025-07-01 PROCEDURE — 258N000003 HC RX IP 258 OP 636: Performed by: INTERNAL MEDICINE

## 2025-07-01 RX ORDER — METHYLPREDNISOLONE SODIUM SUCCINATE 40 MG/ML
40 INJECTION INTRAMUSCULAR; INTRAVENOUS
Status: DISCONTINUED | OUTPATIENT
Start: 2025-07-01 | End: 2025-07-01 | Stop reason: HOSPADM

## 2025-07-01 RX ORDER — ALBUTEROL SULFATE 0.83 MG/ML
2.5 SOLUTION RESPIRATORY (INHALATION)
Status: DISCONTINUED | OUTPATIENT
Start: 2025-07-01 | End: 2025-07-01 | Stop reason: HOSPADM

## 2025-07-01 RX ORDER — DIPHENHYDRAMINE HYDROCHLORIDE 50 MG/ML
50 INJECTION, SOLUTION INTRAMUSCULAR; INTRAVENOUS
Start: 2025-07-03

## 2025-07-01 RX ORDER — EPINEPHRINE 1 MG/ML
0.3 INJECTION, SOLUTION INTRAMUSCULAR; SUBCUTANEOUS EVERY 5 MIN PRN
Status: DISCONTINUED | OUTPATIENT
Start: 2025-07-01 | End: 2025-07-01 | Stop reason: HOSPADM

## 2025-07-01 RX ORDER — ACETAMINOPHEN 325 MG/1
650 TABLET ORAL ONCE
Start: 2025-07-03

## 2025-07-01 RX ORDER — METHYLPREDNISOLONE SODIUM SUCCINATE 125 MG/2ML
125 INJECTION INTRAMUSCULAR; INTRAVENOUS ONCE
Status: COMPLETED | OUTPATIENT
Start: 2025-07-01 | End: 2025-07-01

## 2025-07-01 RX ORDER — DIPHENHYDRAMINE HCL 50 MG
50 CAPSULE ORAL ONCE
Status: COMPLETED | OUTPATIENT
Start: 2025-07-01 | End: 2025-07-01

## 2025-07-01 RX ORDER — DIPHENHYDRAMINE HYDROCHLORIDE 50 MG/ML
25 INJECTION, SOLUTION INTRAMUSCULAR; INTRAVENOUS
Status: DISCONTINUED | OUTPATIENT
Start: 2025-07-01 | End: 2025-07-01 | Stop reason: HOSPADM

## 2025-07-01 RX ORDER — DIPHENHYDRAMINE HYDROCHLORIDE 50 MG/ML
25 INJECTION, SOLUTION INTRAMUSCULAR; INTRAVENOUS
Start: 2025-07-03

## 2025-07-01 RX ORDER — METHYLPREDNISOLONE SODIUM SUCCINATE 40 MG/ML
40 INJECTION INTRAMUSCULAR; INTRAVENOUS
Start: 2025-07-03

## 2025-07-01 RX ORDER — METHYLPREDNISOLONE SODIUM SUCCINATE 125 MG/2ML
125 INJECTION INTRAMUSCULAR; INTRAVENOUS ONCE
OUTPATIENT
Start: 2025-07-03

## 2025-07-01 RX ORDER — DIPHENHYDRAMINE HYDROCHLORIDE 50 MG/ML
50 INJECTION, SOLUTION INTRAMUSCULAR; INTRAVENOUS
Status: DISCONTINUED | OUTPATIENT
Start: 2025-07-01 | End: 2025-07-01 | Stop reason: HOSPADM

## 2025-07-01 RX ORDER — HEPARIN SODIUM (PORCINE) LOCK FLUSH IV SOLN 100 UNIT/ML 100 UNIT/ML
5 SOLUTION INTRAVENOUS
OUTPATIENT
Start: 2025-07-03

## 2025-07-01 RX ORDER — ALBUTEROL SULFATE 0.83 MG/ML
2.5 SOLUTION RESPIRATORY (INHALATION)
OUTPATIENT
Start: 2025-07-03

## 2025-07-01 RX ORDER — EPINEPHRINE 1 MG/ML
0.3 INJECTION, SOLUTION INTRAMUSCULAR; SUBCUTANEOUS EVERY 5 MIN PRN
OUTPATIENT
Start: 2025-07-03

## 2025-07-01 RX ORDER — HEPARIN SODIUM,PORCINE 10 UNIT/ML
5-20 VIAL (ML) INTRAVENOUS DAILY PRN
OUTPATIENT
Start: 2025-07-03

## 2025-07-01 RX ORDER — ACETAMINOPHEN 325 MG/1
650 TABLET ORAL ONCE
Status: COMPLETED | OUTPATIENT
Start: 2025-07-01 | End: 2025-07-01

## 2025-07-01 RX ORDER — ALBUTEROL SULFATE 90 UG/1
1-2 INHALANT RESPIRATORY (INHALATION)
Start: 2025-07-03

## 2025-07-01 RX ORDER — ALBUTEROL SULFATE 90 UG/1
1-2 INHALANT RESPIRATORY (INHALATION)
Status: DISCONTINUED | OUTPATIENT
Start: 2025-07-01 | End: 2025-07-01 | Stop reason: HOSPADM

## 2025-07-01 RX ORDER — DIPHENHYDRAMINE HCL 50 MG
50 CAPSULE ORAL ONCE
Start: 2025-07-03

## 2025-07-01 RX ADMIN — SODIUM CHLORIDE 250 ML: 0.9 INJECTION, SOLUTION INTRAVENOUS at 13:52

## 2025-07-01 RX ADMIN — ACETAMINOPHEN 650 MG: 325 TABLET ORAL at 14:01

## 2025-07-01 RX ADMIN — RITUXIMAB-ABBS 1000 MG: 10 INJECTION, SOLUTION INTRAVENOUS at 14:40

## 2025-07-01 RX ADMIN — METHYLPREDNISOLONE SODIUM SUCCINATE 125 MG: 125 INJECTION INTRAMUSCULAR; INTRAVENOUS at 14:02

## 2025-07-01 RX ADMIN — DIPHENHYDRAMINE HYDROCHLORIDE 50 MG: 50 CAPSULE ORAL at 14:01

## 2025-07-01 NOTE — PROGRESS NOTES
Infusion Nursing Note:  Laxmi Ya presents today for Rituximab infusion.    Patient seen by provider today: No   present during visit today: Not Applicable.    Note: Laxmi arrived ambulatory requesting a bed instead of a chair. She qualifies for rapid infusion today since she tolerated her first dose of Rituximab on 6/19/25 without any issues. Premedicated with PO benadryl, tylenol, and solumedrol. Started infusion at 200 ml/hr for 30 minutes then increased to 400 ml/hr for the remaining hour. Vital signs stable and tolerated infusion with no issues. Requested to have the remaining 250 ml bag of NS after Rituximab completed. She will follow up with provider as scheduled.      Intravenous Access:  Peripheral IV placed.    Treatment Conditions:  Biological Infusion Checklist:  ~~~ NOTE: If the patient answers yes to any of the questions below, hold the infusion and contact ordering provider or on-call provider.    Have you recently had an elevated temperature, fever, chills, productive cough, coughing for 3 weeks or longer or hemoptysis,  abnormal vital signs, night sweats,  chest pain or have you noticed a decrease in your appetite, unexplained weight loss or fatigue? No  Do you have any open wounds or new incisions? No  Do you have any upcoming hospitalizations or surgeries? Does not include esophagogastroduodenoscopy, colonoscopy, endoscopic retrograde cholangiopancreatography (ERCP), endoscopic ultrasound (EUS), dental procedures or joint aspiration/steroid injections No  Do you currently have any signs of illness or infection or are you on any antibiotics? No  Have you had any new, sudden or worsening abdominal pain? No  Have you or anyone in your household received a live vaccination in the past 4 weeks? Please note: No live vaccines while on biologic/chemotherapy until 6 months after the last treatment. Patient can receive the flu vaccine (shot only), pneumovax and the Covid vaccine. It is  optimal for the patient to get these vaccines mid cycle, but they can be given at any time as long as it is not on the day of the infusion. No  Have you recently been diagnosed with any new nervous system diseases (ie. Multiple sclerosis, Guillain [place], seizures, neurological changes) or cancer diagnosis? Are you on any form of radiation or chemotherapy? No  Are you pregnant or breast feeding or do you have plans of pregnancy in the future? No  Have you been having any signs of worsening depression or suicidal ideations?  (benlysta only) No  Have there been any other new onset medical symptoms? No  Have you had any new blood clots? (IVIG only) No      Post Infusion Assessment:  Patient tolerated infusion without incident.  Site patent and intact, free from redness, edema or discomfort.  Access discontinued per protocol.       Discharge Plan:   Patient discharged in stable condition accompanied by: self.  Departure Mode: Ambulatory.      Sonia Arauz RN

## 2025-07-03 DIAGNOSIS — M79.645 BILATERAL THUMB PAIN: Primary | ICD-10-CM

## 2025-07-03 DIAGNOSIS — M79.644 BILATERAL THUMB PAIN: Primary | ICD-10-CM

## 2025-07-07 RX ORDER — TIRZEPATIDE 12.5 MG/.5ML
12.5 INJECTION, SOLUTION SUBCUTANEOUS WEEKLY
COMMUNITY
Start: 2025-06-23

## 2025-07-08 NOTE — PATIENT INSTRUCTIONS
"Recommendations from today's MTM visit:                                                       1.  Contact Dr. Lemus via tabulate 3 to 5 days prior to your Medrol prescription running out with a summary of your symptoms so additional refills can be obtained without delay.    2. Contact your [facility] hematology and neurology clinics to find out if they are comfortable completing these after rituximab infusion.  If they have questions please direct them to our rheumatology clinic phone number to discuss with Dr. Lemus.    Follow-up: Return in about 6 months (around 1/1/2026) for MTM Pharmacist Visit.    It was great speaking with you today.  I value your experience and would be very thankful for your time in providing feedback in our clinic survey. In the next few days, you may receive an email or text message from [facility] [facility] with a link to a survey related to your  clinical pharmacist.\"     To schedule another MTM appointment, please call the clinic directly or you may call the MTM scheduling line at [phone].    My Clinical Pharmacist's contact information:                                                      Please feel free to contact me with any questions or concerns you have.      Yuliya Kaplan, PharmD  Medication Therapy Management Pharmacist  [facility]ph[facility]  Phone: [phone]     "

## 2025-07-08 NOTE — PROGRESS NOTES
Medication Therapy Management (MTM) Encounter    ASSESSMENT:                            Medication Adherence/Access: No issues identified.    Rheumatoid Arthritis: Patient tolerated initial rituximab infusions well however too early to assess efficacy.  Discussed patient will need to follow Medrol care plan as noted in earlier refill encounter.  Encouraged patient to MyChart Dr. Lemus and his nurse staff when she is getting close to needing a refill to prevent any delays in care.  Reviewed hematology and neurology will have to make assessment on if recommended tests are safe after rituximab infusions.  Encouraged her to contact these clinics for an update. Did review rituximab can cause hematologic changes as side effects and encouraged her to have hematology clinic reach out to Dr. Lemus if questions persist.    PLAN:                            1.  Contact Dr. Lemus via [employer] 3 to 5 days prior to your Medrol prescription running out with a summary of your symptoms so additional refills can be obtained without delay.    2. Contact your [facility] hematology and neurology clinics to find out if they are comfortable completing these after rituximab infusion.  If they have questions please direct them to our rheumatology clinic phone number to discuss with Dr. Lemus.    Follow-up: Return in about 6 months (around 1/1/2026) for MTM Pharmacist Visit.    SUBJECTIVE/OBJECTIVE:                          Laxmi Ya is a 50 year old female seen for a follow-up visit.       Reason for visit: Rituximab follow up    Allergies/ADRs: Reviewed in chart  Past Medical History: Reviewed in chart  Tobacco: She reports that she has never smoked. She has never used smokeless tobacco.  Alcohol: Less than 1 beverages / week    Medication Adherence/Access: no issues reported. Was able to work with infusion center to have a bed for rituximab infusions or assistance to get up and walk frequently if the chair was the only  option available.    Rheumatoid Arthritis:   Rituximab infusion (Loading dose 2 of 2: 7/1/25)  Medrol 16 mg daily  Diclofenac 1% gel as needed (uses once per day a few times per week)  Oxycodone 10 mg 2-4 times daily as needed     Reports she tolerated both doses of rituximab well. No significant side effects noted outside of some increased fatigue which was expected. Working with Dr. Lemus on a Medrol taper plan. Notes today she was hoping to continue 16 mg daily for 4 to 6 weeks until full effect of rituximab could be seen.     Per 6/29/25 refill note, rheumatology care plan for Medrol: The medrol is not safe for or intended for chronic use. It is to be used at minimum effective dosage for addressing inflammatory joint symptoms. Refills will occur as each course is completed, and after update/mychart with Rheumatology.     Reports she has noticed some improvement in inflammation since receiving rituximab. Does still continue to have fatigue and significant joint pain.  Did see a rheumatologist at [facility] (Dr. Lucero) on 6/23/2025 who noted:  She reports feeling remarkably better over last weeks since rituximab 1st dose which would be atypical given half life of rituximab     Also saw several other specialists at [facility] including hematology and neurology.  Notes they recommended she complete a spinal tap and bone marrow biopsy.  Would like to know if these can be done since she completed rituximab recently.  Has not checked with [facility] offices yet for their opinion on this.    Last lab monitoring completed: 5/27/25     Today's Vitals: There were no vitals taken for this visit.  ----------------    I spent 28 minutes with this patient today. All changes were made via collaborative practice agreement with Oswaldo Lemus.     A summary of these recommendations was sent via [employer].    Yuliya Kaplan, PharmD  Medication Therapy Management Pharmacist  [facility] Rheumatology and Nephrology  Clinics  Phone: [phone]    Telemedicine Visit Details  The patient's medications can be safely assessed via a telemedicine encounter.  Type of service:  Telephone visit  Originating Location (pt. Location): Home    Distant Location (provider location):  On-site  Start Time: 11:30 AM  End Time: 11:58 AM     Medication Therapy Recommendations  No medication therapy recommendations to display

## 2025-07-11 DIAGNOSIS — M13.80 SERONEGATIVE INFLAMMATORY ARTHRITIS: ICD-10-CM

## 2025-07-11 RX ORDER — METHYLPREDNISOLONE 4 MG/1
TABLET ORAL
Qty: 49 TABLET | Refills: 0 | Status: SHIPPED | OUTPATIENT
Start: 2025-07-11

## 2025-07-11 NOTE — TELEPHONE ENCOUNTER
Return call placed to patient who states she tried to taper methylprednisolone to 3 tablets on 7/7 and 7/8 as prescribed. States she was still in pain at 4 pm both days so she took another 4 mg tablet at 4 and increased dose back to 4 tablets ( 16 mg) daily on 7/9 and is now out of tablets.   She reports ongoing pain and stiffness in neck, pelvis and hips and pain, stiffness and swelling in hands, ankles and feet.   Refill request routed to Dr. Lemus, will update patient with provider's response.   Kaley Serna RN  Adult Rheumatology Clinic

## 2025-07-11 NOTE — TELEPHONE ENCOUNTER
Sorry to hear of ongoing symptoms.  Review of medrol consumption pattern reveals near continuous use of 12 mg/day or greater for greater than one month.  High continuous steroid use is not safe. Moreover, most recent bloodwork does not indicate a bloodborne inflammatory response, raising possibility that non-inflammatory causes of pain are operative and would respond better and more safely to alternative treatment of pain methods.  This is the last medrol refill that can happen until Ms. Ya is seen in primary care or pain clinic or in person in Rheumatology.

## 2025-07-11 NOTE — TELEPHONE ENCOUNTER
[facility] Call Center    Phone Message    May a detailed message be left on voicemail: yes     Reason for Call: Medication Refill Request    Has the patient contacted the pharmacy for the refill? Yes   Name of medication being requested: methylPREDNISolone (MEDROL) 4 MG tablet   Provider who prescribed the medication: Dr Lemus  Pharmacy: [facility]Y #[id number] - [place], [place] - [address]  Date medication is needed: 7/11- Pt took last dose today.     Pt is still experiencing pain and would like a refill. Pt has inflammation, swelling, and pain all over.     Please call pt back at ph: [phone].     Action Taken: Message routed to:  Other: [facility] Rhe[facility]    Travel Screening: Not Applicable     Date of Service: 7/11

## 2025-07-14 NOTE — TELEPHONE ENCOUNTER
Return call place to patient, left message explaining that Dr. Lemus has filled Medrol for one last course. Explained that continuing prolonged use of steroids is not safe per Dr. Lemus and recent blood tests do not indicate an inflammatory response.  Stated that Dr. Lemus recommends evaluation by her PCP, pain clinic or Dr. Lemus for non-inflammatory causes of pain and consideration of alternative pain control methods.  Kaley Serna RN  Adult Rheumatology Clinic

## 2025-07-17 ENCOUNTER — ANCILLARY PROCEDURE (OUTPATIENT)
Dept: GENERAL RADIOLOGY | Facility: CLINIC | Age: 50
End: 2025-07-17
Attending: ORTHOPAEDIC SURGERY
Payer: MEDICARE

## 2025-07-17 ENCOUNTER — OFFICE VISIT (OUTPATIENT)
Dept: ORTHOPEDICS | Facility: CLINIC | Age: 50
End: 2025-07-17
Payer: MEDICARE

## 2025-07-17 ENCOUNTER — THERAPY VISIT (OUTPATIENT)
Dept: PHYSICAL THERAPY | Facility: CLINIC | Age: 50
End: 2025-07-17
Payer: MEDICARE

## 2025-07-17 DIAGNOSIS — M54.2 NECK PAIN: ICD-10-CM

## 2025-07-17 DIAGNOSIS — G89.29 CHRONIC BILATERAL LOW BACK PAIN WITHOUT SCIATICA: ICD-10-CM

## 2025-07-17 DIAGNOSIS — M79.645 BILATERAL THUMB PAIN: Primary | ICD-10-CM

## 2025-07-17 DIAGNOSIS — M79.644 BILATERAL THUMB PAIN: ICD-10-CM

## 2025-07-17 DIAGNOSIS — M54.50 CHRONIC BILATERAL LOW BACK PAIN WITHOUT SCIATICA: ICD-10-CM

## 2025-07-17 DIAGNOSIS — R10.2 PELVIC PAIN IN FEMALE: Primary | ICD-10-CM

## 2025-07-17 DIAGNOSIS — M79.645 BILATERAL THUMB PAIN: ICD-10-CM

## 2025-07-17 DIAGNOSIS — M79.644 BILATERAL THUMB PAIN: Primary | ICD-10-CM

## 2025-07-17 PROCEDURE — 97140 MANUAL THERAPY 1/> REGIONS: CPT | Mod: GP | Performed by: PHYSICAL THERAPIST

## 2025-07-17 PROCEDURE — 97012 MECHANICAL TRACTION THERAPY: CPT | Mod: GP | Performed by: PHYSICAL THERAPIST

## 2025-07-17 RX ORDER — TRIAMCINOLONE ACETONIDE 40 MG/ML
20 INJECTION, SUSPENSION INTRA-ARTICULAR; INTRAMUSCULAR
Status: COMPLETED | OUTPATIENT
Start: 2025-07-17 | End: 2025-07-17

## 2025-07-17 RX ORDER — LIDOCAINE HYDROCHLORIDE 10 MG/ML
1 INJECTION, SOLUTION EPIDURAL; INFILTRATION; INTRACAUDAL; PERINEURAL
Status: COMPLETED | OUTPATIENT
Start: 2025-07-17 | End: 2025-07-17

## 2025-07-17 RX ADMIN — LIDOCAINE HYDROCHLORIDE 1 ML: 10 INJECTION, SOLUTION EPIDURAL; INFILTRATION; INTRACAUDAL; PERINEURAL at 11:20

## 2025-07-17 RX ADMIN — TRIAMCINOLONE ACETONIDE 20 MG: 40 INJECTION, SUSPENSION INTRA-ARTICULAR; INTRAMUSCULAR at 11:20

## 2025-07-17 NOTE — NURSING NOTE
Reason For Visit:   Chief Complaint   Patient presents with    RECHECK     Follow-up bilateral Thumbs CMC joint repeat injections       Primary MD: Amol Juares  Ref. MD: Katja    Age: 50 year old    ?  No      There were no vitals taken for this visit.      Pain Assessment  Patient Currently in Pain: Yes  0-10 Pain Scale: 10  Primary Pain Location: Finger (Comment which one) (Bilateral thumbs CMC joints)  Pain Descriptors: Constant, Sharp  Alleviating Factors: Cartico steroid    Hand Dominance Evaluation  Hand Dominance: Right          QuickDASH Assessment      7/17/2025    10:24 AM   QuickDASH Main   1. Open a tight or new jar Unable   2. Do heavy household chores (e.g., wash walls, floors) Unable   3. Carry a shopping bag or briefcase Unable   4. Wash your back Unable   5. Use a knife to cut food Unable   6. Recreational activities in which you take some force or impact through your arm, shoulder or hand (e.g., golf, hammering, tennis, etc.) Unable   7. During the past week, to what extent has your arm, shoulder or hand problem interfered with your normal social activities with family, friends, neighbours or groups Extremely   8. During the past week, were you limited in your work or other regular daily activities as a result of your arm, shoulder or hand problem Unable   9. Arm, shoulder or hand pain Extreme   10.Tingling (pins and needles) in your arm,shoulder or hand Severe   11. During the past week, how much difficulty have you had sleeping because of the pain in your arm, shoulder or hand So much difficulty that I can't sleep   Quickdash Ability Score 97.73          Current Outpatient Medications   Medication Sig Dispense Refill    albuterol (PROAIR HFA/PROVENTIL HFA/VENTOLIN HFA) 108 (90 Base) MCG/ACT inhaler Inhale 2 puffs into the lungs every 6 hours as needed for shortness of breath, wheezing or cough      Artificial Tear Solution (SOOTHE XP) SOLN as needed      atenolol (TENORMIN) 50 MG  tablet Take 50 mg by mouth daily      azelastine 137 MCG/SPRAY SOLN USE 2 SPRAYS IN EACH NOSTRIL 2 TIMES DAILY FOR 1 MONTH      benzoyl peroxide 5 % external liquid Use daily as directed. Preferred bdrand: Medpura 236 mL 11    blood glucose (NO BRAND SPECIFIED) test strip Use to test blood sugar 2 times daily or as directed. 100 strip 1    blood glucose monitoring (NO BRAND SPECIFIED) meter device kit Use to test blood sugar 2 times daily or as directed. 1 kit 0    ciclopirox (LOPROX) 0.77 % cream Apply topically 2 times daily To affected toenails. 90 g 5    cyclobenzaprine (FLEXERIL) 5 MG tablet Take 5 mg by mouth.      dexAMETHasone (DECADRON) 0.1 % ophthalmic solution       diclofenac (VOLTAREN) 1 % topical gel Apply topically 4 times daily as needed      doxycycline hyclate (VIBRAMYCIN) 100 MG capsule Take 100 mg by mouth 2 times daily.      dupilumab (DUPIXENT) 300 MG/2ML prefilled pen Inject 2 mLs (300 mg) subcutaneously every 14 days. 4 mL 5    econazole nitrate 1 % external cream Apply topically daily. To affected toenails. 85 g 5    famciclovir (FAMVIR) 500 MG tablet Take 1 tablet (500 mg) by mouth 2 times daily. 180 tablet 3    fexofenadine (ALLEGRA) 180 MG tablet Take 1 tablet (180 mg) by mouth daily 90 tablet 2    fidaxomicin (DIFICID) 200 MG tablet Take 200 mg by mouth as needed      fluconazole (DIFLUCAN) 150 MG tablet Take 150 mg by mouth.      Fluticasone-Umeclidin-Vilanterol (TRELEGY ELLIPTA) 200-62.5-25 MCG/ACT oral inhaler Inhale 1 puff into the lungs daily. 90 each 3    insulin glargine (LANTUS PEN) 100 UNIT/ML pen Inject 50 Units subcutaneously daily      insulin lispro (HUMALOG KWIKPEN) 100 UNIT/ML (1 unit dial) KWIKPEN Inject 35 Units subcutaneously 3 times daily (before meals) Plus 1:25 correction scale if BG >150      Lancets (ONETOUCH DELICA PLUS [id number]) MISC CHECK BLOOD GLUCOSE 3 TIMES DAILY, ADJUSTING MEDICATION, DX CODE E 11.9, ON INSULIN      lidocaine (LIDODERM) 5 % patch as needed    1    LORazepam (ATIVAN) 0.5 MG tablet Take 0.25 mg by mouth every 8 hours as needed      losartan (COZAAR) 100 MG tablet Take 100 mg by mouth daily      methylPREDNISolone (MEDROL) 4 MG tablet Take 4 tabs daily for 1 week, then take 3 tabs daily for 1 week 49 tablet 0    mometasone (NASONEX) 50 MCG/ACT nasal spray Spray 2 sprays into both nostrils daily. 51 g 3    montelukast (SINGULAIR) 10 MG tablet Take 10 mg by mouth daily.      MOUNJARO 12.5 MG/0.5ML SOAJ auto-injector pen Inject 12.5 mg subcutaneously once a week.      multivitamin w/minerals (THERA-VIT-M) tablet Take 1 tablet by mouth daily      naloxone (NARCAN) 4 MG/0.1ML nasal spray       naproxen (NAPROSYN) 375 MG tablet Take 375 mg by mouth.      norethindrone (MICRONOR) 0.35 MG tablet Take 1 tablet (0.35 mg) by mouth daily 90 tablet 3    nystatin (MYCOSTATIN) 872066 UNIT/ML suspension       olopatadine (PATADAY) 0.7 % ophthalmic solution Apply 1 drop to eye daily PRN      omeprazole (PRILOSEC) 40 MG DR capsule TAKE 1 CAPSULE (40 MG) BY MOUTH TWO TIMES A DAY. TAKE 1 HOUR BEFORE A MEAL.      oxyCODONE IR (ROXICODONE) 10 MG tablet Take 10 mg by mouth      predniSONE (DELTASONE) 5 MG tablet TAKE 1 TABLET BY MOUTH EVERY DAY 90 tablet 1    rifaximin (XIFAXAN) 550 MG TABS tablet Take 200 mg by mouth 3 times daily Take for 10 days, has on had as needed for small bowel bacteria overgrowh      rosuvastatin (CRESTOR) 5 MG tablet Take 2 tablets by mouth daily      spironolactone-HCTZ (ALDACTAZIDE) 25-25 MG tablet Take 1 tablet by mouth daily      terbinafine (LAMISIL) 250 MG tablet Take 1 tablet by mouth daily.      tobramycin-dexAMETHasone (TOBRADEX) 0.3-0.1 % ophthalmic suspension PLACE 1 DROP INTO BOTH EYES TWO TIMES A DAY.         Allergies   Allergen Reactions    Polyethylene Glycol Rash    Codeine      Other reaction(s): Gastrointestinal, GI intolerance, Vomiting    Vomiting    Hydrocodone Nausea and Vomiting    Morphine Nausea and Vomiting    Sulfasalazine       Other reaction(s): GI intolerance    Has tried and had nausa.    Tramadol Nausea and Vomiting     Other reaction(s): Gastrointestinal, Other (see comments)    Nausea and vomiting     unknown    Acetaminophen Nausea     Nausea and vomiting    Gluten Meal Other (See Comments) and Rash     Other reaction(s): Gastrointestinal, GI intolerance  Dizziness, tired, rash, stomach cramps, thrush, mouth sores  Dizziness, tired, rash, stomach cramps, thrush, mouth sores      Hydroxyzine GI Disturbance and Nausea     nausea, dry mouth    Propylene Glycol Dizziness, Nausea and Vomiting, Nausea and Rash       DUY RESENDEZ, ATC

## 2025-07-17 NOTE — LETTER
7/17/2025      Laxmi Ya  [address] [place] [id number]      Dear Colleague,    Thank you for referring your patient, Laxmi Ya, to the [facility] [place]. Please see a copy of my visit note below.       Diagnosis: bilateral thumb CMC/STT arthritis, bilateral carpal tunnel syndrome, bilateral cubital tunnel syndrome.  Patient comes in today requesting bilateral thumb CMC joint cortisone injections.  She reports that she continues to have difficulty with her rheumatology care between the [facility] and [facility].  She has started on a new medication and was most recently had her injection on July 2.  She was told it would take 6 to 8 weeks to work.  She continues to get breakthrough symptoms and continues to request cortisone which will be delivered on an as-needed basis.    Treatments:     July 17, 2025  bilateral thumb CMC injections, 20 mg each Van Jeff  June 26, 2025 billateral carpal tunnel injections 20 mg each Trino Aguilar  May 8, 2025 bilateral thumb STT injections 20 mg each Trino Aguilar  April 24, 2025 bilateral thumb CMC injections 20 mg Trino Aguilar  April 10, 2025 bilateral carpal tunnel injections 20 mg Trino Aguilar  January 23, 2025 bilateral thumb STT (20 mg) and CMC (20 mg) injections (Trino Aguilar)  10/25/2024 bilateral thumb STT injection (20 mg) he bilateral carpal tunnel injections (20 mg) (Trino Aguilar)  10/9/2024: bilateral thumb CMC steroid injection, cubital tunnel steroid injections (Vakshori_  8/15/2024: bilateral STT steroid injection (Dr. Petit)  6/6/2024: bilateral carpal tunnel steroid injection (Dr. Petit)  5/30/2024: bilateral STT steroid injection (Dr. Petit)  5/21/2024: bilateral cubital tunnel steroid injections  12/27/2023: bilateral carpal tunnel steroid injection  12/21/2023: bilateral STT steroid injection (Dr. Petit)  11/1/2023: bilateral carpal tunnel steroid injection  10/18/2023: Bilateral thumb CMC steroid  injection  9/20/2023: bilateral STT steroid injection  5/4/2023: bilateral carpal tunnel steroid injection (Dr. Petit)  2/16/2023: bilateral STT steroid injection (Dr. Petit)  12/13/2022: bilateral carpal tunnel steroid injection (Dr. Sullivan)  10/27/2022: bilateral STT steroid injections (Dr. Petit)  9/13/2022: bilateral carpal tunnel steroid injection (Dr. Sullivan)  6/30/2022: bilateral STT steroid injections (Dr. Petit)  Bilateral carpal tunnel steroid injections (outside)     PROCEDURE:      PROCEDURE:  After written consent, verifying site and side, a sterile Chlora- prep was performed for the injection site.  C-arm guidance was used for difficult placement of a 25-gauge needle into the right thumb CMC joint with 1% Lidocaine.  1/2 mL of Kenalog (20mg) and 1 mL of lidocaine was injected under fluoroscopic guidance with good relief of pain.  Identical procedure was carried out on the left thumb CMC joint.  Patient tolerated the procedure well.  No complications.  Follow up instructions were given.        We discussed that if the rheumatologist can find an intravenous injectable or oral medication that provides effective anti-inflammatory activity against all of the joints which are problematic for her, this would be ideal as the injections that we have been performing would be unnecessary.  She will keep us updated as her medical workup continues.    Twila Petit MD   Hand and Upper Extremity Specialist  [facility]      Hand / Upper Extremity Injection/Arthrocentesis: bilateral thumb CMC    Date/Time: 7/17/2025 11:20 AM    Performed by: Twila Petit MD  Authorized by: Twila Petit MD    Indications:  Pain  Needle Size:  25 G  Guidance: fluoroscopy    Condition: osteoarthritis    Laterality:  Bilateral  Location:  Thumb  Site:  Bilateral thumb CMC    Medications (Right):  20 mg triamcinolone 40 MG/ML; 1 mL lidocaine (PF) 1 %  Medications (Left):  20 mg  triamcinolone 40 MG/ML; 1 mL lidocaine (PF) 1 %  Outcome:  Tolerated well, no immediate complications  Procedure discussed: discussed risks, benefits, and alternatives    Consent Given by:  Patient  Timeout: timeout called immediately prior to procedure    Prep: patient was prepped and draped in usual sterile fashion        [facility] [address]  4TH FLOOR  [facility] [address] [phone]  Dept: [phone]  ______________________________________________________________________________    Patient: Laxmi Ya   : 1975   MRN: [id number]   2025    INVASIVE PROCEDURE SAFETY CHECKLIST    Date: 2025   Procedure:Bilateral thumb CMC joint steroid injections  Patient Name: Laxmi Ya  MRN: [id number]  YOB: 1975    Action: Complete sections as appropriate. Any discrepancy results in a HARD COPY until resolved.     PRE PROCEDURE:  Patient ID verified with 2 identifiers (name and  or MRN): Yes  Procedure and site verified with patient/designee (when able): Yes  Accurate consent documentation in medical record: Yes  H&P (or appropriate assessment) documented in medical record: Yes  H&P must be up to 20 days prior to procedure and updates within 24 hours of procedure as applicable: Yes  Relevant diagnostic and radiology test results appropriately labeled and displayed as applicable: NA  Procedure site(s) marked with provider initials: NA    TIMEOUT:  Time-Out performed immediately prior to starting procedure, including verbal and active participation of all team members addressing the following:Yes  * Correct patient identify  * Confirmed that the correct side and site are marked  * An accurate procedure consent form  * Agreement on the procedure to be done  * Correct patient position  * Relevant images and results are properly labeled and appropriately displayed  * The need to administer antibiotics or fluids for irrigation  purposes during the procedure as applicable   * Safety precautions based on patient history or medication use    DURING PROCEDURE: Verification of correct person, site, and procedures any time the responsibility for care of the patient is transferred to another member of the care team.       The following medications were given:         Prior to injection, verified patient identity using patient's name and date of birth.  Due to injection administration, patient instructed to remain in clinic for 15 minutes  afterwards, and to report any adverse reaction to me immediately.    Joint injection was performed.    Medication Name: Kenalog 40m/mL NDC [address]-9  Drug Amount Wasted:  None.  Vial/Syringe: Single dose vial  Expiration Date:  4/1/27    Medication Name Lidocaine 1% NDC 29947-765-65    Scribed by DUY RESENDEZ, ATC for Dr. Petit on July 17, 2025 at 11:27am based on the provider's statements to me.     DUY RESENDEZ ATC      Again, thank you for allowing me to participate in the care of your patient.        Sincerely,        Twila Petit MD    Electronically signed

## 2025-07-17 NOTE — PROGRESS NOTES
Diagnosis: bilateral thumb CMC/STT arthritis, bilateral carpal tunnel syndrome, bilateral cubital tunnel syndrome.  Patient comes in today requesting bilateral thumb CMC joint cortisone injections.  She reports that she continues to have difficulty with her rheumatology care between the [facility] and [facility].  She has started on a new medication and was most recently had her injection on July 2.  She was told it would take 6 to 8 weeks to work.  She continues to get breakthrough symptoms and continues to request cortisone which will be delivered on an as-needed basis.    Treatments:     July 17, 2025  bilateral thumb CMC injections, 20 mg each Trino Aguilar  June 26, 2025 billateral carpal tunnel injections 20 mg each Trino Aguilar  May 8, 2025 bilateral thumb STT injections 20 mg each Trino Aguilar  April 24, 2025 bilateral thumb CMC injections 20 mg Trino Aguilar  April 10, 2025 bilateral carpal tunnel injections 20 mg Trino Aguilar  January 23, 2025 bilateral thumb STT (20 mg) and CMC (20 mg) injections (Trino Aguilar)  10/25/2024 bilateral thumb STT injection (20 mg) he bilateral carpal tunnel injections (20 mg) (Trino Aguilar)  10/9/2024: bilateral thumb CMC steroid injection, cubital tunnel steroid injections (Vakshori_  8/15/2024: bilateral STT steroid injection (Dr. Petit)  6/6/2024: bilateral carpal tunnel steroid injection (Dr. Petit)  5/30/2024: bilateral STT steroid injection (Dr. Petit)  5/21/2024: bilateral cubital tunnel steroid injections  12/27/2023: bilateral carpal tunnel steroid injection  12/21/2023: bilateral STT steroid injection (Dr. Petit)  11/1/2023: bilateral carpal tunnel steroid injection  10/18/2023: Bilateral thumb CMC steroid injection  9/20/2023: bilateral STT steroid injection  5/4/2023: bilateral carpal tunnel steroid injection (Dr. Petit)  2/16/2023: bilateral STT steroid injection (Dr. Petit)  12/13/2022: bilateral carpal tunnel steroid injection (  Nate)  10/27/2022: bilateral STT steroid injections (Dr. Petit)  9/13/2022: bilateral carpal tunnel steroid injection (Dr. Sullivan)  6/30/2022: bilateral STT steroid injections (Dr. Petit)  Bilateral carpal tunnel steroid injections (outside)     PROCEDURE:      PROCEDURE:  After written consent, verifying site and side, a sterile Chlora- prep was performed for the injection site.  C-arm guidance was used for difficult placement of a 25-gauge needle into the right thumb CMC joint with 1% Lidocaine.  1/2 mL of Kenalog (20mg) and 1 mL of lidocaine was injected under fluoroscopic guidance with good relief of pain.  Identical procedure was carried out on the left thumb CMC joint.  Patient tolerated the procedure well.  No complications.  Follow up instructions were given.        We discussed that if the rheumatologist can find an intravenous injectable or oral medication that provides effective anti-inflammatory activity against all of the joints which are problematic for her, this would be ideal as the injections that we have been performing would be unnecessary.  She will keep us updated as her medical workup continues.    Twila Petit MD   Hand and Upper Extremity Specialist  [facility]

## 2025-07-17 NOTE — PROGRESS NOTES
Hand / Upper Extremity Injection/Arthrocentesis: bilateral thumb CMC    Date/Time: 2025 11:20 AM    Performed by: Twila Petit MD  Authorized by: Twila Petit MD    Indications:  Pain  Needle Size:  25 G  Guidance: fluoroscopy    Condition: osteoarthritis    Laterality:  Bilateral  Location:  Thumb  Site:  Bilateral thumb CMC    Medications (Right):  20 mg triamcinolone 40 MG/ML; 1 mL lidocaine (PF) 1 %  Medications (Left):  20 mg triamcinolone 40 MG/ML; 1 mL lidocaine (PF) 1 %  Outcome:  Tolerated well, no immediate complications  Procedure discussed: discussed risks, benefits, and alternatives    Consent Given by:  Patient  Timeout: timeout called immediately prior to procedure    Prep: patient was prepped and draped in usual sterile fashion        [facility] [address] [address]  [phone]  Dept: [phone]  ______________________________________________________________________________    Patient: Laxmi Ya   : 1975   MRN: [id number]   2025    INVASIVE PROCEDURE SAFETY CHECKLIST    Date: 2025   Procedure:Bilateral thumb CMC joint steroid injections  Patient Name: Laxmi Ya  MRN: [id number]  YOB: 1975    Action: Complete sections as appropriate. Any discrepancy results in a HARD COPY until resolved.     PRE PROCEDURE:  Patient ID verified with 2 identifiers (name and  or MRN): Yes  Procedure and site verified with patient/designee (when able): Yes  Accurate consent documentation in medical record: Yes  H&P (or appropriate assessment) documented in medical record: Yes  H&P must be up to 20 days prior to procedure and updates within 24 hours of procedure as applicable: Yes  Relevant diagnostic and radiology test results appropriately labeled and displayed as applicable: NA  Procedure site(s) marked with provider initials: NA    TIMEOUT:  Time-Out  performed immediately prior to starting procedure, including verbal and active participation of all team members addressing the following:Yes  * Correct patient identify  * Confirmed that the correct side and site are marked  * An accurate procedure consent form  * Agreement on the procedure to be done  * Correct patient position  * Relevant images and results are properly labeled and appropriately displayed  * The need to administer antibiotics or fluids for irrigation purposes during the procedure as applicable   * Safety precautions based on patient history or medication use    DURING PROCEDURE: Verification of correct person, site, and procedures any time the responsibility for care of the patient is transferred to another member of the care team.       The following medications were given:         Prior to injection, verified patient identity using patient's name and date of birth.  Due to injection administration, patient instructed to remain in clinic for 15 minutes  afterwards, and to report any adverse reaction to me immediately.    Joint injection was performed.    Medication Name: Kenalog 40m/mL NDC [address]-7  Drug Amount Wasted:  None.  Vial/Syringe: Single dose vial  Expiration Date:  4/1/27    Medication Name Lidocaine 1% NDC 87556-501-66    Scribed by UDY RESENDEZ, TIM for Dr. Petit on July 17, 2025 at 11:27am based on the provider's statements to me.     DUY RESENDEZ, ATC

## 2025-07-31 DIAGNOSIS — M79.644 BILATERAL THUMB PAIN: Primary | ICD-10-CM

## 2025-07-31 DIAGNOSIS — M79.645 BILATERAL THUMB PAIN: Primary | ICD-10-CM

## 2025-08-30 DIAGNOSIS — R21 BLISTERING RASH: ICD-10-CM

## 2025-09-02 DIAGNOSIS — B00.9 HSV-2 (HERPES SIMPLEX VIRUS 2) INFECTION: ICD-10-CM

## 2025-09-03 ENCOUNTER — MYC MEDICAL ADVICE (OUTPATIENT)
Dept: OBGYN | Facility: CLINIC | Age: 50
End: 2025-09-03

## 2025-09-03 DIAGNOSIS — R21 BLISTERING RASH: ICD-10-CM

## 2025-09-03 DIAGNOSIS — M79.644 BILATERAL THUMB PAIN: Primary | ICD-10-CM

## 2025-09-03 DIAGNOSIS — M79.645 BILATERAL THUMB PAIN: Primary | ICD-10-CM

## 2025-09-04 ENCOUNTER — TELEPHONE (OUTPATIENT)
Dept: OBGYN | Facility: CLINIC | Age: 50
End: 2025-09-04
Payer: MEDICARE

## 2025-09-04 DIAGNOSIS — B00.9 HSV-2 (HERPES SIMPLEX VIRUS 2) INFECTION: ICD-10-CM

## 2025-09-04 RX ORDER — FAMCICLOVIR 500 MG/1
TABLET ORAL
Qty: 180 TABLET | Refills: 3 | OUTPATIENT
Start: 2025-09-04

## 2025-09-04 RX ORDER — FAMCICLOVIR 500 MG/1
500 TABLET ORAL 2 TIMES DAILY
Qty: 180 TABLET | Refills: 0 | Status: SHIPPED | OUTPATIENT
Start: 2025-09-04

## 2025-09-04 RX ORDER — ACETAMINOPHEN AND CODEINE PHOSPHATE 120; 12 MG/5ML; MG/5ML
0.35 SOLUTION ORAL DAILY
Qty: 84 TABLET | Refills: 3 | OUTPATIENT
Start: 2025-09-04

## 2025-09-04 RX ORDER — ACETAMINOPHEN AND CODEINE PHOSPHATE 120; 12 MG/5ML; MG/5ML
0.35 SOLUTION ORAL DAILY
Qty: 90 TABLET | Refills: 0 | Status: SHIPPED | OUTPATIENT
Start: 2025-09-04

## 2025-09-04 RX ORDER — FAMCICLOVIR 500 MG/1
TABLET ORAL
Qty: 185 TABLET | Refills: 3 | OUTPATIENT
Start: 2025-09-04

## (undated) RX ORDER — LIDOCAINE HYDROCHLORIDE 10 MG/ML
INJECTION, SOLUTION EPIDURAL; INFILTRATION; INTRACAUDAL; PERINEURAL
Status: DISPENSED
Start: 2024-10-24

## (undated) RX ORDER — LIDOCAINE HYDROCHLORIDE 10 MG/ML
INJECTION, SOLUTION EPIDURAL; INFILTRATION; INTRACAUDAL; PERINEURAL
Status: DISPENSED
Start: 2025-04-10

## (undated) RX ORDER — TRIAMCINOLONE ACETONIDE 40 MG/ML
INJECTION, SUSPENSION INTRA-ARTICULAR; INTRAMUSCULAR
Status: DISPENSED
Start: 2025-04-24

## (undated) RX ORDER — TRIAMCINOLONE ACETONIDE 40 MG/ML
INJECTION, SUSPENSION INTRA-ARTICULAR; INTRAMUSCULAR
Status: DISPENSED
Start: 2025-04-10

## (undated) RX ORDER — TRIAMCINOLONE ACETONIDE 40 MG/ML
INJECTION, SUSPENSION INTRA-ARTICULAR; INTRAMUSCULAR
Status: DISPENSED
Start: 2025-07-17

## (undated) RX ORDER — TRIAMCINOLONE ACETONIDE 40 MG/ML
INJECTION, SUSPENSION INTRA-ARTICULAR; INTRAMUSCULAR
Status: DISPENSED
Start: 2024-06-06

## (undated) RX ORDER — TRIAMCINOLONE ACETONIDE 40 MG/ML
INJECTION, SUSPENSION INTRA-ARTICULAR; INTRAMUSCULAR
Status: DISPENSED
Start: 2025-01-28

## (undated) RX ORDER — TRIAMCINOLONE ACETONIDE 40 MG/ML
INJECTION, SUSPENSION INTRA-ARTICULAR; INTRAMUSCULAR
Status: DISPENSED
Start: 2023-12-27

## (undated) RX ORDER — TRIAMCINOLONE ACETONIDE 40 MG/ML
INJECTION, SUSPENSION INTRA-ARTICULAR; INTRAMUSCULAR
Status: DISPENSED
Start: 2023-05-04

## (undated) RX ORDER — LIDOCAINE HYDROCHLORIDE 10 MG/ML
INJECTION, SOLUTION EPIDURAL; INFILTRATION; INTRACAUDAL; PERINEURAL
Status: DISPENSED
Start: 2025-04-24

## (undated) RX ORDER — LIDOCAINE HYDROCHLORIDE 10 MG/ML
INJECTION, SOLUTION EPIDURAL; INFILTRATION; INTRACAUDAL; PERINEURAL
Status: DISPENSED
Start: 2022-06-30

## (undated) RX ORDER — TRIAMCINOLONE ACETONIDE 40 MG/ML
INJECTION, SUSPENSION INTRA-ARTICULAR; INTRAMUSCULAR
Status: DISPENSED
Start: 2024-02-29

## (undated) RX ORDER — TRIAMCINOLONE ACETONIDE 40 MG/ML
INJECTION, SUSPENSION INTRA-ARTICULAR; INTRAMUSCULAR
Status: DISPENSED
Start: 2025-01-23

## (undated) RX ORDER — TRIAMCINOLONE ACETONIDE 40 MG/ML
INJECTION, SUSPENSION INTRA-ARTICULAR; INTRAMUSCULAR
Status: DISPENSED
Start: 2023-09-20

## (undated) RX ORDER — METHYLPREDNISOLONE ACETATE 40 MG/ML
INJECTION, SUSPENSION INTRA-ARTICULAR; INTRALESIONAL; INTRAMUSCULAR; SOFT TISSUE
Status: DISPENSED
Start: 2022-09-13

## (undated) RX ORDER — TRIAMCINOLONE ACETONIDE 40 MG/ML
INJECTION, SUSPENSION INTRA-ARTICULAR; INTRAMUSCULAR
Status: DISPENSED
Start: 2022-06-30

## (undated) RX ORDER — LIDOCAINE HYDROCHLORIDE 10 MG/ML
INJECTION, SOLUTION EPIDURAL; INFILTRATION; INTRACAUDAL; PERINEURAL
Status: DISPENSED
Start: 2024-02-29

## (undated) RX ORDER — LIDOCAINE HYDROCHLORIDE 10 MG/ML
INJECTION, SOLUTION EPIDURAL; INFILTRATION; INTRACAUDAL; PERINEURAL
Status: DISPENSED
Start: 2025-01-23

## (undated) RX ORDER — TRIAMCINOLONE ACETONIDE 40 MG/ML
INJECTION, SUSPENSION INTRA-ARTICULAR; INTRAMUSCULAR
Status: DISPENSED
Start: 2024-08-15

## (undated) RX ORDER — TRIAMCINOLONE ACETONIDE 40 MG/ML
INJECTION, SUSPENSION INTRA-ARTICULAR; INTRAMUSCULAR
Status: DISPENSED
Start: 2024-10-24

## (undated) RX ORDER — LIDOCAINE HYDROCHLORIDE 10 MG/ML
INJECTION, SOLUTION EPIDURAL; INFILTRATION; INTRACAUDAL; PERINEURAL
Status: DISPENSED
Start: 2023-11-01

## (undated) RX ORDER — TRIAMCINOLONE ACETONIDE 40 MG/ML
INJECTION, SUSPENSION INTRA-ARTICULAR; INTRAMUSCULAR
Status: DISPENSED
Start: 2024-05-30

## (undated) RX ORDER — LIDOCAINE HYDROCHLORIDE 10 MG/ML
INJECTION, SOLUTION EPIDURAL; INFILTRATION; INTRACAUDAL; PERINEURAL
Status: DISPENSED
Start: 2024-05-21

## (undated) RX ORDER — LIDOCAINE HYDROCHLORIDE 10 MG/ML
INJECTION, SOLUTION EPIDURAL; INFILTRATION; INTRACAUDAL; PERINEURAL
Status: DISPENSED
Start: 2024-03-14

## (undated) RX ORDER — LIDOCAINE HYDROCHLORIDE 10 MG/ML
INJECTION, SOLUTION EPIDURAL; INFILTRATION; INTRACAUDAL; PERINEURAL
Status: DISPENSED
Start: 2023-09-20

## (undated) RX ORDER — TRIAMCINOLONE ACETONIDE 40 MG/ML
INJECTION, SUSPENSION INTRA-ARTICULAR; INTRAMUSCULAR
Status: DISPENSED
Start: 2023-10-18

## (undated) RX ORDER — TRIAMCINOLONE ACETONIDE 40 MG/ML
INJECTION, SUSPENSION INTRA-ARTICULAR; INTRAMUSCULAR
Status: DISPENSED
Start: 2024-05-21

## (undated) RX ORDER — LIDOCAINE HYDROCHLORIDE 10 MG/ML
INJECTION, SOLUTION EPIDURAL; INFILTRATION; INTRACAUDAL; PERINEURAL
Status: DISPENSED
Start: 2025-01-28

## (undated) RX ORDER — TRIAMCINOLONE ACETONIDE 40 MG/ML
INJECTION, SUSPENSION INTRA-ARTICULAR; INTRAMUSCULAR
Status: DISPENSED
Start: 2025-06-26

## (undated) RX ORDER — LIDOCAINE HYDROCHLORIDE 10 MG/ML
INJECTION, SOLUTION EPIDURAL; INFILTRATION; INTRACAUDAL; PERINEURAL
Status: DISPENSED
Start: 2022-10-27

## (undated) RX ORDER — LIDOCAINE HYDROCHLORIDE 10 MG/ML
INJECTION, SOLUTION EPIDURAL; INFILTRATION; INTRACAUDAL; PERINEURAL
Status: DISPENSED
Start: 2024-05-30

## (undated) RX ORDER — TRIAMCINOLONE ACETONIDE 40 MG/ML
INJECTION, SUSPENSION INTRA-ARTICULAR; INTRAMUSCULAR
Status: DISPENSED
Start: 2022-10-27

## (undated) RX ORDER — LIDOCAINE HYDROCHLORIDE 10 MG/ML
INJECTION, SOLUTION EPIDURAL; INFILTRATION; INTRACAUDAL; PERINEURAL
Status: DISPENSED
Start: 2023-05-04

## (undated) RX ORDER — TRIAMCINOLONE ACETONIDE 40 MG/ML
INJECTION, SUSPENSION INTRA-ARTICULAR; INTRAMUSCULAR
Status: DISPENSED
Start: 2023-12-21

## (undated) RX ORDER — TRIAMCINOLONE ACETONIDE 40 MG/ML
INJECTION, SUSPENSION INTRA-ARTICULAR; INTRAMUSCULAR
Status: DISPENSED
Start: 2023-11-01

## (undated) RX ORDER — LIDOCAINE HYDROCHLORIDE 10 MG/ML
INJECTION, SOLUTION EPIDURAL; INFILTRATION; INTRACAUDAL; PERINEURAL
Status: DISPENSED
Start: 2025-06-26

## (undated) RX ORDER — TRIAMCINOLONE ACETONIDE 40 MG/ML
INJECTION, SUSPENSION INTRA-ARTICULAR; INTRAMUSCULAR
Status: DISPENSED
Start: 2025-05-08

## (undated) RX ORDER — LIDOCAINE HYDROCHLORIDE 10 MG/ML
INJECTION, SOLUTION EPIDURAL; INFILTRATION; INTRACAUDAL; PERINEURAL
Status: DISPENSED
Start: 2023-02-16

## (undated) RX ORDER — LIDOCAINE HYDROCHLORIDE 10 MG/ML
INJECTION, SOLUTION EPIDURAL; INFILTRATION; INTRACAUDAL; PERINEURAL
Status: DISPENSED
Start: 2025-07-17

## (undated) RX ORDER — LIDOCAINE HYDROCHLORIDE 10 MG/ML
INJECTION, SOLUTION EPIDURAL; INFILTRATION; INTRACAUDAL; PERINEURAL
Status: DISPENSED
Start: 2024-08-15

## (undated) RX ORDER — TRIAMCINOLONE ACETONIDE 40 MG/ML
INJECTION, SUSPENSION INTRA-ARTICULAR; INTRAMUSCULAR
Status: DISPENSED
Start: 2024-03-14

## (undated) RX ORDER — LIDOCAINE HYDROCHLORIDE 10 MG/ML
INJECTION, SOLUTION EPIDURAL; INFILTRATION; INTRACAUDAL; PERINEURAL
Status: DISPENSED
Start: 2023-12-27

## (undated) RX ORDER — TRIAMCINOLONE ACETONIDE 40 MG/ML
INJECTION, SUSPENSION INTRA-ARTICULAR; INTRAMUSCULAR
Status: DISPENSED
Start: 2023-02-16

## (undated) RX ORDER — LIDOCAINE HYDROCHLORIDE 10 MG/ML
INJECTION, SOLUTION EPIDURAL; INFILTRATION; INTRACAUDAL; PERINEURAL
Status: DISPENSED
Start: 2025-05-08

## (undated) RX ORDER — LIDOCAINE HYDROCHLORIDE 10 MG/ML
INJECTION, SOLUTION EPIDURAL; INFILTRATION; INTRACAUDAL; PERINEURAL
Status: DISPENSED
Start: 2023-10-18

## (undated) RX ORDER — LIDOCAINE HYDROCHLORIDE 10 MG/ML
INJECTION, SOLUTION EPIDURAL; INFILTRATION; INTRACAUDAL; PERINEURAL
Status: DISPENSED
Start: 2023-12-21

## (undated) RX ORDER — LIDOCAINE HYDROCHLORIDE 10 MG/ML
INJECTION, SOLUTION EPIDURAL; INFILTRATION; INTRACAUDAL; PERINEURAL
Status: DISPENSED
Start: 2022-09-13

## (undated) RX ORDER — LIDOCAINE HYDROCHLORIDE 10 MG/ML
INJECTION, SOLUTION EPIDURAL; INFILTRATION; INTRACAUDAL; PERINEURAL
Status: DISPENSED
Start: 2025-02-25

## (undated) RX ORDER — LIDOCAINE HYDROCHLORIDE 10 MG/ML
INJECTION, SOLUTION EPIDURAL; INFILTRATION; INTRACAUDAL; PERINEURAL
Status: DISPENSED
Start: 2024-10-09

## (undated) RX ORDER — LIDOCAINE HYDROCHLORIDE 10 MG/ML
INJECTION, SOLUTION EPIDURAL; INFILTRATION; INTRACAUDAL; PERINEURAL
Status: DISPENSED
Start: 2022-12-13

## (undated) RX ORDER — LIDOCAINE HYDROCHLORIDE 10 MG/ML
INJECTION, SOLUTION EPIDURAL; INFILTRATION; INTRACAUDAL; PERINEURAL
Status: DISPENSED
Start: 2024-06-06